# Patient Record
Sex: FEMALE | Race: BLACK OR AFRICAN AMERICAN | NOT HISPANIC OR LATINO | Employment: FULL TIME | URBAN - METROPOLITAN AREA
[De-identification: names, ages, dates, MRNs, and addresses within clinical notes are randomized per-mention and may not be internally consistent; named-entity substitution may affect disease eponyms.]

---

## 2017-02-01 ENCOUNTER — OFFICE VISIT (OUTPATIENT)
Dept: FAMILY MEDICINE | Facility: CLINIC | Age: 47
End: 2017-02-01
Payer: COMMERCIAL

## 2017-02-01 VITALS
OXYGEN SATURATION: 97 % | WEIGHT: 287.06 LBS | HEIGHT: 67 IN | BODY MASS INDEX: 45.06 KG/M2 | RESPIRATION RATE: 18 BRPM | HEART RATE: 96 BPM | TEMPERATURE: 97 F | SYSTOLIC BLOOD PRESSURE: 130 MMHG | DIASTOLIC BLOOD PRESSURE: 80 MMHG

## 2017-02-01 DIAGNOSIS — J30.2 SEASONAL ALLERGIC RHINITIS, UNSPECIFIED ALLERGIC RHINITIS TRIGGER: ICD-10-CM

## 2017-02-01 DIAGNOSIS — E55.9 VITAMIN D DEFICIENCY: ICD-10-CM

## 2017-02-01 DIAGNOSIS — B07.0 PLANTAR WARTS: ICD-10-CM

## 2017-02-01 DIAGNOSIS — E89.40 SURGICAL MENOPAUSE: ICD-10-CM

## 2017-02-01 DIAGNOSIS — I10 ESSENTIAL HYPERTENSION: ICD-10-CM

## 2017-02-01 DIAGNOSIS — Z00.00 ANNUAL PHYSICAL EXAM: Primary | ICD-10-CM

## 2017-02-01 DIAGNOSIS — Z12.31 OTHER SCREENING MAMMOGRAM: ICD-10-CM

## 2017-02-01 DIAGNOSIS — E66.01 MORBID OBESITY WITH BMI OF 45.0-49.9, ADULT: ICD-10-CM

## 2017-02-01 DIAGNOSIS — E04.2 MULTINODULAR GOITER: ICD-10-CM

## 2017-02-01 DIAGNOSIS — J45.909 UNCOMPLICATED ASTHMA, UNSPECIFIED ASTHMA SEVERITY: ICD-10-CM

## 2017-02-01 PROCEDURE — 3079F DIAST BP 80-89 MM HG: CPT | Mod: S$GLB,,, | Performed by: FAMILY MEDICINE

## 2017-02-01 PROCEDURE — 3075F SYST BP GE 130 - 139MM HG: CPT | Mod: S$GLB,,, | Performed by: FAMILY MEDICINE

## 2017-02-01 PROCEDURE — 90471 IMMUNIZATION ADMIN: CPT | Mod: S$GLB,,, | Performed by: FAMILY MEDICINE

## 2017-02-01 PROCEDURE — 99999 PR PBB SHADOW E&M-EST. PATIENT-LVL IV: CPT | Mod: PBBFAC,,, | Performed by: FAMILY MEDICINE

## 2017-02-01 PROCEDURE — 90688 IIV4 VACCINE SPLT 0.5 ML IM: CPT | Mod: S$GLB,,, | Performed by: FAMILY MEDICINE

## 2017-02-01 PROCEDURE — 17110 DESTRUCTION B9 LES UP TO 14: CPT | Mod: S$GLB,,, | Performed by: FAMILY MEDICINE

## 2017-02-01 PROCEDURE — 99396 PREV VISIT EST AGE 40-64: CPT | Mod: 25,S$GLB,, | Performed by: FAMILY MEDICINE

## 2017-02-01 PROCEDURE — 88175 CYTOPATH C/V AUTO FLUID REDO: CPT

## 2017-02-01 RX ORDER — ALBUTEROL SULFATE 90 UG/1
1-2 AEROSOL, METERED RESPIRATORY (INHALATION)
Qty: 18 G | Refills: 5 | Status: SHIPPED | OUTPATIENT
Start: 2017-02-01 | End: 2018-03-23 | Stop reason: SDUPTHER

## 2017-02-01 NOTE — MR AVS SNAPSHOT
[facility]  [address] [address]  Phone: [phone]                  Ayanna Alicia   2017 8:15 AM   Office Visit    Description:  Female : 1970   Provider:  Madeleine Enrique MD   Department:  [facility]           Reason for Visit     Annual Exam           Diagnoses this Visit        Comments    Annual physical exam    -  Primary     Essential hypertension         Multinodular goiter         Uncomplicated asthma, unspecified asthma severity         Vitamin D deficiency         Seasonal allergic rhinitis, unspecified allergic rhinitis trigger         Morbid obesity with BMI of 45.0-49.9, adult         Surgical menopause         Other screening mammogram                To Do List           Future Appointments        Provider Department Dept Phone    2017 9:30 AM Madeleine Enrique MD [facility] [phone]      Goals (5 Years of Data)     None      Follow-Up and Disposition     Return in about 6 months (around 2017).       These Medications        Disp Refills Start End    albuterol 90 mcg/actuation inhaler 18 g 5 2017    Inhale 1-2 puffs into the lungs every 4 to 6 hours as needed for Wheezing. - Inhalation    Pharmacy: [facility] [address] [place] [address] [address] [facility] Ph #: [phone]         [facility] On Call     [facility] On Call Nurse Care Line -  Assistance  Registered nurses in the [facility] On Call Center provide clinical advisement, health education, appointment booking, and other advisory services.  Call for this free service at [phone].             Medications           Message regarding Medications     Verify the changes and/or additions to your medication regime listed below are the same as discussed with your clinician today.  If any of these changes or additions are incorrect, please notify your healthcare  "provider.        START taking these NEW medications        Refills    albuterol 90 mcg/actuation inhaler 5    Sig: Inhale 1-2 puffs into the lungs every 4 to 6 hours as needed for Wheezing.    Class: Normal    Route: Inhalation           Verify that the below list of medications is an accurate representation of the medications you are currently taking.  If none reported, the list may be blank. If incorrect, please contact your healthcare provider. Carry this list with you in case of emergency.           Current Medications     b complex vitamins (B-COMPLEX) tablet Take 1 tablet by mouth once daily.    BIOTIN ORAL Take by mouth once daily.    cetirizine (ZYRTEC) 10 MG tablet Take 1 tablet by mouth Daily.    cholecalciferol, vitamin D3, (VITAMIN D3) 2,000 unit Tab Take by mouth once daily.    hydrocortisone (WESTCORT) 0.2 % cream Apply topically Twice daily as needed.    MAGNESIUM CARBONATE ORAL Take by mouth once daily.    multivitamin (THERAGRAN) per tablet Take 1 tablet by mouth Daily.    naproxen sodium (ALEVE) 220 MG tablet     omega-3 fatty acids-vitamin E (FISH OIL) 1,000 mg Cap Take by mouth 2 (two) times daily.    potassium 99 mg Tab Take by mouth once daily.    albuterol 90 mcg/actuation inhaler Inhale 1-2 puffs into the lungs every 4 to 6 hours as needed for Wheezing.           Clinical Reference Information           Vital Signs - Last Recorded  Most recent update: 2/1/2017  8:33 AM by Marnie Clark MA    BP Pulse Temp Resp    130/80 (BP Location: Right arm, Patient Position: Sitting, BP Method: Manual) 102 96.6 °F (35.9 °C) (Tympanic) 18    Ht Wt SpO2 BMI    5' 6.5" (1.689 m) 130.2 kg (287 lb 0.6 oz) 97% 45.64 kg/m2      Blood Pressure          Most Recent Value    BP  130/80      Allergies as of 2/1/2017     Doxycycline    Fluticasone    Penicillins      Immunizations Administered on Date of Encounter - 2/1/2017     None      Orders Placed During Today's Visit      Normal Orders This Visit    " Liquid-based pap smear, screening     Future Labs/Procedures Expected by Expires    CBC auto differential  2/1/2017 4/2/2018    Comprehensive metabolic panel  2/1/2017 4/2/2018    Hemoglobin A1c  2/1/2017 4/2/2018    HIV-1 and HIV-2 antibodies  2/1/2017 4/2/2018    Insulin, random  2/1/2017 4/2/2018    Lipid panel  2/1/2017 4/2/2018    Mammo Digital Screening Bilat with CAD  2/1/2017 4/3/2018    TSH  2/1/2017 4/2/2018    Urinalysis  2/1/2017 4/2/2018    Vitamin D  2/1/2017 4/2/2018      [facility] Sign-Up     Activating your [facility] account is as easy as 1-2-3!     1) Visit [email or web address], select Sign Up Now, enter this activation code and your date of birth, then select Next.  [email or web address]  Expires: 3/18/2017  9:25 AM      2) Create a username and password to use when you visit [facility] in the future and select a security question in case you lose your password and select Next.    3) Enter your e-mail address and click Sign Up!    Additional Information  If you have questions, please e-mail [email or web address] or call [phone] to talk to our [facility] staff. Remember, [facility] is NOT to be used for urgent needs. For medical emergencies, dial 911.         Instructions    Please call Dr. Enrique for your results.    Thanks.
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
## 2017-02-02 ENCOUNTER — TELEPHONE (OUTPATIENT)
Dept: FAMILY MEDICINE | Facility: CLINIC | Age: 47
End: 2017-02-02

## 2017-02-02 LAB
25(OH)D3+25(OH)D2 SERPL-MCNC: 34.7 NG/ML (ref 30–100)
ALBUMIN SERPL-MCNC: 4.3 G/DL (ref 3.5–5.5)
ALBUMIN/GLOB SERPL: 1.4 {RATIO} (ref 1.1–2.5)
ALP SERPL-CCNC: 90 IU/L (ref 39–117)
ALT SERPL-CCNC: 18 IU/L (ref 0–32)
APPEARANCE UR: CLEAR
AST SERPL-CCNC: 22 IU/L (ref 0–40)
BASOPHILS # BLD AUTO: 0 X10E3/UL (ref 0–0.2)
BASOPHILS NFR BLD AUTO: 0 %
BILIRUB SERPL-MCNC: 0.3 MG/DL (ref 0–1.2)
BILIRUB UR QL STRIP: NEGATIVE
BUN SERPL-MCNC: 18 MG/DL (ref 6–24)
BUN/CREAT SERPL: 20 (ref 9–23)
CALCIUM SERPL-MCNC: 9.6 MG/DL (ref 8.7–10.2)
CHLORIDE SERPL-SCNC: 100 MMOL/L (ref 96–106)
CHOLEST SERPL-MCNC: 190 MG/DL (ref 100–199)
CO2 SERPL-SCNC: 23 MMOL/L (ref 18–29)
COLOR UR: YELLOW
CREAT SERPL-MCNC: 0.92 MG/DL (ref 0.57–1)
EOSINOPHIL # BLD AUTO: 0.2 X10E3/UL (ref 0–0.4)
EOSINOPHIL NFR BLD AUTO: 3 %
ERYTHROCYTE [DISTWIDTH] IN BLOOD BY AUTOMATED COUNT: 12.9 % (ref 12.3–15.4)
GLOBULIN SER CALC-MCNC: 3.1 G/DL (ref 1.5–4.5)
GLUCOSE SERPL-MCNC: 96 MG/DL (ref 65–99)
GLUCOSE UR QL: NEGATIVE
HBA1C MFR BLD: 5.3 % (ref 4.8–5.6)
HCT VFR BLD AUTO: 38.9 % (ref 34–46.6)
HDLC SERPL-MCNC: 44 MG/DL
HGB BLD-MCNC: 12.7 G/DL (ref 11.1–15.9)
HGB UR QL STRIP: NEGATIVE
HIV 1+2 AB+HIV1 P24 AG SERPL QL IA: NON REACTIVE
IMM GRANULOCYTES # BLD: 0 X10E3/UL (ref 0–0.1)
IMM GRANULOCYTES NFR BLD: 0 %
INSULIN SERPL-ACNC: 15 UIU/ML (ref 2.6–24.9)
KETONES UR QL STRIP: ABNORMAL
LDLC SERPL CALC-MCNC: 134 MG/DL (ref 0–99)
LEUKOCYTE ESTERASE UR QL STRIP: NEGATIVE
LYMPHOCYTES # BLD AUTO: 2.4 X10E3/UL (ref 0.7–3.1)
LYMPHOCYTES NFR BLD AUTO: 34 %
MCH RBC QN AUTO: 29.5 PG (ref 26.6–33)
MCHC RBC AUTO-ENTMCNC: 32.6 G/DL (ref 31.5–35.7)
MCV RBC AUTO: 90 FL (ref 79–97)
MICRO URNS: ABNORMAL
MONOCYTES # BLD AUTO: 0.6 X10E3/UL (ref 0.1–0.9)
MONOCYTES NFR BLD AUTO: 9 %
NEUTROPHILS # BLD AUTO: 3.8 X10E3/UL (ref 1.4–7)
NEUTROPHILS NFR BLD AUTO: 54 %
NITRITE UR QL STRIP: NEGATIVE
PH UR STRIP: 6 [PH] (ref 5–7.5)
PLATELET # BLD AUTO: 378 X10E3/UL (ref 150–379)
POTASSIUM SERPL-SCNC: 5.1 MMOL/L (ref 3.5–5.2)
PROT SERPL-MCNC: 7.4 G/DL (ref 6–8.5)
PROT UR QL STRIP: NEGATIVE
RBC # BLD AUTO: 4.31 X10E6/UL (ref 3.77–5.28)
SODIUM SERPL-SCNC: 140 MMOL/L (ref 134–144)
SP GR UR: 1.03 (ref 1–1.03)
TRIGL SERPL-MCNC: 62 MG/DL (ref 0–149)
TSH SERPL DL<=0.005 MIU/L-ACNC: 0.82 UIU/ML (ref 0.45–4.5)
UROBILINOGEN UR STRIP-MCNC: 0.2 MG/DL (ref 0.2–1)
VLDLC SERPL CALC-MCNC: 12 MG/DL (ref 5–40)
WBC # BLD AUTO: 7.1 X10E3/UL (ref 3.4–10.8)

## 2017-03-30 ENCOUNTER — HOSPITAL ENCOUNTER (OUTPATIENT)
Dept: RADIOLOGY | Facility: HOSPITAL | Age: 47
Discharge: HOME | End: 2017-03-30
Attending: FAMILY MEDICINE
Payer: COMMERCIAL

## 2017-03-30 ENCOUNTER — OFFICE VISIT (OUTPATIENT)
Dept: INTERNAL MEDICINE | Facility: CLINIC | Age: 47
End: 2017-03-30
Payer: COMMERCIAL

## 2017-03-30 VITALS
DIASTOLIC BLOOD PRESSURE: 96 MMHG | OXYGEN SATURATION: 97 % | TEMPERATURE: 98 F | BODY MASS INDEX: 43.63 KG/M2 | HEIGHT: 67 IN | WEIGHT: 278 LBS | HEART RATE: 107 BPM | SYSTOLIC BLOOD PRESSURE: 142 MMHG
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
## 2017-03-30 DIAGNOSIS — Z12.31 OTHER SCREENING MAMMOGRAM: ICD-10-CM

## 2017-03-30 DIAGNOSIS — M54.2 NECK PAIN, ACUTE: ICD-10-CM
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
## 2017-03-30 PROCEDURE — 72050 X-RAY EXAM NECK SPINE 4/5VWS: CPT | Mod: TC,PO

## 2017-03-30 PROCEDURE — 77063 BREAST TOMOSYNTHESIS BI: CPT | Mod: 26,,, | Performed by: RADIOLOGY

## 2017-03-30 PROCEDURE — 77067 SCR MAMMO BI INCL CAD: CPT | Mod: TC

## 2017-03-30 PROCEDURE — 72050 X-RAY EXAM NECK SPINE 4/5VWS: CPT | Mod: 26,,, | Performed by: RADIOLOGY

## 2017-03-30 PROCEDURE — 99213 OFFICE O/P EST LOW 20 MIN: CPT | Mod: S$GLB,,, | Performed by: PHYSICIAN ASSISTANT

## 2017-03-30 PROCEDURE — 72100 X-RAY EXAM L-S SPINE 2/3 VWS: CPT | Mod: 26,,, | Performed by: RADIOLOGY

## 2017-03-30 PROCEDURE — 3077F SYST BP >= 140 MM HG: CPT | Mod: S$GLB,,, | Performed by: PHYSICIAN ASSISTANT

## 2017-03-30 PROCEDURE — 3080F DIAST BP >= 90 MM HG: CPT | Mod: S$GLB,,, | Performed by: PHYSICIAN ASSISTANT

## 2017-03-30 PROCEDURE — 77067 SCR MAMMO BI INCL CAD: CPT | Mod: 26,,, | Performed by: RADIOLOGY

## 2017-03-30 PROCEDURE — 72100 X-RAY EXAM L-S SPINE 2/3 VWS: CPT | Mod: TC,PO

## 2017-03-30 PROCEDURE — 1160F RVW MEDS BY RX/DR IN RCRD: CPT | Mod: S$GLB,,, | Performed by: PHYSICIAN ASSISTANT

## 2017-03-30 PROCEDURE — 99999 PR PBB SHADOW E&M-EST. PATIENT-LVL IV: CPT | Mod: PBBFAC,,, | Performed by: PHYSICIAN ASSISTANT

## 2017-03-30 RX ORDER — NABUMETONE 750 MG/1
750 TABLET, FILM COATED ORAL 2 TIMES DAILY
Qty: 14 TABLET | Refills: 0 | Status: SHIPPED | OUTPATIENT
Start: 2017-03-30 | End: 2017-04-06

## 2017-03-30 RX ORDER — METHOCARBAMOL 750 MG/1
750 TABLET, FILM COATED ORAL 4 TIMES DAILY
Qty: 28 TABLET | Refills: 0 | Status: SHIPPED | OUTPATIENT
Start: 2017-03-30 | End: 2017-04-06

## 2017-03-30 NOTE — LETTER
March 30, 2017      [facility]  [address] [place] [address]  Phone: [phone]  Fax: [phone]       Patient: Ayanna Alicia   YOB: 1970  Date of Visit: 03/30/2017    To Whom It May Concern:    Ayanna Nagel was at [facility] on 03/30/2017. She may return to work/school on 4/1/2017with no restrictions. If you have any questions or concerns, or if I can be of further assistance, please do not hesitate to contact me.    Sincerely,          Kiarra Newsome PA-C
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
## 2017-07-12 ENCOUNTER — TELEPHONE (OUTPATIENT)
Dept: FAMILY MEDICINE | Facility: CLINIC | Age: 47
End: 2017-07-12

## 2017-07-12 NOTE — TELEPHONE ENCOUNTER
----- Message from Jesusita Newman sent at 7/12/2017  2:27 PM CDT -----  needs callback, will elaborate..[phone]

## 2017-07-12 NOTE — TELEPHONE ENCOUNTER
Pt calling to find out if thyroid was checked in February. Advised pt it was checked and was normal. Pt voiced understanding and requesting that labs be sent to Dr. Gray office.

## 2017-08-29 ENCOUNTER — TELEPHONE (OUTPATIENT)
Dept: FAMILY MEDICINE | Facility: CLINIC | Age: 47
End: 2017-08-29

## 2017-08-29 NOTE — TELEPHONE ENCOUNTER
Saw that pt was scheduled for tomorrow for chest pains.   Called and spoke with pt, denied any sob or anything. States it is under her breast and goes in to her back. Advised pt that she doesn't need to wait until tomorrow to be seen, advised her to go to urgent care so they could see her right away. Pt states okay. Also wanted to keep appt for tomorrow.

## 2017-08-30 ENCOUNTER — TELEPHONE (OUTPATIENT)
Dept: RADIOLOGY | Facility: HOSPITAL | Age: 47
End: 2017-08-30

## 2017-08-30 ENCOUNTER — OFFICE VISIT (OUTPATIENT)
Dept: FAMILY MEDICINE | Facility: CLINIC | Age: 47
End: 2017-08-30
Payer: COMMERCIAL

## 2017-08-30 VITALS
HEART RATE: 87 BPM | HEIGHT: 66 IN | BODY MASS INDEX: 43.08 KG/M2 | SYSTOLIC BLOOD PRESSURE: 120 MMHG | TEMPERATURE: 95 F | WEIGHT: 268.06 LBS | DIASTOLIC BLOOD PRESSURE: 84 MMHG

## 2017-08-30 DIAGNOSIS — R10.11 RIGHT UPPER QUADRANT PAIN: Primary | ICD-10-CM

## 2017-08-30 DIAGNOSIS — K21.9 GASTROESOPHAGEAL REFLUX DISEASE, ESOPHAGITIS PRESENCE NOT SPECIFIED: ICD-10-CM

## 2017-08-30 DIAGNOSIS — F41.8 ANXIETY WITH DEPRESSION: ICD-10-CM

## 2017-08-30 DIAGNOSIS — G47.00 INSOMNIA, UNSPECIFIED TYPE: ICD-10-CM

## 2017-08-30 DIAGNOSIS — I10 ESSENTIAL HYPERTENSION: ICD-10-CM

## 2017-08-30 PROCEDURE — 99214 OFFICE O/P EST MOD 30 MIN: CPT | Mod: S$GLB,,, | Performed by: FAMILY MEDICINE

## 2017-08-30 PROCEDURE — 3079F DIAST BP 80-89 MM HG: CPT | Mod: S$GLB,,, | Performed by: FAMILY MEDICINE

## 2017-08-30 PROCEDURE — 99999 PR PBB SHADOW E&M-EST. PATIENT-LVL IV: CPT | Mod: PBBFAC,,, | Performed by: FAMILY MEDICINE

## 2017-08-30 PROCEDURE — 3074F SYST BP LT 130 MM HG: CPT | Mod: S$GLB,,, | Performed by: FAMILY MEDICINE

## 2017-08-30 PROCEDURE — 3008F BODY MASS INDEX DOCD: CPT | Mod: S$GLB,,, | Performed by: FAMILY MEDICINE

## 2017-08-30 RX ORDER — ESCITALOPRAM OXALATE 10 MG/1
10 TABLET ORAL DAILY
Qty: 30 TABLET | Refills: 5 | Status: SHIPPED | OUTPATIENT
Start: 2017-08-30 | End: 2018-01-05

## 2017-08-30 RX ORDER — HYDROCODONE BITARTRATE AND ACETAMINOPHEN 7.5; 325 MG/1; MG/1
TABLET ORAL
Refills: 0 | COMMUNITY
Start: 2017-08-17 | End: 2018-01-05

## 2017-08-30 RX ORDER — OMEPRAZOLE 20 MG/1
20 CAPSULE, DELAYED RELEASE ORAL DAILY
Qty: 30 CAPSULE | Refills: 1 | Status: SHIPPED | OUTPATIENT
Start: 2017-08-30 | End: 2018-03-23

## 2017-08-30 NOTE — PROGRESS NOTES
Subjective:       Patient ID: Ayanna Alicia is a 46 y.o. female.    Chief Complaint: Chest Pain    Ms. Alicia comes in today with complaint of having chest pain beneath her right breast at about her right upper quadrant abdomen with radiation of pain around to her back for 4 days.  She states the pain is constant but sometimes intensifies and occasionally feels like a burning, stinging sensation.  She reports occasionally having chest tightness with wheezing (chronic in nature), indigestion, heartburn but denies associated trauma, rash, nausea, vomiting, diarrhea, palpitations, cough, shortness of breath, leg swelling.  She states she uses albuterol MDI with help but requests chamber as she states she feels she will be able to inhale medication better.    She states she went to [facility] in [place] last night with EKG performed and states she was told it was okay.  She states she was given GI cocktail which she states may have helped but also states she had taken Shanti-Padroni prior to going to Urgent Care.  She states she was told she likely has gallbladder problems and was told to follow-up with her PCP.    She states she occasionally consumes spicy foods but drinks caffeinated coffee daily.  She occasionally drinks alcohol and states she has been smoking less of marijuana.  She denies tobacco use.  She states she occasionally takes Relafen for pain but states she has been taking Robaxin for chest pain with help.    She states she feels anxious, irritable and has not been sleeping for few weeks.  She states she is stressed at work.  She denies depression, suicidal or homicidal thoughts.  She states she takes Robaxin with help for sleep.      Current Outpatient Prescriptions:  albuterol 90 mcg/actuation inhaler, Inhale 1-2 puffs into the lungs every 4 to 6 hours as needed for Wheezing.  APPLE CIDER VINEGAR ORAL, Take by mouth.  b complex vitamins (B-COMPLEX) tablet, Take 1 tablet by mouth once  daily.  cetirizine (ZYRTEC) 10 MG tablet, Take 1 tablet by mouth Daily.  cholecalciferol, vitamin D3, (VITAMIN D3) 2,000 unit Tab, Take by mouth once daily.  COCONUT OIL ORAL, Take by mouth.  COLLAGEN MISC, by Misc.(Non-Drug; Combo Route) route.  hydrocortisone (WESTCORT) 0.2 % cream, Apply topically Twice daily as needed.  multivitamin (THERAGRAN) per tablet, Take 1 tablet by mouth Daily.  naproxen sodium (ALEVE) 220 MG tablet,   omega-3 fatty acids-vitamin E (FISH OIL) 1,000 mg Cap, Take by mouth 2 (two) times daily.  hydrocodone-acetaminophen 7.5-325mg (NORCO) 7.5-325 mg per tablet, TK 1 T PO  BID PRN P      Labs:                   WBC                      7.1                 02/01/2017                 HGB                      12.7                02/01/2017                 HCT                      38.9                02/01/2017                 PLT                      378                 02/01/2017                  LDLCALC                  134 (H)             02/01/2017                          CHOL                     190                 02/01/2017                 TRIG                     62                  02/01/2017                 HDL                      44                  02/01/2017                 ALT                      18                  02/01/2017                 AST                      22                  02/01/2017                 NA                       140                 02/01/2017                 K                        5.1                 02/01/2017                 CL                       100                 02/01/2017                 CREATININE               0.92                02/01/2017                 BUN                      18                  02/01/2017                 CO2                      23                  02/01/2017                 TSH                      0.822               02/01/2017                 GLUF                     77                  01/03/2007                  HGBA1C                   5.3                 02/01/2017                 Vit D, 25-Hydroxy                    34.7            02/01/2017       Insulin                            15.0            02/01/2017 02/1/2017    Pap smear - Negative for intraepithelial lesion or malignancy. Sparsely cellular specimen.  03/30/2017  Mammogram - Normal.        Review of Systems   Constitutional: Negative for chills and fever.   Respiratory: Positive for chest tightness and wheezing. Negative for cough and shortness of breath.    Cardiovascular: Positive for chest pain. Negative for leg swelling.   Gastrointestinal: Positive for abdominal pain, constipation and nausea. Negative for diarrhea and vomiting.   Genitourinary: Negative for difficulty urinating.   Musculoskeletal: Positive for back pain.   Skin: Negative for rash.   Psychiatric/Behavioral: Positive for agitation and sleep disturbance. Negative for dysphoric mood and suicidal ideas. The patient is nervous/anxious.         Negative for homicidal ideas.       Objective:      Physical Exam   Constitutional: She is oriented to person, place, and time. She appears well-developed and well-nourished. No distress.   Pleasant.   HENT:   Mouth/Throat: Oropharynx is clear and moist. No oropharyngeal exudate.   Neck: Normal range of motion. Neck supple. Thyromegaly present.   Non tender. Chronic.   Cardiovascular: Normal rate, regular rhythm and intact distal pulses.    No murmur heard.  Pulmonary/Chest: Effort normal and breath sounds normal. No respiratory distress. She has no wheezes.   Abdominal: Soft. Bowel sounds are normal. She exhibits no distension and no mass. There is tenderness. There is no rebound.   Slightly tender at right upper quadrant without acute abdomen noted.   Musculoskeletal: Normal range of motion. She exhibits no edema or tenderness.   She is ambulatory without problems. Non tender heels, feet.   Lymphadenopathy:     She has no cervical adenopathy.    Neurological: She is alert and oriented to person, place, and time.   Skin: No rash noted. She is not diaphoretic.   Psychiatric: Her behavior is normal. Judgment and thought content normal.   Slight depressed demeanor.   Vitals reviewed.      Assessment:       1. Right upper quadrant pain    2. Gastroesophageal reflux disease, esophagitis presence not specified    3. Anxiety with depression    4. Insomnia, unspecified type    5. Essential hypertension        Plan:       1.  Gallbladder ultrasound.  Patient advised to call for results.  2.  GERD lifestyle changes discussed and advised. Avoid illicit drug use.  3.  Omeprazole 20 mg daily x 2 months.  4.  Continue current medications, follow low sodium, low cholesterol, low carb diet, daily walks.  5.  Add Lexapro 10 mg every morning, #30, 5 refills; medication precautions discussed with patient.  6.  Try OTC Melatonin for insomnia.  7.  See me in 1 month for anxiety, depression follow up.  8.  Work excuse for August 30, 2017 - September 1, 2017 with return September 5, 2017.  9.  Will check at pharmacy/prescribe chamber.

## 2017-09-06 ENCOUNTER — TELEPHONE (OUTPATIENT)
Dept: RADIOLOGY | Facility: HOSPITAL | Age: 47
End: 2017-09-06

## 2017-09-07 ENCOUNTER — TELEPHONE (OUTPATIENT)
Dept: FAMILY MEDICINE | Facility: CLINIC | Age: 47
End: 2017-09-07

## 2017-09-07 ENCOUNTER — HOSPITAL ENCOUNTER (OUTPATIENT)
Dept: RADIOLOGY | Facility: HOSPITAL | Age: 47
Discharge: HOME | End: 2017-09-07
Attending: FAMILY MEDICINE
Payer: COMMERCIAL

## 2017-09-07 DIAGNOSIS — R10.11 RIGHT UPPER QUADRANT PAIN: ICD-10-CM

## 2017-09-07 PROCEDURE — 76705 ECHO EXAM OF ABDOMEN: CPT | Mod: 26,,, | Performed by: RADIOLOGY

## 2017-09-07 PROCEDURE — 76705 ECHO EXAM OF ABDOMEN: CPT | Mod: TC,PO

## 2017-09-07 NOTE — TELEPHONE ENCOUNTER
----- Message from Laura Cabezas sent at 9/7/2017  3:30 PM CDT -----  Contact: Patient   Patient request a call back at [phone], Regards to her ultrasound results.    Thanks  td

## 2017-09-08 NOTE — TELEPHONE ENCOUNTER
----- Message from Bessy Cabezas sent at 9/8/2017  8:49 AM CDT -----  Contact: pt   ..Pt was returning nurse call    ..[phone] (home)

## 2017-09-08 NOTE — TELEPHONE ENCOUNTER
Pt notified and verbalized understanding.   Pt wants to know about the pain in her chest under her breast.

## 2017-09-08 NOTE — TELEPHONE ENCOUNTER
Advise pt abdominal u/s shows mildly big and fatty liver and small liver cysts similar to previous result years ago. Thanks for call.

## 2017-09-08 NOTE — TELEPHONE ENCOUNTER
Advise pt although U/S shows gallbladder is okay, she may just be experiencing reflux.  Please continue Omeprazole daily and call back in 2 weeks with status check (to see if Omeprazole helps). Thanks.

## 2018-01-05 ENCOUNTER — OFFICE VISIT (OUTPATIENT)
Dept: FAMILY MEDICINE | Facility: CLINIC | Age: 48
End: 2018-01-05
Payer: COMMERCIAL

## 2018-01-05 VITALS
BODY MASS INDEX: 44.26 KG/M2 | OXYGEN SATURATION: 97 % | HEART RATE: 92 BPM | SYSTOLIC BLOOD PRESSURE: 131 MMHG | WEIGHT: 275.38 LBS | HEIGHT: 66 IN | DIASTOLIC BLOOD PRESSURE: 91 MMHG | RESPIRATION RATE: 16 BRPM | TEMPERATURE: 98 F

## 2018-01-05 DIAGNOSIS — R05.9 COUGH: ICD-10-CM

## 2018-01-05 DIAGNOSIS — J45.21 MILD INTERMITTENT ASTHMA WITH ACUTE EXACERBATION: ICD-10-CM

## 2018-01-05 DIAGNOSIS — J34.89 SINUS DRAINAGE: Primary | ICD-10-CM

## 2018-01-05 DIAGNOSIS — R51.9 ACUTE NONINTRACTABLE HEADACHE, UNSPECIFIED HEADACHE TYPE: ICD-10-CM

## 2018-01-05 PROCEDURE — 99999 PR PBB SHADOW E&M-EST. PATIENT-LVL III: CPT | Mod: PBBFAC,,, | Performed by: FAMILY MEDICINE

## 2018-01-05 PROCEDURE — 99214 OFFICE O/P EST MOD 30 MIN: CPT | Mod: S$GLB,,, | Performed by: FAMILY MEDICINE

## 2018-01-05 RX ORDER — IPRATROPIUM BROMIDE 42 UG/1
2 SPRAY, METERED NASAL 3 TIMES DAILY
Qty: 15 ML | Refills: 2 | Status: SHIPPED | OUTPATIENT
Start: 2018-01-05 | End: 2018-02-04

## 2018-01-05 RX ORDER — PROMETHAZINE HYDROCHLORIDE AND CODEINE PHOSPHATE 6.25; 1 MG/5ML; MG/5ML
5 SOLUTION ORAL EVERY 6 HOURS PRN
Qty: 118 ML | Refills: 0 | Status: SHIPPED | OUTPATIENT
Start: 2018-01-05 | End: 2018-01-15

## 2018-01-05 RX ORDER — METHYLPREDNISOLONE 4 MG/1
TABLET ORAL
Qty: 1 PACKAGE | Refills: 0 | Status: SHIPPED | OUTPATIENT
Start: 2018-01-05 | End: 2018-03-23

## 2018-01-05 NOTE — PROGRESS NOTES
Subjective:      Patient ID: Ayanna Alicia is a 47 y.o. female.    Chief Complaint: Headache and Cough      Past Medical History:   Diagnosis Date    Abnormal Pap smear of cervix     in the past with repeat pap smear okay.    Allergic rhinitis, cause unspecified     Arthritis of both knees     Asthma     Eczema     Fatty liver 10/2014    Fibrocystic breast changes     Headache(784.0)     Hepatomegaly 10/2014    Hypertension     Liver cyst 10/2014    Multinodular goiter     Followed by ENT - Dr. Nicolas Gray    Polymenorrhea     TMJ (dislocation of temporomandibular joint)     Uterine fibroid     in the past    Vitamin D deficiency disease      Past Surgical History:   Procedure Laterality Date     SECTION, CLASSIC      x 1    MULTIPLE TOOTH EXTRACTIONS      PARTIAL HYSTERECTOMY  2013    Due to fibroids     Family History   Problem Relation Age of Onset    Stroke Mother     Diabetes Mother     Heart disease Father 63     MI/CAD    Hypertension Father     Cancer Maternal Grandmother 60     Rectal and stomach cancer    Migraines Cousin     Cataracts Cousin     Cancer Maternal Aunt 50     Stomach cancer    Cancer Maternal Aunt 66     Lung cancer (smoker)    Stroke Maternal Grandfather     Diabetes Maternal Grandfather     Stroke Sister      Social History     Social History    Marital status: Single     Spouse name: N/A    Number of children: 0    Years of education: N/A     Occupational History     [employer]     Social History Main Topics    Smoking status: Never Smoker    Smokeless tobacco: Never Used    Alcohol use 0.0 oz/week      Comment: Occasionally    Drug use: Yes     Types: Marijuana      Comment: Smoking less    Sexual activity: Yes     Partners: Female     Other Topics Concern    Not on file     Social History Narrative    She wears seatbelt. Her son was killed in .       Current Outpatient Prescriptions:      albuterol 90 mcg/actuation inhaler, Inhale 1-2 puffs into the lungs every 4 to 6 hours as needed for Wheezing., Disp: 18 g, Rfl: 5    APPLE CIDER VINEGAR ORAL, Take by mouth., Disp: , Rfl:     b complex vitamins (B-COMPLEX) tablet, Take 1 tablet by mouth once daily., Disp: , Rfl:     cetirizine (ZYRTEC) 10 MG tablet, Take 1 tablet by mouth Daily., Disp: , Rfl:     cholecalciferol, vitamin D3, (VITAMIN D3) 2,000 unit Tab, Take by mouth once daily., Disp: , Rfl:     COCONUT OIL ORAL, Take by mouth., Disp: , Rfl:     COLLAGEN MISC, by Misc.(Non-Drug; Combo Route) route., Disp: , Rfl:     hydrocortisone (WESTCORT) 0.2 % cream, Apply topically Twice daily as needed., Disp: 46 g, Rfl: 0    inhalation spacing device, Use as directed for inhalation., Disp: 1 Device, Rfl: 0    multivitamin (THERAGRAN) per tablet, Take 1 tablet by mouth Daily., Disp: , Rfl:     naproxen sodium (ALEVE) 220 MG tablet, , Disp: , Rfl:     omega-3 fatty acids-vitamin E (FISH OIL) 1,000 mg Cap, Take by mouth 2 (two) times daily., Disp: , Rfl:     omeprazole (PRILOSEC) 20 MG capsule, Take 1 capsule (20 mg total) by mouth once daily., Disp: 30 capsule, Rfl: 1    ipratropium (ATROVENT) 42 mcg (0.06 %) nasal spray, 2 sprays by Nasal route 3 (three) times daily., Disp: 15 mL, Rfl: 2    methylPREDNISolone (MEDROL DOSEPACK) 4 mg tablet, use as directed, Disp: 1 Package, Rfl: 0    promethazine-codeine 6.25-10 mg/5 ml (PHENERGAN WITH CODEINE) 6.25-10 mg/5 mL syrup, Take 5 mLs by mouth every 6 (six) hours as needed for Cough., Disp: 118 mL, Rfl: 0  Review of patient's allergies indicates:   Allergen Reactions    Doxycycline      Other reaction(s): Nausea    Fluticasone      Other reaction(s): Epistaxis  Other reaction(s): Epistaxis    Penicillins      Other reaction(s): unknown       Review of Systems   Constitutional: Negative for chills and fever.   HENT: Positive for congestion and sinus pressure.    Respiratory: Positive for cough  "and wheezing. Negative for shortness of breath.    Cardiovascular: Negative for chest pain and palpitations.   Gastrointestinal: Negative for abdominal pain and blood in stool.   Neurological: Positive for headaches.     HPI  Started with headache on Monday along with drainage and asthma exacerbation mild. Cough, chest tightness and wheezing.  BP slightly elevated while feeling bad.  Symptoms persistent and worsening.   Frontal headache.  Clear drainage and congestion.    Objective:   BP (!) 131/91 (BP Location: Right arm, Patient Position: Sitting, BP Method: Large (Manual))   Pulse 92   Temp 97.5 °F (36.4 °C) (Tympanic)   Resp 16   Ht 5' 6" (1.676 m)   Wt 124.9 kg (275 lb 5.7 oz)   SpO2 97%   BMI 44.44 kg/m²      Physical Exam   Constitutional: She is oriented to person, place, and time. She is cooperative. No distress.   HENT:   Right Ear: Hearing, tympanic membrane, external ear and ear canal normal.   Left Ear: Hearing, tympanic membrane, external ear and ear canal normal.   Mouth/Throat: Uvula is midline, oropharynx is clear and moist and mucous membranes are normal.   Eyes: Conjunctivae and EOM are normal.   Cardiovascular: Normal rate, regular rhythm and normal heart sounds.    Pulmonary/Chest: Effort normal and breath sounds normal. No respiratory distress. She has no wheezes.   Abdominal: Soft. There is no tenderness. There is no rebound and no guarding.   Musculoskeletal: She exhibits no edema.   Neurological: She is alert and oriented to person, place, and time. Coordination and gait normal.   Skin: Skin is warm, dry and intact. No rash noted. She is not diaphoretic. No erythema.   Psychiatric: She has a normal mood and affect. Her speech is normal and behavior is normal.   Nursing note and vitals reviewed.          Assessment:       1. Sinus drainage    2. Acute nonintractable headache, unspecified headache type    3. Cough    4. Mild intermittent asthma with acute exacerbation            Plan: "         Sinus drainage  Comments:  start atrovent.    Acute nonintractable headache, unspecified headache type  Comments:  Headache should subside with medrol.    Cough    Mild intermittent asthma with acute exacerbation  Comments:  Start medrol dose pack.  Ventolin prn.    Other orders  -     promethazine-codeine 6.25-10 mg/5 ml (PHENERGAN WITH CODEINE) 6.25-10 mg/5 mL syrup; Take 5 mLs by mouth every 6 (six) hours as needed for Cough.  Dispense: 118 mL; Refill: 0  -     ipratropium (ATROVENT) 42 mcg (0.06 %) nasal spray; 2 sprays by Nasal route 3 (three) times daily.  Dispense: 15 mL; Refill: 2  -     methylPREDNISolone (MEDROL DOSEPACK) 4 mg tablet; use as directed  Dispense: 1 Package; Refill: 0        Patient Care Team:  Madeleine Enrique MD as PCP - General (Family Medicine)    Return if symptoms worsen or fail to improve.

## 2018-01-05 NOTE — PATIENT INSTRUCTIONS
.   Mediterranean Food Guide   People who live in the area around the [place] have a lower risk of heart disease. Researchers have recently shown that following a Mediterranean diet decreases heart disease by 30% in people whom are at-risk.   This lifestyle is built upon daily exercise along with a lot of fruit, vegetables, plant-based proteins, whole grains, fish and smaller amounts of poultry and red meat. Fatty fish (salmon), olive oil, and nuts make this diet higher in fat than the classic heart healthy diet. These fats are mostly unsaturated, and when consumed in place of saturated fat, are good for the heart. The pyramid above and the chart on the next page describe the types of food and serving sizes in this heart healthy meal plan.   Physical Activity- The Mediterranean Diet pyramid is built upon daily physical activity and exercise. Aim for at least 150 minutes of moderate to vigorous exercise every week. Moderate-to-vigorous exercises include walking at a brisk pace, biking, or swimming, among other activities that elevate your heart rate. Always choose activities that you enjoy and that are safe, in order to be active throughout your life.   Achieve and Maintain a Healthy Weight - The high fat content of the Mediterranean is healthy for your heart, but may lead to increased energy intake and weight gain if you dont pay attention to how much you are eating. If you are trying to lose weight, choose the smaller number of servings from each food group, and try to make your serving sizes match those listed. 2   Food Groups and Servings per day  Serving Sizes    Non-starchy Vegetables   4-8 servings per day  One serving is ½ cup of cooked vegetables or 1 cup raw vegetables   Non-starchy vegetables include artichoke, asparagus, beets, broccoli, [place] sprouts, cauliflower, cabbage, celery, carrots, tomatoes, eggplant, cucumber, onion, green and wax beans, zucchini, turnips, peppers, salad greens  and mushrooms. (Potatoes, peas, and corn are starchy vegetables.)    Fruit   2-4 servings per day  One serving is a small fresh fruit or ½ cup juice or ¼ cup dried fruit   Fresh fruits are preferred because of the fiber and other nutrients they contain. Fruits canned in light syrup or their own juice, and frozen fruit with little or no added sugar are also good choices. Use only small amounts of fruit juice (6 oz per day or less), since even unsweetened juices can contain as much sugar as regular soda.    Legumes and Nuts   1-3 servings per day  Legumes: One serving is ½ cup cooked kidney, black, garbanzo, werner, soy (edamame), navy beans, split peas, or lentils, or ¼ cup fat free refried beans or baked beans   Nuts and Seeds: One serving is 2 Tbsp. sunflower or sesame seeds, 1 Tbsp. peanut butter,   7-8 walnuts or pecans, 20 peanuts, or 12-15 almonds   Aim for 1-2 servings of nuts or seeds and 1-2 servings of legumes per day. Legumes are high in fiber, protein, and minerals. Nuts are high in unsaturated fat, and may increase HDL without increasing LDL cholesterol levels.    Low-fat Dairy Products   1-3 servings per day  One serving is 1 cup of skim milk, non-fat yogurt, or 1oz of low-fat (part-skim) cheese   Calcium-fortified soy milk, soy yogurt, and soy cheese can take the place of dairy products. If servings of dairy or fortified soy are less than 2 per day, we advise a calcium and vitamin D supplement.    Fish or shellfish   2-3 times a week  One serving is 3 ounces (about the size of a deck of cards)   Cook fish by baking, sautéing, broiling, roasting, grilling, or poaching. Choose fatty fishes like salmon, herring, sardines, or mackerel often. The fat in fish is high in omega-3 fats, so it has healthy effects on triglycerides and blood cells.    Poultry, if desired   1-3 times a week  One serving is 3 ounces (about the size of a deck of cards)   Bake, sauté, stir villeda, roast, or grill the poultry you eat, and  eat it without the skin.    Whole Grains and Starchy Vegetables   4-6 servings per day  One serving is about 1 ounce of any of these:   1 slice whole wheat bread ½ cup potatoes, sweet potatoes, corn, or peas   ½ large whole grain bun 1 small whole grain roll   6-inch whole wheat ahsan 6 whole grain crackers   ½ cup cooked whole grain cereal (oatmeal, cracked wheat, quinoa)   ½ cup cooked whole wheat pasta, brown rice, or barley   Whole grains are high in fiber and have less effect on blood sugar and triglyceride levels than refined, processed grains like white bread and pasta. Whole grains also keep the stomach full longer, making it easier to control hunger.

## 2018-03-23 ENCOUNTER — OFFICE VISIT (OUTPATIENT)
Dept: FAMILY MEDICINE | Facility: CLINIC | Age: 48
End: 2018-03-23
Payer: COMMERCIAL

## 2018-03-23 VITALS
RESPIRATION RATE: 16 BRPM | HEART RATE: 105 BPM | BODY MASS INDEX: 45.21 KG/M2 | OXYGEN SATURATION: 97 % | HEIGHT: 66 IN | TEMPERATURE: 97 F | SYSTOLIC BLOOD PRESSURE: 140 MMHG | WEIGHT: 281.31 LBS | DIASTOLIC BLOOD PRESSURE: 92 MMHG

## 2018-03-23 DIAGNOSIS — R05.9 COUGH: ICD-10-CM

## 2018-03-23 DIAGNOSIS — J30.1 ACUTE SEASONAL ALLERGIC RHINITIS DUE TO POLLEN: ICD-10-CM

## 2018-03-23 DIAGNOSIS — R03.0 ELEVATED BLOOD PRESSURE READING: ICD-10-CM

## 2018-03-23 DIAGNOSIS — J45.21 MILD INTERMITTENT ASTHMA WITH EXACERBATION: Primary | ICD-10-CM

## 2018-03-23 PROCEDURE — 3077F SYST BP >= 140 MM HG: CPT | Mod: CPTII,S$GLB,, | Performed by: FAMILY MEDICINE

## 2018-03-23 PROCEDURE — 3080F DIAST BP >= 90 MM HG: CPT | Mod: CPTII,S$GLB,, | Performed by: FAMILY MEDICINE

## 2018-03-23 PROCEDURE — 99999 PR PBB SHADOW E&M-EST. PATIENT-LVL III: CPT | Mod: PBBFAC,,, | Performed by: FAMILY MEDICINE

## 2018-03-23 PROCEDURE — 99214 OFFICE O/P EST MOD 30 MIN: CPT | Mod: S$GLB,,, | Performed by: FAMILY MEDICINE

## 2018-03-23 RX ORDER — METHYLPREDNISOLONE 4 MG/1
TABLET ORAL
Qty: 1 PACKAGE | Refills: 0 | Status: SHIPPED | OUTPATIENT
Start: 2018-03-23 | End: 2018-08-13

## 2018-03-23 RX ORDER — BENZONATATE 100 MG/1
100 CAPSULE ORAL 3 TIMES DAILY PRN
Qty: 30 CAPSULE | Refills: 0 | Status: SHIPPED | OUTPATIENT
Start: 2018-03-23 | End: 2018-04-02

## 2018-03-23 RX ORDER — ALBUTEROL SULFATE 90 UG/1
1-2 AEROSOL, METERED RESPIRATORY (INHALATION)
Qty: 18 G | Refills: 5 | Status: SHIPPED | OUTPATIENT
Start: 2018-03-23 | End: 2019-01-18 | Stop reason: SDUPTHER

## 2018-03-23 NOTE — PROGRESS NOTES
Subjective:      Patient ID: Ayanna Alicia is a 47 y.o. female.    Chief Complaint: Cough and Nasal Congestion      Past Medical History:   Diagnosis Date    Abnormal Pap smear of cervix     in the past with repeat pap smear okay.    Allergic rhinitis, cause unspecified     Arthritis of both knees     Asthma     Eczema     Fatty liver 10/2014    Fibrocystic breast changes     Headache(784.0)     Hepatomegaly 10/2014    Hypertension     Liver cyst 10/2014    Multinodular goiter     Followed by ENT - Dr. Nicolas Gray    Polymenorrhea     TMJ (dislocation of temporomandibular joint)     Uterine fibroid     in the past    Vitamin D deficiency disease      Past Surgical History:   Procedure Laterality Date     SECTION, CLASSIC      x 1    MULTIPLE TOOTH EXTRACTIONS      PARTIAL HYSTERECTOMY  2013    Due to fibroids     Family History   Problem Relation Age of Onset    Stroke Mother     Diabetes Mother     Heart disease Father 63     MI/CAD    Hypertension Father     Cancer Maternal Grandmother 60     Rectal and stomach cancer    Migraines Cousin     Cataracts Cousin     Cancer Maternal Aunt 50     Stomach cancer    Cancer Maternal Aunt 66     Lung cancer (smoker)    Stroke Maternal Grandfather     Diabetes Maternal Grandfather     Stroke Sister      Social History     Social History    Marital status: Single     Spouse name: N/A    Number of children: 0    Years of education: N/A     Occupational History     [employer]     Social History Main Topics    Smoking status: Never Smoker    Smokeless tobacco: Never Used    Alcohol use 0.0 oz/week      Comment: Occasionally    Drug use: Yes     Types: Marijuana      Comment: Smoking less    Sexual activity: Yes     Partners: Female     Other Topics Concern    Not on file     Social History Narrative    She wears seatbelt. Her son was killed in .       Current Outpatient  Prescriptions:     albuterol 90 mcg/actuation inhaler, Inhale 1-2 puffs into the lungs every 4 to 6 hours as needed for Wheezing., Disp: 18 g, Rfl: 5    b complex vitamins (B-COMPLEX) tablet, Take 1 tablet by mouth once daily., Disp: , Rfl:     cetirizine (ZYRTEC) 10 MG tablet, Take 1 tablet by mouth Daily., Disp: , Rfl:     cholecalciferol, vitamin D3, (VITAMIN D3) 2,000 unit Tab, Take by mouth once daily., Disp: , Rfl:     COCONUT OIL ORAL, Take by mouth., Disp: , Rfl:     hydrocortisone (WESTCORT) 0.2 % cream, Apply topically Twice daily as needed., Disp: 46 g, Rfl: 0    inhalation spacing device, Use as directed for inhalation., Disp: 1 Device, Rfl: 0    multivitamin (THERAGRAN) per tablet, Take 1 tablet by mouth Daily., Disp: , Rfl:     naproxen sodium (ALEVE) 220 MG tablet, , Disp: , Rfl:     omega-3 fatty acids-vitamin E (FISH OIL) 1,000 mg Cap, Take by mouth 2 (two) times daily., Disp: , Rfl:     APPLE CIDER VINEGAR ORAL, Take by mouth., Disp: , Rfl:     benzonatate (TESSALON) 100 MG capsule, Take 1 capsule (100 mg total) by mouth 3 (three) times daily as needed for Cough., Disp: 30 capsule, Rfl: 0    methylPREDNISolone (MEDROL DOSEPACK) 4 mg tablet, use as directed, Disp: 1 Package, Rfl: 0  Review of patient's allergies indicates:   Allergen Reactions    Doxycycline      Other reaction(s): Nausea    Fluticasone      Other reaction(s): Epistaxis  Other reaction(s): Epistaxis    Penicillins      Other reaction(s): unknown       Review of Systems   Constitutional: Negative for chills and fever.   HENT: Positive for congestion, postnasal drip, rhinorrhea and sinus pressure.    Respiratory: Positive for cough. Negative for shortness of breath and wheezing.    Cardiovascular: Negative for chest pain and palpitations.   Gastrointestinal: Negative for abdominal pain and blood in stool.     Cough   Associated symptoms include postnasal drip and rhinorrhea. Pertinent negatives include no chest pain,  "chills, fever, shortness of breath or wheezing.   One week of cough, sinus drainage - clear/off white sputum.  Coughing and wheezing persistently.   H/o asthma.  BP elevated today, but not feeling well and typically well controlled.      Objective:   BP (!) 140/92 (BP Location: Right arm, Patient Position: Sitting, BP Method: Large (Manual))   Pulse 105   Temp 97 °F (36.1 °C) (Tympanic)   Resp 16   Ht 5' 6" (1.676 m)   Wt 127.6 kg (281 lb 4.9 oz)   SpO2 97%   BMI 45.40 kg/m²      Physical Exam   Constitutional: She is oriented to person, place, and time. She is cooperative. No distress.   Eyes: Conjunctivae and EOM are normal.   Cardiovascular: Normal rate, regular rhythm and normal heart sounds.    Pulmonary/Chest: Effort normal and breath sounds normal.   Musculoskeletal: She exhibits no edema.   Neurological: She is alert and oriented to person, place, and time. Coordination and gait normal.   Skin: Skin is warm, dry and intact. No rash noted. She is not diaphoretic. No erythema.   Psychiatric: She has a normal mood and affect. Her speech is normal and behavior is normal.   Nursing note and vitals reviewed.          Assessment:       1. Mild intermittent asthma with exacerbation    2. Acute seasonal allergic rhinitis due to pollen    3. Cough    4. Elevated blood pressure reading            Plan:         Mild intermittent asthma with exacerbation  Comments:  Medrol dose pack.  If in a week, any signs of worsening symptoms, green sputum, worsening cough or sinusitis, call clinic.    Acute seasonal allergic rhinitis due to pollen  Comments:  Continue zyrtec.    Cough  Comments:  Tessalon prn.    Elevated blood pressure reading  Comments:  Keep home bp log. Goal < 130/80.    Other orders  -     methylPREDNISolone (MEDROL DOSEPACK) 4 mg tablet; use as directed  Dispense: 1 Package; Refill: 0  -     benzonatate (TESSALON) 100 MG capsule; Take 1 capsule (100 mg total) by mouth 3 (three) times daily as needed for " Cough.  Dispense: 30 capsule; Refill: 0  -     albuterol 90 mcg/actuation inhaler; Inhale 1-2 puffs into the lungs every 4 to 6 hours as needed for Wheezing.  Dispense: 18 g; Refill: 5        Patient Care Team:  Madeleine Enrique MD as PCP - General (Family Medicine)    Follow-up if symptoms worsen or fail to improve.

## 2018-04-04 ENCOUNTER — TELEPHONE (OUTPATIENT)
Dept: FAMILY MEDICINE | Facility: CLINIC | Age: 48
End: 2018-04-04

## 2018-04-04 DIAGNOSIS — Z12.31 VISIT FOR SCREENING MAMMOGRAM: Primary | ICD-10-CM

## 2018-04-04 NOTE — TELEPHONE ENCOUNTER
----- Message from Josy Beard sent at 4/4/2018  2:44 PM CDT -----  Contact: pt  Please call pt @ [phone] regarding an order to get a mammograph.

## 2018-04-05 ENCOUNTER — TELEPHONE (OUTPATIENT)
Dept: FAMILY MEDICINE | Facility: CLINIC | Age: 48
End: 2018-04-05

## 2018-04-05 NOTE — TELEPHONE ENCOUNTER
----- Message from Matilda Cheek sent at 4/5/2018  8:11 AM CDT -----  Contact: Pt  Pt called and stated she was returning a call to the nurse. She can be reached at [phone] (home) [phone] (work).    Thanks,  TF

## 2018-04-23 ENCOUNTER — HOSPITAL ENCOUNTER (OUTPATIENT)
Dept: RADIOLOGY | Facility: HOSPITAL | Age: 48
Discharge: HOME | End: 2018-04-23
Attending: FAMILY MEDICINE
Payer: COMMERCIAL

## 2018-04-23 ENCOUNTER — PATIENT OUTREACH (OUTPATIENT)
Dept: ADMINISTRATIVE | Facility: HOSPITAL | Age: 48
End: 2018-04-23

## 2018-04-23 VITALS — HEIGHT: 66 IN | BODY MASS INDEX: 45.16 KG/M2 | WEIGHT: 281 LBS

## 2018-04-23 DIAGNOSIS — Z12.31 VISIT FOR SCREENING MAMMOGRAM: ICD-10-CM

## 2018-04-23 PROCEDURE — 77063 BREAST TOMOSYNTHESIS BI: CPT | Mod: 26,,, | Performed by: RADIOLOGY

## 2018-04-23 PROCEDURE — 77067 SCR MAMMO BI INCL CAD: CPT | Mod: 26,,, | Performed by: RADIOLOGY

## 2018-04-23 PROCEDURE — 77067 SCR MAMMO BI INCL CAD: CPT | Mod: TC,PO

## 2018-08-13 ENCOUNTER — OFFICE VISIT (OUTPATIENT)
Dept: FAMILY MEDICINE | Facility: CLINIC | Age: 48
End: 2018-08-13
Payer: COMMERCIAL

## 2018-08-13 ENCOUNTER — LAB VISIT (OUTPATIENT)
Dept: LAB | Facility: HOSPITAL | Age: 48
End: 2018-08-13
Attending: FAMILY MEDICINE
Payer: COMMERCIAL

## 2018-08-13 VITALS
HEART RATE: 84 BPM | SYSTOLIC BLOOD PRESSURE: 130 MMHG | DIASTOLIC BLOOD PRESSURE: 72 MMHG | BODY MASS INDEX: 43.29 KG/M2 | TEMPERATURE: 97 F | OXYGEN SATURATION: 98 % | HEIGHT: 66 IN | WEIGHT: 269.38 LBS

## 2018-08-13 DIAGNOSIS — Z00.01 ENCOUNTER FOR GENERAL ADULT MEDICAL EXAMINATION WITH ABNORMAL FINDINGS: Primary | ICD-10-CM

## 2018-08-13 DIAGNOSIS — G47.00 INSOMNIA, UNSPECIFIED TYPE: ICD-10-CM

## 2018-08-13 DIAGNOSIS — E55.9 VITAMIN D DEFICIENCY: ICD-10-CM

## 2018-08-13 DIAGNOSIS — E04.2 MULTINODULAR GOITER: ICD-10-CM

## 2018-08-13 DIAGNOSIS — Z00.01 ENCOUNTER FOR GENERAL ADULT MEDICAL EXAMINATION WITH ABNORMAL FINDINGS: ICD-10-CM

## 2018-08-13 DIAGNOSIS — F41.9 ANXIETY: ICD-10-CM

## 2018-08-13 DIAGNOSIS — J45.30 UNCONTROLLED MILD PERSISTENT ALLERGIC ASTHMA: ICD-10-CM

## 2018-08-13 LAB
25(OH)D3+25(OH)D2 SERPL-MCNC: 32 NG/ML
ALBUMIN SERPL BCP-MCNC: 4 G/DL
ALP SERPL-CCNC: 92 U/L
ALT SERPL W/O P-5'-P-CCNC: 15 U/L
ANION GAP SERPL CALC-SCNC: 8 MMOL/L
AST SERPL-CCNC: 17 U/L
BASOPHILS # BLD AUTO: 0.08 K/UL
BASOPHILS NFR BLD: 1 %
BILIRUB SERPL-MCNC: 0.4 MG/DL
BUN SERPL-MCNC: 21 MG/DL
CALCIUM SERPL-MCNC: 9.6 MG/DL
CHLORIDE SERPL-SCNC: 107 MMOL/L
CHOLEST SERPL-MCNC: 201 MG/DL
CHOLEST/HDLC SERPL: 4.3 {RATIO}
CO2 SERPL-SCNC: 25 MMOL/L
CREAT SERPL-MCNC: 0.9 MG/DL
DIFFERENTIAL METHOD: ABNORMAL
EOSINOPHIL # BLD AUTO: 0.4 K/UL
EOSINOPHIL NFR BLD: 4.9 %
ERYTHROCYTE [DISTWIDTH] IN BLOOD BY AUTOMATED COUNT: 12.7 %
EST. GFR  (AFRICAN AMERICAN): >60 ML/MIN/1.73 M^2
EST. GFR  (NON AFRICAN AMERICAN): >60 ML/MIN/1.73 M^2
ESTIMATED AVG GLUCOSE: 85 MG/DL
GLUCOSE SERPL-MCNC: 90 MG/DL
HBA1C MFR BLD HPLC: 4.6 %
HCT VFR BLD AUTO: 42.1 %
HDLC SERPL-MCNC: 47 MG/DL
HDLC SERPL: 23.4 %
HGB BLD-MCNC: 13.1 G/DL
IMM GRANULOCYTES # BLD AUTO: 0.04 K/UL
IMM GRANULOCYTES NFR BLD AUTO: 0.5 %
LDLC SERPL CALC-MCNC: 142.8 MG/DL
LYMPHOCYTES # BLD AUTO: 2.7 K/UL
LYMPHOCYTES NFR BLD: 34.1 %
MCH RBC QN AUTO: 30.3 PG
MCHC RBC AUTO-ENTMCNC: 31.1 G/DL
MCV RBC AUTO: 98 FL
MONOCYTES # BLD AUTO: 0.6 K/UL
MONOCYTES NFR BLD: 7 %
NEUTROPHILS # BLD AUTO: 4.2 K/UL
NEUTROPHILS NFR BLD: 52.5 %
NONHDLC SERPL-MCNC: 154 MG/DL
NRBC BLD-RTO: 0 /100 WBC
PLATELET # BLD AUTO: 312 K/UL
PMV BLD AUTO: 10.8 FL
POTASSIUM SERPL-SCNC: 4.4 MMOL/L
PROT SERPL-MCNC: 7.5 G/DL
RBC # BLD AUTO: 4.32 M/UL
SODIUM SERPL-SCNC: 140 MMOL/L
TRIGL SERPL-MCNC: 56 MG/DL
TSH SERPL DL<=0.005 MIU/L-ACNC: 0.81 UIU/ML
WBC # BLD AUTO: 8 K/UL

## 2018-08-13 PROCEDURE — 99999 PR PBB SHADOW E&M-EST. PATIENT-LVL V: CPT | Mod: PBBFAC,,, | Performed by: FAMILY MEDICINE

## 2018-08-13 PROCEDURE — 3075F SYST BP GE 130 - 139MM HG: CPT | Mod: CPTII,S$GLB,, | Performed by: FAMILY MEDICINE

## 2018-08-13 PROCEDURE — 36415 COLL VENOUS BLD VENIPUNCTURE: CPT | Mod: PO

## 2018-08-13 PROCEDURE — 99214 OFFICE O/P EST MOD 30 MIN: CPT | Mod: S$GLB,,, | Performed by: FAMILY MEDICINE

## 2018-08-13 PROCEDURE — 3078F DIAST BP <80 MM HG: CPT | Mod: CPTII,S$GLB,, | Performed by: FAMILY MEDICINE

## 2018-08-13 PROCEDURE — 82306 VITAMIN D 25 HYDROXY: CPT

## 2018-08-13 PROCEDURE — 80061 LIPID PANEL: CPT

## 2018-08-13 PROCEDURE — 85025 COMPLETE CBC W/AUTO DIFF WBC: CPT

## 2018-08-13 PROCEDURE — 84443 ASSAY THYROID STIM HORMONE: CPT

## 2018-08-13 PROCEDURE — 3008F BODY MASS INDEX DOCD: CPT | Mod: CPTII,S$GLB,, | Performed by: FAMILY MEDICINE

## 2018-08-13 PROCEDURE — 83036 HEMOGLOBIN GLYCOSYLATED A1C: CPT

## 2018-08-13 PROCEDURE — 80053 COMPREHEN METABOLIC PANEL: CPT

## 2018-08-13 RX ORDER — TRAZODONE HYDROCHLORIDE 100 MG/1
100 TABLET ORAL NIGHTLY
Qty: 30 TABLET | Refills: 2 | Status: SHIPPED | OUTPATIENT
Start: 2018-08-13 | End: 2018-09-04

## 2018-08-13 RX ORDER — FLUTICASONE PROPIONATE 44 UG/1
2 AEROSOL, METERED RESPIRATORY (INHALATION) 2 TIMES DAILY
Qty: 10.6 G | Refills: 1 | Status: SHIPPED | OUTPATIENT
Start: 2018-08-13 | End: 2019-05-21

## 2018-08-13 RX ORDER — MONTELUKAST SODIUM 10 MG/1
10 TABLET ORAL NIGHTLY
Qty: 30 TABLET | Refills: 3 | Status: SHIPPED | OUTPATIENT
Start: 2018-08-13 | End: 2018-09-12

## 2018-08-13 NOTE — PROGRESS NOTES
Subjective:      Patient ID: Ayanna Alicia is a 47 y.o. female.    Chief Complaint: Annual Exam      Past Medical History:   Diagnosis Date    Abnormal Pap smear of cervix     in the past with repeat pap smear okay.    Allergic rhinitis, cause unspecified     Arthritis of both knees     Asthma     Eczema     Fatty liver 10/2014    Fibrocystic breast changes     Headache(784.0)     Hepatomegaly 10/2014    Hypertension     Liver cyst 10/2014    Multinodular goiter     Followed by ENT - Dr. Nicolas Gray    Polymenorrhea     TMJ (dislocation of temporomandibular joint)     Uterine fibroid     in the past    Vitamin D deficiency disease      Past Surgical History:   Procedure Laterality Date     SECTION, CLASSIC      x 1    HYSTERECTOMY      MULTIPLE TOOTH EXTRACTIONS      PARTIAL HYSTERECTOMY  2013    Due to fibroids     Family History   Problem Relation Age of Onset    Stroke Mother     Diabetes Mother     Heart disease Father 63        MI/CAD    Hypertension Father     Cancer Maternal Grandmother 60        Rectal and stomach cancer    Migraines Cousin     Cataracts Cousin     Cancer Maternal Aunt 50        Stomach cancer    Cancer Maternal Aunt 66        Lung cancer (smoker)    Stroke Maternal Grandfather     Diabetes Maternal Grandfather     Stroke Sister      Social History     Socioeconomic History    Marital status: Single     Spouse name: Not on file    Number of children: 0    Years of education: Not on file    Highest education level: Not on file   Social Needs    Financial resource strain: Not on file    Food insecurity - worry: Not on file    Food insecurity - inability: Not on file    Transportation needs - medical: Not on file    Transportation needs - non-medical: Not on file   Occupational History    Occupation:      Employer: [employer]     Comment: [employer]   Tobacco Use    Smoking status: Never Smoker    Smokeless  tobacco: Never Used   Substance and Sexual Activity    Alcohol use: Yes     Alcohol/week: 0.0 oz     Comment: Occasionally    Drug use: Yes     Types: Marijuana     Comment: Smoking less    Sexual activity: Yes     Partners: Female   Other Topics Concern    Not on file   Social History Narrative    She wears seatbelt. Her son was killed in 2010.       Current Outpatient Medications:     albuterol 90 mcg/actuation inhaler, Inhale 1-2 puffs into the lungs every 4 to 6 hours as needed for Wheezing., Disp: 18 g, Rfl: 5    APPLE CIDER VINEGAR ORAL, Take by mouth., Disp: , Rfl:     b complex vitamins (B-COMPLEX) tablet, Take 1 tablet by mouth once daily., Disp: , Rfl:     cetirizine (ZYRTEC) 10 MG tablet, Take 1 tablet by mouth Daily., Disp: , Rfl:     cholecalciferol, vitamin D3, (VITAMIN D3) 2,000 unit Tab, Take by mouth once daily., Disp: , Rfl:     COCONUT OIL ORAL, Take by mouth., Disp: , Rfl:     hydrocortisone (WESTCORT) 0.2 % cream, Apply topically Twice daily as needed., Disp: 46 g, Rfl: 0    inhalation spacing device, Use as directed for inhalation., Disp: 1 Device, Rfl: 0    multivitamin (THERAGRAN) per tablet, Take 1 tablet by mouth Daily., Disp: , Rfl:     naproxen sodium (ALEVE) 220 MG tablet, , Disp: , Rfl:     omega-3 fatty acids-vitamin E (FISH OIL) 1,000 mg Cap, Take by mouth 2 (two) times daily., Disp: , Rfl:     fluticasone (FLOVENT HFA) 44 mcg/actuation inhaler, Inhale 2 puffs into the lungs 2 (two) times daily. Controller, Disp: 10.6 g, Rfl: 1    montelukast (SINGULAIR) 10 mg tablet, Take 1 tablet (10 mg total) by mouth every evening., Disp: 30 tablet, Rfl: 3    traZODone (DESYREL) 100 MG tablet, Take 1 tablet (100 mg total) by mouth every evening., Disp: 30 tablet, Rfl: 2  Review of patient's allergies indicates:   Allergen Reactions    Doxycycline      Other reaction(s): Nausea    Fluticasone      Other reaction(s): Epistaxis  Other reaction(s): Epistaxis    Penicillins       "Other reaction(s): unknown       Review of Systems   Constitutional: Negative for chills and fever.   Respiratory: Negative for shortness of breath and wheezing.    Cardiovascular: Negative for chest pain and palpitations.   Gastrointestinal: Negative for abdominal pain and blood in stool.   Psychiatric/Behavioral: Positive for sleep disturbance. The patient is nervous/anxious.      HPI  Here today for yearly exam.  MMG up to date and  Normal  C/o insomnia and anxiety over the past couple of weeks.  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  She ha 15 months to jail.  Insomnia is intense and persistent around 4 hours per night.  Asthma exacerbations with cough and occasional wheezing over the past couple of weeks coming and going along with congestion.  Thinks allergies are triggers.  H/o multinodular thyroid - been a while since she had u/s. Had negative biopsies in the past.    Objective:   /72 (BP Location: Right arm, Patient Position: Sitting)   Pulse 84   Temp 97.4 °F (36.3 °C)   Ht 5' 6" (1.676 m)   Wt 122.2 kg (269 lb 6.4 oz)   SpO2 98%   BMI 43.48 kg/m²      Physical Exam   Constitutional: She is oriented to person, place, and time. She is cooperative. No distress.   Eyes: Conjunctivae and EOM are normal.   Cardiovascular: Normal rate, regular rhythm and normal heart sounds.   Pulmonary/Chest: Effort normal and breath sounds normal. Right breast exhibits no inverted nipple, no mass, no nipple discharge, no skin change and no tenderness. Left breast exhibits no inverted nipple, no mass, no nipple discharge, no skin change and no tenderness. Breasts are symmetrical. There is no breast swelling.   Bilateral breast - no axillary lad   Genitourinary: No breast tenderness, discharge or bleeding.   Musculoskeletal: She exhibits no edema.   Neurological: She is alert and oriented to person, place, and time. Coordination and gait normal.   Skin: Skin is warm, dry and intact. No rash noted. She " is not diaphoretic. No erythema.   Psychiatric: She has a normal mood and affect. Her speech is normal and behavior is normal.   Nursing note and vitals reviewed.          Assessment:       1. Encounter for general adult medical examination with abnormal findings    2. Insomnia, unspecified type    3. Anxiety    4. Multinodular goiter    5. Vitamin D deficiency    6. Uncontrolled mild persistent allergic asthma            Plan:         Encounter for general adult medical examination with abnormal findings  Comments:  Importance of healthy diet and exercise d/w patient - handout given.  Check labs - she is having slow time losing weight.  MMG normal. Breast exam normal.  Orders:  -     Lipid panel; Future; Expected date: 08/13/2018  -     Hemoglobin A1c; Future; Expected date: 08/13/2018  -     Comprehensive metabolic panel; Future; Expected date: 08/13/2018  -     CBC auto differential; Future; Expected date: 08/13/2018    Insomnia, unspecified type  Comments:  Stress at work causing insomnia around one week.  STart trazodone.  Sside effects d/w patient.    Anxiety  Comments:  Start trazodone.  Recheck in 2-3 weeks.    Multinodular goiter  Comments:  TSH adn u/s ordered.  Orders:  -     TSH; Future; Expected date: 08/13/2018  -     US Soft Tissue Head Neck Thyroid; Future; Expected date: 08/13/2018    Vitamin D deficiency  -     Vitamin D; Future; Expected date: 08/13/2018    Uncontrolled mild persistent allergic asthma  Comments:  Start singulair and flovent.  Albuterol prn.  Recheck in 2 weeks.    Other orders  -     montelukast (SINGULAIR) 10 mg tablet; Take 1 tablet (10 mg total) by mouth every evening.  Dispense: 30 tablet; Refill: 3  -     traZODone (DESYREL) 100 MG tablet; Take 1 tablet (100 mg total) by mouth every evening.  Dispense: 30 tablet; Refill: 2  -     fluticasone (FLOVENT HFA) 44 mcg/actuation inhaler; Inhale 2 puffs into the lungs 2 (two) times daily. Controller  Dispense: 10.6 g; Refill:  1        Patient Care Team:  Madeleine Enrique MD as PCP - General (Family Medicine)  Christopher Jhaveri LPN as Care Coordinator    Follow-up 3-4 weeks, for review labs, review meds.

## 2018-08-14 ENCOUNTER — TELEPHONE (OUTPATIENT)
Dept: FAMILY MEDICINE | Facility: CLINIC | Age: 48
End: 2018-08-14

## 2018-08-14 NOTE — PROGRESS NOTES
Labs normal except LDL cholesterol 142 - goal 130 focus on exercise and healthy diet- thyroid labs normal.

## 2018-08-14 NOTE — TELEPHONE ENCOUNTER
----- Message from Juanita Aly sent at 8/14/2018  3:18 PM CDT -----  Contact: self   Patient returning call. Please call back at       Thanks,  Juanita Aly

## 2018-09-04 ENCOUNTER — OFFICE VISIT (OUTPATIENT)
Dept: FAMILY MEDICINE | Facility: CLINIC | Age: 48
End: 2018-09-04
Payer: COMMERCIAL

## 2018-09-04 VITALS
WEIGHT: 266.31 LBS | TEMPERATURE: 99 F | SYSTOLIC BLOOD PRESSURE: 132 MMHG | BODY MASS INDEX: 42.98 KG/M2 | DIASTOLIC BLOOD PRESSURE: 90 MMHG | RESPIRATION RATE: 18 BRPM | HEART RATE: 108 BPM

## 2018-09-04 DIAGNOSIS — J30.1 SEASONAL ALLERGIC RHINITIS DUE TO POLLEN: ICD-10-CM

## 2018-09-04 DIAGNOSIS — R53.81 MALAISE AND FATIGUE: ICD-10-CM

## 2018-09-04 DIAGNOSIS — J02.9 SORE THROAT: ICD-10-CM

## 2018-09-04 DIAGNOSIS — R53.83 MALAISE AND FATIGUE: ICD-10-CM

## 2018-09-04 DIAGNOSIS — J45.901 EXACERBATION OF ASTHMA, UNSPECIFIED ASTHMA SEVERITY, UNSPECIFIED WHETHER PERSISTENT: Primary | ICD-10-CM

## 2018-09-04 DIAGNOSIS — I10 ESSENTIAL HYPERTENSION: ICD-10-CM

## 2018-09-04 PROCEDURE — 3080F DIAST BP >= 90 MM HG: CPT | Mod: CPTII,S$GLB,, | Performed by: FAMILY MEDICINE

## 2018-09-04 PROCEDURE — 3075F SYST BP GE 130 - 139MM HG: CPT | Mod: CPTII,S$GLB,, | Performed by: FAMILY MEDICINE

## 2018-09-04 PROCEDURE — 99214 OFFICE O/P EST MOD 30 MIN: CPT | Mod: S$GLB,,, | Performed by: FAMILY MEDICINE

## 2018-09-04 PROCEDURE — 99999 PR PBB SHADOW E&M-EST. PATIENT-LVL III: CPT | Mod: PBBFAC,,, | Performed by: FAMILY MEDICINE

## 2018-09-04 PROCEDURE — 3008F BODY MASS INDEX DOCD: CPT | Mod: CPTII,S$GLB,, | Performed by: FAMILY MEDICINE

## 2018-09-04 RX ORDER — AZELASTINE 1 MG/ML
1 SPRAY, METERED NASAL 2 TIMES DAILY
Qty: 15 ML | Refills: 0 | Status: SHIPPED | OUTPATIENT
Start: 2018-09-04 | End: 2019-05-21

## 2018-09-04 RX ORDER — METHYLPREDNISOLONE 4 MG/1
TABLET ORAL
Qty: 1 PACKAGE | Refills: 0 | Status: SHIPPED | OUTPATIENT
Start: 2018-09-04 | End: 2019-01-04

## 2018-09-04 NOTE — PROGRESS NOTES
Subjective:      Patient ID: Ayanna Alicia is a 47 y.o. female.    Chief Complaint: Sinus Problem (came on over the weekend; OTCs not relieving symptoms) and Follow-up (4 wk followup; also noticed swelling on right side of neck)      Past Medical History:   Diagnosis Date    Abnormal Pap smear of cervix     in the past with repeat pap smear okay.    Allergic rhinitis, cause unspecified     Arthritis of both knees     Asthma     Eczema     Fatty liver 10/2014    Fibrocystic breast changes     Headache(784.0)     Hepatomegaly 10/2014    Hypertension     Liver cyst 10/2014    Multinodular goiter     Followed by ENT - Dr. Nicolas Gray    Polymenorrhea     TMJ (dislocation of temporomandibular joint)     Uterine fibroid     in the past    Vitamin D deficiency disease      Past Surgical History:   Procedure Laterality Date     SECTION, CLASSIC      x 1    HYSTERECTOMY      MULTIPLE TOOTH EXTRACTIONS      PARTIAL HYSTERECTOMY  2013    Due to fibroids     Family History   Problem Relation Age of Onset    Stroke Mother     Diabetes Mother     Heart disease Father 63        MI/CAD    Hypertension Father     Cancer Maternal Grandmother 60        Rectal and stomach cancer    Migraines Cousin     Cataracts Cousin     Cancer Maternal Aunt 50        Stomach cancer    Cancer Maternal Aunt 66        Lung cancer (smoker)    Stroke Maternal Grandfather     Diabetes Maternal Grandfather     Stroke Sister      Social History     Socioeconomic History    Marital status: Single     Spouse name: Not on file    Number of children: 0    Years of education: Not on file    Highest education level: Not on file   Social Needs    Financial resource strain: Not on file    Food insecurity - worry: Not on file    Food insecurity - inability: Not on file    Transportation needs - medical: Not on file    Transportation needs - non-medical: Not on file   Occupational History    Occupation:       Employer: [employer]     Comment: [employer]   Tobacco Use    Smoking status: Never Smoker    Smokeless tobacco: Never Used   Substance and Sexual Activity    Alcohol use: Yes     Alcohol/week: 0.0 oz     Comment: Occasionally    Drug use: Yes     Types: Marijuana     Comment: Smoking less    Sexual activity: Yes     Partners: Female   Other Topics Concern    Not on file   Social History Narrative    She wears seatbelt. Her son was killed in 2010.       Current Outpatient Medications:     albuterol 90 mcg/actuation inhaler, Inhale 1-2 puffs into the lungs every 4 to 6 hours as needed for Wheezing., Disp: 18 g, Rfl: 5    b complex vitamins (B-COMPLEX) tablet, Take 1 tablet by mouth once daily., Disp: , Rfl:     cholecalciferol, vitamin D3, (VITAMIN D3) 2,000 unit Tab, Take by mouth once daily., Disp: , Rfl:     COCONUT OIL ORAL, Take by mouth., Disp: , Rfl:     fluticasone (FLOVENT HFA) 44 mcg/actuation inhaler, Inhale 2 puffs into the lungs 2 (two) times daily. Controller, Disp: 10.6 g, Rfl: 1    hydrocortisone (WESTCORT) 0.2 % cream, Apply topically Twice daily as needed., Disp: 46 g, Rfl: 0    inhalation spacing device, Use as directed for inhalation., Disp: 1 Device, Rfl: 0    montelukast (SINGULAIR) 10 mg tablet, Take 1 tablet (10 mg total) by mouth every evening., Disp: 30 tablet, Rfl: 3    multivitamin (THERAGRAN) per tablet, Take 1 tablet by mouth Daily., Disp: , Rfl:     naproxen sodium (ALEVE) 220 MG tablet, , Disp: , Rfl:     omega-3 fatty acids-vitamin E (FISH OIL) 1,000 mg Cap, Take by mouth 2 (two) times daily., Disp: , Rfl:     APPLE CIDER VINEGAR ORAL, Take by mouth., Disp: , Rfl:     azelastine (ASTELIN) 137 mcg (0.1 %) nasal spray, 1 spray (137 mcg total) by Nasal route 2 (two) times daily., Disp: 15 mL, Rfl: 0    methylPREDNISolone (MEDROL DOSEPACK) 4 mg tablet, use as directed, Disp: 1 Package, Rfl: 0  Review of patient's allergies indicates:   Allergen  Reactions    Doxycycline      Other reaction(s): Nausea    Fluticasone      Other reaction(s): Epistaxis  Other reaction(s): Epistaxis    Penicillins      Other reaction(s): unknown       Review of Systems   Constitutional: Negative for chills and fever.   Respiratory: Negative for shortness of breath and wheezing.    Cardiovascular: Negative for chest pain and palpitations.   Gastrointestinal: Negative for abdominal pain and blood in stool.     HPI  H/o asthma.  Last visit - started flovent.  She was feeling better.  Asthma much better controlled.  She did not feel this past Thursday that she got 2 puffs b/c it went into back of throat.  Took 3 puffs instead of 2. Since that time, she has had sore throat, drainage, malaise, fatigue.  She has also had asthma exacerbation - increased wheezing.  Allergies not well controlled - taking singulair which helps.  She still has nasal congestion.l    Objective:   BP (!) 132/90   Pulse 108   Temp 98.9 °F (37.2 °C)   Resp 18   Wt 120.8 kg (266 lb 5.1 oz)   BMI 42.98 kg/m²      Physical Exam   Constitutional: She is oriented to person, place, and time. She is cooperative. No distress.   Eyes: Conjunctivae and EOM are normal.   Cardiovascular: Normal rate, regular rhythm and normal heart sounds.   Pulmonary/Chest: Effort normal and breath sounds normal.   Musculoskeletal: She exhibits no edema.   Neurological: She is alert and oriented to person, place, and time. No cranial nerve deficit. Gait normal.   Skin: Skin is warm, dry and intact. No rash noted. She is not diaphoretic. No erythema.   Psychiatric: She has a normal mood and affect. Her speech is normal and behavior is normal.   Nursing note and vitals reviewed.          Assessment:       1. Exacerbation of asthma, unspecified asthma severity, unspecified whether persistent    2. Essential hypertension    3. Seasonal allergic rhinitis due to pollen    4. Malaise and fatigue    5. Sore throat            Plan:          Exacerbation of asthma, unspecified asthma severity, unspecified whether persistent  Comments:  Continue flovent.  Start medrol pack.  F/u prn.    Essential hypertension  Comments:  Well controlled.    Seasonal allergic rhinitis due to pollen  Comments:  Start astelin - continue singulair.    Malaise and fatigue    Sore throat  Comments:  medrol should help.  Call if no improvement by Friday or if worsening.    Other orders  -     azelastine (ASTELIN) 137 mcg (0.1 %) nasal spray; 1 spray (137 mcg total) by Nasal route 2 (two) times daily.  Dispense: 15 mL; Refill: 0  -     methylPREDNISolone (MEDROL DOSEPACK) 4 mg tablet; use as directed  Dispense: 1 Package; Refill: 0        Patient Care Team:  Madeleine Enrique MD as PCP - General (Family Medicine)  Christopher Jhaveri LPN as Care Coordinator    Follow-up in about 4 months (around 1/4/2019) for review meds.

## 2018-11-19 ENCOUNTER — TELEPHONE (OUTPATIENT)
Dept: RADIOLOGY | Facility: HOSPITAL | Age: 48
End: 2018-11-19

## 2018-11-27 ENCOUNTER — HOSPITAL ENCOUNTER (OUTPATIENT)
Dept: RADIOLOGY | Facility: HOSPITAL | Age: 48
Discharge: HOME | End: 2018-11-27
Attending: FAMILY MEDICINE
Payer: COMMERCIAL

## 2018-11-27 DIAGNOSIS — E04.2 MULTINODULAR GOITER: ICD-10-CM

## 2018-11-27 PROCEDURE — 76536 US EXAM OF HEAD AND NECK: CPT | Mod: TC,PO

## 2018-11-27 PROCEDURE — 76536 US EXAM OF HEAD AND NECK: CPT | Mod: 26,,, | Performed by: RADIOLOGY

## 2018-11-30 ENCOUNTER — TELEPHONE (OUTPATIENT)
Dept: FAMILY MEDICINE | Facility: CLINIC | Age: 48
End: 2018-11-30

## 2018-11-30 DIAGNOSIS — E04.2 MULTIPLE THYROID NODULES: Primary | ICD-10-CM

## 2018-11-30 NOTE — TELEPHONE ENCOUNTER
----- Message from Christen Meadows sent at 11/30/2018  9:49 AM CST -----  Contact: pt   Pt is requesting a call back from the nurse in regards to the getting her test results   [phone] (home)

## 2018-11-30 NOTE — TELEPHONE ENCOUNTER
Spoke with patient regarding ultrasound - multinodular goiter - she has had for a while with prior biopsies, but has been a while.  Will send to Dr. Ponce, ENT, for biopsies.

## 2018-12-06 ENCOUNTER — TELEPHONE (OUTPATIENT)
Dept: FAMILY MEDICINE | Facility: CLINIC | Age: 48
End: 2018-12-06

## 2018-12-06 NOTE — TELEPHONE ENCOUNTER
Spoke with patient during discharge on 11/30 and today, Patient advised she would make the appt at a later time on her own

## 2018-12-06 NOTE — TELEPHONE ENCOUNTER
On 11/30, I sent out a request for patient to be scheduled for ENT - Dr. Ponce this was not done.  Please schedule patient with Dr. Ponce for thyroid nodules.

## 2018-12-18 ENCOUNTER — TELEPHONE (OUTPATIENT)
Dept: FAMILY MEDICINE | Facility: CLINIC | Age: 48
End: 2018-12-18

## 2019-01-04 ENCOUNTER — OFFICE VISIT (OUTPATIENT)
Dept: FAMILY MEDICINE | Facility: CLINIC | Age: 49
End: 2019-01-04
Payer: COMMERCIAL

## 2019-01-04 VITALS
TEMPERATURE: 98 F | BODY MASS INDEX: 43.19 KG/M2 | HEIGHT: 66 IN | DIASTOLIC BLOOD PRESSURE: 86 MMHG | HEART RATE: 110 BPM | SYSTOLIC BLOOD PRESSURE: 124 MMHG | WEIGHT: 268.75 LBS | OXYGEN SATURATION: 95 % | RESPIRATION RATE: 18 BRPM

## 2019-01-04 DIAGNOSIS — J44.1 OBSTRUCTIVE CHRONIC BRONCHITIS WITH EXACERBATION: Primary | ICD-10-CM

## 2019-01-04 PROCEDURE — 99213 OFFICE O/P EST LOW 20 MIN: CPT | Mod: 25,S$GLB,, | Performed by: FAMILY MEDICINE

## 2019-01-04 PROCEDURE — 99999 PR PBB SHADOW E&M-EST. PATIENT-LVL III: ICD-10-PCS | Mod: PBBFAC,,, | Performed by: FAMILY MEDICINE

## 2019-01-04 PROCEDURE — 99213 PR OFFICE/OUTPT VISIT, EST, LEVL III, 20-29 MIN: ICD-10-PCS | Mod: 25,S$GLB,, | Performed by: FAMILY MEDICINE

## 2019-01-04 PROCEDURE — 3074F PR MOST RECENT SYSTOLIC BLOOD PRESSURE < 130 MM HG: ICD-10-PCS | Mod: CPTII,S$GLB,, | Performed by: FAMILY MEDICINE

## 2019-01-04 PROCEDURE — 3079F DIAST BP 80-89 MM HG: CPT | Mod: CPTII,S$GLB,, | Performed by: FAMILY MEDICINE

## 2019-01-04 PROCEDURE — 3079F PR MOST RECENT DIASTOLIC BLOOD PRESSURE 80-89 MM HG: ICD-10-PCS | Mod: CPTII,S$GLB,, | Performed by: FAMILY MEDICINE

## 2019-01-04 PROCEDURE — 3008F BODY MASS INDEX DOCD: CPT | Mod: CPTII,S$GLB,, | Performed by: FAMILY MEDICINE

## 2019-01-04 PROCEDURE — 3008F PR BODY MASS INDEX (BMI) DOCUMENTED: ICD-10-PCS | Mod: CPTII,S$GLB,, | Performed by: FAMILY MEDICINE

## 2019-01-04 PROCEDURE — 3074F SYST BP LT 130 MM HG: CPT | Mod: CPTII,S$GLB,, | Performed by: FAMILY MEDICINE

## 2019-01-04 PROCEDURE — 99999 PR PBB SHADOW E&M-EST. PATIENT-LVL III: CPT | Mod: PBBFAC,,, | Performed by: FAMILY MEDICINE

## 2019-01-04 PROCEDURE — 96372 PR INJECTION,THERAP/PROPH/DIAG2ST, IM OR SUBCUT: ICD-10-PCS | Mod: 59,S$GLB,, | Performed by: FAMILY MEDICINE

## 2019-01-04 PROCEDURE — 96372 THER/PROPH/DIAG INJ SC/IM: CPT | Mod: 59,S$GLB,, | Performed by: FAMILY MEDICINE

## 2019-01-04 RX ORDER — PROMETHAZINE HYDROCHLORIDE AND CODEINE PHOSPHATE 6.25; 1 MG/5ML; MG/5ML
5 SOLUTION ORAL EVERY 4 HOURS PRN
Qty: 180 ML | Refills: 0 | Status: SHIPPED | OUTPATIENT
Start: 2019-01-04 | End: 2020-03-19 | Stop reason: SDUPTHER

## 2019-01-04 RX ORDER — BETAMETHASONE SODIUM PHOSPHATE AND BETAMETHASONE ACETATE 3; 3 MG/ML; MG/ML
12 INJECTION, SUSPENSION INTRA-ARTICULAR; INTRALESIONAL; INTRAMUSCULAR; SOFT TISSUE
Status: DISCONTINUED | OUTPATIENT
Start: 2019-01-04 | End: 2019-01-05

## 2019-01-04 RX ORDER — METHYLPREDNISOLONE ACETATE 80 MG/ML
80 INJECTION, SUSPENSION INTRA-ARTICULAR; INTRALESIONAL; INTRAMUSCULAR; SOFT TISSUE
Status: COMPLETED | OUTPATIENT
Start: 2019-01-04 | End: 2019-01-04

## 2019-01-04 RX ORDER — AZITHROMYCIN 250 MG/1
TABLET, FILM COATED ORAL
Qty: 6 TABLET | Refills: 0 | Status: SHIPPED | OUTPATIENT
Start: 2019-01-04 | End: 2019-01-18

## 2019-01-04 RX ADMIN — METHYLPREDNISOLONE ACETATE 80 MG: 80 INJECTION, SUSPENSION INTRA-ARTICULAR; INTRALESIONAL; INTRAMUSCULAR; SOFT TISSUE at 02:01

## 2019-01-04 NOTE — PROGRESS NOTES
CHIEF COMPLAINT:  This is a 48-year-old female complaining of respiratory illness.      SUBJECTIVE:  The patient complains of a 3 week history of stuffy head, sore throat, severe cough and chest congestion.  Cough is productive of yellow mucus.  Patient complains of chills, shortness of breath and wheezing.  The she denies ear pain or fever.  She complains of intermittent chills.  Positive ill contacts.  Patient did not received influenza vaccine.  She has been taking Robitussin, NyQuil and Mucinex without relief.    ROS:  GENERAL: Patient denies fever, chills, night sweats.  Patient denies weight gain or loss. Patient denies anorexia, fatigue, weakness or swollen glands.  SKIN: Patient denies rash.  HEENT: Patient denies hearing loss, visual disturbance, eye irritation or discharge. Positive for head congestion and sore throat.    LUNGS: Patient denies hemoptysis.  Positive for cough and wheezing.  CARDIOVASCULAR: Patient denies chest pain, palpitations, syncope or lower extremity edema. Positive for shortness of breath.  GI: Patient denies abdominal pain, nausea, vomiting, diarrhea, constipation, blood in stool or melena.    OBJECTIVE:   GENERAL: Well-developed well-nourished obese black female alert and oriented x3 in no acute distress.  Memory, judgment and cognition without deficit.  No audible wheezing.  SKIN: Clear without rash.  Normal color and tone.  HEENT: Eyes: Clear conjunctivae.  No scleral icterus.  Ears: Clear canals.  Clear TMs.  Nose:  Marked congestion.  Pharynx: Without injection or exudates.  NECK: Supple, normal range of motion.  No lymphadenopathy.    LUNGS:  Bilateral diffuse rhonchi with expiratory wheezing.  Normal respiratory effort.  O2 saturation 95%.  CARDIOVASCULAR: Regular rhythm, normal S1, S2 without murmur, gallop or rub.    ASSESSMENT:  1. Obstructive chronic bronchitis with exacerbation      PLAN:   1.  Depo-Medrol 80 mg IM.  2.  Z-Kartik.  3.  Phenergan with codeine.  4.   Symptomatic treatment.  Increase fluid intake.  5.  Follow-up if no improvement or worsening symptoms.

## 2019-01-09 ENCOUNTER — OFFICE VISIT (OUTPATIENT)
Dept: FAMILY MEDICINE | Facility: CLINIC | Age: 49
End: 2019-01-09
Payer: COMMERCIAL

## 2019-01-09 VITALS
WEIGHT: 261.25 LBS | HEIGHT: 66 IN | BODY MASS INDEX: 41.99 KG/M2 | OXYGEN SATURATION: 96 % | HEART RATE: 123 BPM | SYSTOLIC BLOOD PRESSURE: 170 MMHG | TEMPERATURE: 101 F | DIASTOLIC BLOOD PRESSURE: 100 MMHG

## 2019-01-09 DIAGNOSIS — J10.1 INFLUENZA A: Primary | ICD-10-CM

## 2019-01-09 DIAGNOSIS — R50.9 FEVER AND CHILLS: ICD-10-CM

## 2019-01-09 DIAGNOSIS — R05.9 COUGH: ICD-10-CM

## 2019-01-09 DIAGNOSIS — I10 ESSENTIAL HYPERTENSION: ICD-10-CM

## 2019-01-09 PROCEDURE — 99999 PR PBB SHADOW E&M-EST. PATIENT-LVL III: ICD-10-PCS | Mod: PBBFAC,,, | Performed by: REGISTERED NURSE

## 2019-01-09 PROCEDURE — 3080F DIAST BP >= 90 MM HG: CPT | Mod: CPTII,S$GLB,, | Performed by: REGISTERED NURSE

## 2019-01-09 PROCEDURE — 87502 INFLUENZA DNA AMP PROBE: CPT | Mod: QW,S$GLB,, | Performed by: REGISTERED NURSE

## 2019-01-09 PROCEDURE — 99999 PR PBB SHADOW E&M-EST. PATIENT-LVL III: CPT | Mod: PBBFAC,,, | Performed by: REGISTERED NURSE

## 2019-01-09 PROCEDURE — 99214 PR OFFICE/OUTPT VISIT, EST, LEVL IV, 30-39 MIN: ICD-10-PCS | Mod: 25,S$GLB,, | Performed by: REGISTERED NURSE

## 2019-01-09 PROCEDURE — 99214 OFFICE O/P EST MOD 30 MIN: CPT | Mod: 25,S$GLB,, | Performed by: REGISTERED NURSE

## 2019-01-09 PROCEDURE — 3077F PR MOST RECENT SYSTOLIC BLOOD PRESSURE >= 140 MM HG: ICD-10-PCS | Mod: CPTII,S$GLB,, | Performed by: REGISTERED NURSE

## 2019-01-09 PROCEDURE — 3077F SYST BP >= 140 MM HG: CPT | Mod: CPTII,S$GLB,, | Performed by: REGISTERED NURSE

## 2019-01-09 PROCEDURE — 3080F PR MOST RECENT DIASTOLIC BLOOD PRESSURE >= 90 MM HG: ICD-10-PCS | Mod: CPTII,S$GLB,, | Performed by: REGISTERED NURSE

## 2019-01-09 PROCEDURE — 87502 POCT INFLUENZA A/B MOLECULAR: ICD-10-PCS | Mod: QW,S$GLB,, | Performed by: REGISTERED NURSE

## 2019-01-09 PROCEDURE — 3008F BODY MASS INDEX DOCD: CPT | Mod: CPTII,S$GLB,, | Performed by: REGISTERED NURSE

## 2019-01-09 PROCEDURE — 3008F PR BODY MASS INDEX (BMI) DOCUMENTED: ICD-10-PCS | Mod: CPTII,S$GLB,, | Performed by: REGISTERED NURSE

## 2019-01-09 RX ORDER — OSELTAMIVIR PHOSPHATE 75 MG/1
75 CAPSULE ORAL 2 TIMES DAILY
Qty: 10 CAPSULE | Refills: 0 | Status: SHIPPED | OUTPATIENT
Start: 2019-01-09 | End: 2019-01-14

## 2019-01-09 NOTE — LETTER
January 4, 2019                 [facility]  Family Medicine  [address] [address]  Phone: [phone]   January 9, 2019     Patient: Ayanna Alicia   YOB: 1970   Date of Visit: 1/4/2019       To Whom it May Concern:    Ayanna Alicia was seen in my clinic on 01/04/2019. She may return to work on 1/8/2019. If you have any questions or concerns, please don't hesitate to call.    Sincerely,         RACHEL Vegas LPN

## 2019-01-09 NOTE — LETTER
January 9, 2019                 [facility]  Family Medicine  [address] [address]  Phone: [phone]   January 9, 2019     Patient: Ayanna Alicia   YOB: 1970   Date of Visit: 1/9/2019       To Whom it May Concern:    Ayanna Alicia was seen in my clinic on 1/9/2019. She may return to work on 1/15/2019. If you have any questions or concerns, please don't hesitate to call.    Sincerely,           RACHEL Vegas LPN

## 2019-01-14 ENCOUNTER — CLINICAL SUPPORT (OUTPATIENT)
Dept: FAMILY MEDICINE | Facility: CLINIC | Age: 49
End: 2019-01-14
Payer: COMMERCIAL

## 2019-01-14 DIAGNOSIS — Z01.30 BLOOD PRESSURE CHECK: Primary | ICD-10-CM

## 2019-01-14 NOTE — PROGRESS NOTES
Subjective:       Patient ID: Ayanna Alicia is a 48 y.o. female.    Chief Complaint: Chills; Fever; Generalized Body Aches; Sore Throat; and Cough      HPI    Ayanna Alicia is here today to f/u on recent illness.  Seen in office on Friday 1/4/2019 for asthma/bronchitis ---- treated with steroid shot, Z-Pack, and cough medication.  She did complete ABX as ordered but started with 101 temp and chills yesterday.  Reports productive cough of yellowish-green mucus, body aches, fatigue and HA pain.       Review of Systems   Constitutional: Positive for chills, diaphoresis, fatigue and fever.   HENT: Positive for congestion. Negative for ear pain, sinus pain and sore throat.    Eyes: Negative.    Respiratory: Positive for cough. Negative for shortness of breath and wheezing.    Cardiovascular: Negative.    Musculoskeletal: Positive for myalgias.   Neurological: Positive for headaches. Negative for light-headedness.       Review of patient's allergies indicates:   Allergen Reactions    Doxycycline Nausea    Fluticasone Epistaxis    Penicillins      Other reaction(s): unknown       Patient Active Problem List   Diagnosis    HTN (hypertension)    Headache(784.0)    Multinodular goiter    Asthma    Seasonal allergies    Morbid obesity with BMI of 45.0-49.9, adult    Depression with anxiety    GERD (gastroesophageal reflux disease)    Vitamin D deficiency    Surgical menopause    Right knee pain    Patella-femoral syndrome    Localized osteoarthrosis not specified whether primary or secondary, lower leg       Current Outpatient Medications on File Prior to Visit   Medication Sig Dispense Refill    albuterol 90 mcg/actuation inhaler Inhale 1-2 puffs into the lungs every 4 to 6 hours as needed for Wheezing. 18 g 5    APPLE CIDER VINEGAR ORAL Take by mouth.      azelastine (ASTELIN) 137 mcg (0.1 %) nasal spray 1 spray (137 mcg total) by Nasal route 2 (two) times daily. 15 mL 0    azithromycin (Z-BONNIE)  "250 MG tablet Take 2 tablets for first dose today, then 1 tablet daily for 4 days. 6 tablet 0    COCONUT OIL ORAL Take by mouth.      fluticasone (FLOVENT HFA) 44 mcg/actuation inhaler Inhale 2 puffs into the lungs 2 (two) times daily. Controller 10.6 g 1    inhalation spacing device Use as directed for inhalation. 1 Device 0    naproxen sodium (ALEVE) 220 MG tablet       promethazine-codeine 6.25-10 mg/5 ml (PHENERGAN WITH CODEINE) 6.25-10 mg/5 mL syrup Take 5 mLs by mouth every 4 (four) hours as needed for Cough. 180 mL 0     No current facility-administered medications on file prior to visit.        Past medical, surgical, family and social histories have been reviewed today.        Objective:     Vitals:    01/09/19 1310   BP: (!) 170/100   Pulse: (!) 123   Temp: (!) 100.7 °F (38.2 °C)   TempSrc: Oral   SpO2: 96%   Weight: 118.5 kg (261 lb 3.9 oz)   Height: 5' 6" (1.676 m)   PainSc:   5   PainLoc: Generalized         Physical Exam   Constitutional: She is oriented to person, place, and time. She appears well-developed and well-nourished.   Febrile   HENT:   Head: Normocephalic and atraumatic.   Right Ear: External ear normal.   Left Ear: External ear normal.   Nose: Nose normal.   Mouth/Throat: Oropharynx is clear and moist. No oropharyngeal exudate.   Eyes: Pupils are equal, round, and reactive to light.   Cardiovascular: Regular rhythm and normal heart sounds. Tachycardia present.   Pulmonary/Chest: Effort normal and breath sounds normal. No respiratory distress. She has no wheezes. She has no rales. She exhibits no tenderness.   Lymphadenopathy:     She has no cervical adenopathy.   Neurological: She is alert and oriented to person, place, and time.   Vitals reviewed.      Component      Latest Ref Rng & Units 1/9/2019   POC Molecular Influenza A Ag      Negative, Not Reported Positive (A)   POC Molecular Influenza B Ag      Negative, Not Reported Negative    Acceptable       Yes "         Diagnosis       1. Influenza A    2. Fever and chills    3. Cough    4. Essential hypertension          Assessment/ Plan     Influenza A  -     oseltamivir (TAMIFLU) 75 MG capsule; Take 1 capsule (75 mg total) by mouth 2 (two) times daily. for 5 days  Dispense: 10 capsule; Refill: 0    Fever and chills  -     POCT Influenza A/B Molecular  -     oseltamivir (TAMIFLU) 75 MG capsule; Take 1 capsule (75 mg total) by mouth 2 (two) times daily. for 5 days  Dispense: 10 capsule; Refill: 0    Cough  -     POCT Influenza A/B Molecular  -     oseltamivir (TAMIFLU) 75 MG capsule; Take 1 capsule (75 mg total) by mouth 2 (two) times daily. for 5 days  Dispense: 10 capsule; Refill: 0    Essential hypertension  · Uncontrolled at this time, does not take routine medication for HTN.  · Elevation likely due to illness and use of OTC/RX medication.  · Low-salt/caffeine intake, rest.          Medication discussed, take as directed.  Continue RX cough medication as ordered from last appt if needed.  Increase rest and fluids.  Follow-up in clinic in 1 week to recheck blood pressure, or sooner if needed.  RTC prn.        KEVEN Vegas  [facility]

## 2019-01-18 ENCOUNTER — OFFICE VISIT (OUTPATIENT)
Dept: FAMILY MEDICINE | Facility: CLINIC | Age: 49
End: 2019-01-18
Payer: COMMERCIAL

## 2019-01-18 VITALS
BODY MASS INDEX: 41.91 KG/M2 | TEMPERATURE: 99 F | HEIGHT: 66 IN | DIASTOLIC BLOOD PRESSURE: 94 MMHG | SYSTOLIC BLOOD PRESSURE: 156 MMHG | HEART RATE: 104 BPM | OXYGEN SATURATION: 95 % | WEIGHT: 260.81 LBS

## 2019-01-18 DIAGNOSIS — R03.0 ELEVATED BLOOD PRESSURE READING: ICD-10-CM

## 2019-01-18 DIAGNOSIS — J45.909 ASTHMATIC BRONCHITIS WITHOUT COMPLICATION, UNSPECIFIED ASTHMA SEVERITY, UNSPECIFIED WHETHER PERSISTENT: Primary | ICD-10-CM

## 2019-01-18 PROCEDURE — 94640 PR INHAL RX, AIRWAY OBST/DX SPUTUM INDUCT: ICD-10-PCS | Mod: 59,S$GLB,, | Performed by: REGISTERED NURSE

## 2019-01-18 PROCEDURE — 94642 AEROSOL INHALATION TREATMENT: CPT | Mod: S$GLB,,, | Performed by: REGISTERED NURSE

## 2019-01-18 PROCEDURE — 99999 PR PBB SHADOW E&M-EST. PATIENT-LVL IV: CPT | Mod: PBBFAC,,, | Performed by: REGISTERED NURSE

## 2019-01-18 PROCEDURE — 3077F SYST BP >= 140 MM HG: CPT | Mod: CPTII,S$GLB,, | Performed by: REGISTERED NURSE

## 2019-01-18 PROCEDURE — 94642 PR AEROSOL INHALATION TREATMENT: ICD-10-PCS | Mod: S$GLB,,, | Performed by: REGISTERED NURSE

## 2019-01-18 PROCEDURE — 99214 PR OFFICE/OUTPT VISIT, EST, LEVL IV, 30-39 MIN: ICD-10-PCS | Mod: 25,S$GLB,, | Performed by: REGISTERED NURSE

## 2019-01-18 PROCEDURE — 3080F PR MOST RECENT DIASTOLIC BLOOD PRESSURE >= 90 MM HG: ICD-10-PCS | Mod: CPTII,S$GLB,, | Performed by: REGISTERED NURSE

## 2019-01-18 PROCEDURE — 99214 OFFICE O/P EST MOD 30 MIN: CPT | Mod: 25,S$GLB,, | Performed by: REGISTERED NURSE

## 2019-01-18 PROCEDURE — 3077F PR MOST RECENT SYSTOLIC BLOOD PRESSURE >= 140 MM HG: ICD-10-PCS | Mod: CPTII,S$GLB,, | Performed by: REGISTERED NURSE

## 2019-01-18 PROCEDURE — 3008F PR BODY MASS INDEX (BMI) DOCUMENTED: ICD-10-PCS | Mod: CPTII,S$GLB,, | Performed by: REGISTERED NURSE

## 2019-01-18 PROCEDURE — 3080F DIAST BP >= 90 MM HG: CPT | Mod: CPTII,S$GLB,, | Performed by: REGISTERED NURSE

## 2019-01-18 PROCEDURE — 3008F BODY MASS INDEX DOCD: CPT | Mod: CPTII,S$GLB,, | Performed by: REGISTERED NURSE

## 2019-01-18 PROCEDURE — 94640 AIRWAY INHALATION TREATMENT: CPT | Mod: 59,S$GLB,, | Performed by: REGISTERED NURSE

## 2019-01-18 PROCEDURE — 99999 PR PBB SHADOW E&M-EST. PATIENT-LVL IV: ICD-10-PCS | Mod: PBBFAC,,, | Performed by: REGISTERED NURSE

## 2019-01-18 RX ORDER — IBUPROFEN 100 MG/5ML
1000 SUSPENSION, ORAL (FINAL DOSE FORM) ORAL 2 TIMES DAILY
COMMUNITY
End: 2019-05-21

## 2019-01-18 RX ORDER — PREDNISONE 20 MG/1
TABLET ORAL
Qty: 10 TABLET | Refills: 0 | Status: SHIPPED | OUTPATIENT
Start: 2019-01-18 | End: 2019-02-06

## 2019-01-18 RX ORDER — IPRATROPIUM BROMIDE 0.5 MG/2.5ML
0.5 SOLUTION RESPIRATORY (INHALATION)
Status: COMPLETED | OUTPATIENT
Start: 2019-01-18 | End: 2019-01-18

## 2019-01-18 RX ORDER — MONTELUKAST SODIUM 10 MG/1
10 TABLET ORAL NIGHTLY
Qty: 30 TABLET | Refills: 6 | Status: SHIPPED | OUTPATIENT
Start: 2019-01-18 | End: 2019-02-17

## 2019-01-18 RX ORDER — ALBUTEROL SULFATE 0.83 MG/ML
2.5 SOLUTION RESPIRATORY (INHALATION)
Status: COMPLETED | OUTPATIENT
Start: 2019-01-18 | End: 2019-01-18

## 2019-01-18 RX ORDER — CODEINE PHOSPHATE AND GUAIFENESIN 10; 100 MG/5ML; MG/5ML
5 SOLUTION ORAL 3 TIMES DAILY PRN
Qty: 118 ML | Refills: 0 | Status: SHIPPED | OUTPATIENT
Start: 2019-01-18 | End: 2019-02-06 | Stop reason: ALTCHOICE

## 2019-01-18 RX ORDER — ALBUTEROL SULFATE 90 UG/1
1-2 AEROSOL, METERED RESPIRATORY (INHALATION)
Qty: 18 G | Refills: 5 | Status: SHIPPED | OUTPATIENT
Start: 2019-01-18 | End: 2020-01-06

## 2019-01-18 RX ADMIN — ALBUTEROL SULFATE 2.5 MG: 0.83 SOLUTION RESPIRATORY (INHALATION) at 09:01

## 2019-01-18 RX ADMIN — IPRATROPIUM BROMIDE 0.5 MG: 0.5 SOLUTION RESPIRATORY (INHALATION) at 09:01

## 2019-01-18 NOTE — PROGRESS NOTES
"Subjective:       Patient ID: Ayanna Alicia is a 48 y.o. female.    Chief Complaint   Patient presents with    Hypertension       HPI    Ayanna Alicia is here today with c/o cough.  She was last seen in the office on 1/9/2019 for Influenza-A.  Reports doing much better in regards to flu symptoms; however, does have a worsening cough that has been productive of thick tan colored mucus.  Reports wheezing, RT ear pain and clear rhinorrhea.  Taking Phenergan-Codeine but just "makes me sleep".  She has used her MDI about 3 to 4 times this week.    Blood pressure at her last visit was elevated, still up today at 156/94 but not as high as last visit.      Review of Systems   Constitutional: Negative for chills and fever.   HENT: Positive for ear pain and rhinorrhea. Negative for congestion, sinus pain, sneezing and sore throat.    Eyes: Negative.    Respiratory: Positive for cough, chest tightness, shortness of breath and wheezing.    Cardiovascular: Negative.    Neurological: Negative.        Review of patient's allergies indicates:   Allergen Reactions    Doxycycline Nausea    Fluticasone Epistaxis    Penicillins      Other reaction(s): unknown       Patient Active Problem List   Diagnosis    HTN (hypertension)    Multinodular goiter    Asthma    Seasonal allergies    Morbid obesity with BMI of 45.0-49.9, adult    Depression with anxiety    GERD (gastroesophageal reflux disease)    Vitamin D deficiency    Surgical menopause    Patella-femoral syndrome    Localized osteoarthrosis not specified whether primary or secondary, lower leg       Current Outpatient Medications on File Prior to Visit   Medication Sig Dispense Refill    APPLE CIDER VINEGAR ORAL Take by mouth.      ascorbic acid, vitamin C, (VITAMIN C) 1000 MG tablet Take 1,000 mg by mouth 2 (two) times daily.      azelastine (ASTELIN) 137 mcg (0.1 %) nasal spray 1 spray (137 mcg total) by Nasal route 2 (two) times daily. 15 mL 0    " "calcium/magnesium/vit B comp (CALCIUM-MAGNESIUM-B COMPLEX ORAL) Take by mouth.      COCONUT OIL ORAL Take by mouth.      fluticasone (FLOVENT HFA) 44 mcg/actuation inhaler Inhale 2 puffs into the lungs 2 (two) times daily. Controller 10.6 g 1    inhalation spacing device Use as directed for inhalation. 1 Device 0    naproxen sodium (ALEVE) 220 MG tablet       omega-3s/dha/epa/fish oil/D3 (VITAMIN-D + OMEGA-3 ORAL) Take by mouth.      albuterol 90 mcg/actuation inhaler Inhale 1-2 puffs into the lungs every 4 to 6 hours as needed for Wheezing. 18 g 5     No current facility-administered medications on file prior to visit.        Past medical, surgical, family and social histories have been reviewed today.        Objective:     Vitals:    01/18/19 0903 01/18/19 0935   BP: (!) 156/94    Pulse: 104    Temp: 98.8 °F (37.1 °C)    TempSrc: Oral    SpO2: (!) 94% 95% --- post-neb   Weight: 118.3 kg (260 lb 12.9 oz)    Height: 5' 6" (1.676 m)    PainSc: 0-No pain          Physical Exam   Constitutional: She is oriented to person, place, and time. She appears well-developed and well-nourished.   HENT:   Head: Normocephalic and atraumatic.   Right Ear: External ear normal.   Left Ear: External ear normal.   Nose: Nose normal.   Mouth/Throat: Oropharynx is clear and moist. No oropharyngeal exudate.   Eyes: Conjunctivae are normal. Pupils are equal, round, and reactive to light.   Cardiovascular: Regular rhythm and normal heart sounds. Tachycardia present. Exam reveals no gallop and no friction rub.   No murmur heard.  Pulmonary/Chest: Effort normal. No respiratory distress. She has wheezes. She has no rales. She exhibits no tenderness.   Lymphadenopathy:     She has no cervical adenopathy.   Neurological: She is alert and oriented to person, place, and time.   Vitals reviewed.        Diagnosis       1. Asthmatic bronchitis without complication, unspecified asthma severity, unspecified whether persistent    2. Elevated blood " pressure reading          Assessment/ Plan     Asthmatic bronchitis without complication, unspecified asthma severity, unspecified whether persistent  · Symptoms uncontrolled, using MDI ~ 3 to 4 times this week but has not helped.  · Nebulizer treatment x 1 today in office ---- wheezing resolved post-neb, better air flow.  · Medication discussed, take as directed.  -     albuterol nebulizer solution 2.5 mg  -     ipratropium 0.02 % nebulizer solution 0.5 mg  -     predniSONE (DELTASONE) 20 MG tablet; Take 2 tab daily x 3 days, 1 tab daily x 3 days, then 1/2 tab daily x 2 days.  Dispense: 10 tablet; Refill: 0  -     guaifenesin-codeine 100-10 mg/5 ml (TUSSI-ORGANIDIN NR)  mg/5 mL syrup; Take 5 mLs by mouth 3 (three) times daily as needed for Cough.  Dispense: 118 mL; Refill: 0  -     albuterol (PROVENTIL/VENTOLIN HFA) 90 mcg/actuation inhaler; Inhale 1-2 puffs into the lungs every 4 to 6 hours as needed for Wheezing.  Dispense: 18 g; Refill: 5  -     montelukast (SINGULAIR) 10 mg tablet; Take 1 tablet (10 mg total) by mouth every evening.  Dispense: 30 tablet; Refill: 6    Elevated blood pressure reading  · BP elevated likely due to illness, medication used for illness and/or not sleeping well.  · Recheck in 2 weeks.        Follow-up in clinic in 2 to 3 days if worse or no better.          KEVEN Vegas  [facility]

## 2019-01-18 NOTE — LETTER
January 18, 2019                 [facility]  Family Medicine  [address] [address]  Phone: [phone]   January 18, 2019     Patient: Ayanna Alicia   YOB: 1970   Date of Visit: 1/18/2019       To Whom it May Concern:    Ayanna Alicia was seen in my clinic on 1/18/2019. She may return to work 1/21/2019. If you have any questions or concerns, please don't hesitate to call.    Sincerely,         RACHEL Vegas LPN

## 2019-01-21 LAB
CTP QC/QA: YES
POC MOLECULAR INFLUENZA A AGN: POSITIVE
POC MOLECULAR INFLUENZA B AGN: NEGATIVE

## 2019-02-06 ENCOUNTER — OFFICE VISIT (OUTPATIENT)
Dept: FAMILY MEDICINE | Facility: CLINIC | Age: 49
End: 2019-02-06
Payer: COMMERCIAL

## 2019-02-06 VITALS
WEIGHT: 266.56 LBS | OXYGEN SATURATION: 98 % | BODY MASS INDEX: 42.84 KG/M2 | SYSTOLIC BLOOD PRESSURE: 150 MMHG | HEIGHT: 66 IN | DIASTOLIC BLOOD PRESSURE: 90 MMHG | TEMPERATURE: 98 F | HEART RATE: 89 BPM

## 2019-02-06 DIAGNOSIS — J45.901 ASTHMA WITH ACUTE EXACERBATION, UNSPECIFIED ASTHMA SEVERITY, UNSPECIFIED WHETHER PERSISTENT: ICD-10-CM

## 2019-02-06 DIAGNOSIS — I10 ESSENTIAL HYPERTENSION: Primary | ICD-10-CM

## 2019-02-06 DIAGNOSIS — R51.9 FREQUENT HEADACHES: ICD-10-CM

## 2019-02-06 DIAGNOSIS — J30.9 ALLERGIC RHINITIS, UNSPECIFIED SEASONALITY, UNSPECIFIED TRIGGER: ICD-10-CM

## 2019-02-06 PROCEDURE — 3078F PR MOST RECENT DIASTOLIC BLOOD PRESSURE < 80 MM HG: ICD-10-PCS | Mod: CPTII,S$GLB,, | Performed by: REGISTERED NURSE

## 2019-02-06 PROCEDURE — 99214 PR OFFICE/OUTPT VISIT, EST, LEVL IV, 30-39 MIN: ICD-10-PCS | Mod: S$GLB,,, | Performed by: REGISTERED NURSE

## 2019-02-06 PROCEDURE — 3008F PR BODY MASS INDEX (BMI) DOCUMENTED: ICD-10-PCS | Mod: CPTII,S$GLB,, | Performed by: REGISTERED NURSE

## 2019-02-06 PROCEDURE — 99999 PR PBB SHADOW E&M-EST. PATIENT-LVL IV: ICD-10-PCS | Mod: PBBFAC,,, | Performed by: REGISTERED NURSE

## 2019-02-06 PROCEDURE — 99999 PR PBB SHADOW E&M-EST. PATIENT-LVL IV: CPT | Mod: PBBFAC,,, | Performed by: REGISTERED NURSE

## 2019-02-06 PROCEDURE — 99214 OFFICE O/P EST MOD 30 MIN: CPT | Mod: S$GLB,,, | Performed by: REGISTERED NURSE

## 2019-02-06 PROCEDURE — 3008F BODY MASS INDEX DOCD: CPT | Mod: CPTII,S$GLB,, | Performed by: REGISTERED NURSE

## 2019-02-06 PROCEDURE — 3074F PR MOST RECENT SYSTOLIC BLOOD PRESSURE < 130 MM HG: ICD-10-PCS | Mod: CPTII,S$GLB,, | Performed by: REGISTERED NURSE

## 2019-02-06 PROCEDURE — 3078F DIAST BP <80 MM HG: CPT | Mod: CPTII,S$GLB,, | Performed by: REGISTERED NURSE

## 2019-02-06 PROCEDURE — 3074F SYST BP LT 130 MM HG: CPT | Mod: CPTII,S$GLB,, | Performed by: REGISTERED NURSE

## 2019-02-06 RX ORDER — VERAPAMIL HYDROCHLORIDE 120 MG/1
120 TABLET, FILM COATED, EXTENDED RELEASE ORAL NIGHTLY
Qty: 90 TABLET | Refills: 1 | Status: SHIPPED | OUTPATIENT
Start: 2019-02-06 | End: 2019-03-26 | Stop reason: ALTCHOICE

## 2019-02-06 RX ORDER — METOPROLOL SUCCINATE 25 MG/1
25 TABLET, EXTENDED RELEASE ORAL DAILY
Qty: 90 TABLET | Refills: 1 | Status: SHIPPED | OUTPATIENT
Start: 2019-02-06 | End: 2019-02-17

## 2019-02-06 NOTE — LETTER
February 6, 2019                 [facility]  Family Medicine  [address] [address]  Phone: [phone]   February 6, 2019     Patient: Ayanna Alicia   YOB: 1970   Date of Visit: 2/6/2019       To Whom it May Concern:    Ayanna Alicia was seen in my clinic on 2/6/2019. She may return to work on 2/7/2019. If you have any questions or concerns, please don't hesitate to call.    Sincerely,         Amy Petty LPN

## 2019-02-18 NOTE — PROGRESS NOTES
Subjective:       Patient ID: Ayanna Alicia is a 48 y.o. female.    Chief Complaint   Patient presents with    Hypertension       HPI    Ayanna Alicia is here today for a blood pressure recheck.  Diet low in salt, caffeine twice weekly.  Does c/o frequent HA pain with floaters.  Current HA pain 4/10 on pain scale.  Having issue with asthma, using her pump 3 to 4 times a week now, does have daily maintenance inhaler but not using.  Does c/o cough, sneezing and wheezing.  Taking Aleve for HA pain but not helping.  Denies CP or SOB, no edema reported.        Review of Systems   Constitutional: Negative.    HENT: Positive for congestion, postnasal drip, rhinorrhea and sneezing. Negative for ear pain, sinus pain and sore throat.    Eyes: Negative.    Respiratory: Positive for cough, chest tightness, shortness of breath and wheezing.    Cardiovascular: Negative.    Allergic/Immunologic: Positive for environmental allergies.   Neurological: Positive for headaches. Negative for weakness, light-headedness and numbness.       Review of patient's allergies indicates:   Allergen Reactions    Doxycycline      Other reaction(s): Nausea    Fluticasone Other (See Comments)     Other reaction(s): Epistaxis      Penicillins      Other reaction(s): unknown       Patient Active Problem List   Diagnosis    HTN (hypertension)    Multinodular goiter    Asthma    Seasonal allergies    Morbid obesity with BMI of 45.0-49.9, adult    Depression with anxiety    GERD (gastroesophageal reflux disease)    Vitamin D deficiency    Surgical menopause    Patella-femoral syndrome    Localized osteoarthrosis not specified whether primary or secondary, lower leg       Current Outpatient Medications on File Prior to Visit   Medication Sig Dispense Refill    albuterol (PROVENTIL/VENTOLIN HFA) 90 mcg/actuation inhaler Inhale 1-2 puffs into the lungs every 4 to 6 hours as needed for Wheezing. 18 g 5    APPLE CIDER VINEGAR ORAL Take  "by mouth.      ascorbic acid, vitamin C, (VITAMIN C) 1000 MG tablet Take 1,000 mg by mouth 2 (two) times daily.      azelastine (ASTELIN) 137 mcg (0.1 %) nasal spray 1 spray (137 mcg total) by Nasal route 2 (two) times daily. 15 mL 0    calcium/magnesium/vit B comp (CALCIUM-MAGNESIUM-B COMPLEX ORAL) Take by mouth.      fluticasone (FLOVENT HFA) 44 mcg/actuation inhaler Inhale 2 puffs into the lungs 2 (two) times daily. Controller 10.6 g 1    naproxen sodium (ALEVE) 220 MG tablet       omega-3s/dha/epa/fish oil/D3 (VITAMIN-D + OMEGA-3 ORAL) Take by mouth.      COCONUT OIL ORAL Take by mouth.      inhalation spacing device Use as directed for inhalation. 1 Device 0    montelukast (SINGULAIR) 10 mg tablet Take 1 tablet (10 mg total) by mouth every evening. 30 tablet 6     No current facility-administered medications on file prior to visit.        Past medical, surgical, family and social histories have been reviewed today.        Objective:     Vitals:    02/06/19 1415 02/06/19 1436   BP: 138/88 (!) 150/90   Pulse: 89    Temp: 98.1 °F (36.7 °C)    TempSrc: Oral    SpO2: 98%    Weight: 120.9 kg (266 lb 8.6 oz)    Height: 5' 6" (1.676 m)    PainSc:   4    PainLoc: Head          Physical Exam   Constitutional: She is oriented to person, place, and time. She appears well-developed and well-nourished.   HENT:   Head: Normocephalic and atraumatic.   Right Ear: Tympanic membrane normal.   Left Ear: Tympanic membrane normal.   Nose: Mucosal edema and rhinorrhea (boggy//clear RN) present.   Mouth/Throat: No oropharyngeal exudate, posterior oropharyngeal edema or posterior oropharyngeal erythema.   Eyes: Conjunctivae and EOM are normal. Pupils are equal, round, and reactive to light. Right eye exhibits no discharge. Left eye exhibits no discharge.   Cardiovascular: Normal rate and regular rhythm.   Pulmonary/Chest: Effort normal. No respiratory distress. She has wheezes. She has no rales. She exhibits no tenderness. "   Lymphadenopathy:     She has no cervical adenopathy.   Neurological: She is alert and oriented to person, place, and time.   Vitals reviewed.        Diagnosis       1. Essential hypertension    2. Allergic rhinitis, unspecified seasonality, unspecified trigger    3. Asthma with acute exacerbation, unspecified asthma severity, unspecified whether persistent    4. Frequent headaches          Assessment/ Plan     Essential hypertension  · Uncontrolled at this time, med started.  · Low-salt/caffeine, healthy diet, exercise.  -     verapamil (CALAN-SR) 120 MG CR tablet; Take 1 tablet (120 mg total) by mouth every evening.  Dispense: 90 tablet; Refill: 1    Allergic rhinitis, unspecified seasonality, unspecified trigger  · Zyrtec or Claritin once daily with nasal spray if needed.    Asthma with acute exacerbation, unspecified asthma severity, unspecified whether persistent  · Continue inhalers as ordered.  · Restart Flovent.    Frequent headaches  · HA pain due to allergies or HTN ????        Follow-up in 2 weeks for recheck.  RTC prn.        KEVEN Vegas  [facility]

## 2019-02-20 ENCOUNTER — PATIENT OUTREACH (OUTPATIENT)
Dept: ADMINISTRATIVE | Facility: HOSPITAL | Age: 49
End: 2019-02-20

## 2019-03-26 ENCOUNTER — TELEPHONE (OUTPATIENT)
Dept: FAMILY MEDICINE | Facility: CLINIC | Age: 49
End: 2019-03-26

## 2019-03-26 ENCOUNTER — OFFICE VISIT (OUTPATIENT)
Dept: FAMILY MEDICINE | Facility: CLINIC | Age: 49
End: 2019-03-26
Payer: COMMERCIAL

## 2019-03-26 VITALS
BODY MASS INDEX: 43.87 KG/M2 | RESPIRATION RATE: 20 BRPM | DIASTOLIC BLOOD PRESSURE: 84 MMHG | WEIGHT: 271.81 LBS | SYSTOLIC BLOOD PRESSURE: 136 MMHG | OXYGEN SATURATION: 98 % | HEART RATE: 75 BPM | TEMPERATURE: 97 F

## 2019-03-26 DIAGNOSIS — I10 ESSENTIAL HYPERTENSION: Primary | ICD-10-CM

## 2019-03-26 DIAGNOSIS — R06.83 SNORING: ICD-10-CM

## 2019-03-26 DIAGNOSIS — J30.2 SEASONAL ALLERGIES: ICD-10-CM

## 2019-03-26 DIAGNOSIS — Z76.89 SLEEP CONCERN: ICD-10-CM

## 2019-03-26 PROCEDURE — 3079F PR MOST RECENT DIASTOLIC BLOOD PRESSURE 80-89 MM HG: ICD-10-PCS | Mod: CPTII,S$GLB,, | Performed by: FAMILY MEDICINE

## 2019-03-26 PROCEDURE — 3008F PR BODY MASS INDEX (BMI) DOCUMENTED: ICD-10-PCS | Mod: CPTII,S$GLB,, | Performed by: FAMILY MEDICINE

## 2019-03-26 PROCEDURE — 3079F DIAST BP 80-89 MM HG: CPT | Mod: CPTII,S$GLB,, | Performed by: FAMILY MEDICINE

## 2019-03-26 PROCEDURE — 3075F PR MOST RECENT SYSTOLIC BLOOD PRESS GE 130-139MM HG: ICD-10-PCS | Mod: CPTII,S$GLB,, | Performed by: FAMILY MEDICINE

## 2019-03-26 PROCEDURE — 3075F SYST BP GE 130 - 139MM HG: CPT | Mod: CPTII,S$GLB,, | Performed by: FAMILY MEDICINE

## 2019-03-26 PROCEDURE — 99999 PR PBB SHADOW E&M-EST. PATIENT-LVL III: CPT | Mod: PBBFAC,,, | Performed by: FAMILY MEDICINE

## 2019-03-26 PROCEDURE — 99999 PR PBB SHADOW E&M-EST. PATIENT-LVL III: ICD-10-PCS | Mod: PBBFAC,,, | Performed by: FAMILY MEDICINE

## 2019-03-26 PROCEDURE — 99214 OFFICE O/P EST MOD 30 MIN: CPT | Mod: S$GLB,,, | Performed by: FAMILY MEDICINE

## 2019-03-26 PROCEDURE — 99214 PR OFFICE/OUTPT VISIT, EST, LEVL IV, 30-39 MIN: ICD-10-PCS | Mod: S$GLB,,, | Performed by: FAMILY MEDICINE

## 2019-03-26 PROCEDURE — 3008F BODY MASS INDEX DOCD: CPT | Mod: CPTII,S$GLB,, | Performed by: FAMILY MEDICINE

## 2019-03-26 RX ORDER — MONTELUKAST SODIUM 10 MG/1
10 TABLET ORAL NIGHTLY
Qty: 30 TABLET | Refills: 5 | Status: SHIPPED | OUTPATIENT
Start: 2019-03-26 | End: 2019-04-25

## 2019-03-26 RX ORDER — AMLODIPINE BESYLATE 5 MG/1
5 TABLET ORAL DAILY
Qty: 30 TABLET | Refills: 5 | Status: SHIPPED | OUTPATIENT
Start: 2019-03-26 | End: 2019-08-15 | Stop reason: SDUPTHER

## 2019-03-26 NOTE — TELEPHONE ENCOUNTER
----- Message from Laura Cabezas sent at 3/26/2019 12:31 PM CDT -----  Contact: Patient   Patient forgot to get a excuse for today, Please call her at [phone] and if possible she wants to know if it can be email to her at [email or web address]    Thanks  Td

## 2019-03-26 NOTE — TELEPHONE ENCOUNTER
----- Message from Laura Cabezas sent at 3/26/2019 12:31 PM CDT -----  Contact: Patient   Patient forgot to get a excuse for today, Please call her at [phone] and if possible she wants to know if it can be email to her at [email or web address]    Thanks  Td

## 2019-03-26 NOTE — LETTER
March 26, 2019                 [facility]  Family Medicine  [address]  [place] [address]  Phone: [phone]  Fax: [phone]   March 26, 2019     Patient: Ayanna Alicia   YOB: 1970   Date of Visit: 3/26/2019       To Whom it May Concern:    Ayanna Alicia was seen in my clinic on 3/26/2019. She may return to work on 3/27/2019.    If you have any questions or concerns, please don't hesitate to call.    Sincerely,     MD Filipe Cristobal LPN

## 2019-03-26 NOTE — PROGRESS NOTES
Ayanna Alicia    Chief Complaint   Patient presents with    Hypertension    Follow-up       History of Present Illness:   Ms. Alicia comes in today for hypertension follow-up.  She states she takes medication at night.  She states she exercises 4-5 times per week by walking 45 minutes and by lifting 20 lb weights.  She states she monitors what she eats.    She saw AWAIS Steele on February 6, 2019 at which time verapamil  mg daily was added for blood pressure control.  She states she has no problems with taking verapamil CR except noticed she has gained weight.    She took Dyazide in the past but states it caused her to have cramps if she did not force fluids.    She states she awakens throughout the night without good sleep.  She states she snores.  She has history of asthma.    Otherwise, she denies having fever, chills, fatigue, appetite changes; shortness of breath, cough, wheezing; chest pain, palpitations, leg swelling; abdominal pain, nausea, vomiting, diarrhea, constipation; unusual urinary symptoms; back pain; headache; anxiety, depression, homicidal or suicidal thoughts.      Current Outpatient Medications   Medication Sig    albuterol (PROVENTIL/VENTOLIN HFA) 90 mcg/actuation inhaler Inhale 1-2 puffs into the lungs every 4 to 6 hours as needed for Wheezing.    APPLE CIDER VINEGAR ORAL Take by mouth.    ascorbic acid, vitamin C, (VITAMIN C) 1000 MG tablet Take 1,000 mg by mouth 2 (two) times daily.    azelastine (ASTELIN) 137 mcg (0.1 %) nasal spray 1 spray (137 mcg total) by Nasal route 2 (two) times daily.    calcium/magnesium/vit B comp (CALCIUM-MAGNESIUM-B COMPLEX ORAL) Take by mouth.    COCONUT OIL ORAL Take by mouth.    fluticasone (FLOVENT HFA) 44 mcg/actuation inhaler Inhale 2 puffs into the lungs 2 (two) times daily. Controller    inhalation spacing device Use as directed for inhalation.    naproxen sodium (ALEVE) 220 MG tablet     omega-3s/dha/epa/fish oil/D3 (VITAMIN-D +  OMEGA-3 ORAL) Take by mouth.    verapamil (CALAN-SR) 120 MG CR tablet Take 1 tablet (120 mg total) by mouth every evening.       Review of Systems   Constitutional: Negative for activity change, appetite change, chills, fatigue and fever.        Weight 120.9 kg (266 lb 8.6 oz) at February 6, 2019 visit.   Respiratory: Negative for cough, shortness of breath and wheezing.         Positive for snoring.   Cardiovascular: Negative for chest pain, palpitations and leg swelling.   Gastrointestinal: Negative for abdominal pain, constipation, diarrhea, nausea and vomiting.   Genitourinary: Negative for difficulty urinating.   Musculoskeletal: Negative for back pain.   Neurological: Negative for headaches.   Psychiatric/Behavioral: Positive for sleep disturbance. Negative for dysphoric mood and suicidal ideas. The patient is not nervous/anxious.         Negative for homicidal ideas.       Objective:  Physical Exam   Constitutional: She is oriented to person, place, and time. She appears well-developed and well-nourished. No distress.   Pleasant.   Neck: Normal range of motion. Neck supple. Thyromegaly present.   Non tender.   Cardiovascular: Normal rate, regular rhythm and intact distal pulses.   No murmur heard.  Pulmonary/Chest: Effort normal and breath sounds normal. No respiratory distress. She has no wheezes.   Abdominal: Soft. Bowel sounds are normal. She exhibits no distension and no mass. There is no tenderness. There is no rebound.   Musculoskeletal: Normal range of motion. She exhibits no edema or tenderness.   She is ambulatory without problems.    Lymphadenopathy:     She has no cervical adenopathy.   Neurological: She is alert and oriented to person, place, and time.   Skin: She is not diaphoretic.   Psychiatric: She has a normal mood and affect. Her behavior is normal. Judgment and thought content normal.   Vitals reviewed.      ASSESSMENT:  1. Essential hypertension    2. Seasonal allergies    3. Snoring     4. Sleep concern        PLAN:  Ayanna was seen today for hypertension and follow-up.    Diagnoses and all orders for this visit:    Essential hypertension  -     amLODIPine (NORVASC) 5 MG tablet; Take 1 tablet (5 mg total) by mouth once daily.    Seasonal allergies  -     montelukast (SINGULAIR) 10 mg tablet; Take 1 tablet (10 mg total) by mouth every evening.    Snoring  -     Ambulatory referral to Pulmonology    Sleep concern  -     Ambulatory referral to Pulmonology       Discussed other class medication options with patient. Stop Verapamil. Try Amlodipine 5 mg daily, #30, 5 refills; medication precautions discussed with patient.  Continue current medications, follow low sodium, low cholesterol, low carb diet, daily walks.  Prescription refill as noted above.  Follow up in about 5 months (around 8/13/2019) for physical.

## 2019-04-18 ENCOUNTER — TELEPHONE (OUTPATIENT)
Dept: PULMONOLOGY | Facility: CLINIC | Age: 49
End: 2019-04-18

## 2019-04-18 DIAGNOSIS — J45.40 MODERATE PERSISTENT ASTHMA WITHOUT COMPLICATION: Primary | ICD-10-CM

## 2019-05-20 ENCOUNTER — TELEPHONE (OUTPATIENT)
Dept: FAMILY MEDICINE | Facility: CLINIC | Age: 49
End: 2019-05-20

## 2019-05-20 DIAGNOSIS — Z12.31 VISIT FOR SCREENING MAMMOGRAM: Primary | ICD-10-CM

## 2019-05-20 NOTE — TELEPHONE ENCOUNTER
Mammogram order in. However, she just had thyroid US done in November 2018; so, not sure she needs it now and can be scheduled for November 2019 when I see her in August 2019. Thanks.

## 2019-05-20 NOTE — TELEPHONE ENCOUNTER
----- Message from Mili Mcclendon sent at 5/20/2019  1:00 PM CDT -----  Contact: self  Pt is requesting orders for mammogram and ultrasound forthyroid . Please call pt once orders have been placed in system at [phone].        Thanks,   Mili Mcclendon

## 2019-05-21 ENCOUNTER — OFFICE VISIT (OUTPATIENT)
Dept: FAMILY MEDICINE | Facility: CLINIC | Age: 49
End: 2019-05-21
Payer: COMMERCIAL

## 2019-05-21 ENCOUNTER — TELEPHONE (OUTPATIENT)
Dept: PULMONOLOGY | Facility: HOSPITAL | Age: 49
End: 2019-05-21

## 2019-05-21 ENCOUNTER — HOSPITAL ENCOUNTER (OUTPATIENT)
Dept: RADIOLOGY | Facility: HOSPITAL | Age: 49
Discharge: HOME | End: 2019-05-21
Attending: FAMILY MEDICINE
Payer: COMMERCIAL

## 2019-05-21 VITALS
HEIGHT: 66 IN | WEIGHT: 272.06 LBS | DIASTOLIC BLOOD PRESSURE: 88 MMHG | HEART RATE: 100 BPM | BODY MASS INDEX: 43.72 KG/M2 | TEMPERATURE: 99 F | RESPIRATION RATE: 20 BRPM | SYSTOLIC BLOOD PRESSURE: 138 MMHG | OXYGEN SATURATION: 99 %

## 2019-05-21 DIAGNOSIS — J45.901 ASTHMA WITH ACUTE EXACERBATION, UNSPECIFIED ASTHMA SEVERITY, UNSPECIFIED WHETHER PERSISTENT: Primary | ICD-10-CM

## 2019-05-21 DIAGNOSIS — R29.818 SUSPECTED SLEEP APNEA: ICD-10-CM

## 2019-05-21 DIAGNOSIS — Z12.31 VISIT FOR SCREENING MAMMOGRAM: ICD-10-CM

## 2019-05-21 DIAGNOSIS — J06.9 VIRAL URI: ICD-10-CM

## 2019-05-21 PROCEDURE — 77067 SCR MAMMO BI INCL CAD: CPT | Mod: 26,,, | Performed by: RADIOLOGY

## 2019-05-21 PROCEDURE — 77067 SCR MAMMO BI INCL CAD: CPT | Mod: TC

## 2019-05-21 PROCEDURE — 77063 BREAST TOMOSYNTHESIS BI: CPT | Mod: 26,,, | Performed by: RADIOLOGY

## 2019-05-21 PROCEDURE — 3075F PR MOST RECENT SYSTOLIC BLOOD PRESS GE 130-139MM HG: ICD-10-PCS | Mod: CPTII,S$GLB,, | Performed by: REGISTERED NURSE

## 2019-05-21 PROCEDURE — 3075F SYST BP GE 130 - 139MM HG: CPT | Mod: CPTII,S$GLB,, | Performed by: REGISTERED NURSE

## 2019-05-21 PROCEDURE — 3079F DIAST BP 80-89 MM HG: CPT | Mod: CPTII,S$GLB,, | Performed by: REGISTERED NURSE

## 2019-05-21 PROCEDURE — 3079F PR MOST RECENT DIASTOLIC BLOOD PRESSURE 80-89 MM HG: ICD-10-PCS | Mod: CPTII,S$GLB,, | Performed by: REGISTERED NURSE

## 2019-05-21 PROCEDURE — 99999 PR PBB SHADOW E&M-EST. PATIENT-LVL III: ICD-10-PCS | Mod: PBBFAC,,, | Performed by: REGISTERED NURSE

## 2019-05-21 PROCEDURE — 99214 PR OFFICE/OUTPT VISIT, EST, LEVL IV, 30-39 MIN: ICD-10-PCS | Mod: S$GLB,,, | Performed by: REGISTERED NURSE

## 2019-05-21 PROCEDURE — 3008F BODY MASS INDEX DOCD: CPT | Mod: CPTII,S$GLB,, | Performed by: REGISTERED NURSE

## 2019-05-21 PROCEDURE — 77067 MAMMO DIGITAL SCREENING BILAT WITH TOMOSYNTHESIS_CAD: ICD-10-PCS | Mod: 26,,, | Performed by: RADIOLOGY

## 2019-05-21 PROCEDURE — 99999 PR PBB SHADOW E&M-EST. PATIENT-LVL III: CPT | Mod: PBBFAC,,, | Performed by: REGISTERED NURSE

## 2019-05-21 PROCEDURE — 77063 MAMMO DIGITAL SCREENING BILAT WITH TOMOSYNTHESIS_CAD: ICD-10-PCS | Mod: 26,,, | Performed by: RADIOLOGY

## 2019-05-21 PROCEDURE — 3008F PR BODY MASS INDEX (BMI) DOCUMENTED: ICD-10-PCS | Mod: CPTII,S$GLB,, | Performed by: REGISTERED NURSE

## 2019-05-21 PROCEDURE — 99214 OFFICE O/P EST MOD 30 MIN: CPT | Mod: S$GLB,,, | Performed by: REGISTERED NURSE

## 2019-05-21 RX ORDER — PROMETHAZINE HYDROCHLORIDE AND DEXTROMETHORPHAN HYDROBROMIDE 6.25; 15 MG/5ML; MG/5ML
5 SYRUP ORAL
Qty: 118 ML | Refills: 0 | Status: SHIPPED | OUTPATIENT
Start: 2019-05-21 | End: 2019-11-07

## 2019-05-21 RX ORDER — PREDNISONE 20 MG/1
TABLET ORAL
Qty: 10 TABLET | Refills: 0 | Status: SHIPPED | OUTPATIENT
Start: 2019-05-21 | End: 2019-07-08 | Stop reason: SDUPTHER

## 2019-05-21 NOTE — LETTER
May 21, 2019      [facility]  [address] [address]  Phone: [phone]  Fax: [phone]       Patient: Ayanna Alicia   YOB: 1970  Date of Visit: 05/21/2019    To Whom It May Concern:    Krystal Alicia  was at [facility] on 05/21/2019. She may return to work/school on 05/21/2019 with no restrictions. If you have any questions or concerns, or if I can be of further assistance, please do not hesitate to contact me.    Sincerely,    Avani Hamilton LPN

## 2019-05-21 NOTE — PROGRESS NOTES
Subjective:       Patient ID: Ayanna Alicia is a 48 y.o. female.    Chief Complaint   Patient presents with    Nasal Congestion    Cough       HPI    Ayanna Alicia is here today with c/o not feeling well x 3 weeks.  OTC medication not helping.  Reports productive cough, sweats, and NC.  Does have some rare intermittent wheezing.  History of asthma.    She is wondering if she has sleep apnea.  Reports not sleeping well with c/o snoring and waking up gasping for air.  Denies daytime somnolence, although having some fatigue.    EPWORTH SLEEPINESS SCALE:  Total score ---- 3  See flowsheet section for results.        Review of Systems   Constitutional: Positive for diaphoresis. Negative for chills.   HENT: Positive for congestion and postnasal drip. Negative for rhinorrhea and sore throat.    Eyes: Negative.    Respiratory: Positive for cough and wheezing. Negative for chest tightness and shortness of breath.    Cardiovascular: Negative.    Neurological: Negative.    Hematological: Negative for adenopathy.       Review of patient's allergies indicates:   Allergen Reactions    Doxycycline Nausea Only     Other reaction(s): Nausea    Fluticasone Other (See Comments)     Other reaction(s): Epistaxis      Penicillins      Other reaction(s): unknown       Patient Active Problem List   Diagnosis    HTN (hypertension)    Multinodular goiter    Asthma    Seasonal allergies    Morbid obesity with BMI of 45.0-49.9, adult    Depression with anxiety    GERD (gastroesophageal reflux disease)    Vitamin D deficiency    Surgical menopause    Patella-femoral syndrome    Localized osteoarthrosis not specified whether primary or secondary, lower leg       Current Outpatient Medications on File Prior to Visit   Medication Sig Dispense Refill    albuterol (PROVENTIL/VENTOLIN HFA) 90 mcg/actuation inhaler Inhale 1-2 puffs into the lungs every 4 to 6 hours as needed for Wheezing. 18 g 5    amLODIPine (NORVASC) 5 MG  "tablet Take 1 tablet (5 mg total) by mouth once daily. 30 tablet 5    calcium/magnesium/vit B comp (CALCIUM-MAGNESIUM-B COMPLEX ORAL) Take by mouth.      COCONUT OIL ORAL Take by mouth.      naproxen sodium (ALEVE) 220 MG tablet       omega-3s/dha/epa/fish oil/D3 (VITAMIN-D + OMEGA-3 ORAL) Take by mouth.       No current facility-administered medications on file prior to visit.        Past medical, surgical, family and social histories have been reviewed today.        Objective:     Vitals:    05/21/19 0828   BP: 138/88   Pulse: 100   Resp: 20   Temp: 98.6 °F (37 °C)   TempSrc: Oral   SpO2: 99%   Weight: 123.4 kg (272 lb 0.8 oz)   Height: 5' 6" (1.676 m)   PainSc:   4   PainLoc: Generalized         Physical Exam   Constitutional: She is oriented to person, place, and time. She appears well-developed and well-nourished.   HENT:   Head: Normocephalic and atraumatic.   Right Ear: Tympanic membrane normal.   Left Ear: Tympanic membrane normal.   Nose: Mucosal edema and rhinorrhea present. No sinus tenderness. No epistaxis. Right sinus exhibits no maxillary sinus tenderness and no frontal sinus tenderness. Left sinus exhibits no maxillary sinus tenderness and no frontal sinus tenderness.   Mouth/Throat: Mucous membranes are normal. No oropharyngeal exudate, posterior oropharyngeal edema or posterior oropharyngeal erythema (some PND noted).   Eyes: Pupils are equal, round, and reactive to light.   Cardiovascular: Normal rate and regular rhythm.   Pulmonary/Chest: Effort normal and breath sounds normal. No respiratory distress. She has no wheezes. She has no rales. She exhibits no tenderness.   Lymphadenopathy:     She has no cervical adenopathy.   Neurological: She is alert and oriented to person, place, and time.   Vitals reviewed.              Diagnosis       1. Asthma with acute exacerbation, unspecified asthma severity, unspecified whether persistent    2. Viral URI    3. Suspected sleep apnea          Assessment/ " Plan     Asthma with acute exacerbation, unspecified asthma severity, unspecified whether persistent  -     promethazine-dextromethorphan (PROMETHAZINE-DM) 6.25-15 mg/5 mL Syrp; Take 5 mLs by mouth every 4 to 6 hours as needed.  Dispense: 118 mL; Refill: 0  -     predniSONE (DELTASONE) 20 MG tablet; Take 2 tab daily x 3 days, 1 tab daily x 3 days, then 1/2 tab daily x 2 days.  Dispense: 10 tablet; Refill: 0    Viral URI  -     promethazine-dextromethorphan (PROMETHAZINE-DM) 6.25-15 mg/5 mL Syrp; Take 5 mLs by mouth every 4 to 6 hours as needed.  Dispense: 118 mL; Refill: 0  -     predniSONE (DELTASONE) 20 MG tablet; Take 2 tab daily x 3 days, 1 tab daily x 3 days, then 1/2 tab daily x 2 days.  Dispense: 10 tablet; Refill: 0    Suspected sleep apnea  -     Home Sleep Studies; Future          Medication discussed, take as directed.  Inhaler as ordered.  Symptomatic care, rest & fluids.  Follow-up in clinic as needed.          KEVEN Vegas  [facility]

## 2019-06-20 ENCOUNTER — TELEPHONE (OUTPATIENT)
Dept: PULMONOLOGY | Facility: CLINIC | Age: 49
End: 2019-06-20

## 2019-06-20 NOTE — PROGRESS NOTES
Subjective:      Patient ID: Ayanna Alicia is a 48 y.o. female.    Patient Active Problem List   Diagnosis    HTN (hypertension)    Multinodular goiter    Asthma    Seasonal allergies    Morbid obesity with BMI of 45.0-49.9, adult    Depression with anxiety    GERD (gastroesophageal reflux disease)    Vitamin D deficiency    Surgical menopause    Patella-femoral syndrome    Localized osteoarthrosis not specified whether primary or secondary, lower leg       she has been referred by Madeleine Enrique MD for evaluation and management for   Chief Complaint   Patient presents with    Sleep Apnea     Chief Complaint: Sleep Apnea    HPI:  She presents for a sleep evaluation, home sleep testing ordered May 2019 by primary care provider office, not yet done.  Patient has complained of daytime sleepiness, snoring  Patient has observed snoring, periods of not breathing, feels sleepy during the day.  Patient reports non restful sleep.  She denies morning headache.   She reports day time napping rarely; duration 20 - 30 Minutes  She denies recent weight gain.  Cardiovascular risk factors: hypertension and obesity  Bed time is 0900 - 1030  Wake time is 0545 - 0600  Sleep onset is within 15 Minutes.  Sleep maintenance difficulties related to frequent night time awakening and non-restful sleep  Wake after sleep onset occurs five times a night or more.  Nocturia occurs three - four times a night,   Sleep aids :  NO  Dry mouth : YES  Sleep walking:  NO  Sleep talking :  NO  Sleep eating: NO   Vivid Dreams :  NO  Cataplexy :  NO    [place] sleepiness score was 9.  Neck circumference is 44 cm. (17.5 inches).  Mallampati score 3    STOP - BANG Questionnaire:   1. Snoring : Do you snore loudly ?     YES  2. Tired : Do you often feel tired, fatigued, or sleepy during daytime?   YES  3. Observed: Has anyone observed you stop breathing during your sleep?     YES  4. Blood pressure : Do you have or are you being treated  for high blood pressure?    YES  5. BMI :BMI more than 35 kg/m2?    YES  6. Age : Age over 50 yr old?   NO  7. Neck circumference: Neck circumference greater than 40 cm?    YES  8. Gender: Gender male?   NO    SCORE: 6    High risk of GLENN: Yes 5 - 8  Intermediate risk of GLENN: Yes 3 - 4  Low risk of GLENN: Yes 0 - 2    Occupational History:   Employed full time as   with [employer] [place]      Previous Report Reviewed: lab reports, office notes and radiology reports     Past Medical History: The following portions of the patient's history were reviewed and updated as appropriate:   She  has a past surgical history that includes  section, classic; Multiple tooth extractions; Partial hysterectomy (2013); and Hysterectomy.  Her family history includes Cancer (age of onset: 50) in her maternal aunt; Cancer (age of onset: 60) in her maternal grandmother; Cancer (age of onset: 66) in her maternal aunt; Cataracts in her cousin; Diabetes in her maternal grandfather and mother; Heart disease (age of onset: 63) in her father; Hypertension in her father; Migraines in her cousin; Stroke in her maternal grandfather, mother, and sister.  She  reports that she has never smoked. She has never used smokeless tobacco. She reports that she drinks alcohol. She reports that she has current or past drug history. Drug: Marijuana. Frequency: 2.00 times per week.  She has a current medication list which includes the following prescription(s): albuterol, amlodipine, calcium/magnesium/vit b comp, coconut oil, montelukast, naproxen sodium, omega-3s/dha/epa/fish oil/d3, prednisone, and promethazine-dextromethorphan.  She is allergic to doxycycline; fluticasone; and penicillins..    Review of Systems   Constitutional: Negative for fever, chills, weight loss, weight gain, activity change, appetite change, fatigue and night sweats.   HENT: Negative for postnasal drip, rhinorrhea, sinus pressure, voice change and congestion.    Eyes:  "Negative for redness and itching.   Respiratory: Positive for snoring and somnolence. Negative for cough, sputum production, chest tightness, shortness of breath, wheezing, orthopnea, asthma nighttime symptoms, dyspnea on extertion and use of rescue inhaler.    Cardiovascular: Negative.  Negative for chest pain, palpitations and leg swelling.   Genitourinary: Negative for difficulty urinating and hematuria.   Endocrine: Negative for cold intolerance and heat intolerance.    Musculoskeletal: Negative for arthralgias, gait problem, joint swelling and myalgias.   Skin: Negative.    Gastrointestinal: Negative for nausea, vomiting, abdominal pain and acid reflux.   Neurological: Negative for dizziness, weakness, light-headedness and headaches.   Hematological: Negative for adenopathy. No excessive bruising.   All other systems reviewed and are negative.     Objective:   /80   Pulse 96   Resp 18   Ht 5' 6" (1.676 m)   Wt 126.8 kg (279 lb 8.7 oz)   SpO2 96%   BMI 45.12 kg/m²   Physical Exam   Constitutional: She is oriented to person, place, and time. She appears well-developed and well-nourished. She is active and cooperative.  Non-toxic appearance. She does not appear ill. No distress.   HENT:   Head: Normocephalic.   Right Ear: External ear normal.   Left Ear: External ear normal.   Nose: Nose normal.   Mouth/Throat: Oropharynx is clear and moist. No oropharyngeal exudate.   Eyes: Conjunctivae are normal.   Neck: Normal range of motion. Neck supple.   Cardiovascular: Normal rate, regular rhythm, normal heart sounds and intact distal pulses.   Pulmonary/Chest: Effort normal and breath sounds normal. No stridor.   Abdominal: Soft.   Musculoskeletal: Normal range of motion.   Lymphadenopathy:     She has no cervical adenopathy.   Neurological: She is alert and oriented to person, place, and time.   Skin: Skin is warm and dry.   Psychiatric: She has a normal mood and affect. Her behavior is normal. Judgment and " thought content normal.   Vitals reviewed.    Personal Diagnostic Review  Review of labs, xray's, cardiology reports.     Assessment:     1. Sleep-disordered breathing    2. Snoring    3. Daytime sleepiness    4. Morbid obesity with BMI of 45.0-49.9, adult    5. Mild intermittent asthma without complication      No orders of the defined types were placed in this encounter.    Plan:   Discussed diagnosis, its evaluation, treatment and usual course. All questions answered.  Problem List Items Addressed This Visit     Morbid obesity with BMI of 45.0-49.9, adult     Encouraged calorie reduction and 30 minutes of exercise daily. Discussed impact of obesity on general health.  Mediterranean diet highlights reviewed and provided per after visit summary  Has regular exercise program 6 days a week          Asthma     Followed by primary care provider, has albuterol inhaler and on singular           Other Visit Diagnoses     Sleep-disordered breathing    -  Primary    At high risk for sleep apnea proceed with home sleep testing if negative will order 3 night home study if positive will proceed with auto CPAP treatment.    Snoring        Daytime sleepiness            Plan summary:  Proceed with home sleep study ordered by Cullen Steele NP, scheduled to be performed in a few days, if GLENN detected then plan to order auto CPAP and begin therapies in follow-up in about 8 9 weeks for initial compliance download.  CPAP goals reviewed, therapeutic goals for positive airway pressure therapy Auto CPAP. Ideal is usage 100% of nights for 6 - 8 hours per night. Minimum usage is 70% of night for at least 4 hours per night used. Pateint expressed understanding.     Follow up for call report on Hsat/begin APAP then fu CPAP compliance download after initial set up.     Thank you for the opportunity to participate in the care of this patient.

## 2019-06-21 ENCOUNTER — OFFICE VISIT (OUTPATIENT)
Dept: PULMONOLOGY | Facility: CLINIC | Age: 49
End: 2019-06-21
Payer: COMMERCIAL

## 2019-06-21 VITALS
OXYGEN SATURATION: 96 % | WEIGHT: 279.56 LBS | DIASTOLIC BLOOD PRESSURE: 80 MMHG | HEART RATE: 96 BPM | RESPIRATION RATE: 18 BRPM | SYSTOLIC BLOOD PRESSURE: 138 MMHG | HEIGHT: 66 IN | BODY MASS INDEX: 44.93 KG/M2

## 2019-06-21 DIAGNOSIS — R40.0 DAYTIME SLEEPINESS: ICD-10-CM

## 2019-06-21 DIAGNOSIS — J45.20 MILD INTERMITTENT ASTHMA WITHOUT COMPLICATION: ICD-10-CM

## 2019-06-21 DIAGNOSIS — R06.83 SNORING: ICD-10-CM

## 2019-06-21 DIAGNOSIS — E66.01 MORBID OBESITY WITH BMI OF 45.0-49.9, ADULT: ICD-10-CM

## 2019-06-21 DIAGNOSIS — G47.30 SLEEP-DISORDERED BREATHING: Primary | ICD-10-CM

## 2019-06-21 PROCEDURE — 3075F SYST BP GE 130 - 139MM HG: CPT | Mod: CPTII,S$GLB,, | Performed by: NURSE PRACTITIONER

## 2019-06-21 PROCEDURE — 3079F PR MOST RECENT DIASTOLIC BLOOD PRESSURE 80-89 MM HG: ICD-10-PCS | Mod: CPTII,S$GLB,, | Performed by: NURSE PRACTITIONER

## 2019-06-21 PROCEDURE — 99999 PR PBB SHADOW E&M-EST. PATIENT-LVL IV: ICD-10-PCS | Mod: PBBFAC,,, | Performed by: NURSE PRACTITIONER

## 2019-06-21 PROCEDURE — 3079F DIAST BP 80-89 MM HG: CPT | Mod: CPTII,S$GLB,, | Performed by: NURSE PRACTITIONER

## 2019-06-21 PROCEDURE — 3008F BODY MASS INDEX DOCD: CPT | Mod: CPTII,S$GLB,, | Performed by: NURSE PRACTITIONER

## 2019-06-21 PROCEDURE — 99204 OFFICE O/P NEW MOD 45 MIN: CPT | Mod: S$GLB,,, | Performed by: NURSE PRACTITIONER

## 2019-06-21 PROCEDURE — 99204 PR OFFICE/OUTPT VISIT, NEW, LEVL IV, 45-59 MIN: ICD-10-PCS | Mod: S$GLB,,, | Performed by: NURSE PRACTITIONER

## 2019-06-21 PROCEDURE — 99999 PR PBB SHADOW E&M-EST. PATIENT-LVL IV: CPT | Mod: PBBFAC,,, | Performed by: NURSE PRACTITIONER

## 2019-06-21 PROCEDURE — 3075F PR MOST RECENT SYSTOLIC BLOOD PRESS GE 130-139MM HG: ICD-10-PCS | Mod: CPTII,S$GLB,, | Performed by: NURSE PRACTITIONER

## 2019-06-21 PROCEDURE — 3008F PR BODY MASS INDEX (BMI) DOCUMENTED: ICD-10-PCS | Mod: CPTII,S$GLB,, | Performed by: NURSE PRACTITIONER

## 2019-06-21 RX ORDER — MONTELUKAST SODIUM 10 MG/1
TABLET ORAL
Refills: 5 | COMMUNITY
Start: 2019-06-01 | End: 2019-08-15

## 2019-06-21 NOTE — LETTER
June 21, 2019      Madeleine Enrique MD  [address] [address]           [facility]  [address]  [place] [address]  Phone: [phone]  Fax: [phone]          Patient: Ayanna Alicia   MR Number: [id number]   YOB: 1970   Date of Visit: 6/21/2019       Dear Dr. Madeleine Enrique:    Thank you for referring Ayanna Alicia to me for evaluation. Attached you will find relevant portions of my assessment and plan of care.    If you have questions, please do not hesitate to call me. I look forward to following Ayanna Alicia along with you.    Sincerely,    Demetrice Carr, NP    Enclosure  CC:  No Recipients    If you would like to receive this communication electronically, please contact [email or web address] or [phone] to request more information on [facility] [employer] access.    For providers and/or their staff who would like to refer a patient to [facility], please contact us through our one-stop-shop provider referral line, [facility] , at [phone].    If you feel you have received this communication in error or would no longer like to receive these types of communications, please e-mail [email or web address]

## 2019-06-21 NOTE — PATIENT INSTRUCTIONS
What Are Snoring and Obstructive Sleep Apnea?  If youve ever had a stuffed-up nose, you know the feeling of trying to breathe through a very narrow passageway. This is what happens in your throat when you snore. While you sleep, structures in your throat partially block your air passage, making the passage narrow and hard to breathe through. If the entire passage becomes blocked and you cant breathe at all, you have sleep apnea.      Snoring Obstructive sleep apnea   Snoring  If your throat structures are too large or the muscles relax too much during sleep, the air passage may be partially blocked. As air from the nose or mouth passes around this blockage, the throat structures vibrate, causing the familiar sound of snoring. At times, this sound can be so loud that snorers wake up others, or even themselves, during the night. Snoring gets worse as more and more of the air passage is blocked.  Obstructive sleep apnea  If the structures completely block the throat, air cant flow to the lungs at all. This is called apnea (meaning no breathing). Since the lungs arent getting fresh air, the brain tells the body to wake up just enough to tighten the muscles and unblock the air passage. With a loud gasp, breathing begins again. This process may be repeated over and over again throughout the night, making your sleep fragmented with a lighter stage of sleep. Even though you do not remember waking up many times during the night to a lighter sleep, you feel tired the next day. The lack of sleep and fresh air can also strain your lungs, heart, and other organs, leading to problems such as high blood pressure, heart attack, or stroke.  Problems in the nose and jaw  Problems in the structure of the nose may obstruct breathing. A crooked (deviated) septum or swollen turbinates can make snoring worse or lead to apnea. Also, a receding jaw may make the tongue sit too far back, so its more likely to block the airway when  youre asleep.        Date Last Reviewed: 7/18/2015  © 1451-3384 [employer]. [address] [place] [address]. All rights reserved. This information is not intended as a substitute for professional medical care. Always follow your healthcare professional's instructions.        Continuous Positive Air Pressure (CPAP)     A mask over the nose gently directs air into the throat to keep the airway open.   Continuous positive air pressure (CPAP) uses gentle air pressure to hold the airway open. CPAP is often the most effective treatment for sleep apnea and severe snoring. It works very well for many people. But keep in mind that it can take several adjustments before the setup is right for you.  How CPAP works  The CPAP machine  is a small portable pump beside the bed. The pump sends air through a hose, which is held over your nose and mouth by a mask. Mild air pressure is gently pushed through your airway. The air pressure nudges sagging tissues aside. This widens the airway so you can breathe better. CPAP may be combined with other kinds of therapy for sleep apnea.  Types of air pressure treatments  There are different types of CPAP. Your doctor or CPAP technician will help you decide which type is best for you:  · Basic CPAP keeps the pressure constant all night long.  · A bilevel device (BiPAP) provides more pressure when you breathe in and less when you breathe out. A BiPAP machine also may be set to provide automatic breaths to maintain breathing if you stop breathing while sleeping.  · An autoCPAP device automatically adjusts pressure throughout the night and in response to changes such as body position, sleep stage, and snoring.  Date Last Reviewed: 8/10/2015  © 0138-3961 [employer]. [address] [place], [place] [id number]. All rights reserved. This information is not intended as a substitute for professional medical care. Always follow your healthcare professional's  instructions.    Key components of the Mediterranean diet     The Mediterranean diet emphasizes:     Eating primarily plant-based foods, such as fruits and vegetables, whole grains, legumes and nuts  Replacing butter with healthy fats such as olive oil and canola oil  Using herbs and spices instead of salt to flavor foods  Limiting red meat to no more than a few times a month  Eating fish and poultry at least twice a week  Enjoying meals with family and friends  Drinking red wine in moderation (optional)  Getting plenty of exercise        The Mediterranean diet pyramid            Low-Salt Diet  This diet removes foods that are high in salt. It also limits the amount of salt you use when cooking. It is most often used for people with high blood pressure, edema (fluid retention), and kidney, liver, or heart disease.  Table salt contains the mineral sodium. Your body needs sodium to work normally. But too much sodium can make your health problems worse. Your healthcare provider is recommending a low-salt (also called low-sodium) diet for you. Your total daily allowance of salt is 1,500 to 2,300 milligrams (mg). It is less than 1 teaspoon of table salt. This means you can have only about 500 to 700 mg of sodium at each meal. People with certain health problems should limit salt intake to the lower end of the recommended range.    When you cook, dont add much salt. If you can cook without using salt, even better. Dont add salt to your food at the table.  When shopping, read food labels. Salt is often called sodium on the label. Choose foods that are salt-free, low salt, or very low salt. Note that foods with reduced salt may not lower your salt intake enough.    Beans, potatoes, and pasta  Ok: Dry beans, split peas, lentils, potatoes, rice, macaroni, pasta, spaghetti without added salt  Avoid: Potato chips, tortilla chips, and similar products  Breads and cereals  Ok: Low-sodium breads, rolls, cereals, and cakes;  low-salt crackers, matzo crackers  Avoid: Salted crackers, pretzels, popcorn, [place] toast, pancakes, muffins  Dairy  Ok: Milk, chocolate milk, hot chocolate mix, low-salt cheeses, and yogurt  Avoid: Processed cheese and cheese spreads; Roquefort, Camembert, and cottage cheese; buttermilk, instant breakfast drink  Desserts  Ok: Ice cream, frozen yogurt, juice bars, gelatin, cookies and pies, sugar, honey, jelly, hard candy  Avoid: Most pies, cakes and cookies prepared or processed with salt; instant pudding  Drinks  Ok: Tea, coffee, fizzy (carbonated) drinks, juices  Avoid: Flavored coffees, electrolyte replacement drinks, sports drinks  Meats  Ok: All fresh meat, fish, poultry, low-salt tuna, eggs, egg substitute  Avoid: Smoked, pickled, brine-cured, or salted meats and fish. This includes kruse, chipped beef, corned beef, hot dogs, deli meats, ham, kosher meats, salt pork, sausage, canned tuna, salted codfish, smoked salmon, herring, sardines, or anchovies.  Seasonings and spices  Ok: Most seasonings are okay. Good substitutes for salt include: fresh herb blends, hot sauce, lemon, garlic, hernandez, vinegar, dry mustard, parsley, cilantro, horseradish, tomato paste, regular margarine, mayonnaise, unsalted butter, cream cheese, vegetable oil, cream, low-salt salad dressing and gravy.  Avoid: Regular ketchup, relishes, pickles, soy sauce, teriyaki sauce, Worcestershire sauce, BBQ sauce, tartar sauce, meat tenderizer, chili sauce, regular gravy, regular salad dressing, salted butter  Soups  Ok: Low-salt soups and broths made with allowed foods  Avoid: Bouillon cubes, soups with smoked or salted meats, regular soup and broth  Vegetables  Ok: Most vegetables are okay; also low-salt tomato and vegetable juices  Avoid: Sauerkraut and other brine-soaked vegetables; pickles and other pickled vegetables; tomato juice, olives  Date Last Reviewed: 8/1/2016  © 4940-8658 [employer]. [address] [place] [address], [place],  "[place] [address]. All rights reserved. This information is not intended as a substitute for professional medical care. Always follow your healthcare professional's instructions.    Dietary therapy -- Many types of diets produce modest weight loss. Options include balanced low-calorie, low-fat/low-calorie, moderate-fat/low-calorie, and low-carbohydrate diets, as well as the Mediterranean diet, Keto diet, Paleo diet. Staying on the diet chosen is an important predictor of weight loss, regardless of the type of diet. Thus, we suggest tailoring a diet that reduces calorie (energy) intake below calories used by the body (energy expenditure)  to individual patient preferences, rather than focusing on the protein, carbohydrate, or fat composition of the diet. The addition of dietary counseling may improve weight loss, particularly during the first year.     Metabolic studies using state-of-the-art techniques have shown all adults will lose weight when fed less [id number] kcal/day.     Thus, even patients who are concerned that they are "metabolically resistant" to weight loss will lose weight if they comply with a diet of 800 to 1200 kcal/day.     More severe caloric restriction might be expected to induce weight loss more quickly, but a comparison with 400 versus 800 kcal/day diet formulas showed no difference in weight loss, presumably due to slowing of resting metabolic rate. We thus recommend diets with greater than 800 kcal/day.     Exercise -- Although less effect than dietary restriction in promoting weight loss, increasing energy expenditure through physical activity is a strong predictor of weight loss maintenance. Physical activity should be performed for approximately 30 minutes or more, five to seven days a week, to prevent weight gain and to improve cardiovascular health.     There appears to be a dose effect for physical activity and weight loss, and much greater amounts of exercise are necessary to produce significant " weight loss in the absence of a calorically restricted diet. Therefore, when weight loss is the desired goal, a diet should be combined with physical activity and the activity should be gradually increased over time as tolerated by the patient.     A multi component program that includes aerobic and resistance training is preferred.     Existing medical conditions, age, and preferences for types of exercise should all be considered in the decisions.

## 2019-06-21 NOTE — LETTER
June 21, 2019      [facility]  [address]  [place] [address]  Phone: [phone]  Fax: [phone]       Patient: Ayanna Alicia   YOB: 1970  Date of Visit: 06/21/2019    To Whom It May Concern:    Ayanna Alicia  was at [facility] on 06/21/2019. She may return to work on 6/21/19 with no restrictions. If you have any questions or concerns, or if I can be of further assistance, please do not hesitate to contact me.    Sincerely,    Kary Naranjo LPN

## 2019-06-21 NOTE — ASSESSMENT & PLAN NOTE
Encouraged calorie reduction and 30 minutes of exercise daily. Discussed impact of obesity on general health.  Mediterranean diet highlights reviewed and provided per after visit summary  Has regular exercise program 6 days a week

## 2019-07-02 NOTE — TELEPHONE ENCOUNTER
----- Message from Jeanie Pelayo sent at 9/1/2017  8:35 AM CDT -----  Contact: pt  The pt wants to reschedule her US appt, pt can be reached at [phone]///thxMW  
No answer. LM to rtc  
Principal Discharge DX:	Epinephrine adverse reaction, initial encounter

## 2019-07-03 ENCOUNTER — PROCEDURE VISIT (OUTPATIENT)
Dept: SLEEP MEDICINE | Facility: CLINIC | Age: 49
End: 2019-07-03
Payer: COMMERCIAL

## 2019-07-03 DIAGNOSIS — G47.33 OSA (OBSTRUCTIVE SLEEP APNEA): Primary | ICD-10-CM

## 2019-07-03 PROCEDURE — 99499 NO LOS: ICD-10-PCS | Mod: S$GLB,,, | Performed by: INTERNAL MEDICINE

## 2019-07-03 PROCEDURE — 95800 SLP STDY UNATTENDED: CPT

## 2019-07-03 PROCEDURE — 99499 UNLISTED E&M SERVICE: CPT | Mod: S$GLB,,, | Performed by: INTERNAL MEDICINE

## 2019-07-03 NOTE — Clinical Note
PHYSICIAN INTERPRETATION AND COMMENTS: Findings are consistent with MILD obstructive sleep apnea(GLENN). Overall AHI was 11.0/hr with 5.5 hours sleep.CLINICAL HISTORY: 48 year old female presented with: Patient has observed snoring, periods of not breathing, feelssleepy during the day. She reports non restful sleep. 16.5 inch neck, BMI of 45, an [place] sleepiness score of 8, history ofhypertension and symptoms of nocturnal snoring, waking up choking and witnessed apneas. Based on the clinical history, thepatient has a high pre-test probability of having severe GLENN.SLEEP STUDY FINDINGS: Patient underwent a one night Home Sleep Test and by behavioral criteria, slept forapproximately 5.5 hours, with a sleep latency of 8 minutes and a sleep efficiency of 70.9%. Mild sleep disordered breathing(AHI=11) is noted based on a 4% hypopnea desaturation criteria. The patient slept supine 16.7% of the night based on validrecording time of 5.47 hours. When considering more subtle measures of sleep disordere

## 2019-07-05 PROCEDURE — 95800 PR SLEEP STUDY, UNATTENDED, RECORD HEART RATE/O2 SAT/RESP ANAL/SLEEP TIME: ICD-10-PCS | Mod: 26,,, | Performed by: INTERNAL MEDICINE

## 2019-07-05 PROCEDURE — 95800 SLP STDY UNATTENDED: CPT | Mod: 26,,, | Performed by: INTERNAL MEDICINE

## 2019-07-05 NOTE — PROCEDURES
Home Sleep Studies  Date/Time: 7/5/2019 8:11 AM  Performed by: Agustín Aviles MD  Authorized by: Cullen Steele NP       PHYSICIAN INTERPRETATION AND COMMENTS: Findings are consistent with MILD obstructive sleep apnea  (GLENN). Overall AHI was 11.0/hr with 5.5 hours sleep.  CLINICAL HISTORY: 48 year old female presented with: Patient has observed snoring, periods of not breathing, feels  sleepy during the day. She reports non restful sleep. 16.5 inch neck, BMI of 45, an [place] sleepiness score of 8, history of  hypertension and symptoms of nocturnal snoring, waking up choking and witnessed apneas. Based on the clinical history, the  patient has a high pre-test probability of having severe GLENN.  SLEEP STUDY FINDINGS: Patient underwent a one night Home Sleep Test and by behavioral criteria, slept for  approximately 5.5 hours, with a sleep latency of 8 minutes and a sleep efficiency of 70.9%. Mild sleep disordered breathing  (AHI=11) is noted based on a 4% hypopnea desaturation criteria. The patient slept supine 16.7% of the night based on valid  recording time of 5.47 hours. When considering more subtle measures of sleep disordered breathing, the overall respiratory  disturbance index is moderate (RDI=21) based on a 1% hypopnea desaturation criteria with confirmation by surrogate arousal  indicators, predominantly in the supine position (31 events/hour). The apneas/hypopneas are accompanied by minimal oxygen  desaturation (percent time below 90% SpO2: 4.1%, Min SpO2: 81.8%). The average desaturation across all sleep disordered  breathing events is 3.2%. Snoring occurs for 45.7% (30 dB) of the study, 44.9% is very loud. The mean pulse rate is 82 BPM,  with very frequent pulse rate variability (83 events with >= 6 BPM increase/decrease per hour).  TREATMENT CONSIDERATIONS: Consider nasal continuous positive airway pressure (CPAP/AutoPAP) as the initial  treatment choice based on the RDI severity and  co-morbidities. A mandibular advancement splint (MAS) or referral to an  ENT surgeon for modification to the airway should be considered to reduce the potential contribution of GLENN on existing  diseases if the patient prefers an alternative therapy or the CPAP trial is unsuccessful. A Mandibular Advancement Splint  (MAS) will likely provide treatment benefit independent of GLENN severity. The patient should avoid sleeping supine; the  non-supine RDI is 1.6 times less severe than the supine RDI.  DISEASE MANAGEMENT CONSIDERATIONS: Insomnia is a symptom that can be associated with untreated GLENN.

## 2019-07-05 NOTE — PROGRESS NOTES
PHYSICIAN INTERPRETATION AND COMMENTS: Findings are consistent with MILD obstructive sleep apnea  (GLENN). Overall AHI was 11.0/hr with 5.5 hours sleep.  CLINICAL HISTORY: 48 year old female presented with: Patient has observed snoring, periods of not breathing, feels  sleepy during the day. She reports non restful sleep. 16.5 inch neck, BMI of 45, an [place] sleepiness score of 8, history of  hypertension and symptoms of nocturnal snoring, waking up choking and witnessed apneas. Based on the clinical history, the  patient has a high pre-test probability of having severe GLENN.  SLEEP STUDY FINDINGS: Patient underwent a one night Home Sleep Test and by behavioral criteria, slept for  approximately 5.5 hours, with a sleep latency of 8 minutes and a sleep efficiency of 70.9%. Mild sleep disordered breathing  (AHI=11) is noted based on a 4% hypopnea desaturation criteria. The patient slept supine 16.7% of the night based on valid  recording time of 5.47 hours. When considering more subtle measures of sleep disordered breathing, the overall respiratory  disturbance index is moderate (RDI=21) based on a 1% hypopnea desaturation criteria with confirmation by surrogate arousal  indicators, predominantly in the supine position (31 events/hour). The apneas/hypopneas are accompanied by minimal oxygen  desaturation (percent time below 90% SpO2: 4.1%, Min SpO2: 81.8%). The average desaturation across all sleep disordered  breathing events is 3.2%. Snoring occurs for 45.7% (30 dB) of the study, 44.9% is very loud. The mean pulse rate is 82 BPM,  with very frequent pulse rate variability (83 events with >= 6 BPM increase/decrease per hour).  TREATMENT CONSIDERATIONS: Consider nasal continuous positive airway pressure (CPAP/AutoPAP) as the initial  treatment choice based on the RDI severity and co-morbidities. A mandibular advancement splint (MAS) or referral to an  ENT surgeon for modification to the airway should be considered to  reduce the potential contribution of GLENN on existing  diseases if the patient prefers an alternative therapy or the CPAP trial is unsuccessful. A Mandibular Advancement Splint  (MAS) will likely provide treatment benefit independent of GLENN severity. The patient should avoid sleeping supine; the  non-supine RDI is 1.6 times less severe than the supine RDI.  DISEASE MANAGEMENT CONSIDERATIONS: Insomnia is a symptom that can be associated with untreated GLENN.

## 2019-07-08 ENCOUNTER — OFFICE VISIT (OUTPATIENT)
Dept: INTERNAL MEDICINE | Facility: CLINIC | Age: 49
End: 2019-07-08
Payer: COMMERCIAL

## 2019-07-08 ENCOUNTER — TELEPHONE (OUTPATIENT)
Dept: FAMILY MEDICINE | Facility: CLINIC | Age: 49
End: 2019-07-08

## 2019-07-08 VITALS
DIASTOLIC BLOOD PRESSURE: 80 MMHG | WEIGHT: 279.13 LBS | TEMPERATURE: 99 F | HEART RATE: 100 BPM | BODY MASS INDEX: 44.86 KG/M2 | HEIGHT: 66 IN | OXYGEN SATURATION: 100 % | SYSTOLIC BLOOD PRESSURE: 132 MMHG

## 2019-07-08 DIAGNOSIS — J45.901 ASTHMA WITH ACUTE EXACERBATION, UNSPECIFIED ASTHMA SEVERITY, UNSPECIFIED WHETHER PERSISTENT: ICD-10-CM

## 2019-07-08 DIAGNOSIS — G47.30 SLEEP APNEA, UNSPECIFIED TYPE: Primary | ICD-10-CM

## 2019-07-08 PROCEDURE — 99214 OFFICE O/P EST MOD 30 MIN: CPT | Mod: S$GLB,,, | Performed by: NURSE PRACTITIONER

## 2019-07-08 PROCEDURE — 3075F PR MOST RECENT SYSTOLIC BLOOD PRESS GE 130-139MM HG: ICD-10-PCS | Mod: CPTII,S$GLB,, | Performed by: NURSE PRACTITIONER

## 2019-07-08 PROCEDURE — 3079F PR MOST RECENT DIASTOLIC BLOOD PRESSURE 80-89 MM HG: ICD-10-PCS | Mod: CPTII,S$GLB,, | Performed by: NURSE PRACTITIONER

## 2019-07-08 PROCEDURE — 3008F PR BODY MASS INDEX (BMI) DOCUMENTED: ICD-10-PCS | Mod: CPTII,S$GLB,, | Performed by: NURSE PRACTITIONER

## 2019-07-08 PROCEDURE — 3075F SYST BP GE 130 - 139MM HG: CPT | Mod: CPTII,S$GLB,, | Performed by: NURSE PRACTITIONER

## 2019-07-08 PROCEDURE — 99999 PR PBB SHADOW E&M-EST. PATIENT-LVL III: CPT | Mod: PBBFAC,,, | Performed by: NURSE PRACTITIONER

## 2019-07-08 PROCEDURE — 99214 PR OFFICE/OUTPT VISIT, EST, LEVL IV, 30-39 MIN: ICD-10-PCS | Mod: S$GLB,,, | Performed by: NURSE PRACTITIONER

## 2019-07-08 PROCEDURE — 3079F DIAST BP 80-89 MM HG: CPT | Mod: CPTII,S$GLB,, | Performed by: NURSE PRACTITIONER

## 2019-07-08 PROCEDURE — 3008F BODY MASS INDEX DOCD: CPT | Mod: CPTII,S$GLB,, | Performed by: NURSE PRACTITIONER

## 2019-07-08 PROCEDURE — 99999 PR PBB SHADOW E&M-EST. PATIENT-LVL III: ICD-10-PCS | Mod: PBBFAC,,, | Performed by: NURSE PRACTITIONER

## 2019-07-08 RX ORDER — PREDNISONE 20 MG/1
TABLET ORAL
Qty: 10 TABLET | Refills: 0 | Status: SHIPPED | OUTPATIENT
Start: 2019-07-08 | End: 2019-08-15

## 2019-07-08 RX ORDER — BENZONATATE 200 MG/1
200 CAPSULE ORAL 3 TIMES DAILY PRN
Qty: 30 CAPSULE | Refills: 0 | Status: SHIPPED | OUTPATIENT
Start: 2019-07-08 | End: 2019-07-18

## 2019-07-08 NOTE — TELEPHONE ENCOUNTER
Sleep study shows mild sleep apnea.  May benefit from CPAP or other equipment.  Referral to Pulmonary to discuss further.

## 2019-07-08 NOTE — PROGRESS NOTES
Subjective:       Patient ID: Ayanna Alicia is a 48 y.o. female.    Chief Complaint: Wheezing    Asthma   She complains of cough, shortness of breath, sputum production and wheezing. There is no chest tightness, difficulty breathing, frequent throat clearing, hemoptysis or hoarse voice. This is a recurrent problem. The current episode started in the past 7 days. Asthma causes daytime symptoms frequently. Asthma causes nighttime symptoms frequently. The problem has been gradually worsening. The cough is productive of sputum. Associated symptoms include dyspnea on exertion, nasal congestion and postnasal drip. Pertinent negatives include no appetite change, chest pain, ear congestion, ear pain, fever, headaches, heartburn, malaise/fatigue, myalgias, orthopnea, PND, rhinorrhea, sneezing, sore throat, sweats, trouble swallowing or weight loss. Her symptoms are aggravated by lying down. Her symptoms are not alleviated by beta-agonist. There are no known risk factors for lung disease. Her past medical history is significant for asthma and bronchitis.           Past Medical History:   Diagnosis Date    Abnormal Pap smear of cervix     in the past with repeat pap smear okay.    Allergic rhinitis, cause unspecified     Arthritis of both knees     Asthma     Eczema     Fatty liver 10/2014    Fibrocystic breast changes     Headache(784.0)     Hepatomegaly 10/2014    Hypertension     Liver cyst 10/2014    Multinodular goiter     Followed by ENT - Dr. Nicolas Gray    Polymenorrhea     TMJ (dislocation of temporomandibular joint)     Uterine fibroid     in the past    Vitamin D deficiency disease      Past Surgical History:   Procedure Laterality Date     SECTION, CLASSIC      x 1    HYSTERECTOMY      MULTIPLE TOOTH EXTRACTIONS      PARTIAL HYSTERECTOMY  2013    Due to fibroids     Past Surgical History:   Procedure Laterality Date     SECTION, CLASSIC      x 1    HYSTERECTOMY       MULTIPLE TOOTH EXTRACTIONS      PARTIAL HYSTERECTOMY  03/20/2013    Due to fibroids     Social History     Socioeconomic History    Marital status: Single     Spouse name: Not on file    Number of children: 0    Years of education: Not on file    Highest education level: Not on file   Occupational History    Occupation:      Employer: [employer]     Comment: [employer]   Social Needs    Financial resource strain: Not on file    Food insecurity:     Worry: Not on file     Inability: Not on file    Transportation needs:     Medical: Not on file     Non-medical: Not on file   Tobacco Use    Smoking status: Never Smoker    Smokeless tobacco: Never Used   Substance and Sexual Activity    Alcohol use: Yes     Alcohol/week: 0.0 oz     Comment: Occasionally    Drug use: Yes     Frequency: 2.0 times per week     Types: Marijuana     Comment: Smoking less    Sexual activity: Yes     Partners: Female   Lifestyle    Physical activity:     Days per week: Not on file     Minutes per session: Not on file    Stress: Not on file   Relationships    Social connections:     Talks on phone: Not on file     Gets together: Not on file     Attends [employer]ism service: Not on file     Active member of club or organization: Not on file     Attends meetings of clubs or organizations: Not on file     Relationship status: Not on file   Other Topics Concern    Not on file   Social History Narrative    She wears seatbelt. Her son was killed in 2010.     Review of patient's allergies indicates:   Allergen Reactions    Doxycycline Nausea Only     Other reaction(s): Nausea    Fluticasone Other (See Comments)     Other reaction(s): Epistaxis      Penicillins      Other reaction(s): unknown     Current Outpatient Medications   Medication Sig    albuterol (PROVENTIL/VENTOLIN HFA) 90 mcg/actuation inhaler Inhale 1-2 puffs into the lungs every 4 to 6 hours as needed for Wheezing.    amLODIPine (NORVASC) 5 MG  tablet Take 1 tablet (5 mg total) by mouth once daily.    calcium/magnesium/vit B comp (CALCIUM-MAGNESIUM-B COMPLEX ORAL) Take by mouth.    COCONUT OIL ORAL Take by mouth.    montelukast (SINGULAIR) 10 mg tablet     naproxen sodium (ALEVE) 220 MG tablet     omega-3s/dha/epa/fish oil/D3 (VITAMIN-D + OMEGA-3 ORAL) Take by mouth.    predniSONE (DELTASONE) 20 MG tablet Take 2 tab daily x 3 days, 1 tab daily x 3 days, then 1/2 tab daily x 2 days.    benzonatate (TESSALON) 200 MG capsule Take 1 capsule (200 mg total) by mouth 3 (three) times daily as needed for Cough.    promethazine-dextromethorphan (PROMETHAZINE-DM) 6.25-15 mg/5 mL Syrp Take 5 mLs by mouth every 4 to 6 hours as needed.     No current facility-administered medications for this visit.            Review of Systems   Constitutional: Negative for activity change, appetite change, chills, diaphoresis, fatigue, fever, malaise/fatigue, unexpected weight change and weight loss.   HENT: Positive for postnasal drip. Negative for congestion, ear pain, hoarse voice, rhinorrhea, sinus pressure, sinus pain, sneezing, sore throat, tinnitus, trouble swallowing and voice change.    Eyes: Negative for photophobia, pain and visual disturbance.   Respiratory: Positive for cough, sputum production, shortness of breath and wheezing. Negative for hemoptysis and chest tightness.    Cardiovascular: Positive for dyspnea on exertion. Negative for chest pain, palpitations, leg swelling and PND.   Gastrointestinal: Negative for abdominal distention, abdominal pain, constipation, diarrhea, heartburn, nausea and vomiting.   Genitourinary: Negative for decreased urine volume, difficulty urinating, dysuria, flank pain, frequency, hematuria and urgency.   Musculoskeletal: Negative for arthralgias, back pain, joint swelling, myalgias, neck pain and neck stiffness.   Allergic/Immunologic: Positive for environmental allergies. Negative for immunocompromised state.   Neurological:  Negative for dizziness, tremors, seizures, syncope, facial asymmetry, speech difficulty, weakness, light-headedness, numbness and headaches.   Hematological: Negative for adenopathy. Does not bruise/bleed easily.   Psychiatric/Behavioral: Negative for confusion and sleep disturbance.       Objective:      Physical Exam   Constitutional: She is oriented to person, place, and time.   Cardiovascular: Normal rate and regular rhythm.   Pulmonary/Chest: Effort normal. She has wheezes.   Musculoskeletal: Normal range of motion.   Neurological: She is alert and oriented to person, place, and time.   Skin: Skin is warm and dry.       Assessment:     Vitals:    07/08/19 1536   BP: 132/80   Pulse: 100   Temp: 99 °F (37.2 °C)         1. Asthma with acute exacerbation, unspecified asthma severity, unspecified whether persistent        Plan:   Asthma with acute exacerbation, unspecified asthma severity, unspecified whether persistent  -     predniSONE (DELTASONE) 20 MG tablet; Take 2 tab daily x 3 days, 1 tab daily x 3 days, then 1/2 tab daily x 2 days.  Dispense: 10 tablet; Refill: 0  -     benzonatate (TESSALON) 200 MG capsule; Take 1 capsule (200 mg total) by mouth 3 (three) times daily as needed for Cough.  Dispense: 30 capsule; Refill: 0          As above  F/u with PCP if s/s worsen or fail to improve

## 2019-07-08 NOTE — LETTER
July 8, 2019      [facility]  [address]  [place] [address]  Phone: [phone]  Fax: [phone]       Patient: Ayanna Alicia   YOB: 1970  Date of Visit: 07/08/2019     To Whom It May Concern:    Krystal Alicia  was at [facility] on 07/08/2019. Please excuse her from work on 7/8/19 and 7/19/19. She may return on 7/10/19.     Sincerely,    Whit Lucas MA

## 2019-07-09 ENCOUNTER — PATIENT OUTREACH (OUTPATIENT)
Dept: ADMINISTRATIVE | Facility: HOSPITAL | Age: 49
End: 2019-07-09

## 2019-07-09 NOTE — PROGRESS NOTES
PreVisit Chart Audit Perfomed         [place] LPN Care Coordinator  Care Coordination Department  [facility]  [phone]

## 2019-07-15 ENCOUNTER — PATIENT MESSAGE (OUTPATIENT)
Dept: PULMONOLOGY | Facility: CLINIC | Age: 49
End: 2019-07-15

## 2019-07-15 DIAGNOSIS — G47.33 OBSTRUCTIVE SLEEP APNEA: Primary | ICD-10-CM

## 2019-07-15 NOTE — TELEPHONE ENCOUNTER
Orders Placed This Encounter   Procedures    CPAP FOR HOME USE     Order Specific Question:   Type:     Answer:   Auto CPAP     Order Specific Question:   Auto CPAP pressure setting range (cmH20):     Answer:   5-20     Order Specific Question:   Length of need (1-99 months):     Answer:   99     Order Specific Question:   Humidification:     Answer:   Heated     Order Specific Question:   Type of mask:     Answer:   Nasal     Order Specific Question:   Headgear?     Answer:   Yes     Order Specific Question:   Tubing?     Answer:   Yes     Order Specific Question:   Humidifier chamber?     Answer:   Yes     Order Specific Question:   Chin strap?     Answer:   Yes     Order Specific Question:   Filters?     Answer:   Yes     Order Specific Question:   Cushions?     Answer:   Yes     1. Obstructive sleep apnea  CPAP FOR HOME USE     Follow up in 9 weeks for CPAP initial compliance download.

## 2019-07-17 ENCOUNTER — PATIENT MESSAGE (OUTPATIENT)
Dept: PULMONOLOGY | Facility: CLINIC | Age: 49
End: 2019-07-17

## 2019-08-15 ENCOUNTER — OFFICE VISIT (OUTPATIENT)
Dept: FAMILY MEDICINE | Facility: CLINIC | Age: 49
End: 2019-08-15
Payer: COMMERCIAL

## 2019-08-15 VITALS
WEIGHT: 272.69 LBS | TEMPERATURE: 98 F | HEIGHT: 66 IN | OXYGEN SATURATION: 95 % | DIASTOLIC BLOOD PRESSURE: 80 MMHG | BODY MASS INDEX: 43.82 KG/M2 | SYSTOLIC BLOOD PRESSURE: 130 MMHG | HEART RATE: 105 BPM

## 2019-08-15 DIAGNOSIS — Z12.11 COLON CANCER SCREENING: ICD-10-CM

## 2019-08-15 DIAGNOSIS — J30.2 SEASONAL ALLERGIES: ICD-10-CM

## 2019-08-15 DIAGNOSIS — Z00.00 ANNUAL PHYSICAL EXAM: Primary | ICD-10-CM

## 2019-08-15 DIAGNOSIS — I10 ESSENTIAL HYPERTENSION: ICD-10-CM

## 2019-08-15 DIAGNOSIS — J45.909 UNCOMPLICATED ASTHMA, UNSPECIFIED ASTHMA SEVERITY, UNSPECIFIED WHETHER PERSISTENT: ICD-10-CM

## 2019-08-15 DIAGNOSIS — E66.01 MORBID OBESITY WITH BMI OF 40.0-44.9, ADULT: ICD-10-CM

## 2019-08-15 DIAGNOSIS — L68.0 HIRSUTISM: ICD-10-CM

## 2019-08-15 DIAGNOSIS — E55.9 VITAMIN D DEFICIENCY: ICD-10-CM

## 2019-08-15 DIAGNOSIS — K21.9 GASTROESOPHAGEAL REFLUX DISEASE WITHOUT ESOPHAGITIS: ICD-10-CM

## 2019-08-15 PROCEDURE — 3075F SYST BP GE 130 - 139MM HG: CPT | Mod: CPTII,S$GLB,, | Performed by: REGISTERED NURSE

## 2019-08-15 PROCEDURE — 99396 PREV VISIT EST AGE 40-64: CPT | Mod: 25,S$GLB,, | Performed by: REGISTERED NURSE

## 2019-08-15 PROCEDURE — 3075F PR MOST RECENT SYSTOLIC BLOOD PRESS GE 130-139MM HG: ICD-10-PCS | Mod: CPTII,S$GLB,, | Performed by: REGISTERED NURSE

## 2019-08-15 PROCEDURE — 99999 PR PBB SHADOW E&M-EST. PATIENT-LVL IV: ICD-10-PCS | Mod: PBBFAC,,, | Performed by: REGISTERED NURSE

## 2019-08-15 PROCEDURE — 3079F PR MOST RECENT DIASTOLIC BLOOD PRESSURE 80-89 MM HG: ICD-10-PCS | Mod: CPTII,S$GLB,, | Performed by: REGISTERED NURSE

## 2019-08-15 PROCEDURE — 96372 PR INJECTION,THERAP/PROPH/DIAG2ST, IM OR SUBCUT: ICD-10-PCS | Mod: S$GLB,,, | Performed by: REGISTERED NURSE

## 2019-08-15 PROCEDURE — 82043 UR ALBUMIN QUANTITATIVE: CPT

## 2019-08-15 PROCEDURE — 99396 PR PREVENTIVE VISIT,EST,40-64: ICD-10-PCS | Mod: 25,S$GLB,, | Performed by: REGISTERED NURSE

## 2019-08-15 PROCEDURE — 99999 PR PBB SHADOW E&M-EST. PATIENT-LVL IV: CPT | Mod: PBBFAC,,, | Performed by: REGISTERED NURSE

## 2019-08-15 PROCEDURE — 96372 THER/PROPH/DIAG INJ SC/IM: CPT | Mod: S$GLB,,, | Performed by: REGISTERED NURSE

## 2019-08-15 PROCEDURE — 3079F DIAST BP 80-89 MM HG: CPT | Mod: CPTII,S$GLB,, | Performed by: REGISTERED NURSE

## 2019-08-15 RX ORDER — AMLODIPINE BESYLATE 5 MG/1
5 TABLET ORAL DAILY
Qty: 90 TABLET | Refills: 1 | Status: SHIPPED | OUTPATIENT
Start: 2019-08-15 | End: 2020-04-22 | Stop reason: SDUPTHER

## 2019-08-15 RX ORDER — LEVOCETIRIZINE DIHYDROCHLORIDE 5 MG/1
5 TABLET, FILM COATED ORAL EVERY MORNING
Qty: 90 TABLET | Refills: 1 | Status: SHIPPED | OUTPATIENT
Start: 2019-08-15 | End: 2020-06-19 | Stop reason: SDUPTHER

## 2019-08-15 RX ORDER — BETAMETHASONE SODIUM PHOSPHATE AND BETAMETHASONE ACETATE 3; 3 MG/ML; MG/ML
12 INJECTION, SUSPENSION INTRA-ARTICULAR; INTRALESIONAL; INTRAMUSCULAR; SOFT TISSUE
Status: COMPLETED | OUTPATIENT
Start: 2019-08-15 | End: 2019-08-15

## 2019-08-15 RX ORDER — MONTELUKAST SODIUM 10 MG/1
10 TABLET ORAL NIGHTLY
Qty: 90 TABLET | Refills: 1 | Status: SHIPPED | OUTPATIENT
Start: 2019-08-15 | End: 2021-08-19 | Stop reason: SDUPTHER

## 2019-08-15 RX ORDER — FLUTICASONE FUROATE AND VILANTEROL 100; 25 UG/1; UG/1
1 POWDER RESPIRATORY (INHALATION) DAILY
Qty: 60 EACH | Refills: 3 | Status: SHIPPED | OUTPATIENT
Start: 2019-08-15 | End: 2020-03-19 | Stop reason: SDUPTHER

## 2019-08-15 RX ADMIN — BETAMETHASONE SODIUM PHOSPHATE AND BETAMETHASONE ACETATE 12 MG: 3; 3 INJECTION, SUSPENSION INTRA-ARTICULAR; INTRALESIONAL; INTRAMUSCULAR; SOFT TISSUE at 08:08

## 2019-08-15 NOTE — PROGRESS NOTES
"Subjective:       Patient ID: Ayanna Alicia is a 48 y.o. female.    Chief Complaint   Patient presents with    Annual Exam         HPI    Ayanna Alicia is here today for an annual wellness exam.  I have reviewed the patient's medical history in detail and updated the computerized patient record.        Review of Systems   Constitutional: Positive for unexpected weight change (loss -- has changed eating habits, exercising daily, counts calories). Negative for activity change, appetite change, chills, diaphoresis, fatigue and fever.   HENT: Positive for nasal congestion. Negative for mouth sores and tinnitus.         Allergies flaring x 1 week causing her asthma to act up.  Req steroids.   Eyes: Negative for discharge and visual disturbance.   Respiratory: Positive for cough (mild, occ sputum) and wheezing. Negative for shortness of breath.         Asthma flaring x 1 week.  Using inhaler.  Symptoms mild at this time, states "not as bad as it usually is".  Taking Singulair nightly.   Cardiovascular: Negative for chest pain, palpitations and leg swelling.        Reports home BP running ~ 130/80.  Spikes up in times of stress and increased salt intake.   Gastrointestinal: Negative for abdominal pain, bloating, blood in stool, constipation, diarrhea, nausea, vomiting and reflux.   Endocrine: Negative.    Genitourinary: Negative.    Musculoskeletal: Negative.    Integumentary:  Negative for rash. Negative.   Neurological: Negative for vertigo, seizures, syncope, numbness and headaches.   Hematological: Negative.    Psychiatric/Behavioral: Negative for depression and sleep disturbance. The patient is not nervous/anxious.    Breast: negative.           Review of patient's allergies indicates:   Allergen Reactions    Doxycycline Nausea Only    Fluticasone Epistaxis    Penicillins ???         Current Outpatient Medications on File Prior to Visit   Medication Sig Dispense Refill    albuterol (PROVENTIL/VENTOLIN " HFA) 90 mcg/actuation inhaler Inhale 1-2 puffs into the lungs every 4 to 6 hours as needed for Wheezing. 18 g 5    amLODIPine (NORVASC) 5 MG tablet Take 1 tablet (5 mg total) by mouth once daily. 30 tablet 5    calcium/magnesium/vit B comp (CALCIUM-MAGNESIUM-B COMPLEX ORAL) Take by mouth.      COCONUT OIL ORAL Take by mouth.      montelukast (SINGULAIR) 10 mg tablet   5    naproxen sodium (ALEVE) 220 MG tablet       omega-3s/dha/epa/fish oil/D3 (VITAMIN-D + OMEGA-3 ORAL) Take by mouth.      promethazine-dextromethorphan (PROMETHAZINE-DM) 6.25-15 mg/5 mL Syrp Take 5 mLs by mouth every 4 to 6 hours as needed. 118 mL 0     No current facility-administered medications on file prior to visit.        Patient Active Problem List   Diagnosis    HTN (hypertension)    Multinodular goiter    Asthma    Seasonal allergies    Morbid obesity with BMI of 40.0-44.9, adult    Depression with anxiety    GERD (gastroesophageal reflux disease)    Vitamin D deficiency    Surgical menopause    Patella-femoral syndrome    Localized osteoarthrosis not specified whether primary or secondary, lower leg       Past Medical History:   Diagnosis Date    Abnormal Pap smear of cervix     in the past with repeat pap smear okay.    Allergic rhinitis, cause unspecified     Arthritis of both knees     Asthma     Eczema     Fatty liver 10/2014    Fibrocystic breast changes     Headache(784.0)     Hepatomegaly 10/2014    Hypertension     Liver cyst 10/2014    Multinodular goiter     Followed by ENT - Dr. Nicolas Gray    Polymenorrhea     TMJ (dislocation of temporomandibular joint)     Uterine fibroid     in the past    Vitamin D deficiency disease        Past Surgical History:   Procedure Laterality Date     SECTION, CLASSIC      x 1    HYSTERECTOMY      MULTIPLE TOOTH EXTRACTIONS      PARTIAL HYSTERECTOMY  2013    Due to fibroids       Family History   Problem Relation Age of Onset    Stroke  "Mother     Diabetes Mother     Heart disease Father 63        MI/CAD    Hypertension Father     Cancer Maternal Grandmother 60        Rectal and stomach cancer    Migraines Cousin     Cataracts Cousin     Cancer Maternal Aunt 50        Stomach cancer    Cancer Maternal Aunt 66        Lung cancer (smoker)    Stroke Maternal Grandfather     Diabetes Maternal Grandfather     Stroke Sister        Social History     Socioeconomic History    Marital status: Single    Number of children: 0   Occupational History    Occupation:      Employer: [employer]     Comment: [employer]   Tobacco Use    Smoking status: Never Smoker    Smokeless tobacco: Never Used   Substance and Sexual Activity    Alcohol use: Yes     Alcohol/week: 0.0 oz     Comment: Occasionally    Drug use: Yes     Frequency: 2.0 times per week     Types: Marijuana     Comment: Smoking less    Sexual activity: Yes     Partners: Female       Objective:     Vitals:    08/15/19 0819   BP: 130/80   Pulse: 105   Temp: 98.3 °F (36.8 °C)   SpO2: 95%   Weight: 123.7 kg (272 lb 11.3 oz)   Height: 5' 6" (1.676 m)   PainSc: 0-No pain         Physical Exam   Constitutional: She is oriented to person, place, and time. She appears well-developed and well-nourished. No distress.   HENT:   Head: Normocephalic and atraumatic.   Right Ear: Tympanic membrane, external ear and ear canal normal.   Left Ear: Tympanic membrane, external ear and ear canal normal.   Nose: Mucosal edema and rhinorrhea (boggy//clear RN) present. No sinus tenderness or nasal deformity. No epistaxis. Right sinus exhibits no maxillary sinus tenderness and no frontal sinus tenderness. Left sinus exhibits no maxillary sinus tenderness and no frontal sinus tenderness.   Mouth/Throat: Uvula is midline, oropharynx is clear and moist and mucous membranes are normal. Mucous membranes are not dry and not cyanotic. No oral lesions. Normal dentition. No uvula swelling. No " oropharyngeal exudate, posterior oropharyngeal erythema (clear PND noted) or tonsillar abscesses.   Eyes: Pupils are equal, round, and reactive to light. Conjunctivae, EOM and lids are normal. Lids are everted and swept, no foreign bodies found. Right eye exhibits no discharge and no exudate. Left eye exhibits no discharge and no exudate. Right conjunctiva is not injected. Left conjunctiva is not injected. No scleral icterus.   Neck: Normal range of motion. Neck supple. No JVD present. Carotid bruit is not present. No tracheal deviation, no edema, no erythema and normal range of motion present. No thyroid mass and no thyromegaly present.   Cardiovascular: Normal rate, regular rhythm and intact distal pulses. Exam reveals no gallop and no friction rub.   No murmur heard.  Pulmonary/Chest: Effort normal and breath sounds normal. No stridor. No respiratory distress. She has no wheezes. She exhibits no tenderness.   Abdominal: Soft. Bowel sounds are normal. She exhibits no distension and no mass. There is no hepatosplenomegaly. There is no tenderness. There is no rebound, no guarding and no CVA tenderness.   Musculoskeletal: Normal range of motion. She exhibits no edema, tenderness or deformity.   Lymphadenopathy:     She has no cervical adenopathy.   Neurological: She is alert and oriented to person, place, and time. She has normal strength and normal reflexes. She displays no atrophy and no tremor. No cranial nerve deficit or sensory deficit. She exhibits normal muscle tone. Coordination and gait normal.   Skin: Skin is warm, dry and intact. Capillary refill takes less than 2 seconds. No bruising and no rash noted. She is not diaphoretic. No erythema. No pallor.   Psychiatric: She has a normal mood and affect. Her speech is normal and behavior is normal. Judgment and thought content normal. Cognition and memory are normal. Cognition and memory are not impaired.         Diagnosis       1. Annual physical exam    2.  Essential hypertension    3. Seasonal allergies    4. Uncomplicated asthma, unspecified asthma severity, unspecified whether persistent    5. Gastroesophageal reflux disease without esophagitis    6. Vitamin D deficiency    7. Hirsutism    8. Morbid obesity with BMI of 40.0-44.9, adult    9. Colon cancer screening          Assessment/ Plan     Annual physical exam  -     CBC auto differential; Future; Expected date: 08/15/2019  -     Comprehensive metabolic panel; Future; Expected date: 08/15/2019  -     TSH; Future; Expected date: 08/15/2019  -     Lipid panel; Future; Expected date: 08/15/2019  -     Microalbumin/creatinine urine ratio  -     Vitamin D; Future; Expected date: 08/15/2019  -     Case request GI: COLONOSCOPY  -     amLODIPine (NORVASC) 5 MG tablet; Take 1 tablet (5 mg total) by mouth once daily.  Dispense: 90 tablet; Refill: 1  -     Hemoglobin A1c; Future; Expected date: 08/15/2019  -     Insulin, random; Future; Expected date: 08/15/2019  -     Testosterone; Future; Expected date: 08/15/2019    Essential hypertension  -     CBC auto differential; Future; Expected date: 08/15/2019  -     Comprehensive metabolic panel; Future; Expected date: 08/15/2019  -     TSH; Future; Expected date: 08/15/2019  -     Lipid panel; Future; Expected date: 08/15/2019  -     Microalbumin/creatinine urine ratio  -     amLODIPine (NORVASC) 5 MG tablet; Take 1 tablet (5 mg total) by mouth once daily.  Dispense: 90 tablet; Refill: 1    Seasonal allergies  -     levocetirizine (XYZAL) 5 MG tablet; Take 1 tablet (5 mg total) by mouth every morning.  Dispense: 90 tablet; Refill: 1  -     montelukast (SINGULAIR) 10 mg tablet; Take 1 tablet (10 mg total) by mouth every evening.  Dispense: 90 tablet; Refill: 1  -     betamethasone acetate-betamethasone sodium phosphate injection 12 mg    Uncomplicated asthma, unspecified asthma severity, unspecified whether persistent  -     montelukast (SINGULAIR) 10 mg tablet; Take 1 tablet  (10 mg total) by mouth every evening.  Dispense: 90 tablet; Refill: 1  -     betamethasone acetate-betamethasone sodium phosphate injection 12 mg  -     fluticasone furoate-vilanterol (BREO ELLIPTA) 100-25 mcg/dose diskus inhaler; Inhale 1 puff into the lungs once daily. Controller  Dispense: 60 each; Refill: 3    Gastroesophageal reflux disease without esophagitis  · Stable, improved with better eating habits, med prn.    Vitamin D deficiency  -     Vitamin D; Future; Expected date: 08/15/2019    Hirsutism  -     CBC auto differential; Future; Expected date: 08/15/2019  -     Comprehensive metabolic panel; Future; Expected date: 08/15/2019  -     TSH; Future; Expected date: 08/15/2019  -     Lipid panel; Future; Expected date: 08/15/2019  -     Hemoglobin A1c; Future; Expected date: 08/15/2019  -     Insulin, random; Future; Expected date: 08/15/2019  -     Testosterone; Future; Expected date: 08/15/2019    Morbid obesity with BMI of 40.0-44.9, adult  -     CBC auto differential; Future; Expected date: 08/15/2019  -     Comprehensive metabolic panel; Future; Expected date: 08/15/2019  -     TSH; Future; Expected date: 08/15/2019  -     Lipid panel; Future; Expected date: 08/15/2019  -     Hemoglobin A1c; Future; Expected date: 08/15/2019  -     Insulin, random; Future; Expected date: 08/15/2019    Colon cancer screening        -     Case request GI: COLONOSCOPY        Colonoscopy.  Mammogram last done 5/2019.  Lab pending.  Healthy diet, regular exercise.  Weight reduction.  Med refills given.  Follow-up in clinic as needed.        KEVEN Vegas  [facility]

## 2019-08-16 LAB
ALBUMIN/CREAT UR: 16.9 UG/MG (ref 0–30)
CREAT UR-MCNC: 172 MG/DL (ref 15–325)
MICROALBUMIN UR DL<=1MG/L-MCNC: 29 UG/ML

## 2019-10-18 ENCOUNTER — TELEPHONE (OUTPATIENT)
Dept: ENDOSCOPY | Facility: HOSPITAL | Age: 49
End: 2019-10-18

## 2019-10-18 ENCOUNTER — TELEPHONE (OUTPATIENT)
Dept: FAMILY MEDICINE | Facility: CLINIC | Age: 49
End: 2019-10-18

## 2019-10-18 NOTE — TELEPHONE ENCOUNTER
----- Message from Jeanie Pelayo sent at 10/18/2019  9:18 AM CDT -----  Contact: pt  The pt wants to cancel her 10/21/2019 appt, no call back is needed///[email or web address]

## 2019-10-18 NOTE — TELEPHONE ENCOUNTER
----- Message from Alexandra Andrade LPN sent at 10/18/2019 10:18 AM CDT -----  Contact: pt   Wants canceled.  ----- Message -----  From: Jeanie Pelayo  Sent: 10/18/2019   9:18 AM CDT  To: Chino Chappell, Klaus Naylor Staff    The pt wants to cancel her 10/21/2019 appt, no call back is needed///[email or web address]

## 2019-10-18 NOTE — TELEPHONE ENCOUNTER
Spoke with patient and rescheduled procedure from 10- to 1- with dr gongora.  New instructions sent via portal, patient verbalized understanding.

## 2019-10-25 DIAGNOSIS — Z12.11 COLON CANCER SCREENING: ICD-10-CM

## 2019-11-07 ENCOUNTER — OFFICE VISIT (OUTPATIENT)
Dept: INTERNAL MEDICINE | Facility: CLINIC | Age: 49
End: 2019-11-07
Payer: COMMERCIAL

## 2019-11-07 VITALS
SYSTOLIC BLOOD PRESSURE: 128 MMHG | WEIGHT: 279.56 LBS | HEART RATE: 96 BPM | TEMPERATURE: 98 F | BODY MASS INDEX: 44.93 KG/M2 | HEIGHT: 66 IN | OXYGEN SATURATION: 100 % | DIASTOLIC BLOOD PRESSURE: 80 MMHG

## 2019-11-07 DIAGNOSIS — J45.901 ASTHMA WITH ACUTE EXACERBATION, UNSPECIFIED ASTHMA SEVERITY, UNSPECIFIED WHETHER PERSISTENT: ICD-10-CM

## 2019-11-07 DIAGNOSIS — J45.20 MILD INTERMITTENT ASTHMA WITHOUT COMPLICATION: Primary | ICD-10-CM

## 2019-11-07 DIAGNOSIS — J06.9 VIRAL URI WITH COUGH: ICD-10-CM

## 2019-11-07 PROCEDURE — 99214 PR OFFICE/OUTPT VISIT, EST, LEVL IV, 30-39 MIN: ICD-10-PCS | Mod: S$GLB,,, | Performed by: NURSE PRACTITIONER

## 2019-11-07 PROCEDURE — 3008F PR BODY MASS INDEX (BMI) DOCUMENTED: ICD-10-PCS | Mod: CPTII,S$GLB,, | Performed by: NURSE PRACTITIONER

## 2019-11-07 PROCEDURE — 99214 OFFICE O/P EST MOD 30 MIN: CPT | Mod: S$GLB,,, | Performed by: NURSE PRACTITIONER

## 2019-11-07 PROCEDURE — 3079F DIAST BP 80-89 MM HG: CPT | Mod: CPTII,S$GLB,, | Performed by: NURSE PRACTITIONER

## 2019-11-07 PROCEDURE — 3074F SYST BP LT 130 MM HG: CPT | Mod: CPTII,S$GLB,, | Performed by: NURSE PRACTITIONER

## 2019-11-07 PROCEDURE — 3074F PR MOST RECENT SYSTOLIC BLOOD PRESSURE < 130 MM HG: ICD-10-PCS | Mod: CPTII,S$GLB,, | Performed by: NURSE PRACTITIONER

## 2019-11-07 PROCEDURE — 99999 PR PBB SHADOW E&M-EST. PATIENT-LVL III: ICD-10-PCS | Mod: PBBFAC,,, | Performed by: NURSE PRACTITIONER

## 2019-11-07 PROCEDURE — 99999 PR PBB SHADOW E&M-EST. PATIENT-LVL III: CPT | Mod: PBBFAC,,, | Performed by: NURSE PRACTITIONER

## 2019-11-07 PROCEDURE — 3008F BODY MASS INDEX DOCD: CPT | Mod: CPTII,S$GLB,, | Performed by: NURSE PRACTITIONER

## 2019-11-07 PROCEDURE — 3079F PR MOST RECENT DIASTOLIC BLOOD PRESSURE 80-89 MM HG: ICD-10-PCS | Mod: CPTII,S$GLB,, | Performed by: NURSE PRACTITIONER

## 2019-11-07 RX ORDER — PREDNISONE 20 MG/1
TABLET ORAL
Qty: 10 TABLET | Refills: 0 | Status: SHIPPED | OUTPATIENT
Start: 2019-11-07 | End: 2020-01-06

## 2019-11-07 RX ORDER — BENZONATATE 200 MG/1
200 CAPSULE ORAL 2 TIMES DAILY PRN
Qty: 20 CAPSULE | Refills: 0 | Status: SHIPPED | OUTPATIENT
Start: 2019-11-07 | End: 2019-11-17

## 2019-11-07 RX ORDER — PROMETHAZINE HYDROCHLORIDE AND DEXTROMETHORPHAN HYDROBROMIDE 6.25; 15 MG/5ML; MG/5ML
5 SYRUP ORAL NIGHTLY PRN
Qty: 180 ML | Refills: 0 | Status: SHIPPED | OUTPATIENT
Start: 2019-11-07 | End: 2019-11-17

## 2019-11-07 NOTE — LETTER
November 7, 2019      [facility]  [address]  [place] [address]  Phone: [phone]  Fax: [phone]       Patient: Ayanna Alicia   YOB: 1970  Date of Visit: 11/07/2019    To Whom It May Concern:    Krystal Alicia  was at [facility] on 11/07/2019. She may return to work/school on 11/11/19  With/no restrictions. If you have any questions or concerns, or if I can be of further assistance, please do not hesitate to contact me.    Sincerely,        Whit Lucas MA

## 2019-11-07 NOTE — PROGRESS NOTES
Subjective:       Patient ID: Ayanna Alicia is a 49 y.o. female.    Chief Complaint: Nasal Congestion and Cough    URI    This is a new problem. The current episode started in the past 7 days. The problem has been waxing and waning. There has been no fever. Associated symptoms include congestion, coughing, rhinorrhea, sinus pain and a sore throat. Pertinent negatives include no abdominal pain, chest pain, diarrhea, dysuria, ear pain, headaches, joint pain, joint swelling, nausea, neck pain, plugged ear sensation, rash, sneezing, swollen glands, vomiting or wheezing. She has tried antihistamine and increased fluids for the symptoms. The treatment provided mild relief.           Past Medical History:   Diagnosis Date    Abnormal Pap smear of cervix     in the past with repeat pap smear okay.    Allergic rhinitis, cause unspecified     Arthritis of both knees     Asthma     Eczema     Fatty liver 10/2014    Fibrocystic breast changes     Headache(784.0)     Hepatomegaly 10/2014    Hypertension     Liver cyst 10/2014    Multinodular goiter     Followed by ENT - Dr. Nicolas Gray    Polymenorrhea     TMJ (dislocation of temporomandibular joint)     Uterine fibroid     in the past    Vitamin D deficiency disease      Past Surgical History:   Procedure Laterality Date     SECTION, CLASSIC      x 1    HYSTERECTOMY      MULTIPLE TOOTH EXTRACTIONS      PARTIAL HYSTERECTOMY  2013    Due to fibroids     Social History     Socioeconomic History    Marital status: Single     Spouse name: Not on file    Number of children: 0    Years of education: Not on file    Highest education level: Not on file   Occupational History    Occupation:      Employer: [employer]     Comment: [employer]   Social Needs    Financial resource strain: Not on file    Food insecurity:     Worry: Not on file     Inability: Not on file    Transportation needs:     Medical: Not on file      Non-medical: Not on file   Tobacco Use    Smoking status: Never Smoker    Smokeless tobacco: Never Used   Substance and Sexual Activity    Alcohol use: Yes     Alcohol/week: 0.0 standard drinks     Comment: Occasionally    Drug use: Yes     Frequency: 2.0 times per week     Types: Marijuana     Comment: Smoking less    Sexual activity: Yes     Partners: Female   Lifestyle    Physical activity:     Days per week: Not on file     Minutes per session: Not on file    Stress: Not at all   Relationships    Social connections:     Talks on phone: Not on file     Gets together: Not on file     Attends Oriental orthodox service: Not on file     Active member of club or organization: Not on file     Attends meetings of clubs or organizations: Not on file     Relationship status: Not on file   Other Topics Concern    Not on file   Social History Narrative    She wears seatbelt. Her son was killed in 2010.     Review of patient's allergies indicates:   Allergen Reactions    Doxycycline Nausea Only     Other reaction(s): Nausea    Fluticasone Other (See Comments)     Other reaction(s): Epistaxis      Penicillins      Other reaction(s): unknown     Current Outpatient Medications   Medication Sig    albuterol (PROVENTIL/VENTOLIN HFA) 90 mcg/actuation inhaler Inhale 1-2 puffs into the lungs every 4 to 6 hours as needed for Wheezing.    amLODIPine (NORVASC) 5 MG tablet Take 1 tablet (5 mg total) by mouth once daily.    calcium/magnesium/vit B comp (CALCIUM-MAGNESIUM-B COMPLEX ORAL) Take by mouth.    COCONUT OIL ORAL Take by mouth.    fluticasone furoate-vilanterol (BREO ELLIPTA) 100-25 mcg/dose diskus inhaler Inhale 1 puff into the lungs once daily. Controller    levocetirizine (XYZAL) 5 MG tablet Take 1 tablet (5 mg total) by mouth every morning.    montelukast (SINGULAIR) 10 mg tablet Take 1 tablet (10 mg total) by mouth every evening.    naproxen sodium (ALEVE) 220 MG tablet     omega-3s/dha/epa/fish oil/D3  (VITAMIN-D + OMEGA-3 ORAL) Take by mouth.    benzonatate (TESSALON) 200 MG capsule Take 1 capsule (200 mg total) by mouth 2 (two) times daily as needed for Cough.    predniSONE (DELTASONE) 20 MG tablet Take 2 tab daily x 3 days, 1 tab daily x 3 days, then 1/2 tab daily x 2 days.    promethazine-dextromethorphan (PROMETHAZINE-DM) 6.25-15 mg/5 mL Syrp Take 5 mLs by mouth nightly as needed.     No current facility-administered medications for this visit.            Review of Systems   Constitutional: Negative for activity change, appetite change, chills, diaphoresis, fatigue, fever and unexpected weight change.   HENT: Positive for congestion, postnasal drip, rhinorrhea, sinus pain and sore throat. Negative for ear pain, sinus pressure, sneezing, tinnitus, trouble swallowing and voice change.    Eyes: Negative for photophobia, pain and visual disturbance.   Respiratory: Positive for cough. Negative for chest tightness, shortness of breath and wheezing.    Cardiovascular: Negative for chest pain, palpitations and leg swelling.   Gastrointestinal: Negative for abdominal distention, abdominal pain, constipation, diarrhea, nausea and vomiting.   Genitourinary: Negative for decreased urine volume, difficulty urinating, dysuria, flank pain, frequency, hematuria and urgency.   Musculoskeletal: Negative for arthralgias, back pain, joint pain, joint swelling, neck pain and neck stiffness.   Skin: Negative for rash.   Allergic/Immunologic: Negative for immunocompromised state.   Neurological: Negative for dizziness, tremors, seizures, syncope, facial asymmetry, speech difficulty, weakness, light-headedness, numbness and headaches.   Hematological: Negative for adenopathy. Does not bruise/bleed easily.   Psychiatric/Behavioral: Negative for confusion and sleep disturbance.       Objective:      Physical Exam   Constitutional: She is oriented to person, place, and time.   HENT:   Right Ear: Tympanic membrane is erythematous.    Left Ear: Tympanic membrane is erythematous.   Nose: Mucosal edema and rhinorrhea present.   Mouth/Throat: Uvula is midline and mucous membranes are normal. Posterior oropharyngeal erythema present.   Cardiovascular: Normal rate, regular rhythm and normal heart sounds.   Pulmonary/Chest: Effort normal and breath sounds normal. Wheezes: mild    Abdominal: Soft. Bowel sounds are normal.   Neurological: She is alert and oriented to person, place, and time.   Skin: Skin is warm and dry.       Assessment:     Vitals:    11/07/19 1353   BP: 128/80   Pulse: 96   Temp: 98.4 °F (36.9 °C)         1. Mild intermittent asthma without complication    2. Viral URI with cough    3. Asthma with acute exacerbation, unspecified asthma severity, unspecified whether persistent        Plan:   Mild intermittent asthma without complication  -     benzonatate (TESSALON) 200 MG capsule; Take 1 capsule (200 mg total) by mouth 2 (two) times daily as needed for Cough.  Dispense: 20 capsule; Refill: 0  -     promethazine-dextromethorphan (PROMETHAZINE-DM) 6.25-15 mg/5 mL Syrp; Take 5 mLs by mouth nightly as needed.  Dispense: 180 mL; Refill: 0    Viral URI with cough  -     benzonatate (TESSALON) 200 MG capsule; Take 1 capsule (200 mg total) by mouth 2 (two) times daily as needed for Cough.  Dispense: 20 capsule; Refill: 0  -     promethazine-dextromethorphan (PROMETHAZINE-DM) 6.25-15 mg/5 mL Syrp; Take 5 mLs by mouth nightly as needed.  Dispense: 180 mL; Refill: 0    Asthma with acute exacerbation, unspecified asthma severity, unspecified whether persistent  -     predniSONE (DELTASONE) 20 MG tablet; Take 2 tab daily x 3 days, 1 tab daily x 3 days, then 1/2 tab daily x 2 days.  Dispense: 10 tablet; Refill: 0        As above  Continue inhalers/singulair  Return PRN

## 2019-12-18 ENCOUNTER — NURSE TRIAGE (OUTPATIENT)
Dept: ADMINISTRATIVE | Facility: CLINIC | Age: 49
End: 2019-12-18

## 2019-12-18 NOTE — TELEPHONE ENCOUNTER
Last reading 157/109, around 1:15 pm today, has not taken amlodipine yet today.  She has a mild headache, rates it 2/10.  States her bp has been in this range since Friday of last week.      Reason for Disposition   Systolic BP >= 160 OR Diastolic >= 100    Additional Information   Negative: Sounds like a life-threatening emergency to the triager   Negative: Pregnant > 20 weeks and new hand or face swelling   Negative: Pregnant > 20 weeks and BP > 140/90   Negative: Systolic BP >= 160 OR Diastolic >= 100, and any cardiac or neurologic symptoms (e.g., chest pain, difficulty breathing, unsteady gait, blurred vision)   Negative: Patient sounds very sick or weak to the triager   Negative: BP Systolic BP >= 140 OR Diastolic >= 90 and postpartum < 4 weeks   Negative: Systolic BP >= 180 OR Diastolic >= 110, and missed most recent dose of blood pressure medication   Negative: Systolic BP >= 180 OR Diastolic >= 110   Negative: Patient wants to be seen   Negative: Ran out of BP medications   Negative: Taking BP medications and feels is having side effects (e.g., impotence, cough, dizziness)    Protocols used: HIGH BLOOD PRESSURE-A-OH

## 2020-01-02 NOTE — PROGRESS NOTES
Subjective:       Patient ID: Ayanna Alicia is a 49 y.o. female.    Chief Complaint   Patient presents with    Hypertension       Hypertension   This is a chronic problem. The problem is uncontrolled (home bp running ~ 157-159 // 100-110). Associated symptoms include anxiety (normal stress), headaches and malaise/fatigue. Pertinent negatives include no blurred vision, chest pain (more of a chest tightness), neck pain, orthopnea, palpitations, peripheral edema, shortness of breath or sweats. Risk factors for coronary artery disease include obesity, stress and family history. Past treatments include diuretics, calcium channel blockers and angiotensin blockers. There are no compliance problems.        Review of Systems   Constitutional: Positive for fatigue and malaise/fatigue. Negative for activity change and appetite change.   Eyes: Negative.  Negative for blurred vision.   Respiratory: Negative.  Negative for shortness of breath.    Cardiovascular: Negative for chest pain (more of a chest tightness), palpitations, orthopnea and leg swelling.   Musculoskeletal: Negative for neck pain.   Neurological: Positive for headaches. Negative for weakness, light-headedness and numbness.         Review of patient's allergies indicates:   Allergen Reactions    Doxycycline Nausea Only     Other reaction(s): Nausea    Fluticasone Other (See Comments)     Other reaction(s): Epistaxis      Penicillins      Other reaction(s): unknown         Medication List with Changes/Refills   Current Medications    AMLODIPINE (NORVASC) 5 MG TABLET    Take 1 tablet (5 mg total) by mouth once daily.    CALCIUM/MAGNESIUM/VIT B COMP (CALCIUM-MAGNESIUM-B COMPLEX ORAL)    Take by mouth.    COCONUT OIL ORAL    Take by mouth.    FLUTICASONE FUROATE-VILANTEROL (BREO ELLIPTA) 100-25 MCG/DOSE DISKUS INHALER    Inhale 1 puff into the lungs once daily. Controller    LEVOCETIRIZINE (XYZAL) 5 MG TABLET    Take 1 tablet (5 mg total) by mouth every  "morning.    MONTELUKAST (SINGULAIR) 10 MG TABLET    Take 1 tablet (10 mg total) by mouth every evening.    NAPROXEN SODIUM (ALEVE) 220 MG TABLET        OMEGA-3S/DHA/EPA/FISH OIL/D3 (VITAMIN-D + OMEGA-3 ORAL)    Take by mouth.   Discontinued Medications    ALBUTEROL (PROVENTIL/VENTOLIN HFA) 90 MCG/ACTUATION INHALER    Inhale 1-2 puffs into the lungs every 4 to 6 hours as needed for Wheezing.    PREDNISONE (DELTASONE) 20 MG TABLET    Take 2 tab daily x 3 days, 1 tab daily x 3 days, then 1/2 tab daily x 2 days.       Patient Active Problem List   Diagnosis    HTN (hypertension)    Multinodular goiter    Asthma    Seasonal allergies    Morbid obesity with BMI of 40.0-44.9, adult    GERD (gastroesophageal reflux disease)    Vitamin D deficiency    Surgical menopause    Patella-femoral syndrome    Localized osteoarthrosis not specified whether primary or secondary, lower leg         Past medical, surgical, family and social histories have been reviewed today.        Objective:     Vitals:    01/06/20 0847 01/06/20 0900   BP: (!) 150/106 (!) 150/100   Pulse: 74    Temp: 98 °F (36.7 °C)    Weight: 129 kg (284 lb 6.3 oz)    Height: 5' 6" (1.676 m)    PainSc:   4    PainLoc: Head          Physical Exam   Constitutional: She is oriented to person, place, and time. She appears well-developed and well-nourished.   HENT:   Head: Normocephalic and atraumatic.   Eyes: Pupils are equal, round, and reactive to light.   Cardiovascular: Normal rate and regular rhythm.   Pulmonary/Chest: Effort normal and breath sounds normal.   Musculoskeletal: Normal range of motion. She exhibits no edema.   Neurological: She is alert and oriented to person, place, and time.   Skin: Capillary refill takes less than 2 seconds.   Psychiatric: She has a normal mood and affect. Her behavior is normal. Judgment and thought content normal.   Vitals reviewed.        Diagnosis       1. Essential hypertension    2. Morbid obesity with BMI of " 45.0-49.9, adult          Assessment/ Plan     Essential hypertension  · Uncontrolled at this time, on med as ordered.  · Continue amlodipine 5 mg daily, add diuretic.  · Hydration, low-salt/caffeine intake, healthy diet.  -     hydroCHLOROthiazide (HYDRODIURIL) 12.5 MG Tab; Take 1 tablet (12.5 mg total) by mouth once daily.  Dispense: 90 tablet; Refill: 0    Morbid obesity with BMI of 45.0-49.9, adult  · Healthy diet, regular exercise.  · Weight reduction.      To see nurse in 1 week for BP recheck.  She has been given a note to return to work on 1/8/2020.  Follow-up in clinic as needed.        Patient Care Team:  Madeleine Enrique MD as PCP - General (Family Medicine)  Christopher Jhaveri LPN as Care Coordinator      [facility]

## 2020-01-03 ENCOUNTER — PATIENT MESSAGE (OUTPATIENT)
Dept: PULMONOLOGY | Facility: CLINIC | Age: 50
End: 2020-01-03

## 2020-01-03 ENCOUNTER — TELEPHONE (OUTPATIENT)
Dept: PULMONOLOGY | Facility: CLINIC | Age: 50
End: 2020-01-03

## 2020-01-03 NOTE — TELEPHONE ENCOUNTER
----- Message from Guillermina Justin sent at 12/31/2019 11:54 AM CST -----  Regarding: PAP Equipment  Dale Solorzano,    Ms Alicia returned her pap equipment today AMA.     Guillermina Mcdonald, CRT-SDS

## 2020-01-06 ENCOUNTER — OFFICE VISIT (OUTPATIENT)
Dept: FAMILY MEDICINE | Facility: CLINIC | Age: 50
End: 2020-01-06
Payer: COMMERCIAL

## 2020-01-06 VITALS
WEIGHT: 284.38 LBS | SYSTOLIC BLOOD PRESSURE: 150 MMHG | HEART RATE: 74 BPM | TEMPERATURE: 98 F | HEIGHT: 66 IN | BODY MASS INDEX: 45.7 KG/M2 | DIASTOLIC BLOOD PRESSURE: 100 MMHG

## 2020-01-06 DIAGNOSIS — I10 ESSENTIAL HYPERTENSION: Primary | ICD-10-CM

## 2020-01-06 DIAGNOSIS — E66.01 MORBID OBESITY WITH BMI OF 45.0-49.9, ADULT: ICD-10-CM

## 2020-01-06 PROCEDURE — 99214 OFFICE O/P EST MOD 30 MIN: CPT | Mod: S$GLB,,, | Performed by: REGISTERED NURSE

## 2020-01-06 PROCEDURE — 99999 PR PBB SHADOW E&M-EST. PATIENT-LVL III: CPT | Mod: PBBFAC,,, | Performed by: REGISTERED NURSE

## 2020-01-06 PROCEDURE — 3008F PR BODY MASS INDEX (BMI) DOCUMENTED: ICD-10-PCS | Mod: CPTII,S$GLB,, | Performed by: REGISTERED NURSE

## 2020-01-06 PROCEDURE — 3080F PR MOST RECENT DIASTOLIC BLOOD PRESSURE >= 90 MM HG: ICD-10-PCS | Mod: CPTII,S$GLB,, | Performed by: REGISTERED NURSE

## 2020-01-06 PROCEDURE — 3077F PR MOST RECENT SYSTOLIC BLOOD PRESSURE >= 140 MM HG: ICD-10-PCS | Mod: CPTII,S$GLB,, | Performed by: REGISTERED NURSE

## 2020-01-06 PROCEDURE — 3077F SYST BP >= 140 MM HG: CPT | Mod: CPTII,S$GLB,, | Performed by: REGISTERED NURSE

## 2020-01-06 PROCEDURE — 99999 PR PBB SHADOW E&M-EST. PATIENT-LVL III: ICD-10-PCS | Mod: PBBFAC,,, | Performed by: REGISTERED NURSE

## 2020-01-06 PROCEDURE — 3080F DIAST BP >= 90 MM HG: CPT | Mod: CPTII,S$GLB,, | Performed by: REGISTERED NURSE

## 2020-01-06 PROCEDURE — 3008F BODY MASS INDEX DOCD: CPT | Mod: CPTII,S$GLB,, | Performed by: REGISTERED NURSE

## 2020-01-06 PROCEDURE — 99214 PR OFFICE/OUTPT VISIT, EST, LEVL IV, 30-39 MIN: ICD-10-PCS | Mod: S$GLB,,, | Performed by: REGISTERED NURSE

## 2020-01-06 RX ORDER — HYDROCHLOROTHIAZIDE 12.5 MG/1
12.5 TABLET ORAL DAILY
Qty: 90 TABLET | Refills: 0 | Status: SHIPPED | OUTPATIENT
Start: 2020-01-06 | End: 2020-04-02 | Stop reason: SDUPTHER

## 2020-01-06 NOTE — LETTER
January 6, 2020                 [facility]  Family Medicine  [address] [place] [address]  Phone: [phone]  Fax: [phone]   January 6, 2020     Patient: Ayanna Alicia   YOB: 1970   Date of Visit: 1/6/2020       To Whom it May Concern:    Ayanna Alicia was seen in my clinic on 1/6/2020. She may return to work on 01/08/2020.    Please excuse her from any classes or work missed.    If you have any questions or concerns, please don't hesitate to call.    Sincerely,         Cullen Steele, NP      No

## 2020-01-07 ENCOUNTER — PATIENT MESSAGE (OUTPATIENT)
Dept: FAMILY MEDICINE | Facility: CLINIC | Age: 50
End: 2020-01-07

## 2020-01-13 NOTE — PROGRESS NOTES
Subjective:       Patient ID: Ayanna Alicia is a 49 y.o. female.    Chief Complaint   Patient presents with    Hypertension       HPI    Ayanna Alicia is here today for a 1 week HTN follow-up.  She has been on medication as ordered, no problems reported.  She has cut out all alcohol, trying to eat better.  Does not check home BP.        Review of Systems   Constitutional: Negative.    Eyes: Negative.    Respiratory: Negative.    Cardiovascular: Negative.    Neurological: Negative.          Review of patient's allergies indicates:   Allergen Reactions    Doxycycline Nausea Only     Other reaction(s): Nausea    Fluticasone Other (See Comments)     Other reaction(s): Epistaxis      Penicillins      Other reaction(s): unknown         Medication List with Changes/Refills   Current Medications    AMLODIPINE (NORVASC) 5 MG TABLET    Take 1 tablet (5 mg total) by mouth once daily.    CALCIUM/MAGNESIUM/VIT B COMP (CALCIUM-MAGNESIUM-B COMPLEX ORAL)    Take by mouth.    COCONUT OIL ORAL    Take by mouth.    FLUTICASONE FUROATE-VILANTEROL (BREO ELLIPTA) 100-25 MCG/DOSE DISKUS INHALER    Inhale 1 puff into the lungs once daily. Controller    HYDROCHLOROTHIAZIDE (HYDRODIURIL) 12.5 MG TAB    Take 1 tablet (12.5 mg total) by mouth once daily.    LEVOCETIRIZINE (XYZAL) 5 MG TABLET    Take 1 tablet (5 mg total) by mouth every morning.    MONTELUKAST (SINGULAIR) 10 MG TABLET    Take 1 tablet (10 mg total) by mouth every evening.    NAPROXEN SODIUM (ALEVE) 220 MG TABLET        OMEGA-3S/DHA/EPA/FISH OIL/D3 (VITAMIN-D + OMEGA-3 ORAL)    Take by mouth.       Patient Active Problem List   Diagnosis    HTN (hypertension)    Multinodular goiter    Asthma    Seasonal allergies    GERD (gastroesophageal reflux disease)    Vitamin D deficiency    Surgical menopause    Patella-femoral syndrome    Localized osteoarthrosis not specified whether primary or secondary, lower leg    Morbid obesity with BMI of 45.0-49.9, adult  "        Past medical, surgical, family and social histories have been reviewed today.        Objective:     Vitals:    01/15/20 1011 01/15/20 1027   BP: (!) 138/95 (!) 140/72   Pulse: 75    Temp: 97.7 °F (36.5 °C)    Weight: 130.1 kg (286 lb 13.1 oz)    Height: 5' 6" (1.676 m)    PainSc: 0-No pain          Physical Exam   Constitutional: She is oriented to person, place, and time. She appears well-developed and well-nourished. No distress.   HENT:   Head: Normocephalic and atraumatic.   Eyes: Pupils are equal, round, and reactive to light. EOM are normal.   Neck: No JVD present. No tracheal deviation present. No thyromegaly present.   Cardiovascular: Normal rate and regular rhythm.   Pulmonary/Chest: Effort normal and breath sounds normal.   Musculoskeletal: Normal range of motion. She exhibits no edema.   Neurological: She is alert and oriented to person, place, and time.   Skin: Capillary refill takes less than 2 seconds. She is not diaphoretic.   Psychiatric: She has a normal mood and affect. Her behavior is normal. Judgment and thought content normal.         Diagnosis       1. Hypertension, unspecified type    2. Asthma in adult, unspecified asthma severity, unspecified whether complicated, unspecified whether persistent          Assessment/ Plan     Hypertension, unspecified type  · Better controlled on 1 week of medication start, continue.  · Low-salt/caffeine intake, regular exercise.  · Weight reduction.    Asthma in adult, unspecified asthma severity, unspecified whether complicated, unspecified whether persistent  · Med refill:  -     albuterol (PROVENTIL HFA) 90 mcg/actuation inhaler; Inhale 1-2 puffs into the lungs every 4 to 6 hours as needed for Wheezing or Shortness of Breath. Rescue  Dispense: 18 g; Refill: 2        Return to clinic in August 2020 for annual wellness exam.  Follow-up in clinic as needed.        Patient Care Team:  Madeleine Enrique MD as PCP - General (Family Medicine)  Christopher WELLS" MANDO Jhaveri as Care Coordinator      KEVEN [place]  [facility]

## 2020-01-15 ENCOUNTER — OFFICE VISIT (OUTPATIENT)
Dept: FAMILY MEDICINE | Facility: CLINIC | Age: 50
End: 2020-01-15
Payer: COMMERCIAL

## 2020-01-15 VITALS
SYSTOLIC BLOOD PRESSURE: 140 MMHG | TEMPERATURE: 98 F | DIASTOLIC BLOOD PRESSURE: 72 MMHG | BODY MASS INDEX: 46.09 KG/M2 | HEIGHT: 66 IN | WEIGHT: 286.81 LBS | HEART RATE: 75 BPM

## 2020-01-15 DIAGNOSIS — J45.909 ASTHMA IN ADULT, UNSPECIFIED ASTHMA SEVERITY, UNSPECIFIED WHETHER COMPLICATED, UNSPECIFIED WHETHER PERSISTENT: ICD-10-CM

## 2020-01-15 DIAGNOSIS — I10 HYPERTENSION, UNSPECIFIED TYPE: Primary | ICD-10-CM

## 2020-01-15 PROCEDURE — 3074F SYST BP LT 130 MM HG: CPT | Mod: CPTII,S$GLB,, | Performed by: REGISTERED NURSE

## 2020-01-15 PROCEDURE — 3008F PR BODY MASS INDEX (BMI) DOCUMENTED: ICD-10-PCS | Mod: CPTII,S$GLB,, | Performed by: REGISTERED NURSE

## 2020-01-15 PROCEDURE — 3008F BODY MASS INDEX DOCD: CPT | Mod: CPTII,S$GLB,, | Performed by: REGISTERED NURSE

## 2020-01-15 PROCEDURE — 3078F DIAST BP <80 MM HG: CPT | Mod: CPTII,S$GLB,, | Performed by: REGISTERED NURSE

## 2020-01-15 PROCEDURE — 99214 OFFICE O/P EST MOD 30 MIN: CPT | Mod: S$GLB,,, | Performed by: REGISTERED NURSE

## 2020-01-15 PROCEDURE — 99999 PR PBB SHADOW E&M-EST. PATIENT-LVL III: ICD-10-PCS | Mod: PBBFAC,,, | Performed by: REGISTERED NURSE

## 2020-01-15 PROCEDURE — 99999 PR PBB SHADOW E&M-EST. PATIENT-LVL III: CPT | Mod: PBBFAC,,, | Performed by: REGISTERED NURSE

## 2020-01-15 PROCEDURE — 3078F PR MOST RECENT DIASTOLIC BLOOD PRESSURE < 80 MM HG: ICD-10-PCS | Mod: CPTII,S$GLB,, | Performed by: REGISTERED NURSE

## 2020-01-15 PROCEDURE — 99214 PR OFFICE/OUTPT VISIT, EST, LEVL IV, 30-39 MIN: ICD-10-PCS | Mod: S$GLB,,, | Performed by: REGISTERED NURSE

## 2020-01-15 PROCEDURE — 3074F PR MOST RECENT SYSTOLIC BLOOD PRESSURE < 130 MM HG: ICD-10-PCS | Mod: CPTII,S$GLB,, | Performed by: REGISTERED NURSE

## 2020-01-15 RX ORDER — SODIUM, POTASSIUM,MAG SULFATES 17.5-3.13G
SOLUTION, RECONSTITUTED, ORAL ORAL
Qty: 354 ML | Refills: 0 | Status: ON HOLD | OUTPATIENT
Start: 2020-01-15 | End: 2020-01-20 | Stop reason: HOSPADM

## 2020-01-15 RX ORDER — ALBUTEROL SULFATE 90 UG/1
1-2 AEROSOL, METERED RESPIRATORY (INHALATION)
Qty: 18 G | Refills: 2 | Status: SHIPPED | OUTPATIENT
Start: 2020-01-15 | End: 2020-08-11 | Stop reason: SDUPTHER

## 2020-01-15 NOTE — LETTER
January 15, 2020      [facility]  [address] [place] [address]  Phone: [phone]  Fax: [phone]       Patient: Ayanna Alicia   YOB: 1970  Date of Visit: 01/15/2020    To Whom It May Concern:    Krystal Alicia  was at [facility] on 01/15/2020. She may return to work/school on 01/15/2020 with no restrictions. If you have any questions or concerns, or if I can be of further assistance, please do not hesitate to contact me.    Sincerely,        MANDO Swain NP

## 2020-01-19 PROBLEM — Z12.11 ENCOUNTER FOR SCREENING COLONOSCOPY: Status: ACTIVE | Noted: 2020-01-19

## 2020-01-20 ENCOUNTER — HOSPITAL ENCOUNTER (OUTPATIENT)
Facility: HOSPITAL | Age: 50
Discharge: HOME | End: 2020-01-20
Attending: INTERNAL MEDICINE | Admitting: INTERNAL MEDICINE
Payer: COMMERCIAL

## 2020-01-20 ENCOUNTER — ANESTHESIA (OUTPATIENT)
Dept: ENDOSCOPY | Facility: HOSPITAL | Age: 50
End: 2020-01-20
Payer: COMMERCIAL

## 2020-01-20 ENCOUNTER — ANESTHESIA EVENT (OUTPATIENT)
Dept: ENDOSCOPY | Facility: HOSPITAL | Age: 50
End: 2020-01-20
Payer: COMMERCIAL

## 2020-01-20 VITALS
TEMPERATURE: 47 F | HEIGHT: 66 IN | DIASTOLIC BLOOD PRESSURE: 81 MMHG | SYSTOLIC BLOOD PRESSURE: 126 MMHG | HEART RATE: 78 BPM | WEIGHT: 283.94 LBS | RESPIRATION RATE: 18 BRPM | OXYGEN SATURATION: 98 % | BODY MASS INDEX: 45.63 KG/M2

## 2020-01-20 DIAGNOSIS — Z12.11 ENCOUNTER FOR SCREENING COLONOSCOPY: Primary | ICD-10-CM

## 2020-01-20 PROCEDURE — 37000008 HC ANESTHESIA 1ST 15 MINUTES: Performed by: INTERNAL MEDICINE

## 2020-01-20 PROCEDURE — 27201012 HC FORCEPS, HOT/COLD, DISP: Performed by: INTERNAL MEDICINE

## 2020-01-20 PROCEDURE — 63600175 PHARM REV CODE 636 W HCPCS: Performed by: NURSE ANESTHETIST, CERTIFIED REGISTERED

## 2020-01-20 PROCEDURE — 45380 PR COLONOSCOPY,BIOPSY: ICD-10-PCS | Mod: 33,,, | Performed by: INTERNAL MEDICINE

## 2020-01-20 PROCEDURE — 88305 TISSUE EXAM BY PATHOLOGIST: CPT | Mod: 26,,, | Performed by: PATHOLOGY

## 2020-01-20 PROCEDURE — 27200997: Performed by: INTERNAL MEDICINE

## 2020-01-20 PROCEDURE — 45380 COLONOSCOPY AND BIOPSY: CPT | Performed by: INTERNAL MEDICINE

## 2020-01-20 PROCEDURE — 37000009 HC ANESTHESIA EA ADD 15 MINS: Performed by: INTERNAL MEDICINE

## 2020-01-20 PROCEDURE — 88305 TISSUE EXAM BY PATHOLOGIST: ICD-10-PCS | Mod: 26,,, | Performed by: PATHOLOGY

## 2020-01-20 PROCEDURE — 88305 TISSUE EXAM BY PATHOLOGIST: CPT | Performed by: PATHOLOGY

## 2020-01-20 PROCEDURE — 45380 COLONOSCOPY AND BIOPSY: CPT | Mod: 33,,, | Performed by: INTERNAL MEDICINE

## 2020-01-20 RX ORDER — SODIUM CHLORIDE, SODIUM LACTATE, POTASSIUM CHLORIDE, CALCIUM CHLORIDE 600; 310; 30; 20 MG/100ML; MG/100ML; MG/100ML; MG/100ML
INJECTION, SOLUTION INTRAVENOUS CONTINUOUS PRN
Status: DISCONTINUED | OUTPATIENT
Start: 2020-01-20 | End: 2020-01-20

## 2020-01-20 RX ORDER — PROPOFOL 10 MG/ML
INJECTION, EMULSION INTRAVENOUS
Status: DISCONTINUED | OUTPATIENT
Start: 2020-01-20 | End: 2020-01-20

## 2020-01-20 RX ORDER — SODIUM CHLORIDE, SODIUM LACTATE, POTASSIUM CHLORIDE, CALCIUM CHLORIDE 600; 310; 30; 20 MG/100ML; MG/100ML; MG/100ML; MG/100ML
INJECTION, SOLUTION INTRAVENOUS CONTINUOUS
Status: DISCONTINUED | OUTPATIENT
Start: 2020-01-20 | End: 2020-01-20 | Stop reason: HOSPADM

## 2020-01-20 RX ORDER — SODIUM CHLORIDE 0.9 % (FLUSH) 0.9 %
10 SYRINGE (ML) INJECTION
Status: DISCONTINUED | OUTPATIENT
Start: 2020-01-20 | End: 2020-01-20 | Stop reason: HOSPADM

## 2020-01-20 RX ORDER — LIDOCAINE HCL/PF 100 MG/5ML
SYRINGE (ML) INTRAVENOUS
Status: DISCONTINUED | OUTPATIENT
Start: 2020-01-20 | End: 2020-01-20

## 2020-01-20 RX ADMIN — PROPOFOL 30 MG: 10 INJECTION, EMULSION INTRAVENOUS at 09:01

## 2020-01-20 RX ADMIN — SODIUM CHLORIDE, SODIUM LACTATE, POTASSIUM CHLORIDE, AND CALCIUM CHLORIDE: 600; 310; 30; 20 INJECTION, SOLUTION INTRAVENOUS at 09:01

## 2020-01-20 RX ADMIN — LIDOCAINE HYDROCHLORIDE 50 MG: 20 INJECTION, SOLUTION INTRAVENOUS at 09:01

## 2020-01-20 RX ADMIN — PROPOFOL 100 MG: 10 INJECTION, EMULSION INTRAVENOUS at 09:01

## 2020-01-20 NOTE — ANESTHESIA RELEASE NOTE
"Anesthesia Release from PACU Note    Patient: Ayanna Alicia    Procedure(s) Performed: Procedure(s) (LRB):  COLONOSCOPY (N/A)    Anesthesia type: MAC    Post pain: Adequate analgesia    Post assessment: no apparent anesthetic complications, tolerated procedure well and no evidence of recall    Last Vitals:   Visit Vitals  /64   Pulse 78   Temp 36.7 °C (98.1 °F)   Resp 18   Ht 5' 6" (1.676 m)   Wt 128.8 kg (283 lb 15.2 oz)   SpO2 97%   Breastfeeding? No   BMI 45.83 kg/m²       Post vital signs: stable    Level of consciousness: responds to stimulation    Nausea/Vomiting: no nausea/no vomiting    Complications: none    Airway Patency: patent    Respiratory: unassisted    Cardiovascular: stable and blood pressure at baseline    Hydration: euvolemic       "

## 2020-01-20 NOTE — DISCHARGE SUMMARY
Endoscopy Discharge Summary      Admit Date: 1/20/2020    Discharge Date and Time:  1/20/2020 9:29 AM    Attending Physician: Angie Magana MD     Discharge Physician: Angie Magana MD     Principal Admitting Diagnoses: Encounter for screening colonoscopy         Discharge Diagnosis: The encounter diagnosis was Encounter for screening colonoscopy.     Discharged Condition: Good    Indication for Admission: Encounter for screening colonoscopy     Hospital Course: Patient was admitted for an inpatient procedure and tolerated the procedure well with no complications.    Significant Diagnostic Studies: Colonoscopy with polypectomy and clip placement x 1    Pathology (if any):  Specimen (12h ago, onward)    None          Estimated Blood Loss: 1 ml.    Discussed with: patient.    Disposition: Home.    Follow Up/Patient Instructions:   Current Discharge Medication List      CONTINUE these medications which have NOT CHANGED    Details   albuterol (PROVENTIL HFA) 90 mcg/actuation inhaler Inhale 1-2 puffs into the lungs every 4 to 6 hours as needed for Wheezing or Shortness of Breath. Rescue  Qty: 18 g, Refills: 2    Associated Diagnoses: Asthma in adult, unspecified asthma severity, unspecified whether complicated, unspecified whether persistent      amLODIPine (NORVASC) 5 MG tablet Take 1 tablet (5 mg total) by mouth once daily.  Qty: 90 tablet, Refills: 1    Associated Diagnoses: Essential hypertension; Annual physical exam      calcium/magnesium/vit B comp (CALCIUM-MAGNESIUM-B COMPLEX ORAL) Take by mouth.      COCONUT OIL ORAL Take by mouth.      fluticasone furoate-vilanterol (BREO ELLIPTA) 100-25 mcg/dose diskus inhaler Inhale 1 puff into the lungs once daily. Controller  Qty: 60 each, Refills: 3      hydroCHLOROthiazide (HYDRODIURIL) 12.5 MG Tab Take 1 tablet (12.5 mg total) by mouth once daily.  Qty: 90 tablet, Refills: 0    Associated Diagnoses: Essential hypertension      levocetirizine (XYZAL) 5 MG  tablet Take 1 tablet (5 mg total) by mouth every morning.  Qty: 90 tablet, Refills: 1    Associated Diagnoses: Seasonal allergies      montelukast (SINGULAIR) 10 mg tablet Take 1 tablet (10 mg total) by mouth every evening.  Qty: 90 tablet, Refills: 1    Associated Diagnoses: Uncomplicated asthma, unspecified asthma severity, unspecified whether persistent; Seasonal allergies      naproxen sodium (ALEVE) 220 MG tablet       omega-3s/dha/epa/fish oil/D3 (VITAMIN-D + OMEGA-3 ORAL) Take by mouth.         STOP taking these medications       sodium,potassium,mag sulfates (SUPREP BOWEL PREP KIT) 17.5-3.13-1.6 gram SolR Comments:   Reason for Stopping:               No discharge procedures on file.        Angie Magana MD  Gastroenterology and Hepatology

## 2020-01-20 NOTE — TRANSFER OF CARE
"Anesthesia Transfer of Care Note    Patient: Ayanna Alicia    Procedure(s) Performed: Procedure(s) (LRB):  COLONOSCOPY (N/A)    Patient location: PACU    Anesthesia Type: MAC    Transport from OR: Transported from OR on room air with adequate spontaneous ventilation    Post pain: adequate analgesia    Post assessment: no apparent anesthetic complications    Post vital signs: stable    Level of consciousness: responds to stimulation    Nausea/Vomiting: no nausea/vomiting    Complications: none    Transfer of care protocol was followed      Last vitals:   Visit Vitals  /64   Pulse 78   Temp 36.7 °C (98.1 °F)   Resp 18   Ht 5' 6" (1.676 m)   Wt 128.8 kg (283 lb 15.2 oz)   SpO2 97%   Breastfeeding? No   BMI 45.83 kg/m²     " No

## 2020-01-20 NOTE — ANESTHESIA PREPROCEDURE EVALUATION
01/20/2020  Ayanna Alicia is a 49 y.o., female.    Anesthesia Evaluation    I have reviewed the Patient Summary Reports.    I have reviewed the Nursing Notes.   I have reviewed the Medications.     Review of Systems  Anesthesia Hx:  Denies Family Hx of Anesthesia complications.   Denies Personal Hx of Anesthesia complications.   Social:  Social Alcohol Use, Non-Smoker Drug use: Marijuana; Times per week: 4   Hematology/Oncology:  Hematology Normal   Oncology Normal     EENT/Dental:   chronic allergic rhinitis   Cardiovascular:   Hypertension Denies MI.   Denies CABG/stent.         Pulmonary:   Denies COPD. Asthma asymptomatic and mild Sleep Apnea Noncompliant with cpap  Infrequent rescue inhaler use   Renal/:  Renal/ Normal     Hepatic/GI:   Bowel Prep. GERD Liver Disease, Denies Hepatitis. fatty liver  hepatomegaly   Musculoskeletal:   Arthritis  TMJ   Neurological:   Denies CVA. Headaches Denies Seizures.    Endocrine:   Denies Diabetes. Denies Hypothyroidism. Denies Hyperthyroidism. Multinodular goiter   Dermatological:   eczema       Physical Exam  General:  Morbid Obesity    Airway/Jaw/Neck:  Airway Findings: Mouth Opening: Normal Tongue: Normal  General Airway Assessment: Adult  Mallampati: II      Dental:  Dental Findings: In tact   Chest/Lungs:  Chest/Lungs Findings: Clear to auscultation, Normal Respiratory Rate     Heart/Vascular:  Heart Findings: Rate: Normal  Rhythm: Regular Rhythm             Anesthesia Plan  Type of Anesthesia, risks & benefits discussed:  Anesthesia Type:  MAC  Patient's Preference:   Intra-op Monitoring Plan: standard ASA monitors  Intra-op Monitoring Plan Comments:   Post Op Pain Control Plan:   Post Op Pain Control Plan Comments:   Induction:   IV  Beta Blocker:  Patient is not currently on a Beta-Blocker (No further documentation required).       Informed Consent:  Patient understands risks and agrees with Anesthesia plan.  Questions answered. Anesthesia consent signed with patient.  ASA Score: 2     Day of Surgery Review of History & Physical: I have interviewed and examined the patient. I have reviewed the patient's H&P dated:  There are no significant changes.  H&P update referred to the surgeon.         Ready For Surgery From Anesthesia Perspective.

## 2020-01-20 NOTE — ANESTHESIA POSTPROCEDURE EVALUATION
Anesthesia Post Evaluation    Patient: Ayanna Alicia    Procedure(s) Performed: Procedure(s) (LRB):  COLONOSCOPY (N/A)    Final Anesthesia Type: MAC    Patient location during evaluation: PACU  Patient participation: Yes- Able to Participate  Level of consciousness: awake and alert and awake  Post-procedure vital signs: reviewed and stable  Pain management: adequate  Airway patency: patent    PONV status at discharge: No PONV  Anesthetic complications: no      Cardiovascular status: blood pressure returned to baseline  Respiratory status: spontaneous ventilation, room air and unassisted  Hydration status: euvolemic  Follow-up not needed.          Vitals Value Taken Time   /64 1/20/2020  8:21 AM   Temp 36.7 °C (98.1 °F) 1/20/2020  8:21 AM   Pulse 78 1/20/2020  8:21 AM   Resp 18 1/20/2020  8:21 AM   SpO2 97 % 1/20/2020  8:21 AM         No case tracking events are documented in the log.      Pain/Dav Score: No data recorded

## 2020-01-20 NOTE — DISCHARGE INSTRUCTIONS

## 2020-01-20 NOTE — H&P
Short Stay Endoscopy History and Physical    PCP - Madeleine Enrique MD    Procedure - Colonoscopy  ASA - 2  Mallampati - per anesthesia  History of Anesthesia problems - no  Family history Anesthesia problems -  no     HPI:  This is a 49 y.o. female here for evaluation of :   Active Hospital Problems    Diagnosis  POA    *Encounter for screening colonoscopy [Z12.11]  Not Applicable      Resolved Hospital Problems   No resolved problems to display.         Health Maintenance       Date Due Completion Date    Shingles Vaccine (1 of 2) 2018 ---    Colonoscopy 2018 ---    Lipid Panel 2019    Influenza Vaccine (1) 2019    Pap Smear 2020    Override on 2012: Done    Mammogram 2020    TETANUS VACCINE 2022 3/23/2012          Screening - yes  History of polyps - no  Diarrhea - no  Anemia - no  Blood in stools - no  Abdominal pain - no  Family History of Colon Cancer- no  Other - no    ROS:  CONSTITUTIONAL: Denies weight change,  fatigue, fevers, chills, night sweats.  CARDIOVASCULAR: Denies chest pain, shortness of breath, orthopnea and edema.  RESPIRATORY: Denies cough, hemoptysis, dyspnea, and wheezing.  GI: See HPI.    Medical History:   Past Medical History:   Diagnosis Date    Abnormal Pap smear of cervix     in the past with repeat pap smear okay.    Allergic rhinitis, cause unspecified     Arthritis of both knees     Asthma     Eczema     Fatty liver 10/2014    Fibrocystic breast changes     Headache(784.0)     Hepatomegaly 10/2014    Hypertension     Liver cyst 10/2014    Multinodular goiter     Followed by ENT - Dr. Nicolas Gray    Polymenorrhea 2008    TMJ (dislocation of temporomandibular joint)     Uterine fibroid     in the past    Vitamin D deficiency disease        Surgical History:   Past Surgical History:   Procedure Laterality Date     SECTION, CLASSIC      x 1    HYSTERECTOMY      MULTIPLE TOOTH  EXTRACTIONS      PARTIAL HYSTERECTOMY  03/20/2013    Due to fibroids       Family History:   Family History   Problem Relation Age of Onset    Stroke Mother     Diabetes Mother     Heart disease Mother     Heart disease Father 63        MI/CAD    Hypertension Father     Cancer Maternal Grandmother 60        Rectal and stomach cancer    Migraines Cousin     Cataracts Cousin     Cancer Maternal Aunt 50        Stomach cancer    Cancer Maternal Aunt 66        Lung cancer (smoker)    Stroke Maternal Grandfather     Diabetes Maternal Grandfather     Stroke Sister        Social History:   Social History     Tobacco Use    Smoking status: Never Smoker    Smokeless tobacco: Never Used   Substance Use Topics    Alcohol use: Yes     Alcohol/week: 0.0 standard drinks     Comment: Occasionally    Drug use: Yes     Frequency: 4.0 times per week     Types: Marijuana     Comment: 1-19-20       Allergies:   Review of patient's allergies indicates:   Allergen Reactions    Doxycycline Nausea Only     Other reaction(s): Nausea    Fluticasone Other (See Comments)     Other reaction(s): Epistaxis      Penicillins      Other reaction(s): unknown       Medications:   No current facility-administered medications on file prior to encounter.      Current Outpatient Medications on File Prior to Encounter   Medication Sig Dispense Refill    amLODIPine (NORVASC) 5 MG tablet Take 1 tablet (5 mg total) by mouth once daily. 90 tablet 1    calcium/magnesium/vit B comp (CALCIUM-MAGNESIUM-B COMPLEX ORAL) Take by mouth.      COCONUT OIL ORAL Take by mouth.      fluticasone furoate-vilanterol (BREO ELLIPTA) 100-25 mcg/dose diskus inhaler Inhale 1 puff into the lungs once daily. Controller 60 each 3    levocetirizine (XYZAL) 5 MG tablet Take 1 tablet (5 mg total) by mouth every morning. 90 tablet 1    montelukast (SINGULAIR) 10 mg tablet Take 1 tablet (10 mg total) by mouth every evening. 90 tablet 1    naproxen sodium (ALEVE)  220 MG tablet       omega-3s/dha/epa/fish oil/D3 (VITAMIN-D + OMEGA-3 ORAL) Take by mouth.         Physical Exam:  Vital Signs: There were no vitals filed for this visit.  General Appearance: Well appearing in no acute distress  ENT: OP clear  Chest: CTA B  CV: RRR, no m/r/g  Abd: s/nt/nd/nabs  Ext: no edema    Labs:Reviewed    IMP:  Active Hospital Problems    Diagnosis  POA    *Encounter for screening colonoscopy [Z12.11]  Not Applicable      Resolved Hospital Problems   No resolved problems to display.         Plan:   I have explained the risks and benefits of colonoscopy to the patient including but not limited to bleeding, perforation, infection, and death. The patient wishes to proceed.

## 2020-01-20 NOTE — [employer] PATIENT INSTRUCTIONS
Discharge Summary/Instructions after an Endoscopic Procedure  Patient Name: Ayanna Alicia  Patient MRN: [id number]  Patient YOB: 1970 Monday, January 20, 2020 Angie Magana MD  RESTRICTIONS:  During your procedure today, you received medications for sedation.  These   medications may affect your judgment, balance and coordination.  Therefore,   for 24 hours, you have the following restrictions:   - DO NOT drive a car, operate machinery, make legal/financial decisions,   sign important papers or drink alcohol.    ACTIVITY:  Today: no heavy lifting, straining or running due to procedural   sedation/anesthesia.  The following day: return to full activity including work.  DIET:  Eat and drink normally unless instructed otherwise.     TREATMENT FOR COMMON SIDE EFFECTS:  - Mild abdominal pain, nausea, belching, bloating or excessive gas:  rest,   eat lightly and use a heating pad.  - Sore Throat: treat with throat lozenges and/or gargle with warm salt   water.  - Because air was used during the procedure, expelling large amounts of air   from your rectum or belching is normal.  - If a bowel prep was taken, you may not have a bowel movement for 1-3 days.    This is normal.  SYMPTOMS TO WATCH FOR AND REPORT TO YOUR PHYSICIAN:  1. Abdominal pain or bloating, other than gas cramps.  2. Chest pain.  3. Back pain.  4. Signs of infection such as: chills or fever occurring within 24 hours   after the procedure.  5. Rectal bleeding, which would show as bright red, maroon, or black stools.   (A tablespoon of blood from the rectum is not serious, especially if   hemorrhoids are present.)  6. Vomiting.  7. Weakness or dizziness.  GO DIRECTLY TO THE NEAREST EMERGENCY ROOM IF YOU HAVE ANY OF THE FOLLOWING:      Difficulty breathing              Chills and/or fever over 101 F   Persistent vomiting and/or vomiting blood   Severe abdominal pain   Severe chest pain   Black, tarry stools   Bleeding- more than one  tablespoon   Any other symptom or condition that you feel may need urgent attention  Your doctor recommends these additional instructions:  If any biopsies were taken, your doctors clinic will contact you in 1 to 2   weeks with any results.  - Discharge patient to home (via wheelchair).   - Resume previous diet.   - Continue present medications.   - Await pathology results.   - Repeat colonoscopy in 3 years for surveillance.   - Telephone GI clinic for pathology results in 2 weeks.   - Patient has a contact number available for emergencies.  The signs and   symptoms of potential delayed complications were discussed with the   patient.  Return to normal activities tomorrow.  Written discharge   instructions were provided to the patient.  For questions, problems or results please call your physician Angie Magana MD at Work:  [phone]  If you have any questions about the above instructions, call the GI   department at [phone] or call the endoscopy unit at [phone]   from 7am until 3 pm.  [facility] [place], EMERGENCY ROOM PHONE NUMBER:   [phone]  IF A COMPLICATION OR EMERGENCY SITUATION ARISES AND YOU ARE UNABLE TO REACH   YOUR PHYSICIAN - GO DIRECTLY TO THE EMERGENCY ROOM.  I have read or have had read to me these discharge instructions for my   procedure and have received a written copy.  I understand these   instructions and will follow-up with my physician if I have any questions.     __________________________________       _____________________________________  Nurse Signature                                          Patient/Designated   Responsible Party Signature  MD Angie Rodas MD  1/20/2020 9:28:22 AM  [employer]

## 2020-01-20 NOTE — PLAN OF CARE
Dr Magana came to bedside and discussed findings. NO N/V,  no abdominal pain, no GI bleeding, and vitals stable.  Pt discharged from unit.

## 2020-01-23 LAB
FINAL PATHOLOGIC DIAGNOSIS: NORMAL
GROSS: NORMAL

## 2020-01-28 ENCOUNTER — PATIENT MESSAGE (OUTPATIENT)
Dept: GASTROENTEROLOGY | Facility: CLINIC | Age: 50
End: 2020-01-28

## 2020-03-19 ENCOUNTER — OFFICE VISIT (OUTPATIENT)
Dept: FAMILY MEDICINE | Facility: CLINIC | Age: 50
End: 2020-03-19
Payer: COMMERCIAL

## 2020-03-19 VITALS
RESPIRATION RATE: 22 BRPM | HEART RATE: 96 BPM | WEIGHT: 280.19 LBS | SYSTOLIC BLOOD PRESSURE: 140 MMHG | DIASTOLIC BLOOD PRESSURE: 86 MMHG | BODY MASS INDEX: 45.03 KG/M2 | HEIGHT: 66 IN | OXYGEN SATURATION: 97 %

## 2020-03-19 DIAGNOSIS — J40 SINOBRONCHITIS: Primary | ICD-10-CM

## 2020-03-19 DIAGNOSIS — J45.20 MILD INTERMITTENT ASTHMA WITHOUT COMPLICATION: ICD-10-CM

## 2020-03-19 DIAGNOSIS — I10 ESSENTIAL HYPERTENSION: ICD-10-CM

## 2020-03-19 DIAGNOSIS — R05.9 COUGH: ICD-10-CM

## 2020-03-19 DIAGNOSIS — J32.9 SINOBRONCHITIS: Primary | ICD-10-CM

## 2020-03-19 PROCEDURE — 99214 OFFICE O/P EST MOD 30 MIN: CPT | Mod: S$GLB,,, | Performed by: FAMILY MEDICINE

## 2020-03-19 PROCEDURE — 3008F BODY MASS INDEX DOCD: CPT | Mod: CPTII,S$GLB,, | Performed by: FAMILY MEDICINE

## 2020-03-19 PROCEDURE — 3079F PR MOST RECENT DIASTOLIC BLOOD PRESSURE 80-89 MM HG: ICD-10-PCS | Mod: CPTII,S$GLB,, | Performed by: FAMILY MEDICINE

## 2020-03-19 PROCEDURE — 3077F PR MOST RECENT SYSTOLIC BLOOD PRESSURE >= 140 MM HG: ICD-10-PCS | Mod: CPTII,S$GLB,, | Performed by: FAMILY MEDICINE

## 2020-03-19 PROCEDURE — 3077F SYST BP >= 140 MM HG: CPT | Mod: CPTII,S$GLB,, | Performed by: FAMILY MEDICINE

## 2020-03-19 PROCEDURE — 99999 PR PBB SHADOW E&M-EST. PATIENT-LVL III: ICD-10-PCS | Mod: PBBFAC,,, | Performed by: FAMILY MEDICINE

## 2020-03-19 PROCEDURE — 99999 PR PBB SHADOW E&M-EST. PATIENT-LVL III: CPT | Mod: PBBFAC,,, | Performed by: FAMILY MEDICINE

## 2020-03-19 PROCEDURE — 3079F DIAST BP 80-89 MM HG: CPT | Mod: CPTII,S$GLB,, | Performed by: FAMILY MEDICINE

## 2020-03-19 PROCEDURE — 3008F PR BODY MASS INDEX (BMI) DOCUMENTED: ICD-10-PCS | Mod: CPTII,S$GLB,, | Performed by: FAMILY MEDICINE

## 2020-03-19 PROCEDURE — 99214 PR OFFICE/OUTPT VISIT, EST, LEVL IV, 30-39 MIN: ICD-10-PCS | Mod: S$GLB,,, | Performed by: FAMILY MEDICINE

## 2020-03-19 RX ORDER — PROMETHAZINE HYDROCHLORIDE AND CODEINE PHOSPHATE 6.25; 1 MG/5ML; MG/5ML
5 SOLUTION ORAL EVERY 4 HOURS PRN
Qty: 180 ML | Refills: 0 | Status: SHIPPED | OUTPATIENT
Start: 2020-03-19 | End: 2020-03-29

## 2020-03-19 RX ORDER — FLUTICASONE FUROATE AND VILANTEROL 100; 25 UG/1; UG/1
1 POWDER RESPIRATORY (INHALATION) DAILY
Qty: 60 EACH | Refills: 5 | Status: SHIPPED | OUTPATIENT
Start: 2020-03-19 | End: 2020-04-26

## 2020-03-19 RX ORDER — SULFAMETHOXAZOLE AND TRIMETHOPRIM 800; 160 MG/1; MG/1
1 TABLET ORAL 2 TIMES DAILY
Qty: 20 TABLET | Refills: 0 | Status: SHIPPED | OUTPATIENT
Start: 2020-03-19 | End: 2020-03-29

## 2020-03-19 NOTE — PROGRESS NOTES
Ayanna Alicia    Chief Complaint   Patient presents with    Allergies    Asthma       History of Present Illness:   Ms. Alicia comes in today with complaint of having cough, productive of yellowish mucus for several weeks.  She noticed slight tinge of blood in her mucus this morning.  She reports having occasional wheeze, post-tussive shortness of breath.  She also reports having sore throat, nasal congestion.  She states she was evaluated at a local urgent care on March 16, 2020 with negative Strep test and was given Medrol dose pack and Promethazine-DM which she states she continues and helps little.  She also states she has been taking OTC Sudafed, Chloraseptic spray and prescription Xyzal, Singulair and Albuterol MDI with help; she states she's just not at 100%.  She states she ran out of Breo 1 week ago.    Otherwise, she denies having fever, chills, fatigue, appetite changes; chest pain, palpitations, leg swelling; abdominal pain, nausea, vomiting, diarrhea, constipation; unusual urinary symptoms; back pain; headache; anxiety, depression, homicidal or suicidal thoughts.     She does not smoke. She denies known exposure to ill contacts and denies recent foreign travel.           Current Outpatient Medications   Medication Sig    albuterol (PROVENTIL HFA) 90 mcg/actuation inhaler Inhale 1-2 puffs into the lungs every 4 to 6 hours as needed for Wheezing or Shortness of Breath. Rescue    amLODIPine (NORVASC) 5 MG tablet Take 1 tablet (5 mg total) by mouth once daily.    calcium/magnesium/vit B comp (CALCIUM-MAGNESIUM-B COMPLEX ORAL) Take by mouth.    COCONUT OIL ORAL Take by mouth.    hydroCHLOROthiazide (HYDRODIURIL) 12.5 MG Tab Take 1 tablet (12.5 mg total) by mouth once daily.    levocetirizine (XYZAL) 5 MG tablet Take 1 tablet (5 mg total) by mouth every morning.    montelukast (SINGULAIR) 10 mg tablet Take 1 tablet (10 mg total) by mouth every evening.    naproxen sodium (ALEVE) 220 MG tablet      omega-3s/dha/epa/fish oil/D3 (VITAMIN-D + OMEGA-3 ORAL) Take by mouth.    fluticasone furoate-vilanterol (BREO ELLIPTA) 100-25 mcg/dose diskus inhaler Inhale 1 puff into the lungs once daily. Controller       Review of Systems   Constitutional: Positive for chills. Negative for appetite change, fatigue and fever.   HENT: Positive for congestion and sore throat. Negative for postnasal drip, rhinorrhea, sinus pressure, sinus pain and sneezing.    Eyes: Negative for pain, discharge, redness and itching.   Respiratory: Positive for cough, shortness of breath and wheezing.    Cardiovascular: Negative for chest pain, palpitations and leg swelling.   Gastrointestinal: Negative for abdominal pain, constipation, diarrhea, nausea and vomiting.   Genitourinary: Negative for difficulty urinating.   Musculoskeletal: Negative for back pain.   Neurological: Negative for headaches.   Psychiatric/Behavioral: Negative for dysphoric mood and suicidal ideas. The patient is not nervous/anxious.         Negative for homicidal ideas.     Objective:  Physical Exam   Constitutional: She is oriented to person, place, and time. She appears well-developed and well-nourished. No distress.   Pleasant.   HENT:   Head: Normocephalic and atraumatic.   Right Ear: External ear normal.   Left Ear: External ear normal.   Nose: Nose normal.   Mouth/Throat: Oropharynx is clear and moist. No oropharyngeal exudate.   Non tender sinuses.  TM's shiny and clear. Nasal mucosa inflamed, swollen, with slight drainage at right nostril noted.   Eyes: Pupils are equal, round, and reactive to light. Conjunctivae and EOM are normal. Right eye exhibits no discharge. Left eye exhibits no discharge.   Neck: Normal range of motion. Neck supple. Thyromegaly present.   Non tender, chronic.   Cardiovascular: Normal rate, regular rhythm and intact distal pulses.   No murmur heard.  Pulmonary/Chest: Effort normal and breath sounds normal. No respiratory distress. She has  no wheezes.   Abdominal: Soft. Bowel sounds are normal. She exhibits no distension and no mass. There is no tenderness. There is no rebound.   Musculoskeletal: Normal range of motion. She exhibits no edema or tenderness.   She is ambulatory without problems.    Lymphadenopathy:     She has no cervical adenopathy.   Neurological: She is alert and oriented to person, place, and time.   Skin: She is not diaphoretic.   Psychiatric: She has a normal mood and affect. Her behavior is normal. Judgment and thought content normal.   Vitals reviewed.      ASSESSMENT:  1. Sinobronchitis    2. Cough    3. Mild intermittent asthma without complication    4. Essential hypertension        PLAN:  Ayanna was seen today for allergies and asthma.    Diagnoses and all orders for this visit:    Sinobronchitis  -     sulfamethoxazole-trimethoprim 800-160mg (BACTRIM DS) 800-160 mg Tab; Take 1 tablet by mouth 2 (two) times daily. for 10 days    Cough  -     promethazine-codeine 6.25-10 mg/5 ml (PHENERGAN WITH CODEINE) 6.25-10 mg/5 mL syrup; Take 5 mLs by mouth every 4 (four) hours as needed for Cough.    Mild intermittent asthma without complication  -     fluticasone furoate-vilanteroL (BREO ELLIPTA) 100-25 mcg/dose diskus inhaler; Inhale 1 puff into the lungs once daily. Controller    Essential hypertension       Patient advised to call for results.  Continue current medications, follow low sodium, low cholesterol, low carb diet, daily walks.  Stop Sudafed as may cause elevation of blood pressure.  Add Bactrim DS; medication precautions discussed with patient.  Prescription refill for Promethazine-Codeine per patient request as noted above. Stop taking Promethazine-DM.  Work excuse for today with return tomorrow.   Follow up if symptoms worsen or fail to improve.

## 2020-04-02 ENCOUNTER — PATIENT MESSAGE (OUTPATIENT)
Dept: ADMINISTRATIVE | Facility: OTHER | Age: 50
End: 2020-04-02

## 2020-04-02 ENCOUNTER — PATIENT MESSAGE (OUTPATIENT)
Dept: FAMILY MEDICINE | Facility: CLINIC | Age: 50
End: 2020-04-02

## 2020-04-02 DIAGNOSIS — I10 ESSENTIAL HYPERTENSION: ICD-10-CM

## 2020-04-02 RX ORDER — HYDROCHLOROTHIAZIDE 12.5 MG/1
12.5 TABLET ORAL DAILY
Qty: 90 TABLET | Refills: 0 | Status: SHIPPED | OUTPATIENT
Start: 2020-04-02 | End: 2020-07-09

## 2020-04-22 DIAGNOSIS — Z00.00 ANNUAL PHYSICAL EXAM: ICD-10-CM

## 2020-04-22 DIAGNOSIS — I10 ESSENTIAL HYPERTENSION: ICD-10-CM

## 2020-04-22 NOTE — TELEPHONE ENCOUNTER
----- Message from Angela Willi sent at 4/22/2020  3:39 PM CDT -----  Contact: self  Type:  RX Refill Request    Who Called: Ayanna  Refill or New Rx:refill  RX Name and Strength:amLODIPine (NORVASC) 5 MG tablet  How is the patient currently taking it? (ex. 1XDay):  Is this a 30 day or 90 day RX:  Preferred Pharmacy with phone number:    [facility] #[id number] [address] [facility]  [address] [facility] [address]  Phone: [phone] Fax: [phone]      [facility] #[id number] [place], [place] - [address] [address]  Phone: [phone] Fax: [phone]    Local or Mail Order:local  Ordering Provider:Klaus  Would the patient rather a call back or a response via [facility]? call  Best Call Back Number:[phone]  Additional Information: pt states that she is completely out and has sent a previous message via [employer] that has not been responded to.

## 2020-04-23 RX ORDER — AMLODIPINE BESYLATE 5 MG/1
5 TABLET ORAL DAILY
Qty: 90 TABLET | Refills: 1 | Status: SHIPPED | OUTPATIENT
Start: 2020-04-23 | End: 2020-06-19 | Stop reason: SDUPTHER

## 2020-04-26 DIAGNOSIS — J45.20 MILD INTERMITTENT ASTHMA WITHOUT COMPLICATION: ICD-10-CM

## 2020-04-26 RX ORDER — FLUTICASONE FUROATE AND VILANTEROL TRIFENATATE 100; 25 UG/1; UG/1
POWDER RESPIRATORY (INHALATION)
Qty: 1 EACH | Refills: 2 | Status: SHIPPED | OUTPATIENT
Start: 2020-04-26 | End: 2020-08-11 | Stop reason: SDUPTHER

## 2020-06-17 DIAGNOSIS — I10 ESSENTIAL HYPERTENSION: Primary | ICD-10-CM

## 2020-06-17 DIAGNOSIS — Z00.00 ANNUAL PHYSICAL EXAM: ICD-10-CM

## 2020-06-18 DIAGNOSIS — Z12.31 VISIT FOR SCREENING MAMMOGRAM: Primary | ICD-10-CM

## 2020-06-21 RX ORDER — AMLODIPINE BESYLATE 5 MG/1
5 TABLET ORAL DAILY
Qty: 90 TABLET | Refills: 1 | Status: SHIPPED | OUTPATIENT
Start: 2020-06-21 | End: 2020-09-22 | Stop reason: SDUPTHER

## 2020-08-11 DIAGNOSIS — Z00.00 ANNUAL PHYSICAL EXAM: Primary | ICD-10-CM

## 2020-09-17 ENCOUNTER — HOSPITAL ENCOUNTER (OUTPATIENT)
Dept: RADIOLOGY | Facility: HOSPITAL | Age: 50
Discharge: HOME | End: 2020-09-17
Attending: FAMILY MEDICINE
Payer: COMMERCIAL

## 2020-09-17 VITALS — BODY MASS INDEX: 45.03 KG/M2 | HEIGHT: 66 IN | WEIGHT: 280.19 LBS

## 2020-09-17 DIAGNOSIS — Z12.31 VISIT FOR SCREENING MAMMOGRAM: ICD-10-CM

## 2020-09-17 PROCEDURE — 77067 SCR MAMMO BI INCL CAD: CPT | Mod: TC

## 2020-09-17 PROCEDURE — 77063 MAMMO DIGITAL SCREENING BILAT WITH TOMO: ICD-10-PCS | Mod: 26,,, | Performed by: RADIOLOGY

## 2020-09-17 PROCEDURE — 77063 BREAST TOMOSYNTHESIS BI: CPT | Mod: 26,,, | Performed by: RADIOLOGY

## 2020-09-17 PROCEDURE — 77067 SCR MAMMO BI INCL CAD: CPT | Mod: 26,,, | Performed by: RADIOLOGY

## 2020-09-17 PROCEDURE — 77067 MAMMO DIGITAL SCREENING BILAT WITH TOMO: ICD-10-PCS | Mod: 26,,, | Performed by: RADIOLOGY

## 2020-09-22 ENCOUNTER — LAB VISIT (OUTPATIENT)
Dept: LAB | Facility: HOSPITAL | Age: 50
End: 2020-09-22
Payer: COMMERCIAL

## 2020-09-22 ENCOUNTER — OFFICE VISIT (OUTPATIENT)
Dept: FAMILY MEDICINE | Facility: CLINIC | Age: 50
End: 2020-09-22
Payer: COMMERCIAL

## 2020-09-22 VITALS
HEART RATE: 95 BPM | OXYGEN SATURATION: 98 % | BODY MASS INDEX: 44.41 KG/M2 | TEMPERATURE: 98 F | RESPIRATION RATE: 16 BRPM | WEIGHT: 275.13 LBS | DIASTOLIC BLOOD PRESSURE: 84 MMHG | SYSTOLIC BLOOD PRESSURE: 136 MMHG

## 2020-09-22 DIAGNOSIS — E89.40 SURGICAL MENOPAUSE: ICD-10-CM

## 2020-09-22 DIAGNOSIS — E04.2 MULTINODULAR GOITER: ICD-10-CM

## 2020-09-22 DIAGNOSIS — Z23 NEED FOR INFLUENZA VACCINATION: ICD-10-CM

## 2020-09-22 DIAGNOSIS — J45.20 MILD INTERMITTENT ASTHMA WITHOUT COMPLICATION: ICD-10-CM

## 2020-09-22 DIAGNOSIS — Z00.00 ANNUAL PHYSICAL EXAM: ICD-10-CM

## 2020-09-22 DIAGNOSIS — K63.5 BENIGN COLON POLYP: ICD-10-CM

## 2020-09-22 DIAGNOSIS — Z00.00 ANNUAL PHYSICAL EXAM: Primary | ICD-10-CM

## 2020-09-22 DIAGNOSIS — E66.01 MORBID OBESITY WITH BMI OF 40.0-44.9, ADULT: ICD-10-CM

## 2020-09-22 DIAGNOSIS — I10 ESSENTIAL HYPERTENSION: ICD-10-CM

## 2020-09-22 DIAGNOSIS — E55.9 VITAMIN D DEFICIENCY: ICD-10-CM

## 2020-09-22 DIAGNOSIS — J30.2 SEASONAL ALLERGIES: ICD-10-CM

## 2020-09-22 LAB
ALBUMIN SERPL BCP-MCNC: 4.2 G/DL (ref 3.5–5.2)
ALP SERPL-CCNC: 104 U/L (ref 55–135)
ALT SERPL W/O P-5'-P-CCNC: 19 U/L (ref 10–44)
ANION GAP SERPL CALC-SCNC: 9 MMOL/L (ref 8–16)
AST SERPL-CCNC: 15 U/L (ref 10–40)
BACTERIA #/AREA URNS AUTO: ABNORMAL /HPF
BASOPHILS # BLD AUTO: 0.07 K/UL (ref 0–0.2)
BASOPHILS NFR BLD: 0.8 % (ref 0–1.9)
BILIRUB SERPL-MCNC: 0.4 MG/DL (ref 0.1–1)
BILIRUB UR QL STRIP: NEGATIVE
BUN SERPL-MCNC: 18 MG/DL (ref 6–20)
CALCIUM SERPL-MCNC: 9.4 MG/DL (ref 8.7–10.5)
CHLORIDE SERPL-SCNC: 103 MMOL/L (ref 95–110)
CHOLEST SERPL-MCNC: 212 MG/DL (ref 120–199)
CHOLEST/HDLC SERPL: 4.7 {RATIO} (ref 2–5)
CLARITY UR REFRACT.AUTO: ABNORMAL
CO2 SERPL-SCNC: 28 MMOL/L (ref 23–29)
COLOR UR AUTO: YELLOW
CREAT SERPL-MCNC: 0.9 MG/DL (ref 0.5–1.4)
DIFFERENTIAL METHOD: ABNORMAL
EOSINOPHIL # BLD AUTO: 0.2 K/UL (ref 0–0.5)
EOSINOPHIL NFR BLD: 1.9 % (ref 0–8)
ERYTHROCYTE [DISTWIDTH] IN BLOOD BY AUTOMATED COUNT: 13.3 % (ref 11.5–14.5)
EST. GFR  (AFRICAN AMERICAN): >60 ML/MIN/1.73 M^2
EST. GFR  (NON AFRICAN AMERICAN): >60 ML/MIN/1.73 M^2
GLUCOSE SERPL-MCNC: 92 MG/DL (ref 70–110)
GLUCOSE UR QL STRIP: NEGATIVE
HCT VFR BLD AUTO: 40.4 % (ref 37–48.5)
HDLC SERPL-MCNC: 45 MG/DL (ref 40–75)
HDLC SERPL: 21.2 % (ref 20–50)
HGB BLD-MCNC: 12.4 G/DL (ref 12–16)
HGB UR QL STRIP: NEGATIVE
IMM GRANULOCYTES # BLD AUTO: 0.04 K/UL (ref 0–0.04)
IMM GRANULOCYTES NFR BLD AUTO: 0.5 % (ref 0–0.5)
KETONES UR QL STRIP: NEGATIVE
LDLC SERPL CALC-MCNC: 149.4 MG/DL (ref 63–159)
LEUKOCYTE ESTERASE UR QL STRIP: NEGATIVE
LYMPHOCYTES # BLD AUTO: 3 K/UL (ref 1–4.8)
LYMPHOCYTES NFR BLD: 34 % (ref 18–48)
MCH RBC QN AUTO: 30 PG (ref 27–31)
MCHC RBC AUTO-ENTMCNC: 30.7 G/DL (ref 32–36)
MCV RBC AUTO: 98 FL (ref 82–98)
MICROSCOPIC COMMENT: ABNORMAL
MONOCYTES # BLD AUTO: 0.6 K/UL (ref 0.3–1)
MONOCYTES NFR BLD: 6.6 % (ref 4–15)
NEUTROPHILS # BLD AUTO: 5 K/UL (ref 1.8–7.7)
NEUTROPHILS NFR BLD: 56.2 % (ref 38–73)
NITRITE UR QL STRIP: NEGATIVE
NONHDLC SERPL-MCNC: 167 MG/DL
NRBC BLD-RTO: 0 /100 WBC
PH UR STRIP: 6 [PH] (ref 5–8)
PLATELET # BLD AUTO: 364 K/UL (ref 150–350)
PMV BLD AUTO: 9.8 FL (ref 9.2–12.9)
POTASSIUM SERPL-SCNC: 3.7 MMOL/L (ref 3.5–5.1)
PROT SERPL-MCNC: 7.8 G/DL (ref 6–8.4)
PROT UR QL STRIP: NEGATIVE
RBC # BLD AUTO: 4.14 M/UL (ref 4–5.4)
RBC #/AREA URNS AUTO: 0 /HPF (ref 0–4)
SODIUM SERPL-SCNC: 140 MMOL/L (ref 136–145)
SP GR UR STRIP: 1.01 (ref 1–1.03)
SQUAMOUS #/AREA URNS AUTO: 2 /HPF
TRIGL SERPL-MCNC: 88 MG/DL (ref 30–150)
TSH SERPL DL<=0.005 MIU/L-ACNC: 0.95 UIU/ML (ref 0.4–4)
URN SPEC COLLECT METH UR: ABNORMAL
WBC # BLD AUTO: 8.85 K/UL (ref 3.9–12.7)
WBC #/AREA URNS AUTO: 4 /HPF (ref 0–5)

## 2020-09-22 PROCEDURE — 81001 URINALYSIS AUTO W/SCOPE: CPT

## 2020-09-22 PROCEDURE — 99396 PR PREVENTIVE VISIT,EST,40-64: ICD-10-PCS | Mod: 25,S$GLB,, | Performed by: FAMILY MEDICINE

## 2020-09-22 PROCEDURE — 3079F PR MOST RECENT DIASTOLIC BLOOD PRESSURE 80-89 MM HG: ICD-10-PCS | Mod: CPTII,S$GLB,, | Performed by: FAMILY MEDICINE

## 2020-09-22 PROCEDURE — 3079F DIAST BP 80-89 MM HG: CPT | Mod: CPTII,S$GLB,, | Performed by: FAMILY MEDICINE

## 2020-09-22 PROCEDURE — 90471 FLU VACCINE (QUAD) GREATER THAN OR EQUAL TO 3YO PRESERVATIVE FREE IM: ICD-10-PCS | Mod: S$GLB,,, | Performed by: FAMILY MEDICINE

## 2020-09-22 PROCEDURE — 90686 FLU VACCINE (QUAD) GREATER THAN OR EQUAL TO 3YO PRESERVATIVE FREE IM: ICD-10-PCS | Mod: S$GLB,,, | Performed by: FAMILY MEDICINE

## 2020-09-22 PROCEDURE — 3008F BODY MASS INDEX DOCD: CPT | Mod: CPTII,S$GLB,, | Performed by: FAMILY MEDICINE

## 2020-09-22 PROCEDURE — 90686 IIV4 VACC NO PRSV 0.5 ML IM: CPT | Mod: S$GLB,,, | Performed by: FAMILY MEDICINE

## 2020-09-22 PROCEDURE — 85025 COMPLETE CBC W/AUTO DIFF WBC: CPT

## 2020-09-22 PROCEDURE — 99396 PREV VISIT EST AGE 40-64: CPT | Mod: 25,S$GLB,, | Performed by: FAMILY MEDICINE

## 2020-09-22 PROCEDURE — 3075F PR MOST RECENT SYSTOLIC BLOOD PRESS GE 130-139MM HG: ICD-10-PCS | Mod: CPTII,S$GLB,, | Performed by: FAMILY MEDICINE

## 2020-09-22 PROCEDURE — 99999 PR PBB SHADOW E&M-EST. PATIENT-LVL V: ICD-10-PCS | Mod: PBBFAC,,, | Performed by: FAMILY MEDICINE

## 2020-09-22 PROCEDURE — 3075F SYST BP GE 130 - 139MM HG: CPT | Mod: CPTII,S$GLB,, | Performed by: FAMILY MEDICINE

## 2020-09-22 PROCEDURE — 86803 HEPATITIS C AB TEST: CPT

## 2020-09-22 PROCEDURE — 99999 PR PBB SHADOW E&M-EST. PATIENT-LVL V: CPT | Mod: PBBFAC,,, | Performed by: FAMILY MEDICINE

## 2020-09-22 PROCEDURE — 82306 VITAMIN D 25 HYDROXY: CPT

## 2020-09-22 PROCEDURE — 36415 COLL VENOUS BLD VENIPUNCTURE: CPT | Mod: PO

## 2020-09-22 PROCEDURE — 3008F PR BODY MASS INDEX (BMI) DOCUMENTED: ICD-10-PCS | Mod: CPTII,S$GLB,, | Performed by: FAMILY MEDICINE

## 2020-09-22 PROCEDURE — 83525 ASSAY OF INSULIN: CPT

## 2020-09-22 PROCEDURE — 80053 COMPREHEN METABOLIC PANEL: CPT

## 2020-09-22 PROCEDURE — 84443 ASSAY THYROID STIM HORMONE: CPT

## 2020-09-22 PROCEDURE — 83036 HEMOGLOBIN GLYCOSYLATED A1C: CPT

## 2020-09-22 PROCEDURE — 90471 IMMUNIZATION ADMIN: CPT | Mod: S$GLB,,, | Performed by: FAMILY MEDICINE

## 2020-09-22 PROCEDURE — 80061 LIPID PANEL: CPT

## 2020-09-22 NOTE — PROGRESS NOTES
CHIEF COMPLAINT: Annual wellness examination.     HISTORY OF PRESENT ILLNESS: Ms. Alicia with chronic medical problems comes in today fasting and with taking medication for annual wellness examination.     END OF LIFE DECISION: She has no living will and does not desire life support.    Current Outpatient Medications   Medication Sig    albuterol (PROVENTIL HFA) 90 mcg/actuation inhaler Inhale 1-2 puffs into the lungs every 4 to 6 hours as needed for Wheezing or Shortness of Breath. Rescue    amLODIPine (NORVASC) 5 MG tablet Take 1 tablet (5 mg total) by mouth once daily.    calcium/magnesium/vit B comp (CALCIUM-MAGNESIUM-B COMPLEX ORAL) Take by mouth.    COCONUT OIL ORAL Take by mouth.    fluticasone furoate-vilanteroL (BREO ELLIPTA) 100-25 mcg/dose diskus inhaler INHALE 1 PUFF INTO THE LUNGS ONCE DAILY    hydroCHLOROthiazide (HYDRODIURIL) 12.5 MG Tab TAKE 1 TABLET(12.5 MG) BY MOUTH EVERY DAY    levocetirizine (XYZAL) 5 MG tablet Take 1 tablet (5 mg total) by mouth every morning.    montelukast (SINGULAIR) 10 mg tablet Take 1 tablet (10 mg total) by mouth every evening.    naproxen sodium (ALEVE) 220 MG tablet     omega-3s/dha/epa/fish oil/D3 (VITAMIN-D + OMEGA-3 ORAL) Take by mouth.      SCREENINGS:  Cholesterol: November 3, 2014.  FFS/Colonoscopy: January 20, 2020 - benign colon polyps; repeat in 3 years advised.  Mammogram: September 17, 2020 - okay.  GYN Exam: February 1, 2017 pap smear - okay.  Pap smear no longer needed.   Dexa Scan: Never.  Eye Exam: March 7, 2016 wit Dr. Blackwell.  She wears reading glasses.  HCVAb: Never. She desires.  PPD: Never.  Immunizations: Tdap - March 23, 2012.  Gardasil - N./A.  Zostavax - N./A.  Shingrix - Never. Reports no history of varicella or zoster.  Pneumovax - 2009.  Seasonal Flu - January 12, 2016. She desires.     ROS:  GENERAL: Denies fever, chills, fatigue or abnormal weight gain. Appetite normal. Weight 127.1 kg (280 lb 3.3 oz) at March 19, 2020 visit.  Monitors diet. Exercises occasionally during Covid-19 pandemic as currently works from home until September 30, 2020.  SKIN: Denies rashes, itching, changes in mole, color or texture of skin or easy bruising.   HEAD: Denies recent head trauma or headaches.  EYES: Denies change in vision, pain, diplopia, redness or discharge.   EARS: Denies ear pain, discharge, vertigo or decreased hearing.  NOSE: Denies loss of smell, epistaxis except allergy issues but stable today and reports no longer followed by allergist Dr. Harrell.  MOUTH & THROAT: Denies hoarseness or change in voice. Denies excessive gum bleeding or mouth sores. Denies sore throat.  NODES: Denies swollen glands.  CHEST: Denies HICKS, wheezing, cough, or sputum production.   CARDIOVASCULAR: Denies chest pain, PND, orthopnea or reduced exercise tolerance. Denies palpitations. Reports home blood pressure levels range 130-140's/80-90's.  ABDOMEN: Denies diarrhea, constipation, nausea, vomiting, abdominal pain, or blood in stool.   URINARY: Denies flank pain, dysuria or hematuria.  GENITOURINARY: Denies flank pain, dysuria, frequency or hematuria. Does perform monthly breast self exams. Reports occasional hot flashes.  ENDOCRINE: Denies diabetes, cholesterol problems. States saw Dr. Nicolas Gray, ENT, 2015 for surveillance of multinodular thyroid in the past.   HEME/LYMPH: Denies bleeding problems.  PERIPHERAL VASCULAR:Denies claudication or cyanosis.  MUSCULOSKELETAL: Denies joint stiffness, pain or swelling. Denies edema.  NEUROLOGIC: Denies history of seizures, tremors, paralysis, alteration of gait or coordination.  PSYCHIATRIC: Denies mood swings, depression, anxiety, homicidal or suicidal thoughts. Denies sleep problems.    PE:   VS: /84 Comment: Rechecked by Dr. Enrique.  Pulse 95   Temp 97.9 °F (36.6 °C) (Temporal)   Resp 16   Wt 124.8 kg (275 lb 2.2 oz)   SpO2 98%   BMI 44.41 kg/m²   APPEARANCE: Well nourished, well developed female, pleasant and  obese, alert and oriented in no acute distress.  HEAD: Non tender. Full range of motion.  EYES: PERRL, conjunctiva pink, lids no edema.  EARS: External canal patent, no swelling or redness. TM's shiny and clear.  NOSE: Mucosa and turbinates pink, not swollen. No discharge. Non tender sinuses.  THROAT: No pharyngeal erythema or exudate. No stridor.  NECK: Supple, no mass, chronic, mild non tender thyroid enlargement.  NODES: No cervical, axillary or inguinal lymph node enlargement.  CHEST: Normal respiratory effort. Lungs clear to auscultation.  CARDIOVASCULAR: Normal S1, S2. No rubs, murmurs or gallops. PMI not displaced. No carotid bruit. Pedal pulses palpable bilaterally. No edema.  ABDOMEN: Bowel sounds present. Not distended. Soft. No tenderness, masses or organomegaly.  BREAST EXAM: Symmetrical, no external lesions, no discharge, no masses palpated.  PELVIC EXAM: No external lesions noted, no discharge, absent cervix and uterus, bimanual exam showed no adnexal masses or tenderness. Urethra and bladder intact.  RECTAL EXAM: Not examined as had colonoscopy earlier this hear.  MUSCULOSKELETAL: No joint deformities or stiffness. She is ambulatory without problems.  SKIN: No rashes or suspicious lesions, normal color and turgor.  NEUROLOGIC: Cranial Nerves: II-XII grossly intact. DTR's: Knees, Ankles 2+ and equal bilaterally. Gait & Posture: Normal gait and fine motion.  PSYCHIATRIC: Patient alert, oriented x 3. Mood/Affect normal without acute anxiety but mild depression noted. Judgment/insight good as she makes appropriate decisions during today's examination.     ASSESSMENT:    ICD-10-CM ICD-9-CM    1. Annual physical exam  Z00.00 V70.0 CBC auto differential      TSH      Urinalysis      Comprehensive metabolic panel      Lipid Panel      Insulin, random      Hemoglobin A1C      Hepatitis C Antibody      Vitamin D   2. Essential hypertension  I10 401.9    3. Multinodular goiter  E04.2 241.1 US Soft Tissue Head  Neck Thyroid   4. Vitamin D deficiency  E55.9 268.9    5. Mild intermittent asthma without complication  J45.20 493.90    6. Seasonal allergies  J30.2 477.9    7. Benign colon polyp  K63.5 211.3    8. Surgical menopause  E89.40 627.4    9. Morbid obesity with BMI of 40.0-44.9, adult  E66.01 278.01     Z68.41 V85.41    10. Need for influenza vaccination  Z23 V04.81 Influenza - Quadrivalent *Preferred* (6 months+) (PF)     PLAN:  1. Age-appropriate counseling-appropriate low-sodium, low-cholesterol, low carbohydrate diet and exercise daily, monthly breast self exam, annual wellness examination.   2. Patient advised to call for results.  3. Continue current medications.  4. Keep follow up with specialists.  5. Follow up in about 6 months (around 3/22/2021) for hypertension follow up.

## 2020-09-23 LAB
25(OH)D3+25(OH)D2 SERPL-MCNC: 45 NG/ML (ref 30–96)
HCV AB SERPL QL IA: NEGATIVE
INSULIN COLLECTION INTERVAL: NORMAL
INSULIN SERPL-ACNC: 6.1 UU/ML

## 2020-09-24 ENCOUNTER — PATIENT MESSAGE (OUTPATIENT)
Dept: FAMILY MEDICINE | Facility: CLINIC | Age: 50
End: 2020-09-24

## 2020-09-24 LAB
ESTIMATED AVG GLUCOSE: 91 MG/DL (ref 68–131)
HBA1C MFR BLD HPLC: 4.8 % (ref 4–5.6)

## 2020-09-25 ENCOUNTER — TELEPHONE (OUTPATIENT)
Dept: RADIOLOGY | Facility: HOSPITAL | Age: 50
End: 2020-09-25

## 2020-09-30 ENCOUNTER — TELEPHONE (OUTPATIENT)
Dept: RADIOLOGY | Facility: HOSPITAL | Age: 50
End: 2020-09-30

## 2020-10-01 ENCOUNTER — HOSPITAL ENCOUNTER (OUTPATIENT)
Dept: RADIOLOGY | Facility: HOSPITAL | Age: 50
Discharge: HOME | End: 2020-10-01
Attending: FAMILY MEDICINE
Payer: COMMERCIAL

## 2020-10-01 DIAGNOSIS — E04.2 MULTINODULAR GOITER: ICD-10-CM

## 2020-10-01 PROCEDURE — 76536 US SOFT TISSUE HEAD NECK THYROID: ICD-10-PCS | Mod: 26,,, | Performed by: RADIOLOGY

## 2020-10-01 PROCEDURE — 76536 US EXAM OF HEAD AND NECK: CPT | Mod: 26,,, | Performed by: RADIOLOGY

## 2020-10-01 PROCEDURE — 76536 US EXAM OF HEAD AND NECK: CPT | Mod: TC

## 2020-10-07 DIAGNOSIS — I10 ESSENTIAL HYPERTENSION: ICD-10-CM

## 2020-10-07 RX ORDER — HYDROCHLOROTHIAZIDE 12.5 MG/1
TABLET ORAL
Qty: 90 TABLET | Refills: 1 | Status: SHIPPED | OUTPATIENT
Start: 2020-10-07 | End: 2021-03-24 | Stop reason: SDUPTHER

## 2020-12-07 ENCOUNTER — PATIENT MESSAGE (OUTPATIENT)
Dept: FAMILY MEDICINE | Facility: CLINIC | Age: 50
End: 2020-12-07

## 2021-01-15 ENCOUNTER — PATIENT MESSAGE (OUTPATIENT)
Dept: FAMILY MEDICINE | Facility: CLINIC | Age: 51
End: 2021-01-15

## 2021-01-15 DIAGNOSIS — J45.20 MILD INTERMITTENT ASTHMA WITHOUT COMPLICATION: ICD-10-CM

## 2021-01-19 DIAGNOSIS — J45.909 ASTHMA IN ADULT, UNSPECIFIED ASTHMA SEVERITY, UNSPECIFIED WHETHER COMPLICATED, UNSPECIFIED WHETHER PERSISTENT: ICD-10-CM

## 2021-01-19 RX ORDER — ALBUTEROL SULFATE 90 UG/1
1-2 AEROSOL, METERED RESPIRATORY (INHALATION)
Qty: 18 G | Refills: 5 | Status: SHIPPED | OUTPATIENT
Start: 2021-01-19 | End: 2021-10-12

## 2021-01-19 RX ORDER — FLUTICASONE FUROATE AND VILANTEROL TRIFENATATE 100; 25 UG/1; UG/1
POWDER RESPIRATORY (INHALATION)
Qty: 1 EACH | Refills: 5 | Status: SHIPPED | OUTPATIENT
Start: 2021-01-19 | End: 2021-08-04 | Stop reason: SDUPTHER

## 2021-01-21 ENCOUNTER — OFFICE VISIT (OUTPATIENT)
Dept: OTOLARYNGOLOGY | Facility: CLINIC | Age: 51
End: 2021-01-21
Payer: COMMERCIAL

## 2021-01-21 VITALS
TEMPERATURE: 98 F | HEART RATE: 87 BPM | BODY MASS INDEX: 42.95 KG/M2 | DIASTOLIC BLOOD PRESSURE: 94 MMHG | SYSTOLIC BLOOD PRESSURE: 144 MMHG | WEIGHT: 266.13 LBS

## 2021-01-21 DIAGNOSIS — H61.22 IMPACTED CERUMEN OF LEFT EAR: ICD-10-CM

## 2021-01-21 DIAGNOSIS — E04.2 MULTINODULAR THYROID: Primary | ICD-10-CM

## 2021-01-21 PROCEDURE — 69210 PR REMOVAL IMPACTED CERUMEN REQUIRING INSTRUMENTATION, UNILATERAL: ICD-10-PCS | Mod: S$GLB,,, | Performed by: PHYSICIAN ASSISTANT

## 2021-01-21 PROCEDURE — 99999 PR PBB SHADOW E&M-EST. PATIENT-LVL III: ICD-10-PCS | Mod: PBBFAC,,, | Performed by: PHYSICIAN ASSISTANT

## 2021-01-21 PROCEDURE — 99999 PR PBB SHADOW E&M-EST. PATIENT-LVL III: CPT | Mod: PBBFAC,,, | Performed by: PHYSICIAN ASSISTANT

## 2021-01-21 PROCEDURE — 3077F PR MOST RECENT SYSTOLIC BLOOD PRESSURE >= 140 MM HG: ICD-10-PCS | Mod: CPTII,S$GLB,, | Performed by: PHYSICIAN ASSISTANT

## 2021-01-21 PROCEDURE — 3080F DIAST BP >= 90 MM HG: CPT | Mod: CPTII,S$GLB,, | Performed by: PHYSICIAN ASSISTANT

## 2021-01-21 PROCEDURE — 99204 OFFICE O/P NEW MOD 45 MIN: CPT | Mod: 25,S$GLB,, | Performed by: PHYSICIAN ASSISTANT

## 2021-01-21 PROCEDURE — 99204 PR OFFICE/OUTPT VISIT, NEW, LEVL IV, 45-59 MIN: ICD-10-PCS | Mod: 25,S$GLB,, | Performed by: PHYSICIAN ASSISTANT

## 2021-01-21 PROCEDURE — 3008F BODY MASS INDEX DOCD: CPT | Mod: CPTII,S$GLB,, | Performed by: PHYSICIAN ASSISTANT

## 2021-01-21 PROCEDURE — 3077F SYST BP >= 140 MM HG: CPT | Mod: CPTII,S$GLB,, | Performed by: PHYSICIAN ASSISTANT

## 2021-01-21 PROCEDURE — 69210 REMOVE IMPACTED EAR WAX UNI: CPT | Mod: S$GLB,,, | Performed by: PHYSICIAN ASSISTANT

## 2021-01-21 PROCEDURE — 3080F PR MOST RECENT DIASTOLIC BLOOD PRESSURE >= 90 MM HG: ICD-10-PCS | Mod: CPTII,S$GLB,, | Performed by: PHYSICIAN ASSISTANT

## 2021-01-21 PROCEDURE — 3008F PR BODY MASS INDEX (BMI) DOCUMENTED: ICD-10-PCS | Mod: CPTII,S$GLB,, | Performed by: PHYSICIAN ASSISTANT

## 2021-01-26 ENCOUNTER — HOSPITAL ENCOUNTER (OUTPATIENT)
Dept: RADIOLOGY | Facility: HOSPITAL | Age: 51
Discharge: HOME | End: 2021-01-26
Attending: PHYSICIAN ASSISTANT
Payer: COMMERCIAL

## 2021-01-26 DIAGNOSIS — E04.2 MULTINODULAR THYROID: ICD-10-CM

## 2021-01-26 PROCEDURE — 88173 CYTOPATH EVAL FNA REPORT: CPT | Performed by: PATHOLOGY

## 2021-01-26 PROCEDURE — 88173 CYTOPATH EVAL FNA REPORT: CPT | Mod: 26,,, | Performed by: PATHOLOGY

## 2021-01-26 PROCEDURE — 88173 PR  INTERPRETATION OF FNA SMEAR: ICD-10-PCS | Mod: 26,,, | Performed by: PATHOLOGY

## 2021-01-26 PROCEDURE — 10005 FNA BX W/US GDN 1ST LES: CPT

## 2021-01-29 LAB
COMMENT: ABNORMAL
COMMENT: NORMAL
FINAL PATHOLOGIC DIAGNOSIS: ABNORMAL
FINAL PATHOLOGIC DIAGNOSIS: NORMAL

## 2021-02-02 ENCOUNTER — OFFICE VISIT (OUTPATIENT)
Dept: OTOLARYNGOLOGY | Facility: CLINIC | Age: 51
End: 2021-02-02
Payer: COMMERCIAL

## 2021-02-02 ENCOUNTER — TELEPHONE (OUTPATIENT)
Dept: OTOLARYNGOLOGY | Facility: CLINIC | Age: 51
End: 2021-02-02

## 2021-02-02 VITALS
SYSTOLIC BLOOD PRESSURE: 141 MMHG | HEART RATE: 78 BPM | DIASTOLIC BLOOD PRESSURE: 84 MMHG | BODY MASS INDEX: 43.87 KG/M2 | WEIGHT: 271.81 LBS | TEMPERATURE: 98 F

## 2021-02-02 DIAGNOSIS — E04.2 MULTINODULAR THYROID: Primary | ICD-10-CM

## 2021-02-02 PROCEDURE — 3077F PR MOST RECENT SYSTOLIC BLOOD PRESSURE >= 140 MM HG: ICD-10-PCS | Mod: CPTII,S$GLB,, | Performed by: ORTHOPAEDIC SURGERY

## 2021-02-02 PROCEDURE — 99999 PR PBB SHADOW E&M-EST. PATIENT-LVL III: ICD-10-PCS | Mod: PBBFAC,,, | Performed by: ORTHOPAEDIC SURGERY

## 2021-02-02 PROCEDURE — 3008F PR BODY MASS INDEX (BMI) DOCUMENTED: ICD-10-PCS | Mod: CPTII,S$GLB,, | Performed by: ORTHOPAEDIC SURGERY

## 2021-02-02 PROCEDURE — 1126F PR PAIN SEVERITY QUANTIFIED, NO PAIN PRESENT: ICD-10-PCS | Mod: S$GLB,,, | Performed by: ORTHOPAEDIC SURGERY

## 2021-02-02 PROCEDURE — 1126F AMNT PAIN NOTED NONE PRSNT: CPT | Mod: S$GLB,,, | Performed by: ORTHOPAEDIC SURGERY

## 2021-02-02 PROCEDURE — 3079F PR MOST RECENT DIASTOLIC BLOOD PRESSURE 80-89 MM HG: ICD-10-PCS | Mod: CPTII,S$GLB,, | Performed by: ORTHOPAEDIC SURGERY

## 2021-02-02 PROCEDURE — 3079F DIAST BP 80-89 MM HG: CPT | Mod: CPTII,S$GLB,, | Performed by: ORTHOPAEDIC SURGERY

## 2021-02-02 PROCEDURE — 99214 PR OFFICE/OUTPT VISIT, EST, LEVL IV, 30-39 MIN: ICD-10-PCS | Mod: S$GLB,,, | Performed by: ORTHOPAEDIC SURGERY

## 2021-02-02 PROCEDURE — 99999 PR PBB SHADOW E&M-EST. PATIENT-LVL III: CPT | Mod: PBBFAC,,, | Performed by: ORTHOPAEDIC SURGERY

## 2021-02-02 PROCEDURE — 99214 OFFICE O/P EST MOD 30 MIN: CPT | Mod: S$GLB,,, | Performed by: ORTHOPAEDIC SURGERY

## 2021-02-02 PROCEDURE — 3077F SYST BP >= 140 MM HG: CPT | Mod: CPTII,S$GLB,, | Performed by: ORTHOPAEDIC SURGERY

## 2021-02-02 PROCEDURE — 3008F BODY MASS INDEX DOCD: CPT | Mod: CPTII,S$GLB,, | Performed by: ORTHOPAEDIC SURGERY

## 2021-02-05 ENCOUNTER — PATIENT OUTREACH (OUTPATIENT)
Dept: ADMINISTRATIVE | Facility: OTHER | Age: 51
End: 2021-02-05

## 2021-02-05 ENCOUNTER — OFFICE VISIT (OUTPATIENT)
Dept: OTOLARYNGOLOGY | Facility: CLINIC | Age: 51
End: 2021-02-05
Payer: COMMERCIAL

## 2021-02-05 VITALS
TEMPERATURE: 98 F | DIASTOLIC BLOOD PRESSURE: 86 MMHG | HEART RATE: 85 BPM | WEIGHT: 264.56 LBS | HEIGHT: 66 IN | SYSTOLIC BLOOD PRESSURE: 125 MMHG | BODY MASS INDEX: 42.52 KG/M2

## 2021-02-05 DIAGNOSIS — E04.9 GOITER, NODULAR: Primary | ICD-10-CM

## 2021-02-05 DIAGNOSIS — R13.10 PILL DYSPHAGIA: ICD-10-CM

## 2021-02-05 DIAGNOSIS — E04.9 GOITER: Primary | ICD-10-CM

## 2021-02-05 PROCEDURE — 99213 OFFICE O/P EST LOW 20 MIN: CPT | Mod: 25,S$GLB,, | Performed by: OTOLARYNGOLOGY

## 2021-02-05 PROCEDURE — 31575 PR LARYNGOSCOPY, FLEXIBLE; DIAGNOSTIC: ICD-10-PCS | Mod: S$GLB,,, | Performed by: OTOLARYNGOLOGY

## 2021-02-05 PROCEDURE — 3079F PR MOST RECENT DIASTOLIC BLOOD PRESSURE 80-89 MM HG: ICD-10-PCS | Mod: CPTII,S$GLB,, | Performed by: OTOLARYNGOLOGY

## 2021-02-05 PROCEDURE — 3008F PR BODY MASS INDEX (BMI) DOCUMENTED: ICD-10-PCS | Mod: CPTII,S$GLB,, | Performed by: OTOLARYNGOLOGY

## 2021-02-05 PROCEDURE — 31575 DIAGNOSTIC LARYNGOSCOPY: CPT | Mod: S$GLB,,, | Performed by: OTOLARYNGOLOGY

## 2021-02-05 PROCEDURE — 99213 PR OFFICE/OUTPT VISIT, EST, LEVL III, 20-29 MIN: ICD-10-PCS | Mod: 25,S$GLB,, | Performed by: OTOLARYNGOLOGY

## 2021-02-05 PROCEDURE — 1126F PR PAIN SEVERITY QUANTIFIED, NO PAIN PRESENT: ICD-10-PCS | Mod: S$GLB,,, | Performed by: OTOLARYNGOLOGY

## 2021-02-05 PROCEDURE — 3079F DIAST BP 80-89 MM HG: CPT | Mod: CPTII,S$GLB,, | Performed by: OTOLARYNGOLOGY

## 2021-02-05 PROCEDURE — 3074F SYST BP LT 130 MM HG: CPT | Mod: CPTII,S$GLB,, | Performed by: OTOLARYNGOLOGY

## 2021-02-05 PROCEDURE — 3008F BODY MASS INDEX DOCD: CPT | Mod: CPTII,S$GLB,, | Performed by: OTOLARYNGOLOGY

## 2021-02-05 PROCEDURE — 99999 PR PBB SHADOW E&M-EST. PATIENT-LVL IV: CPT | Mod: PBBFAC,,, | Performed by: OTOLARYNGOLOGY

## 2021-02-05 PROCEDURE — 1126F AMNT PAIN NOTED NONE PRSNT: CPT | Mod: S$GLB,,, | Performed by: OTOLARYNGOLOGY

## 2021-02-05 PROCEDURE — 3074F PR MOST RECENT SYSTOLIC BLOOD PRESSURE < 130 MM HG: ICD-10-PCS | Mod: CPTII,S$GLB,, | Performed by: OTOLARYNGOLOGY

## 2021-02-05 PROCEDURE — 99999 PR PBB SHADOW E&M-EST. PATIENT-LVL IV: ICD-10-PCS | Mod: PBBFAC,,, | Performed by: OTOLARYNGOLOGY

## 2021-02-08 ENCOUNTER — TELEPHONE (OUTPATIENT)
Dept: OTOLARYNGOLOGY | Facility: CLINIC | Age: 51
End: 2021-02-08

## 2021-03-09 ENCOUNTER — PATIENT MESSAGE (OUTPATIENT)
Dept: FAMILY MEDICINE | Facility: CLINIC | Age: 51
End: 2021-03-09

## 2021-03-09 ENCOUNTER — PATIENT MESSAGE (OUTPATIENT)
Dept: GASTROENTEROLOGY | Facility: CLINIC | Age: 51
End: 2021-03-09

## 2021-03-22 DIAGNOSIS — Z00.00 ANNUAL PHYSICAL EXAM: ICD-10-CM

## 2021-03-22 DIAGNOSIS — I10 ESSENTIAL HYPERTENSION: ICD-10-CM

## 2021-03-22 RX ORDER — HYDROCHLOROTHIAZIDE 12.5 MG/1
12.5 TABLET ORAL DAILY
Qty: 90 TABLET | Refills: 1 | Status: CANCELLED | OUTPATIENT
Start: 2021-03-22

## 2021-03-22 RX ORDER — AMLODIPINE BESYLATE 5 MG/1
5 TABLET ORAL DAILY
Qty: 90 TABLET | Refills: 1 | Status: CANCELLED | OUTPATIENT
Start: 2021-03-22 | End: 2022-03-22

## 2021-03-24 ENCOUNTER — PATIENT MESSAGE (OUTPATIENT)
Dept: FAMILY MEDICINE | Facility: CLINIC | Age: 51
End: 2021-03-24

## 2021-03-24 ENCOUNTER — OFFICE VISIT (OUTPATIENT)
Dept: FAMILY MEDICINE | Facility: CLINIC | Age: 51
End: 2021-03-24
Payer: COMMERCIAL

## 2021-03-24 ENCOUNTER — LAB VISIT (OUTPATIENT)
Dept: LAB | Facility: HOSPITAL | Age: 51
End: 2021-03-24
Payer: COMMERCIAL

## 2021-03-24 ENCOUNTER — TELEPHONE (OUTPATIENT)
Dept: FAMILY MEDICINE | Facility: CLINIC | Age: 51
End: 2021-03-24

## 2021-03-24 VITALS
WEIGHT: 266.13 LBS | OXYGEN SATURATION: 98 % | BODY MASS INDEX: 42.77 KG/M2 | RESPIRATION RATE: 18 BRPM | HEIGHT: 66 IN | TEMPERATURE: 98 F | SYSTOLIC BLOOD PRESSURE: 148 MMHG | DIASTOLIC BLOOD PRESSURE: 102 MMHG | HEART RATE: 89 BPM

## 2021-03-24 DIAGNOSIS — R58 ECCHYMOSIS: Primary | ICD-10-CM

## 2021-03-24 DIAGNOSIS — R58 ECCHYMOSIS: ICD-10-CM

## 2021-03-24 DIAGNOSIS — I10 ESSENTIAL HYPERTENSION: ICD-10-CM

## 2021-03-24 LAB
ALBUMIN SERPL BCP-MCNC: 4.1 G/DL (ref 3.5–5.2)
ALP SERPL-CCNC: 102 U/L (ref 55–135)
ALT SERPL W/O P-5'-P-CCNC: 14 U/L (ref 10–44)
AST SERPL-CCNC: 17 U/L (ref 10–40)
BILIRUB DIRECT SERPL-MCNC: 0.2 MG/DL (ref 0.1–0.3)
BILIRUB SERPL-MCNC: 0.5 MG/DL (ref 0.1–1)
ERYTHROCYTE [DISTWIDTH] IN BLOOD BY AUTOMATED COUNT: 13.6 % (ref 11.5–14.5)
HCT VFR BLD AUTO: 40.3 % (ref 37–48.5)
HGB BLD-MCNC: 12.4 G/DL (ref 12–16)
INR PPP: 0.9 (ref 0.8–1.2)
MCH RBC QN AUTO: 29.1 PG (ref 27–31)
MCHC RBC AUTO-ENTMCNC: 30.8 G/DL (ref 32–36)
MCV RBC AUTO: 95 FL (ref 82–98)
PLATELET # BLD AUTO: 389 K/UL (ref 150–350)
PMV BLD AUTO: 10 FL (ref 9.2–12.9)
PROT SERPL-MCNC: 7.7 G/DL (ref 6–8.4)
PROTHROMBIN TIME: 9.9 SEC (ref 9–12.5)
RBC # BLD AUTO: 4.26 M/UL (ref 4–5.4)
WBC # BLD AUTO: 9.55 K/UL (ref 3.9–12.7)

## 2021-03-24 PROCEDURE — 99999 PR PBB SHADOW E&M-EST. PATIENT-LVL IV: CPT | Mod: PBBFAC,,, | Performed by: REGISTERED NURSE

## 2021-03-24 PROCEDURE — 3008F PR BODY MASS INDEX (BMI) DOCUMENTED: ICD-10-PCS | Mod: CPTII,S$GLB,, | Performed by: REGISTERED NURSE

## 2021-03-24 PROCEDURE — 99999 PR PBB SHADOW E&M-EST. PATIENT-LVL IV: ICD-10-PCS | Mod: PBBFAC,,, | Performed by: REGISTERED NURSE

## 2021-03-24 PROCEDURE — 85610 PROTHROMBIN TIME: CPT | Performed by: REGISTERED NURSE

## 2021-03-24 PROCEDURE — 3080F DIAST BP >= 90 MM HG: CPT | Mod: CPTII,S$GLB,, | Performed by: REGISTERED NURSE

## 2021-03-24 PROCEDURE — 3080F PR MOST RECENT DIASTOLIC BLOOD PRESSURE >= 90 MM HG: ICD-10-PCS | Mod: CPTII,S$GLB,, | Performed by: REGISTERED NURSE

## 2021-03-24 PROCEDURE — 3008F BODY MASS INDEX DOCD: CPT | Mod: CPTII,S$GLB,, | Performed by: REGISTERED NURSE

## 2021-03-24 PROCEDURE — 3077F PR MOST RECENT SYSTOLIC BLOOD PRESSURE >= 140 MM HG: ICD-10-PCS | Mod: CPTII,S$GLB,, | Performed by: REGISTERED NURSE

## 2021-03-24 PROCEDURE — 99214 PR OFFICE/OUTPT VISIT, EST, LEVL IV, 30-39 MIN: ICD-10-PCS | Mod: S$GLB,,, | Performed by: REGISTERED NURSE

## 2021-03-24 PROCEDURE — 85027 COMPLETE CBC AUTOMATED: CPT | Performed by: REGISTERED NURSE

## 2021-03-24 PROCEDURE — 80076 HEPATIC FUNCTION PANEL: CPT | Performed by: REGISTERED NURSE

## 2021-03-24 PROCEDURE — 3077F SYST BP >= 140 MM HG: CPT | Mod: CPTII,S$GLB,, | Performed by: REGISTERED NURSE

## 2021-03-24 PROCEDURE — 36415 COLL VENOUS BLD VENIPUNCTURE: CPT | Mod: PO | Performed by: REGISTERED NURSE

## 2021-03-24 PROCEDURE — 99214 OFFICE O/P EST MOD 30 MIN: CPT | Mod: S$GLB,,, | Performed by: REGISTERED NURSE

## 2021-03-24 RX ORDER — HYDROCHLOROTHIAZIDE 12.5 MG/1
12.5 TABLET ORAL DAILY
Qty: 90 TABLET | Refills: 1 | Status: SHIPPED | OUTPATIENT
Start: 2021-03-24 | End: 2021-08-19 | Stop reason: SDUPTHER

## 2021-03-24 RX ORDER — AMLODIPINE BESYLATE 5 MG/1
5 TABLET ORAL DAILY
Qty: 90 TABLET | Refills: 1 | Status: SHIPPED | OUTPATIENT
Start: 2021-03-24 | End: 2021-04-28

## 2021-03-25 ENCOUNTER — TELEPHONE (OUTPATIENT)
Dept: RADIOLOGY | Facility: HOSPITAL | Age: 51
End: 2021-03-25

## 2021-03-26 ENCOUNTER — HOSPITAL ENCOUNTER (OUTPATIENT)
Dept: RADIOLOGY | Facility: HOSPITAL | Age: 51
Discharge: HOME | End: 2021-03-26
Attending: REGISTERED NURSE
Payer: COMMERCIAL

## 2021-03-26 DIAGNOSIS — R58 ECCHYMOSIS: ICD-10-CM

## 2021-03-26 PROCEDURE — 76642 US BREAST RIGHT LIMITED: ICD-10-PCS | Mod: 26,RT,, | Performed by: RADIOLOGY

## 2021-03-26 PROCEDURE — 77061 BREAST TOMOSYNTHESIS UNI: CPT | Mod: TC,RT

## 2021-03-26 PROCEDURE — 76642 ULTRASOUND BREAST LIMITED: CPT | Mod: TC,RT

## 2021-03-26 PROCEDURE — 77061 BREAST TOMOSYNTHESIS UNI: CPT | Mod: 26,RT,, | Performed by: RADIOLOGY

## 2021-03-26 PROCEDURE — 77065 MAMMO DIGITAL DIAGNOSTIC RIGHT WITH TOMO: ICD-10-PCS | Mod: 26,RT,, | Performed by: RADIOLOGY

## 2021-03-26 PROCEDURE — 77061 MAMMO DIGITAL DIAGNOSTIC RIGHT WITH TOMO: ICD-10-PCS | Mod: 26,RT,, | Performed by: RADIOLOGY

## 2021-03-26 PROCEDURE — 76642 ULTRASOUND BREAST LIMITED: CPT | Mod: 26,RT,, | Performed by: RADIOLOGY

## 2021-03-26 PROCEDURE — 77065 DX MAMMO INCL CAD UNI: CPT | Mod: 26,RT,, | Performed by: RADIOLOGY

## 2021-03-29 ENCOUNTER — TELEPHONE (OUTPATIENT)
Dept: FAMILY MEDICINE | Facility: CLINIC | Age: 51
End: 2021-03-29

## 2021-03-30 ENCOUNTER — PATIENT MESSAGE (OUTPATIENT)
Dept: FAMILY MEDICINE | Facility: CLINIC | Age: 51
End: 2021-03-30

## 2021-03-30 ENCOUNTER — TELEPHONE (OUTPATIENT)
Dept: FAMILY MEDICINE | Facility: CLINIC | Age: 51
End: 2021-03-30

## 2021-03-31 RX ORDER — PREDNISONE 20 MG/1
TABLET ORAL
Qty: 10 TABLET | Refills: 0 | Status: SHIPPED | OUTPATIENT
Start: 2021-03-31 | End: 2021-04-28

## 2021-03-31 RX ORDER — DICLOFENAC SODIUM 75 MG/1
75 TABLET, DELAYED RELEASE ORAL 2 TIMES DAILY PRN
Qty: 60 TABLET | Refills: 0 | Status: SHIPPED | OUTPATIENT
Start: 2021-03-31 | End: 2021-04-28

## 2021-04-27 ENCOUNTER — PATIENT OUTREACH (OUTPATIENT)
Dept: ADMINISTRATIVE | Facility: HOSPITAL | Age: 51
End: 2021-04-27

## 2021-04-28 ENCOUNTER — OFFICE VISIT (OUTPATIENT)
Dept: FAMILY MEDICINE | Facility: CLINIC | Age: 51
End: 2021-04-28
Payer: COMMERCIAL

## 2021-04-28 ENCOUNTER — LAB VISIT (OUTPATIENT)
Dept: LAB | Facility: HOSPITAL | Age: 51
End: 2021-04-28
Payer: COMMERCIAL

## 2021-04-28 VITALS
HEART RATE: 93 BPM | DIASTOLIC BLOOD PRESSURE: 90 MMHG | TEMPERATURE: 98 F | HEIGHT: 66 IN | SYSTOLIC BLOOD PRESSURE: 140 MMHG | OXYGEN SATURATION: 98 % | WEIGHT: 268.75 LBS | BODY MASS INDEX: 43.19 KG/M2 | RESPIRATION RATE: 18 BRPM

## 2021-04-28 DIAGNOSIS — N95.1 MENOPAUSAL VASOMOTOR SYNDROME: ICD-10-CM

## 2021-04-28 DIAGNOSIS — M54.10 RADICULOPATHY OF LEG: ICD-10-CM

## 2021-04-28 DIAGNOSIS — I10 ESSENTIAL HYPERTENSION: Primary | ICD-10-CM

## 2021-04-28 LAB — FSH SERPL-ACNC: 25.7 MIU/ML

## 2021-04-28 PROCEDURE — 3077F SYST BP >= 140 MM HG: CPT | Mod: CPTII,S$GLB,, | Performed by: REGISTERED NURSE

## 2021-04-28 PROCEDURE — 1125F AMNT PAIN NOTED PAIN PRSNT: CPT | Mod: S$GLB,,, | Performed by: REGISTERED NURSE

## 2021-04-28 PROCEDURE — 36415 COLL VENOUS BLD VENIPUNCTURE: CPT | Mod: PO | Performed by: REGISTERED NURSE

## 2021-04-28 PROCEDURE — 99999 PR PBB SHADOW E&M-EST. PATIENT-LVL IV: ICD-10-PCS | Mod: PBBFAC,,, | Performed by: REGISTERED NURSE

## 2021-04-28 PROCEDURE — 3080F DIAST BP >= 90 MM HG: CPT | Mod: CPTII,S$GLB,, | Performed by: REGISTERED NURSE

## 2021-04-28 PROCEDURE — 3008F PR BODY MASS INDEX (BMI) DOCUMENTED: ICD-10-PCS | Mod: CPTII,S$GLB,, | Performed by: REGISTERED NURSE

## 2021-04-28 PROCEDURE — 3008F BODY MASS INDEX DOCD: CPT | Mod: CPTII,S$GLB,, | Performed by: REGISTERED NURSE

## 2021-04-28 PROCEDURE — 99214 OFFICE O/P EST MOD 30 MIN: CPT | Mod: S$GLB,,, | Performed by: REGISTERED NURSE

## 2021-04-28 PROCEDURE — 1125F PR PAIN SEVERITY QUANTIFIED, PAIN PRESENT: ICD-10-PCS | Mod: S$GLB,,, | Performed by: REGISTERED NURSE

## 2021-04-28 PROCEDURE — 99214 PR OFFICE/OUTPT VISIT, EST, LEVL IV, 30-39 MIN: ICD-10-PCS | Mod: S$GLB,,, | Performed by: REGISTERED NURSE

## 2021-04-28 PROCEDURE — 3077F PR MOST RECENT SYSTOLIC BLOOD PRESSURE >= 140 MM HG: ICD-10-PCS | Mod: CPTII,S$GLB,, | Performed by: REGISTERED NURSE

## 2021-04-28 PROCEDURE — 3080F PR MOST RECENT DIASTOLIC BLOOD PRESSURE >= 90 MM HG: ICD-10-PCS | Mod: CPTII,S$GLB,, | Performed by: REGISTERED NURSE

## 2021-04-28 PROCEDURE — 99999 PR PBB SHADOW E&M-EST. PATIENT-LVL IV: CPT | Mod: PBBFAC,,, | Performed by: REGISTERED NURSE

## 2021-04-28 PROCEDURE — 83001 ASSAY OF GONADOTROPIN (FSH): CPT | Performed by: REGISTERED NURSE

## 2021-04-28 RX ORDER — AMLODIPINE BESYLATE 10 MG/1
10 TABLET ORAL DAILY
Qty: 90 TABLET | Refills: 1 | Status: SHIPPED | OUTPATIENT
Start: 2021-04-28 | End: 2021-08-19 | Stop reason: SDUPTHER

## 2021-04-29 ENCOUNTER — TELEPHONE (OUTPATIENT)
Dept: PHYSICAL MEDICINE AND REHAB | Facility: CLINIC | Age: 51
End: 2021-04-29

## 2021-05-10 ENCOUNTER — TELEPHONE (OUTPATIENT)
Dept: FAMILY MEDICINE | Facility: CLINIC | Age: 51
End: 2021-05-10

## 2021-05-10 ENCOUNTER — PATIENT MESSAGE (OUTPATIENT)
Dept: FAMILY MEDICINE | Facility: CLINIC | Age: 51
End: 2021-05-10

## 2021-05-17 ENCOUNTER — PATIENT OUTREACH (OUTPATIENT)
Dept: ADMINISTRATIVE | Facility: OTHER | Age: 51
End: 2021-05-17

## 2021-08-19 ENCOUNTER — OFFICE VISIT (OUTPATIENT)
Dept: FAMILY MEDICINE | Facility: CLINIC | Age: 51
End: 2021-08-19
Payer: COMMERCIAL

## 2021-08-19 VITALS
SYSTOLIC BLOOD PRESSURE: 136 MMHG | HEART RATE: 96 BPM | HEIGHT: 66 IN | TEMPERATURE: 98 F | DIASTOLIC BLOOD PRESSURE: 88 MMHG | OXYGEN SATURATION: 97 % | WEIGHT: 273.56 LBS | BODY MASS INDEX: 43.96 KG/M2

## 2021-08-19 DIAGNOSIS — J45.909 ASTHMA IN ADULT, UNSPECIFIED ASTHMA SEVERITY, UNSPECIFIED WHETHER COMPLICATED, UNSPECIFIED WHETHER PERSISTENT: ICD-10-CM

## 2021-08-19 DIAGNOSIS — J30.2 SEASONAL ALLERGIES: ICD-10-CM

## 2021-08-19 DIAGNOSIS — I10 ESSENTIAL HYPERTENSION: ICD-10-CM

## 2021-08-19 DIAGNOSIS — L84 CALLUS OF FOOT: ICD-10-CM

## 2021-08-19 PROCEDURE — 99999 PR PBB SHADOW E&M-EST. PATIENT-LVL V: ICD-10-PCS | Mod: PBBFAC,,, | Performed by: FAMILY MEDICINE

## 2021-08-19 PROCEDURE — 3008F BODY MASS INDEX DOCD: CPT | Mod: CPTII,S$GLB,, | Performed by: FAMILY MEDICINE

## 2021-08-19 PROCEDURE — 3079F PR MOST RECENT DIASTOLIC BLOOD PRESSURE 80-89 MM HG: ICD-10-PCS | Mod: CPTII,S$GLB,, | Performed by: FAMILY MEDICINE

## 2021-08-19 PROCEDURE — 3044F PR MOST RECENT HEMOGLOBIN A1C LEVEL <7.0%: ICD-10-PCS | Mod: CPTII,S$GLB,, | Performed by: FAMILY MEDICINE

## 2021-08-19 PROCEDURE — 3044F HG A1C LEVEL LT 7.0%: CPT | Mod: CPTII,S$GLB,, | Performed by: FAMILY MEDICINE

## 2021-08-19 PROCEDURE — 99214 OFFICE O/P EST MOD 30 MIN: CPT | Mod: S$GLB,,, | Performed by: FAMILY MEDICINE

## 2021-08-19 PROCEDURE — 99214 PR OFFICE/OUTPT VISIT, EST, LEVL IV, 30-39 MIN: ICD-10-PCS | Mod: S$GLB,,, | Performed by: FAMILY MEDICINE

## 2021-08-19 PROCEDURE — 3075F SYST BP GE 130 - 139MM HG: CPT | Mod: CPTII,S$GLB,, | Performed by: FAMILY MEDICINE

## 2021-08-19 PROCEDURE — 3075F PR MOST RECENT SYSTOLIC BLOOD PRESS GE 130-139MM HG: ICD-10-PCS | Mod: CPTII,S$GLB,, | Performed by: FAMILY MEDICINE

## 2021-08-19 PROCEDURE — 3079F DIAST BP 80-89 MM HG: CPT | Mod: CPTII,S$GLB,, | Performed by: FAMILY MEDICINE

## 2021-08-19 PROCEDURE — 99999 PR PBB SHADOW E&M-EST. PATIENT-LVL V: CPT | Mod: PBBFAC,,, | Performed by: FAMILY MEDICINE

## 2021-08-19 PROCEDURE — 3008F PR BODY MASS INDEX (BMI) DOCUMENTED: ICD-10-PCS | Mod: CPTII,S$GLB,, | Performed by: FAMILY MEDICINE

## 2021-08-19 RX ORDER — MONTELUKAST SODIUM 10 MG/1
10 TABLET ORAL NIGHTLY
Qty: 90 TABLET | Refills: 1 | Status: SHIPPED | OUTPATIENT
Start: 2021-08-19 | End: 2022-06-09

## 2021-08-19 RX ORDER — HYDROCHLOROTHIAZIDE 12.5 MG/1
12.5 TABLET ORAL DAILY
Qty: 90 TABLET | Refills: 1 | Status: SHIPPED | OUTPATIENT
Start: 2021-08-19 | End: 2021-12-22 | Stop reason: SDUPTHER

## 2021-08-19 RX ORDER — AMLODIPINE BESYLATE 10 MG/1
10 TABLET ORAL DAILY
Qty: 90 TABLET | Refills: 1 | Status: SHIPPED | OUTPATIENT
Start: 2021-08-19 | End: 2021-12-22 | Stop reason: SDUPTHER

## 2021-10-11 DIAGNOSIS — J45.909 ASTHMA IN ADULT, UNSPECIFIED ASTHMA SEVERITY, UNSPECIFIED WHETHER COMPLICATED, UNSPECIFIED WHETHER PERSISTENT: ICD-10-CM

## 2021-10-12 RX ORDER — ALBUTEROL SULFATE 90 UG/1
AEROSOL, METERED RESPIRATORY (INHALATION)
Qty: 18 G | Refills: 5 | Status: SHIPPED | OUTPATIENT
Start: 2021-10-12 | End: 2022-07-15 | Stop reason: SDUPTHER

## 2021-10-14 ENCOUNTER — LAB VISIT (OUTPATIENT)
Dept: LAB | Facility: HOSPITAL | Age: 51
End: 2021-10-14
Attending: FAMILY MEDICINE
Payer: COMMERCIAL

## 2021-10-14 ENCOUNTER — OFFICE VISIT (OUTPATIENT)
Dept: FAMILY MEDICINE | Facility: CLINIC | Age: 51
End: 2021-10-14
Payer: COMMERCIAL

## 2021-10-14 VITALS
HEART RATE: 82 BPM | OXYGEN SATURATION: 97 % | BODY MASS INDEX: 44.11 KG/M2 | HEIGHT: 66 IN | TEMPERATURE: 97 F | SYSTOLIC BLOOD PRESSURE: 132 MMHG | WEIGHT: 274.5 LBS | DIASTOLIC BLOOD PRESSURE: 80 MMHG

## 2021-10-14 DIAGNOSIS — E55.9 VITAMIN D DEFICIENCY: ICD-10-CM

## 2021-10-14 DIAGNOSIS — E66.01 MORBID OBESITY WITH BMI OF 40.0-44.9, ADULT: ICD-10-CM

## 2021-10-14 DIAGNOSIS — E89.40 SURGICAL MENOPAUSE: ICD-10-CM

## 2021-10-14 DIAGNOSIS — K63.5 BENIGN COLON POLYP: ICD-10-CM

## 2021-10-14 DIAGNOSIS — E04.2 MULTINODULAR GOITER: ICD-10-CM

## 2021-10-14 DIAGNOSIS — Z00.00 ANNUAL PHYSICAL EXAM: ICD-10-CM

## 2021-10-14 DIAGNOSIS — J45.909 ASTHMA IN ADULT, UNSPECIFIED ASTHMA SEVERITY, UNSPECIFIED WHETHER COMPLICATED, UNSPECIFIED WHETHER PERSISTENT: ICD-10-CM

## 2021-10-14 DIAGNOSIS — I10 ESSENTIAL HYPERTENSION: ICD-10-CM

## 2021-10-14 DIAGNOSIS — J30.2 SEASONAL ALLERGIES: ICD-10-CM

## 2021-10-14 DIAGNOSIS — Z00.00 ANNUAL PHYSICAL EXAM: Primary | ICD-10-CM

## 2021-10-14 DIAGNOSIS — N95.1 MENOPAUSAL VASOMOTOR SYNDROME: ICD-10-CM

## 2021-10-14 DIAGNOSIS — Z12.31 VISIT FOR SCREENING MAMMOGRAM: ICD-10-CM

## 2021-10-14 LAB
25(OH)D3+25(OH)D2 SERPL-MCNC: 49 NG/ML (ref 30–96)
ALBUMIN SERPL BCP-MCNC: 4 G/DL (ref 3.5–5.2)
ALP SERPL-CCNC: 98 U/L (ref 55–135)
ALT SERPL W/O P-5'-P-CCNC: 19 U/L (ref 10–44)
ANION GAP SERPL CALC-SCNC: 17 MMOL/L (ref 8–16)
AST SERPL-CCNC: 17 U/L (ref 10–40)
BASOPHILS # BLD AUTO: 0.09 K/UL (ref 0–0.2)
BASOPHILS NFR BLD: 1 % (ref 0–1.9)
BILIRUB SERPL-MCNC: 0.4 MG/DL (ref 0.1–1)
BILIRUB UR QL STRIP: NEGATIVE
BUN SERPL-MCNC: 20 MG/DL (ref 6–20)
CALCIUM SERPL-MCNC: 9.9 MG/DL (ref 8.7–10.5)
CHLORIDE SERPL-SCNC: 105 MMOL/L (ref 95–110)
CHOLEST SERPL-MCNC: 201 MG/DL (ref 120–199)
CHOLEST/HDLC SERPL: 3.8 {RATIO} (ref 2–5)
CLARITY UR REFRACT.AUTO: CLEAR
CO2 SERPL-SCNC: 19 MMOL/L (ref 23–29)
COLOR UR AUTO: YELLOW
CREAT SERPL-MCNC: 0.8 MG/DL (ref 0.5–1.4)
DIFFERENTIAL METHOD: ABNORMAL
EOSINOPHIL # BLD AUTO: 0.2 K/UL (ref 0–0.5)
EOSINOPHIL NFR BLD: 2.3 % (ref 0–8)
ERYTHROCYTE [DISTWIDTH] IN BLOOD BY AUTOMATED COUNT: 13.7 % (ref 11.5–14.5)
EST. GFR  (AFRICAN AMERICAN): >60 ML/MIN/1.73 M^2
EST. GFR  (NON AFRICAN AMERICAN): >60 ML/MIN/1.73 M^2
ESTIMATED AVG GLUCOSE: 91 MG/DL (ref 68–131)
GLUCOSE SERPL-MCNC: 100 MG/DL (ref 70–110)
GLUCOSE UR QL STRIP: NEGATIVE
HBA1C MFR BLD: 4.8 % (ref 4–5.6)
HCT VFR BLD AUTO: 38.3 % (ref 37–48.5)
HDLC SERPL-MCNC: 53 MG/DL (ref 40–75)
HDLC SERPL: 26.4 % (ref 20–50)
HGB BLD-MCNC: 12.1 G/DL (ref 12–16)
HGB UR QL STRIP: NEGATIVE
IMM GRANULOCYTES # BLD AUTO: 0.03 K/UL (ref 0–0.04)
IMM GRANULOCYTES NFR BLD AUTO: 0.3 % (ref 0–0.5)
INSULIN COLLECTION INTERVAL: NORMAL
INSULIN SERPL-ACNC: 19.6 UU/ML
KETONES UR QL STRIP: NEGATIVE
LDLC SERPL CALC-MCNC: 134.8 MG/DL (ref 63–159)
LEUKOCYTE ESTERASE UR QL STRIP: NEGATIVE
LYMPHOCYTES # BLD AUTO: 2.7 K/UL (ref 1–4.8)
LYMPHOCYTES NFR BLD: 31.3 % (ref 18–48)
MCH RBC QN AUTO: 29.2 PG (ref 27–31)
MCHC RBC AUTO-ENTMCNC: 31.6 G/DL (ref 32–36)
MCV RBC AUTO: 93 FL (ref 82–98)
MONOCYTES # BLD AUTO: 0.7 K/UL (ref 0.3–1)
MONOCYTES NFR BLD: 8 % (ref 4–15)
NEUTROPHILS # BLD AUTO: 4.9 K/UL (ref 1.8–7.7)
NEUTROPHILS NFR BLD: 57.1 % (ref 38–73)
NITRITE UR QL STRIP: NEGATIVE
NONHDLC SERPL-MCNC: 148 MG/DL
NRBC BLD-RTO: 0 /100 WBC
PH UR STRIP: 6 [PH] (ref 5–8)
PLATELET # BLD AUTO: 410 K/UL (ref 150–450)
PMV BLD AUTO: 10.1 FL (ref 9.2–12.9)
POTASSIUM SERPL-SCNC: 4 MMOL/L (ref 3.5–5.1)
PROT SERPL-MCNC: 7.6 G/DL (ref 6–8.4)
PROT UR QL STRIP: NEGATIVE
RBC # BLD AUTO: 4.14 M/UL (ref 4–5.4)
SODIUM SERPL-SCNC: 141 MMOL/L (ref 136–145)
SP GR UR STRIP: 1.02 (ref 1–1.03)
TRIGL SERPL-MCNC: 66 MG/DL (ref 30–150)
TSH SERPL DL<=0.005 MIU/L-ACNC: 0.67 UIU/ML (ref 0.4–4)
URN SPEC COLLECT METH UR: NORMAL
WBC # BLD AUTO: 8.64 K/UL (ref 3.9–12.7)

## 2021-10-14 PROCEDURE — 80053 COMPREHEN METABOLIC PANEL: CPT | Performed by: FAMILY MEDICINE

## 2021-10-14 PROCEDURE — 82306 VITAMIN D 25 HYDROXY: CPT | Performed by: FAMILY MEDICINE

## 2021-10-14 PROCEDURE — 80061 LIPID PANEL: CPT | Performed by: FAMILY MEDICINE

## 2021-10-14 PROCEDURE — 84443 ASSAY THYROID STIM HORMONE: CPT | Performed by: FAMILY MEDICINE

## 2021-10-14 PROCEDURE — 83525 ASSAY OF INSULIN: CPT | Performed by: FAMILY MEDICINE

## 2021-10-14 PROCEDURE — 83036 HEMOGLOBIN GLYCOSYLATED A1C: CPT | Performed by: FAMILY MEDICINE

## 2021-10-14 PROCEDURE — 99396 PREV VISIT EST AGE 40-64: CPT | Mod: S$GLB,,, | Performed by: FAMILY MEDICINE

## 2021-10-14 PROCEDURE — 36415 COLL VENOUS BLD VENIPUNCTURE: CPT | Mod: PO | Performed by: FAMILY MEDICINE

## 2021-10-14 PROCEDURE — 85025 COMPLETE CBC W/AUTO DIFF WBC: CPT | Performed by: FAMILY MEDICINE

## 2021-10-14 PROCEDURE — 81003 URINALYSIS AUTO W/O SCOPE: CPT | Performed by: FAMILY MEDICINE

## 2021-10-14 PROCEDURE — 99999 PR PBB SHADOW E&M-EST. PATIENT-LVL IV: ICD-10-PCS | Mod: PBBFAC,,, | Performed by: FAMILY MEDICINE

## 2021-10-14 PROCEDURE — 99999 PR PBB SHADOW E&M-EST. PATIENT-LVL IV: CPT | Mod: PBBFAC,,, | Performed by: FAMILY MEDICINE

## 2021-10-14 PROCEDURE — 99396 PR PREVENTIVE VISIT,EST,40-64: ICD-10-PCS | Mod: S$GLB,,, | Performed by: FAMILY MEDICINE

## 2021-10-14 RX ORDER — LEVOCETIRIZINE DIHYDROCHLORIDE 5 MG/1
5 TABLET, FILM COATED ORAL DAILY
Qty: 90 TABLET | Refills: 1 | Status: SHIPPED | OUTPATIENT
Start: 2021-10-14 | End: 2022-02-23 | Stop reason: SDUPTHER

## 2021-10-14 RX ORDER — SERTRALINE HYDROCHLORIDE 50 MG/1
50 TABLET, FILM COATED ORAL DAILY
Qty: 90 TABLET | Refills: 1 | Status: SHIPPED | OUTPATIENT
Start: 2021-10-14 | End: 2022-02-23 | Stop reason: SDUPTHER

## 2021-12-22 DIAGNOSIS — I10 ESSENTIAL HYPERTENSION: ICD-10-CM

## 2021-12-22 DIAGNOSIS — J45.20 MILD INTERMITTENT ASTHMA WITHOUT COMPLICATION: ICD-10-CM

## 2021-12-22 RX ORDER — FLUTICASONE FUROATE AND VILANTEROL TRIFENATATE 100; 25 UG/1; UG/1
POWDER RESPIRATORY (INHALATION)
Qty: 1 EACH | Refills: 5 | Status: SHIPPED | OUTPATIENT
Start: 2021-12-22 | End: 2022-02-23 | Stop reason: SDUPTHER

## 2021-12-22 RX ORDER — HYDROCHLOROTHIAZIDE 12.5 MG/1
12.5 TABLET ORAL DAILY
Qty: 90 TABLET | Refills: 1 | Status: SHIPPED | OUTPATIENT
Start: 2021-12-22 | End: 2022-07-13 | Stop reason: SDUPTHER

## 2021-12-22 RX ORDER — AMLODIPINE BESYLATE 10 MG/1
10 TABLET ORAL DAILY
Qty: 90 TABLET | Refills: 1 | Status: SHIPPED | OUTPATIENT
Start: 2021-12-22 | End: 2022-07-13 | Stop reason: SDUPTHER

## 2022-01-27 ENCOUNTER — HOSPITAL ENCOUNTER (OUTPATIENT)
Dept: RADIOLOGY | Facility: HOSPITAL | Age: 52
Discharge: HOME | End: 2022-01-27
Attending: FAMILY MEDICINE
Payer: COMMERCIAL

## 2022-01-27 VITALS — BODY MASS INDEX: 44.03 KG/M2 | HEIGHT: 66 IN | WEIGHT: 274 LBS

## 2022-01-27 DIAGNOSIS — Z12.31 VISIT FOR SCREENING MAMMOGRAM: ICD-10-CM

## 2022-01-27 PROCEDURE — 77067 SCR MAMMO BI INCL CAD: CPT | Mod: TC

## 2022-01-27 PROCEDURE — 77063 BREAST TOMOSYNTHESIS BI: CPT | Mod: 26,,, | Performed by: RADIOLOGY

## 2022-01-27 PROCEDURE — 77067 MAMMO DIGITAL SCREENING BILAT WITH TOMO: ICD-10-PCS | Mod: 26,,, | Performed by: RADIOLOGY

## 2022-01-27 PROCEDURE — 77067 SCR MAMMO BI INCL CAD: CPT | Mod: 26,,, | Performed by: RADIOLOGY

## 2022-01-27 PROCEDURE — 77063 MAMMO DIGITAL SCREENING BILAT WITH TOMO: ICD-10-PCS | Mod: 26,,, | Performed by: RADIOLOGY

## 2022-01-28 ENCOUNTER — TELEPHONE (OUTPATIENT)
Dept: FAMILY MEDICINE | Facility: CLINIC | Age: 52
End: 2022-01-28
Payer: COMMERCIAL

## 2022-01-28 ENCOUNTER — PATIENT MESSAGE (OUTPATIENT)
Dept: FAMILY MEDICINE | Facility: CLINIC | Age: 52
End: 2022-01-28
Payer: COMMERCIAL

## 2022-01-28 NOTE — TELEPHONE ENCOUNTER
Please check w/radiology dept to see if they will be calling pt to set up additional reviews?  Thanks.

## 2022-01-28 NOTE — TELEPHONE ENCOUNTER
Pt portal message    Pt inquiring mammo results    The new finding---would someone give me a call about my test results? Should I have more testing done?    Please advise

## 2022-02-02 ENCOUNTER — HOSPITAL ENCOUNTER (OUTPATIENT)
Dept: RADIOLOGY | Facility: HOSPITAL | Age: 52
Discharge: HOME | End: 2022-02-02
Attending: FAMILY MEDICINE
Payer: COMMERCIAL

## 2022-02-02 DIAGNOSIS — R92.8 ABNORMAL MAMMOGRAM: ICD-10-CM

## 2022-02-02 PROCEDURE — 77061 MAMMO DIGITAL DIAGNOSTIC RIGHT WITH TOMO: ICD-10-PCS | Mod: 26,RT,, | Performed by: RADIOLOGY

## 2022-02-02 PROCEDURE — 76642 ULTRASOUND BREAST LIMITED: CPT | Mod: 26,RT,, | Performed by: RADIOLOGY

## 2022-02-02 PROCEDURE — 77065 MAMMO DIGITAL DIAGNOSTIC RIGHT WITH TOMO: ICD-10-PCS | Mod: 26,RT,, | Performed by: RADIOLOGY

## 2022-02-02 PROCEDURE — 76642 ULTRASOUND BREAST LIMITED: CPT | Mod: TC,RT

## 2022-02-02 PROCEDURE — 76642 US BREAST RIGHT LIMITED: ICD-10-PCS | Mod: 26,RT,, | Performed by: RADIOLOGY

## 2022-02-02 PROCEDURE — 77065 DX MAMMO INCL CAD UNI: CPT | Mod: TC,RT

## 2022-02-02 PROCEDURE — 77065 DX MAMMO INCL CAD UNI: CPT | Mod: 26,RT,, | Performed by: RADIOLOGY

## 2022-02-02 PROCEDURE — 77061 BREAST TOMOSYNTHESIS UNI: CPT | Mod: 26,RT,, | Performed by: RADIOLOGY

## 2022-02-23 ENCOUNTER — HOSPITAL ENCOUNTER (OUTPATIENT)
Dept: RADIOLOGY | Facility: HOSPITAL | Age: 52
Discharge: HOME | End: 2022-02-23
Attending: REGISTERED NURSE
Payer: COMMERCIAL

## 2022-02-23 ENCOUNTER — OFFICE VISIT (OUTPATIENT)
Dept: FAMILY MEDICINE | Facility: CLINIC | Age: 52
End: 2022-02-23
Payer: COMMERCIAL

## 2022-02-23 VITALS
DIASTOLIC BLOOD PRESSURE: 81 MMHG | HEIGHT: 66 IN | HEART RATE: 104 BPM | BODY MASS INDEX: 43.79 KG/M2 | TEMPERATURE: 98 F | OXYGEN SATURATION: 95 % | WEIGHT: 272.5 LBS | SYSTOLIC BLOOD PRESSURE: 135 MMHG

## 2022-02-23 DIAGNOSIS — Z82.0 FAMILY HISTORY OF TREMOR: ICD-10-CM

## 2022-02-23 DIAGNOSIS — J45.909 ASTHMA IN ADULT, UNSPECIFIED ASTHMA SEVERITY, UNSPECIFIED WHETHER COMPLICATED, UNSPECIFIED WHETHER PERSISTENT: ICD-10-CM

## 2022-02-23 DIAGNOSIS — J45.909 ASTHMA IN ADULT, UNSPECIFIED ASTHMA SEVERITY, UNSPECIFIED WHETHER COMPLICATED, UNSPECIFIED WHETHER PERSISTENT: Primary | ICD-10-CM

## 2022-02-23 DIAGNOSIS — J30.2 SEASONAL ALLERGIES: ICD-10-CM

## 2022-02-23 DIAGNOSIS — R06.00 DYSPNEA, UNSPECIFIED TYPE: ICD-10-CM

## 2022-02-23 DIAGNOSIS — R05.9 COUGH: ICD-10-CM

## 2022-02-23 DIAGNOSIS — N95.1 MENOPAUSAL VASOMOTOR SYNDROME: ICD-10-CM

## 2022-02-23 DIAGNOSIS — R25.1 TREMOR: ICD-10-CM

## 2022-02-23 LAB
CTP QC/QA: YES
SARS-COV-2 RDRP RESP QL NAA+PROBE: NEGATIVE

## 2022-02-23 PROCEDURE — 3008F BODY MASS INDEX DOCD: CPT | Mod: CPTII,S$GLB,, | Performed by: REGISTERED NURSE

## 2022-02-23 PROCEDURE — 99214 OFFICE O/P EST MOD 30 MIN: CPT | Mod: 25,S$GLB,, | Performed by: REGISTERED NURSE

## 2022-02-23 PROCEDURE — 94642 PR AEROSOL INHALATION TREATMENT: ICD-10-PCS | Mod: S$GLB,,, | Performed by: REGISTERED NURSE

## 2022-02-23 PROCEDURE — 71046 X-RAY EXAM CHEST 2 VIEWS: CPT | Mod: TC,FY,PO

## 2022-02-23 PROCEDURE — 99999 PR PBB SHADOW E&M-EST. PATIENT-LVL V: ICD-10-PCS | Mod: PBBFAC,,, | Performed by: REGISTERED NURSE

## 2022-02-23 PROCEDURE — 1159F PR MEDICATION LIST DOCUMENTED IN MEDICAL RECORD: ICD-10-PCS | Mod: CPTII,S$GLB,, | Performed by: REGISTERED NURSE

## 2022-02-23 PROCEDURE — U0002: ICD-10-PCS | Mod: QW,S$GLB,, | Performed by: REGISTERED NURSE

## 2022-02-23 PROCEDURE — 3079F DIAST BP 80-89 MM HG: CPT | Mod: CPTII,S$GLB,, | Performed by: REGISTERED NURSE

## 2022-02-23 PROCEDURE — 3079F PR MOST RECENT DIASTOLIC BLOOD PRESSURE 80-89 MM HG: ICD-10-PCS | Mod: CPTII,S$GLB,, | Performed by: REGISTERED NURSE

## 2022-02-23 PROCEDURE — 71046 X-RAY EXAM CHEST 2 VIEWS: CPT | Mod: 26,,, | Performed by: RADIOLOGY

## 2022-02-23 PROCEDURE — 94640 AIRWAY INHALATION TREATMENT: CPT | Mod: 59,S$GLB,, | Performed by: REGISTERED NURSE

## 2022-02-23 PROCEDURE — 71046 XR CHEST PA AND LATERAL: ICD-10-PCS | Mod: 26,,, | Performed by: RADIOLOGY

## 2022-02-23 PROCEDURE — 1159F MED LIST DOCD IN RCRD: CPT | Mod: CPTII,S$GLB,, | Performed by: REGISTERED NURSE

## 2022-02-23 PROCEDURE — 3075F PR MOST RECENT SYSTOLIC BLOOD PRESS GE 130-139MM HG: ICD-10-PCS | Mod: CPTII,S$GLB,, | Performed by: REGISTERED NURSE

## 2022-02-23 PROCEDURE — 94642 AEROSOL INHALATION TREATMENT: CPT | Mod: S$GLB,,, | Performed by: REGISTERED NURSE

## 2022-02-23 PROCEDURE — 99999 PR PBB SHADOW E&M-EST. PATIENT-LVL V: CPT | Mod: PBBFAC,,, | Performed by: REGISTERED NURSE

## 2022-02-23 PROCEDURE — 96372 PR INJECTION,THERAP/PROPH/DIAG2ST, IM OR SUBCUT: ICD-10-PCS | Mod: 59,S$GLB,, | Performed by: REGISTERED NURSE

## 2022-02-23 PROCEDURE — 3075F SYST BP GE 130 - 139MM HG: CPT | Mod: CPTII,S$GLB,, | Performed by: REGISTERED NURSE

## 2022-02-23 PROCEDURE — 3008F PR BODY MASS INDEX (BMI) DOCUMENTED: ICD-10-PCS | Mod: CPTII,S$GLB,, | Performed by: REGISTERED NURSE

## 2022-02-23 PROCEDURE — 94640 PR INHAL RX, AIRWAY OBST/DX SPUTUM INDUCT: ICD-10-PCS | Mod: S$GLB,,, | Performed by: REGISTERED NURSE

## 2022-02-23 PROCEDURE — 96372 THER/PROPH/DIAG INJ SC/IM: CPT | Mod: 59,S$GLB,, | Performed by: REGISTERED NURSE

## 2022-02-23 PROCEDURE — 99214 PR OFFICE/OUTPT VISIT, EST, LEVL IV, 30-39 MIN: ICD-10-PCS | Mod: 25,S$GLB,, | Performed by: REGISTERED NURSE

## 2022-02-23 PROCEDURE — U0002 COVID-19 LAB TEST NON-CDC: HCPCS | Mod: QW,S$GLB,, | Performed by: REGISTERED NURSE

## 2022-02-23 RX ORDER — FLUTICASONE FUROATE AND VILANTEROL TRIFENATATE 100; 25 UG/1; UG/1
POWDER RESPIRATORY (INHALATION)
Qty: 1 EACH | Refills: 5 | Status: SHIPPED | OUTPATIENT
Start: 2022-02-23 | End: 2022-07-27 | Stop reason: SDUPTHER

## 2022-02-23 RX ORDER — DEXAMETHASONE SODIUM PHOSPHATE 4 MG/ML
8 INJECTION, SOLUTION INTRA-ARTICULAR; INTRALESIONAL; INTRAMUSCULAR; INTRAVENOUS; SOFT TISSUE
Status: COMPLETED | OUTPATIENT
Start: 2022-02-23 | End: 2022-02-23

## 2022-02-23 RX ORDER — LEVOCETIRIZINE DIHYDROCHLORIDE 5 MG/1
5 TABLET, FILM COATED ORAL DAILY
Qty: 90 TABLET | Refills: 1 | Status: SHIPPED | OUTPATIENT
Start: 2022-02-23 | End: 2022-11-08

## 2022-02-23 RX ORDER — ZINC GLUCONATE 50 MG
15 TABLET ORAL DAILY
COMMUNITY
End: 2022-04-27

## 2022-02-23 RX ORDER — IPRATROPIUM BROMIDE 0.5 MG/2.5ML
0.5 SOLUTION RESPIRATORY (INHALATION)
Status: COMPLETED | OUTPATIENT
Start: 2022-02-23 | End: 2022-02-23

## 2022-02-23 RX ORDER — ASCORBIC ACID 500 MG
500 TABLET ORAL DAILY
COMMUNITY
End: 2023-10-11

## 2022-02-23 RX ORDER — SERTRALINE HYDROCHLORIDE 50 MG/1
50 TABLET, FILM COATED ORAL DAILY
Qty: 90 TABLET | Refills: 1 | Status: SHIPPED | OUTPATIENT
Start: 2022-02-23 | End: 2022-08-22

## 2022-02-23 RX ORDER — ALBUTEROL SULFATE 0.63 MG/3ML
0.63 SOLUTION RESPIRATORY (INHALATION)
Qty: 75 ML | Refills: 6 | Status: SHIPPED | OUTPATIENT
Start: 2022-02-23 | End: 2023-02-23

## 2022-02-23 RX ORDER — ALBUTEROL SULFATE 0.83 MG/ML
2.5 SOLUTION RESPIRATORY (INHALATION)
Status: COMPLETED | OUTPATIENT
Start: 2022-02-23 | End: 2022-02-23

## 2022-02-23 RX ADMIN — DEXAMETHASONE SODIUM PHOSPHATE 8 MG: 4 INJECTION, SOLUTION INTRA-ARTICULAR; INTRALESIONAL; INTRAMUSCULAR; INTRAVENOUS; SOFT TISSUE at 04:02

## 2022-02-23 RX ADMIN — IPRATROPIUM BROMIDE 0.5 MG: 0.5 SOLUTION RESPIRATORY (INHALATION) at 03:02

## 2022-02-23 RX ADMIN — ALBUTEROL SULFATE 2.5 MG: 0.83 SOLUTION RESPIRATORY (INHALATION) at 03:02

## 2022-02-23 NOTE — PROGRESS NOTES
Subjective:       Patient ID: Ayanna Alicia is a 51 y.o. female.      Chief Complaint   Patient presents with    Fatigue    Shortness of Breath       HPI    Ayanna is here with c/o not feeling well x few weeks, grad worse.  History of asthma  Reports increased sob, gets more short-winded at times at rest or with exertion.  Reports tachycardia, sweats, some occ wheezing and chest tightness.  Reports bending over to tie shoes causes resp issues.  Reports dry cough with nagging headaches.    Reports hot flashes, not on Zoloft.      Review of Systems   Constitutional: Positive for diaphoresis and fatigue.   HENT: Positive for postnasal drip.    Respiratory: Positive for cough, chest tightness, shortness of breath and wheezing.    Cardiovascular: Positive for palpitations. Negative for chest pain and leg swelling.   Gastrointestinal: Negative.    Genitourinary: Negative.    Neurological: Positive for tremors and headaches. Weakness: mild at this time, concerned due to mother with h/o tremors.         Review of patient's allergies indicates:   Allergen Reactions    Doxycycline Nausea Only    Fluticasone Epistaxis    Penicillins Unknown         Patient Active Problem List   Diagnosis    HTN (hypertension)    Multinodular goiter    Asthma    Seasonal allergies    Morbid obesity with BMI of 40.0-44.9, adult    GERD (gastroesophageal reflux disease)    Vitamin D deficiency    Surgical menopause    Patella-femoral syndrome    Localized osteoarthrosis not specified whether primary or secondary, lower leg    Benign colon polyp           Current Outpatient Medications:     albuterol (PROVENTIL/VENTOLIN HFA) 90 mcg/actuation inhaler, INHALE 1 TO 2 PUFFS BY MOUTH INTO THE LUNGS EVERY 4 TO 6 HOURS AS NEEDED FOR WHEEZING OR SHORTNESS OF BREATH, Disp: 18 g, Rfl: 5    amLODIPine (NORVASC) 10 MG tablet, Take 1 tablet (10 mg total) by mouth once daily., Disp: 90 tablet, Rfl: 1    ascorbic acid, vitamin C, (VITAMIN  "C) 100 MG tablet, Take 60 mg by mouth once daily., Disp: , Rfl:     fluticasone furoate-vilanteroL (BREO ELLIPTA) 100-25 mcg/dose diskus inhaler, INHALE 1 PUFF INTO THE LUNGS ONCE DAILY, Disp: 1 each, Rfl: 5    ginseng (GIN-ZING ORAL), Take by mouth., Disp: , Rfl:     hydroCHLOROthiazide (HYDRODIURIL) 12.5 MG Tab, Take 1 tablet (12.5 mg total) by mouth once daily., Disp: 90 tablet, Rfl: 1    levocetirizine (XYZAL) 5 MG tablet, Take 1 tablet (5 mg total) by mouth once daily., Disp: 90 tablet, Rfl: 1    montelukast (SINGULAIR) 10 mg tablet, Take 1 tablet (10 mg total) by mouth every evening., Disp: 90 tablet, Rfl: 1    omega-3s/dha/epa/fish oil/D3 (VITAMIN-D + OMEGA-3 ORAL), Take by mouth., Disp: , Rfl:     sertraline (ZOLOFT) 50 MG tablet, Take 1 tablet (50 mg total) by mouth once daily., Disp: 90 tablet, Rfl: 1    vitamin E acetate (VITAMIN E ORAL), Take by mouth., Disp: , Rfl:     zinc gluconate 50 mg tablet, Take 15 mg by mouth once daily., Disp: , Rfl:     calcium/magnesium/vit B comp (CALCIUM-MAGNESIUM-B COMPLEX ORAL), Take by mouth., Disp: , Rfl:         Past medical, surgical, family and social histories have been reviewed today.      Objective:     Vitals:    02/23/22 1517   BP: 135/81   Pulse: 104   Temp: 98.1 °F (36.7 °C)   TempSrc: Temporal   SpO2: 95%   Weight: 123.6 kg (272 lb 7.8 oz)   Height: 5' 6" (1.676 m)   PainSc:   3   PainLoc: Chest         Physical Exam  Constitutional:       General: She is not in acute distress.  HENT:      Head: Normocephalic and atraumatic.      Right Ear: Tympanic membrane normal.      Left Ear: Tympanic membrane normal.      Nose: Mucosal edema present. No rhinorrhea.      Right Turbinates: Pale.      Left Turbinates: Pale.      Mouth/Throat:      Mouth: Mucous membranes are moist.      Pharynx: Oropharynx is clear.   Eyes:      General: Lids are normal. Allergic shiner present.         Right eye: No discharge.         Left eye: No discharge.      Extraocular " Movements: Extraocular movements intact.      Pupils: Pupils are equal, round, and reactive to light.   Cardiovascular:      Rate and Rhythm: Tachycardia present.      Comments: Mild tachy at 104  Pulmonary:      Effort: No respiratory distress.      Breath sounds: Wheezing present. No rhonchi.   Chest:      Chest wall: No tenderness.   Lymphadenopathy:      Cervical: No cervical adenopathy.   Skin:     Capillary Refill: Capillary refill takes less than 2 seconds.   Neurological:      Mental Status: She is alert and oriented to person, place, and time.      Sensory: No sensory deficit.      Motor: No weakness, tremor, atrophy, abnormal muscle tone or seizure activity.      Coordination: Coordination is intact. Coordination normal.      Gait: Gait normal.   Psychiatric:         Mood and Affect: Mood normal.         Behavior: Behavior normal.         Thought Content: Thought content normal.         Judgment: Judgment normal.             Results for orders placed or performed in visit on 02/23/22   POCT COVID-19 Rapid Screening   Result Value Ref Range    POC Rapid COVID Negative Negative     Acceptable Yes          Assessment       ICD-10-CM ICD-9-CM    1. Asthma in adult, unspecified asthma severity, unspecified whether complicated, unspecified whether persistent  J45.909 493.90 albuterol nebulizer solution 2.5 mg  ipratropium 0.02 % nebulizer solution 0.5 mg   fluticasone furoate-vilanteroL (BREO ELLIPTA) 100-25 mcg/dose diskus inhaler   X-Ray Chest PA And Lateral   NEBULIZER FOR HOME USE   NEBULIZER KIT (SUPPLIES) FOR HOME USE   albuterol (ACCUNEB) 0.63 mg/3 mL Nebu   dexamethasone injection 8 mg      2. Seasonal allergies  J30.2 477.9 levocetirizine (XYZAL) 5 MG tablet      dexamethasone injection 8 mg   3. Dyspnea, unspecified type  R06.00 786.09 albuterol nebulizer solution 2.5 mg      ipratropium 0.02 % nebulizer solution 0.5 mg      POCT COVID-19 Rapid Screening      X-Ray Chest PA And Lateral       dexamethasone injection 8 mg   4. Cough  R05.9 786.2 albuterol nebulizer solution 2.5 mg      ipratropium 0.02 % nebulizer solution 0.5 mg      POCT COVID-19 Rapid Screening      X-Ray Chest PA And Lateral      dexamethasone injection 8 mg   5. Menopausal vasomotor syndrome  N95.1 627.2 sertraline (ZOLOFT) 50 MG tablet  Refilled Zoloft, monitor.   6. Tremor  R25.1 781.0 Ambulatory referral/consult to Neurology   7. Family history of tremor  Z82.0 V17.2 Ambulatory referral/consult to Neurology        Follow-up     Contact office back in 2 to 3 days if worse or no improvement noted.  Return to clinic as needed.      KEVEN Vegas  [facility]

## 2022-02-28 ENCOUNTER — PATIENT MESSAGE (OUTPATIENT)
Dept: FAMILY MEDICINE | Facility: CLINIC | Age: 52
End: 2022-02-28
Payer: COMMERCIAL

## 2022-03-14 ENCOUNTER — PATIENT MESSAGE (OUTPATIENT)
Dept: FAMILY MEDICINE | Facility: CLINIC | Age: 52
End: 2022-03-14
Payer: COMMERCIAL

## 2022-03-14 DIAGNOSIS — J45.909 ASTHMA IN ADULT, UNSPECIFIED ASTHMA SEVERITY, UNSPECIFIED WHETHER COMPLICATED, UNSPECIFIED WHETHER PERSISTENT: Primary | ICD-10-CM

## 2022-03-21 ENCOUNTER — NURSE TRIAGE (OUTPATIENT)
Dept: ADMINISTRATIVE | Facility: CLINIC | Age: 52
End: 2022-03-21
Payer: COMMERCIAL

## 2022-03-21 ENCOUNTER — TELEPHONE (OUTPATIENT)
Dept: FAMILY MEDICINE | Facility: CLINIC | Age: 52
End: 2022-03-21
Payer: COMMERCIAL

## 2022-03-21 NOTE — TELEPHONE ENCOUNTER
----- Message from Chhaya Conti sent at 3/21/2022  2:36 PM CDT -----  Contact: Missy Louis called regarding her chest is tight and is hard to take a deep breath, and want to know if there is a prescription she can take, please give her a call back at [phone]      Thanks  Kb

## 2022-03-21 NOTE — TELEPHONE ENCOUNTER
"Pt called for c/o Shortness of breath pt said that she saw NP last month for same and she said that her lungs were swollen and developing adult onset Asthma. Pt said that she is having trouble and cant take a deep breath. She also said that she has a little pain on inspiration as well. Pt told will reach out to Md and there are no available appt. I called pt back to tell her to go to the  or Ed since non available.and left on VM    Reason for Disposition   MILD difficulty breathing (e.g., minimal/no SOB at rest, SOB with walking, pulse < 100) of new-onset or worse than normal    Additional Information   Negative: SEVERE difficulty breathing (e.g., struggling for each breath, speaks in single words, pulse > 120)   Negative: Breathing stopped and hasn't returned   Negative: Choking on something   Negative: Bluish (or gray) lips or face   Negative: Difficult to awaken or acting confused (e.g., disoriented, slurred speech)   Negative: Passed out (i.e., fainted, collapsed and was not responding)   Negative: Wheezing started suddenly after medicine, an allergic food, or bee sting   Negative: Stridor   Negative: Slow, shallow and weak breathing   Negative: Sounds like a life-threatening emergency to the triager   Negative: MODERATE difficulty breathing (e.g., speaks in phrases, SOB even at rest, pulse 100-120) of new-onset or worse than normal   Negative: Wheezing can be heard across the room   Negative: Drooling or spitting out saliva (because can't swallow)   Negative: Any history of prior "blood clot" in leg or lungs   Negative: Illness requiring prolonged bedrest in past month (e.g., immobilization, long hospital stay)   Negative: Hip or leg fracture (broken bone) in past month (or had cast on leg or ankle in past month)   Negative: Major surgery in the past month   Negative: Long-distance travel in past month (e.g., car, bus, train, plane; with trip lasting 6 or more hours)   Negative: Cancer " "treatment in past six months (or has cancer now)   Negative: Extra heart beats OR irregular heart beating (i.e., "palpitations")   Negative: Fever > 103 F (39.4 C)   Negative: Fever > 101 F (38.3 C) and over 60 years of age   Negative: Fever > 100.0 F (37.8 C) and bedridden (e.g., nursing home patient, stroke, chronic illness, recovering from surgery)   Negative: Fever > 100.0 F (37.8 C) and diabetes mellitus or weak immune system (e.g., HIV positive, cancer chemo, splenectomy, organ transplant, chronic steroids)   Negative: Periods where breathing stops and then resumes normally and bedridden (e.g., nursing home patient, CVA)   Negative: Pregnant or postpartum (from 0 to 6 weeks after delivery)   Negative: Patient sounds very sick or weak to the triager    Protocols used: BREATHING DIFFICULTY-A-OH    "

## 2022-03-21 NOTE — TELEPHONE ENCOUNTER
Spoke with pt otp who stated she has appt for Thurs 3/24 with Dr Enrique. Soonest NP could see her would be 3/23 so pt elected to keep appt with MD. Pt stated if she feels worse before then she will go to UC or ER, but will keep us updated.

## 2022-03-24 ENCOUNTER — OFFICE VISIT (OUTPATIENT)
Dept: FAMILY MEDICINE | Facility: CLINIC | Age: 52
End: 2022-03-24
Payer: COMMERCIAL

## 2022-03-24 VITALS
WEIGHT: 262.38 LBS | OXYGEN SATURATION: 99 % | SYSTOLIC BLOOD PRESSURE: 122 MMHG | DIASTOLIC BLOOD PRESSURE: 78 MMHG | TEMPERATURE: 98 F | HEIGHT: 66 IN | HEART RATE: 95 BPM | BODY MASS INDEX: 42.17 KG/M2

## 2022-03-24 DIAGNOSIS — R07.89 ANTERIOR CHEST WALL PAIN: ICD-10-CM

## 2022-03-24 DIAGNOSIS — I51.7 MILD CARDIOMEGALY: ICD-10-CM

## 2022-03-24 DIAGNOSIS — R07.89 CHEST TIGHTNESS: Primary | ICD-10-CM

## 2022-03-24 DIAGNOSIS — E66.01 MORBID OBESITY WITH BMI OF 40.0-44.9, ADULT: ICD-10-CM

## 2022-03-24 DIAGNOSIS — J45.20 MILD INTERMITTENT ASTHMA WITHOUT COMPLICATION: ICD-10-CM

## 2022-03-24 PROCEDURE — 3008F PR BODY MASS INDEX (BMI) DOCUMENTED: ICD-10-PCS | Mod: CPTII,S$GLB,, | Performed by: FAMILY MEDICINE

## 2022-03-24 PROCEDURE — 1159F PR MEDICATION LIST DOCUMENTED IN MEDICAL RECORD: ICD-10-PCS | Mod: CPTII,S$GLB,, | Performed by: FAMILY MEDICINE

## 2022-03-24 PROCEDURE — 93010 ELECTROCARDIOGRAM REPORT: CPT | Mod: S$GLB,,, | Performed by: INTERNAL MEDICINE

## 2022-03-24 PROCEDURE — 3078F DIAST BP <80 MM HG: CPT | Mod: CPTII,S$GLB,, | Performed by: FAMILY MEDICINE

## 2022-03-24 PROCEDURE — 1159F MED LIST DOCD IN RCRD: CPT | Mod: CPTII,S$GLB,, | Performed by: FAMILY MEDICINE

## 2022-03-24 PROCEDURE — 93005 PR ELECTROCARDIOGRAM, TRACING: ICD-10-PCS | Mod: S$GLB,,, | Performed by: FAMILY MEDICINE

## 2022-03-24 PROCEDURE — 93005 ELECTROCARDIOGRAM TRACING: CPT | Mod: S$GLB,,, | Performed by: FAMILY MEDICINE

## 2022-03-24 PROCEDURE — 3078F PR MOST RECENT DIASTOLIC BLOOD PRESSURE < 80 MM HG: ICD-10-PCS | Mod: CPTII,S$GLB,, | Performed by: FAMILY MEDICINE

## 2022-03-24 PROCEDURE — 99214 PR OFFICE/OUTPT VISIT, EST, LEVL IV, 30-39 MIN: ICD-10-PCS | Mod: 25,S$GLB,, | Performed by: FAMILY MEDICINE

## 2022-03-24 PROCEDURE — 99214 OFFICE O/P EST MOD 30 MIN: CPT | Mod: 25,S$GLB,, | Performed by: FAMILY MEDICINE

## 2022-03-24 PROCEDURE — 1160F PR REVIEW ALL MEDS BY PRESCRIBER/CLIN PHARMACIST DOCUMENTED: ICD-10-PCS | Mod: CPTII,S$GLB,, | Performed by: FAMILY MEDICINE

## 2022-03-24 PROCEDURE — 1160F RVW MEDS BY RX/DR IN RCRD: CPT | Mod: CPTII,S$GLB,, | Performed by: FAMILY MEDICINE

## 2022-03-24 PROCEDURE — 93010 EKG 12-LEAD: ICD-10-PCS | Mod: S$GLB,,, | Performed by: INTERNAL MEDICINE

## 2022-03-24 PROCEDURE — 3074F SYST BP LT 130 MM HG: CPT | Mod: CPTII,S$GLB,, | Performed by: FAMILY MEDICINE

## 2022-03-24 PROCEDURE — 3074F PR MOST RECENT SYSTOLIC BLOOD PRESSURE < 130 MM HG: ICD-10-PCS | Mod: CPTII,S$GLB,, | Performed by: FAMILY MEDICINE

## 2022-03-24 PROCEDURE — 99999 PR PBB SHADOW E&M-EST. PATIENT-LVL V: CPT | Mod: PBBFAC,,, | Performed by: FAMILY MEDICINE

## 2022-03-24 PROCEDURE — 3008F BODY MASS INDEX DOCD: CPT | Mod: CPTII,S$GLB,, | Performed by: FAMILY MEDICINE

## 2022-03-24 PROCEDURE — 99999 PR PBB SHADOW E&M-EST. PATIENT-LVL V: ICD-10-PCS | Mod: PBBFAC,,, | Performed by: FAMILY MEDICINE

## 2022-03-24 RX ORDER — METHYLPREDNISOLONE 4 MG/1
TABLET ORAL
Qty: 1 EACH | Refills: 0 | Status: SHIPPED | OUTPATIENT
Start: 2022-03-24 | End: 2022-04-27 | Stop reason: ALTCHOICE

## 2022-03-24 NOTE — PROGRESS NOTES
Ayanna Alicia    Chief Complaint   Patient presents with    Chest Pain       History of Present Illness:   Ayanna Alicia is a 51 y.o. female who presents today for chest tightness.    She reports having chest tightness since mid-February 2022. She also reports chest tightness radiates around her body to her back. She reports taking 2 Aleve in the evening for the past week and 8 Ibuprofen over the past 2 weeks. She reports medications help some. She reports pain level today is  2-3.5/10. She reports pain/ tightness is irritating and reports not being able to take a deep breath without pain. She reports having a little cough and reports having shortness of breath with walking long distances. She reports having occasional hot flashes. She reports having 1 episode of indigestion.    Otherwise, she denies having fever, chills, fatigue, appetite changes; wheezing; chest pain, palpitations,  leg swelling; other sinus symptoms; abdominal pain, nausea, vomiting, diarrhea, constipation; unusual urinary symptoms; polydipsia, polyphagia, polyuria, cold intolerance; numbness, headache; anxiety, depression, homicidal or suicidal thoughts.     She states she has not been smoking cigarettes or marijuana since chest tightness started in February 2022.    She saw AWAIS Steele on February 23, 2022 for fatigue, shortness of breath with work up (chest x-ray and negative Covid test).    She reports no history of kidney or liver disease, PUD, or known GERD.    EKG ordered and interpreted: vent rate 76 bpm, normal sinus rhythm, possible Left atrial enlargement, possible anterior infarct, age undetermined, abnormal ECG.  Not much change when compared to 10/2/2014 ECG.      02/23/2022 Chest x-ray:  FINDINGS:  Mild increased interstitial markings are present bilaterally.  No pleural effusion or pneumothorax.   The cardiac silhouette is mildly enlarged.  The hilar and mediastinal contours are unremarkable.   Bones are  intact.   Impression:   Mild cardiomegaly with increased interstitial markings which may represent mild edema.         Current Outpatient Medications   Medication Sig    albuterol (ACCUNEB) 0.63 mg/3 mL Nebu Take 3 mLs (0.63 mg total) by nebulization every 4 to 6 hours as needed (asthma). Rescue    albuterol (PROVENTIL/VENTOLIN HFA) 90 mcg/actuation inhaler INHALE 1 TO 2 PUFFS BY MOUTH INTO THE LUNGS EVERY 4 TO 6 HOURS AS NEEDED FOR WHEEZING OR SHORTNESS OF BREATH    amLODIPine (NORVASC) 10 MG tablet Take 1 tablet (10 mg total) by mouth once daily.    ascorbic acid, vitamin C, (VITAMIN C) 100 MG tablet Take 60 mg by mouth once daily.    calcium/magnesium/vit B comp (CALCIUM-MAGNESIUM-B COMPLEX ORAL) Take by mouth.    fluticasone furoate-vilanteroL (BREO ELLIPTA) 100-25 mcg/dose diskus inhaler INHALE 1 PUFF INTO THE LUNGS ONCE DAILY    ginseng (GIN-ZING ORAL) Take by mouth.    hydroCHLOROthiazide (HYDRODIURIL) 12.5 MG Tab Take 1 tablet (12.5 mg total) by mouth once daily.    levocetirizine (XYZAL) 5 MG tablet Take 1 tablet (5 mg total) by mouth once daily.    montelukast (SINGULAIR) 10 mg tablet Take 1 tablet (10 mg total) by mouth every evening.    omega-3s/dha/epa/fish oil/D3 (VITAMIN-D + OMEGA-3 ORAL) Take by mouth.    sertraline (ZOLOFT) 50 MG tablet Take 1 tablet (50 mg total) by mouth once daily.    vitamin E acetate (VITAMIN E ORAL) Take by mouth.    zinc gluconate 50 mg tablet Take 15 mg by mouth once daily.       Review of Systems   Constitutional: Negative for appetite change, chills, fatigue and fever.        Weight 124.8 kg (275 lb 2.2 oz) September 22, 2020 visit.   Respiratory: Positive for chest tightness and shortness of breath. Negative for cough and wheezing.         See history of present illness.   Cardiovascular: Negative for chest pain, palpitations and leg swelling.   Gastrointestinal: Negative for abdominal pain, constipation, diarrhea, nausea and vomiting.   Endocrine: Positive  for heat intolerance. Negative for cold intolerance, polydipsia, polyphagia and polyuria.   Genitourinary: Negative for difficulty urinating.   Musculoskeletal: Positive for back pain. Negative for myalgias.        See history of present illness.   Neurological: Negative for headaches.   Psychiatric/Behavioral: Negative for dysphoric mood and suicidal ideas. The patient is not nervous/anxious.         Negative for homicidal ideas.       Objective:  Physical Exam  Vitals reviewed.   Constitutional:       General: She is not in acute distress.     Appearance: Normal appearance. She is well-developed. She is obese. She is not ill-appearing, toxic-appearing or diaphoretic.      Comments: Pleasant.   Neck:      Thyroid: Thyromegaly present.      Comments: Non tender, chronic.  Cardiovascular:      Rate and Rhythm: Normal rate and regular rhythm.      Heart sounds: No murmur heard.  Pulmonary:      Effort: Pulmonary effort is normal. No respiratory distress.      Breath sounds: Normal breath sounds. No wheezing.   Chest:      Chest wall: Tenderness present.   Abdominal:      General: Bowel sounds are normal. There is no distension.      Palpations: Abdomen is soft. There is no mass.      Tenderness: There is no abdominal tenderness. There is no guarding or rebound.   Musculoskeletal:         General: No swelling or tenderness. Normal range of motion.      Cervical back: Normal range of motion and neck supple.      Comments: She is ambulatory without problems. Non tender back with full range of motion noted.   Lymphadenopathy:      Cervical: No cervical adenopathy.   Skin:     Findings: No rash.   Neurological:      General: No focal deficit present.      Mental Status: She is alert and oriented to person, place, and time.   Psychiatric:         Mood and Affect: Mood normal.         Behavior: Behavior normal.         Thought Content: Thought content normal.         Judgment: Judgment normal.         ASSESSMENT:  1. Chest  tightness    2. Anterior chest wall pain    3. Mild cardiomegaly    4. Mild intermittent asthma without complication    5. Morbid obesity with BMI of 40.0-44.9, adult        PLAN:  Ayanna was seen today for chest pain.    Diagnoses and all orders for this visit:    Chest tightness  -     IN OFFICE EKG 12-LEAD (to Muse)  -     methylPREDNISolone (MEDROL DOSEPACK) 4 mg tablet; use as directed    Anterior chest wall pain  -     methylPREDNISolone (MEDROL DOSEPACK) 4 mg tablet; use as directed    Mild cardiomegaly  -     Echo; Future    Mild intermittent asthma without complication    Morbid obesity with BMI of 40.0-44.9, adult       Patient advised to call for results.  Continue current medications, follow low sodium, low cholesterol, low carb diet, daily walks.  Try Medrol dose pack as directed; medication precautions discussed with patient.  Work excuse for today with return tomorrow.  Follow up if symptoms worsen or fail to improve.     Scribe Attestation:   I, Alirio Sullivan, am scribing for, and in the presence of, Dr. Enrique. I performed the above scribed service and the documentation accurately describes the services I performed. I attest to the accuracy of the note.    I, Dr. Enrique, reviewed documentation as scribed above. I personally performed the services described in this documentation.  I agree that the record reflects my personal performance and is accurate and complete. . 03/24/2022

## 2022-03-25 ENCOUNTER — TELEPHONE (OUTPATIENT)
Dept: FAMILY MEDICINE | Facility: CLINIC | Age: 52
End: 2022-03-25
Payer: COMMERCIAL

## 2022-03-25 ENCOUNTER — PATIENT MESSAGE (OUTPATIENT)
Dept: FAMILY MEDICINE | Facility: CLINIC | Age: 52
End: 2022-03-25
Payer: COMMERCIAL

## 2022-03-25 DIAGNOSIS — R94.31 ABNORMAL EKG: Primary | ICD-10-CM

## 2022-03-25 NOTE — TELEPHONE ENCOUNTER
"Pt portal message        "Is there a treatment for this?"    Pt needing results and instructions for ekg. Please advise    "

## 2022-03-25 NOTE — TELEPHONE ENCOUNTER
Spoke with cardiology. They could not get her in today but she has been scheduled for Monday. I  Spoke with pt and advised her to go to ER as she may need to have heart enzymes done. Also advised she keep her cardiology appt on Monday.

## 2022-03-25 NOTE — TELEPHONE ENCOUNTER
I would like pt to see Cardiology for further evaluation of abnormal EKG today.  Please call the department to see if Cardiology can see her.  If not, I recommend she go to ER for further evaluation as she may need to have heart enzymes done (these are not done in the clinic).  Thanks.    I have put the following orders and/or medications to this note.  Please advise pt and assist.    Orders Placed This Encounter   Procedures    Ambulatory referral/consult to Cardiology     Standing Status:   Future     Standing Expiration Date:   4/25/2023     Referral Priority:   Routine     Referral Type:   Consultation     Referral Reason:   Specialty Services Required     Requested Specialty:   Cardiology     Number of Visits Requested:   1

## 2022-03-28 ENCOUNTER — OFFICE VISIT (OUTPATIENT)
Dept: CARDIOLOGY | Facility: CLINIC | Age: 52
End: 2022-03-28
Payer: COMMERCIAL

## 2022-03-28 ENCOUNTER — PATIENT OUTREACH (OUTPATIENT)
Dept: ADMINISTRATIVE | Facility: OTHER | Age: 52
End: 2022-03-28
Payer: COMMERCIAL

## 2022-03-28 VITALS
HEIGHT: 66 IN | OXYGEN SATURATION: 97 % | SYSTOLIC BLOOD PRESSURE: 128 MMHG | WEIGHT: 261.69 LBS | HEART RATE: 71 BPM | BODY MASS INDEX: 42.06 KG/M2 | DIASTOLIC BLOOD PRESSURE: 68 MMHG

## 2022-03-28 DIAGNOSIS — J45.20 MILD INTERMITTENT ASTHMA WITHOUT COMPLICATION: ICD-10-CM

## 2022-03-28 DIAGNOSIS — R94.31 ABNORMAL EKG: Primary | ICD-10-CM

## 2022-03-28 DIAGNOSIS — I10 PRIMARY HYPERTENSION: ICD-10-CM

## 2022-03-28 DIAGNOSIS — E66.01 MORBID OBESITY WITH BMI OF 40.0-44.9, ADULT: ICD-10-CM

## 2022-03-28 DIAGNOSIS — R06.09 DOE (DYSPNEA ON EXERTION): ICD-10-CM

## 2022-03-28 DIAGNOSIS — R00.2 PALPITATIONS: ICD-10-CM

## 2022-03-28 DIAGNOSIS — K21.9 GASTROESOPHAGEAL REFLUX DISEASE WITHOUT ESOPHAGITIS: ICD-10-CM

## 2022-03-28 DIAGNOSIS — R07.89 OTHER CHEST PAIN: ICD-10-CM

## 2022-03-28 DIAGNOSIS — G47.30 SLEEP APNEA, UNSPECIFIED TYPE: ICD-10-CM

## 2022-03-28 PROCEDURE — 3074F PR MOST RECENT SYSTOLIC BLOOD PRESSURE < 130 MM HG: ICD-10-PCS | Mod: CPTII,S$GLB,, | Performed by: INTERNAL MEDICINE

## 2022-03-28 PROCEDURE — 3078F DIAST BP <80 MM HG: CPT | Mod: CPTII,S$GLB,, | Performed by: INTERNAL MEDICINE

## 2022-03-28 PROCEDURE — 3008F BODY MASS INDEX DOCD: CPT | Mod: CPTII,S$GLB,, | Performed by: INTERNAL MEDICINE

## 2022-03-28 PROCEDURE — 3078F PR MOST RECENT DIASTOLIC BLOOD PRESSURE < 80 MM HG: ICD-10-PCS | Mod: CPTII,S$GLB,, | Performed by: INTERNAL MEDICINE

## 2022-03-28 PROCEDURE — 99999 PR PBB SHADOW E&M-EST. PATIENT-LVL V: ICD-10-PCS | Mod: PBBFAC,,, | Performed by: INTERNAL MEDICINE

## 2022-03-28 PROCEDURE — 3074F SYST BP LT 130 MM HG: CPT | Mod: CPTII,S$GLB,, | Performed by: INTERNAL MEDICINE

## 2022-03-28 PROCEDURE — 99999 PR PBB SHADOW E&M-EST. PATIENT-LVL V: CPT | Mod: PBBFAC,,, | Performed by: INTERNAL MEDICINE

## 2022-03-28 PROCEDURE — 1159F MED LIST DOCD IN RCRD: CPT | Mod: CPTII,S$GLB,, | Performed by: INTERNAL MEDICINE

## 2022-03-28 PROCEDURE — 99205 PR OFFICE/OUTPT VISIT, NEW, LEVL V, 60-74 MIN: ICD-10-PCS | Mod: S$GLB,,, | Performed by: INTERNAL MEDICINE

## 2022-03-28 PROCEDURE — 99205 OFFICE O/P NEW HI 60 MIN: CPT | Mod: S$GLB,,, | Performed by: INTERNAL MEDICINE

## 2022-03-28 PROCEDURE — 1159F PR MEDICATION LIST DOCUMENTED IN MEDICAL RECORD: ICD-10-PCS | Mod: CPTII,S$GLB,, | Performed by: INTERNAL MEDICINE

## 2022-03-28 PROCEDURE — 3008F PR BODY MASS INDEX (BMI) DOCUMENTED: ICD-10-PCS | Mod: CPTII,S$GLB,, | Performed by: INTERNAL MEDICINE

## 2022-03-28 NOTE — PATIENT INSTRUCTIONS
Key components of the Mediterranean diet    The Mediterranean diet emphasizes:    Eating primarily plant-based foods, such as fruits and vegetables, whole grains, legumes and nuts  Replacing butter with healthy fats such as olive oil and canola oil  Using herbs and spices instead of salt to flavor foods  Limiting red meat to no more than a few times a month  Eating fish and poultry at least twice a week  Enjoying meals with family and friends  Drinking red wine in moderation (optional)  Getting plenty of exercise      The Mediterranean diet pyramid

## 2022-03-28 NOTE — PROGRESS NOTES
"Subjective:   Patient ID:  Ayanna Alicia is a 51 y.o. female who presents for cardiac consult of No chief complaint on file.    Referring Physician: Madeleine Enrique MD   Reason for consult: abnormal ECG    HPI  The patient came in today for cardiac consult of No chief complaint on file.    3/28/22  Ayanna Alicia is a 51 y.o. female  With HTN, HLD, obesity, asthma, GERD presents for CV eval of abnormal ECG.     Pt had recent ER eval 3 days ago :My full Hx/PE/MDM: Patient is a 51-year-old female presenting for evaluation after an "abnormal EKG" in her PCPs office. She was seen for some intermittent chest tightness and shortness of breath, felt to be related to her asthma. An EKG was read as age undetermined possible anterior infarct. She denies any chest pain at this time, no current shortness of breath. She has an appointment scheduled with cardiology on Monday, 3 days from now. No fevers, chills. She has had a slight cough and some congestion as well. She denies abdominal pain,, vomiting. On exam, lungs clear bilaterally, heart regular rate and rhythm. Lower extremities without edema. Labs, EKG, chest x-ray obtained. Blood work unremarkable, troponin negative. Chest x-ray with likely atelectasis, low suspicion for pneumonia given normal white count, no fever, clear lungs. EKG with atrial enlargement, no evidence of ACS. She stable for discharge with instructions to follow-up with cardiology    Results reviewed, ECG with poor RWP. She has been having more HICKS, thought due to asthma concerned due to angina. She gets more SOB when she walks and feels palpitations and hears in ears.     Patient feels  no leg swelling, no PND, no dizziness, no syncope, no CNS symptoms.    Patient has fairly good exercise tolerance. Is an , does rental properties.     Patient is compliant with medications.    FH - mother - had strokes, father - had MI; older sister -  of strokes -  in 60s     ECG  Normal sinus " rhythm   Possible Left atrial enlargement   Possible Anterior infarct ,age undetermined   Abnormal ECG   When compared with ECG of 02-OCT-2014 11:29,   Borderline criteria for Anterior infarct are now Present   QT has lengthened   Confirmed by CLAY BLACKBURN MD ([id number]) on 3/25/2022 11:45:17 AM     Past Medical History:   Diagnosis Date    Abnormal Pap smear of cervix     in the past with repeat pap smear okay.    Allergic rhinitis, cause unspecified     Arthritis of both knees     Asthma     Eczema     Fatty liver 10/2014    Fibrocystic breast changes     Headache(784.0)     Hepatomegaly 10/2014    Hypertension     Liver cyst 10/2014    Multinodular goiter     Followed by ENT - Dr. Nicolas Gray    Polymenorrhea 2008    TMJ (dislocation of temporomandibular joint)     Uterine fibroid     in the past    Vitamin D deficiency disease        Past Surgical History:   Procedure Laterality Date     SECTION, CLASSIC      x 1    COLONOSCOPY N/A 2020    Procedure: COLONOSCOPY;  Surgeon: Angie Magana MD;  Location: [facility];  Service: Endoscopy;  Laterality: N/A;    HYSTERECTOMY      MULTIPLE TOOTH EXTRACTIONS      PARTIAL HYSTERECTOMY  2013    Due to fibroids       Social History     Tobacco Use    Smoking status: Never Smoker    Smokeless tobacco: Never Used   Substance Use Topics    Alcohol use: Yes     Alcohol/week: 0.0 standard drinks     Comment: Occasionally    Drug use: Yes     Frequency: 4.0 times per week     Types: Marijuana     Comment: 20       Family History   Problem Relation Age of Onset    Stroke Mother     Diabetes Mother     Heart disease Mother     Heart disease Father 63        MI/CAD    Hypertension Father     Cancer Maternal Grandmother 60        Rectal and stomach cancer    Migraines Cousin     Cataracts Cousin     Cancer Maternal Aunt 50        Stomach cancer    Cancer Maternal Aunt 66        Lung cancer (smoker)    Stroke Maternal  Grandfather     Diabetes Maternal Grandfather     Stroke Sister        Patient's Medications   New Prescriptions    No medications on file   Previous Medications    ALBUTEROL (ACCUNEB) 0.63 MG/3 ML NEBU    Take 3 mLs (0.63 mg total) by nebulization every 4 to 6 hours as needed (asthma). Rescue    ALBUTEROL (PROVENTIL/VENTOLIN HFA) 90 MCG/ACTUATION INHALER    INHALE 1 TO 2 PUFFS BY MOUTH INTO THE LUNGS EVERY 4 TO 6 HOURS AS NEEDED FOR WHEEZING OR SHORTNESS OF BREATH    AMLODIPINE (NORVASC) 10 MG TABLET    Take 1 tablet (10 mg total) by mouth once daily.    ASCORBIC ACID, VITAMIN C, (VITAMIN C) 100 MG TABLET    Take 60 mg by mouth once daily.    CALCIUM/MAGNESIUM/VIT B COMP (CALCIUM-MAGNESIUM-B COMPLEX ORAL)    Take by mouth.    FLUTICASONE FUROATE-VILANTEROL (BREO ELLIPTA) 100-25 MCG/DOSE DISKUS INHALER    INHALE 1 PUFF INTO THE LUNGS ONCE DAILY    GINSENG (GIN-ZING ORAL)    Take by mouth.    HYDROCHLOROTHIAZIDE (HYDRODIURIL) 12.5 MG TAB    Take 1 tablet (12.5 mg total) by mouth once daily.    LEVOCETIRIZINE (XYZAL) 5 MG TABLET    Take 1 tablet (5 mg total) by mouth once daily.    METHYLPREDNISOLONE (MEDROL DOSEPACK) 4 MG TABLET    use as directed    MONTELUKAST (SINGULAIR) 10 MG TABLET    Take 1 tablet (10 mg total) by mouth every evening.    OMEGA-3S/DHA/EPA/FISH OIL/D3 (VITAMIN-D + OMEGA-3 ORAL)    Take by mouth.    SERTRALINE (ZOLOFT) 50 MG TABLET    Take 1 tablet (50 mg total) by mouth once daily.    VITAMIN E ACETATE (VITAMIN E ORAL)    Take by mouth.    ZINC GLUCONATE 50 MG TABLET    Take 15 mg by mouth once daily.   Modified Medications    No medications on file   Discontinued Medications    No medications on file       Review of Systems   Constitutional: Negative.    HENT: Negative.    Eyes: Negative.    Respiratory: Positive for shortness of breath.    Cardiovascular: Positive for chest pain and palpitations.   Gastrointestinal: Negative.    Genitourinary: Negative.    Musculoskeletal: Negative.    Skin:  "Negative.    Neurological: Negative.    Endo/Heme/Allergies: Negative.    Psychiatric/Behavioral: Negative.    All 12 systems otherwise negative.      Wt Readings from Last 3 Encounters:   03/28/22 118.7 kg (261 lb 11 oz)   03/24/22 119 kg (262 lb 5.6 oz)   02/23/22 123.6 kg (272 lb 7.8 oz)     Temp Readings from Last 3 Encounters:   03/24/22 97.8 °F (36.6 °C)   02/23/22 98.1 °F (36.7 °C) (Temporal)   10/14/21 97.4 °F (36.3 °C)     BP Readings from Last 3 Encounters:   03/28/22 128/68   03/24/22 122/78   02/23/22 135/81     Pulse Readings from Last 3 Encounters:   03/28/22 71   03/24/22 95   02/23/22 104       /68 (BP Location: Left arm, Patient Position: Sitting, BP Method: Large (Manual))   Pulse 71   Ht 5' 6" (1.676 m)   Wt 118.7 kg (261 lb 11 oz)   SpO2 97%   BMI 42.24 kg/m²     Objective:   Physical Exam  Vitals and nursing note reviewed.   Constitutional:       General: She is not in acute distress.     Appearance: She is well-developed. She is obese. She is not diaphoretic.   HENT:      Head: Normocephalic and atraumatic.      Nose: Nose normal.   Eyes:      General: No scleral icterus.     Conjunctiva/sclera: Conjunctivae normal.   Neck:      Thyroid: No thyromegaly.      Vascular: No JVD.   Cardiovascular:      Rate and Rhythm: Normal rate and regular rhythm.      Heart sounds: S1 normal and S2 normal. No murmur heard.    No friction rub. No gallop. No S3 or S4 sounds.   Pulmonary:      Effort: Pulmonary effort is normal. No respiratory distress.      Breath sounds: Normal breath sounds. No stridor. No wheezing or rales.   Chest:      Chest wall: No tenderness.   Abdominal:      General: Bowel sounds are normal. There is no distension.      Palpations: Abdomen is soft. There is no mass.      Tenderness: There is no abdominal tenderness. There is no rebound.   Genitourinary:     Comments: Deferred  Musculoskeletal:         General: No tenderness or deformity. Normal range of motion.      Cervical " back: Normal range of motion and neck supple.   Lymphadenopathy:      Cervical: No cervical adenopathy.   Skin:     General: Skin is warm and dry.      Coloration: Skin is not pale.      Findings: No erythema or rash.   Neurological:      Mental Status: She is alert and oriented to person, place, and time.      Motor: No abnormal muscle tone.      Coordination: Coordination normal.   Psychiatric:         Behavior: Behavior normal.         Thought Content: Thought content normal.         Judgment: Judgment normal.         Lab Results   Component Value Date     10/14/2021    K 4.0 10/14/2021     10/14/2021    CO2 19 (L) 10/14/2021    BUN 20 10/14/2021    CREATININE 0.8 10/14/2021     10/14/2021    HGBA1C 4.8 10/14/2021    MG 2.2 02/25/2014    AST 17 10/14/2021    ALT 19 10/14/2021    ALBUMIN 4.0 10/14/2021    PROT 7.6 10/14/2021    BILITOT 0.4 10/14/2021    WBC 8.64 10/14/2021    HGB 12.1 10/14/2021    HCT 38.3 10/14/2021    MCV 93 10/14/2021     10/14/2021    INR 0.9 03/24/2021    TSH 0.668 10/14/2021    CHOL 201 (H) 10/14/2021    HDL 53 10/14/2021    LDLCALC 134.8 10/14/2021    LDLCALC 134 (H) 02/01/2017    TRIG 66 10/14/2021     Assessment:      1. Abnormal EKG    2. Primary hypertension    3. Mild intermittent asthma without complication    4. Morbid obesity with BMI of 40.0-44.9, adult    5. Gastroesophageal reflux disease without esophagitis    6. HICKS (dyspnea on exertion)    7. Other chest pain    8. Palpitations    9. Sleep apnea, unspecified type        Plan:     1. Abnormal ECG - poor RWP concern for anterior ischemia, with CP/SOB, palpitations   - order stress echo, to r/o ischemia/valve disease  - order Holter  - refer to pulm as well - has GLENN/worsening asthma     2. HTN   titrate meds    3. Obesity  - cont weight loss with diet/exercise    4. GERD  - cont PPI     5. Asthma  - cont tx per PCP    6. GLENN  - may need CPAP vs Inspire       Thank you for allowing me to participate in  this patient's care. Please do not hesitate to contact me with any questions or concerns. Consult note has been forwarded to the referral physician.

## 2022-04-07 ENCOUNTER — HOSPITAL ENCOUNTER (OUTPATIENT)
Dept: CARDIOLOGY | Facility: HOSPITAL | Age: 52
Discharge: HOME | End: 2022-04-07
Attending: INTERNAL MEDICINE
Payer: COMMERCIAL

## 2022-04-07 VITALS
HEART RATE: 101 BPM | DIASTOLIC BLOOD PRESSURE: 59 MMHG | WEIGHT: 261 LBS | BODY MASS INDEX: 41.95 KG/M2 | SYSTOLIC BLOOD PRESSURE: 118 MMHG | HEIGHT: 66 IN

## 2022-04-07 DIAGNOSIS — R07.89 OTHER CHEST PAIN: ICD-10-CM

## 2022-04-07 DIAGNOSIS — R00.2 PALPITATIONS: ICD-10-CM

## 2022-04-07 DIAGNOSIS — E66.01 MORBID OBESITY WITH BMI OF 40.0-44.9, ADULT: ICD-10-CM

## 2022-04-07 DIAGNOSIS — I10 PRIMARY HYPERTENSION: ICD-10-CM

## 2022-04-07 DIAGNOSIS — K21.9 GASTROESOPHAGEAL REFLUX DISEASE WITHOUT ESOPHAGITIS: ICD-10-CM

## 2022-04-07 DIAGNOSIS — R94.31 ABNORMAL EKG: ICD-10-CM

## 2022-04-07 DIAGNOSIS — J45.20 MILD INTERMITTENT ASTHMA WITHOUT COMPLICATION: ICD-10-CM

## 2022-04-07 DIAGNOSIS — R06.09 DOE (DYSPNEA ON EXERTION): ICD-10-CM

## 2022-04-07 LAB
AORTIC ROOT ANNULUS: 2.94 CM
AV INDEX (PROSTH): 0.84
AV MEAN GRADIENT: 5 MMHG
AV PEAK GRADIENT: 9 MMHG
AV VALVE AREA: 2.74 CM2
AV VELOCITY RATIO: 0.83
BSA FOR ECHO PROCEDURE: 2.35 M2
CV ECHO LV RWT: 0.56 CM
CV STRESS BASE HR: 101 BPM
DIASTOLIC BLOOD PRESSURE: 59 MMHG
DOP CALC AO PEAK VEL: 1.53 M/S
DOP CALC AO VTI: 24.6 CM
DOP CALC LVOT AREA: 3.3 CM2
DOP CALC LVOT DIAMETER: 2.04 CM
DOP CALC LVOT PEAK VEL: 1.27 M/S
DOP CALC LVOT STROKE VOLUME: 67.3 CM3
DOP CALC RVOT PEAK VEL: 0.92 M/S
DOP CALC RVOT VTI: 16.7 CM
DOP CALCLVOT PEAK VEL VTI: 20.6 CM
E WAVE DECELERATION TIME: 168.5 MSEC
E/A RATIO: 1
E/E' RATIO: 5.85 M/S
ECHO EF ESTIMATED: 58 %
ECHO LV POSTERIOR WALL: 1.18 CM (ref 0.6–1.1)
EJECTION FRACTION: 60 %
FRACTIONAL SHORTENING: 30 % (ref 28–44)
INTERVENTRICULAR SEPTUM: 0.92 CM (ref 0.6–1.1)
IVRT: 94.2 MSEC
LA MAJOR: 3.81 CM
LA MINOR: 3.73 CM
LA WIDTH: 3.73 CM
LEFT ATRIUM SIZE: 3.18 CM
LEFT ATRIUM VOLUME INDEX MOD: 17 ML/M2
LEFT ATRIUM VOLUME INDEX: 17 ML/M2
LEFT ATRIUM VOLUME MOD: 38.15 CM3
LEFT ATRIUM VOLUME: 38.01 CM3
LEFT INTERNAL DIMENSION IN SYSTOLE: 2.92 CM (ref 2.1–4)
LEFT VENTRICLE DIASTOLIC VOLUME INDEX: 34.71 ML/M2
LEFT VENTRICLE DIASTOLIC VOLUME: 77.76 ML
LEFT VENTRICLE MASS INDEX: 65 G/M2
LEFT VENTRICLE SYSTOLIC VOLUME INDEX: 14.6 ML/M2
LEFT VENTRICLE SYSTOLIC VOLUME: 32.74 ML
LEFT VENTRICULAR INTERNAL DIMENSION IN DIASTOLE: 4.18 CM (ref 3.5–6)
LEFT VENTRICULAR MASS: 145.9 G
LV LATERAL E/E' RATIO: 5.43 M/S
LV SEPTAL E/E' RATIO: 6.33 M/S
LVOT MG: 3.99 MMHG
LVOT MV: 0.95 CM/S
MV PEAK A VEL: 0.76 M/S
MV PEAK E VEL: 0.76 M/S
MV STENOSIS PRESSURE HALF TIME: 48.86 MS
MV VALVE AREA P 1/2 METHOD: 4.5 CM2
OHS CV CPX 1 MINUTE RECOVERY HEART RATE: 139 BPM
OHS CV CPX 85 PERCENT MAX PREDICTED HEART RATE MALE: 135
OHS CV CPX ESTIMATED METS: 5
OHS CV CPX MAX PREDICTED HEART RATE: 159
OHS CV CPX PATIENT IS FEMALE: 1
OHS CV CPX PATIENT IS MALE: 0
OHS CV CPX PEAK DIASTOLIC BLOOD PRESSURE: 60 MMHG
OHS CV CPX PEAK HEAR RATE: 148 BPM
OHS CV CPX PEAK RATE PRESSURE PRODUCT: ABNORMAL
OHS CV CPX PEAK SYSTOLIC BLOOD PRESSURE: 173 MMHG
OHS CV CPX PERCENT MAX PREDICTED HEART RATE ACHIEVED: 93
OHS CV CPX RATE PRESSURE PRODUCT PRESENTING: ABNORMAL
PISA TR MAX VEL: 2.54 M/S
PV MEAN GRADIENT: 2.46 MMHG
PV PEAK VELOCITY: 1.09 CM/S
RA MAJOR: 4.41 CM
RA PRESSURE: 3 MMHG
RA WIDTH: 3.62 CM
RIGHT VENTRICULAR END-DIASTOLIC DIMENSION: 3.13 CM
SINUS: 2.97 CM
STJ: 3.03 CM
STRESS ANGINA INDEX: 0
STRESS ECHO POST EXERCISE DUR MIN: 1 MINUTES
STRESS ECHO POST EXERCISE DUR SEC: 50 SECONDS
SYSTOLIC BLOOD PRESSURE: 118 MMHG
TDI LATERAL: 0.14 M/S
TDI SEPTAL: 0.12 M/S
TDI: 0.13 M/S
TR MAX PG: 26 MMHG
TRICUSPID ANNULAR PLANE SYSTOLIC EXCURSION: 2.16 CM
TV REST PULMONARY ARTERY PRESSURE: 29 MMHG

## 2022-04-07 PROCEDURE — 93226 XTRNL ECG REC<48 HR SCAN A/R: CPT

## 2022-04-07 PROCEDURE — 93351 STRESS TTE COMPLETE: CPT

## 2022-04-07 PROCEDURE — 93351 STRESS ECHO (CUPID ONLY): ICD-10-PCS | Mod: 26,,, | Performed by: INTERNAL MEDICINE

## 2022-04-07 PROCEDURE — 93351 STRESS TTE COMPLETE: CPT | Mod: 26,,, | Performed by: INTERNAL MEDICINE

## 2022-04-11 DIAGNOSIS — R00.2 PALPITATIONS: Primary | ICD-10-CM

## 2022-04-12 NOTE — PROGRESS NOTES
Subjective:       Patient ID: Ayanna Alicia is a 51 y.o. female.    Chief Complaint: Shortness of Breath      52yo female referred by Jarad Contreras MD for GLENN/asthma  Daily BREO, albuterol hfa/neb prn  Has been SOB, wheezing, productive cough, yellow sputum, fatigue for 2 months, inhalers do not help  Has taken medrol steroid pack  Went to [facility] ER 3 weeks ago, Chest X Ray: Mild patchy opacification in the lung bases is likely atelectasis versus less likely pneumonia  Has not taken any antibiotics  No fever or chest pain    Asthma Control Test  In the past 4  weeks, how much of the time did your asthma keep you from getting as much done at work, school or at home?: All of the time  During the past 4 weeks, how often have you had shortness of breath?: More than once a day  During the past 4 weeks, how often did your asthma symptoms (wheezing, couging, shortness of breath, chest tightness or pain) wake you up at night or earlier than usual in the morning?: 4 or more nights a week  During the past 4 weeks, how often have you used your rescue inhaler or nebulizer medication (such as albuterol)?: 1 or 2 times per day  How would you rate your asthma control during the past 4 weeks?: Not controlled at all  If your score is 19 or less, your asthma may not be under control: 6     Immunization History   Administered Date(s) Administered    COVID-19 Vaccine 08/20/2021, 10/14/2021    COVID-19, MRNA, LN-S, PF (MODERNA FULL 0.5 ML DOSE) 08/20/2021    Influenza 10/13/2010    Influenza - Quadrivalent 10/02/2014, 01/12/2016, 02/01/2017    Influenza - Quadrivalent - PF *Preferred* (6 months and older) 09/22/2020    Influenza - Trivalent (ADULT) 10/13/2010    Tdap 03/23/2012      Tobacco Use: Low Risk     Smoking Tobacco Use: Never Smoker    Smokeless Tobacco Use: Never Used      Past Medical History:   Diagnosis Date    Abnormal Pap smear of cervix     in the past with repeat pap smear okay.    Allergic rhinitis, cause  unspecified     Arthritis of both knees     Asthma     Eczema     Fatty liver 10/2014    Fibrocystic breast changes     Headache(784.0)     Hepatomegaly 10/2014    Hypertension     Liver cyst 10/2014    Multinodular goiter     Followed by ENT - Dr. Nicolas Gray    Polymenorrhea 2008    TMJ (dislocation of temporomandibular joint)     Uterine fibroid     in the past    Vitamin D deficiency disease       Current Outpatient Medications on File Prior to Visit   Medication Sig Dispense Refill    albuterol (ACCUNEB) 0.63 mg/3 mL Nebu Take 3 mLs (0.63 mg total) by nebulization every 4 to 6 hours as needed (asthma). Rescue 75 mL 6    albuterol (PROVENTIL/VENTOLIN HFA) 90 mcg/actuation inhaler INHALE 1 TO 2 PUFFS BY MOUTH INTO THE LUNGS EVERY 4 TO 6 HOURS AS NEEDED FOR WHEEZING OR SHORTNESS OF BREATH 18 g 5    amLODIPine (NORVASC) 10 MG tablet Take 1 tablet (10 mg total) by mouth once daily. 90 tablet 1    ascorbic acid, vitamin C, (VITAMIN C) 100 MG tablet Take 60 mg by mouth once daily.      calcium/magnesium/vit B comp (CALCIUM-MAGNESIUM-B COMPLEX ORAL) Take by mouth.      fluticasone furoate-vilanteroL (BREO ELLIPTA) 100-25 mcg/dose diskus inhaler INHALE 1 PUFF INTO THE LUNGS ONCE DAILY 1 each 5    ginseng (GIN-ZING ORAL) Take by mouth.      hydroCHLOROthiazide (HYDRODIURIL) 12.5 MG Tab Take 1 tablet (12.5 mg total) by mouth once daily. 90 tablet 1    levocetirizine (XYZAL) 5 MG tablet Take 1 tablet (5 mg total) by mouth once daily. 90 tablet 1    montelukast (SINGULAIR) 10 mg tablet Take 1 tablet (10 mg total) by mouth every evening. 90 tablet 1    omega-3s/dha/epa/fish oil/D3 (VITAMIN-D + OMEGA-3 ORAL) Take by mouth.      sertraline (ZOLOFT) 50 MG tablet Take 1 tablet (50 mg total) by mouth once daily. 90 tablet 1    vitamin E acetate (VITAMIN E ORAL) Take by mouth.      zinc gluconate 50 mg tablet Take 15 mg by mouth once daily.      methylPREDNISolone (MEDROL DOSEPACK) 4 mg tablet use as  "directed 1 each 0     No current facility-administered medications on file prior to visit.        Review of Systems   Constitutional: Positive for fatigue. Negative for fever, weight loss, appetite change and weakness.   HENT: Negative for postnasal drip, rhinorrhea, sinus pressure, trouble swallowing and congestion.    Respiratory: Positive for cough, sputum production, shortness of breath, wheezing and dyspnea on extertion. Negative for choking and chest tightness.    Cardiovascular: Negative for chest pain and leg swelling.   Musculoskeletal: Negative for arthralgias, gait problem and joint swelling.   Gastrointestinal: Negative for nausea, vomiting and abdominal pain.   Neurological: Negative for dizziness, weakness and headaches.   All other systems reviewed and are negative.      Objective:       Vitals:    04/13/22 0849   BP: 120/86   Pulse: 107   Resp: 16   SpO2: 99%   Weight: 117.4 kg (258 lb 11.4 oz)   Height: 5' 6" (1.676 m)       Physical Exam   Constitutional: She is oriented to person, place, and time. She appears well-developed and well-nourished. No distress. She is obese.   HENT:   Head: Normocephalic.   Nose: Nose normal.   Mouth/Throat: Oropharynx is clear and moist.   Cardiovascular: Normal rate and regular rhythm.   Pulmonary/Chest: Effort normal. No respiratory distress. She has no wheezes. She has no rhonchi. She has no rales.   Coarse breath sounds in bilateral lower fields   Musculoskeletal:         General: No edema.      Cervical back: Normal range of motion and neck supple.   Lymphadenopathy: No supraclavicular adenopathy is present.     She has no cervical adenopathy.   Neurological: She is alert and oriented to person, place, and time. Gait normal.   Skin: Skin is warm and dry.   Psychiatric: She has a normal mood and affect.   Vitals reviewed.    Personal Diagnostic Review    Nicolas Valdez II, MD - 03/25/2022   Formatting of this note might be different from the original.   XR CHEST " 1 VIEW     HISTORY:  chest pain,  other     COMPARISON: None available     TECHNIQUE: AP portable chest radiograph     FINDINGS:     The heart is mildly enlarged.     There is no overt pulmonary edema. Mild patchy opacification in the lung bases is likely atelectasis versus less likely pneumonia.     There is no pneumothorax or pleural effusion.  Exam End: 03/25/22  5:03 PM Last Resulted: 03/25/22  5:07 PM       Stress Echo Which stress agent will be used? Treadmill Exercise; Color Flow Doppler? No  · The left ventricle is normal in size with concentric remodeling and   normal systolic function.  · The test was stopped because the patient experienced shortness of   breath. The patient requested the test to be stopped.  · The patient's exercise capacity was severely impaired.  · There were no arrhythmias during stress.  · The estimated ejection fraction is 60%.  · Normal left ventricular diastolic function.  · Normal right ventricular size with normal right ventricular systolic   function.  · Mild tricuspid regurgitation.  · Normal central venous pressure (3 mmHg).  · The estimated PA systolic pressure is 29 mmHg.  · The stress echo portion of this study is negative for myocardial   ischemia.  · The ECG portion of this study is negative for myocardial ischemia.       2019:  Home Sleep Studies  Date/Time: 7/5/2019 8:11 AM  Performed by: Agustín Aviles MD  Authorized by: Cullen Steele NP   PHYSICIAN INTERPRETATION AND COMMENTS: Findings are consistent with MILD obstructive sleep apnea  (GLENN). Overall AHI was 11.0/hr with 5.5 hours sleep.  CLINICAL HISTORY: 48 year old female presented with: Patient has observed snoring, periods of not breathing, feels  sleepy during the day. She reports non restful sleep. 16.5 inch neck, BMI of 45, an [place] sleepiness score of 8, history of  hypertension and symptoms of nocturnal snoring, waking up choking and witnessed apneas. Based on the clinical history, the  patient  has a high pre-test probability of having severe GLENN.  SLEEP STUDY FINDINGS: Patient underwent a one night Home Sleep Test and by behavioral criteria, slept for  approximately 5.5 hours, with a sleep latency of 8 minutes and a sleep efficiency of 70.9%. Mild sleep disordered breathing  (AHI=11) is noted based on a 4% hypopnea desaturation criteria. The patient slept supine 16.7% of the night based on valid  recording time of 5.47 hours. When considering more subtle measures of sleep disordered breathing, the overall respiratory  disturbance index is moderate (RDI=21) based on a 1% hypopnea desaturation criteria with confirmation by surrogate arousal  indicators, predominantly in the supine position (31 events/hour). The apneas/hypopneas are accompanied by minimal oxygen  desaturation (percent time below 90% SpO2: 4.1%, Min SpO2: 81.8%). The average desaturation across all sleep disordered  breathing events is 3.2%. Snoring occurs for 45.7% (30 dB) of the study, 44.9% is very loud. The mean pulse rate is 82 BPM,  with very frequent pulse rate variability (83 events with >= 6 BPM increase/decrease per hour).  TREATMENT CONSIDERATIONS: Consider nasal continuous positive airway pressure (CPAP/AutoPAP) as the initial  treatment choice based on the RDI severity and co-morbidities. A mandibular advancement splint (MAS) or referral to an  ENT surgeon for modification to the airway should be considered to reduce the potential contribution of GLENN on existing  diseases if the patient prefers an alternative therapy or the CPAP trial is unsuccessful. A Mandibular Advancement Splint  (MAS) will likely provide treatment benefit independent of GLENN severity. The patient should avoid sleeping supine; the  non-supine RDI is 1.6 times less severe than the supine RDI.  DISEASE MANAGEMENT CONSIDERATIONS: Insomnia is a symptom that can be associated with untreated GLENN.        Assessment/Plan:       Problem List Items Addressed This Visit         Pulmonary    Asthma     No wheezing on exam  BREO, albuterol  Prednisone, doxycycline today           Acute bronchitis - Primary     No acute distress  Continue asthma inhalers  Prednisone and doxycyline, follow up in 2 weeks with Chest X Ray  Allergy to doxycycline in Epic: princess reported in 2012, patient reports she does not remember this, will give zofran for prn           Relevant Medications    doxycycline (MONODOX) 100 MG capsule    ondansetron (ZOFRAN-ODT) 4 MG TbDL    predniSONE (DELTASONE) 20 MG tablet    Other Relevant Orders    X-Ray Chest PA And Lateral    Pneumonia due to infectious organism     Suggestive on Chest X Ray last month  Antibiotics and repeat Chest X Ray in 2 weeks           Relevant Medications    doxycycline (MONODOX) 100 MG capsule    ondansetron (ZOFRAN-ODT) 4 MG TbDL    predniSONE (DELTASONE) 20 MG tablet    Other Relevant Orders    X-Ray Chest PA And Lateral       Cardiac/Vascular    HTN (hypertension)     Stable on amlodipine              Endocrine    Morbid obesity with BMI of 40.0-44.9, adult     Weight loss and exercise to improve overall health.                Other    Sleep apnea     Assess next visit               Follow up in 2 weeks with repeat Chest X Ray. Report to ER for any worsening symptoms.    Discussed diagnosis, its evaluation, treatment and usual course. All questions answered.    Patient verbalized understanding of plan and left in no acute distress    Thank you for the courtesy of participating in the care of this patient    Marilu Izquierdo PA-C

## 2022-04-13 ENCOUNTER — OFFICE VISIT (OUTPATIENT)
Dept: PULMONOLOGY | Facility: CLINIC | Age: 52
End: 2022-04-13
Payer: COMMERCIAL

## 2022-04-13 VITALS
HEART RATE: 107 BPM | HEIGHT: 66 IN | SYSTOLIC BLOOD PRESSURE: 120 MMHG | DIASTOLIC BLOOD PRESSURE: 86 MMHG | BODY MASS INDEX: 41.57 KG/M2 | RESPIRATION RATE: 16 BRPM | WEIGHT: 258.69 LBS | OXYGEN SATURATION: 99 %

## 2022-04-13 DIAGNOSIS — J18.9 PNEUMONIA DUE TO INFECTIOUS ORGANISM, UNSPECIFIED LATERALITY, UNSPECIFIED PART OF LUNG: ICD-10-CM

## 2022-04-13 DIAGNOSIS — J45.31 MILD PERSISTENT ASTHMA WITH ACUTE EXACERBATION: ICD-10-CM

## 2022-04-13 DIAGNOSIS — G47.30 SLEEP APNEA, UNSPECIFIED TYPE: ICD-10-CM

## 2022-04-13 DIAGNOSIS — J20.9 ACUTE BRONCHITIS, UNSPECIFIED ORGANISM: Primary | ICD-10-CM

## 2022-04-13 DIAGNOSIS — I10 PRIMARY HYPERTENSION: ICD-10-CM

## 2022-04-13 DIAGNOSIS — E66.01 MORBID OBESITY WITH BMI OF 40.0-44.9, ADULT: ICD-10-CM

## 2022-04-13 PROCEDURE — 99999 PR PBB SHADOW E&M-EST. PATIENT-LVL V: ICD-10-PCS | Mod: PBBFAC,,, | Performed by: PHYSICIAN ASSISTANT

## 2022-04-13 PROCEDURE — 99214 OFFICE O/P EST MOD 30 MIN: CPT | Mod: S$GLB,,, | Performed by: PHYSICIAN ASSISTANT

## 2022-04-13 PROCEDURE — 1160F RVW MEDS BY RX/DR IN RCRD: CPT | Mod: CPTII,S$GLB,, | Performed by: PHYSICIAN ASSISTANT

## 2022-04-13 PROCEDURE — 1159F MED LIST DOCD IN RCRD: CPT | Mod: CPTII,S$GLB,, | Performed by: PHYSICIAN ASSISTANT

## 2022-04-13 PROCEDURE — 3079F PR MOST RECENT DIASTOLIC BLOOD PRESSURE 80-89 MM HG: ICD-10-PCS | Mod: CPTII,S$GLB,, | Performed by: PHYSICIAN ASSISTANT

## 2022-04-13 PROCEDURE — 3074F SYST BP LT 130 MM HG: CPT | Mod: CPTII,S$GLB,, | Performed by: PHYSICIAN ASSISTANT

## 2022-04-13 PROCEDURE — 99214 PR OFFICE/OUTPT VISIT, EST, LEVL IV, 30-39 MIN: ICD-10-PCS | Mod: S$GLB,,, | Performed by: PHYSICIAN ASSISTANT

## 2022-04-13 PROCEDURE — 1159F PR MEDICATION LIST DOCUMENTED IN MEDICAL RECORD: ICD-10-PCS | Mod: CPTII,S$GLB,, | Performed by: PHYSICIAN ASSISTANT

## 2022-04-13 PROCEDURE — 99999 PR PBB SHADOW E&M-EST. PATIENT-LVL V: CPT | Mod: PBBFAC,,, | Performed by: PHYSICIAN ASSISTANT

## 2022-04-13 PROCEDURE — 1160F PR REVIEW ALL MEDS BY PRESCRIBER/CLIN PHARMACIST DOCUMENTED: ICD-10-PCS | Mod: CPTII,S$GLB,, | Performed by: PHYSICIAN ASSISTANT

## 2022-04-13 PROCEDURE — 3008F PR BODY MASS INDEX (BMI) DOCUMENTED: ICD-10-PCS | Mod: CPTII,S$GLB,, | Performed by: PHYSICIAN ASSISTANT

## 2022-04-13 PROCEDURE — 3008F BODY MASS INDEX DOCD: CPT | Mod: CPTII,S$GLB,, | Performed by: PHYSICIAN ASSISTANT

## 2022-04-13 PROCEDURE — 3079F DIAST BP 80-89 MM HG: CPT | Mod: CPTII,S$GLB,, | Performed by: PHYSICIAN ASSISTANT

## 2022-04-13 PROCEDURE — 3074F PR MOST RECENT SYSTOLIC BLOOD PRESSURE < 130 MM HG: ICD-10-PCS | Mod: CPTII,S$GLB,, | Performed by: PHYSICIAN ASSISTANT

## 2022-04-13 RX ORDER — ONDANSETRON 4 MG/1
4 TABLET, ORALLY DISINTEGRATING ORAL EVERY 6 HOURS PRN
Qty: 90 TABLET | Refills: 0 | Status: SHIPPED | OUTPATIENT
Start: 2022-04-13 | End: 2023-03-17

## 2022-04-13 RX ORDER — DOXYCYCLINE 100 MG/1
100 CAPSULE ORAL EVERY 12 HOURS
Qty: 20 CAPSULE | Refills: 1 | Status: SHIPPED | OUTPATIENT
Start: 2022-04-13 | End: 2022-09-13

## 2022-04-13 RX ORDER — PREDNISONE 20 MG/1
TABLET ORAL
Qty: 20 TABLET | Refills: 0 | Status: SHIPPED | OUTPATIENT
Start: 2022-04-13 | End: 2022-04-25

## 2022-04-13 NOTE — ASSESSMENT & PLAN NOTE
No acute distress  Continue asthma inhalers  Prednisone and doxycyline, follow up in 2 weeks with Chest X Ray  Allergy to doxycycline in Epic: princess reported in 2012, patient reports she does not remember this, will give zofran for prn

## 2022-04-26 ENCOUNTER — PATIENT MESSAGE (OUTPATIENT)
Dept: PULMONOLOGY | Facility: CLINIC | Age: 52
End: 2022-04-26
Payer: COMMERCIAL

## 2022-04-27 ENCOUNTER — OFFICE VISIT (OUTPATIENT)
Dept: PULMONOLOGY | Facility: CLINIC | Age: 52
End: 2022-04-27
Payer: COMMERCIAL

## 2022-04-27 ENCOUNTER — PATIENT MESSAGE (OUTPATIENT)
Dept: PULMONOLOGY | Facility: CLINIC | Age: 52
End: 2022-04-27

## 2022-04-27 ENCOUNTER — HOSPITAL ENCOUNTER (OUTPATIENT)
Dept: RADIOLOGY | Facility: HOSPITAL | Age: 52
Discharge: HOME | DRG: 193 | End: 2022-04-27
Attending: PHYSICIAN ASSISTANT
Payer: COMMERCIAL

## 2022-04-27 ENCOUNTER — HOSPITAL ENCOUNTER (INPATIENT)
Facility: HOSPITAL | Age: 52
LOS: 4 days | Discharge: HOME | DRG: 193 | End: 2022-05-02
Attending: EMERGENCY MEDICINE | Admitting: FAMILY MEDICINE
Payer: COMMERCIAL

## 2022-04-27 VITALS
BODY MASS INDEX: 40.89 KG/M2 | OXYGEN SATURATION: 95 % | DIASTOLIC BLOOD PRESSURE: 70 MMHG | RESPIRATION RATE: 16 BRPM | SYSTOLIC BLOOD PRESSURE: 124 MMHG | HEIGHT: 66 IN | WEIGHT: 254.44 LBS | HEART RATE: 116 BPM

## 2022-04-27 DIAGNOSIS — J18.9 PNEUMONIA OF BOTH LOWER LOBES DUE TO INFECTIOUS ORGANISM: ICD-10-CM

## 2022-04-27 DIAGNOSIS — R06.02 SOB (SHORTNESS OF BREATH): Primary | ICD-10-CM

## 2022-04-27 DIAGNOSIS — J18.9 PNEUMONIA OF BOTH LUNGS DUE TO INFECTIOUS ORGANISM, UNSPECIFIED PART OF LUNG: ICD-10-CM

## 2022-04-27 DIAGNOSIS — J20.9 ACUTE BRONCHITIS, UNSPECIFIED ORGANISM: ICD-10-CM

## 2022-04-27 DIAGNOSIS — E66.01 MORBID OBESITY: ICD-10-CM

## 2022-04-27 DIAGNOSIS — R91.8 PULMONARY INFILTRATES ON CXR: ICD-10-CM

## 2022-04-27 DIAGNOSIS — R06.02 SOB (SHORTNESS OF BREATH): ICD-10-CM

## 2022-04-27 DIAGNOSIS — J18.9 PNEUMONIA DUE TO INFECTIOUS ORGANISM, UNSPECIFIED LATERALITY, UNSPECIFIED PART OF LUNG: ICD-10-CM

## 2022-04-27 DIAGNOSIS — Z87.09 HISTORY OF ASTHMA: ICD-10-CM

## 2022-04-27 DIAGNOSIS — J96.01 ACUTE HYPOXEMIC RESPIRATORY FAILURE: Primary | ICD-10-CM

## 2022-04-27 DIAGNOSIS — J18.9 UNRESOLVED PNEUMONIA: ICD-10-CM

## 2022-04-27 LAB
ALBUMIN SERPL BCP-MCNC: 3.7 G/DL (ref 3.5–5.2)
ALP SERPL-CCNC: 113 U/L (ref 55–135)
ALT SERPL W/O P-5'-P-CCNC: 27 U/L (ref 10–44)
ANION GAP SERPL CALC-SCNC: 11 MMOL/L (ref 8–16)
APTT BLDCRRT: 28.4 SEC (ref 21–32)
AST SERPL-CCNC: 26 U/L (ref 10–40)
BASOPHILS # BLD AUTO: 0.12 K/UL (ref 0–0.2)
BASOPHILS NFR BLD: 0.9 % (ref 0–1.9)
BILIRUB SERPL-MCNC: 0.4 MG/DL (ref 0.1–1)
BNP SERPL-MCNC: <10 PG/ML (ref 0–99)
BUN SERPL-MCNC: 13 MG/DL (ref 6–20)
CALCIUM SERPL-MCNC: 9.2 MG/DL (ref 8.7–10.5)
CHLORIDE SERPL-SCNC: 105 MMOL/L (ref 95–110)
CO2 SERPL-SCNC: 23 MMOL/L (ref 23–29)
CREAT SERPL-MCNC: 0.9 MG/DL (ref 0.5–1.4)
D DIMER PPP IA.FEU-MCNC: 0.63 MG/L FEU
DIFFERENTIAL METHOD: ABNORMAL
EOSINOPHIL # BLD AUTO: 0.6 K/UL (ref 0–0.5)
EOSINOPHIL NFR BLD: 4.4 % (ref 0–8)
ERYTHROCYTE [DISTWIDTH] IN BLOOD BY AUTOMATED COUNT: 14.5 % (ref 11.5–14.5)
EST. GFR  (AFRICAN AMERICAN): >60 ML/MIN/1.73 M^2
EST. GFR  (NON AFRICAN AMERICAN): >60 ML/MIN/1.73 M^2
FIBRINOGEN PPP-MCNC: 443 MG/DL (ref 182–400)
GLUCOSE SERPL-MCNC: 110 MG/DL (ref 70–110)
HCT VFR BLD AUTO: 43.9 % (ref 37–48.5)
HGB BLD-MCNC: 13.2 G/DL (ref 12–16)
IMM GRANULOCYTES # BLD AUTO: 0.16 K/UL (ref 0–0.04)
IMM GRANULOCYTES NFR BLD AUTO: 1.2 % (ref 0–0.5)
INR PPP: 1 (ref 0.8–1.2)
LACTATE SERPL-SCNC: 1.7 MMOL/L (ref 0.5–2.2)
LDH SERPL L TO P-CCNC: 460 U/L (ref 110–260)
LYMPHOCYTES # BLD AUTO: 1.6 K/UL (ref 1–4.8)
LYMPHOCYTES NFR BLD: 12.4 % (ref 18–48)
MCH RBC QN AUTO: 28.1 PG (ref 27–31)
MCHC RBC AUTO-ENTMCNC: 30.1 G/DL (ref 32–36)
MCV RBC AUTO: 94 FL (ref 82–98)
MONOCYTES # BLD AUTO: 0.7 K/UL (ref 0.3–1)
MONOCYTES NFR BLD: 5.2 % (ref 4–15)
NEUTROPHILS # BLD AUTO: 10 K/UL (ref 1.8–7.7)
NEUTROPHILS NFR BLD: 75.9 % (ref 38–73)
NRBC BLD-RTO: 0 /100 WBC
PLATELET # BLD AUTO: 308 K/UL (ref 150–450)
PMV BLD AUTO: 10.5 FL (ref 9.2–12.9)
POTASSIUM SERPL-SCNC: 4.6 MMOL/L (ref 3.5–5.1)
PROCALCITONIN SERPL IA-MCNC: 0.04 NG/ML
PROT SERPL-MCNC: 7.1 G/DL (ref 6–8.4)
PROTHROMBIN TIME: 10.4 SEC (ref 9–12.5)
RBC # BLD AUTO: 4.69 M/UL (ref 4–5.4)
SARS-COV-2 RDRP RESP QL NAA+PROBE: NEGATIVE
SODIUM SERPL-SCNC: 139 MMOL/L (ref 136–145)
TROPONIN I SERPL DL<=0.01 NG/ML-MCNC: 0.02 NG/ML (ref 0–0.03)
WBC # BLD AUTO: 13.13 K/UL (ref 3.9–12.7)

## 2022-04-27 PROCEDURE — G0378 HOSPITAL OBSERVATION PER HR: HCPCS

## 2022-04-27 PROCEDURE — 1160F RVW MEDS BY RX/DR IN RCRD: CPT | Mod: CPTII,S$GLB,, | Performed by: PHYSICIAN ASSISTANT

## 2022-04-27 PROCEDURE — 83605 ASSAY OF LACTIC ACID: CPT | Performed by: EMERGENCY MEDICINE

## 2022-04-27 PROCEDURE — 3078F PR MOST RECENT DIASTOLIC BLOOD PRESSURE < 80 MM HG: ICD-10-PCS | Mod: CPTII,S$GLB,, | Performed by: PHYSICIAN ASSISTANT

## 2022-04-27 PROCEDURE — 93005 ELECTROCARDIOGRAM TRACING: CPT

## 2022-04-27 PROCEDURE — 84484 ASSAY OF TROPONIN QUANT: CPT | Performed by: EMERGENCY MEDICINE

## 2022-04-27 PROCEDURE — 63600175 PHARM REV CODE 636 W HCPCS: Performed by: EMERGENCY MEDICINE

## 2022-04-27 PROCEDURE — 3008F PR BODY MASS INDEX (BMI) DOCUMENTED: ICD-10-PCS | Mod: CPTII,S$GLB,, | Performed by: PHYSICIAN ASSISTANT

## 2022-04-27 PROCEDURE — 80053 COMPREHEN METABOLIC PANEL: CPT | Performed by: EMERGENCY MEDICINE

## 2022-04-27 PROCEDURE — 71046 X-RAY EXAM CHEST 2 VIEWS: CPT | Mod: 26,,, | Performed by: RADIOLOGY

## 2022-04-27 PROCEDURE — 99900035 HC TECH TIME PER 15 MIN (STAT)

## 2022-04-27 PROCEDURE — 25000003 PHARM REV CODE 250: Performed by: FAMILY MEDICINE

## 2022-04-27 PROCEDURE — 63600175 PHARM REV CODE 636 W HCPCS: Performed by: FAMILY MEDICINE

## 2022-04-27 PROCEDURE — 3074F PR MOST RECENT SYSTOLIC BLOOD PRESSURE < 130 MM HG: ICD-10-PCS | Mod: CPTII,S$GLB,, | Performed by: PHYSICIAN ASSISTANT

## 2022-04-27 PROCEDURE — 85379 FIBRIN DEGRADATION QUANT: CPT | Performed by: EMERGENCY MEDICINE

## 2022-04-27 PROCEDURE — 83615 LACTATE (LD) (LDH) ENZYME: CPT | Performed by: EMERGENCY MEDICINE

## 2022-04-27 PROCEDURE — 71046 XR CHEST PA AND LATERAL: ICD-10-PCS | Mod: 26,,, | Performed by: RADIOLOGY

## 2022-04-27 PROCEDURE — 3074F SYST BP LT 130 MM HG: CPT | Mod: CPTII,S$GLB,, | Performed by: PHYSICIAN ASSISTANT

## 2022-04-27 PROCEDURE — 94760 N-INVAS EAR/PLS OXIMETRY 1: CPT

## 2022-04-27 PROCEDURE — 85730 THROMBOPLASTIN TIME PARTIAL: CPT | Performed by: EMERGENCY MEDICINE

## 2022-04-27 PROCEDURE — 1160F PR REVIEW ALL MEDS BY PRESCRIBER/CLIN PHARMACIST DOCUMENTED: ICD-10-PCS | Mod: CPTII,S$GLB,, | Performed by: PHYSICIAN ASSISTANT

## 2022-04-27 PROCEDURE — 71046 X-RAY EXAM CHEST 2 VIEWS: CPT | Mod: TC

## 2022-04-27 PROCEDURE — 1159F MED LIST DOCD IN RCRD: CPT | Mod: CPTII,S$GLB,, | Performed by: PHYSICIAN ASSISTANT

## 2022-04-27 PROCEDURE — 83880 ASSAY OF NATRIURETIC PEPTIDE: CPT | Performed by: EMERGENCY MEDICINE

## 2022-04-27 PROCEDURE — 25500020 PHARM REV CODE 255: Performed by: EMERGENCY MEDICINE

## 2022-04-27 PROCEDURE — 25000242 PHARM REV CODE 250 ALT 637 W/ HCPCS: Performed by: FAMILY MEDICINE

## 2022-04-27 PROCEDURE — 1159F PR MEDICATION LIST DOCUMENTED IN MEDICAL RECORD: ICD-10-PCS | Mod: CPTII,S$GLB,, | Performed by: PHYSICIAN ASSISTANT

## 2022-04-27 PROCEDURE — 25000242 PHARM REV CODE 250 ALT 637 W/ HCPCS: Performed by: EMERGENCY MEDICINE

## 2022-04-27 PROCEDURE — 94640 AIRWAY INHALATION TREATMENT: CPT | Mod: XB

## 2022-04-27 PROCEDURE — 3008F BODY MASS INDEX DOCD: CPT | Mod: CPTII,S$GLB,, | Performed by: PHYSICIAN ASSISTANT

## 2022-04-27 PROCEDURE — 99214 OFFICE O/P EST MOD 30 MIN: CPT | Mod: S$GLB,,, | Performed by: PHYSICIAN ASSISTANT

## 2022-04-27 PROCEDURE — 93010 EKG 12-LEAD: ICD-10-PCS | Mod: ,,, | Performed by: INTERNAL MEDICINE

## 2022-04-27 PROCEDURE — 87040 BLOOD CULTURE FOR BACTERIA: CPT | Performed by: EMERGENCY MEDICINE

## 2022-04-27 PROCEDURE — 99999 PR PBB SHADOW E&M-EST. PATIENT-LVL IV: CPT | Mod: PBBFAC,,, | Performed by: PHYSICIAN ASSISTANT

## 2022-04-27 PROCEDURE — 96375 TX/PRO/DX INJ NEW DRUG ADDON: CPT

## 2022-04-27 PROCEDURE — 94640 AIRWAY INHALATION TREATMENT: CPT

## 2022-04-27 PROCEDURE — 3078F DIAST BP <80 MM HG: CPT | Mod: CPTII,S$GLB,, | Performed by: PHYSICIAN ASSISTANT

## 2022-04-27 PROCEDURE — 99291 CRITICAL CARE FIRST HOUR: CPT | Mod: 25

## 2022-04-27 PROCEDURE — 85610 PROTHROMBIN TIME: CPT | Performed by: EMERGENCY MEDICINE

## 2022-04-27 PROCEDURE — 96365 THER/PROPH/DIAG IV INF INIT: CPT

## 2022-04-27 PROCEDURE — 85384 FIBRINOGEN ACTIVITY: CPT | Performed by: EMERGENCY MEDICINE

## 2022-04-27 PROCEDURE — 93010 ELECTROCARDIOGRAM REPORT: CPT | Mod: ,,, | Performed by: INTERNAL MEDICINE

## 2022-04-27 PROCEDURE — U0002 COVID-19 LAB TEST NON-CDC: HCPCS | Performed by: EMERGENCY MEDICINE

## 2022-04-27 PROCEDURE — 99999 PR PBB SHADOW E&M-EST. PATIENT-LVL IV: ICD-10-PCS | Mod: PBBFAC,,, | Performed by: PHYSICIAN ASSISTANT

## 2022-04-27 PROCEDURE — 27000221 HC OXYGEN, UP TO 24 HOURS

## 2022-04-27 PROCEDURE — 85025 COMPLETE CBC W/AUTO DIFF WBC: CPT | Performed by: EMERGENCY MEDICINE

## 2022-04-27 PROCEDURE — 84145 PROCALCITONIN (PCT): CPT | Performed by: EMERGENCY MEDICINE

## 2022-04-27 PROCEDURE — 99214 PR OFFICE/OUTPT VISIT, EST, LEVL IV, 30-39 MIN: ICD-10-PCS | Mod: S$GLB,,, | Performed by: PHYSICIAN ASSISTANT

## 2022-04-27 PROCEDURE — 96372 THER/PROPH/DIAG INJ SC/IM: CPT | Performed by: EMERGENCY MEDICINE

## 2022-04-27 RX ORDER — HYDRALAZINE HYDROCHLORIDE 20 MG/ML
10 INJECTION INTRAMUSCULAR; INTRAVENOUS EVERY 6 HOURS PRN
Status: DISCONTINUED | OUTPATIENT
Start: 2022-04-27 | End: 2022-05-02 | Stop reason: HOSPADM

## 2022-04-27 RX ORDER — ARFORMOTEROL TARTRATE 15 UG/2ML
15 SOLUTION RESPIRATORY (INHALATION) 2 TIMES DAILY
Status: DISCONTINUED | OUTPATIENT
Start: 2022-04-27 | End: 2022-05-02 | Stop reason: HOSPADM

## 2022-04-27 RX ORDER — ONDANSETRON 4 MG/1
4 TABLET, ORALLY DISINTEGRATING ORAL EVERY 6 HOURS PRN
Status: DISCONTINUED | OUTPATIENT
Start: 2022-04-27 | End: 2022-05-02 | Stop reason: HOSPADM

## 2022-04-27 RX ORDER — BUDESONIDE 0.5 MG/2ML
0.5 INHALANT ORAL EVERY 12 HOURS
Status: DISCONTINUED | OUTPATIENT
Start: 2022-04-27 | End: 2022-05-02 | Stop reason: HOSPADM

## 2022-04-27 RX ORDER — ENOXAPARIN SODIUM 100 MG/ML
40 INJECTION SUBCUTANEOUS EVERY 12 HOURS
Status: DISCONTINUED | OUTPATIENT
Start: 2022-04-27 | End: 2022-05-02 | Stop reason: HOSPADM

## 2022-04-27 RX ORDER — AMLODIPINE BESYLATE 10 MG/1
10 TABLET ORAL DAILY
Status: DISCONTINUED | OUTPATIENT
Start: 2022-04-28 | End: 2022-05-02 | Stop reason: HOSPADM

## 2022-04-27 RX ORDER — HYDROCHLOROTHIAZIDE 12.5 MG/1
12.5 TABLET ORAL DAILY
Status: DISCONTINUED | OUTPATIENT
Start: 2022-04-28 | End: 2022-05-02 | Stop reason: HOSPADM

## 2022-04-27 RX ORDER — ENOXAPARIN SODIUM 100 MG/ML
40 INJECTION SUBCUTANEOUS EVERY 24 HOURS
Status: DISCONTINUED | OUTPATIENT
Start: 2022-04-27 | End: 2022-04-27

## 2022-04-27 RX ORDER — SERTRALINE HYDROCHLORIDE 50 MG/1
50 TABLET, FILM COATED ORAL DAILY
Status: DISCONTINUED | OUTPATIENT
Start: 2022-04-28 | End: 2022-05-02 | Stop reason: HOSPADM

## 2022-04-27 RX ORDER — IPRATROPIUM BROMIDE AND ALBUTEROL SULFATE 2.5; .5 MG/3ML; MG/3ML
3 SOLUTION RESPIRATORY (INHALATION)
Status: COMPLETED | OUTPATIENT
Start: 2022-04-27 | End: 2022-04-27

## 2022-04-27 RX ORDER — ACETAMINOPHEN 325 MG/1
650 TABLET ORAL EVERY 6 HOURS PRN
Status: DISCONTINUED | OUTPATIENT
Start: 2022-04-27 | End: 2022-05-02 | Stop reason: HOSPADM

## 2022-04-27 RX ADMIN — IPRATROPIUM BROMIDE AND ALBUTEROL SULFATE 3 ML: 2.5; .5 SOLUTION RESPIRATORY (INHALATION) at 01:04

## 2022-04-27 RX ADMIN — BUDESONIDE 0.5 MG: 0.5 INHALANT ORAL at 07:04

## 2022-04-27 RX ADMIN — ENOXAPARIN SODIUM 40 MG: 40 INJECTION SUBCUTANEOUS at 08:04

## 2022-04-27 RX ADMIN — IOHEXOL 100 ML: 350 INJECTION, SOLUTION INTRAVENOUS at 03:04

## 2022-04-27 RX ADMIN — CEFTRIAXONE 1 G: 1 INJECTION, SOLUTION INTRAVENOUS at 02:04

## 2022-04-27 RX ADMIN — AZITHROMYCIN MONOHYDRATE 500 MG: 500 INJECTION, POWDER, LYOPHILIZED, FOR SOLUTION INTRAVENOUS at 03:04

## 2022-04-27 RX ADMIN — ARFORMOTEROL TARTRATE 15 MCG: 15 SOLUTION RESPIRATORY (INHALATION) at 07:04

## 2022-04-27 NOTE — PROGRESS NOTES
Subjective:       Patient ID: Ayanna Alicia is a 51 y.o. female.    Chief Complaint: Asthma      4/27/22  50yo female here for follow up of pneumonia/asthma  Took doxycycline and prednisone  Has worsening SOB, fatigue, significant SOB just from walking to office from car  No relief with inhalers; BREO and ALBUTEROL  No leg swelling  No wheezing, no cough  No NVD  Feels feverish    4/13/2022  50yo female referred by Jarad Contreras MD for GLENN/asthma  Daily BREO, albuterol hfa/neb prn  Has been SOB, wheezing, productive cough, yellow sputum, fatigue for 2 months, inhalers do not help  Has taken medrol steroid pack  Went to [facility] ER 3 weeks ago, Chest X Ray: Mild patchy opacification in the lung bases is likely atelectasis versus less likely pneumonia  Has not taken any antibiotics  No fever or chest pain    Asthma Control Test  In the past 4  weeks, how much of the time did your asthma keep you from getting as much done at work, school or at home?: Most of the time  During the past 4 weeks, how often have you had shortness of breath?: More than once a day  During the past 4 weeks, how often did your asthma symptoms (wheezing, couging, shortness of breath, chest tightness or pain) wake you up at night or earlier than usual in the morning?: 4 or more nights a week  During the past 4 weeks, how often have you used your rescue inhaler or nebulizer medication (such as albuterol)?: 3 or more times per day  How would you rate your asthma control during the past 4 weeks?: Not controlled at all  If your score is 19 or less, your asthma may not be under control: 6     Immunization History   Administered Date(s) Administered    COVID-19 Vaccine 08/20/2021, 10/14/2021    COVID-19, MRNA, LN-S, PF (MODERNA FULL 0.5 ML DOSE) 08/20/2021    Influenza 10/13/2010    Influenza - Quadrivalent 10/02/2014, 01/12/2016, 02/01/2017    Influenza - Quadrivalent - PF *Preferred* (6 months and older) 09/22/2020    Influenza - Trivalent  (ADULT) 10/13/2010    Tdap 03/23/2012      Tobacco Use: Low Risk     Smoking Tobacco Use: Never Smoker    Smokeless Tobacco Use: Never Used      Past Medical History:   Diagnosis Date    Abnormal Pap smear of cervix     in the past with repeat pap smear okay.    Allergic rhinitis, cause unspecified     Arthritis of both knees     Asthma     Eczema     Fatty liver 10/2014    Fibrocystic breast changes     Headache(784.0)     Hepatomegaly 10/2014    Hypertension     Liver cyst 10/2014    Multinodular goiter     Followed by ENT - Dr. Nicolas Gray    Polymenorrhea 2008    TMJ (dislocation of temporomandibular joint)     Uterine fibroid     in the past    Vitamin D deficiency disease       Current Outpatient Medications on File Prior to Visit   Medication Sig Dispense Refill    albuterol (ACCUNEB) 0.63 mg/3 mL Nebu Take 3 mLs (0.63 mg total) by nebulization every 4 to 6 hours as needed (asthma). Rescue 75 mL 6    albuterol (PROVENTIL/VENTOLIN HFA) 90 mcg/actuation inhaler INHALE 1 TO 2 PUFFS BY MOUTH INTO THE LUNGS EVERY 4 TO 6 HOURS AS NEEDED FOR WHEEZING OR SHORTNESS OF BREATH 18 g 5    amLODIPine (NORVASC) 10 MG tablet Take 1 tablet (10 mg total) by mouth once daily. 90 tablet 1    ascorbic acid, vitamin C, (VITAMIN C) 100 MG tablet Take 60 mg by mouth once daily.      calcium/magnesium/vit B comp (CALCIUM-MAGNESIUM-B COMPLEX ORAL) Take by mouth.      doxycycline (MONODOX) 100 MG capsule Take 1 capsule (100 mg total) by mouth every 12 (twelve) hours. 20 capsule 1    fluticasone furoate-vilanteroL (BREO ELLIPTA) 100-25 mcg/dose diskus inhaler INHALE 1 PUFF INTO THE LUNGS ONCE DAILY 1 each 5    ginseng (GIN-ZING ORAL) Take by mouth.      hydroCHLOROthiazide (HYDRODIURIL) 12.5 MG Tab Take 1 tablet (12.5 mg total) by mouth once daily. 90 tablet 1    levocetirizine (XYZAL) 5 MG tablet Take 1 tablet (5 mg total) by mouth once daily. 90 tablet 1    montelukast (SINGULAIR) 10 mg tablet Take 1  "tablet (10 mg total) by mouth every evening. 90 tablet 1    omega-3s/dha/epa/fish oil/D3 (VITAMIN-D + OMEGA-3 ORAL) Take by mouth.      ondansetron (ZOFRAN-ODT) 4 MG TbDL Dissolve 1 tablet (4 mg total) by mouth every 6 (six) hours as needed (nausea). 90 tablet 0    sertraline (ZOLOFT) 50 MG tablet Take 1 tablet (50 mg total) by mouth once daily. 90 tablet 1    vitamin E acetate (VITAMIN E ORAL) Take by mouth.      zinc gluconate 50 mg tablet Take 15 mg by mouth once daily.      methylPREDNISolone (MEDROL DOSEPACK) 4 mg tablet use as directed 1 each 0     No current facility-administered medications on file prior to visit.        Review of Systems   Constitutional: Positive for fatigue. Negative for fever, weight loss, appetite change and weakness.   HENT: Negative for postnasal drip, rhinorrhea, sinus pressure, trouble swallowing and congestion.    Respiratory: Positive for cough, chest tightness, shortness of breath and dyspnea on extertion. Negative for sputum production, choking and wheezing.    Cardiovascular: Negative for chest pain and leg swelling.   Musculoskeletal: Positive for back pain. Negative for arthralgias, gait problem and joint swelling.   Gastrointestinal: Negative for nausea, vomiting and abdominal pain.   Neurological: Negative for dizziness, weakness and headaches.   All other systems reviewed and are negative.      Objective:       Vitals:    04/27/22 1112   BP: 124/70   Pulse: (!) 116   Resp: 16   SpO2: 95%   Weight: 115.4 kg (254 lb 6.6 oz)   Height: 5' 6" (1.676 m)       Physical Exam   Constitutional: She is oriented to person, place, and time. She appears well-developed and well-nourished. No distress. She is obese.   HENT:   Head: Normocephalic.   Nose: Nose normal.   Mouth/Throat: Oropharynx is clear and moist.   Cardiovascular: Normal rate and regular rhythm.   Pulmonary/Chest: No respiratory distress. She has no wheezes. She has no rhonchi. She has rales.   Increased effort "   Musculoskeletal:         General: No edema.      Cervical back: Normal range of motion and neck supple.   Lymphadenopathy: No supraclavicular adenopathy is present.     She has no cervical adenopathy.   Neurological: She is alert and oriented to person, place, and time. Gait normal.   Skin: Skin is warm and dry.   Psychiatric: She has a normal mood and affect.   Vitals reviewed.    Personal Diagnostic Review    Nicolas Valdez II, MD - 03/25/2022   Formatting of this note might be different from the original.   XR CHEST 1 VIEW     HISTORY:  chest pain,  other     COMPARISON: None available     TECHNIQUE: AP portable chest radiograph     FINDINGS:     The heart is mildly enlarged.     There is no overt pulmonary edema. Mild patchy opacification in the lung bases is likely atelectasis versus less likely pneumonia.     There is no pneumothorax or pleural effusion.  Exam End: 03/25/22  5:03 PM Last Resulted: 03/25/22  5:07 PM       X-Ray Chest PA And Lateral  Narrative: EXAMINATION:  XR CHEST PA AND LATERAL    CLINICAL HISTORY:  SOB; Pneumonia, unspecified organism    TECHNIQUE:  PA and lateral views of the chest were performed.    COMPARISON:  Chest radiograph February 23, 2022    FINDINGS:  New interstitial opacities are seen within the lung bases bilaterally which may represent multifocal pneumonia, edema, or changes of aspiration depending on the clinical context.  No pneumothorax or pleural effusion.  Unchanged mild cardiomegaly.  No acute osseous abnormality.  Impression: As above.    Electronically signed by: Vahe Astudillo  Date:    04/27/2022  Time:    11:46    2019:  Home Sleep Studies  Date/Time: 7/5/2019 8:11 AM  Performed by: Agustín Aviles MD  Authorized by: Cullen Steele NP   PHYSICIAN INTERPRETATION AND COMMENTS: Findings are consistent with MILD obstructive sleep apnea  (GLENN). Overall AHI was 11.0/hr with 5.5 hours sleep.  CLINICAL HISTORY: 48 year old female presented with: Patient has  observed snoring, periods of not breathing, feels  sleepy during the day. She reports non restful sleep. 16.5 inch neck, BMI of 45, an [place] sleepiness score of 8, history of  hypertension and symptoms of nocturnal snoring, waking up choking and witnessed apneas. Based on the clinical history, the  patient has a high pre-test probability of having severe GLENN.  SLEEP STUDY FINDINGS: Patient underwent a one night Home Sleep Test and by behavioral criteria, slept for  approximately 5.5 hours, with a sleep latency of 8 minutes and a sleep efficiency of 70.9%. Mild sleep disordered breathing  (AHI=11) is noted based on a 4% hypopnea desaturation criteria. The patient slept supine 16.7% of the night based on valid  recording time of 5.47 hours. When considering more subtle measures of sleep disordered breathing, the overall respiratory  disturbance index is moderate (RDI=21) based on a 1% hypopnea desaturation criteria with confirmation by surrogate arousal  indicators, predominantly in the supine position (31 events/hour). The apneas/hypopneas are accompanied by minimal oxygen  desaturation (percent time below 90% SpO2: 4.1%, Min SpO2: 81.8%). The average desaturation across all sleep disordered  breathing events is 3.2%. Snoring occurs for 45.7% (30 dB) of the study, 44.9% is very loud. The mean pulse rate is 82 BPM,  with very frequent pulse rate variability (83 events with >= 6 BPM increase/decrease per hour).  TREATMENT CONSIDERATIONS: Consider nasal continuous positive airway pressure (CPAP/AutoPAP) as the initial  treatment choice based on the RDI severity and co-morbidities. A mandibular advancement splint (MAS) or referral to an  ENT surgeon for modification to the airway should be considered to reduce the potential contribution of GLENN on existing  diseases if the patient prefers an alternative therapy or the CPAP trial is unsuccessful. A Mandibular Advancement Splint  (MAS) will likely provide treatment  benefit independent of GLENN severity. The patient should avoid sleeping supine; the  non-supine RDI is 1.6 times less severe than the supine RDI.  DISEASE MANAGEMENT CONSIDERATIONS: Insomnia is a symptom that can be associated with untreated GLENN.        Assessment/Plan:       Problem List Items Addressed This Visit        Pulmonary    Pneumonia due to infectious organism     Failed doxycycline, tolerated but worsening Chest X Ray  See below for plan           SOB (shortness of breath) - Primary     Worsening SOB and fatigue despite steroid, doxy, albuterol  Worsening increased interstitial markings on Chest X Ray today  See below for plan           Relevant Orders    Complete PFT with bronchodilator    Arterial Blood Gas    Stress test, pulmonary        Patient given option of trying different oral antibiotic, recommend D-dimer for PE evaluation. Or report to ER due to worsening symptoms and failing outpatient treatment (doxycycline). She agrees to go to ER for further evaluation today. EMS offered. She will drive herself to ER.     PFT, walk, ABG ordered for outpatient evaluation. Follow up next available with pulmonologist.    Discussed diagnosis, its evaluation, treatment and usual course. All questions answered.    Patient verbalized understanding of plan and left in no acute distress    Thank you for the courtesy of participating in the care of this patient    Marilu Izquierdo PA-C

## 2022-04-27 NOTE — PROGRESS NOTES
Pharmacist Renal Dose Adjustment Note    Ayanna Alicia is a 51 y.o. female being treated with the medication enoxaparin.    Patient Data:    Vital Signs (Most Recent):  Temp: 98.7 °F (37.1 °C) (04/27/22 1229)  Pulse: 83 (04/27/22 1332)  Resp: 16 (04/27/22 1332)  BP: (!) 112/58 (04/27/22 1331)  SpO2: 100 % (04/27/22 1332)   Vital Signs (72h Range):  Temp:  [98.7 °F (37.1 °C)]   Pulse:  []   Resp:  [16-26]   BP: (112-156)/(58-79)   SpO2:  [88 %-100 %]      Recent Labs   Lab 04/27/22  1322   CREATININE 0.9     Serum creatinine: 0.9 mg/dL 04/27/22 1322  Estimated creatinine clearance: 93.2 mL/min    Medication: Enoxaparin dose: 40 mg frequency daily will be changed to medication: enoxaparin  dose: 40 mg frequency: every 12 hours, due to BMI>40.    Pharmacist's Name: Carolyne Barros, PharmD

## 2022-04-27 NOTE — ASSESSMENT & PLAN NOTE
Bilateral lower lobe infiltrates  Leukocytosis noted  CTA pending  Will continue Rocephin azithromycin  Sputum culture pending

## 2022-04-27 NOTE — ED PROVIDER NOTES
SCRIBE #1 NOTE: I, Audrey Barbosa, am scribing for, and in the presence of, Mary Whittington MD. I have scribed the entire note.      History      Chief Complaint   Patient presents with    Shortness of Breath     Worsening SOB after dx with pneumonia; sent by Dr. Izquierdo for further eval       Review of patient's allergies indicates:   Allergen Reactions    Doxycycline Nausea Only     Other reaction(s): Nausea    Fluticasone Other (See Comments)     Other reaction(s): Epistaxis      Penicillins      Other reaction(s): unknown        HPI   HPI    4/27/2022, 12:55 PM   History obtained from the patient      History of Present Illness: Ayanna Alicia is a 51 y.o. female patient with a PMHx of asthma, fatty liver, and hepatomegaly who presents to the Emergency Department for SOB which onset gradually at the beginning of this month. For her SOB, the pt was seen by Pulmonary and prescribed doxycycline and steroids with minimal improvement. Today, the pt had a CXR done outpatient and, after, seen by Pulmonology who told the pt she had pneumonia and referred her to the ED. Symptoms are constant and moderate in severity. She reports that her SOB is worse upon minimal exertion. No mitigating factors reported. Associated sxs include a non-productive cough and chest tightness. Patient denies any orthopnea, PND, leg swelling, CP, fever, chills, N/V/D, weakness, and all other sxs at this time. The pt denies being on home O2. Per the pt, she has sleep apnea, but she does not use a CPAP. The pt admits to smoking marijuana. No further complaints or concerns at this time.         Arrival mode: Personal vehicle     PCP: Madeleine Enrique MD       Past Medical History:  Past Medical History:   Diagnosis Date    Abnormal Pap smear of cervix     in the past with repeat pap smear okay.    Allergic rhinitis, cause unspecified     Arthritis of both knees     Asthma     Eczema     Fatty liver 10/2014    Fibrocystic breast changes      Headache(784.0)     Hepatomegaly 10/2014    Hypertension     Liver cyst 10/2014    Multinodular goiter     Followed by ENT - Dr. Nicolas Gray    Polymenorrhea 2008    TMJ (dislocation of temporomandibular joint)     Uterine fibroid     in the past    Vitamin D deficiency disease        Past Surgical History:  Past Surgical History:   Procedure Laterality Date     SECTION, CLASSIC      x 1    COLONOSCOPY N/A 2020    Procedure: COLONOSCOPY;  Surgeon: Angie Magana MD;  Location: [facility];  Service: Endoscopy;  Laterality: N/A;    HYSTERECTOMY      MULTIPLE TOOTH EXTRACTIONS      PARTIAL HYSTERECTOMY  2013    Due to fibroids         Family History:  Family History   Problem Relation Age of Onset    Stroke Mother     Diabetes Mother     Heart disease Mother     Heart disease Father 63        MI/CAD    Hypertension Father     Cancer Maternal Grandmother 60        Rectal and stomach cancer    Migraines Cousin     Cataracts Cousin     Cancer Maternal Aunt 50        Stomach cancer    Cancer Maternal Aunt 66        Lung cancer (smoker)    Stroke Maternal Grandfather     Diabetes Maternal Grandfather     Stroke Sister        Social History:  Social History     Tobacco Use    Smoking status: Never Smoker    Smokeless tobacco: Never Used   Substance and Sexual Activity    Alcohol use: Yes     Alcohol/week: 0.0 standard drinks     Comment: Occasionally    Drug use: Yes     Frequency: 4.0 times per week     Types: Marijuana     Comment: 20    Sexual activity: Yes     Partners: Female       ROS   Review of Systems   Constitutional: Negative for chills and fever.   HENT: Negative for sore throat.    Respiratory: Positive for cough (non-productive), chest tightness and shortness of breath.         (-) orthopnea  (-) PND   Cardiovascular: Negative for chest pain and leg swelling.   Gastrointestinal: Negative for diarrhea, nausea and vomiting.   Genitourinary: Negative  for dysuria.   Musculoskeletal: Negative for back pain.   Skin: Negative for rash.   Neurological: Negative for weakness.   Hematological: Does not bruise/bleed easily.   All other systems reviewed and are negative.    Physical Exam      Initial Vitals [04/27/22 1229]   BP Pulse Resp Temp SpO2   (!) 156/79 104 (!) 24 98.7 °F (37.1 °C) (!) 88 %      MAP       --          Physical Exam  Nursing Notes and Vital Signs Reviewed.  Constitutional: Patient is in no acute distress. Morbidly obese.  Head: Atraumatic. Normocephalic.  Eyes: PERRL. EOM intact. Conjunctivae are not pale. No scleral icterus.  ENT: Mucous membranes are moist. Oropharynx is clear and symmetric.    Neck: Supple. Full ROM. No lymphadenopathy.  Cardiovascular: Regular rate. Regular rhythm. No murmurs, rubs, or gallops. Distal pulses are 2+ and symmetric.  Pulmonary/Chest: No respiratory distress. The pt is a little tachypneic. Rhonchi. Faint expiratory wheezing. No crackles.  Abdominal: Soft and non-distended.  There is no tenderness.  No rebound, guarding, or rigidity.   Musculoskeletal: Moves all extremities. No obvious deformities. No edema.  Skin: Warm and dry.  Neurological:  Alert, awake, and appropriate.  Normal speech.  No acute focal neurological deficits are appreciated.  Psychiatric: Normal affect. Good eye contact. Appropriate in content.    ED Course    Critical Care    Date/Time: 4/27/2022 2:27 PM  Performed by: Mary Whittington MD  Authorized by: Mary Whittington MD   Direct patient critical care time: 15 minutes  Additional history critical care time: 5 minutes  Ordering / reviewing critical care time: 5 minutes  Documentation critical care time: 5 minutes  Consulting other physicians critical care time: 10 minutes  Total critical care time (exclusive of procedural time) : 40 minutes  Critical care time was exclusive of separately billable procedures and treating other patients and teaching time.  Critical care was necessary to treat or  "prevent imminent or life-threatening deterioration of the following conditions: respiratory failure (hypoxemic).  Critical care was time spent personally by me on the following activities: blood draw for specimens, development of treatment plan with patient or surrogate, discussions with consultants, interpretation of cardiac output measurements, evaluation of patient's response to treatment, examination of patient, obtaining history from patient or surrogate, ordering and performing treatments and interventions, ordering and review of laboratory studies, ordering and review of radiographic studies, pulse oximetry, re-evaluation of patient's condition and review of old charts.        ED Vital Signs:  Vitals:    04/27/22 1229 04/27/22 1239 04/27/22 1249 04/27/22 1250   BP: (!) 156/79 (!) 144/72     Pulse: 104 101 93 96   Resp: (!) 24 (!) 26  (!) 24   Temp: 98.7 °F (37.1 °C)      TempSrc: Oral      SpO2: (!) 88% 97%  95%   Weight: 115.4 kg (254 lb 6.6 oz)      Height: 5' 6" (1.676 m)       04/27/22 1300 04/27/22 1310 04/27/22 1331 04/27/22 1332   BP:   (!) 112/58    Pulse:  95 91 83   Resp: (!) 22 (!) 22  16   Temp:       TempSrc:       SpO2:  100% 100% 100%   Weight:       Height:           Abnormal Lab Results:  Labs Reviewed   CBC W/ AUTO DIFFERENTIAL - Abnormal; Notable for the following components:       Result Value    WBC 13.13 (*)     MCHC 30.1 (*)     Immature Granulocytes 1.2 (*)     Gran # (ANC) 10.0 (*)     Immature Grans (Abs) 0.16 (*)     Eos # 0.6 (*)     Gran % 75.9 (*)     Lymph % 12.4 (*)     All other components within normal limits   LACTATE DEHYDROGENASE - Abnormal; Notable for the following components:     (*)     All other components within normal limits   D DIMER, QUANTITATIVE - Abnormal; Notable for the following components:    D-Dimer 0.63 (*)     All other components within normal limits   FIBRINOGEN - Abnormal; Notable for the following components:    Fibrinogen 443 (*)     All other " components within normal limits   CULTURE, RESPIRATORY   CULTURE, RESPIRATORY   CULTURE, BLOOD   CULTURE, BLOOD   COMPREHENSIVE METABOLIC PANEL   TROPONIN I   B-TYPE NATRIURETIC PEPTIDE   PROTIME-INR   APTT   SARS-COV-2 RNA AMPLIFICATION, QUAL   PROCALCITONIN   LACTIC ACID, PLASMA        All Lab Results:  Results for orders placed or performed during the hospital encounter of 04/27/22   CBC auto differential   Result Value Ref Range    WBC 13.13 (H) 3.90 - 12.70 K/uL    RBC 4.69 4.00 - 5.40 M/uL    Hemoglobin 13.2 12.0 - 16.0 g/dL    Hematocrit 43.9 37.0 - 48.5 %    MCV 94 82 - 98 fL    MCH 28.1 27.0 - 31.0 pg    MCHC 30.1 (L) 32.0 - 36.0 g/dL    RDW 14.5 11.5 - 14.5 %    Platelets 308 150 - 450 K/uL    MPV 10.5 9.2 - 12.9 fL    Immature Granulocytes 1.2 (H) 0.0 - 0.5 %    Gran # (ANC) 10.0 (H) 1.8 - 7.7 K/uL    Immature Grans (Abs) 0.16 (H) 0.00 - 0.04 K/uL    Lymph # 1.6 1.0 - 4.8 K/uL    Mono # 0.7 0.3 - 1.0 K/uL    Eos # 0.6 (H) 0.0 - 0.5 K/uL    Baso # 0.12 0.00 - 0.20 K/uL    nRBC 0 0 /100 WBC    Gran % 75.9 (H) 38.0 - 73.0 %    Lymph % 12.4 (L) 18.0 - 48.0 %    Mono % 5.2 4.0 - 15.0 %    Eosinophil % 4.4 0.0 - 8.0 %    Basophil % 0.9 0.0 - 1.9 %    Differential Method Automated    Comprehensive metabolic panel   Result Value Ref Range    Sodium 139 136 - 145 mmol/L    Potassium 4.6 3.5 - 5.1 mmol/L    Chloride 105 95 - 110 mmol/L    CO2 23 23 - 29 mmol/L    Glucose 110 70 - 110 mg/dL    BUN 13 6 - 20 mg/dL    Creatinine 0.9 0.5 - 1.4 mg/dL    Calcium 9.2 8.7 - 10.5 mg/dL    Total Protein 7.1 6.0 - 8.4 g/dL    Albumin 3.7 3.5 - 5.2 g/dL    Total Bilirubin 0.4 0.1 - 1.0 mg/dL    Alkaline Phosphatase 113 55 - 135 U/L    AST 26 10 - 40 U/L    ALT 27 10 - 44 U/L    Anion Gap 11 8 - 16 mmol/L    eGFR if African American >60 >60 mL/min/1.73 m^2    eGFR if non African American >60 >60 mL/min/1.73 m^2   Troponin I #1   Result Value Ref Range    Troponin I 0.017 0.000 - 0.026 ng/mL   BNP   Result Value Ref Range    BNP  <10 0 - 99 pg/mL   Protime-INR   Result Value Ref Range    Prothrombin Time 10.4 9.0 - 12.5 sec    INR 1.0 0.8 - 1.2   APTT   Result Value Ref Range    aPTT 28.4 21.0 - 32.0 sec   COVID-19 Rapid Screening   Result Value Ref Range    SARS-CoV-2 RNA, Amplification, Qual Negative Negative   Lactate dehydrogenase   Result Value Ref Range     (H) 110 - 260 U/L   D dimer, quantitative   Result Value Ref Range    D-Dimer 0.63 (H) <0.50 mg/L FEU   Fibrinogen   Result Value Ref Range    Fibrinogen 443 (H) 182 - 400 mg/dL   Procalcitonin   Result Value Ref Range    Procalcitonin 0.04 <0.25 ng/mL   Lactic acid, plasma   Result Value Ref Range    Lactate (Lactic Acid) 1.7 0.5 - 2.2 mmol/L         Imaging Results:  Imaging Results    None        The EKG was ordered, reviewed, and independently interpreted by the ED provider.  Interpretation time: 12:49  Rate: 91 BPM  Rhythm: normal sinus rhythm  Interpretation: Biatrial enlargement. Left axis deviation. Low voltage QRS. Cannot rule out Anterior infarct, age undetermined. No STEMI.           The Emergency Provider reviewed the vital signs and test results, which are outlined above.    ED Discussion     1:23 PM: Pt's previous CXR results from 4/27/2022 were reviewed and are as follows:   EXAMINATION:  XR CHEST PA AND LATERAL     CLINICAL HISTORY:  SOB; Pneumonia, unspecified organism     TECHNIQUE:  PA and lateral views of the chest were performed.     COMPARISON:  Chest radiograph February 23, 2022     FINDINGS:  New interstitial opacities are seen within the lung bases bilaterally which may represent multifocal pneumonia, edema, or changes of aspiration depending on the clinical context.  No pneumothorax or pleural effusion.  Unchanged mild cardiomegaly.  No acute osseous abnormality.     Impression:     As above.      2:24 PM: Spoke with the pt about her penicillin allergy, and the pt is unable to state what her allergy is, and reports that she was told at a young age by  her mother that she had an allergy to penicillin.    2:41 PM: Discussed case with Connor Camp NP ([facility]). Dr. Nassar agrees with current care and management of pt and accepts admission.   Admitting Service: Hospital Medicine  Admitting Physician: Dr. Nassar  Admit to: Obs Unit    2:42 PM: Re-evaluated pt. I have discussed test results, shared treatment plan, and the need for admission with patient and family at bedside. Pt and family express understanding at this time and agree with all information. All questions answered. Pt and family have no further questions or concerns at this time. Pt is ready for admit.           ED Medication(s):  Medications   azithromycin 500 mg in dextrose 5 % 250 mL IVPB (ready to mix system) (has no administration in time range)   cefTRIAXone (ROCEPHIN) 1 g/50 mL D5W IVPB (has no administration in time range)   azithromycin 500 mg in dextrose 5 % 250 mL IVPB (ready to mix system) (has no administration in time range)   amLODIPine tablet 10 mg (has no administration in time range)   hydroCHLOROthiazide tablet 12.5 mg (has no administration in time range)   sertraline tablet 50 mg (has no administration in time range)   hydrALAZINE injection 10 mg (has no administration in time range)   ondansetron disintegrating tablet 4 mg (has no administration in time range)   acetaminophen tablet 650 mg (has no administration in time range)   budesonide nebulizer solution 0.5 mg (has no administration in time range)   arformoteroL nebulizer solution 15 mcg (has no administration in time range)   albuterol-ipratropium 2.5 mg-0.5 mg/3 mL nebulizer solution 3 mL (3 mLs Nebulization Given 4/27/22 1310)   cefTRIAXone (ROCEPHIN) 1 g/50 mL D5W IVPB (1 g Intravenous New Bag 4/27/22 1446)           New Prescriptions    No medications on file         Medical Decision Making    Medical Decision Making:   Clinical Tests:   Lab Tests: Ordered and Reviewed  Radiological Study: Reviewed  Medical Tests:  Ordered and Reviewed           Scribe Attestation:   Scribe #1: I performed the above scribed service and the documentation accurately describes the services I performed. I attest to the accuracy of the note.    Attending:   Physician Attestation Statement for Scribe #1: I, Mary Whittington MD, personally performed the services described in this documentation, as scribed by Audrey Barbosa, in my presence, and it is both accurate and complete.          Clinical Impression       ICD-10-CM ICD-9-CM   1. Acute hypoxemic respiratory failure  J96.01 518.81   2. SOB (shortness of breath)  R06.02 786.05   3. Pneumonia of both lower lobes due to infectious organism  J18.9 486   4. Morbid obesity  E66.01 278.01   5. History of asthma  Z87.09 V12.69       Disposition:   Disposition: Placed in Observation  Condition: Fair         Mary Whittington MD  04/27/22 6176

## 2022-04-27 NOTE — H&P
[facility] - Emergency Dept.  [facility] Medicine  History & Physical    Patient Name: Ayanna Alicia  MRN: [id number]  Patient Class: OP- Observation  Admission Date: 4/27/2022  Attending Physician: Thaddeus Nassar MD  Primary Care Provider: Madeleine Enrique MD         Patient information was obtained from patient and ER records.     Subjective:     Principal Problem:CAP (community acquired pneumonia)    Chief Complaint:   Chief Complaint   Patient presents with    Shortness of Breath     Worsening SOB after dx with pneumonia; sent by Dr. Izquierdo for further eval        HPI: Patient is a 51 y.o. aa female with a PMHx of asthma, fatty liver, HTN, and KALYANI who presents to the Emergency Department for SOB which onset gradually at the beginning of this month. Patient currently being seen by pulmonology outpatient for asthma.  She was treated with doxycycline and steroids without improvement.  Patient was seen by pulmonology today and was referred to the ED.  She reports shortness of breath is worse on exertion and relieves with rest.  States that she may have had fever when the symptoms 1st started however not now.  Reports nonproductive cough.    In the ED, chest xray concerning bilateral interstitial infiltrates. D dimer elevated and CTA chest was pending at time of admission. She was started on rocephin and azithromycin. HM consulted and patient placed in observation for pna.       Past Medical History:   Diagnosis Date    Abnormal Pap smear of cervix     in the past with repeat pap smear okay.    Allergic rhinitis, cause unspecified     Arthritis of both knees     Asthma     Eczema     Fatty liver 10/2014    Fibrocystic breast changes     Headache(784.0)     Hepatomegaly 10/2014    Hypertension     Liver cyst 10/2014    Multinodular goiter     Followed by ENT - Dr. Nicolas Gray    Polymenorrhea 2008    TMJ (dislocation of temporomandibular joint)     Uterine fibroid     in the past    Vitamin D deficiency disease         Past Surgical History:   Procedure Laterality Date     SECTION, CLASSIC      x 1    COLONOSCOPY N/A 2020    Procedure: COLONOSCOPY;  Surgeon: Angie Magana MD;  Location: [facility];  Service: Endoscopy;  Laterality: N/A;    HYSTERECTOMY      MULTIPLE TOOTH EXTRACTIONS      PARTIAL HYSTERECTOMY  2013    Due to fibroids       Review of patient's allergies indicates:   Allergen Reactions    Doxycycline Nausea Only     Other reaction(s): Nausea    Fluticasone Other (See Comments)     Other reaction(s): Epistaxis      Penicillins      Other reaction(s): unknown       No current facility-administered medications on file prior to encounter.     Current Outpatient Medications on File Prior to Encounter   Medication Sig    albuterol (ACCUNEB) 0.63 mg/3 mL Nebu Take 3 mLs (0.63 mg total) by nebulization every 4 to 6 hours as needed (asthma). Rescue    albuterol (PROVENTIL/VENTOLIN HFA) 90 mcg/actuation inhaler INHALE 1 TO 2 PUFFS BY MOUTH INTO THE LUNGS EVERY 4 TO 6 HOURS AS NEEDED FOR WHEEZING OR SHORTNESS OF BREATH    amLODIPine (NORVASC) 10 MG tablet Take 1 tablet (10 mg total) by mouth once daily.    ascorbic acid, vitamin C, (VITAMIN C) 500 MG tablet Take 500 mg by mouth once daily.    calcium/magnesium/vit B comp (CALCIUM-MAGNESIUM-B COMPLEX ORAL) Take 1 tablet by mouth once daily at 6am.    doxycycline (MONODOX) 100 MG capsule Take 1 capsule (100 mg total) by mouth every 12 (twelve) hours.    fluticasone furoate-vilanteroL (BREO ELLIPTA) 100-25 mcg/dose diskus inhaler INHALE 1 PUFF INTO THE LUNGS ONCE DAILY    hydroCHLOROthiazide (HYDRODIURIL) 12.5 MG Tab Take 1 tablet (12.5 mg total) by mouth once daily.    levocetirizine (XYZAL) 5 MG tablet Take 1 tablet (5 mg total) by mouth once daily.    montelukast (SINGULAIR) 10 mg tablet Take 1 tablet (10 mg total) by mouth every evening.    omega-3s/dha/epa/fish oil/D3 (VITAMIN-D + OMEGA-3 ORAL) Take 1 tablet by mouth once  daily at 6am.    ondansetron (ZOFRAN-ODT) 4 MG TbDL Dissolve 1 tablet (4 mg total) by mouth every 6 (six) hours as needed (nausea).    sertraline (ZOLOFT) 50 MG tablet Take 1 tablet (50 mg total) by mouth once daily.    [DISCONTINUED] ginseng (GIN-ZING ORAL) Take by mouth.    [DISCONTINUED] methylPREDNISolone (MEDROL DOSEPACK) 4 mg tablet use as directed    [DISCONTINUED] vitamin E acetate (VITAMIN E ORAL) Take by mouth.    [DISCONTINUED] zinc gluconate 50 mg tablet Take 15 mg by mouth once daily.     Family History       Problem Relation (Age of Onset)    Cancer Maternal Grandmother (60), Maternal Aunt (50), Maternal Aunt (66)    Cataracts Cousin    Diabetes Mother, Maternal Grandfather    Heart disease Mother, Father (63)    Hypertension Father    Migraines Cousin    Stroke Mother, Maternal Grandfather, Sister          Tobacco Use    Smoking status: Never Smoker    Smokeless tobacco: Never Used   Substance and Sexual Activity    Alcohol use: Yes     Alcohol/week: 0.0 standard drinks     Comment: Occasionally    Drug use: Yes     Frequency: 4.0 times per week     Types: Marijuana     Comment: 1-19-20    Sexual activity: Yes     Partners: Female     Review of Systems   Constitutional:  Negative for fatigue and fever.   HENT:  Negative for sinus pressure.    Eyes:  Negative for visual disturbance.   Respiratory:  Positive for cough and shortness of breath.    Cardiovascular:  Negative for chest pain.   Gastrointestinal:  Negative for nausea and vomiting.   Genitourinary:  Negative for difficulty urinating.   Musculoskeletal:  Negative for back pain.   Skin:  Negative for rash.   Neurological:  Negative for headaches.   Psychiatric/Behavioral:  Negative for confusion.    Objective:     Vital Signs (Most Recent):  Temp: 98.7 °F (37.1 °C) (04/27/22 1229)  Pulse: 83 (04/27/22 1332)  Resp: 16 (04/27/22 1332)  BP: (!) 112/58 (04/27/22 1331)  SpO2: 100 % (04/27/22 1332)   Vital Signs (24h Range):  Temp:  [98.7 °F  (37.1 °C)] 98.7 °F (37.1 °C)  Pulse:  [] 83  Resp:  [16-26] 16  SpO2:  [88 %-100 %] 100 %  BP: (112-156)/(58-79) 112/58     Weight: 115.4 kg (254 lb 6.6 oz)  Body mass index is 41.06 kg/m².    Physical Exam  Constitutional:       General: She is not in acute distress.     Appearance: She is well-developed. She is not diaphoretic.   HENT:      Head: Normocephalic and atraumatic.   Eyes:      Pupils: Pupils are equal, round, and reactive to light.   Cardiovascular:      Rate and Rhythm: Normal rate and regular rhythm.      Heart sounds: Normal heart sounds. No murmur heard.    No friction rub. No gallop.   Pulmonary:      Effort: Pulmonary effort is normal. No respiratory distress.      Breath sounds: No stridor. Rhonchi present. No wheezing or rales.   Abdominal:      General: Bowel sounds are normal. There is no distension.      Palpations: Abdomen is soft. There is no mass.      Tenderness: There is no abdominal tenderness. There is no guarding.   Musculoskeletal:      Right lower leg: No edema.      Left lower leg: No edema.   Skin:     General: Skin is warm.      Findings: No erythema.   Neurological:      Mental Status: She is alert and oriented to person, place, and time.   Psychiatric:         Mood and Affect: Mood normal.         Behavior: Behavior normal.         Thought Content: Thought content normal.         Judgment: Judgment normal.         CRANIAL NERVES     CN III, IV, VI   Pupils are equal, round, and reactive to light.     Significant Labs:   Results for orders placed or performed during the hospital encounter of 04/27/22   CBC auto differential   Result Value Ref Range    WBC 13.13 (H) 3.90 - 12.70 K/uL    RBC 4.69 4.00 - 5.40 M/uL    Hemoglobin 13.2 12.0 - 16.0 g/dL    Hematocrit 43.9 37.0 - 48.5 %    MCV 94 82 - 98 fL    MCH 28.1 27.0 - 31.0 pg    MCHC 30.1 (L) 32.0 - 36.0 g/dL    RDW 14.5 11.5 - 14.5 %    Platelets 308 150 - 450 K/uL    MPV 10.5 9.2 - 12.9 fL    Immature Granulocytes 1.2  (H) 0.0 - 0.5 %    Gran # (ANC) 10.0 (H) 1.8 - 7.7 K/uL    Immature Grans (Abs) 0.16 (H) 0.00 - 0.04 K/uL    Lymph # 1.6 1.0 - 4.8 K/uL    Mono # 0.7 0.3 - 1.0 K/uL    Eos # 0.6 (H) 0.0 - 0.5 K/uL    Baso # 0.12 0.00 - 0.20 K/uL    nRBC 0 0 /100 WBC    Gran % 75.9 (H) 38.0 - 73.0 %    Lymph % 12.4 (L) 18.0 - 48.0 %    Mono % 5.2 4.0 - 15.0 %    Eosinophil % 4.4 0.0 - 8.0 %    Basophil % 0.9 0.0 - 1.9 %    Differential Method Automated    Comprehensive metabolic panel   Result Value Ref Range    Sodium 139 136 - 145 mmol/L    Potassium 4.6 3.5 - 5.1 mmol/L    Chloride 105 95 - 110 mmol/L    CO2 23 23 - 29 mmol/L    Glucose 110 70 - 110 mg/dL    BUN 13 6 - 20 mg/dL    Creatinine 0.9 0.5 - 1.4 mg/dL    Calcium 9.2 8.7 - 10.5 mg/dL    Total Protein 7.1 6.0 - 8.4 g/dL    Albumin 3.7 3.5 - 5.2 g/dL    Total Bilirubin 0.4 0.1 - 1.0 mg/dL    Alkaline Phosphatase 113 55 - 135 U/L    AST 26 10 - 40 U/L    ALT 27 10 - 44 U/L    Anion Gap 11 8 - 16 mmol/L    eGFR if African American >60 >60 mL/min/1.73 m^2    eGFR if non African American >60 >60 mL/min/1.73 m^2   Troponin I #1   Result Value Ref Range    Troponin I 0.017 0.000 - 0.026 ng/mL   BNP   Result Value Ref Range    BNP <10 0 - 99 pg/mL   Protime-INR   Result Value Ref Range    Prothrombin Time 10.4 9.0 - 12.5 sec    INR 1.0 0.8 - 1.2   APTT   Result Value Ref Range    aPTT 28.4 21.0 - 32.0 sec   COVID-19 Rapid Screening   Result Value Ref Range    SARS-CoV-2 RNA, Amplification, Qual Negative Negative   Lactate dehydrogenase   Result Value Ref Range     (H) 110 - 260 U/L   D dimer, quantitative   Result Value Ref Range    D-Dimer 0.63 (H) <0.50 mg/L FEU   Fibrinogen   Result Value Ref Range    Fibrinogen 443 (H) 182 - 400 mg/dL   Procalcitonin   Result Value Ref Range    Procalcitonin 0.04 <0.25 ng/mL   Lactic acid, plasma   Result Value Ref Range    Lactate (Lactic Acid) 1.7 0.5 - 2.2 mmol/L        Significant Imaging: X-Ray Chest PA And Lateral  Narrative:  EXAMINATION:  XR CHEST PA AND LATERAL    CLINICAL HISTORY:  SOB; Pneumonia, unspecified organism    TECHNIQUE:  PA and lateral views of the chest were performed.    COMPARISON:  Chest radiograph February 23, 2022    FINDINGS:  New interstitial opacities are seen within the lung bases bilaterally which may represent multifocal pneumonia, edema, or changes of aspiration depending on the clinical context.  No pneumothorax or pleural effusion.  Unchanged mild cardiomegaly.  No acute osseous abnormality.  Impression: As above.    Electronically signed by: Vahe Astudillo  Date:    04/27/2022  Time:    11:46      Assessment/Plan:     * CAP (community acquired pneumonia)  Bilateral lower lobe infiltrates  Leukocytosis noted  CTA pending  Will continue Rocephin azithromycin  Sputum culture pending        Acute hypoxemic respiratory failure  Patient with Hypoxic Respiratory failure which is Acute.  she is not on home oxygen. Supplemental oxygen was provided and noted- Oxygen Concentration (%):  [24] 24.   Signs/symptoms of respiratory failure include- tachypnea and increased work of breathing. Contributing diagnoses includes - Pneumonia Labs and images were reviewed. Patient Has not had a recent ABG. Will treat underlying causes and adjust management of respiratory failure as follows-     Treat pneumonia   Home o2 eval prior to dc     Sleep apnea  Does not use CPAP at home  Currently being followed by pulm outpatient      Asthma  Will continue steroid and Laba nebs      HTN (hypertension)  Resume home meds  Hydralazine p.r.n.      VTE Risk Mitigation (From admission, onward)         Ordered     enoxaparin injection 40 mg  Daily         04/27/22 6475                   Thaddeus Nassar MD  Department of Hospital Medicine   [facility] - Emergency Dept.

## 2022-04-27 NOTE — SUBJECTIVE & OBJECTIVE
Past Medical History:   Diagnosis Date    Abnormal Pap smear of cervix     in the past with repeat pap smear okay.    Allergic rhinitis, cause unspecified     Arthritis of both knees     Asthma     Eczema     Fatty liver 10/2014    Fibrocystic breast changes     Headache(784.0)     Hepatomegaly 10/2014    Hypertension     Liver cyst 10/2014    Multinodular goiter     Followed by ENT - Dr. Nicolas Gray    Polymenorrhea     TMJ (dislocation of temporomandibular joint)     Uterine fibroid     in the past    Vitamin D deficiency disease        Past Surgical History:   Procedure Laterality Date     SECTION, CLASSIC      x 1    COLONOSCOPY N/A 2020    Procedure: COLONOSCOPY;  Surgeon: Angie Magana MD;  Location: [facility];  Service: Endoscopy;  Laterality: N/A;    HYSTERECTOMY      MULTIPLE TOOTH EXTRACTIONS      PARTIAL HYSTERECTOMY  2013    Due to fibroids       Review of patient's allergies indicates:   Allergen Reactions    Doxycycline Nausea Only     Other reaction(s): Nausea    Fluticasone Other (See Comments)     Other reaction(s): Epistaxis      Penicillins      Other reaction(s): unknown       No current facility-administered medications on file prior to encounter.     Current Outpatient Medications on File Prior to Encounter   Medication Sig    albuterol (ACCUNEB) 0.63 mg/3 mL Nebu Take 3 mLs (0.63 mg total) by nebulization every 4 to 6 hours as needed (asthma). Rescue    albuterol (PROVENTIL/VENTOLIN HFA) 90 mcg/actuation inhaler INHALE 1 TO 2 PUFFS BY MOUTH INTO THE LUNGS EVERY 4 TO 6 HOURS AS NEEDED FOR WHEEZING OR SHORTNESS OF BREATH    amLODIPine (NORVASC) 10 MG tablet Take 1 tablet (10 mg total) by mouth once daily.    ascorbic acid, vitamin C, (VITAMIN C) 500 MG tablet Take 500 mg by mouth once daily.    calcium/magnesium/vit B comp (CALCIUM-MAGNESIUM-B COMPLEX ORAL) Take 1 tablet by mouth once daily at 6am.    doxycycline (MONODOX) 100 MG capsule Take 1 capsule (100 mg  total) by mouth every 12 (twelve) hours.    fluticasone furoate-vilanteroL (BREO ELLIPTA) 100-25 mcg/dose diskus inhaler INHALE 1 PUFF INTO THE LUNGS ONCE DAILY    hydroCHLOROthiazide (HYDRODIURIL) 12.5 MG Tab Take 1 tablet (12.5 mg total) by mouth once daily.    levocetirizine (XYZAL) 5 MG tablet Take 1 tablet (5 mg total) by mouth once daily.    montelukast (SINGULAIR) 10 mg tablet Take 1 tablet (10 mg total) by mouth every evening.    omega-3s/dha/epa/fish oil/D3 (VITAMIN-D + OMEGA-3 ORAL) Take 1 tablet by mouth once daily at 6am.    ondansetron (ZOFRAN-ODT) 4 MG TbDL Dissolve 1 tablet (4 mg total) by mouth every 6 (six) hours as needed (nausea).    sertraline (ZOLOFT) 50 MG tablet Take 1 tablet (50 mg total) by mouth once daily.    [DISCONTINUED] ginseng (GIN-ZING ORAL) Take by mouth.    [DISCONTINUED] methylPREDNISolone (MEDROL DOSEPACK) 4 mg tablet use as directed    [DISCONTINUED] vitamin E acetate (VITAMIN E ORAL) Take by mouth.    [DISCONTINUED] zinc gluconate 50 mg tablet Take 15 mg by mouth once daily.     Family History       Problem Relation (Age of Onset)    Cancer Maternal Grandmother (60), Maternal Aunt (50), Maternal Aunt (66)    Cataracts Cousin    Diabetes Mother, Maternal Grandfather    Heart disease Mother, Father (63)    Hypertension Father    Migraines Cousin    Stroke Mother, Maternal Grandfather, Sister          Tobacco Use    Smoking status: Never Smoker    Smokeless tobacco: Never Used   Substance and Sexual Activity    Alcohol use: Yes     Alcohol/week: 0.0 standard drinks     Comment: Occasionally    Drug use: Yes     Frequency: 4.0 times per week     Types: Marijuana     Comment: 1-19-20    Sexual activity: Yes     Partners: Female     Review of Systems   Constitutional:  Negative for fatigue and fever.   HENT:  Negative for sinus pressure.    Eyes:  Negative for visual disturbance.   Respiratory:  Positive for cough and shortness of breath.    Cardiovascular:  Negative for chest pain.    Gastrointestinal:  Negative for nausea and vomiting.   Genitourinary:  Negative for difficulty urinating.   Musculoskeletal:  Negative for back pain.   Skin:  Negative for rash.   Neurological:  Negative for headaches.   Psychiatric/Behavioral:  Negative for confusion.    Objective:     Vital Signs (Most Recent):  Temp: 98.7 °F (37.1 °C) (04/27/22 1229)  Pulse: 83 (04/27/22 1332)  Resp: 16 (04/27/22 1332)  BP: (!) 112/58 (04/27/22 1331)  SpO2: 100 % (04/27/22 1332)   Vital Signs (24h Range):  Temp:  [98.7 °F (37.1 °C)] 98.7 °F (37.1 °C)  Pulse:  [] 83  Resp:  [16-26] 16  SpO2:  [88 %-100 %] 100 %  BP: (112-156)/(58-79) 112/58     Weight: 115.4 kg (254 lb 6.6 oz)  Body mass index is 41.06 kg/m².    Physical Exam  Constitutional:       General: She is not in acute distress.     Appearance: She is well-developed. She is not diaphoretic.   HENT:      Head: Normocephalic and atraumatic.   Eyes:      Pupils: Pupils are equal, round, and reactive to light.   Cardiovascular:      Rate and Rhythm: Normal rate and regular rhythm.      Heart sounds: Normal heart sounds. No murmur heard.    No friction rub. No gallop.   Pulmonary:      Effort: Pulmonary effort is normal. No respiratory distress.      Breath sounds: No stridor. Rhonchi present. No wheezing or rales.   Abdominal:      General: Bowel sounds are normal. There is no distension.      Palpations: Abdomen is soft. There is no mass.      Tenderness: There is no abdominal tenderness. There is no guarding.   Musculoskeletal:      Right lower leg: No edema.      Left lower leg: No edema.   Skin:     General: Skin is warm.      Findings: No erythema.   Neurological:      Mental Status: She is alert and oriented to person, place, and time.   Psychiatric:         Mood and Affect: Mood normal.         Behavior: Behavior normal.         Thought Content: Thought content normal.         Judgment: Judgment normal.         CRANIAL NERVES     CN III, IV, VI   Pupils are  equal, round, and reactive to light.     Significant Labs:   Results for orders placed or performed during the hospital encounter of 04/27/22   CBC auto differential   Result Value Ref Range    WBC 13.13 (H) 3.90 - 12.70 K/uL    RBC 4.69 4.00 - 5.40 M/uL    Hemoglobin 13.2 12.0 - 16.0 g/dL    Hematocrit 43.9 37.0 - 48.5 %    MCV 94 82 - 98 fL    MCH 28.1 27.0 - 31.0 pg    MCHC 30.1 (L) 32.0 - 36.0 g/dL    RDW 14.5 11.5 - 14.5 %    Platelets 308 150 - 450 K/uL    MPV 10.5 9.2 - 12.9 fL    Immature Granulocytes 1.2 (H) 0.0 - 0.5 %    Gran # (ANC) 10.0 (H) 1.8 - 7.7 K/uL    Immature Grans (Abs) 0.16 (H) 0.00 - 0.04 K/uL    Lymph # 1.6 1.0 - 4.8 K/uL    Mono # 0.7 0.3 - 1.0 K/uL    Eos # 0.6 (H) 0.0 - 0.5 K/uL    Baso # 0.12 0.00 - 0.20 K/uL    nRBC 0 0 /100 WBC    Gran % 75.9 (H) 38.0 - 73.0 %    Lymph % 12.4 (L) 18.0 - 48.0 %    Mono % 5.2 4.0 - 15.0 %    Eosinophil % 4.4 0.0 - 8.0 %    Basophil % 0.9 0.0 - 1.9 %    Differential Method Automated    Comprehensive metabolic panel   Result Value Ref Range    Sodium 139 136 - 145 mmol/L    Potassium 4.6 3.5 - 5.1 mmol/L    Chloride 105 95 - 110 mmol/L    CO2 23 23 - 29 mmol/L    Glucose 110 70 - 110 mg/dL    BUN 13 6 - 20 mg/dL    Creatinine 0.9 0.5 - 1.4 mg/dL    Calcium 9.2 8.7 - 10.5 mg/dL    Total Protein 7.1 6.0 - 8.4 g/dL    Albumin 3.7 3.5 - 5.2 g/dL    Total Bilirubin 0.4 0.1 - 1.0 mg/dL    Alkaline Phosphatase 113 55 - 135 U/L    AST 26 10 - 40 U/L    ALT 27 10 - 44 U/L    Anion Gap 11 8 - 16 mmol/L    eGFR if African American >60 >60 mL/min/1.73 m^2    eGFR if non African American >60 >60 mL/min/1.73 m^2   Troponin I #1   Result Value Ref Range    Troponin I 0.017 0.000 - 0.026 ng/mL   BNP   Result Value Ref Range    BNP <10 0 - 99 pg/mL   Protime-INR   Result Value Ref Range    Prothrombin Time 10.4 9.0 - 12.5 sec    INR 1.0 0.8 - 1.2   APTT   Result Value Ref Range    aPTT 28.4 21.0 - 32.0 sec   COVID-19 Rapid Screening   Result Value Ref Range    SARS-CoV-2  RNA, Amplification, Qual Negative Negative   Lactate dehydrogenase   Result Value Ref Range     (H) 110 - 260 U/L   D dimer, quantitative   Result Value Ref Range    D-Dimer 0.63 (H) <0.50 mg/L FEU   Fibrinogen   Result Value Ref Range    Fibrinogen 443 (H) 182 - 400 mg/dL   Procalcitonin   Result Value Ref Range    Procalcitonin 0.04 <0.25 ng/mL   Lactic acid, plasma   Result Value Ref Range    Lactate (Lactic Acid) 1.7 0.5 - 2.2 mmol/L        Significant Imaging: X-Ray Chest PA And Lateral  Narrative: EXAMINATION:  XR CHEST PA AND LATERAL    CLINICAL HISTORY:  SOB; Pneumonia, unspecified organism    TECHNIQUE:  PA and lateral views of the chest were performed.    COMPARISON:  Chest radiograph February 23, 2022    FINDINGS:  New interstitial opacities are seen within the lung bases bilaterally which may represent multifocal pneumonia, edema, or changes of aspiration depending on the clinical context.  No pneumothorax or pleural effusion.  Unchanged mild cardiomegaly.  No acute osseous abnormality.  Impression: As above.    Electronically signed by: Vahe Astudillo  Date:    04/27/2022  Time:    11:46

## 2022-04-27 NOTE — ASSESSMENT & PLAN NOTE
Patient with Hypoxic Respiratory failure which is Acute.  she is not on home oxygen. Supplemental oxygen was provided and noted- Oxygen Concentration (%):  [24] 24.   Signs/symptoms of respiratory failure include- tachypnea and increased work of breathing. Contributing diagnoses includes - Pneumonia Labs and images were reviewed. Patient Has not had a recent ABG. Will treat underlying causes and adjust management of respiratory failure as follows-     Treat pneumonia   Home o2 eval prior to dc

## 2022-04-27 NOTE — ASSESSMENT & PLAN NOTE
Worsening SOB and fatigue despite steroid, doxy, albuterol  Worsening increased interstitial markings on Chest X Ray today  See below for plan

## 2022-04-28 PROBLEM — R91.8 PULMONARY INFILTRATES ON CXR: Status: ACTIVE | Noted: 2022-04-28

## 2022-04-28 LAB
ANION GAP SERPL CALC-SCNC: 12 MMOL/L (ref 8–16)
BASOPHILS # BLD AUTO: 0.09 K/UL (ref 0–0.2)
BASOPHILS NFR BLD: 0.9 % (ref 0–1.9)
BUN SERPL-MCNC: 12 MG/DL (ref 6–20)
CALCIUM SERPL-MCNC: 9 MG/DL (ref 8.7–10.5)
CHLORIDE SERPL-SCNC: 104 MMOL/L (ref 95–110)
CO2 SERPL-SCNC: 25 MMOL/L (ref 23–29)
CREAT SERPL-MCNC: 0.9 MG/DL (ref 0.5–1.4)
CRP SERPL-MCNC: 28.3 MG/L (ref 0–8.2)
DIFFERENTIAL METHOD: ABNORMAL
EOSINOPHIL # BLD AUTO: 0.6 K/UL (ref 0–0.5)
EOSINOPHIL NFR BLD: 6.1 % (ref 0–8)
ERYTHROCYTE [DISTWIDTH] IN BLOOD BY AUTOMATED COUNT: 14.5 % (ref 11.5–14.5)
ERYTHROCYTE [SEDIMENTATION RATE] IN BLOOD BY WESTERGREN METHOD: 30 MM/HR (ref 0–20)
EST. GFR  (AFRICAN AMERICAN): >60 ML/MIN/1.73 M^2
EST. GFR  (NON AFRICAN AMERICAN): >60 ML/MIN/1.73 M^2
GLUCOSE SERPL-MCNC: 99 MG/DL (ref 70–110)
HCT VFR BLD AUTO: 40 % (ref 37–48.5)
HGB BLD-MCNC: 12 G/DL (ref 12–16)
HIV1+2 IGG SERPL QL IA.RAPID: NORMAL
IMM GRANULOCYTES # BLD AUTO: 0.11 K/UL (ref 0–0.04)
IMM GRANULOCYTES NFR BLD AUTO: 1.1 % (ref 0–0.5)
LYMPHOCYTES # BLD AUTO: 1.6 K/UL (ref 1–4.8)
LYMPHOCYTES NFR BLD: 15.4 % (ref 18–48)
MCH RBC QN AUTO: 28.2 PG (ref 27–31)
MCHC RBC AUTO-ENTMCNC: 30 G/DL (ref 32–36)
MCV RBC AUTO: 94 FL (ref 82–98)
MONOCYTES # BLD AUTO: 0.7 K/UL (ref 0.3–1)
MONOCYTES NFR BLD: 6.7 % (ref 4–15)
NEUTROPHILS # BLD AUTO: 7.2 K/UL (ref 1.8–7.7)
NEUTROPHILS NFR BLD: 69.8 % (ref 38–73)
NRBC BLD-RTO: 0 /100 WBC
PLATELET # BLD AUTO: 390 K/UL (ref 150–450)
PMV BLD AUTO: 9.5 FL (ref 9.2–12.9)
POTASSIUM SERPL-SCNC: 4.4 MMOL/L (ref 3.5–5.1)
RBC # BLD AUTO: 4.26 M/UL (ref 4–5.4)
RHEUMATOID FACT SERPL-ACNC: 19 IU/ML (ref 0–15)
SODIUM SERPL-SCNC: 141 MMOL/L (ref 136–145)
TSH SERPL DL<=0.005 MIU/L-ACNC: 0.85 UIU/ML (ref 0.4–4)
WBC # BLD AUTO: 10.31 K/UL (ref 3.9–12.7)

## 2022-04-28 PROCEDURE — 27000221 HC OXYGEN, UP TO 24 HOURS

## 2022-04-28 PROCEDURE — 36415 COLL VENOUS BLD VENIPUNCTURE: CPT | Performed by: INTERNAL MEDICINE

## 2022-04-28 PROCEDURE — 94761 N-INVAS EAR/PLS OXIMETRY MLT: CPT

## 2022-04-28 PROCEDURE — 25000003 PHARM REV CODE 250: Performed by: FAMILY MEDICINE

## 2022-04-28 PROCEDURE — 85651 RBC SED RATE NONAUTOMATED: CPT | Performed by: INTERNAL MEDICINE

## 2022-04-28 PROCEDURE — 86331 IMMUNODIFFUSION OUCHTERLONY: CPT | Mod: 91 | Performed by: INTERNAL MEDICINE

## 2022-04-28 PROCEDURE — 99900035 HC TECH TIME PER 15 MIN (STAT)

## 2022-04-28 PROCEDURE — 80048 BASIC METABOLIC PNL TOTAL CA: CPT | Performed by: FAMILY MEDICINE

## 2022-04-28 PROCEDURE — 86038 ANTINUCLEAR ANTIBODIES: CPT | Performed by: INTERNAL MEDICINE

## 2022-04-28 PROCEDURE — 86431 RHEUMATOID FACTOR QUANT: CPT | Performed by: INTERNAL MEDICINE

## 2022-04-28 PROCEDURE — 87206 SMEAR FLUORESCENT/ACID STAI: CPT | Performed by: INTERNAL MEDICINE

## 2022-04-28 PROCEDURE — 87070 CULTURE OTHR SPECIMN AEROBIC: CPT | Performed by: INTERNAL MEDICINE

## 2022-04-28 PROCEDURE — 99223 1ST HOSP IP/OBS HIGH 75: CPT | Mod: ,,, | Performed by: INTERNAL MEDICINE

## 2022-04-28 PROCEDURE — 25000242 PHARM REV CODE 250 ALT 637 W/ HCPCS: Performed by: INTERNAL MEDICINE

## 2022-04-28 PROCEDURE — 87015 SPECIMEN INFECT AGNT CONCNTJ: CPT | Performed by: INTERNAL MEDICINE

## 2022-04-28 PROCEDURE — 85025 COMPLETE CBC W/AUTO DIFF WBC: CPT | Performed by: FAMILY MEDICINE

## 2022-04-28 PROCEDURE — 86703 HIV-1/HIV-2 1 RESULT ANTBDY: CPT | Performed by: INTERNAL MEDICINE

## 2022-04-28 PROCEDURE — 84443 ASSAY THYROID STIM HORMONE: CPT | Performed by: FAMILY MEDICINE

## 2022-04-28 PROCEDURE — 86140 C-REACTIVE PROTEIN: CPT | Performed by: INTERNAL MEDICINE

## 2022-04-28 PROCEDURE — 94640 AIRWAY INHALATION TREATMENT: CPT

## 2022-04-28 PROCEDURE — 82164 ANGIOTENSIN I ENZYME TEST: CPT | Performed by: INTERNAL MEDICINE

## 2022-04-28 PROCEDURE — 25000242 PHARM REV CODE 250 ALT 637 W/ HCPCS: Performed by: FAMILY MEDICINE

## 2022-04-28 PROCEDURE — 87116 MYCOBACTERIA CULTURE: CPT | Performed by: INTERNAL MEDICINE

## 2022-04-28 PROCEDURE — 87102 FUNGUS ISOLATION CULTURE: CPT | Performed by: INTERNAL MEDICINE

## 2022-04-28 PROCEDURE — 63600175 PHARM REV CODE 636 W HCPCS: Performed by: EMERGENCY MEDICINE

## 2022-04-28 PROCEDURE — 63600175 PHARM REV CODE 636 W HCPCS: Performed by: FAMILY MEDICINE

## 2022-04-28 PROCEDURE — 25000003 PHARM REV CODE 250: Performed by: NURSE PRACTITIONER

## 2022-04-28 PROCEDURE — 99223 PR INITIAL HOSPITAL CARE,LEVL III: ICD-10-PCS | Mod: ,,, | Performed by: INTERNAL MEDICINE

## 2022-04-28 PROCEDURE — 87205 SMEAR GRAM STAIN: CPT | Performed by: INTERNAL MEDICINE

## 2022-04-28 PROCEDURE — 11000001 HC ACUTE MED/SURG PRIVATE ROOM

## 2022-04-28 RX ORDER — ALBUTEROL SULFATE 0.83 MG/ML
2.5 SOLUTION RESPIRATORY (INHALATION) EVERY 6 HOURS
Status: DISCONTINUED | OUTPATIENT
Start: 2022-04-28 | End: 2022-05-02 | Stop reason: HOSPADM

## 2022-04-28 RX ORDER — ALBUTEROL SULFATE 0.83 MG/ML
2.5 SOLUTION RESPIRATORY (INHALATION) EVERY 4 HOURS PRN
Status: DISCONTINUED | OUTPATIENT
Start: 2022-04-28 | End: 2022-04-28

## 2022-04-28 RX ORDER — DOCUSATE SODIUM 100 MG/1
100 CAPSULE, LIQUID FILLED ORAL 2 TIMES DAILY PRN
Status: DISCONTINUED | OUTPATIENT
Start: 2022-04-28 | End: 2022-05-02 | Stop reason: HOSPADM

## 2022-04-28 RX ORDER — MAG HYDROX/ALUMINUM HYD/SIMETH 200-200-20
30 SUSPENSION, ORAL (FINAL DOSE FORM) ORAL EVERY 4 HOURS PRN
Status: DISCONTINUED | OUTPATIENT
Start: 2022-04-28 | End: 2022-05-02 | Stop reason: HOSPADM

## 2022-04-28 RX ORDER — MAG HYDROX/ALUMINUM HYD/SIMETH 200-200-20
30 SUSPENSION, ORAL (FINAL DOSE FORM) ORAL EVERY 6 HOURS PRN
Status: DISCONTINUED | OUTPATIENT
Start: 2022-04-28 | End: 2022-04-28

## 2022-04-28 RX ADMIN — MAGNESIUM HYDROXIDE/ALUMINUM HYDROXICE/SIMETHICONE 30 ML: 120; 1200; 1200 SUSPENSION ORAL at 04:04

## 2022-04-28 RX ADMIN — ALBUTEROL SULFATE 2.5 MG: 2.5 SOLUTION RESPIRATORY (INHALATION) at 01:04

## 2022-04-28 RX ADMIN — SERTRALINE HYDROCHLORIDE 50 MG: 50 TABLET ORAL at 08:04

## 2022-04-28 RX ADMIN — ALBUTEROL SULFATE 2.5 MG: 2.5 SOLUTION RESPIRATORY (INHALATION) at 11:04

## 2022-04-28 RX ADMIN — CEFTRIAXONE 1 G: 1 INJECTION, SOLUTION INTRAVENOUS at 02:04

## 2022-04-28 RX ADMIN — AZITHROMYCIN MONOHYDRATE 500 MG: 500 INJECTION, POWDER, LYOPHILIZED, FOR SOLUTION INTRAVENOUS at 01:04

## 2022-04-28 RX ADMIN — BUDESONIDE 0.5 MG: 0.5 INHALANT ORAL at 07:04

## 2022-04-28 RX ADMIN — AMLODIPINE BESYLATE 10 MG: 10 TABLET ORAL at 08:04

## 2022-04-28 RX ADMIN — ARFORMOTEROL TARTRATE 15 MCG: 15 SOLUTION RESPIRATORY (INHALATION) at 07:04

## 2022-04-28 RX ADMIN — HYDROCHLOROTHIAZIDE 12.5 MG: 12.5 TABLET ORAL at 08:04

## 2022-04-28 RX ADMIN — MAGNESIUM HYDROXIDE/ALUMINUM HYDROXICE/SIMETHICONE 30 ML: 120; 1200; 1200 SUSPENSION ORAL at 08:04

## 2022-04-28 RX ADMIN — ENOXAPARIN SODIUM 40 MG: 40 INJECTION SUBCUTANEOUS at 08:04

## 2022-04-28 RX ADMIN — ALBUTEROL SULFATE 2.5 MG: 2.5 SOLUTION RESPIRATORY (INHALATION) at 07:04

## 2022-04-28 NOTE — ASSESSMENT & PLAN NOTE
Patient with Hypoxic Respiratory failure which is Acute.  she is not on home oxygen. Supplemental oxygen was provided and noted- Oxygen Concentration (%):  [24-28] 28.   Signs/symptoms of respiratory failure include- tachypnea and increased work of breathing. Contributing diagnoses includes - Pneumonia Labs and images were reviewed. Patient Has not had a recent ABG. Will treat underlying causes and adjust management of respiratory failure as follows- O2 target sat 92-94% , IV rocephine AZT , nebs , cx sent

## 2022-04-28 NOTE — CONSULTS
[facility]  Pulmonology  Consult Note    Patient Name: Ayanna Alicia  MRN: [id number]  Admission Date: 2022  Hospital Length of Stay: 0 days  Code Status: Full Code  Attending Physician: Thaddeus Nassar MD  Primary Care Provider: Madeleine Enrique MD   Principal Problem: CAP (community acquired pneumonia)    [unfilled]  Subjective:     HPI:  51-year-old female patient with past medical history of asthma and sleep apnea sent by pulmonology nurse practitioner to the emergency room for nonresolving pneumonia over few weeks.SOB, wheezing, productive cough, yellow sputum, fatigue for 2 months.    She was treated about 3 weeks ago with doxycycline but did not notice a persistent improvement.  O2 sat for the last few weeks were between 86-90%.  She does report 15 years of smoking marijuana daily.    She did vape at some point 2021.      Past Medical History:   Diagnosis Date    Abnormal Pap smear of cervix     in the past with repeat pap smear okay.    Allergic rhinitis, cause unspecified     Arthritis of both knees     Asthma     Eczema     Fatty liver 10/2014    Fibrocystic breast changes     Headache(784.0)     Hepatomegaly 10/2014    Hypertension     Liver cyst 10/2014    Multinodular goiter     Followed by ENT - Dr. Nicolas Gray    Polymenorrhea 2008    TMJ (dislocation of temporomandibular joint)     Uterine fibroid     in the past    Vitamin D deficiency disease        Past Surgical History:   Procedure Laterality Date     SECTION, CLASSIC      x 1    COLONOSCOPY N/A 2020    Procedure: COLONOSCOPY;  Surgeon: Angie Magana MD;  Location: [facility];  Service: Endoscopy;  Laterality: N/A;    HYSTERECTOMY      MULTIPLE TOOTH EXTRACTIONS      PARTIAL HYSTERECTOMY  2013    Due to fibroids       Review of patient's allergies indicates:   Allergen Reactions    Doxycycline Nausea Only     Other reaction(s): Nausea    Fluticasone Other (See Comments)     Other reaction(s): Epistaxis       Penicillins      Other reaction(s): unknown       Family History       Problem Relation (Age of Onset)    Cancer Maternal Grandmother (60), Maternal Aunt (50), Maternal Aunt (66)    Cataracts Cousin    Diabetes Mother, Maternal Grandfather    Heart disease Mother, Father (63)    Hypertension Father    Migraines Cousin    Stroke Mother, Maternal Grandfather, Sister          Tobacco Use    Smoking status: Never Smoker    Smokeless tobacco: Never Used   Substance and Sexual Activity    Alcohol use: Yes     Alcohol/week: 0.0 standard drinks     Comment: Occasionally    Drug use: Yes     Frequency: 4.0 times per week     Types: Marijuana     Comment: 1-19-20    Sexual activity: Yes     Partners: Female         Review of Systems   Constitutional:  Positive for activity change and fatigue. Negative for chills and fever.   HENT:  Positive for congestion. Negative for drooling, ear discharge and nosebleeds.    Eyes:  Negative for pain, discharge and itching.   Respiratory:  Positive for apnea, cough and shortness of breath. Negative for choking.    Cardiovascular:  Negative for chest pain.   Gastrointestinal:  Negative for anal bleeding.   Endocrine: Negative for cold intolerance.   Genitourinary:  Negative for hematuria.   Musculoskeletal:  Positive for arthralgias. Negative for neck stiffness.   Skin:  Negative for rash.   Allergic/Immunologic: Negative for immunocompromised state.   Neurological:  Positive for weakness. Negative for seizures and facial asymmetry.   Hematological:  Negative for adenopathy.   Psychiatric/Behavioral:  Negative for behavioral problems, self-injury and suicidal ideas.    Objective:     Vital Signs (Most Recent):  Temp: 98.4 °F (36.9 °C) (04/28/22 0746)  Pulse: 88 (04/28/22 0746)  Resp: 18 (04/28/22 0746)  BP: 115/68 (04/28/22 0746)  SpO2: 99 % (04/28/22 0746) Vital Signs (24h Range):  Temp:  [97.8 °F (36.6 °C)-98.7 °F (37.1 °C)] 98.4 °F (36.9 °C)  Pulse:  [] 88  Resp:  [16-26] 18  SpO2:   [88 %-100 %] 99 %  BP: (112-156)/(58-79) 115/68     Weight: 115.6 kg (254 lb 13.6 oz)  Body mass index is 41.13 kg/m².      Intake/Output Summary (Last 24 hours) at 4/28/2022 0851  Last data filed at 4/27/2022 1551  Gross per 24 hour   Intake 50 ml   Output --   Net 50 ml       Physical Exam  Vitals and nursing note reviewed.   Constitutional:       General: She is not in acute distress.     Appearance: She is well-developed. She is ill-appearing.   HENT:      Head: Normocephalic and atraumatic.      Nose: Nose normal.   Eyes:      Extraocular Movements: Extraocular movements intact.   Cardiovascular:      Rate and Rhythm: Normal rate and regular rhythm.   Pulmonary:      Effort: Pulmonary effort is normal.      Breath sounds: No stridor. Rales present.      Comments: Bilateral mid to lower lung field rales  Abdominal:      General: There is no distension.   Musculoskeletal:         General: No deformity or signs of injury.      Cervical back: Normal range of motion and neck supple.   Skin:     General: Skin is warm and dry.   Neurological:      General: No focal deficit present.      Mental Status: She is alert and oriented to person, place, and time. Mental status is at baseline.   Psychiatric:         Mood and Affect: Mood normal.         Behavior: Behavior normal.         Thought Content: Thought content normal.         Judgment: Judgment normal.       Vents:  Oxygen Concentration (%): 28 (04/28/22 0723)    Lines/Drains/Airways       Peripheral Intravenous Line  Duration                  Peripheral IV - Single Lumen 04/27/22 1320 20 G Left Forearm <1 day                    Significant Labs:    CBC/Anemia Profile:  Recent Labs   Lab 04/27/22  1322   WBC 13.13*   HGB 13.2   HCT 43.9      MCV 94   RDW 14.5        Chemistries:  Recent Labs   Lab 04/27/22  1322      K 4.6      CO2 23   BUN 13   CREATININE 0.9   CALCIUM 9.2   ALBUMIN 3.7   PROT 7.1   BILITOT 0.4   ALKPHOS 113   ALT 27   AST 26       Latest Reference Range & Units 04/27/22 13:22   BNP 0 - 99 pg/mL <10 [1]    - 260 U/L 460 (H) [2]   Troponin I 0.000 - 0.026 ng/mL 0.017 [3]   Stress echo April 7, 2022  Summary    The left ventricle is normal in size with concentric remodeling and normal systolic function.  The test was stopped because the patient experienced shortness of breath. The patient requested the test to be stopped.  The patient's exercise capacity was severely impaired.  There were no arrhythmias during stress.  The estimated ejection fraction is 60%.  Normal left ventricular diastolic function.  Normal right ventricular size with normal right ventricular systolic function.  Mild tricuspid regurgitation.  Normal central venous pressure (3 mmHg).  The estimated PA systolic pressure is 29 mmHg.  The stress echo portion of this study is negative for myocardial ischemia.  The ECG portion of this study is negative for myocardial ischemia.    EKG 04/27/2022    Vent. Rate : 091 BPM     Atrial Rate : 091 BPM      P-R Int : 160 ms          QRS Dur : 092 ms       QT Int : 382 ms       P-R-T Axes : 051 -42 019 degrees      QTc Int : 469 ms     Normal sinus rhythm   Biatrial enlargement   Left axis deviation   Low voltage QRS   Cannot rule out Anterior infarct (cited on or before 27-APR-2022)   Abnormal ECG   When compared with ECG of 07-APR-2022 12:39,   Previous ECG has undetermined rhythm, needs review   The axis Shifted left   T wave inversion now evident in Inferior leads       Significant Imaging:         CTA Chest Non-Coronary (PE Study)  Order: [id number]  Status: Final result    Visible to patient: Yes (not seen)    Next appt: 05/05/2022 at 08:00 AM in Pulmonology (PULMONARY LAB, [facility])    0 Result Notes    Details    Reading Physician Reading Date Result Priority   Jerry Skelton MD  [phone]  [phone] 4/27/2022 STAT     Narrative & Impression  EXAMINATION:  CTA CHEST NON CORONARY     CLINICAL HISTORY:  Chest pain and  shortness of breath     TECHNIQUE:  After the intravenous administration of 100 cc of Omni 350 nonionic contrast using CT pulmonary angio technique, 1.25  Mm axial images were acquired using helical CT technique from the lung apices through costophrenic sulci.  Sagittal coronal and oblique MIPS were also submitted for interpretation.     COMPARISON:  Chest x-ray dated 04/27/2022     FINDINGS:  -Pulmonary arteries: Pulmonary arteries are well opacified.  No evidence of pulmonary embolism.  No evidence of pulmonary hypertension.  No right heart strain is identified with RV/LV ratio < 0.9.     -Lungs: Diffuse interstitial thickening with ground-glass opacities predominately throughout the lower lobes with associated bronchiectasis concerning for interstitial pneumonia versus interstitial edema.  Similar findings within the right middle lobe.  Upper lobes are largely clear.  Concerning for     -Pleura: No thickening or fluid.     -Mediastinum/Paula:Mildly enlarged lymph nodes are seen within the mediastinum and bilateral hilar regions.     -Axilla: Shotty adenopathy.     -Thyroid: Heterogeneous enlargement of the thyroid gland bilaterally concerning for multinodular goiter.  Recommend follow-up thyroid ultrasound.     -Heart/Aorta: Heart size is enlarged.  Mild coronary artery disease.  No pericardial effusion. Aorta normal caliber.   Aorta demonstrates mild atherosclerotic disease.     -Bones/Chest Wall: Intact  with multilevel degenerative spondylosis throughout the thoracic spine     -Upper Abdomen: Small hiatal hernia.  Hepatomegaly.  Spleen is normal in size.  Multiple indeterminate hypodensities within the liver not fully characterized on phase of imaging largest within the left hepatic lobe measuring 13 mm.     Impression:     No evidence of pulmonary embolism.     Diffuse interstitial thickening with ground-glass opacities predominately throughout the lower lobes with associated bronchiectasis concerning for  interstitial pneumonia versus interstitial edema.     Enlarged lymph nodes are seen within the mediastinum and bilateral hilar regions likely reactive.  Follow-up after acute exacerbation is recommended.     Heterogeneous enlargement of the thyroid gland bilaterally concerning for multinodular goiter. Recommend follow-up thyroid ultrasound.        Chest x-ray 04/27/2022    CLINICAL HISTORY:  SOB; Pneumonia, unspecified organism     TECHNIQUE:  PA and lateral views of the chest were performed.     COMPARISON:  Chest radiograph February 23, 2022     FINDINGS:  New interstitial opacities are seen within the lung bases bilaterally which may represent multifocal pneumonia, edema, or changes of aspiration depending on the clinical context.  No pneumothorax or pleural effusion.  Unchanged mild cardiomegaly.  No acute osseous abnormality.            ABG  No results for input(s): PH, PO2, PCO2, HCO3, BE in the last 168 hours.  Assessment/Plan:     * CAP (community acquired pneumonia)   O2 target sat 92-94% , IV rocephine AZT , nebs , cx sent .   Autoimmune etiologieslow suspicion, xray changes occurred within few weeks  , will send basic rheum w/up. HIV   WBC decreased from 13 K to thank a on antibiotic.  Send sputum microbiology.  Send autoimmune serologies.  Fungal serologies.      Pulmonary infiltrates on CXR  With history of marijuana smoking.  Unusual presentation of pneumonia Suspicion of inhalation related lung injury.  If afebrile and negative microbiology will try empiric steroids in the next 24 hours.        Acute hypoxemic respiratory failure  Patient with Hypoxic Respiratory failure which is Acute.  she is not on home oxygen. Supplemental oxygen was provided and noted- Oxygen Concentration (%):  [24-28] 28.   Signs/symptoms of respiratory failure include- tachypnea and increased work of breathing. Contributing diagnoses includes - Pneumonia Labs and images were reviewed. Patient Has not had a recent ABG. Will  treat underlying causes and adjust management of respiratory failure as follows- O2 target sat 92-94% , IV rocephine AZT , nebs , cx sent     Sleep apnea  2019 AHI 11 events per hour . Nocturnal CPAP     Asthma  Albuterol , ICS , LABA    HTN (hypertension)  Amlodipine , HCTZ       Discussed with hospital medicine attending.      Thank you for your consult. I will follow-up with patient. Please contact us if you have any additional questions.     Mode Thompson MD  Pulmonology  [facility]

## 2022-04-28 NOTE — PLAN OF CARE
[facility]  Initial Discharge Assessment       Primary Care Provider: Madeleine Enrique MD    Admission Diagnosis: Morbid obesity [E66.01]  SOB (shortness of breath) [R06.02]  History of asthma [Z87.09]  Pneumonia of both lower lobes due to infectious organism [J18.9]  Acute hypoxemic respiratory failure [J96.01]    Admission Date: 4/27/2022  Expected Discharge Date:     Discharge Barriers Identified: None    Payor: BLUE CROSS BLUE SHIELD / Plan: BCBS OF [facility] LOCAL PLUS / Product Type: Commercial /     Extended Emergency Contact Information  Primary Emergency Contact: Lianna Alicia   [facility]  Home Phone: [phone]  Mobile Phone: [phone]  Relation: Other  Secondary Emergency Contact: Soy Clements  Mobile Phone: [phone]  Relation: Relative    Discharge Plan A: Home  Discharge Plan B: Home      [id number] [address] [facility] [address] [facility]  [address] [facility] [address]  Phone: [phone] Fax: [phone]    [facility] [address] [address]  Phone: [phone] Fax: [phone]    [id number] [place], [place] - [address] [facility]  [address] [facility] [address]  Phone: [phone] Fax: [phone]    [facility] [address] [place] [address] [place] [address]  Phone: [phone] Fax: [phone]      Initial Assessment (most recent)     Adult Discharge Assessment - 04/28/22 0905        Discharge Assessment    Assessment Type Discharge Planning Assessment     Confirmed/corrected address, phone number and insurance Yes     Confirmed Demographics Correct on Facesheet     Source of Information patient     When was your last doctors appointment? 04/13/22     Communicated JANETTE with patient/caregiver Date not available/Unable to determine     Reason For Admission Shortness of breath     Lives With alone      Do you expect to return to your current living situation? Yes     Do you have help at home or someone to help you manage your care at home? No     Prior to hospitilization cognitive status: Alert/Oriented     Current cognitive status: Alert/Oriented     Walking or Climbing Stairs Difficulty none     Dressing/Bathing Difficulty none     Equipment Currently Used at Home nebulizer     Readmission within 30 days? No     Patient currently being followed by outpatient case management? No     Do you currently have service(s) that help you manage your care at home? No     Do you take prescription medications? Yes     Do you have prescription coverage? Yes     Coverage [place]     Do you have any problems affording any of your prescribed medications? No     Is the patient taking medications as prescribed? yes     Who is going to help you get home at discharge? patient is independent     How do you get to doctors appointments? car, drives self     Are you on dialysis? No     Do you take coumadin? No     Discharge Plan A Home     Discharge Plan B Home     DME Needed Upon Discharge  none     Discharge Plan discussed with: Patient     Discharge Barriers Identified None                  CM met with patient at the bedside to assess for discharge needs.  Patient lives at home alone and is independent with all needs.  Patient drove herself to the hospital.  Patient has no anticipated discharge needs at this time. CM provided a transitional care folder, information on advanced directives, information on pharmacy bedside delivery, and discharge planning begins on admission with contact information for any needs/questions.

## 2022-04-28 NOTE — SUBJECTIVE & OBJECTIVE
Interval History: WBC trending down.  Currently on Rocephin and azithromycin.  Procalcitonin negative.  Possibly viral pneumonia however given patient's improvement on antibiotic therapy will continue current treatment.  Pulmonology consulted on case.    No acute events overnight.  Currently stable on nasal cannula.    Review of Systems   Constitutional:  Negative for fatigue and fever.   HENT:  Negative for sinus pressure.    Eyes:  Negative for visual disturbance.   Respiratory:  Positive for cough and shortness of breath.    Cardiovascular:  Negative for chest pain.   Gastrointestinal:  Negative for nausea and vomiting.   Genitourinary:  Negative for difficulty urinating.   Musculoskeletal:  Negative for back pain.   Skin:  Negative for rash.   Neurological:  Negative for headaches.   Psychiatric/Behavioral:  Negative for confusion.    Objective:     Vital Signs (Most Recent):  Temp: 98.4 °F (36.9 °C) (04/28/22 0746)  Pulse: 88 (04/28/22 0746)  Resp: 18 (04/28/22 0746)  BP: 115/68 (04/28/22 0746)  SpO2: 99 % (04/28/22 0746) Vital Signs (24h Range):  Temp:  [97.8 °F (36.6 °C)-98.7 °F (37.1 °C)] 98.4 °F (36.9 °C)  Pulse:  [] 88  Resp:  [16-26] 18  SpO2:  [88 %-100 %] 99 %  BP: (112-156)/(58-79) 115/68     Weight: 115.6 kg (254 lb 13.6 oz)  Body mass index is 41.13 kg/m².    Intake/Output Summary (Last 24 hours) at 4/28/2022 1116  Last data filed at 4/27/2022 1551  Gross per 24 hour   Intake 50 ml   Output --   Net 50 ml      Physical Exam  Constitutional:       General: She is not in acute distress.     Appearance: She is well-developed. She is not diaphoretic.   HENT:      Head: Normocephalic and atraumatic.   Eyes:      Pupils: Pupils are equal, round, and reactive to light.   Cardiovascular:      Rate and Rhythm: Normal rate and regular rhythm.      Heart sounds: Normal heart sounds. No murmur heard.    No friction rub. No gallop.   Pulmonary:      Effort: Pulmonary effort is normal. No respiratory  distress.      Breath sounds: No stridor. Rhonchi present. No wheezing or rales.   Abdominal:      General: Bowel sounds are normal. There is no distension.      Palpations: Abdomen is soft. There is no mass.      Tenderness: There is no abdominal tenderness. There is no guarding.   Musculoskeletal:      Right lower leg: No edema.      Left lower leg: No edema.   Skin:     General: Skin is warm.      Findings: No erythema.   Neurological:      Mental Status: She is alert and oriented to person, place, and time.   Psychiatric:         Mood and Affect: Mood normal.         Behavior: Behavior normal.         Thought Content: Thought content normal.         Judgment: Judgment normal.       Significant Labs: All pertinent labs within the past 24 hours have been reviewed.    Significant Imaging: I have reviewed all pertinent imaging results/findings within the past 24 hours.

## 2022-04-28 NOTE — ASSESSMENT & PLAN NOTE
O2 target sat 92-94% , IV rocephine AZT , nebs , cx sent .   Autoimmune etiologieslow suspicion, xray changes occurred within few weeks  , will send basic rheum w/up. HIV   WBC decreased from 13 K to thank a on antibiotic.  Send sputum microbiology.  Send autoimmune serologies.  Fungal serologies.

## 2022-04-28 NOTE — HOSPITAL COURSE
Patient was admitted for hypoxic respiratory failure. CTA showed siffuse interstitial thickening with ground-glass opacities predominately throughout the lower lobes with associated bronchiectasis concerning for interstitial pneumonia versus interstitial edema. She was treated empirically for CAP with rocephin and azithromycin. Patient did not clinically improve. Sputum culture negative. She was then started on IV solumedrol with good clinical response. Inflammatory markers trended down post steroids. Chest xray showed improved. Of note multi-nodular goiter was found during stay. Patient has been seeing ENT and PCP. TSH WNL and anti-TPO negative. Rheumatoid factor elevated however anti-ccp within normal limits. Home O2 was obtained. Decision was made to discharge with prednisone taper x 5 weeks. Outpatient f/u with pulmonology.

## 2022-04-28 NOTE — ASSESSMENT & PLAN NOTE
With history of marijuana smoking.  Unusual presentation of pneumonia Suspicion of inhalation related lung injury.  If afebrile and negative microbiology will try empiric steroids in the next 24 hours.

## 2022-04-28 NOTE — ASSESSMENT & PLAN NOTE
Bilateral lower lobe infiltrates  Leukocytosis noted  CTA pending  Will continue Rocephin azithromycin  Sputum culture pending    4/28:  Leukocytosis improving  CTA showing bilateral interstitial infiltrate  Procalcitonin is negative therefore  Viral pneumonia is possibility  However given patient's current improvement on antibiotic therapy  Will plan to continue Rocephin azithromycin  Sputum culture pending

## 2022-04-28 NOTE — HOSPITAL COURSE
O2 sat 99% on 2 liters/minute.  Chest x-ray CT angio of the chest reviewed.  Home sleep study reviewed.  4/29 O2 SAT 93% ON 2 LITERS/MINUTE.  AFEBRILE.  SOB fatigue decreased activity.  Preliminary respiratory culture gram-positive cocci gram-negative rods rales.  Workup showed carotid factor 30, mild elevation of ESR and CRP.  On IV Rocephin azithromycin  4/30 seen and examined.  O2 sat 94% on 1 liter/minute.  Afebrile.  Sputum culture normal iris.  Continues to feel SOB coughing.  No improvement in chest x-ray ESR CRP on IV antibiotic.  Afebrile.  5/1 seen and examined.  Feeling slightly better.  O2 sat 96% on 1 L. Ambulating.  Microbiology studies negative.  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  SOB fatigue.    05/02: seen and examined: on 2 LPM, inflammation studies pending, added IGE, T  max: 98.9F

## 2022-04-28 NOTE — PROGRESS NOTES
[facility]  [facility] Medicine  Progress Note    Patient Name: Ayanna Alicia  MRN: [id number]  Patient Class: IP- Inpatient   Admission Date: 4/27/2022  Length of Stay: 0 days  Attending Physician: Thaddeus Nassar MD  Primary Care Provider: Madeleine Enrique MD        Subjective:     Principal Problem:CAP (community acquired pneumonia)        HPI:  Patient is a 51 y.o. aa female with a PMHx of asthma, fatty liver, HTN, and KALYANI who presents to the Emergency Department for SOB which onset gradually at the beginning of this month. Patient currently being seen by pulmonology outpatient for asthma.  She was treated with doxycycline and steroids without improvement.  Patient was seen by pulmonology today and was referred to the ED.  She reports shortness of breath is worse on exertion and relieves with rest.  States that she may have had fever when the symptoms 1st started however not now.  Reports nonproductive cough.    In the ED, chest xray concerning bilateral interstitial infiltrates. D dimer elevated and CTA chest was pending at time of admission. She was started on rocephin and azithromycin. HM consulted and patient placed in observation for pna.       Overview/Hospital Course:  4/28:  WBC trending down.  Currently on Rocephin and azithromycin.  Procalcitonin negative.  Possibly viral pneumonia however given patient's improvement on antibiotic therapy will continue current treatment.  Pulmonology consulted on case.      Interval History: WBC trending down.  Currently on Rocephin and azithromycin.  Procalcitonin negative.  Possibly viral pneumonia however given patient's improvement on antibiotic therapy will continue current treatment.  Pulmonology consulted on case.    No acute events overnight.  Currently stable on nasal cannula.    Review of Systems   Constitutional:  Negative for fatigue and fever.   HENT:  Negative for sinus pressure.    Eyes:  Negative for visual disturbance.   Respiratory:  Positive for cough  and shortness of breath.    Cardiovascular:  Negative for chest pain.   Gastrointestinal:  Negative for nausea and vomiting.   Genitourinary:  Negative for difficulty urinating.   Musculoskeletal:  Negative for back pain.   Skin:  Negative for rash.   Neurological:  Negative for headaches.   Psychiatric/Behavioral:  Negative for confusion.    Objective:     Vital Signs (Most Recent):  Temp: 98.4 °F (36.9 °C) (04/28/22 0746)  Pulse: 88 (04/28/22 0746)  Resp: 18 (04/28/22 0746)  BP: 115/68 (04/28/22 0746)  SpO2: 99 % (04/28/22 0746) Vital Signs (24h Range):  Temp:  [97.8 °F (36.6 °C)-98.7 °F (37.1 °C)] 98.4 °F (36.9 °C)  Pulse:  [] 88  Resp:  [16-26] 18  SpO2:  [88 %-100 %] 99 %  BP: (112-156)/(58-79) 115/68     Weight: 115.6 kg (254 lb 13.6 oz)  Body mass index is 41.13 kg/m².    Intake/Output Summary (Last 24 hours) at 4/28/2022 1116  Last data filed at 4/27/2022 1551  Gross per 24 hour   Intake 50 ml   Output --   Net 50 ml      Physical Exam  Constitutional:       General: She is not in acute distress.     Appearance: She is well-developed. She is not diaphoretic.   HENT:      Head: Normocephalic and atraumatic.   Eyes:      Pupils: Pupils are equal, round, and reactive to light.   Cardiovascular:      Rate and Rhythm: Normal rate and regular rhythm.      Heart sounds: Normal heart sounds. No murmur heard.    No friction rub. No gallop.   Pulmonary:      Effort: Pulmonary effort is normal. No respiratory distress.      Breath sounds: No stridor. Rhonchi present. No wheezing or rales.   Abdominal:      General: Bowel sounds are normal. There is no distension.      Palpations: Abdomen is soft. There is no mass.      Tenderness: There is no abdominal tenderness. There is no guarding.   Musculoskeletal:      Right lower leg: No edema.      Left lower leg: No edema.   Skin:     General: Skin is warm.      Findings: No erythema.   Neurological:      Mental Status: She is alert and oriented to person, place, and  time.   Psychiatric:         Mood and Affect: Mood normal.         Behavior: Behavior normal.         Thought Content: Thought content normal.         Judgment: Judgment normal.       Significant Labs: All pertinent labs within the past 24 hours have been reviewed.    Significant Imaging: I have reviewed all pertinent imaging results/findings within the past 24 hours.        Assessment/Plan:      * CAP (community acquired pneumonia)  Bilateral lower lobe infiltrates  Leukocytosis noted  CTA pending  Will continue Rocephin azithromycin  Sputum culture pending    4/28:  Leukocytosis improving  CTA showing bilateral interstitial infiltrate  Procalcitonin is negative therefore  Viral pneumonia is possibility  However given patient's current improvement on antibiotic therapy  Will plan to continue Rocephin azithromycin  Sputum culture pending      Acute hypoxemic respiratory failure  Patient with Hypoxic Respiratory failure which is Acute.  she is not on home oxygen. Supplemental oxygen was provided and noted- Oxygen Concentration (%):  [24-28] 28.   Signs/symptoms of respiratory failure include- tachypnea and increased work of breathing. Contributing diagnoses includes - Pneumonia Labs and images were reviewed. Patient Has not had a recent ABG. Will treat underlying causes and adjust management of respiratory failure as follows-     Treat pneumonia   Home o2 eval prior to dc     4/28:  Patient being seen by pulmonology outpatient  Will consult pulmonology on case  Wean O2 as tolerated  Home O2 eval prior to discharge    Sleep apnea  Does not use CPAP at home  Currently being followed by pulm outpatient      Asthma  Will continue steroid and Laba nebs      HTN (hypertension)  Resume home meds  Hydralazine p.r.n.        VTE Risk Mitigation (From admission, onward)         Ordered     enoxaparin injection 40 mg  Every 12 hours         04/27/22 1533                Discharge Planning   JANETTE:      Code Status: Full Code   Is  the patient medically ready for discharge?:     Reason for patient still in hospital (select all that apply): Patient trending condition  Discharge Plan A: Home                  Thaddeus Nassar MD  Department of Hospital Medicine   [facility]

## 2022-04-28 NOTE — ASSESSMENT & PLAN NOTE
Patient with Hypoxic Respiratory failure which is Acute.  she is not on home oxygen. Supplemental oxygen was provided and noted- Oxygen Concentration (%):  [24-28] 28.   Signs/symptoms of respiratory failure include- tachypnea and increased work of breathing. Contributing diagnoses includes - Pneumonia Labs and images were reviewed. Patient Has not had a recent ABG. Will treat underlying causes and adjust management of respiratory failure as follows-     Treat pneumonia   Home o2 eval prior to dc     4/28:  Patient being seen by pulmonology outpatient  Will consult pulmonology on case  Wean O2 as tolerated  Home O2 eval prior to discharge

## 2022-04-28 NOTE — SUBJECTIVE & OBJECTIVE
Past Medical History:   Diagnosis Date    Abnormal Pap smear of cervix     in the past with repeat pap smear okay.    Allergic rhinitis, cause unspecified     Arthritis of both knees     Asthma     Eczema     Fatty liver 10/2014    Fibrocystic breast changes     Headache(784.0)     Hepatomegaly 10/2014    Hypertension     Liver cyst 10/2014    Multinodular goiter     Followed by ENT - Dr. Nicolas Gray    Polymenorrhea     TMJ (dislocation of temporomandibular joint)     Uterine fibroid     in the past    Vitamin D deficiency disease        Past Surgical History:   Procedure Laterality Date     SECTION, CLASSIC      x 1    COLONOSCOPY N/A 2020    Procedure: COLONOSCOPY;  Surgeon: Angie Magana MD;  Location: [facility];  Service: Endoscopy;  Laterality: N/A;    HYSTERECTOMY      MULTIPLE TOOTH EXTRACTIONS      PARTIAL HYSTERECTOMY  2013    Due to fibroids       Review of patient's allergies indicates:   Allergen Reactions    Doxycycline Nausea Only     Other reaction(s): Nausea    Fluticasone Other (See Comments)     Other reaction(s): Epistaxis      Penicillins      Other reaction(s): unknown       Family History       Problem Relation (Age of Onset)    Cancer Maternal Grandmother (60), Maternal Aunt (50), Maternal Aunt (66)    Cataracts Cousin    Diabetes Mother, Maternal Grandfather    Heart disease Mother, Father (63)    Hypertension Father    Migraines Cousin    Stroke Mother, Maternal Grandfather, Sister          Tobacco Use    Smoking status: Never Smoker    Smokeless tobacco: Never Used   Substance and Sexual Activity    Alcohol use: Yes     Alcohol/week: 0.0 standard drinks     Comment: Occasionally    Drug use: Yes     Frequency: 4.0 times per week     Types: Marijuana     Comment: 20    Sexual activity: Yes     Partners: Female         Review of Systems   Constitutional:  Positive for activity change and fatigue. Negative for chills and fever.   HENT:  Positive for  congestion. Negative for drooling, ear discharge and nosebleeds.    Eyes:  Negative for pain, discharge and itching.   Respiratory:  Positive for apnea, cough and shortness of breath. Negative for choking.    Cardiovascular:  Negative for chest pain.   Gastrointestinal:  Negative for anal bleeding.   Endocrine: Negative for cold intolerance.   Genitourinary:  Negative for hematuria.   Musculoskeletal:  Positive for arthralgias. Negative for neck stiffness.   Skin:  Negative for rash.   Allergic/Immunologic: Negative for immunocompromised state.   Neurological:  Positive for weakness. Negative for seizures and facial asymmetry.   Hematological:  Negative for adenopathy.   Psychiatric/Behavioral:  Negative for behavioral problems, self-injury and suicidal ideas.    Objective:     Vital Signs (Most Recent):  Temp: 98.4 °F (36.9 °C) (04/28/22 0746)  Pulse: 88 (04/28/22 0746)  Resp: 18 (04/28/22 0746)  BP: 115/68 (04/28/22 0746)  SpO2: 99 % (04/28/22 0746) Vital Signs (24h Range):  Temp:  [97.8 °F (36.6 °C)-98.7 °F (37.1 °C)] 98.4 °F (36.9 °C)  Pulse:  [] 88  Resp:  [16-26] 18  SpO2:  [88 %-100 %] 99 %  BP: (112-156)/(58-79) 115/68     Weight: 115.6 kg (254 lb 13.6 oz)  Body mass index is 41.13 kg/m².      Intake/Output Summary (Last 24 hours) at 4/28/2022 0851  Last data filed at 4/27/2022 1551  Gross per 24 hour   Intake 50 ml   Output --   Net 50 ml       Physical Exam  Vitals and nursing note reviewed.   Constitutional:       General: She is not in acute distress.     Appearance: She is well-developed. She is ill-appearing.   HENT:      Head: Normocephalic and atraumatic.      Nose: Nose normal.   Eyes:      Extraocular Movements: Extraocular movements intact.   Cardiovascular:      Rate and Rhythm: Normal rate and regular rhythm.   Pulmonary:      Effort: Pulmonary effort is normal.      Breath sounds: No stridor. Rales present.      Comments: Bilateral mid to lower lung field rales  Abdominal:      General:  There is no distension.   Musculoskeletal:         General: No deformity or signs of injury.      Cervical back: Normal range of motion and neck supple.   Skin:     General: Skin is warm and dry.   Neurological:      General: No focal deficit present.      Mental Status: She is alert and oriented to person, place, and time. Mental status is at baseline.   Psychiatric:         Mood and Affect: Mood normal.         Behavior: Behavior normal.         Thought Content: Thought content normal.         Judgment: Judgment normal.       Vents:  Oxygen Concentration (%): 28 (04/28/22 0723)    Lines/Drains/Airways       Peripheral Intravenous Line  Duration                  Peripheral IV - Single Lumen 04/27/22 1320 20 G Left Forearm <1 day                    Significant Labs:    CBC/Anemia Profile:  Recent Labs   Lab 04/27/22  1322   WBC 13.13*   HGB 13.2   HCT 43.9      MCV 94   RDW 14.5        Chemistries:  Recent Labs   Lab 04/27/22  1322      K 4.6      CO2 23   BUN 13   CREATININE 0.9   CALCIUM 9.2   ALBUMIN 3.7   PROT 7.1   BILITOT 0.4   ALKPHOS 113   ALT 27   AST 26      Latest Reference Range & Units 04/27/22 13:22   BNP 0 - 99 pg/mL <10 [1]    - 260 U/L 460 (H) [2]   Troponin I 0.000 - 0.026 ng/mL 0.017 [3]   Stress echo April 7, 2022  Summary    The left ventricle is normal in size with concentric remodeling and normal systolic function.  The test was stopped because the patient experienced shortness of breath. The patient requested the test to be stopped.  The patient's exercise capacity was severely impaired.  There were no arrhythmias during stress.  The estimated ejection fraction is 60%.  Normal left ventricular diastolic function.  Normal right ventricular size with normal right ventricular systolic function.  Mild tricuspid regurgitation.  Normal central venous pressure (3 mmHg).  The estimated PA systolic pressure is 29 mmHg.  The stress echo portion of this study is negative for  myocardial ischemia.  The ECG portion of this study is negative for myocardial ischemia.    EKG 04/27/2022    Vent. Rate : 091 BPM     Atrial Rate : 091 BPM      P-R Int : 160 ms          QRS Dur : 092 ms       QT Int : 382 ms       P-R-T Axes : 051 -42 019 degrees      QTc Int : 469 ms     Normal sinus rhythm   Biatrial enlargement   Left axis deviation   Low voltage QRS   Cannot rule out Anterior infarct (cited on or before 27-APR-2022)   Abnormal ECG   When compared with ECG of 07-APR-2022 12:39,   Previous ECG has undetermined rhythm, needs review   The axis Shifted left   T wave inversion now evident in Inferior leads       Significant Imaging:         CTA Chest Non-Coronary (PE Study)  Order: [id number]  Status: Final result    Visible to patient: Yes (not seen)    Next appt: 05/05/2022 at 08:00 AM in Pulmonology (PULMONARY LAB, [facility])    0 Result Notes    Details    Reading Physician Reading Date Result Priority   Jerry Skelton MD  [phone]  [phone] 4/27/2022 STAT     Narrative & Impression  EXAMINATION:  CTA CHEST NON CORONARY     CLINICAL HISTORY:  Chest pain and shortness of breath     TECHNIQUE:  After the intravenous administration of 100 cc of Omni 350 nonionic contrast using CT pulmonary angio technique, 1.25  Mm axial images were acquired using helical CT technique from the lung apices through costophrenic sulci.  Sagittal coronal and oblique MIPS were also submitted for interpretation.     COMPARISON:  Chest x-ray dated 04/27/2022     FINDINGS:  -Pulmonary arteries: Pulmonary arteries are well opacified.  No evidence of pulmonary embolism.  No evidence of pulmonary hypertension.  No right heart strain is identified with RV/LV ratio < 0.9.     -Lungs: Diffuse interstitial thickening with ground-glass opacities predominately throughout the lower lobes with associated bronchiectasis concerning for interstitial pneumonia versus interstitial edema.  Similar findings within the right middle lobe.   Upper lobes are largely clear.  Concerning for     -Pleura: No thickening or fluid.     -Mediastinum/Paula:Mildly enlarged lymph nodes are seen within the mediastinum and bilateral hilar regions.     -Axilla: Shotty adenopathy.     -Thyroid: Heterogeneous enlargement of the thyroid gland bilaterally concerning for multinodular goiter.  Recommend follow-up thyroid ultrasound.     -Heart/Aorta: Heart size is enlarged.  Mild coronary artery disease.  No pericardial effusion. Aorta normal caliber.   Aorta demonstrates mild atherosclerotic disease.     -Bones/Chest Wall: Intact  with multilevel degenerative spondylosis throughout the thoracic spine     -Upper Abdomen: Small hiatal hernia.  Hepatomegaly.  Spleen is normal in size.  Multiple indeterminate hypodensities within the liver not fully characterized on phase of imaging largest within the left hepatic lobe measuring 13 mm.     Impression:     No evidence of pulmonary embolism.     Diffuse interstitial thickening with ground-glass opacities predominately throughout the lower lobes with associated bronchiectasis concerning for interstitial pneumonia versus interstitial edema.     Enlarged lymph nodes are seen within the mediastinum and bilateral hilar regions likely reactive.  Follow-up after acute exacerbation is recommended.     Heterogeneous enlargement of the thyroid gland bilaterally concerning for multinodular goiter. Recommend follow-up thyroid ultrasound.        Chest x-ray 04/27/2022    CLINICAL HISTORY:  SOB; Pneumonia, unspecified organism     TECHNIQUE:  PA and lateral views of the chest were performed.     COMPARISON:  Chest radiograph February 23, 2022     FINDINGS:  New interstitial opacities are seen within the lung bases bilaterally which may represent multifocal pneumonia, edema, or changes of aspiration depending on the clinical context.  No pneumothorax or pleural effusion.  Unchanged mild cardiomegaly.  No acute osseous abnormality.

## 2022-04-28 NOTE — HPI
51-year-old female patient with past medical history of asthma and sleep apnea sent by pulmonology nurse practitioner to the emergency room for nonresolving pneumonia over few weeks.SOB, wheezing, productive cough, yellow sputum, fatigue for 2 months.    She was treated about 3 weeks ago with doxycycline but did not notice a persistent improvement.  O2 sat for the last few weeks were between 86-90%.  She does report 15 years of smoking marijuana daily.    She did vape at some point November 2021.

## 2022-04-29 LAB
ANA SER QL IF: NORMAL
ANION GAP SERPL CALC-SCNC: 8 MMOL/L (ref 8–16)
BASOPHILS # BLD AUTO: 0.08 K/UL (ref 0–0.2)
BASOPHILS NFR BLD: 0.6 % (ref 0–1.9)
BUN SERPL-MCNC: 13 MG/DL (ref 6–20)
CALCIUM SERPL-MCNC: 8.8 MG/DL (ref 8.7–10.5)
CCP AB SER IA-ACNC: <0.5 U/ML
CHLORIDE SERPL-SCNC: 103 MMOL/L (ref 95–110)
CO2 SERPL-SCNC: 29 MMOL/L (ref 23–29)
CREAT SERPL-MCNC: 0.8 MG/DL (ref 0.5–1.4)
DIFFERENTIAL METHOD: ABNORMAL
EOSINOPHIL # BLD AUTO: 0.6 K/UL (ref 0–0.5)
EOSINOPHIL NFR BLD: 4.4 % (ref 0–8)
ERYTHROCYTE [DISTWIDTH] IN BLOOD BY AUTOMATED COUNT: 14.5 % (ref 11.5–14.5)
EST. GFR  (AFRICAN AMERICAN): >60 ML/MIN/1.73 M^2
EST. GFR  (NON AFRICAN AMERICAN): >60 ML/MIN/1.73 M^2
GLUCOSE SERPL-MCNC: 110 MG/DL (ref 70–110)
HCT VFR BLD AUTO: 38.7 % (ref 37–48.5)
HGB BLD-MCNC: 11.7 G/DL (ref 12–16)
IMM GRANULOCYTES # BLD AUTO: 0.09 K/UL (ref 0–0.04)
IMM GRANULOCYTES NFR BLD AUTO: 0.7 % (ref 0–0.5)
LYMPHOCYTES # BLD AUTO: 2.1 K/UL (ref 1–4.8)
LYMPHOCYTES NFR BLD: 16.7 % (ref 18–48)
MCH RBC QN AUTO: 28.1 PG (ref 27–31)
MCHC RBC AUTO-ENTMCNC: 30.2 G/DL (ref 32–36)
MCV RBC AUTO: 93 FL (ref 82–98)
MONOCYTES # BLD AUTO: 0.9 K/UL (ref 0.3–1)
MONOCYTES NFR BLD: 7.5 % (ref 4–15)
NEUTROPHILS # BLD AUTO: 8.8 K/UL (ref 1.8–7.7)
NEUTROPHILS NFR BLD: 70.1 % (ref 38–73)
NRBC BLD-RTO: 0 /100 WBC
PLATELET # BLD AUTO: 370 K/UL (ref 150–450)
PMV BLD AUTO: 9.6 FL (ref 9.2–12.9)
POTASSIUM SERPL-SCNC: 4.1 MMOL/L (ref 3.5–5.1)
RBC # BLD AUTO: 4.16 M/UL (ref 4–5.4)
SODIUM SERPL-SCNC: 140 MMOL/L (ref 136–145)
WBC # BLD AUTO: 12.61 K/UL (ref 3.9–12.7)

## 2022-04-29 PROCEDURE — 94640 AIRWAY INHALATION TREATMENT: CPT

## 2022-04-29 PROCEDURE — 63600175 PHARM REV CODE 636 W HCPCS: Performed by: EMERGENCY MEDICINE

## 2022-04-29 PROCEDURE — 87449 NOS EACH ORGANISM AG IA: CPT | Performed by: INTERNAL MEDICINE

## 2022-04-29 PROCEDURE — 63700000 PHARM REV CODE 250 ALT 637 W/O HCPCS: Performed by: FAMILY MEDICINE

## 2022-04-29 PROCEDURE — 99900035 HC TECH TIME PER 15 MIN (STAT)

## 2022-04-29 PROCEDURE — 94761 N-INVAS EAR/PLS OXIMETRY MLT: CPT

## 2022-04-29 PROCEDURE — 27000221 HC OXYGEN, UP TO 24 HOURS

## 2022-04-29 PROCEDURE — 99233 SBSQ HOSP IP/OBS HIGH 50: CPT | Mod: ,,, | Performed by: INTERNAL MEDICINE

## 2022-04-29 PROCEDURE — 25000242 PHARM REV CODE 250 ALT 637 W/ HCPCS: Performed by: INTERNAL MEDICINE

## 2022-04-29 PROCEDURE — 86200 CCP ANTIBODY: CPT | Performed by: INTERNAL MEDICINE

## 2022-04-29 PROCEDURE — 86635 COCCIDIOIDES ANTIBODY: CPT | Performed by: INTERNAL MEDICINE

## 2022-04-29 PROCEDURE — 11000001 HC ACUTE MED/SURG PRIVATE ROOM

## 2022-04-29 PROCEDURE — 25000242 PHARM REV CODE 250 ALT 637 W/ HCPCS: Performed by: FAMILY MEDICINE

## 2022-04-29 PROCEDURE — 36415 COLL VENOUS BLD VENIPUNCTURE: CPT | Performed by: INTERNAL MEDICINE

## 2022-04-29 PROCEDURE — 99233 PR SUBSEQUENT HOSPITAL CARE,LEVL III: ICD-10-PCS | Mod: ,,, | Performed by: INTERNAL MEDICINE

## 2022-04-29 PROCEDURE — 85025 COMPLETE CBC W/AUTO DIFF WBC: CPT | Performed by: FAMILY MEDICINE

## 2022-04-29 PROCEDURE — 25000003 PHARM REV CODE 250: Performed by: FAMILY MEDICINE

## 2022-04-29 PROCEDURE — 80048 BASIC METABOLIC PNL TOTAL CA: CPT | Performed by: FAMILY MEDICINE

## 2022-04-29 RX ORDER — AZITHROMYCIN 250 MG/1
500 TABLET, FILM COATED ORAL ONCE
Status: COMPLETED | OUTPATIENT
Start: 2022-04-29 | End: 2022-04-29

## 2022-04-29 RX ORDER — HYDROCODONE BITARTRATE AND ACETAMINOPHEN 5; 325 MG/1; MG/1
1 TABLET ORAL EVERY 6 HOURS PRN
Status: DISCONTINUED | OUTPATIENT
Start: 2022-04-29 | End: 2022-05-02 | Stop reason: HOSPADM

## 2022-04-29 RX ADMIN — ALBUTEROL SULFATE 2.5 MG: 2.5 SOLUTION RESPIRATORY (INHALATION) at 07:04

## 2022-04-29 RX ADMIN — Medication 4 TABLET: at 04:04

## 2022-04-29 RX ADMIN — BUDESONIDE 0.5 MG: 0.5 INHALANT ORAL at 07:04

## 2022-04-29 RX ADMIN — AMLODIPINE BESYLATE 10 MG: 10 TABLET ORAL at 09:04

## 2022-04-29 RX ADMIN — ARFORMOTEROL TARTRATE 15 MCG: 15 SOLUTION RESPIRATORY (INHALATION) at 07:04

## 2022-04-29 RX ADMIN — ALBUTEROL SULFATE 2.5 MG: 2.5 SOLUTION RESPIRATORY (INHALATION) at 01:04

## 2022-04-29 RX ADMIN — SERTRALINE HYDROCHLORIDE 50 MG: 50 TABLET ORAL at 09:04

## 2022-04-29 RX ADMIN — AZITHROMYCIN MONOHYDRATE 500 MG: 250 TABLET ORAL at 12:04

## 2022-04-29 RX ADMIN — ENOXAPARIN SODIUM 40 MG: 40 INJECTION SUBCUTANEOUS at 09:04

## 2022-04-29 RX ADMIN — ENOXAPARIN SODIUM 40 MG: 40 INJECTION SUBCUTANEOUS at 08:04

## 2022-04-29 RX ADMIN — HYDROCHLOROTHIAZIDE 12.5 MG: 12.5 TABLET ORAL at 09:04

## 2022-04-29 NOTE — PROGRESS NOTES
[facility]  [facility] Medicine  Progress Note    Patient Name: Ayanna Alicia  MRN: [id number]  Patient Class: IP- Inpatient   Admission Date: 4/27/2022  Length of Stay: 1 days  Attending Physician: Thaddeus Nassar MD  Primary Care Provider: Madeleine Enrique MD        Subjective:     Principal Problem:CAP (community acquired pneumonia)        HPI:  Patient is a 51 y.o. aa female with a PMHx of asthma, fatty liver, HTN, and KALYANI who presents to the Emergency Department for SOB which onset gradually at the beginning of this month. Patient currently being seen by pulmonology outpatient for asthma.  She was treated with doxycycline and steroids without improvement.  Patient was seen by pulmonology today and was referred to the ED.  She reports shortness of breath is worse on exertion and relieves with rest.  States that she may have had fever when the symptoms 1st started however not now.  Reports nonproductive cough.    In the ED, chest xray concerning bilateral interstitial infiltrates. D dimer elevated and CTA chest was pending at time of admission. She was started on rocephin and azithromycin. HM consulted and patient placed in observation for pna.       Overview/Hospital Course:  4/28:  WBC trending down.  Currently on Rocephin and azithromycin.  Procalcitonin negative.  Possibly viral pneumonia however given patient's improvement on antibiotic therapy will continue current treatment.  Pulmonology consulted on case.  4/29:  WBC trending down.  Continue Rocephin.  Rheumatoid factor elevated.  Checking anti CCP.  Will plan repeat inflammatory markers in a.m..  Repeat chest x-ray.  Possible bronch on Monday pending clinical response.      Interval History: WBC trending down.  Continue Rocephin.  Rheumatoid factor elevated.  Checking anti CCP.  Will plan repeat inflammatory markers in a.m..  Repeat chest x-ray.  Possible bronch on Monday pending clinical response.    Patient reports diarrhea overnight.  Heartburn with  meals.  Denies any other issues.    Review of Systems   Constitutional:  Negative for fatigue and fever.   HENT:  Negative for sinus pressure.    Eyes:  Negative for visual disturbance.   Respiratory:  Positive for cough and shortness of breath.    Cardiovascular:  Negative for chest pain.   Gastrointestinal:  Negative for nausea and vomiting.   Genitourinary:  Negative for difficulty urinating.   Musculoskeletal:  Negative for back pain.   Skin:  Negative for rash.   Neurological:  Negative for headaches.   Psychiatric/Behavioral:  Negative for confusion.    Objective:     Vital Signs (Most Recent):  Temp: 97.8 °F (36.6 °C) (04/29/22 1144)  Pulse: 87 (04/29/22 1306)  Resp: 18 (04/29/22 1306)  BP: 119/63 (04/29/22 1144)  SpO2: (!) 93 % (04/29/22 1306)   Vital Signs (24h Range):  Temp:  [97.8 °F (36.6 °C)-98.7 °F (37.1 °C)] 97.8 °F (36.6 °C)  Pulse:  [] 87  Resp:  [18-20] 18  SpO2:  [92 %-98 %] 93 %  BP: (114-146)/(63-78) 119/63     Weight: 115.7 kg (255 lb 1.2 oz)  Body mass index is 41.17 kg/m².  No intake or output data in the 24 hours ending 04/29/22 1438   Physical Exam  Constitutional:       General: She is not in acute distress.     Appearance: She is well-developed. She is not diaphoretic.   HENT:      Head: Normocephalic and atraumatic.   Eyes:      Pupils: Pupils are equal, round, and reactive to light.   Cardiovascular:      Rate and Rhythm: Normal rate and regular rhythm.      Heart sounds: Normal heart sounds. No murmur heard.    No friction rub. No gallop.   Pulmonary:      Effort: Pulmonary effort is normal. No respiratory distress.      Breath sounds: No stridor. No wheezing, rhonchi or rales.   Abdominal:      General: Bowel sounds are normal. There is no distension.      Palpations: Abdomen is soft. There is no mass.      Tenderness: There is no abdominal tenderness. There is no guarding.   Musculoskeletal:      Right lower leg: No edema.      Left lower leg: No edema.   Skin:     General:  Skin is warm.      Findings: No erythema.   Neurological:      Mental Status: She is alert and oriented to person, place, and time.   Psychiatric:         Mood and Affect: Mood normal.         Behavior: Behavior normal.         Thought Content: Thought content normal.         Judgment: Judgment normal.       Significant Labs: All pertinent labs within the past 24 hours have been reviewed.    Significant Imaging: I have reviewed all pertinent imaging results/findings within the past 24 hours.      Assessment/Plan:      * CAP (community acquired pneumonia)  Bilateral lower lobe infiltrates  Leukocytosis noted  CTA pending  Will continue Rocephin azithromycin  Sputum culture pending    4/28:  Leukocytosis improving  CTA showing bilateral interstitial infiltrate  Procalcitonin is negative therefore  Viral pneumonia is possibility  However given patient's current improvement on antibiotic therapy  Will plan to continue Rocephin azithromycin  Sputum culture pending    4/29:  Continue Rocephin  Completed azithromycin    Acute hypoxemic respiratory failure  Patient with Hypoxic Respiratory failure which is Acute.  she is not on home oxygen. Supplemental oxygen was provided and noted- Oxygen Concentration (%):  [28] 28.   Signs/symptoms of respiratory failure include- tachypnea and increased work of breathing. Contributing diagnoses includes - Pneumonia Labs and images were reviewed. Patient Has not had a recent ABG. Will treat underlying causes and adjust management of respiratory failure as follows-     Treat pneumonia   Home o2 eval prior to dc     4/28:  Patient being seen by pulmonology outpatient  Will consult pulmonology on case  Wean O2 as tolerated  Home O2 eval prior to discharge    4/29:  Wean O2 as tolerated  Rheumatoid factor elevated  Anti CCP pending  Will recheck inflammatory markers tomorrow  Repeat chest x-ray  Possible bronch on Monday depending on clinical response      Sleep apnea  Does not use CPAP at  home  Currently being followed by pulm outpatient      Asthma  Will continue steroid and Laba nebs      HTN (hypertension)  Resume home meds  Hydralazine p.r.n.        VTE Risk Mitigation (From admission, onward)         Ordered     enoxaparin injection 40 mg  Every 12 hours         04/27/22 1533                Discharge Planning   JANETTE:      Code Status: Full Code   Is the patient medically ready for discharge?:     Reason for patient still in hospital (select all that apply): Patient trending condition  Discharge Plan A: Home                  Thaddeus Nassar MD  Department of Hospital Medicine   [facility]

## 2022-04-29 NOTE — PROGRESS NOTES
[facility]  Pulmonology  Consult Note    Patient Name: Ayanna Alicia  MRN: [id number]  Admission Date: 4/27/2022  Hospital Length of Stay: 1 days  Code Status: Full Code  Attending Physician: Thaddeus Nassar MD  Primary Care Provider: Madeleine Enrique MD   Principal Problem: CAP (community acquired pneumonia)    [unfilled]  Subjective:     HPI:  51-year-old female patient with past medical history of asthma and sleep apnea sent by pulmonology nurse practitioner to the emergency room for nonresolving pneumonia over few weeks.SOB, wheezing, productive cough, yellow sputum, fatigue for 2 months.    She was treated about 3 weeks ago with doxycycline but did not notice a persistent improvement.  O2 sat for the last few weeks were between 86-90%.  She does report 15 years of smoking marijuana daily.    She did vape at some point November 2021.          4/29 O2 SAT 93% ON 2 LITERS/MINUTE.  AFEBRILE.  SOB fatigue decreased activity.  Preliminary respiratory culture gram-positive cocci gram-negative rods rales.  Workup showed carotid factor 30, mild elevation of ESR and CRP.  On IV Rocephin azithromycin    Review of Systems   Constitutional:  Positive for activity change and fatigue. Negative for chills and fever.   HENT:  Positive for congestion. Negative for drooling, ear discharge and nosebleeds.    Eyes:  Negative for pain, discharge and itching.   Respiratory:  Positive for apnea, cough and shortness of breath. Negative for choking.    Cardiovascular:  Negative for chest pain.   Gastrointestinal:  Negative for anal bleeding.   Endocrine: Negative for cold intolerance.   Genitourinary:  Negative for hematuria.   Musculoskeletal:  Positive for arthralgias. Negative for neck stiffness.   Skin:  Negative for rash.   Allergic/Immunologic: Negative for immunocompromised state.   Neurological:  Positive for weakness. Negative for seizures and facial asymmetry.   Hematological:  Negative for adenopathy.    Psychiatric/Behavioral:  Negative for behavioral problems, self-injury and suicidal ideas.    Objective:     Vital Signs (Most Recent):  Temp: 97.8 °F (36.6 °C) (04/29/22 1144)  Pulse: 87 (04/29/22 1306)  Resp: 18 (04/29/22 1306)  BP: 119/63 (04/29/22 1144)  SpO2: (!) 93 % (04/29/22 1306) Vital Signs (24h Range):  Temp:  [97.8 °F (36.6 °C)-98.7 °F (37.1 °C)] 97.8 °F (36.6 °C)  Pulse:  [] 87  Resp:  [18-20] 18  SpO2:  [92 %-98 %] 93 %  BP: (114-146)/(63-78) 119/63     Weight: 115.7 kg (255 lb 1.2 oz)  Body mass index is 41.17 kg/m².    No intake or output data in the 24 hours ending 04/29/22 1537      Physical Exam  Vitals and nursing note reviewed.   Constitutional:       General: She is not in acute distress.     Appearance: She is well-developed. She is ill-appearing.   HENT:      Head: Normocephalic and atraumatic.      Nose: Nose normal.   Eyes:      Extraocular Movements: Extraocular movements intact.   Cardiovascular:      Rate and Rhythm: Normal rate and regular rhythm.   Pulmonary:      Effort: Pulmonary effort is normal.      Breath sounds: No stridor. Rales present.      Comments: Bilateral mid to lower lung field rales  Abdominal:      General: There is no distension.   Musculoskeletal:         General: No deformity or signs of injury.      Cervical back: Normal range of motion and neck supple.   Skin:     General: Skin is warm and dry.   Neurological:      General: No focal deficit present.      Mental Status: She is alert and oriented to person, place, and time. Mental status is at baseline.   Psychiatric:         Mood and Affect: Mood normal.         Behavior: Behavior normal.         Thought Content: Thought content normal.         Judgment: Judgment normal.       Vents:  Oxygen Concentration (%): 28 (04/29/22 0733)    Lines/Drains/Airways       Peripheral Intravenous Line  Duration                  Peripheral IV - Single Lumen 04/27/22 1320 20 G Left Forearm 2 days                     Significant Labs:    CBC/Anemia Profile:  Recent Labs   Lab 04/28/22  1015 04/29/22  0601   WBC 10.31 12.61   HGB 12.0 11.7*   HCT 40.0 38.7    370   MCV 94 93   RDW 14.5 14.5          Chemistries:  Recent Labs   Lab 04/28/22  1014 04/29/22  0601    140   K 4.4 4.1    103   CO2 25 29   BUN 12 13   CREATININE 0.9 0.8   CALCIUM 9.0 8.8        Latest Reference Range & Units 04/27/22 13:22   BNP 0 - 99 pg/mL <10 [1]    - 260 U/L 460 (H) [2]   Troponin I 0.000 - 0.026 ng/mL 0.017 [3]   Stress echo April 7, 2022  Summary    The left ventricle is normal in size with concentric remodeling and normal systolic function.  The test was stopped because the patient experienced shortness of breath. The patient requested the test to be stopped.  The patient's exercise capacity was severely impaired.  There were no arrhythmias during stress.  The estimated ejection fraction is 60%.  Normal left ventricular diastolic function.  Normal right ventricular size with normal right ventricular systolic function.  Mild tricuspid regurgitation.  Normal central venous pressure (3 mmHg).  The estimated PA systolic pressure is 29 mmHg.  The stress echo portion of this study is negative for myocardial ischemia.  The ECG portion of this study is negative for myocardial ischemia.    EKG 04/27/2022    Vent. Rate : 091 BPM     Atrial Rate : 091 BPM      P-R Int : 160 ms          QRS Dur : 092 ms       QT Int : 382 ms       P-R-T Axes : 051 -42 019 degrees      QTc Int : 469 ms     Normal sinus rhythm   Biatrial enlargement   Left axis deviation   Low voltage QRS   Cannot rule out Anterior infarct (cited on or before 27-APR-2022)   Abnormal ECG   When compared with ECG of 07-APR-2022 12:39,   Previous ECG has undetermined rhythm, needs review   The axis Shifted left   T wave inversion now evident in Inferior leads       Significant Imaging:         CTA Chest Non-Coronary (PE Study)  Order: [id number]  Status: Final result     Visible to patient: Yes (not seen)    Next appt: 05/05/2022 at 08:00 AM in Pulmonology (PULMONARY LAB, [facility])    0 Result Notes    Details    Reading Physician Reading Date Result Priority   Jerry Skelton MD  [phone] 4/27/2022 STAT     Narrative & Impression  EXAMINATION:  CTA CHEST NON CORONARY     CLINICAL HISTORY:  Chest pain and shortness of breath     TECHNIQUE:  After the intravenous administration of 100 cc of Omni 350 nonionic contrast using CT pulmonary angio technique, 1.25  Mm axial images were acquired using helical CT technique from the lung apices through costophrenic sulci.  Sagittal coronal and oblique MIPS were also submitted for interpretation.     COMPARISON:  Chest x-ray dated 04/27/2022     FINDINGS:  -Pulmonary arteries: Pulmonary arteries are well opacified.  No evidence of pulmonary embolism.  No evidence of pulmonary hypertension.  No right heart strain is identified with RV/LV ratio < 0.9.     -Lungs: Diffuse interstitial thickening with ground-glass opacities predominately throughout the lower lobes with associated bronchiectasis concerning for interstitial pneumonia versus interstitial edema.  Similar findings within the right middle lobe.  Upper lobes are largely clear.  Concerning for     -Pleura: No thickening or fluid.     -Mediastinum/Paula:Mildly enlarged lymph nodes are seen within the mediastinum and bilateral hilar regions.     -Axilla: Shotty adenopathy.     -Thyroid: Heterogeneous enlargement of the thyroid gland bilaterally concerning for multinodular goiter.  Recommend follow-up thyroid ultrasound.     -Heart/Aorta: Heart size is enlarged.  Mild coronary artery disease.  No pericardial effusion. Aorta normal caliber.   Aorta demonstrates mild atherosclerotic disease.     -Bones/Chest Wall: Intact  with multilevel degenerative spondylosis throughout the thoracic spine     -Upper Abdomen: Small hiatal hernia.  Hepatomegaly.  Spleen is normal in size.   Multiple indeterminate hypodensities within the liver not fully characterized on phase of imaging largest within the left hepatic lobe measuring 13 mm.     Impression:     No evidence of pulmonary embolism.     Diffuse interstitial thickening with ground-glass opacities predominately throughout the lower lobes with associated bronchiectasis concerning for interstitial pneumonia versus interstitial edema.     Enlarged lymph nodes are seen within the mediastinum and bilateral hilar regions likely reactive.  Follow-up after acute exacerbation is recommended.     Heterogeneous enlargement of the thyroid gland bilaterally concerning for multinodular goiter. Recommend follow-up thyroid ultrasound.        Chest x-ray 04/27/2022    CLINICAL HISTORY:  SOB; Pneumonia, unspecified organism     TECHNIQUE:  PA and lateral views of the chest were performed.     COMPARISON:  Chest radiograph February 23, 2022     FINDINGS:  New interstitial opacities are seen within the lung bases bilaterally which may represent multifocal pneumonia, edema, or changes of aspiration depending on the clinical context.  No pneumothorax or pleural effusion.  Unchanged mild cardiomegaly.  No acute osseous abnormality.            ABG  No results for input(s): PH, PO2, PCO2, HCO3, BE in the last 168 hours.  Assessment/Plan:     * CAP (community acquired pneumonia)   O2 target sat 92-94% , IV rocephine AZT , nebs , cx sent .   Autoimmune etiologieslow suspicion, xray changes occurred within few weeks  , will send basic rheum w/up. HIV   WBC decreased from 13 K to thank a on antibiotic.  Send sputum microbiology.  Send autoimmune serologies.  Fungal serologies.  4/29 gram-positive cocci and gram-negative rods in sputum rare.  IV Rocephin azithromycin.  Await identification    Pulmonary infiltrates on CXR  With history of marijuana smoking.  Unusual presentation of pneumonia Suspicion of inhalation related lung injury.  If afebrile and negative microbiology  will try empiric steroids in the next 24 hours.    4/29 treating now as community-acquired pneumonia IV Rocephin and azithromycin.  Repeat chest x-ray CRP and ESR in a.m..  Now mildly elevated.  Rare Gram-positive cocci and Gram-negative rods in sputum If no improvement suspicion of marijuana smoking related hypersensitivity pneumonitis/lung injury.  Might proceed with a trial of empiric steroid     Acute hypoxemic respiratory failure  Patient with Hypoxic Respiratory failure which is Acute.  she is not on home oxygen. Supplemental oxygen was provided and noted- Oxygen Concentration (%):  [28] 28.   Signs/symptoms of respiratory failure include- tachypnea and increased work of breathing. Contributing diagnoses includes - Pneumonia Labs and images were reviewed. Patient Has not had a recent ABG. Will treat underlying causes and adjust management of respiratory failure as follows- O2 target sat 92-94% , IV rocephine AZT , nebs , cx sent   4/29 treating now as community-acquired pneumonia IV Rocephin and azithromycin.  Repeat chest x-ray CRP and ESR in a.m..  If no improvement suspicion of marijuana smoking related hypersensitivity pneumonitis/lung injury.  Might proceed with a trial of empiric steroid     Asthma  Albuterol , ICS , LABA  4/29 continue albuterol Brovana budesonide      Discussed with hospital medicine attending.    Mode Thompson MD  Pulmonology  [facility]

## 2022-04-29 NOTE — SUBJECTIVE & OBJECTIVE
4/29 O2 SAT 93% ON 2 LITERS/MINUTE.  AFEBRILE.  SOB fatigue decreased activity.  Preliminary respiratory culture gram-positive cocci gram-negative rods rales.  Workup showed carotid factor 30, mild elevation of ESR and CRP.  On IV Rocephin azithromycin    Review of Systems   Constitutional:  Positive for activity change and fatigue. Negative for chills and fever.   HENT:  Positive for congestion. Negative for drooling, ear discharge and nosebleeds.    Eyes:  Negative for pain, discharge and itching.   Respiratory:  Positive for apnea, cough and shortness of breath. Negative for choking.    Cardiovascular:  Negative for chest pain.   Gastrointestinal:  Negative for anal bleeding.   Endocrine: Negative for cold intolerance.   Genitourinary:  Negative for hematuria.   Musculoskeletal:  Positive for arthralgias. Negative for neck stiffness.   Skin:  Negative for rash.   Allergic/Immunologic: Negative for immunocompromised state.   Neurological:  Positive for weakness. Negative for seizures and facial asymmetry.   Hematological:  Negative for adenopathy.   Psychiatric/Behavioral:  Negative for behavioral problems, self-injury and suicidal ideas.    Objective:     Vital Signs (Most Recent):  Temp: 97.8 °F (36.6 °C) (04/29/22 1144)  Pulse: 87 (04/29/22 1306)  Resp: 18 (04/29/22 1306)  BP: 119/63 (04/29/22 1144)  SpO2: (!) 93 % (04/29/22 1306) Vital Signs (24h Range):  Temp:  [97.8 °F (36.6 °C)-98.7 °F (37.1 °C)] 97.8 °F (36.6 °C)  Pulse:  [] 87  Resp:  [18-20] 18  SpO2:  [92 %-98 %] 93 %  BP: (114-146)/(63-78) 119/63     Weight: 115.7 kg (255 lb 1.2 oz)  Body mass index is 41.17 kg/m².    No intake or output data in the 24 hours ending 04/29/22 1537      Physical Exam  Vitals and nursing note reviewed.   Constitutional:       General: She is not in acute distress.     Appearance: She is well-developed. She is ill-appearing.   HENT:      Head: Normocephalic and atraumatic.      Nose: Nose normal.   Eyes:       Extraocular Movements: Extraocular movements intact.   Cardiovascular:      Rate and Rhythm: Normal rate and regular rhythm.   Pulmonary:      Effort: Pulmonary effort is normal.      Breath sounds: No stridor. Rales present.      Comments: Bilateral mid to lower lung field rales  Abdominal:      General: There is no distension.   Musculoskeletal:         General: No deformity or signs of injury.      Cervical back: Normal range of motion and neck supple.   Skin:     General: Skin is warm and dry.   Neurological:      General: No focal deficit present.      Mental Status: She is alert and oriented to person, place, and time. Mental status is at baseline.   Psychiatric:         Mood and Affect: Mood normal.         Behavior: Behavior normal.         Thought Content: Thought content normal.         Judgment: Judgment normal.       Vents:  Oxygen Concentration (%): 28 (04/29/22 0733)    Lines/Drains/Airways       Peripheral Intravenous Line  Duration                  Peripheral IV - Single Lumen 04/27/22 1320 20 G Left Forearm 2 days                    Significant Labs:    CBC/Anemia Profile:  Recent Labs   Lab 04/28/22  1015 04/29/22  0601   WBC 10.31 12.61   HGB 12.0 11.7*   HCT 40.0 38.7    370   MCV 94 93   RDW 14.5 14.5          Chemistries:  Recent Labs   Lab 04/28/22  1014 04/29/22  0601    140   K 4.4 4.1    103   CO2 25 29   BUN 12 13   CREATININE 0.9 0.8   CALCIUM 9.0 8.8        Latest Reference Range & Units 04/27/22 13:22   BNP 0 - 99 pg/mL <10 [1]    - 260 U/L 460 (H) [2]   Troponin I 0.000 - 0.026 ng/mL 0.017 [3]   Stress echo April 7, 2022  Summary    The left ventricle is normal in size with concentric remodeling and normal systolic function.  The test was stopped because the patient experienced shortness of breath. The patient requested the test to be stopped.  The patient's exercise capacity was severely impaired.  There were no arrhythmias during stress.  The estimated  ejection fraction is 60%.  Normal left ventricular diastolic function.  Normal right ventricular size with normal right ventricular systolic function.  Mild tricuspid regurgitation.  Normal central venous pressure (3 mmHg).  The estimated PA systolic pressure is 29 mmHg.  The stress echo portion of this study is negative for myocardial ischemia.  The ECG portion of this study is negative for myocardial ischemia.    EKG 04/27/2022    Vent. Rate : 091 BPM     Atrial Rate : 091 BPM      P-R Int : 160 ms          QRS Dur : 092 ms       QT Int : 382 ms       P-R-T Axes : 051 -42 019 degrees      QTc Int : 469 ms     Normal sinus rhythm   Biatrial enlargement   Left axis deviation   Low voltage QRS   Cannot rule out Anterior infarct (cited on or before 27-APR-2022)   Abnormal ECG   When compared with ECG of 07-APR-2022 12:39,   Previous ECG has undetermined rhythm, needs review   The axis Shifted left   T wave inversion now evident in Inferior leads       Significant Imaging:         CTA Chest Non-Coronary (PE Study)  Order: [id number]  Status: Final result    Visible to patient: Yes (not seen)    Next appt: 05/05/2022 at 08:00 AM in Pulmonology (PULMONARY LAB, [facility])    0 Result Notes    Details    Reading Physician Reading Date Result Priority   Jerry Skelton MD  [phone] 4/27/2022 STAT     Narrative & Impression  EXAMINATION:  CTA CHEST NON CORONARY     CLINICAL HISTORY:  Chest pain and shortness of breath     TECHNIQUE:  After the intravenous administration of 100 cc of Omni 350 nonionic contrast using CT pulmonary angio technique, 1.25  Mm axial images were acquired using helical CT technique from the lung apices through costophrenic sulci.  Sagittal coronal and oblique MIPS were also submitted for interpretation.     COMPARISON:  Chest x-ray dated 04/27/2022     FINDINGS:  -Pulmonary arteries: Pulmonary arteries are well opacified.  No evidence of pulmonary embolism.  No evidence of pulmonary  hypertension.  No right heart strain is identified with RV/LV ratio < 0.9.     -Lungs: Diffuse interstitial thickening with ground-glass opacities predominately throughout the lower lobes with associated bronchiectasis concerning for interstitial pneumonia versus interstitial edema.  Similar findings within the right middle lobe.  Upper lobes are largely clear.  Concerning for     -Pleura: No thickening or fluid.     -Mediastinum/Paula:Mildly enlarged lymph nodes are seen within the mediastinum and bilateral hilar regions.     -Axilla: Shotty adenopathy.     -Thyroid: Heterogeneous enlargement of the thyroid gland bilaterally concerning for multinodular goiter.  Recommend follow-up thyroid ultrasound.     -Heart/Aorta: Heart size is enlarged.  Mild coronary artery disease.  No pericardial effusion. Aorta normal caliber.   Aorta demonstrates mild atherosclerotic disease.     -Bones/Chest Wall: Intact  with multilevel degenerative spondylosis throughout the thoracic spine     -Upper Abdomen: Small hiatal hernia.  Hepatomegaly.  Spleen is normal in size.  Multiple indeterminate hypodensities within the liver not fully characterized on phase of imaging largest within the left hepatic lobe measuring 13 mm.     Impression:     No evidence of pulmonary embolism.     Diffuse interstitial thickening with ground-glass opacities predominately throughout the lower lobes with associated bronchiectasis concerning for interstitial pneumonia versus interstitial edema.     Enlarged lymph nodes are seen within the mediastinum and bilateral hilar regions likely reactive.  Follow-up after acute exacerbation is recommended.     Heterogeneous enlargement of the thyroid gland bilaterally concerning for multinodular goiter. Recommend follow-up thyroid ultrasound.        Chest x-ray 04/27/2022    CLINICAL HISTORY:  SOB; Pneumonia, unspecified organism     TECHNIQUE:  PA and lateral views of the chest were performed.     COMPARISON:  Chest  radiograph February 23, 2022     FINDINGS:  New interstitial opacities are seen within the lung bases bilaterally which may represent multifocal pneumonia, edema, or changes of aspiration depending on the clinical context.  No pneumothorax or pleural effusion.  Unchanged mild cardiomegaly.  No acute osseous abnormality.

## 2022-04-29 NOTE — ASSESSMENT & PLAN NOTE
Patient with Hypoxic Respiratory failure which is Acute.  she is not on home oxygen. Supplemental oxygen was provided and noted- Oxygen Concentration (%):  [28] 28.   Signs/symptoms of respiratory failure include- tachypnea and increased work of breathing. Contributing diagnoses includes - Pneumonia Labs and images were reviewed. Patient Has not had a recent ABG. Will treat underlying causes and adjust management of respiratory failure as follows- O2 target sat 92-94% , IV rocephine AZT , nebs , cx sent   4/29 treating now as community-acquired pneumonia IV Rocephin and azithromycin.  Repeat chest x-ray CRP and ESR in a.m..  If no improvement suspicion of marijuana smoking related hypersensitivity pneumonitis/lung injury.  Might proceed with a trial of empiric steroid

## 2022-04-29 NOTE — ASSESSMENT & PLAN NOTE
With history of marijuana smoking.  Unusual presentation of pneumonia Suspicion of inhalation related lung injury.  If afebrile and negative microbiology will try empiric steroids in the next 24 hours.    4/29 treating now as community-acquired pneumonia IV Rocephin and azithromycin.  Repeat chest x-ray CRP and ESR in a.m..  Now mildly elevated.  Rare Gram-positive cocci and Gram-negative rods in sputum If no improvement suspicion of marijuana smoking related hypersensitivity pneumonitis/lung injury.  Might proceed with a trial of empiric steroid

## 2022-04-29 NOTE — ASSESSMENT & PLAN NOTE
Bilateral lower lobe infiltrates  Leukocytosis noted  CTA pending  Will continue Rocephin azithromycin  Sputum culture pending    4/28:  Leukocytosis improving  CTA showing bilateral interstitial infiltrate  Procalcitonin is negative therefore  Viral pneumonia is possibility  However given patient's current improvement on antibiotic therapy  Will plan to continue Rocephin azithromycin  Sputum culture pending    4/29:  Continue Rocephin  Completed azithromycin

## 2022-04-29 NOTE — SUBJECTIVE & OBJECTIVE
Interval History: WBC trending down.  Continue Rocephin.  Rheumatoid factor elevated.  Checking anti CCP.  Will plan repeat inflammatory markers in a.m..  Repeat chest x-ray.  Possible bronch on Monday pending clinical response.    Patient reports diarrhea overnight.  Heartburn with meals.  Denies any other issues.    Review of Systems   Constitutional:  Negative for fatigue and fever.   HENT:  Negative for sinus pressure.    Eyes:  Negative for visual disturbance.   Respiratory:  Positive for cough and shortness of breath.    Cardiovascular:  Negative for chest pain.   Gastrointestinal:  Negative for nausea and vomiting.   Genitourinary:  Negative for difficulty urinating.   Musculoskeletal:  Negative for back pain.   Skin:  Negative for rash.   Neurological:  Negative for headaches.   Psychiatric/Behavioral:  Negative for confusion.    Objective:     Vital Signs (Most Recent):  Temp: 97.8 °F (36.6 °C) (04/29/22 1144)  Pulse: 87 (04/29/22 1306)  Resp: 18 (04/29/22 1306)  BP: 119/63 (04/29/22 1144)  SpO2: (!) 93 % (04/29/22 1306)   Vital Signs (24h Range):  Temp:  [97.8 °F (36.6 °C)-98.7 °F (37.1 °C)] 97.8 °F (36.6 °C)  Pulse:  [] 87  Resp:  [18-20] 18  SpO2:  [92 %-98 %] 93 %  BP: (114-146)/(63-78) 119/63     Weight: 115.7 kg (255 lb 1.2 oz)  Body mass index is 41.17 kg/m².  No intake or output data in the 24 hours ending 04/29/22 1438   Physical Exam  Constitutional:       General: She is not in acute distress.     Appearance: She is well-developed. She is not diaphoretic.   HENT:      Head: Normocephalic and atraumatic.   Eyes:      Pupils: Pupils are equal, round, and reactive to light.   Cardiovascular:      Rate and Rhythm: Normal rate and regular rhythm.      Heart sounds: Normal heart sounds. No murmur heard.    No friction rub. No gallop.   Pulmonary:      Effort: Pulmonary effort is normal. No respiratory distress.      Breath sounds: No stridor. No wheezing, rhonchi or rales.   Abdominal:       General: Bowel sounds are normal. There is no distension.      Palpations: Abdomen is soft. There is no mass.      Tenderness: There is no abdominal tenderness. There is no guarding.   Musculoskeletal:      Right lower leg: No edema.      Left lower leg: No edema.   Skin:     General: Skin is warm.      Findings: No erythema.   Neurological:      Mental Status: She is alert and oriented to person, place, and time.   Psychiatric:         Mood and Affect: Mood normal.         Behavior: Behavior normal.         Thought Content: Thought content normal.         Judgment: Judgment normal.       Significant Labs: All pertinent labs within the past 24 hours have been reviewed.    Significant Imaging: I have reviewed all pertinent imaging results/findings within the past 24 hours.

## 2022-04-29 NOTE — ASSESSMENT & PLAN NOTE
O2 target sat 92-94% , IV rocephine AZT , nebs , cx sent .   Autoimmune etiologieslow suspicion, xray changes occurred within few weeks  , will send basic rheum w/up. HIV   WBC decreased from 13 K to thank a on antibiotic.  Send sputum microbiology.  Send autoimmune serologies.  Fungal serologies.  4/29 gram-positive cocci and gram-negative rods in sputum rare.  IV Rocephin azithromycin.  Await identification

## 2022-04-29 NOTE — ASSESSMENT & PLAN NOTE
Patient with Hypoxic Respiratory failure which is Acute.  she is not on home oxygen. Supplemental oxygen was provided and noted- Oxygen Concentration (%):  [28] 28.   Signs/symptoms of respiratory failure include- tachypnea and increased work of breathing. Contributing diagnoses includes - Pneumonia Labs and images were reviewed. Patient Has not had a recent ABG. Will treat underlying causes and adjust management of respiratory failure as follows-     Treat pneumonia   Home o2 eval prior to dc     4/28:  Patient being seen by pulmonology outpatient  Will consult pulmonology on case  Wean O2 as tolerated  Home O2 eval prior to discharge    4/29:  Wean O2 as tolerated  Rheumatoid factor elevated  Anti CCP pending  Will recheck inflammatory markers tomorrow  Repeat chest x-ray  Possible bronch on Monday depending on clinical response

## 2022-04-30 LAB
ACE SERPL-CCNC: 22 U/L (ref 16–85)
ANION GAP SERPL CALC-SCNC: 10 MMOL/L (ref 8–16)
BASOPHILS # BLD AUTO: 0.1 K/UL (ref 0–0.2)
BASOPHILS NFR BLD: 0.8 % (ref 0–1.9)
BUN SERPL-MCNC: 14 MG/DL (ref 6–20)
CALCIUM SERPL-MCNC: 9.5 MG/DL (ref 8.7–10.5)
CHLORIDE SERPL-SCNC: 104 MMOL/L (ref 95–110)
CO2 SERPL-SCNC: 28 MMOL/L (ref 23–29)
CREAT SERPL-MCNC: 0.8 MG/DL (ref 0.5–1.4)
CRP SERPL-MCNC: 27.8 MG/L (ref 0–8.2)
DIFFERENTIAL METHOD: ABNORMAL
EOSINOPHIL # BLD AUTO: 0.7 K/UL (ref 0–0.5)
EOSINOPHIL NFR BLD: 5.4 % (ref 0–8)
ERYTHROCYTE [DISTWIDTH] IN BLOOD BY AUTOMATED COUNT: 14.5 % (ref 11.5–14.5)
ERYTHROCYTE [SEDIMENTATION RATE] IN BLOOD BY WESTERGREN METHOD: 40 MM/HR (ref 0–20)
EST. GFR  (AFRICAN AMERICAN): >60 ML/MIN/1.73 M^2
EST. GFR  (NON AFRICAN AMERICAN): >60 ML/MIN/1.73 M^2
GLUCOSE SERPL-MCNC: 103 MG/DL (ref 70–110)
HCT VFR BLD AUTO: 39.2 % (ref 37–48.5)
HGB BLD-MCNC: 11.9 G/DL (ref 12–16)
IMM GRANULOCYTES # BLD AUTO: 0.09 K/UL (ref 0–0.04)
IMM GRANULOCYTES NFR BLD AUTO: 0.7 % (ref 0–0.5)
LYMPHOCYTES # BLD AUTO: 2.4 K/UL (ref 1–4.8)
LYMPHOCYTES NFR BLD: 19.5 % (ref 18–48)
MCH RBC QN AUTO: 28.5 PG (ref 27–31)
MCHC RBC AUTO-ENTMCNC: 30.4 G/DL (ref 32–36)
MCV RBC AUTO: 94 FL (ref 82–98)
MONOCYTES # BLD AUTO: 0.9 K/UL (ref 0.3–1)
MONOCYTES NFR BLD: 7.1 % (ref 4–15)
NEUTROPHILS # BLD AUTO: 8.2 K/UL (ref 1.8–7.7)
NEUTROPHILS NFR BLD: 66.5 % (ref 38–73)
NRBC BLD-RTO: 0 /100 WBC
PLATELET # BLD AUTO: 381 K/UL (ref 150–450)
PMV BLD AUTO: 10.1 FL (ref 9.2–12.9)
POTASSIUM SERPL-SCNC: 4.2 MMOL/L (ref 3.5–5.1)
RBC # BLD AUTO: 4.18 M/UL (ref 4–5.4)
SODIUM SERPL-SCNC: 142 MMOL/L (ref 136–145)
THYROPEROXIDASE IGG SERPL-ACNC: <6 IU/ML
WBC # BLD AUTO: 12.33 K/UL (ref 3.9–12.7)

## 2022-04-30 PROCEDURE — 80048 BASIC METABOLIC PNL TOTAL CA: CPT | Performed by: FAMILY MEDICINE

## 2022-04-30 PROCEDURE — 94761 N-INVAS EAR/PLS OXIMETRY MLT: CPT

## 2022-04-30 PROCEDURE — 27000221 HC OXYGEN, UP TO 24 HOURS

## 2022-04-30 PROCEDURE — 99233 SBSQ HOSP IP/OBS HIGH 50: CPT | Mod: ,,, | Performed by: INTERNAL MEDICINE

## 2022-04-30 PROCEDURE — 94640 AIRWAY INHALATION TREATMENT: CPT

## 2022-04-30 PROCEDURE — 99233 PR SUBSEQUENT HOSPITAL CARE,LEVL III: ICD-10-PCS | Mod: ,,, | Performed by: INTERNAL MEDICINE

## 2022-04-30 PROCEDURE — 11000001 HC ACUTE MED/SURG PRIVATE ROOM

## 2022-04-30 PROCEDURE — 36415 COLL VENOUS BLD VENIPUNCTURE: CPT | Performed by: FAMILY MEDICINE

## 2022-04-30 PROCEDURE — 63600175 PHARM REV CODE 636 W HCPCS: Performed by: INTERNAL MEDICINE

## 2022-04-30 PROCEDURE — 85025 COMPLETE CBC W/AUTO DIFF WBC: CPT | Performed by: FAMILY MEDICINE

## 2022-04-30 PROCEDURE — 99900035 HC TECH TIME PER 15 MIN (STAT)

## 2022-04-30 PROCEDURE — 85651 RBC SED RATE NONAUTOMATED: CPT | Performed by: INTERNAL MEDICINE

## 2022-04-30 PROCEDURE — 25000242 PHARM REV CODE 250 ALT 637 W/ HCPCS: Performed by: FAMILY MEDICINE

## 2022-04-30 PROCEDURE — 86376 MICROSOMAL ANTIBODY EACH: CPT | Performed by: FAMILY MEDICINE

## 2022-04-30 PROCEDURE — 86140 C-REACTIVE PROTEIN: CPT | Performed by: INTERNAL MEDICINE

## 2022-04-30 PROCEDURE — 25000242 PHARM REV CODE 250 ALT 637 W/ HCPCS: Performed by: INTERNAL MEDICINE

## 2022-04-30 PROCEDURE — 63600175 PHARM REV CODE 636 W HCPCS: Performed by: EMERGENCY MEDICINE

## 2022-04-30 PROCEDURE — 25000003 PHARM REV CODE 250: Performed by: FAMILY MEDICINE

## 2022-04-30 RX ORDER — NAPROXEN 500 MG/1
500 TABLET ORAL 2 TIMES DAILY PRN
Status: DISCONTINUED | OUTPATIENT
Start: 2022-04-30 | End: 2022-05-02 | Stop reason: HOSPADM

## 2022-04-30 RX ADMIN — ENOXAPARIN SODIUM 40 MG: 40 INJECTION SUBCUTANEOUS at 09:04

## 2022-04-30 RX ADMIN — HYDROCHLOROTHIAZIDE 12.5 MG: 12.5 TABLET ORAL at 09:04

## 2022-04-30 RX ADMIN — ALBUTEROL SULFATE 2.5 MG: 2.5 SOLUTION RESPIRATORY (INHALATION) at 07:04

## 2022-04-30 RX ADMIN — ARFORMOTEROL TARTRATE 15 MCG: 15 SOLUTION RESPIRATORY (INHALATION) at 07:04

## 2022-04-30 RX ADMIN — SERTRALINE HYDROCHLORIDE 50 MG: 50 TABLET ORAL at 09:04

## 2022-04-30 RX ADMIN — ENOXAPARIN SODIUM 40 MG: 40 INJECTION SUBCUTANEOUS at 08:04

## 2022-04-30 RX ADMIN — BUDESONIDE 0.5 MG: 0.5 INHALANT ORAL at 07:04

## 2022-04-30 RX ADMIN — METHYLPREDNISOLONE SODIUM SUCCINATE 80 MG: 40 INJECTION, POWDER, FOR SOLUTION INTRAMUSCULAR; INTRAVENOUS at 08:04

## 2022-04-30 RX ADMIN — AMLODIPINE BESYLATE 10 MG: 10 TABLET ORAL at 09:04

## 2022-04-30 RX ADMIN — ALBUTEROL SULFATE 2.5 MG: 2.5 SOLUTION RESPIRATORY (INHALATION) at 12:04

## 2022-04-30 RX ADMIN — NAPROXEN 500 MG: 500 TABLET ORAL at 08:04

## 2022-04-30 RX ADMIN — ALBUTEROL SULFATE 2.5 MG: 2.5 SOLUTION RESPIRATORY (INHALATION) at 01:04

## 2022-04-30 RX ADMIN — Medication 4 TABLET: at 11:04

## 2022-04-30 RX ADMIN — ARFORMOTEROL TARTRATE 15 MCG: 15 SOLUTION RESPIRATORY (INHALATION) at 08:04

## 2022-04-30 RX ADMIN — Medication 4 TABLET: at 05:04

## 2022-04-30 RX ADMIN — Medication 4 TABLET: at 08:04

## 2022-04-30 RX ADMIN — METHYLPREDNISOLONE SODIUM SUCCINATE 80 MG: 40 INJECTION, POWDER, FOR SOLUTION INTRAMUSCULAR; INTRAVENOUS at 11:04

## 2022-04-30 NOTE — ASSESSMENT & PLAN NOTE
4/30:  Patient with hx of MDG  Has been evaluated by PCP and ENT outpatient  tsh normal, will check TPO  Thyroid U/S shows enlargement compared  To prior U/S  Will recommend outpatient follow up  Possible need for repeat FNA

## 2022-04-30 NOTE — PROGRESS NOTES
[facility]  [facility]  Progress Note    Patient Name: Ayanna Alicia  MRN: [id number]  Patient Class: IP- Inpatient   Admission Date: 4/27/2022  Length of Stay: 2 days  Attending Physician: Thaddeus Nassar MD  Primary Care Provider: Madeleine Enrique MD        Subjective:     Principal Problem:CAP (community acquired pneumonia)        HPI:  Patient is a 51 y.o. aa female with a PMHx of asthma, fatty liver, HTN, and KALYANI who presents to the Emergency Department for SOB which onset gradually at the beginning of this month. Patient currently being seen by pulmonology outpatient for asthma.  She was treated with doxycycline and steroids without improvement.  Patient was seen by pulmonology today and was referred to the ED.  She reports shortness of breath is worse on exertion and relieves with rest.  States that she may have had fever when the symptoms 1st started however not now.  Reports nonproductive cough.    In the ED, chest xray concerning bilateral interstitial infiltrates. D dimer elevated and CTA chest was pending at time of admission. She was started on rocephin and azithromycin. HM consulted and patient placed in observation for pna.       Overview/Hospital Course:  4/28:  WBC trending down.  Currently on Rocephin and azithromycin.  Procalcitonin negative.  Possibly viral pneumonia however given patient's improvement on antibiotic therapy will continue current treatment.  Pulmonology consulted on case.  4/29:  WBC trending down.  Continue Rocephin.  Rheumatoid factor elevated.  Checking anti CCP.  Will plan repeat inflammatory markers in a.m..  Repeat chest x-ray.  Possible bronch on Monday pending clinical response.   4/30: No improvement noted on chest xray. Discussed with pulmonology. Will start solumedrol.Thyroid U/S showing multinodular goiter with growth since last U/S. Patient has been evaluated by pcp and ENT. Will recommend outpatient f/u. Check TPO antibodies, tsh normal.       Interval History:  No improvement noted on chest xray. Discussed with pulmonology. Will start solumedrol.Thyroid U/S showing multinodular goiter with growth since last U/S. Patient has been evaluated by pcp and ENT. Will recommend outpatient f/u. Check TPO antibodies, tsh normal.     Patient complains of tooth ache overnight. Also reports burning sensation underneath left breast. Lost IV access. Requesting IV to be placed with ultrasound guidance.     Review of Systems   Constitutional:  Negative for fatigue and fever.   HENT:  Positive for dental problem (pain). Negative for sinus pressure.    Eyes:  Negative for visual disturbance.   Respiratory:  Positive for cough and shortness of breath.    Cardiovascular:  Negative for chest pain.   Gastrointestinal:  Negative for nausea and vomiting.   Genitourinary:  Negative for difficulty urinating.   Musculoskeletal:  Negative for back pain.   Skin:  Negative for rash.   Neurological:  Negative for headaches.   Psychiatric/Behavioral:  Negative for confusion.    Objective:     Vital Signs (Most Recent):  Temp: 97.7 °F (36.5 °C) (04/30/22 0739)  Pulse: 103 (04/30/22 0739)  Resp: 18 (04/30/22 0739)  BP: 119/69 (04/30/22 0739)  SpO2: (!) 91 % (04/30/22 0739)   Vital Signs (24h Range):  Temp:  [97.7 °F (36.5 °C)-98.6 °F (37 °C)] 97.7 °F (36.5 °C)  Pulse:  [] 103  Resp:  [18-21] 18  SpO2:  [88 %-99 %] 91 %  BP: (119-146)/(59-88) 119/69     Weight: 116.5 kg (256 lb 13.4 oz)  Body mass index is 41.45 kg/m².  No intake or output data in the 24 hours ending 04/30/22 1039   Physical Exam  Constitutional:       General: She is not in acute distress.     Appearance: She is well-developed. She is not diaphoretic.   HENT:      Head: Normocephalic and atraumatic.   Eyes:      Pupils: Pupils are equal, round, and reactive to light.   Cardiovascular:      Rate and Rhythm: Normal rate and regular rhythm.      Heart sounds: Normal heart sounds. No murmur heard.    No friction rub. No gallop.    Pulmonary:      Effort: Pulmonary effort is normal. No respiratory distress.      Breath sounds: No stridor. No wheezing, rhonchi or rales.   Abdominal:      General: Bowel sounds are normal. There is no distension.      Palpations: Abdomen is soft. There is no mass.      Tenderness: There is no abdominal tenderness. There is no guarding.   Musculoskeletal:      Right lower leg: No edema.      Left lower leg: No edema.   Skin:     General: Skin is warm.      Findings: No erythema.   Neurological:      Mental Status: She is alert and oriented to person, place, and time.   Psychiatric:         Mood and Affect: Mood normal.         Behavior: Behavior normal.         Thought Content: Thought content normal.         Judgment: Judgment normal.       Significant Labs: All pertinent labs within the past 24 hours have been reviewed.    Significant Imaging: I have reviewed all pertinent imaging results/findings within the past 24 hours.      Assessment/Plan:      * CAP (community acquired pneumonia)  Bilateral lower lobe infiltrates  Leukocytosis noted  CTA pending  Will continue Rocephin azithromycin  Sputum culture pending    4/28:  Leukocytosis improving  CTA showing bilateral interstitial infiltrate  Procalcitonin is negative therefore  Viral pneumonia is possibility  However given patient's current improvement on antibiotic therapy  Will plan to continue Rocephin azithromycin  Sputum culture pending    4/30:  Sputum culture normal iris  procal negative  No improvement on chest xray  Will dc abx therapy  Unlikely to be CAP    Acute hypoxemic respiratory failure  Patient with Hypoxic Respiratory failure which is Acute.  she is not on home oxygen. Supplemental oxygen was provided and noted- Oxygen Concentration (%):  [28] 28.   Signs/symptoms of respiratory failure include- tachypnea and increased work of breathing. Contributing diagnoses includes - Pneumonia Labs and images were reviewed. Patient Has not had a recent  ABG. Will treat underlying causes and adjust management of respiratory failure as follows-     Treat pneumonia   Home o2 eval prior to dc     4/28:  Patient being seen by pulmonology outpatient  Will consult pulmonology on case  Wean O2 as tolerated  Home O2 eval prior to discharge    4/30:  Wean O2 as tolerated  Rheumatoid factor elevated  Anti CCP negative   No improvement on chest xray  abx dced  Will start solumedrol  Possible bronch on Monday if no improvement     Sleep apnea  Does not use CPAP at home  Currently being followed by pulm outpatient      Asthma  Will continue steroid and Laba nebs      Multinodular goiter  4/30:  Patient with hx of MDG  Has been evaluated by PCP and ENT outpatient  tsh normal, will check TPO  Thyroid U/S shows enlargement compared  To prior U/S  Will recommend outpatient follow up  Possible need for repeat FNA      HTN (hypertension)  Resume home meds  Hydralazine p.r.n.      VTE Risk Mitigation (From admission, onward)         Ordered     enoxaparin injection 40 mg  Every 12 hours         04/27/22 1533                Discharge Planning   JANETTE:      Code Status: Full Code   Is the patient medically ready for discharge?:     Reason for patient still in hospital (select all that apply): Patient trending condition  Discharge Plan A: Home                  Thaddeus Nassar MD  Department of Hospital Medicine   [facility]

## 2022-04-30 NOTE — SUBJECTIVE & OBJECTIVE
Interval History: No improvement noted on chest xray. Discussed with pulmonology. Will start solumedrol.Thyroid U/S showing multinodular goiter with growth since last U/S. Patient has been evaluated by pcp and ENT. Will recommend outpatient f/u. Check TPO antibodies, tsh normal.     Patient complains of tooth ache overnight. Also reports burning sensation underneath left breast. Lost IV access. Requesting IV to be placed with ultrasound guidance.     Review of Systems   Constitutional:  Negative for fatigue and fever.   HENT:  Positive for dental problem (pain). Negative for sinus pressure.    Eyes:  Negative for visual disturbance.   Respiratory:  Positive for cough and shortness of breath.    Cardiovascular:  Negative for chest pain.   Gastrointestinal:  Negative for nausea and vomiting.   Genitourinary:  Negative for difficulty urinating.   Musculoskeletal:  Negative for back pain.   Skin:  Negative for rash.   Neurological:  Negative for headaches.   Psychiatric/Behavioral:  Negative for confusion.    Objective:     Vital Signs (Most Recent):  Temp: 97.7 °F (36.5 °C) (04/30/22 0739)  Pulse: 103 (04/30/22 0739)  Resp: 18 (04/30/22 0739)  BP: 119/69 (04/30/22 0739)  SpO2: (!) 91 % (04/30/22 0739)   Vital Signs (24h Range):  Temp:  [97.7 °F (36.5 °C)-98.6 °F (37 °C)] 97.7 °F (36.5 °C)  Pulse:  [] 103  Resp:  [18-21] 18  SpO2:  [88 %-99 %] 91 %  BP: (119-146)/(59-88) 119/69     Weight: 116.5 kg (256 lb 13.4 oz)  Body mass index is 41.45 kg/m².  No intake or output data in the 24 hours ending 04/30/22 1039   Physical Exam  Constitutional:       General: She is not in acute distress.     Appearance: She is well-developed. She is not diaphoretic.   HENT:      Head: Normocephalic and atraumatic.   Eyes:      Pupils: Pupils are equal, round, and reactive to light.   Cardiovascular:      Rate and Rhythm: Normal rate and regular rhythm.      Heart sounds: Normal heart sounds. No murmur heard.    No friction rub. No  gallop.   Pulmonary:      Effort: Pulmonary effort is normal. No respiratory distress.      Breath sounds: No stridor. No wheezing, rhonchi or rales.   Abdominal:      General: Bowel sounds are normal. There is no distension.      Palpations: Abdomen is soft. There is no mass.      Tenderness: There is no abdominal tenderness. There is no guarding.   Musculoskeletal:      Right lower leg: No edema.      Left lower leg: No edema.   Skin:     General: Skin is warm.      Findings: No erythema.   Neurological:      Mental Status: She is alert and oriented to person, place, and time.   Psychiatric:         Mood and Affect: Mood normal.         Behavior: Behavior normal.         Thought Content: Thought content normal.         Judgment: Judgment normal.       Significant Labs: All pertinent labs within the past 24 hours have been reviewed.    Significant Imaging: I have reviewed all pertinent imaging results/findings within the past 24 hours.

## 2022-04-30 NOTE — NURSING
Patient resting in Bed. ER placed IV per Patient request. Patient declined PICC at this time. MD notified. Will continue current treatment plan.

## 2022-04-30 NOTE — ASSESSMENT & PLAN NOTE
Patient with Hypoxic Respiratory failure which is Acute.  she is not on home oxygen. Supplemental oxygen was provided and noted- Oxygen Concentration (%):  [28] 28.   Signs/symptoms of respiratory failure include- tachypnea and increased work of breathing. Contributing diagnoses includes - Pneumonia Labs and images were reviewed. Patient Has not had a recent ABG. Will treat underlying causes and adjust management of respiratory failure as follows-     Treat pneumonia   Home o2 eval prior to dc     4/28:  Patient being seen by pulmonology outpatient  Will consult pulmonology on case  Wean O2 as tolerated  Home O2 eval prior to discharge    4/30:  Wean O2 as tolerated  Rheumatoid factor elevated  Anti CCP negative   No improvement on chest xray  abx dced  Will start solumedrol  Possible bronch on Monday if no improvement

## 2022-04-30 NOTE — PROGRESS NOTES
[facility]  Pulmonology  Consult Note    Patient Name: Ayanna Alicia  MRN: [id number]  Admission Date: 4/27/2022  Hospital Length of Stay: 2 days  Code Status: Full Code  Attending Physician: Thaddeus Nassar MD  Primary Care Provider: Madeleine Enrique MD   Principal Problem: Unresolved pneumonia    [unfilled]  Subjective:     HPI:  51-year-old female patient with past medical history of asthma and sleep apnea sent by pulmonology nurse practitioner to the emergency room for nonresolving pneumonia over few weeks.SOB, wheezing, productive cough, yellow sputum, fatigue for 2 months.    She was treated about 3 weeks ago with doxycycline but did not notice a persistent improvement.  O2 sat for the last few weeks were between 86-90%.  She does report 15 years of smoking marijuana daily.    She did vape at some point November 2021.          4/30 seen and examined.  O2 sat 94% on 1 liter/minute.  Afebrile.  Sputum culture normal iris.  Continues to feel SOB coughing.  No improvement in chest x-ray ESR CRP on IV antibiotic.  Afebrile.  Review of Systems   Constitutional:  Positive for activity change and fatigue. Negative for chills and fever.   HENT:  Positive for congestion. Negative for drooling, ear discharge and nosebleeds.    Eyes:  Negative for pain, discharge and itching.   Respiratory:  Positive for apnea, cough and shortness of breath. Negative for choking.    Cardiovascular:  Negative for chest pain.   Gastrointestinal:  Negative for anal bleeding.   Endocrine: Negative for cold intolerance.   Genitourinary:  Negative for hematuria.   Musculoskeletal:  Positive for arthralgias. Negative for neck stiffness.   Skin:  Negative for rash.   Allergic/Immunologic: Negative for immunocompromised state.   Neurological:  Positive for weakness. Negative for seizures and facial asymmetry.   Hematological:  Negative for adenopathy.   Psychiatric/Behavioral:  Negative for behavioral problems, self-injury and suicidal ideas.     Objective:     Vital Signs (Most Recent):  Temp: 97.5 °F (36.4 °C) (04/30/22 1159)  Pulse: 102 (04/30/22 1332)  Resp: 18 (04/30/22 1332)  BP: 137/84 (04/30/22 1159)  SpO2: (!) 94 % (04/30/22 1332) Vital Signs (24h Range):  Temp:  [97.5 °F (36.4 °C)-98.6 °F (37 °C)] 97.5 °F (36.4 °C)  Pulse:  [] 102  Resp:  [18-21] 18  SpO2:  [88 %-99 %] 94 %  BP: (119-146)/(59-88) 137/84     Weight: 116.5 kg (256 lb 13.4 oz)  Body mass index is 41.45 kg/m².    No intake or output data in the 24 hours ending 04/30/22 1401      Physical Exam  Vitals and nursing note reviewed.   Constitutional:       General: She is not in acute distress.     Appearance: She is well-developed. She is ill-appearing.   HENT:      Head: Normocephalic and atraumatic.      Nose: Nose normal.   Eyes:      Extraocular Movements: Extraocular movements intact.   Cardiovascular:      Rate and Rhythm: Normal rate and regular rhythm.   Pulmonary:      Effort: Pulmonary effort is normal.      Breath sounds: No stridor. Rales present.      Comments: Bilateral mid to lower lung field rales  Abdominal:      General: There is no distension.   Musculoskeletal:         General: No deformity or signs of injury.      Cervical back: Normal range of motion and neck supple.   Skin:     General: Skin is warm and dry.   Neurological:      General: No focal deficit present.      Mental Status: She is alert and oriented to person, place, and time. Mental status is at baseline.   Psychiatric:         Mood and Affect: Mood normal.         Behavior: Behavior normal.         Thought Content: Thought content normal.         Judgment: Judgment normal.       Vents:  Oxygen Concentration (%): 24 (04/30/22 1332)    Lines/Drains/Airways       Peripheral Intravenous Line  Duration                  Peripheral IV - Single Lumen 04/30/22 1139 20 G Anterior;Right Forearm <1 day                    Significant Labs:    CBC/Anemia Profile:  Recent Labs   Lab 04/29/22  0601 04/30/22  0604    WBC 12.61 12.33   HGB 11.7* 11.9*   HCT 38.7 39.2    381   MCV 93 94   RDW 14.5 14.5          Chemistries:  Recent Labs   Lab 04/29/22  0601 04/30/22  0604    142   K 4.1 4.2    104   CO2 29 28   BUN 13 14   CREATININE 0.8 0.8   CALCIUM 8.8 9.5        Latest Reference Range & Units 04/27/22 13:22   BNP 0 - 99 pg/mL <10 [1]    - 260 U/L 460 (H) [2]   Troponin I 0.000 - 0.026 ng/mL 0.017 [3]   Stress echo April 7, 2022  Summary    · The left ventricle is normal in size with concentric remodeling and normal systolic function.  · The test was stopped because the patient experienced shortness of breath. The patient requested the test to be stopped.  · The patient's exercise capacity was severely impaired.  · There were no arrhythmias during stress.  · The estimated ejection fraction is 60%.  · Normal left ventricular diastolic function.  · Normal right ventricular size with normal right ventricular systolic function.  · Mild tricuspid regurgitation.  · Normal central venous pressure (3 mmHg).  · The estimated PA systolic pressure is 29 mmHg.  · The stress echo portion of this study is negative for myocardial ischemia.  · The ECG portion of this study is negative for myocardial ischemia.    EKG 04/27/2022    Vent. Rate : 091 BPM     Atrial Rate : 091 BPM      P-R Int : 160 ms          QRS Dur : 092 ms       QT Int : 382 ms       P-R-T Axes : 051 -42 019 degrees      QTc Int : 469 ms     Normal sinus rhythm   Biatrial enlargement   Left axis deviation   Low voltage QRS   Cannot rule out Anterior infarct (cited on or before 27-APR-2022)   Abnormal ECG   When compared with ECG of 07-APR-2022 12:39,   Previous ECG has undetermined rhythm, needs review   The axis Shifted left   T wave inversion now evident in Inferior leads       Significant Imaging:     Chest x-ray 04/30/2022  EXAMINATION:  XR CHEST AP PORTABLE     CLINICAL HISTORY:  PNA;  pneumonia.     COMPARISON:  04/27/2022     FINDINGS:  Hazy  bibasilar infiltrates, worse on the left.  No significant interval change.  Heart size within normal limits.Degenerative changes thoracic spine.     .     Impression:     Hazy bibasilar infiltrates, greater on the left.  No significant interval change.      CTA Chest Non-Coronary (PE Study)  Order: [id number]   Status: Final result     Visible to patient: Yes (not seen)     Next appt: 05/05/2022 at 08:00 AM in Pulmonology (PULMONARY LAB, [facility])     0 Result Notes    Details    Reading Physician Reading Date Result Priority   Jerry Skelton MD  [phone]  [phone] 4/27/2022 STAT     Narrative & Impression  EXAMINATION:  CTA CHEST NON CORONARY     CLINICAL HISTORY:  Chest pain and shortness of breath     TECHNIQUE:  After the intravenous administration of 100 cc of Omni 350 nonionic contrast using CT pulmonary angio technique, 1.25  Mm axial images were acquired using helical CT technique from the lung apices through costophrenic sulci.  Sagittal coronal and oblique MIPS were also submitted for interpretation.     COMPARISON:  Chest x-ray dated 04/27/2022     FINDINGS:  -Pulmonary arteries: Pulmonary arteries are well opacified.  No evidence of pulmonary embolism.  No evidence of pulmonary hypertension.  No right heart strain is identified with RV/LV ratio < 0.9.     -Lungs: Diffuse interstitial thickening with ground-glass opacities predominately throughout the lower lobes with associated bronchiectasis concerning for interstitial pneumonia versus interstitial edema.  Similar findings within the right middle lobe.  Upper lobes are largely clear.  Concerning for     -Pleura: No thickening or fluid.     -Mediastinum/Paula:Mildly enlarged lymph nodes are seen within the mediastinum and bilateral hilar regions.     -Axilla: Shotty adenopathy.     -Thyroid: Heterogeneous enlargement of the thyroid gland bilaterally concerning for multinodular goiter.  Recommend follow-up thyroid ultrasound.     -Heart/Aorta: Heart  size is enlarged.  Mild coronary artery disease.  No pericardial effusion. Aorta normal caliber.   Aorta demonstrates mild atherosclerotic disease.     -Bones/Chest Wall: Intact  with multilevel degenerative spondylosis throughout the thoracic spine     -Upper Abdomen: Small hiatal hernia.  Hepatomegaly.  Spleen is normal in size.  Multiple indeterminate hypodensities within the liver not fully characterized on phase of imaging largest within the left hepatic lobe measuring 13 mm.     Impression:     No evidence of pulmonary embolism.     Diffuse interstitial thickening with ground-glass opacities predominately throughout the lower lobes with associated bronchiectasis concerning for interstitial pneumonia versus interstitial edema.     Enlarged lymph nodes are seen within the mediastinum and bilateral hilar regions likely reactive.  Follow-up after acute exacerbation is recommended.     Heterogeneous enlargement of the thyroid gland bilaterally concerning for multinodular goiter. Recommend follow-up thyroid ultrasound.        Chest x-ray 04/27/2022    CLINICAL HISTORY:  SOB; Pneumonia, unspecified organism     TECHNIQUE:  PA and lateral views of the chest were performed.     COMPARISON:  Chest radiograph February 23, 2022     FINDINGS:  New interstitial opacities are seen within the lung bases bilaterally which may represent multifocal pneumonia, edema, or changes of aspiration depending on the clinical context.  No pneumothorax or pleural effusion.  Unchanged mild cardiomegaly.  No acute osseous abnormality.            ABG  No results for input(s): PH, PO2, PCO2, HCO3, BE in the last 168 hours.  Assessment/Plan:     * Unresolved pneumonia   O2 target sat 92-94% , IV rocephine AZT , nebs , cx sent .   Autoimmune etiologieslow suspicion, xray changes occurred within few weeks  , will send basic rheum w/up. HIV   WBC decreased from 13 K to thank a on antibiotic.  Send sputum microbiology.  Send autoimmune serologies.   Fungal serologies.  4/29 gram-positive cocci and gram-negative rods in sputum rare.  IV Rocephin azithromycin.  Await identification  4/30 lack of improvement with outpatient antibiotic therapy and inpatient antibiotic therapy.  Microbiology studies negative.  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  If no improvement will need bronchoscopy and bronchioalveolar sampling.    Pulmonary infiltrates on CXR  With history of marijuana smoking.  Unusual presentation of pneumonia Suspicion of inhalation related lung injury.  If afebrile and negative microbiology will try empiric steroids in the next 24 hours.    4/29 treating now as community-acquired pneumonia IV Rocephin and azithromycin.  Repeat chest x-ray CRP and ESR in a.m..  Now mildly elevated.  Rare Gram-positive cocci and Gram-negative rods in sputum If no improvement suspicion of marijuana smoking related hypersensitivity pneumonitis/lung injury.  Might proceed with a trial of empiric steroid   4/30 lack of improvement with outpatient antibiotic therapy and inpatient antibiotic therapy.  Microbiology studies negative.  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  If no improvement will need bronchoscopy and bronchioalveolar sampling.    Acute hypoxemic respiratory failure  Patient with Hypoxic Respiratory failure which is  Acute.  she is not on home oxygen. Supplemental oxygen was provided and noted- Oxygen Concentration (%):  [24-28] 24.   Signs/symptoms of respiratory failure include- tachypnea and increased work of breathing. Contributing diagnoses includes - Pneumonia Labs and images were reviewed. Patient Has not had a recent ABG. Will treat underlying causes and adjust management of respiratory failure as follows- O2 target sat 92-94% , IV rocephine AZT , nebs , cx sent   4/29 treating now as community-acquired pneumonia IV Rocephin and azithromycin.  Repeat chest x-ray CRP and ESR in a.m..  If no improvement suspicion of marijuana smoking related hypersensitivity pneumonitis/lung injury.  Might proceed with a trial of empiric steroid   4/30 lack of improvement with outpatient antibiotic therapy and inpatient antibiotic therapy.  Microbiology studies negative.  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  If no improvement will need bronchoscopy and bronchioalveolar sampling.    Asthma  Albuterol , ICS , LABA  4/29 continue albuterol Brovana budesonide  4/30 continue same    Multinodular goiter  4/30 ultrasound of the thyroid noted.  Outpatient ENT evaluation.  Discussed with hospital medicine attending        Mode Thompson MD  Pulmonology  [facility]

## 2022-04-30 NOTE — SUBJECTIVE & OBJECTIVE
4/30 seen and examined.  O2 sat 94% on 1 liter/minute.  Afebrile.  Sputum culture normal iirs.  Continues to feel SOB coughing.  No improvement in chest x-ray ESR CRP on IV antibiotic.  Afebrile.  Review of Systems   Constitutional:  Positive for activity change and fatigue. Negative for chills and fever.   HENT:  Positive for congestion. Negative for drooling, ear discharge and nosebleeds.    Eyes:  Negative for pain, discharge and itching.   Respiratory:  Positive for apnea, cough and shortness of breath. Negative for choking.    Cardiovascular:  Negative for chest pain.   Gastrointestinal:  Negative for anal bleeding.   Endocrine: Negative for cold intolerance.   Genitourinary:  Negative for hematuria.   Musculoskeletal:  Positive for arthralgias. Negative for neck stiffness.   Skin:  Negative for rash.   Allergic/Immunologic: Negative for immunocompromised state.   Neurological:  Positive for weakness. Negative for seizures and facial asymmetry.   Hematological:  Negative for adenopathy.   Psychiatric/Behavioral:  Negative for behavioral problems, self-injury and suicidal ideas.    Objective:     Vital Signs (Most Recent):  Temp: 97.5 °F (36.4 °C) (04/30/22 1159)  Pulse: 102 (04/30/22 1332)  Resp: 18 (04/30/22 1332)  BP: 137/84 (04/30/22 1159)  SpO2: (!) 94 % (04/30/22 1332) Vital Signs (24h Range):  Temp:  [97.5 °F (36.4 °C)-98.6 °F (37 °C)] 97.5 °F (36.4 °C)  Pulse:  [] 102  Resp:  [18-21] 18  SpO2:  [88 %-99 %] 94 %  BP: (119-146)/(59-88) 137/84     Weight: 116.5 kg (256 lb 13.4 oz)  Body mass index is 41.45 kg/m².    No intake or output data in the 24 hours ending 04/30/22 1401      Physical Exam  Vitals and nursing note reviewed.   Constitutional:       General: She is not in acute distress.     Appearance: She is well-developed. She is ill-appearing.   HENT:      Head: Normocephalic and atraumatic.      Nose: Nose normal.   Eyes:      Extraocular Movements: Extraocular movements intact.    Cardiovascular:      Rate and Rhythm: Normal rate and regular rhythm.   Pulmonary:      Effort: Pulmonary effort is normal.      Breath sounds: No stridor. Rales present.      Comments: Bilateral mid to lower lung field rales  Abdominal:      General: There is no distension.   Musculoskeletal:         General: No deformity or signs of injury.      Cervical back: Normal range of motion and neck supple.   Skin:     General: Skin is warm and dry.   Neurological:      General: No focal deficit present.      Mental Status: She is alert and oriented to person, place, and time. Mental status is at baseline.   Psychiatric:         Mood and Affect: Mood normal.         Behavior: Behavior normal.         Thought Content: Thought content normal.         Judgment: Judgment normal.       Vents:  Oxygen Concentration (%): 24 (04/30/22 1332)    Lines/Drains/Airways       Peripheral Intravenous Line  Duration                  Peripheral IV - Single Lumen 04/30/22 1139 20 G Anterior;Right Forearm <1 day                    Significant Labs:    CBC/Anemia Profile:  Recent Labs   Lab 04/29/22  0601 04/30/22  0604   WBC 12.61 12.33   HGB 11.7* 11.9*   HCT 38.7 39.2    381   MCV 93 94   RDW 14.5 14.5          Chemistries:  Recent Labs   Lab 04/29/22  0601 04/30/22  0604    142   K 4.1 4.2    104   CO2 29 28   BUN 13 14   CREATININE 0.8 0.8   CALCIUM 8.8 9.5        Latest Reference Range & Units 04/27/22 13:22   BNP 0 - 99 pg/mL <10 [1]    - 260 U/L 460 (H) [2]   Troponin I 0.000 - 0.026 ng/mL 0.017 [3]   Stress echo April 7, 2022  Summary    The left ventricle is normal in size with concentric remodeling and normal systolic function.  The test was stopped because the patient experienced shortness of breath. The patient requested the test to be stopped.  The patient's exercise capacity was severely impaired.  There were no arrhythmias during stress.  The estimated ejection fraction is 60%.  Normal left  ventricular diastolic function.  Normal right ventricular size with normal right ventricular systolic function.  Mild tricuspid regurgitation.  Normal central venous pressure (3 mmHg).  The estimated PA systolic pressure is 29 mmHg.  The stress echo portion of this study is negative for myocardial ischemia.  The ECG portion of this study is negative for myocardial ischemia.    EKG 04/27/2022    Vent. Rate : 091 BPM     Atrial Rate : 091 BPM      P-R Int : 160 ms          QRS Dur : 092 ms       QT Int : 382 ms       P-R-T Axes : 051 -42 019 degrees      QTc Int : 469 ms     Normal sinus rhythm   Biatrial enlargement   Left axis deviation   Low voltage QRS   Cannot rule out Anterior infarct (cited on or before 27-APR-2022)   Abnormal ECG   When compared with ECG of 07-APR-2022 12:39,   Previous ECG has undetermined rhythm, needs review   The axis Shifted left   T wave inversion now evident in Inferior leads       Significant Imaging:     Chest x-ray 04/30/2022  EXAMINATION:  XR CHEST AP PORTABLE     CLINICAL HISTORY:  PNA;  pneumonia.     COMPARISON:  04/27/2022     FINDINGS:  Hazy bibasilar infiltrates, worse on the left.  No significant interval change.  Heart size within normal limits.Degenerative changes thoracic spine.     .     Impression:     Hazy bibasilar infiltrates, greater on the left.  No significant interval change.      CTA Chest Non-Coronary (PE Study)  Order: [id number]  Status: Final result    Visible to patient: Yes (not seen)    Next appt: 05/05/2022 at 08:00 AM in Pulmonology (PULMONARY LAB, [facility])    0 Result Notes    Details    Reading Physician Reading Date Result Priority   Jerry Skelton MD  [phone]  [phone] 4/27/2022 STAT     Narrative & Impression  EXAMINATION:  CTA CHEST NON CORONARY     CLINICAL HISTORY:  Chest pain and shortness of breath     TECHNIQUE:  After the intravenous administration of 100 cc of Omni 350 nonionic contrast using CT pulmonary angio technique, 1.25  Mm  axial images were acquired using helical CT technique from the lung apices through costophrenic sulci.  Sagittal coronal and oblique MIPS were also submitted for interpretation.     COMPARISON:  Chest x-ray dated 04/27/2022     FINDINGS:  -Pulmonary arteries: Pulmonary arteries are well opacified.  No evidence of pulmonary embolism.  No evidence of pulmonary hypertension.  No right heart strain is identified with RV/LV ratio < 0.9.     -Lungs: Diffuse interstitial thickening with ground-glass opacities predominately throughout the lower lobes with associated bronchiectasis concerning for interstitial pneumonia versus interstitial edema.  Similar findings within the right middle lobe.  Upper lobes are largely clear.  Concerning for     -Pleura: No thickening or fluid.     -Mediastinum/Paula:Mildly enlarged lymph nodes are seen within the mediastinum and bilateral hilar regions.     -Axilla: Shotty adenopathy.     -Thyroid: Heterogeneous enlargement of the thyroid gland bilaterally concerning for multinodular goiter.  Recommend follow-up thyroid ultrasound.     -Heart/Aorta: Heart size is enlarged.  Mild coronary artery disease.  No pericardial effusion. Aorta normal caliber.   Aorta demonstrates mild atherosclerotic disease.     -Bones/Chest Wall: Intact  with multilevel degenerative spondylosis throughout the thoracic spine     -Upper Abdomen: Small hiatal hernia.  Hepatomegaly.  Spleen is normal in size.  Multiple indeterminate hypodensities within the liver not fully characterized on phase of imaging largest within the left hepatic lobe measuring 13 mm.     Impression:     No evidence of pulmonary embolism.     Diffuse interstitial thickening with ground-glass opacities predominately throughout the lower lobes with associated bronchiectasis concerning for interstitial pneumonia versus interstitial edema.     Enlarged lymph nodes are seen within the mediastinum and bilateral hilar regions likely reactive.  Follow-up  after acute exacerbation is recommended.     Heterogeneous enlargement of the thyroid gland bilaterally concerning for multinodular goiter. Recommend follow-up thyroid ultrasound.        Chest x-ray 04/27/2022    CLINICAL HISTORY:  SOB; Pneumonia, unspecified organism     TECHNIQUE:  PA and lateral views of the chest were performed.     COMPARISON:  Chest radiograph February 23, 2022     FINDINGS:  New interstitial opacities are seen within the lung bases bilaterally which may represent multifocal pneumonia, edema, or changes of aspiration depending on the clinical context.  No pneumothorax or pleural effusion.  Unchanged mild cardiomegaly.  No acute osseous abnormality.

## 2022-04-30 NOTE — PLAN OF CARE
Patient sitting up in bed with family at bedside. No acute distress noted. Plan of care reviewed with patient. Verbalized understanding. Remains free of falls. Will continue current treatment plan.

## 2022-04-30 NOTE — ASSESSMENT & PLAN NOTE
Patient with Hypoxic Respiratory failure which is Acute.  she is not on home oxygen. Supplemental oxygen was provided and noted- Oxygen Concentration (%):  [24-28] 24.   Signs/symptoms of respiratory failure include- tachypnea and increased work of breathing. Contributing diagnoses includes - Pneumonia Labs and images were reviewed. Patient Has not had a recent ABG. Will treat underlying causes and adjust management of respiratory failure as follows- O2 target sat 92-94% , IV rocephine AZT , nebs , cx sent   4/29 treating now as community-acquired pneumonia IV Rocephin and azithromycin.  Repeat chest x-ray CRP and ESR in a.m..  If no improvement suspicion of marijuana smoking related hypersensitivity pneumonitis/lung injury.  Might proceed with a trial of empiric steroid   4/30 lack of improvement with outpatient antibiotic therapy and inpatient antibiotic therapy.  Microbiology studies negative.  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  If no improvement will need bronchoscopy and bronchioalveolar sampling.

## 2022-04-30 NOTE — ASSESSMENT & PLAN NOTE
O2 target sat 92-94% , IV rocephine AZT , nebs , cx sent .   Autoimmune etiologieslow suspicion, xray changes occurred within few weeks  , will send basic rheum w/up. HIV   WBC decreased from 13 K to thank a on antibiotic.  Send sputum microbiology.  Send autoimmune serologies.  Fungal serologies.  4/29 gram-positive cocci and gram-negative rods in sputum rare.  IV Rocephin azithromycin.  Await identification  4/30 lack of improvement with outpatient antibiotic therapy and inpatient antibiotic therapy.  Microbiology studies negative.  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  If no improvement will need bronchoscopy and bronchioalveolar sampling.

## 2022-04-30 NOTE — ASSESSMENT & PLAN NOTE
Bilateral lower lobe infiltrates  Leukocytosis noted  CTA pending  Will continue Rocephin azithromycin  Sputum culture pending    4/28:  Leukocytosis improving  CTA showing bilateral interstitial infiltrate  Procalcitonin is negative therefore  Viral pneumonia is possibility  However given patient's current improvement on antibiotic therapy  Will plan to continue Rocephin azithromycin  Sputum culture pending    4/30:  Sputum culture normal iris  procal negative  No improvement on chest xray  Will dc abx therapy  Unlikely to be CAP

## 2022-04-30 NOTE — ASSESSMENT & PLAN NOTE
With history of marijuana smoking.  Unusual presentation of pneumonia Suspicion of inhalation related lung injury.  If afebrile and negative microbiology will try empiric steroids in the next 24 hours.    4/29 treating now as community-acquired pneumonia IV Rocephin and azithromycin.  Repeat chest x-ray CRP and ESR in a.m..  Now mildly elevated.  Rare Gram-positive cocci and Gram-negative rods in sputum If no improvement suspicion of marijuana smoking related hypersensitivity pneumonitis/lung injury.  Might proceed with a trial of empiric steroid   4/30 lack of improvement with outpatient antibiotic therapy and inpatient antibiotic therapy.  Microbiology studies negative.  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  If no improvement will need bronchoscopy and bronchioalveolar sampling.

## 2022-04-30 NOTE — ASSESSMENT & PLAN NOTE
4/30 ultrasound of the thyroid noted.  Outpatient ENT evaluation.  Discussed with hospital medicine attending

## 2022-04-30 NOTE — NURSING
Patient does not have IV access, patient wants IV to be done by ER (ultrasound guided) because she is a hard stick. ER called by faustnio. Called ER again around 0130 am to follow up, spoke to carmella Lawson on the list.

## 2022-05-01 PROCEDURE — 27000221 HC OXYGEN, UP TO 24 HOURS

## 2022-05-01 PROCEDURE — 25000242 PHARM REV CODE 250 ALT 637 W/ HCPCS: Performed by: FAMILY MEDICINE

## 2022-05-01 PROCEDURE — 63600175 PHARM REV CODE 636 W HCPCS: Performed by: EMERGENCY MEDICINE

## 2022-05-01 PROCEDURE — 99233 PR SUBSEQUENT HOSPITAL CARE,LEVL III: ICD-10-PCS | Mod: ,,, | Performed by: INTERNAL MEDICINE

## 2022-05-01 PROCEDURE — 63600175 PHARM REV CODE 636 W HCPCS: Performed by: INTERNAL MEDICINE

## 2022-05-01 PROCEDURE — 25000242 PHARM REV CODE 250 ALT 637 W/ HCPCS: Performed by: INTERNAL MEDICINE

## 2022-05-01 PROCEDURE — 99900035 HC TECH TIME PER 15 MIN (STAT)

## 2022-05-01 PROCEDURE — 99233 SBSQ HOSP IP/OBS HIGH 50: CPT | Mod: ,,, | Performed by: INTERNAL MEDICINE

## 2022-05-01 PROCEDURE — 21400001 HC TELEMETRY ROOM

## 2022-05-01 PROCEDURE — 94640 AIRWAY INHALATION TREATMENT: CPT

## 2022-05-01 PROCEDURE — 25000003 PHARM REV CODE 250: Performed by: FAMILY MEDICINE

## 2022-05-01 PROCEDURE — 94761 N-INVAS EAR/PLS OXIMETRY MLT: CPT

## 2022-05-01 RX ADMIN — Medication 4 TABLET: at 08:05

## 2022-05-01 RX ADMIN — SERTRALINE HYDROCHLORIDE 50 MG: 50 TABLET ORAL at 08:05

## 2022-05-01 RX ADMIN — BUDESONIDE 0.5 MG: 0.5 INHALANT ORAL at 07:05

## 2022-05-01 RX ADMIN — METHYLPREDNISOLONE SODIUM SUCCINATE 80 MG: 40 INJECTION, POWDER, FOR SOLUTION INTRAMUSCULAR; INTRAVENOUS at 04:05

## 2022-05-01 RX ADMIN — AMLODIPINE BESYLATE 10 MG: 10 TABLET ORAL at 08:05

## 2022-05-01 RX ADMIN — ALBUTEROL SULFATE 2.5 MG: 2.5 SOLUTION RESPIRATORY (INHALATION) at 07:05

## 2022-05-01 RX ADMIN — Medication 4 TABLET: at 04:05

## 2022-05-01 RX ADMIN — Medication 4 TABLET: at 11:05

## 2022-05-01 RX ADMIN — HYDROCHLOROTHIAZIDE 12.5 MG: 12.5 TABLET ORAL at 08:05

## 2022-05-01 RX ADMIN — ARFORMOTEROL TARTRATE 15 MCG: 15 SOLUTION RESPIRATORY (INHALATION) at 07:05

## 2022-05-01 RX ADMIN — ALBUTEROL SULFATE 2.5 MG: 2.5 SOLUTION RESPIRATORY (INHALATION) at 01:05

## 2022-05-01 RX ADMIN — ENOXAPARIN SODIUM 40 MG: 40 INJECTION SUBCUTANEOUS at 08:05

## 2022-05-01 RX ADMIN — METHYLPREDNISOLONE SODIUM SUCCINATE 80 MG: 40 INJECTION, POWDER, FOR SOLUTION INTRAMUSCULAR; INTRAVENOUS at 11:05

## 2022-05-01 RX ADMIN — METHYLPREDNISOLONE SODIUM SUCCINATE 80 MG: 40 INJECTION, POWDER, FOR SOLUTION INTRAMUSCULAR; INTRAVENOUS at 08:05

## 2022-05-01 NOTE — PROGRESS NOTES
[facility]  [facility]  Progress Note    Patient Name: Ayanna Alicia  MRN: [id number]  Patient Class: IP- Inpatient   Admission Date: 4/27/2022  Length of Stay: 3 days  Attending Physician: Thaddeus Nassar MD  Primary Care Provider: Madeleine Enrique MD        Subjective:     Principal Problem:Unresolved pneumonia        HPI:  Patient is a 51 y.o. aa female with a PMHx of asthma, fatty liver, HTN, and KALYANI who presents to the Emergency Department for SOB which onset gradually at the beginning of this month. Patient currently being seen by pulmonology outpatient for asthma.  She was treated with doxycycline and steroids without improvement.  Patient was seen by pulmonology today and was referred to the ED.  She reports shortness of breath is worse on exertion and relieves with rest.  States that she may have had fever when the symptoms 1st started however not now.  Reports nonproductive cough.    In the ED, chest xray concerning bilateral interstitial infiltrates. D dimer elevated and CTA chest was pending at time of admission. She was started on rocephin and azithromycin. HM consulted and patient placed in observation for pna.       Overview/Hospital Course:  4/28:  WBC trending down.  Currently on Rocephin and azithromycin.  Procalcitonin negative.  Possibly viral pneumonia however given patient's improvement on antibiotic therapy will continue current treatment.  Pulmonology consulted on case.  4/29:  WBC trending down.  Continue Rocephin.  Rheumatoid factor elevated.  Checking anti CCP.  Will plan repeat inflammatory markers in a.m..  Repeat chest x-ray.  Possible bronch on Monday pending clinical response.   4/30: No improvement noted on chest xray. Discussed with pulmonology. Will start solumedrol.Thyroid U/S showing multinodular goiter with growth since last U/S. Patient has been evaluated by pcp and ENT. Will recommend outpatient f/u. Check TPO antibodies, tsh normal.   5/1:  Patient reports feeling  better since starting Solu-Medrol.  Will continue current management.  Repeat imaging and lab work in a.m..  Possible bronchoscopy this admission if no improvement.      Interval History: Patient reports feeling better since starting Solu-Medrol.  Will continue current management.  Repeat imaging and lab work in a.m..  Possible bronchoscopy this admission if no improvement.    Patient denies any issues overnight.  Denies any current issues.    Review of Systems   Constitutional:  Negative for fatigue and fever.   HENT:  Positive for dental problem (pain). Negative for sinus pressure.    Eyes:  Negative for visual disturbance.   Respiratory:  Positive for cough and shortness of breath.    Cardiovascular:  Negative for chest pain.   Gastrointestinal:  Negative for nausea and vomiting.   Genitourinary:  Negative for difficulty urinating.   Musculoskeletal:  Negative for back pain.   Skin:  Negative for rash.   Neurological:  Negative for headaches.   Psychiatric/Behavioral:  Negative for confusion.    Objective:     Vital Signs (Most Recent):  Temp: 97.9 °F (36.6 °C) (05/01/22 0716)  Pulse: 91 (05/01/22 0733)  Resp: 18 (05/01/22 0733)  BP: 124/79 (05/01/22 0716)  SpO2: 96 % (05/01/22 0733) Vital Signs (24h Range):  Temp:  [97.5 °F (36.4 °C)-98.7 °F (37.1 °C)] 97.9 °F (36.6 °C)  Pulse:  [] 91  Resp:  [16-19] 18  SpO2:  [92 %-96 %] 96 %  BP: (124-138)/(64-84) 124/79     Weight: 116.5 kg (256 lb 13.4 oz)  Body mass index is 41.45 kg/m².  No intake or output data in the 24 hours ending 05/01/22 1007   Physical Exam  Constitutional:       General: She is not in acute distress.     Appearance: She is well-developed. She is not diaphoretic.   HENT:      Head: Normocephalic and atraumatic.   Eyes:      Pupils: Pupils are equal, round, and reactive to light.   Cardiovascular:      Rate and Rhythm: Normal rate and regular rhythm.      Heart sounds: Normal heart sounds. No murmur heard.    No friction rub. No gallop.    Pulmonary:      Effort: Pulmonary effort is normal. No respiratory distress.      Breath sounds: No stridor. No wheezing, rhonchi or rales.   Abdominal:      General: Bowel sounds are normal. There is no distension.      Palpations: Abdomen is soft. There is no mass.      Tenderness: There is no abdominal tenderness. There is no guarding.   Musculoskeletal:      Right lower leg: No edema.      Left lower leg: No edema.   Skin:     General: Skin is warm.      Findings: No erythema.   Neurological:      Mental Status: She is alert and oriented to person, place, and time.   Psychiatric:         Mood and Affect: Mood normal.         Behavior: Behavior normal.         Thought Content: Thought content normal.         Judgment: Judgment normal.       Significant Labs: All pertinent labs within the past 24 hours have been reviewed.    Significant Imaging: I have reviewed all pertinent imaging results/findings within the past 24 hours.        Assessment/Plan:      * Unresolved pneumonia  Bilateral lower lobe infiltrates  Leukocytosis noted  CTA pending  Will continue Rocephin azithromycin  Sputum culture pending    4/28:  Leukocytosis improving  CTA showing bilateral interstitial infiltrate  Procalcitonin is negative therefore  Viral pneumonia is possibility  However given patient's current improvement on antibiotic therapy  Will plan to continue Rocephin azithromycin  Sputum culture pending    5/1:  Sputum culture normal iris  procal negative  No improvement on chest xray  Will dc abx therapy  Unlikely to be CAP    Acute hypoxemic respiratory failure  Patient with Hypoxic Respiratory failure which is Acute.  she is not on home oxygen. Supplemental oxygen was provided and noted- Oxygen Concentration (%):  [24] 24.   Signs/symptoms of respiratory failure include- tachypnea and increased work of breathing. Contributing diagnoses includes - Pneumonia Labs and images were reviewed. Patient Has not had a recent ABG. Will treat  underlying causes and adjust management of respiratory failure as follows-     Treat pneumonia   Home o2 eval prior to dc     4/28:  Patient being seen by pulmonology outpatient  Will consult pulmonology on case  Wean O2 as tolerated  Home O2 eval prior to discharge    5/1:  Wean O2 as tolerated  Rheumatoid factor elevated  Anti CCP negative   No improvement on chest xray  abx dced  Continue solumedrol  Possible bronch this admission if no improvement     Sleep apnea  Does not use CPAP at home  Currently being followed by pulm outpatient      Asthma  Will continue steroid and Laba nebs      Multinodular goiter  4/30:  Patient with hx of MDG  Has been evaluated by PCP and ENT outpatient  tsh normal, will check TPO  Thyroid U/S shows enlargement compared  To prior U/S  Will recommend outpatient follow up  Possible need for repeat FNA    5/1:  Anti TPO negative  Recommend outpatient follow-up with PCP and ENT    HTN (hypertension)  Resume home meds  Hydralazine p.r.n.        VTE Risk Mitigation (From admission, onward)         Ordered     enoxaparin injection 40 mg  Every 12 hours         04/27/22 1533                Discharge Planning   JANETTE:      Code Status: Full Code   Is the patient medically ready for discharge?:     Reason for patient still in hospital (select all that apply): Patient trending condition  Discharge Plan A: Home                  Thaddeus Nassar MD  Department of Hospital Medicine   [facility]

## 2022-05-01 NOTE — ASSESSMENT & PLAN NOTE
Patient with Hypoxic Respiratory failure which is Acute.  she is not on home oxygen. Supplemental oxygen was provided and noted- Oxygen Concentration (%):  [24] 24.   Signs/symptoms of respiratory failure include- tachypnea and increased work of breathing. Contributing diagnoses includes - Pneumonia Labs and images were reviewed. Patient Has not had a recent ABG. Will treat underlying causes and adjust management of respiratory failure as follows- O2 target sat 92-94% , IV rocephine AZT , nebs , cx sent   4/29 treating now as community-acquired pneumonia IV Rocephin and azithromycin.  Repeat chest x-ray CRP and ESR in a.m..  If no improvement suspicion of marijuana smoking related hypersensitivity pneumonitis/lung injury.  Might proceed with a trial of empiric steroid   4/30 lack of improvement with outpatient antibiotic therapy and inpatient antibiotic therapy.  Microbiology studies negative.  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  If no improvement will need bronchoscopy and bronchioalveolar sampling.  5/1 repeat chest x-ray and CRP ESR in a.m..  On IV Solu-Medrol 80 mg q.8 hours.  Home O2 evaluation

## 2022-05-01 NOTE — PLAN OF CARE
Pt AAOx4. VSS on 2L NC. Pt remained free of falls this shift. No complaints of pain or discomfort. Medications administered as ordered. PIV intact, flushed and SL. Hourly rounding completed. Pt instructed to call for assistance. POC reviewed. Pt verbalized understanding.

## 2022-05-01 NOTE — NURSING
Pt transferred to room 216. Pt oriented to room, call light system, and hourly rounding. No further needs expressed at this time.

## 2022-05-01 NOTE — ASSESSMENT & PLAN NOTE
Patient with Hypoxic Respiratory failure which is Acute.  she is not on home oxygen. Supplemental oxygen was provided and noted- Oxygen Concentration (%):  [24] 24.   Signs/symptoms of respiratory failure include- tachypnea and increased work of breathing. Contributing diagnoses includes - Pneumonia Labs and images were reviewed. Patient Has not had a recent ABG. Will treat underlying causes and adjust management of respiratory failure as follows-     Treat pneumonia   Home o2 eval prior to dc     4/28:  Patient being seen by pulmonology outpatient  Will consult pulmonology on case  Wean O2 as tolerated  Home O2 eval prior to discharge    5/1:  Wean O2 as tolerated  Rheumatoid factor elevated  Anti CCP negative   No improvement on chest xray  abx dced  Continue solumedrol  Possible bronch this admission if no improvement

## 2022-05-01 NOTE — ASSESSMENT & PLAN NOTE
4/30:  Patient with hx of MDG  Has been evaluated by PCP and ENT outpatient  tsh normal, will check TPO  Thyroid U/S shows enlargement compared  To prior U/S  Will recommend outpatient follow up  Possible need for repeat FNA    5/1:  Anti TPO negative  Recommend outpatient follow-up with PCP and ENT

## 2022-05-01 NOTE — ASSESSMENT & PLAN NOTE
With history of marijuana smoking.  Unusual presentation of pneumonia Suspicion of inhalation related lung injury.  If afebrile and negative microbiology will try empiric steroids in the next 24 hours.    4/29 treating now as community-acquired pneumonia IV Rocephin and azithromycin.  Repeat chest x-ray CRP and ESR in a.m..  Now mildly elevated.  Rare Gram-positive cocci and Gram-negative rods in sputum If no improvement suspicion of marijuana smoking related hypersensitivity pneumonitis/lung injury.  Might proceed with a trial of empiric steroid   4/30 lack of improvement with outpatient antibiotic therapy and inpatient antibiotic therapy.  Microbiology studies negative.  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  If no improvement will need bronchoscopy and bronchioalveolar sampling.  5/15/1 repeat chest x-ray and CRP ESR in a.m..  On IV Solu-Medrol 80 mg q.8 hours.

## 2022-05-01 NOTE — ASSESSMENT & PLAN NOTE
Albuterol , ICS , LABA  4/29 continue albuterol Brovana budesonide  4/30 continue same  5/1 discharged on albuterol Breo and Singulair

## 2022-05-01 NOTE — ASSESSMENT & PLAN NOTE
Bilateral lower lobe infiltrates  Leukocytosis noted  CTA pending  Will continue Rocephin azithromycin  Sputum culture pending    4/28:  Leukocytosis improving  CTA showing bilateral interstitial infiltrate  Procalcitonin is negative therefore  Viral pneumonia is possibility  However given patient's current improvement on antibiotic therapy  Will plan to continue Rocephin azithromycin  Sputum culture pending    5/1:  Sputum culture normal iris  procal negative  No improvement on chest xray  Will dc abx therapy  Unlikely to be CAP

## 2022-05-01 NOTE — ASSESSMENT & PLAN NOTE
4/30 ultrasound of the thyroid noted.  Outpatient ENT evaluation.  Discussed with hospital medicine attending  5/1 ENT evaluation

## 2022-05-01 NOTE — ASSESSMENT & PLAN NOTE
O2 target sat 92-94% , IV rocephine AZT , nebs , cx sent .   Autoimmune etiologieslow suspicion, xray changes occurred within few weeks  , will send basic rheum w/up. HIV   WBC decreased from 13 K to thank a on antibiotic.  Send sputum microbiology.  Send autoimmune serologies.  Fungal serologies.  4/29 gram-positive cocci and gram-negative rods in sputum rare.  IV Rocephin azithromycin.  Await identification  4/30 lack of improvement with outpatient antibiotic therapy and inpatient antibiotic therapy.  Microbiology studies negative.  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  If no improvement will need bronchoscopy and bronchioalveolar sampling.  5/1 repeat chest x-ray and CRP ESR in a.m..  On IV Solu-Medrol 80 mg q.8 hours.

## 2022-05-01 NOTE — SUBJECTIVE & OBJECTIVE
Interval History: Patient reports feeling better since starting Solu-Medrol.  Will continue current management.  Repeat imaging and lab work in a.m..  Possible bronchoscopy this admission if no improvement.    Patient denies any issues overnight.  Denies any current issues.    Review of Systems   Constitutional:  Negative for fatigue and fever.   HENT:  Positive for dental problem (pain). Negative for sinus pressure.    Eyes:  Negative for visual disturbance.   Respiratory:  Positive for cough and shortness of breath.    Cardiovascular:  Negative for chest pain.   Gastrointestinal:  Negative for nausea and vomiting.   Genitourinary:  Negative for difficulty urinating.   Musculoskeletal:  Negative for back pain.   Skin:  Negative for rash.   Neurological:  Negative for headaches.   Psychiatric/Behavioral:  Negative for confusion.    Objective:     Vital Signs (Most Recent):  Temp: 97.9 °F (36.6 °C) (05/01/22 0716)  Pulse: 91 (05/01/22 0733)  Resp: 18 (05/01/22 0733)  BP: 124/79 (05/01/22 0716)  SpO2: 96 % (05/01/22 0733) Vital Signs (24h Range):  Temp:  [97.5 °F (36.4 °C)-98.7 °F (37.1 °C)] 97.9 °F (36.6 °C)  Pulse:  [] 91  Resp:  [16-19] 18  SpO2:  [92 %-96 %] 96 %  BP: (124-138)/(64-84) 124/79     Weight: 116.5 kg (256 lb 13.4 oz)  Body mass index is 41.45 kg/m².  No intake or output data in the 24 hours ending 05/01/22 [facility] [place]   Physical Exam  Constitutional:       General: She is not in acute distress.     Appearance: She is well-developed. She is not diaphoretic.   HENT:      Head: Normocephalic and atraumatic.   Eyes:      Pupils: Pupils are equal, round, and reactive to light.   Cardiovascular:      Rate and Rhythm: Normal rate and regular rhythm.      Heart sounds: Normal heart sounds. No murmur heard.    No friction rub. No gallop.   Pulmonary:      Effort: Pulmonary effort is normal. No respiratory distress.      Breath sounds: No stridor. No wheezing, rhonchi or rales.   Abdominal:      General: Bowel  sounds are normal. There is no distension.      Palpations: Abdomen is soft. There is no mass.      Tenderness: There is no abdominal tenderness. There is no guarding.   Musculoskeletal:      Right lower leg: No edema.      Left lower leg: No edema.   Skin:     General: Skin is warm.      Findings: No erythema.   Neurological:      Mental Status: She is alert and oriented to person, place, and time.   Psychiatric:         Mood and Affect: Mood normal.         Behavior: Behavior normal.         Thought Content: Thought content normal.         Judgment: Judgment normal.       Significant Labs: All pertinent labs within the past 24 hours have been reviewed.    Significant Imaging: I have reviewed all pertinent imaging results/findings within the past 24 hours.

## 2022-05-01 NOTE — SUBJECTIVE & OBJECTIVE
5/1 seen and examined.  Feeling slightly better.  O2 sat 96% on 1 L. Ambulating.  Microbiology studies negative.  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  SOB fatigue.   Review of Systems   Constitutional:  Positive for activity change and fatigue. Negative for chills and fever.   HENT:  Positive for congestion. Negative for drooling, ear discharge and nosebleeds.    Eyes:  Negative for pain, discharge and itching.   Respiratory:  Positive for apnea, cough and shortness of breath. Negative for choking.    Cardiovascular:  Negative for chest pain.   Gastrointestinal:  Negative for anal bleeding.   Endocrine: Negative for cold intolerance.   Genitourinary:  Negative for hematuria.   Musculoskeletal:  Positive for arthralgias. Negative for neck stiffness.   Skin:  Negative for rash.   Allergic/Immunologic: Negative for immunocompromised state.   Neurological:  Positive for weakness. Negative for seizures and facial asymmetry.   Hematological:  Negative for adenopathy.   Psychiatric/Behavioral:  Negative for behavioral problems, self-injury and suicidal ideas.    Objective:     Vital Signs (Most Recent):  Temp: 98.9 °F (37.2 °C) (05/01/22 1200)  Pulse: 106 (05/01/22 1200)  Resp: 18 (05/01/22 1200)  BP: 135/82 (05/01/22 1200)  SpO2: 96 % (05/01/22 1200) Vital Signs (24h Range):  Temp:  [97.9 °F (36.6 °C)-98.9 °F (37.2 °C)] 98.9 °F (37.2 °C)  Pulse:  [] 106  Resp:  [16-19] 18  SpO2:  [92 %-96 %] 96 %  BP: (124-138)/(64-82) 135/82     Weight: 116.5 kg (256 lb 13.4 oz)  Body mass index is 41.45 kg/m².      Intake/Output Summary (Last 24 hours) at 5/1/2022 1400  Last data filed at 5/1/2022 1300  Gross per 24 hour   Intake 300 ml   Output --   Net 300 ml         Physical Exam  Vitals and nursing note reviewed.   Constitutional:       General: She  is not in acute distress.     Appearance: She is well-developed. She is ill-appearing.   HENT:      Head: Normocephalic and atraumatic.      Nose: Nose normal.   Eyes:      Extraocular Movements: Extraocular movements intact.   Cardiovascular:      Rate and Rhythm: Normal rate and regular rhythm.   Pulmonary:      Effort: Pulmonary effort is normal.      Breath sounds: No stridor. Rales present.      Comments: Bilateral mid to lower lung field rales  Abdominal:      General: There is no distension.   Musculoskeletal:         General: No deformity or signs of injury.      Cervical back: Normal range of motion and neck supple.   Skin:     General: Skin is warm and dry.   Neurological:      General: No focal deficit present.      Mental Status: She is alert and oriented to person, place, and time. Mental status is at baseline.   Psychiatric:         Mood and Affect: Mood normal.         Behavior: Behavior normal.         Thought Content: Thought content normal.         Judgment: Judgment normal.       Vents:  Oxygen Concentration (%): (S) 24 (05/01/22 0729)    Lines/Drains/Airways       Peripheral Intravenous Line  Duration                  Peripheral IV - Single Lumen 04/30/22 1139 20 G Anterior;Right Forearm 1 day                    Significant Labs:    CBC/Anemia Profile:  Recent Labs   Lab 04/30/22  0604   WBC 12.33   HGB 11.9*   HCT 39.2      MCV 94   RDW 14.5          Chemistries:  Recent Labs   Lab 04/30/22  0604      K 4.2      CO2 28   BUN 14   CREATININE 0.8   CALCIUM 9.5        Latest Reference Range & Units 04/27/22 13:22   BNP 0 - 99 pg/mL <10 [1]    - 260 U/L 460 (H) [2]   Troponin I 0.000 - 0.026 ng/mL 0.017 [3]   Stress echo April 7, 2022  Summary    The left ventricle is normal in size with concentric remodeling and normal systolic function.  The test was stopped because the patient experienced shortness of breath. The patient requested the test to be stopped.  The patient's  exercise capacity was severely impaired.  There were no arrhythmias during stress.  The estimated ejection fraction is 60%.  Normal left ventricular diastolic function.  Normal right ventricular size with normal right ventricular systolic function.  Mild tricuspid regurgitation.  Normal central venous pressure (3 mmHg).  The estimated PA systolic pressure is 29 mmHg.  The stress echo portion of this study is negative for myocardial ischemia.  The ECG portion of this study is negative for myocardial ischemia.    EKG 04/27/2022    Vent. Rate : 091 BPM     Atrial Rate : 091 BPM      P-R Int : 160 ms          QRS Dur : 092 ms       QT Int : 382 ms       P-R-T Axes : 051 -42 019 degrees      QTc Int : 469 ms     Normal sinus rhythm   Biatrial enlargement   Left axis deviation   Low voltage QRS   Cannot rule out Anterior infarct (cited on or before 27-APR-2022)   Abnormal ECG   When compared with ECG of 07-APR-2022 12:39,   Previous ECG has undetermined rhythm, needs review   The axis Shifted left   T wave inversion now evident in Inferior leads       Significant Imaging:     Chest x-ray 04/30/2022  EXAMINATION:  XR CHEST AP PORTABLE     CLINICAL HISTORY:  PNA;  pneumonia.     COMPARISON:  04/27/2022     FINDINGS:  Hazy bibasilar infiltrates, worse on the left.  No significant interval change.  Heart size within normal limits.Degenerative changes thoracic spine.     .     Impression:     Hazy bibasilar infiltrates, greater on the left.  No significant interval change.      CTA Chest Non-Coronary (PE Study)  Order: [id number]  Status: Final result    Visible to patient: Yes (not seen)    Next appt: 05/05/2022 at 08:00 AM in Pulmonology (PULMONARY LAB, [facility])    0 Result Notes    Details    Reading Physician Reading Date Result Priority   Jerry Skelton MD  [phone]  [phone] 4/27/2022 STAT     Narrative & Impression  EXAMINATION:  CTA CHEST NON CORONARY     CLINICAL HISTORY:  Chest pain and shortness of breath      TECHNIQUE:  After the intravenous administration of 100 cc of Omni 350 nonionic contrast using CT pulmonary angio technique, 1.25  Mm axial images were acquired using helical CT technique from the lung apices through costophrenic sulci.  Sagittal coronal and oblique MIPS were also submitted for interpretation.     COMPARISON:  Chest x-ray dated 04/27/2022     FINDINGS:  -Pulmonary arteries: Pulmonary arteries are well opacified.  No evidence of pulmonary embolism.  No evidence of pulmonary hypertension.  No right heart strain is identified with RV/LV ratio < 0.9.     -Lungs: Diffuse interstitial thickening with ground-glass opacities predominately throughout the lower lobes with associated bronchiectasis concerning for interstitial pneumonia versus interstitial edema.  Similar findings within the right middle lobe.  Upper lobes are largely clear.  Concerning for     -Pleura: No thickening or fluid.     -Mediastinum/Paula:Mildly enlarged lymph nodes are seen within the mediastinum and bilateral hilar regions.     -Axilla: Shotty adenopathy.     -Thyroid: Heterogeneous enlargement of the thyroid gland bilaterally concerning for multinodular goiter.  Recommend follow-up thyroid ultrasound.     -Heart/Aorta: Heart size is enlarged.  Mild coronary artery disease.  No pericardial effusion. Aorta normal caliber.   Aorta demonstrates mild atherosclerotic disease.     -Bones/Chest Wall: Intact  with multilevel degenerative spondylosis throughout the thoracic spine     -Upper Abdomen: Small hiatal hernia.  Hepatomegaly.  Spleen is normal in size.  Multiple indeterminate hypodensities within the liver not fully characterized on phase of imaging largest within the left hepatic lobe measuring 13 mm.     Impression:     No evidence of pulmonary embolism.     Diffuse interstitial thickening with ground-glass opacities predominately throughout the lower lobes with associated bronchiectasis concerning for interstitial pneumonia  versus interstitial edema.     Enlarged lymph nodes are seen within the mediastinum and bilateral hilar regions likely reactive.  Follow-up after acute exacerbation is recommended.     Heterogeneous enlargement of the thyroid gland bilaterally concerning for multinodular goiter. Recommend follow-up thyroid ultrasound.        Chest x-ray 04/27/2022    CLINICAL HISTORY:  SOB; Pneumonia, unspecified organism     TECHNIQUE:  PA and lateral views of the chest were performed.     COMPARISON:  Chest radiograph February 23, 2022     FINDINGS:  New interstitial opacities are seen within the lung bases bilaterally which may represent multifocal pneumonia, edema, or changes of aspiration depending on the clinical context.  No pneumothorax or pleural effusion.  Unchanged mild cardiomegaly.  No acute osseous abnormality.

## 2022-05-01 NOTE — PROGRESS NOTES
[facility] - Telemetry ([facility])  Pulmonology  Progress Note    Patient Name: Ayanna Alicia  MRN: [id number]  Admission Date: 4/27/2022  Hospital Length of Stay: 3 days  Code Status: Full Code  Attending Provider: Thaddeus Nassar MD  Primary Care Provider: Madeleine Enrique MD   Principal Problem: Unresolved pneumonia    Subjective:     5/1 seen and examined.  Feeling slightly better.  O2 sat 96% on 1 L. Ambulating.  Microbiology studies negative.  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  SOB fatigue.   Review of Systems   Constitutional:  Positive for activity change and fatigue. Negative for chills and fever.   HENT:  Positive for congestion. Negative for drooling, ear discharge and nosebleeds.    Eyes:  Negative for pain, discharge and itching.   Respiratory:  Positive for apnea, cough and shortness of breath. Negative for choking.    Cardiovascular:  Negative for chest pain.   Gastrointestinal:  Negative for anal bleeding.   Endocrine: Negative for cold intolerance.   Genitourinary:  Negative for hematuria.   Musculoskeletal:  Positive for arthralgias. Negative for neck stiffness.   Skin:  Negative for rash.   Allergic/Immunologic: Negative for immunocompromised state.   Neurological:  Positive for weakness. Negative for seizures and facial asymmetry.   Hematological:  Negative for adenopathy.   Psychiatric/Behavioral:  Negative for behavioral problems, self-injury and suicidal ideas.    Objective:     Vital Signs (Most Recent):  Temp: 98.9 °F (37.2 °C) (05/01/22 1200)  Pulse: 106 (05/01/22 1200)  Resp: 18 (05/01/22 1200)  BP: 135/82 (05/01/22 1200)  SpO2: 96 % (05/01/22 1200) Vital Signs (24h Range):  Temp:  [97.9 °F (36.6 °C)-98.9 °F (37.2 °C)] 98.9 °F (37.2 °C)  Pulse:  [] 106  Resp:  [16-19] 18  SpO2:  [92 %-96 %] 96 %  BP: (124-138)/(64-82)  135/82     Weight: 116.5 kg (256 lb 13.4 oz)  Body mass index is 41.45 kg/m².      Intake/Output Summary (Last 24 hours) at 5/1/2022 1400  Last data filed at 5/1/2022 1300  Gross per 24 hour   Intake 300 ml   Output --   Net 300 ml         Physical Exam  Vitals and nursing note reviewed.   Constitutional:       General: She is not in acute distress.     Appearance: She is well-developed. She is ill-appearing.   HENT:      Head: Normocephalic and atraumatic.      Nose: Nose normal.   Eyes:      Extraocular Movements: Extraocular movements intact.   Cardiovascular:      Rate and Rhythm: Normal rate and regular rhythm.   Pulmonary:      Effort: Pulmonary effort is normal.      Breath sounds: No stridor. Rales present.      Comments: Bilateral mid to lower lung field rales  Abdominal:      General: There is no distension.   Musculoskeletal:         General: No deformity or signs of injury.      Cervical back: Normal range of motion and neck supple.   Skin:     General: Skin is warm and dry.   Neurological:      General: No focal deficit present.      Mental Status: She is alert and oriented to person, place, and time. Mental status is at baseline.   Psychiatric:         Mood and Affect: Mood normal.         Behavior: Behavior normal.         Thought Content: Thought content normal.         Judgment: Judgment normal.       Vents:  Oxygen Concentration (%): (S) 24 (05/01/22 0729)    Lines/Drains/Airways       Peripheral Intravenous Line  Duration                  Peripheral IV - Single Lumen 04/30/22 1139 20 G Anterior;Right Forearm 1 day                    Significant Labs:    CBC/Anemia Profile:  Recent Labs   Lab 04/30/22  0604   WBC 12.33   HGB 11.9*   HCT 39.2      MCV 94   RDW 14.5          Chemistries:  Recent Labs   Lab 04/30/22  0604      K 4.2      CO2 28   BUN 14   CREATININE 0.8   CALCIUM 9.5        Latest Reference Range & Units 04/27/22 13:22   BNP 0 - 99 pg/mL <10 [1]    - 260 U/L  460 (H) [2]   Troponin I 0.000 - 0.026 ng/mL 0.017 [3]   Stress echo April 7, 2022  Summary    · The left ventricle is normal in size with concentric remodeling and normal systolic function.  · The test was stopped because the patient experienced shortness of breath. The patient requested the test to be stopped.  · The patient's exercise capacity was severely impaired.  · There were no arrhythmias during stress.  · The estimated ejection fraction is 60%.  · Normal left ventricular diastolic function.  · Normal right ventricular size with normal right ventricular systolic function.  · Mild tricuspid regurgitation.  · Normal central venous pressure (3 mmHg).  · The estimated PA systolic pressure is 29 mmHg.  · The stress echo portion of this study is negative for myocardial ischemia.  · The ECG portion of this study is negative for myocardial ischemia.    EKG 04/27/2022    Vent. Rate : 091 BPM     Atrial Rate : 091 BPM      P-R Int : 160 ms          QRS Dur : 092 ms       QT Int : 382 ms       P-R-T Axes : 051 -42 019 degrees      QTc Int : 469 ms     Normal sinus rhythm   Biatrial enlargement   Left axis deviation   Low voltage QRS   Cannot rule out Anterior infarct (cited on or before 27-APR-2022)   Abnormal ECG   When compared with ECG of 07-APR-2022 12:39,   Previous ECG has undetermined rhythm, needs review   The axis Shifted left   T wave inversion now evident in Inferior leads       Significant Imaging:     Chest x-ray 04/30/2022  EXAMINATION:  XR CHEST AP PORTABLE     CLINICAL HISTORY:  PNA;  pneumonia.     COMPARISON:  04/27/2022     FINDINGS:  Hazy bibasilar infiltrates, worse on the left.  No significant interval change.  Heart size within normal limits.Degenerative changes thoracic spine.     .     Impression:     Hazy bibasilar infiltrates, greater on the left.  No significant interval change.      CTA Chest Non-Coronary (PE Study)  Order: [id number]   Status: Final result     Visible to patient: Yes (not  seen)     Next appt: 05/05/2022 at 08:00 AM in Pulmonology (PULMONARY LAB, [facility])     0 Result Notes    Details    Reading Physician Reading Date Result Priority   Jerry Skelton MD  [phone] 4/27/2022 STAT     Narrative & Impression  EXAMINATION:  CTA CHEST NON CORONARY     CLINICAL HISTORY:  Chest pain and shortness of breath     TECHNIQUE:  After the intravenous administration of 100 cc of Omni 350 nonionic contrast using CT pulmonary angio technique, 1.25  Mm axial images were acquired using helical CT technique from the lung apices through costophrenic sulci.  Sagittal coronal and oblique MIPS were also submitted for interpretation.     COMPARISON:  Chest x-ray dated 04/27/2022     FINDINGS:  -Pulmonary arteries: Pulmonary arteries are well opacified.  No evidence of pulmonary embolism.  No evidence of pulmonary hypertension.  No right heart strain is identified with RV/LV ratio < 0.9.     -Lungs: Diffuse interstitial thickening with ground-glass opacities predominately throughout the lower lobes with associated bronchiectasis concerning for interstitial pneumonia versus interstitial edema.  Similar findings within the right middle lobe.  Upper lobes are largely clear.  Concerning for     -Pleura: No thickening or fluid.     -Mediastinum/Paula:Mildly enlarged lymph nodes are seen within the mediastinum and bilateral hilar regions.     -Axilla: Shotty adenopathy.     -Thyroid: Heterogeneous enlargement of the thyroid gland bilaterally concerning for multinodular goiter.  Recommend follow-up thyroid ultrasound.     -Heart/Aorta: Heart size is enlarged.  Mild coronary artery disease.  No pericardial effusion. Aorta normal caliber.   Aorta demonstrates mild atherosclerotic disease.     -Bones/Chest Wall: Intact  with multilevel degenerative spondylosis throughout the thoracic spine     -Upper Abdomen: Small hiatal hernia.  Hepatomegaly.  Spleen is normal in size.  Multiple indeterminate  hypodensities within the liver not fully characterized on phase of imaging largest within the left hepatic lobe measuring 13 mm.     Impression:     No evidence of pulmonary embolism.     Diffuse interstitial thickening with ground-glass opacities predominately throughout the lower lobes with associated bronchiectasis concerning for interstitial pneumonia versus interstitial edema.     Enlarged lymph nodes are seen within the mediastinum and bilateral hilar regions likely reactive.  Follow-up after acute exacerbation is recommended.     Heterogeneous enlargement of the thyroid gland bilaterally concerning for multinodular goiter. Recommend follow-up thyroid ultrasound.        Chest x-ray 04/27/2022    CLINICAL HISTORY:  SOB; Pneumonia, unspecified organism     TECHNIQUE:  PA and lateral views of the chest were performed.     COMPARISON:  Chest radiograph February 23, 2022     FINDINGS:  New interstitial opacities are seen within the lung bases bilaterally which may represent multifocal pneumonia, edema, or changes of aspiration depending on the clinical context.  No pneumothorax or pleural effusion.  Unchanged mild cardiomegaly.  No acute osseous abnormality.            ABG  No results for input(s): PH, PO2, PCO2, HCO3, BE in the last 168 hours.  Assessment/Plan:     * Unresolved pneumonia   O2 target sat 92-94% , IV rocephine AZT , nebs , cx sent .   Autoimmune etiologieslow suspicion, xray changes occurred within few weeks  , will send basic rheum w/up. HIV   WBC decreased from 13 K to thank a on antibiotic.  Send sputum microbiology.  Send autoimmune serologies.  Fungal serologies.  4/29 gram-positive cocci and gram-negative rods in sputum rare.  IV Rocephin azithromycin.  Await identification  4/30 lack of improvement with outpatient antibiotic therapy and inpatient antibiotic therapy.  Microbiology studies negative.  Findings on CT scan with patient clinical history more in favor of interstitial lung disease  likely related to inhalation hypersensitivity pneumonitis in patient with history of marijuana smoking times 15 years, noted improvements on steroids as outpatient previously will start empiric Solu-Medrol , monitor patient clinically repeat chest x-ray in 48 hours ESR CRP.  If no improvement will need bronchoscopy and bronchioalveolar sampling.  5/1 repeat chest x-ray and CRP ESR in a.m..  On IV Solu-Medrol 80 mg q.8 hours.    Pulmonary infiltrates on CXR  With history of marijuana smoking.  Unusual presentation of pneumonia Suspicion of inhalation related lung injury.  If afebrile and negative microbiology will try empiric steroids in the next 24 hours.    4/29 treating now as community-acquired pneumonia IV Rocephin and azithromycin.  Repeat chest x-ray CRP and ESR in a.m..  Now mildly elevated.  Rare Gram-positive cocci and Gram-negative rods in sputum If no improvement suspicion of marijuana smoking related hypersensitivity pneumonitis/lung injury.  Might proceed with a trial of empiric steroid   4/30 lack of improvement with outpatient antibiotic therapy and inpatient antibiotic therapy.  Microbiology studies negative.  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  If no improvement will need bronchoscopy and bronchioalveolar sampling.  5/15/1 repeat chest x-ray and CRP ESR in a.m..  On IV Solu-Medrol 80 mg q.8 hours.    Acute hypoxemic respiratory failure  Patient with Hypoxic Respiratory failure which is Acute.  she is not on home oxygen. Supplemental oxygen was provided and noted- Oxygen Concentration (%):  [24] 24.   Signs/symptoms of respiratory failure include- tachypnea and increased  work of breathing. Contributing diagnoses includes - Pneumonia Labs and images were reviewed. Patient Has not had a recent ABG. Will treat underlying causes and adjust management of respiratory failure as follows- O2 target sat 92-94% , IV rocephine AZT , nebs , cx sent   4/29 treating now as community-acquired pneumonia IV Rocephin and azithromycin.  Repeat chest x-ray CRP and ESR in a.m..  If no improvement suspicion of marijuana smoking related hypersensitivity pneumonitis/lung injury.  Might proceed with a trial of empiric steroid   4/30 lack of improvement with outpatient antibiotic therapy and inpatient antibiotic therapy.  Microbiology studies negative.  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  If no improvement will need bronchoscopy and bronchioalveolar sampling.  5/1 repeat chest x-ray and CRP ESR in a.m..  On IV Solu-Medrol 80 mg q.8 hours.  Home O2 evaluation    Asthma  Albuterol , ICS , LABA  4/29 continue albuterol Brovana budesonide  4/30 continue same  5/1 discharged on albuterol Breo and Singulair    Multinodular goiter  4/30 ultrasound of the thyroid noted.  Outpatient ENT evaluation.  Discussed with hospital medicine attending  5/1 ENT evaluation           Mode Thompson MD  Pulmonology  [facility] - Telemetry ([facility])

## 2022-05-02 VITALS
BODY MASS INDEX: 39.82 KG/M2 | HEIGHT: 66 IN | TEMPERATURE: 98 F | WEIGHT: 247.81 LBS | SYSTOLIC BLOOD PRESSURE: 114 MMHG | DIASTOLIC BLOOD PRESSURE: 58 MMHG | HEART RATE: 101 BPM | RESPIRATION RATE: 18 BRPM | OXYGEN SATURATION: 97 %

## 2022-05-02 DIAGNOSIS — J45.909 ASTHMA: Primary | ICD-10-CM

## 2022-05-02 LAB
ANION GAP SERPL CALC-SCNC: 12 MMOL/L (ref 8–16)
ANISOCYTOSIS BLD QL SMEAR: SLIGHT
BACTERIA SPEC AEROBE CULT: NORMAL
BACTERIA SPEC AEROBE CULT: NORMAL
BASOPHILS # BLD AUTO: 0.05 K/UL (ref 0–0.2)
BASOPHILS NFR BLD: 0.2 % (ref 0–1.9)
BUN SERPL-MCNC: 20 MG/DL (ref 6–20)
CALCIUM SERPL-MCNC: 9.5 MG/DL (ref 8.7–10.5)
CHLORIDE SERPL-SCNC: 106 MMOL/L (ref 95–110)
CO2 SERPL-SCNC: 22 MMOL/L (ref 23–29)
CREAT SERPL-MCNC: 0.8 MG/DL (ref 0.5–1.4)
CRP SERPL-MCNC: 8.3 MG/L (ref 0–8.2)
DIFFERENTIAL METHOD: ABNORMAL
EOSINOPHIL # BLD AUTO: 0 K/UL (ref 0–0.5)
EOSINOPHIL NFR BLD: 0 % (ref 0–8)
ERYTHROCYTE [DISTWIDTH] IN BLOOD BY AUTOMATED COUNT: 13.9 % (ref 11.5–14.5)
ERYTHROCYTE [SEDIMENTATION RATE] IN BLOOD BY WESTERGREN METHOD: 30 MM/HR (ref 0–20)
EST. GFR  (AFRICAN AMERICAN): >60 ML/MIN/1.73 M^2
EST. GFR  (NON AFRICAN AMERICAN): >60 ML/MIN/1.73 M^2
GLUCOSE SERPL-MCNC: 163 MG/DL (ref 70–110)
GRAM STN SPEC: NORMAL
HCT VFR BLD AUTO: 36.7 % (ref 37–48.5)
HGB BLD-MCNC: 11.2 G/DL (ref 12–16)
HYPOCHROMIA BLD QL SMEAR: ABNORMAL
IGE SERPL-ACNC: 420 IU/ML (ref 0–100)
IMM GRANULOCYTES # BLD AUTO: 0.36 K/UL (ref 0–0.04)
IMM GRANULOCYTES NFR BLD AUTO: 1.4 % (ref 0–0.5)
LYMPHOCYTES # BLD AUTO: 2.1 K/UL (ref 1–4.8)
LYMPHOCYTES NFR BLD: 7.7 % (ref 18–48)
MCH RBC QN AUTO: 28.4 PG (ref 27–31)
MCHC RBC AUTO-ENTMCNC: 30.5 G/DL (ref 32–36)
MCV RBC AUTO: 93 FL (ref 82–98)
MONOCYTES # BLD AUTO: 1 K/UL (ref 0.3–1)
MONOCYTES NFR BLD: 3.6 % (ref 4–15)
NEUTROPHILS # BLD AUTO: 23.2 K/UL (ref 1.8–7.7)
NEUTROPHILS NFR BLD: 87.1 % (ref 38–73)
NRBC BLD-RTO: 0 /100 WBC
PLATELET # BLD AUTO: 389 K/UL (ref 150–450)
PLATELET BLD QL SMEAR: ABNORMAL
PMV BLD AUTO: 10.1 FL (ref 9.2–12.9)
POTASSIUM SERPL-SCNC: 4.3 MMOL/L (ref 3.5–5.1)
RBC # BLD AUTO: 3.94 M/UL (ref 4–5.4)
SODIUM SERPL-SCNC: 140 MMOL/L (ref 136–145)
WBC # BLD AUTO: 26.66 K/UL (ref 3.9–12.7)

## 2022-05-02 PROCEDURE — 99232 SBSQ HOSP IP/OBS MODERATE 35: CPT | Mod: ,,, | Performed by: INTERNAL MEDICINE

## 2022-05-02 PROCEDURE — 25000003 PHARM REV CODE 250: Performed by: NURSE PRACTITIONER

## 2022-05-02 PROCEDURE — 85651 RBC SED RATE NONAUTOMATED: CPT | Performed by: INTERNAL MEDICINE

## 2022-05-02 PROCEDURE — 99900035 HC TECH TIME PER 15 MIN (STAT)

## 2022-05-02 PROCEDURE — 63600175 PHARM REV CODE 636 W HCPCS: Performed by: EMERGENCY MEDICINE

## 2022-05-02 PROCEDURE — 63600175 PHARM REV CODE 636 W HCPCS: Performed by: INTERNAL MEDICINE

## 2022-05-02 PROCEDURE — 80048 BASIC METABOLIC PNL TOTAL CA: CPT | Performed by: FAMILY MEDICINE

## 2022-05-02 PROCEDURE — 27000221 HC OXYGEN, UP TO 24 HOURS

## 2022-05-02 PROCEDURE — 82785 ASSAY OF IGE: CPT | Performed by: FAMILY MEDICINE

## 2022-05-02 PROCEDURE — 25000003 PHARM REV CODE 250: Performed by: FAMILY MEDICINE

## 2022-05-02 PROCEDURE — 94640 AIRWAY INHALATION TREATMENT: CPT

## 2022-05-02 PROCEDURE — 94761 N-INVAS EAR/PLS OXIMETRY MLT: CPT

## 2022-05-02 PROCEDURE — 86140 C-REACTIVE PROTEIN: CPT | Performed by: INTERNAL MEDICINE

## 2022-05-02 PROCEDURE — 36415 COLL VENOUS BLD VENIPUNCTURE: CPT | Performed by: FAMILY MEDICINE

## 2022-05-02 PROCEDURE — 85025 COMPLETE CBC W/AUTO DIFF WBC: CPT | Performed by: FAMILY MEDICINE

## 2022-05-02 PROCEDURE — 25000242 PHARM REV CODE 250 ALT 637 W/ HCPCS: Performed by: INTERNAL MEDICINE

## 2022-05-02 PROCEDURE — 94799 UNLISTED PULMONARY SVC/PX: CPT

## 2022-05-02 PROCEDURE — 99232 PR SUBSEQUENT HOSPITAL CARE,LEVL II: ICD-10-PCS | Mod: ,,, | Performed by: INTERNAL MEDICINE

## 2022-05-02 PROCEDURE — 25000242 PHARM REV CODE 250 ALT 637 W/ HCPCS: Performed by: FAMILY MEDICINE

## 2022-05-02 PROCEDURE — 94760 N-INVAS EAR/PLS OXIMETRY 1: CPT

## 2022-05-02 RX ORDER — GUAIFENESIN/DEXTROMETHORPHAN 100-10MG/5
10 SYRUP ORAL EVERY 4 HOURS PRN
Status: DISCONTINUED | OUTPATIENT
Start: 2022-05-02 | End: 2022-05-02 | Stop reason: HOSPADM

## 2022-05-02 RX ORDER — IPRATROPIUM BROMIDE AND ALBUTEROL SULFATE 2.5; .5 MG/3ML; MG/3ML
3 SOLUTION RESPIRATORY (INHALATION) EVERY 6 HOURS PRN
Qty: 90 ML | Refills: 0 | Status: SHIPPED | OUTPATIENT
Start: 2022-05-02 | End: 2022-10-17 | Stop reason: SDUPTHER

## 2022-05-02 RX ORDER — PREDNISONE 10 MG/1
TABLET ORAL
Qty: 11 TABLET | Refills: 0 | Status: SHIPPED | OUTPATIENT
Start: 2022-05-23 | End: 2022-06-06

## 2022-05-02 RX ORDER — PREDNISONE 20 MG/1
TABLET ORAL
Qty: 32 TABLET | Refills: 0 | Status: SHIPPED | OUTPATIENT
Start: 2022-05-02 | End: 2022-05-23

## 2022-05-02 RX ADMIN — HYDROCHLOROTHIAZIDE 12.5 MG: 12.5 TABLET ORAL at 09:05

## 2022-05-02 RX ADMIN — METHYLPREDNISOLONE SODIUM SUCCINATE 80 MG: 40 INJECTION, POWDER, FOR SOLUTION INTRAMUSCULAR; INTRAVENOUS at 12:05

## 2022-05-02 RX ADMIN — AMLODIPINE BESYLATE 10 MG: 10 TABLET ORAL at 09:05

## 2022-05-02 RX ADMIN — METHYLPREDNISOLONE SODIUM SUCCINATE 80 MG: 40 INJECTION, POWDER, FOR SOLUTION INTRAMUSCULAR; INTRAVENOUS at 02:05

## 2022-05-02 RX ADMIN — ALBUTEROL SULFATE 2.5 MG: 2.5 SOLUTION RESPIRATORY (INHALATION) at 01:05

## 2022-05-02 RX ADMIN — GUAIFENESIN AND DEXTROMETHORPHAN 10 ML: 100; 10 SYRUP ORAL at 01:05

## 2022-05-02 RX ADMIN — SERTRALINE HYDROCHLORIDE 50 MG: 50 TABLET ORAL at 09:05

## 2022-05-02 RX ADMIN — ALBUTEROL SULFATE 2.5 MG: 2.5 SOLUTION RESPIRATORY (INHALATION) at 07:05

## 2022-05-02 RX ADMIN — ENOXAPARIN SODIUM 40 MG: 40 INJECTION SUBCUTANEOUS at 09:05

## 2022-05-02 RX ADMIN — ARFORMOTEROL TARTRATE 15 MCG: 15 SOLUTION RESPIRATORY (INHALATION) at 07:05

## 2022-05-02 RX ADMIN — BUDESONIDE 0.5 MG: 0.5 INHALANT ORAL at 07:05

## 2022-05-02 NOTE — PROGRESS NOTES
[facility] - Telemetry ([facility])  Pulmonology  Progress Note    Patient Name: Ayanna Alicia  MRN: [id number]  Admission Date: 4/27/2022  Hospital Length of Stay: 4 days  Code Status: Full Code  Attending Provider: Thaddeus Nassar MD  Primary Care Provider: Madeleine Enrique MD   Principal Problem: Unresolved pneumonia    Subjective:     Interval History:  05/02: seen and examined: on 2 LPM, inflammation studies pending, added IGE, T  max: 98.9F    Review of Systems   Constitutional:  Positive for malaise/fatigue.   Respiratory:  Positive for shortness of breath.        Objective:     Vital Signs (Most Recent):  Temp: 96.9 °F (36.1 °C) (05/02/22 0422)  Pulse: 99 (05/02/22 0422)  Resp: 18 (05/02/22 0422)  BP: 136/76 (05/02/22 0422)  SpO2: 97 % (05/02/22 0422) Vital Signs (24h Range):  Temp:  [96.9 °F (36.1 °C)-98.9 °F (37.2 °C)] 96.9 °F (36.1 °C)  Pulse:  [] 99  Resp:  [16-18] 18  SpO2:  [94 %-99 %] 97 %  BP: (124-137)/(66-82) 136/76     Weight: 112.4 kg (247 lb 12.8 oz)  Body mass index is 40 kg/m².      Intake/Output Summary (Last 24 hours) at 5/2/2022 0659  Last data filed at 5/1/2022 1800  Gross per 24 hour   Intake 555 ml   Output --   Net 555 ml       Physical Exam  Vitals and nursing note reviewed.   Constitutional:       Appearance: Normal appearance.   HENT:      Head: Normocephalic and atraumatic.      Nose: Nose normal.   Eyes:      Pupils: Pupils are equal, round, and reactive to light.   Cardiovascular:      Rate and Rhythm: Normal rate and regular rhythm.      Pulses: Normal pulses.      Heart sounds: Normal heart sounds.   Pulmonary:      Effort: Pulmonary effort is normal.      Breath sounds: Normal breath sounds.   Abdominal:      General: Bowel sounds are normal.      Palpations: Abdomen is soft.   Musculoskeletal:      Cervical back: Normal range of motion.   Neurological:      General: No focal deficit present.      Mental Status: She is alert and oriented to person, place, and time.       Vents:  Oxygen  Concentration (%): 28 (05/02/22 0110)    Lines/Drains/Airways       Peripheral Intravenous Line  Duration                  Peripheral IV - Single Lumen 04/30/22 1139 20 G Anterior;Right Forearm 1 day                    Significant Labs:    CBC/Anemia Profile:  Recent Labs   Lab 05/02/22  0432   WBC 26.66*   HGB 11.2*   HCT 36.7*      MCV 93   RDW 13.9        Chemistries:  Recent Labs   Lab 05/02/22  0432      K 4.3      CO2 22*   BUN 20   CREATININE 0.8   CALCIUM 9.5       ABGs: No results for input(s): PH, PCO2, HCO3, POCSATURATED, BE in the last 48 hours.  Blood Culture: No results for input(s): LABBLOO in the last 48 hours.  POCT Glucose: No results for input(s): POCTGLUCOSE in the last 48 hours.  Respiratory Culture: No results for input(s): GSRESP, RESPIRATORYC in the last 48 hours.  All pertinent labs within the past 24 hours have been reviewed.    Significant Imaging:  I have reviewed all pertinent imaging results/findings within the past 24 hours.    X-Ray Chest AP Portable  Narrative: EXAMINATION:  XR CHEST AP PORTABLE    CLINICAL HISTORY:  Unresolving pneumonia;    COMPARISON:  April 30, 2022    FINDINGS:  Mild interval improvement without resolution of early right lung base infiltrate.  Left retrocardiac infiltrate and adjacent effusion is unchanged.  Negative for new pulmonary opacities.  The hilar and mediastinal contours and osseous structures are unchanged.  Impression: 1.  Interval improvement without resolution of right basilar infiltrate.  Stable left basilar infiltrate.    2.  Follow-up radiographs recommended.    Electronically signed by: Yariel Hall MD  Date:    05/02/2022  Time:    08:13       Component      Latest Ref Rng & Units 5/2/2022 4/30/2022   CRP      0.0 - 8.2 mg/L 8.3 (H) 27.8 (H)   Sed Rate      0 - 20 mm/Hr 30 (H) 40 (H)           ABG  No results for input(s): PH, PO2, PCO2, HCO3, BE in the last 168 hours.  Assessment/Plan:     * Unresolved pneumonia   O2  target sat 92-94% , IV rocephine AZT , nebs , cx sent .   Autoimmune etiologieslow suspicion, xray changes occurred within few weeks  , will send basic rheum w/up. HIV   WBC decreased from 13 K to thank a on antibiotic.  Send sputum microbiology.  Send autoimmune serologies.  Fungal serologies.  4/29 gram-positive cocci and gram-negative rods in sputum rare.  IV Rocephin azithromycin.  Await identification  4/30 lack of improvement with outpatient antibiotic therapy and inpatient antibiotic therapy.  Microbiology studies negative.  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  If no improvement will need bronchoscopy and bronchioalveolar sampling.  5/1 repeat chest x-ray and CRP ESR in a.m..  On IV Solu-Medrol 80 mg q.8 hours.  05/02: CXR improved, Completed Abx course    Pulmonary infiltrates on CXR  With history of marijuana smoking.  Unusual presentation of pneumonia Suspicion of inhalation related lung injury.  If afebrile and negative microbiology will try empiric steroids in the next 24 hours.    4/29 treating now as community-acquired pneumonia IV Rocephin and azithromycin.  Repeat chest x-ray CRP and ESR in a.m..  Now mildly elevated.  Rare Gram-positive cocci and Gram-negative rods in sputum If no improvement suspicion of marijuana smoking related hypersensitivity pneumonitis/lung injury.  Might proceed with a trial of empiric steroid   4/30 lack of improvement with outpatient antibiotic therapy and inpatient antibiotic therapy.  Microbiology studies negative.  Findings on CT scan with patient clinical history more in favor of interstitial lung disease likely related to inhalation  hypersensitivity pneumonitis in patient with history of marijuana smoking times 15 years, noted improvements on steroids as outpatient previously will start empiric Solu-Medrol , monitor patient clinically repeat chest x-ray in 48 hours ESR CRP.  If no improvement will need bronchoscopy and bronchioalveolar sampling.  5/15/1 repeat chest x-ray and CRP ESR in a.m..  On IV Solu-Medrol 80 mg q.8 hours.  05/02: Wax and wane infiltrated likely HSP: dc on pred 40 mg x 7 days, 30 mg x 7 days, 20 mg x 7 days, 10 mg x 7 days, 5 mg X 7 days    Acute hypoxemic respiratory failure  Patient with Hypoxic Respiratory failure which is Acute.  she is not on home oxygen. Supplemental oxygen was provided and noted- Oxygen Concentration (%):  [24-28] 28.   Signs/symptoms of respiratory failure include- tachypnea and increased work of breathing. Contributing diagnoses includes - Pneumonia Labs and images were reviewed. Patient Has not had a recent ABG. Will treat underlying causes and adjust management of respiratory failure as follows- O2 target sat 92-94% , IV rocephine AZT , nebs , cx sent   4/29 treating now as community-acquired pneumonia IV Rocephin and azithromycin.  Repeat chest x-ray CRP and ESR in a.m..  If no improvement suspicion of marijuana smoking related hypersensitivity pneumonitis/lung injury.  Might proceed with a trial of empiric steroid   4/30 lack of improvement with outpatient antibiotic therapy and inpatient antibiotic therapy.  Microbiology studies negative.  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  If no improvement will need bronchoscopy and bronchioalveolar sampling.  5/1 repeat chest  x-ray and CRP ESR in a.m..  On IV Solu-Medrol 80 mg q.8 hours.  Home O2 evaluation  05/02: CXR improved, Home O2 eval, follow in clinic:DR Thompson  pred 40 mg x 7 days, 30 mg x 7 days, 20 mg x 7 days, 10 mg x 7 days, 5 mg X 7 days    Asthma  Albuterol , ICS , LABA  4/29 continue albuterol Brovana budesonide  4/30 continue same  5/1 discharged on albuterol Breo and Singulair  05/02: Check IGE, BREO and MONTELAST, outpatient PFT, FeNO    Radha GARCIA Home     Agustín Aviles MD  Pulmonology  [facility] - Telemetry ([facility])

## 2022-05-02 NOTE — ASSESSMENT & PLAN NOTE
Albuterol , ICS , LABA  4/29 continue albuterol Brovana budesonide  4/30 continue same  5/1 discharged on albuterol Breo and Singulair  05/02: Check IGE, BREO and MONTELAST, outpatient PFT, FeNO

## 2022-05-02 NOTE — ASSESSMENT & PLAN NOTE
Patient with Hypoxic Respiratory failure which is Acute.  she is not on home oxygen. Supplemental oxygen was provided and noted- Oxygen Concentration (%):  [24-28] 28.   Signs/symptoms of respiratory failure include- tachypnea and increased work of breathing. Contributing diagnoses includes - Pneumonia Labs and images were reviewed. Patient Has not had a recent ABG. Will treat underlying causes and adjust management of respiratory failure as follows- O2 target sat 92-94% , IV rocephine AZT , nebs , cx sent   4/29 treating now as community-acquired pneumonia IV Rocephin and azithromycin.  Repeat chest x-ray CRP and ESR in a.m..  If no improvement suspicion of marijuana smoking related hypersensitivity pneumonitis/lung injury.  Might proceed with a trial of empiric steroid   4/30 lack of improvement with outpatient antibiotic therapy and inpatient antibiotic therapy.  Microbiology studies negative.  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  If no improvement will need bronchoscopy and bronchioalveolar sampling.  5/1 repeat chest x-ray and CRP ESR in a.m..  On IV Solu-Medrol 80 mg q.8 hours.  Home O2 evaluation  05/02: CXR improved, Home O2 eval, follow in clinic:DR Thompson  pred 40 mg x 7 days, 30 mg x 7 days, 20 mg x 7 days, 10 mg x 7 days, 5 mg X 7 days

## 2022-05-02 NOTE — PROGRESS NOTES
Patient refused the Lactobacillus, stated she thinks it is causing constipation - Sent Dr Garcia secure chat

## 2022-05-02 NOTE — PLAN OF CARE
[facility] - Telemetry (Hospital)  Discharge Final Note    Primary Care Provider: Madeleine Enrique MD    Expected Discharge Date: 5/2/2022    Final Discharge Note (most recent)     Final Note - 05/02/22 1523        Final Note    Assessment Type Final Discharge Note     Anticipated Discharge Disposition Home or Self Care     Hospital Resources/Appts/Education Provided Appointments scheduled by Navigator/Coordinator;Appointments scheduled and added to AVS        Post-Acute Status    Post-Acute Authorization HME     HME Status Set-up Complete/Auth obtained   Home 02 with [facility]    Discharge Delays None known at this time                 Important Message from Medicare             Contact Info     Madeleine Enrique MD   Specialty: Family Medicine   Relationship: PCP - General    [address] [id number]   Phone: [phone]       Next Steps: Go on 5/6/2022    Instructions: @ 8:20am for hospital follow up

## 2022-05-02 NOTE — ASSESSMENT & PLAN NOTE
O2 target sat 92-94% , IV rocephine AZT , nebs , cx sent .   Autoimmune etiologieslow suspicion, xray changes occurred within few weeks  , will send basic rheum w/up. HIV   WBC decreased from 13 K to thank a on antibiotic.  Send sputum microbiology.  Send autoimmune serologies.  Fungal serologies.  4/29 gram-positive cocci and gram-negative rods in sputum rare.  IV Rocephin azithromycin.  Await identification  4/30 lack of improvement with outpatient antibiotic therapy and inpatient antibiotic therapy.  Microbiology studies negative.  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  If no improvement will need bronchoscopy and bronchioalveolar sampling.  5/1 repeat chest x-ray and CRP ESR in a.m..  On IV Solu-Medrol 80 mg q.8 hours.  05/02: CXR improved, Completed Abx course

## 2022-05-02 NOTE — PLAN OF CARE
Pt free from fall/injury/trauma  Nebs and steroids administered  Skin intact with no evidence of breakdown  Pt up with assistance to restroom  Pt monitored on telemetry   Will continue to monitor

## 2022-05-02 NOTE — ASSESSMENT & PLAN NOTE
With history of marijuana smoking.  Unusual presentation of pneumonia Suspicion of inhalation related lung injury.  If afebrile and negative microbiology will try empiric steroids in the next 24 hours.    4/29 treating now as community-acquired pneumonia IV Rocephin and azithromycin.  Repeat chest x-ray CRP and ESR in a.m..  Now mildly elevated.  Rare Gram-positive cocci and Gram-negative rods in sputum If no improvement suspicion of marijuana smoking related hypersensitivity pneumonitis/lung injury.  Might proceed with a trial of empiric steroid   4/30 lack of improvement with outpatient antibiotic therapy and inpatient antibiotic therapy.  Microbiology studies negative.  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  If no improvement will need bronchoscopy and bronchioalveolar sampling.  5/15/1 repeat chest x-ray and CRP ESR in a.m..  On IV Solu-Medrol 80 mg q.8 hours.  05/02: Wax and wane infiltrated likely HSP: dc on pred 40 mg x 7 days, 30 mg x 7 days, 20 mg x 7 days, 10 mg x 7 days, 5 mg X 7 days

## 2022-05-02 NOTE — PROGRESS NOTES
Discharge instructions given to patient, verbalized understanding - IV removed without difficulty, pressure dressing applied to site - cardiac monitor removed and returned to monitor tech - patient to car via wheelchair without distress noted. Patient drove herself to hospital and is going to drive herself home. Instructed patient on how to use portable O2 tank.

## 2022-05-02 NOTE — SUBJECTIVE & OBJECTIVE
Interval History:  05/02: seen and examined: on 2 LPM, inflammation studies pending, added IGE, T  max: 98.9F    Review of Systems   Constitutional:  Positive for malaise/fatigue.   Respiratory:  Positive for shortness of breath.        Objective:     Vital Signs (Most Recent):  Temp: 96.9 °F (36.1 °C) (05/02/22 0422)  Pulse: 99 (05/02/22 0422)  Resp: 18 (05/02/22 0422)  BP: 136/76 (05/02/22 0422)  SpO2: 97 % (05/02/22 0422) Vital Signs (24h Range):  Temp:  [96.9 °F (36.1 °C)-98.9 °F (37.2 °C)] 96.9 °F (36.1 °C)  Pulse:  [] 99  Resp:  [16-18] 18  SpO2:  [94 %-99 %] 97 %  BP: (124-137)/(66-82) 136/76     Weight: 112.4 kg (247 lb 12.8 oz)  Body mass index is 40 kg/m².      Intake/Output Summary (Last 24 hours) at 5/2/2022 0659  Last data filed at 5/1/2022 1800  Gross per 24 hour   Intake 555 ml   Output --   Net 555 ml       Physical Exam  Vitals and nursing note reviewed.   Constitutional:       Appearance: Normal appearance.   HENT:      Head: Normocephalic and atraumatic.      Nose: Nose normal.   Eyes:      Pupils: Pupils are equal, round, and reactive to light.   Cardiovascular:      Rate and Rhythm: Normal rate and regular rhythm.      Pulses: Normal pulses.      Heart sounds: Normal heart sounds.   Pulmonary:      Effort: Pulmonary effort is normal.      Breath sounds: Normal breath sounds.   Abdominal:      General: Bowel sounds are normal.      Palpations: Abdomen is soft.   Musculoskeletal:      Cervical back: Normal range of motion.   Neurological:      General: No focal deficit present.      Mental Status: She is alert and oriented to person, place, and time.       Vents:  Oxygen Concentration (%): 28 (05/02/22 0110)    Lines/Drains/Airways       Peripheral Intravenous Line  Duration                  Peripheral IV - Single Lumen 04/30/22 1139 20 G Anterior;Right Forearm 1 day                    Significant Labs:    CBC/Anemia Profile:  Recent Labs   Lab 05/02/22 0432   WBC 26.66*   HGB 11.2*   HCT  36.7*      MCV 93   RDW 13.9        Chemistries:  Recent Labs   Lab 05/02/22  0432      K 4.3      CO2 22*   BUN 20   CREATININE 0.8   CALCIUM 9.5       ABGs: No results for input(s): PH, PCO2, HCO3, POCSATURATED, BE in the last 48 hours.  Blood Culture: No results for input(s): LABBLOO in the last 48 hours.  POCT Glucose: No results for input(s): POCTGLUCOSE in the last 48 hours.  Respiratory Culture: No results for input(s): GSRESP, RESPIRATORYC in the last 48 hours.  All pertinent labs within the past 24 hours have been reviewed.    Significant Imaging:  I have reviewed all pertinent imaging results/findings within the past 24 hours.    X-Ray Chest AP Portable  Narrative: EXAMINATION:  XR CHEST AP PORTABLE    CLINICAL HISTORY:  Unresolving pneumonia;    COMPARISON:  April 30, 2022    FINDINGS:  Mild interval improvement without resolution of early right lung base infiltrate.  Left retrocardiac infiltrate and adjacent effusion is unchanged.  Negative for new pulmonary opacities.  The hilar and mediastinal contours and osseous structures are unchanged.  Impression: 1.  Interval improvement without resolution of right basilar infiltrate.  Stable left basilar infiltrate.    2.  Follow-up radiographs recommended.    Electronically signed by: Yariel Hall MD  Date:    05/02/2022  Time:    08:13       Component      Latest Ref Rng & Units 5/2/2022 4/30/2022   CRP      0.0 - 8.2 mg/L 8.3 (H) 27.8 (H)   Sed Rate      0 - 20 mm/Hr 30 (H) 40 (H)

## 2022-05-02 NOTE — RESPIRATORY THERAPY
Home Oxygen Evaluation     1) Patient's O2 Sat on room air while at rest: 92%  If at rest Sat is 89% or above, continue.     2) Patient's O2 Sat on room air while exercisin%  If exercise Sat is 88% or below, continue.     3) Patient's O2 Sat while exercising on O2: 94% at 2 LPM  (Must show improvement from #2 for patients to qualify)     If exercise Sat on O2 shows improvement from #2, this patient qualifies for portable Oxygen. If not, the patient does not qualify.

## 2022-05-03 ENCOUNTER — PATIENT MESSAGE (OUTPATIENT)
Dept: CARDIOLOGY | Facility: CLINIC | Age: 52
End: 2022-05-03
Payer: COMMERCIAL

## 2022-05-03 PROBLEM — J18.9 UNRESOLVED PNEUMONIA: Status: RESOLVED | Noted: 2022-04-27 | Resolved: 2022-05-03

## 2022-05-03 PROBLEM — R06.02 SOB (SHORTNESS OF BREATH): Status: RESOLVED | Noted: 2022-04-27 | Resolved: 2022-05-03

## 2022-05-03 LAB
1,3 BETA GLUCAN SER-MCNC: <31 PG/ML
BACTERIA BLD CULT: NORMAL
BACTERIA BLD CULT: NORMAL
FUNGITELL COMMENTS: NEGATIVE

## 2022-05-04 ENCOUNTER — PATIENT MESSAGE (OUTPATIENT)
Dept: PULMONOLOGY | Facility: CLINIC | Age: 52
End: 2022-05-04
Payer: COMMERCIAL

## 2022-05-04 ENCOUNTER — TELEPHONE (OUTPATIENT)
Dept: PULMONOLOGY | Facility: CLINIC | Age: 52
End: 2022-05-04
Payer: COMMERCIAL

## 2022-05-04 PROBLEM — G47.33 OSA ON CPAP: Status: ACTIVE | Noted: 2022-04-13

## 2022-05-04 LAB
ASPERGILLUS AB SER QL ID: NORMAL
B DERMAT AB SER QL ID: NORMAL
C IMMITIS AB SER QL ID: NORMAL
H CAPSUL AB SER QL ID: NORMAL

## 2022-05-04 NOTE — PROGRESS NOTES
Pulmonary Outpatient  Visit     Subjective:       Patient ID: Ayanna Alicia is a 51 y.o. female.    Chief Complaint: Asthma and Shortness of Breath      Ayanna Alicia is 51 y.o.  Post hospital f/u  Acute respiratory failure ILD, +ve RF  Was discharged on oxygen  Here to review results  Explained  PFT: severe restriction and reduced DLCO  ABG  6MWD: oxygen desat needs supplementary oxygen 3 LPM  Has some nose bleed will add AYR gel and humifier bottle  FMLA paper work given  Out of work letter   Added PEPCID and Bactrim  Adherent with inhaler        Asthma Control Test  In the past 4  weeks, how much of the time did your asthma keep you from getting as much done at work, school or at home?: All of the time  During the past 4 weeks, how often have you had shortness of breath?: More than once a day  During the past 4 weeks, how often did your asthma symptoms (wheezing, couging, shortness of breath, chest tightness or pain) wake you up at night or earlier than usual in the morning?: 4 or more nights a week  During the past 4 weeks, how often have you used your rescue inhaler or nebulizer medication (such as albuterol)?: 1 or 2 times per day  How would you rate your asthma control during the past 4 weeks?: Somewhat controlled  If your score is 19 or less, your asthma may not be under control: 8          MMRC Dyspnea Scale (4 is worst)     [] MMRC 0: Dyspneic on strenuous excercise (0 points)    [x] MMRC 1: Dyspneic on walking a slight hill (0 points)    [] MMRC 2: Dyspneic on walking level ground; must stop occasionally due to breathlessness (1 point)    [] MMRC 3: Must stop for breathlessness after walking 100 yards or after a few minutes (2 points)    [] MMRC 4: Cannot leave house; breathless on dressing/undressing (3 points)           COPD score 8        The following portions of the patient's history were reviewed and updated as appropriate:   She  has a past medical  history of Abnormal Pap smear of cervix, Allergic rhinitis, cause unspecified, Arthritis of both knees, Asthma, Eczema, Fatty liver (10/2014), Fibrocystic breast changes, Headache(784.0), Hepatomegaly (10/2014), Hypertension, Liver cyst (10/2014), Multinodular goiter, Polymenorrhea (), TMJ (dislocation of temporomandibular joint), Uterine fibroid, and Vitamin D deficiency disease.  She does not have any pertinent problems on file.  She  has a past surgical history that includes  section, classic; Multiple tooth extractions; Partial hysterectomy (2013); Hysterectomy; and Colonoscopy (N/A, 2020).  Her family history includes Cancer (age of onset: 50) in her maternal aunt; Cancer (age of onset: 60) in her maternal grandmother; Cancer (age of onset: 66) in her maternal aunt; Cataracts in her cousin; Diabetes in her maternal grandfather and mother; Heart disease in her mother; Heart disease (age of onset: 63) in her father; Hypertension in her father; Migraines in her cousin; Stroke in her maternal grandfather, mother, and sister.  She  reports that she has never smoked. She has never used smokeless tobacco. She reports current alcohol use. She reports current drug use. Frequency: 4.00 times per week. Drug: Marijuana.  She has a current medication list which includes the following prescription(s): albuterol, albuterol, albuterol-ipratropium, amlodipine, ascorbic acid (vitamin c), calcium/magnesium/vit b comp, doxycycline, breo ellipta, hydrochlorothiazide, levocetirizine, montelukast, omega-3s/dha/epa/fish oil/d3, ondansetron, [START ON 2022] prednisone, prednisone, sertraline, famotidine, and [START ON 2022] sulfamethoxazole-trimethoprim 800-160mg.  Current Outpatient Medications on File Prior to Visit   Medication Sig Dispense Refill    albuterol (ACCUNEB) 0.63 mg/3 mL Nebu Take 3 mLs (0.63 mg total) by nebulization every 4 to 6 hours as needed (asthma). Rescue 75 mL 6    albuterol  (PROVENTIL/VENTOLIN HFA) 90 mcg/actuation inhaler INHALE 1 TO 2 PUFFS BY MOUTH INTO THE LUNGS EVERY 4 TO 6 HOURS AS NEEDED FOR WHEEZING OR SHORTNESS OF BREATH 18 g 5    albuterol-ipratropium (DUO-NEB) 2.5 mg-0.5 mg/3 mL nebulizer solution Take 3 mLs by nebulization every 6 (six) hours as needed for Wheezing. Rescue 90 mL 0    amLODIPine (NORVASC) 10 MG tablet Take 1 tablet (10 mg total) by mouth once daily. 90 tablet 1    ascorbic acid, vitamin C, (VITAMIN C) 500 MG tablet Take 500 mg by mouth once daily.      calcium/magnesium/vit B comp (CALCIUM-MAGNESIUM-B COMPLEX ORAL) Take 1 tablet by mouth once daily at 6am.      doxycycline (MONODOX) 100 MG capsule Take 1 capsule (100 mg total) by mouth every 12 (twelve) hours. 20 capsule 1    fluticasone furoate-vilanteroL (BREO ELLIPTA) 100-25 mcg/dose diskus inhaler INHALE 1 PUFF INTO THE LUNGS ONCE DAILY 1 each 5    hydroCHLOROthiazide (HYDRODIURIL) 12.5 MG Tab Take 1 tablet (12.5 mg total) by mouth once daily. 90 tablet 1    levocetirizine (XYZAL) 5 MG tablet Take 1 tablet (5 mg total) by mouth once daily. 90 tablet 1    montelukast (SINGULAIR) 10 mg tablet Take 1 tablet (10 mg total) by mouth every evening. 90 tablet 1    omega-3s/dha/epa/fish oil/D3 (VITAMIN-D + OMEGA-3 ORAL) Take 1 tablet by mouth once daily at 6am.      ondansetron (ZOFRAN-ODT) 4 MG TbDL Dissolve 1 tablet (4 mg total) by mouth every 6 (six) hours as needed (nausea). 90 tablet 0    [START ON 5/23/2022] predniSONE (DELTASONE) 10 MG tablet Take 1 tablet (10 mg total) by mouth once daily for 7 days, THEN 0.5 tablets (5 mg total) once daily for 7 days. 11 tablet 0    predniSONE (DELTASONE) 20 MG tablet Take 2 tablets (40 mg total) by mouth once daily for 7 days, THEN 1.5 tablets (30 mg total) once daily for 7 days, THEN 1 tablet (20 mg total) once daily for 7 days. 32 tablet 0    sertraline (ZOLOFT) 50 MG tablet Take 1 tablet (50 mg total) by mouth once daily. 90 tablet 1     No current  facility-administered medications on file prior to visit.     She is allergic to doxycycline, fluticasone, and penicillins..      Review of Systems   Constitutional: Positive for activity change and fatigue.   Respiratory: Positive for dyspnea on extertion.    Gastrointestinal: Positive for acid reflux.       Outpatient Encounter Medications as of 5/5/2022   Medication Sig Dispense Refill    albuterol (ACCUNEB) 0.63 mg/3 mL Nebu Take 3 mLs (0.63 mg total) by nebulization every 4 to 6 hours as needed (asthma). Rescue 75 mL 6    albuterol (PROVENTIL/VENTOLIN HFA) 90 mcg/actuation inhaler INHALE 1 TO 2 PUFFS BY MOUTH INTO THE LUNGS EVERY 4 TO 6 HOURS AS NEEDED FOR WHEEZING OR SHORTNESS OF BREATH 18 g 5    albuterol-ipratropium (DUO-NEB) 2.5 mg-0.5 mg/3 mL nebulizer solution Take 3 mLs by nebulization every 6 (six) hours as needed for Wheezing. Rescue 90 mL 0    amLODIPine (NORVASC) 10 MG tablet Take 1 tablet (10 mg total) by mouth once daily. 90 tablet 1    ascorbic acid, vitamin C, (VITAMIN C) 500 MG tablet Take 500 mg by mouth once daily.      calcium/magnesium/vit B comp (CALCIUM-MAGNESIUM-B COMPLEX ORAL) Take 1 tablet by mouth once daily at 6am.      doxycycline (MONODOX) 100 MG capsule Take 1 capsule (100 mg total) by mouth every 12 (twelve) hours. 20 capsule 1    fluticasone furoate-vilanteroL (BREO ELLIPTA) 100-25 mcg/dose diskus inhaler INHALE 1 PUFF INTO THE LUNGS ONCE DAILY 1 each 5    hydroCHLOROthiazide (HYDRODIURIL) 12.5 MG Tab Take 1 tablet (12.5 mg total) by mouth once daily. 90 tablet 1    levocetirizine (XYZAL) 5 MG tablet Take 1 tablet (5 mg total) by mouth once daily. 90 tablet 1    montelukast (SINGULAIR) 10 mg tablet Take 1 tablet (10 mg total) by mouth every evening. 90 tablet 1    omega-3s/dha/epa/fish oil/D3 (VITAMIN-D + OMEGA-3 ORAL) Take 1 tablet by mouth once daily at 6am.      ondansetron (ZOFRAN-ODT) 4 MG TbDL Dissolve 1 tablet (4 mg total) by mouth every 6 (six) hours as  "needed (nausea). 90 tablet 0    [START ON 5/23/2022] predniSONE (DELTASONE) 10 MG tablet Take 1 tablet (10 mg total) by mouth once daily for 7 days, THEN 0.5 tablets (5 mg total) once daily for 7 days. 11 tablet 0    predniSONE (DELTASONE) 20 MG tablet Take 2 tablets (40 mg total) by mouth once daily for 7 days, THEN 1.5 tablets (30 mg total) once daily for 7 days, THEN 1 tablet (20 mg total) once daily for 7 days. 32 tablet 0    sertraline (ZOLOFT) 50 MG tablet Take 1 tablet (50 mg total) by mouth once daily. 90 tablet 1    famotidine (PEPCID AC) 10 MG tablet Take 1 tablet (10 mg total) by mouth 2 (two) times daily. 60 tablet 3    [START ON 5/6/2022] sulfamethoxazole-trimethoprim 800-160mg (BACTRIM DS) 800-160 mg Tab Take 2 tablets by mouth every Mon, Wed, Fri. 24 tablet 2     No facility-administered encounter medications on file as of 5/5/2022.       Objective:     Vital Signs (Most Recent)  Vital Signs  Pulse: 104  Resp: 20  SpO2: (!) 88 % (97 on 3 liters)  BP: 137/88  Height and Weight  Height: 5' 6" (167.6 cm)  Weight: 116.8 kg (257 lb 8 oz)  BSA (Calculated - sq m): 2.33 sq meters  BMI (Calculated): 41.6  Weight in (lb) to have BMI = 25: 154.6]  Wt Readings from Last 2 Encounters:   05/05/22 116.8 kg (257 lb 8 oz)   05/05/22 116.8 kg (257 lb 8 oz)       Physical Exam   Constitutional: She is oriented to person, place, and time. Vital signs are normal. She appears well-developed and well-nourished. Nasal cannula in place.   HENT:   Head: Normocephalic.   Mouth/Throat: Mallampati Score: II.   Neck: No JVD present.   Cardiovascular: Normal rate and intact distal pulses.   No murmur heard.  Pulmonary/Chest: Normal expansion, effort normal and breath sounds normal.   Abdominal: Soft. Bowel sounds are normal.   Musculoskeletal:         General: No edema. Normal range of motion.      Cervical back: Normal range of motion and neck supple.   Lymphadenopathy:     She has no axillary adenopathy.   Neurological: She " is alert and oriented to person, place, and time.   Skin: Skin is warm and dry. Bruising and ecchymosis noted. No cyanosis. Nails show no clubbing.   Psychiatric: She has a normal mood and affect.   Nursing note and vitals reviewed.      Laboratory  Lab Results   Component Value Date    WBC 26.66 (H) 05/02/2022    RBC 3.94 (L) 05/02/2022    HGB 11.2 (L) 05/02/2022    HCT 36.7 (L) 05/02/2022    MCV 93 05/02/2022    MCH 28.4 05/02/2022    MCHC 30.5 (L) 05/02/2022    RDW 13.9 05/02/2022     05/02/2022    MPV 10.1 05/02/2022    GRAN 23.2 (H) 05/02/2022    GRAN 87.1 (H) 05/02/2022    LYMPH 2.1 05/02/2022    LYMPH 7.7 (L) 05/02/2022    MONO 1.0 05/02/2022    MONO 3.6 (L) 05/02/2022    EOS 0.0 05/02/2022    BASO 0.05 05/02/2022    EOSINOPHIL 0.0 05/02/2022    BASOPHIL 0.2 05/02/2022       BMP  Lab Results   Component Value Date     05/02/2022    K 4.3 05/02/2022     05/02/2022    CO2 22 (L) 05/02/2022    BUN 20 05/02/2022    CREATININE 0.8 05/02/2022    CALCIUM 9.5 05/02/2022    ANIONGAP 12 05/02/2022    ESTGFRAFRICA >60 05/02/2022    EGFRNONAA >60 05/02/2022    AST 26 04/27/2022    ALT 27 04/27/2022    PROT 7.1 04/27/2022       Lab Results   Component Value Date    BNP <10 04/27/2022       Lab Results   Component Value Date    TSH 0.854 04/28/2022       Lab Results   Component Value Date    SEDRATE 30 (H) 05/02/2022       Lab Results   Component Value Date    CRP 8.3 (H) 05/02/2022     Lab Results   Component Value Date     (H) 05/02/2022        Lab Results   Component Value Date    ASPERGILLUS None Detected 04/29/2022     No results found for: AFUMIGATUSCL     Lab Results   Component Value Date    ACE 22 04/28/2022        Diagnostic Results:  I have personally reviewed today the following studies:    Component      Latest Ref Rng & Units 5/2/2022 4/29/2022 4/28/2022   Coccidioides      None Detected  None Detected    Aspergillus Antibody      None Detected  None Detected    Blastomyces  "Antibody      None Detected  None Detected    Histoplasma Ab, Immunodiffusion      None Detected  None Detected    Fungitell Assay      <60 pg/mL pg/mL  <31    Fungitell Comments      Negative  Negative    Sed Rate      0 - 20 mm/Hr 30 (H)  30 (H)   CRP      0.0 - 8.2 mg/L 8.3 (H)  28.3 (H)   KEISHA Screen      Negative <1:80   Negative <1:80   Rheumatoid Factor      0.0 - 15.0 IU/mL   19.0 (H)   Angio Convert Enzyme      16 - 85 U/L   22   CCP Antibodies      <5.0 U/mL  <0.5    IgE      0 - 100 IU/mL 420 (H)         Recent Labs     05/05/22  0819   PH 7.406   PCO2 40.4   PO2 64*   HCO3 25.4   POCSATURATED 92*   BE 1      6MW Test  Height: 5' 6" (167.6 cm)  Weight: 116.8 kg (257 lb 8 oz)  BMI (Calculated): 41.6  Predicted Distance: 309.43  Interpretation  Predicted Distance Meters (Calculated): 446.82 meters  Ordering Provider: Felecia YU              Interpreting Provider: Dr. Aviles  Performing nurse/tech/RT: Kary CRT  Diagnosis: Shortness of Breath  Height: 5' 6" (167.6 cm)  Weight: 116.8 kg (257 lb 8 oz)  BMI (Calculated): 41.6              Patient Race:                                                                 Phase Oxygen Assessment Supplemental O2 Heart   Rate Blood Pressure Jessica Dyspnea Scale Rating   Resting 92 % Room Air 91 bpm 133/81 3   Exercise             Minute             1 88 % (placed patient on 2lnc(no change)3l(no change), increased to 4l spo2 increased to 93%) Room Air 110 bpm       2 93 % 4 L/M 105 bpm       3 94 % 4 L/M 110 bpm       4 86 % (increased patient to 6lnc, spo2 increased to 91%) 4 L/M 111 bpm       5 91 % 6 L/M 116 bpm       6  94 % 6 L/M 123 bpm 137/88 5-6   Recovery             Minute             1 92 % 6 L/M 107 bpm       2 97 % 6 L/M 90 bpm       3 99 % 6 L/M 93 bpm       4 99 % 6 L/M 92 bpm 132/79 3      Six Minute Walk Summary  6MWT Status: completed without stopping  Patient Reported: Dyspnea, Lightheadedness (chest tightness)      "      Interpretation:  Did the patient stop or pause?: No  Total Time Walked (Calculated): 360 seconds  Final Partial Lap Distance (feet): 150 feet  Total Distance Meters (Calculated): 289.56 meters  Predicted Distance Meters (Calculated): 446.82 meters  Percentage of Predicted (Calculated): 64.8  Peak VO2 (Calculated): 12.67  Mets: 3.62  Has The Patient Had a Previous Six Minute Walk Test?: No  Comments: patient has home o2, placed patient on oxygen and will see Dr. Aviles at [facility] at 10:40am 5/5/22     Previous 6MWT Results  Has The Patient Had a Previous Six Minute Walk Test?: No          PFT  Spirometry shows a reduced vital capacity suggesting restriction. Lung volumes show severe restriction is present. Airway mechanics show airway resistance is increased. Specific conductance remains normal. DLCO is severely decreased. MVV is mildly   decreased.   Â    Notes:   Â    DLCO interpretation based on the adjusted DLCO value due to a low hemoglobin     Office Spirometry Results:     FEV1: 1.47L( 59.6%)  FVC  1.67L( 54.4%)  FV1/FVc 88  TLC  2.09L( 39.6%)  DLCO  6.48( 25.9%)      Assessment/Plan:     Problem List Items Addressed This Visit     Pulmonary infiltrates on CXR - Primary    Relevant Orders    X-Ray Chest PA And Lateral (Completed)    C-Reactive Protein    Sedimentation rate    Ambulatory referral/consult to Rheumatology    Acute hypoxemic respiratory failure    HTN (hypertension)     Stable Norvasc and HCTZ  Will monitor BP at home           Asthma     On BREO + JESSI  PDM education  FeNO           Morbid obesity with BMI of 40.0-44.9, adult     Weight loss and exercise to improve overall health.    Advised will/may likely gain weight with steriods           GERD (gastroesophageal reflux disease)     Added PEPCID since will be on steriods for > 4 weeks           Acute interstitial pneumonitis     ILD with +ve RF  Based on Chest CT  Elevated ESR and CRP improved with steriods  PFT today: RESTRICTION with  reduced DLCO  Steriod regime: Take 2 tablets (40 mg total) by mouth once daily for 7 days, THEN 1.5 tablets (30 mg total) once daily for 7 days, THEN 1 tablet (20 mg total) once daily for 7 days.  Take 1 tablet (10 mg total) by mouth once daily for 7 days, THEN 0.5 tablets (5 mg total) once daily for 7 days.,   PCP prophylaxis  Consult rheumatology  Bronchoscopy still consideration             Relevant Medications    sulfamethoxazole-trimethoprim 800-160mg (BACTRIM DS) 800-160 mg Tab (Start on 2022)    famotidine (PEPCID AC) 10 MG tablet    Other Relevant Orders    Ambulatory referral/consult to Rheumatology    Fraction of  Nitric Oxide    X-Ray Chest PA And Lateral    Spirometry with/without bronchodilator    GLENN on CPAP     Cont CPAP  Download next visit    AutoPAp 5-20           Relevant Orders    MyChart Patient Entered CPAP Usage    Elevated IgE level     Will moniotor  With bronchiectasis likely consistent with ABPA  Already on steriods  Trend: if refractory will consult allergy           Elevated rheumatoid factor    Relevant Orders    Ambulatory referral/consult to Rheumatology           AYR gel  Humidifier bottle      Follow up in about 4 weeks (around 2022), or cxr today, next visit cxr, trevor, labs today, FMLA paper work, bactrim, PEPCID, water bottle,FENo,, for Referal to Rheumatology.    This note was prepared using voice recognition system and is likely to have sound alike errors that may have been overlooked even after proof reading.  Please call me with any questions    Discussed diagnosis, its evaluation, treatment and usual course. All questions answered.      Agustín Aviles MD

## 2022-05-05 ENCOUNTER — HOSPITAL ENCOUNTER (OUTPATIENT)
Dept: RADIOLOGY | Facility: HOSPITAL | Age: 52
Discharge: HOME | End: 2022-05-05
Attending: INTERNAL MEDICINE
Payer: COMMERCIAL

## 2022-05-05 ENCOUNTER — CLINICAL SUPPORT (OUTPATIENT)
Dept: PULMONOLOGY | Facility: CLINIC | Age: 52
End: 2022-05-05
Payer: COMMERCIAL

## 2022-05-05 ENCOUNTER — TELEPHONE (OUTPATIENT)
Dept: PULMONOLOGY | Facility: CLINIC | Age: 52
End: 2022-05-05

## 2022-05-05 ENCOUNTER — OFFICE VISIT (OUTPATIENT)
Dept: PULMONOLOGY | Facility: CLINIC | Age: 52
End: 2022-05-05
Payer: COMMERCIAL

## 2022-05-05 VITALS
OXYGEN SATURATION: 88 % | WEIGHT: 257.5 LBS | SYSTOLIC BLOOD PRESSURE: 137 MMHG | BODY MASS INDEX: 41.38 KG/M2 | HEIGHT: 66 IN | RESPIRATION RATE: 20 BRPM | HEART RATE: 104 BPM | DIASTOLIC BLOOD PRESSURE: 88 MMHG

## 2022-05-05 VITALS — WEIGHT: 257.5 LBS | HEIGHT: 66 IN | BODY MASS INDEX: 41.38 KG/M2

## 2022-05-05 DIAGNOSIS — K21.9 GASTROESOPHAGEAL REFLUX DISEASE WITHOUT ESOPHAGITIS: ICD-10-CM

## 2022-05-05 DIAGNOSIS — I10 PRIMARY HYPERTENSION: ICD-10-CM

## 2022-05-05 DIAGNOSIS — R91.8 PULMONARY INFILTRATES ON CXR: ICD-10-CM

## 2022-05-05 DIAGNOSIS — J96.01 ACUTE HYPOXEMIC RESPIRATORY FAILURE: ICD-10-CM

## 2022-05-05 DIAGNOSIS — R76.8 ELEVATED IGE LEVEL: ICD-10-CM

## 2022-05-05 DIAGNOSIS — J45.901 PERSISTENT ASTHMA WITH ACUTE EXACERBATION, UNSPECIFIED ASTHMA SEVERITY: ICD-10-CM

## 2022-05-05 DIAGNOSIS — R91.8 PULMONARY INFILTRATES ON CXR: Primary | ICD-10-CM

## 2022-05-05 DIAGNOSIS — R06.02 SOB (SHORTNESS OF BREATH): ICD-10-CM

## 2022-05-05 DIAGNOSIS — R76.8 ELEVATED RHEUMATOID FACTOR: ICD-10-CM

## 2022-05-05 DIAGNOSIS — J84.114 ACUTE INTERSTITIAL PNEUMONITIS: ICD-10-CM

## 2022-05-05 DIAGNOSIS — J84.114 ACUTE INTERSTITIAL PNEUMONITIS: Primary | ICD-10-CM

## 2022-05-05 DIAGNOSIS — G47.33 OSA ON CPAP: ICD-10-CM

## 2022-05-05 DIAGNOSIS — E66.01 MORBID OBESITY WITH BMI OF 40.0-44.9, ADULT: ICD-10-CM

## 2022-05-05 DIAGNOSIS — J45.909 ASTHMA: ICD-10-CM

## 2022-05-05 LAB
ALLENS TEST: ABNORMAL
BRPFT: ABNORMAL
BRPFT: ABNORMAL
DELSYS: ABNORMAL
DLCO ADJ PRE: 7 ML/(MIN*MMHG) (ref 19.28–30.74)
DLCO SINGLE BREATH LLN: 19.28
DLCO SINGLE BREATH PRE REF: 25.9 %
DLCO SINGLE BREATH REF: 25.01
DLCOC SBVA LLN: 3.34
DLCOC SBVA PRE REF: 71.4 %
DLCOC SBVA REF: 4.74
DLCOC SINGLE BREATH LLN: 19.28
DLCOC SINGLE BREATH PRE REF: 28 %
DLCOC SINGLE BREATH REF: 25.01
DLCOVA LLN: 3.34
DLCOVA PRE REF: 66.1 %
DLCOVA PRE: 3.13 ML/(MIN*MMHG*L) (ref 3.34–6.15)
DLCOVA REF: 4.74
DLVAADJ PRE: 3.39 ML/(MIN*MMHG*L) (ref 3.34–6.15)
ERV LLN: -16449.07
ERV PRE REF: 67 %
ERV REF: 0.93
FEF 25 75 LLN: 1.1
FEF 25 75 PRE REF: 97.2 %
FEF 25 75 REF: 2.36
FEV1 FVC LLN: 69
FEV1 FVC PRE REF: 109.1 %
FEV1 FVC REF: 80
FEV1 LLN: 1.84
FEV1 PRE REF: 59.6 %
FEV1 REF: 2.46
FIO2: 0.21
FRCPLETH LLN: 1.99
FRCPLETH PREREF: 36.8 %
FRCPLETH REF: 2.81
FVC LLN: 2.33
FVC PRE REF: 54.4 %
FVC REF: 3.08
HCO3 UR-SCNC: 25.4 MMOL/L (ref 24–28)
IVC PRE: 1.5 L (ref 2.33–3.87)
IVC SINGLE BREATH LLN: 2.33
IVC SINGLE BREATH PRE REF: 48.8 %
IVC SINGLE BREATH REF: 3.08
MODE: ABNORMAL
MVV LLN: 92
MVV PRE REF: 77.4 %
MVV REF: 108
PCO2 BLDA: 40.4 MMHG (ref 35–45)
PEF LLN: 4.23
PEF PRE REF: 89.9 %
PEF REF: 6.36
PH SMN: 7.41 [PH] (ref 7.35–7.45)
PO2 BLDA: 64 MMHG (ref 80–100)
POC BE: 1 MMOL/L
POC SATURATED O2: 92 % (ref 95–100)
PRE DLCO: 6.48 ML/(MIN*MMHG) (ref 19.28–30.74)
PRE ERV: 0.62 L (ref -16449.07–16450.93)
PRE FEF 25 75: 2.29 L/S (ref 1.1–4.08)
PRE FET 100: 6.03 SEC
PRE FEV1 FVC: 87.6 % (ref 69.22–89.65)
PRE FEV1: 1.47 L (ref 1.84–3.06)
PRE FRC PL: 1.03 L (ref 1.99–3.63)
PRE FVC: 1.67 L (ref 2.33–3.87)
PRE MVV: 83.53 L/MIN (ref 91.69–124.05)
PRE PEF: 5.72 L/S (ref 4.23–8.49)
PRE RV: 0.41 L (ref 1.31–2.46)
PRE TLC: 2.09 L (ref 4.29–6.26)
RAW LLN: 3.06
RAW PRE REF: 150.9 %
RAW PRE: 4.62 CMH2O*S/L (ref 3.06–3.06)
RAW REF: 3.06
RV LLN: 1.31
RV PRE REF: 22 %
RV REF: 1.88
RVTLC LLN: 27
RVTLC PRE REF: 53.6 %
RVTLC PRE: 19.84 % (ref 27.39–46.57)
RVTLC REF: 37
SAMPLE: ABNORMAL
SITE: ABNORMAL
TLC LLN: 4.29
TLC PRE REF: 39.6 %
TLC REF: 5.27
VA PRE: 2.07 L (ref 5.12–5.12)
VA SINGLE BREATH LLN: 5.12
VA SINGLE BREATH PRE REF: 40.3 %
VA SINGLE BREATH REF: 5.12
VC LLN: 2.33
VC PRE REF: 54.4 %
VC PRE: 1.67 L (ref 2.33–3.87)
VC REF: 3.08

## 2022-05-05 PROCEDURE — 3008F PR BODY MASS INDEX (BMI) DOCUMENTED: ICD-10-PCS | Mod: CPTII,S$GLB,, | Performed by: INTERNAL MEDICINE

## 2022-05-05 PROCEDURE — 94729 PR C02/MEMBANE DIFFUSE CAPACITY: ICD-10-PCS | Mod: S$GLB,,, | Performed by: INTERNAL MEDICINE

## 2022-05-05 PROCEDURE — 94618 PULMONARY STRESS TESTING: CPT | Mod: S$GLB,,, | Performed by: INTERNAL MEDICINE

## 2022-05-05 PROCEDURE — 1111F DSCHRG MED/CURRENT MED MERGE: CPT | Mod: CPTII,S$GLB,, | Performed by: INTERNAL MEDICINE

## 2022-05-05 PROCEDURE — 94618 PULMONARY STRESS TESTING: ICD-10-PCS | Mod: S$GLB,,, | Performed by: INTERNAL MEDICINE

## 2022-05-05 PROCEDURE — 3075F SYST BP GE 130 - 139MM HG: CPT | Mod: CPTII,S$GLB,, | Performed by: INTERNAL MEDICINE

## 2022-05-05 PROCEDURE — 99999 PR PBB SHADOW E&M-EST. PATIENT-LVL I: CPT | Mod: PBBFAC,,,

## 2022-05-05 PROCEDURE — 99214 OFFICE O/P EST MOD 30 MIN: CPT | Mod: 25,S$GLB,, | Performed by: INTERNAL MEDICINE

## 2022-05-05 PROCEDURE — 1160F RVW MEDS BY RX/DR IN RCRD: CPT | Mod: CPTII,S$GLB,, | Performed by: INTERNAL MEDICINE

## 2022-05-05 PROCEDURE — 94729 DIFFUSING CAPACITY: CPT | Mod: S$GLB,,, | Performed by: INTERNAL MEDICINE

## 2022-05-05 PROCEDURE — 36600 WITHDRAWAL OF ARTERIAL BLOOD: CPT | Mod: S$GLB,,, | Performed by: INTERNAL MEDICINE

## 2022-05-05 PROCEDURE — 1160F PR REVIEW ALL MEDS BY PRESCRIBER/CLIN PHARMACIST DOCUMENTED: ICD-10-PCS | Mod: CPTII,S$GLB,, | Performed by: INTERNAL MEDICINE

## 2022-05-05 PROCEDURE — 99214 PR OFFICE/OUTPT VISIT, EST, LEVL IV, 30-39 MIN: ICD-10-PCS | Mod: 25,S$GLB,, | Performed by: INTERNAL MEDICINE

## 2022-05-05 PROCEDURE — 71046 X-RAY EXAM CHEST 2 VIEWS: CPT | Mod: TC

## 2022-05-05 PROCEDURE — 94726 PLETHYSMOGRAPHY LUNG VOLUMES: CPT | Mod: S$GLB,,, | Performed by: INTERNAL MEDICINE

## 2022-05-05 PROCEDURE — 71046 X-RAY EXAM CHEST 2 VIEWS: CPT | Mod: 26,,, | Performed by: RADIOLOGY

## 2022-05-05 PROCEDURE — 3079F DIAST BP 80-89 MM HG: CPT | Mod: CPTII,S$GLB,, | Performed by: INTERNAL MEDICINE

## 2022-05-05 PROCEDURE — 99999 PR PBB SHADOW E&M-EST. PATIENT-LVL II: CPT | Mod: PBBFAC,,,

## 2022-05-05 PROCEDURE — 82803 BLOOD GASES ANY COMBINATION: CPT | Mod: S$GLB,,, | Performed by: INTERNAL MEDICINE

## 2022-05-05 PROCEDURE — 94726 PULM FUNCT TST PLETHYSMOGRAP: ICD-10-PCS | Mod: S$GLB,,, | Performed by: INTERNAL MEDICINE

## 2022-05-05 PROCEDURE — 3075F PR MOST RECENT SYSTOLIC BLOOD PRESS GE 130-139MM HG: ICD-10-PCS | Mod: CPTII,S$GLB,, | Performed by: INTERNAL MEDICINE

## 2022-05-05 PROCEDURE — 3008F BODY MASS INDEX DOCD: CPT | Mod: CPTII,S$GLB,, | Performed by: INTERNAL MEDICINE

## 2022-05-05 PROCEDURE — 99999 PR PBB SHADOW E&M-EST. PATIENT-LVL V: CPT | Mod: PBBFAC,,, | Performed by: INTERNAL MEDICINE

## 2022-05-05 PROCEDURE — 94010 BREATHING CAPACITY TEST: CPT | Mod: S$GLB,,, | Performed by: INTERNAL MEDICINE

## 2022-05-05 PROCEDURE — 3079F PR MOST RECENT DIASTOLIC BLOOD PRESSURE 80-89 MM HG: ICD-10-PCS | Mod: CPTII,S$GLB,, | Performed by: INTERNAL MEDICINE

## 2022-05-05 PROCEDURE — 99999 PR PBB SHADOW E&M-EST. PATIENT-LVL V: ICD-10-PCS | Mod: PBBFAC,,, | Performed by: INTERNAL MEDICINE

## 2022-05-05 PROCEDURE — 82803 PR  BLOOD GASES: PH, PO2 & PCO2: ICD-10-PCS | Mod: S$GLB,,, | Performed by: INTERNAL MEDICINE

## 2022-05-05 PROCEDURE — 1159F MED LIST DOCD IN RCRD: CPT | Mod: CPTII,S$GLB,, | Performed by: INTERNAL MEDICINE

## 2022-05-05 PROCEDURE — 1111F PR DISCHARGE MEDS RECONCILED W/ CURRENT OUTPATIENT MED LIST: ICD-10-PCS | Mod: CPTII,S$GLB,, | Performed by: INTERNAL MEDICINE

## 2022-05-05 PROCEDURE — 99999 PR PBB SHADOW E&M-EST. PATIENT-LVL I: ICD-10-PCS | Mod: PBBFAC,,,

## 2022-05-05 PROCEDURE — 71046 XR CHEST PA AND LATERAL: ICD-10-PCS | Mod: 26,,, | Performed by: RADIOLOGY

## 2022-05-05 PROCEDURE — 1159F PR MEDICATION LIST DOCUMENTED IN MEDICAL RECORD: ICD-10-PCS | Mod: CPTII,S$GLB,, | Performed by: INTERNAL MEDICINE

## 2022-05-05 PROCEDURE — 36600 PR WITHDRAWAL OF ARTERIAL BLOOD: ICD-10-PCS | Mod: S$GLB,,, | Performed by: INTERNAL MEDICINE

## 2022-05-05 PROCEDURE — 94010 BREATHING CAPACITY TEST: ICD-10-PCS | Mod: S$GLB,,, | Performed by: INTERNAL MEDICINE

## 2022-05-05 PROCEDURE — 99999 PR PBB SHADOW E&M-EST. PATIENT-LVL II: ICD-10-PCS | Mod: PBBFAC,,,

## 2022-05-05 RX ORDER — SULFAMETHOXAZOLE AND TRIMETHOPRIM 800; 160 MG/1; MG/1
2 TABLET ORAL
Qty: 24 TABLET | Refills: 2 | Status: SHIPPED | OUTPATIENT
Start: 2022-05-06 | End: 2022-06-09 | Stop reason: SDUPTHER

## 2022-05-05 RX ORDER — FAMOTIDINE 10 MG/1
10 TABLET ORAL 2 TIMES DAILY
Qty: 60 TABLET | Refills: 3 | Status: SHIPPED | OUTPATIENT
Start: 2022-05-05 | End: 2022-09-06

## 2022-05-05 NOTE — TELEPHONE ENCOUNTER
Orders Placed This Encounter   Procedures    C-Reactive Protein     Standing Status:   Future     Standing Expiration Date:   7/4/2023    Sedimentation rate     Standing Status:   Future     Standing Expiration Date:   7/4/2023

## 2022-05-05 NOTE — ASSESSMENT & PLAN NOTE
ILD with +ve RF  Based on Chest CT  Elevated ESR and CRP improved with steriods  PFT today: RESTRICTION with reduced DLCO  Steriod regime: Take 2 tablets (40 mg total) by mouth once daily for 7 days, THEN 1.5 tablets (30 mg total) once daily for 7 days, THEN 1 tablet (20 mg total) once daily for 7 days.  Take 1 tablet (10 mg total) by mouth once daily for 7 days, THEN 0.5 tablets (5 mg total) once daily for 7 days.,   PCP prophylaxis  Consult rheumatology  Bronchoscopy still consideration

## 2022-05-05 NOTE — ASSESSMENT & PLAN NOTE
Weight loss and exercise to improve overall health.    Advised will/may likely gain weight with steriods

## 2022-05-05 NOTE — PROCEDURES
"[facility] - Pulmonary Function  Six Minute Walk     SUMMARY     Ordering Provider: Felecia YU   Interpreting Provider: Dr. Aviles  Performing nurse/tech/RT: Kary CRT  Diagnosis: Shortness of Breath  Height: 5' 6" (167.6 cm)  Weight: 116.8 kg (257 lb 8 oz)  BMI (Calculated): 41.6   Patient Race:             Phase Oxygen Assessment Supplemental O2 Heart   Rate Blood Pressure Jessica Dyspnea Scale Rating   Resting 92 % Room Air 91 bpm 133/81 3   Exercise        Minute        1 88 % (placed patient on 2lnc(no change)3l(no change), increased to 4l spo2 increased to 93%) Room Air 110 bpm     2 93 % 4 L/M 105 bpm     3 94 % 4 L/M 110 bpm     4 86 % (increased patient to 6lnc, spo2 increased to 91%) 4 L/M 111 bpm     5 91 % 6 L/M 116 bpm     6  94 % 6 L/M 123 bpm 137/88 5-6   Recovery        Minute        1 92 % 6 L/M 107 bpm     2 97 % 6 L/M 90 bpm     3 99 % 6 L/M 93 bpm     4 99 % 6 L/M 92 bpm 132/79 3     Six Minute Walk Summary  6MWT Status: completed without stopping  Patient Reported: Dyspnea, Lightheadedness (chest tightness)     Interpretation:  Did the patient stop or pause?: No         Total Time Walked (Calculated): 360 seconds  Final Partial Lap Distance (feet): 150 feet  Total Distance Meters (Calculated): 289.56 meters  Predicted Distance Meters (Calculated): 446.82 meters  Percentage of Predicted (Calculated): 64.8  Peak VO2 (Calculated): 12.67  Mets: 3.62  Has The Patient Had a Previous Six Minute Walk Test?: No  Comments: patient has home o2, placed patient on oxygen and will see Dr. Aviles at [facility] at 10:40am 5/5/22    Previous 6MWT Results  Has The Patient Had a Previous Six Minute Walk Test?: No         CLINICAL INTERPRETATION:  Six minute walk distance is 289.56m (64.8 % predicted) with somewhat heavy dyspnea.  During exercise, there was significant desaturation while breathing room air.  Blood pressure remained stable and Heart rate remained stable with walking.  The patient " reported non-pulmonary symptoms during exercise.  Significant exercise impairment is likely due to desaturation.  The patient did complete the study, walking 360 seconds of the 360 second test.  The patient may benefit from using supplemental oxygen during exertion.  Based upon age and body mass index, exercise capacity is less than predicted.      [] Mild exercise-induced hypoxemia described as an arterial oxygen saturation of 93-95% (or 3-4% less than at rest)  []  Moderate exercise-induced hypoxemia as 89-93%  [x]  Severe exercise induced hypoxemia as < 89% O2 saturation.  Medicare Criteria for oxygen prescription comments:   [x]  When arterial oxygen saturation is at or below 88% during exercise (severe exercise induced hypoxemia) then the patient falls under Medicare Group 1 criteria for supplemental oxygen

## 2022-05-05 NOTE — PROCEDURES
See ABG results.       A-a Gradient  35.2 mm Hg    Expected A-a Gradient for Age  17.3 mm Hg    Primary Metabolic Alkalosis, with: Appropriately Compensated by Respiratory Acidosis

## 2022-05-05 NOTE — PATIENT INSTRUCTIONS
ACTION PLAN    GREEN ZONE  My sputum is clear/white/usual color and easily cleared.  My breathing is no harder than usual.  I can do my usual activities.  I can think clearly.   Take your usual medicines, including oxygen, as you are told to do so by your health care provider.   YELLOW ZONE  My sputum has change (color, thickness, amount).  I am more short of breath than usual.  I cough or wheeze more.  I weigh more and my legs/feet swell.  I cannot do my usual activities without resting.   Call your health care provider. You will probably be told to begin taking an antibiotic and prednisone. Have your pharmacy phone number available.   RED ZONE  I cannot cough out my sputum.  I am much more short of breath than normal.  I need to sit up to breathe  I cannot do my usual activities.  I am unable to speak more than one or two words at a time.  I am confused.   Call your health care provider. You may be asked to come in to be seen, told to go to the emergency room, or told to call 9-1-1.

## 2022-05-05 NOTE — ASSESSMENT & PLAN NOTE
Will moniotor  With bronchiectasis likely consistent with ABPA  Already on steriods  Trend: if refractory will consult allergy

## 2022-05-05 NOTE — PROGRESS NOTES
Pulmonary Disease Management  [facility]   CHRONIC CARE MANAGEMENT  Initial Visit       Diagnosis: ASTHMA  Last Hospital Admission: 4/27/22   Last provider visit: 5/5/22      Current Outpatient Medications:     albuterol (ACCUNEB) 0.63 mg/3 mL Nebu, Take 3 mLs (0.63 mg total) by nebulization every 4 to 6 hours as needed (asthma). Rescue, Disp: 75 mL, Rfl: 6    albuterol (PROVENTIL/VENTOLIN HFA) 90 mcg/actuation inhaler, INHALE 1 TO 2 PUFFS BY MOUTH INTO THE LUNGS EVERY 4 TO 6 HOURS AS NEEDED FOR WHEEZING OR SHORTNESS OF BREATH, Disp: 18 g, Rfl: 5    albuterol-ipratropium (DUO-NEB) 2.5 mg-0.5 mg/3 mL nebulizer solution, Take 3 mLs by nebulization every 6 (six) hours as needed for Wheezing. Rescue, Disp: 90 mL, Rfl: 0    amLODIPine (NORVASC) 10 MG tablet, Take 1 tablet (10 mg total) by mouth once daily., Disp: 90 tablet, Rfl: 1    ascorbic acid, vitamin C, (VITAMIN C) 500 MG tablet, Take 500 mg by mouth once daily., Disp: , Rfl:     calcium/magnesium/vit B comp (CALCIUM-MAGNESIUM-B COMPLEX ORAL), Take 1 tablet by mouth once daily at 6am., Disp: , Rfl:     doxycycline (MONODOX) 100 MG capsule, Take 1 capsule (100 mg total) by mouth every 12 (twelve) hours., Disp: 20 capsule, Rfl: 1    famotidine (PEPCID AC) 10 MG tablet, Take 1 tablet (10 mg total) by mouth 2 (two) times daily., Disp: 60 tablet, Rfl: 3    fluticasone furoate-vilanteroL (BREO ELLIPTA) 100-25 mcg/dose diskus inhaler, INHALE 1 PUFF INTO THE LUNGS ONCE DAILY, Disp: 1 each, Rfl: 5    hydroCHLOROthiazide (HYDRODIURIL) 12.5 MG Tab, Take 1 tablet (12.5 mg total) by mouth once daily., Disp: 90 tablet, Rfl: 1    levocetirizine (XYZAL) 5 MG tablet, Take 1 tablet (5 mg total) by mouth once daily., Disp: 90 tablet, Rfl: 1    montelukast (SINGULAIR) 10 mg tablet, Take 1 tablet (10 mg total) by mouth every evening., Disp: 90 tablet, Rfl: 1    omega-3s/dha/epa/fish oil/D3 (VITAMIN-D + OMEGA-3 ORAL), Take 1 tablet by mouth once daily at 6am.,  Disp: , Rfl:     ondansetron (ZOFRAN-ODT) 4 MG TbDL, Dissolve 1 tablet (4 mg total) by mouth every 6 (six) hours as needed (nausea)., Disp: 90 tablet, Rfl: 0    [START ON 5/23/2022] predniSONE (DELTASONE) 10 MG tablet, Take 1 tablet (10 mg total) by mouth once daily for 7 days, THEN 0.5 tablets (5 mg total) once daily for 7 days., Disp: 11 tablet, Rfl: 0    predniSONE (DELTASONE) 20 MG tablet, Take 2 tablets (40 mg total) by mouth once daily for 7 days, THEN 1.5 tablets (30 mg total) once daily for 7 days, THEN 1 tablet (20 mg total) once daily for 7 days., Disp: 32 tablet, Rfl: 0    sertraline (ZOLOFT) 50 MG tablet, Take 1 tablet (50 mg total) by mouth once daily., Disp: 90 tablet, Rfl: 1    [START ON 5/6/2022] sulfamethoxazole-trimethoprim 800-160mg (BACTRIM DS) 800-160 mg Tab, Take 2 tablets by mouth every Mon, Wed, Fri., Disp: 24 tablet, Rfl: 2    Review of patient's allergies indicates:   Allergen Reactions    Doxycycline Nausea Only     Other reaction(s): Nausea    Fluticasone Other (See Comments)     Other reaction(s): Epistaxis      Penicillins      Other reaction(s): unknown  Pt tolerated ceftriaxone       Smoking history:   Social History     Tobacco Use   Smoking Status Never Smoker   Smokeless Tobacco Never Used            PFT Results:  Pre FVC   Date/Time Value Ref Range Status   05/05/2022 11:15 AM 1.67 (L) 2.33 - 3.87 L Final     Pre FEV1   Date/Time Value Ref Range Status   05/05/2022 11:15 AM 1.47 (L) 1.84 - 3.06 L Final     Pre FEV1 FVC   Date/Time Value Ref Range Status   05/05/2022 11:15 AM 87.60 69.22 - 89.65 % Final     Pre TLC   Date/Time Value Ref Range Status   05/05/2022 11:15 AM 2.09 (L) 4.29 - 6.26 L Final     Pre DLCO   Date/Time Value Ref Range Status   05/05/2022 11:15 AM 6.48 (L) 19.28 - 30.74 ml/(min*mmHg) Final         Patient Concerns or Expectations:   NA  Therapist Comments:   Ayanna Alicia  was seen 5/5/2022  12:05 PM in the Pulmonary Disease management clinic for  evaluation, education, reinforcement of self management techniques and exacerbation action plan.    Saskia Barron    Past Medical History:   Diagnosis Date    Abnormal Pap smear of cervix     in the past with repeat pap smear okay.    Allergic rhinitis, cause unspecified     Arthritis of both knees     Asthma     Eczema     Fatty liver 10/2014    Fibrocystic breast changes     Headache(784.0)     Hepatomegaly 10/2014    Hypertension     Liver cyst 10/2014    Multinodular goiter     Followed by ENT - Dr. Nicolas Gray    Polymenorrhea 2008    TMJ (dislocation of temporomandibular joint)     Uterine fibroid     in the past    Vitamin D deficiency disease                Educational assessment:   [x]            Good  []            Fair  []            Poor    Readiness to learn:   [x]            Good  []            Fair  []            Poor    Vision Status:   [x]            Good  []            Fair  []            Poor    Reading Ability:  [x]            Good  []            Fair  []            Poor    Knowledge of condition:   []            Good  []            Fair  []            Poor    Language Barriers:   [x]            Good  []            Fair  []            Poor  []            None    Cognitive/ Physical Barriers:   []            Good  []            Fair  []            Poor  [x]            None    Learning best by:                       [x]            Seeing  [x]            Hearing  [x]            Reading                         [x]            Doing    Describe any barrier /Limitation or financial implications of care choices identified     []            Financial  []            Emotional  []            Education  []            Vision/Hearing  []            Physical  [x]            None  []                TOPIC /CONTENT FOR TODAY:    [x]            MDI with or without spacer  [x]            Dry powder inhaler  [x]            Maximize energy   []           Peak Flow meter  []            COPD action  plan  [x]            Nebulizer use  [x]            Oxygen use safety  [x]            Breathing and cough techniques  [x]            Energy conservation  [x]            Infection prevention  [x]            ASTHMA TRIGGERS          Learner:    [x]            Patient   []            Caregiver    Method:    [x]            Verbal explanation  [x]            Audio visual    [x]            Literature  [x]            Teach back      Evaluation:    [x]            Teach back  [x]            Demonstrate  [x]            Follow up phone call    []            2 weeks     []            4 weeks   []            PRN        Plan:  Monthly Pulmonary Disease Management Questionnaire  Follow-up PDM appointment scheduled for 11/2/22 at 0900 at [facility]   Reinforce education  Meds: ALBUTEROL, DUONEB, BREO  DME Needs: [facility]  Action Plan: ASTHMA  Immunization: Pneumococcal- DUE, Flu-CURRENT  Next Provider Visit:  6/3/22  Next Spirometry/CPFT: 6/3/22  Approximate time spent with patient: 35 MINUTES

## 2022-05-05 NOTE — PATIENT INSTRUCTIONS
Prednisone Oral tablet  Trade Names   Deltasone   Predone   Sterapred   Sterapred DS   The list of names may not include all products that are available in the market  What is this medicine?  PREDNISONE (PRED ni sone) is a corticosteroid. It is commonly used to treat inflammation of the skin, joints, lungs, and other organs. Common conditions treated include asthma, allergies, and arthritis. It is also used for other conditions, such as blood disorders and diseases of the adrenal glands.  This medicine may be used for other purposes; ask your health care provider or pharmacist if you have questions.  What should I tell my health care provider before I take this medicine?  They need to know if you have any of these conditions:   Cushing's syndrome   diabetes   glaucoma   heart disease   high blood pressure   infection (especially a virus infection such as chickenpox, cold sores, or herpes)   kidney disease   liver disease   mental illness   myasthenia gravis   osteoporosis   seizures   stomach or intestine problems   thyroid disease   an unusual or allergic reaction to lactose, prednisone, other medicines, foods, dyes, or preservatives   pregnant or trying to get pregnant   breast-feeding   How should I use this medicine?  Take this medicine by mouth with a glass of water. Follow the directions on the prescription label. Take this medicine with food. If you are taking this medicine once a day, take it in the morning. Do not take more medicine than you are told to take. Do not suddenly stop taking your medicine because you may develop a severe reaction. Your doctor will tell you how much medicine to take. If your doctor wants you to stop the medicine, the dose may be slowly lowered over time to avoid any side effects.    Talk to your pediatrician regarding the use of this medicine in children. Special care may be needed.  Overdosage: If you think you have taken too much of this medicine contact a poison  control center or emergency room at once.  NOTE: This medicine is only for you. Do not share this medicine with others.  What if I miss a dose?  If you miss a dose, take it as soon as you can. If it is almost time for your next dose, talk to your doctor or health care professional. You may need to miss a dose or take an extra dose. Do not take double or extra doses without advice.  What may interact with this medicine?  Do not take this medicine with any of the following medications:   metyrapone   mifepristone   This medicine may also interact with the following medications:   aminoglutethimide   amphotericin B   aspirin and aspirin-like medicines   barbiturates   certain medicines for diabetes, like glipizide or glyburide   cholestyramine   cholinesterase inhibitors   cyclosporine   digoxin   diuretics   ephedrine   female hormones, like estrogens and birth control pills   isoniazid   ketoconazole   NSAIDS, medicines for pain and inflammation, like ibuprofen or naproxen   phenytoin   rifampin   toxoids   vaccines   warfarin   This list may not describe all possible interactions. Give your health care provider a list of all the medicines, herbs, non-prescription drugs, or dietary supplements you use. Also tell them if you smoke, drink alcohol, or use illegal drugs. Some items may interact with your medicine.  What should I watch for while using this medicine?  Visit your doctor or health care professional for regular checks on your progress. If you are taking this medicine over a prolonged period, carry an identification card with your name and address, the type and dose of your medicine, and your doctor's name and address.    This medicine may increase your risk of getting an infection. Tell your doctor or health care professional if you are around anyone with measles or chickenpox, or if you develop sores or blisters that do not heal properly.    If you are going to have surgery, tell your doctor or health care  professional that you have taken this medicine within the last twelve months.    Ask your doctor or health care professional about your diet. You may need to lower the amount of salt you eat.    This medicine may affect blood sugar levels. If you have diabetes, check with your doctor or health care professional before you change your diet or the dose of your diabetic medicine.  What side effects may I notice from receiving this medicine?  Side effects that you should report to your doctor or health care professional as soon as possible:   allergic reactions like skin rash, itching or hives, swelling of the face, lips, or tongue   changes in emotions or moods   changes in vision   depressed mood   eye pain   fever or chills, cough, sore throat, pain or difficulty passing urine   increased thirst   swelling of ankles, feet   Side effects that usually do not require medical attention (report to your doctor or health care professional if they continue or are bothersome):   confusion, excitement, restlessness   headache   nausea, vomiting   skin problems, acne, thin and shiny skin   trouble sleeping   weight gain   This list may not describe all possible side effects. Call your doctor for medical advice about side effects. You may report side effects to FDA at 2-758-FDA-1799.  Where should I keep my medicine?  Keep out of the reach of children.    Store at room temperature between 15 and 30 degrees C (59 and 86 degrees F). Protect from light. Keep container tightly closed. Throw away any unused medicine after the expiration date.  NOTE: This sheet is a summary. It may not cover all possible information. If you have questions about this medicine, talk to your doctor, pharmacist, or health care provider      Please call office [phone] for any questions      Steriod regime: Take 2 tablets (40 mg total) by mouth once daily for 7 days, THEN 1.5 tablets (30 mg total) once daily for 7 days, THEN 1 tablet (20 mg total) once  daily for 7 days.  Take 1 tablet (10 mg total) by mouth once daily for 7 days, THEN 0.5 tablets (5 mg total) once daily for 7 days.,

## 2022-05-06 ENCOUNTER — PATIENT MESSAGE (OUTPATIENT)
Dept: PULMONOLOGY | Facility: CLINIC | Age: 52
End: 2022-05-06
Payer: COMMERCIAL

## 2022-05-06 ENCOUNTER — PATIENT MESSAGE (OUTPATIENT)
Dept: FAMILY MEDICINE | Facility: CLINIC | Age: 52
End: 2022-05-06
Payer: COMMERCIAL

## 2022-05-06 NOTE — DISCHARGE SUMMARY
[facility] Medicine  Discharge Summary      Patient Name: Ayanna Alicia  MRN: [id number]  Patient Class: IP- Inpatient  Admission Date: 4/27/2022  Hospital Length of Stay: 4 days  Discharge Date and Time: 5/2/2022  3:54 PM  Attending Physician: No att. providers found   Discharging Provider: Thaddeus Nasasr MD  Primary Care Provider: Madeleine Enrique MD      HPI:   Patient is a 51 y.o. aa female with a PMHx of asthma, fatty liver, HTN, and KALYANI who presents to the Emergency Department for SOB which onset gradually at the beginning of this month. Patient currently being seen by pulmonology outpatient for asthma.  She was treated with doxycycline and steroids without improvement.  Patient was seen by pulmonology today and was referred to the ED.  She reports shortness of breath is worse on exertion and relieves with rest.  States that she may have had fever when the symptoms 1st started however not now.  Reports nonproductive cough.    In the ED, chest xray concerning bilateral interstitial infiltrates. D dimer elevated and CTA chest was pending at time of admission. She was started on rocephin and azithromycin. HM consulted and patient placed in observation for pna.       * No surgery found *      Hospital Course:   Patient was admitted for hypoxic respiratory failure. CTA showed siffuse interstitial thickening with ground-glass opacities predominately throughout the lower lobes with associated bronchiectasis concerning for interstitial pneumonia versus interstitial edema. She was treated empirically for CAP with rocephin and azithromycin. Patient did not clinically improve. Sputum culture negative. She was then started on IV solumedrol with good clinical response. Inflammatory markers trended down post steroids. Chest xray showed improved. Of note multi-nodular goiter was found during stay. Patient has been seeing ENT and PCP. TSH WNL and anti-TPO negative. Rheumatoid factor elevated however  anti-ccp within normal limits. Home O2 was obtained. Decision was made to discharge with prednisone taper x 5 weeks. Outpatient f/u with pulmonology.            Goals of Care Treatment Preferences:  Code Status: Full Code      Consults:   Consults (From admission, onward)        Status Ordering Provider     Inpatient consult to Pulmonology  Once        Provider:  Mode Thompson MD    Completed LE, TRAVIS     Inpatient consult to Social Work/Case Management  Once        Provider:  (Not yet assigned)    Completed LE, TRAVIS          No new Assessment & Plan notes have been filed under this hospital service since the last note was generated.  Service: Hospital Medicine    Final Active Diagnoses:    Diagnosis Date Noted POA    Pulmonary infiltrates on CXR [R91.8] 04/28/2022 Unknown    Acute hypoxemic respiratory failure [J96.01] 04/27/2022 Yes    GLENN on CPAP [G47.33, Z99.89] 04/13/2022 Yes    Asthma [J45.909] 02/25/2014 Yes    Multinodular goiter [E04.2] 02/25/2014 Yes    HTN (hypertension) [I10] 09/04/2013 Yes      Problems Resolved During this Admission:    Diagnosis Date Noted Date Resolved POA    PRINCIPAL PROBLEM:  Unresolved pneumonia [J18.9] 04/27/2022 05/03/2022 Yes       Discharged Condition: good    Disposition: Home or Self Care    Follow Up:   Follow-up Information     Madeleine Enrique MD. Go on 5/6/2022.    Specialty: Family Medicine  Why: @ 8:20am for hospital follow up  Contact information:  [address] [facility] [address] [phone]                       Patient Instructions:      OXYGEN FOR HOME USE     Order Specific Question Answer Comments   Liter Flow 2    Duration With activity    Qualifying Test Performed at: Activity    Oxygen saturation at rest 92    Oxygen saturation with activity 88    Oxygen saturation with activity on oxygen 94    Portable mode: continuous    Route nasal cannula    Device: home concentrator with portable tanks    Length of need (in months): 3 mos    Patient  "condition with qualifying saturation Other - List qualifying diagnosis and code    Select a diagnosis & list the code in the comments Acute respiratory failure with hypoxia [933265]    Height: 5' 6" (1.676 m)    Weight: 112.4 kg (247 lb 12.8 oz)    Alternative treatment measures have been tried or considered and deemed clinically ineffective. Yes        Significant Diagnostic Studies: Labs: BMP: No results for input(s): GLU, NA, K, CL, CO2, BUN, CREATININE, CALCIUM, MG in the last 48 hours. and CBC No results for input(s): WBC, HGB, HCT, PLT in the last 48 hours.    Pending Diagnostic Studies:     Procedure Component Value Units Date/Time    Anastacio Panel (5 Bird Antigens), Serum [[id number]] Collected: 04/28/22 1014    Order Status: Sent Lab Status: In process Updated: 04/28/22 9093    Specimen: Blood          Medications:  Reconciled Home Medications:      Medication List      START taking these medications    albuterol-ipratropium 2.5 mg-0.5 mg/3 mL nebulizer solution  Commonly known as: DUO-NEB  Take 3 mLs by nebulization every 6 (six) hours as needed for Wheezing. Rescue     * predniSONE 20 MG tablet  Commonly known as: DELTASONE  Take 2 tablets (40 mg total) by mouth once daily for 7 days, THEN 1.5 tablets (30 mg total) once daily for 7 days, THEN 1 tablet (20 mg total) once daily for 7 days.  Start taking on: May 2, 2022     * predniSONE 10 MG tablet  Commonly known as: DELTASONE  Take 1 tablet (10 mg total) by mouth once daily for 7 days, THEN 0.5 tablets (5 mg total) once daily for 7 days.  Start taking on: May 23, 2022         * This list has 2 medication(s) that are the same as other medications prescribed for you. Read the directions carefully, and ask your doctor or other care provider to review them with you.            CONTINUE taking these medications    * albuterol 90 mcg/actuation inhaler  Commonly known as: PROVENTIL/VENTOLIN HFA  INHALE 1 TO 2 PUFFS BY MOUTH INTO THE LUNGS EVERY 4 TO 6 HOURS AS " NEEDED FOR WHEEZING OR SHORTNESS OF BREATH     * albuterol 0.63 mg/3 mL Nebu  Commonly known as: ACCUNEB  Take 3 mLs (0.63 mg total) by nebulization every 4 to 6 hours as needed (asthma). Rescue     amLODIPine 10 MG tablet  Commonly known as: NORVASC  Take 1 tablet (10 mg total) by mouth once daily.     ascorbic acid (vitamin C) 500 MG tablet  Commonly known as: VITAMIN C  Take 500 mg by mouth once daily.     BREO ELLIPTA 100-25 mcg/dose diskus inhaler  Generic drug: fluticasone furoate-vilanteroL  INHALE 1 PUFF INTO THE LUNGS ONCE DAILY     CALCIUM-MAGNESIUM-B COMPLEX ORAL  Take 1 tablet by mouth once daily at 6am.     doxycycline 100 MG capsule  Commonly known as: MONODOX  Take 1 capsule (100 mg total) by mouth every 12 (twelve) hours.     hydroCHLOROthiazide 12.5 MG Tab  Commonly known as: HYDRODIURIL  Take 1 tablet (12.5 mg total) by mouth once daily.     levocetirizine 5 MG tablet  Commonly known as: XYZAL  Take 1 tablet (5 mg total) by mouth once daily.     montelukast 10 mg tablet  Commonly known as: SINGULAIR  Take 1 tablet (10 mg total) by mouth every evening.     ondansetron 4 MG Tbdl  Commonly known as: ZOFRAN-ODT  Dissolve 1 tablet (4 mg total) by mouth every 6 (six) hours as needed (nausea).     sertraline 50 MG tablet  Commonly known as: ZOLOFT  Take 1 tablet (50 mg total) by mouth once daily.     VITAMIN-D + OMEGA-3 ORAL  Take 1 tablet by mouth once daily at 6am.         * This list has 2 medication(s) that are the same as other medications prescribed for you. Read the directions carefully, and ask your doctor or other care provider to review them with you.                Indwelling Lines/Drains at time of discharge:   Lines/Drains/Airways     None                 Time spent on the discharge of patient: 36 minutes         Thaddeus Nassar MD  Department of Hospital Medicine  '[place] - Telemetry ([facility])

## 2022-05-09 ENCOUNTER — TELEPHONE (OUTPATIENT)
Dept: FAMILY MEDICINE | Facility: CLINIC | Age: 52
End: 2022-05-09
Payer: COMMERCIAL

## 2022-05-09 ENCOUNTER — TELEPHONE (OUTPATIENT)
Dept: PULMONOLOGY | Facility: CLINIC | Age: 52
End: 2022-05-09
Payer: COMMERCIAL

## 2022-05-09 NOTE — TELEPHONE ENCOUNTER
"Pt portal message    Inquiring about wegovy.    "Appetite suppressant medication is this something we can do with my medical conditions? Will my insurance cover it ?"    Please advise    "

## 2022-05-09 NOTE — TELEPHONE ENCOUNTER
Advise pt she should first check with her insurance to see if it covers Wegovy for weight loss assistance.  If so, then we can discuss if appropriate for her to use.  Thanks.

## 2022-05-12 ENCOUNTER — PATIENT MESSAGE (OUTPATIENT)
Dept: PULMONOLOGY | Facility: CLINIC | Age: 52
End: 2022-05-12
Payer: COMMERCIAL

## 2022-05-13 ENCOUNTER — TELEPHONE (OUTPATIENT)
Dept: PULMONOLOGY | Facility: CLINIC | Age: 52
End: 2022-05-13
Payer: COMMERCIAL

## 2022-05-13 DIAGNOSIS — J45.909 ASTHMA, UNSPECIFIED ASTHMA SEVERITY, UNSPECIFIED WHETHER COMPLICATED, UNSPECIFIED WHETHER PERSISTENT: Primary | ICD-10-CM

## 2022-05-17 LAB
Lab: NEGATIVE

## 2022-05-18 ENCOUNTER — PATIENT MESSAGE (OUTPATIENT)
Dept: RHEUMATOLOGY | Facility: CLINIC | Age: 52
End: 2022-05-18

## 2022-05-18 ENCOUNTER — OFFICE VISIT (OUTPATIENT)
Dept: PULMONOLOGY | Facility: CLINIC | Age: 52
End: 2022-05-18
Payer: COMMERCIAL

## 2022-05-18 ENCOUNTER — CLINICAL SUPPORT (OUTPATIENT)
Dept: PULMONOLOGY | Facility: CLINIC | Age: 52
End: 2022-05-18
Payer: COMMERCIAL

## 2022-05-18 ENCOUNTER — TELEPHONE (OUTPATIENT)
Dept: PULMONOLOGY | Facility: CLINIC | Age: 52
End: 2022-05-18
Payer: COMMERCIAL

## 2022-05-18 ENCOUNTER — HOSPITAL ENCOUNTER (OUTPATIENT)
Dept: RADIOLOGY | Facility: HOSPITAL | Age: 52
Discharge: HOME | End: 2022-05-18
Attending: INTERNAL MEDICINE
Payer: COMMERCIAL

## 2022-05-18 ENCOUNTER — OFFICE VISIT (OUTPATIENT)
Dept: RHEUMATOLOGY | Facility: CLINIC | Age: 52
End: 2022-05-18
Payer: COMMERCIAL

## 2022-05-18 VITALS
BODY MASS INDEX: 41.34 KG/M2 | SYSTOLIC BLOOD PRESSURE: 133 MMHG | HEIGHT: 66 IN | DIASTOLIC BLOOD PRESSURE: 77 MMHG | HEART RATE: 83 BPM | WEIGHT: 257.25 LBS

## 2022-05-18 VITALS
WEIGHT: 257.25 LBS | OXYGEN SATURATION: 100 % | RESPIRATION RATE: 12 BRPM | HEIGHT: 66 IN | BODY MASS INDEX: 41.34 KG/M2 | HEART RATE: 77 BPM | DIASTOLIC BLOOD PRESSURE: 72 MMHG | SYSTOLIC BLOOD PRESSURE: 126 MMHG

## 2022-05-18 DIAGNOSIS — Z71.89 COUNSELING ON HEALTH PROMOTION AND DISEASE PREVENTION: ICD-10-CM

## 2022-05-18 DIAGNOSIS — R76.8 ELEVATED RHEUMATOID FACTOR: ICD-10-CM

## 2022-05-18 DIAGNOSIS — I10 PRIMARY HYPERTENSION: ICD-10-CM

## 2022-05-18 DIAGNOSIS — E66.01 MORBID OBESITY WITH BMI OF 40.0-44.9, ADULT: ICD-10-CM

## 2022-05-18 DIAGNOSIS — J84.9 INTERSTITIAL LUNG DISEASE: ICD-10-CM

## 2022-05-18 DIAGNOSIS — J96.11 CHRONIC RESPIRATORY FAILURE WITH HYPOXIA: ICD-10-CM

## 2022-05-18 DIAGNOSIS — G47.33 OSA (OBSTRUCTIVE SLEEP APNEA): Primary | ICD-10-CM

## 2022-05-18 DIAGNOSIS — Z79.60 LONG-TERM USE OF IMMUNOSUPPRESSANT MEDICATION: ICD-10-CM

## 2022-05-18 DIAGNOSIS — J98.4 RESTRICTIVE LUNG DISEASE: ICD-10-CM

## 2022-05-18 DIAGNOSIS — J84.9 INTERSTITIAL PULMONARY DISEASE, UNSPECIFIED: ICD-10-CM

## 2022-05-18 DIAGNOSIS — R76.8 RHEUMATOID FACTOR POSITIVE: Primary | ICD-10-CM

## 2022-05-18 DIAGNOSIS — J45.909 ASTHMA, UNSPECIFIED ASTHMA SEVERITY, UNSPECIFIED WHETHER COMPLICATED, UNSPECIFIED WHETHER PERSISTENT: ICD-10-CM

## 2022-05-18 DIAGNOSIS — R91.8 PULMONARY INFILTRATES ON CXR: ICD-10-CM

## 2022-05-18 DIAGNOSIS — J45.909 ASTHMA, UNSPECIFIED ASTHMA SEVERITY, UNSPECIFIED WHETHER COMPLICATED, UNSPECIFIED WHETHER PERSISTENT: Primary | ICD-10-CM

## 2022-05-18 DIAGNOSIS — J45.909 ASTHMA IN ADULT, UNSPECIFIED ASTHMA SEVERITY, UNSPECIFIED WHETHER COMPLICATED, UNSPECIFIED WHETHER PERSISTENT: ICD-10-CM

## 2022-05-18 DIAGNOSIS — J84.114 ACUTE INTERSTITIAL PNEUMONITIS: ICD-10-CM

## 2022-05-18 DIAGNOSIS — J45.909 PERSISTENT ASTHMA WITHOUT COMPLICATION, UNSPECIFIED ASTHMA SEVERITY: ICD-10-CM

## 2022-05-18 DIAGNOSIS — R76.8 ELEVATED IGE LEVEL: ICD-10-CM

## 2022-05-18 PROBLEM — J20.9 ACUTE BRONCHITIS: Status: RESOLVED | Noted: 2022-04-13 | Resolved: 2022-05-18

## 2022-05-18 LAB
BRPFT: NORMAL
FEF 25 75 LLN: 1.11
FEF 25 75 PRE REF: 66.1 %
FEF 25 75 REF: 2.37
FEV1 FVC LLN: 69
FEV1 FVC PRE REF: 105.4 %
FEV1 FVC REF: 80
FEV1 LLN: 1.84
FEV1 PRE REF: 53.2 %
FEV1 REF: 2.47
FVC LLN: 2.34
FVC PRE REF: 50.2 %
FVC REF: 3.09
PEF LLN: 4.25
PEF PRE REF: 70.6 %
PEF REF: 6.39
PRE FEF 25 75: 1.56 L/S
PRE FET 100: 7.1 SEC
PRE FEV1 FVC: 84.55 %
PRE FEV1: 1.31 L
PRE FVC: 1.55 L
PRE PEF: 4.51 L/S

## 2022-05-18 PROCEDURE — 99214 OFFICE O/P EST MOD 30 MIN: CPT | Mod: 25,S$GLB,, | Performed by: INTERNAL MEDICINE

## 2022-05-18 PROCEDURE — 99214 PR OFFICE/OUTPT VISIT, EST, LEVL IV, 30-39 MIN: ICD-10-PCS | Mod: 25,S$GLB,, | Performed by: INTERNAL MEDICINE

## 2022-05-18 PROCEDURE — 99999 PR PBB SHADOW E&M-EST. PATIENT-LVL V: CPT | Mod: PBBFAC,,, | Performed by: INTERNAL MEDICINE

## 2022-05-18 PROCEDURE — 3078F DIAST BP <80 MM HG: CPT | Mod: CPTII,S$GLB,, | Performed by: INTERNAL MEDICINE

## 2022-05-18 PROCEDURE — 1111F DSCHRG MED/CURRENT MED MERGE: CPT | Mod: CPTII,S$GLB,, | Performed by: INTERNAL MEDICINE

## 2022-05-18 PROCEDURE — 99205 PR OFFICE/OUTPT VISIT, NEW, LEVL V, 60-74 MIN: ICD-10-PCS | Mod: S$GLB,,, | Performed by: INTERNAL MEDICINE

## 2022-05-18 PROCEDURE — 1159F PR MEDICATION LIST DOCUMENTED IN MEDICAL RECORD: ICD-10-PCS | Mod: CPTII,S$GLB,, | Performed by: INTERNAL MEDICINE

## 2022-05-18 PROCEDURE — 99999 PR PBB SHADOW E&M-EST. PATIENT-LVL V: ICD-10-PCS | Mod: PBBFAC,,, | Performed by: INTERNAL MEDICINE

## 2022-05-18 PROCEDURE — 3008F BODY MASS INDEX DOCD: CPT | Mod: CPTII,S$GLB,, | Performed by: INTERNAL MEDICINE

## 2022-05-18 PROCEDURE — 3078F PR MOST RECENT DIASTOLIC BLOOD PRESSURE < 80 MM HG: ICD-10-PCS | Mod: CPTII,S$GLB,, | Performed by: INTERNAL MEDICINE

## 2022-05-18 PROCEDURE — 3008F PR BODY MASS INDEX (BMI) DOCUMENTED: ICD-10-PCS | Mod: CPTII,S$GLB,, | Performed by: INTERNAL MEDICINE

## 2022-05-18 PROCEDURE — 99999 PR PBB SHADOW E&M-EST. PATIENT-LVL I: ICD-10-PCS | Mod: PBBFAC,,,

## 2022-05-18 PROCEDURE — 71046 X-RAY EXAM CHEST 2 VIEWS: CPT | Mod: 26,,, | Performed by: RADIOLOGY

## 2022-05-18 PROCEDURE — 3074F SYST BP LT 130 MM HG: CPT | Mod: CPTII,S$GLB,, | Performed by: INTERNAL MEDICINE

## 2022-05-18 PROCEDURE — 1111F PR DISCHARGE MEDS RECONCILED W/ CURRENT OUTPATIENT MED LIST: ICD-10-PCS | Mod: CPTII,S$GLB,, | Performed by: INTERNAL MEDICINE

## 2022-05-18 PROCEDURE — 71046 XR CHEST PA AND LATERAL: ICD-10-PCS | Mod: 26,,, | Performed by: RADIOLOGY

## 2022-05-18 PROCEDURE — 1159F MED LIST DOCD IN RCRD: CPT | Mod: CPTII,S$GLB,, | Performed by: INTERNAL MEDICINE

## 2022-05-18 PROCEDURE — 94010 BREATHING CAPACITY TEST: CPT | Mod: S$GLB,,, | Performed by: INTERNAL MEDICINE

## 2022-05-18 PROCEDURE — 3074F PR MOST RECENT SYSTOLIC BLOOD PRESSURE < 130 MM HG: ICD-10-PCS | Mod: CPTII,S$GLB,, | Performed by: INTERNAL MEDICINE

## 2022-05-18 PROCEDURE — 1160F PR REVIEW ALL MEDS BY PRESCRIBER/CLIN PHARMACIST DOCUMENTED: ICD-10-PCS | Mod: CPTII,S$GLB,, | Performed by: INTERNAL MEDICINE

## 2022-05-18 PROCEDURE — 99205 OFFICE O/P NEW HI 60 MIN: CPT | Mod: S$GLB,,, | Performed by: INTERNAL MEDICINE

## 2022-05-18 PROCEDURE — 99999 PR PBB SHADOW E&M-EST. PATIENT-LVL I: CPT | Mod: PBBFAC,,,

## 2022-05-18 PROCEDURE — 1160F RVW MEDS BY RX/DR IN RCRD: CPT | Mod: CPTII,S$GLB,, | Performed by: INTERNAL MEDICINE

## 2022-05-18 PROCEDURE — 3075F PR MOST RECENT SYSTOLIC BLOOD PRESS GE 130-139MM HG: ICD-10-PCS | Mod: CPTII,S$GLB,, | Performed by: INTERNAL MEDICINE

## 2022-05-18 PROCEDURE — 94010 BREATHING CAPACITY TEST: ICD-10-PCS | Mod: S$GLB,,, | Performed by: INTERNAL MEDICINE

## 2022-05-18 PROCEDURE — 71046 X-RAY EXAM CHEST 2 VIEWS: CPT | Mod: TC

## 2022-05-18 PROCEDURE — 3075F SYST BP GE 130 - 139MM HG: CPT | Mod: CPTII,S$GLB,, | Performed by: INTERNAL MEDICINE

## 2022-05-18 RX ORDER — MYCOPHENOLATE MOFETIL 500 MG/1
1000 TABLET ORAL 2 TIMES DAILY
Qty: 360 TABLET | Refills: 1 | Status: SHIPPED | OUTPATIENT
Start: 2022-05-18 | End: 2022-10-06 | Stop reason: SDUPTHER

## 2022-05-18 NOTE — PROGRESS NOTES
Pulmonary Outpatient  Visit     Subjective:       Patient ID: Ayanna Alicia is a 51 y.o. female.    Chief Complaint: review tests      Ayanna Alicia is 51 y.o.  Post hospital f/u  Acute respiratory failure ILD, +ve RF  Was discharged on oxygen  Here to review results  Explained  PFT: severe restriction and reduced DLCO  ABG  6MWD: oxygen desat needs supplementary oxygen 3 LPM  Has some nose bleed will add AYR gel and humifier bottle  FMLA paper work given  Out of work letter   Added PEPCID and Bactrim  Adherent with inhaler    2022  Here to see today  Concern, Dyspnea with activity palpitations  Seen Rheumatology: Added CELLCEPT  On steriod taper  No cough wheezing  On oxygen 3 LPM  Had GLENN in 2019: was prescribed CPAP returned back  Needs PAP at night  Motivated to restart                 MMRC Dyspnea Scale (4 is worst)     [] MMRC 0: Dyspneic on strenuous excercise (0 points)    [] MMRC 1: Dyspneic on walking a slight hill (0 points)    [x] MMRC 2: Dyspneic on walking level ground; must stop occasionally due to breathlessness (1 point)    [] MMRC 3: Must stop for breathlessness after walking 100 yards or after a few minutes (2 points)    [] MMRC 4: Cannot leave house; breathless on dressing/undressing (3 points)                 The following portions of the patient's history were reviewed and updated as appropriate:   She  has a past medical history of Abnormal Pap smear of cervix, Allergic rhinitis, cause unspecified, Arthritis of both knees, Asthma, Eczema, Fatty liver (10/2014), Fibrocystic breast changes, Headache(784.0), Hepatomegaly (10/2014), Hypertension, Liver cyst (10/2014), Multinodular goiter, Polymenorrhea (), TMJ (dislocation of temporomandibular joint), Uterine fibroid, and Vitamin D deficiency disease.  She does not have any pertinent problems on file.  She  has a past surgical history that includes  section, classic; Multiple  tooth extractions; Partial hysterectomy (03/20/2013); Hysterectomy; and Colonoscopy (N/A, 1/20/2020).  Her family history includes Cancer (age of onset: 50) in her maternal aunt; Cancer (age of onset: 60) in her maternal grandmother; Cancer (age of onset: 66) in her maternal aunt; Cataracts in her cousin; Diabetes in her maternal grandfather and mother; Heart disease in her mother; Heart disease (age of onset: 63) in her father; Hypertension in her father; Migraines in her cousin; Stroke in her maternal grandfather, mother, and sister.  She  reports that she has never smoked. She has never used smokeless tobacco. She reports current alcohol use. She reports current drug use. Frequency: 4.00 times per week. Drug: Marijuana.  She has a current medication list which includes the following prescription(s): albuterol, albuterol, albuterol-ipratropium, amlodipine, ascorbic acid (vitamin c), calcium/magnesium/vit b comp, doxycycline, famotidine, breo ellipta, hydrochlorothiazide, levocetirizine, montelukast, mycophenolate, omega-3s/dha/epa/fish oil/d3, ondansetron, [START ON 5/23/2022] prednisone, prednisone, sertraline, and sulfamethoxazole-trimethoprim 800-160mg.  Current Outpatient Medications on File Prior to Visit   Medication Sig Dispense Refill    albuterol (ACCUNEB) 0.63 mg/3 mL Nebu Take 3 mLs (0.63 mg total) by nebulization every 4 to 6 hours as needed (asthma). Rescue 75 mL 6    albuterol (PROVENTIL/VENTOLIN HFA) 90 mcg/actuation inhaler INHALE 1 TO 2 PUFFS BY MOUTH INTO THE LUNGS EVERY 4 TO 6 HOURS AS NEEDED FOR WHEEZING OR SHORTNESS OF BREATH 18 g 5    albuterol-ipratropium (DUO-NEB) 2.5 mg-0.5 mg/3 mL nebulizer solution Take 3 mLs by nebulization every 6 (six) hours as needed for Wheezing. Rescue 90 mL 0    amLODIPine (NORVASC) 10 MG tablet Take 1 tablet (10 mg total) by mouth once daily. 90 tablet 1    ascorbic acid, vitamin C, (VITAMIN C) 500 MG tablet Take 500 mg by mouth once daily.       calcium/magnesium/vit B comp (CALCIUM-MAGNESIUM-B COMPLEX ORAL) Take 1 tablet by mouth once daily at 6am.      doxycycline (MONODOX) 100 MG capsule Take 1 capsule (100 mg total) by mouth every 12 (twelve) hours. 20 capsule 1    famotidine (PEPCID AC) 10 MG tablet Take 1 tablet (10 mg total) by mouth 2 (two) times daily. 60 tablet 3    fluticasone furoate-vilanteroL (BREO ELLIPTA) 100-25 mcg/dose diskus inhaler INHALE 1 PUFF INTO THE LUNGS ONCE DAILY 1 each 5    hydroCHLOROthiazide (HYDRODIURIL) 12.5 MG Tab Take 1 tablet (12.5 mg total) by mouth once daily. 90 tablet 1    levocetirizine (XYZAL) 5 MG tablet Take 1 tablet (5 mg total) by mouth once daily. 90 tablet 1    montelukast (SINGULAIR) 10 mg tablet Take 1 tablet (10 mg total) by mouth every evening. 90 tablet 1    mycophenolate (CELLCEPT) 500 mg Tab Take 2 tablets (1,000 mg total) by mouth 2 (two) times daily. 360 tablet 1    omega-3s/dha/epa/fish oil/D3 (VITAMIN-D + OMEGA-3 ORAL) Take 1 tablet by mouth once daily at 6am.      ondansetron (ZOFRAN-ODT) 4 MG TbDL Dissolve 1 tablet (4 mg total) by mouth every 6 (six) hours as needed (nausea). 90 tablet 0    [START ON 5/23/2022] predniSONE (DELTASONE) 10 MG tablet Take 1 tablet (10 mg total) by mouth once daily for 7 days, THEN 0.5 tablets (5 mg total) once daily for 7 days. 11 tablet 0    predniSONE (DELTASONE) 20 MG tablet Take 2 tablets (40 mg total) by mouth once daily for 7 days, THEN 1.5 tablets (30 mg total) once daily for 7 days, THEN 1 tablet (20 mg total) once daily for 7 days. 32 tablet 0    sertraline (ZOLOFT) 50 MG tablet Take 1 tablet (50 mg total) by mouth once daily. 90 tablet 1    sulfamethoxazole-trimethoprim 800-160mg (BACTRIM DS) 800-160 mg Tab Take 2 tablets by mouth every Mon, Wed, Fri. 24 tablet 2     No current facility-administered medications on file prior to visit.     She is allergic to doxycycline, fluticasone, and penicillins..      Review of Systems   Constitutional:  Positive for activity change and fatigue.   Respiratory: Positive for dyspnea on extertion.    Gastrointestinal: Positive for acid reflux.       Outpatient Encounter Medications as of 5/18/2022   Medication Sig Dispense Refill    albuterol (ACCUNEB) 0.63 mg/3 mL Nebu Take 3 mLs (0.63 mg total) by nebulization every 4 to 6 hours as needed (asthma). Rescue 75 mL 6    albuterol (PROVENTIL/VENTOLIN HFA) 90 mcg/actuation inhaler INHALE 1 TO 2 PUFFS BY MOUTH INTO THE LUNGS EVERY 4 TO 6 HOURS AS NEEDED FOR WHEEZING OR SHORTNESS OF BREATH 18 g 5    albuterol-ipratropium (DUO-NEB) 2.5 mg-0.5 mg/3 mL nebulizer solution Take 3 mLs by nebulization every 6 (six) hours as needed for Wheezing. Rescue 90 mL 0    amLODIPine (NORVASC) 10 MG tablet Take 1 tablet (10 mg total) by mouth once daily. 90 tablet 1    ascorbic acid, vitamin C, (VITAMIN C) 500 MG tablet Take 500 mg by mouth once daily.      calcium/magnesium/vit B comp (CALCIUM-MAGNESIUM-B COMPLEX ORAL) Take 1 tablet by mouth once daily at 6am.      doxycycline (MONODOX) 100 MG capsule Take 1 capsule (100 mg total) by mouth every 12 (twelve) hours. 20 capsule 1    famotidine (PEPCID AC) 10 MG tablet Take 1 tablet (10 mg total) by mouth 2 (two) times daily. 60 tablet 3    fluticasone furoate-vilanteroL (BREO ELLIPTA) 100-25 mcg/dose diskus inhaler INHALE 1 PUFF INTO THE LUNGS ONCE DAILY 1 each 5    hydroCHLOROthiazide (HYDRODIURIL) 12.5 MG Tab Take 1 tablet (12.5 mg total) by mouth once daily. 90 tablet 1    levocetirizine (XYZAL) 5 MG tablet Take 1 tablet (5 mg total) by mouth once daily. 90 tablet 1    montelukast (SINGULAIR) 10 mg tablet Take 1 tablet (10 mg total) by mouth every evening. 90 tablet 1    mycophenolate (CELLCEPT) 500 mg Tab Take 2 tablets (1,000 mg total) by mouth 2 (two) times daily. 360 tablet 1    omega-3s/dha/epa/fish oil/D3 (VITAMIN-D + OMEGA-3 ORAL) Take 1 tablet by mouth once daily at 6am.      ondansetron (ZOFRAN-ODT) 4 MG TbDL Dissolve  "1 tablet (4 mg total) by mouth every 6 (six) hours as needed (nausea). 90 tablet 0    [START ON 5/23/2022] predniSONE (DELTASONE) 10 MG tablet Take 1 tablet (10 mg total) by mouth once daily for 7 days, THEN 0.5 tablets (5 mg total) once daily for 7 days. 11 tablet 0    predniSONE (DELTASONE) 20 MG tablet Take 2 tablets (40 mg total) by mouth once daily for 7 days, THEN 1.5 tablets (30 mg total) once daily for 7 days, THEN 1 tablet (20 mg total) once daily for 7 days. 32 tablet 0    sertraline (ZOLOFT) 50 MG tablet Take 1 tablet (50 mg total) by mouth once daily. 90 tablet 1    sulfamethoxazole-trimethoprim 800-160mg (BACTRIM DS) 800-160 mg Tab Take 2 tablets by mouth every Mon, Wed, Fri. 24 tablet 2     No facility-administered encounter medications on file as of 5/18/2022.       Objective:     Vital Signs (Most Recent)  Vital Signs  Pulse: 77  Resp: 12  SpO2: 100 %  BP: 126/72  Height and Weight  Height: 5' 6" (167.6 cm)  Weight: 116.7 kg (257 lb 4.4 oz)  BSA (Calculated - sq m): 2.33 sq meters  BMI (Calculated): 41.5  Weight in (lb) to have BMI = 25: 154.6]  Wt Readings from Last 2 Encounters:   05/18/22 116.7 kg (257 lb 4.4 oz)   05/18/22 116.7 kg (257 lb 4.4 oz)       Physical Exam   Constitutional: She is oriented to person, place, and time. Vital signs are normal. She appears well-developed and well-nourished. Nasal cannula in place.   HENT:   Head: Normocephalic.   Mouth/Throat: Mallampati Score: II.   Neck: No JVD present.   Cardiovascular: Normal rate and intact distal pulses.   No murmur heard.  Pulmonary/Chest: Normal expansion, effort normal and breath sounds normal.   Abdominal: Soft. Bowel sounds are normal.   Musculoskeletal:         General: No edema. Normal range of motion.      Cervical back: Normal range of motion and neck supple.   Lymphadenopathy:     She has no axillary adenopathy.   Neurological: She is alert and oriented to person, place, and time.   Skin: Skin is warm and dry. Bruising " and ecchymosis noted. No cyanosis. Nails show no clubbing.   Psychiatric: She has a normal mood and affect.   Nursing note and vitals reviewed.      Laboratory  Lab Results   Component Value Date    WBC 26.66 (H) 05/02/2022    RBC 3.94 (L) 05/02/2022    HGB 11.2 (L) 05/02/2022    HCT 36.7 (L) 05/02/2022    MCV 93 05/02/2022    MCH 28.4 05/02/2022    MCHC 30.5 (L) 05/02/2022    RDW 13.9 05/02/2022     05/02/2022    MPV 10.1 05/02/2022    GRAN 23.2 (H) 05/02/2022    GRAN 87.1 (H) 05/02/2022    LYMPH 2.1 05/02/2022    LYMPH 7.7 (L) 05/02/2022    MONO 1.0 05/02/2022    MONO 3.6 (L) 05/02/2022    EOS 0.0 05/02/2022    BASO 0.05 05/02/2022    EOSINOPHIL 0.0 05/02/2022    BASOPHIL 0.2 05/02/2022       BMP  Lab Results   Component Value Date     05/02/2022    K 4.3 05/02/2022     05/02/2022    CO2 22 (L) 05/02/2022    BUN 20 05/02/2022    CREATININE 0.8 05/02/2022    CALCIUM 9.5 05/02/2022    ANIONGAP 12 05/02/2022    ESTGFRAFRICA >60 05/02/2022    EGFRNONAA >60 05/02/2022    AST 26 04/27/2022    ALT 27 04/27/2022    PROT 7.1 04/27/2022       Lab Results   Component Value Date    BNP <10 04/27/2022       Lab Results   Component Value Date    TSH 0.854 04/28/2022       Lab Results   Component Value Date    SEDRATE 15 05/05/2022       Lab Results   Component Value Date    CRP 7.3 05/05/2022     Lab Results   Component Value Date     (H) 05/02/2022        Lab Results   Component Value Date    ASPERGILLUS None Detected 04/29/2022     No results found for: AFUMIGATUSCL     Lab Results   Component Value Date    ACE 22 04/28/2022        Diagnostic Results:  I have personally reviewed today the following studies:  Office Spirometry Results:     FEV1: 1.31L( 53.2%)  FVC  1.56L( 50.2%)  FV1/FVc 85     PHYSICIAN INTERPRETATION AND COMMENTS: Findings are consistent with MILD obstructive sleep apnea  (GLENN). Overall AHI was 11.0/hr with 5.5 hours sleep.  CLINICAL HISTORY: 48 year old female presented with:  Patient has observed snoring, periods of not breathing, feels  sleepy during the day. She reports non restful sleep. 16.5 inch neck, BMI of 45, an [place] sleepiness score of 8, history of  hypertension and symptoms of nocturnal snoring, waking up choking and witnessed apneas. Based on the clinical history, the  patient has a high pre-test probability of having severe GLENN.  SLEEP STUDY FINDINGS: Patient underwent a one night Home Sleep Test and by behavioral criteria, slept for  approximately 5.5 hours, with a sleep latency of 8 minutes and a sleep efficiency of 70.9%. Mild sleep disordered breathing  (AHI=11) is noted based on a 4% hypopnea desaturation criteria. The patient slept supine 16.7% of the night based on valid  recording time of 5.47 hours. When considering more subtle measures of sleep disordered breathing, the overall respiratory  disturbance index is moderate (RDI=21) based on a 1% hypopnea desaturation criteria with confirmation by surrogate arousal  indicators, predominantly in the supine position (31 events/hour). The apneas/hypopneas are accompanied by minimal oxygen  desaturation (percent time below 90% SpO2: 4.1%, Min SpO2: 81.8%). The average desaturation across all sleep disordered  breathing events is 3.2%. Snoring occurs for 45.7% (30 dB) of the study, 44.9% is very loud. The mean pulse rate is 82 BPM,  with very frequent pulse rate variability (83 events with >= 6 BPM increase/decrease per hour).    Assessment/Plan:     Problem List Items Addressed This Visit     HTN (hypertension)     Stable Norvasc and HCTZ  Will monitor BP at home           Asthma     On BREO + JESSI  PDM education  Refill Nebs meds           Morbid obesity with BMI of 40.0-44.9, adult     Weight loss and exercise to improve overall health.    Advised will/may likely gain weight with steriods           Acute interstitial pneumonitis     ILD with +ve RF  Based on Chest CT  Elevated ESR and CRP improved with steriods  PFT  today: RESTRICTION with reduced DLCO  Steriod regime: Take 2 tablets (40 mg total) by mouth once daily for 7 days, THEN 1.5 tablets (30 mg total) once daily for 7 days, THEN 1 tablet (20 mg total) once daily for 7 days.  Take 1 tablet (10 mg total) by mouth once daily for 7 days, THEN 0.5 tablets (5 mg total) once daily for 7 days.,   PCP prophylaxis  Consult rheumatology: completed, started on CELLCEPT  Bronchoscopy still consideration on review of chest CT               Relevant Orders    CPAP Titration (Must have dx of GLENN from previous sleep study.)    GLENN (obstructive sleep apnea) - Primary     Doesn't have CPAP  Did not like it  Hard to keep   CPAP titration ordered           Relevant Orders    CPAP Titration (Must have dx of GLENN from previous sleep study.)    Chronic respiratory failure with hypoxia      Home O2  3 LPM  Need to use CPAP with O2  Will get CPAP titration           Relevant Orders    CPAP Titration (Must have dx of GLENN from previous sleep study.)    Pulmonary infiltrates on CXR     On steriod taper  Current CXR shows improvement           Relevant Orders    CPAP Titration (Must have dx of GLENN from previous sleep study.)    Elevated IgE level     Will moniotor  With bronchiectasis likely consistent with ABPA  Already on steriods  Trend: if refractory will consult allergy           Elevated rheumatoid factor     Seen Rheumatology  Cellceptwas added           Restrictive lung disease     Secondary to API/ILD/+ve RF/Body habitus  FEV1: 53.2%, FVC 50.2%, FEV1/FVC 85    Continue Inhalers           Relevant Orders    CPAP Titration (Must have dx of GLENN from previous sleep study.)      Other Visit Diagnoses     Asthma in adult, unspecified asthma severity, unspecified whether complicated, unspecified whether persistent                Split study      Follow up in about 2 months (around 7/18/2022), or reassurance, cont cellcept, refill nebs, move june appt to July, sleep study.    This note was prepared  using voice recognition system and is likely to have sound alike errors that may have been overlooked even after proof reading.  Please call me with any questions    Discussed diagnosis, its evaluation, treatment and usual course. All questions answered.      Agustín Aviles MD

## 2022-05-18 NOTE — PROGRESS NOTES
RHEUMATOLOGY OUTPATIENT CLINIC NOTE    5/18/2022    Attending Rheumatologist: Chicho Lewis  Primary Care Provider/Physician Requesting Consultation: Madeleine Enrique MD   Chief Complaint/Reason For Consultation:  elevated rheumatoid factor      Subjective:     Ayanna Alicia is a 51 y.o. Black or  female with interstitial lung disease with rheumatoid factor elevation referred for rheumatic evaluation.    Patient main concern of worsening dyspnea since approximately February.  Denies any particular precipitating event or past events.  No response to oral antibiotic therapy while inpatient.  Currently on home oxygen and steroids per Pulmonary.  Denies significant arthralgias that interfere with activities of daily living.    Review of Systems   Constitutional: Positive for malaise/fatigue. Negative for fever.   HENT:        Mild sicca symptoms since on home oxygen per nasal cannula   Eyes: Negative for photophobia and pain.   Respiratory: Positive for cough, sputum production and shortness of breath. Negative for hemoptysis.    Gastrointestinal: Negative for abdominal pain, blood in stool and melena.   Genitourinary: Negative for dysuria, hematuria and urgency.   Musculoskeletal: Negative for joint pain (Denies significant arthralgias previous steroid therapy).   Skin: Negative for rash.        Denies Raynaud phenomena   Neurological: Negative for focal weakness.   Endo/Heme/Allergies:        No history of vascular thrombosis       Chronic comorbid conditions affecting medical decision making today:  Past Medical History:   Diagnosis Date    Abnormal Pap smear of cervix     in the past with repeat pap smear okay.    Allergic rhinitis, cause unspecified     Arthritis of both knees     Asthma     Eczema     Fatty liver 10/2014    Fibrocystic breast changes     Headache(784.0)     Hepatomegaly 10/2014    Hypertension     Liver cyst 10/2014    Multinodular goiter     Followed by ENT -   Nicolas Gray    Polymenorrhea     TMJ (dislocation of temporomandibular joint)     Uterine fibroid     in the past    Vitamin D deficiency disease      Past Surgical History:   Procedure Laterality Date     SECTION, CLASSIC      x 1    COLONOSCOPY N/A 2020    Procedure: COLONOSCOPY;  Surgeon: Angie Magana MD;  Location: [facility];  Service: Endoscopy;  Laterality: N/A;    HYSTERECTOMY      MULTIPLE TOOTH EXTRACTIONS      PARTIAL HYSTERECTOMY  2013    Due to fibroids     Family History   Problem Relation Age of Onset    Stroke Mother     Diabetes Mother     Heart disease Mother     Heart disease Father 63        MI/CAD    Hypertension Father     Cancer Maternal Grandmother 60        Rectal and stomach cancer    Migraines Cousin     Cataracts Cousin     Cancer Maternal Aunt 50        Stomach cancer    Cancer Maternal Aunt 66        Lung cancer (smoker)    Stroke Maternal Grandfather     Diabetes Maternal Grandfather     Stroke Sister      Social History     Substance and Sexual Activity   Alcohol Use Yes    Alcohol/week: 0.0 standard drinks    Comment: Occasionally     Social History     Tobacco Use   Smoking Status Never Smoker   Smokeless Tobacco Never Used     Social History     Substance and Sexual Activity   Drug Use Yes    Frequency: 4.0 times per week    Types: Marijuana    Comment: 20       Current Outpatient Medications:     albuterol (ACCUNEB) 0.63 mg/3 mL Nebu, Take 3 mLs (0.63 mg total) by nebulization every 4 to 6 hours as needed (asthma). Rescue, Disp: 75 mL, Rfl: 6    albuterol (PROVENTIL/VENTOLIN HFA) 90 mcg/actuation inhaler, INHALE 1 TO 2 PUFFS BY MOUTH INTO THE LUNGS EVERY 4 TO 6 HOURS AS NEEDED FOR WHEEZING OR SHORTNESS OF BREATH, Disp: 18 g, Rfl: 5    albuterol-ipratropium (DUO-NEB) 2.5 mg-0.5 mg/3 mL nebulizer solution, Take 3 mLs by nebulization every 6 (six) hours as needed for Wheezing. Rescue, Disp: 90 mL, Rfl: 0    amLODIPine  (NORVASC) 10 MG tablet, Take 1 tablet (10 mg total) by mouth once daily., Disp: 90 tablet, Rfl: 1    ascorbic acid, vitamin C, (VITAMIN C) 500 MG tablet, Take 500 mg by mouth once daily., Disp: , Rfl:     calcium/magnesium/vit B comp (CALCIUM-MAGNESIUM-B COMPLEX ORAL), Take 1 tablet by mouth once daily at 6am., Disp: , Rfl:     doxycycline (MONODOX) 100 MG capsule, Take 1 capsule (100 mg total) by mouth every 12 (twelve) hours., Disp: 20 capsule, Rfl: 1    famotidine (PEPCID AC) 10 MG tablet, Take 1 tablet (10 mg total) by mouth 2 (two) times daily., Disp: 60 tablet, Rfl: 3    fluticasone furoate-vilanteroL (BREO ELLIPTA) 100-25 mcg/dose diskus inhaler, INHALE 1 PUFF INTO THE LUNGS ONCE DAILY, Disp: 1 each, Rfl: 5    hydroCHLOROthiazide (HYDRODIURIL) 12.5 MG Tab, Take 1 tablet (12.5 mg total) by mouth once daily., Disp: 90 tablet, Rfl: 1    levocetirizine (XYZAL) 5 MG tablet, Take 1 tablet (5 mg total) by mouth once daily., Disp: 90 tablet, Rfl: 1    montelukast (SINGULAIR) 10 mg tablet, Take 1 tablet (10 mg total) by mouth every evening., Disp: 90 tablet, Rfl: 1    omega-3s/dha/epa/fish oil/D3 (VITAMIN-D + OMEGA-3 ORAL), Take 1 tablet by mouth once daily at 6am., Disp: , Rfl:     predniSONE (DELTASONE) 20 MG tablet, Take 2 tablets (40 mg total) by mouth once daily for 7 days, THEN 1.5 tablets (30 mg total) once daily for 7 days, THEN 1 tablet (20 mg total) once daily for 7 days., Disp: 32 tablet, Rfl: 0    sertraline (ZOLOFT) 50 MG tablet, Take 1 tablet (50 mg total) by mouth once daily., Disp: 90 tablet, Rfl: 1    sulfamethoxazole-trimethoprim 800-160mg (BACTRIM DS) 800-160 mg Tab, Take 2 tablets by mouth every Mon, Wed, Fri., Disp: 24 tablet, Rfl: 2    ondansetron (ZOFRAN-ODT) 4 MG TbDL, Dissolve 1 tablet (4 mg total) by mouth every 6 (six) hours as needed (nausea)., Disp: 90 tablet, Rfl: 0    [START ON 5/23/2022] predniSONE (DELTASONE) 10 MG tablet, Take 1 tablet (10 mg total) by mouth once daily  for 7 days, THEN 0.5 tablets (5 mg total) once daily for 7 days. (Patient not taking: Reported on 5/18/2022), Disp: 11 tablet, Rfl: 0     Objective:     Vitals:    05/18/22 0710   BP: 133/77   Pulse: 83     Physical Exam   Constitutional: She appears obese.   Eyes: Conjunctivae are normal.   Pulmonary/Chest: Effort normal.   Musculoskeletal:         General: No swelling. Normal range of motion.   Neurological: She displays no weakness.   Skin: No rash noted. No erythema.       Reviewed available old and all outside pertinent medical records available.    All lab results personally reviewed and interpreted by me.  Lab Results   Component Value Date    WBC 26.66 (H) 05/02/2022    HGB 11.2 (L) 05/02/2022    HCT 36.7 (L) 05/02/2022    MCV 93 05/02/2022    RDW 13.9 05/02/2022     05/02/2022    NEUTROABS 3.8 02/01/2017    PLTEST Appears normal 05/02/2022       Lab Results   Component Value Date     05/02/2022    K 4.3 05/02/2022     05/02/2022    CO2 22 (L) 05/02/2022     (H) 05/02/2022    BUN 20 05/02/2022    CALCIUM 9.5 05/02/2022    PROT 7.1 04/27/2022    ALBUMIN 3.7 04/27/2022    BILITOT 0.4 04/27/2022    AST 26 04/27/2022    ALKPHOS 113 04/27/2022    ALT 27 04/27/2022       Lab Results   Component Value Date    COLORU Yellow 10/14/2021    APPEARANCEUA Clear 10/14/2021    SPECGRAV 1.020 10/14/2021    PHUR 6.0 10/14/2021    PROTEINUA Negative 10/14/2021    GLUCOSEU Negative 02/01/2017    KETONESU Negative 10/14/2021    LEUKOCYTESUR Negative 10/14/2021    NITRITE Negative 10/14/2021    UROBILINOGEN 0.2 02/01/2017       No results found for: PTH    No results found for: URICACID    Lab Results   Component Value Date    CRP 7.3 05/05/2022       No results found for: ANATITER    No components found for: TSPOTTB,  QUANTIFERON     ASSESSMENT:     Slight elevation of rheumatoid factor detected during evaluation for interstitial lung disease.  Poor response to oral antibiotic therapy, suspected to be  autoimmune in origin by Pulmonary Medicine.  Patient without synovitis on exam, currently on systemic steroids for IL D.  Denies significant arthralgias that interfere with activities of daily living prior systemic steroids.  Imaging on file without marginal erosive changes or ankylosis.  Will perform x-rays survey evaluation to determine presence of chronic inflammatory arthritis changes.  If no infectious etiology suspected, might benefit from anti-inflammatory/antifibrotic therapy in efforts to halt interstitial lung disease progression.  Consider addition of CellCept as steroid sparing agent up to 3 g per day and monitor response after at lest 4 months of therapy.  Side effects of therapy discussed, written literature provided.  Blood work today and prior next encounter.    PLAN:     1. Elevation rheumatoid factor    2. Interstitial lung disease    3.  High risk medication use    4. Other specified counseling    Disclaimer: This note is prepared using voice recognition software and as such is likely to have errors and has not been proof read. Please contact me for questions.   Chicho Lewis M.D.

## 2022-05-18 NOTE — PATIENT INSTRUCTIONS
Your provider has scheduled you for a sleep study.   You should be receiving a phone call from the sleep lab shortly after your study has been approved by your insurance. Please make sure you have your current phone numbers in the [facility]. If you do not hear from anyone in the next 10 business days, please call the sleep lab at [phone] to schedule your sleep study. The sleep studies are performed at [facility] seven nights a week.  When you are scheduling your sleep study, they will also make you a follow up appointment with your provider. This follow up appointment will be 10-14 days after your sleep study to review the results. If it is noted that you do have sleep apnea on your initial sleep study, you may receive a call back for a second night study with the CPAP before you come back to the office.

## 2022-05-18 NOTE — ASSESSMENT & PLAN NOTE
Secondary to API/ILD/+ve RF/Body habitus  FEV1: 53.2%, FVC 50.2%, FEV1/FVC 85    Continue Inhalers

## 2022-05-18 NOTE — ASSESSMENT & PLAN NOTE
ILD with +ve RF  Based on Chest CT  Elevated ESR and CRP improved with steriods  PFT today: RESTRICTION with reduced DLCO  Steriod regime: Take 2 tablets (40 mg total) by mouth once daily for 7 days, THEN 1.5 tablets (30 mg total) once daily for 7 days, THEN 1 tablet (20 mg total) once daily for 7 days.  Take 1 tablet (10 mg total) by mouth once daily for 7 days, THEN 0.5 tablets (5 mg total) once daily for 7 days.,   PCP prophylaxis  Consult rheumatology: completed, started on CELLCEPT  Bronchoscopy still consideration on review of chest CT

## 2022-05-24 ENCOUNTER — PATIENT MESSAGE (OUTPATIENT)
Dept: PULMONOLOGY | Facility: CLINIC | Age: 52
End: 2022-05-24
Payer: COMMERCIAL

## 2022-05-25 LAB — FUNGUS SPEC CULT: NORMAL

## 2022-06-09 ENCOUNTER — PATIENT MESSAGE (OUTPATIENT)
Dept: PULMONOLOGY | Facility: CLINIC | Age: 52
End: 2022-06-09
Payer: COMMERCIAL

## 2022-06-09 DIAGNOSIS — J45.909 ASTHMA IN ADULT, UNSPECIFIED ASTHMA SEVERITY, UNSPECIFIED WHETHER COMPLICATED, UNSPECIFIED WHETHER PERSISTENT: ICD-10-CM

## 2022-06-09 DIAGNOSIS — J84.114 ACUTE INTERSTITIAL PNEUMONITIS: ICD-10-CM

## 2022-06-09 DIAGNOSIS — J30.2 SEASONAL ALLERGIES: ICD-10-CM

## 2022-06-09 RX ORDER — SULFAMETHOXAZOLE AND TRIMETHOPRIM 800; 160 MG/1; MG/1
2 TABLET ORAL
Qty: 24 TABLET | Refills: 2 | Status: SHIPPED | OUTPATIENT
Start: 2022-06-10 | End: 2022-07-10

## 2022-06-09 RX ORDER — MONTELUKAST SODIUM 10 MG/1
TABLET ORAL
Qty: 90 TABLET | Refills: 1 | Status: SHIPPED | OUTPATIENT
Start: 2022-06-09 | End: 2022-09-20 | Stop reason: SDUPTHER

## 2022-06-09 NOTE — TELEPHONE ENCOUNTER
No new care gaps identified.  [facility] Embedded Care Gaps. Reference number: [id number]. 6/09/2022   1:34:18 PM CDT

## 2022-06-09 NOTE — TELEPHONE ENCOUNTER
Refill Routing Note   Medication(s) are not appropriate for processing by [facility] for the following reason(s):      - Patient has been seen in the ED/Hospital since the last PCP visit    [facility] action(s):  Defer          Medication reconciliation completed: No     Appointments  past 12m or future 3m with PCP    Date Provider   Last Visit   3/24/2022 Madeleine Enrique MD   Next Visit   6/9/2022 Madeleine Enrique MD   ED visits in past 90 days: 0        Note composed:1:41 PM 06/09/2022

## 2022-06-16 ENCOUNTER — PATIENT OUTREACH (OUTPATIENT)
Dept: PULMONOLOGY | Facility: CLINIC | Age: 52
End: 2022-06-16
Payer: COMMERCIAL

## 2022-06-16 LAB
ACID FAST MOD KINY STN SPEC: NORMAL
MYCOBACTERIUM SPEC QL CULT: NORMAL

## 2022-06-17 ENCOUNTER — HOSPITAL ENCOUNTER (OUTPATIENT)
Dept: SLEEP MEDICINE | Facility: HOSPITAL | Age: 52
Discharge: HOME | End: 2022-06-17
Attending: INTERNAL MEDICINE
Payer: COMMERCIAL

## 2022-06-17 DIAGNOSIS — G47.61 PLMD (PERIODIC LIMB MOVEMENT DISORDER): ICD-10-CM

## 2022-06-17 DIAGNOSIS — J84.114 ACUTE INTERSTITIAL PNEUMONITIS: ICD-10-CM

## 2022-06-17 DIAGNOSIS — R91.8 PULMONARY INFILTRATES ON CXR: ICD-10-CM

## 2022-06-17 DIAGNOSIS — J98.4 RESTRICTIVE LUNG DISEASE: ICD-10-CM

## 2022-06-17 DIAGNOSIS — G47.33 OSA (OBSTRUCTIVE SLEEP APNEA): ICD-10-CM

## 2022-06-17 DIAGNOSIS — J96.11 CHRONIC RESPIRATORY FAILURE WITH HYPOXIA: ICD-10-CM

## 2022-06-17 PROCEDURE — 95811 POLYSOM 6/>YRS CPAP 4/> PARM: CPT | Mod: 26,,, | Performed by: INTERNAL MEDICINE

## 2022-06-17 PROCEDURE — 95811 PR POLYSOMNOGRAPHY W/CPAP: ICD-10-PCS | Mod: 26,,, | Performed by: INTERNAL MEDICINE

## 2022-06-17 PROCEDURE — 95811 POLYSOM 6/>YRS CPAP 4/> PARM: CPT

## 2022-06-17 NOTE — Clinical Note
Obstructive Sleep Apnea (G47.33) Periodic limb movement disorder: mild Trial of CPAP therapy on 9 cm H2O with a Medium size Resmed Full Face Mask AirFit F20 mask and heated humidification. Avoid alcohol, sedatives and other CNS depressants that may worsen sleep apnea and disrupt normal sleep architecture. Sleep hygiene should be reviewed to assess factors that may improve sleep quality. Weight management and regular exercise should be initiated or continued under supervison of clinician. Oxygen may be required with sleep, follow up Overnight saturation

## 2022-06-24 ENCOUNTER — PATIENT MESSAGE (OUTPATIENT)
Dept: PULMONOLOGY | Facility: CLINIC | Age: 52
End: 2022-06-24
Payer: COMMERCIAL

## 2022-06-24 ENCOUNTER — PATIENT MESSAGE (OUTPATIENT)
Dept: RHEUMATOLOGY | Facility: CLINIC | Age: 52
End: 2022-06-24
Payer: COMMERCIAL

## 2022-06-24 DIAGNOSIS — G47.33 OSA (OBSTRUCTIVE SLEEP APNEA): Primary | ICD-10-CM

## 2022-06-24 NOTE — PROCEDURES
"Obstructive Sleep Apnea (G47.33)   Periodic limb movement disorder: mild   Trial of CPAP therapy on 9 cm H2O with a Medium size Resmed Full Face Mask AirFit F20 mask and   heated humidification.   Avoid alcohol, sedatives and other CNS depressants that may worsen sleep apnea and disrupt normal sleep   architecture.   Sleep hygiene should be reviewed to assess factors that may improve sleep quality.   Weight management and regular exercise should be initiated or continued under supervison of clinician.   Oxygen may be required with sleep, follow up Overnight saturation     See imported Sleep Study result in "Chart Review" under the   "Media tab".      (This Sleep Study was interpreted by a Board Certified Sleep  Specialist who conducted an epoch-by-epoch review of the entire  raw data recording.)     (The indication for this sleep study was reviewed and deemed  appropriate by [facility] Practice Parameters or other reasons by a  Board Certified Sleep Specialist.)      The sleep study that you ordered is complete.  You have ordered sleep LAB services to perform the sleep study for@       You can look  for the report in the  Media as a procedure document type.    As the ordering provider, you are responsible for reviewing the results and implementing a treatment plan with your patient.     If you need a Sleep Medicine provider to explain the sleep study findings and arrange treatment for the patient, please refer patient for consultation to our Sleep Clinic via [facility] with Ambulatory Consult Sleep.     To do that please place an order for an  "Ambulatory Consult Sleep" - it will go to our clinic work queue for our Medical Assistant to contact the patient for an appointment.     For any questions, please contact our clinic staff at  to talk to clinical staff or pulmonary nurse navigator    Agustín Aviles MD      "

## 2022-06-24 NOTE — PROGRESS NOTES
Orders Placed This Encounter   Procedures    CPAP FOR HOME USE     O2 bleed 2 LPM     Order Specific Question:   Type ():     Answer:   CPAP     Order Specific Question:   CPAP setting (cmH20):     Answer:   9     Order Specific Question:   Length of need (1-99 months):     Answer:   99     Order Specific Question:   Fulfillment Priority:     Answer:   Level 2:   AHI 30  to < 59 associated with systolic congestive heart failure, moderate-severe COPD, moderate-severe neuromuscular disease, HTN consistently > 140/90 despite at least 2 anti-hypertensives that patient is compliant with, atrial fibrillation     Order Specific Question:   Humidification ():     Answer:   Heated     Order Specific Question:   Type of mask (//), 1 per 90 days:     Answer:   FFM     Order Specific Question:   Headgear (), 1 per 180 days:     Answer:   Yes     Order Specific Question:   Tubing (/), 1 per 90 days:     Answer:   Yes     Order Specific Question:   Humidifier chamber (), 1 per 180 days:     Answer:   Yes     Order Specific Question:   Chin strap (), 1 per 180 days:     Answer:   Yes     Order Specific Question:   Are disposable filters needed?     Answer:   Yes     Order Specific Question:    Disposable Filter, 6 per 90 days     Answer:   Yes        Obstructive Sleep Apnea (G47.33)   Periodic limb movement disorder: mild   Trial of CPAP therapy on 9 cm H2O with a Medium size Resmed Full Face Mask AirFit F20 mask and   heated humidification.   Avoid alcohol, sedatives and other CNS depressants that may worsen sleep apnea and disrupt normal sleep   architecture.   Sleep hygiene should be reviewed to assess factors that may improve sleep quality.   Weight management and regular exercise should be initiated or continued under supervison of clinician.   Oxygen may be required with sleep, follow up Overnight saturation

## 2022-07-13 ENCOUNTER — OFFICE VISIT (OUTPATIENT)
Dept: CARDIOLOGY | Facility: CLINIC | Age: 52
End: 2022-07-13
Payer: COMMERCIAL

## 2022-07-13 VITALS
SYSTOLIC BLOOD PRESSURE: 125 MMHG | HEART RATE: 76 BPM | OXYGEN SATURATION: 76 % | BODY MASS INDEX: 41.17 KG/M2 | HEIGHT: 66 IN | WEIGHT: 256.19 LBS | DIASTOLIC BLOOD PRESSURE: 75 MMHG

## 2022-07-13 DIAGNOSIS — R06.02 SOB (SHORTNESS OF BREATH): Primary | ICD-10-CM

## 2022-07-13 DIAGNOSIS — E66.01 MORBID OBESITY: ICD-10-CM

## 2022-07-13 DIAGNOSIS — R91.8 PULMONARY INFILTRATES ON CXR: ICD-10-CM

## 2022-07-13 DIAGNOSIS — Z87.09 HISTORY OF ASTHMA: ICD-10-CM

## 2022-07-13 DIAGNOSIS — J18.9 UNRESOLVED PNEUMONIA: ICD-10-CM

## 2022-07-13 DIAGNOSIS — J18.9 PNEUMONIA OF BOTH LOWER LOBES DUE TO INFECTIOUS ORGANISM: ICD-10-CM

## 2022-07-13 DIAGNOSIS — J45.20 MILD INTERMITTENT ASTHMA WITHOUT COMPLICATION: ICD-10-CM

## 2022-07-13 DIAGNOSIS — R00.2 PALPITATIONS: ICD-10-CM

## 2022-07-13 DIAGNOSIS — I10 ESSENTIAL HYPERTENSION: ICD-10-CM

## 2022-07-13 PROCEDURE — 3074F SYST BP LT 130 MM HG: CPT | Mod: CPTII,S$GLB,, | Performed by: INTERNAL MEDICINE

## 2022-07-13 PROCEDURE — 3074F PR MOST RECENT SYSTOLIC BLOOD PRESSURE < 130 MM HG: ICD-10-PCS | Mod: CPTII,S$GLB,, | Performed by: INTERNAL MEDICINE

## 2022-07-13 PROCEDURE — 3078F DIAST BP <80 MM HG: CPT | Mod: CPTII,S$GLB,, | Performed by: INTERNAL MEDICINE

## 2022-07-13 PROCEDURE — 1159F PR MEDICATION LIST DOCUMENTED IN MEDICAL RECORD: ICD-10-PCS | Mod: CPTII,S$GLB,, | Performed by: INTERNAL MEDICINE

## 2022-07-13 PROCEDURE — 3008F BODY MASS INDEX DOCD: CPT | Mod: CPTII,S$GLB,, | Performed by: INTERNAL MEDICINE

## 2022-07-13 PROCEDURE — 99999 PR PBB SHADOW E&M-EST. PATIENT-LVL IV: ICD-10-PCS | Mod: PBBFAC,,, | Performed by: INTERNAL MEDICINE

## 2022-07-13 PROCEDURE — 3008F PR BODY MASS INDEX (BMI) DOCUMENTED: ICD-10-PCS | Mod: CPTII,S$GLB,, | Performed by: INTERNAL MEDICINE

## 2022-07-13 PROCEDURE — 99214 PR OFFICE/OUTPT VISIT, EST, LEVL IV, 30-39 MIN: ICD-10-PCS | Mod: S$GLB,,, | Performed by: INTERNAL MEDICINE

## 2022-07-13 PROCEDURE — 99999 PR PBB SHADOW E&M-EST. PATIENT-LVL IV: CPT | Mod: PBBFAC,,, | Performed by: INTERNAL MEDICINE

## 2022-07-13 PROCEDURE — 1159F MED LIST DOCD IN RCRD: CPT | Mod: CPTII,S$GLB,, | Performed by: INTERNAL MEDICINE

## 2022-07-13 PROCEDURE — 1160F PR REVIEW ALL MEDS BY PRESCRIBER/CLIN PHARMACIST DOCUMENTED: ICD-10-PCS | Mod: CPTII,S$GLB,, | Performed by: INTERNAL MEDICINE

## 2022-07-13 PROCEDURE — 1160F RVW MEDS BY RX/DR IN RCRD: CPT | Mod: CPTII,S$GLB,, | Performed by: INTERNAL MEDICINE

## 2022-07-13 PROCEDURE — 99214 OFFICE O/P EST MOD 30 MIN: CPT | Mod: S$GLB,,, | Performed by: INTERNAL MEDICINE

## 2022-07-13 PROCEDURE — 3078F PR MOST RECENT DIASTOLIC BLOOD PRESSURE < 80 MM HG: ICD-10-PCS | Mod: CPTII,S$GLB,, | Performed by: INTERNAL MEDICINE

## 2022-07-13 RX ORDER — AMLODIPINE BESYLATE 10 MG/1
10 TABLET ORAL DAILY
Qty: 90 TABLET | Refills: 1 | Status: SHIPPED | OUTPATIENT
Start: 2022-07-13 | End: 2023-02-20 | Stop reason: SDUPTHER

## 2022-07-13 RX ORDER — AMLODIPINE BESYLATE 10 MG/1
10 TABLET ORAL DAILY
Qty: 90 TABLET | Refills: 1 | Status: SHIPPED | OUTPATIENT
Start: 2022-07-13 | End: 2022-07-13

## 2022-07-13 RX ORDER — HYDROCHLOROTHIAZIDE 12.5 MG/1
12.5 TABLET ORAL DAILY
Qty: 90 TABLET | Refills: 1 | Status: SHIPPED | OUTPATIENT
Start: 2022-07-13 | End: 2023-01-10 | Stop reason: SDUPTHER

## 2022-07-13 RX ORDER — HYDROCHLOROTHIAZIDE 12.5 MG/1
12.5 TABLET ORAL DAILY
Qty: 90 TABLET | Refills: 1 | Status: SHIPPED | OUTPATIENT
Start: 2022-07-13 | End: 2022-07-13

## 2022-07-13 NOTE — PROGRESS NOTES
"Subjective:   Patient ID:  Ayanna Alicia is a 51 y.o. female who presents for cardiac consult of No chief complaint on file.    Referring Physician: Madeleine Enrique MD   Reason for consult: abnormal ECG    HPI  The patient came in today for cardiac consult of No chief complaint on file.      Ayanna Alicia is a 51 y.o. female  With HTN, ILD, HLD, obesity, asthma, GERD presents for follow up CV eval.     3/28/22  Pt had recent ER eval 3 days ago :My full Hx/PE/MDM: Patient is a 51-year-old female presenting for evaluation after an "abnormal EKG" in her PCPs office. She was seen for some intermittent chest tightness and shortness of breath, felt to be related to her asthma. An EKG was read as age undetermined possible anterior infarct. She denies any chest pain at this time, no current shortness of breath. She has an appointment scheduled with cardiology on Monday, 3 days from now. No fevers, chills. She has had a slight cough and some congestion as well. She denies abdominal pain,, vomiting. On exam, lungs clear bilaterally, heart regular rate and rhythm. Lower extremities without edema. Labs, EKG, chest x-ray obtained. Blood work unremarkable, troponin negative. Chest x-ray with likely atelectasis, low suspicion for pneumonia given normal white count, no fever, clear lungs. EKG with atrial enlargement, no evidence of ACS. She stable for discharge with instructions to follow-up with cardiology    Results reviewed, ECG with poor RWP. She has been having more HICKS, thought due to asthma concerned due to angina. She gets more SOB when she walks and feels palpitations and hears in ears.     Hospital Course:   Patient was admitted for hypoxic respiratory failure. CTA showed siffuse interstitial thickening with ground-glass opacities predominately throughout the lower lobes with associated bronchiectasis concerning for interstitial pneumonia versus interstitial edema. She was treated empirically for CAP with " rocephin and azithromycin. Patient did not clinically improve. Sputum culture negative. She was then started on IV solumedrol with good clinical response. Inflammatory markers trended down post steroids. Chest xray showed improved. Of note multi-nodular goiter was found during stay. Patient has been seeing ENT and PCP. TSH WNL and anti-TPO negative. Rheumatoid factor elevated however anti-ccp within normal limits. Home O2 was obtained. Decision was made to discharge with prednisone taper x 5 weeks. Outpatient f/u with pulmonology.      22  She was admitted for resp failure in April treated with steroids and abx. Is seeing pulm, is on oxygen on 2L. She has occ CP/cough as well.     Patient feels  no leg swelling, no PND, no dizziness, no syncope, no CNS symptoms.    Patient has fairly good exercise tolerance. Is an , does rental properties.     Patient is compliant with medications.    FH - mother - had strokes, father - had MI; older sister -  of strokes -  in 60s     Summary    · The left ventricle is normal in size with concentric remodeling and normal systolic function.  · The test was stopped because the patient experienced shortness of breath. The patient requested the test to be stopped.  · The patient's exercise capacity was severely impaired.  · There were no arrhythmias during stress.  · The estimated ejection fraction is 60%.  · Normal left ventricular diastolic function.  · Normal right ventricular size with normal right ventricular systolic function.  · Mild tricuspid regurgitation.  · Normal central venous pressure (3 mmHg).  · The estimated PA systolic pressure is 29 mmHg.  · The stress echo portion of this study is negative for myocardial ischemia.  · The ECG portion of this study is negative for myocardial ischemia.         ECG  Normal sinus rhythm   Possible Left atrial enlargement   Possible Anterior infarct ,age undetermined   Abnormal ECG   When compared with ECG of  02-OCT-2014 11:29,   Borderline criteria for Anterior infarct are now Present   QT has lengthened   Confirmed by CLAY BLACKBURN MD ([id number]) on 3/25/2022 11:45:17 AM     Past Medical History:   Diagnosis Date    Abnormal Pap smear of cervix     in the past with repeat pap smear okay.    Allergic rhinitis, cause unspecified     Arthritis of both knees     Asthma     Eczema     Fatty liver 10/2014    Fibrocystic breast changes     Headache(784.0)     Hepatomegaly 10/2014    Hypertension     Liver cyst 10/2014    Multinodular goiter     Followed by ENT - Dr. Nicolas Gray    Polymenorrhea 2008    TMJ (dislocation of temporomandibular joint)     Uterine fibroid     in the past    Vitamin D deficiency disease        Past Surgical History:   Procedure Laterality Date     SECTION, CLASSIC      x 1    COLONOSCOPY N/A 2020    Procedure: COLONOSCOPY;  Surgeon: Angie Magana MD;  Location[facility];  Service: Endoscopy;  Laterality: N/A;    HYSTERECTOMY      MULTIPLE TOOTH EXTRACTIONS      PARTIAL HYSTERECTOMY  2013    Due to fibroids       Social History     Tobacco Use    Smoking status: Never Smoker    Smokeless tobacco: Never Used   Substance Use Topics    Alcohol use: Yes     Alcohol/week: 0.0 standard drinks     Comment: Occasionally    Drug use: Yes     Frequency: 4.0 times per week     Types: Marijuana     Comment: 20       Family History   Problem Relation Age of Onset    Stroke Mother     Diabetes Mother     Heart disease Mother     Heart disease Father 63        MI/CAD    Hypertension Father     Cancer Maternal Grandmother 60        Rectal and stomach cancer    Migraines Cousin     Cataracts Cousin     Cancer Maternal Aunt 50        Stomach cancer    Cancer Maternal Aunt 66        Lung cancer (smoker)    Stroke Maternal Grandfather     Diabetes Maternal Grandfather     Stroke Sister        Patient's Medications   New Prescriptions    No medications on  file   Previous Medications    ALBUTEROL (ACCUNEB) 0.63 MG/3 ML NEBU    Take 3 mLs (0.63 mg total) by nebulization every 4 to 6 hours as needed (asthma). Rescue    ALBUTEROL (PROVENTIL/VENTOLIN HFA) 90 MCG/ACTUATION INHALER    INHALE 1 TO 2 PUFFS BY MOUTH INTO THE LUNGS EVERY 4 TO 6 HOURS AS NEEDED FOR WHEEZING OR SHORTNESS OF BREATH    ALBUTEROL-IPRATROPIUM (DUO-NEB) 2.5 MG-0.5 MG/3 ML NEBULIZER SOLUTION    Take 3 mLs by nebulization every 6 (six) hours as needed for Wheezing. Rescue    ASCORBIC ACID, VITAMIN C, (VITAMIN C) 500 MG TABLET    Take 500 mg by mouth once daily.    CALCIUM/MAGNESIUM/VIT B COMP (CALCIUM-MAGNESIUM-B COMPLEX ORAL)    Take 1 tablet by mouth once daily at 6am.    DOXYCYCLINE (MONODOX) 100 MG CAPSULE    Take 1 capsule (100 mg total) by mouth every 12 (twelve) hours.    FAMOTIDINE (PEPCID AC) 10 MG TABLET    Take 1 tablet (10 mg total) by mouth 2 (two) times daily.    FLUTICASONE FUROATE-VILANTEROL (BREO ELLIPTA) 100-25 MCG/DOSE DISKUS INHALER    INHALE 1 PUFF INTO THE LUNGS ONCE DAILY    LEVOCETIRIZINE (XYZAL) 5 MG TABLET    Take 1 tablet (5 mg total) by mouth once daily.    MONTELUKAST (SINGULAIR) 10 MG TABLET    TAKE 1 TABLET(10 MG) BY MOUTH EVERY EVENING    MYCOPHENOLATE (CELLCEPT) 500 MG TAB    Take 2 tablets (1,000 mg total) by mouth 2 (two) times daily.    OMEGA-3S/DHA/EPA/FISH OIL/D3 (VITAMIN-D + OMEGA-3 ORAL)    Take 1 tablet by mouth once daily at 6am.    ONDANSETRON (ZOFRAN-ODT) 4 MG TBDL    Dissolve 1 tablet (4 mg total) by mouth every 6 (six) hours as needed (nausea).    SERTRALINE (ZOLOFT) 50 MG TABLET    Take 1 tablet (50 mg total) by mouth once daily.   Modified Medications    Modified Medication Previous Medication    AMLODIPINE (NORVASC) 10 MG TABLET amLODIPine (NORVASC) 10 MG tablet       Take 1 tablet (10 mg total) by mouth once daily.    Take 1 tablet (10 mg total) by mouth once daily.    HYDROCHLOROTHIAZIDE (HYDRODIURIL) 12.5 MG TAB hydroCHLOROthiazide (HYDRODIURIL) 12.5  "MG Tab       Take 1 tablet (12.5 mg total) by mouth once daily.    Take 1 tablet (12.5 mg total) by mouth once daily.   Discontinued Medications    No medications on file       Review of Systems   Constitutional: Negative.    HENT: Negative.    Eyes: Negative.    Respiratory: Positive for shortness of breath.    Cardiovascular: Positive for chest pain and palpitations.   Gastrointestinal: Negative.    Genitourinary: Negative.    Musculoskeletal: Negative.    Skin: Negative.    Neurological: Negative.    Endo/Heme/Allergies: Negative.    Psychiatric/Behavioral: Negative.    All 12 systems otherwise negative.      Wt Readings from Last 3 Encounters:   07/13/22 116.2 kg (256 lb 2.8 oz)   05/18/22 116.7 kg (257 lb 4.4 oz)   05/18/22 116.7 kg (257 lb 4.4 oz)     Temp Readings from Last 3 Encounters:   05/02/22 97.8 °F (36.6 °C) (Oral)   03/24/22 97.8 °F (36.6 °C)   02/23/22 98.1 °F (36.7 °C) (Temporal)     BP Readings from Last 3 Encounters:   07/13/22 125/75   05/18/22 126/72   05/18/22 133/77     Pulse Readings from Last 3 Encounters:   07/13/22 76   05/18/22 77   05/18/22 83       /75   Pulse 76   Ht 5' 6" (1.676 m)   Wt 116.2 kg (256 lb 2.8 oz)   SpO2 (!) 76% Comment: pt is on oxygen  BMI 41.35 kg/m²     Objective:   Physical Exam  Vitals and nursing note reviewed.   Constitutional:       General: She is not in acute distress.     Appearance: She is well-developed. She is obese. She is not diaphoretic.   HENT:      Head: Normocephalic and atraumatic.      Nose: Nose normal.   Eyes:      General: No scleral icterus.     Conjunctiva/sclera: Conjunctivae normal.   Neck:      Thyroid: No thyromegaly.      Vascular: No JVD.   Cardiovascular:      Rate and Rhythm: Normal rate and regular rhythm.      Heart sounds: S1 normal and S2 normal. No murmur heard.    No friction rub. No gallop. No S3 or S4 sounds.   Pulmonary:      Effort: Pulmonary effort is normal. No respiratory distress.      Breath sounds: Normal " breath sounds. No stridor. No wheezing or rales.   Chest:      Chest wall: No tenderness.   Abdominal:      General: Bowel sounds are normal. There is no distension.      Palpations: Abdomen is soft. There is no mass.      Tenderness: There is no abdominal tenderness. There is no rebound.   Genitourinary:     Comments: Deferred  Musculoskeletal:         General: No tenderness or deformity. Normal range of motion.      Cervical back: Normal range of motion and neck supple.   Lymphadenopathy:      Cervical: No cervical adenopathy.   Skin:     General: Skin is warm and dry.      Coloration: Skin is not pale.      Findings: No erythema or rash.   Neurological:      Mental Status: She is alert and oriented to person, place, and time.      Motor: No abnormal muscle tone.      Coordination: Coordination normal.   Psychiatric:         Behavior: Behavior normal.         Thought Content: Thought content normal.         Judgment: Judgment normal.         Lab Results   Component Value Date     05/02/2022    K 4.3 05/02/2022     05/02/2022    CO2 22 (L) 05/02/2022    BUN 20 05/02/2022    CREATININE 0.8 05/02/2022     (H) 05/02/2022    HGBA1C 4.8 10/14/2021    MG 2.2 02/25/2014    AST 26 04/27/2022    ALT 27 04/27/2022    ALBUMIN 3.7 04/27/2022    PROT 7.1 04/27/2022    BILITOT 0.4 04/27/2022    WBC 26.66 (H) 05/02/2022    HGB 11.2 (L) 05/02/2022    HCT 36.7 (L) 05/02/2022    MCV 93 05/02/2022     05/02/2022    INR 1.0 04/27/2022    TSH 0.854 04/28/2022    CHOL 201 (H) 10/14/2021    HDL 53 10/14/2021    LDLCALC 134.8 10/14/2021    LDLCALC 134 (H) 02/01/2017    TRIG 66 10/14/2021    BNP <10 04/27/2022     Assessment:      1. SOB (shortness of breath)    2. Pneumonia of both lower lobes due to infectious organism    3. Morbid obesity    4. History of asthma    5. Unresolved pneumonia    6. Pulmonary infiltrates on CXR    7. Palpitations    8. Mild intermittent asthma without complication    9. Essential  hypertension        Plan:     1. Abnormal ECG - poor RWP concern for anterior ischemia, with CP/SOB, palpitations   - stress echo - neg 4/2022  - order Holter  - refer to pulm as well - has GLENN/worsening asthma     2. HTN   titrate meds    3. Obesity BMI 41 - 256 lbs  - cont weight loss with diet/exercise    4. GERD  - rec PPI     5. Asthma with ILD  - started Cellcept  - cont tx per PCP/pulm    6. GLENN  - may need CPAP vs Inspire     7. Chest pain, atypical  - sec to inflammation - f/u rheum and pulm  - discussed reflux as well  - if worse rec GI eval      Thank you for allowing me to participate in this patient's care. Please do not hesitate to contact me with any questions or concerns. Consult note has been forwarded to the referral physician.

## 2022-07-15 ENCOUNTER — CLINICAL SUPPORT (OUTPATIENT)
Dept: PULMONOLOGY | Facility: CLINIC | Age: 52
End: 2022-07-15
Payer: COMMERCIAL

## 2022-07-15 ENCOUNTER — HOSPITAL ENCOUNTER (OUTPATIENT)
Dept: RADIOLOGY | Facility: HOSPITAL | Age: 52
Discharge: HOME | End: 2022-07-15
Attending: INTERNAL MEDICINE
Payer: COMMERCIAL

## 2022-07-15 ENCOUNTER — OFFICE VISIT (OUTPATIENT)
Dept: PULMONOLOGY | Facility: CLINIC | Age: 52
End: 2022-07-15
Payer: COMMERCIAL

## 2022-07-15 VITALS
HEART RATE: 116 BPM | RESPIRATION RATE: 17 BRPM | WEIGHT: 255.31 LBS | DIASTOLIC BLOOD PRESSURE: 80 MMHG | BODY MASS INDEX: 41.03 KG/M2 | HEIGHT: 66 IN | OXYGEN SATURATION: 94 % | SYSTOLIC BLOOD PRESSURE: 134 MMHG

## 2022-07-15 VITALS — WEIGHT: 255.31 LBS | BODY MASS INDEX: 41.03 KG/M2 | HEIGHT: 66 IN

## 2022-07-15 DIAGNOSIS — I10 PRIMARY HYPERTENSION: ICD-10-CM

## 2022-07-15 DIAGNOSIS — R76.8 ELEVATED RHEUMATOID FACTOR: ICD-10-CM

## 2022-07-15 DIAGNOSIS — R76.8 ELEVATED IGE LEVEL: ICD-10-CM

## 2022-07-15 DIAGNOSIS — J84.114 ACUTE INTERSTITIAL PNEUMONITIS: Primary | ICD-10-CM

## 2022-07-15 DIAGNOSIS — R91.8 PULMONARY INFILTRATES ON CXR: ICD-10-CM

## 2022-07-15 DIAGNOSIS — J96.11 CHRONIC RESPIRATORY FAILURE WITH HYPOXIA: ICD-10-CM

## 2022-07-15 DIAGNOSIS — J84.114 ACUTE INTERSTITIAL PNEUMONITIS: ICD-10-CM

## 2022-07-15 DIAGNOSIS — G47.33 OSA (OBSTRUCTIVE SLEEP APNEA): ICD-10-CM

## 2022-07-15 DIAGNOSIS — E66.01 MORBID OBESITY WITH BMI OF 40.0-44.9, ADULT: ICD-10-CM

## 2022-07-15 DIAGNOSIS — J98.4 RESTRICTIVE LUNG DISEASE: ICD-10-CM

## 2022-07-15 DIAGNOSIS — J45.909 ASTHMA IN ADULT, UNSPECIFIED ASTHMA SEVERITY, UNSPECIFIED WHETHER COMPLICATED, UNSPECIFIED WHETHER PERSISTENT: ICD-10-CM

## 2022-07-15 LAB
BRPFT: NORMAL
FEF 25 75 LLN: 1.45
FEF 25 75 PRE REF: 101.2 %
FEF 25 75 REF: 2.67
FEV1 FVC LLN: 68
FEV1 FVC PRE REF: 109.1 %
FEV1 FVC REF: 80
FEV1 LLN: 2.21
FEV1 PRE REF: 46.9 %
FEV1 REF: 2.86
FVC LLN: 2.79
FVC PRE REF: 42.7 %
FVC REF: 3.61
PEF LLN: 5.13
PEF PRE REF: 71.2 %
PEF REF: 6.96
PRE FEF 25 75: 2.71 L/S
PRE FET 100: 6.95 SEC
PRE FEV1 FVC: 87.08 %
PRE FEV1: 1.34 L
PRE FVC: 1.54 L
PRE PEF: 4.96 L/S

## 2022-07-15 PROCEDURE — 99214 OFFICE O/P EST MOD 30 MIN: CPT | Mod: 25,S$GLB,, | Performed by: INTERNAL MEDICINE

## 2022-07-15 PROCEDURE — 99999 PR PBB SHADOW E&M-EST. PATIENT-LVL V: ICD-10-PCS | Mod: PBBFAC,,, | Performed by: INTERNAL MEDICINE

## 2022-07-15 PROCEDURE — 99999 PR PBB SHADOW E&M-EST. PATIENT-LVL V: CPT | Mod: PBBFAC,,, | Performed by: INTERNAL MEDICINE

## 2022-07-15 PROCEDURE — 1160F RVW MEDS BY RX/DR IN RCRD: CPT | Mod: CPTII,S$GLB,, | Performed by: INTERNAL MEDICINE

## 2022-07-15 PROCEDURE — 1159F PR MEDICATION LIST DOCUMENTED IN MEDICAL RECORD: ICD-10-PCS | Mod: CPTII,S$GLB,, | Performed by: INTERNAL MEDICINE

## 2022-07-15 PROCEDURE — 94010 BREATHING CAPACITY TEST: CPT | Mod: S$GLB,,, | Performed by: INTERNAL MEDICINE

## 2022-07-15 PROCEDURE — 71046 X-RAY EXAM CHEST 2 VIEWS: CPT | Mod: TC

## 2022-07-15 PROCEDURE — 94010 BREATHING CAPACITY TEST: ICD-10-PCS | Mod: S$GLB,,, | Performed by: INTERNAL MEDICINE

## 2022-07-15 PROCEDURE — 94618 PULMONARY STRESS TESTING: CPT | Mod: S$GLB,,, | Performed by: INTERNAL MEDICINE

## 2022-07-15 PROCEDURE — 3008F BODY MASS INDEX DOCD: CPT | Mod: CPTII,S$GLB,, | Performed by: INTERNAL MEDICINE

## 2022-07-15 PROCEDURE — 3079F DIAST BP 80-89 MM HG: CPT | Mod: CPTII,S$GLB,, | Performed by: INTERNAL MEDICINE

## 2022-07-15 PROCEDURE — 94618 PULMONARY STRESS TESTING: ICD-10-PCS | Mod: S$GLB,,, | Performed by: INTERNAL MEDICINE

## 2022-07-15 PROCEDURE — 1160F PR REVIEW ALL MEDS BY PRESCRIBER/CLIN PHARMACIST DOCUMENTED: ICD-10-PCS | Mod: CPTII,S$GLB,, | Performed by: INTERNAL MEDICINE

## 2022-07-15 PROCEDURE — 3008F PR BODY MASS INDEX (BMI) DOCUMENTED: ICD-10-PCS | Mod: CPTII,S$GLB,, | Performed by: INTERNAL MEDICINE

## 2022-07-15 PROCEDURE — 99999 PR PBB SHADOW E&M-EST. PATIENT-LVL I: ICD-10-PCS | Mod: PBBFAC,,,

## 2022-07-15 PROCEDURE — 1159F MED LIST DOCD IN RCRD: CPT | Mod: CPTII,S$GLB,, | Performed by: INTERNAL MEDICINE

## 2022-07-15 PROCEDURE — 95012 NITRIC OXIDE EXP GAS DETER: CPT | Mod: S$GLB,,, | Performed by: INTERNAL MEDICINE

## 2022-07-15 PROCEDURE — 71046 XR CHEST PA AND LATERAL: ICD-10-PCS | Mod: 26,,, | Performed by: RADIOLOGY

## 2022-07-15 PROCEDURE — 71046 X-RAY EXAM CHEST 2 VIEWS: CPT | Mod: 26,,, | Performed by: RADIOLOGY

## 2022-07-15 PROCEDURE — 99214 PR OFFICE/OUTPT VISIT, EST, LEVL IV, 30-39 MIN: ICD-10-PCS | Mod: 25,S$GLB,, | Performed by: INTERNAL MEDICINE

## 2022-07-15 PROCEDURE — 95012 PR NITRIC OXIDE EXPIRED GAS DETERMINATION: ICD-10-PCS | Mod: S$GLB,,, | Performed by: INTERNAL MEDICINE

## 2022-07-15 PROCEDURE — 3075F PR MOST RECENT SYSTOLIC BLOOD PRESS GE 130-139MM HG: ICD-10-PCS | Mod: CPTII,S$GLB,, | Performed by: INTERNAL MEDICINE

## 2022-07-15 PROCEDURE — 3079F PR MOST RECENT DIASTOLIC BLOOD PRESSURE 80-89 MM HG: ICD-10-PCS | Mod: CPTII,S$GLB,, | Performed by: INTERNAL MEDICINE

## 2022-07-15 PROCEDURE — 3075F SYST BP GE 130 - 139MM HG: CPT | Mod: CPTII,S$GLB,, | Performed by: INTERNAL MEDICINE

## 2022-07-15 PROCEDURE — 99999 PR PBB SHADOW E&M-EST. PATIENT-LVL I: CPT | Mod: PBBFAC,,,

## 2022-07-15 RX ORDER — ALBUTEROL SULFATE 90 UG/1
AEROSOL, METERED RESPIRATORY (INHALATION)
Qty: 8.5 G | Refills: 5 | Status: SHIPPED | OUTPATIENT
Start: 2022-07-15 | End: 2022-09-20 | Stop reason: SDUPTHER

## 2022-07-15 NOTE — PROCEDURES
"[facility] - Pulmonary Function  Six Minute Walk     SUMMARY     Ordering Provider: Dr. Aviles   Interpreting Provider: Dr. Aviles  Performing nurse/tech/RT: KAILEY Gonzalez, RRT  Diagnosis:  (acute interstitial pneumonitis.)  Height: 5' 6" (167.6 cm)  Weight: 115.8 kg (255 lb 4.7 oz)  BMI (Calculated): 41.2   Patient Race:             Phase Oxygen Assessment Supplemental O2 Heart   Rate Blood Pressure Jessica Dyspnea Scale Rating   Resting 85 % (90% when placed on 2L) Room Air (increased to 2) 112 bpm 117/65 3   Exercise        Minute        1 82 % (90% when increased to 6L) 2 L/M (increased to 4L, then 6L) 134 bpm     2 94 % 6 L/M 125 bpm     3 89 % 6 L/M 142 bpm     4 89 % 6 L/M 138 bpm     5 84 % (92% when placed on venti mask) 6 L/M (40% venti mask applied) 145 bpm     6  89 % 40% Venti Mask 12 L/M 141 bpm 135/66 5-6   Recovery        Minute        1 96 % 40% Venti Mask 12 L/M 127 bpm     2 99 % 40% Venti Mask 12 L/M 113 bpm     3 100 % 40% Venti Mask 12 L/M 110 bpm     4 100 % 40% Venti Mask 12 L/M 107 bpm 118/71 2     Six Minute Walk Summary  6MWT Status: completed without stopping  Patient Reported: Dyspnea, Lightheadedness (back/side pain)     Interpretation:  Did the patient stop or pause?: No         Total Time Walked (Calculated): 360 seconds  Final Partial Lap Distance (feet): 100 feet  Total Distance Meters (Calculated): 335.28 meters  Predicted Distance Meters (Calculated): 449.11 meters  Percentage of Predicted (Calculated): 74.65  Peak VO2 (Calculated): 14.04  Mets: 4.01  Has The Patient Had a Previous Six Minute Walk Test?: Yes       Previous 6MWT Results  Has The Patient Had a Previous Six Minute Walk Test?: Yes  Date of Previous Test: 05/05/22  Total Time Walked: 360 seconds  Total Distance (meters): 289.56  Predicted Distance (meters): 446.82 meters  Percentage of Predicted: 64.8  Percent Change (Calculated): -0.16           CLINICAL INTERPRETATION:  Six minute walk distance is 335.28m " (74.65 % predicted) with moderate dyspnea.  During exercise, there was significant desaturation while breathing room air.  Blood pressure remained stable and Heart rate increased significantly with walking.  The patient reported non-pulmonary symptoms during exercise.  The patient did complete the study, walking 360 seconds of the 360 second test.  The patient may benefit from using supplemental oxygen and using supplemental oxygen during exertion.  Since the previous study in 05/05/2022, exercise capacity may be somewhat improved.  Based upon age and body mass index, exercise capacity is normal.      [] Mild exercise-induced hypoxemia described as an arterial oxygen saturation of 93-95% (or 3-4% less than at rest)  []  Moderate exercise-induced hypoxemia as 89-93%  [x]  Severe exercise induced hypoxemia as < 89% O2 saturation.  Medicare Criteria for oxygen prescription comments:   []  When arterial oxygen saturation is at or below 88% during exercise (severe exercise induced hypoxemia) then the patient falls under Medicare Group 1 criteria for supplemental oxygen

## 2022-07-15 NOTE — PROGRESS NOTES
Pulmonary Outpatient  Visit     Subjective:       Patient ID: Ayanna Alicia is a 51 y.o. female.    Chief Complaint: Asthma, Sleep Apnea, and Interstitial Lung Disease      Ayanna Alicia is 51 y.o.  Post hospital f/u  Acute respiratory failure ILD, +ve RF  Was discharged on oxygen  Here to review results  Explained  PFT: severe restriction and reduced DLCO  ABG  6MWD: oxygen desat needs supplementary oxygen 3 LPM  Has some nose bleed will add AYR gel and humifier bottle  FMLA paper work given  Out of work letter   Added PEPCID and Bactrim  Adherent with inhaler    05/18/2022  Here to see today  Concern, Dyspnea with activity palpitations  Seen Rheumatology: Added CELLCEPT  On steriod taper  No cough wheezing  On oxygen 3 LPM  Had GLENN in 2019: was prescribed CPAP returned back  Needs PAP at night  Motivated to restart      07/15/2022  Last seen 05/18/2022  ACT score 10, Orange score 6  CPAP study: CPAP ordered  Await   Says cannot go to work, tied, focus off  6MWD: RA sat was 95%  O2 titrated to 4-6 LPM  Tachycardia noted : 145 BPM      Asthma Control Test  In the past 4  weeks, how much of the time did your asthma keep you from getting as much done at work, school or at home?: Most of the time  During the past 4 weeks, how often have you had shortness of breath?: More than once a day  During the past 4 weeks, how often did your asthma symptoms (wheezing, couging, shortness of breath, chest tightness or pain) wake you up at night or earlier than usual in the morning?: 4 or more nights a week  During the past 4 weeks, how often have you used your rescue inhaler or nebulizer medication (such as albuterol)?: 2 or 3 times a week  How would you rate your asthma control during the past 4 weeks?: Somewhat controlled  If your score is 19 or less, your asthma may not be under control: 10          MMRC Dyspnea Scale (4 is worst)     [] MMRC 0: Dyspneic on strenuous  excercise (0 points)    [] MMRC 1: Dyspneic on walking a slight hill (0 points)    [x] MMRC 2: Dyspneic on walking level ground; must stop occasionally due to breathlessness (1 point)    [] MMRC 3: Must stop for breathlessness after walking 100 yards or after a few minutes (2 points)    [] MMRC 4: Cannot leave house; breathless on dressing/undressing (3 points)         EPWORTH SLEEPINESS SCALE 7/15/2022   Sitting and reading 1   Watching TV 1   Sitting, inactive in a public place (e.g. a theatre or a meeting) 1   As a passenger in a car for an hour without a break 0   Lying down to rest in the afternoon when circumstances permit 3   Sitting and talking to someone 0   Sitting quietly after a lunch without alcohol 0   In a car, while stopped for a few minutes in traffic 0   Total score 6                 The following portions of the patient's history were reviewed and updated as appropriate:   She  has a past medical history of Abnormal Pap smear of cervix, Allergic rhinitis, cause unspecified, Arthritis of both knees, Asthma, Eczema, Fatty liver (10/2014), Fibrocystic breast changes, Headache(784.0), Hepatomegaly (10/2014), Hypertension, Liver cyst (10/2014), Multinodular goiter, Polymenorrhea (), TMJ (dislocation of temporomandibular joint), Uterine fibroid, and Vitamin D deficiency disease.  She does not have any pertinent problems on file.  She  has a past surgical history that includes  section, classic; Multiple tooth extractions; Partial hysterectomy (2013); Hysterectomy; and Colonoscopy (N/A, 2020).  Her family history includes Cancer (age of onset: 50) in her maternal aunt; Cancer (age of onset: 60) in her maternal grandmother; Cancer (age of onset: 66) in her maternal aunt; Cataracts in her cousin; Diabetes in her maternal grandfather and mother; Heart disease in her mother; Heart disease (age of onset: 63) in her father; Hypertension in her father; Migraines in her cousin; Stroke in  her maternal grandfather, mother, and sister.  She  reports that she has never smoked. She has never used smokeless tobacco. She reports current alcohol use. She reports current drug use. Frequency: 4.00 times per week. Drug: Marijuana.  She has a current medication list which includes the following prescription(s): albuterol, albuterol-ipratropium, amlodipine, ascorbic acid (vitamin c), calcium/magnesium/vit b comp, doxycycline, famotidine, breo ellipta, hydrochlorothiazide, levocetirizine, montelukast, mycophenolate, omega-3s/dha/epa/fish oil/d3, ondansetron, sertraline, and albuterol.  Current Outpatient Medications on File Prior to Visit   Medication Sig Dispense Refill    albuterol (ACCUNEB) 0.63 mg/3 mL Nebu Take 3 mLs (0.63 mg total) by nebulization every 4 to 6 hours as needed (asthma). Rescue 75 mL 6    albuterol-ipratropium (DUO-NEB) 2.5 mg-0.5 mg/3 mL nebulizer solution Take 3 mLs by nebulization every 6 (six) hours as needed for Wheezing. Rescue 90 mL 0    amLODIPine (NORVASC) 10 MG tablet Take 1 tablet (10 mg total) by mouth once daily. 90 tablet 1    ascorbic acid, vitamin C, (VITAMIN C) 500 MG tablet Take 500 mg by mouth once daily.      calcium/magnesium/vit B comp (CALCIUM-MAGNESIUM-B COMPLEX ORAL) Take 1 tablet by mouth once daily at 6am.      doxycycline (MONODOX) 100 MG capsule Take 1 capsule (100 mg total) by mouth every 12 (twelve) hours. 20 capsule 1    famotidine (PEPCID AC) 10 MG tablet Take 1 tablet (10 mg total) by mouth 2 (two) times daily. 60 tablet 3    fluticasone furoate-vilanteroL (BREO ELLIPTA) 100-25 mcg/dose diskus inhaler INHALE 1 PUFF INTO THE LUNGS ONCE DAILY 1 each 5    hydroCHLOROthiazide (HYDRODIURIL) 12.5 MG Tab Take 1 tablet (12.5 mg total) by mouth once daily. 90 tablet 1    levocetirizine (XYZAL) 5 MG tablet Take 1 tablet (5 mg total) by mouth once daily. 90 tablet 1    montelukast (SINGULAIR) 10 mg tablet TAKE 1 TABLET(10 MG) BY MOUTH EVERY EVENING 90 tablet  1    mycophenolate (CELLCEPT) 500 mg Tab Take 2 tablets (1,000 mg total) by mouth 2 (two) times daily. 360 tablet 1    omega-3s/dha/epa/fish oil/D3 (VITAMIN-D + OMEGA-3 ORAL) Take 1 tablet by mouth once daily at 6am.      ondansetron (ZOFRAN-ODT) 4 MG TbDL Dissolve 1 tablet (4 mg total) by mouth every 6 (six) hours as needed (nausea). 90 tablet 0    sertraline (ZOLOFT) 50 MG tablet Take 1 tablet (50 mg total) by mouth once daily. 90 tablet 1    [DISCONTINUED] albuterol (PROVENTIL/VENTOLIN HFA) 90 mcg/actuation inhaler INHALE 1 TO 2 PUFFS BY MOUTH INTO THE LUNGS EVERY 4 TO 6 HOURS AS NEEDED FOR WHEEZING OR SHORTNESS OF BREATH 18 g 5     No current facility-administered medications on file prior to visit.     She is allergic to doxycycline, fluticasone, and penicillins..      Review of Systems   Constitutional: Positive for activity change and fatigue.   Respiratory: Positive for dyspnea on extertion.    Gastrointestinal: Positive for acid reflux.       Outpatient Encounter Medications as of 7/15/2022   Medication Sig Dispense Refill    albuterol (ACCUNEB) 0.63 mg/3 mL Nebu Take 3 mLs (0.63 mg total) by nebulization every 4 to 6 hours as needed (asthma). Rescue 75 mL 6    albuterol-ipratropium (DUO-NEB) 2.5 mg-0.5 mg/3 mL nebulizer solution Take 3 mLs by nebulization every 6 (six) hours as needed for Wheezing. Rescue 90 mL 0    amLODIPine (NORVASC) 10 MG tablet Take 1 tablet (10 mg total) by mouth once daily. 90 tablet 1    ascorbic acid, vitamin C, (VITAMIN C) 500 MG tablet Take 500 mg by mouth once daily.      calcium/magnesium/vit B comp (CALCIUM-MAGNESIUM-B COMPLEX ORAL) Take 1 tablet by mouth once daily at 6am.      doxycycline (MONODOX) 100 MG capsule Take 1 capsule (100 mg total) by mouth every 12 (twelve) hours. 20 capsule 1    famotidine (PEPCID AC) 10 MG tablet Take 1 tablet (10 mg total) by mouth 2 (two) times daily. 60 tablet 3    fluticasone furoate-vilanteroL (BREO ELLIPTA) 100-25 mcg/dose  "diskus inhaler INHALE 1 PUFF INTO THE LUNGS ONCE DAILY 1 each 5    hydroCHLOROthiazide (HYDRODIURIL) 12.5 MG Tab Take 1 tablet (12.5 mg total) by mouth once daily. 90 tablet 1    levocetirizine (XYZAL) 5 MG tablet Take 1 tablet (5 mg total) by mouth once daily. 90 tablet 1    montelukast (SINGULAIR) 10 mg tablet TAKE 1 TABLET(10 MG) BY MOUTH EVERY EVENING 90 tablet 1    mycophenolate (CELLCEPT) 500 mg Tab Take 2 tablets (1,000 mg total) by mouth 2 (two) times daily. 360 tablet 1    omega-3s/dha/epa/fish oil/D3 (VITAMIN-D + OMEGA-3 ORAL) Take 1 tablet by mouth once daily at 6am.      ondansetron (ZOFRAN-ODT) 4 MG TbDL Dissolve 1 tablet (4 mg total) by mouth every 6 (six) hours as needed (nausea). 90 tablet 0    sertraline (ZOLOFT) 50 MG tablet Take 1 tablet (50 mg total) by mouth once daily. 90 tablet 1    [DISCONTINUED] albuterol (PROVENTIL/VENTOLIN HFA) 90 mcg/actuation inhaler INHALE 1 TO 2 PUFFS BY MOUTH INTO THE LUNGS EVERY 4 TO 6 HOURS AS NEEDED FOR WHEEZING OR SHORTNESS OF BREATH 18 g 5    albuterol (PROVENTIL/VENTOLIN HFA) 90 mcg/actuation inhaler INHALE 1 TO 2 PUFFS BY MOUTH INTO THE LUNGS EVERY 4 TO 6 HOURS AS NEEDED FOR WHEEZING OR SHORTNESS OF BREATH 8.5 g 5     No facility-administered encounter medications on file as of 7/15/2022.       Objective:     Vital Signs (Most Recent)  Vital Signs  Pulse: (!) 116  Resp: 17  SpO2: (!) 94 % (2 LPM)  BP: 134/80  Height and Weight  Height: 5' 6" (167.6 cm)  Weight: 115.8 kg (255 lb 4.7 oz)  BSA (Calculated - sq m): 2.32 sq meters  BMI (Calculated): 41.2  Weight in (lb) to have BMI = 25: 154.6]  Wt Readings from Last 2 Encounters:   07/15/22 115.8 kg (255 lb 4.7 oz)   07/15/22 115.8 kg (255 lb 4.7 oz)       Physical Exam   Constitutional: She is oriented to person, place, and time. Vital signs are normal. She appears well-developed and well-nourished. Nasal cannula in place.   HENT:   Head: Normocephalic.   Mouth/Throat: Mallampati Score: II.   Neck: No JVD " present.   Cardiovascular: Normal rate and intact distal pulses.   No murmur heard.  Pulmonary/Chest: Normal expansion, effort normal and breath sounds normal.   Abdominal: Soft. Bowel sounds are normal.   Musculoskeletal:         General: No edema. Normal range of motion.      Cervical back: Normal range of motion and neck supple.   Lymphadenopathy:     She has no axillary adenopathy.   Neurological: She is alert and oriented to person, place, and time.   Skin: Skin is warm and dry. Bruising and ecchymosis noted. No cyanosis. Nails show no clubbing.   Psychiatric: She has a normal mood and affect.   Nursing note and vitals reviewed.      Laboratory  Lab Results   Component Value Date    WBC 26.66 (H) 05/02/2022    RBC 3.94 (L) 05/02/2022    HGB 11.2 (L) 05/02/2022    HCT 36.7 (L) 05/02/2022    MCV 93 05/02/2022    MCH 28.4 05/02/2022    MCHC 30.5 (L) 05/02/2022    RDW 13.9 05/02/2022     05/02/2022    MPV 10.1 05/02/2022    GRAN 23.2 (H) 05/02/2022    GRAN 87.1 (H) 05/02/2022    LYMPH 2.1 05/02/2022    LYMPH 7.7 (L) 05/02/2022    MONO 1.0 05/02/2022    MONO 3.6 (L) 05/02/2022    EOS 0.0 05/02/2022    BASO 0.05 05/02/2022    EOSINOPHIL 0.0 05/02/2022    BASOPHIL 0.2 05/02/2022       BMP  Lab Results   Component Value Date     05/02/2022    K 4.3 05/02/2022     05/02/2022    CO2 22 (L) 05/02/2022    BUN 20 05/02/2022    CREATININE 0.8 05/02/2022    CALCIUM 9.5 05/02/2022    ANIONGAP 12 05/02/2022    ESTGFRAFRICA >60 05/02/2022    EGFRNONAA >60 05/02/2022    AST 26 04/27/2022    ALT 27 04/27/2022    PROT 7.1 04/27/2022       Lab Results   Component Value Date    BNP <10 04/27/2022       Lab Results   Component Value Date    TSH 0.854 04/28/2022       Lab Results   Component Value Date    SEDRATE 15 05/05/2022       Lab Results   Component Value Date    CRP 7.3 05/05/2022     Lab Results   Component Value Date     (H) 05/02/2022        Lab Results   Component Value Date    ASPERGILLUS None  Detected 04/29/2022     No results found for: AFUMIGATUSCL     Lab Results   Component Value Date    ACE 22 04/28/2022        Diagnostic Results:  I have personally reviewed today the following studies:  Office Spirometry Results:     FEV1: 1.34L( 46.9%)  FVC  1.54L( 42.7%)  FV1/FVC 87     PHYSICIAN INTERPRETATION AND COMMENTS: Findings are consistent with MILD obstructive sleep apnea  (GLENN). Overall AHI was 11.0/hr with 5.5 hours sleep.  CLINICAL HISTORY: 48 year old female presented with: Patient has observed snoring, periods of not breathing, feels  sleepy during the day. She reports non restful sleep. 16.5 inch neck, BMI of 45, an [place] sleepiness score of 8, history of  hypertension and symptoms of nocturnal snoring, waking up choking and witnessed apneas. Based on the clinical history, the  patient has a high pre-test probability of having severe GLENN.  SLEEP STUDY FINDINGS: Patient underwent a one night Home Sleep Test and by behavioral criteria, slept for  approximately 5.5 hours, with a sleep latency of 8 minutes and a sleep efficiency of 70.9%. Mild sleep disordered breathing  (AHI=11) is noted based on a 4% hypopnea desaturation criteria. The patient slept supine 16.7% of the night based on valid  recording time of 5.47 hours. When considering more subtle measures of sleep disordered breathing, the overall respiratory  disturbance index is moderate (RDI=21) based on a 1% hypopnea desaturation criteria with confirmation by surrogate arousal  indicators, predominantly in the supine position (31 events/hour). The apneas/hypopneas are accompanied by minimal oxygen  desaturation (percent time below 90% SpO2: 4.1%, Min SpO2: 81.8%). The average desaturation across all sleep disordered  breathing events is 3.2%. Snoring occurs for 45.7% (30 dB) of the study, 44.9% is very loud. The mean pulse rate is 82 BPM,  with very frequent pulse rate variability (83 events with >= 6 BPM increase/decrease per hour).    Six  Minute Walk Summary  6MWT Status: completed without stopping  Patient Reported: Dyspnea, Lightheadedness (back/side pain)            Interpretation:  Did the patient stop or pause?: No  Total Time Walked (Calculated): 360 seconds  Final Partial Lap Distance (feet): 100 feet  Total Distance Meters (Calculated): 335.28 meters  Predicted Distance Meters (Calculated): 449.11 meters  Percentage of Predicted (Calculated): 74.65  Peak VO2 (Calculated): 14.04  Mets: 4.01  Has The Patient Had a Previous Six Minute Walk Test?: Yes     Previous 6MWT Results  Has The Patient Had a Previous Six Minute Walk Test?: Yes  Date of Previous Test: 05/05/22  Total Time Walked: 360 seconds  Total Distance (meters): 289.56  Predicted Distance (meters): 446.82 meters  Percentage of Predicted: 64.8  Percent Change (Calculated): -0.16       Assessment/Plan:     Problem List Items Addressed This Visit     Pulmonary infiltrates on CXR    Relevant Orders    CT Chest Without Contrast    Elevated IgE level    HTN (hypertension)     Stable Norvasc and HCTZ  Will monitor BP at home           Morbid obesity with BMI of 40.0-44.9, adult     Weight loss and exercise to improve overall health.    Advised will/may likely gain weight with steriods           Acute interstitial pneumonitis - Primary     ILD with +ve RF  Based on Chest CT  Elevated ESR and CRP improved with steriods  PFT today: RESTRICTION   Consulted rheumatology: completed, started on CELLCEPT  Stable FEV1 and FVC    PFT and HRCT next visit               Relevant Orders    Stress test, pulmonary (Completed)    Echo Saline Bubble? Yes    Ambulatory referral/consult to Pulmonary Rehab    Complete PFT without bronchodilator - Clinic    GLENN (obstructive sleep apnea)     CPAP has been ordered with O2 bleed           Chronic respiratory failure with hypoxia     Patient with Hypoxic Respiratory failure which is Chronic.  she is on home oxygen at 4 LPM. Supplemental oxygen was provided and noted-   .   Signs/symptoms of respiratory failure include- increased work of breathing. Contributing diagnoses includes - Interstitial lung disease Labs and images were reviewed. Patient Has not had a recent ABG. Will treat underlying causes and adjust management of respiratory failure as follows-                Relevant Orders    Stress test, pulmonary (Completed)    Echo Saline Bubble? Yes    Ambulatory referral/consult to Pulmonary Rehab    Elevated rheumatoid factor     Followed with rheumatology           Restrictive lung disease     Secondary to API/ILD/+ve RF/Body habitus  FEV1: 46.9 %, FVC 42.7%, FEV1/FVC 87    Continue Inhalers    Repeat HRCT           Relevant Orders    Stress test, pulmonary (Completed)    Echo Saline Bubble? Yes    CT Chest Without Contrast    Ambulatory referral/consult to Pulmonary Rehab    Complete PFT without bronchodilator - Clinic      Other Visit Diagnoses     Asthma in adult, unspecified asthma severity, unspecified whether complicated, unspecified whether persistent        Relevant Medications    albuterol (PROVENTIL/VENTOLIN HFA) 90 mcg/actuation inhaler            6MWD distance improved from 289.56m to 335.28m  ( 64% to 74%)  Enrol to pul rehab      Follow up in about 2 months (around 9/15/2022), or echo, 6MWD, weight loss, pul rehab, PFT.    This note was prepared using voice recognition system and is likely to have sound alike errors that may have been overlooked even after proof reading.  Please call me with any questions    Discussed diagnosis, its evaluation, treatment and usual course. All questions answered.      Agustín Aviles MD

## 2022-07-15 NOTE — PROCEDURES
"Clinical Guide to Interpretation or FeNO Levels :    FeNO  (ppb) LOW INTERMEDIATE HIGH   ADULT VALUES < 25 25-50          > 50   Th2-driven Inflammation Unlikely Likely Significant     Patients FeNO level at this visit : _13___ (ppb)      Interpretation of FeNO measurement in adults:   [X ] FENO is less than 25 ppb implies non eosinophilic airway inflammation or the absence of airway inflammation.   Comment: Low FENO (<25 ppb) in adult asthmatics with persistent symptoms suggests other etiologies for these symptoms and a lower likelihood of benefit from adding or increasing inhaled glucocorticoids.     [ ] FENO between 25 and 50 ppb in adults should be interpreted cautiously with reference to the clinical situation (eg, symptomatic, on or off therapy, current smoking).     [ ] FENO greater than 50 ppb in adults  suggests eosinophilic airway inflammation   Comment: High FENO (>50 ppb) in adult asthmatics even with atypical symptoms suggests glucocorticoid responsiveness. High FENO (>50 ppb) can help identify poor adherence or uncontrolled inflammation in asthma patients with otherwise seemingly "controlled" asthma.     Discussion:   ·A FENO less than 25 ppb in adults and less than 20 ppb in children younger than 12 years of age implies noneosinophilic airway inflammation or the absence of airway inflammation.   ·A FENO greater than 50 ppb in adults or greater than 35 ppb in children suggests eosinophilic airway inflammation.   ·Values of FENO between 25 and 50 ppb in adults (20 to 35 ppb in children) should be interpreted cautiously with reference to the clinical situation (eg, symptomatic, on or off therapy, current smoking).   ·A rising FENO with a greater than 20 percent change and more than 25 ppb (20 ppb in children) from a previously stable level suggests increasing eosinophilic airway inflammation, but there are wide inter-individual differences.   ·A decrease in FENO greater than 20 percent for values over 50 " ppb or more than 10 ppb for values less than 50 ppb may be clinically important.   FENO in other respiratory diseases - Several other diseases are associated with altered levels of exhaled NO: low levels of FENO have been noted in cystic fibrosis, current smoking, pulmonary hypertension, hypothermia, primary ciliary dyskinesia, and bronchopulmonary dysplasia. Elevated FENO has been noted in atopy, nonasthmatic eosinophilic bronchitis, COPD exacerbations, noncystic fibrosis bronchiectasis, and viral upper respiratory infections.     REFERENCE:   ATS Board of Directors, December 2004, and by the ERS Executive Committee, June 2004. ATS/ERS Recommendations for Standardized Procedures for the Online and Offline Measurement of Exhaled Lower Respiratory Nitric Oxide and Nasal Nitric Oxide. Guideline 2005

## 2022-07-15 NOTE — ASSESSMENT & PLAN NOTE
Patient with Hypoxic Respiratory failure which is Chronic.  she is on home oxygen at 4 LPM. Supplemental oxygen was provided and noted-  .   Signs/symptoms of respiratory failure include- increased work of breathing. Contributing diagnoses includes - Interstitial lung disease Labs and images were reviewed. Patient Has not had a recent ABG. Will treat underlying causes and adjust management of respiratory failure as follows-

## 2022-07-15 NOTE — ASSESSMENT & PLAN NOTE
Secondary to API/ILD/+ve RF/Body habitus  FEV1: 46.9 %, FVC 42.7%, FEV1/FVC 87    Continue Inhalers    Repeat HRCT

## 2022-07-15 NOTE — ASSESSMENT & PLAN NOTE
ILD with +ve RF  Based on Chest CT  Elevated ESR and CRP improved with steriods  PFT today: RESTRICTION   Consulted rheumatology: completed, started on CELLCEPT  Stable FEV1 and FVC    PFT and HRCT next visit

## 2022-07-18 ENCOUNTER — PATIENT MESSAGE (OUTPATIENT)
Dept: PULMONOLOGY | Facility: CLINIC | Age: 52
End: 2022-07-18
Payer: COMMERCIAL

## 2022-07-19 ENCOUNTER — PATIENT MESSAGE (OUTPATIENT)
Dept: RHEUMATOLOGY | Facility: CLINIC | Age: 52
End: 2022-07-19
Payer: COMMERCIAL

## 2022-07-19 ENCOUNTER — TELEPHONE (OUTPATIENT)
Dept: RHEUMATOLOGY | Facility: CLINIC | Age: 52
End: 2022-07-19
Payer: COMMERCIAL

## 2022-07-19 NOTE — TELEPHONE ENCOUNTER
----- Message from Dima Pretty sent at 7/19/2022  8:37 AM CDT -----  Contact: [phone]  Patient would like to consult with a nurse in regards to her scheduled xray. Please call back at [phone]. Thanks r/s

## 2022-07-20 ENCOUNTER — HOSPITAL ENCOUNTER (OUTPATIENT)
Dept: RADIOLOGY | Facility: HOSPITAL | Age: 52
Discharge: HOME | End: 2022-07-20
Attending: INTERNAL MEDICINE
Payer: COMMERCIAL

## 2022-07-20 ENCOUNTER — PATIENT MESSAGE (OUTPATIENT)
Dept: RHEUMATOLOGY | Facility: CLINIC | Age: 52
End: 2022-07-20
Payer: COMMERCIAL

## 2022-07-20 DIAGNOSIS — J84.9 INTERSTITIAL LUNG DISEASE: ICD-10-CM

## 2022-07-20 DIAGNOSIS — R76.8 RHEUMATOID FACTOR POSITIVE: ICD-10-CM

## 2022-07-20 PROCEDURE — 77077 XR ARTHRITIS SURVEY: ICD-10-PCS | Mod: 26,,, | Performed by: RADIOLOGY

## 2022-07-20 PROCEDURE — 77077 JOINT SURVEY SINGLE VIEW: CPT | Mod: 26,,, | Performed by: RADIOLOGY

## 2022-07-20 PROCEDURE — 77077 JOINT SURVEY SINGLE VIEW: CPT | Mod: TC

## 2022-07-21 ENCOUNTER — PATIENT MESSAGE (OUTPATIENT)
Dept: RHEUMATOLOGY | Facility: CLINIC | Age: 52
End: 2022-07-21
Payer: COMMERCIAL

## 2022-07-21 ENCOUNTER — TELEPHONE (OUTPATIENT)
Dept: RHEUMATOLOGY | Facility: CLINIC | Age: 52
End: 2022-07-21
Payer: COMMERCIAL

## 2022-07-25 DIAGNOSIS — J84.111 CHRONIC INTERSTITIAL PNEUMONIA: ICD-10-CM

## 2022-07-25 DIAGNOSIS — J98.4 RESTRICTIVE LUNG DISEASE: Primary | ICD-10-CM

## 2022-07-25 DIAGNOSIS — R76.8 ELEVATED RHEUMATOID FACTOR: ICD-10-CM

## 2022-07-25 RX ORDER — PREDNISONE 10 MG/1
10 TABLET ORAL DAILY
Qty: 30 TABLET | Refills: 3 | Status: SHIPPED | OUTPATIENT
Start: 2022-07-25 | End: 2022-08-22 | Stop reason: SDUPTHER

## 2022-07-25 NOTE — PROGRESS NOTES
Requested Prescriptions     Signed Prescriptions Disp Refills    predniSONE (DELTASONE) 10 MG tablet 30 tablet 3     Sig: Take 1 tablet (10 mg total) by mouth once daily.

## 2022-07-26 ENCOUNTER — TELEPHONE (OUTPATIENT)
Dept: PULMONOLOGY | Facility: CLINIC | Age: 52
End: 2022-07-26
Payer: COMMERCIAL

## 2022-07-27 DIAGNOSIS — J45.909 ASTHMA IN ADULT, UNSPECIFIED ASTHMA SEVERITY, UNSPECIFIED WHETHER COMPLICATED, UNSPECIFIED WHETHER PERSISTENT: ICD-10-CM

## 2022-07-27 RX ORDER — FLUTICASONE FUROATE AND VILANTEROL 100; 25 UG/1; UG/1
POWDER RESPIRATORY (INHALATION)
Qty: 60 EACH | Refills: 5 | Status: SHIPPED | OUTPATIENT
Start: 2022-07-27 | End: 2022-09-20 | Stop reason: SDUPTHER

## 2022-08-10 ENCOUNTER — PATIENT MESSAGE (OUTPATIENT)
Dept: PULMONOLOGY | Facility: CLINIC | Age: 52
End: 2022-08-10
Payer: COMMERCIAL

## 2022-08-11 ENCOUNTER — TELEPHONE (OUTPATIENT)
Dept: PULMONOLOGY | Facility: HOSPITAL | Age: 52
End: 2022-08-11
Payer: COMMERCIAL

## 2022-08-17 ENCOUNTER — HOSPITAL ENCOUNTER (OUTPATIENT)
Dept: PULMONOLOGY | Facility: HOSPITAL | Age: 52
Discharge: HOME | End: 2022-08-17
Attending: INTERNAL MEDICINE
Payer: COMMERCIAL

## 2022-08-17 VITALS — HEIGHT: 66 IN | WEIGHT: 255 LBS | BODY MASS INDEX: 40.98 KG/M2

## 2022-08-17 DIAGNOSIS — J96.11 CHRONIC RESPIRATORY FAILURE WITH HYPOXIA: ICD-10-CM

## 2022-08-17 DIAGNOSIS — J98.4 RESTRICTIVE LUNG DISEASE: ICD-10-CM

## 2022-08-17 DIAGNOSIS — J84.114 ACUTE INTERSTITIAL PNEUMONITIS: ICD-10-CM

## 2022-08-17 PROCEDURE — G0239 OTH RESP PROC, GROUP: HCPCS

## 2022-08-17 NOTE — PROGRESS NOTES
[facility]  Pulmonary Rehab  Initial Consult    SUMMARY     Referring Physician: Agustín Aviles MD   Pt presented today for intake to pulmonary rehab. She has Acute Interstitial Pneumonitis, Chronic Respiratory failure with hypoxia, Restrictive Lung disease, Asthma, Sleep apnea, among other medical issues. Pt wears O2 continuously at 3 lpm. She takes Albuterol nebs and Breo at home. She is trying to be more compliant with her CPAP at night. Pt is excited to start rehab, stating that she asked to participate in pulmonary rehab. She hopes to gain strength, decrease fatigue, lose weight and would like to get back in the physical shape that she once was. She would also like to possibly wean off oxygen. Pt is set to start 8/22/22.    Diagnosis:   Problem List as of 8/17/2022 Reviewed: 7/15/2022  5:03 PM by Agustín Aviles MD       ENT    Seasonal allergies       Pulmonary    Asthma    Last Assessment & Plan 5/18/2022 Office Visit Written 5/18/2022 10:11 AM by Agustín Aviles MD     On BREO + JESSI  PDM education  Refill Nebs meds           Chronic interstitial pneumonia    Last Assessment & Plan 7/15/2022 Office Visit Edited 7/15/2022  5:04 PM by Agustín Aviles MD     ILD with +ve RF  Based on Chest CT  Elevated ESR and CRP improved with steriods  PFT today: RESTRICTION   Consulted rheumatology: completed, started on CELLCEPT  Stable FEV1 and FVC    PFT and HRCT next visit               Chronic respiratory failure with hypoxia    Last Assessment & Plan 7/15/2022 Office Visit Written 7/15/2022  5:06 PM by Agustín Aviles MD     Patient with Hypoxic Respiratory failure which is Chronic.  she is on home oxygen at 4 LPM. Supplemental oxygen was provided and noted-  .   Signs/symptoms of respiratory failure include- increased work of breathing. Contributing diagnoses includes - Interstitial lung disease Labs and images were reviewed. Patient Has not had a recent ABG. Will treat underlying  causes and adjust management of respiratory failure as follows-                Pulmonary infiltrates on CXR    Last Assessment & Plan 5/18/2022 Office Visit Written 5/18/2022 10:09 AM by Agustín Aviles MD     On steriod taper  Current CXR shows improvement           Restrictive lung disease    Last Assessment & Plan 7/15/2022 Office Visit Edited 7/15/2022  1:50 PM by Agustín Aviles MD     Secondary to API/ILD/+ve RF/Body habitus  FEV1: 46.9 %, FVC 42.7%, FEV1/FVC 87    Continue Inhalers    Repeat HRCT           Acute interstitial pneumonitis       Cardiac/Vascular    HTN (hypertension)    Last Assessment & Plan 7/15/2022 Office Visit Written 7/15/2022  6:48 AM by Agustín Aviles MD     Stable Norvasc and HCTZ  Will monitor BP at home              Renal/    Surgical menopause       Immunology/Multi System    Elevated IgE level    Last Assessment & Plan 5/18/2022 Office Visit Written 5/18/2022 10:07 AM by Agustín Aviles MD     Will moniotor  With bronchiectasis likely consistent with ABPA  Already on steriods  Trend: if refractory will consult allergy           Elevated rheumatoid factor    Last Assessment & Plan 7/15/2022 Office Visit Written 7/15/2022  6:47 AM by Agustín Aviles MD     Followed with rheumatology              Endocrine    Multinodular goiter    Last Assessment & Plan 4/27/2022 Hospital Encounter Written 5/1/2022  2:03 PM by Mode Thompson MD     4/30 ultrasound of the thyroid noted.  Outpatient ENT evaluation.  Discussed with hospital medicine attending  5/1 ENT evaluation           Morbid obesity with BMI of 40.0-44.9, adult    Last Assessment & Plan 7/15/2022 Office Visit Written 7/15/2022  6:47 AM by Agustín Aviles MD     Weight loss and exercise to improve overall health.    Advised will/may likely gain weight with steriods           Vitamin D deficiency       GI    GERD (gastroesophageal reflux disease)    Last Assessment & Plan 5/5/2022 Office Visit Written  5/5/2022  1:05 PM by Agustín Aviles MD     Added PEPCID since will be on steriods for > 4 weeks           Benign colon polyp       Orthopedic    Patella-femoral syndrome    Localized osteoarthrosis not specified whether primary or secondary, lower leg       Other    GLENN (obstructive sleep apnea)    Last Assessment & Plan 7/15/2022 Office Visit Written 7/15/2022  1:50 PM by Agustín Aviles MD     CPAP has been ordered with O2 bleed           PLMD (periodic limb movement disorder)          History of Present Illness:       Smoking History:     Social History     Tobacco Use   Smoking Status Never Smoker   Smokeless Tobacco Never Used       Medications:   Current Outpatient Medications Ordered in Epic   Medication Sig Dispense Refill    albuterol (ACCUNEB) 0.63 mg/3 mL Nebu Take 3 mLs (0.63 mg total) by nebulization every 4 to 6 hours as needed (asthma). Rescue 75 mL 6    albuterol (PROVENTIL/VENTOLIN HFA) 90 mcg/actuation inhaler INHALE 1 TO 2 PUFFS BY MOUTH INTO THE LUNGS EVERY 4 TO 6 HOURS AS NEEDED FOR WHEEZING OR SHORTNESS OF BREATH 8.5 g 5    albuterol-ipratropium (DUO-NEB) 2.5 mg-0.5 mg/3 mL nebulizer solution Take 3 mLs by nebulization every 6 (six) hours as needed for Wheezing. Rescue 90 mL 0    amLODIPine (NORVASC) 10 MG tablet Take 1 tablet (10 mg total) by mouth once daily. 90 tablet 1    ascorbic acid, vitamin C, (VITAMIN C) 500 MG tablet Take 500 mg by mouth once daily.      calcium/magnesium/vit B comp (CALCIUM-MAGNESIUM-B COMPLEX ORAL) Take 1 tablet by mouth once daily at 6am.      doxycycline (MONODOX) 100 MG capsule Take 1 capsule (100 mg total) by mouth every 12 (twelve) hours. 20 capsule 1    famotidine (PEPCID AC) 10 MG tablet Take 1 tablet (10 mg total) by mouth 2 (two) times daily. 60 tablet 3    fluticasone furoate-vilanteroL (BREO ELLIPTA) 100-25 mcg/dose diskus inhaler INHALE 1 PUFF INTO THE LUNGS ONCE DAILY 60 each 5    hydroCHLOROthiazide (HYDRODIURIL) 12.5 MG Tab Take 1  tablet (12.5 mg total) by mouth once daily. 90 tablet 1    levocetirizine (XYZAL) 5 MG tablet Take 1 tablet (5 mg total) by mouth once daily. 90 tablet 1    montelukast (SINGULAIR) 10 mg tablet TAKE 1 TABLET(10 MG) BY MOUTH EVERY EVENING 90 tablet 1    mycophenolate (CELLCEPT) 500 mg Tab Take 2 tablets (1,000 mg total) by mouth 2 (two) times daily. 360 tablet 1    omega-3s/dha/epa/fish oil/D3 (VITAMIN-D + OMEGA-3 ORAL) Take 1 tablet by mouth once daily at 6am.      ondansetron (ZOFRAN-ODT) 4 MG TbDL Dissolve 1 tablet (4 mg total) by mouth every 6 (six) hours as needed (nausea). 90 tablet 0    predniSONE (DELTASONE) 10 MG tablet Take 1 tablet (10 mg total) by mouth once daily. 30 tablet 3    sertraline (ZOLOFT) 50 MG tablet Take 1 tablet (50 mg total) by mouth once daily. 90 tablet 1     No current Epic-ordered facility-administered medications on file.        Pulmonary History Questionnaire:    Pulmonary History  How many times have you been hospitalized in the last year as a result of lung problems?: 1  How many days were you in the hospital in the last year as a result of breathing difficulty?: 5  How many ER visits have you had in the past year as a result of breathing difficulty?: 1  Have you ever attended a pulmonary rehabilitation program?: No  Have you ever had any chest injuries or surgeries?: No  Do you have any physical limitations that may affect your ability to exercise?: other (comment) (weight)    Major Symptomatology  What are your symptoms today?: Can't walk far without getting out of breath, using Alb more, O2 sat low, using O2 more  What were your symptoms last year?: increased SOB, heart beating fast, feeling tired, able to be off O2 more  What were your symptoms 5 years ago?: had asthma, used inhaler,had bronchitis often, was still able to exercise without SOB  When did you realize that you had lung problems?: 10 yrs ago    Disease Impact  Do you sleep with your head elevated?:  Elevated  If elevated, how high?: 30 degrees  Do you awaken during the night?: No  Do your ankles ever swell up?: No  Do you cough?: Yes  What part of the day?: anytime  Do you cough up sputum?: Yes  When?: sometimes  Describe: white, sometimes yellow, thick, moderate amount  Have you ever coughed up blood?: No  Do you use oxygen?: Yes  How often?: continuously  Liters per minute: 2 l/min  Type of oxygen delivery system: O2 concentrator and tanks  Supplier: [facility]  Are you on any home respiratory theraphy?: Yes  Type: nebs and CPAP  How do you clean the equipment?: soap and water  Do you exercise?: No  Do you have exercise equipment?: Yes  Type: stationary bike  Has your physician limited your activities?: No    Depression PHQ:    Depression Patient Health Questionnaire (PHQ-9)  Over the last two weeks how often have you been bothered by little interest or pleasure in doing things: (P) Not at all  Over the last two weeks how often have you been bothered by feeling down, depressed or hopeless: (P) Not at all  Over the last two weeks how often have you been bothered by trouble falling or staying asleep, or sleeping too much: (P) Not at all  Over the last two weeks how often have you been bothered by feeling tired or having little energy: (P) Several days  Over the last two weeks how often have you been bothered by a poor appetite or overeating: (P) Several days  Over the last two weeks how often have you been bothered by feeling bad about yourself - or that you are a failure or have let yourself or your family down: (P) Not at all  Over the last two weeks how often have you been bothered by trouble concentrating on things, such as reading the newspaper or watching television: (P) Several days  Over the last two weeks how often have you been bothered by moving or speaking so slowly that other people could have noticed. Or the opposite - being so fidgety or restless that you have been moving around a lot more than  "usual.: (P) Not at all  Over the last two weeks how often have you been bothered by thoughts that you would be better off dead, or of hurting yourself: (P) Not at all  If you checked off any problems, how difficult have these problems made it for you to do your work, take care of things at home or get along with other people?: (P) Somewhat difficult  PHQ-9 Score: (P) 3  PHQ-9 Interpretation: (P) Minimal or None    Questionnaire Score:   Pulmonary Knowledge Test: 38  Rate Your Plate: 48  SF36 Physical Functionin  SF36 Mental Health: 52     Physical Health Summary  Your Score: 26  Your current score indicates that you are well below the general population of similar age and gender.  Compared to this population your health is:    Well Below the average in:  Overall physical functioning  Performance at work, home, or school due to physical problems  General health  Same or Better than the average in:  Ability to participate in activities due to bodily pain    Mental Health Summary  Your Score: 52  Your current score indicates that you are the same or better than the general population of similar age and gender.  Compared to this population your health is:    Well Below the average in:  Ability to participate in social activities  Same or Better than the average in:  Level of energy  Performance at work, home, or school due to emotional problems  ?  Progress    Self Evaluated Transition  Compared to one year ago, you rated your health in general as: Much worse    Pulmonary Function Test Data:     Date: 7/15/2022 FEV1: 46.9 FVC: 42.7     Six Minute Walk    Height: 5' 6" (167.6 cm) Weight: 115.7 kg (255 lb)  Performing RT: RB, RRT    Phase Oxygen Assessment Supplemental O2 Heart   Rate Blood Pressure Jessica Dyspnea Scale Rating   Resting 99 % 2 L/M 84 bpm  136/77 1   Exercise        Minute        1 82 % 2 L/M - 40% VM added 100 bpm     2 96 % 40% Venti Mask 12 L/M 110 bpm     3 92 % 40% Venti Mask 12 L/M 119 bpm     4 " 90 % 40% Venti Mask 12 L/M 128 bpm     5 85 % 40% Venti Mask 12 L/M (increased to 50%) 133 bpm     6  93 % 50% Venti Mask 15 L/M 127 bpm 131/70 7-8   Recovery        Minute        1 98 % 50% Venti Mask 15 L/M 117 bpm     2 99 % 50% Venti Mask 15 L/M 98 bpm     3 100 % 50% Venti Mask 15 L/M 102 bpm     4 100 % 50% Venti Mask 15 L/M 96 bpm 123/72 3       Six Minute Walk Summary       Total Time Walked (Calculated): 360 seconds  Total Distance Meters (Calculated): 403.86 meters  Predicted Distance Meters (Calculated): 449.42 meters  Percentage of Predicted (Calculated): 89.86  Peak VO2 (Calculated): 16.1  Mets: 4.6  Symptoms: lightheaded/tired          Fall Risk:  Fall Risk Assessment (every shift)  History Of Fall (W/I 3 Mos): N  Age Greater Than 65 Years: N  Dizziness/Vertigo: N    Individual Goal Review  Individual Goal Review  Are you exercising on a regular basis at home?: No  Dietary goal:: lose weight, eat more vegetables and fruit, eat more healthy, less sodas  Are you a diabetic?: No  Are you better able to manage your daily activities, i.e. grooming, bathing, cooking, etc.?: Yes  Are you smoke free?: Yes  Has your knowledge of stress management increased?: N/A  Do you have a positive support system in place?: Yes  Your short term goal was:: decrease tiredness, be able to walk without sob  Your long term goal was:: possibly wean from oxygen, get back in shape, be able to work, physically like used to. Be able to go trips    Education: Benefits of pulmonary rehab, what program consists of, pt expectations, compliance policy, questions answered. Pt expressed understanding.     Short Term Goals: Decrease tiredness, be able to walk without feeling breathless  Long Term Goals: Possibly wean off oxygen, get back in shape, be able to work and be physically active like she used to be  Dietary Goals: Lose weight, eat more fruits and vegetables, eat more healthy, drink less sodas      Checklist                                                                            Response  Chronic Stable Pulmonary Impairment                             Yes  Medical Regimen Optimized                                             Yes  PFT within 12 months                                                       Yes  Impaired Function due to Pulmonary Disease                 Yes  Motivated and Physically able to Participate                     Yes  Rehab likely to result in improvement                               Yes    Orders: Begin pulmonary rehabilitation 2 times a week, see exercise prescription.    [facility]  Pulmonary Rehab  Exercise Prescription    SUMMARY     General Guidelines     · Record Pulse, SPO2, and Blood Pressure before during and after exercise   · Hold exercise for: Oxygen saturation <90% despite supplemental Oxygen, S/Sx Exacerbation, Blood Pressure >180/95 or <80/60, Resting pulse 100>134 Max target heart rate  · Maintain SPO2>90% with exercise    Outpatient Exercises  Days per week: 2 Days  Minutes: 45    Home Exercise  Days per week: 2 Days  Minutes: 45    Exercise Prescription  Initial Exercise Prescription: Yes      Modality  Modality: Arm Ergometer, Hand Free Weights, Nustep      Arm Ergometer  Time: 10 minutes  Level: 1      Nustep  Time: 20 minutes  Steps: 1000  Load: 3      Biceps  lbs: 3 lbs (dumbbells)  Sets: 2  Reps: 10    Chest  lbs: 3 lbs (dumbbells)  Sets: 2  Reps: 10      I certify the patient is physically and psychologically able to participate in pulmonary rehabilitation and is medically stable.

## 2022-08-22 ENCOUNTER — PATIENT MESSAGE (OUTPATIENT)
Dept: PULMONOLOGY | Facility: CLINIC | Age: 52
End: 2022-08-22
Payer: COMMERCIAL

## 2022-08-22 ENCOUNTER — HOSPITAL ENCOUNTER (OUTPATIENT)
Dept: PULMONOLOGY | Facility: HOSPITAL | Age: 52
Discharge: HOME | End: 2022-08-22
Attending: INTERNAL MEDICINE
Payer: COMMERCIAL

## 2022-08-22 VITALS — WEIGHT: 253.31 LBS | BODY MASS INDEX: 40.88 KG/M2

## 2022-08-22 DIAGNOSIS — J98.4 RESTRICTIVE LUNG DISEASE: ICD-10-CM

## 2022-08-22 DIAGNOSIS — R76.8 ELEVATED RHEUMATOID FACTOR: ICD-10-CM

## 2022-08-22 DIAGNOSIS — J84.111 CHRONIC INTERSTITIAL PNEUMONIA: ICD-10-CM

## 2022-08-22 PROCEDURE — G0239 OTH RESP PROC, GROUP: HCPCS

## 2022-08-22 RX ORDER — PREDNISONE 10 MG/1
10 TABLET ORAL DAILY
Qty: 30 TABLET | Refills: 3 | Status: SHIPPED | OUTPATIENT
Start: 2022-08-22 | End: 2022-09-20 | Stop reason: SDUPTHER

## 2022-08-22 NOTE — PROGRESS NOTES
[facility]  Pulmonary Rehab  Session Summary    SUMMARY     Session Data  Session Number: 2  Time In: 1100  Time Out: 1200  Weight: 114.9 kg (253 lb 4.8 oz)  Target Heart Rate Zone: Minimum: 100 bpm  Target Heart Rate Zone: Maximum: 134 bpm  Patient Motivation: Excellent  Patient Effort: Excellent      Pre Exercise Vitals  SpO2: 93 %  Supplemental O2?: Yes  O2 Device: nasal cannula  O2 Flow (L/min): 4  Pulse: 110  BP: 122/63  Jessica Dyspnea Rating : 5-6      During Exercise Vitals  SpO2: 97 %  Supplemental O2?: Yes  O2 Device: nasal cannula  O2 Flow (L/min): 4  Pulse: 97  BP: 117/61  Jessica Dyspnea Rating : 4      Post Exercise Vitals  SpO2: 92 %  Supplemental O2?: Yes  O2 Device: nasal cannula  O2 Flow (L/min): 4  Pulse: 111  BP: 139/78  Jessica Dyspnea Rating : 4       Modality  Modality: Arm Ergometer, Hand Free Weights, Nustep, Treadmill      Arm Ergometer  Time: 10 minutes (.88 miles)  Level: 1 (1.6 mets)      Nustep  Time: 20 minutes  Steps: 1535  Load: 3  Mets: 2.2    Treadmill  Time: 5 minutes  MPH: 1.2 MPH  stGstrstastdstest:st st1st Biceps  lbs: 3 lbs (dumbbells)  Sets: 2  Reps: 10      Chest  lbs: 3 lbs (dumbbells)  Sets: 2  Reps: 10     Pulmonary Rehab COVID19 Screening Checklist    1. Are you having any of the following physical symptoms?   Yes [] No [x]      Fever or chills   Unexplained muscle or body aches   Cough   Shortness of breath or difficulty breathing   Fatigue   Headache   New loss of taste or smell   Sore throat   Congestion or runny nose   Nausea or vomiting   Diarrhea      2.  Have you come in close contact with anyone with the above symptoms or with known COVID19 in the last 14 days? Yes [] No [x]        3.  Have you tested positive for COVID19 in the last 30 days?   Yes [] No [x]         Education  Equipment use and safety  Pursed lip breathing  maximizing energy      Therapist Notes   Excellent effort. Today was pt's first exercise day. She completed all initial goals. Pt appears  to be motivated. Vitals were stable. NC 4lpm in use for session. Will continue to motivate and educate pt in rehab.    Dr. Aviles immediately available as needed.

## 2022-08-24 ENCOUNTER — HOSPITAL ENCOUNTER (OUTPATIENT)
Dept: PULMONOLOGY | Facility: HOSPITAL | Age: 52
Discharge: HOME | End: 2022-08-24
Attending: INTERNAL MEDICINE
Payer: COMMERCIAL

## 2022-08-24 PROCEDURE — G0239 OTH RESP PROC, GROUP: HCPCS

## 2022-08-25 VITALS — BODY MASS INDEX: 40.53 KG/M2 | WEIGHT: 251.13 LBS

## 2022-08-25 NOTE — PROGRESS NOTES
[facility]  Pulmonary Rehab  Session Summary    SUMMARY     Session Data  Session Number: 3  Time In: 1100  Time Out: 1200  Weight: 113.9 kg (251 lb 1.6 oz)  Target Heart Rate Zone: Minimum: 100 bpm  Target Heart Rate Zone: Maximum: 134 bpm  Patient Motivation: Excellent  Patient Effort: Excellent      Pre Exercise Vitals  SpO2: 96 %  Supplemental O2?: Yes  O2 Device: nasal cannula  O2 Flow (L/min): 4  Pulse: 101  BP: 132/75  Jessica Dyspnea Rating : 3      During Exercise Vitals  SpO2: 98 %  Supplemental O2?: Yes  O2 Device: nasal cannula  O2 Flow (L/min): 4  Pulse: 102  BP: 113/63  Jessica Dyspnea Rating : 3      Post Exercise Vitals  SpO2: 99 %  Supplemental O2?: Yes  O2 Device: nasal cannula  O2 Flow (L/min): 4  Pulse: 101  BP: 129/69  Jessica Dyspnea Rating : 3       Modality  Modality: Arm Ergometer, Hand Free Weights, Nustep, Recumbent Bike, Treadmill      Arm Ergometer  Time: 10 minutes (1.8 mets)  Level: 1 (.96 miles)      Nustep  Time: 20 minutes  Steps: 1670  Load: 3  Mets: 2.4      Recumbent Bike  Time: 5 minutes (.67 miles)  Level: 1  Mets: 2.1      Treadmill  Time: 5 minutes  MPH: 1.3 MPH  stGstrstastdstest:st st1st Biceps  lbs: 3 lbs (dumbbells)  Sets: 2  Reps: 10      Chest  lbs: 3 lbs (dumbbells)  Sets: 2  Reps: 10     Pulmonary Rehab COVID19 Screening Checklist    1. Are you having any of the following physical symptoms?   Yes [] No [x]      Fever or chills   Unexplained muscle or body aches   Cough   Shortness of breath or difficulty breathing   Fatigue   Headache   New loss of taste or smell   Sore throat   Congestion or runny nose   Nausea or vomiting   Diarrhea      2.  Have you come in close contact with anyone with the above symptoms or with known COVID19 in the last 14 days? Yes [] No [x]        3.  Have you tested positive for COVID19 in the last 30 days?   Yes [] No [x]       Education  Avoiding allergens and irritants  Tips for safe exercise      Therapist Notes   Excellent effort.  Introduced recumbent bike. Pt tolerated this and all exercises well. Vitals were stable. Pt is motivated and works hard in her session. Will continue to encourage and educate pt in rehab.    Dr. Aviles immediately available as needed.

## 2022-08-29 ENCOUNTER — HOSPITAL ENCOUNTER (OUTPATIENT)
Dept: PULMONOLOGY | Facility: HOSPITAL | Age: 52
Discharge: HOME | End: 2022-08-29
Attending: INTERNAL MEDICINE
Payer: COMMERCIAL

## 2022-08-29 VITALS — WEIGHT: 254.5 LBS | BODY MASS INDEX: 41.08 KG/M2

## 2022-08-29 PROCEDURE — G0239 OTH RESP PROC, GROUP: HCPCS

## 2022-08-29 NOTE — RESPIRATORY THERAPY
[facility]  Pulmonary Rehab  Session Summary    SUMMARY     Session Data  Session Number: 4  Time In: 1100  Time Out: 1200  Weight: 115.4 kg (254 lb 8 oz)  Target Heart Rate Zone: Minimum: 100 bpm  Target Heart Rate Zone: Maximum: 134 bpm  Patient Motivation: Excellent  Patient Effort: Excellent      Pre Exercise Vitals  SpO2: 94 %  Supplemental O2?: Yes  O2 Device: nasal cannula  O2 Flow (L/min): 4  Pulse: 120  BP: 134/65  Jessica Dyspnea Rating : 4      During Exercise Vitals  SpO2: 94 %  Supplemental O2?: Yes  O2 Device: nasal cannula  O2 Flow (L/min): 4  Pulse: 105  BP: 125/71  Jessica Dyspnea Rating : 3      Post Exercise Vitals  SpO2: 93 %  Supplemental O2?: Yes  O2 Device: nasal cannula  O2 Flow (L/min): 4  Pulse: 118  BP: 144/61  Jessica Dyspnea Rating : 2       Modality  Modality: Arm Ergometer, Hand Free Weights, Nustep, Recumbent Bike, Treadmill      Arm Ergometer  Time: 10 minutes (1.12 miles)  Level: 1.5 (1.9 mets)      Nustep  Time: 20 minutes  Steps: 1647  Load: 3  Mets: 2.3      Recumbent Bike  Time: 5 minutes (.63 miles)  Level: 2  Mets: 1.9      Treadmill  Time: 5 minutes  MPH: 1.3 MPH  stGstrstastdstest:st st1st Biceps  lbs: 3 lbs (dumbbells)  Sets: 2  Reps: 10      Chest  lbs: 3 lbs (dumbbells)  Sets: 2  Reps: 10    Pulmonary Rehab COVID19 Screening Checklist    1. Are you having any of the following physical symptoms?   Yes [] No [x]     Fever or chills  Unexplained muscle or body aches  Cough  Shortness of breath or difficulty breathing  Fatigue  Headache  New loss of taste or smell  Sore throat  Congestion or runny nose  Nausea or vomiting  Diarrhea      2.  Have you come in close contact with anyone with the above symptoms or with known COVID19 in the last 14 days? Yes [] No [x]        3.  Have you tested positive for COVID19 in the last 30 days?   Yes [] No [x]        Education  Preventing lung infections  Lessons learned with COPD      Therapist Notes   Excellent effort. Pt was more SOB today. She  did well though and completed all exercises as charted above. Increased to level 2 on recumbent bike for 5 minutes and increased to level 1.5 on arm ergometer. She tolerated all changes and exercises well. Pt desaturates to low 80's with exercise but recovers into the low 90's with rest. Will continue to motivate and educate pt in rehab.    Dr. Thompson immediately available as needed.

## 2022-08-31 ENCOUNTER — HOSPITAL ENCOUNTER (OUTPATIENT)
Dept: PULMONOLOGY | Facility: HOSPITAL | Age: 52
Discharge: HOME | End: 2022-08-31
Attending: INTERNAL MEDICINE
Payer: COMMERCIAL

## 2022-08-31 VITALS — WEIGHT: 253.5 LBS | BODY MASS INDEX: 40.92 KG/M2

## 2022-08-31 PROCEDURE — G0239 OTH RESP PROC, GROUP: HCPCS

## 2022-08-31 NOTE — PROGRESS NOTES
[facility]  Pulmonary Rehab  Session Summary    SUMMARY     Session Data  Session Number: 5  Time In: 1100  Time Out: 1200  Weight: 115 kg (253 lb 8 oz)  Target Heart Rate Zone: Minimum: 100 bpm  Target Heart Rate Zone: Maximum: 134 bpm  Patient Motivation: Excellent  Patient Effort: Excellent      Pre Exercise Vitals  SpO2: 95 %  Supplemental O2?: Yes  O2 Device: nasal cannula  O2 Flow (L/min): 3  Pulse: 128  BP: 133/70  Jessica Dyspnea Rating : 4      During Exercise Vitals  SpO2: 92 %  Supplemental O2?: Yes  O2 Device: nasal cannula  O2 Flow (L/min): 3  Pulse: 110  BP: 119/66  Jessica Dyspnea Rating : 3      Post Exercise Vitals  SpO2: 91 %  Supplemental O2?: Yes  O2 Device: nasal cannula  O2 Flow (L/min): 3  Pulse: 109  BP: 128/70  Jessica Dyspnea Rating : 3       Modality  Modality: Arm Ergometer, Hand Free Weights, Nustep, Treadmill    Arm Ergometer  Time: 11 minutes (2.3 mets)  Level: 1.5 (1.16 miles)      Nustep  Time: 21 minutes  Steps: 1651  Load: 4  Mets: 2.6      Treadmill  Time: 5 minutes  MPH: 1.4 MPH  ndGndrndanddndend:nd nd2nd Biceps  lbs: 3 lbs (dumbbells)  Sets: 2  Reps: 10      Chest  lbs: 3 lbs (dumbbells)  Sets: 2  Reps: 10    Pulmonary Rehab COVID19 Screening Checklist    1. Are you having any of the following physical symptoms?   Yes [] No [x]     Fever or chills  Unexplained muscle or body aches  Cough  Shortness of breath or difficulty breathing  Fatigue  Headache  New loss of taste or smell  Sore throat  Congestion or runny nose  Nausea or vomiting  Diarrhea      2.  Have you come in close contact with anyone with the above symptoms or with known COVID19 in the last 14 days? Yes [] No [x]        3.  Have you tested positive for COVID19 in the last 30 days?   Yes [] No [x]      Education  Lessons learned with COPD  Preventing lung infections      Therapist Notes   Excellent effort. Increased to 1.4 mph and incline of 1 on treadmill. Increased to load 4 on Nustep for 21 minutes, achieved 1651 steps.  Also increased time on arm ergometer to 11 minutes on level 1.5. Pt tolerated all changes and exercises well. Will continue to motivate and educate pt in rehab.    Dr. Garcia immediately available as needed.

## 2022-09-07 ENCOUNTER — PATIENT OUTREACH (OUTPATIENT)
Dept: PULMONOLOGY | Facility: CLINIC | Age: 52
End: 2022-09-07
Payer: COMMERCIAL

## 2022-09-07 NOTE — PROGRESS NOTES
[facility]  Pulmonary Rehab  30 Day Evaluation and Recertification    SUMMARY       Summary of Progress: Ayanna Alicia has been doing excellent in rehab. She is increasing her exercise tolerance and will continue to benefit from pulmonary rehab. Pt works hard in her sessions and is dedicated and committed to rehab. She participates in educational topics. Pt is exercising at home, outside of rehab in hopes of achieving the most benefit from the program. Will continue to motivate and educate pt while striving to increase her strength and endurance in rehab.    Attends:     Patient attends 2 days a week and has completed 5 visits.  The patient's current compliance is 100%.    Referring provider:  Dr. Aviles    Start date:  8/17/22    Problems this Certification Period:   none    Exercise Capacity Summary          Nustep Date:    8/22/22 Time Load Steps Date:  8/31/22 Time Load Steps     20 3 1535  21 4 1651   Recumbent Bike Date:  8/24/22  (0.67 miles     Time Level Date:  8/29/22  (0.63 miles) Time Level     5 1  5 2   Treadmill Date:    8/22/22 Time Speed Grade Date:  8/31/22 Time Speed Grade     5 1.2 0  5 1.4 1   Arm Ergometer Date:    8/22/22  (0.88 miles) Time Level Date:  8/31/22  (1.16 miles) Time Level     10 1  11 1.5   Free Weights Date:    8/22/22  (Dumb) Bicep Curls  Chest Press  Triceps  Legs lbs Sets Reps Date:  8/31/22  (Dumb) Bicep Curls  Chest Press  Triceps  Legs lbs Sets Reps      3 2 10   3 2 10                                                                       Education:  Equipment use and safety  Proper heart rate zone  Pursed lip breathing  Maximizing energy  Benefits of rehab and exercise    Goals:  met    Updated Exercise Prescription:  Endurance Training: Treadmill 7 minutes, 1.4 mph, incline 1  Strength Training: Arm ergometer, level 2, 10 minutes         I certify that the patient is making progress in pulmonary rehabilitation, is physically able to participate,  medically stable and remains motivated.

## 2022-09-07 NOTE — TELEPHONE ENCOUNTER
Chronic Disease Management  Called patient to complete Pulmonary Disease Management Questionnaire.    No answer. Left message.

## 2022-09-08 ENCOUNTER — HOSPITAL ENCOUNTER (OUTPATIENT)
Dept: PULMONOLOGY | Facility: HOSPITAL | Age: 52
Discharge: HOME | End: 2022-09-08
Attending: INTERNAL MEDICINE
Payer: COMMERCIAL

## 2022-09-08 VITALS — BODY MASS INDEX: 40.95 KG/M2 | WEIGHT: 253.69 LBS

## 2022-09-08 PROCEDURE — G0239 OTH RESP PROC, GROUP: HCPCS

## 2022-09-08 NOTE — PROGRESS NOTES
[facility]  Pulmonary Rehab  Session Summary    SUMMARY     Session Data  Session Number: 6  Time In: 1430  Time Out: 1530  Weight: 115.1 kg (253 lb 11.2 oz)  Target Heart Rate Zone: Minimum: 100 bpm  Target Heart Rate Zone: Maximum: 134 bpm  Patient Motivation: Excellent  Patient Effort: Excellent      Pre Exercise Vitals  SpO2: 94 %  Supplemental O2?: Yes  O2 Device: nasal cannula  O2 Flow (L/min): 4  Pulse: 115  BP: 131/64  Jessica Dyspnea Rating : 5-6      During Exercise Vitals  SpO2: 97 %  Supplemental O2?: Yes  O2 Device: nasal cannula  O2 Flow (L/min): 4  Pulse: 111  BP: 121/60  Jessica Dyspnea Rating : 3      Post Exercise Vitals  SpO2: 98 %  Supplemental O2?: Yes  O2 Device: nasal cannula  O2 Flow (L/min): 4  Pulse: 120  BP: 115/66  Jessica Dyspnea Rating : 4       Modality  Modality: Arm Ergometer, Hand Free Weights, Nustep, Recumbent Bike, Treadmill      Arm Ergometer  Time: 10 minutes (1.7 mets)  Level: 2 (.98 miles)      Nustep  Time: 21 minutes  Steps: 1700  Load: 4  Mets: 2.6      Recumbent Bike  Time: 6 minutes (1 mile)  Level: 2  Mets: 2.5      Treadmill  Time: 7 minutes  MPH: 1.4 MPH  ndGndrndanddndend:nd nd2nd Biceps  lbs: 3 lbs (dumbbells)  Sets: 2  Reps: 10      Chest  lbs: 3 lbs (dumbbells)  Sets: 2  Reps: 10    Pulmonary Rehab COVID19 Screening Checklist    1. Are you having any of the following physical symptoms?   Yes [] No [x]     Fever or chills  Unexplained muscle or body aches  Cough  Shortness of breath or difficulty breathing  Fatigue  Headache  New loss of taste or smell  Sore throat  Congestion or runny nose  Nausea or vomiting  Diarrhea      2.  Have you come in close contact with anyone with the above symptoms or with known COVID19 in the last 14 days? Yes [] No [x]        3.  Have you tested positive for COVID19 in the last 30 days?   Yes [] No [x]        Education  Compliance in rehab, with O2 and CPAP      Therapist Notes   Excellent effort. Increased to 7 minutes on treadmill with  incline of 1 and 1.4 mph. Also increased to level 2 on arm ergometer and increased time on recumbent bike to 6 mins on level 2. She tolerated changes and exercises well. Will continue to motivate and educate pt in rehab.    Dr. Love immediately available as needed.

## 2022-09-12 NOTE — PROGRESS NOTES
"Subjective:      Ayanna Alicia is a 51 y.o. female, here today for follow-up of:  BIOMETRIC SCREENING        PCP: Madeleine Enrique MD --- last visit 3/24/2022  The patient's last visit with me was on 2/23/2022.      HPI:    Ayanna is here today for work/insurance biometric screening.  Does bring in form today for review and completion when labs done.      Review of Systems   Constitutional:  Positive for fatigue. Negative for activity change, appetite change, chills, diaphoresis and fever.        Reports trying to eat healthier, "I'm eating more greens".  No smoking or alcohol intake reported.   HENT: Negative.     Eyes:  Negative for photophobia, pain and visual disturbance.   Respiratory:  Positive for chest tightness and shortness of breath. Negative for cough and wheezing.         Currently in Pulm Rehab.  Doing better, resp s/s fluctuate.  Is on oxygen per orders.   Cardiovascular:  Negative for chest pain, palpitations and leg swelling.   Gastrointestinal:  Negative for abdominal distention, abdominal pain, anal bleeding, blood in stool, constipation, diarrhea, nausea, rectal pain and vomiting.   Endocrine: Negative for polydipsia, polyphagia and polyuria.   Genitourinary: Negative.    Musculoskeletal:  Negative for gait problem and joint swelling.   Skin:  Negative for rash and wound.   Neurological:  Negative for dizziness, tremors, seizures, syncope, speech difficulty, light-headedness and headaches.   Hematological:  Negative for adenopathy. Does not bruise/bleed easily.     Review of patient's allergies indicates:   Allergen Reactions    Doxycycline Nausea Only     Other reaction(s): Nausea    Fluticasone Other (See Comments)     Other reaction(s): Epistaxis      Penicillins      Other reaction(s): unknown  Pt tolerated ceftriaxone       Patient Active Problem List   Diagnosis    HTN (hypertension)    Multinodular goiter    Asthma    Seasonal allergies    Morbid obesity with BMI of 40.0-44.9, adult    " GERD (gastroesophageal reflux disease)    Vitamin D deficiency    Surgical menopause    Patella-femoral syndrome    Localized osteoarthrosis not specified whether primary or secondary, lower leg    Benign colon polyp    Chronic interstitial pneumonia    GLENN (obstructive sleep apnea)    Chronic respiratory failure with hypoxia    Elevated IgE level    Elevated rheumatoid factor    Restrictive lung disease    PLMD (periodic limb movement disorder)         Current Outpatient Medications:     albuterol (ACCUNEB) 0.63 mg/3 mL Nebu, Take 3 mLs (0.63 mg total) by nebulization every 4 to 6 hours as needed (asthma). Rescue, Disp: 75 mL, Rfl: 6    albuterol (PROVENTIL/VENTOLIN HFA) 90 mcg/actuation inhaler, INHALE 1 TO 2 PUFFS BY MOUTH INTO THE LUNGS EVERY 4 TO 6 HOURS AS NEEDED FOR WHEEZING OR SHORTNESS OF BREATH, Disp: 8.5 g, Rfl: 5    albuterol-ipratropium (DUO-NEB) 2.5 mg-0.5 mg/3 mL nebulizer solution, Take 3 mLs by nebulization every 6 (six) hours as needed for Wheezing. Rescue, Disp: 90 mL, Rfl: 0    amLODIPine (NORVASC) 10 MG tablet, Take 1 tablet (10 mg total) by mouth once daily., Disp: 90 tablet, Rfl: 1    ascorbic acid, vitamin C, (VITAMIN C) 500 MG tablet, Take 500 mg by mouth once daily., Disp: , Rfl:     calcium/magnesium/vit B comp (CALCIUM-MAGNESIUM-B COMPLEX ORAL), Take 1 tablet by mouth once daily at 6am., Disp: , Rfl:     doxycycline (MONODOX) 100 MG capsule, Take 1 capsule (100 mg total) by mouth every 12 (twelve) hours., Disp: 20 capsule, Rfl: 1    fluticasone furoate-vilanteroL (BREO ELLIPTA) 100-25 mcg/dose diskus inhaler, INHALE 1 PUFF INTO THE LUNGS ONCE DAILY, Disp: 60 each, Rfl: 5    hydroCHLOROthiazide (HYDRODIURIL) 12.5 MG Tab, Take 1 tablet (12.5 mg total) by mouth once daily., Disp: 90 tablet, Rfl: 1    levocetirizine (XYZAL) 5 MG tablet, Take 1 tablet (5 mg total) by mouth once daily., Disp: 90 tablet, Rfl: 1    montelukast (SINGULAIR) 10 mg tablet, TAKE 1 TABLET(10 MG) BY MOUTH EVERY EVENING,  "Disp: 90 tablet, Rfl: 1    mycophenolate (CELLCEPT) 500 mg Tab, Take 2 tablets (1,000 mg total) by mouth 2 (two) times daily., Disp: 360 tablet, Rfl: 1    omega-3s/dha/epa/fish oil/D3 (VITAMIN-D + OMEGA-3 ORAL), Take 1 tablet by mouth once daily at 6am., Disp: , Rfl:     ondansetron (ZOFRAN-ODT) 4 MG TbDL, Dissolve 1 tablet (4 mg total) by mouth every 6 (six) hours as needed (nausea)., Disp: 90 tablet, Rfl: 0    predniSONE (DELTASONE) 10 MG tablet, Take 1 tablet (10 mg total) by mouth once daily., Disp: 30 tablet, Rfl: 3    sertraline (ZOLOFT) 50 MG tablet, TAKE 1 TABLET(50 MG) BY MOUTH EVERY DAY, Disp: 90 tablet, Rfl: 1      Past medical, surgical, family and social histories have been reviewed today.      Objective:     Vitals:    09/13/22 1440   BP: 108/70   Pulse: 110   Temp: 98.1 °F (36.7 °C)   SpO2: (!) 93%   Weight: 114.4 kg (252 lb 1.5 oz)   Height: 5' 6" (1.676 m)       Physical Exam  Vitals reviewed.   Constitutional:       General: She is not in acute distress.     Appearance: She is not ill-appearing or diaphoretic.   Eyes:      Pupils: Pupils are equal, round, and reactive to light.   Cardiovascular:      Rate and Rhythm: Regular rhythm. Tachycardia present.      Pulses: Normal pulses.      Heart sounds: Normal heart sounds. No murmur heard.    No gallop.   Pulmonary:      Effort: Pulmonary effort is normal. No respiratory distress.      Breath sounds: No rhonchi or rales.   Chest:      Chest wall: No tenderness.   Musculoskeletal:         General: Normal range of motion.      Cervical back: Normal range of motion and neck supple. No rigidity.      Right lower leg: No edema.      Left lower leg: No edema.   Skin:     Capillary Refill: Capillary refill takes less than 2 seconds.   Neurological:      Mental Status: She is alert and oriented to person, place, and time. Mental status is at baseline.   Psychiatric:         Mood and Affect: Mood normal.         Behavior: Behavior normal.         Thought " Content: Thought content normal.         Judgment: Judgment normal.         Lab Results   Component Value Date    CHOL 201 (H) 10/14/2021    CHOL 212 (H) 09/22/2020    CHOL 201 (H) 08/13/2018     Lab Results   Component Value Date    HDL 53 10/14/2021    HDL 45 09/22/2020    HDL 47 08/13/2018     Lab Results   Component Value Date    LDLCALC 134.8 10/14/2021    LDLCALC 149.4 09/22/2020    LDLCALC 142.8 08/13/2018     Lab Results   Component Value Date    TRIG 66 10/14/2021    TRIG 88 09/22/2020    TRIG 56 08/13/2018     Lab Results   Component Value Date    CHOLHDL 26.4 10/14/2021    CHOLHDL 21.2 09/22/2020    CHOLHDL 23.4 08/13/2018     CMP  Sodium   Date Value Ref Range Status   07/20/2022 139 136 - 145 mmol/L Final     Potassium   Date Value Ref Range Status   07/20/2022 4.3 3.5 - 5.1 mmol/L Final     Chloride   Date Value Ref Range Status   07/20/2022 102 95 - 110 mmol/L Final     CO2   Date Value Ref Range Status   07/20/2022 27 23 - 29 mmol/L Final     Glucose   Date Value Ref Range Status   07/20/2022 93 70 - 110 mg/dL Final     BUN   Date Value Ref Range Status   07/20/2022 12 6 - 20 mg/dL Final     Creatinine   Date Value Ref Range Status   07/20/2022 0.9 0.5 - 1.4 mg/dL Final     Calcium   Date Value Ref Range Status   07/20/2022 9.6 8.7 - 10.5 mg/dL Final     Total Protein   Date Value Ref Range Status   07/20/2022 7.3 6.0 - 8.4 g/dL Final     Albumin   Date Value Ref Range Status   07/20/2022 4.1 3.5 - 5.2 g/dL Final     Total Bilirubin   Date Value Ref Range Status   07/20/2022 0.3 0.1 - 1.0 mg/dL Final     Comment:     For infants and newborns, interpretation of results should be based  on gestational age, weight and in agreement with clinical  observations.    Premature Infant recommended reference ranges:  Up to 24 hours.............<8.0 mg/dL  Up to 48 hours............<12.0 mg/dL  3-5 days..................<15.0 mg/dL  6-29 days.................<15.0 mg/dL       Alkaline Phosphatase   Date Value  Ref Range Status   07/20/2022 105 55 - 135 U/L Final     AST   Date Value Ref Range Status   07/20/2022 23 10 - 40 U/L Final     ALT   Date Value Ref Range Status   07/20/2022 15 10 - 44 U/L Final     Anion Gap   Date Value Ref Range Status   07/20/2022 10 8 - 16 mmol/L Final     eGFR if    Date Value Ref Range Status   07/20/2022 >60 >60 mL/min/1.73 m^2 Final     eGFR if non    Date Value Ref Range Status   07/20/2022 >60 >60 mL/min/1.73 m^2 Final     Comment:     Calculation used to obtain the estimated glomerular filtration  rate (eGFR) is the CKD-EPI equation.            Diagnosis/Assessment:     1. Encounter for biometric screening     Plan:     Able to use lipids and glucose from labs as above.  Form completed and faxed.    Follow-Up:     RTC as directed and/or prn.      KEVEN Vegas  [facility]       Patient Care Team:  Madeleine Enrique MD as PCP - General (Family Medicine)      Future Appointments   Date Time Provider Department Center   9/14/2022 11:00 AM PULMONARY REHAB, ON[facility] ON[facility] PUL[facility]   9/19/2022 11:00 AM PULMONARY REHAB, ON[facility] ON[facility] PUL[facility]   9/20/2022 12:30 PM CARDIOVASCULAR 2, [facility] SPECCPR [facility]   9/20/2022  1:30 PM [facility] CT1 LIMIT 500 LBS [facility] CT SCAN [place]   9/20/2022  2:00 PM Agustín Aviles MD [facility] PULMSVC [facility]   9/21/2022 11:00 AM PULMONARY REHAB, ONLH ONLH PULRE[facility]   9/26/2022 11:00 AM PULMONARY REHAB, ON[facility] ON[facility] PUL[facility]   9/28/2022 11:00 AM PULMONARY REHAB, ON[facility] ON[facility] PUL[facility]   10/3/2022 11:00 AM PULMONARY REHAB, ON[facility] ON[facility] PUL[facility]   10/10/2022 11:00 AM PULMONARY REHAB, ONLH ONLH PULREHB [facility]   10/12/2022 11:00 AM PULMONARY REHAB, ON[facility] ON[facility] PUL[facility]   10/17/2022 11:00 AM PULMONARY REHAB, ONL ONL PULREHB [facility]   10/18/2022  9:00 AM [facility] CT1 LIMIT 500 LBS [facility] CT SCAN [place]   10/18/2022  9:30 AM LABORATORY, [facility] LAB [facility]  [place]   10/19/2022 11:00 AM PULMONARY REHAB, ONLH ONLH PULREHB [facility]   10/24/2022 11:00 AM PULMONARY REHAB, ONLH ONLH PULREHB [facility]   10/26/2022 11:00 AM PULMONARY REHAB, ONLH ONLH PULREHB [facility]   10/27/2022 10:00 AM Chicho Lewis MD ON[facility] RHEU  [facility]   10/31/2022 11:00 AM PULMONARY REHAB, ONLH ONLH PULREHB [facility]   11/2/2022  9:00 AM PULMONARY DISEASE MANAGEMENT ONLC ONLC PULMFS  [facility]   11/2/2022 11:00 AM PULMONARY REHAB, ONLH ONLH PULREHB [facility]   11/7/2022 11:00 AM PULMONARY REHAB, ONLH ONLH PULREHB [facility]   11/9/2022 11:00 AM PULMONARY REHAB, ONLH ONLH PULREHB [facility]   11/14/2022 11:00 AM PULMONARY REHAB, ONLH ONLH PULREHB [facility]   11/16/2022 11:00 AM PULMONARY REHAB, ONLH ONLH PULREHB [facility]   11/21/2022 11:00 AM PULMONARY REHAB, ONLH ONLH PULREHB [facility]   11/23/2022 11:00 AM PULMONARY REHAB, ONLH ONLH PULREHB [facility]   11/28/2022 11:00 AM PULMONARY REHAB, ONLH ONLH PULREHB [facility]   11/30/2022 11:00 AM PULMONARY REHAB, ONLH ONLH PULREHB [facility]   12/5/2022 11:00 AM PULMONARY REHAB, ONLH ONLH PULREHB [facility]   12/7/2022 11:00 AM PULMONARY REHAB, ONLH ONLH PULREHB [facility]   12/12/2022 11:00 AM PULMONARY REHAB, ONLH ONLH PULREHB [facility]   12/14/2022 11:00 AM PULMONARY REHAB, ONLH ONLH PULREHB [facility]   12/19/2022 11:00 AM PULMONARY REHAB, ON ONL PULREHB [facility]   12/21/2022 11:00 AM PULMONARY REHAB, ON ON PULREHB [facility]   1/17/2023  2:20 PM Madeleine Enrique MD [facility]   1/18/2023 10:00 AM Rajeev Castillo MD [facility] CARDIO [facility]         20 minutes of total time spent on the encounter, which includes face to face time and non-face to face time preparing to see the patient (eg, review of tests), obtaining and/or reviewing separately obtained history, and documenting clinical information in the electronic or other health record.  Also includes independent interpretation of results (not separately reported) and communicating results to the patient/family/caregiver, with care  coordination (not separately reported).

## 2022-09-13 ENCOUNTER — OFFICE VISIT (OUTPATIENT)
Dept: FAMILY MEDICINE | Facility: CLINIC | Age: 52
End: 2022-09-13
Payer: COMMERCIAL

## 2022-09-13 VITALS
HEIGHT: 66 IN | OXYGEN SATURATION: 93 % | HEART RATE: 110 BPM | WEIGHT: 252.13 LBS | SYSTOLIC BLOOD PRESSURE: 108 MMHG | DIASTOLIC BLOOD PRESSURE: 70 MMHG | BODY MASS INDEX: 40.52 KG/M2 | TEMPERATURE: 98 F

## 2022-09-13 DIAGNOSIS — Z00.8 ENCOUNTER FOR BIOMETRIC SCREENING: Primary | ICD-10-CM

## 2022-09-13 PROCEDURE — 3078F DIAST BP <80 MM HG: CPT | Mod: CPTII,S$GLB,, | Performed by: REGISTERED NURSE

## 2022-09-13 PROCEDURE — 3074F SYST BP LT 130 MM HG: CPT | Mod: CPTII,S$GLB,, | Performed by: REGISTERED NURSE

## 2022-09-13 PROCEDURE — 99999 PR PBB SHADOW E&M-EST. PATIENT-LVL IV: CPT | Mod: PBBFAC,,, | Performed by: REGISTERED NURSE

## 2022-09-13 PROCEDURE — 99999 PR PBB SHADOW E&M-EST. PATIENT-LVL IV: ICD-10-PCS | Mod: PBBFAC,,, | Performed by: REGISTERED NURSE

## 2022-09-13 PROCEDURE — 3074F PR MOST RECENT SYSTOLIC BLOOD PRESSURE < 130 MM HG: ICD-10-PCS | Mod: CPTII,S$GLB,, | Performed by: REGISTERED NURSE

## 2022-09-13 PROCEDURE — 99429 UNLISTED PREVENTIVE SERVICE: CPT | Mod: S$GLB,,, | Performed by: REGISTERED NURSE

## 2022-09-13 PROCEDURE — 99429: ICD-10-PCS | Mod: S$GLB,,, | Performed by: REGISTERED NURSE

## 2022-09-13 PROCEDURE — 3008F BODY MASS INDEX DOCD: CPT | Mod: CPTII,S$GLB,, | Performed by: REGISTERED NURSE

## 2022-09-13 PROCEDURE — 3078F PR MOST RECENT DIASTOLIC BLOOD PRESSURE < 80 MM HG: ICD-10-PCS | Mod: CPTII,S$GLB,, | Performed by: REGISTERED NURSE

## 2022-09-13 PROCEDURE — 3008F PR BODY MASS INDEX (BMI) DOCUMENTED: ICD-10-PCS | Mod: CPTII,S$GLB,, | Performed by: REGISTERED NURSE

## 2022-09-14 ENCOUNTER — HOSPITAL ENCOUNTER (OUTPATIENT)
Dept: PULMONOLOGY | Facility: HOSPITAL | Age: 52
Discharge: HOME | End: 2022-09-14
Attending: INTERNAL MEDICINE
Payer: COMMERCIAL

## 2022-09-14 VITALS — WEIGHT: 251.38 LBS | BODY MASS INDEX: 40.58 KG/M2

## 2022-09-14 PROCEDURE — G0239 OTH RESP PROC, GROUP: HCPCS

## 2022-09-14 NOTE — PROGRESS NOTES
[facility]  Pulmonary Rehab  Session Summary    SUMMARY     Session Data  Session Number: 7  Time In: 1100  Time Out: 1200  Weight: 114 kg (251 lb 6.4 oz)  Target Heart Rate Zone: Minimum: 100 bpm  Target Heart Rate Zone: Maximum: 134 bpm  Patient Motivation: Excellent  Patient Effort: Excellent      Pre Exercise Vitals  SpO2: 95 %  Supplemental O2?: Yes  O2 Device: nasal cannula  O2 Flow (L/min): 4  Pulse: 110  BP: 115/60  Jessica Dyspnea Rating : 4      During Exercise Vitals  SpO2: 95 %  Supplemental O2?: Yes  O2 Device: nasal cannula  O2 Flow (L/min): 4  Pulse: 105  BP: 109/57  Jessica Dyspnea Rating : 3      Post Exercise Vitals  SpO2: 97 %  Supplemental O2?: Yes  O2 Device: nasal cannula  O2 Flow (L/min): 4  Pulse: 106  BP: 118/67  Jessica Dyspnea Rating : 4       Modality  Modality: Arm Ergometer, Hand Free Weights, Nustep, Treadmill      Arm Ergometer  Time: 10 minutes (2.4 mets)  Level: 2 (1.14 miles)      Nustep  Time: 20 minutes  Steps: 1600  Load: 4  Mets: 2.5      Treadmill  Time: 7 minutes  MPH: 1.2 MPH  ndGndrndanddndend:nd nd2nd Biceps  lbs: 3 lbs (dumbbells)  Sets: 2  Reps: 10      Chest  lbs: 3 lbs (dumbbells)  Sets: 2  Reps: 10    Pulmonary Rehab COVID19 Screening Checklist    1. Are you having any of the following physical symptoms?   Yes [] No [x]     Fever or chills  Unexplained muscle or body aches  Cough  Shortness of breath or difficulty breathing  Fatigue  Headache  New loss of taste or smell  Sore throat  Congestion or runny nose  Nausea or vomiting  Diarrhea      2.  Have you come in close contact with anyone with the above symptoms or with known COVID19 in the last 14 days? Yes [] No [x]        3.  Have you tested positive for COVID19 in the last 30 days?   Yes [] No [x]        Education  Maximizing energy      Therapist Notes   Excellent effort. Pt tolerated all exercises charted above well. BP lower today than usual. Pt advised to monitor it at home and contact PCP if stay low. She did not  feel well today but worked through it. Will continue to motivate and educate pt in rehab.    Dr. Aviles immediately available as needed.

## 2022-09-19 ENCOUNTER — HOSPITAL ENCOUNTER (OUTPATIENT)
Dept: PULMONOLOGY | Facility: HOSPITAL | Age: 52
Discharge: HOME | End: 2022-09-19
Attending: INTERNAL MEDICINE
Payer: COMMERCIAL

## 2022-09-19 VITALS — WEIGHT: 252.69 LBS | BODY MASS INDEX: 40.79 KG/M2

## 2022-09-19 PROCEDURE — G0239 OTH RESP PROC, GROUP: HCPCS

## 2022-09-19 NOTE — PROGRESS NOTES
[facility]  Pulmonary Rehab  Session Summary    SUMMARY     Session Data  Session Number: 8  Time In: 1100  Time Out: 1200  Weight: 114.6 kg (252 lb 11.2 oz)  Target Heart Rate Zone: Minimum: 100 bpm  Target Heart Rate Zone: Maximum: 134 bpm  Patient Motivation: Excellent  Patient Effort: Excellent      Pre Exercise Vitals  SpO2: 94 %  Supplemental O2?: Yes  O2 Device: nasal cannula  O2 Flow (L/min): 3  Pulse: 105  BP: 132/60  Jessica Dyspnea Rating : 4      During Exercise Vitals  SpO2: 93 %  Supplemental O2?: Yes  O2 Device: nasal cannula  O2 Flow (L/min): 3  Pulse: 102  BP: 127/72  Jessica Dyspnea Rating : 3      Post Exercise Vitals  SpO2: 92 %  Supplemental O2?: Yes  O2 Device: nasal cannula  O2 Flow (L/min): 3  Pulse: 109  BP: 130/66  Jessica Dyspnea Rating : 3       Modality  Modality: Arm Ergometer, Hand Free Weights, Nustep, Treadmill      Arm Ergometer  Time: 12 minutes (2 mets)  Level: 2 (1.21 miles)    Nustep  Time: 20 minutes  Steps: 1606  Load: 4  Mets: 2.3      Treadmill  Time: 7 minutes  MPH: 1.2 MPH  stGstrstastdstest:st st1st Biceps  lbs: 3 lbs (dumbbells)  Sets: 2  Reps: 10      Chest  lbs: 3 lbs (dumbbells)  Sets: 2  Reps: 10    Pulmonary Rehab COVID19 Screening Checklist    1. Are you having any of the following physical symptoms?   Yes [] No [x]     Fever or chills  Unexplained muscle or body aches  Cough  Shortness of breath or difficulty breathing  Fatigue  Headache  New loss of taste or smell  Sore throat  Congestion or runny nose  Nausea or vomiting  Diarrhea      2.  Have you come in close contact with anyone with the above symptoms or with known COVID19 in the last 14 days? Yes [] No [x]        3.  Have you tested positive for COVID19 in the last 30 days?   Yes [] No [x]      Education  Proper nutrition and breathing      Therapist Notes   Excellent effort. Increased time on arm ergometer, level 2 to 12 mins. Pt tolerated all exercises well. Pt exercised at home this past weekend by doing weights  and riding stationary bike. Will continue to motivate and educate pt in rehab.    Dr. Aviles immediately available as needed.

## 2022-09-20 ENCOUNTER — HOSPITAL ENCOUNTER (OUTPATIENT)
Dept: CARDIOLOGY | Facility: HOSPITAL | Age: 52
Discharge: HOME | End: 2022-09-20
Attending: INTERNAL MEDICINE
Payer: COMMERCIAL

## 2022-09-20 ENCOUNTER — OFFICE VISIT (OUTPATIENT)
Dept: PULMONOLOGY | Facility: CLINIC | Age: 52
End: 2022-09-20
Payer: COMMERCIAL

## 2022-09-20 ENCOUNTER — OFFICE VISIT (OUTPATIENT)
Dept: OTOLARYNGOLOGY | Facility: CLINIC | Age: 52
End: 2022-09-20
Payer: COMMERCIAL

## 2022-09-20 ENCOUNTER — HOSPITAL ENCOUNTER (OUTPATIENT)
Dept: RADIOLOGY | Facility: HOSPITAL | Age: 52
Discharge: HOME | End: 2022-09-20
Attending: INTERNAL MEDICINE
Payer: COMMERCIAL

## 2022-09-20 ENCOUNTER — HOSPITAL ENCOUNTER (OUTPATIENT)
Dept: PULMONOLOGY | Facility: HOSPITAL | Age: 52
Discharge: HOME | End: 2022-09-20
Attending: INTERNAL MEDICINE
Payer: COMMERCIAL

## 2022-09-20 VITALS
BODY MASS INDEX: 40.34 KG/M2 | WEIGHT: 251 LBS | HEIGHT: 66 IN | OXYGEN SATURATION: 95 % | DIASTOLIC BLOOD PRESSURE: 70 MMHG | WEIGHT: 251.13 LBS | HEIGHT: 66 IN | RESPIRATION RATE: 12 BRPM | HEART RATE: 110 BPM | BODY MASS INDEX: 40.36 KG/M2 | DIASTOLIC BLOOD PRESSURE: 70 MMHG | HEART RATE: 93 BPM | SYSTOLIC BLOOD PRESSURE: 108 MMHG | SYSTOLIC BLOOD PRESSURE: 108 MMHG

## 2022-09-20 VITALS — BODY MASS INDEX: 40.82 KG/M2 | WEIGHT: 252.88 LBS

## 2022-09-20 DIAGNOSIS — J84.114 ACUTE INTERSTITIAL PNEUMONITIS: ICD-10-CM

## 2022-09-20 DIAGNOSIS — J45.909 ASTHMA IN ADULT, UNSPECIFIED ASTHMA SEVERITY, UNSPECIFIED WHETHER COMPLICATED, UNSPECIFIED WHETHER PERSISTENT: ICD-10-CM

## 2022-09-20 DIAGNOSIS — J98.4 RESTRICTIVE LUNG DISEASE: ICD-10-CM

## 2022-09-20 DIAGNOSIS — J30.2 SEASONAL ALLERGIES: ICD-10-CM

## 2022-09-20 DIAGNOSIS — R91.8 PULMONARY INFILTRATES ON CXR: ICD-10-CM

## 2022-09-20 DIAGNOSIS — J96.11 CHRONIC RESPIRATORY FAILURE WITH HYPOXIA: ICD-10-CM

## 2022-09-20 DIAGNOSIS — J84.111 CHRONIC INTERSTITIAL PNEUMONIA: Primary | ICD-10-CM

## 2022-09-20 DIAGNOSIS — I10 PRIMARY HYPERTENSION: ICD-10-CM

## 2022-09-20 DIAGNOSIS — G47.33 OSA ON CPAP: ICD-10-CM

## 2022-09-20 DIAGNOSIS — K13.0 LIP LESION: Primary | ICD-10-CM

## 2022-09-20 DIAGNOSIS — R76.8 ELEVATED RHEUMATOID FACTOR: ICD-10-CM

## 2022-09-20 LAB
AORTIC ROOT ANNULUS: 2.41 CM
ASCENDING AORTA: 2.43 CM
AV INDEX (PROSTH): 0.51
AV MEAN GRADIENT: 10 MMHG
AV PEAK GRADIENT: 18 MMHG
AV VALVE AREA: 1.81 CM2
AV VELOCITY RATIO: 0.5
BSA FOR ECHO PROCEDURE: 2.3 M2
CV ECHO LV RWT: 0.6 CM
DOP CALC AO PEAK VEL: 2.12 M/S
DOP CALC AO VTI: 37.8 CM
DOP CALC LVOT AREA: 3.6 CM2
DOP CALC LVOT DIAMETER: 2.13 CM
DOP CALC LVOT PEAK VEL: 1.06 M/S
DOP CALC LVOT STROKE VOLUME: 68.38 CM3
DOP CALC RVOT PEAK VEL: 0.9 M/S
DOP CALC RVOT VTI: 17.9 CM
DOP CALCLVOT PEAK VEL VTI: 19.2 CM
ECHO LV POSTERIOR WALL: 1.33 CM (ref 0.6–1.1)
EJECTION FRACTION: 60 %
FRACTIONAL SHORTENING: 33 % (ref 28–44)
INTERVENTRICULAR SEPTUM: 1.02 CM (ref 0.6–1.1)
IVC DIAMETER: 1.09 CM
IVRT: 91.34 MSEC
LA MAJOR: 4.87 CM
LA MINOR: 4.96 CM
LA WIDTH: 3.5 CM
LEFT ATRIUM SIZE: 3.07 CM
LEFT ATRIUM VOLUME INDEX MOD: 18 ML/M2
LEFT ATRIUM VOLUME INDEX: 20.4 ML/M2
LEFT ATRIUM VOLUME MOD: 39.6 CM3
LEFT ATRIUM VOLUME: 44.89 CM3
LEFT INTERNAL DIMENSION IN SYSTOLE: 2.94 CM (ref 2.1–4)
LEFT VENTRICLE DIASTOLIC VOLUME INDEX: 40.16 ML/M2
LEFT VENTRICLE DIASTOLIC VOLUME: 88.35 ML
LEFT VENTRICLE MASS INDEX: 85 G/M2
LEFT VENTRICLE SYSTOLIC VOLUME INDEX: 15.1 ML/M2
LEFT VENTRICLE SYSTOLIC VOLUME: 33.27 ML
LEFT VENTRICULAR INTERNAL DIMENSION IN DIASTOLE: 4.41 CM (ref 3.5–6)
LEFT VENTRICULAR MASS: 186.26 G
LVOT MG: 2.82 MMHG
LVOT MV: 0.79 CM/S
PISA TR MAX VEL: 2.8 M/S
PULM VEIN S/D RATIO: 1.23
PV MEAN GRADIENT: 1.96 MMHG
PV PEAK D VEL: 0.56 M/S
PV PEAK S VEL: 0.69 M/S
PV PEAK VELOCITY: 1.39 CM/S
RA MAJOR: 4.63 CM
RA PRESSURE: 3 MMHG
RA WIDTH: 3.44 CM
RIGHT VENTRICULAR END-DIASTOLIC DIMENSION: 3.13 CM
SINUS: 2.49 CM
STJ: 2.32 CM
TDI LATERAL: 0.1 M/S
TDI SEPTAL: 0.13 M/S
TDI: 0.12 M/S
TR MAX PG: 31 MMHG
TV REST PULMONARY ARTERY PRESSURE: 34 MMHG

## 2022-09-20 PROCEDURE — 99999 PR PBB SHADOW E&M-EST. PATIENT-LVL V: ICD-10-PCS | Mod: PBBFAC,,, | Performed by: INTERNAL MEDICINE

## 2022-09-20 PROCEDURE — 99214 PR OFFICE/OUTPT VISIT, EST, LEVL IV, 30-39 MIN: ICD-10-PCS | Mod: S$GLB,,, | Performed by: INTERNAL MEDICINE

## 2022-09-20 PROCEDURE — 3078F DIAST BP <80 MM HG: CPT | Mod: CPTII,S$GLB,, | Performed by: INTERNAL MEDICINE

## 2022-09-20 PROCEDURE — 3008F PR BODY MASS INDEX (BMI) DOCUMENTED: ICD-10-PCS | Mod: CPTII,S$GLB,, | Performed by: INTERNAL MEDICINE

## 2022-09-20 PROCEDURE — 1160F PR REVIEW ALL MEDS BY PRESCRIBER/CLIN PHARMACIST DOCUMENTED: ICD-10-PCS | Mod: CPTII,S$GLB,, | Performed by: INTERNAL MEDICINE

## 2022-09-20 PROCEDURE — 93306 ECHO (CUPID ONLY): ICD-10-PCS | Mod: 26,,, | Performed by: INTERNAL MEDICINE

## 2022-09-20 PROCEDURE — 3074F SYST BP LT 130 MM HG: CPT | Mod: CPTII,S$GLB,, | Performed by: INTERNAL MEDICINE

## 2022-09-20 PROCEDURE — 11442 PR EXC SKIN BENIG 1.1-2 CM FACE,FACIAL: ICD-10-PCS | Mod: S$GLB,,, | Performed by: STUDENT IN AN ORGANIZED HEALTH CARE EDUCATION/TRAINING PROGRAM

## 2022-09-20 PROCEDURE — 3078F PR MOST RECENT DIASTOLIC BLOOD PRESSURE < 80 MM HG: ICD-10-PCS | Mod: CPTII,S$GLB,, | Performed by: INTERNAL MEDICINE

## 2022-09-20 PROCEDURE — 88305 TISSUE EXAM BY PATHOLOGIST: ICD-10-PCS | Mod: 26,,, | Performed by: DERMATOLOGY

## 2022-09-20 PROCEDURE — 3074F PR MOST RECENT SYSTOLIC BLOOD PRESSURE < 130 MM HG: ICD-10-PCS | Mod: CPTII,S$GLB,, | Performed by: INTERNAL MEDICINE

## 2022-09-20 PROCEDURE — 99999 PR PBB SHADOW E&M-EST. PATIENT-LVL I: ICD-10-PCS | Mod: PBBFAC,,, | Performed by: STUDENT IN AN ORGANIZED HEALTH CARE EDUCATION/TRAINING PROGRAM

## 2022-09-20 PROCEDURE — 3008F BODY MASS INDEX DOCD: CPT | Mod: CPTII,S$GLB,, | Performed by: INTERNAL MEDICINE

## 2022-09-20 PROCEDURE — 88305 TISSUE EXAM BY PATHOLOGIST: CPT | Performed by: DERMATOLOGY

## 2022-09-20 PROCEDURE — 99213 PR OFFICE/OUTPT VISIT, EST, LEVL III, 20-29 MIN: ICD-10-PCS | Mod: 25,S$GLB,, | Performed by: STUDENT IN AN ORGANIZED HEALTH CARE EDUCATION/TRAINING PROGRAM

## 2022-09-20 PROCEDURE — 1159F PR MEDICATION LIST DOCUMENTED IN MEDICAL RECORD: ICD-10-PCS | Mod: CPTII,S$GLB,, | Performed by: INTERNAL MEDICINE

## 2022-09-20 PROCEDURE — 11442 EXC FACE-MM B9+MARG 1.1-2 CM: CPT | Mod: S$GLB,,, | Performed by: STUDENT IN AN ORGANIZED HEALTH CARE EDUCATION/TRAINING PROGRAM

## 2022-09-20 PROCEDURE — 99214 OFFICE O/P EST MOD 30 MIN: CPT | Mod: S$GLB,,, | Performed by: INTERNAL MEDICINE

## 2022-09-20 PROCEDURE — G0239 OTH RESP PROC, GROUP: HCPCS

## 2022-09-20 PROCEDURE — 71250 CT THORAX DX C-: CPT | Mod: TC

## 2022-09-20 PROCEDURE — 93306 TTE W/DOPPLER COMPLETE: CPT | Mod: 26,,, | Performed by: INTERNAL MEDICINE

## 2022-09-20 PROCEDURE — 1160F RVW MEDS BY RX/DR IN RCRD: CPT | Mod: CPTII,S$GLB,, | Performed by: INTERNAL MEDICINE

## 2022-09-20 PROCEDURE — 88305 TISSUE EXAM BY PATHOLOGIST: CPT | Mod: 26,,, | Performed by: DERMATOLOGY

## 2022-09-20 PROCEDURE — 99213 OFFICE O/P EST LOW 20 MIN: CPT | Mod: 25,S$GLB,, | Performed by: STUDENT IN AN ORGANIZED HEALTH CARE EDUCATION/TRAINING PROGRAM

## 2022-09-20 PROCEDURE — 99999 PR PBB SHADOW E&M-EST. PATIENT-LVL V: CPT | Mod: PBBFAC,,, | Performed by: INTERNAL MEDICINE

## 2022-09-20 PROCEDURE — 1159F MED LIST DOCD IN RCRD: CPT | Mod: CPTII,S$GLB,, | Performed by: INTERNAL MEDICINE

## 2022-09-20 PROCEDURE — 99999 PR PBB SHADOW E&M-EST. PATIENT-LVL I: CPT | Mod: PBBFAC,,, | Performed by: STUDENT IN AN ORGANIZED HEALTH CARE EDUCATION/TRAINING PROGRAM

## 2022-09-20 PROCEDURE — 93306 TTE W/DOPPLER COMPLETE: CPT

## 2022-09-20 RX ORDER — PREDNISONE 10 MG/1
10 TABLET ORAL DAILY
Qty: 30 TABLET | Refills: 3 | Status: SHIPPED | OUTPATIENT
Start: 2022-09-20 | End: 2023-01-24 | Stop reason: SDUPTHER

## 2022-09-20 RX ORDER — MONTELUKAST SODIUM 10 MG/1
10 TABLET ORAL NIGHTLY
Qty: 90 TABLET | Refills: 1 | Status: SHIPPED | OUTPATIENT
Start: 2022-09-20 | End: 2023-03-17

## 2022-09-20 RX ORDER — FLUTICASONE FUROATE AND VILANTEROL 100; 25 UG/1; UG/1
POWDER RESPIRATORY (INHALATION)
Qty: 60 EACH | Refills: 5 | Status: SHIPPED | OUTPATIENT
Start: 2022-09-20 | End: 2023-04-26 | Stop reason: SDUPTHER

## 2022-09-20 RX ORDER — ALBUTEROL SULFATE 90 UG/1
AEROSOL, METERED RESPIRATORY (INHALATION)
Qty: 8.5 G | Refills: 5 | Status: SHIPPED | OUTPATIENT
Start: 2022-09-20 | End: 2023-05-10 | Stop reason: SDUPTHER

## 2022-09-20 RX ORDER — SODIUM CHLORIDE 0.9 % (FLUSH) 0.9 %
10 SYRINGE (ML) INJECTION
Status: DISCONTINUED | OUTPATIENT
Start: 2022-09-20 | End: 2023-07-31 | Stop reason: ALTCHOICE

## 2022-09-20 NOTE — NURSING NOTE
Pt presented for an echocardiogram with bubble study per Dr. Hall.  Procedure was explained to the patient, she verbalized understanding.      Patient tolerated the procedure well.  IV discontinued, pressure dressing applied..

## 2022-09-20 NOTE — PROGRESS NOTES
Pulmonary Outpatient  Visit     Subjective:       Patient ID: Ayanna Alicia is a 51 y.o. female.    Chief Complaint: Sleep Apnea, Asthma, and Interstitial Lung Disease      Ayanna Alicia is 51 y.o.  Post hospital f/u  Acute respiratory failure ILD, +ve RF  Was discharged on oxygen  Here to review results  Explained  PFT: severe restriction and reduced DLCO  ABG  6MWD: oxygen desat needs supplementary oxygen 3 LPM  Has some nose bleed will add AYR gel and humifier bottle  FMLA paper work given  Out of work letter   Added PEPCID and Bactrim  Adherent with inhaler    05/18/2022  Here to see today  Concern, Dyspnea with activity palpitations  Seen Rheumatology: Added CELLCEPT  On steriod taper  No cough wheezing  On oxygen 3 LPM  Had GLENN in 2019: was prescribed CPAP returned back  Needs PAP at night  Motivated to restart      07/15/2022  Last seen 05/18/2022  ACT score 10, Shickley score 6  CPAP study: CPAP ordered  Await   Says cannot go to work, tied, focus off  6MWD: RA sat was 95%  O2 titrated to 4-6 LPM  Tachycardia noted : 145 BPM    09/20/2022  Last seen 07/15/2022  Here to review progress  Enrolled in pul rehab, session 8  Sat Stable @ 2-3 LPM  ACt score 5, Shickley 6  No cough, better exercise tolerance  Stable on cellcept 1000mg BID + prednisone 10 mg  Will DW Rheum whether can titrate up cellcept to wean steriods  CPAP download shows adherence   CPAP 9 cm with resudual AHI 0.2  Usage > 4 hrs was 67%  Cehst CT reviewed  Repeat PFT pending      Asthma Control Test  In the past 4  weeks, how much of the time did your asthma keep you from getting as much done at work, school or at home?: Some of the time  During the past 4 weeks, how often have you had shortness of breath?: More than once a day  During the past 4 weeks, how often did your asthma symptoms (wheezing, couging, shortness of breath, chest tightness or pain) wake you up at night or earlier  than usual in the morning?: Not at all  During the past 4 weeks, how often have you used your rescue inhaler or nebulizer medication (such as albuterol)?: 2 or 3 times a week  How would you rate your asthma control during the past 4 weeks?: Somewhat controlled  If your score is 19 or less, your asthma may not be under control: 15          MMRC Dyspnea Scale (4 is worst)     [] MMRC 0: Dyspneic on strenuous excercise (0 points)    [] MMRC 1: Dyspneic on walking a slight hill (0 points)    [x] MMRC 2: Dyspneic on walking level ground; must stop occasionally due to breathlessness (1 point)    [] MMRC 3: Must stop for breathlessness after walking 100 yards or after a few minutes (2 points)    [] MMRC 4: Cannot leave house; breathless on dressing/undressing (3 points)         EPWORTH SLEEPINESS SCALE 2022   Sitting and reading 1   Watching TV 1   Sitting, inactive in a public place (e.g. a theatre or a meeting) 0   As a passenger in a car for an hour without a break 1   Lying down to rest in the afternoon when circumstances permit 3   Sitting and talking to someone 0   Sitting quietly after a lunch without alcohol 0   In a car, while stopped for a few minutes in traffic 0   Total score 6             The following portions of the patient's history were reviewed and updated as appropriate:   She  has a past medical history of Abnormal Pap smear of cervix, Acute interstitial pneumonitis, Allergic rhinitis, cause unspecified, Arthritis of both knees, Asthma, Eczema, Fatty liver (10/2014), Fibrocystic breast changes, Headache(784.0), Hepatomegaly (10/2014), Hypertension, Liver cyst (10/2014), Multinodular goiter, Polymenorrhea (), TMJ (dislocation of temporomandibular joint), Uterine fibroid, and Vitamin D deficiency disease.  She does not have any pertinent problems on file.  She  has a past surgical history that includes  section, classic; Multiple tooth extractions; Partial hysterectomy (2013);  Hysterectomy; and Colonoscopy (N/A, 1/20/2020).  Her family history includes Cancer (age of onset: 50) in her maternal aunt; Cancer (age of onset: 60) in her maternal grandmother; Cancer (age of onset: 66) in her maternal aunt; Cataracts in her cousin; Diabetes in her maternal grandfather and mother; Heart disease in her mother; Heart disease (age of onset: 63) in her father; Hypertension in her father; Migraines in her cousin; Stroke in her maternal grandfather, mother, and sister.  She  reports that she has never smoked. She has never used smokeless tobacco. She reports current alcohol use. She reports current drug use. Frequency: 4.00 times per week. Drug: Marijuana.  She has a current medication list which includes the following prescription(s): albuterol, albuterol-ipratropium, amlodipine, ascorbic acid (vitamin c), calcium/magnesium/vit b comp, hydrochlorothiazide, levocetirizine, mycophenolate, omega-3s/dha/epa/fish oil/d3, ondansetron, sertraline, albuterol, fluticasone furoate-vilanterol, montelukast, and prednisone, and the following Facility-Administered Medications: sodium chloride 0.9%.  Current Outpatient Medications on File Prior to Visit   Medication Sig Dispense Refill    albuterol (ACCUNEB) 0.63 mg/3 mL Nebu Take 3 mLs (0.63 mg total) by nebulization every 4 to 6 hours as needed (asthma). Rescue 75 mL 6    albuterol-ipratropium (DUO-NEB) 2.5 mg-0.5 mg/3 mL nebulizer solution Take 3 mLs by nebulization every 6 (six) hours as needed for Wheezing. Rescue 90 mL 0    amLODIPine (NORVASC) 10 MG tablet Take 1 tablet (10 mg total) by mouth once daily. 90 tablet 1    ascorbic acid, vitamin C, (VITAMIN C) 500 MG tablet Take 500 mg by mouth once daily.      calcium/magnesium/vit B comp (CALCIUM-MAGNESIUM-B COMPLEX ORAL) Take 1 tablet by mouth once daily at 6am.      hydroCHLOROthiazide (HYDRODIURIL) 12.5 MG Tab Take 1 tablet (12.5 mg total) by mouth once daily. 90 tablet 1    levocetirizine (XYZAL) 5 MG  tablet Take 1 tablet (5 mg total) by mouth once daily. 90 tablet 1    mycophenolate (CELLCEPT) 500 mg Tab Take 2 tablets (1,000 mg total) by mouth 2 (two) times daily. 360 tablet 1    omega-3s/dha/epa/fish oil/D3 (VITAMIN-D + OMEGA-3 ORAL) Take 1 tablet by mouth once daily at 6am.      ondansetron (ZOFRAN-ODT) 4 MG TbDL Dissolve 1 tablet (4 mg total) by mouth every 6 (six) hours as needed (nausea). 90 tablet 0    sertraline (ZOLOFT) 50 MG tablet TAKE 1 TABLET(50 MG) BY MOUTH EVERY DAY 90 tablet 1    [DISCONTINUED] albuterol (PROVENTIL/VENTOLIN HFA) 90 mcg/actuation inhaler INHALE 1 TO 2 PUFFS BY MOUTH INTO THE LUNGS EVERY 4 TO 6 HOURS AS NEEDED FOR WHEEZING OR SHORTNESS OF BREATH 8.5 g 5    [DISCONTINUED] fluticasone furoate-vilanteroL (BREO ELLIPTA) 100-25 mcg/dose diskus inhaler INHALE 1 PUFF INTO THE LUNGS ONCE DAILY 60 each 5    [DISCONTINUED] montelukast (SINGULAIR) 10 mg tablet TAKE 1 TABLET(10 MG) BY MOUTH EVERY EVENING 90 tablet 1    [DISCONTINUED] predniSONE (DELTASONE) 10 MG tablet Take 1 tablet (10 mg total) by mouth once daily. 30 tablet 3     No current facility-administered medications on file prior to visit.     She is allergic to doxycycline, fluticasone, and penicillins..      Review of Systems   Constitutional:  Positive for activity change and fatigue.   Respiratory:  Positive for dyspnea on extertion.    Gastrointestinal:  Positive for acid reflux.     Outpatient Encounter Medications as of 9/20/2022   Medication Sig Dispense Refill    albuterol (ACCUNEB) 0.63 mg/3 mL Nebu Take 3 mLs (0.63 mg total) by nebulization every 4 to 6 hours as needed (asthma). Rescue 75 mL 6    albuterol-ipratropium (DUO-NEB) 2.5 mg-0.5 mg/3 mL nebulizer solution Take 3 mLs by nebulization every 6 (six) hours as needed for Wheezing. Rescue 90 mL 0    amLODIPine (NORVASC) 10 MG tablet Take 1 tablet (10 mg total) by mouth once daily. 90 tablet 1    ascorbic acid, vitamin C, (VITAMIN C) 500 MG tablet Take 500 mg by mouth  once daily.      calcium/magnesium/vit B comp (CALCIUM-MAGNESIUM-B COMPLEX ORAL) Take 1 tablet by mouth once daily at 6am.      hydroCHLOROthiazide (HYDRODIURIL) 12.5 MG Tab Take 1 tablet (12.5 mg total) by mouth once daily. 90 tablet 1    levocetirizine (XYZAL) 5 MG tablet Take 1 tablet (5 mg total) by mouth once daily. 90 tablet 1    mycophenolate (CELLCEPT) 500 mg Tab Take 2 tablets (1,000 mg total) by mouth 2 (two) times daily. 360 tablet 1    omega-3s/dha/epa/fish oil/D3 (VITAMIN-D + OMEGA-3 ORAL) Take 1 tablet by mouth once daily at 6am.      ondansetron (ZOFRAN-ODT) 4 MG TbDL Dissolve 1 tablet (4 mg total) by mouth every 6 (six) hours as needed (nausea). 90 tablet 0    sertraline (ZOLOFT) 50 MG tablet TAKE 1 TABLET(50 MG) BY MOUTH EVERY DAY 90 tablet 1    [DISCONTINUED] albuterol (PROVENTIL/VENTOLIN HFA) 90 mcg/actuation inhaler INHALE 1 TO 2 PUFFS BY MOUTH INTO THE LUNGS EVERY 4 TO 6 HOURS AS NEEDED FOR WHEEZING OR SHORTNESS OF BREATH 8.5 g 5    [DISCONTINUED] fluticasone furoate-vilanteroL (BREO ELLIPTA) 100-25 mcg/dose diskus inhaler INHALE 1 PUFF INTO THE LUNGS ONCE DAILY 60 each 5    [DISCONTINUED] montelukast (SINGULAIR) 10 mg tablet TAKE 1 TABLET(10 MG) BY MOUTH EVERY EVENING 90 tablet 1    [DISCONTINUED] predniSONE (DELTASONE) 10 MG tablet Take 1 tablet (10 mg total) by mouth once daily. 30 tablet 3    albuterol (PROVENTIL/VENTOLIN HFA) 90 mcg/actuation inhaler INHALE 1 TO 2 PUFFS BY MOUTH INTO THE LUNGS EVERY 4 TO 6 HOURS AS NEEDED FOR WHEEZING OR SHORTNESS OF BREATH 8.5 g 5    fluticasone furoate-vilanteroL (BREO ELLIPTA) 100-25 mcg/dose diskus inhaler INHALE 1 PUFF INTO THE LUNGS ONCE DAILY 60 each 5    montelukast (SINGULAIR) 10 mg tablet Take 1 tablet (10 mg total) by mouth every evening. 90 tablet 1    predniSONE (DELTASONE) 10 MG tablet Take 1 tablet (10 mg total) by mouth once daily. 30 tablet 3     Facility-Administered Encounter Medications as of 9/20/2022   Medication Dose Route Frequency  "Provider Last Rate Last Admin    sodium chloride 0.9% flush 10 mL  10 mL Intravenous PRN Agustín Aviles MD           Objective:     Vital Signs (Most Recent)  Vital Signs  Pulse: 110  Resp: 12  SpO2: 95 %  BP: 108/70  Pain Score: 0-No pain  Height and Weight  Height: 5' 6" (167.6 cm)  Weight: 113.9 kg (251 lb 1.7 oz)  BSA (Calculated - sq m): 2.3 sq meters  BMI (Calculated): 40.5  Weight in (lb) to have BMI = 25: 154.6]  Wt Readings from Last 2 Encounters:   09/20/22 113.9 kg (251 lb 1.7 oz)   09/20/22 113.9 kg (251 lb)       Physical Exam   Constitutional: She is oriented to person, place, and time. She appears well-developed and well-nourished.   HENT:   Head: Normocephalic.   Mouth/Throat: Mallampati Score: II.   Neck: No JVD present.   Cardiovascular: Normal rate and intact distal pulses.   No murmur heard.  Pulmonary/Chest: Normal expansion, effort normal and breath sounds normal.   Abdominal: Soft. Bowel sounds are normal.   Musculoskeletal:         General: No edema. Normal range of motion.      Cervical back: Normal range of motion and neck supple.   Lymphadenopathy:     She has no axillary adenopathy.   Neurological: She is alert and oriented to person, place, and time.   Skin: Skin is warm and dry. No cyanosis. Nails show no clubbing.   Psychiatric: She has a normal mood and affect.   Nursing note and vitals reviewed.    Laboratory  Lab Results   Component Value Date    WBC 8.43 07/20/2022    RBC 4.05 07/20/2022    HGB 11.2 (L) 07/20/2022    HCT 35.3 (L) 07/20/2022    MCV 87 07/20/2022    MCH 27.7 07/20/2022    MCHC 31.7 (L) 07/20/2022    RDW 14.5 07/20/2022     07/20/2022    MPV 9.8 07/20/2022    GRAN 5.6 07/20/2022    GRAN 66.8 07/20/2022    LYMPH 1.4 07/20/2022    LYMPH 16.1 (L) 07/20/2022    MONO 0.8 07/20/2022    MONO 9.0 07/20/2022    EOS 0.6 (H) 07/20/2022    BASO 0.08 07/20/2022    EOSINOPHIL 6.5 07/20/2022    BASOPHIL 0.9 07/20/2022       BMP  Lab Results   Component Value Date    NA " 139 07/20/2022    K 4.3 07/20/2022     07/20/2022    CO2 27 07/20/2022    BUN 12 07/20/2022    CREATININE 0.9 07/20/2022    CALCIUM 9.6 07/20/2022    ANIONGAP 10 07/20/2022    ESTGFRAFRICA >60 07/20/2022    EGFRNONAA >60 07/20/2022    AST 23 07/20/2022    ALT 15 07/20/2022    PROT 7.3 07/20/2022       Lab Results   Component Value Date    BNP <10 04/27/2022       Lab Results   Component Value Date    TSH 0.854 04/28/2022       Lab Results   Component Value Date    SEDRATE 42 (H) 07/20/2022       Lab Results   Component Value Date    CRP 14.6 (H) 07/20/2022     Lab Results   Component Value Date     (H) 05/02/2022        Lab Results   Component Value Date    ASPERGILLUS None Detected 04/29/2022     No results found for: AFUMIGATUSCL     Lab Results   Component Value Date    ACE 22 04/28/2022        Diagnostic Results:  I have personally reviewed today the following studies:  Office Spirometry Results:     FEV1: 1.34L( 46.9%)  FVC  1.54L( 42.7%)  FV1/FVC 87          CT Chest Without Contrast  Narrative: EXAMINATION:  CT CHEST WITHOUT CONTRAST, multiplanar reconstructions    CLINICAL HISTORY:  Other nonspecific abnormal finding of lung field.ILD;    TECHNIQUE:  Axial images through the chest were obtained without the use of IV contrast. Sagittal and coronal <reconstructions are provided for review>.    COMPARISON:  April 27, 2022    FINDINGS:  Progressive peripheral ground-glass opacities are seen throughout all lobes.  Extensive bronchiectatic changes in the bilateral lower lobes again seen.  Negative for effusion or pneumothorax.    There are no hilar or mediastinal masses or abnormal lymph nodes by size criteria.    The airways are patent.  The thyroid gland is unchanged in appearance with redemonstration of large low-density lesions in both thyroid lobes measuring up to 3 cm on the right..  The esophagus is normal.  Minor coronary artery calcifications again seen.    Stable hepatic cysts measuring up  to 1.6 cm in size in the left hepatic lobe.  The upper abdominal organs are otherwise normal.    Negative for osseous lesions. Multilevel marginal spondylosis again seen.  Impression: 1.  Detrimental change.  Worsening peripheral ground-glass opacities throughout all lobes.  Differential considerations would include superimposed pneumonia versus active alveolitis of an underlying interstitial lung disease versus inflammatory process involving the lungs.  Clinical correlation is advised.    2.  Redemonstration of bilateral thyroid nodules measuring up to 3 cm, for which a thyroid ultrasound is highly recommended.    3.  Other stable findings as noted above.    All CT scans at this facility are performed  using dose modulation techniques as appropriate to performed exam including the following:  automated exposure control; adjustment of mA and/or kV according to the patients size (this includes techniques or standardized protocols for targeted exams where dose is matched to indication/reason for exam: i.e. extremities or head);  iterative reconstruction technique.    Electronically signed by: Yariel Hall MD  Date:    09/20/2022  Time:    14:23     There is no evidence of intracardiac shunting.  The left ventricle is normal in size with concentric remodeling and normal systolic function.  The estimated ejection fraction is 60%.  Normal left ventricular diastolic function.  Normal right ventricular size with normal right ventricular systolic function.  Normal central venous pressure (3 mmHg).  The estimated PA systolic pressure is 34 mmHg.          Performed Procedure    Transthoracic echo (TTE) complete  Study Details    A complete echo was performed using complete 2D, color flow Doppler and spectral Doppler. During the study, the apical, parasternal and subcostal views were captured. There was 5 mL of intravenous agitated saline (bubble) used. Study is negative for shunt.     Assessment/Plan:     Problem List Items  Addressed This Visit       Seasonal allergies    Relevant Medications    montelukast (SINGULAIR) 10 mg tablet    HTN (hypertension)    Chronic respiratory failure with hypoxia    Elevated rheumatoid factor    Relevant Medications    predniSONE (DELTASONE) 10 MG tablet    Restrictive lung disease    Relevant Medications    predniSONE (DELTASONE) 10 MG tablet    Chronic interstitial pneumonia - Primary    Relevant Medications    predniSONE (DELTASONE) 10 MG tablet    Other Relevant Orders    Complete PFT without bronchodilator - Clinic    Stress test, pulmonary    X-Ray Chest PA And Lateral    GLENN on CPAP     Wilmot score 6  CPAP  Using and benefit            Other Visit Diagnoses       Asthma in adult, unspecified asthma severity, unspecified whether complicated, unspecified whether persistent        Relevant Medications    montelukast (SINGULAIR) 10 mg tablet    fluticasone furoate-vilanteroL (BREO ELLIPTA) 100-25 mcg/dose diskus inhaler    albuterol (PROVENTIL/VENTOLIN HFA) 90 mcg/actuation inhaler                Enrolled in pul rehab: benefits: Letter written  Benefits from CPAP  O2 requirement improved, will  ge POC after 6mwd  Cont Cellcept and Pred 10, collaborate with rheumatology  echO Normal EF, PA was normal 22.4 , No shunt      Follow up in about 3 months (around 12/20/2022), or PFT, 6MWD, CXR, PFT.    This note was prepared using voice recognition system and is likely to have sound alike errors that may have been overlooked even after proof reading.  Please call me with any questions    Discussed diagnosis, its evaluation, treatment and usual course. All questions answered.      Agustín Aviles MD     Requested Prescriptions     Signed Prescriptions Disp Refills    montelukast (SINGULAIR) 10 mg tablet 90 tablet 1     Sig: Take 1 tablet (10 mg total) by mouth every evening.    fluticasone furoate-vilanteroL (BREO ELLIPTA) 100-25 mcg/dose diskus inhaler 60 each 5     Sig: INHALE 1 PUFF INTO THE LUNGS  ONCE DAILY    albuterol (PROVENTIL/VENTOLIN HFA) 90 mcg/actuation inhaler 8.5 g 5     Sig: INHALE 1 TO 2 PUFFS BY MOUTH INTO THE LUNGS EVERY 4 TO 6 HOURS AS NEEDED FOR WHEEZING OR SHORTNESS OF BREATH    predniSONE (DELTASONE) 10 MG tablet 30 tablet 3     Sig: Take 1 tablet (10 mg total) by mouth once daily.

## 2022-09-20 NOTE — PROGRESS NOTES
Chief complaint:  No chief complaint on file.          Referring Provider:  Aaareferral Self  No address on file      History of present illness:     Ms. Alicia is a 51 y.o. presenting for evaluation of lip lesion.   Present for 10 days - irhgt lower lip. Has bit it. Bled a couple times when she bites it. Otherwise does not hurt. Stable size.     The patient denies neck mass, odynophagia, dysphagia, otalgia, unusual bleeding, or unintentional weight loss.        History      Past Medical History:   Past Medical History:   Diagnosis Date    Abnormal Pap smear of cervix     in the past with repeat pap smear okay.    Acute interstitial pneumonitis     Allergic rhinitis, cause unspecified     Arthritis of both knees     Asthma     Eczema     Fatty liver 10/2014    Fibrocystic breast changes     Headache(784.0)     Hepatomegaly 10/2014    Hypertension     Liver cyst 10/2014    Multinodular goiter     Followed by ENT - Dr. Nicolas Gray    Polymenorrhea     TMJ (dislocation of temporomandibular joint)     Uterine fibroid     in the past    Vitamin D deficiency disease          Past Surgical History:  Past Surgical History:   Procedure Laterality Date     SECTION, CLASSIC      x 1    COLONOSCOPY N/A 2020    Procedure: COLONOSCOPY;  Surgeon: Angie Magana MD;  Location: [facility];  Service: Endoscopy;  Laterality: N/A;    HYSTERECTOMY      MULTIPLE TOOTH EXTRACTIONS      PARTIAL HYSTERECTOMY  2013    Due to fibroids         Medications: Medication list reviewed. She  has a current medication list which includes the following prescription(s): albuterol, albuterol, albuterol-ipratropium, amlodipine, ascorbic acid (vitamin c), calcium/magnesium/vit b comp, fluticasone furoate-vilanterol, hydrochlorothiazide, levocetirizine, montelukast, mycophenolate, omega-3s/dha/epa/fish oil/d3, ondansetron, prednisone, and sertraline, and the following Facility-Administered Medications: sodium chloride 0.9%.      Allergies:   Review of patient's allergies indicates:   Allergen Reactions    Doxycycline Nausea Only     Other reaction(s): Nausea    Fluticasone Other (See Comments)     Other reaction(s): Epistaxis      Penicillins      Other reaction(s): unknown  Pt tolerated ceftriaxone         Family history: family history includes Cancer (age of onset: 50) in her maternal aunt; Cancer (age of onset: 60) in her maternal grandmother; Cancer (age of onset: 66) in her maternal aunt; Cataracts in her cousin; Diabetes in her maternal grandfather and mother; Heart disease in her mother; Heart disease (age of onset: 63) in her father; Hypertension in her father; Migraines in her cousin; Stroke in her maternal grandfather, mother, and sister.         Social History          Alcohol use:  reports current alcohol use.            Tobacco:  reports that she has never smoked. She has never used smokeless tobacco.         Physical Examination      Vitals: There were no vitals taken for this visit.      General: Well developed, well nourished, well hydrated.     Voice: no dysphonia, no dysarthria      Head/Face: Normocephalic, atraumatic. No scars or lesions. Facial musculature equal.     Eyes: No scleral icterus or conjunctival hemorrhage. EOMI. PERRLA.     Ears:     Right ear: No gross deformity. EAC is clear of debris and erythema. TM are intact with a pneumatized middle ear. No signs of retraction, fluid or infection.      Left ear: No gross deformity. EAC is clear of debris and erythema. TM are intact with a pneumatized middle ear. No signs of retraction, fluid or infection.      Nose: No gross deformity or lesions. No purulent discharge. No significant NSD.      Mouth/Oropharynx: 1 cm right lower lesion - firm, pedunculated, pink. No mucosal lesions within the oropharynx. No tonsillar exudate or lesions. Pharyngeal walls symmetrical. Uvula midline. Tongue midline without lesions.     Neck: Trachea midline. No masses. No thyromegaly or  nodules palpated.     Lymphatic: No lymphadenopathy in the neck.     Extremities: No cyanosis. Warm and well-perfused.     Skin: No scars or lesions on face or neck.      Neurologic: Moving all extremities without gross abnormality.CN II-XII grossly intact. House-Brackmann 1/6. No signs of nystagmus.          Data reviewed      Review of records:      I reviewed records from the referring provider's office visits, including the history, workup, and/or treatment of this problem thus far.        Procedures:    Excision of lower lip lesion with simple closure    Risks and benefits of the procedure were discussed. Informed consent was obtained.    Local anesthesia using 1% lidocaine with epinephrine was injected in the surrounding skin to achieve adequate anesthesia. The area was then cleaned and draped in the standard fashion.    Incision was marked with a small margin of normal mucosa around the lesion. We began with excision of the 1 cm lower lip lesion.  The incisions were made with a 15 blade in an elliptical fashion. Dissection was carried down to the submucosa and overlying muscle with a scissor.. The lesion was removed en bloc. This was sent for pathology.  The wound was irrigated with sterile saline. Hemostasis obtained with pressure and silver nitrate. Closure was achieved with simple running 5-0 FAST.      Assessment/Plan:    1. Lip lesion        Excised today  Baci x 5 days  Tylenol/motrin for pain  Will call with results  She would prefer to return as needed for follow up; will call if there are any wound concerns       Jay Sanabria MD  [facility]   [facility]  [id number] [place] [address].  [place] [address]  P: [phone]  F: [phone]

## 2022-09-20 NOTE — PROGRESS NOTES
[facility] - [facility]  Pulmonary Rehab  Session Summary    SUMMARY     Session Data  Session Number: 9  Time In: 1100  Time Out: 1200  Weight: 114.7 kg (252 lb 14.4 oz)  Target Heart Rate Zone: Minimum: 100 bpm  Target Heart Rate Zone: Maximum: 134 bpm  Patient Motivation: Excellent  Patient Effort: Excellent      Pre Exercise Vitals  SpO2: 92 %  Supplemental O2?: Yes  O2 Device: nasal cannula  O2 Flow (L/min): 3  Pulse: 97  BP: 125/68  Jessica Dyspnea Rating : 4      During Exercise Vitals  SpO2: 98 %  Supplemental O2?: Yes  O2 Device: nasal cannula  O2 Flow (L/min): 3  Pulse: 100  BP: 110/57  Jessica Dyspnea Rating : 3      Post Exercise Vitals  SpO2: 95 %  Supplemental O2?: Yes  O2 Device: nasal cannula  O2 Flow (L/min): 3  Pulse: 113  BP: 104/55  Jessica Dyspnea Rating : 4       Modality  Modality: Arm Ergometer, Hand Free Weights, Nustep, Treadmill    Arm Ergometer  Time: 10 minutes (1.17 miles)  Level: 2 (2.2 mets)      Nustep  Time: 20 minutes  Steps: 1585  Load: 5 (12:28 on load 4, then changed to load 5.)  Mets: 2.7    Treadmill  Time: 8 minutes  MPH: 1.2 MPH  ndGndrndanddndend:nd nd2nd Biceps  lbs: 3 lbs (dumbbells)  Sets: 2  Reps: 10      Chest  lbs: 3 lbs (dumbbells)  Sets: 2  Reps: 10    Pulmonary Rehab COVID19 Screening Checklist    1. Are you having any of the following physical symptoms?   Yes [] No [x]     Fever or chills  Unexplained muscle or body aches  Cough  Shortness of breath or difficulty breathing  Fatigue  Headache  New loss of taste or smell  Sore throat  Congestion or runny nose  Nausea or vomiting  Diarrhea      2.  Have you come in close contact with anyone with the above symptoms or with known COVID19 in the last 14 days? Yes [] No [x]        3.  Have you tested positive for COVID19 in the last 30 days?   Yes [] No [x]        Education  Proper nutrition and breathing      Therapist Notes   Excellent effort. Increased to load 5 on Nustep for 7:30 mins. Also increased time on treadmill to 8 mins at 1.2  mph, incline of 1. Pt tolerated all changes and exercises well. Vitals were stable. Will continue to motivate and educate pt in rehab.    Dr. Aviles immediately available as needed.

## 2022-09-26 ENCOUNTER — HOSPITAL ENCOUNTER (OUTPATIENT)
Dept: PULMONOLOGY | Facility: HOSPITAL | Age: 52
Discharge: HOME | End: 2022-09-26
Attending: INTERNAL MEDICINE
Payer: COMMERCIAL

## 2022-09-26 ENCOUNTER — PATIENT MESSAGE (OUTPATIENT)
Dept: PULMONOLOGY | Facility: CLINIC | Age: 52
End: 2022-09-26
Payer: COMMERCIAL

## 2022-09-26 VITALS — BODY MASS INDEX: 40.53 KG/M2 | WEIGHT: 251.13 LBS

## 2022-09-26 DIAGNOSIS — J06.9 UPPER RESPIRATORY TRACT INFECTION, UNSPECIFIED TYPE: Primary | ICD-10-CM

## 2022-09-26 PROCEDURE — G0239 OTH RESP PROC, GROUP: HCPCS

## 2022-09-26 RX ORDER — AZITHROMYCIN 250 MG/1
TABLET, FILM COATED ORAL
Qty: 6 TABLET | Refills: 0 | Status: SHIPPED | OUTPATIENT
Start: 2022-09-26 | End: 2022-10-31

## 2022-09-26 NOTE — PROGRESS NOTES
[facility]  Pulmonary Rehab  Session Summary    SUMMARY     Session Data  Session Number: 10  Time In: 1100  Time Out: 1200  Weight: 113.9 kg (251 lb 1.6 oz)  Target Heart Rate Zone: Minimum: 100 bpm  Target Heart Rate Zone: Maximum: 133 bpm  Patient Motivation: Excellent  Patient Effort: Excellent      Pre Exercise Vitals  SpO2: 95 %  Supplemental O2?: Yes  O2 Device: nasal cannula  O2 Flow (L/min): 3  Pulse: 105  BP: 138/84  Jessica Dyspnea Rating : 3      During Exercise Vitals  SpO2: 98 %  Supplemental O2?: Yes  O2 Device: nasal cannula  O2 Flow (L/min): 3  Pulse: 105  BP: 137/76  Jessica Dyspnea Rating : 3      Post Exercise Vitals  SpO2: 98 %  Supplemental O2?: Yes  O2 Device: nasal cannula  O2 Flow (L/min): 2  Pulse: 106  BP: 125/78  Jessica Dyspnea Rating : 3       Modality  Modality: Arm Ergometer, Hand Free Weights, Nustep, Treadmill      Arm Ergometer  Time: 10 minutes (1.45 miles)  Level: 2 (2.8 mets)      Nustep  Time: 20 minutes  Steps: 1549  Load: 5  Mets: 2.9      Treadmill  Time: 8 minutes  MPH: 1.3 MPH  ndGndrndanddndend:nd nd2nd Biceps  lbs: 5 lbs (dumbbells)  Sets: 2  Reps: 10      Chest  lbs: 5 lbs (dumbbells)  Sets: 2  Reps: 10    Pulmonary Rehab COVID19 Screening Checklist    1. Are you having any of the following physical symptoms?   Yes [] No [x]     Fever or chills  Unexplained muscle or body aches  Cough  Shortness of breath or difficulty breathing  Fatigue  Headache  New loss of taste or smell  Sore throat  Congestion or runny nose  Nausea or vomiting  Diarrhea      2.  Have you come in close contact with anyone with the above symptoms or with known COVID19 in the last 14 days? Yes [] No [x]        3.  Have you tested positive for COVID19 in the last 30 days?   Yes [] No [x]        Education  Stress and SOB  Cycle of inactivity      Therapist Notes   Excellent effort. Increased to 1.3 mph on treadmill. Also increased to load 5 for entire 20 mins on Nustep and to 5 lb dumbbells for chest and bicep  exercises. She tolerated all changes and exercises well. Pt did not want to do 6 MWT that Dr. Aviles ordered today, wants to wait until Wed. Due to her not feeling well this past weekend. Will continue to motivate and educate pt in rehab.    Dr. Thompson immediately available as needed.

## 2022-09-26 NOTE — TELEPHONE ENCOUNTER
Requested Prescriptions     Signed Prescriptions Disp Refills    azithromycin (Z-BONNIE) 250 MG tablet 6 tablet 0     Sig: Take 2 tablets by mouth on day 1; Take 1 tablet by mouth on days 2-5     Authorizing Provider: DANE BARRY

## 2022-09-28 ENCOUNTER — HOSPITAL ENCOUNTER (OUTPATIENT)
Dept: PULMONOLOGY | Facility: HOSPITAL | Age: 52
Discharge: HOME | End: 2022-09-28
Attending: INTERNAL MEDICINE
Payer: COMMERCIAL

## 2022-09-28 VITALS — WEIGHT: 249.69 LBS | BODY MASS INDEX: 40.3 KG/M2

## 2022-09-28 PROCEDURE — G0239 OTH RESP PROC, GROUP: HCPCS

## 2022-09-28 NOTE — PROGRESS NOTES
[facility]  Pulmonary Rehab  Session Summary    SUMMARY     Session Data  Session Number: 11  Time In: 1430  Time Out: 1530  Weight: 113.3 kg (249 lb 11.2 oz)  Target Heart Rate Zone: Minimum: 100 bpm  Target Heart Rate Zone: Maximum: 133 bpm  Patient Motivation: Excellent  Patient Effort: Excellent      Pre Exercise Vitals  SpO2: 95 %  Supplemental O2?: Yes  O2 Device: nasal cannula  O2 Flow (L/min): 3  Pulse: 115  BP: 135/76  Jessica Dyspnea Rating : 3      During Exercise Vitals  SpO2: 95 %  Supplemental O2?: Yes  O2 Device: nasal cannula  O2 Flow (L/min): 4  Pulse: 124  BP: 108/68  Jessica Dyspnea Rating : 3      Post Exercise Vitals  SpO2: 97 %  Supplemental O2?: Yes  O2 Device: nasal cannula  O2 Flow (L/min): 4  Pulse: 121  BP: 125/67  Jessica Dyspnea Rating : 5-6       Modality  Modality: Arm Ergometer, Hand Free Weights, Nustep, Treadmill      Arm Ergometer  Time: 12 minutes (1.37 miles)  Level: 2 (2.2 mets)    Nustep  Time: 21 minutes  Steps: 1613  Load: 5  Mets: 2.7    Treadmill  Time: 5 minutes  MPH: 1.4 MPH  ndGndrndanddndend:nd nd2nd Biceps  lbs: 5 lbs (dumbbells)  Sets: 2  Reps: 10      Chest  lbs: 5 lbs (dumbbells)  Sets: 2  Reps: 10    Pulmonary Rehab COVID19 Screening Checklist    1. Are you having any of the following physical symptoms?   Yes [] No [x]     Fever or chills  Unexplained muscle or body aches  Cough  Shortness of breath or difficulty breathing  Fatigue  Headache  New loss of taste or smell  Sore throat  Congestion or runny nose  Nausea or vomiting  Diarrhea      2.  Have you come in close contact with anyone with the above symptoms or with known COVID19 in the last 14 days? Yes [] No [x]        3.  Have you tested positive for COVID19 in the last 30 days?   Yes [] No [x]        Education  Cycle of inactivity  Pursed lip breathing      Therapist Notes   Excellent effort. Increased time on Nustep to 21 minutes on load 5. She also completed 12 mins on level 2 on arm ergometer. She tolerated  exercise well but had to increase O2 to 4lpm. Pt states she doesn't feel well to do 6 MWT today. Will try next week. Will continue to motivate and educate pt in rehab.    Dr. Castillo immediately available as needed.

## 2022-09-30 ENCOUNTER — PATIENT MESSAGE (OUTPATIENT)
Dept: PULMONOLOGY | Facility: CLINIC | Age: 52
End: 2022-09-30
Payer: COMMERCIAL

## 2022-09-30 LAB
FINAL PATHOLOGIC DIAGNOSIS: NORMAL
GROSS: NORMAL
Lab: NORMAL
MICROSCOPIC EXAM: NORMAL

## 2022-10-05 ENCOUNTER — PATIENT MESSAGE (OUTPATIENT)
Dept: RHEUMATOLOGY | Facility: CLINIC | Age: 52
End: 2022-10-05
Payer: COMMERCIAL

## 2022-10-06 DIAGNOSIS — J84.9 INTERSTITIAL LUNG DISEASE: ICD-10-CM

## 2022-10-06 RX ORDER — MYCOPHENOLATE MOFETIL 500 MG/1
1000 TABLET ORAL 2 TIMES DAILY
Qty: 360 TABLET | Refills: 1 | Status: SHIPPED | OUTPATIENT
Start: 2022-10-06 | End: 2022-10-11 | Stop reason: SDUPTHER

## 2022-10-10 ENCOUNTER — CLINICAL SUPPORT (OUTPATIENT)
Dept: PULMONOLOGY | Facility: CLINIC | Age: 52
End: 2022-10-10
Payer: COMMERCIAL

## 2022-10-10 ENCOUNTER — HOSPITAL ENCOUNTER (OUTPATIENT)
Dept: PULMONOLOGY | Facility: HOSPITAL | Age: 52
Discharge: HOME | End: 2022-10-10
Attending: INTERNAL MEDICINE
Payer: COMMERCIAL

## 2022-10-10 VITALS — HEIGHT: 66 IN | BODY MASS INDEX: 40.08 KG/M2 | WEIGHT: 249.38 LBS

## 2022-10-10 VITALS — BODY MASS INDEX: 40.25 KG/M2 | WEIGHT: 249.38 LBS

## 2022-10-10 DIAGNOSIS — J84.111 CHRONIC INTERSTITIAL PNEUMONIA: ICD-10-CM

## 2022-10-10 PROCEDURE — 99999 PR PBB SHADOW E&M-EST. PATIENT-LVL I: ICD-10-PCS | Mod: PBBFAC,,,

## 2022-10-10 PROCEDURE — 94618 PULMONARY STRESS TESTING: ICD-10-PCS | Mod: S$GLB,,, | Performed by: INTERNAL MEDICINE

## 2022-10-10 PROCEDURE — G0239 OTH RESP PROC, GROUP: HCPCS

## 2022-10-10 PROCEDURE — 99999 PR PBB SHADOW E&M-EST. PATIENT-LVL I: CPT | Mod: PBBFAC,,,

## 2022-10-10 PROCEDURE — 94618 PULMONARY STRESS TESTING: CPT | Mod: S$GLB,,, | Performed by: INTERNAL MEDICINE

## 2022-10-10 NOTE — TELEPHONE ENCOUNTER
"Patient is scheduled for CT chest without contrast next Tuesday 10/18/2022. She just had one on 09/20/2022 ordered by Dr. Aviles. Does she have to have another one next week?    Sadaf Lafleur (Allye), [facility]  Rheumatology Department   "

## 2022-10-10 NOTE — PROCEDURES
"[facility] - Pulmonary Function  Six Minute Walk     SUMMARY     Ordering Provider:    Interpreting Provider:   Performing nurse/tech/RT: Aníbal, RT (performed) (MARIA DOLORES BryanRT (charted))  Diagnosis:  (Acute Interstitial Pneumonitis)  Height: 5' 6" (167.6 cm)  Weight: 113.1 kg (249 lb 6.4 oz)  BMI (Calculated): 40.3   Patient Race:             Phase Oxygen Assessment Supplemental O2 Heart   Rate Blood Pressure Jessica Dyspnea Scale Rating   Resting 93 % 3 L/M 110 bpm 114/72 2   Exercise        Minute        1 75 % (Increased to 5L) 3 L/M 120 bpm     2 86 % 5 L/M 129 bpm     3 76 % (Increased to 6L) 5 L/M 130 bpm     4 74 % 6 L/M 135 bpm     5 90 % 6 L/M 132 bpm     6  86 % 6 L/M 129 bpm 115/74 4   Recovery        Minute        1 90 % 6 L/M 108 bpm     2 97 % 4 L/M 111 bpm     3 96 % 4 L/M 111 bpm     4 96 % 3 L/M 112 bpm 107/57 3     Six Minute Walk Summary  6MWT Status: completed with stops  Patient Reported: Dyspnea     Interpretation:  Did the patient stop or pause?: Yes  How many times did the patient stop or pause?: 1  Stop Time 1: 222  Restart Time 1: 300  Pause Time 1: 78 seconds         Total Time Walked (Calculated): 282 seconds  Final Partial Lap Distance (feet): 100 feet  Total Distance Meters (Calculated): 213.36 meters  Predicted Distance Meters (Calculated): 449.46 meters  Percentage of Predicted (Calculated): 47.47  Peak VO2 (Calculated): 10.38  Mets: 2.97  Has The Patient Had a Previous Six Minute Walk Test?: Yes       Previous 6MWT Results  Has The Patient Had a Previous Six Minute Walk Test?: Yes  Date of Previous Test: 05/05/22  Total Time Walked: 360 seconds  Total Distance (meters): 289.56  Predicted Distance (meters): 446.82 meters  Percentage of Predicted: 64.8  Percent Change (Calculated): 0.26           CLINICAL INTERPRETATION:  Six minute walk distance is 213.36m (47.47 % predicted) with light dyspnea.  During exercise, there was significant desaturation while " breathing supplemental oxygen.  Blood pressure remained stable and Heart rate increased significantly with walking.  Significant exercise impairment is likely due to desaturation.  The patient did not complete the study, walking 282 seconds of the 360 second test.  The patient may benefit from using supplemental oxygen and using supplemental oxygen during exertion.  Since the previous study in 05/05/2022, exercise capacity may be somewhat worse.  Based upon age and body mass index, exercise capacity is less than predicted.      [] Mild exercise-induced hypoxemia described as an arterial oxygen saturation of 93-95% (or 3-4% less than at rest)  []  Moderate exercise-induced hypoxemia as 89-93%  [x]  Severe exercise induced hypoxemia as < 89% O2 saturation.  Medicare Criteria for oxygen prescription comments:   []  When arterial oxygen saturation is at or below 88% during exercise (severe exercise induced hypoxemia) then the patient falls under Medicare Group 1 criteria for supplemental oxygen

## 2022-10-10 NOTE — PROGRESS NOTES
[facility] - [facility] Re[facility]  Pulmonary Rehab  Session Summary    SUMMARY     Session Data  Session Number: 12  Time In: 1100  Time Out: 1200  Weight: 113.1 kg (249 lb 6.4 oz)  Target Heart Rate Zone: Minimum: 100 bpm  Target Heart Rate Zone: Maximum: 133 bpm  Patient Motivation: Excellent  Patient Effort: Excellent      Pre Exercise Vitals  SpO2: 93 %  Supplemental O2?: Yes  O2 Device: nasal cannula  O2 Flow (L/min): 3  Pulse: 110  BP: 114/72  Jessica Dyspnea Rating : 2      During Exercise Vitals  SpO2: 94 %  Supplemental O2?: Yes  O2 Device: nasal cannula  O2 Flow (L/min): 3  Pulse: 120  BP: 115/74  Jessica Dyspnea Rating : 4      Post Exercise Vitals  SpO2: 94 %  Supplemental O2?: Yes  O2 Device: nasal cannula  O2 Flow (L/min): 3  Pulse: 110  BP: 122/68  Jessica Dyspnea Rating : 3       Modality  Modality: Arm Ergometer, Hand Free Weights, Nustep (6 min walk done prior to exercise)      Arm Ergometer  Time: 10 minutes (2 mets)  Level: 2 (1.15 miles)      Nustep  Time: 22 minutes  Steps: 1539  Load: 5  Mets: 2.5    Biceps  lbs: 5 lbs (dumbbells)  Sets: 2  Reps: 10      Chest  lbs: 5 lbs (dumbbells)  Sets: 2  Reps: 10    Pulmonary Rehab COVID19 Screening Checklist    1. Are you having any of the following physical symptoms?   Yes [] No [x]     Fever or chills  Unexplained muscle or body aches  Cough  Shortness of breath or difficulty breathing  Fatigue  Headache  New loss of taste or smell  Sore throat  Congestion or runny nose  Nausea or vomiting  Diarrhea      2.  Have you come in close contact with anyone with the above symptoms or with known COVID19 in the last 14 days? Yes [] No [x]        3.  Have you tested positive for COVID19 in the last 30 days?   Yes [] No [x]         Education  Preventing infections  Tips for safe exercise      Therapist Notes   Excellent effort. Pt performed 6 MWT prior to exercise in rehab. (See notes) Will discuss with Dr. Aviles possible doing 6 MWT on pulse dose O2 concentrator. She  tolerated all exercises well. Vitals stable. Will continue to motivate and educate pt in rehab.    Dr. Castillo immediately available as needed.

## 2022-10-11 ENCOUNTER — PATIENT MESSAGE (OUTPATIENT)
Dept: PULMONOLOGY | Facility: CLINIC | Age: 52
End: 2022-10-11
Payer: COMMERCIAL

## 2022-10-11 ENCOUNTER — PATIENT MESSAGE (OUTPATIENT)
Dept: RHEUMATOLOGY | Facility: CLINIC | Age: 52
End: 2022-10-11
Payer: COMMERCIAL

## 2022-10-11 DIAGNOSIS — J84.9 INTERSTITIAL LUNG DISEASE: ICD-10-CM

## 2022-10-11 RX ORDER — MYCOPHENOLATE MOFETIL 500 MG/1
1500 TABLET ORAL 2 TIMES DAILY
Qty: 180 TABLET | Refills: 0 | Status: SHIPPED | OUTPATIENT
Start: 2022-10-11 | End: 2023-03-17

## 2022-10-11 NOTE — TELEPHONE ENCOUNTER
"Contacted patient to discuss this message with her. Patient stated that when she went to the pharmacy yesterday to  her cellcept prescription they told her that they didn't have a prescription for her. She has not started the increase due to her not having enough pills.     Contacted [facility] at [facility] to see what is going on with patients prescription. They stated that it is too soon to fill the prescription since she last filled on 08/29/2022. She would not be due for a refill until 11/01/2022.    Called patient back to discuss this with her. Patient stated that she has been taking her prescription as prescribed and wanted to know how much it would cost to pay out of pocket for the remainder of the pills. After speaking with the pharmacy again advised patient that it would be $32.00 for a 21 day supply until insurance will cover the prescription again. Patient then asked if there is any way that Dr. Sutton send in a prescription for the increased dosage to see if insurance will go ahead and cover it since it is an increase. Advised patient that I will talk to Dr. Sutton and let her know once I hear something. Patient verbalized understanding. All questions answered.     Sadaf Garrison" [place], [facility]  Rheumatology Department   "

## 2022-10-12 ENCOUNTER — HOSPITAL ENCOUNTER (OUTPATIENT)
Dept: PULMONOLOGY | Facility: HOSPITAL | Age: 52
Discharge: HOME | End: 2022-10-12
Attending: INTERNAL MEDICINE
Payer: COMMERCIAL

## 2022-10-12 VITALS — WEIGHT: 248.81 LBS | BODY MASS INDEX: 40.16 KG/M2

## 2022-10-12 DIAGNOSIS — R09.02 EXERCISE HYPOXEMIA: ICD-10-CM

## 2022-10-12 DIAGNOSIS — J96.11 CHRONIC RESPIRATORY FAILURE WITH HYPOXIA: ICD-10-CM

## 2022-10-12 DIAGNOSIS — J84.111 CHRONIC INTERSTITIAL PNEUMONIA: Primary | ICD-10-CM

## 2022-10-12 DIAGNOSIS — J98.4 RESTRICTIVE LUNG DISEASE: ICD-10-CM

## 2022-10-12 PROCEDURE — 94625 PHY/QHP OP PULM RHB W/O MNTR: CPT

## 2022-10-12 NOTE — PROGRESS NOTES
[facility] - [facility] Re[facility]  Pulmonary Rehab  30 Day Evaluation and Recertification    SUMMARY       Summary of Progress: Ayanna Alicia has been doing excellent in rehab. She is increasing her exercise tolerance and will continue to benefit from pulmonary rehab. Pt works hard in her sessions and strives to meet her goals. Pt has been focusing on eating more healthy. She has lost 6 lbs since starting rehab. Pt would like a portable O2 concentrator but required 6lpm of O2 continuous on 6 MWT done on 10/11/22. Discussed with Dr. Aviles. He is investigating trying to help pt with portable concentrator. Pt exercises at home outside of rehab with free weights and stationary bike. Will continue to motivate and educate pt while striving to increase her strength and endurance in rehab.    Attends:     Patient attends 2 days a week and has completed 12 visits.  The patient's current compliance is 83%.    Referring provider:  Dr. Aviles    Start date:  8/17/22    Problems this Certification Period:   compliance    Exercise Capacity Summary          Nustep Date:  8/31/22   Time Load Steps Date:  10/10/22 Time Load Steps     21 4 1651  22 5 1539   Recumbent Bike Date:  8/29/22  (0.63 miles)   Time Level Date:  9/8/22  (1 mile) Time Level     5 2  6 2   Treadmill Date:  8/31/22   Time Speed Grade Date:  9/26/22 Time Speed Grade     5 1.4 1  8 1.3 1   Arm Ergometer Date:    8/31/22  (1.16 miles) Time Level Date:  9/28/22  (1.37 miles) Time Level     11 1.5  12 2   Free Weights Date:  8/31/22  (Dumb)   Bicep Curls  Chest Press  Triceps  Legs lbs Sets Reps Date:  10/10/22  (Dumb) Bicep Curls  Chest Press  Triceps  Legs lbs Sets Reps      3 2 10   5 2 10                                                                                                 Education:  Proper nutrition and exercise  Pursed lip breathing  Maximizing energy  Tips for safe exercise  Preventing lung infections  Pursed lip breathing    Goals:  met    Updated  Exercise Prescription:  Endurance Training: Treadmill 10 mins, 1.4 mph, incline 1  Strength Training: Nustep, load 6, 500 steps, 10 minutes         I certify that the patient is making progress in pulmonary rehabilitation, is physically able to participate, medically stable and remains motivated.

## 2022-10-12 NOTE — PROGRESS NOTES
"Spoke with patient  Patient wants POC with accomondation of pulse flow  Will closely monitor SpO2    Orders Placed This Encounter   Procedures    OXYGEN FOR HOME USE     Night time O2 bleed with CPAP     Order Specific Question:   Liter Flow     Answer:   6     Order Specific Question:   Duration     Answer:   Continuous     Order Specific Question:   Qualifying Test Performed at:     Answer:   Activity     Order Specific Question:   Oxygen saturation at rest     Answer:   93     Comments:   3.0LPM     Order Specific Question:   Oxygen saturation with activity     Answer:   86     Order Specific Question:   Oxygen saturation with activity on oxygen     Answer:   90     Comments:   6.0LPM     Order Specific Question:   Portable mode:     Answer:   pulse dose acceptable     Order Specific Question:   Mode:     Answer:   Portable concentrator     Order Specific Question:   Route     Answer:   nasal cannula     Order Specific Question:   Device:     Answer:   home concentrator with portable concentrator     Order Specific Question:   Length of need (in months):     Answer:   99 mos     Order Specific Question:   Patient condition with qualifying saturation     Answer:   Other - List qualifying diagnosis and code     Order Specific Question:   Select a diagnosis & list the code in the comments     Answer:   ILD (interstitial lung disease) [795544]     Order Specific Question:   Height:     Answer:   5'6"     Order Specific Question:   Weight:     Answer:   248LB     Order Specific Question:   Alternative treatment measures have been tried or considered and deemed clinically ineffective.     Answer:   Yes        "

## 2022-10-12 NOTE — PROGRESS NOTES
[facility] - Pulmonary Rehab  Pulmonary Rehab  Session Summary    SUMMARY     Session Data  Session Number: 12  Time In: 1100  Time Out: 1200  Weight: 112.9 kg (248 lb 12.8 oz)  Target Heart Rate Zone: Minimum: 100 bpm  Target Heart Rate Zone: Maximum: 133 bpm  Patient Motivation: Excellent  Patient Effort: Excellent      Pre Exercise Vitals  SpO2: 93 %  Supplemental O2?: Yes  O2 Device: nasal cannula  O2 Flow (L/min): 3  Pulse: 102  BP: 131/65  Jessica Dyspnea Rating :  (2.5)      During Exercise Vitals  SpO2: 93 %  Supplemental O2?: Yes  O2 Device: nasal cannula  O2 Flow (L/min): 3  Pulse: 107  BP: 122/68  Jessica Dyspnea Rating : 4      Post Exercise Vitals  SpO2: 94 %  Supplemental O2?: Yes  O2 Device: nasal cannula  O2 Flow (L/min): 3  Pulse: 109  BP: 118/65  Jessica Dyspnea Rating : 3       Modality  Modality: Hand Free Weights, Nustep, Arm Ergometer, Recumbent Bike       Arm Ergometer  Time: 12 minutes  Level: 2 (Miles 1.19  Mets 2.1)     Nustep  Time: 20 minutes  Steps: 1384  Load:  (Load 5 for 10 min then load 6 for 10 min)  Mets: 2.6      Recumbent Bike  Time: 6 minutes  Level: 2 (Miles 1.04)  Mets: 1.4        Biceps  lbs: 4 lbs (dumbbell)  Sets: 2  Reps: 10    Chest  lbs: 4 lbs (dumbbell)  Sets: 2  Reps: 10              Education  Preventing Lung infections  Tips for safe exercise      Therapist Notes   Good patient effort.Patient tolerated exercise well and Vital signs were stable.Started Nustep load at 5 for 10 min then increased load to 6 for the next 10 minutes.Patient did well but was tired after rehab session.Will continue to educated and motivate patient in Rehab.  Dr. Aviles immediately available as needed.       Pulmonary Rehab COVID19 Screening Checklist    1. Are you having any of the following physical symptoms?   Yes [] No [x]     Fever or chills  Unexplained muscle or body aches  Cough  Shortness of breath or difficulty breathing  Fatigue  Headache  New loss of taste or smell  Sore throat  Congestion  or runny nose  Nausea or vomiting  Diarrhea      2.  Have you come in close contact with anyone with the above symptoms or with known COVID19 in the last 14 days? Yes [] No [x]        3.  Have you tested positive for COVID19 in the last 30 days?   Yes [] No [x]

## 2022-10-17 ENCOUNTER — PATIENT MESSAGE (OUTPATIENT)
Dept: PULMONOLOGY | Facility: CLINIC | Age: 52
End: 2022-10-17
Payer: COMMERCIAL

## 2022-10-17 DIAGNOSIS — J96.11 CHRONIC RESPIRATORY FAILURE WITH HYPOXIA: Primary | ICD-10-CM

## 2022-10-17 RX ORDER — IPRATROPIUM BROMIDE AND ALBUTEROL SULFATE 2.5; .5 MG/3ML; MG/3ML
3 SOLUTION RESPIRATORY (INHALATION) EVERY 6 HOURS PRN
Qty: 90 ML | Refills: 11 | Status: SHIPPED | OUTPATIENT
Start: 2022-10-17 | End: 2022-11-07 | Stop reason: SDUPTHER

## 2022-10-18 NOTE — TELEPHONE ENCOUNTER
"Contacted patient to discuss this with her. Advised patient that she does not need another CT scan. Appointment cancelled. Patient verbalized understanding. All questions answered.     Sadaf Lafleur (Allye), [facility]  Rheumatology Department   "

## 2022-10-27 ENCOUNTER — LAB VISIT (OUTPATIENT)
Dept: LAB | Facility: HOSPITAL | Age: 52
End: 2022-10-27
Attending: FAMILY MEDICINE
Payer: COMMERCIAL

## 2022-10-27 ENCOUNTER — PATIENT MESSAGE (OUTPATIENT)
Dept: PULMONOLOGY | Facility: CLINIC | Age: 52
End: 2022-10-27
Payer: COMMERCIAL

## 2022-10-27 ENCOUNTER — TELEPHONE (OUTPATIENT)
Dept: RHEUMATOLOGY | Facility: CLINIC | Age: 52
End: 2022-10-27
Payer: COMMERCIAL

## 2022-10-27 ENCOUNTER — OFFICE VISIT (OUTPATIENT)
Dept: RHEUMATOLOGY | Facility: CLINIC | Age: 52
End: 2022-10-27
Payer: COMMERCIAL

## 2022-10-27 ENCOUNTER — PATIENT MESSAGE (OUTPATIENT)
Dept: RHEUMATOLOGY | Facility: CLINIC | Age: 52
End: 2022-10-27
Payer: COMMERCIAL

## 2022-10-27 VITALS
DIASTOLIC BLOOD PRESSURE: 81 MMHG | SYSTOLIC BLOOD PRESSURE: 137 MMHG | TEMPERATURE: 99 F | HEART RATE: 112 BPM | WEIGHT: 248.88 LBS | HEIGHT: 66 IN | BODY MASS INDEX: 40 KG/M2

## 2022-10-27 DIAGNOSIS — Z79.60 LONG-TERM USE OF IMMUNOSUPPRESSANT MEDICATION: ICD-10-CM

## 2022-10-27 DIAGNOSIS — Z79.899 HIGH RISK MEDICATION USE: ICD-10-CM

## 2022-10-27 DIAGNOSIS — R76.8 RHEUMATOID FACTOR POSITIVE: Primary | ICD-10-CM

## 2022-10-27 DIAGNOSIS — M05.9 RHEUMATOID ARTHRITIS WITH POSITIVE RHEUMATOID FACTOR, INVOLVING UNSPECIFIED SITE: ICD-10-CM

## 2022-10-27 DIAGNOSIS — R76.8 RHEUMATOID FACTOR POSITIVE: ICD-10-CM

## 2022-10-27 DIAGNOSIS — J84.9 INTERSTITIAL LUNG DISEASE: ICD-10-CM

## 2022-10-27 DIAGNOSIS — J84.9 INTERSTITIAL PULMONARY DISEASE, UNSPECIFIED: ICD-10-CM

## 2022-10-27 PROCEDURE — 83516 IMMUNOASSAY NONANTIBODY: CPT | Mod: 59 | Performed by: INTERNAL MEDICINE

## 2022-10-27 PROCEDURE — 3075F PR MOST RECENT SYSTOLIC BLOOD PRESS GE 130-139MM HG: ICD-10-PCS | Mod: CPTII,S$GLB,, | Performed by: INTERNAL MEDICINE

## 2022-10-27 PROCEDURE — 99999 PR PBB SHADOW E&M-EST. PATIENT-LVL IV: CPT | Mod: PBBFAC,,, | Performed by: INTERNAL MEDICINE

## 2022-10-27 PROCEDURE — 1159F PR MEDICATION LIST DOCUMENTED IN MEDICAL RECORD: ICD-10-PCS | Mod: CPTII,S$GLB,, | Performed by: INTERNAL MEDICINE

## 2022-10-27 PROCEDURE — 36415 COLL VENOUS BLD VENIPUNCTURE: CPT | Performed by: INTERNAL MEDICINE

## 2022-10-27 PROCEDURE — 3079F DIAST BP 80-89 MM HG: CPT | Mod: CPTII,S$GLB,, | Performed by: INTERNAL MEDICINE

## 2022-10-27 PROCEDURE — 1159F MED LIST DOCD IN RCRD: CPT | Mod: CPTII,S$GLB,, | Performed by: INTERNAL MEDICINE

## 2022-10-27 PROCEDURE — 3079F PR MOST RECENT DIASTOLIC BLOOD PRESSURE 80-89 MM HG: ICD-10-PCS | Mod: CPTII,S$GLB,, | Performed by: INTERNAL MEDICINE

## 2022-10-27 PROCEDURE — 86235 NUCLEAR ANTIGEN ANTIBODY: CPT | Mod: 59 | Performed by: INTERNAL MEDICINE

## 2022-10-27 PROCEDURE — 83516 IMMUNOASSAY NONANTIBODY: CPT | Performed by: INTERNAL MEDICINE

## 2022-10-27 PROCEDURE — 99999 PR PBB SHADOW E&M-EST. PATIENT-LVL IV: ICD-10-PCS | Mod: PBBFAC,,, | Performed by: INTERNAL MEDICINE

## 2022-10-27 PROCEDURE — 86036 ANCA SCREEN EACH ANTIBODY: CPT | Performed by: INTERNAL MEDICINE

## 2022-10-27 PROCEDURE — 99215 OFFICE O/P EST HI 40 MIN: CPT | Mod: S$GLB,,, | Performed by: INTERNAL MEDICINE

## 2022-10-27 PROCEDURE — 99215 PR OFFICE/OUTPT VISIT, EST, LEVL V, 40-54 MIN: ICD-10-PCS | Mod: S$GLB,,, | Performed by: INTERNAL MEDICINE

## 2022-10-27 PROCEDURE — 3075F SYST BP GE 130 - 139MM HG: CPT | Mod: CPTII,S$GLB,, | Performed by: INTERNAL MEDICINE

## 2022-10-27 RX ORDER — FAMOTIDINE 10 MG/ML
20 INJECTION INTRAVENOUS
Status: CANCELLED | OUTPATIENT
Start: 2022-10-27

## 2022-10-27 RX ORDER — HEPARIN 100 UNIT/ML
500 SYRINGE INTRAVENOUS
Status: CANCELLED | OUTPATIENT
Start: 2022-10-27

## 2022-10-27 RX ORDER — ACETAMINOPHEN 325 MG/1
650 TABLET ORAL
Status: CANCELLED | OUTPATIENT
Start: 2022-10-27

## 2022-10-27 RX ORDER — SODIUM CHLORIDE 0.9 % (FLUSH) 0.9 %
10 SYRINGE (ML) INJECTION
Status: CANCELLED | OUTPATIENT
Start: 2022-10-27

## 2022-10-27 RX ORDER — MEPERIDINE HYDROCHLORIDE 50 MG/ML
25 INJECTION INTRAMUSCULAR; INTRAVENOUS; SUBCUTANEOUS
Status: CANCELLED | OUTPATIENT
Start: 2022-10-27

## 2022-10-27 NOTE — TELEPHONE ENCOUNTER
----- Message from Chicho Lewis MD sent at 10/27/2022 10:48 AM CDT -----  New rituximab.  Planning on repeating regimen in 6 months (375mg q/ weekly x4).

## 2022-10-27 NOTE — TELEPHONE ENCOUNTER
Spoke with pt to discuss rituxan infusion protocol per Dr Lewis's therapy plan. Pt verbalized understanding. She is tentatively scheduled for her first treatment on 11/11/22 pending ins. Approval. She will come to the [facility] on 10/31 for FLUAD or go to her PCP. Reviewed Dr Lewis' instructions regarding cellcept. Pt is presently taking 2gm daily.

## 2022-10-27 NOTE — Clinical Note
Good afternoon!  Planning on rituximab 375 q/ weekly x4, with repeat cycle in 4-6 months.  Will recommend actemra or abatacept if RTX infusion denied.

## 2022-10-27 NOTE — PROGRESS NOTES
RHEUMATOLOGY OUTPATIENT CLINIC NOTE    10/27/2022    Attending Rheumatologist: Chicho Lewis  Primary Care Provider/Physician Requesting Consultation: Madeleine Enrique MD   Chief Complaint/Reason For Consultation:  Positive RF and Interstitial Lung Disease      Subjective:     Ayanna Alicia is a 52 y.o. Black or  female with interstitial pneumonitis and positive rheumatoid factor for follow-up encounter.    Main concern of progressive respiratory symptoms.  Adherence with CellCept currently at 3 g per day for the past 2 weeks without significant side effects.    Addendum 11/11: High titier 52kD SSA, RF elevation and sicca symptoms consistent with Sjogren's syndrome.  Leukocytosis, low-grade temperature, increased yellow sputum production in context of fibrotic interstitial lung disease. Cannot exclude concomitant infectious process at present.  Prescribed oral antibiotic therapy by Pulmonary earlier this week.  CXR with Basilar pulmonary opacities, similar in extent. Sputum culture not available.  Persistent malaise and unchanged sputum production reported by patient.  Consider too risky to receive infusion today.  Recommend f/u with pulmonary medicine for clearance to receive rituximab infusions for fibrotic interstitial lung disease with autoimmune features.    Review of Systems   Constitutional:  Positive for malaise/fatigue. Negative for fever.   HENT:  Positive for congestion and nosebleeds.    Eyes:  Negative for photophobia and pain.   Respiratory:  Positive for cough and shortness of breath. Negative for hemoptysis.    Gastrointestinal:  Positive for diarrhea (Loose stool since dose increase of CellCept). Negative for blood in stool and melena.        No history of diverticulosis or diverticulitis   Genitourinary:  Negative for dysuria, hematuria and urgency.   Musculoskeletal:  Negative for joint pain.   Skin:  Negative for rash.   Neurological:  Positive for weakness. Negative for focal  weakness.     Chronic comorbid conditions affecting medical decision making today:  Past Medical History:   Diagnosis Date    Abnormal Pap smear of cervix     in the past with repeat pap smear okay.    Acute interstitial pneumonitis     Allergic rhinitis, cause unspecified     Arthritis of both knees     Asthma     Eczema     Fatty liver 10/2014    Fibrocystic breast changes     Headache(784.0)     Hepatomegaly 10/2014    Hypertension     Liver cyst 10/2014    Multinodular goiter     Followed by ENT - Dr. Nicolas Gray    Polymenorrhea     TMJ (dislocation of temporomandibular joint)     Uterine fibroid     in the past    Vitamin D deficiency disease      Past Surgical History:   Procedure Laterality Date     SECTION, CLASSIC      x 1    COLONOSCOPY N/A 2020    Procedure: COLONOSCOPY;  Surgeon: Angie Magana MD;  Location: [facility];  Service: Endoscopy;  Laterality: N/A;    HYSTERECTOMY      MULTIPLE TOOTH EXTRACTIONS      PARTIAL HYSTERECTOMY  2013    Due to fibroids     Family History   Problem Relation Age of Onset    Stroke Mother     Diabetes Mother     Heart disease Mother     Heart disease Father 63        MI/CAD    Hypertension Father     Cancer Maternal Grandmother 60        Rectal and stomach cancer    Migraines Cousin     Cataracts Cousin     Cancer Maternal Aunt 50        Stomach cancer    Cancer Maternal Aunt 66        Lung cancer (smoker)    Stroke Maternal Grandfather     Diabetes Maternal Grandfather     Stroke Sister      Social History     Tobacco Use   Smoking Status Never   Smokeless Tobacco Never       Current Outpatient Medications:     albuterol (ACCUNEB) 0.63 mg/3 mL Nebu, Take 3 mLs (0.63 mg total) by nebulization every 4 to 6 hours as needed (asthma). Rescue, Disp: 75 mL, Rfl: 6    albuterol (PROVENTIL/VENTOLIN HFA) 90 mcg/actuation inhaler, INHALE 1 TO 2 PUFFS BY MOUTH INTO THE LUNGS EVERY 4 TO 6 HOURS AS NEEDED FOR WHEEZING OR SHORTNESS OF BREATH, Disp: 8.5 g,  Rfl: 5    albuterol-ipratropium (DUO-NEB) 2.5 mg-0.5 mg/3 mL nebulizer solution, Take 3 mLs by nebulization every 6 (six) hours as needed for Wheezing. Rescue, Disp: 90 mL, Rfl: 11    amLODIPine (NORVASC) 10 MG tablet, Take 1 tablet (10 mg total) by mouth once daily., Disp: 90 tablet, Rfl: 1    ascorbic acid, vitamin C, (VITAMIN C) 500 MG tablet, Take 500 mg by mouth once daily., Disp: , Rfl:     azithromycin (Z-BONNIE) 250 MG tablet, Take 2 tablets by mouth on day 1; Take 1 tablet by mouth on days 2-5, Disp: 6 tablet, Rfl: 0    calcium/magnesium/vit B comp (CALCIUM-MAGNESIUM-B COMPLEX ORAL), Take 1 tablet by mouth once daily at 6am., Disp: , Rfl:     fluticasone furoate-vilanteroL (BREO ELLIPTA) 100-25 mcg/dose diskus inhaler, INHALE 1 PUFF INTO THE LUNGS ONCE DAILY, Disp: 60 each, Rfl: 5    hydroCHLOROthiazide (HYDRODIURIL) 12.5 MG Tab, Take 1 tablet (12.5 mg total) by mouth once daily., Disp: 90 tablet, Rfl: 1    levocetirizine (XYZAL) 5 MG tablet, Take 1 tablet (5 mg total) by mouth once daily., Disp: 90 tablet, Rfl: 1    montelukast (SINGULAIR) 10 mg tablet, Take 1 tablet (10 mg total) by mouth every evening., Disp: 90 tablet, Rfl: 1    mycophenolate (CELLCEPT) 500 mg Tab, Take 3 tablets (1,500 mg total) by mouth 2 (two) times daily., Disp: 180 tablet, Rfl: 0    omega-3s/dha/epa/fish oil/D3 (VITAMIN-D + OMEGA-3 ORAL), Take 1 tablet by mouth once daily at 6am., Disp: , Rfl:     ondansetron (ZOFRAN-ODT) 4 MG TbDL, Dissolve 1 tablet (4 mg total) by mouth every 6 (six) hours as needed (nausea)., Disp: 90 tablet, Rfl: 0    predniSONE (DELTASONE) 10 MG tablet, Take 1 tablet (10 mg total) by mouth once daily., Disp: 30 tablet, Rfl: 3    sertraline (ZOLOFT) 50 MG tablet, TAKE 1 TABLET(50 MG) BY MOUTH EVERY DAY, Disp: 90 tablet, Rfl: 1    Current Facility-Administered Medications:     sodium chloride 0.9% flush 10 mL, 10 mL, Intravenous, PRN, Agustín Aviles MD     Objective:     Vitals:    10/27/22 0954   BP: 137/81    Pulse: (!) 112   Temp: 99.2 °F (37.3 °C)     Physical Exam   HENT:   Mouth/Throat: Oropharynx is clear.   Eyes: Conjunctivae are normal.   Pulmonary/Chest: No respiratory distress.   Musculoskeletal:         General: No swelling or tenderness.   Neurological: She displays no weakness.   Skin: Rash (Perioral erythema crossing nasolabial folds) noted.     Reviewed available old and all outside pertinent medical records available.    All lab results personally reviewed and interpreted by me.       ASSESSMENT:     Interstitial pneumonia with autoimmune features    Seropositive rheumatoid arthritis    High risk medication use    Medication monitoring encounter    PLAN:     Follow-up encounter for interstitial pneumonitis with features of autoimmunity (elevated rheumatoid factor).  Inadequate response to course of CellCept 2 g per day, interval escalation medication to 3 g per day approximately 2 weeks ago.  Review of systems negative for fever.  Refers chronic intermittent bleeding from right nostril in context of history of allergic rhinitis and CPAP machine use.  Recommend follow-up with ENT, for nosebleeds consider CT sinus to determine presence of ulcerative lesions suggestive of vasculitis.  Erythema on face on area of CPAP mask, no connective tissue disease appearing rashes otherwise.  No obvious telangiectasias or sclerodactyly present.  Exam without reproducible muscle weakness or squeeze tenderness suggestive of active subclinical synovitis.  Repeat chest CT with progressive interstitial lung disease.  Impression of interstitial lung disease refractory to current management of CellCept and systemic steroids.  Recommend escalating therapy to adding rituximab, may discontinue CellCept once scheduled for RTX infusions.  Side effects therapy discussed, written literature provided.  Planning on repeating chest CT and pulmonary function test to determine response to therapy in 4-6 months.  Will add Myomarker panel  and vasculitis serologies for prognosis.        Disclaimer: This note is prepared using voice recognition software and as such is likely to have errors and has not been proof read. Please contact me for questions.         Chicho Lewis M.D.

## 2022-10-29 LAB — PROTEINASE3 IGG SER-ACNC: <0.2 U

## 2022-10-31 ENCOUNTER — TELEPHONE (OUTPATIENT)
Dept: PULMONOLOGY | Facility: HOSPITAL | Age: 52
End: 2022-10-31
Payer: COMMERCIAL

## 2022-10-31 ENCOUNTER — PATIENT MESSAGE (OUTPATIENT)
Dept: RHEUMATOLOGY | Facility: CLINIC | Age: 52
End: 2022-10-31
Payer: COMMERCIAL

## 2022-10-31 DIAGNOSIS — D84.9 IMMUNOSUPPRESSED STATUS: Primary | ICD-10-CM

## 2022-10-31 DIAGNOSIS — J84.111 CHRONIC INTERSTITIAL PNEUMONIA: Primary | ICD-10-CM

## 2022-10-31 LAB
ANCA AB TITR SER IF: NORMAL TITER
MYELOPEROXIDASE AB SER-ACNC: 3 UNITS
P-ANCA TITR SER IF: NORMAL TITER

## 2022-10-31 NOTE — TELEPHONE ENCOUNTER
Spoke to pt this morning concerning absences in pulmonary rehab. Pt states she has no energy and is about to start infusions for inflammation. She states she will need to drop out of rehab at this time. She hopes infusions will give her more energy and she will start back to rehab in the next few months.

## 2022-11-01 ENCOUNTER — OFFICE VISIT (OUTPATIENT)
Dept: FAMILY MEDICINE | Facility: CLINIC | Age: 52
End: 2022-11-01
Payer: COMMERCIAL

## 2022-11-01 ENCOUNTER — HOSPITAL ENCOUNTER (OUTPATIENT)
Dept: RADIOLOGY | Facility: HOSPITAL | Age: 52
Discharge: HOME | End: 2022-11-01
Attending: REGISTERED NURSE
Payer: COMMERCIAL

## 2022-11-01 ENCOUNTER — PATIENT MESSAGE (OUTPATIENT)
Dept: FAMILY MEDICINE | Facility: CLINIC | Age: 52
End: 2022-11-01

## 2022-11-01 VITALS
TEMPERATURE: 98 F | BODY MASS INDEX: 39.48 KG/M2 | HEART RATE: 101 BPM | DIASTOLIC BLOOD PRESSURE: 70 MMHG | OXYGEN SATURATION: 98 % | SYSTOLIC BLOOD PRESSURE: 124 MMHG | WEIGHT: 245.69 LBS | HEIGHT: 66 IN

## 2022-11-01 DIAGNOSIS — D84.9 IMMUNOCOMPROMISED STATE: ICD-10-CM

## 2022-11-01 DIAGNOSIS — J84.9 INTERSTITIAL LUNG DISEASE: ICD-10-CM

## 2022-11-01 DIAGNOSIS — Z23 NEED FOR IMMUNIZATION AGAINST INFLUENZA: ICD-10-CM

## 2022-11-01 DIAGNOSIS — E66.01 SEVERE OBESITY WITH BODY MASS INDEX (BMI) OF 35.0 TO 39.9 WITH SERIOUS COMORBIDITY: ICD-10-CM

## 2022-11-01 DIAGNOSIS — Z00.00 PREVENTATIVE HEALTH CARE: Primary | ICD-10-CM

## 2022-11-01 DIAGNOSIS — I10 HYPERTENSION, UNSPECIFIED TYPE: ICD-10-CM

## 2022-11-01 DIAGNOSIS — G47.33 OSA ON CPAP: ICD-10-CM

## 2022-11-01 DIAGNOSIS — J96.11 CHRONIC RESPIRATORY FAILURE WITH HYPOXIA: ICD-10-CM

## 2022-11-01 DIAGNOSIS — Z23 NEED FOR PNEUMOCOCCAL VACCINATION: ICD-10-CM

## 2022-11-01 DIAGNOSIS — E55.9 VITAMIN D DEFICIENCY: ICD-10-CM

## 2022-11-01 DIAGNOSIS — M05.9 RHEUMATOID ARTHRITIS WITH POSITIVE RHEUMATOID FACTOR, INVOLVING UNSPECIFIED SITE: ICD-10-CM

## 2022-11-01 DIAGNOSIS — Z83.3 FAMILY HISTORY OF DIABETES MELLITUS (DM): ICD-10-CM

## 2022-11-01 DIAGNOSIS — R61 NIGHT SWEATS: ICD-10-CM

## 2022-11-01 DIAGNOSIS — E78.00 HYPERCHOLESTEROLEMIA: ICD-10-CM

## 2022-11-01 PROCEDURE — 99396 PR PREVENTIVE VISIT,EST,40-64: ICD-10-PCS | Mod: 25,S$GLB,, | Performed by: REGISTERED NURSE

## 2022-11-01 PROCEDURE — 90694 VACC AIIV4 NO PRSRV 0.5ML IM: CPT | Mod: S$GLB,,, | Performed by: REGISTERED NURSE

## 2022-11-01 PROCEDURE — 3008F BODY MASS INDEX DOCD: CPT | Mod: CPTII,S$GLB,, | Performed by: REGISTERED NURSE

## 2022-11-01 PROCEDURE — 71046 X-RAY EXAM CHEST 2 VIEWS: CPT | Mod: TC,FY,PO

## 2022-11-01 PROCEDURE — 3044F PR MOST RECENT HEMOGLOBIN A1C LEVEL <7.0%: ICD-10-PCS | Mod: CPTII,S$GLB,, | Performed by: REGISTERED NURSE

## 2022-11-01 PROCEDURE — 99999 PR PBB SHADOW E&M-EST. PATIENT-LVL V: CPT | Mod: PBBFAC,,, | Performed by: REGISTERED NURSE

## 2022-11-01 PROCEDURE — 90472 IMMUNIZATION ADMIN EACH ADD: CPT | Mod: S$GLB,,, | Performed by: REGISTERED NURSE

## 2022-11-01 PROCEDURE — 71046 XR CHEST PA AND LATERAL: ICD-10-PCS | Mod: 26,,, | Performed by: RADIOLOGY

## 2022-11-01 PROCEDURE — 99999 PR PBB SHADOW E&M-EST. PATIENT-LVL V: ICD-10-PCS | Mod: PBBFAC,,, | Performed by: REGISTERED NURSE

## 2022-11-01 PROCEDURE — 3074F SYST BP LT 130 MM HG: CPT | Mod: CPTII,S$GLB,, | Performed by: REGISTERED NURSE

## 2022-11-01 PROCEDURE — 71046 X-RAY EXAM CHEST 2 VIEWS: CPT | Mod: 26,,, | Performed by: RADIOLOGY

## 2022-11-01 PROCEDURE — 90677 PNEUMOCOCCAL CONJUGATE VACCINE 20-VALENT: ICD-10-PCS | Mod: S$GLB,,, | Performed by: REGISTERED NURSE

## 2022-11-01 PROCEDURE — 3078F DIAST BP <80 MM HG: CPT | Mod: CPTII,S$GLB,, | Performed by: REGISTERED NURSE

## 2022-11-01 PROCEDURE — 3078F PR MOST RECENT DIASTOLIC BLOOD PRESSURE < 80 MM HG: ICD-10-PCS | Mod: CPTII,S$GLB,, | Performed by: REGISTERED NURSE

## 2022-11-01 PROCEDURE — 99396 PREV VISIT EST AGE 40-64: CPT | Mod: 25,S$GLB,, | Performed by: REGISTERED NURSE

## 2022-11-01 PROCEDURE — 90677 PCV20 VACCINE IM: CPT | Mod: S$GLB,,, | Performed by: REGISTERED NURSE

## 2022-11-01 PROCEDURE — 3044F HG A1C LEVEL LT 7.0%: CPT | Mod: CPTII,S$GLB,, | Performed by: REGISTERED NURSE

## 2022-11-01 PROCEDURE — 3008F PR BODY MASS INDEX (BMI) DOCUMENTED: ICD-10-PCS | Mod: CPTII,S$GLB,, | Performed by: REGISTERED NURSE

## 2022-11-01 PROCEDURE — 90472 PNEUMOCOCCAL CONJUGATE VACCINE 20-VALENT: ICD-10-PCS | Mod: S$GLB,,, | Performed by: REGISTERED NURSE

## 2022-11-01 PROCEDURE — 3074F PR MOST RECENT SYSTOLIC BLOOD PRESSURE < 130 MM HG: ICD-10-PCS | Mod: CPTII,S$GLB,, | Performed by: REGISTERED NURSE

## 2022-11-01 PROCEDURE — 90471 FLU VACCINE - QUADRIVALENT - ADJUVANTED: ICD-10-PCS | Mod: S$GLB,,, | Performed by: REGISTERED NURSE

## 2022-11-01 PROCEDURE — 90694 FLU VACCINE - QUADRIVALENT - ADJUVANTED: ICD-10-PCS | Mod: S$GLB,,, | Performed by: REGISTERED NURSE

## 2022-11-01 PROCEDURE — 90471 IMMUNIZATION ADMIN: CPT | Mod: S$GLB,,, | Performed by: REGISTERED NURSE

## 2022-11-01 NOTE — PROGRESS NOTES
Subjective:      Ayanna Alicia is a 52 y.o. female, to the office today for:  ANNUAL WELLNESS    HPI:    Ayanna Alicia has fasted to have bloodwork done today.  I have reviewed the patient's medical history in detail and updated the computerized patient record.    Health Maintenance Topics with due status: Not Due       Topic Last Completion Date    Mammogram 02/02/2022     Health Maintenance Due   Topic Date Due    Pneumococcal Vaccines (Age 0-64) (1 - PCV) Never done    Shingles Vaccine (1 of 2) Never done    TETANUS VACCINE  03/23/2022    Influenza Vaccine (1) 09/01/2022    Lipid Panel  10/14/2022       Review of Systems   Constitutional:  Positive for diaphoresis (reports onset of hot flashes last month) and fatigue. Negative for activity change, appetite change, chills, fever and unexpected weight change.        Overall healthy diet, no exercise due to chronic pulmonary issues.  Does take daily vitamins/supplements --- MVI, D3 and fish oil.   HENT: Negative.     Eyes:  Negative for discharge and visual disturbance.   Respiratory:  Positive for shortness of breath. Negative for cough and wheezing.         Dependent on oxygen, reports home sats at rest running ~ 90s.  With activity and/or exertion, sats will drop btw 58 and 73.  Do come back up with rest.  Oxygen set at 3 to 4 lpm per Pulmonary.   Cardiovascular:  Negative for chest pain, palpitations and leg swelling.        Reports home bp ~ 120/70.   Gastrointestinal: Negative.    Genitourinary: Negative.    Musculoskeletal: Negative.    Integumentary:  Negative for rash and mole/lesion.   Neurological:  Negative for vertigo, seizures, syncope, numbness and headaches.   Hematological: Negative.    Psychiatric/Behavioral:  Negative for depression and sleep disturbance. The patient is nervous/anxious.         Reports problem with nerves, more emotional, crying more.   Breast: negative.        Review of patient's allergies indicates:   Allergen Reactions     Doxycycline Nausea Only    Fluticasone Epistaxis    Penicillins Unknown reaction --- was able to tolerate ceftriaxone       Patient Active Problem List   Diagnosis    HTN (hypertension)    Multinodular goiter    Asthma    Seasonal allergies    Morbid obesity with BMI of 40.0-44.9, adult    GERD (gastroesophageal reflux disease)    Vitamin D deficiency    Surgical menopause    Patella-femoral syndrome    Localized osteoarthrosis not specified whether primary or secondary, lower leg    Benign colon polyp    Chronic interstitial pneumonia    GLENN on CPAP    Chronic respiratory failure with hypoxia    Elevated IgE level    Elevated rheumatoid factor    Restrictive lung disease    PLMD (periodic limb movement disorder)    Exercise hypoxemia    Interstitial lung disease    Rheumatoid arthritis with positive rheumatoid factor         Current Outpatient Medications:     albuterol (ACCUNEB) 0.63 mg/3 mL Nebu, Take 3 mLs (0.63 mg total) by nebulization every 4 to 6 hours as needed (asthma). Rescue, Disp: 75 mL, Rfl: 6    albuterol (PROVENTIL/VENTOLIN HFA) 90 mcg/actuation inhaler, INHALE 1 TO 2 PUFFS BY MOUTH INTO THE LUNGS EVERY 4 TO 6 HOURS AS NEEDED FOR WHEEZING OR SHORTNESS OF BREATH, Disp: 8.5 g, Rfl: 5    albuterol-ipratropium (DUO-NEB) 2.5 mg-0.5 mg/3 mL nebulizer solution, Take 3 mLs by nebulization every 6 (six) hours as needed for Wheezing. Rescue, Disp: 90 mL, Rfl: 11    amLODIPine (NORVASC) 10 MG tablet, Take 1 tablet (10 mg total) by mouth once daily., Disp: 90 tablet, Rfl: 1    ascorbic acid, vitamin C, (VITAMIN C) 500 MG tablet, Take 500 mg by mouth once daily., Disp: , Rfl:     calcium/magnesium/vit B comp (CALCIUM-MAGNESIUM-B COMPLEX ORAL), Take 1 tablet by mouth once daily at 6am., Disp: , Rfl:     fluticasone furoate-vilanteroL (BREO ELLIPTA) 100-25 mcg/dose diskus inhaler, INHALE 1 PUFF INTO THE LUNGS ONCE DAILY, Disp: 60 each, Rfl: 5    hydroCHLOROthiazide (HYDRODIURIL) 12.5 MG Tab, Take 1 tablet (12.5  mg total) by mouth once daily., Disp: 90 tablet, Rfl: 1    levocetirizine (XYZAL) 5 MG tablet, Take 1 tablet (5 mg total) by mouth once daily., Disp: 90 tablet, Rfl: 1    montelukast (SINGULAIR) 10 mg tablet, Take 1 tablet (10 mg total) by mouth every evening., Disp: 90 tablet, Rfl: 1    mycophenolate (CELLCEPT) 500 mg Tab, Take 3 tablets (1,500 mg total) by mouth 2 (two) times daily., Disp: 180 tablet, Rfl: 0    omega-3s/dha/epa/fish oil/D3 (VITAMIN-D + OMEGA-3 ORAL), Take 1 tablet by mouth once daily at 6am., Disp: , Rfl:     ondansetron (ZOFRAN-ODT) 4 MG TbDL, Dissolve 1 tablet (4 mg total) by mouth every 6 (six) hours as needed (nausea)., Disp: 90 tablet, Rfl: 0    predniSONE (DELTASONE) 10 MG tablet, Take 1 tablet (10 mg total) by mouth once daily., Disp: 30 tablet, Rfl: 3    sertraline (ZOLOFT) 50 MG tablet, TAKE 1 TABLET(50 MG) BY MOUTH EVERY DAY, Disp: 90 tablet, Rfl: 1    Current Facility-Administered Medications:     sodium chloride 0.9% flush 10 mL, 10 mL, Intravenous, PRN, Agustín Aviles MD      Past Medical History:   Diagnosis Date    Abnormal Pap smear of cervix     in the past with repeat pap smear okay.    Acute interstitial pneumonitis     Allergic rhinitis, cause unspecified     Arthritis of both knees     Asthma     Eczema     Fatty liver 10/2014    Fibrocystic breast changes     Headache(784.0)     Hepatomegaly 10/2014    Hypertension     Liver cyst 10/2014    Multinodular goiter     Followed by ENT - Dr. Nicolas Gray    Polymenorrhea     TMJ (dislocation of temporomandibular joint)     Uterine fibroid     in the past    Vitamin D deficiency disease        Past Surgical History:   Procedure Laterality Date     SECTION, CLASSIC      x 1    COLONOSCOPY N/A 2020    Procedure: COLONOSCOPY;  Surgeon: Angie Magana MD;  Location: [facility];  Service: Endoscopy;  Laterality: N/A;    HYSTERECTOMY      MULTIPLE TOOTH EXTRACTIONS      PARTIAL HYSTERECTOMY  2013    Due to  "fibroids       Family History   Problem Relation Age of Onset    Stroke Mother     Diabetes Mother     Heart disease Mother     Heart disease Father 63        MI/CAD    Hypertension Father     Cancer Maternal Grandmother 60        Rectal and stomach cancer    Migraines Cousin     Cataracts Cousin     Cancer Maternal Aunt 50        Stomach cancer    Cancer Maternal Aunt 66        Lung cancer (smoker)    Stroke Maternal Grandfather     Diabetes Maternal Grandfather     Stroke Sister        Social History     Tobacco Use    Smoking status: Never    Smokeless tobacco: Never   Substance Use Topics    Alcohol use: Yes     Alcohol/week: 0.0 standard drinks     Comment: Occasionally    Drug use: Yes     Frequency: 4.0 times per week     Types: Marijuana     Comment: 1-19-20         Objective:     Vitals:    11/01/22 0837   BP: 124/70   Pulse: 101   Temp: 98.4 °F (36.9 °C)   SpO2: 98%   Weight: 111.4 kg (245 lb 11.2 oz)   Height: 5' 6" (1.676 m)       Body mass index is 39.66 kg/m².      Physical Exam  Constitutional:       General: She is not in acute distress.     Appearance: She is well-developed. She is not diaphoretic.   HENT:      Head: Normocephalic and atraumatic.      Right Ear: Tympanic membrane, ear canal and external ear normal. There is no impacted cerumen.      Left Ear: Tympanic membrane, ear canal and external ear normal. There is no impacted cerumen.      Nose: Nose normal. No nasal deformity, mucosal edema, congestion or rhinorrhea.      Mouth/Throat:      Mouth: Mucous membranes are moist. Mucous membranes are not dry and not cyanotic. No oral lesions.      Dentition: Normal dentition.      Pharynx: Oropharynx is clear. Uvula midline. No oropharyngeal exudate, posterior oropharyngeal erythema or uvula swelling.      Tonsils: No tonsillar abscesses.   Eyes:      General: Lids are normal. Lids are everted, no foreign bodies appreciated. No scleral icterus.        Right eye: No discharge.         Left eye: No " discharge.      Conjunctiva/sclera: Conjunctivae normal.      Right eye: Right conjunctiva is not injected. No exudate.     Left eye: Left conjunctiva is not injected. No exudate.     Pupils: Pupils are equal, round, and reactive to light.   Neck:      Thyroid: No thyroid mass or thyromegaly.      Vascular: No carotid bruit or JVD.      Trachea: No tracheal deviation.   Cardiovascular:      Rate and Rhythm: Normal rate and regular rhythm.      Pulses: Normal pulses.      Heart sounds: Normal heart sounds. No murmur heard.    No friction rub. No gallop.   Pulmonary:      Effort: Pulmonary effort is normal.      Breath sounds: No stridor.   Chest:      Chest wall: No tenderness.   Abdominal:      General: Bowel sounds are normal. There is no distension.      Palpations: Abdomen is soft.   Genitourinary:     Comments: Deferred, h/o hysterectomy  Musculoskeletal:         General: No swelling, tenderness or deformity. Normal range of motion.      Cervical back: Normal range of motion and neck supple. No edema or erythema. Normal range of motion.   Lymphadenopathy:      Cervical: No cervical adenopathy.   Skin:     General: Skin is warm and dry.      Capillary Refill: Capillary refill takes less than 2 seconds.      Coloration: Skin is not pale.      Findings: No bruising, erythema or rash.   Neurological:      Mental Status: She is alert and oriented to person, place, and time.      Motor: No tremor, atrophy or abnormal muscle tone.      Deep Tendon Reflexes: Reflexes are normal and symmetric.   Psychiatric:         Mood and Affect: Mood normal.         Speech: Speech normal.         Behavior: Behavior normal.         Thought Content: Thought content normal.         Cognition and Memory: Memory is not impaired.         Judgment: Judgment normal.       LABS REVIEWED:    Lab Results   Component Value Date    WBC 8.43 07/20/2022    RBC 4.05 07/20/2022    HGB 11.2 (L) 07/20/2022    HCT 35.3 (L) 07/20/2022    MCV 87  07/20/2022    MCH 27.7 07/20/2022    MCHC 31.7 (L) 07/20/2022    RDW 14.5 07/20/2022     07/20/2022    MPV 9.8 07/20/2022    GRAN 5.6 07/20/2022    GRAN 66.8 07/20/2022    LYMPH 1.4 07/20/2022    LYMPH 16.1 (L) 07/20/2022    MONO 0.8 07/20/2022    MONO 9.0 07/20/2022    EOS 0.6 (H) 07/20/2022    BASO 0.08 07/20/2022    EOSINOPHIL 6.5 07/20/2022    BASOPHIL 0.9 07/20/2022       Sodium   Date Value Ref Range Status   07/20/2022 139 136 - 145 mmol/L Final     Potassium   Date Value Ref Range Status   07/20/2022 4.3 3.5 - 5.1 mmol/L Final     Chloride   Date Value Ref Range Status   07/20/2022 102 95 - 110 mmol/L Final     CO2   Date Value Ref Range Status   07/20/2022 27 23 - 29 mmol/L Final     Glucose   Date Value Ref Range Status   07/20/2022 93 70 - 110 mg/dL Final     BUN   Date Value Ref Range Status   07/20/2022 12 6 - 20 mg/dL Final     Creatinine   Date Value Ref Range Status   07/20/2022 0.9 0.5 - 1.4 mg/dL Final     Calcium   Date Value Ref Range Status   07/20/2022 9.6 8.7 - 10.5 mg/dL Final     Total Protein   Date Value Ref Range Status   07/20/2022 7.3 6.0 - 8.4 g/dL Final     Albumin   Date Value Ref Range Status   07/20/2022 4.1 3.5 - 5.2 g/dL Final     Total Bilirubin   Date Value Ref Range Status   07/20/2022 0.3 0.1 - 1.0 mg/dL Final     Comment:     For infants and newborns, interpretation of results should be based  on gestational age, weight and in agreement with clinical  observations.    Premature Infant recommended reference ranges:  Up to 24 hours.............<8.0 mg/dL  Up to 48 hours............<12.0 mg/dL  3-5 days..................<15.0 mg/dL  6-29 days.................<15.0 mg/dL       Alkaline Phosphatase   Date Value Ref Range Status   07/20/2022 105 55 - 135 U/L Final     AST   Date Value Ref Range Status   07/20/2022 23 10 - 40 U/L Final     ALT   Date Value Ref Range Status   07/20/2022 15 10 - 44 U/L Final     Anion Gap   Date Value Ref Range Status   07/20/2022 10 8 - 16  mmol/L Final       eGFR if    Date Value Ref Range Status   07/20/2022 >60 >60 mL/min/1.73 m^2 Final        Lab Results   Component Value Date    CHOL 201 (H) 10/14/2021     Lab Results   Component Value Date    TRIG 66 10/14/2021     Lab Results   Component Value Date    HDL 53 10/14/2021     Lab Results   Component Value Date    LDLCALC 134.8 10/14/2021       Lab Results   Component Value Date    TSH 0.854 04/28/2022       Lab Results   Component Value Date    HGBA1C 4.8 10/14/2021       Insulin   Date Value Ref Range Status   10/14/2021 19.6 <25.0 uU/mL Final       Vit D, 25-Hydroxy   Date Value Ref Range Status   10/14/2021 49 30 - 96 ng/mL Final     Comment:     Vitamin D deficiency.........<10 ng/mL                              Vitamin D insufficiency......10-29 ng/mL       Vitamin D sufficiency........> or equal to 30 ng/mL  Vitamin D toxicity............>100 ng/mL         Lab Results   Component Value Date    HIV1X2 Negative 09/16/2011       Lab Results   Component Value Date    HEPBCAB Negative 07/20/2022    HEPCAB Negative 09/22/2020       Microalb/Creat Ratio   Date Value Ref Range Status   08/15/2019 16.9 0.0 - 30.0 ug/mg Final         Diagnosis/Assessment:     1. Preventative health care  - (In Office Administered) Pneumococcal Conjugate Vaccine (20 Valent) (IM)  - CBC Auto Differential; Future  - Comprehensive Metabolic Panel; Future  - Lipid Panel; Future  - Hemoglobin A1C; Future  - Insulin, Random; Future  - Vitamin D; Future    2. Hypertension, unspecified type -- stable on medication.  DASH/heart healthy diet.  - CBC Auto Differential; Future  - Comprehensive Metabolic Panel; Future  - Lipid Panel; Future  - Hemoglobin A1C; Future  - Insulin, Random; Future    3. Hypercholesterolemia -- daily fish oil supplement  - Lipid Panel; Future    4. Chronic respiratory failure with hypoxia --- lab today as ordered by Pulmonary, stable currently  - C-REACTIVE PROTEIN; Future  - Sedimentation  rate; Future  - QUANTIFERON GOLD TB; Future  - HEPATITIS B CORE ANTIBODY, TOTAL; Future  - HEPATITIS B SURFACE ANTIBODY; Future  - HEPATITIS B SURFACE ANTIGEN; Future  - X-Ray Chest PA And Lateral; Future    5. Interstitial lung disease --- per Pulmonary, on med as ordered  - C-REACTIVE PROTEIN; Future  - Sedimentation rate; Future  - QUANTIFERON GOLD TB; Future  - HEPATITIS B CORE ANTIBODY, TOTAL; Future  - HEPATITIS B SURFACE ANTIBODY; Future  - HEPATITIS B SURFACE ANTIGEN; Future  - X-Ray Chest PA And Lateral; Future    6. GLENN on CPAP -- per Pulm    7. Rheumatoid arthritis with positive rheumatoid factor, involving unspecified site -- per Rheumatology    8. Immunocompromised state    9. Vitamin D deficiency  - Vitamin D; Future    10. Night sweats --- hormonal vs other ?  - Hemoglobin A1C; Future  - Insulin, Random; Future    11. Severe obesity with body mass index (BMI) of 35.0 to 39.9 with serious comorbidity --- healthy dietary and lifestyle changes within physical/respiratory limitations    12. Family history of diabetes mellitus (DM)  - Hemoglobin A1C; Future    13. Need for immunization against influenza --- high-dose given today per Pulmonary recommendations    14. Need for pneumococcal vaccination  - (In Office Administered) Pneumococcal Conjugate Vaccine (20 Valent) (IM)       Follow-Up:     Pending lab.  RTC as directed or prn.        KEVEN Vegas  [facility]       Patient Care Team:  Madeleine Enrique MD as PCP - General (Family Medicine)  Jarad Contreras MD as Consulting Physician (Cardiology)  Chicho Lewis MD as Consulting Physician (Rheumatology)  Cullen Steele NP as Nurse Practitioner (Family Medicine)  Agustín Aviles MD as Consulting Physician (Pulmonary Disease)

## 2022-11-02 ENCOUNTER — PATIENT MESSAGE (OUTPATIENT)
Dept: FAMILY MEDICINE | Facility: CLINIC | Age: 52
End: 2022-11-02
Payer: COMMERCIAL

## 2022-11-02 ENCOUNTER — TELEPHONE (OUTPATIENT)
Dept: PULMONOLOGY | Facility: HOSPITAL | Age: 52
End: 2022-11-02
Payer: COMMERCIAL

## 2022-11-02 NOTE — TELEPHONE ENCOUNTER
----- Message from Kary Randolph MA sent at 10/31/2022  1:03 PM CDT -----  Regarding: FW: Pulm rehab  FYI  ----- Message -----  From: Genna Solorzano RRT  Sent: 10/31/2022  12:59 PM CDT  To: Stefan Randolph Staff  Subject: Pulm rehab                                       FYI... Spoke to pt this morning concerning absences in pulmonary rehab. Pt states she has no energy and is about to start infusions for inflammation. She states she will need to drop out of rehab at this time. She hopes infusions will give her more energy and she will start back to rehab in the next few months.

## 2022-11-03 ENCOUNTER — TELEPHONE (OUTPATIENT)
Dept: RHEUMATOLOGY | Facility: CLINIC | Age: 52
End: 2022-11-03
Payer: COMMERCIAL

## 2022-11-03 ENCOUNTER — TELEPHONE (OUTPATIENT)
Dept: PULMONOLOGY | Facility: CLINIC | Age: 52
End: 2022-11-03
Payer: COMMERCIAL

## 2022-11-03 NOTE — TELEPHONE ENCOUNTER
"Patient returned my phone call regarding TB results. Advised patient that Dr. Lewis would like to schedule a T-SPOT lab test for her since her TB test came back indeterminate. Patient verbalized understanding. All questions answered.     Sadaf Lafleur (Allye), [facility]  Rheumatology Department   "

## 2022-11-03 NOTE — TELEPHONE ENCOUNTER
----- Message from Angela Kirby sent at 11/3/2022  4:27 PM CDT -----  Contact: Ayanna  .Type:  Patient Returning Call    Who Called:   Ayanna   Who Left Message for Patient:nurse   Does the patient know what this is regarding?:call   Would the patient rather a call back or a response via [facility]? .[phone]     Additional Information:

## 2022-11-04 ENCOUNTER — PATIENT MESSAGE (OUTPATIENT)
Dept: RHEUMATOLOGY | Facility: CLINIC | Age: 52
End: 2022-11-04
Payer: COMMERCIAL

## 2022-11-07 ENCOUNTER — HOSPITAL ENCOUNTER (OUTPATIENT)
Dept: RADIOLOGY | Facility: HOSPITAL | Age: 52
Discharge: HOME | End: 2022-11-07
Attending: INTERNAL MEDICINE
Payer: COMMERCIAL

## 2022-11-07 ENCOUNTER — OFFICE VISIT (OUTPATIENT)
Dept: PULMONOLOGY | Facility: CLINIC | Age: 52
End: 2022-11-07
Payer: COMMERCIAL

## 2022-11-07 VITALS
BODY MASS INDEX: 39.46 KG/M2 | DIASTOLIC BLOOD PRESSURE: 80 MMHG | HEIGHT: 66 IN | SYSTOLIC BLOOD PRESSURE: 120 MMHG | OXYGEN SATURATION: 98 % | HEART RATE: 112 BPM | WEIGHT: 245.56 LBS | RESPIRATION RATE: 19 BRPM

## 2022-11-07 DIAGNOSIS — J84.9 INTERSTITIAL LUNG DISEASE: ICD-10-CM

## 2022-11-07 DIAGNOSIS — E66.01 MORBID OBESITY WITH BMI OF 40.0-44.9, ADULT: ICD-10-CM

## 2022-11-07 DIAGNOSIS — J96.11 CHRONIC RESPIRATORY FAILURE WITH HYPOXIA: ICD-10-CM

## 2022-11-07 DIAGNOSIS — R76.8 RHEUMATOID FACTOR POSITIVE: ICD-10-CM

## 2022-11-07 DIAGNOSIS — J84.9 INTERSTITIAL PULMONARY DISEASE, UNSPECIFIED: ICD-10-CM

## 2022-11-07 DIAGNOSIS — R05.9 COUGH, UNSPECIFIED TYPE: Primary | ICD-10-CM

## 2022-11-07 PROCEDURE — 3079F DIAST BP 80-89 MM HG: CPT | Mod: CPTII,S$GLB,, | Performed by: PHYSICIAN ASSISTANT

## 2022-11-07 PROCEDURE — 3079F PR MOST RECENT DIASTOLIC BLOOD PRESSURE 80-89 MM HG: ICD-10-PCS | Mod: CPTII,S$GLB,, | Performed by: PHYSICIAN ASSISTANT

## 2022-11-07 PROCEDURE — 99214 PR OFFICE/OUTPT VISIT, EST, LEVL IV, 30-39 MIN: ICD-10-PCS | Mod: S$GLB,,, | Performed by: PHYSICIAN ASSISTANT

## 2022-11-07 PROCEDURE — 1160F PR REVIEW ALL MEDS BY PRESCRIBER/CLIN PHARMACIST DOCUMENTED: ICD-10-PCS | Mod: CPTII,S$GLB,, | Performed by: PHYSICIAN ASSISTANT

## 2022-11-07 PROCEDURE — 3044F HG A1C LEVEL LT 7.0%: CPT | Mod: CPTII,S$GLB,, | Performed by: PHYSICIAN ASSISTANT

## 2022-11-07 PROCEDURE — 3074F SYST BP LT 130 MM HG: CPT | Mod: CPTII,S$GLB,, | Performed by: PHYSICIAN ASSISTANT

## 2022-11-07 PROCEDURE — 3044F PR MOST RECENT HEMOGLOBIN A1C LEVEL <7.0%: ICD-10-PCS | Mod: CPTII,S$GLB,, | Performed by: PHYSICIAN ASSISTANT

## 2022-11-07 PROCEDURE — 99214 OFFICE O/P EST MOD 30 MIN: CPT | Mod: S$GLB,,, | Performed by: PHYSICIAN ASSISTANT

## 2022-11-07 PROCEDURE — 3008F BODY MASS INDEX DOCD: CPT | Mod: CPTII,S$GLB,, | Performed by: PHYSICIAN ASSISTANT

## 2022-11-07 PROCEDURE — 99999 PR PBB SHADOW E&M-EST. PATIENT-LVL IV: ICD-10-PCS | Mod: PBBFAC,,, | Performed by: PHYSICIAN ASSISTANT

## 2022-11-07 PROCEDURE — 3008F PR BODY MASS INDEX (BMI) DOCUMENTED: ICD-10-PCS | Mod: CPTII,S$GLB,, | Performed by: PHYSICIAN ASSISTANT

## 2022-11-07 PROCEDURE — 3074F PR MOST RECENT SYSTOLIC BLOOD PRESSURE < 130 MM HG: ICD-10-PCS | Mod: CPTII,S$GLB,, | Performed by: PHYSICIAN ASSISTANT

## 2022-11-07 PROCEDURE — 1159F PR MEDICATION LIST DOCUMENTED IN MEDICAL RECORD: ICD-10-PCS | Mod: CPTII,S$GLB,, | Performed by: PHYSICIAN ASSISTANT

## 2022-11-07 PROCEDURE — 1159F MED LIST DOCD IN RCRD: CPT | Mod: CPTII,S$GLB,, | Performed by: PHYSICIAN ASSISTANT

## 2022-11-07 PROCEDURE — 99999 PR PBB SHADOW E&M-EST. PATIENT-LVL IV: CPT | Mod: PBBFAC,,, | Performed by: PHYSICIAN ASSISTANT

## 2022-11-07 PROCEDURE — 1160F RVW MEDS BY RX/DR IN RCRD: CPT | Mod: CPTII,S$GLB,, | Performed by: PHYSICIAN ASSISTANT

## 2022-11-07 PROCEDURE — 71250 CT THORAX DX C-: CPT | Mod: TC

## 2022-11-07 RX ORDER — IPRATROPIUM BROMIDE AND ALBUTEROL SULFATE 2.5; .5 MG/3ML; MG/3ML
3 SOLUTION RESPIRATORY (INHALATION) EVERY 6 HOURS PRN
Qty: 90 ML | Refills: 11 | Status: SHIPPED | OUTPATIENT
Start: 2022-11-07 | End: 2023-05-10

## 2022-11-07 RX ORDER — DOXYCYCLINE 100 MG/1
100 CAPSULE ORAL 2 TIMES DAILY
Qty: 10 CAPSULE | Refills: 1 | Status: SHIPPED | OUTPATIENT
Start: 2022-11-07 | End: 2023-03-17

## 2022-11-07 NOTE — PROGRESS NOTES
Pulmonary Outpatient  Visit     Subjective:       Patient ID: Ayanna Alicia is a 52 y.o. female.    Chief Complaint: Shortness of Breath      11/7/22  51yo female history of ILD  Followed by Dr. Aviles and Dr. Lewis  Last seen 9/20/22  Scheduled for PFT and ABG 12/28/22 and visit with Dr. Aviles  Feels like she is declining since last visit, more SOB and fatigue  Coughing up thick yellow mucus, hot flashes, low grade temp (99F)  On 2g cellcept  Starting rituximab infusions Friday    Ayanna Alicia is 51 y.o.  Post hospital f/u  Acute respiratory failure ILD, +ve RF  Was discharged on oxygen  Here to review results  Explained  PFT: severe restriction and reduced DLCO  ABG  6MWD: oxygen desat needs supplementary oxygen 3 LPM  Has some nose bleed will add AYR gel and humifier bottle  FMLA paper work given  Out of work letter   Added PEPCID and Bactrim  Adherent with inhaler    05/18/2022  Here to see today  Concern, Dyspnea with activity palpitations  Seen Rheumatology: Added CELLCEPT  On steriod taper  No cough wheezing  On oxygen 3 LPM  Had GLENN in 2019: was prescribed CPAP returned back  Needs PAP at night  Motivated to restart      07/15/2022  Last seen 05/18/2022  ACT score 10, [place] score 6  CPAP study: CPAP ordered  Await   Says cannot go to work, tied, focus off  6MWD: RA sat was 95%  O2 titrated to 4-6 LPM  Tachycardia noted : 145 BPM    09/20/2022  Last seen 07/15/2022  Here to review progress  Enrolled in pul rehab, session 8  Sat Stable @ 2-3 LPM  ACt score 5, [place] 6  No cough, better exercise tolerance  Stable on cellcept 1000mg BID + prednisone 10 mg  Will [facility] Rheum whether can titrate up cellcept to wean steriods  CPAP download shows adherence   CPAP 9 cm with resudual AHI 0.2  Usage > 4 hrs was 67%  Cehst CT reviewed  Repeat PFT pending      Asthma Control Test  In the past 4  weeks, how much of the time did your asthma keep you from  getting as much done at work, school or at home?: All of the time  During the past 4 weeks, how often have you had shortness of breath?: More than once a day  During the past 4 weeks, how often did your asthma symptoms (wheezing, couging, shortness of breath, chest tightness or pain) wake you up at night or earlier than usual in the morning?: 4 or more nights a week  During the past 4 weeks, how often have you used your rescue inhaler or nebulizer medication (such as albuterol)?: 1 or 2 times per day  How would you rate your asthma control during the past 4 weeks?: Not controlled at all  If your score is 19 or less, your asthma may not be under control: 6          MMRC Dyspnea Scale (4 is worst)     [] MMRC 0: Dyspneic on strenuous excercise (0 points)    [] MMRC 1: Dyspneic on walking a slight hill (0 points)    [x] MMRC 2: Dyspneic on walking level ground; must stop occasionally due to breathlessness (1 point)    [] MMRC 3: Must stop for breathlessness after walking 100 yards or after a few minutes (2 points)    [] MMRC 4: Cannot leave house; breathless on dressing/undressing (3 points)         EPWORTH SLEEPINESS SCALE 9/20/2022   Sitting and reading 1   Watching TV 1   Sitting, inactive in a public place (e.g. a theatre or a meeting) 0   As a passenger in a car for an hour without a break 1   Lying down to rest in the afternoon when circumstances permit 3   Sitting and talking to someone 0   Sitting quietly after a lunch without alcohol 0   In a car, while stopped for a few minutes in traffic 0   Total score 6             The following portions of the patient's history were reviewed and updated as appropriate:   She  has a past medical history of Abnormal Pap smear of cervix, Acute interstitial pneumonitis, Allergic rhinitis, cause unspecified, Arthritis of both knees, Asthma, Eczema, Fatty liver (10/2014), Fibrocystic breast changes, Headache(784.0), Hepatomegaly (10/2014), Hypertension, Liver cyst (10/2014),  Multinodular goiter, Polymenorrhea (), TMJ (dislocation of temporomandibular joint), Uterine fibroid, and Vitamin D deficiency disease.  She does not have any pertinent problems on file.  She  has a past surgical history that includes  section, classic; Multiple tooth extractions; Partial hysterectomy (2013); Hysterectomy; and Colonoscopy (N/A, 2020).  Her family history includes Cancer (age of onset: 50) in her maternal aunt; Cancer (age of onset: 60) in her maternal grandmother; Cancer (age of onset: 66) in her maternal aunt; Cataracts in her cousin; Diabetes in her maternal grandfather; Diverticulitis in her mother; Heart disease in her mother; Heart disease (age of onset: 63) in her father; Hypertension in her father; Migraines in her cousin; Peripheral vascular disease in her maternal grandfather; Stroke in her maternal grandfather, mother, and sister.  She  reports that she has never smoked. She has never used smokeless tobacco. She reports current alcohol use. She reports current drug use. Frequency: 4.00 times per week. Drug: Marijuana.  She has a current medication list which includes the following prescription(s): albuterol, albuterol, amlodipine, ascorbic acid (vitamin c), calcium/magnesium/vit b comp, fluticasone furoate-vilanterol, hydrochlorothiazide, levocetirizine, montelukast, mycophenolate, omega-3s/dha/epa/fish oil/d3, ondansetron, prednisone, sertraline, albuterol-ipratropium, and doxycycline, and the following Facility-Administered Medications: sodium chloride 0.9%.  Current Outpatient Medications on File Prior to Visit   Medication Sig Dispense Refill    albuterol (ACCUNEB) 0.63 mg/3 mL Nebu Take 3 mLs (0.63 mg total) by nebulization every 4 to 6 hours as needed (asthma). Rescue 75 mL 6    albuterol (PROVENTIL/VENTOLIN HFA) 90 mcg/actuation inhaler INHALE 1 TO 2 PUFFS BY MOUTH INTO THE LUNGS EVERY 4 TO 6 HOURS AS NEEDED FOR WHEEZING OR SHORTNESS OF BREATH 8.5 g 5     amLODIPine (NORVASC) 10 MG tablet Take 1 tablet (10 mg total) by mouth once daily. 90 tablet 1    ascorbic acid, vitamin C, (VITAMIN C) 500 MG tablet Take 500 mg by mouth once daily.      calcium/magnesium/vit B comp (CALCIUM-MAGNESIUM-B COMPLEX ORAL) Take 1 tablet by mouth once daily at 6am.      fluticasone furoate-vilanteroL (BREO ELLIPTA) 100-25 mcg/dose diskus inhaler INHALE 1 PUFF INTO THE LUNGS ONCE DAILY 60 each 5    hydroCHLOROthiazide (HYDRODIURIL) 12.5 MG Tab Take 1 tablet (12.5 mg total) by mouth once daily. 90 tablet 1    levocetirizine (XYZAL) 5 MG tablet Take 1 tablet (5 mg total) by mouth once daily. 90 tablet 1    montelukast (SINGULAIR) 10 mg tablet Take 1 tablet (10 mg total) by mouth every evening. 90 tablet 1    mycophenolate (CELLCEPT) 500 mg Tab Take 3 tablets (1,500 mg total) by mouth 2 (two) times daily. 180 tablet 0    omega-3s/dha/epa/fish oil/D3 (VITAMIN-D + OMEGA-3 ORAL) Take 1 tablet by mouth once daily at 6am.      ondansetron (ZOFRAN-ODT) 4 MG TbDL Dissolve 1 tablet (4 mg total) by mouth every 6 (six) hours as needed (nausea). 90 tablet 0    predniSONE (DELTASONE) 10 MG tablet Take 1 tablet (10 mg total) by mouth once daily. 30 tablet 3    sertraline (ZOLOFT) 50 MG tablet TAKE 1 TABLET(50 MG) BY MOUTH EVERY DAY 90 tablet 1    [DISCONTINUED] albuterol-ipratropium (DUO-NEB) 2.5 mg-0.5 mg/3 mL nebulizer solution Take 3 mLs by nebulization every 6 (six) hours as needed for Wheezing. Rescue 90 mL 11     Current Facility-Administered Medications on File Prior to Visit   Medication Dose Route Frequency Provider Last Rate Last Admin    sodium chloride 0.9% flush 10 mL  10 mL Intravenous PRN Agustín Aviles MD         She is allergic to doxycycline, fluticasone, and penicillins..      Review of Systems   Constitutional:  Positive for activity change and fatigue. Negative for weight loss, appetite change and weakness.   HENT:  Negative for postnasal drip, rhinorrhea, sinus pressure, trouble  swallowing and congestion.    Respiratory:  Positive for cough, sputum production, shortness of breath and dyspnea on extertion. Negative for choking, chest tightness and wheezing.    Cardiovascular:  Negative for chest pain and leg swelling.   Musculoskeletal:  Negative for arthralgias, gait problem and joint swelling.   Gastrointestinal:  Positive for acid reflux. Negative for nausea, vomiting and abdominal pain.   Neurological:  Negative for dizziness, weakness and headaches.     Outpatient Encounter Medications as of 11/7/2022   Medication Sig Dispense Refill    albuterol (ACCUNEB) 0.63 mg/3 mL Nebu Take 3 mLs (0.63 mg total) by nebulization every 4 to 6 hours as needed (asthma). Rescue 75 mL 6    albuterol (PROVENTIL/VENTOLIN HFA) 90 mcg/actuation inhaler INHALE 1 TO 2 PUFFS BY MOUTH INTO THE LUNGS EVERY 4 TO 6 HOURS AS NEEDED FOR WHEEZING OR SHORTNESS OF BREATH 8.5 g 5    amLODIPine (NORVASC) 10 MG tablet Take 1 tablet (10 mg total) by mouth once daily. 90 tablet 1    ascorbic acid, vitamin C, (VITAMIN C) 500 MG tablet Take 500 mg by mouth once daily.      calcium/magnesium/vit B comp (CALCIUM-MAGNESIUM-B COMPLEX ORAL) Take 1 tablet by mouth once daily at 6am.      fluticasone furoate-vilanteroL (BREO ELLIPTA) 100-25 mcg/dose diskus inhaler INHALE 1 PUFF INTO THE LUNGS ONCE DAILY 60 each 5    hydroCHLOROthiazide (HYDRODIURIL) 12.5 MG Tab Take 1 tablet (12.5 mg total) by mouth once daily. 90 tablet 1    levocetirizine (XYZAL) 5 MG tablet Take 1 tablet (5 mg total) by mouth once daily. 90 tablet 1    montelukast (SINGULAIR) 10 mg tablet Take 1 tablet (10 mg total) by mouth every evening. 90 tablet 1    mycophenolate (CELLCEPT) 500 mg Tab Take 3 tablets (1,500 mg total) by mouth 2 (two) times daily. 180 tablet 0    omega-3s/dha/epa/fish oil/D3 (VITAMIN-D + OMEGA-3 ORAL) Take 1 tablet by mouth once daily at 6am.      ondansetron (ZOFRAN-ODT) 4 MG TbDL Dissolve 1 tablet (4 mg total) by mouth every 6 (six) hours as  "needed (nausea). 90 tablet 0    predniSONE (DELTASONE) 10 MG tablet Take 1 tablet (10 mg total) by mouth once daily. 30 tablet 3    sertraline (ZOLOFT) 50 MG tablet TAKE 1 TABLET(50 MG) BY MOUTH EVERY DAY 90 tablet 1    [DISCONTINUED] albuterol-ipratropium (DUO-NEB) 2.5 mg-0.5 mg/3 mL nebulizer solution Take 3 mLs by nebulization every 6 (six) hours as needed for Wheezing. Rescue 90 mL 11    albuterol-ipratropium (DUO-NEB) 2.5 mg-0.5 mg/3 mL nebulizer solution Take 3 mLs by nebulization every 6 (six) hours as needed for Wheezing. Rescue 90 mL 11    doxycycline (VIBRAMYCIN) 100 MG Cap Take 1 capsule (100 mg total) by mouth 2 (two) times daily. 10 capsule 1     Facility-Administered Encounter Medications as of 11/7/2022   Medication Dose Route Frequency Provider Last Rate Last Admin    sodium chloride 0.9% flush 10 mL  10 mL Intravenous PRN Agustín Aviles MD           Objective:     Vital Signs (Most Recent)  Vital Signs  Pulse: (!) 112  Resp: 19  SpO2: 98 % (3 LPM)  BP: 120/80  Height and Weight  Height: 5' 6" (167.6 cm)  Weight: 111.4 kg (245 lb 9.5 oz)  BSA (Calculated - sq m): 2.28 sq meters  BMI (Calculated): 39.7  Weight in (lb) to have BMI = 25: 154.6]  Wt Readings from Last 2 Encounters:   11/07/22 111.4 kg (245 lb 9.5 oz)   11/01/22 111.4 kg (245 lb 11.2 oz)       Physical Exam   Constitutional: She is oriented to person, place, and time. She appears well-developed and well-nourished.   HENT:   Head: Normocephalic.   Mouth/Throat: Mallampati Score: II.   Neck: No JVD present.   Cardiovascular: Normal rate and intact distal pulses.   No murmur heard.  Pulmonary/Chest: Normal expansion and effort normal. No respiratory distress. She has no wheezes. She has rhonchi. She has no rales.   Abdominal: Soft. Bowel sounds are normal.   Musculoskeletal:         General: No edema. Normal range of motion.      Cervical back: Normal range of motion and neck supple.   Lymphadenopathy:     She has no axillary " adenopathy.   Neurological: She is alert and oriented to person, place, and time.   Skin: Skin is warm and dry. No cyanosis. Nails show no clubbing.   Psychiatric: She has a normal mood and affect.   Nursing note and vitals reviewed.    Laboratory  Lab Results   Component Value Date    WBC 16.92 (H) 11/01/2022    RBC 4.10 11/01/2022    HGB 10.1 (L) 11/01/2022    HCT 34.7 (L) 11/01/2022    MCV 85 11/01/2022    MCH 24.6 (L) 11/01/2022    MCHC 29.1 (L) 11/01/2022    RDW 15.9 (H) 11/01/2022     (H) 11/01/2022    MPV 10.5 11/01/2022    GRAN 14.8 (H) 11/01/2022    GRAN 87.3 (H) 11/01/2022    LYMPH 0.7 (L) 11/01/2022    LYMPH 4.4 (L) 11/01/2022    MONO 0.8 11/01/2022    MONO 4.5 11/01/2022    EOS 0.4 11/01/2022    BASO 0.08 11/01/2022    EOSINOPHIL 2.1 11/01/2022    BASOPHIL 0.5 11/01/2022       BMP  Lab Results   Component Value Date     11/01/2022    K 3.9 11/01/2022     11/01/2022    CO2 24 11/01/2022    BUN 11 11/01/2022    CREATININE 0.8 11/01/2022    CALCIUM 10.2 11/01/2022    ANIONGAP 13 11/01/2022    ESTGFRAFRICA >60 07/20/2022    EGFRNONAA >60 07/20/2022    AST 31 11/01/2022    ALT 31 11/01/2022    PROT 7.5 11/01/2022       Lab Results   Component Value Date    BNP <10 04/27/2022       Lab Results   Component Value Date    TSH 0.854 04/28/2022       Lab Results   Component Value Date    SEDRATE 110 (H) 11/01/2022       Lab Results   Component Value Date    CRP 54.2 (H) 11/01/2022     Lab Results   Component Value Date     (H) 05/02/2022        Lab Results   Component Value Date    ASPERGILLUS None Detected 04/29/2022     No results found for: AFUMIGATUSCL     Lab Results   Component Value Date    ACE 22 04/28/2022        Diagnostic Results:  I have personally reviewed today the following studies:  Office Spirometry Results:     FEV1: 1.34L( 46.9%)  FVC  1.54L( 42.7%)  FV1/FVC 87          CT Chest Without Contrast  Narrative: EXAMINATION:  CT CHEST WITHOUT CONTRAST, multiplanar  reconstructions    CLINICAL HISTORY:  Interstitial pulmonary disease, unspecifiedInterstitial lung disease;Monitor disease progression/response to therapy;    TECHNIQUE:  Axial images through the chest were obtained without the use of IV contrast. Sagittal and coronal <reconstructions are provided for review>.    COMPARISON:  September 20, 2022    FINDINGS:  There is been interval progression of the ground-glass opacities throughout the inferior half of the lungs, confluent and portions of the bilateral lower lobes and inferior middle lobe.  The distribution of the peripheral ground-glass opacities within the bilateral upper lobes has not significantly changed.  Negative for effusion or pneumothorax.    Minor coronary artery calcifications again seen.  Small pericardial effusion.  There are no hilar or mediastinal masses or abnormal lymph nodes by size criteria.    The airways are patent.  The thyroid gland is unchanged with redemonstration of a 3 cm right thyroid nodule..  The esophagus is normal.    The upper abdominal organs are unchanged in appearance..    The osseous structures are unchanged in appearance.  Impression: 1.  Detrimental change.  There continues to be progression of the ground-glass opacities throughout the lungs, especially the lower half of the lungs.  The previous diagnostic considerations remain valid.  As clinically warranted, the patient may benefit from a bronchoscopy with BAL or open lung biopsy if the etiology of this disease process is unknown/uncertain at this time.    2.  Stable findings as noted above.    All CT scans at this facility are performed  using dose modulation techniques as appropriate to performed exam including the following:  automated exposure control; adjustment of mA and/or kV according to the patients size (this includes techniques or standardized protocols for targeted exams where dose is matched to indication/reason for exam: i.e. extremities or head);  iterative  reconstruction technique.    Electronically signed by: Yariel Hall MD  Date:    11/07/2022  Time:    13:25     EXAMINATION:  XR CHEST PA AND LATERAL     CLINICAL HISTORY:  Chronic respiratory failure with hypoxia     TECHNIQUE:  PA and lateral views of the chest were performed.     COMPARISON:  Radiograph 07/15/2022; CTs 09/20/2022 and 04/27/2022     FINDINGS:  The left lower lung zone is partially obscured.  There is patchy airspace and interstitial disease in both mid to lower lung zones with bronchiectasis.  Borderline enlarged cardiac silhouette, unchanged.  No pleural effusion or pneumothorax.  Multilevel spinal degenerative change.     Impression:     Basilar pulmonary opacities, similar in extent.  In a chronic setting these are suggestive of interstitial fibrosis.  Consider pulmonary medicine consultation.        Electronically signed by: Vladislav Watts  Date:                                            11/01/2022  Time:                                           11:07    Assessment/Plan:     Problem List Items Addressed This Visit          Pulmonary    Chronic respiratory failure with hypoxia     Stable on 3L NC         Interstitial lung disease     Continue plan Per Dr. Aviles, Dr. Mckeon         Cough - Primary     Worsening cough, sputum production  Doxycycline and sputum cultures today         Relevant Medications    doxycycline (VIBRAMYCIN) 100 MG Cap    albuterol-ipratropium (DUO-NEB) 2.5 mg-0.5 mg/3 mL nebulizer solution    Other Relevant Orders    Culture, Respiratory with Gram Stain    Fungus culture    AFB Culture & Smear    AFB Culture & Smear    AFB Culture & Smear       Endocrine    Morbid obesity with BMI of 40.0-44.9, adult     Weight loss and exercise to improve overall health.                Follow up as scheduled Dr. Aviles 12/28/22 with PFT and ABG. Report to ER for any worsening symptoms    Discussed diagnosis, its evaluation, treatment and usual course. All questions  answered.    Patient verbalized understanding of plan and left in no acute distress    Thank you for the courtesy of participating in the care of this patient    Marilu Izquierdo PA-C

## 2022-11-10 PROBLEM — Z79.899 HIGH RISK MEDICATION USE: Status: ACTIVE | Noted: 2022-11-10

## 2022-11-10 PROBLEM — J84.9 INTERSTITIAL PULMONARY DISEASE, UNSPECIFIED: Status: ACTIVE | Noted: 2022-11-10

## 2022-11-10 LAB
ANTI-PM/SCL AB: <20 UNITS
ANTI-SS-A 52 KD AB, IGG: 107 UNITS
EJ AB SER QL: NEGATIVE
ENA JO1 AB SER IA-ACNC: <20 UNITS
ENA SM+RNP AB SER IA-ACNC: <20 UNITS
FIBRILLARIN (U3 RNP): NEGATIVE
KU AB SER QL: NEGATIVE
MDA-5 (P140): <20 UNITS
MI2 AB SER QL: NEGATIVE
NXP-2 (P140): <20 UNITS
OJ AB SER QL: NEGATIVE
PL12 AB SER QL: NEGATIVE
PL7 AB SER QL: NEGATIVE
SRP AB SERPL QL: NEGATIVE
TIF1 GAMMA (P155/140): <20 UNITS
U2 SNRNP: NEGATIVE

## 2022-11-10 RX ORDER — METHYLPREDNISOLONE SOD SUCC 125 MG
100 VIAL (EA) INJECTION ONCE
Status: CANCELLED | OUTPATIENT
Start: 2022-11-10 | End: 2022-11-10

## 2022-11-10 RX ORDER — DIPHENHYDRAMINE HYDROCHLORIDE 50 MG/ML
25 INJECTION INTRAMUSCULAR; INTRAVENOUS
Status: CANCELLED
Start: 2022-11-10

## 2022-11-10 RX ORDER — ACETAMINOPHEN 325 MG/1
650 TABLET ORAL ONCE
Status: CANCELLED | OUTPATIENT
Start: 2022-11-10 | End: 2022-11-10

## 2022-11-11 ENCOUNTER — PATIENT MESSAGE (OUTPATIENT)
Dept: FAMILY MEDICINE | Facility: CLINIC | Age: 52
End: 2022-11-11
Payer: COMMERCIAL

## 2022-11-11 ENCOUNTER — PATIENT MESSAGE (OUTPATIENT)
Dept: PULMONOLOGY | Facility: CLINIC | Age: 52
End: 2022-11-11
Payer: COMMERCIAL

## 2022-11-11 ENCOUNTER — TELEPHONE (OUTPATIENT)
Dept: PULMONOLOGY | Facility: CLINIC | Age: 52
End: 2022-11-11
Payer: COMMERCIAL

## 2022-11-11 NOTE — TELEPHONE ENCOUNTER
Spoke with patient   Infusion was canceled due to antibiotics  Rescheduled for next week   No fever  Will encourage to continue with pulmonary rehab

## 2022-11-15 ENCOUNTER — PATIENT MESSAGE (OUTPATIENT)
Dept: PULMONOLOGY | Facility: CLINIC | Age: 52
End: 2022-11-15
Payer: COMMERCIAL

## 2022-11-18 ENCOUNTER — LAB VISIT (OUTPATIENT)
Dept: LAB | Facility: HOSPITAL | Age: 52
End: 2022-11-18
Payer: COMMERCIAL

## 2022-11-18 ENCOUNTER — LAB VISIT (OUTPATIENT)
Dept: LAB | Facility: HOSPITAL | Age: 52
End: 2022-11-18
Attending: INTERNAL MEDICINE
Payer: COMMERCIAL

## 2022-11-18 ENCOUNTER — INFUSION (OUTPATIENT)
Dept: INFUSION THERAPY | Facility: HOSPITAL | Age: 52
End: 2022-11-18
Attending: INTERNAL MEDICINE
Payer: COMMERCIAL

## 2022-11-18 ENCOUNTER — TELEPHONE (OUTPATIENT)
Dept: INFUSION THERAPY | Facility: HOSPITAL | Age: 52
End: 2022-11-18
Payer: COMMERCIAL

## 2022-11-18 VITALS
WEIGHT: 243.38 LBS | HEART RATE: 78 BPM | BODY MASS INDEX: 39.12 KG/M2 | OXYGEN SATURATION: 98 % | SYSTOLIC BLOOD PRESSURE: 98 MMHG | DIASTOLIC BLOOD PRESSURE: 60 MMHG | TEMPERATURE: 98 F | HEIGHT: 66 IN | RESPIRATION RATE: 16 BRPM

## 2022-11-18 DIAGNOSIS — J84.9 INTERSTITIAL LUNG DISEASE: Primary | ICD-10-CM

## 2022-11-18 DIAGNOSIS — J84.9 INTERSTITIAL PULMONARY DISEASE, UNSPECIFIED: ICD-10-CM

## 2022-11-18 DIAGNOSIS — M05.9 RHEUMATOID ARTHRITIS WITH POSITIVE RHEUMATOID FACTOR, INVOLVING UNSPECIFIED SITE: ICD-10-CM

## 2022-11-18 DIAGNOSIS — R05.9 COUGH, UNSPECIFIED TYPE: ICD-10-CM

## 2022-11-18 DIAGNOSIS — Z79.899 HIGH RISK MEDICATION USE: ICD-10-CM

## 2022-11-18 DIAGNOSIS — Z79.60 LONG-TERM USE OF IMMUNOSUPPRESSANT MEDICATION: ICD-10-CM

## 2022-11-18 LAB
ALBUMIN SERPL BCP-MCNC: 3.2 G/DL (ref 3.5–5.2)
ALP SERPL-CCNC: 78 U/L (ref 55–135)
ALT SERPL W/O P-5'-P-CCNC: 30 U/L (ref 10–44)
ANION GAP SERPL CALC-SCNC: 11 MMOL/L (ref 8–16)
AST SERPL-CCNC: 29 U/L (ref 10–40)
BASOPHILS # BLD AUTO: 0.09 K/UL (ref 0–0.2)
BASOPHILS NFR BLD: 0.6 % (ref 0–1.9)
BILIRUB SERPL-MCNC: 0.4 MG/DL (ref 0.1–1)
BUN SERPL-MCNC: 10 MG/DL (ref 6–20)
CALCIUM SERPL-MCNC: 9.3 MG/DL (ref 8.7–10.5)
CHLORIDE SERPL-SCNC: 100 MMOL/L (ref 95–110)
CO2 SERPL-SCNC: 27 MMOL/L (ref 23–29)
CREAT SERPL-MCNC: 0.9 MG/DL (ref 0.5–1.4)
DIFFERENTIAL METHOD: ABNORMAL
EOSINOPHIL # BLD AUTO: 0.4 K/UL (ref 0–0.5)
EOSINOPHIL NFR BLD: 2.8 % (ref 0–8)
ERYTHROCYTE [DISTWIDTH] IN BLOOD BY AUTOMATED COUNT: 16.4 % (ref 11.5–14.5)
EST. GFR  (NO RACE VARIABLE): >60 ML/MIN/1.73 M^2
GLUCOSE SERPL-MCNC: 153 MG/DL (ref 70–110)
HCT VFR BLD AUTO: 30.9 % (ref 37–48.5)
HGB BLD-MCNC: 9 G/DL (ref 12–16)
IMM GRANULOCYTES # BLD AUTO: 0.18 K/UL (ref 0–0.04)
IMM GRANULOCYTES NFR BLD AUTO: 1.2 % (ref 0–0.5)
LYMPHOCYTES # BLD AUTO: 1.3 K/UL (ref 1–4.8)
LYMPHOCYTES NFR BLD: 8 % (ref 18–48)
MCH RBC QN AUTO: 24.3 PG (ref 27–31)
MCHC RBC AUTO-ENTMCNC: 29.1 G/DL (ref 32–36)
MCV RBC AUTO: 83 FL (ref 82–98)
MONOCYTES # BLD AUTO: 1 K/UL (ref 0.3–1)
MONOCYTES NFR BLD: 6.5 % (ref 4–15)
NEUTROPHILS # BLD AUTO: 12.6 K/UL (ref 1.8–7.7)
NEUTROPHILS NFR BLD: 80.9 % (ref 38–73)
NRBC BLD-RTO: 0 /100 WBC
PLATELET # BLD AUTO: 524 K/UL (ref 150–450)
PMV BLD AUTO: 9.7 FL (ref 9.2–12.9)
POTASSIUM SERPL-SCNC: 3.7 MMOL/L (ref 3.5–5.1)
PROT SERPL-MCNC: 7.1 G/DL (ref 6–8.4)
RBC # BLD AUTO: 3.71 M/UL (ref 4–5.4)
SODIUM SERPL-SCNC: 138 MMOL/L (ref 136–145)
WBC # BLD AUTO: 15.6 K/UL (ref 3.9–12.7)

## 2022-11-18 PROCEDURE — 80053 COMPREHEN METABOLIC PANEL: CPT | Performed by: INTERNAL MEDICINE

## 2022-11-18 PROCEDURE — 87070 CULTURE OTHR SPECIMN AEROBIC: CPT | Performed by: PHYSICIAN ASSISTANT

## 2022-11-18 PROCEDURE — 96375 TX/PRO/DX INJ NEW DRUG ADDON: CPT

## 2022-11-18 PROCEDURE — 63600175 PHARM REV CODE 636 W HCPCS: Performed by: INTERNAL MEDICINE

## 2022-11-18 PROCEDURE — 87015 SPECIMEN INFECT AGNT CONCNTJ: CPT | Performed by: PHYSICIAN ASSISTANT

## 2022-11-18 PROCEDURE — 36415 COLL VENOUS BLD VENIPUNCTURE: CPT | Performed by: INTERNAL MEDICINE

## 2022-11-18 PROCEDURE — 63600175 PHARM REV CODE 636 W HCPCS: Performed by: REGISTERED NURSE

## 2022-11-18 PROCEDURE — 87206 SMEAR FLUORESCENT/ACID STAI: CPT | Performed by: PHYSICIAN ASSISTANT

## 2022-11-18 PROCEDURE — 96413 CHEMO IV INFUSION 1 HR: CPT

## 2022-11-18 PROCEDURE — 96415 CHEMO IV INFUSION ADDL HR: CPT

## 2022-11-18 PROCEDURE — 96366 THER/PROPH/DIAG IV INF ADDON: CPT

## 2022-11-18 PROCEDURE — 87116 MYCOBACTERIA CULTURE: CPT | Performed by: PHYSICIAN ASSISTANT

## 2022-11-18 PROCEDURE — 85025 COMPLETE CBC W/AUTO DIFF WBC: CPT | Performed by: INTERNAL MEDICINE

## 2022-11-18 PROCEDURE — A4216 STERILE WATER/SALINE, 10 ML: HCPCS | Performed by: INTERNAL MEDICINE

## 2022-11-18 PROCEDURE — 87205 SMEAR GRAM STAIN: CPT | Performed by: PHYSICIAN ASSISTANT

## 2022-11-18 PROCEDURE — 25000003 PHARM REV CODE 250: Performed by: INTERNAL MEDICINE

## 2022-11-18 PROCEDURE — 87102 FUNGUS ISOLATION CULTURE: CPT | Performed by: PHYSICIAN ASSISTANT

## 2022-11-18 PROCEDURE — 96365 THER/PROPH/DIAG IV INF INIT: CPT

## 2022-11-18 RX ORDER — METHYLPREDNISOLONE SOD SUCC 125 MG
100 VIAL (EA) INJECTION ONCE
Status: CANCELLED | OUTPATIENT
Start: 2022-11-18 | End: 2022-11-18

## 2022-11-18 RX ORDER — HEPARIN 100 UNIT/ML
500 SYRINGE INTRAVENOUS
Status: CANCELLED | OUTPATIENT
Start: 2022-11-25

## 2022-11-18 RX ORDER — SODIUM CHLORIDE 0.9 % (FLUSH) 0.9 %
10 SYRINGE (ML) INJECTION
Status: CANCELLED | OUTPATIENT
Start: 2022-11-25

## 2022-11-18 RX ORDER — FAMOTIDINE 10 MG/ML
20 INJECTION INTRAVENOUS
Status: CANCELLED | OUTPATIENT
Start: 2022-11-25

## 2022-11-18 RX ORDER — SODIUM CHLORIDE 0.9 % (FLUSH) 0.9 %
10 SYRINGE (ML) INJECTION
Status: DISCONTINUED | OUTPATIENT
Start: 2022-11-18 | End: 2022-11-18 | Stop reason: HOSPADM

## 2022-11-18 RX ORDER — MEPERIDINE HYDROCHLORIDE 50 MG/ML
25 INJECTION INTRAMUSCULAR; INTRAVENOUS; SUBCUTANEOUS
Status: CANCELLED | OUTPATIENT
Start: 2022-11-25

## 2022-11-18 RX ORDER — FAMOTIDINE 10 MG/ML
20 INJECTION INTRAVENOUS
Status: COMPLETED | OUTPATIENT
Start: 2022-11-18 | End: 2022-11-18

## 2022-11-18 RX ORDER — DIPHENHYDRAMINE HYDROCHLORIDE 50 MG/ML
25 INJECTION INTRAMUSCULAR; INTRAVENOUS
Status: CANCELLED
Start: 2022-11-25

## 2022-11-18 RX ORDER — ACETAMINOPHEN 325 MG/1
650 TABLET ORAL ONCE
Status: CANCELLED | OUTPATIENT
Start: 2022-11-18 | End: 2022-11-18

## 2022-11-18 RX ORDER — ACETAMINOPHEN 325 MG/1
650 TABLET ORAL
Status: CANCELLED | OUTPATIENT
Start: 2022-11-25

## 2022-11-18 RX ORDER — ACETAMINOPHEN 325 MG/1
650 TABLET ORAL
Status: COMPLETED | OUTPATIENT
Start: 2022-11-18 | End: 2022-11-18

## 2022-11-18 RX ORDER — DIPHENHYDRAMINE HYDROCHLORIDE 50 MG/ML
25 INJECTION INTRAMUSCULAR; INTRAVENOUS
Status: COMPLETED | OUTPATIENT
Start: 2022-11-18 | End: 2022-11-18

## 2022-11-18 RX ORDER — METHYLPREDNISOLONE SOD SUCC 125 MG
100 VIAL (EA) INJECTION ONCE
Status: COMPLETED | OUTPATIENT
Start: 2022-11-18 | End: 2022-11-18

## 2022-11-18 RX ADMIN — METHYLPREDNISOLONE SODIUM SUCCINATE 100 MG: 125 INJECTION, POWDER, FOR SOLUTION INTRAMUSCULAR; INTRAVENOUS at 09:11

## 2022-11-18 RX ADMIN — Medication 10 ML: at 09:11

## 2022-11-18 RX ADMIN — FAMOTIDINE 20 MG: 10 INJECTION INTRAVENOUS at 09:11

## 2022-11-18 RX ADMIN — RITUXIMAB 800 MG: 10 INJECTION, SOLUTION INTRAVENOUS at 10:11

## 2022-11-18 RX ADMIN — DIPHENHYDRAMINE HYDROCHLORIDE 25 MG: 50 INJECTION INTRAMUSCULAR; INTRAVENOUS at 09:11

## 2022-11-18 RX ADMIN — ACETAMINOPHEN 650 MG: 325 TABLET ORAL at 09:11

## 2022-11-18 NOTE — PLAN OF CARE
Plan of care reviewed with patient. Discussed if there are any new or ongoing concerns. Denies.    Problem: Adult Inpatient Plan of Care  Goal: Plan of Care Review  Outcome: Ongoing, Progressing  Goal: Patient-Specific Goal (Individualized)  Outcome: Ongoing, Progressing  Goal: Absence of Hospital-Acquired Illness or Injury  Outcome: Ongoing, Progressing  Intervention: Identify and Manage Fall Risk  Flowsheets (Taken 11/18/2022 1109)  Safety Promotion/Fall Prevention: in recliner, wheels locked  Goal: Optimal Comfort and Wellbeing  Outcome: Ongoing, Progressing  Intervention: Provide Person-Centered Care  Flowsheets (Taken 11/18/2022 1109)  Trust Relationship/Rapport:   reassurance provided   care explained   choices provided   thoughts/feelings acknowledged   emotional support provided   empathic listening provided   questions answered   questions encouraged

## 2022-11-18 NOTE — TELEPHONE ENCOUNTER
----- Message from Emerald Connor sent at 11/18/2022  7:49 AM CST -----  Regarding: RE: rituxan  Good morning Macy, once the patient's treatment has been completed and payment due posted to her account she will fall into our DCAP workque.  Once her account has been added to the workque it will be reviewed for possible enrollment into the program. Not sure how long it may take to be posted to her account, but Karen Vidal will take care of it.  Thank you,  Emerald  ----- Message -----  From: Macy Plasencia RN  Sent: 11/18/2022   7:35 AM CST  [facility]  Subject: rit[facility]an                                          Good Morning,   Pt is starting her rituxan treatments today. Please enroll in copay assist. She was scheduled to begin last Friday but treatment was delayed due to an infection. Thanks, Macy

## 2022-11-21 LAB
BACTERIA SPEC AEROBE CULT: NORMAL
BACTERIA SPEC AEROBE CULT: NORMAL
GRAM STN SPEC: NORMAL

## 2022-11-22 ENCOUNTER — PATIENT MESSAGE (OUTPATIENT)
Dept: RHEUMATOLOGY | Facility: CLINIC | Age: 52
End: 2022-11-22
Payer: COMMERCIAL

## 2022-11-25 ENCOUNTER — PATIENT MESSAGE (OUTPATIENT)
Dept: PULMONOLOGY | Facility: CLINIC | Age: 52
End: 2022-11-25
Payer: COMMERCIAL

## 2022-11-25 ENCOUNTER — INFUSION (OUTPATIENT)
Dept: INFUSION THERAPY | Facility: HOSPITAL | Age: 52
End: 2022-11-25
Attending: INTERNAL MEDICINE
Payer: COMMERCIAL

## 2022-11-25 VITALS
RESPIRATION RATE: 16 BRPM | OXYGEN SATURATION: 98 % | WEIGHT: 241.38 LBS | SYSTOLIC BLOOD PRESSURE: 111 MMHG | HEART RATE: 94 BPM | DIASTOLIC BLOOD PRESSURE: 64 MMHG | TEMPERATURE: 98 F | BODY MASS INDEX: 38.96 KG/M2

## 2022-11-25 DIAGNOSIS — J84.9 INTERSTITIAL PULMONARY DISEASE, UNSPECIFIED: ICD-10-CM

## 2022-11-25 DIAGNOSIS — J84.9 INTERSTITIAL LUNG DISEASE: Primary | ICD-10-CM

## 2022-11-25 DIAGNOSIS — Z79.899 HIGH RISK MEDICATION USE: ICD-10-CM

## 2022-11-25 DIAGNOSIS — M05.9 RHEUMATOID ARTHRITIS WITH POSITIVE RHEUMATOID FACTOR, INVOLVING UNSPECIFIED SITE: ICD-10-CM

## 2022-11-25 PROCEDURE — 96375 TX/PRO/DX INJ NEW DRUG ADDON: CPT

## 2022-11-25 PROCEDURE — 63600175 PHARM REV CODE 636 W HCPCS: Mod: JG | Performed by: INTERNAL MEDICINE

## 2022-11-25 PROCEDURE — 96365 THER/PROPH/DIAG IV INF INIT: CPT

## 2022-11-25 PROCEDURE — A4216 STERILE WATER/SALINE, 10 ML: HCPCS | Performed by: INTERNAL MEDICINE

## 2022-11-25 PROCEDURE — 96413 CHEMO IV INFUSION 1 HR: CPT

## 2022-11-25 PROCEDURE — 63600175 PHARM REV CODE 636 W HCPCS: Performed by: REGISTERED NURSE

## 2022-11-25 PROCEDURE — 96415 CHEMO IV INFUSION ADDL HR: CPT

## 2022-11-25 PROCEDURE — 25000003 PHARM REV CODE 250: Performed by: INTERNAL MEDICINE

## 2022-11-25 PROCEDURE — 96366 THER/PROPH/DIAG IV INF ADDON: CPT

## 2022-11-25 RX ORDER — ACETAMINOPHEN 325 MG/1
650 TABLET ORAL ONCE
Status: CANCELLED | OUTPATIENT
Start: 2022-11-25 | End: 2022-11-25

## 2022-11-25 RX ORDER — METHYLPREDNISOLONE SOD SUCC 125 MG
100 VIAL (EA) INJECTION ONCE
Status: COMPLETED | OUTPATIENT
Start: 2022-11-25 | End: 2022-11-25

## 2022-11-25 RX ORDER — ACETAMINOPHEN 325 MG/1
650 TABLET ORAL
Status: CANCELLED | OUTPATIENT
Start: 2022-12-02

## 2022-11-25 RX ORDER — HEPARIN 100 UNIT/ML
500 SYRINGE INTRAVENOUS
Status: CANCELLED | OUTPATIENT
Start: 2022-12-02

## 2022-11-25 RX ORDER — SODIUM CHLORIDE 0.9 % (FLUSH) 0.9 %
10 SYRINGE (ML) INJECTION
Status: CANCELLED | OUTPATIENT
Start: 2022-12-02

## 2022-11-25 RX ORDER — DIPHENHYDRAMINE HYDROCHLORIDE 50 MG/ML
25 INJECTION INTRAMUSCULAR; INTRAVENOUS
Status: CANCELLED
Start: 2022-12-02

## 2022-11-25 RX ORDER — MEPERIDINE HYDROCHLORIDE 50 MG/ML
25 INJECTION INTRAMUSCULAR; INTRAVENOUS; SUBCUTANEOUS
Status: CANCELLED | OUTPATIENT
Start: 2022-12-02

## 2022-11-25 RX ORDER — FAMOTIDINE 10 MG/ML
20 INJECTION INTRAVENOUS
Status: CANCELLED | OUTPATIENT
Start: 2022-12-02

## 2022-11-25 RX ORDER — FAMOTIDINE 10 MG/ML
20 INJECTION INTRAVENOUS
Status: COMPLETED | OUTPATIENT
Start: 2022-11-25 | End: 2022-11-25

## 2022-11-25 RX ORDER — DIPHENHYDRAMINE HYDROCHLORIDE 50 MG/ML
25 INJECTION INTRAMUSCULAR; INTRAVENOUS
Status: COMPLETED | OUTPATIENT
Start: 2022-11-25 | End: 2022-11-25

## 2022-11-25 RX ORDER — METHYLPREDNISOLONE SOD SUCC 125 MG
100 VIAL (EA) INJECTION ONCE
Status: CANCELLED | OUTPATIENT
Start: 2022-11-25 | End: 2022-11-25

## 2022-11-25 RX ORDER — SODIUM CHLORIDE 0.9 % (FLUSH) 0.9 %
10 SYRINGE (ML) INJECTION
Status: DISCONTINUED | OUTPATIENT
Start: 2022-11-25 | End: 2022-11-25 | Stop reason: HOSPADM

## 2022-11-25 RX ORDER — ACETAMINOPHEN 325 MG/1
650 TABLET ORAL
Status: COMPLETED | OUTPATIENT
Start: 2022-11-25 | End: 2022-11-25

## 2022-11-25 RX ADMIN — FAMOTIDINE 20 MG: 10 INJECTION INTRAVENOUS at 09:11

## 2022-11-25 RX ADMIN — DIPHENHYDRAMINE HYDROCHLORIDE 25 MG: 50 INJECTION INTRAMUSCULAR; INTRAVENOUS at 09:11

## 2022-11-25 RX ADMIN — Medication 10 ML: at 09:11

## 2022-11-25 RX ADMIN — RITUXIMAB 800 MG: 10 INJECTION, SOLUTION INTRAVENOUS at 09:11

## 2022-11-25 RX ADMIN — METHYLPREDNISOLONE SODIUM SUCCINATE 100 MG: 125 INJECTION, POWDER, FOR SOLUTION INTRAMUSCULAR; INTRAVENOUS at 09:11

## 2022-11-25 RX ADMIN — ACETAMINOPHEN 650 MG: 325 TABLET ORAL at 09:11

## 2022-11-25 NOTE — PLAN OF CARE
Plan of care reviewed with patient. Discussed if there are any new or ongoing concerns. Denies.    Problem: Adult Inpatient Plan of Care  Goal: Plan of Care Review  11/25/2022 0925 by Alessandra Hastings RN  Outcome: Ongoing, Progressing  11/25/2022 0925 by Alessandra Hastings RN  Outcome: Ongoing, Progressing  Goal: Patient-Specific Goal (Individualized)  11/25/2022 0925 by Alessandra Hastings RN  Outcome: Ongoing, Progressing  11/25/2022 0925 by Alessandra Hastings RN  Outcome: Ongoing, Progressing  Goal: Absence of Hospital-Acquired Illness or Injury  11/25/2022 0925 by Alessandra Hastings RN  Outcome: Ongoing, Progressing  11/25/2022 0925 by Alessandra Hastings RN  Outcome: Ongoing, Progressing  Intervention: Identify and Manage Fall Risk  Flowsheets (Taken 11/25/2022 0925)  Safety Promotion/Fall Prevention: in recliner, wheels locked  Goal: Optimal Comfort and Wellbeing  11/25/2022 0925 by Alessandra Hastings RN  Outcome: Ongoing, Progressing  11/25/2022 0925 by Alessandra Hastings RN  Outcome: Ongoing, Progressing  Intervention: Provide Person-Centered Care  Flowsheets (Taken 11/25/2022 0925)  Trust Relationship/Rapport:   care explained   reassurance provided   choices provided   empathic listening provided   emotional support provided   thoughts/feelings acknowledged   questions answered   questions encouraged

## 2022-12-02 ENCOUNTER — PATIENT MESSAGE (OUTPATIENT)
Dept: RHEUMATOLOGY | Facility: CLINIC | Age: 52
End: 2022-12-02
Payer: COMMERCIAL

## 2022-12-02 ENCOUNTER — INFUSION (OUTPATIENT)
Dept: INFUSION THERAPY | Facility: HOSPITAL | Age: 52
End: 2022-12-02
Attending: INTERNAL MEDICINE
Payer: COMMERCIAL

## 2022-12-02 VITALS
SYSTOLIC BLOOD PRESSURE: 117 MMHG | HEART RATE: 93 BPM | WEIGHT: 239 LBS | BODY MASS INDEX: 38.57 KG/M2 | TEMPERATURE: 98 F | OXYGEN SATURATION: 98 % | RESPIRATION RATE: 16 BRPM | DIASTOLIC BLOOD PRESSURE: 71 MMHG

## 2022-12-02 DIAGNOSIS — J84.9 INTERSTITIAL PULMONARY DISEASE, UNSPECIFIED: ICD-10-CM

## 2022-12-02 DIAGNOSIS — J84.9 INTERSTITIAL LUNG DISEASE: Primary | ICD-10-CM

## 2022-12-02 DIAGNOSIS — M05.9 RHEUMATOID ARTHRITIS WITH POSITIVE RHEUMATOID FACTOR, INVOLVING UNSPECIFIED SITE: ICD-10-CM

## 2022-12-02 DIAGNOSIS — Z79.899 HIGH RISK MEDICATION USE: ICD-10-CM

## 2022-12-02 PROCEDURE — 96413 CHEMO IV INFUSION 1 HR: CPT

## 2022-12-02 PROCEDURE — 96415 CHEMO IV INFUSION ADDL HR: CPT

## 2022-12-02 PROCEDURE — 96375 TX/PRO/DX INJ NEW DRUG ADDON: CPT

## 2022-12-02 PROCEDURE — 25000003 PHARM REV CODE 250: Performed by: INTERNAL MEDICINE

## 2022-12-02 PROCEDURE — 63600175 PHARM REV CODE 636 W HCPCS: Performed by: INTERNAL MEDICINE

## 2022-12-02 PROCEDURE — 63600175 PHARM REV CODE 636 W HCPCS: Performed by: REGISTERED NURSE

## 2022-12-02 RX ORDER — DIPHENHYDRAMINE HYDROCHLORIDE 50 MG/ML
25 INJECTION INTRAMUSCULAR; INTRAVENOUS
Status: COMPLETED | OUTPATIENT
Start: 2022-12-02 | End: 2022-12-02

## 2022-12-02 RX ORDER — FAMOTIDINE 10 MG/ML
20 INJECTION INTRAVENOUS
Status: COMPLETED | OUTPATIENT
Start: 2022-12-02 | End: 2022-12-02

## 2022-12-02 RX ORDER — FAMOTIDINE 10 MG/ML
20 INJECTION INTRAVENOUS
Status: CANCELLED | OUTPATIENT
Start: 2022-12-09

## 2022-12-02 RX ORDER — DIPHENHYDRAMINE HYDROCHLORIDE 50 MG/ML
25 INJECTION INTRAMUSCULAR; INTRAVENOUS
Status: CANCELLED
Start: 2022-12-09

## 2022-12-02 RX ORDER — ACETAMINOPHEN 325 MG/1
650 TABLET ORAL ONCE
Status: CANCELLED | OUTPATIENT
Start: 2022-12-02 | End: 2022-12-02

## 2022-12-02 RX ORDER — ACETAMINOPHEN 325 MG/1
650 TABLET ORAL
Status: CANCELLED | OUTPATIENT
Start: 2022-12-09

## 2022-12-02 RX ORDER — ACETAMINOPHEN 325 MG/1
650 TABLET ORAL
Status: COMPLETED | OUTPATIENT
Start: 2022-12-02 | End: 2022-12-02

## 2022-12-02 RX ORDER — METHYLPREDNISOLONE SOD SUCC 125 MG
100 VIAL (EA) INJECTION ONCE
Status: CANCELLED | OUTPATIENT
Start: 2022-12-02 | End: 2022-12-02

## 2022-12-02 RX ORDER — MEPERIDINE HYDROCHLORIDE 50 MG/ML
25 INJECTION INTRAMUSCULAR; INTRAVENOUS; SUBCUTANEOUS
Status: DISCONTINUED | OUTPATIENT
Start: 2022-12-02 | End: 2022-12-02 | Stop reason: HOSPADM

## 2022-12-02 RX ORDER — HEPARIN 100 UNIT/ML
500 SYRINGE INTRAVENOUS
Status: CANCELLED | OUTPATIENT
Start: 2022-12-09

## 2022-12-02 RX ORDER — SODIUM CHLORIDE 0.9 % (FLUSH) 0.9 %
10 SYRINGE (ML) INJECTION
Status: CANCELLED | OUTPATIENT
Start: 2022-12-09

## 2022-12-02 RX ORDER — METHYLPREDNISOLONE SOD SUCC 125 MG
100 VIAL (EA) INJECTION ONCE
Status: COMPLETED | OUTPATIENT
Start: 2022-12-02 | End: 2022-12-02

## 2022-12-02 RX ORDER — MEPERIDINE HYDROCHLORIDE 50 MG/ML
25 INJECTION INTRAMUSCULAR; INTRAVENOUS; SUBCUTANEOUS
Status: CANCELLED | OUTPATIENT
Start: 2022-12-09

## 2022-12-02 RX ADMIN — ACETAMINOPHEN 650 MG: 325 TABLET ORAL at 09:12

## 2022-12-02 RX ADMIN — DIPHENHYDRAMINE HYDROCHLORIDE 25 MG: 50 INJECTION INTRAMUSCULAR; INTRAVENOUS at 09:12

## 2022-12-02 RX ADMIN — FAMOTIDINE 20 MG: 10 INJECTION INTRAVENOUS at 09:12

## 2022-12-02 RX ADMIN — RITUXIMAB 800 MG: 10 INJECTION, SOLUTION INTRAVENOUS at 09:12

## 2022-12-02 RX ADMIN — SODIUM CHLORIDE: 0.9 INJECTION, SOLUTION INTRAVENOUS at 09:12

## 2022-12-02 RX ADMIN — METHYLPREDNISOLONE SODIUM SUCCINATE 100 MG: 125 INJECTION, POWDER, FOR SOLUTION INTRAMUSCULAR; INTRAVENOUS at 09:12

## 2022-12-02 NOTE — PLAN OF CARE
Discussed plan of care with pt. Addressed any and ongoing concerns. Pt denies    Problem: Adult Inpatient Plan of Care  Goal: Plan of Care Review  Outcome: Ongoing, Progressing  Flowsheets (Taken 12/2/2022 1407)  Plan of Care Reviewed With: patient  Goal: Patient-Specific Goal (Individualized)  Outcome: Ongoing, Progressing  Goal: Absence of Hospital-Acquired Illness or Injury  Outcome: Ongoing, Progressing  Intervention: Identify and Manage Fall Risk  Flowsheets (Taken 12/2/2022 1407)  Safety Promotion/Fall Prevention:   assistive device/personal item within reach   in recliner, wheels locked   instructed to call staff for mobility  Intervention: Prevent Infection  Flowsheets (Taken 12/2/2022 1407)  Infection Prevention:   hand hygiene promoted   equipment surfaces disinfected  Goal: Optimal Comfort and Wellbeing  Outcome: Ongoing, Progressing  Intervention: Provide Person-Centered Care  Flowsheets (Taken 12/2/2022 1407)  Trust Relationship/Rapport:   care explained   reassurance provided   choices provided   thoughts/feelings acknowledged   emotional support provided   empathic listening provided   questions encouraged     Problem: Fall Injury Risk  Goal: Absence of Fall and Fall-Related Injury  Outcome: Ongoing, Progressing  Intervention: Promote Injury-Free Environment  Flowsheets (Taken 12/2/2022 1407)  Safety Promotion/Fall Prevention:   assistive device/personal item within reach   in recliner, wheels locked   instructed to call staff for mobility

## 2022-12-05 ENCOUNTER — PATIENT MESSAGE (OUTPATIENT)
Dept: PULMONOLOGY | Facility: CLINIC | Age: 52
End: 2022-12-05
Payer: COMMERCIAL

## 2022-12-05 NOTE — TELEPHONE ENCOUNTER
I think she is questioning about the dose of rituximab infusion. Infusion dose is based on body surface area - 3.75 mg / m2 BSA. It cannot be increased to 1000  mg without completion of the first course. Advice to continue infusion as advised by . He will repeat labs and CT after completion of the infusion course to monitor efficacy.   Thanks

## 2022-12-06 ENCOUNTER — TELEPHONE (OUTPATIENT)
Dept: PULMONOLOGY | Facility: CLINIC | Age: 52
End: 2022-12-06
Payer: COMMERCIAL

## 2022-12-07 ENCOUNTER — PATIENT MESSAGE (OUTPATIENT)
Dept: FAMILY MEDICINE | Facility: CLINIC | Age: 52
End: 2022-12-07
Payer: COMMERCIAL

## 2022-12-07 DIAGNOSIS — N95.1 MENOPAUSAL VASOMOTOR SYNDROME: ICD-10-CM

## 2022-12-07 NOTE — TELEPHONE ENCOUNTER
No new care gaps identified.  [facility] Embedded Care Gaps. Reference number: [id number]. 12/07/2022   3:07:36 PM CST

## 2022-12-08 RX ORDER — SERTRALINE HYDROCHLORIDE 50 MG/1
50 TABLET, FILM COATED ORAL DAILY
Qty: 90 TABLET | Refills: 3 | Status: ON HOLD | OUTPATIENT
Start: 2022-12-08 | End: 2023-08-05 | Stop reason: HOSPADM

## 2022-12-09 ENCOUNTER — PATIENT MESSAGE (OUTPATIENT)
Dept: PULMONOLOGY | Facility: CLINIC | Age: 52
End: 2022-12-09
Payer: COMMERCIAL

## 2022-12-09 ENCOUNTER — INFUSION (OUTPATIENT)
Dept: INFUSION THERAPY | Facility: HOSPITAL | Age: 52
End: 2022-12-09
Attending: INTERNAL MEDICINE
Payer: COMMERCIAL

## 2022-12-09 VITALS
WEIGHT: 239.63 LBS | BODY MASS INDEX: 38.51 KG/M2 | RESPIRATION RATE: 16 BRPM | HEART RATE: 96 BPM | OXYGEN SATURATION: 95 % | HEIGHT: 66 IN | SYSTOLIC BLOOD PRESSURE: 103 MMHG | DIASTOLIC BLOOD PRESSURE: 67 MMHG | TEMPERATURE: 98 F

## 2022-12-09 DIAGNOSIS — M05.9 RHEUMATOID ARTHRITIS WITH POSITIVE RHEUMATOID FACTOR, INVOLVING UNSPECIFIED SITE: ICD-10-CM

## 2022-12-09 DIAGNOSIS — Z79.899 HIGH RISK MEDICATION USE: ICD-10-CM

## 2022-12-09 DIAGNOSIS — J84.9 INTERSTITIAL PULMONARY DISEASE, UNSPECIFIED: ICD-10-CM

## 2022-12-09 DIAGNOSIS — J84.9 INTERSTITIAL LUNG DISEASE: Primary | ICD-10-CM

## 2022-12-09 PROCEDURE — 63600175 PHARM REV CODE 636 W HCPCS: Performed by: REGISTERED NURSE

## 2022-12-09 PROCEDURE — 96413 CHEMO IV INFUSION 1 HR: CPT

## 2022-12-09 PROCEDURE — 63600175 PHARM REV CODE 636 W HCPCS: Performed by: INTERNAL MEDICINE

## 2022-12-09 PROCEDURE — 96375 TX/PRO/DX INJ NEW DRUG ADDON: CPT

## 2022-12-09 PROCEDURE — 96415 CHEMO IV INFUSION ADDL HR: CPT

## 2022-12-09 PROCEDURE — 25000003 PHARM REV CODE 250: Performed by: INTERNAL MEDICINE

## 2022-12-09 RX ORDER — ONDANSETRON 2 MG/ML
4 INJECTION INTRAMUSCULAR; INTRAVENOUS
Status: DISCONTINUED | OUTPATIENT
Start: 2022-12-09 | End: 2022-12-09 | Stop reason: HOSPADM

## 2022-12-09 RX ORDER — HEPARIN 100 UNIT/ML
500 SYRINGE INTRAVENOUS
Status: CANCELLED | OUTPATIENT
Start: 2022-12-16

## 2022-12-09 RX ORDER — ONDANSETRON 2 MG/ML
4 INJECTION INTRAMUSCULAR; INTRAVENOUS
Status: CANCELLED
Start: 2022-12-09

## 2022-12-09 RX ORDER — DIPHENHYDRAMINE HYDROCHLORIDE 50 MG/ML
25 INJECTION INTRAMUSCULAR; INTRAVENOUS
Status: CANCELLED
Start: 2022-12-16

## 2022-12-09 RX ORDER — ACETAMINOPHEN 325 MG/1
650 TABLET ORAL
Status: COMPLETED | OUTPATIENT
Start: 2022-12-09 | End: 2022-12-09

## 2022-12-09 RX ORDER — METHYLPREDNISOLONE SOD SUCC 125 MG
100 VIAL (EA) INJECTION ONCE
Status: CANCELLED | OUTPATIENT
Start: 2022-12-09 | End: 2022-12-09

## 2022-12-09 RX ORDER — SODIUM CHLORIDE 0.9 % (FLUSH) 0.9 %
10 SYRINGE (ML) INJECTION
Status: CANCELLED | OUTPATIENT
Start: 2022-12-16

## 2022-12-09 RX ORDER — ACETAMINOPHEN 325 MG/1
650 TABLET ORAL ONCE
Status: CANCELLED | OUTPATIENT
Start: 2022-12-09 | End: 2022-12-09

## 2022-12-09 RX ORDER — MEPERIDINE HYDROCHLORIDE 50 MG/ML
25 INJECTION INTRAMUSCULAR; INTRAVENOUS; SUBCUTANEOUS
Status: CANCELLED | OUTPATIENT
Start: 2022-12-16

## 2022-12-09 RX ORDER — FAMOTIDINE 10 MG/ML
20 INJECTION INTRAVENOUS
Status: CANCELLED | OUTPATIENT
Start: 2022-12-16

## 2022-12-09 RX ORDER — ACETAMINOPHEN 325 MG/1
650 TABLET ORAL
Status: CANCELLED | OUTPATIENT
Start: 2022-12-16

## 2022-12-09 RX ORDER — METHYLPREDNISOLONE SOD SUCC 125 MG
100 VIAL (EA) INJECTION ONCE
Status: COMPLETED | OUTPATIENT
Start: 2022-12-09 | End: 2022-12-09

## 2022-12-09 RX ORDER — FAMOTIDINE 10 MG/ML
20 INJECTION INTRAVENOUS
Status: COMPLETED | OUTPATIENT
Start: 2022-12-09 | End: 2022-12-09

## 2022-12-09 RX ORDER — DIPHENHYDRAMINE HYDROCHLORIDE 50 MG/ML
25 INJECTION INTRAMUSCULAR; INTRAVENOUS
Status: COMPLETED | OUTPATIENT
Start: 2022-12-09 | End: 2022-12-09

## 2022-12-09 RX ADMIN — RITUXIMAB 800 MG: 10 INJECTION, SOLUTION INTRAVENOUS at 09:12

## 2022-12-09 RX ADMIN — METHYLPREDNISOLONE SODIUM SUCCINATE 100 MG: 125 INJECTION, POWDER, FOR SOLUTION INTRAMUSCULAR; INTRAVENOUS at 09:12

## 2022-12-09 RX ADMIN — ACETAMINOPHEN 650 MG: 325 TABLET ORAL at 09:12

## 2022-12-09 RX ADMIN — FAMOTIDINE 20 MG: 10 INJECTION INTRAVENOUS at 09:12

## 2022-12-09 RX ADMIN — DIPHENHYDRAMINE HYDROCHLORIDE 25 MG: 50 INJECTION INTRAMUSCULAR; INTRAVENOUS at 09:12

## 2022-12-09 NOTE — DISCHARGE INSTRUCTIONS
WAYS TO HELP PREVENT INFECTION        WASH YOUR HANDS OFTEN DURING THE DAY, ESPECIALLY BEFORE YOU EAT, AFTER USING THE BATHROOM, AND AFTER TOUCHING ANIMALS    STAY AWAY FROM PEOPLE WHO HAVE ILLNESSES YOU CAN CATCH; SUCH AS COLDS, FLU, CHICKEN POX    TRY TO AVOID CROWDS    STAY AWAY FROM CHILDREN WHO RECENTLY HAVE RECEIVED LIVE VIRUS VACCINES    MAINTAIN GOOD MOUTH CARE    DO NOT SQUEEZE OR SCRATCH PIMPLES    CLEAN CUTS & SCRAPES RIGHT AWAY AND DAILY UNTIL HEALED WITH WARM WATER, SOAP & AN ANTISEPTIC    AVOID CONTACT WITH LITTER BOXES, BIRD CAGES, & FISH TANKS    AVOID STANDING WATER, IE., BIRD BATHS, FLOWER POTS/VASES, OR HUMIDIFIERS    WEAR GLOVES WHEN GARDENING OR CLEANING UP AFTER OTHERS, ESPECIALLY BABIES & SMALL CHILDREN    DO NOT EAT RAW FISH, SEAFOOD, MEAT, OR EGGS      FALL PREVENTION   Falls often occur due to slipping, tripping or losing your balance. Here are ways to reduce your risk of falling again.   Was there anything that caused your fall that can be fixed, removed or replaced?   Make your home safe by keeping walkways clear of objects you may trip over.   Use non-slip pads under rugs.   Do not walk in poorly lit areas.   Do not stand on chairs or wobbly ladders.   Use caution when reaching overhead or looking upward. This position can cause a loss of balance.   Be sure your shoes fit properly, have non-slip bottoms and are in good condition.   Be cautious when going up and down stairs, curbs, and when walking on uneven sidewalks.   If your balance is poor, consider using a cane or walker.   If your fall was related to alcohol use, stop or limit alcohol intake.   If your fall was related to use of sleeping medicines, talk to your doctor about this. You may need to reduce your dosage at bedtime if you awaken during the night to go to the bathroom.   To reduce the need for nighttime bathroom trips:   Avoid drinking fluids for several hours before going to bed   Empty your bladder before going to bed    Men can keep a urinal at the bedside   © 0547-6077 [employer], [address] [place] [address], [place] [address]. All rights reserved. This information is not intended as a substitute for professional medical care. Always follow your healthcare professional's instructions.

## 2022-12-09 NOTE — NURSING
Infusion # 4 - Rituxan 375mg/m2  Last dose- 12/2/2022    Any:  -recent illness, infection, or antibiotic use in past week- denied  -open wounds or mouth sores- denied  -invasive procedures or surgeries in past 4 weeks or in upcoming 4 weeks- denied  -vaccinations in past week- denied  -any new symptoms/change in symptoms-been coughing more with productive yellow sputum; afebrile today.  Dr Aviles aware and will see Monday.  -chance you may be pregnant- denied      Recent labs? 11/18/2022  Last pulmonary provider visit- Will see on Monday     Premeds-Tylenol, Solu-medrol, Pepcid, and Benadryl     Rituxan 800 mg administered IV at a titrated rate per orders; see MAR and vitals for more detail.  Pt is aware that if she starts feeling worse or running a fever to go to nearest urgent care/ER.

## 2022-12-09 NOTE — PLAN OF CARE
Patient tolerated Rituxan well today; no adverse reaction noted.  IV discontinued with catheter intact and pressure dressing applied to the site.  Has f/u appt(s) scheduled per MD request.  No questions or concerns voiced.  NAD noted upon discharge.

## 2022-12-16 ENCOUNTER — PATIENT MESSAGE (OUTPATIENT)
Dept: PULMONOLOGY | Facility: CLINIC | Age: 52
End: 2022-12-16
Payer: COMMERCIAL

## 2022-12-16 ENCOUNTER — PATIENT MESSAGE (OUTPATIENT)
Dept: RHEUMATOLOGY | Facility: CLINIC | Age: 52
End: 2022-12-16
Payer: COMMERCIAL

## 2022-12-16 LAB — FUNGUS SPEC CULT: NORMAL

## 2022-12-20 ENCOUNTER — TELEPHONE (OUTPATIENT)
Dept: PULMONOLOGY | Facility: CLINIC | Age: 52
End: 2022-12-20
Payer: COMMERCIAL

## 2022-12-27 DIAGNOSIS — R06.02 SOB (SHORTNESS OF BREATH): Primary | ICD-10-CM

## 2022-12-28 ENCOUNTER — CLINICAL SUPPORT (OUTPATIENT)
Dept: PULMONOLOGY | Facility: CLINIC | Age: 52
End: 2022-12-28
Payer: COMMERCIAL

## 2022-12-28 ENCOUNTER — OFFICE VISIT (OUTPATIENT)
Dept: PULMONOLOGY | Facility: CLINIC | Age: 52
End: 2022-12-28
Payer: COMMERCIAL

## 2022-12-28 VITALS
OXYGEN SATURATION: 93 % | BODY MASS INDEX: 38.73 KG/M2 | HEIGHT: 66 IN | HEART RATE: 113 BPM | WEIGHT: 241 LBS | SYSTOLIC BLOOD PRESSURE: 124 MMHG | RESPIRATION RATE: 18 BRPM | DIASTOLIC BLOOD PRESSURE: 80 MMHG

## 2022-12-28 DIAGNOSIS — J84.9 INTERSTITIAL LUNG DISEASE: ICD-10-CM

## 2022-12-28 DIAGNOSIS — E66.01 MORBID OBESITY WITH BMI OF 40.0-44.9, ADULT: ICD-10-CM

## 2022-12-28 DIAGNOSIS — J98.4 RESTRICTIVE LUNG DISEASE: ICD-10-CM

## 2022-12-28 DIAGNOSIS — R09.02 EXERCISE HYPOXEMIA: ICD-10-CM

## 2022-12-28 DIAGNOSIS — I10 PRIMARY HYPERTENSION: ICD-10-CM

## 2022-12-28 DIAGNOSIS — M05.9 RHEUMATOID ARTHRITIS WITH POSITIVE RHEUMATOID FACTOR, INVOLVING UNSPECIFIED SITE: ICD-10-CM

## 2022-12-28 DIAGNOSIS — J96.11 CHRONIC RESPIRATORY FAILURE WITH HYPOXIA: ICD-10-CM

## 2022-12-28 DIAGNOSIS — J84.111 CHRONIC INTERSTITIAL PNEUMONIA: Primary | ICD-10-CM

## 2022-12-28 DIAGNOSIS — G47.33 OSA ON CPAP: ICD-10-CM

## 2022-12-28 DIAGNOSIS — R06.02 SOB (SHORTNESS OF BREATH): ICD-10-CM

## 2022-12-28 DIAGNOSIS — J84.114 ACUTE INTERSTITIAL PNEUMONITIS: ICD-10-CM

## 2022-12-28 LAB
ALLENS TEST: ABNORMAL
DELSYS: ABNORMAL
FIO2: 21
HCO3 UR-SCNC: 26.6 MMOL/L (ref 24–28)
MODE: ABNORMAL
PCO2 BLDA: 40.3 MMHG (ref 35–45)
PH SMN: 7.43 [PH] (ref 7.35–7.45)
PO2 BLDA: 39 MMHG (ref 80–100)
POC BE: 2 MMOL/L
POC SATURATED O2: 75 % (ref 95–100)
SAMPLE: ABNORMAL
SITE: ABNORMAL

## 2022-12-28 PROCEDURE — 1160F PR REVIEW ALL MEDS BY PRESCRIBER/CLIN PHARMACIST DOCUMENTED: ICD-10-PCS | Mod: CPTII,S$GLB,, | Performed by: INTERNAL MEDICINE

## 2022-12-28 PROCEDURE — 82803 BLOOD GASES ANY COMBINATION: CPT | Mod: S$GLB,,, | Performed by: INTERNAL MEDICINE

## 2022-12-28 PROCEDURE — 3079F PR MOST RECENT DIASTOLIC BLOOD PRESSURE 80-89 MM HG: ICD-10-PCS | Mod: CPTII,S$GLB,, | Performed by: INTERNAL MEDICINE

## 2022-12-28 PROCEDURE — 1160F RVW MEDS BY RX/DR IN RCRD: CPT | Mod: CPTII,S$GLB,, | Performed by: INTERNAL MEDICINE

## 2022-12-28 PROCEDURE — 3044F HG A1C LEVEL LT 7.0%: CPT | Mod: CPTII,S$GLB,, | Performed by: INTERNAL MEDICINE

## 2022-12-28 PROCEDURE — 3008F BODY MASS INDEX DOCD: CPT | Mod: CPTII,S$GLB,, | Performed by: INTERNAL MEDICINE

## 2022-12-28 PROCEDURE — 99999 PR PBB SHADOW E&M-EST. PATIENT-LVL V: ICD-10-PCS | Mod: PBBFAC,,, | Performed by: INTERNAL MEDICINE

## 2022-12-28 PROCEDURE — 36600 WITHDRAWAL OF ARTERIAL BLOOD: CPT | Mod: S$GLB,,, | Performed by: INTERNAL MEDICINE

## 2022-12-28 PROCEDURE — 36600 PR WITHDRAWAL OF ARTERIAL BLOOD: ICD-10-PCS | Mod: S$GLB,,, | Performed by: INTERNAL MEDICINE

## 2022-12-28 PROCEDURE — 99214 PR OFFICE/OUTPT VISIT, EST, LEVL IV, 30-39 MIN: ICD-10-PCS | Mod: 25,S$GLB,, | Performed by: INTERNAL MEDICINE

## 2022-12-28 PROCEDURE — 82803 PR  BLOOD GASES: PH, PO2 & PCO2: ICD-10-PCS | Mod: S$GLB,,, | Performed by: INTERNAL MEDICINE

## 2022-12-28 PROCEDURE — 3008F PR BODY MASS INDEX (BMI) DOCUMENTED: ICD-10-PCS | Mod: CPTII,S$GLB,, | Performed by: INTERNAL MEDICINE

## 2022-12-28 PROCEDURE — 99999 PR PBB SHADOW E&M-EST. PATIENT-LVL V: CPT | Mod: PBBFAC,,, | Performed by: INTERNAL MEDICINE

## 2022-12-28 PROCEDURE — 99214 OFFICE O/P EST MOD 30 MIN: CPT | Mod: 25,S$GLB,, | Performed by: INTERNAL MEDICINE

## 2022-12-28 PROCEDURE — 1159F PR MEDICATION LIST DOCUMENTED IN MEDICAL RECORD: ICD-10-PCS | Mod: CPTII,S$GLB,, | Performed by: INTERNAL MEDICINE

## 2022-12-28 PROCEDURE — 3079F DIAST BP 80-89 MM HG: CPT | Mod: CPTII,S$GLB,, | Performed by: INTERNAL MEDICINE

## 2022-12-28 PROCEDURE — 3074F PR MOST RECENT SYSTOLIC BLOOD PRESSURE < 130 MM HG: ICD-10-PCS | Mod: CPTII,S$GLB,, | Performed by: INTERNAL MEDICINE

## 2022-12-28 PROCEDURE — 3044F PR MOST RECENT HEMOGLOBIN A1C LEVEL <7.0%: ICD-10-PCS | Mod: CPTII,S$GLB,, | Performed by: INTERNAL MEDICINE

## 2022-12-28 PROCEDURE — 1159F MED LIST DOCD IN RCRD: CPT | Mod: CPTII,S$GLB,, | Performed by: INTERNAL MEDICINE

## 2022-12-28 PROCEDURE — 3074F SYST BP LT 130 MM HG: CPT | Mod: CPTII,S$GLB,, | Performed by: INTERNAL MEDICINE

## 2022-12-28 RX ORDER — METOLAZONE 2.5 MG/1
2.5 TABLET ORAL
Qty: 36 TABLET | Refills: 3 | Status: ON HOLD | OUTPATIENT
Start: 2022-12-28 | End: 2023-08-05 | Stop reason: HOSPADM

## 2022-12-28 RX ORDER — NINTEDANIB 100 MG/1
50 CAPSULE ORAL 2 TIMES DAILY
Qty: 60 CAPSULE | Refills: 6 | Status: SHIPPED | OUTPATIENT
Start: 2022-12-28 | End: 2023-03-07 | Stop reason: SDUPTHER

## 2022-12-28 NOTE — ASSESSMENT & PLAN NOTE
ILD with +ve RF  Based on Chest CT  Elevated ESR and CRP improved with steriods: declined recently  SP Rituxan       HRCT reviewed  Worsening GGO    Fluid restriction  Metalozone Mon/WEd/FRi  Added OFEV  Cont Prednisone

## 2022-12-28 NOTE — PROGRESS NOTES
Pulmonary Outpatient  Visit     Subjective:       Patient ID: Ayanna Alicia is a 52 y.o. female.    Chief Complaint: Asthma, Apnea, Pulmonary Fibrosis, and Fatigue        Ayanna Alicia is 51 y.o.  Post hospital f/u  Acute respiratory failure ILD, +ve RF  Was discharged on oxygen  Here to review results  Explained  PFT: severe restriction and reduced DLCO  ABG  6MWD: oxygen desat needs supplementary oxygen 3 LPM  Has some nose bleed will add AYR gel and humifier bottle  FMLA paper work given  Out of work letter   Added PEPCID and Bactrim  Adherent with inhaler    05/18/2022  Here to see today  Concern, Dyspnea with activity palpitations  Seen Rheumatology: Added CELLCEPT  On steriod taper  No cough wheezing  On oxygen 3 LPM  Had GLENN in 2019: was prescribed CPAP returned back  Needs PAP at night  Motivated to restart      07/15/2022  Last seen 05/18/2022  ACT score 10, Lyerly score 6  CPAP study: CPAP ordered  Await   Says cannot go to work, tied, focus off  6MWD: RA sat was 95%  O2 titrated to 4-6 LPM  Tachycardia noted : 145 BPM    09/20/2022  Last seen 07/15/2022  Here to review progress  Enrolled in pul rehab, session 8  Sat Stable @ 2-3 LPM  ACt score 5, Lyerly 6  No cough, better exercise tolerance  Stable on cellcept 1000mg BID + prednisone 10 mg  Will DW Rheum whether can titrate up cellcept to wean steriods  CPAP download shows adherence   CPAP 9 cm with resudual AHI 0.2  Usage > 4 hrs was 67%  Cehst CT reviewed  Repeat PFT pending      12/28/2022  Last seen 11/07/2022  Here to review chest CT done recently  Fatigue, SOB, on 6 LPM  SP rituxan 4 doses  ABG reveiwed  ESR and CRP were increased  On Prednisone 10 mg  Hoarse voice  Discussed utility of bronchoscopy if infection is considered  TBBX would have risk of flaring and acute resp failure:  Serologies sent  Added OFEV  Will also reduce fluid intake to approx 32 oz  Will reenrol on Pul  rehab      Asthma Control Test  In the past 4  weeks, how much of the time did your asthma keep you from getting as much done at work, school or at home?: Some of the time  During the past 4 weeks, how often have you had shortness of breath?: More than once a day  During the past 4 weeks, how often did your asthma symptoms (wheezing, couging, shortness of breath, chest tightness or pain) wake you up at night or earlier than usual in the morning?: Once or twice  During the past 4 weeks, how often have you used your rescue inhaler or nebulizer medication (such as albuterol)?: 3 or more times per day  How would you rate your asthma control during the past 4 weeks?: Not controlled at all  If your score is 19 or less, your asthma may not be under control: 10          MMRC Dyspnea Scale (4 is worst)     [] MMRC 0: Dyspneic on strenuous excercise (0 points)    [] MMRC 1: Dyspneic on walking a slight hill (0 points)    [x] MMRC 2: Dyspneic on walking level ground; must stop occasionally due to breathlessness (1 point)    [] MMRC 3: Must stop for breathlessness after walking 100 yards or after a few minutes (2 points)    [] MMRC 4: Cannot leave house; breathless on dressing/undressing (3 points)          EPWORTH SLEEPINESS SCALE 12/28/2022   Sitting and reading 1   Watching TV 1   Sitting, inactive in a public place (e.g. a theatre or a meeting) 0   As a passenger in a car for an hour without a break 1   Lying down to rest in the afternoon when circumstances permit 3   Sitting and talking to someone 0   Sitting quietly after a lunch without alcohol 0   In a car, while stopped for a few minutes in traffic 0   Total score 6        Asthma Control Test  In the past 4  weeks, how much of the time did your asthma keep you from getting as much done at work, school or at home?: Some of the time  During the past 4 weeks, how often have you had shortness of breath?: More than once a day  During the past 4 weeks, how often did your  asthma symptoms (wheezing, couging, shortness of breath, chest tightness or pain) wake you up at night or earlier than usual in the morning?: Once or twice  During the past 4 weeks, how often have you used your rescue inhaler or nebulizer medication (such as albuterol)?: 3 or more times per day  How would you rate your asthma control during the past 4 weeks?: Not controlled at all  If your score is 19 or less, your asthma may not be under control: 10        The following portions of the patient's history were reviewed and updated as appropriate:   She  has a past medical history of Abnormal Pap smear of cervix, Acute interstitial pneumonitis, Allergic rhinitis, cause unspecified, Arthritis of both knees, Asthma, Eczema, Fatty liver (10/2014), Fibrocystic breast changes, Headache(784.0), Hepatomegaly (10/2014), Hypertension, Liver cyst (10/2014), Multinodular goiter, Polymenorrhea (), TMJ (dislocation of temporomandibular joint), Uterine fibroid, and Vitamin D deficiency disease.  She does not have any pertinent problems on file.  She  has a past surgical history that includes  section, classic; Multiple tooth extractions; Partial hysterectomy (2013); Hysterectomy; and Colonoscopy (N/A, 2020).  Her family history includes Cancer (age of onset: 50) in her maternal aunt; Cancer (age of onset: 60) in her maternal grandmother; Cancer (age of onset: 66) in her maternal aunt; Cataracts in her cousin; Diabetes in her maternal grandfather; Diverticulitis in her mother; Heart disease in her mother; Heart disease (age of onset: 63) in her father; Hypertension in her father; Migraines in her cousin; Peripheral vascular disease in her maternal grandfather; Stroke in her maternal grandfather, mother, and sister.  She  reports that she has never smoked. She has never used smokeless tobacco. She reports current alcohol use. She reports current drug use. Frequency: 4.00 times per week. Drug: Marijuana.  She  has a current medication list which includes the following prescription(s): albuterol, albuterol, albuterol-ipratropium, amlodipine, ascorbic acid (vitamin c), calcium/magnesium/vit b comp, doxycycline, fluticasone furoate-vilanterol, hydrochlorothiazide, levocetirizine, montelukast, mycophenolate, omega-3s/dha/epa/fish oil/d3, ondansetron, prednisone, sertraline, metolazone, and ofev, and the following Facility-Administered Medications: sodium chloride 0.9%.  Current Outpatient Medications on File Prior to Visit   Medication Sig Dispense Refill    albuterol (ACCUNEB) 0.63 mg/3 mL Nebu Take 3 mLs (0.63 mg total) by nebulization every 4 to 6 hours as needed (asthma). Rescue 75 mL 6    albuterol (PROVENTIL/VENTOLIN HFA) 90 mcg/actuation inhaler INHALE 1 TO 2 PUFFS BY MOUTH INTO THE LUNGS EVERY 4 TO 6 HOURS AS NEEDED FOR WHEEZING OR SHORTNESS OF BREATH 8.5 g 5    albuterol-ipratropium (DUO-NEB) 2.5 mg-0.5 mg/3 mL nebulizer solution Take 3 mLs by nebulization every 6 (six) hours as needed for Wheezing. Rescue (Patient taking differently: Take 3 mLs by nebulization 3 (three) times daily. Rescue) 90 mL 11    amLODIPine (NORVASC) 10 MG tablet Take 1 tablet (10 mg total) by mouth once daily. 90 tablet 1    ascorbic acid, vitamin C, (VITAMIN C) 500 MG tablet Take 500 mg by mouth once daily.      calcium/magnesium/vit B comp (CALCIUM-MAGNESIUM-B COMPLEX ORAL) Take 1 tablet by mouth once daily at 6am.      doxycycline (VIBRAMYCIN) 100 MG Cap Take 1 capsule (100 mg total) by mouth 2 (two) times daily. 10 capsule 1    fluticasone furoate-vilanteroL (BREO ELLIPTA) 100-25 mcg/dose diskus inhaler INHALE 1 PUFF INTO THE LUNGS ONCE DAILY 60 each 5    hydroCHLOROthiazide (HYDRODIURIL) 12.5 MG Tab Take 1 tablet (12.5 mg total) by mouth once daily. 90 tablet 1    levocetirizine (XYZAL) 5 MG tablet TAKE 1 TABLET(5 MG) BY MOUTH EVERY DAY 90 tablet 1    montelukast (SINGULAIR) 10 mg tablet Take 1 tablet (10 mg total) by mouth every  evening. 90 tablet 1    mycophenolate (CELLCEPT) 500 mg Tab Take 3 tablets (1,500 mg total) by mouth 2 (two) times daily. 180 tablet 0    omega-3s/dha/epa/fish oil/D3 (VITAMIN-D + OMEGA-3 ORAL) Take 1 tablet by mouth once daily at 6am.      ondansetron (ZOFRAN-ODT) 4 MG TbDL Dissolve 1 tablet (4 mg total) by mouth every 6 (six) hours as needed (nausea). 90 tablet 0    predniSONE (DELTASONE) 10 MG tablet Take 1 tablet (10 mg total) by mouth once daily. 30 tablet 3    sertraline (ZOLOFT) 50 MG tablet Take 1 tablet (50 mg total) by mouth once daily. 90 tablet 3     Current Facility-Administered Medications on File Prior to Visit   Medication Dose Route Frequency Provider Last Rate Last Admin    sodium chloride 0.9% flush 10 mL  10 mL Intravenous PRN Agustín Aviles MD         She is allergic to doxycycline, fluticasone, and penicillins..      Review of Systems   Constitutional:  Positive for activity change and fatigue.   Respiratory:  Positive for dyspnea on extertion.    Gastrointestinal:  Positive for acid reflux.     Outpatient Encounter Medications as of 12/28/2022   Medication Sig Dispense Refill    albuterol (ACCUNEB) 0.63 mg/3 mL Nebu Take 3 mLs (0.63 mg total) by nebulization every 4 to 6 hours as needed (asthma). Rescue 75 mL 6    albuterol (PROVENTIL/VENTOLIN HFA) 90 mcg/actuation inhaler INHALE 1 TO 2 PUFFS BY MOUTH INTO THE LUNGS EVERY 4 TO 6 HOURS AS NEEDED FOR WHEEZING OR SHORTNESS OF BREATH 8.5 g 5    albuterol-ipratropium (DUO-NEB) 2.5 mg-0.5 mg/3 mL nebulizer solution Take 3 mLs by nebulization every 6 (six) hours as needed for Wheezing. Rescue (Patient taking differently: Take 3 mLs by nebulization 3 (three) times daily. Rescue) 90 mL 11    amLODIPine (NORVASC) 10 MG tablet Take 1 tablet (10 mg total) by mouth once daily. 90 tablet 1    ascorbic acid, vitamin C, (VITAMIN C) 500 MG tablet Take 500 mg by mouth once daily.      calcium/magnesium/vit B comp (CALCIUM-MAGNESIUM-B COMPLEX ORAL) Take 1  "tablet by mouth once daily at 6am.      doxycycline (VIBRAMYCIN) 100 MG Cap Take 1 capsule (100 mg total) by mouth 2 (two) times daily. 10 capsule 1    fluticasone furoate-vilanteroL (BREO ELLIPTA) 100-25 mcg/dose diskus inhaler INHALE 1 PUFF INTO THE LUNGS ONCE DAILY 60 each 5    hydroCHLOROthiazide (HYDRODIURIL) 12.5 MG Tab Take 1 tablet (12.5 mg total) by mouth once daily. 90 tablet 1    levocetirizine (XYZAL) 5 MG tablet TAKE 1 TABLET(5 MG) BY MOUTH EVERY DAY 90 tablet 1    montelukast (SINGULAIR) 10 mg tablet Take 1 tablet (10 mg total) by mouth every evening. 90 tablet 1    mycophenolate (CELLCEPT) 500 mg Tab Take 3 tablets (1,500 mg total) by mouth 2 (two) times daily. 180 tablet 0    omega-3s/dha/epa/fish oil/D3 (VITAMIN-D + OMEGA-3 ORAL) Take 1 tablet by mouth once daily at 6am.      ondansetron (ZOFRAN-ODT) 4 MG TbDL Dissolve 1 tablet (4 mg total) by mouth every 6 (six) hours as needed (nausea). 90 tablet 0    predniSONE (DELTASONE) 10 MG tablet Take 1 tablet (10 mg total) by mouth once daily. 30 tablet 3    sertraline (ZOLOFT) 50 MG tablet Take 1 tablet (50 mg total) by mouth once daily. 90 tablet 3    metOLazone (ZAROXOLYN) 2.5 MG tablet Take 1 tablet (2.5 mg total) by mouth every Mon, Wed, Fri. 36 tablet 3    nintedanib (OFEV) 100 mg Cap Take 50 mg by mouth 2 (two) times daily. 60 capsule 6     Facility-Administered Encounter Medications as of 12/28/2022   Medication Dose Route Frequency Provider Last Rate Last Admin    sodium chloride 0.9% flush 10 mL  10 mL Intravenous PRN Agustín Aviles MD           Objective:     Vital Signs (Most Recent)  Vital Signs  Pulse: (!) 113  Resp: 18  SpO2: (!) 93 % (6 liters)  BP: 124/80  Height and Weight  Height: 5' 6" (167.6 cm)  Weight: 109.3 kg (241 lb)  BSA (Calculated - sq m): 2.26 sq meters  BMI (Calculated): 38.9  Weight in (lb) to have BMI = 25: 154.6]  Wt Readings from Last 2 Encounters:   12/28/22 109.3 kg (241 lb)   12/09/22 108.7 kg (239 lb 10.2 oz) "       Physical Exam   Constitutional: She is oriented to person, place, and time. She appears well-developed and well-nourished.   HENT:   Head: Normocephalic.   Mouth/Throat: Mallampati Score: II.   Neck: No JVD present.   Cardiovascular: Normal rate and intact distal pulses.   No murmur heard.  Pulmonary/Chest: Normal expansion, effort normal and breath sounds normal.   Abdominal: Soft. Bowel sounds are normal.   Musculoskeletal:         General: No edema. Normal range of motion.      Cervical back: Normal range of motion and neck supple.   Lymphadenopathy:     She has no axillary adenopathy.   Neurological: She is alert and oriented to person, place, and time.   Skin: Skin is warm and dry. No cyanosis. Nails show no clubbing.   Psychiatric: She has a normal mood and affect.   Nursing note and vitals reviewed.    Laboratory  Lab Results   Component Value Date    WBC 15.60 (H) 11/18/2022    RBC 3.71 (L) 11/18/2022    HGB 9.0 (L) 11/18/2022    HCT 30.9 (L) 11/18/2022    MCV 83 11/18/2022    MCH 24.3 (L) 11/18/2022    MCHC 29.1 (L) 11/18/2022    RDW 16.4 (H) 11/18/2022     (H) 11/18/2022    MPV 9.7 11/18/2022    GRAN 12.6 (H) 11/18/2022    GRAN 80.9 (H) 11/18/2022    LYMPH 1.3 11/18/2022    LYMPH 8.0 (L) 11/18/2022    MONO 1.0 11/18/2022    MONO 6.5 11/18/2022    EOS 0.4 11/18/2022    BASO 0.09 11/18/2022    EOSINOPHIL 2.8 11/18/2022    BASOPHIL 0.6 11/18/2022       BMP  Lab Results   Component Value Date     11/18/2022    K 3.7 11/18/2022     11/18/2022    CO2 27 11/18/2022    BUN 10 11/18/2022    CREATININE 0.9 11/18/2022    CALCIUM 9.3 11/18/2022    ANIONGAP 11 11/18/2022    ESTGFRAFRICA >60 07/20/2022    EGFRNONAA >60 07/20/2022    AST 29 11/18/2022    ALT 30 11/18/2022    PROT 7.1 11/18/2022       Lab Results   Component Value Date    BNP <10 04/27/2022       Lab Results   Component Value Date    TSH 0.854 04/28/2022       Lab Results   Component Value Date    SEDRATE 110 (H) 11/01/2022        Lab Results   Component Value Date    CRP 54.2 (H) 11/01/2022     Lab Results   Component Value Date     (H) 05/02/2022        Lab Results   Component Value Date    ASPERGILLUS None Detected 04/29/2022     No results found for: AFUMIGATUSCL     Lab Results   Component Value Date    ACE 22 04/28/2022        Diagnostic Results:  I have personally reviewed today the following studies:  Office Spirometry Results:     FEV1: 1.34L( 46.9%)  FVC  1.54L( 42.7%)  FV1/FVC 87          CT Chest Without Contrast  Narrative: EXAMINATION:  CT CHEST WITHOUT CONTRAST, multiplanar reconstructions    CLINICAL HISTORY:  Interstitial pulmonary disease, unspecifiedInterstitial lung disease;Monitor disease progression/response to therapy;    TECHNIQUE:  Axial images through the chest were obtained without the use of IV contrast. Sagittal and coronal <reconstructions are provided for review>.    COMPARISON:  September 20, 2022    FINDINGS:  There is been interval progression of the ground-glass opacities throughout the inferior half of the lungs, confluent and portions of the bilateral lower lobes and inferior middle lobe.  The distribution of the peripheral ground-glass opacities within the bilateral upper lobes has not significantly changed.  Negative for effusion or pneumothorax.    Minor coronary artery calcifications again seen.  Small pericardial effusion.  There are no hilar or mediastinal masses or abnormal lymph nodes by size criteria.    The airways are patent.  The thyroid gland is unchanged with redemonstration of a 3 cm right thyroid nodule..  The esophagus is normal.    The upper abdominal organs are unchanged in appearance..    The osseous structures are unchanged in appearance.  Impression: 1.  Detrimental change.  There continues to be progression of the ground-glass opacities throughout the lungs, especially the lower half of the lungs.  The previous diagnostic considerations remain valid.  As clinically  warranted, the patient may benefit from a bronchoscopy with BAL or open lung biopsy if the etiology of this disease process is unknown/uncertain at this time.    2.  Stable findings as noted above.    All CT scans at this facility are performed  using dose modulation techniques as appropriate to performed exam including the following:  automated exposure control; adjustment of mA and/or kV according to the patients size (this includes techniques or standardized protocols for targeted exams where dose is matched to indication/reason for exam: i.e. extremities or head);  iterative reconstruction technique.    Electronically signed by: Yariel Hall MD  Date:    11/07/2022  Time:    13:25       There is no evidence of intracardiac shunting.  The left ventricle is normal in size with concentric remodeling and normal systolic function.  The estimated ejection fraction is 60%.  Normal left ventricular diastolic function.  Normal right ventricular size with normal right ventricular systolic function.  Normal central venous pressure (3 mmHg).  The estimated PA systolic pressure is 34 mmHg.          Performed Procedure    Transthoracic echo (TTE) complete  Study Details    A complete echo was performed using complete 2D, color flow Doppler and spectral Doppler. During the study, the apical, parasternal and subcostal views were captured. There was 5 mL of intravenous agitated saline (bubble) used. Study is negative for shunt.     Recent Labs     12/28/22  1139   PH 7.427   PCO2 40.3   PO2 39*   HCO3 26.6   POCSATURATED 75*   BE 2        Assessment/Plan:     Problem List Items Addressed This Visit       HTN (hypertension)    Chronic interstitial pneumonia - Primary    Relevant Medications    metOLazone (ZAROXOLYN) 2.5 MG tablet    nintedanib (OFEV) 100 mg Cap    Other Relevant Orders    Comprehensive Metabolic Panel    FUNGAL IMMUNODIFFUSION - BLOOD    Fungitell Assay For (1.3)-B-D-Glucans    Sedimentation rate    C-Reactive  Protein    Ambulatory referral/consult to Pulmonary Rehab    Rheumatoid arthritis with positive rheumatoid factor    Relevant Medications    metOLazone (ZAROXOLYN) 2.5 MG tablet    nintedanib (OFEV) 100 mg Cap    Other Relevant Orders    Comprehensive Metabolic Panel    FUNGAL IMMUNODIFFUSION - BLOOD    Fungitell Assay For (1.3)-B-D-Glucans    Sedimentation rate    C-Reactive Protein    Chronic respiratory failure with hypoxia    Relevant Medications    metOLazone (ZAROXOLYN) 2.5 MG tablet    Other Relevant Orders    Comprehensive Metabolic Panel    FUNGAL IMMUNODIFFUSION - BLOOD    Fungitell Assay For (1.3)-B-D-Glucans    Sedimentation rate    C-Reactive Protein    Ambulatory referral/consult to Pulmonary Rehab    Morbid obesity with BMI of 40.0-44.9, adult     Weight loss and exercise         GLENN on CPAP     [place] score 6  Inconsistent PAP use  Says suffocates her  Encourage adherence         Restrictive lung disease     Encouraged to rejoin pul rehab  Discussed LUNG transplantation criteria         Relevant Medications    metOLazone (ZAROXOLYN) 2.5 MG tablet    Other Relevant Orders    Comprehensive Metabolic Panel    FUNGAL IMMUNODIFFUSION - BLOOD    Fungitell Assay For (1.3)-B-D-Glucans    Sedimentation rate    C-Reactive Protein    Ambulatory referral/consult to Pulmonary Rehab    Exercise hypoxemia     POC 4-6 LPM         Interstitial lung disease     ILD with +ve RF  Based on Chest CT  Elevated ESR and CRP improved with steriods: declined recently  SP Rituxan       HRCT reviewed  Worsening GGO    Fluid restriction  Metalozone Mon/WEd/FRi  Added OFEV  Cont Prednisone             Relevant Medications    metOLazone (ZAROXOLYN) 2.5 MG tablet    nintedanib (OFEV) 100 mg Cap    Other Relevant Orders    Comprehensive Metabolic Panel    FUNGAL IMMUNODIFFUSION - BLOOD    Fungitell Assay For (1.3)-B-D-Glucans    Sedimentation rate    C-Reactive Protein           Re enrolled in pul rehab: benefits: Letter  written  Benefits from CPAP: adherence stressed   Standing labs  Cont   Pred 10, collaborate with rheumatology  Added OFEV  TBBX at this point may be hazardous and given +ve inflammatory serologies may not change therapy plan  Monitor immunosuppresion to avoid fungal infections or PCP         Follow up in about 3 months (around 3/28/2023), or standing labs, CXR, trevor, Metalazone, cont Prednsione, Pul rehab,.    This note was prepared using voice recognition system and is likely to have sound alike errors that may have been overlooked even after proof reading.  Please call me with any questions    Discussed diagnosis, its evaluation, treatment and usual course. All questions answered.      Agustín Aviles MD     Requested Prescriptions     Signed Prescriptions Disp Refills    metOLazone (ZAROXOLYN) 2.5 MG tablet 36 tablet 3     Sig: Take 1 tablet (2.5 mg total) by mouth every Mon, Wed, Fri.    nintedanib (OFEV) 100 mg Cap 60 capsule 6     Sig: Take 50 mg by mouth 2 (two) times daily.

## 2022-12-29 ENCOUNTER — SPECIALTY PHARMACY (OUTPATIENT)
Dept: PHARMACY | Facility: CLINIC | Age: 52
End: 2022-12-29
Payer: COMMERCIAL

## 2022-12-30 NOTE — TELEPHONE ENCOUNTER
Benefit Investigation    OFEV  RX [employer]   Deductible: $0  Max OOP: $3500   Estimated copay: $80  [facility] is in network    Forward to FA

## 2022-12-30 NOTE — TELEPHONE ENCOUNTER
Ofev PA submitted via [facility].  Key: [id number] - PA Case ID: [id number]    ______________________________      Samra, this is Rosalba Nassar with [facility].  We are working on your prescription that your doctor has sent us. We will be working with your insurance to get this approved for you. We will be calling you along the way with updates on your medication.  If you have any questions, you can reach us at [phone].    Welcome call outcome: Patient/caregiver reached

## 2022-12-30 NOTE — TELEPHONE ENCOUNTER
Ofev is LDD     Outgoing call to [employer], spoke to Beatris, luis [facility] is in pt's network    Outgoing call to pt regarding Ofev approval, network, copay, and copay card - LVM    *If pt calls back - please inform pt rx was routed to [facility] as Ofev is a limited drug distribution, [facility] do not have access to medication. Also please inform pt her copay is $80, there is a copay card available , pt would have to call to request copay assistance: [phone]    [phone]  Copay card: [phone]    _____________________________    [facility]  Closing out referral at [facility]

## 2022-12-30 NOTE — TELEPHONE ENCOUNTER
Shanel YU approved. CaseId: [id number]; Status: Approved; Review Type: Prior Auth; Coverage Start Date:11/30/2022 - 12/30/2023    Test claim: $80    Forward to BI   Ofev is LDD ([facility])

## 2023-01-01 ENCOUNTER — PATIENT MESSAGE (OUTPATIENT)
Dept: PULMONOLOGY | Facility: CLINIC | Age: 53
End: 2023-01-01
Payer: COMMERCIAL

## 2023-01-03 ENCOUNTER — PATIENT MESSAGE (OUTPATIENT)
Dept: PULMONOLOGY | Facility: CLINIC | Age: 53
End: 2023-01-03
Payer: COMMERCIAL

## 2023-01-03 ENCOUNTER — TELEPHONE (OUTPATIENT)
Dept: OTOLARYNGOLOGY | Facility: CLINIC | Age: 53
End: 2023-01-03
Payer: COMMERCIAL

## 2023-01-07 LAB
ACID FAST MOD KINY STN SPEC: NORMAL
MYCOBACTERIUM SPEC QL CULT: NORMAL

## 2023-01-09 ENCOUNTER — TELEPHONE (OUTPATIENT)
Dept: PULMONOLOGY | Facility: CLINIC | Age: 53
End: 2023-01-09
Payer: COMMERCIAL

## 2023-01-09 NOTE — TELEPHONE ENCOUNTER
----- Message from Suki Pelayo sent at 1/9/2023  7:50 AM CST -----  Contact: nintedanib (OFEV) 100 mg Cap  Pharmacy called in regards to nintedanib (OFEV) 100 mg Cap. Has the prior authorization  has been sent. Please call back at.      Jesus Pillai @ [phone] [facility] [id number]

## 2023-01-09 NOTE — TELEPHONE ENCOUNTER
Left vm informing Freya that PA was submitted on 12/29/22 by [facility]. Return call with any further questions.

## 2023-01-10 ENCOUNTER — PATIENT MESSAGE (OUTPATIENT)
Dept: CARDIOLOGY | Facility: CLINIC | Age: 53
End: 2023-01-10
Payer: COMMERCIAL

## 2023-01-10 DIAGNOSIS — I10 ESSENTIAL HYPERTENSION: ICD-10-CM

## 2023-01-10 RX ORDER — HYDROCHLOROTHIAZIDE 12.5 MG/1
12.5 TABLET ORAL DAILY
Qty: 90 TABLET | Refills: 1 | Status: SHIPPED | OUTPATIENT
Start: 2023-01-10 | End: 2023-06-29 | Stop reason: SDUPTHER

## 2023-01-13 ENCOUNTER — PATIENT MESSAGE (OUTPATIENT)
Dept: PULMONOLOGY | Facility: CLINIC | Age: 53
End: 2023-01-13
Payer: COMMERCIAL

## 2023-01-17 ENCOUNTER — PATIENT MESSAGE (OUTPATIENT)
Dept: PULMONOLOGY | Facility: CLINIC | Age: 53
End: 2023-01-17
Payer: COMMERCIAL

## 2023-01-19 ENCOUNTER — PATIENT MESSAGE (OUTPATIENT)
Dept: PULMONOLOGY | Facility: CLINIC | Age: 53
End: 2023-01-19
Payer: COMMERCIAL

## 2023-01-24 ENCOUNTER — TELEPHONE (OUTPATIENT)
Dept: PULMONOLOGY | Facility: HOSPITAL | Age: 53
End: 2023-01-24
Payer: COMMERCIAL

## 2023-01-24 ENCOUNTER — PATIENT MESSAGE (OUTPATIENT)
Dept: PULMONOLOGY | Facility: CLINIC | Age: 53
End: 2023-01-24
Payer: COMMERCIAL

## 2023-01-24 DIAGNOSIS — I10 PRIMARY HYPERTENSION: Primary | ICD-10-CM

## 2023-01-24 DIAGNOSIS — J98.4 RESTRICTIVE LUNG DISEASE: ICD-10-CM

## 2023-01-24 DIAGNOSIS — R76.8 ELEVATED RHEUMATOID FACTOR: ICD-10-CM

## 2023-01-24 DIAGNOSIS — J84.111 CHRONIC INTERSTITIAL PNEUMONIA: ICD-10-CM

## 2023-01-24 RX ORDER — PREDNISONE 10 MG/1
20 TABLET ORAL DAILY
Qty: 60 TABLET | Refills: 3 | Status: SHIPPED | OUTPATIENT
Start: 2023-01-24 | End: 2023-03-17 | Stop reason: SDUPTHER

## 2023-01-24 NOTE — TELEPHONE ENCOUNTER
Spoke with patient   Voice issues  Has appt with ENT Monday  Tolerating intial dose ofev well  If stable next 7 days will increase  Also increased prednisone to 20 mg  She felt that she did not have oral thrush  Will speak next week

## 2023-01-25 ENCOUNTER — HOSPITAL ENCOUNTER (OUTPATIENT)
Dept: CARDIOLOGY | Facility: HOSPITAL | Age: 53
Discharge: HOME | End: 2023-01-25
Attending: STUDENT IN AN ORGANIZED HEALTH CARE EDUCATION/TRAINING PROGRAM
Payer: COMMERCIAL

## 2023-01-25 ENCOUNTER — OFFICE VISIT (OUTPATIENT)
Dept: CARDIOLOGY | Facility: CLINIC | Age: 53
End: 2023-01-25
Payer: COMMERCIAL

## 2023-01-25 VITALS
BODY MASS INDEX: 37.56 KG/M2 | DIASTOLIC BLOOD PRESSURE: 72 MMHG | OXYGEN SATURATION: 99 % | HEIGHT: 66 IN | SYSTOLIC BLOOD PRESSURE: 130 MMHG | HEART RATE: 105 BPM | WEIGHT: 233.69 LBS

## 2023-01-25 DIAGNOSIS — E55.9 VITAMIN D DEFICIENCY: ICD-10-CM

## 2023-01-25 DIAGNOSIS — J96.11 CHRONIC RESPIRATORY FAILURE WITH HYPOXIA: ICD-10-CM

## 2023-01-25 DIAGNOSIS — I10 PRIMARY HYPERTENSION: ICD-10-CM

## 2023-01-25 DIAGNOSIS — G47.33 OSA ON CPAP: ICD-10-CM

## 2023-01-25 DIAGNOSIS — K21.9 GASTROESOPHAGEAL REFLUX DISEASE WITHOUT ESOPHAGITIS: Primary | ICD-10-CM

## 2023-01-25 DIAGNOSIS — R07.9 CHEST PAIN, UNSPECIFIED TYPE: ICD-10-CM

## 2023-01-25 DIAGNOSIS — J98.4 RESTRICTIVE LUNG DISEASE: ICD-10-CM

## 2023-01-25 DIAGNOSIS — E66.01 SEVERE OBESITY: ICD-10-CM

## 2023-01-25 DIAGNOSIS — R94.31 ABNORMAL EKG: ICD-10-CM

## 2023-01-25 PROCEDURE — 99214 PR OFFICE/OUTPT VISIT, EST, LEVL IV, 30-39 MIN: ICD-10-PCS | Mod: S$GLB,,, | Performed by: STUDENT IN AN ORGANIZED HEALTH CARE EDUCATION/TRAINING PROGRAM

## 2023-01-25 PROCEDURE — 3078F PR MOST RECENT DIASTOLIC BLOOD PRESSURE < 80 MM HG: ICD-10-PCS | Mod: CPTII,S$GLB,, | Performed by: STUDENT IN AN ORGANIZED HEALTH CARE EDUCATION/TRAINING PROGRAM

## 2023-01-25 PROCEDURE — 3008F BODY MASS INDEX DOCD: CPT | Mod: CPTII,S$GLB,, | Performed by: STUDENT IN AN ORGANIZED HEALTH CARE EDUCATION/TRAINING PROGRAM

## 2023-01-25 PROCEDURE — 93010 EKG 12-LEAD: ICD-10-PCS | Mod: ,,, | Performed by: INTERNAL MEDICINE

## 2023-01-25 PROCEDURE — 93010 ELECTROCARDIOGRAM REPORT: CPT | Mod: ,,, | Performed by: INTERNAL MEDICINE

## 2023-01-25 PROCEDURE — 99999 PR PBB SHADOW E&M-EST. PATIENT-LVL V: ICD-10-PCS | Mod: PBBFAC,,, | Performed by: STUDENT IN AN ORGANIZED HEALTH CARE EDUCATION/TRAINING PROGRAM

## 2023-01-25 PROCEDURE — 1159F PR MEDICATION LIST DOCUMENTED IN MEDICAL RECORD: ICD-10-PCS | Mod: CPTII,S$GLB,, | Performed by: STUDENT IN AN ORGANIZED HEALTH CARE EDUCATION/TRAINING PROGRAM

## 2023-01-25 PROCEDURE — 3075F SYST BP GE 130 - 139MM HG: CPT | Mod: CPTII,S$GLB,, | Performed by: STUDENT IN AN ORGANIZED HEALTH CARE EDUCATION/TRAINING PROGRAM

## 2023-01-25 PROCEDURE — 3075F PR MOST RECENT SYSTOLIC BLOOD PRESS GE 130-139MM HG: ICD-10-PCS | Mod: CPTII,S$GLB,, | Performed by: STUDENT IN AN ORGANIZED HEALTH CARE EDUCATION/TRAINING PROGRAM

## 2023-01-25 PROCEDURE — 99999 PR PBB SHADOW E&M-EST. PATIENT-LVL V: CPT | Mod: PBBFAC,,, | Performed by: STUDENT IN AN ORGANIZED HEALTH CARE EDUCATION/TRAINING PROGRAM

## 2023-01-25 PROCEDURE — 1159F MED LIST DOCD IN RCRD: CPT | Mod: CPTII,S$GLB,, | Performed by: STUDENT IN AN ORGANIZED HEALTH CARE EDUCATION/TRAINING PROGRAM

## 2023-01-25 PROCEDURE — 3008F PR BODY MASS INDEX (BMI) DOCUMENTED: ICD-10-PCS | Mod: CPTII,S$GLB,, | Performed by: STUDENT IN AN ORGANIZED HEALTH CARE EDUCATION/TRAINING PROGRAM

## 2023-01-25 PROCEDURE — 3078F DIAST BP <80 MM HG: CPT | Mod: CPTII,S$GLB,, | Performed by: STUDENT IN AN ORGANIZED HEALTH CARE EDUCATION/TRAINING PROGRAM

## 2023-01-25 PROCEDURE — 99214 OFFICE O/P EST MOD 30 MIN: CPT | Mod: S$GLB,,, | Performed by: STUDENT IN AN ORGANIZED HEALTH CARE EDUCATION/TRAINING PROGRAM

## 2023-01-25 PROCEDURE — 93005 ELECTROCARDIOGRAM TRACING: CPT

## 2023-01-25 NOTE — PROGRESS NOTES
"Subjective:   Patient ID:  Ayanna Alicia is a 52 y.o. female who presents for cardiac consult of Shortness of Breath    Referring Physician: Madeleine Enrique MD   Reason for consult: abnormal ECG    HPI  The patient came in today for cardiac consult of Shortness of Breath      Ayanna Alicia is a 52 y.o. female  With HTN, ILD, HLD, obesity, asthma, GERD presents for follow up CV eval.     3/28/22  Pt had recent ER eval 3 days ago :My full Hx/PE/MDM: Patient is a 51-year-old female presenting for evaluation after an "abnormal EKG" in her PCPs office. She was seen for some intermittent chest tightness and shortness of breath, felt to be related to her asthma. An EKG was read as age undetermined possible anterior infarct. She denies any chest pain at this time, no current shortness of breath. She has an appointment scheduled with cardiology on Monday, 3 days from now. No fevers, chills. She has had a slight cough and some congestion as well. She denies abdominal pain,, vomiting. On exam, lungs clear bilaterally, heart regular rate and rhythm. Lower extremities without edema. Labs, EKG, chest x-ray obtained. Blood work unremarkable, troponin negative. Chest x-ray with likely atelectasis, low suspicion for pneumonia given normal white count, no fever, clear lungs. EKG with atrial enlargement, no evidence of ACS. She stable for discharge with instructions to follow-up with cardiology    Results reviewed, ECG with poor RWP. She has been having more HICKS, thought due to asthma concerned due to angina. She gets more SOB when she walks and feels palpitations and hears in ears.     Hospital Course:   Patient was admitted for hypoxic respiratory failure. CTA showed siffuse interstitial thickening with ground-glass opacities predominately throughout the lower lobes with associated bronchiectasis concerning for interstitial pneumonia versus interstitial edema. She was treated empirically for CAP with rocephin and " azithromycin. Patient did not clinically improve. Sputum culture negative. She was then started on IV solumedrol with good clinical response. Inflammatory markers trended down post steroids. Chest xray showed improved. Of note multi-nodular goiter was found during stay. Patient has been seeing ENT and PCP. TSH WNL and anti-TPO negative. Rheumatoid factor elevated however anti-ccp within normal limits. Home O2 was obtained. Decision was made to discharge with prednisone taper x 5 weeks. Outpatient f/u with pulmonology.      22  She was admitted for resp failure in April treated with steroids and abx. Is seeing pulm, is on oxygen on 2L. She has occ CP/cough as well.     Patient feels  no leg swelling, no PND, no dizziness, no syncope, no CNS symptoms.    Patient has fairly good exercise tolerance. Is an , does rental properties.     Patient is compliant with medications.  FH - mother - had strokes, father - had MI; older sister -  of strokes -  in 60s         23  Comes in today, wants to switch providers   On chronic home O2 4-6L continuous  Intermittent chest pain, usually at rest, doesn't last long, thinks it may be acid reflux  Takes pepcid prn only  Follows up with Pulmonary    blood pressure stable    Denies syncope, palpitations        EKG 23 ST, biatrial enlargement, cannot r/o anterior infarct  ECG  Normal sinus rhythm   Possible Left atrial enlargement   Possible Anterior infarct ,age undetermined   Abnormal ECG   When compared with ECG of 02-OCT-2014 11:29,   Borderline criteria for Anterior infarct are now Present   QT has lengthened   Confirmed by BERE BASURTO, CLAY ([id number]) on 3/25/2022 11:45:17 AM       Summary    The left ventricle is normal in size with concentric remodeling and normal systolic function.  The test was stopped because the patient experienced shortness of breath. The patient requested the test to be stopped.  The patient's exercise capacity was severely  impaired.  There were no arrhythmias during stress.  The estimated ejection fraction is 60%.  Normal left ventricular diastolic function.  Normal right ventricular size with normal right ventricular systolic function.  Mild tricuspid regurgitation.  Normal central venous pressure (3 mmHg).  The estimated PA systolic pressure is 29 mmHg.  The stress echo portion of this study is negative for myocardial ischemia.  The ECG portion of this study is negative for myocardial ischemia.         Past Medical History:   Diagnosis Date    Abnormal Pap smear of cervix     in the past with repeat pap smear okay.    Acute interstitial pneumonitis     Allergic rhinitis, cause unspecified     Arthritis of both knees     Asthma     Eczema     Fatty liver 10/2014    Fibrocystic breast changes     Headache(784.0)     Hepatomegaly 10/2014    Hypertension     Liver cyst 10/2014    Multinodular goiter     Followed by ENT - Dr. Nicolas rGay    Polymenorrhea     TMJ (dislocation of temporomandibular joint)     Uterine fibroid     in the past    Vitamin D deficiency disease        Past Surgical History:   Procedure Laterality Date     SECTION, CLASSIC      x 1    COLONOSCOPY N/A 2020    Procedure: COLONOSCOPY;  Surgeon: Angie Magana MD;  Location: [facility];  Service: Endoscopy;  Laterality: N/A;    HYSTERECTOMY      MULTIPLE TOOTH EXTRACTIONS      PARTIAL HYSTERECTOMY  2013    Due to fibroids       Social History     Tobacco Use    Smoking status: Never    Smokeless tobacco: Never   Substance Use Topics    Alcohol use: Yes     Alcohol/week: 0.0 standard drinks     Comment: Occasionally    Drug use: Yes     Frequency: 4.0 times per week     Types: Marijuana       Family History   Problem Relation Age of Onset    Stroke Mother     Heart disease Mother     Diverticulitis Mother     Heart disease Father 63        MI/CAD    Hypertension Father     Stroke Sister     Cancer Maternal Grandmother 60        Rectal and  stomach cancer    Stroke Maternal Grandfather     Diabetes Maternal Grandfather     Peripheral vascular disease Maternal Grandfather     Cancer Maternal Aunt 50        Stomach cancer    Cancer Maternal Aunt 66        Lung cancer (smoker)    Migraines Cousin     Cataracts Cousin        Patient's Medications   New Prescriptions    No medications on file   Previous Medications    ALBUTEROL (ACCUNEB) 0.63 MG/3 ML NEBU    Take 3 mLs (0.63 mg total) by nebulization every 4 to 6 hours as needed (asthma). Rescue    ALBUTEROL (PROVENTIL/VENTOLIN HFA) 90 MCG/ACTUATION INHALER    INHALE 1 TO 2 PUFFS BY MOUTH INTO THE LUNGS EVERY 4 TO 6 HOURS AS NEEDED FOR WHEEZING OR SHORTNESS OF BREATH    ALBUTEROL-IPRATROPIUM (DUO-NEB) 2.5 MG-0.5 MG/3 ML NEBULIZER SOLUTION    Take 3 mLs by nebulization every 6 (six) hours as needed for Wheezing. Rescue    AMLODIPINE (NORVASC) 10 MG TABLET    Take 1 tablet (10 mg total) by mouth once daily.    ASCORBIC ACID, VITAMIN C, (VITAMIN C) 500 MG TABLET    Take 500 mg by mouth once daily.    CALCIUM/MAGNESIUM/VIT B COMP (CALCIUM-MAGNESIUM-B COMPLEX ORAL)    Take 1 tablet by mouth once daily at 6am.    DOXYCYCLINE (VIBRAMYCIN) 100 MG CAP    Take 1 capsule (100 mg total) by mouth 2 (two) times daily.    FLUTICASONE FUROATE-VILANTEROL (BREO ELLIPTA) 100-25 MCG/DOSE DISKUS INHALER    INHALE 1 PUFF INTO THE LUNGS ONCE DAILY    HYDROCHLOROTHIAZIDE (HYDRODIURIL) 12.5 MG TAB    Take 1 tablet (12.5 mg total) by mouth once daily.    LEVOCETIRIZINE (XYZAL) 5 MG TABLET    TAKE 1 TABLET(5 MG) BY MOUTH EVERY DAY    METOLAZONE (ZAROXOLYN) 2.5 MG TABLET    Take 1 tablet (2.5 mg total) by mouth every Mon, Wed, Fri.    MONTELUKAST (SINGULAIR) 10 MG TABLET    Take 1 tablet (10 mg total) by mouth every evening.    MYCOPHENOLATE (CELLCEPT) 500 MG TAB    Take 3 tablets (1,500 mg total) by mouth 2 (two) times daily.    NINTEDANIB (OFEV) 100 MG CAP    Take 50 mg by mouth 2 (two) times daily.    OMEGA-3S/DHA/EPA/FISH OIL/D3  "(VITAMIN-D + OMEGA-3 ORAL)    Take 1 tablet by mouth once daily at 6am.    ONDANSETRON (ZOFRAN-ODT) 4 MG TBDL    Dissolve 1 tablet (4 mg total) by mouth every 6 (six) hours as needed (nausea).    PREDNISONE (DELTASONE) 10 MG TABLET    Take 2 tablets (20 mg total) by mouth once daily.    SERTRALINE (ZOLOFT) 50 MG TABLET    Take 1 tablet (50 mg total) by mouth once daily.   Modified Medications    No medications on file   Discontinued Medications    No medications on file       Review of Systems   Constitutional: Negative.    HENT: Negative.     Eyes: Negative.    Respiratory:  Positive for shortness of breath.    Cardiovascular:  Positive for chest pain.   Gastrointestinal: Negative.    Genitourinary: Negative.    Musculoskeletal: Negative.    Skin: Negative.    Neurological: Negative.    Endo/Heme/Allergies: Negative.    Psychiatric/Behavioral: Negative.     All 12 systems otherwise negative.    Wt Readings from Last 3 Encounters:   01/25/23 106 kg (233 lb 11 oz)   12/28/22 109.3 kg (241 lb)   12/09/22 108.7 kg (239 lb 10.2 oz)     Temp Readings from Last 3 Encounters:   12/09/22 97.9 °F (36.6 °C)   12/02/22 97.8 °F (36.6 °C)   11/25/22 97.9 °F (36.6 °C)     BP Readings from Last 3 Encounters:   01/25/23 130/72   12/28/22 124/80   12/09/22 103/67     Pulse Readings from Last 3 Encounters:   01/25/23 105   12/28/22 (!) 113   12/09/22 96       /72 (BP Location: Right arm, Patient Position: Sitting, BP Method: Large (Manual))   Pulse 105   Ht 5' 6" (1.676 m)   Wt 106 kg (233 lb 11 oz)   SpO2 99%   BMI 37.72 kg/m²     Objective:   Physical Exam  Vitals and nursing note reviewed.   Constitutional:       General: She is not in acute distress.     Appearance: She is well-developed. She is obese. She is not diaphoretic.   HENT:      Head: Normocephalic and atraumatic.      Nose: Nose normal.   Eyes:      General: No scleral icterus.     Conjunctiva/sclera: Conjunctivae normal.   Neck:      Thyroid: No " thyromegaly.      Vascular: No JVD.   Cardiovascular:      Rate and Rhythm: Normal rate and regular rhythm.      Heart sounds: S1 normal and S2 normal. No murmur heard.    No friction rub. No gallop. No S3 or S4 sounds.   Pulmonary:      Effort: Pulmonary effort is normal. No respiratory distress.      Breath sounds: Normal breath sounds. No stridor. No wheezing or rales.   Chest:      Chest wall: No tenderness.   Abdominal:      General: Bowel sounds are normal. There is no distension.      Palpations: Abdomen is soft. There is no mass.      Tenderness: There is no abdominal tenderness. There is no rebound.   Genitourinary:     Comments: Deferred  Musculoskeletal:         General: No tenderness or deformity. Normal range of motion.      Cervical back: Normal range of motion and neck supple.   Lymphadenopathy:      Cervical: No cervical adenopathy.   Skin:     General: Skin is warm and dry.      Coloration: Skin is not pale.      Findings: No erythema or rash.   Neurological:      Mental Status: She is alert and oriented to person, place, and time.      Motor: No abnormal muscle tone.      Coordination: Coordination normal.   Psychiatric:         Behavior: Behavior normal.         Thought Content: Thought content normal.         Judgment: Judgment normal.       Lab Results   Component Value Date     11/18/2022    K 3.7 11/18/2022     11/18/2022    CO2 27 11/18/2022    BUN 10 11/18/2022    CREATININE 0.9 11/18/2022     (H) 11/18/2022    HGBA1C 5.1 11/01/2022    MG 2.2 02/25/2014    AST 29 11/18/2022    ALT 30 11/18/2022    ALBUMIN 3.2 (L) 11/18/2022    PROT 7.1 11/18/2022    BILITOT 0.4 11/18/2022    WBC 15.60 (H) 11/18/2022    HGB 9.0 (L) 11/18/2022    HCT 30.9 (L) 11/18/2022    MCV 83 11/18/2022     (H) 11/18/2022    INR 1.0 04/27/2022    TSH 0.854 04/28/2022    CHOL 228 (H) 11/01/2022    HDL 37 (L) 11/01/2022    LDLCALC 171.8 (H) 11/01/2022    LDLCALC 134 (H) 02/01/2017    TRIG 96  11/01/2022    BNP <10 04/27/2022     Assessment:      1. Gastroesophageal reflux disease without esophagitis    2. GLENN on CPAP    3. Restrictive lung disease    4. Chronic respiratory failure with hypoxia    5. Primary hypertension    6. Vitamin D deficiency    7. Severe obesity    8. Chest pain, unspecified type    9. Abnormal EKG          Plan:     Abnormla EKG   - stress echo - neg 4/2022    HTN  Stable  Continue HCTZ, amlodipine     Obesity BMI 35-39.9  Low-salt, low-fat diet   Exercise as tolerated, at least 30 minutes daily  Seated exercises    GERD  - recommend Prilosec daily    Asthma with ILD  - continue therapy  - f/jackie pulm     GLENN  - intermittent use of CPAP  - f/u with pulm    Chest pain, atypical  - sec to inflammation vs GERD - f/u rheum and pulm  - stable     Reviewed all labs     Rtc 3 months

## 2023-01-27 ENCOUNTER — PATIENT MESSAGE (OUTPATIENT)
Dept: PULMONOLOGY | Facility: CLINIC | Age: 53
End: 2023-01-27
Payer: COMMERCIAL

## 2023-01-27 DIAGNOSIS — J96.11 CHRONIC RESPIRATORY FAILURE WITH HYPOXIA: ICD-10-CM

## 2023-01-27 DIAGNOSIS — J84.9 INTERSTITIAL PULMONARY DISEASE, UNSPECIFIED: ICD-10-CM

## 2023-01-27 DIAGNOSIS — J84.9 INTERSTITIAL LUNG DISEASE: Primary | ICD-10-CM

## 2023-01-27 RX ORDER — SULFAMETHOXAZOLE AND TRIMETHOPRIM 800; 160 MG/1; MG/1
1 TABLET ORAL
Qty: 36 TABLET | Refills: 1 | Status: SHIPPED | OUTPATIENT
Start: 2023-01-27 | End: 2023-03-17

## 2023-01-27 NOTE — TELEPHONE ENCOUNTER
Orders Placed This Encounter   Procedures    MOTORIZED SCOOTER FOR HOME USE     Order Specific Question:   Height:     Answer:   5F 6In     Order Specific Question:   Weight:     Answer:   233lb     Order Specific Question:   Length of need (1-99 months):     Answer:   99        Requested Prescriptions     Signed Prescriptions Disp Refills    sulfamethoxazole-trimethoprim 800-160mg (BACTRIM DS) 800-160 mg Tab 36 tablet 1     Sig: Take 1 tablet by mouth every Mon, Wed, Fri. PCP prophylaxis while on prednisone 20 mg     Authorizing Provider: DANE BARRY

## 2023-01-30 ENCOUNTER — OFFICE VISIT (OUTPATIENT)
Dept: OTOLARYNGOLOGY | Facility: CLINIC | Age: 53
End: 2023-01-30
Payer: COMMERCIAL

## 2023-01-30 ENCOUNTER — PATIENT MESSAGE (OUTPATIENT)
Dept: PULMONOLOGY | Facility: CLINIC | Age: 53
End: 2023-01-30
Payer: COMMERCIAL

## 2023-01-30 VITALS — WEIGHT: 233.19 LBS | BODY MASS INDEX: 37.64 KG/M2 | TEMPERATURE: 98 F

## 2023-01-30 DIAGNOSIS — R49.0 DYSPHONIA: Primary | ICD-10-CM

## 2023-01-30 DIAGNOSIS — B37.89 LARYNGEAL CANDIDIASIS: ICD-10-CM

## 2023-01-30 PROCEDURE — 99214 PR OFFICE/OUTPT VISIT, EST, LEVL IV, 30-39 MIN: ICD-10-PCS | Mod: 25,S$GLB,, | Performed by: STUDENT IN AN ORGANIZED HEALTH CARE EDUCATION/TRAINING PROGRAM

## 2023-01-30 PROCEDURE — 99999 PR PBB SHADOW E&M-EST. PATIENT-LVL III: CPT | Mod: PBBFAC,,, | Performed by: STUDENT IN AN ORGANIZED HEALTH CARE EDUCATION/TRAINING PROGRAM

## 2023-01-30 PROCEDURE — 3008F PR BODY MASS INDEX (BMI) DOCUMENTED: ICD-10-PCS | Mod: CPTII,S$GLB,, | Performed by: STUDENT IN AN ORGANIZED HEALTH CARE EDUCATION/TRAINING PROGRAM

## 2023-01-30 PROCEDURE — 31575 PR LARYNGOSCOPY, FLEXIBLE; DIAGNOSTIC: ICD-10-PCS | Mod: S$GLB,,, | Performed by: STUDENT IN AN ORGANIZED HEALTH CARE EDUCATION/TRAINING PROGRAM

## 2023-01-30 PROCEDURE — 99999 PR PBB SHADOW E&M-EST. PATIENT-LVL III: ICD-10-PCS | Mod: PBBFAC,,, | Performed by: STUDENT IN AN ORGANIZED HEALTH CARE EDUCATION/TRAINING PROGRAM

## 2023-01-30 PROCEDURE — 1159F MED LIST DOCD IN RCRD: CPT | Mod: CPTII,S$GLB,, | Performed by: STUDENT IN AN ORGANIZED HEALTH CARE EDUCATION/TRAINING PROGRAM

## 2023-01-30 PROCEDURE — 99214 OFFICE O/P EST MOD 30 MIN: CPT | Mod: 25,S$GLB,, | Performed by: STUDENT IN AN ORGANIZED HEALTH CARE EDUCATION/TRAINING PROGRAM

## 2023-01-30 PROCEDURE — 1159F PR MEDICATION LIST DOCUMENTED IN MEDICAL RECORD: ICD-10-PCS | Mod: CPTII,S$GLB,, | Performed by: STUDENT IN AN ORGANIZED HEALTH CARE EDUCATION/TRAINING PROGRAM

## 2023-01-30 PROCEDURE — 3008F BODY MASS INDEX DOCD: CPT | Mod: CPTII,S$GLB,, | Performed by: STUDENT IN AN ORGANIZED HEALTH CARE EDUCATION/TRAINING PROGRAM

## 2023-01-30 PROCEDURE — 31575 DIAGNOSTIC LARYNGOSCOPY: CPT | Mod: S$GLB,,, | Performed by: STUDENT IN AN ORGANIZED HEALTH CARE EDUCATION/TRAINING PROGRAM

## 2023-01-30 RX ORDER — FLUCONAZOLE 150 MG/1
150 TABLET ORAL DAILY
Qty: 21 TABLET | Refills: 0 | Status: SHIPPED | OUTPATIENT
Start: 2023-01-30 | End: 2023-02-20

## 2023-01-30 NOTE — PROGRESS NOTES
Chief complaint:    Chief Complaint   Patient presents with    Other     Hoarseness x7wks           Referring Provider:  Magalys Self  No address on file    History of present illness:     Ms. Alicia is a 52 y.o. w/AIP presenting for evaluation of hoarseness.     Onset: 7 weeks, had a night with a bad coughing attack and has been hoarse since  Severity: severe  Quality: hoarse, raspy, breathy and is progressively worsening  The voice has been persistently hoarse.   Associated signs and symptoms:  productive cough  Symptoms are worse all day  Prior medical therapy:   OTC medications, lozenges    Recently increased from 10 to 20 mg prednisone daily    Also on alubterol and breo    History      Past Medical History:   Past Medical History:   Diagnosis Date    Abnormal Pap smear of cervix     in the past with repeat pap smear okay.    Acute interstitial pneumonitis     Allergic rhinitis, cause unspecified     Arthritis of both knees     Asthma     Eczema     Fatty liver 10/2014    Fibrocystic breast changes     Headache(784.0)     Hepatomegaly 10/2014    Hypertension     Liver cyst 10/2014    Multinodular goiter     Followed by ENT - Dr. Nicolas Gray    Polymenorrhea     TMJ (dislocation of temporomandibular joint)     Uterine fibroid     in the past    Vitamin D deficiency disease          Past Surgical History:  Past Surgical History:   Procedure Laterality Date     SECTION, CLASSIC      x 1    COLONOSCOPY N/A 2020    Procedure: COLONOSCOPY;  Surgeon: Angie Magana MD;  Location: [facility];  Service: Endoscopy;  Laterality: N/A;    HYSTERECTOMY      MULTIPLE TOOTH EXTRACTIONS      PARTIAL HYSTERECTOMY  2013    Due to fibroids         Medications: Medication list reviewed. She  has a current medication list which includes the following prescription(s): albuterol, albuterol, albuterol-ipratropium, amlodipine, ascorbic acid (vitamin c), calcium/magnesium/vit b comp, doxycycline,  fluticasone furoate-vilanterol, hydrochlorothiazide, levocetirizine, metolazone, montelukast, mycophenolate, ofev, omega-3s/dha/epa/fish oil/d3, ondansetron, prednisone, sertraline, and sulfamethoxazole-trimethoprim 800-160mg, and the following Facility-Administered Medications: sodium chloride 0.9%.     Allergies:   Review of patient's allergies indicates:   Allergen Reactions    Doxycycline Nausea Only     Other reaction(s): Nausea    Fluticasone Other (See Comments)     Other reaction(s): Epistaxis      Penicillins      Other reaction(s): unknown  Pt tolerated ceftriaxone         Family history: family history includes Cancer (age of onset: 50) in her maternal aunt; Cancer (age of onset: 60) in her maternal grandmother; Cancer (age of onset: 66) in her maternal aunt; Cataracts in her cousin; Diabetes in her maternal grandfather; Diverticulitis in her mother; Heart disease in her mother; Heart disease (age of onset: 63) in her father; Hypertension in her father; Migraines in her cousin; Peripheral vascular disease in her maternal grandfather; Stroke in her maternal grandfather, mother, and sister.         Social History          Alcohol use:  reports current alcohol use.            Tobacco:  reports that she has never smoked. She has never used smokeless tobacco.         Physical Examination      Vitals: Temperature 98.1 °F (36.7 °C), temperature source Temporal, weight 105.8 kg (233 lb 3.2 oz).      General: Well developed, well nourished, well hydrated.    Voice: moderate to severe dysphonia, no dysarthria      Head/Face: Normocephalic, atraumatic. No scars or lesions. Facial musculature equal.     Eyes: No scleral icterus or conjunctival hemorrhage. EOMI. PERRLA.     Ears:     Right ear: No gross deformity. EAC is clear of debris and erythema. TM are intact with a pneumatized middle ear. No signs of retraction, fluid or infection.      Left ear: No gross deformity. EAC is clear of debris and erythema. TM are  intact with a pneumatized middle ear. No signs of retraction, fluid or infection.      Nose: No gross deformity or lesions. No purulent discharge. No significant NSD.      Mouth/Oropharynx: Lips without any lesions. No mucosal lesions within the oropharynx. No tonsillar exudate or lesions. Pharyngeal walls symmetrical. Uvula midline. Tongue midline without lesions.     Neck: Trachea midline. No masses. No thyromegaly or nodules palpated.     Lymphatic: No lymphadenopathy in the neck.     Extremities: No cyanosis. Warm and well-perfused.     Skin: No scars or lesions on face or neck.      Neurologic: Moving all extremities without gross abnormality.CN II-XII grossly intact. House-Brackmann 1/6. No signs of nystagmus.          Data reviewed      Review of records:      I reviewed records from the referring provider's office visits, including the history, workup, and/or treatment of this problem thus far.        Procedures:    Procedure -Transnasal fiberoptic laryngoscopy     Surgeon: Jay Sanabria M.D. .      Anesthesia: topical 0.05% oxymetazoline with 4% lidocaine      Complications: None.     Description of Procedure: With the patient in the sitting position, topical lidocaine and oxymetazoline was applied to the nose. The scope was passed through the nose. Examination was carried out of the nose, nasopharynx, oropharynx, hypopharynx, and larynx with findings as noted above. Scope was removed. The patient tolerated the procedure well.      Findings: No masses or lesions in the nose, nasopharynx, oropharynx, hypopharynx, or larynx. Vocal fold abduction and adduction is normal but there are bilateral white plaques of striking surface of VC (larger on the right). No pooling of secretions in the piriform sinuses, penetration, or aspiration.          Assessment/Plan:    1. Dysphonia    2. Laryngeal candidiasis        No obstructing airway pathology that would cause her worsening hypoxia  About to start bactrim three  times weekly  Oral diflucan x 21d  Return in 3-4 weeks for recheck        Jay Sanabria MD  [facility]   [facility]  [address] [facility]  [place] [id number]  P: [phone]  F: [phone]

## 2023-01-31 ENCOUNTER — TELEPHONE (OUTPATIENT)
Dept: SLEEP MEDICINE | Facility: CLINIC | Age: 53
End: 2023-01-31
Payer: COMMERCIAL

## 2023-01-31 NOTE — TELEPHONE ENCOUNTER
Was seen by ENT  Added Diflucan  SpO2 90% in Am  Episodic drops  Oxygen sometime 5-6 LPM  No fever   Tolerates OFEV, occasional loose stool.  Will increase OFEV once done with Diflucan

## 2023-02-01 ENCOUNTER — TELEPHONE (OUTPATIENT)
Dept: PULMONOLOGY | Facility: CLINIC | Age: 53
End: 2023-02-01
Payer: COMMERCIAL

## 2023-02-01 NOTE — TELEPHONE ENCOUNTER
Returned call to patient. Informed her if she does not want to go through [facility] we can fax the order to company she wants to go through for [facility] [employer].   She stated understanding   ----- Message from Kizzy Babin sent at 2/1/2023 11:40 AM CST -----  Contact: Ayanna Urena is needing a call back regarding some concerns she has. Please call her back at [phone].

## 2023-02-15 ENCOUNTER — PATIENT MESSAGE (OUTPATIENT)
Dept: CARDIOLOGY | Facility: CLINIC | Age: 53
End: 2023-02-15
Payer: COMMERCIAL

## 2023-02-15 DIAGNOSIS — I10 ESSENTIAL HYPERTENSION: ICD-10-CM

## 2023-02-15 DIAGNOSIS — J20.9 ACUTE BRONCHITIS, UNSPECIFIED ORGANISM: ICD-10-CM

## 2023-02-15 DIAGNOSIS — J18.9 PNEUMONIA DUE TO INFECTIOUS ORGANISM, UNSPECIFIED LATERALITY, UNSPECIFIED PART OF LUNG: ICD-10-CM

## 2023-02-15 RX ORDER — ONDANSETRON 4 MG/1
4 TABLET, ORALLY DISINTEGRATING ORAL EVERY 6 HOURS PRN
Qty: 90 TABLET | Refills: 0 | Status: CANCELLED | OUTPATIENT
Start: 2023-02-15

## 2023-02-15 RX ORDER — AMLODIPINE BESYLATE 10 MG/1
10 TABLET ORAL DAILY
Qty: 90 TABLET | Refills: 1 | Status: CANCELLED | OUTPATIENT
Start: 2023-02-15 | End: 2024-02-15

## 2023-02-20 ENCOUNTER — PATIENT MESSAGE (OUTPATIENT)
Dept: PULMONOLOGY | Facility: CLINIC | Age: 53
End: 2023-02-20
Payer: COMMERCIAL

## 2023-02-20 DIAGNOSIS — I10 ESSENTIAL HYPERTENSION: ICD-10-CM

## 2023-02-21 RX ORDER — AMLODIPINE BESYLATE 10 MG/1
10 TABLET ORAL DAILY
Qty: 90 TABLET | Refills: 1 | Status: SHIPPED | OUTPATIENT
Start: 2023-02-21 | End: 2023-06-12 | Stop reason: SDUPTHER

## 2023-02-22 ENCOUNTER — LAB VISIT (OUTPATIENT)
Dept: LAB | Facility: HOSPITAL | Age: 53
End: 2023-02-22
Attending: FAMILY MEDICINE
Payer: COMMERCIAL

## 2023-02-22 DIAGNOSIS — R76.8 RHEUMATOID FACTOR POSITIVE: ICD-10-CM

## 2023-02-22 DIAGNOSIS — Z79.899 HIGH RISK MEDICATION USE: ICD-10-CM

## 2023-02-22 DIAGNOSIS — J84.9 INTERSTITIAL LUNG DISEASE: ICD-10-CM

## 2023-02-22 LAB
ALBUMIN SERPL BCP-MCNC: 3.3 G/DL (ref 3.5–5.2)
ALP SERPL-CCNC: 90 U/L (ref 55–135)
ALT SERPL W/O P-5'-P-CCNC: 44 U/L (ref 10–44)
ANION GAP SERPL CALC-SCNC: 15 MMOL/L (ref 8–16)
AST SERPL-CCNC: 34 U/L (ref 10–40)
BASOPHILS # BLD AUTO: 0.11 K/UL (ref 0–0.2)
BASOPHILS NFR BLD: 0.6 % (ref 0–1.9)
BILIRUB SERPL-MCNC: 0.3 MG/DL (ref 0.1–1)
BILIRUB UR QL STRIP: NEGATIVE
BUN SERPL-MCNC: 18 MG/DL (ref 6–20)
CALCIUM SERPL-MCNC: 9.7 MG/DL (ref 8.7–10.5)
CHLORIDE SERPL-SCNC: 100 MMOL/L (ref 95–110)
CLARITY UR: CLEAR
CO2 SERPL-SCNC: 27 MMOL/L (ref 23–29)
COLOR UR: YELLOW
CREAT SERPL-MCNC: 0.9 MG/DL (ref 0.5–1.4)
CREAT UR-MCNC: 187 MG/DL (ref 15–325)
DIFFERENTIAL METHOD: ABNORMAL
EOSINOPHIL # BLD AUTO: 0.6 K/UL (ref 0–0.5)
EOSINOPHIL NFR BLD: 3.4 % (ref 0–8)
ERYTHROCYTE [DISTWIDTH] IN BLOOD BY AUTOMATED COUNT: 18.5 % (ref 11.5–14.5)
EST. GFR  (NO RACE VARIABLE): >60 ML/MIN/1.73 M^2
GLUCOSE SERPL-MCNC: 146 MG/DL (ref 70–110)
GLUCOSE UR QL STRIP: NEGATIVE
HCT VFR BLD AUTO: 33.3 % (ref 37–48.5)
HGB BLD-MCNC: 10 G/DL (ref 12–16)
HGB UR QL STRIP: NEGATIVE
IMM GRANULOCYTES # BLD AUTO: 0.18 K/UL (ref 0–0.04)
IMM GRANULOCYTES NFR BLD AUTO: 1 % (ref 0–0.5)
KETONES UR QL STRIP: NEGATIVE
LEUKOCYTE ESTERASE UR QL STRIP: NEGATIVE
LYMPHOCYTES # BLD AUTO: 1.8 K/UL (ref 1–4.8)
LYMPHOCYTES NFR BLD: 9.9 % (ref 18–48)
MCH RBC QN AUTO: 24.4 PG (ref 27–31)
MCHC RBC AUTO-ENTMCNC: 30 G/DL (ref 32–36)
MCV RBC AUTO: 81 FL (ref 82–98)
MONOCYTES # BLD AUTO: 1.1 K/UL (ref 0.3–1)
MONOCYTES NFR BLD: 6.1 % (ref 4–15)
NEUTROPHILS # BLD AUTO: 14.2 K/UL (ref 1.8–7.7)
NEUTROPHILS NFR BLD: 79 % (ref 38–73)
NITRITE UR QL STRIP: NEGATIVE
NRBC BLD-RTO: 0 /100 WBC
PH UR STRIP: 7 [PH] (ref 5–8)
PLATELET # BLD AUTO: 510 K/UL (ref 150–450)
PMV BLD AUTO: 8.8 FL (ref 9.2–12.9)
POTASSIUM SERPL-SCNC: 3.5 MMOL/L (ref 3.5–5.1)
PROT SERPL-MCNC: 6.8 G/DL (ref 6–8.4)
PROT UR QL STRIP: NEGATIVE
PROT UR-MCNC: 10 MG/DL (ref 0–15)
PROT/CREAT UR: 0.05 MG/G{CREAT} (ref 0–0.2)
RBC # BLD AUTO: 4.09 M/UL (ref 4–5.4)
SODIUM SERPL-SCNC: 142 MMOL/L (ref 136–145)
SP GR UR STRIP: 1.01 (ref 1–1.03)
URN SPEC COLLECT METH UR: NORMAL
UROBILINOGEN UR STRIP-ACNC: NEGATIVE EU/DL
WBC # BLD AUTO: 17.91 K/UL (ref 3.9–12.7)

## 2023-02-22 PROCEDURE — 80053 COMPREHEN METABOLIC PANEL: CPT | Performed by: INTERNAL MEDICINE

## 2023-02-22 PROCEDURE — 84156 ASSAY OF PROTEIN URINE: CPT | Performed by: INTERNAL MEDICINE

## 2023-02-22 PROCEDURE — 36415 COLL VENOUS BLD VENIPUNCTURE: CPT | Performed by: INTERNAL MEDICINE

## 2023-02-22 PROCEDURE — 81002 URINALYSIS NONAUTO W/O SCOPE: CPT | Performed by: INTERNAL MEDICINE

## 2023-02-22 PROCEDURE — 85025 COMPLETE CBC W/AUTO DIFF WBC: CPT | Performed by: INTERNAL MEDICINE

## 2023-02-28 ENCOUNTER — PATIENT MESSAGE (OUTPATIENT)
Dept: PULMONOLOGY | Facility: CLINIC | Age: 53
End: 2023-02-28
Payer: COMMERCIAL

## 2023-02-28 ENCOUNTER — OFFICE VISIT (OUTPATIENT)
Dept: RHEUMATOLOGY | Facility: CLINIC | Age: 53
End: 2023-02-28
Payer: COMMERCIAL

## 2023-02-28 VITALS
BODY MASS INDEX: 36.92 KG/M2 | SYSTOLIC BLOOD PRESSURE: 126 MMHG | WEIGHT: 229.75 LBS | HEIGHT: 66 IN | HEART RATE: 106 BPM | DIASTOLIC BLOOD PRESSURE: 83 MMHG

## 2023-02-28 DIAGNOSIS — J84.9 INTERSTITIAL PULMONARY DISEASE, UNSPECIFIED: ICD-10-CM

## 2023-02-28 DIAGNOSIS — G47.33 OSA ON CPAP: ICD-10-CM

## 2023-02-28 DIAGNOSIS — J96.11 CHRONIC RESPIRATORY FAILURE WITH HYPOXIA: ICD-10-CM

## 2023-02-28 DIAGNOSIS — E04.2 MULTINODULAR GOITER: ICD-10-CM

## 2023-02-28 DIAGNOSIS — M35.02 SJOGREN SYNDROME WITH LUNG INVOLVEMENT: ICD-10-CM

## 2023-02-28 DIAGNOSIS — R74.8 ELEVATED LIVER ENZYMES: ICD-10-CM

## 2023-02-28 DIAGNOSIS — M05.9 SEROPOSITIVE RHEUMATOID ARTHRITIS: ICD-10-CM

## 2023-02-28 DIAGNOSIS — J84.111 CHRONIC INTERSTITIAL PNEUMONIA: ICD-10-CM

## 2023-02-28 DIAGNOSIS — E66.01 MORBID OBESITY WITH BMI OF 40.0-44.9, ADULT: ICD-10-CM

## 2023-02-28 DIAGNOSIS — Z79.899 HIGH RISK MEDICATION USE: ICD-10-CM

## 2023-02-28 DIAGNOSIS — M05.9 RHEUMATOID ARTHRITIS WITH POSITIVE RHEUMATOID FACTOR, INVOLVING UNSPECIFIED SITE: Primary | ICD-10-CM

## 2023-02-28 DIAGNOSIS — Z79.60 LONG-TERM USE OF IMMUNOSUPPRESSANT MEDICATION: ICD-10-CM

## 2023-02-28 DIAGNOSIS — K21.9 GASTROESOPHAGEAL REFLUX DISEASE WITHOUT ESOPHAGITIS: ICD-10-CM

## 2023-02-28 PROCEDURE — 3074F SYST BP LT 130 MM HG: CPT | Mod: CPTII,S$GLB,, | Performed by: INTERNAL MEDICINE

## 2023-02-28 PROCEDURE — 99215 OFFICE O/P EST HI 40 MIN: CPT | Mod: S$GLB,,, | Performed by: INTERNAL MEDICINE

## 2023-02-28 PROCEDURE — 1159F PR MEDICATION LIST DOCUMENTED IN MEDICAL RECORD: ICD-10-PCS | Mod: CPTII,S$GLB,, | Performed by: INTERNAL MEDICINE

## 2023-02-28 PROCEDURE — 3079F PR MOST RECENT DIASTOLIC BLOOD PRESSURE 80-89 MM HG: ICD-10-PCS | Mod: CPTII,S$GLB,, | Performed by: INTERNAL MEDICINE

## 2023-02-28 PROCEDURE — 1159F MED LIST DOCD IN RCRD: CPT | Mod: CPTII,S$GLB,, | Performed by: INTERNAL MEDICINE

## 2023-02-28 PROCEDURE — 3079F DIAST BP 80-89 MM HG: CPT | Mod: CPTII,S$GLB,, | Performed by: INTERNAL MEDICINE

## 2023-02-28 PROCEDURE — 3008F PR BODY MASS INDEX (BMI) DOCUMENTED: ICD-10-PCS | Mod: CPTII,S$GLB,, | Performed by: INTERNAL MEDICINE

## 2023-02-28 PROCEDURE — 99999 PR PBB SHADOW E&M-EST. PATIENT-LVL IV: CPT | Mod: PBBFAC,,, | Performed by: INTERNAL MEDICINE

## 2023-02-28 PROCEDURE — 3074F PR MOST RECENT SYSTOLIC BLOOD PRESSURE < 130 MM HG: ICD-10-PCS | Mod: CPTII,S$GLB,, | Performed by: INTERNAL MEDICINE

## 2023-02-28 PROCEDURE — 3008F BODY MASS INDEX DOCD: CPT | Mod: CPTII,S$GLB,, | Performed by: INTERNAL MEDICINE

## 2023-02-28 PROCEDURE — 99215 PR OFFICE/OUTPT VISIT, EST, LEVL V, 40-54 MIN: ICD-10-PCS | Mod: S$GLB,,, | Performed by: INTERNAL MEDICINE

## 2023-02-28 PROCEDURE — 99999 PR PBB SHADOW E&M-EST. PATIENT-LVL IV: ICD-10-PCS | Mod: PBBFAC,,, | Performed by: INTERNAL MEDICINE

## 2023-02-28 NOTE — PROGRESS NOTES
RHEUMATOLOGY OUTPATIENT CLINIC NOTE    2/28/2023    Attending Rheumatologist: Chicho Lewis  Primary Care Provider/Physician Requesting Consultation: Madeleine Enrique MD   Chief Complaint/Reason For Consultation:  Positive Rheumatoid Factor and Interstitial Lung Disease      Subjective:     Ayanna Alicia is a 52 y.o. Black or  female with ILD with SPRA and SJS for f/u.    Recovering from dysphonia after course of fluconazole.  Dyspnea persistent, clinically slightly better compared to previous visit.    Addendum 5/11: Repeat CT following RTX rx w/ progressive ILD findings.  Suggest trial of Actemra instead.  Recommend follow up in clinic to discuss implications of therapy if patient would prefer more information than provided via patient portal.     Addendum 5/18: patient opted for Actemra IV for SPRA w/ ILD refractory to RTX.       Addendum 5/23: peer to peer performed for Actemra: medication approved.     Addendum 6/9: elevation of liver chemistries on last CMP, not repeated.  Suggest bloodwork (cmp, ggt, ck) prior Actemra infusion, if persistent liver chemistry elevation will hold Actemra and refer to hepatology for evaluation.      Review of Systems   Constitutional:  Negative for fever.   Respiratory:  Positive for cough and shortness of breath. Negative for hemoptysis.    Gastrointestinal:  Negative for blood in stool.        Denies odynophagia   Genitourinary:  Negative for hematuria.   Musculoskeletal:  Negative for joint pain.   Skin:  Negative for rash.   Neurological:  Negative for focal weakness.     Chronic comorbid conditions affecting medical decision making today:  Past Medical History:   Diagnosis Date    Abnormal Pap smear of cervix     in the past with repeat pap smear okay.    Acute interstitial pneumonitis     Allergic rhinitis, cause unspecified     Arthritis of both knees     Asthma     Eczema     Fatty liver 10/2014    Fibrocystic breast changes     Headache(784.0)      Hepatomegaly 10/2014    Hypertension     Liver cyst 10/2014    Multinodular goiter     Followed by ENT - Dr. Nicolas Gray    Polymenorrhea     TMJ (dislocation of temporomandibular joint)     Uterine fibroid     in the past    Vitamin D deficiency disease      Past Surgical History:   Procedure Laterality Date     SECTION, CLASSIC      x 1    COLONOSCOPY N/A 2020    Procedure: COLONOSCOPY;  Surgeon: Angie Magana MD;  Location: [facility];  Service: Endoscopy;  Laterality: N/A;    HYSTERECTOMY      MULTIPLE TOOTH EXTRACTIONS      PARTIAL HYSTERECTOMY  2013    Due to fibroids     Family History   Problem Relation Age of Onset    Stroke Mother     Heart disease Mother     Diverticulitis Mother     Heart disease Father 63        MI/CAD    Hypertension Father     Stroke Sister     Cancer Maternal Grandmother 60        Rectal and stomach cancer    Stroke Maternal Grandfather     Diabetes Maternal Grandfather     Peripheral vascular disease Maternal Grandfather     Cancer Maternal Aunt 50        Stomach cancer    Cancer Maternal Aunt 66        Lung cancer (smoker)    Migraines Cousin     Cataracts Cousin      Social History     Tobacco Use   Smoking Status Never   Smokeless Tobacco Never       Current Outpatient Medications:     albuterol (PROVENTIL/VENTOLIN HFA) 90 mcg/actuation inhaler, INHALE 1 TO 2 PUFFS BY MOUTH INTO THE LUNGS EVERY 4 TO 6 HOURS AS NEEDED FOR WHEEZING OR SHORTNESS OF BREATH, Disp: 8.5 g, Rfl: 5    albuterol-ipratropium (DUO-NEB) 2.5 mg-0.5 mg/3 mL nebulizer solution, Take 3 mLs by nebulization every 6 (six) hours as needed for Wheezing. Rescue (Patient taking differently: Take 3 mLs by nebulization 3 (three) times daily. Rescue), Disp: 90 mL, Rfl: 11    amLODIPine (NORVASC) 10 MG tablet, Take 1 tablet (10 mg total) by mouth once daily., Disp: 90 tablet, Rfl: 1    ascorbic acid, vitamin C, (VITAMIN C) 500 MG tablet, Take 500 mg by mouth once daily., Disp: , Rfl:      calcium/magnesium/vit B comp (CALCIUM-MAGNESIUM-B COMPLEX ORAL), Take 1 tablet by mouth once daily at 6am., Disp: , Rfl:     doxycycline (VIBRAMYCIN) 100 MG Cap, Take 1 capsule (100 mg total) by mouth 2 (two) times daily., Disp: 10 capsule, Rfl: 1    fluticasone furoate-vilanteroL (BREO ELLIPTA) 100-25 mcg/dose diskus inhaler, INHALE 1 PUFF INTO THE LUNGS ONCE DAILY, Disp: 60 each, Rfl: 5    hydroCHLOROthiazide (HYDRODIURIL) 12.5 MG Tab, Take 1 tablet (12.5 mg total) by mouth once daily., Disp: 90 tablet, Rfl: 1    levocetirizine (XYZAL) 5 MG tablet, TAKE 1 TABLET(5 MG) BY MOUTH EVERY DAY, Disp: 90 tablet, Rfl: 1    metOLazone (ZAROXOLYN) 2.5 MG tablet, Take 1 tablet (2.5 mg total) by mouth every Mon, Wed, Fri., Disp: 36 tablet, Rfl: 3    montelukast (SINGULAIR) 10 mg tablet, Take 1 tablet (10 mg total) by mouth every evening., Disp: 90 tablet, Rfl: 1    nintedanib (OFEV) 100 mg Cap, Take 50 mg by mouth 2 (two) times daily., Disp: 60 capsule, Rfl: 6    omega-3s/dha/epa/fish oil/D3 (VITAMIN-D + OMEGA-3 ORAL), Take 1 tablet by mouth once daily at 6am., Disp: , Rfl:     ondansetron (ZOFRAN-ODT) 4 MG TbDL, Dissolve 1 tablet (4 mg total) by mouth every 6 (six) hours as needed (nausea)., Disp: 90 tablet, Rfl: 0    predniSONE (DELTASONE) 10 MG tablet, Take 2 tablets (20 mg total) by mouth once daily., Disp: 60 tablet, Rfl: 3    sertraline (ZOLOFT) 50 MG tablet, Take 1 tablet (50 mg total) by mouth once daily., Disp: 90 tablet, Rfl: 3    sulfamethoxazole-trimethoprim 800-160mg (BACTRIM DS) 800-160 mg Tab, Take 1 tablet by mouth every Mon, Wed, Fri. PCP prophylaxis while on prednisone 20 mg, Disp: 36 tablet, Rfl: 1    mycophenolate (CELLCEPT) 500 mg Tab, Take 3 tablets (1,500 mg total) by mouth 2 (two) times daily., Disp: 180 tablet, Rfl: 0    Current Facility-Administered Medications:     sodium chloride 0.9% flush 10 mL, 10 mL, Intravenous, PRN, Agustín Aviles MD     Objective:     Vitals:    02/28/23 0759   BP:  126/83   Pulse: 106     Physical Exam   Constitutional: She appears obese.   Eyes: Conjunctivae are normal.   Pulmonary/Chest: Effort normal.   Musculoskeletal:         General: No swelling or tenderness.   Neurological: She displays no weakness.   Skin: No rash noted.     Reviewed available old and all outside pertinent medical records available.    All lab results personally reviewed and interpreted by me.       ASSESSMENT      Encounter Diagnoses   Name Primary?    Interstitial pulmonary disease, unspecified     High risk medication use     Long-term use of immunosuppressant medication     Rheumatoid arthritis with positive rheumatoid factor, involving unspecified site     Sjogren syndrome with lung involvement Yes    Chronic interstitial pneumonia     Chronic respiratory failure with hypoxia     Morbid obesity with BMI of 40.0-44.9, adult     Multinodular goiter     Gastroesophageal reflux disease without esophagitis     GLENN on CPAP       PLAN     Clinically stable.  Denies arthralgias, active joint swelling, rashes.  HICKS and cough with little change.  No synovitis or reproducible weakness on exam.  Labs w/o toxicity from biologic.  MMF replaced by RTX 1000mg course, received twice since last visit.  On PDN per pulmonary.  Plan to repeat CT at least 4 months since RTX infusion to monitor response to therapy.  CT chest and labs close to f/u visit.  Will continue RTX 1000mg once q 6months for time being, contingent of repeat CT results.      Chicho Lewis M.D.

## 2023-03-02 ENCOUNTER — TELEPHONE (OUTPATIENT)
Dept: FAMILY MEDICINE | Facility: CLINIC | Age: 53
End: 2023-03-02
Payer: COMMERCIAL

## 2023-03-02 ENCOUNTER — OFFICE VISIT (OUTPATIENT)
Dept: OTOLARYNGOLOGY | Facility: CLINIC | Age: 53
End: 2023-03-02
Payer: COMMERCIAL

## 2023-03-02 VITALS
HEART RATE: 123 BPM | TEMPERATURE: 97 F | DIASTOLIC BLOOD PRESSURE: 72 MMHG | WEIGHT: 230.38 LBS | SYSTOLIC BLOOD PRESSURE: 129 MMHG | BODY MASS INDEX: 37.18 KG/M2

## 2023-03-02 DIAGNOSIS — Z12.31 OTHER SCREENING MAMMOGRAM: Primary | ICD-10-CM

## 2023-03-02 DIAGNOSIS — J84.111 CHRONIC INTERSTITIAL PNEUMONIA: Primary | ICD-10-CM

## 2023-03-02 PROCEDURE — 99214 PR OFFICE/OUTPT VISIT, EST, LEVL IV, 30-39 MIN: ICD-10-PCS | Mod: 25,S$GLB,, | Performed by: STUDENT IN AN ORGANIZED HEALTH CARE EDUCATION/TRAINING PROGRAM

## 2023-03-02 PROCEDURE — 3008F PR BODY MASS INDEX (BMI) DOCUMENTED: ICD-10-PCS | Mod: CPTII,S$GLB,, | Performed by: STUDENT IN AN ORGANIZED HEALTH CARE EDUCATION/TRAINING PROGRAM

## 2023-03-02 PROCEDURE — 1159F PR MEDICATION LIST DOCUMENTED IN MEDICAL RECORD: ICD-10-PCS | Mod: CPTII,S$GLB,, | Performed by: STUDENT IN AN ORGANIZED HEALTH CARE EDUCATION/TRAINING PROGRAM

## 2023-03-02 PROCEDURE — 1159F MED LIST DOCD IN RCRD: CPT | Mod: CPTII,S$GLB,, | Performed by: STUDENT IN AN ORGANIZED HEALTH CARE EDUCATION/TRAINING PROGRAM

## 2023-03-02 PROCEDURE — 3074F PR MOST RECENT SYSTOLIC BLOOD PRESSURE < 130 MM HG: ICD-10-PCS | Mod: CPTII,S$GLB,, | Performed by: STUDENT IN AN ORGANIZED HEALTH CARE EDUCATION/TRAINING PROGRAM

## 2023-03-02 PROCEDURE — 3078F PR MOST RECENT DIASTOLIC BLOOD PRESSURE < 80 MM HG: ICD-10-PCS | Mod: CPTII,S$GLB,, | Performed by: STUDENT IN AN ORGANIZED HEALTH CARE EDUCATION/TRAINING PROGRAM

## 2023-03-02 PROCEDURE — 99999 PR PBB SHADOW E&M-EST. PATIENT-LVL IV: CPT | Mod: PBBFAC,,, | Performed by: STUDENT IN AN ORGANIZED HEALTH CARE EDUCATION/TRAINING PROGRAM

## 2023-03-02 PROCEDURE — 3074F SYST BP LT 130 MM HG: CPT | Mod: CPTII,S$GLB,, | Performed by: STUDENT IN AN ORGANIZED HEALTH CARE EDUCATION/TRAINING PROGRAM

## 2023-03-02 PROCEDURE — 3078F DIAST BP <80 MM HG: CPT | Mod: CPTII,S$GLB,, | Performed by: STUDENT IN AN ORGANIZED HEALTH CARE EDUCATION/TRAINING PROGRAM

## 2023-03-02 PROCEDURE — 99999 PR PBB SHADOW E&M-EST. PATIENT-LVL IV: ICD-10-PCS | Mod: PBBFAC,,, | Performed by: STUDENT IN AN ORGANIZED HEALTH CARE EDUCATION/TRAINING PROGRAM

## 2023-03-02 PROCEDURE — 31575 DIAGNOSTIC LARYNGOSCOPY: CPT | Mod: S$GLB,,, | Performed by: STUDENT IN AN ORGANIZED HEALTH CARE EDUCATION/TRAINING PROGRAM

## 2023-03-02 PROCEDURE — 99214 OFFICE O/P EST MOD 30 MIN: CPT | Mod: 25,S$GLB,, | Performed by: STUDENT IN AN ORGANIZED HEALTH CARE EDUCATION/TRAINING PROGRAM

## 2023-03-02 PROCEDURE — 3008F BODY MASS INDEX DOCD: CPT | Mod: CPTII,S$GLB,, | Performed by: STUDENT IN AN ORGANIZED HEALTH CARE EDUCATION/TRAINING PROGRAM

## 2023-03-02 PROCEDURE — 31575 PR LARYNGOSCOPY, FLEXIBLE; DIAGNOSTIC: ICD-10-PCS | Mod: S$GLB,,, | Performed by: STUDENT IN AN ORGANIZED HEALTH CARE EDUCATION/TRAINING PROGRAM

## 2023-03-02 RX ORDER — FLUCONAZOLE 100 MG/1
100 TABLET ORAL 2 TIMES DAILY
Qty: 56 TABLET | Refills: 0 | Status: SHIPPED | OUTPATIENT
Start: 2023-03-02 | End: 2023-04-03

## 2023-03-02 RX ORDER — SULFAMETHOXAZOLE AND TRIMETHOPRIM 800; 160 MG/1; MG/1
1 TABLET ORAL 2 TIMES DAILY
Qty: 20 TABLET | Refills: 0 | Status: SHIPPED | OUTPATIENT
Start: 2023-03-02 | End: 2023-03-03

## 2023-03-02 NOTE — TELEPHONE ENCOUNTER
----- Message from Alyssa Weaver sent at 3/2/2023  2:12 PM CST -----  Patient is requesting order for mammo and to be scheduled,Please call back at [phone].thanks

## 2023-03-02 NOTE — PROGRESS NOTES
Chief complaint:    No chief complaint on file.          Referring Provider:  No referring provider defined for this encounter.    History of present illness:     Ms. Alicia is a 52 y.o. w/AIP presenting for evaluation of hoarseness.     Onset: 7 weeks, had a night with a bad coughing attack and has been hoarse since  Severity: severe  Quality: hoarse, raspy, breathy and is progressively worsening  The voice has been persistently hoarse.   Associated signs and symptoms:  productive cough  Symptoms are worse all day  Prior medical therapy:   OTC medications, lozenges    Recently increased from 10 to 20 mg prednisone daily    Also on alubterol and breo    Update 3/2/23  Overall voice doing a lot better, but not quite back to normal. Took diflucan as prescribed. However, over last day she did have worse SOB and mucus production.   Also some nasal crusting and scabs blown out this morning.      History      Past Medical History:   Past Medical History:   Diagnosis Date    Abnormal Pap smear of cervix     in the past with repeat pap smear okay.    Acute interstitial pneumonitis     Allergic rhinitis, cause unspecified     Arthritis of both knees     Asthma     Eczema     Fatty liver 10/2014    Fibrocystic breast changes     Headache(784.0)     Hepatomegaly 10/2014    Hypertension     Liver cyst 10/2014    Multinodular goiter     Followed by ENT - Dr. Nicolas Gray    Polymenorrhea 2008    TMJ (dislocation of temporomandibular joint)     Uterine fibroid     in the past    Vitamin D deficiency disease          Past Surgical History:  Past Surgical History:   Procedure Laterality Date     SECTION, CLASSIC      x 1    COLONOSCOPY N/A 2020    Procedure: COLONOSCOPY;  Surgeon: Angie Magana MD;  Location: [facility];  Service: Endoscopy;  Laterality: N/A;    HYSTERECTOMY      MULTIPLE TOOTH EXTRACTIONS      PARTIAL HYSTERECTOMY  2013    Due to fibroids         Medications: Medication list reviewed. She   has a current medication list which includes the following prescription(s): albuterol, albuterol-ipratropium, amlodipine, ascorbic acid (vitamin c), calcium/magnesium/vit b comp, doxycycline, fluconazole, fluticasone furoate-vilanterol, hydrochlorothiazide, levocetirizine, metolazone, montelukast, mycophenolate, ofev, omega-3s/dha/epa/fish oil/d3, ondansetron, prednisone, sertraline, sulfamethoxazole-trimethoprim 800-160mg, and sulfamethoxazole-trimethoprim 800-160mg, and the following Facility-Administered Medications: sodium chloride 0.9%.     Allergies:   Review of patient's allergies indicates:   Allergen Reactions    Doxycycline Nausea Only     Other reaction(s): Nausea    Fluticasone Other (See Comments)     Other reaction(s): Epistaxis      Penicillins      Other reaction(s): unknown  Pt tolerated ceftriaxone         Family history: family history includes Cancer (age of onset: 50) in her maternal aunt; Cancer (age of onset: 60) in her maternal grandmother; Cancer (age of onset: 66) in her maternal aunt; Cataracts in her cousin; Diabetes in her maternal grandfather; Diverticulitis in her mother; Heart disease in her mother; Heart disease (age of onset: 63) in her father; Hypertension in her father; Migraines in her cousin; Peripheral vascular disease in her maternal grandfather; Stroke in her maternal grandfather, mother, and sister.         Social History          Alcohol use:  reports current alcohol use.            Tobacco:  reports that she has never smoked. She has never used smokeless tobacco.         Physical Examination      Vitals: Blood pressure 129/72, pulse (!) 123, temperature 97 °F (36.1 °C), weight 104.5 kg (230 lb 6.1 oz).      General: Well developed, well nourished, well hydrated.    Voice: moderate to severe dysphonia, no dysarthria      Head/Face: Normocephalic, atraumatic. No scars or lesions. Facial musculature equal.     Eyes: No scleral icterus or conjunctival hemorrhage. EOMI. PERRLA.      Ears:     Right ear: No gross deformity. EAC is clear of debris and erythema. TM are intact with a pneumatized middle ear. No signs of retraction, fluid or infection.      Left ear: No gross deformity. EAC is clear of debris and erythema. TM are intact with a pneumatized middle ear. No signs of retraction, fluid or infection.      Nose: No gross deformity or lesions. No purulent discharge. No significant NSD.      Mouth/Oropharynx: Lips without any lesions. No mucosal lesions within the oropharynx. No tonsillar exudate or lesions. Pharyngeal walls symmetrical. Uvula midline. Tongue midline without lesions.     Neck: Trachea midline. No masses. No thyromegaly or nodules palpated.     Lymphatic: No lymphadenopathy in the neck.     Extremities: No cyanosis. Warm and well-perfused.     Skin: No scars or lesions on face or neck.      Neurologic: Moving all extremities without gross abnormality.CN II-XII grossly intact. House-Brackmann 1/6. No signs of nystagmus.          Data reviewed      Review of records:      I reviewed records from the referring provider's office visits, including the history, workup, and/or treatment of this problem thus far.        Procedures:    Procedure -Transnasal fiberoptic laryngoscopy     Surgeon: Jay Sanabria M.D. .      Anesthesia: topical 0.05% oxymetazoline with 4% lidocaine      Complications: None.     Description of Procedure: With the patient in the sitting position, topical lidocaine and oxymetazoline was applied to the nose. The scope was passed through the nose. Examination was carried out of the nose, nasopharynx, oropharynx, hypopharynx, and larynx with findings as noted above. Scope was removed. The patient tolerated the procedure well.      Findings: No masses or lesions in the nose, nasopharynx, oropharynx, hypopharynx, or larynx. Vocal fold abduction and adduction is normal but there are bilateral white plaques of striking surface of VC (larger on the right). No  pooling of secretions in the piriform sinuses, penetration, or aspiration.          Assessment/Plan:    1. Chronic interstitial pneumonia          No obstructing airway pathology that would cause her worsening hypoxia  About to start bactrim three times weekly  Oral diflucan x 21d  Return in 3-4 weeks for recheck    Update 3/2/23            Jay Sanabria MD  [facility]   [facility]  [id number] [place] [address].  POLLY Balderrama [id number]  P: [phone]  F: [phone]

## 2023-03-02 NOTE — TELEPHONE ENCOUNTER
I have put the following orders and/or medications to this note.  Please advise pt and assist.    Orders Placed This Encounter   Procedures    Mammo Digital Screening Bilat w/ Reid     Standing Status:   Future     Standing Expiration Date:   3/2/2025     Order Specific Question:   May the Radiologist modify the order per protocol to meet the clinical needs of the patient?     Answer:   Yes     Order Specific Question:   Release to patient     Answer:   Immediate

## 2023-03-03 ENCOUNTER — PATIENT MESSAGE (OUTPATIENT)
Dept: OTOLARYNGOLOGY | Facility: CLINIC | Age: 53
End: 2023-03-03
Payer: COMMERCIAL

## 2023-03-03 RX ORDER — SULFAMETHOXAZOLE AND TRIMETHOPRIM 800; 160 MG/1; MG/1
1 TABLET ORAL DAILY
Qty: 14 TABLET | Refills: 0 | Status: SHIPPED | OUTPATIENT
Start: 2023-03-03 | End: 2023-03-29

## 2023-03-06 ENCOUNTER — PATIENT MESSAGE (OUTPATIENT)
Dept: PULMONOLOGY | Facility: CLINIC | Age: 53
End: 2023-03-06
Payer: COMMERCIAL

## 2023-03-07 ENCOUNTER — OFFICE VISIT (OUTPATIENT)
Dept: PULMONOLOGY | Facility: CLINIC | Age: 53
End: 2023-03-07
Payer: COMMERCIAL

## 2023-03-07 ENCOUNTER — TELEPHONE (OUTPATIENT)
Dept: PULMONOLOGY | Facility: CLINIC | Age: 53
End: 2023-03-07
Payer: COMMERCIAL

## 2023-03-07 ENCOUNTER — HOSPITAL ENCOUNTER (OUTPATIENT)
Dept: RADIOLOGY | Facility: HOSPITAL | Age: 53
Discharge: HOME | End: 2023-03-07
Attending: INTERNAL MEDICINE
Payer: COMMERCIAL

## 2023-03-07 VITALS
BODY MASS INDEX: 37.09 KG/M2 | SYSTOLIC BLOOD PRESSURE: 132 MMHG | DIASTOLIC BLOOD PRESSURE: 78 MMHG | HEART RATE: 107 BPM | OXYGEN SATURATION: 90 % | WEIGHT: 230.81 LBS | HEIGHT: 66 IN

## 2023-03-07 DIAGNOSIS — G47.33 OSA ON CPAP: ICD-10-CM

## 2023-03-07 DIAGNOSIS — M05.7A RHEUMATOID ARTHRITIS OF OTHER SITE WITH POSITIVE RHEUMATOID FACTOR: ICD-10-CM

## 2023-03-07 DIAGNOSIS — J84.9 INTERSTITIAL LUNG DISEASE: ICD-10-CM

## 2023-03-07 DIAGNOSIS — J84.111 CHRONIC INTERSTITIAL PNEUMONIA: Primary | ICD-10-CM

## 2023-03-07 DIAGNOSIS — J96.11 CHRONIC RESPIRATORY FAILURE WITH HYPOXIA: ICD-10-CM

## 2023-03-07 DIAGNOSIS — J98.4 RESTRICTIVE LUNG DISEASE: ICD-10-CM

## 2023-03-07 DIAGNOSIS — R06.02 SOB (SHORTNESS OF BREATH): ICD-10-CM

## 2023-03-07 DIAGNOSIS — M05.9 RHEUMATOID ARTHRITIS WITH POSITIVE RHEUMATOID FACTOR, INVOLVING UNSPECIFIED SITE: ICD-10-CM

## 2023-03-07 DIAGNOSIS — B37.0 THRUSH: ICD-10-CM

## 2023-03-07 DIAGNOSIS — R09.02 EXERCISE HYPOXEMIA: ICD-10-CM

## 2023-03-07 DIAGNOSIS — E66.01 MORBID OBESITY WITH BMI OF 40.0-44.9, ADULT: ICD-10-CM

## 2023-03-07 DIAGNOSIS — I10 PRIMARY HYPERTENSION: ICD-10-CM

## 2023-03-07 DIAGNOSIS — R06.02 SOB (SHORTNESS OF BREATH): Primary | ICD-10-CM

## 2023-03-07 PROCEDURE — 71046 X-RAY EXAM CHEST 2 VIEWS: CPT | Mod: 26,,, | Performed by: RADIOLOGY

## 2023-03-07 PROCEDURE — 1159F PR MEDICATION LIST DOCUMENTED IN MEDICAL RECORD: ICD-10-PCS | Mod: CPTII,S$GLB,, | Performed by: INTERNAL MEDICINE

## 2023-03-07 PROCEDURE — 3078F PR MOST RECENT DIASTOLIC BLOOD PRESSURE < 80 MM HG: ICD-10-PCS | Mod: CPTII,S$GLB,, | Performed by: INTERNAL MEDICINE

## 2023-03-07 PROCEDURE — 3075F PR MOST RECENT SYSTOLIC BLOOD PRESS GE 130-139MM HG: ICD-10-PCS | Mod: CPTII,S$GLB,, | Performed by: INTERNAL MEDICINE

## 2023-03-07 PROCEDURE — 1160F RVW MEDS BY RX/DR IN RCRD: CPT | Mod: CPTII,S$GLB,, | Performed by: INTERNAL MEDICINE

## 2023-03-07 PROCEDURE — 99214 OFFICE O/P EST MOD 30 MIN: CPT | Mod: S$GLB,,, | Performed by: INTERNAL MEDICINE

## 2023-03-07 PROCEDURE — 3008F PR BODY MASS INDEX (BMI) DOCUMENTED: ICD-10-PCS | Mod: CPTII,S$GLB,, | Performed by: INTERNAL MEDICINE

## 2023-03-07 PROCEDURE — 3078F DIAST BP <80 MM HG: CPT | Mod: CPTII,S$GLB,, | Performed by: INTERNAL MEDICINE

## 2023-03-07 PROCEDURE — 99999 PR PBB SHADOW E&M-EST. PATIENT-LVL V: CPT | Mod: PBBFAC,,, | Performed by: INTERNAL MEDICINE

## 2023-03-07 PROCEDURE — 3075F SYST BP GE 130 - 139MM HG: CPT | Mod: CPTII,S$GLB,, | Performed by: INTERNAL MEDICINE

## 2023-03-07 PROCEDURE — 71046 X-RAY EXAM CHEST 2 VIEWS: CPT | Mod: TC

## 2023-03-07 PROCEDURE — 1159F MED LIST DOCD IN RCRD: CPT | Mod: CPTII,S$GLB,, | Performed by: INTERNAL MEDICINE

## 2023-03-07 PROCEDURE — 3008F BODY MASS INDEX DOCD: CPT | Mod: CPTII,S$GLB,, | Performed by: INTERNAL MEDICINE

## 2023-03-07 PROCEDURE — 99214 PR OFFICE/OUTPT VISIT, EST, LEVL IV, 30-39 MIN: ICD-10-PCS | Mod: S$GLB,,, | Performed by: INTERNAL MEDICINE

## 2023-03-07 PROCEDURE — 99999 PR PBB SHADOW E&M-EST. PATIENT-LVL V: ICD-10-PCS | Mod: PBBFAC,,, | Performed by: INTERNAL MEDICINE

## 2023-03-07 PROCEDURE — 1160F PR REVIEW ALL MEDS BY PRESCRIBER/CLIN PHARMACIST DOCUMENTED: ICD-10-PCS | Mod: CPTII,S$GLB,, | Performed by: INTERNAL MEDICINE

## 2023-03-07 PROCEDURE — 71046 XR CHEST PA AND LATERAL: ICD-10-PCS | Mod: 26,,, | Performed by: RADIOLOGY

## 2023-03-07 RX ORDER — NYSTATIN 100000 [USP'U]/ML
4 SUSPENSION ORAL
Qty: 240 ML | Refills: 1 | Status: SHIPPED | OUTPATIENT
Start: 2023-03-07 | End: 2023-03-22

## 2023-03-07 RX ORDER — NINTEDANIB 100 MG/1
100 CAPSULE ORAL 2 TIMES DAILY
Qty: 120 CAPSULE | Refills: 11 | Status: SHIPPED | OUTPATIENT
Start: 2023-03-07 | End: 2023-05-01

## 2023-03-07 NOTE — PROGRESS NOTES
Pulmonary Outpatient  Visit     Subjective:       Patient ID: Ayanna Alicia is a 52 y.o. female.    Chief Complaint: Asthma, Apnea, and Breathing Problem        Ayanna Alicia is 51 y.o.  Post hospital f/u  Acute respiratory failure ILD, +ve RF  Was discharged on oxygen  Here to review results  Explained  PFT: severe restriction and reduced DLCO  ABG  6MWD: oxygen desat needs supplementary oxygen 3 LPM  Has some nose bleed will add AYR gel and humifier bottle  FMLA paper work given  Out of work letter   Added PEPCID and Bactrim  Adherent with inhaler    05/18/2022  Here to see today  Concern, Dyspnea with activity palpitations  Seen Rheumatology: Added CELLCEPT  On steriod taper  No cough wheezing  On oxygen 3 LPM  Had GLENN in 2019: was prescribed CPAP returned back  Needs PAP at night  Motivated to restart      07/15/2022  Last seen 05/18/2022  ACT score 10, [place] score 6  CPAP study: CPAP ordered  Await   Says cannot go to work, tied, focus off  6MWD: RA sat was 95%  O2 titrated to 4-6 LPM  Tachycardia noted : 145 BPM    09/20/2022  Last seen 07/15/2022  Here to review progress  Enrolled in pul rehab, session 8  Sat Stable @ 2-3 LPM  ACt score 5, [place] 6  No cough, better exercise tolerance  Stable on cellcept 1000mg BID + prednisone 10 mg  Will DW Rheum whether can titrate up cellcept to wean steriods  CPAP download shows adherence   CPAP 9 cm with resudual AHI 0.2  Usage > 4 hrs was 67%  Cehst CT reviewed  Repeat PFT pending      12/28/2022  Last seen 11/07/2022  Here to review chest CT done recently  Fatigue, SOB, on 6 LPM  SP rituxan 4 doses  ABG reveiwed  ESR and CRP were increased  On Prednisone 10 mg  Hoarse voice  Discussed utility of bronchoscopy if infection is considered  TBBX would have risk of flaring and acute resp failure:  Serologies sent  Added OFEV  Will also reduce fluid intake to approx 32 oz  Will reenrol on Pul  rehab      03/07/2023  Urgent visit: weak, easily desat  Subjectively better  Seen Dr Stallings: Diflucan and bactrim  Oral thrush: not present today  No fever  On 6 LPM  Dropped weight from 241Lb to 228 lb  Ofev increased: felt better onofev  Will decrease prednisone to 10 mg since concern for PJP  Discussed referal to advance lung disease  Sputum culture  In wheel chair  Adherent with CPAP  Schedule bronch for BAL next week      Asthma Control Test  In the past 4  weeks, how much of the time did your asthma keep you from getting as much done at work, school or at home?: All of the time  During the past 4 weeks, how often have you had shortness of breath?: More than once a day  During the past 4 weeks, how often did your asthma symptoms (wheezing, couging, shortness of breath, chest tightness or pain) wake you up at night or earlier than usual in the morning?: Once or twice  During the past 4 weeks, how often have you used your rescue inhaler or nebulizer medication (such as albuterol)?: 3 or more times per day  How would you rate your asthma control during the past 4 weeks?: Not controlled at all  If your score is 19 or less, your asthma may not be under control: 8          MMRC Dyspnea Scale (4 is worst)     [] MMRC 0: Dyspneic on strenuous excercise (0 points)    [] MMRC 1: Dyspneic on walking a slight hill (0 points)    [x] MMRC 2: Dyspneic on walking level ground; must stop occasionally due to breathlessness (1 point)    [] MMRC 3: Must stop for breathlessness after walking 100 yards or after a few minutes (2 points)    [] MMRC 4: Cannot leave house; breathless on dressing/undressing (3 points)          EPWORTH SLEEPINESS SCALE 12/28/2022   Sitting and reading 1   Watching TV 1   Sitting, inactive in a public place (e.g. a theatre or a meeting) 0   As a passenger in a car for an hour without a break 1   Lying down to rest in the afternoon when circumstances permit 3   Sitting and talking to someone 0   Sitting  quietly after a lunch without alcohol 0   In a car, while stopped for a few minutes in traffic 0   Total score 6        Asthma Control Test  In the past 4  weeks, how much of the time did your asthma keep you from getting as much done at work, school or at home?: All of the time  During the past 4 weeks, how often have you had shortness of breath?: More than once a day  During the past 4 weeks, how often did your asthma symptoms (wheezing, couging, shortness of breath, chest tightness or pain) wake you up at night or earlier than usual in the morning?: Once or twice  During the past 4 weeks, how often have you used your rescue inhaler or nebulizer medication (such as albuterol)?: 3 or more times per day  How would you rate your asthma control during the past 4 weeks?: Not controlled at all  If your score is 19 or less, your asthma may not be under control: 8        The following portions of the patient's history were reviewed and updated as appropriate:   She  has a past medical history of Abnormal Pap smear of cervix, Acute interstitial pneumonitis, Allergic rhinitis, cause unspecified, Arthritis of both knees, Asthma, Eczema, Fatty liver (10/2014), Fibrocystic breast changes, Headache(784.0), Hepatomegaly (10/2014), Hypertension, Liver cyst (10/2014), Multinodular goiter, Polymenorrhea (), TMJ (dislocation of temporomandibular joint), Uterine fibroid, and Vitamin D deficiency disease.  She does not have any pertinent problems on file.  She  has a past surgical history that includes  section, classic; Multiple tooth extractions; Partial hysterectomy (2013); Hysterectomy; and Colonoscopy (N/A, 2020).  Her family history includes Cancer (age of onset: 50) in her maternal aunt; Cancer (age of onset: 60) in her maternal grandmother; Cancer (age of onset: 66) in her maternal aunt; Cataracts in her cousin; Diabetes in her maternal grandfather; Diverticulitis in her mother; Heart disease in her  mother; Heart disease (age of onset: 63) in her father; Hypertension in her father; Migraines in her cousin; Peripheral vascular disease in her maternal grandfather; Stroke in her maternal grandfather, mother, and sister.  She  reports that she has never smoked. She has never used smokeless tobacco. She reports current alcohol use. She reports current drug use. Frequency: 4.00 times per week. Drug: Marijuana.  She has a current medication list which includes the following prescription(s): albuterol, albuterol-ipratropium, amlodipine, ascorbic acid (vitamin c), calcium/magnesium/vit b comp, doxycycline, fluconazole, fluticasone furoate-vilanterol, hydrochlorothiazide, levocetirizine, metolazone, montelukast, omega-3s/dha/epa/fish oil/d3, ondansetron, prednisone, sertraline, sulfamethoxazole-trimethoprim 800-160mg, sulfamethoxazole-trimethoprim 800-160mg, mycophenolate, ofev, and nystatin, and the following Facility-Administered Medications: sodium chloride 0.9%.  Current Outpatient Medications on File Prior to Visit   Medication Sig Dispense Refill    albuterol (PROVENTIL/VENTOLIN HFA) 90 mcg/actuation inhaler INHALE 1 TO 2 PUFFS BY MOUTH INTO THE LUNGS EVERY 4 TO 6 HOURS AS NEEDED FOR WHEEZING OR SHORTNESS OF BREATH 8.5 g 5    albuterol-ipratropium (DUO-NEB) 2.5 mg-0.5 mg/3 mL nebulizer solution Take 3 mLs by nebulization every 6 (six) hours as needed for Wheezing. Rescue (Patient taking differently: Take 3 mLs by nebulization 3 (three) times daily. Rescue) 90 mL 11    amLODIPine (NORVASC) 10 MG tablet Take 1 tablet (10 mg total) by mouth once daily. 90 tablet 1    ascorbic acid, vitamin C, (VITAMIN C) 500 MG tablet Take 500 mg by mouth once daily.      calcium/magnesium/vit B comp (CALCIUM-MAGNESIUM-B COMPLEX ORAL) Take 1 tablet by mouth once daily at 6am.      doxycycline (VIBRAMYCIN) 100 MG Cap Take 1 capsule (100 mg total) by mouth 2 (two) times daily. 10 capsule 1    fluconazole (DIFLUCAN) 100 MG tablet Take 1  tablet (100 mg total) by mouth 2 (two) times a day. 56 tablet 0    fluticasone furoate-vilanteroL (BREO ELLIPTA) 100-25 mcg/dose diskus inhaler INHALE 1 PUFF INTO THE LUNGS ONCE DAILY 60 each 5    hydroCHLOROthiazide (HYDRODIURIL) 12.5 MG Tab Take 1 tablet (12.5 mg total) by mouth once daily. 90 tablet 1    levocetirizine (XYZAL) 5 MG tablet TAKE 1 TABLET(5 MG) BY MOUTH EVERY DAY 90 tablet 1    metOLazone (ZAROXOLYN) 2.5 MG tablet Take 1 tablet (2.5 mg total) by mouth every Mon, Wed, Fri. 36 tablet 3    montelukast (SINGULAIR) 10 mg tablet Take 1 tablet (10 mg total) by mouth every evening. 90 tablet 1    omega-3s/dha/epa/fish oil/D3 (VITAMIN-D + OMEGA-3 ORAL) Take 1 tablet by mouth once daily at 6am.      ondansetron (ZOFRAN-ODT) 4 MG TbDL Dissolve 1 tablet (4 mg total) by mouth every 6 (six) hours as needed (nausea). 90 tablet 0    predniSONE (DELTASONE) 10 MG tablet Take 2 tablets (20 mg total) by mouth once daily. (Patient taking differently: Take 10 mg by mouth once daily.) 60 tablet 3    sertraline (ZOLOFT) 50 MG tablet Take 1 tablet (50 mg total) by mouth once daily. 90 tablet 3    sulfamethoxazole-trimethoprim 800-160mg (BACTRIM DS) 800-160 mg Tab Take 1 tablet by mouth every Mon, Wed, Fri. PCP prophylaxis while on prednisone 20 mg 36 tablet 1    sulfamethoxazole-trimethoprim 800-160mg (BACTRIM DS) 800-160 mg Tab Take 1 tablet by mouth once daily. Take this instead of your usual Bactrim 3 times weekly dose for the next 10 days, then resume your usual Bactrim schedule. for 14 days 14 tablet 0    mycophenolate (CELLCEPT) 500 mg Tab Take 3 tablets (1,500 mg total) by mouth 2 (two) times daily. 180 tablet 0     Current Facility-Administered Medications on File Prior to Visit   Medication Dose Route Frequency Provider Last Rate Last Admin    sodium chloride 0.9% flush 10 mL  10 mL Intravenous PRN Agustín Aviles MD         She is allergic to doxycycline, fluticasone, and penicillins..      Review of Systems    Constitutional:  Positive for activity change and fatigue.   Respiratory:  Positive for dyspnea on extertion.    Gastrointestinal:  Positive for acid reflux.   All other systems reviewed and are negative.    Outpatient Encounter Medications as of 3/7/2023   Medication Sig Dispense Refill    albuterol (PROVENTIL/VENTOLIN HFA) 90 mcg/actuation inhaler INHALE 1 TO 2 PUFFS BY MOUTH INTO THE LUNGS EVERY 4 TO 6 HOURS AS NEEDED FOR WHEEZING OR SHORTNESS OF BREATH 8.5 g 5    albuterol-ipratropium (DUO-NEB) 2.5 mg-0.5 mg/3 mL nebulizer solution Take 3 mLs by nebulization every 6 (six) hours as needed for Wheezing. Rescue (Patient taking differently: Take 3 mLs by nebulization 3 (three) times daily. Rescue) 90 mL 11    amLODIPine (NORVASC) 10 MG tablet Take 1 tablet (10 mg total) by mouth once daily. 90 tablet 1    ascorbic acid, vitamin C, (VITAMIN C) 500 MG tablet Take 500 mg by mouth once daily.      calcium/magnesium/vit B comp (CALCIUM-MAGNESIUM-B COMPLEX ORAL) Take 1 tablet by mouth once daily at 6am.      doxycycline (VIBRAMYCIN) 100 MG Cap Take 1 capsule (100 mg total) by mouth 2 (two) times daily. 10 capsule 1    fluconazole (DIFLUCAN) 100 MG tablet Take 1 tablet (100 mg total) by mouth 2 (two) times a day. 56 tablet 0    fluticasone furoate-vilanteroL (BREO ELLIPTA) 100-25 mcg/dose diskus inhaler INHALE 1 PUFF INTO THE LUNGS ONCE DAILY 60 each 5    hydroCHLOROthiazide (HYDRODIURIL) 12.5 MG Tab Take 1 tablet (12.5 mg total) by mouth once daily. 90 tablet 1    levocetirizine (XYZAL) 5 MG tablet TAKE 1 TABLET(5 MG) BY MOUTH EVERY DAY 90 tablet 1    metOLazone (ZAROXOLYN) 2.5 MG tablet Take 1 tablet (2.5 mg total) by mouth every Mon, Wed, Fri. 36 tablet 3    montelukast (SINGULAIR) 10 mg tablet Take 1 tablet (10 mg total) by mouth every evening. 90 tablet 1    omega-3s/dha/epa/fish oil/D3 (VITAMIN-D + OMEGA-3 ORAL) Take 1 tablet by mouth once daily at 6am.      ondansetron (ZOFRAN-ODT) 4 MG TbDL Dissolve 1 tablet (4  "mg total) by mouth every 6 (six) hours as needed (nausea). 90 tablet 0    predniSONE (DELTASONE) 10 MG tablet Take 2 tablets (20 mg total) by mouth once daily. (Patient taking differently: Take 10 mg by mouth once daily.) 60 tablet 3    sertraline (ZOLOFT) 50 MG tablet Take 1 tablet (50 mg total) by mouth once daily. 90 tablet 3    sulfamethoxazole-trimethoprim 800-160mg (BACTRIM DS) 800-160 mg Tab Take 1 tablet by mouth every Mon, Wed, Fri. PCP prophylaxis while on prednisone 20 mg 36 tablet 1    sulfamethoxazole-trimethoprim 800-160mg (BACTRIM DS) 800-160 mg Tab Take 1 tablet by mouth once daily. Take this instead of your usual Bactrim 3 times weekly dose for the next 10 days, then resume your usual Bactrim schedule. for 14 days 14 tablet 0    [DISCONTINUED] nintedanib (OFEV) 100 mg Cap Take 50 mg by mouth 2 (two) times daily. (Patient taking differently: Take 100 mg by mouth 2 (two) times daily.) 60 capsule 6    mycophenolate (CELLCEPT) 500 mg Tab Take 3 tablets (1,500 mg total) by mouth 2 (two) times daily. 180 tablet 0    nintedanib (OFEV) 100 mg Cap Take 100 mg by mouth 2 (two) times daily. 120 capsule 11    nystatin (MYCOSTATIN) 100,000 unit/mL suspension Swish and spit 4 mLs (400,000 Units total) by mouth 2 hours after meals and at bedtime for 10 days 240 mL 1     Facility-Administered Encounter Medications as of 3/7/2023   Medication Dose Route Frequency Provider Last Rate Last Admin    sodium chloride 0.9% flush 10 mL  10 mL Intravenous PRN Agustín Aviles MD           Objective:     Vital Signs (Most Recent)  Vital Signs  Pulse: 107  SpO2: (!) 90 % (6 liters 90-95)  BP: 132/78  Height and Weight  Height: 5' 6" (167.6 cm)  Weight: 104.7 kg (230 lb 13.2 oz)  BSA (Calculated - sq m): 2.21 sq meters  BMI (Calculated): 37.3  Weight in (lb) to have BMI = 25: 154.6]  Wt Readings from Last 2 Encounters:   03/07/23 104.7 kg (230 lb 13.2 oz)   03/02/23 104.5 kg (230 lb 6.1 oz)       Physical Exam "   Constitutional: She is oriented to person, place, and time. She appears well-developed and well-nourished.   HENT:   Head: Normocephalic.   Mouth/Throat: Mallampati Score: II.   Neck: No JVD present.   Cardiovascular: Normal rate and intact distal pulses.   No murmur heard.  Pulmonary/Chest: Normal expansion and effort normal. She has rales.   Abdominal: Soft. Bowel sounds are normal.   Musculoskeletal:         General: No edema. Normal range of motion.      Cervical back: Normal range of motion and neck supple.   Lymphadenopathy:     She has no axillary adenopathy.   Neurological: She is alert and oriented to person, place, and time.   Skin: Skin is warm and dry. No cyanosis. Nails show no clubbing.   Psychiatric: She has a normal mood and affect.   Nursing note and vitals reviewed.    Laboratory  Lab Results   Component Value Date    WBC 17.91 (H) 02/22/2023    RBC 4.09 02/22/2023    HGB 10.0 (L) 02/22/2023    HCT 33.3 (L) 02/22/2023    MCV 81 (L) 02/22/2023    MCH 24.4 (L) 02/22/2023    MCHC 30.0 (L) 02/22/2023    RDW 18.5 (H) 02/22/2023     (H) 02/22/2023    MPV 8.8 (L) 02/22/2023    GRAN 14.2 (H) 02/22/2023    GRAN 79.0 (H) 02/22/2023    LYMPH 1.8 02/22/2023    LYMPH 9.9 (L) 02/22/2023    MONO 1.1 (H) 02/22/2023    MONO 6.1 02/22/2023    EOS 0.6 (H) 02/22/2023    BASO 0.11 02/22/2023    EOSINOPHIL 3.4 02/22/2023    BASOPHIL 0.6 02/22/2023       BMP  Lab Results   Component Value Date     02/22/2023    K 3.5 02/22/2023     02/22/2023    CO2 27 02/22/2023    BUN 18 02/22/2023    CREATININE 0.9 02/22/2023    CALCIUM 9.7 02/22/2023    ANIONGAP 15 02/22/2023    ESTGFRAFRICA >60 07/20/2022    EGFRNONAA >60 07/20/2022    AST 34 02/22/2023    ALT 44 02/22/2023    PROT 6.8 02/22/2023       Lab Results   Component Value Date    BNP <10 04/27/2022       Lab Results   Component Value Date    TSH 0.854 04/28/2022       Lab Results   Component Value Date    SEDRATE 110 (H) 11/01/2022       Lab Results    Component Value Date    CRP 54.2 (H) 11/01/2022     Lab Results   Component Value Date     (H) 05/02/2022        Lab Results   Component Value Date    ASPERGILLUS None Detected 04/29/2022     No results found for: AFUMIGATUSCL     Lab Results   Component Value Date    ACE 22 04/28/2022        Diagnostic Results:  I have personally reviewed today the following studies:  Office Spirometry Results:             X-Ray Chest PA And Lateral  Narrative: EXAM:  XR CHEST PA AND LATERAL    CLINICAL HISTORY: Shortness of breath    TECHNIQUE:  Two-view chest x-ray    COMPARISON STUDIES: 11/01/2022    FINDINGS:  Stable mild cardiomegaly.    There is a background of diffuse interstitial thickening throughout the mid to lower lungs more confluent areas of hazy airspace opacification at the lung bases, right greater than left.  Slightly increasing opacification at the right lung base.  No significant effusion evident.    No acute osseous findings. .  There are no advanced arthritic changes.  Impression: Slight interval worsening.  Increasing hazy airspace opacification right lung base superimposed upon chronic change.    Finalized on: 3/7/2023 8:53 AM By:  Nicolas Cowart MD  [facility]# [id number]      2023-03-07 08:55:21.150    [facility]        No results for input(s): PH, PCO2, PO2, HCO3, POCSATURATED, BE in the last 72 hours.         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    Culture, Respiratory with Gram Stain    Ambulatory referral/consult to Advanced Lung Disease    Case Request Endoscopy: Bronchoscopy - insert lighted tube into airway to take a biopsy of lung (Completed)    Rheumatoid arthritis with positive rheumatoid factor    Relevant Medications    nintedanib (OFEV) 100 mg Cap    Other Relevant Orders    Ambulatory  referral/consult to Advanced Lung Disease    Case Request Endoscopy: Bronchoscopy - insert lighted tube into airway to take a biopsy of lung (Completed)    Chronic respiratory failure with hypoxia    Relevant Orders    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    Culture, Respiratory with Gram Stain    Case Request Endoscopy: Bronchoscopy - insert lighted tube into airway to take a biopsy of lung (Completed)    Morbid obesity with BMI of 40.0-44.9, adult    GLENN on CPAP    Restrictive lung disease    Relevant Orders    Ambulatory referral/consult to Advanced Lung Disease    Case Request Endoscopy: Bronchoscopy - insert lighted tube into airway to take a biopsy of lung (Completed)    Exercise hypoxemia    Interstitial lung disease    Relevant Medications    nintedanib (OFEV) 100 mg Cap     Other Visit Diagnoses       Thrush        Relevant Medications    nystatin (MYCOSTATIN) 100,000 unit/mL suspension                Increased OFEV  Reduced prednisone  Complete BACTARIM as ordered  Added nystatin  Bronch for atypical infection  Referal to advanced lung clinic     My diagnostic impression and work-up plan were discussed at length with patient. Risks were discussed. BRONCH procedure. Complications of the procedure discussed in detail with patient. Complications including but not limited to infection that may require hospital admission, bleeding that may require blood transfusion and or hospital admission, perforation of the lung which may require surgery,chance of injury to the throat, windpipe or bronchial tubes, laryngospam, coughing, aspiration, hypoxemia or cardiac arrythmias. Patient expressed and verbalized understanding. The material risks of anesthesia in connection with the procedure including brain damage, paralysis from the neck downwards, paralysis from the waist downwards, loss of function of an arm or a leg, disfigurement (incluing scars)and death were discussed with patient who expressedand verbalized understanding.  Alternate treatments and material risks associated with such alternatives were discussed with pateint. These include radiologic surveillance with minimal risk and sugery with an indeterminate risk. The material risks of refusing the procedure was discussed in detail. This includes no diagnosis or confirmation of diagnisis and rendering of appropriate treatment the risk of which depends on the nature of the diagnosed illness. Patient expressed and verbalized understanding. Procedure scheduled for date TBD. Consent signed. Orders entered. Coagulation studies per orders.   All questions were answered and the patient expressed understanding      Follow up in about 4 weeks (around 4/4/2023), or Sputum, ref advance lung disease, cont current, Added Nystatin, Bronch consent.    This note was prepared using voice recognition system and is likely to have sound alike errors that may have been overlooked even after proof reading.  Please call me with any questions    Discussed diagnosis, its evaluation, treatment and usual course. All questions answered.      Agustín Aviles MD     Requested Prescriptions     Signed Prescriptions Disp Refills    nystatin (MYCOSTATIN) 100,000 unit/mL suspension 240 mL 1     Sig: Swish and spit 4 mLs (400,000 Units total) by mouth 2 hours after meals and at bedtime for 10 days    nintedanib (OFEV) 100 mg Cap 120 capsule 11     Sig: Take 100 mg by mouth 2 (two) times daily.

## 2023-03-07 NOTE — H&P (VIEW-ONLY)
Pulmonary Outpatient  Visit     Subjective:       Patient ID: Ayanna Alicia is a 52 y.o. female.    Chief Complaint: Asthma, Apnea, and Breathing Problem        Ayanna Alicia is 51 y.o.  Post hospital f/u  Acute respiratory failure ILD, +ve RF  Was discharged on oxygen  Here to review results  Explained  PFT: severe restriction and reduced DLCO  ABG  6MWD: oxygen desat needs supplementary oxygen 3 LPM  Has some nose bleed will add AYR gel and humifier bottle  FMLA paper work given  Out of work letter   Added PEPCID and Bactrim  Adherent with inhaler    05/18/2022  Here to see today  Concern, Dyspnea with activity palpitations  Seen Rheumatology: Added CELLCEPT  On steriod taper  No cough wheezing  On oxygen 3 LPM  Had GLENN in 2019: was prescribed CPAP returned back  Needs PAP at night  Motivated to restart      07/15/2022  Last seen 05/18/2022  ACT score 10, [place] score 6  CPAP study: CPAP ordered  Await   Says cannot go to work, tied, focus off  6MWD: RA sat was 95%  O2 titrated to 4-6 LPM  Tachycardia noted : 145 BPM    09/20/2022  Last seen 07/15/2022  Here to review progress  Enrolled in pul rehab, session 8  Sat Stable @ 2-3 LPM  ACt score 5, [place] 6  No cough, better exercise tolerance  Stable on cellcept 1000mg BID + prednisone 10 mg  Will DW Rheum whether can titrate up cellcept to wean steriods  CPAP download shows adherence   CPAP 9 cm with resudual AHI 0.2  Usage > 4 hrs was 67%  Cehst CT reviewed  Repeat PFT pending      12/28/2022  Last seen 11/07/2022  Here to review chest CT done recently  Fatigue, SOB, on 6 LPM  SP rituxan 4 doses  ABG reveiwed  ESR and CRP were increased  On Prednisone 10 mg  Hoarse voice  Discussed utility of bronchoscopy if infection is considered  TBBX would have risk of flaring and acute resp failure:  Serologies sent  Added OFEV  Will also reduce fluid intake to approx 32 oz  Will reenrol on Pul  rehab      03/07/2023  Urgent visit: weak, easily desat  Subjectively better  Seen Dr Stallings: Diflucan and bactrim  Oral thrush: not present today  No fever  On 6 LPM  Dropped weight from 241Lb to 228 lb  Ofev increased: felt better onofev  Will decrease prednisone to 10 mg since concern for PJP  Discussed referal to advance lung disease  Sputum culture  In wheel chair  Adherent with CPAP  Schedule bronch for BAL next week      Asthma Control Test  In the past 4  weeks, how much of the time did your asthma keep you from getting as much done at work, school or at home?: All of the time  During the past 4 weeks, how often have you had shortness of breath?: More than once a day  During the past 4 weeks, how often did your asthma symptoms (wheezing, couging, shortness of breath, chest tightness or pain) wake you up at night or earlier than usual in the morning?: Once or twice  During the past 4 weeks, how often have you used your rescue inhaler or nebulizer medication (such as albuterol)?: 3 or more times per day  How would you rate your asthma control during the past 4 weeks?: Not controlled at all  If your score is 19 or less, your asthma may not be under control: 8          MMRC Dyspnea Scale (4 is worst)     [] MMRC 0: Dyspneic on strenuous excercise (0 points)    [] MMRC 1: Dyspneic on walking a slight hill (0 points)    [x] MMRC 2: Dyspneic on walking level ground; must stop occasionally due to breathlessness (1 point)    [] MMRC 3: Must stop for breathlessness after walking 100 yards or after a few minutes (2 points)    [] MMRC 4: Cannot leave house; breathless on dressing/undressing (3 points)          EPWORTH SLEEPINESS SCALE 12/28/2022   Sitting and reading 1   Watching TV 1   Sitting, inactive in a public place (e.g. a theatre or a meeting) 0   As a passenger in a car for an hour without a break 1   Lying down to rest in the afternoon when circumstances permit 3   Sitting and talking to someone 0   Sitting  quietly after a lunch without alcohol 0   In a car, while stopped for a few minutes in traffic 0   Total score 6        Asthma Control Test  In the past 4  weeks, how much of the time did your asthma keep you from getting as much done at work, school or at home?: All of the time  During the past 4 weeks, how often have you had shortness of breath?: More than once a day  During the past 4 weeks, how often did your asthma symptoms (wheezing, couging, shortness of breath, chest tightness or pain) wake you up at night or earlier than usual in the morning?: Once or twice  During the past 4 weeks, how often have you used your rescue inhaler or nebulizer medication (such as albuterol)?: 3 or more times per day  How would you rate your asthma control during the past 4 weeks?: Not controlled at all  If your score is 19 or less, your asthma may not be under control: 8        The following portions of the patient's history were reviewed and updated as appropriate:   She  has a past medical history of Abnormal Pap smear of cervix, Acute interstitial pneumonitis, Allergic rhinitis, cause unspecified, Arthritis of both knees, Asthma, Eczema, Fatty liver (10/2014), Fibrocystic breast changes, Headache(784.0), Hepatomegaly (10/2014), Hypertension, Liver cyst (10/2014), Multinodular goiter, Polymenorrhea (), TMJ (dislocation of temporomandibular joint), Uterine fibroid, and Vitamin D deficiency disease.  She does not have any pertinent problems on file.  She  has a past surgical history that includes  section, classic; Multiple tooth extractions; Partial hysterectomy (2013); Hysterectomy; and Colonoscopy (N/A, 2020).  Her family history includes Cancer (age of onset: 50) in her maternal aunt; Cancer (age of onset: 60) in her maternal grandmother; Cancer (age of onset: 66) in her maternal aunt; Cataracts in her cousin; Diabetes in her maternal grandfather; Diverticulitis in her mother; Heart disease in her  mother; Heart disease (age of onset: 63) in her father; Hypertension in her father; Migraines in her cousin; Peripheral vascular disease in her maternal grandfather; Stroke in her maternal grandfather, mother, and sister.  She  reports that she has never smoked. She has never used smokeless tobacco. She reports current alcohol use. She reports current drug use. Frequency: 4.00 times per week. Drug: Marijuana.  She has a current medication list which includes the following prescription(s): albuterol, albuterol-ipratropium, amlodipine, ascorbic acid (vitamin c), calcium/magnesium/vit b comp, doxycycline, fluconazole, fluticasone furoate-vilanterol, hydrochlorothiazide, levocetirizine, metolazone, montelukast, omega-3s/dha/epa/fish oil/d3, ondansetron, prednisone, sertraline, sulfamethoxazole-trimethoprim 800-160mg, sulfamethoxazole-trimethoprim 800-160mg, mycophenolate, ofev, and nystatin, and the following Facility-Administered Medications: sodium chloride 0.9%.  Current Outpatient Medications on File Prior to Visit   Medication Sig Dispense Refill    albuterol (PROVENTIL/VENTOLIN HFA) 90 mcg/actuation inhaler INHALE 1 TO 2 PUFFS BY MOUTH INTO THE LUNGS EVERY 4 TO 6 HOURS AS NEEDED FOR WHEEZING OR SHORTNESS OF BREATH 8.5 g 5    albuterol-ipratropium (DUO-NEB) 2.5 mg-0.5 mg/3 mL nebulizer solution Take 3 mLs by nebulization every 6 (six) hours as needed for Wheezing. Rescue (Patient taking differently: Take 3 mLs by nebulization 3 (three) times daily. Rescue) 90 mL 11    amLODIPine (NORVASC) 10 MG tablet Take 1 tablet (10 mg total) by mouth once daily. 90 tablet 1    ascorbic acid, vitamin C, (VITAMIN C) 500 MG tablet Take 500 mg by mouth once daily.      calcium/magnesium/vit B comp (CALCIUM-MAGNESIUM-B COMPLEX ORAL) Take 1 tablet by mouth once daily at 6am.      doxycycline (VIBRAMYCIN) 100 MG Cap Take 1 capsule (100 mg total) by mouth 2 (two) times daily. 10 capsule 1    fluconazole (DIFLUCAN) 100 MG tablet Take 1  tablet (100 mg total) by mouth 2 (two) times a day. 56 tablet 0    fluticasone furoate-vilanteroL (BREO ELLIPTA) 100-25 mcg/dose diskus inhaler INHALE 1 PUFF INTO THE LUNGS ONCE DAILY 60 each 5    hydroCHLOROthiazide (HYDRODIURIL) 12.5 MG Tab Take 1 tablet (12.5 mg total) by mouth once daily. 90 tablet 1    levocetirizine (XYZAL) 5 MG tablet TAKE 1 TABLET(5 MG) BY MOUTH EVERY DAY 90 tablet 1    metOLazone (ZAROXOLYN) 2.5 MG tablet Take 1 tablet (2.5 mg total) by mouth every Mon, Wed, Fri. 36 tablet 3    montelukast (SINGULAIR) 10 mg tablet Take 1 tablet (10 mg total) by mouth every evening. 90 tablet 1    omega-3s/dha/epa/fish oil/D3 (VITAMIN-D + OMEGA-3 ORAL) Take 1 tablet by mouth once daily at 6am.      ondansetron (ZOFRAN-ODT) 4 MG TbDL Dissolve 1 tablet (4 mg total) by mouth every 6 (six) hours as needed (nausea). 90 tablet 0    predniSONE (DELTASONE) 10 MG tablet Take 2 tablets (20 mg total) by mouth once daily. (Patient taking differently: Take 10 mg by mouth once daily.) 60 tablet 3    sertraline (ZOLOFT) 50 MG tablet Take 1 tablet (50 mg total) by mouth once daily. 90 tablet 3    sulfamethoxazole-trimethoprim 800-160mg (BACTRIM DS) 800-160 mg Tab Take 1 tablet by mouth every Mon, Wed, Fri. PCP prophylaxis while on prednisone 20 mg 36 tablet 1    sulfamethoxazole-trimethoprim 800-160mg (BACTRIM DS) 800-160 mg Tab Take 1 tablet by mouth once daily. Take this instead of your usual Bactrim 3 times weekly dose for the next 10 days, then resume your usual Bactrim schedule. for 14 days 14 tablet 0    mycophenolate (CELLCEPT) 500 mg Tab Take 3 tablets (1,500 mg total) by mouth 2 (two) times daily. 180 tablet 0     Current Facility-Administered Medications on File Prior to Visit   Medication Dose Route Frequency Provider Last Rate Last Admin    sodium chloride 0.9% flush 10 mL  10 mL Intravenous PRN Agustín Aviles MD         She is allergic to doxycycline, fluticasone, and penicillins..      Review of Systems    Constitutional:  Positive for activity change and fatigue.   Respiratory:  Positive for dyspnea on extertion.    Gastrointestinal:  Positive for acid reflux.   All other systems reviewed and are negative.    Outpatient Encounter Medications as of 3/7/2023   Medication Sig Dispense Refill    albuterol (PROVENTIL/VENTOLIN HFA) 90 mcg/actuation inhaler INHALE 1 TO 2 PUFFS BY MOUTH INTO THE LUNGS EVERY 4 TO 6 HOURS AS NEEDED FOR WHEEZING OR SHORTNESS OF BREATH 8.5 g 5    albuterol-ipratropium (DUO-NEB) 2.5 mg-0.5 mg/3 mL nebulizer solution Take 3 mLs by nebulization every 6 (six) hours as needed for Wheezing. Rescue (Patient taking differently: Take 3 mLs by nebulization 3 (three) times daily. Rescue) 90 mL 11    amLODIPine (NORVASC) 10 MG tablet Take 1 tablet (10 mg total) by mouth once daily. 90 tablet 1    ascorbic acid, vitamin C, (VITAMIN C) 500 MG tablet Take 500 mg by mouth once daily.      calcium/magnesium/vit B comp (CALCIUM-MAGNESIUM-B COMPLEX ORAL) Take 1 tablet by mouth once daily at 6am.      doxycycline (VIBRAMYCIN) 100 MG Cap Take 1 capsule (100 mg total) by mouth 2 (two) times daily. 10 capsule 1    fluconazole (DIFLUCAN) 100 MG tablet Take 1 tablet (100 mg total) by mouth 2 (two) times a day. 56 tablet 0    fluticasone furoate-vilanteroL (BREO ELLIPTA) 100-25 mcg/dose diskus inhaler INHALE 1 PUFF INTO THE LUNGS ONCE DAILY 60 each 5    hydroCHLOROthiazide (HYDRODIURIL) 12.5 MG Tab Take 1 tablet (12.5 mg total) by mouth once daily. 90 tablet 1    levocetirizine (XYZAL) 5 MG tablet TAKE 1 TABLET(5 MG) BY MOUTH EVERY DAY 90 tablet 1    metOLazone (ZAROXOLYN) 2.5 MG tablet Take 1 tablet (2.5 mg total) by mouth every Mon, Wed, Fri. 36 tablet 3    montelukast (SINGULAIR) 10 mg tablet Take 1 tablet (10 mg total) by mouth every evening. 90 tablet 1    omega-3s/dha/epa/fish oil/D3 (VITAMIN-D + OMEGA-3 ORAL) Take 1 tablet by mouth once daily at 6am.      ondansetron (ZOFRAN-ODT) 4 MG TbDL Dissolve 1 tablet (4  "mg total) by mouth every 6 (six) hours as needed (nausea). 90 tablet 0    predniSONE (DELTASONE) 10 MG tablet Take 2 tablets (20 mg total) by mouth once daily. (Patient taking differently: Take 10 mg by mouth once daily.) 60 tablet 3    sertraline (ZOLOFT) 50 MG tablet Take 1 tablet (50 mg total) by mouth once daily. 90 tablet 3    sulfamethoxazole-trimethoprim 800-160mg (BACTRIM DS) 800-160 mg Tab Take 1 tablet by mouth every Mon, Wed, Fri. PCP prophylaxis while on prednisone 20 mg 36 tablet 1    sulfamethoxazole-trimethoprim 800-160mg (BACTRIM DS) 800-160 mg Tab Take 1 tablet by mouth once daily. Take this instead of your usual Bactrim 3 times weekly dose for the next 10 days, then resume your usual Bactrim schedule. for 14 days 14 tablet 0    [DISCONTINUED] nintedanib (OFEV) 100 mg Cap Take 50 mg by mouth 2 (two) times daily. (Patient taking differently: Take 100 mg by mouth 2 (two) times daily.) 60 capsule 6    mycophenolate (CELLCEPT) 500 mg Tab Take 3 tablets (1,500 mg total) by mouth 2 (two) times daily. 180 tablet 0    nintedanib (OFEV) 100 mg Cap Take 100 mg by mouth 2 (two) times daily. 120 capsule 11    nystatin (MYCOSTATIN) 100,000 unit/mL suspension Swish and spit 4 mLs (400,000 Units total) by mouth 2 hours after meals and at bedtime for 10 days 240 mL 1     Facility-Administered Encounter Medications as of 3/7/2023   Medication Dose Route Frequency Provider Last Rate Last Admin    sodium chloride 0.9% flush 10 mL  10 mL Intravenous PRN Agustín Aviles MD           Objective:     Vital Signs (Most Recent)  Vital Signs  Pulse: 107  SpO2: (!) 90 % (6 liters 90-95)  BP: 132/78  Height and Weight  Height: 5' 6" (167.6 cm)  Weight: 104.7 kg (230 lb 13.2 oz)  BSA (Calculated - sq m): 2.21 sq meters  BMI (Calculated): 37.3  Weight in (lb) to have BMI = 25: 154.6]  Wt Readings from Last 2 Encounters:   03/07/23 104.7 kg (230 lb 13.2 oz)   03/02/23 104.5 kg (230 lb 6.1 oz)       Physical Exam "   Constitutional: She is oriented to person, place, and time. She appears well-developed and well-nourished.   HENT:   Head: Normocephalic.   Mouth/Throat: Mallampati Score: II.   Neck: No JVD present.   Cardiovascular: Normal rate and intact distal pulses.   No murmur heard.  Pulmonary/Chest: Normal expansion and effort normal. She has rales.   Abdominal: Soft. Bowel sounds are normal.   Musculoskeletal:         General: No edema. Normal range of motion.      Cervical back: Normal range of motion and neck supple.   Lymphadenopathy:     She has no axillary adenopathy.   Neurological: She is alert and oriented to person, place, and time.   Skin: Skin is warm and dry. No cyanosis. Nails show no clubbing.   Psychiatric: She has a normal mood and affect.   Nursing note and vitals reviewed.    Laboratory  Lab Results   Component Value Date    WBC 17.91 (H) 02/22/2023    RBC 4.09 02/22/2023    HGB 10.0 (L) 02/22/2023    HCT 33.3 (L) 02/22/2023    MCV 81 (L) 02/22/2023    MCH 24.4 (L) 02/22/2023    MCHC 30.0 (L) 02/22/2023    RDW 18.5 (H) 02/22/2023     (H) 02/22/2023    MPV 8.8 (L) 02/22/2023    GRAN 14.2 (H) 02/22/2023    GRAN 79.0 (H) 02/22/2023    LYMPH 1.8 02/22/2023    LYMPH 9.9 (L) 02/22/2023    MONO 1.1 (H) 02/22/2023    MONO 6.1 02/22/2023    EOS 0.6 (H) 02/22/2023    BASO 0.11 02/22/2023    EOSINOPHIL 3.4 02/22/2023    BASOPHIL 0.6 02/22/2023       BMP  Lab Results   Component Value Date     02/22/2023    K 3.5 02/22/2023     02/22/2023    CO2 27 02/22/2023    BUN 18 02/22/2023    CREATININE 0.9 02/22/2023    CALCIUM 9.7 02/22/2023    ANIONGAP 15 02/22/2023    ESTGFRAFRICA >60 07/20/2022    EGFRNONAA >60 07/20/2022    AST 34 02/22/2023    ALT 44 02/22/2023    PROT 6.8 02/22/2023       Lab Results   Component Value Date    BNP <10 04/27/2022       Lab Results   Component Value Date    TSH 0.854 04/28/2022       Lab Results   Component Value Date    SEDRATE 110 (H) 11/01/2022       Lab Results    Component Value Date    CRP 54.2 (H) 11/01/2022     Lab Results   Component Value Date     (H) 05/02/2022        Lab Results   Component Value Date    ASPERGILLUS None Detected 04/29/2022     No results found for: AFUMIGATUSCL     Lab Results   Component Value Date    ACE 22 04/28/2022        Diagnostic Results:  I have personally reviewed today the following studies:  Office Spirometry Results:             X-Ray Chest PA And Lateral  Narrative: EXAM:  XR CHEST PA AND LATERAL    CLINICAL HISTORY: Shortness of breath    TECHNIQUE:  Two-view chest x-ray    COMPARISON STUDIES: 11/01/2022    FINDINGS:  Stable mild cardiomegaly.    There is a background of diffuse interstitial thickening throughout the mid to lower lungs more confluent areas of hazy airspace opacification at the lung bases, right greater than left.  Slightly increasing opacification at the right lung base.  No significant effusion evident.    No acute osseous findings. .  There are no advanced arthritic changes.  Impression: Slight interval worsening.  Increasing hazy airspace opacification right lung base superimposed upon chronic change.    Finalized on: 3/7/2023 8:53 AM By:  Nicolas Cowart MD  [facility]# [id number]      2023-03-07 08:55:21.150    [facility]        No results for input(s): PH, PCO2, PO2, HCO3, POCSATURATED, BE in the last 72 hours.         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    Culture, Respiratory with Gram Stain    Ambulatory referral/consult to Advanced Lung Disease    Case Request Endoscopy: Bronchoscopy - insert lighted tube into airway to take a biopsy of lung (Completed)    Rheumatoid arthritis with positive rheumatoid factor    Relevant Medications    nintedanib (OFEV) 100 mg Cap    Other Relevant Orders    Ambulatory  referral/consult to Advanced Lung Disease    Case Request Endoscopy: Bronchoscopy - insert lighted tube into airway to take a biopsy of lung (Completed)    Chronic respiratory failure with hypoxia    Relevant Orders    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    Culture, Respiratory with Gram Stain    Case Request Endoscopy: Bronchoscopy - insert lighted tube into airway to take a biopsy of lung (Completed)    Morbid obesity with BMI of 40.0-44.9, adult    GLENN on CPAP    Restrictive lung disease    Relevant Orders    Ambulatory referral/consult to Advanced Lung Disease    Case Request Endoscopy: Bronchoscopy - insert lighted tube into airway to take a biopsy of lung (Completed)    Exercise hypoxemia    Interstitial lung disease    Relevant Medications    nintedanib (OFEV) 100 mg Cap     Other Visit Diagnoses       Thrush        Relevant Medications    nystatin (MYCOSTATIN) 100,000 unit/mL suspension                Increased OFEV  Reduced prednisone  Complete BACTARIM as ordered  Added nystatin  Bronch for atypical infection  Referal to advanced lung clinic     My diagnostic impression and work-up plan were discussed at length with patient. Risks were discussed. BRONCH procedure. Complications of the procedure discussed in detail with patient. Complications including but not limited to infection that may require hospital admission, bleeding that may require blood transfusion and or hospital admission, perforation of the lung which may require surgery,chance of injury to the throat, windpipe or bronchial tubes, laryngospam, coughing, aspiration, hypoxemia or cardiac arrythmias. Patient expressed and verbalized understanding. The material risks of anesthesia in connection with the procedure including brain damage, paralysis from the neck downwards, paralysis from the waist downwards, loss of function of an arm or a leg, disfigurement (incluing scars)and death were discussed with patient who expressedand verbalized understanding.  Alternate treatments and material risks associated with such alternatives were discussed with pateint. These include radiologic surveillance with minimal risk and sugery with an indeterminate risk. The material risks of refusing the procedure was discussed in detail. This includes no diagnosis or confirmation of diagnisis and rendering of appropriate treatment the risk of which depends on the nature of the diagnosed illness. Patient expressed and verbalized understanding. Procedure scheduled for date TBD. Consent signed. Orders entered. Coagulation studies per orders.   All questions were answered and the patient expressed understanding      Follow up in about 4 weeks (around 4/4/2023), or Sputum, ref advance lung disease, cont current, Added Nystatin, Bronch consent.    This note was prepared using voice recognition system and is likely to have sound alike errors that may have been overlooked even after proof reading.  Please call me with any questions    Discussed diagnosis, its evaluation, treatment and usual course. All questions answered.      Agustín Aviles MD     Requested Prescriptions     Signed Prescriptions Disp Refills    nystatin (MYCOSTATIN) 100,000 unit/mL suspension 240 mL 1     Sig: Swish and spit 4 mLs (400,000 Units total) by mouth 2 hours after meals and at bedtime for 10 days    nintedanib (OFEV) 100 mg Cap 120 capsule 11     Sig: Take 100 mg by mouth 2 (two) times daily.

## 2023-03-08 ENCOUNTER — PATIENT MESSAGE (OUTPATIENT)
Dept: ENDOSCOPY | Facility: HOSPITAL | Age: 53
End: 2023-03-08
Payer: COMMERCIAL

## 2023-03-08 ENCOUNTER — PATIENT MESSAGE (OUTPATIENT)
Dept: TRANSPLANT | Facility: CLINIC | Age: 53
End: 2023-03-08
Payer: COMMERCIAL

## 2023-03-08 ENCOUNTER — TELEPHONE (OUTPATIENT)
Dept: TRANSPLANT | Facility: CLINIC | Age: 53
End: 2023-03-08
Payer: COMMERCIAL

## 2023-03-08 DIAGNOSIS — J84.9 INTERSTITIAL LUNG DISEASE: Primary | ICD-10-CM

## 2023-03-08 NOTE — TELEPHONE ENCOUNTER
Spoke with patient about upcoming appointments on 3/13/23      ----- Message from Keira CAR Do RN sent at 3/8/2023  3:41 PM CST -----  Regarding: new referral  Popeye Urrutia,  Mitch patient needs an appt with Poojary with PFTs and 6 MWT (with continuous/interval SPO2).  Patient is aware and looking forward to your call.    Thanks,  Ghada HUNTER

## 2023-03-09 ENCOUNTER — TELEPHONE (OUTPATIENT)
Dept: TRANSPLANT | Facility: CLINIC | Age: 53
End: 2023-03-09
Payer: COMMERCIAL

## 2023-03-09 ENCOUNTER — DOCUMENTATION ONLY (OUTPATIENT)
Dept: TRANSPLANT | Facility: CLINIC | Age: 53
End: 2023-03-09
Payer: COMMERCIAL

## 2023-03-09 RX ORDER — LIDOCAINE HYDROCHLORIDE 40 MG/ML
4 SOLUTION TOPICAL ONCE
Status: CANCELLED | OUTPATIENT
Start: 2023-03-09 | End: 2023-03-09

## 2023-03-09 RX ORDER — SODIUM CHLORIDE 9 MG/ML
INJECTION, SOLUTION INTRAVENOUS CONTINUOUS
Status: CANCELLED | OUTPATIENT
Start: 2023-03-09

## 2023-03-09 RX ORDER — LIDOCAINE HYDROCHLORIDE 20 MG/ML
20 INJECTION, SOLUTION INFILTRATION; PERINEURAL ONCE
Status: CANCELLED | OUTPATIENT
Start: 2023-03-09 | End: 2023-03-09

## 2023-03-09 NOTE — TELEPHONE ENCOUNTER
"Received written orders from Dr. Vincent.  Patient contacted and scheduled with Dr. Vincent in the ALD clinic on 3/13 with PFTs and a 6MWT.        ----- Message from Le Vincent MD sent at 3/7/2023  2:23 PM CST -----  Regarding: RE: ALD Referral  That ABG is good  ----- Message -----  From: Keira CAR Do, RN  Sent: 3/7/2023  12:15 PM CST  To: Le Vincent MD  Subject: RE: ALD Referral                                 ABG's done on 12/22.  Do you want an updated one?  ----- Message -----  From: Le Vincent MD  Sent: 3/7/2023  12:06 PM CST  To: Keira CAR Do, RN  Subject: RE: ALD Referral                                 ALD routine   Testing:  PFTs, 6mwt with continuous/interval SPO2 (current exertional O2 needs/?6LPM), ABG    ----- Message -----  From: Keira CAR Do, RN  Sent: 3/7/2023  12:03 PM CST  To: Le Vincent MD  Subject: ALD Referral                                     Advanced Lung Disease (ALD) Clinic Referral Note    Referral from: Pulmonologist DR. Agustín Aviles    Lung diagnosis: IIP: Nonspecific Interstitial Pneumonia, Rheumatoid Disease    Age: 52 y.o.    Height/Weight/BMI:  Ht: 5'6" / Wt: 104.7 kg /Body mass index is 37.26 kg/m².    Smoking history: Social History    Tobacco Use      Smoking status: Never      Smokeless tobacco: Never    PFT date: 05/05/2022  FVC  1.54 / 42.7  FEV1  1.34 / 46.9  FEV1/FVC   87 / 109.1    6MWT: 10/10/2022-Total distance 213.36 meter.  93% on 3L of O2 desat to 74%, increased O2 to 6L     CXR date: 03/07/2023  Impression: Slight interval worsening.  Increasing hazy airspace opacification right lung base superimposed upon chronic change.    Chest CT date: 11/07/2022  Impression:  1.  Detrimental change.  There continues to be progression of the ground-glass opacities throughout the lungs, especially the lower half of the lungs.  The previous diagnostic considerations remain valid.  As clinically warranted, the patient may benefit from a bronchoscopy with " BAL or open lung biopsy if the etiology of this disease process is unknown/uncertain at this time.     2.  Stable findings as noted above.    Echo date: 09/20/2022    Impression:    · There is no evidence of intracardiac shunting.  · The left ventricle is normal in size with concentric remodeling and normal systolic function.  · The estimated ejection fraction is 60%.  · Normal left ventricular diastolic function.  · Normal right ventricular size with normal right ventricular systolic function.  · Normal central venous pressure (3 mmHg).  · The estimated PA systolic pressure is 34 mmHg    Other pertinent medical history:  OFEV prescribed today by general pulmonologist. On MMF prescribed by Rheumotologist.    GLENN on CPAP     Restrictive lung disease    Relevant Orders    Ambulatory referral/consult to Advanced Lung Disease    Case Request Endoscopy: Bronchoscopy - insert lighted tube into airway to take a biopsy of lung (Completed)    Exercise hypoxemia    Interstitial lung disease    Relevant Medications    nintedanib (OFEV) 100 mg Cap    Please send recs.

## 2023-03-10 ENCOUNTER — TELEPHONE (OUTPATIENT)
Dept: SLEEP MEDICINE | Facility: CLINIC | Age: 53
End: 2023-03-10
Payer: COMMERCIAL

## 2023-03-10 ENCOUNTER — PATIENT MESSAGE (OUTPATIENT)
Dept: ENDOSCOPY | Facility: HOSPITAL | Age: 53
End: 2023-03-10
Payer: COMMERCIAL

## 2023-03-10 ENCOUNTER — TELEPHONE (OUTPATIENT)
Dept: TRANSPLANT | Facility: CLINIC | Age: 53
End: 2023-03-10
Payer: COMMERCIAL

## 2023-03-10 NOTE — TELEPHONE ENCOUNTER
Spoke with patient about r/s upcoming appointments on 3/13 to 3/16              FW: Reschedule appt  Received: Today  Keira CAR Do, RN  Ghada Johnson MA  Caller: [phone] (Today, 11:29 AM)         Previous Messages       ----- Message -----   From: Ino Arvizu MA   Sent: 3/10/2023  11:34 AM CST   To: [facility]   Subject: Reschedule appt                                   Patient does think she has enough tanks to make to the appt. Requesting her appt for next day schedule?

## 2023-03-10 NOTE — TELEPHONE ENCOUNTER
----- Message from Zaira Connor, RRT sent at 3/9/2023  3:38 PM CST -----  Dr. Aviles    I spoke to intake team in [place] who handles the oxygen.. I was told they will have Mrs. Alicia oxygen concentrator swap out for the 10LPM one.  I called her and left her a message stating that she should be hearing from [facility] soon about the swap out and if not, to call me.    Zaira

## 2023-03-14 ENCOUNTER — TELEPHONE (OUTPATIENT)
Dept: SLEEP MEDICINE | Facility: CLINIC | Age: 53
End: 2023-03-14
Payer: COMMERCIAL

## 2023-03-14 NOTE — TELEPHONE ENCOUNTER
Wants to reschedule due to appt Advanced lung disease clinic day after bronch  Will move appt to April 5th  Tolerating OFEV increas and taper of steriods  Voice better

## 2023-03-15 ENCOUNTER — TELEPHONE (OUTPATIENT)
Dept: TRANSPLANT | Facility: CLINIC | Age: 53
End: 2023-03-15
Payer: COMMERCIAL

## 2023-03-15 ENCOUNTER — PATIENT MESSAGE (OUTPATIENT)
Dept: PULMONOLOGY | Facility: CLINIC | Age: 53
End: 2023-03-15
Payer: COMMERCIAL

## 2023-03-15 DIAGNOSIS — J45.20 MILD INTERMITTENT ASTHMA WITHOUT COMPLICATION: Primary | ICD-10-CM

## 2023-03-15 RX ORDER — IPRATROPIUM BROMIDE 0.5 MG/2.5ML
500 SOLUTION RESPIRATORY (INHALATION) 4 TIMES DAILY
Qty: 300 ML | Refills: 11 | Status: SHIPPED | OUTPATIENT
Start: 2023-03-15 | End: 2023-10-24

## 2023-03-15 NOTE — TELEPHONE ENCOUNTER
Requested Prescriptions     Signed Prescriptions Disp Refills    ipratropium (ATROVENT) 0.02 % nebulizer solution 300 mL 11     Sig: Take 2.5 mLs (500 mcg total) by nebulization 4 (four) times daily. Rescue     Authorizing Provider: DANE BARRY

## 2023-03-16 ENCOUNTER — OFFICE VISIT (OUTPATIENT)
Dept: TRANSPLANT | Facility: CLINIC | Age: 53
End: 2023-03-16
Payer: COMMERCIAL

## 2023-03-16 ENCOUNTER — HOSPITAL ENCOUNTER (OUTPATIENT)
Dept: PULMONOLOGY | Facility: CLINIC | Age: 53
Discharge: HOME | End: 2023-03-16
Payer: COMMERCIAL

## 2023-03-16 VITALS
BODY MASS INDEX: 36.95 KG/M2 | WEIGHT: 229.94 LBS | WEIGHT: 229.94 LBS | RESPIRATION RATE: 18 BRPM | HEART RATE: 110 BPM | HEIGHT: 66 IN | TEMPERATURE: 101 F | HEIGHT: 66 IN | DIASTOLIC BLOOD PRESSURE: 66 MMHG | SYSTOLIC BLOOD PRESSURE: 119 MMHG | OXYGEN SATURATION: 100 % | BODY MASS INDEX: 36.95 KG/M2

## 2023-03-16 DIAGNOSIS — J84.111 CHRONIC INTERSTITIAL PNEUMONIA: ICD-10-CM

## 2023-03-16 DIAGNOSIS — J84.9 INTERSTITIAL LUNG DISEASE: ICD-10-CM

## 2023-03-16 DIAGNOSIS — M05.7A RHEUMATOID ARTHRITIS OF OTHER SITE WITH POSITIVE RHEUMATOID FACTOR: ICD-10-CM

## 2023-03-16 DIAGNOSIS — J84.9 INTERSTITIAL PULMONARY DISEASE, UNSPECIFIED: Primary | ICD-10-CM

## 2023-03-16 DIAGNOSIS — J98.4 RESTRICTIVE LUNG DISEASE: ICD-10-CM

## 2023-03-16 LAB
ALBUMIN SERPL BCP-MCNC: 3.2 G/DL (ref 3.5–5.2)
ALP SERPL-CCNC: 97 U/L (ref 55–135)
ALT SERPL W/O P-5'-P-CCNC: 27 U/L (ref 10–44)
ANION GAP SERPL CALC-SCNC: 11 MMOL/L (ref 8–16)
AST SERPL-CCNC: 28 U/L (ref 10–40)
BASOPHILS # BLD AUTO: 0.1 K/UL (ref 0–0.2)
BASOPHILS NFR BLD: 0.6 % (ref 0–1.9)
BILIRUB SERPL-MCNC: 0.2 MG/DL (ref 0.1–1)
BUN SERPL-MCNC: 12 MG/DL (ref 6–20)
CALCIUM SERPL-MCNC: 9.8 MG/DL (ref 8.7–10.5)
CHLORIDE SERPL-SCNC: 101 MMOL/L (ref 95–110)
CO2 SERPL-SCNC: 28 MMOL/L (ref 23–29)
CREAT SERPL-MCNC: 0.9 MG/DL (ref 0.5–1.4)
DIFFERENTIAL METHOD: ABNORMAL
EOSINOPHIL # BLD AUTO: 0.2 K/UL (ref 0–0.5)
EOSINOPHIL NFR BLD: 1.2 % (ref 0–8)
ERYTHROCYTE [DISTWIDTH] IN BLOOD BY AUTOMATED COUNT: 18.2 % (ref 11.5–14.5)
EST. GFR  (NO RACE VARIABLE): >60 ML/MIN/1.73 M^2
GLUCOSE SERPL-MCNC: 67 MG/DL (ref 70–110)
HCT VFR BLD AUTO: 33.6 % (ref 37–48.5)
HGB BLD-MCNC: 10.1 G/DL (ref 12–16)
IMM GRANULOCYTES # BLD AUTO: 0.18 K/UL (ref 0–0.04)
IMM GRANULOCYTES NFR BLD AUTO: 1.1 % (ref 0–0.5)
LACTATE SERPL-SCNC: 1.2 MMOL/L (ref 0.5–2.2)
LYMPHOCYTES # BLD AUTO: 1.6 K/UL (ref 1–4.8)
LYMPHOCYTES NFR BLD: 9.6 % (ref 18–48)
MCH RBC QN AUTO: 24.9 PG (ref 27–31)
MCHC RBC AUTO-ENTMCNC: 30.1 G/DL (ref 32–36)
MCV RBC AUTO: 83 FL (ref 82–98)
MONOCYTES # BLD AUTO: 1 K/UL (ref 0.3–1)
MONOCYTES NFR BLD: 6 % (ref 4–15)
NEUTROPHILS # BLD AUTO: 13.4 K/UL (ref 1.8–7.7)
NEUTROPHILS NFR BLD: 81.5 % (ref 38–73)
NRBC BLD-RTO: 0 /100 WBC
PLATELET # BLD AUTO: 510 K/UL (ref 150–450)
PMV BLD AUTO: 9.1 FL (ref 9.2–12.9)
POTASSIUM SERPL-SCNC: 4.2 MMOL/L (ref 3.5–5.1)
PROT SERPL-MCNC: 7.6 G/DL (ref 6–8.4)
RBC # BLD AUTO: 4.05 M/UL (ref 4–5.4)
SODIUM SERPL-SCNC: 140 MMOL/L (ref 136–145)
WBC # BLD AUTO: 16.45 K/UL (ref 3.9–12.7)

## 2023-03-16 PROCEDURE — 3078F DIAST BP <80 MM HG: CPT | Mod: CPTII,NTX,S$GLB, | Performed by: INTERNAL MEDICINE

## 2023-03-16 PROCEDURE — 1160F PR REVIEW ALL MEDS BY PRESCRIBER/CLIN PHARMACIST DOCUMENTED: ICD-10-PCS | Mod: CPTII,NTX,S$GLB, | Performed by: INTERNAL MEDICINE

## 2023-03-16 PROCEDURE — 94726 PULM FUNCT TST PLETHYSMOGRAP: ICD-10-PCS | Mod: 53,NTX,S$GLB, | Performed by: INTERNAL MEDICINE

## 2023-03-16 PROCEDURE — 94729 PR C02/MEMBANE DIFFUSE CAPACITY: ICD-10-PCS | Mod: NTX,S$GLB,, | Performed by: INTERNAL MEDICINE

## 2023-03-16 PROCEDURE — 3078F PR MOST RECENT DIASTOLIC BLOOD PRESSURE < 80 MM HG: ICD-10-PCS | Mod: CPTII,NTX,S$GLB, | Performed by: INTERNAL MEDICINE

## 2023-03-16 PROCEDURE — 80053 COMPREHEN METABOLIC PANEL: CPT | Mod: NTX | Performed by: NURSE PRACTITIONER

## 2023-03-16 PROCEDURE — 1159F MED LIST DOCD IN RCRD: CPT | Mod: CPTII,NTX,S$GLB, | Performed by: INTERNAL MEDICINE

## 2023-03-16 PROCEDURE — 3074F SYST BP LT 130 MM HG: CPT | Mod: CPTII,NTX,S$GLB, | Performed by: INTERNAL MEDICINE

## 2023-03-16 PROCEDURE — 94010 BREATHING CAPACITY TEST: ICD-10-PCS | Mod: NTX,S$GLB,, | Performed by: INTERNAL MEDICINE

## 2023-03-16 PROCEDURE — 99215 PR OFFICE/OUTPT VISIT, EST, LEVL V, 40-54 MIN: ICD-10-PCS | Mod: NTX,S$GLB,, | Performed by: INTERNAL MEDICINE

## 2023-03-16 PROCEDURE — 99291 CRITICAL CARE FIRST HOUR: CPT | Mod: NTX

## 2023-03-16 PROCEDURE — 83880 ASSAY OF NATRIURETIC PEPTIDE: CPT | Mod: NTX | Performed by: NURSE PRACTITIONER

## 2023-03-16 PROCEDURE — 3008F PR BODY MASS INDEX (BMI) DOCUMENTED: ICD-10-PCS | Mod: CPTII,NTX,S$GLB, | Performed by: INTERNAL MEDICINE

## 2023-03-16 PROCEDURE — 99215 OFFICE O/P EST HI 40 MIN: CPT | Mod: NTX,S$GLB,, | Performed by: INTERNAL MEDICINE

## 2023-03-16 PROCEDURE — 94618 PULMONARY STRESS TESTING: ICD-10-PCS | Mod: NTX,S$GLB,, | Performed by: INTERNAL MEDICINE

## 2023-03-16 PROCEDURE — 99999 PR PBB SHADOW E&M-EST. PATIENT-LVL IV: ICD-10-PCS | Mod: PBBFAC,TXP,, | Performed by: INTERNAL MEDICINE

## 2023-03-16 PROCEDURE — 94729 DIFFUSING CAPACITY: CPT | Mod: NTX,S$GLB,, | Performed by: INTERNAL MEDICINE

## 2023-03-16 PROCEDURE — 3008F BODY MASS INDEX DOCD: CPT | Mod: CPTII,NTX,S$GLB, | Performed by: INTERNAL MEDICINE

## 2023-03-16 PROCEDURE — 94010 BREATHING CAPACITY TEST: CPT | Mod: NTX,S$GLB,, | Performed by: INTERNAL MEDICINE

## 2023-03-16 PROCEDURE — 94618 PULMONARY STRESS TESTING: CPT | Mod: NTX,S$GLB,, | Performed by: INTERNAL MEDICINE

## 2023-03-16 PROCEDURE — 1160F RVW MEDS BY RX/DR IN RCRD: CPT | Mod: CPTII,NTX,S$GLB, | Performed by: INTERNAL MEDICINE

## 2023-03-16 PROCEDURE — 1159F PR MEDICATION LIST DOCUMENTED IN MEDICAL RECORD: ICD-10-PCS | Mod: CPTII,NTX,S$GLB, | Performed by: INTERNAL MEDICINE

## 2023-03-16 PROCEDURE — 83605 ASSAY OF LACTIC ACID: CPT | Mod: NTX | Performed by: NURSE PRACTITIONER

## 2023-03-16 PROCEDURE — 99999 PR PBB SHADOW E&M-EST. PATIENT-LVL IV: CPT | Mod: PBBFAC,TXP,, | Performed by: INTERNAL MEDICINE

## 2023-03-16 PROCEDURE — 85025 COMPLETE CBC W/AUTO DIFF WBC: CPT | Mod: NTX | Performed by: NURSE PRACTITIONER

## 2023-03-16 PROCEDURE — 3074F PR MOST RECENT SYSTOLIC BLOOD PRESSURE < 130 MM HG: ICD-10-PCS | Mod: CPTII,NTX,S$GLB, | Performed by: INTERNAL MEDICINE

## 2023-03-16 PROCEDURE — 94726 PLETHYSMOGRAPHY LUNG VOLUMES: CPT | Mod: 53,NTX,S$GLB, | Performed by: INTERNAL MEDICINE

## 2023-03-16 RX ORDER — CHOLECALCIFEROL (VITAMIN D3) 25 MCG
1000 TABLET ORAL DAILY
COMMUNITY

## 2023-03-16 NOTE — PROGRESS NOTES
LUNG TRANSPLANT INITIAL EVALUATION                                                                                                                                           Reason for Visit:  Evaluation for lung transplant    Referring Physician: Dr Agustín Aviles    History of Present Illness: Ayanna Alicia is a 52 y.o. female who is on 5L of oxygen.  She is on CPAP.  Her New York Heart Association Class is III and a Karnofsky score of 50% - Requires considerable assistance and frequent medical care. She is not diabetic.    Ayanna Alicia is a 51 yo F pt with PMH of Chronic hypoxemic respiratory failure, Sjogren's Syndrome with  associated ILD, Asthma, GLENN, Morbid Obesity who is presenting to our clinic for a new consult for advanced lung disease    She had a hospitalization 5/2022 with AHRF and persistent pneumonia, was treated for PNA, and dishcarged on oxygen. Initial PFTs with severe restriction and reduced DLCO. SHe was prescribed steroids, referred to Rheumatology who started her on Cellcept, and steroids were continued, She had refractory symptoms and not much response. Thus she was started on Rituximab infusions. Ofev was also added and she was enrolled in pulmonary rehab.  She was referred to use for lung transplant evaluation  She takes Breo, Spiriva, Montelukast for her Asthma    Has significant exertional SOB, can walk at home, but only limited distance  Has both dry and clear mucus, sometimes yellow  No chest pain but chest tightness  Has lower abdominal pain after a coughing episode  No Nausea or vomiting  No fever, but chills sometimes, was  febrile here at 101  Heart burn present, takes pepcid for it   Flatulence present  Has been losing weight, lost 50 lbs since last year  Not taking cellcept since 12/2022, received 4 treatment of Rituximab , last in Dec 2022, takes prednisone 10 mg and Bactrim  Has a fungus on her vocal cords, takes nystatin moushwash, took fluconazole as well  Tolerative  Ofev fine, no  side effects    Occupation history: , also has some properties  Never smoker, used to smoke marJixee, did that for 15 years, socially, quit back in 3/2022  No alcohol use    Past Medical History:   Diagnosis Date    Abnormal Pap smear of cervix     in the past with repeat pap smear okay.    Acute interstitial pneumonitis     Allergic rhinitis, cause unspecified     Arthritis of both knees     Asthma     Eczema     Fatty liver 10/2014    Fibrocystic breast changes     Headache(784.0)     Hepatomegaly 10/2014    Hypertension     Liver cyst 10/2014    Multinodular goiter     Followed by ENT - Dr. Nicolas Gray    Polymenorrhea     TMJ (dislocation of temporomandibular joint)     Uterine fibroid     in the past    Vitamin D deficiency disease        Past Surgical History:   Procedure Laterality Date     SECTION, CLASSIC      x 1    COLONOSCOPY N/A 2020    Procedure: COLONOSCOPY;  Surgeon: Angie Magana MD;  Location: [facility];  Service: Endoscopy;  Laterality: N/A;    HYSTERECTOMY      MULTIPLE TOOTH EXTRACTIONS      PARTIAL HYSTERECTOMY  2013    Due to fibroids       Allergies: Doxycycline, Fluticasone, and Penicillins    Current Outpatient Medications   Medication Sig    albuterol (PROVENTIL/VENTOLIN HFA) 90 mcg/actuation inhaler INHALE 1 TO 2 PUFFS BY MOUTH INTO THE LUNGS EVERY 4 TO 6 HOURS AS NEEDED FOR WHEEZING OR SHORTNESS OF BREATH    albuterol-ipratropium (DUO-NEB) 2.5 mg-0.5 mg/3 mL nebulizer solution Take 3 mLs by nebulization every 6 (six) hours as needed for Wheezing. Rescue (Patient taking differently: Take 3 mLs by nebulization 3 (three) times daily. Rescue)    amLODIPine (NORVASC) 10 MG tablet Take 1 tablet (10 mg total) by mouth once daily.    ascorbic acid, vitamin C, (VITAMIN C) 500 MG tablet Take 500 mg by mouth once daily.    calcium/magnesium/vit B comp (CALCIUM-MAGNESIUM-B COMPLEX ORAL) Take 1 tablet by mouth once daily at 6am.    doxycycline  (VIBRAMYCIN) 100 MG Cap Take 1 capsule (100 mg total) by mouth 2 (two) times daily.    fluconazole (DIFLUCAN) 100 MG tablet Take 1 tablet (100 mg total) by mouth 2 (two) times a day.    fluticasone furoate-vilanteroL (BREO ELLIPTA) 100-25 mcg/dose diskus inhaler INHALE 1 PUFF INTO THE LUNGS ONCE DAILY    hydroCHLOROthiazide (HYDRODIURIL) 12.5 MG Tab Take 1 tablet (12.5 mg total) by mouth once daily.    ipratropium (ATROVENT) 0.02 % nebulizer solution Take 2.5 mLs (500 mcg total) by nebulization 4 (four) times daily. Rescue    levocetirizine (XYZAL) 5 MG tablet TAKE 1 TABLET(5 MG) BY MOUTH EVERY DAY    metOLazone (ZAROXOLYN) 2.5 MG tablet Take 1 tablet (2.5 mg total) by mouth every Mon, Wed, Fri.    montelukast (SINGULAIR) 10 mg tablet Take 1 tablet (10 mg total) by mouth every evening.    mycophenolate (CELLCEPT) 500 mg Tab Take 3 tablets (1,500 mg total) by mouth 2 (two) times daily.    nintedanib (OFEV) 100 mg Cap Take 100 mg by mouth 2 (two) times daily.    nystatin (MYCOSTATIN) 100,000 unit/mL suspension Swish and spit 4 mLs (400,000 Units total) by mouth 2 hours after meals and at bedtime for 10 days    omega-3s/dha/epa/fish oil/D3 (VITAMIN-D + OMEGA-3 ORAL) Take 1 tablet by mouth once daily at 6am.    ondansetron (ZOFRAN-ODT) 4 MG TbDL Dissolve 1 tablet (4 mg total) by mouth every 6 (six) hours as needed (nausea).    predniSONE (DELTASONE) 10 MG tablet Take 2 tablets (20 mg total) by mouth once daily. (Patient taking differently: Take 10 mg by mouth once daily.)    sertraline (ZOLOFT) 50 MG tablet Take 1 tablet (50 mg total) by mouth once daily.    sulfamethoxazole-trimethoprim 800-160mg (BACTRIM DS) 800-160 mg Tab Take 1 tablet by mouth every Mon, Wed, Fri. PCP prophylaxis while on prednisone 20 mg    sulfamethoxazole-trimethoprim 800-160mg (BACTRIM DS) 800-160 mg Tab Take 1 tablet by mouth once daily. Take this instead of your usual Bactrim 3 times weekly dose for the next 10 days, then resume your usual  Bactrim schedule. for 14 days     Current Facility-Administered Medications   Medication    sodium chloride 0.9% flush 10 mL       Immunization History   Administered Date(s) Administered    COVID-19 Vaccine 08/20/2021, 10/14/2021    COVID-19, MRNA, LN-S, PF (MODERNA FULL 0.5 ML DOSE) 08/20/2021, 10/14/2021    Influenza 10/13/2010    Influenza (FLUAD) - Quadrivalent - Adjuvanted - PF *Preferred* (65+) 11/01/2022    Influenza - Quadrivalent 10/02/2014, 01/12/2016, 02/01/2017    Influenza - Quadrivalent - PF *Preferred* (6 months and older) 09/22/2020, 10/14/2021    Influenza - Trivalent (ADULT) 10/13/2010    Pneumococcal Conjugate - 20 Valent 11/01/2022    Tdap 03/23/2012     Family History:    Family History   Problem Relation Age of Onset    Stroke Mother     Heart disease Mother     Diverticulitis Mother     Heart disease Father 63        MI/CAD    Hypertension Father     Stroke Sister     Cancer Maternal Grandmother 60        Rectal and stomach cancer    Stroke Maternal Grandfather     Diabetes Maternal Grandfather     Peripheral vascular disease Maternal Grandfather     Cancer Maternal Aunt 50        Stomach cancer    Cancer Maternal Aunt 66        Lung cancer (smoker)    Migraines Cousin     Cataracts Cousin      Social History     Substance and Sexual Activity   Alcohol Use Yes    Alcohol/week: 0.0 standard drinks    Comment: Occasionally      Social History     Substance and Sexual Activity   Drug Use Yes    Frequency: 4.0 times per week    Types: Marijuana      Social History     Socioeconomic History    Marital status: Single    Number of children: 0   Occupational History    Occupation:      Employer: [employer]     Comment: [employer]   Tobacco Use    Smoking status: Never    Smokeless tobacco: Never   Substance and Sexual Activity    Alcohol use: Yes     Alcohol/week: 0.0 standard drinks     Comment: Occasionally    Drug use: Yes     Frequency: 4.0 times per week     Types: Marijuana  "   Sexual activity: Yes     Partners: Female   Social History Narrative    She wears seatbelt. Her son was killed in 2010.     Social Determinants of Health     Physical Activity: Inactive    Days of Exercise per Week: 0 days    Minutes of Exercise per Session: 0 min     Review of Systems   Constitutional:  Positive for chills, fever, malaise/fatigue and weight loss. Negative for diaphoresis.   Respiratory:  Positive for cough, sputum production and shortness of breath. Negative for hemoptysis and wheezing.    Cardiovascular:  Negative for chest pain, palpitations and leg swelling.   Gastrointestinal:  Positive for abdominal pain, constipation and heartburn. Negative for blood in stool, diarrhea, nausea and vomiting.   Musculoskeletal:  Positive for myalgias.   Skin:  Negative for itching and rash.   Neurological:  Negative for dizziness, seizures, weakness and headaches.   Vitals  /66 (BP Location: Right arm, Patient Position: Sitting, BP Method: Large (Automatic))   Pulse 110   Temp (!) 101.1 °F (38.4 °C) (Oral)   Resp 18   Ht 5' 6" (1.676 m)   Wt 104.3 kg (229 lb 15 oz)   SpO2 100%   PF (!) 6 L/min   BMI 37.11 kg/m²   Physical Exam  Constitutional:       Appearance: Normal appearance. She is ill-appearing. She is not diaphoretic.      Comments: In mild respiratory distress   HENT:      Head: Normocephalic and atraumatic.      Nose: No congestion.      Mouth/Throat:      Comments: Hoarse voice  Eyes:      General:         Right eye: No discharge.         Left eye: No discharge.      Extraocular Movements: Extraocular movements intact.      Conjunctiva/sclera: Conjunctivae normal.   Cardiovascular:      Rate and Rhythm: Regular rhythm. Tachycardia present.      Heart sounds: No murmur heard.  Pulmonary:      Effort: Respiratory distress present.      Breath sounds: No stridor. Rhonchi and rales present. No wheezing.   Chest:      Chest wall: No tenderness.   Abdominal:      General: There is no " distension.      Palpations: Abdomen is soft.      Tenderness: There is no abdominal tenderness. There is no guarding.   Musculoskeletal:         General: No swelling or tenderness.   Skin:     Coloration: Skin is not jaundiced or pale.   Neurological:      Mental Status: She is alert and oriented to person, place, and time. Mental status is at baseline.      Motor: No weakness.      Coordination: Coordination normal.     Labs:  Lab Visit on 03/15/2023   Component Date Value    Pneumocystis Source 03/15/2023 Tracheal Aspirate     Gram Stain (Respiratory) 03/15/2023 <10 epithelial cells per low power field.     Gram Stain (Respiratory) 03/15/2023 Moderate WBC's     Gram Stain (Respiratory) 03/15/2023 Moderate Gram positive cocci     Gram Stain (Respiratory) 03/15/2023 Rare Gram negative rods        Pulmonary Function Tests 3/16/2023   FVC 1.31   FEV1 1.2   DLCO (ml/mmHg sec) 3.1   FVC% 42.9   FEV1% 49.1   FEF 25-75 2.21   FEF 25-75% 91   DLCO% 12.5     6MW 10/12/2022 10/10/2022 10/10/2022 9/28/2022 9/26/2022 9/20/2022 9/19/2022   6MWT Status - - completed with stops - - - -   Patient Reported - - Dyspnea - - - -   Was O2 used? - - Yes - - - -   Delivery Method - - Cannula - - - -   6MW Distance walked (feet) - - 700 - - - -   Distance walked (meters) - - 213.36 - - - -   Did patient stop? - - Yes - - - -   Oxygen Saturation - - 93 - - - -   Supplemental Oxygen - - 3 L/M - - - -   Heart Rate - - 110 - - - -   Blood Pressure - - 114/72 - - - -   Jessica Dyspnea Rating  (No Data) light light moderate moderate somewhat heavy somewhat heavy   Oxygen Saturation - - 86 - - - -   Supplemental Oxygen - - 6 L/M - - - -   Heart Rate - - 129 - - - -   Blood Pressure - - 115/74 - - - -   Jessica Dyspnea Rating  moderate moderate somewhat heavy heavy moderate somewhat heavy moderate   Recovery Time (seconds) - - 240 - - - -   Oxygen Saturation - - 96 - - - -   Supplemental Oxygen - - 3 L/M - - - -   Heart Rate - - 112 - - - -        Imaging:  Results for orders placed during the hospital encounter of 03/07/23    X-Ray Chest PA And Lateral    Narrative  EXAM:  XR CHEST PA AND LATERAL    CLINICAL HISTORY: Shortness of breath      TECHNIQUE:  Two-view chest x-ray      COMPARISON STUDIES: 11/01/2022    FINDINGS:  Stable mild cardiomegaly.    There is a background of diffuse interstitial thickening throughout the mid to lower lungs more confluent areas of hazy airspace opacification at the lung bases, right greater than left.  Slightly increasing opacification at the right lung base.  No significant effusion evident.    No acute osseous findings. .  There are no advanced arthritic changes.    Impression  Slight interval worsening.  Increasing hazy airspace opacification right lung base superimposed upon chronic change.    Finalized on: 3/7/2023 8:53 AM By:  Nicolas Cowart MD  [facility]# [id number]      2023-03-07 08:55:21.150    [facility]    No results found for this or any previous visit.    CT Chest 11/7/2022:    FINDINGS:  There is been interval progression of the ground-glass opacities throughout the inferior half of the lungs, confluent and portions of the bilateral lower lobes and inferior middle lobe.  The distribution of the peripheral ground-glass opacities within the bilateral upper lobes has not significantly changed.  Negative for effusion or pneumothorax.     Minor coronary artery calcifications again seen.  Small pericardial effusion.  There are no hilar or mediastinal masses or abnormal lymph nodes by size criteria.     The airways are patent.  The thyroid gland is unchanged with redemonstration of a 3 cm right thyroid nodule..  The esophagus is normal.     The upper abdominal organs are unchanged in appearance..     The osseous structures are unchanged in appearance.     Impression:     1.  Detrimental change.  There continues to be progression of the ground-glass opacities throughout the lungs, especially the lower half of the lungs.  The  previous diagnostic considerations remain valid.  As clinically warranted, the patient may benefit from a bronchoscopy with BAL or open lung biopsy if the etiology of this disease process is unknown/uncertain at this time.     2.  Stable findings as noted above.     All CT scans at this facility are performed  using dose modulation techniques as appropriate to performed exam including the following:  automated exposure control; adjustment of mA and/or kV according to the patients size (this includes techniques or standardized protocols for targeted exams where dose is matched to indication/reason for exam: i.e. extremities or head);  iterative reconstruction technique.      CT Chest 9/2022:  FINDINGS:  Progressive peripheral ground-glass opacities are seen throughout all lobes.  Extensive bronchiectatic changes in the bilateral lower lobes again seen.  Negative for effusion or pneumothorax.     There are no hilar or mediastinal masses or abnormal lymph nodes by size criteria.     The airways are patent.  The thyroid gland is unchanged in appearance with redemonstration of large low-density lesions in both thyroid lobes measuring up to 3 cm on the right..  The esophagus is normal.  Minor coronary artery calcifications again seen.     Stable hepatic cysts measuring up to 1.6 cm in size in the left hepatic lobe.  The upper abdominal organs are otherwise normal.     Negative for osseous lesions. Multilevel marginal spondylosis again seen.     Impression:     1.  Detrimental change.  Worsening peripheral ground-glass opacities throughout all lobes.  Differential considerations would include superimposed pneumonia versus active alveolitis of an underlying interstitial lung disease versus inflammatory process involving the lungs.  Clinical correlation is advised.     2.  Redemonstration of bilateral thyroid nodules measuring up to 3 cm, for which a thyroid ultrasound is highly recommended.     3.  Other stable findings as  noted above.     All CT scans at this facility are performed  using dose modulation techniques as appropriate to performed exam including the following:  automated exposure control; adjustment of mA and/or kV according to the patients size (this includes techniques or standardized protocols for targeted exams where dose is matched to indication/reason for exam: i.e. extremities or head);  iterative reconstruction technique.         Cardiodiagnostics:  ECHO 9/2022:    There is no evidence of intracardiac shunting.  The left ventricle is normal in size with concentric remodeling and normal systolic function.  The estimated ejection fraction is 60%.  Normal left ventricular diastolic function.  Normal right ventricular size with normal right ventricular systolic function.  Normal central venous pressure (3 mmHg).  The estimated PA systolic pressure is 34 mmHg.      Assessment:  1. Interstitial pulmonary disease, unspecified    2. Chronic interstitial pneumonia    3. Restrictive lung disease    4. Rheumatoid arthritis of other site with positive rheumatoid factor      Plan:     Patient with Progressive fibrotic ILD, Likely Sjogren's Syndrome related ILD given serology. Pt is on appropriate medical therapy for her condition, that unfortunately appears progressive despite all that. She received Cellcept, currently on prednisone, Rituximab infusion, and on Ofev. Her PFTs are with worsening FEV1, FVC and DLCO 12% on most recent one done today.   Pt also with poor performance on 6MWD, she walked 152 feet only, desatted to 68% on 5LPM, with HR going up to 137. She is febrile at 101. Given all this, she might be in an acute exacerbation, and we need to make sure no specific viral etiologies are driving her symptoms worsening. Given all this and her worsening symptoms we recommended that she goes to the ED. She had her friend driving her, and she will go to the ER in [facility] closer to where she lives.  Given uncontrolled  GERD, with associated with ILD, recommend PPI, and obtaining esophagram.  Encouraged weight loss, and CPAP compliance    Hao Jack MD  Pulmonary/Critical Care Fellow PGY5    Attending Note:     I have seen and evaluated the patient with Dr. Jack. Their note reflects the content of our discussion and my plan of care.  Pt sent for initial evaluation of ILD.  Has a hx of Sjogren's Syndrome with progressive fibrotic ILD phenotype.  Currently on MMF, pred, rituxan, and OFEV.  PFTs continue to decline.  Desats to 68% on 6L during 6MWT.  GERD uncontrolled.  Obtain esophagram and recommend PPI.  Encourage weight loss and CPAP compliance.  Otherwise, is on optimal therapy but unfortunately is not a transplant candidate at this time due to BMI.  Recommend ED evaluation given new fever of 101F and severe desaturations.        Shayy Leung D.O.  Pulmonary/Critical Care and Lung Transplantation  [facility] [place]

## 2023-03-16 NOTE — LETTER
March 20, 2023        Agustín Aviles  [address] [facility] [place] [address]  Phone: [phone]  Fax: [phone]             [facility] Trans[facility] 1st Fl  [address]  [facility] [address]  Phone: [phone]   Patient: Ayanna Alicia   MR Number: [id number]   YOB: 1970   Date of Visit: 3/16/2023       Dear Dr. Agustín Aviles    Thank you for referring Ayanna Alicia to me for evaluation. Attached you will find relevant portions of my assessment and plan of care.    If you have questions, please do not hesitate to call me. I look forward to following Ayanna Alicia along with you.    Sincerely,    Shayy Leung, DO    Enclosure    If you would like to receive this communication electronically, please contact [email or web address] or [phone] to request [employer] access.    [employer] is a tool which provides read-only access to select patient information with whom you have a relationship. Its easy to use and provides real time access to review your patients record including encounter summaries, notes, results, and demographic information.    If you feel you have received this communication in error or would no longer like to receive these types of communications, please e-mail [email or web address]

## 2023-03-17 ENCOUNTER — PATIENT MESSAGE (OUTPATIENT)
Dept: PULMONOLOGY | Facility: CLINIC | Age: 53
End: 2023-03-17
Payer: COMMERCIAL

## 2023-03-17 ENCOUNTER — HOSPITAL ENCOUNTER (INPATIENT)
Facility: HOSPITAL | Age: 53
LOS: 1 days | Discharge: HOME | DRG: 197 | End: 2023-03-17
Attending: EMERGENCY MEDICINE | Admitting: HOSPITALIST
Payer: COMMERCIAL

## 2023-03-17 VITALS
OXYGEN SATURATION: 98 % | HEIGHT: 66 IN | DIASTOLIC BLOOD PRESSURE: 62 MMHG | WEIGHT: 230 LBS | RESPIRATION RATE: 20 BRPM | SYSTOLIC BLOOD PRESSURE: 115 MMHG | BODY MASS INDEX: 36.96 KG/M2 | TEMPERATURE: 99 F | HEART RATE: 84 BPM

## 2023-03-17 DIAGNOSIS — R76.8 ELEVATED RHEUMATOID FACTOR: ICD-10-CM

## 2023-03-17 DIAGNOSIS — R06.00 DYSPNEA: ICD-10-CM

## 2023-03-17 DIAGNOSIS — J98.4 RESTRICTIVE LUNG DISEASE: ICD-10-CM

## 2023-03-17 DIAGNOSIS — J84.111 CHRONIC INTERSTITIAL PNEUMONIA: ICD-10-CM

## 2023-03-17 DIAGNOSIS — J96.21 ACUTE ON CHRONIC RESPIRATORY FAILURE WITH HYPOXIA: Primary | ICD-10-CM

## 2023-03-17 DIAGNOSIS — J44.1 COPD EXACERBATION: ICD-10-CM

## 2023-03-17 DIAGNOSIS — R05.9 COUGH, UNSPECIFIED TYPE: ICD-10-CM

## 2023-03-17 DIAGNOSIS — R07.9 CHEST PAIN: ICD-10-CM

## 2023-03-17 DIAGNOSIS — J18.9 PNEUMONIA OF BOTH LOWER LOBES DUE TO INFECTIOUS ORGANISM: ICD-10-CM

## 2023-03-17 PROBLEM — E66.9 OBESITY WITH SERIOUS COMORBIDITY: Status: ACTIVE | Noted: 2023-01-25

## 2023-03-17 PROBLEM — J96.10 CHRONIC RESPIRATORY FAILURE: Status: ACTIVE | Noted: 2022-04-27

## 2023-03-17 PROBLEM — J84.112 UIP (USUAL INTERSTITIAL PNEUMONITIS): Status: ACTIVE | Noted: 2023-03-17

## 2023-03-17 PROBLEM — J45.20 MILD INTERMITTENT ASTHMA: Status: ACTIVE | Noted: 2023-03-17

## 2023-03-17 LAB
BNP SERPL-MCNC: 12 PG/ML (ref 0–99)
DLCO ADJ PRE: 3.53 ML/(MIN*MMHG) (ref 19.13–30.6)
DLCO SINGLE BREATH LLN: 19.13
DLCO SINGLE BREATH PRE REF: 12.5 %
DLCO SINGLE BREATH REF: 24.86
DLCOC SBVA LLN: 3.31
DLCOC SBVA PRE REF: 48.1 %
DLCOC SBVA REF: 4.71
DLCOC SINGLE BREATH LLN: 19.13
DLCOC SINGLE BREATH PRE REF: 14.2 %
DLCOC SINGLE BREATH REF: 24.86
DLCOCSBVAULN: 6.12
DLCOCSINGLEBREATHULN: 30.6
DLCOSINGLEBREATHULN: 30.6
DLCOVA LLN: 3.31
DLCOVA PRE REF: 42.2 %
DLCOVA PRE: 1.99 ML/(MIN*MMHG*L) (ref 3.31–6.12)
DLCOVA REF: 4.71
DLCOVAULN: 6.12
DLVAADJ PRE: 2.27 ML/(MIN*MMHG*L) (ref 3.31–6.12)
FEF 25 75 LLN: 1.08
FEF 25 75 PRE REF: 95.2 %
FEF 25 75 REF: 2.33
FEV05 LLN: 1.17
FEV05 REF: 2.02
FEV1 FVC LLN: 69
FEV1 FVC PRE REF: 113.9 %
FEV1 FVC REF: 80
FEV1 LLN: 1.82
FEV1 PRE REF: 49.1 %
FEV1 REF: 2.44
FVC LLN: 2.3
FVC PRE REF: 42.9 %
FVC REF: 3.06
INFLUENZA A, MOLECULAR: NEGATIVE
INFLUENZA B, MOLECULAR: NEGATIVE
IVC PRE: 1.23 L (ref 2.3–3.85)
IVC SINGLE BREATH LLN: 2.3
IVC SINGLE BREATH PRE REF: 40.2 %
IVC SINGLE BREATH REF: 3.06
IVCSINGLEBREATHULN: 3.85
PEF LLN: 4.17
PEF PRE REF: 56.2 %
PEF REF: 6.31
PHYSICIAN COMMENT: ABNORMAL
PRE DLCO: 3.1 ML/(MIN*MMHG) (ref 19.13–30.6)
PRE FEF 25 75: 2.21 L/S (ref 1.08–4.04)
PRE FET 100: 4.7 SEC
PRE FEV05 REF: 53.5 %
PRE FEV1 FVC: 91.27 % (ref 69.05–89.61)
PRE FEV1: 1.2 L (ref 1.82–3.04)
PRE FEV5: 1.08 L (ref 1.17–2.88)
PRE FVC: 1.31 L (ref 2.3–3.85)
PRE PEF: 3.54 L/S (ref 4.17–8.44)
SARS-COV-2 RDRP RESP QL NAA+PROBE: NEGATIVE
SPECIMEN SOURCE: NORMAL
VA PRE: 1.56 L (ref 5.12–5.12)
VA SINGLE BREATH LLN: 5.12
VA SINGLE BREATH PRE REF: 30.4 %
VA SINGLE BREATH REF: 5.12
VASINGLEBREATHULN: 5.12

## 2023-03-17 PROCEDURE — 25000242 PHARM REV CODE 250 ALT 637 W/ HCPCS: Mod: NTX | Performed by: NURSE PRACTITIONER

## 2023-03-17 PROCEDURE — 87502 INFLUENZA DNA AMP PROBE: CPT | Mod: NTX | Performed by: HOSPITALIST

## 2023-03-17 PROCEDURE — 94761 N-INVAS EAR/PLS OXIMETRY MLT: CPT | Mod: NTX

## 2023-03-17 PROCEDURE — 99900035 HC TECH TIME PER 15 MIN (STAT): Mod: NTX

## 2023-03-17 PROCEDURE — 11000001 HC ACUTE MED/SURG PRIVATE ROOM: Mod: NTX

## 2023-03-17 PROCEDURE — 87040 BLOOD CULTURE FOR BACTERIA: CPT | Mod: NTX | Performed by: EMERGENCY MEDICINE

## 2023-03-17 PROCEDURE — 63600175 PHARM REV CODE 636 W HCPCS: Mod: NTX | Performed by: NURSE PRACTITIONER

## 2023-03-17 PROCEDURE — 93005 ELECTROCARDIOGRAM TRACING: CPT | Mod: NTX

## 2023-03-17 PROCEDURE — 94640 AIRWAY INHALATION TREATMENT: CPT | Mod: NTX

## 2023-03-17 PROCEDURE — 86698 HISTOPLASMA ANTIBODY: CPT | Mod: NTX | Performed by: NURSE PRACTITIONER

## 2023-03-17 PROCEDURE — 87449 NOS EACH ORGANISM AG IA: CPT | Mod: NTX | Performed by: NURSE PRACTITIONER

## 2023-03-17 PROCEDURE — 99291 CRITICAL CARE FIRST HOUR: CPT | Mod: NTX

## 2023-03-17 PROCEDURE — 25000242 PHARM REV CODE 250 ALT 637 W/ HCPCS: Mod: NTX | Performed by: EMERGENCY MEDICINE

## 2023-03-17 PROCEDURE — 63600175 PHARM REV CODE 636 W HCPCS: Mod: NTX | Performed by: EMERGENCY MEDICINE

## 2023-03-17 PROCEDURE — 25000003 PHARM REV CODE 250: Mod: NTX | Performed by: NURSE PRACTITIONER

## 2023-03-17 PROCEDURE — 86606 ASPERGILLUS ANTIBODY: CPT | Mod: NTX | Performed by: NURSE PRACTITIONER

## 2023-03-17 PROCEDURE — 36415 COLL VENOUS BLD VENIPUNCTURE: CPT | Mod: NTX | Performed by: NURSE PRACTITIONER

## 2023-03-17 PROCEDURE — 93010 ELECTROCARDIOGRAM REPORT: CPT | Mod: NTX,,, | Performed by: INTERNAL MEDICINE

## 2023-03-17 PROCEDURE — U0002 COVID-19 LAB TEST NON-CDC: HCPCS | Mod: NTX | Performed by: HOSPITALIST

## 2023-03-17 PROCEDURE — 93010 EKG 12-LEAD: ICD-10-PCS | Mod: NTX,,, | Performed by: INTERNAL MEDICINE

## 2023-03-17 PROCEDURE — 27000221 HC OXYGEN, UP TO 24 HOURS: Mod: NTX

## 2023-03-17 RX ORDER — HYDROCODONE BITARTRATE AND ACETAMINOPHEN 5; 325 MG/1; MG/1
1 TABLET ORAL EVERY 6 HOURS PRN
Status: DISCONTINUED | OUTPATIENT
Start: 2023-03-17 | End: 2023-03-17 | Stop reason: HOSPADM

## 2023-03-17 RX ORDER — IBUPROFEN 200 MG
24 TABLET ORAL
Status: DISCONTINUED | OUTPATIENT
Start: 2023-03-17 | End: 2023-03-17 | Stop reason: HOSPADM

## 2023-03-17 RX ORDER — MORPHINE SULFATE 4 MG/ML
2 INJECTION, SOLUTION INTRAMUSCULAR; INTRAVENOUS EVERY 4 HOURS PRN
Status: DISCONTINUED | OUTPATIENT
Start: 2023-03-17 | End: 2023-03-17 | Stop reason: HOSPADM

## 2023-03-17 RX ORDER — PANTOPRAZOLE SODIUM 40 MG/1
40 TABLET, DELAYED RELEASE ORAL DAILY
Qty: 30 TABLET | Refills: 11 | Status: ON HOLD | OUTPATIENT
Start: 2023-03-17 | End: 2023-11-14 | Stop reason: HOSPADM

## 2023-03-17 RX ORDER — IPRATROPIUM BROMIDE AND ALBUTEROL SULFATE 2.5; .5 MG/3ML; MG/3ML
3 SOLUTION RESPIRATORY (INHALATION) EVERY 4 HOURS PRN
Status: DISCONTINUED | OUTPATIENT
Start: 2023-03-17 | End: 2023-03-17 | Stop reason: HOSPADM

## 2023-03-17 RX ORDER — ENOXAPARIN SODIUM 100 MG/ML
40 INJECTION SUBCUTANEOUS EVERY 24 HOURS
Status: DISCONTINUED | OUTPATIENT
Start: 2023-03-17 | End: 2023-03-17 | Stop reason: HOSPADM

## 2023-03-17 RX ORDER — LEVOFLOXACIN 5 MG/ML
750 INJECTION, SOLUTION INTRAVENOUS
Status: COMPLETED | OUTPATIENT
Start: 2023-03-17 | End: 2023-03-17

## 2023-03-17 RX ORDER — NALOXONE HCL 0.4 MG/ML
0.02 VIAL (ML) INJECTION
Status: DISCONTINUED | OUTPATIENT
Start: 2023-03-17 | End: 2023-03-17 | Stop reason: HOSPADM

## 2023-03-17 RX ORDER — SUCRALFATE 1 G/1
1 TABLET ORAL 4 TIMES DAILY
Qty: 120 TABLET | Refills: 6 | Status: SHIPPED | OUTPATIENT
Start: 2023-03-17 | End: 2023-04-16

## 2023-03-17 RX ORDER — HYDROCHLOROTHIAZIDE 12.5 MG/1
12.5 TABLET ORAL DAILY
Status: DISCONTINUED | OUTPATIENT
Start: 2023-03-17 | End: 2023-03-17 | Stop reason: HOSPADM

## 2023-03-17 RX ORDER — PREDNISONE 10 MG/1
10 TABLET ORAL DAILY
Status: DISCONTINUED | OUTPATIENT
Start: 2023-03-17 | End: 2023-03-17 | Stop reason: HOSPADM

## 2023-03-17 RX ORDER — PREDNISONE 10 MG/1
10 TABLET ORAL DAILY
Start: 2023-03-17 | End: 2023-04-18 | Stop reason: SDUPTHER

## 2023-03-17 RX ORDER — TALC
6 POWDER (GRAM) TOPICAL NIGHTLY PRN
Status: DISCONTINUED | OUTPATIENT
Start: 2023-03-17 | End: 2023-03-17 | Stop reason: HOSPADM

## 2023-03-17 RX ORDER — CETIRIZINE HYDROCHLORIDE 5 MG/1
5 TABLET ORAL DAILY
Status: DISCONTINUED | OUTPATIENT
Start: 2023-03-17 | End: 2023-03-17 | Stop reason: HOSPADM

## 2023-03-17 RX ORDER — ONDANSETRON 2 MG/ML
4 INJECTION INTRAMUSCULAR; INTRAVENOUS EVERY 8 HOURS PRN
Status: DISCONTINUED | OUTPATIENT
Start: 2023-03-17 | End: 2023-03-17 | Stop reason: HOSPADM

## 2023-03-17 RX ORDER — IBUPROFEN 200 MG
16 TABLET ORAL
Status: DISCONTINUED | OUTPATIENT
Start: 2023-03-17 | End: 2023-03-17 | Stop reason: HOSPADM

## 2023-03-17 RX ORDER — IPRATROPIUM BROMIDE AND ALBUTEROL SULFATE 2.5; .5 MG/3ML; MG/3ML
3 SOLUTION RESPIRATORY (INHALATION)
Status: COMPLETED | OUTPATIENT
Start: 2023-03-17 | End: 2023-03-17

## 2023-03-17 RX ORDER — MAG HYDROX/ALUMINUM HYD/SIMETH 200-200-20
30 SUSPENSION, ORAL (FINAL DOSE FORM) ORAL 4 TIMES DAILY PRN
Status: DISCONTINUED | OUTPATIENT
Start: 2023-03-17 | End: 2023-03-17 | Stop reason: HOSPADM

## 2023-03-17 RX ORDER — SIMETHICONE 80 MG
1 TABLET,CHEWABLE ORAL 4 TIMES DAILY PRN
Status: DISCONTINUED | OUTPATIENT
Start: 2023-03-17 | End: 2023-03-17 | Stop reason: HOSPADM

## 2023-03-17 RX ORDER — AMLODIPINE BESYLATE 5 MG/1
10 TABLET ORAL DAILY
Status: DISCONTINUED | OUTPATIENT
Start: 2023-03-17 | End: 2023-03-17 | Stop reason: HOSPADM

## 2023-03-17 RX ORDER — ARFORMOTEROL TARTRATE 15 UG/2ML
15 SOLUTION RESPIRATORY (INHALATION) 2 TIMES DAILY
Status: DISCONTINUED | OUTPATIENT
Start: 2023-03-17 | End: 2023-03-17 | Stop reason: HOSPADM

## 2023-03-17 RX ORDER — ACETAMINOPHEN 650 MG/1
650 SUPPOSITORY RECTAL EVERY 4 HOURS PRN
Status: DISCONTINUED | OUTPATIENT
Start: 2023-03-17 | End: 2023-03-17 | Stop reason: HOSPADM

## 2023-03-17 RX ORDER — FLUTICASONE FUROATE AND VILANTEROL 100; 25 UG/1; UG/1
1 POWDER RESPIRATORY (INHALATION) DAILY
Status: DISCONTINUED | OUTPATIENT
Start: 2023-03-17 | End: 2023-03-17 | Stop reason: CLARIF

## 2023-03-17 RX ORDER — METOLAZONE 2.5 MG/1
2.5 TABLET ORAL
Status: DISCONTINUED | OUTPATIENT
Start: 2023-03-17 | End: 2023-03-17 | Stop reason: HOSPADM

## 2023-03-17 RX ORDER — POLYETHYLENE GLYCOL 3350 17 G/17G
17 POWDER, FOR SOLUTION ORAL DAILY PRN
Status: DISCONTINUED | OUTPATIENT
Start: 2023-03-17 | End: 2023-03-17 | Stop reason: HOSPADM

## 2023-03-17 RX ORDER — PROMETHAZINE HYDROCHLORIDE 25 MG/1
25 TABLET ORAL EVERY 6 HOURS PRN
Status: DISCONTINUED | OUTPATIENT
Start: 2023-03-17 | End: 2023-03-17 | Stop reason: HOSPADM

## 2023-03-17 RX ORDER — LEVOFLOXACIN 750 MG/1
750 TABLET ORAL DAILY
Qty: 14 TABLET | Refills: 0 | Status: SHIPPED | OUTPATIENT
Start: 2023-03-17 | End: 2023-03-31

## 2023-03-17 RX ORDER — SERTRALINE HYDROCHLORIDE 50 MG/1
50 TABLET, FILM COATED ORAL DAILY
Status: DISCONTINUED | OUTPATIENT
Start: 2023-03-17 | End: 2023-03-17 | Stop reason: HOSPADM

## 2023-03-17 RX ORDER — CHOLECALCIFEROL (VITAMIN D3) 25 MCG
1000 TABLET ORAL DAILY
Status: DISCONTINUED | OUTPATIENT
Start: 2023-03-17 | End: 2023-03-17 | Stop reason: HOSPADM

## 2023-03-17 RX ORDER — LEVOFLOXACIN 5 MG/ML
750 INJECTION, SOLUTION INTRAVENOUS
Status: DISCONTINUED | OUTPATIENT
Start: 2023-03-18 | End: 2023-03-17 | Stop reason: HOSPADM

## 2023-03-17 RX ORDER — SODIUM CHLORIDE 0.9 % (FLUSH) 0.9 %
3 SYRINGE (ML) INJECTION EVERY 12 HOURS PRN
Status: DISCONTINUED | OUTPATIENT
Start: 2023-03-17 | End: 2023-03-17 | Stop reason: HOSPADM

## 2023-03-17 RX ORDER — GLUCAGON 1 MG
1 KIT INJECTION
Status: DISCONTINUED | OUTPATIENT
Start: 2023-03-17 | End: 2023-03-17 | Stop reason: HOSPADM

## 2023-03-17 RX ORDER — BUDESONIDE 0.5 MG/2ML
0.5 INHALANT ORAL EVERY 12 HOURS
Status: DISCONTINUED | OUTPATIENT
Start: 2023-03-17 | End: 2023-03-17 | Stop reason: HOSPADM

## 2023-03-17 RX ORDER — ACETAMINOPHEN 325 MG/1
650 TABLET ORAL EVERY 8 HOURS PRN
Status: DISCONTINUED | OUTPATIENT
Start: 2023-03-17 | End: 2023-03-17 | Stop reason: HOSPADM

## 2023-03-17 RX ADMIN — IPRATROPIUM BROMIDE AND ALBUTEROL SULFATE 3 ML: 2.5; .5 SOLUTION RESPIRATORY (INHALATION) at 12:03

## 2023-03-17 RX ADMIN — ARFORMOTEROL TARTRATE 15 MCG: 15 SOLUTION RESPIRATORY (INHALATION) at 07:03

## 2023-03-17 RX ADMIN — LEVOFLOXACIN 750 MG: 750 INJECTION, SOLUTION INTRAVENOUS at 12:03

## 2023-03-17 RX ADMIN — HYDROCHLOROTHIAZIDE 12.5 MG: 12.5 TABLET ORAL at 09:03

## 2023-03-17 RX ADMIN — SIMETHICONE 80 MG: 80 TABLET, CHEWABLE ORAL at 02:03

## 2023-03-17 RX ADMIN — AMLODIPINE BESYLATE 10 MG: 5 TABLET ORAL at 09:03

## 2023-03-17 RX ADMIN — SERTRALINE HYDROCHLORIDE 50 MG: 50 TABLET ORAL at 09:03

## 2023-03-17 RX ADMIN — BUDESONIDE 0.5 MG: 0.5 INHALANT ORAL at 07:03

## 2023-03-17 RX ADMIN — Medication 1000 UNITS: at 09:03

## 2023-03-17 RX ADMIN — PREDNISONE 10 MG: 10 TABLET ORAL at 09:03

## 2023-03-17 NOTE — HPI
Ayanna Alicia is a 52-year-old female with a past medical history of chronic hypoxic respiratory failure at 5L/NC, asthma, Sjogren's syndrome with associated interstitial lung disease, multinodular goiter, GLENN, and morbid obesity who  presented upon the request of the transplant team after she reportedly had a fever of 101 with associated productive cough and shortness of breath.  Patient reports she felt as if she had worsening shortness of breadth yesterday due to ambulating across campus to go to her transplant appointment, in addition to, undergoing pulmonary function test which she was unable to complete. She is followed by Dr. Aviles here in [place].     Patient reports she was hospitalized in May of 2022 with acute respiratory failure and persistent pneumonia. At that time, she was dishcarged on oxygen. She has since been diagnosed with Sjogren's disease and thought to have interstitial lung disease secondary to her underlying rheumatological disease. Initial PFTs with severe restriction and reduced DLCO. She was initially prescribed steroids and subsequently added Cellcept and Rituximab infusions (last infusion in Dec 2022). Most recently, she was initiated on Ofev; however, the patient reports she has not had any significant improvement in her clinical condition since her original illness in May 2022 and reports she has clinically worsened over the course of this time. She chronically takes Breo, Spiriva, and Montelukast for her asthma. She endorses significant exertional SOB, can walk at home, but only limited distance. Has both dry and clear mucus, sometimes yellow sputum production. Denies any chest pain but endorses occasional chest tightness. Has the occasional lower abdominal pain exacerbated by coughing episodes; denies nausea or vomiting. No fever but endorses occasional chills. Endorses chronic heart burn for which she takes OTC Pepcid without significant symptomatic improvement. Endorses  chronic belching and flatulence. Endorses losing weight, lost 50 lbs since last year. Not taking cellcept since 12/2022, received 4 treatments of Rituximab with her last dose in Dec 2022. She takes prednisone 10 mg and recently completed a course of Bactrim. Reports a recent fungal infection on her vocal cords diagnosed per endoscopy for which she takes nystatin mouthwash and a course of fluconazole as well. Reports tolerating Ofev with no acute side effects at this time.    Patient with recent sputum culture performed on March 15, 2022 with moderate WBC's, moderate gram positive cocci, and rare gram negative rods; however, has not been speciated as of this time.  Labs at time of admission are unremarkable.  She has remained afebrile.  No acute findings on her chest imaging at the time of presentation.  Patient is noted to have patchy areas as airspace disease in her lower lung zones which are similar to her prior chest images.  Pulmonary was consulted for evaluation.

## 2023-03-17 NOTE — HOSPITAL COURSE
Ms. Alicia was admitted this morning for worsening shortness of breathe and advised by her transplant lung surgeon in [facility] to come to the ER.  Patient is on home o2 at 5-6L and currently on 5L sating >90%. She states she drops when she ambulates but that is chronic.  Her WBC is mildly elevated but is on chronic steroids and has a known chronic fungal infection of the lung.  Patient was seen by pulmonary and CT chest appears unchanged.  Repeat fungetell assay was ordered and patient is scheduled for a bronchoscopy outpatient on 4/6.  She has a follow up appt with Dr. Aviles on 3/28.  Case dw Taylor, Pulm NP.  We are in agreement that no additional inpatient therapy is needed.  She will be discharge on Levaquin for 14 days as she has positive sputum cultures on Bactrim (completed on 3/16).  Plan of care dw patient and scripts for Levaquin and protonix was sent to [facility].  She also has a pending script for dounebs that will be delivered prior to discharge.

## 2023-03-17 NOTE — SUBJECTIVE & OBJECTIVE
Past Medical History:   Diagnosis Date    Abnormal Pap smear of cervix     in the past with repeat pap smear okay.    Acute interstitial pneumonitis     Allergic rhinitis, cause unspecified     Arthritis of both knees     Asthma     Eczema     Fatty liver 10/2014    Fibrocystic breast changes     Headache(784.0)     Hepatomegaly 10/2014    Hypertension     Liver cyst 10/2014    Multinodular goiter     Followed by ENT - Dr. Nicolas Gray    Polymenorrhea     TMJ (dislocation of temporomandibular joint)     Uterine fibroid     in the past    Vitamin D deficiency disease        Past Surgical History:   Procedure Laterality Date     SECTION, CLASSIC      x 1    COLONOSCOPY N/A 2020    Procedure: COLONOSCOPY;  Surgeon: Angie Magana MD;  Location: [facility];  Service: Endoscopy;  Laterality: N/A;    HYSTERECTOMY      MULTIPLE TOOTH EXTRACTIONS      PARTIAL HYSTERECTOMY  2013    Due to fibroids       Review of patient's allergies indicates:   Allergen Reactions    Doxycycline Nausea Only     Other reaction(s): Nausea    Fluticasone Other (See Comments)     Other reaction(s): Epistaxis      Penicillins      Other reaction(s): unknown  Pt tolerated ceftriaxone       Current Facility-Administered Medications on File Prior to Encounter   Medication    sodium chloride 0.9% flush 10 mL     Current Outpatient Medications on File Prior to Encounter   Medication Sig    albuterol (PROVENTIL/VENTOLIN HFA) 90 mcg/actuation inhaler INHALE 1 TO 2 PUFFS BY MOUTH INTO THE LUNGS EVERY 4 TO 6 HOURS AS NEEDED FOR WHEEZING OR SHORTNESS OF BREATH    albuterol-ipratropium (DUO-NEB) 2.5 mg-0.5 mg/3 mL nebulizer solution Take 3 mLs by nebulization every 6 (six) hours as needed for Wheezing. Rescue (Patient taking differently: Take 3 mLs by nebulization 3 (three) times daily. Rescue)    amLODIPine (NORVASC) 10 MG tablet Take 1 tablet (10 mg total) by mouth once daily.    fluconazole (DIFLUCAN) 100 MG tablet Take 1  tablet (100 mg total) by mouth 2 (two) times a day.    fluticasone furoate-vilanteroL (BREO ELLIPTA) 100-25 mcg/dose diskus inhaler INHALE 1 PUFF INTO THE LUNGS ONCE DAILY    hydroCHLOROthiazide (HYDRODIURIL) 12.5 MG Tab Take 1 tablet (12.5 mg total) by mouth once daily.    ipratropium (ATROVENT) 0.02 % nebulizer solution Take 2.5 mLs (500 mcg total) by nebulization 4 (four) times daily. Rescue    levocetirizine (XYZAL) 5 MG tablet TAKE 1 TABLET(5 MG) BY MOUTH EVERY DAY    metOLazone (ZAROXOLYN) 2.5 MG tablet Take 1 tablet (2.5 mg total) by mouth every Mon, Wed, Fri.    mv-minerals/FA/omega 3,6,9 #3 (WOMEN'S 50+ ADVANCED ORAL) Take 1 tablet by mouth Daily.    nintedanib (OFEV) 100 mg Cap Take 100 mg by mouth 2 (two) times daily.    nystatin (MYCOSTATIN) 100,000 unit/mL suspension Swish and spit 4 mLs (400,000 Units total) by mouth 2 hours after meals and at bedtime for 10 days    omega-3s/dha/epa/fish oil/D3 (VITAMIN-D + OMEGA-3 ORAL) Take 2 tablets by mouth once daily at 6am.    predniSONE (DELTASONE) 10 MG tablet Take 2 tablets (20 mg total) by mouth once daily. (Patient taking differently: Take 10 mg by mouth once daily.)    sertraline (ZOLOFT) 50 MG tablet Take 1 tablet (50 mg total) by mouth once daily.    sulfamethoxazole-trimethoprim 800-160mg (BACTRIM DS) 800-160 mg Tab Take 1 tablet by mouth once daily. Take this instead of your usual Bactrim 3 times weekly dose for the next 10 days, then resume your usual Bactrim schedule. for 14 days    vitamin D (VITAMIN D3) 1000 units Tab Take 1,000 Units by mouth once daily.    ascorbic acid, vitamin C, (VITAMIN C) 500 MG tablet Take 500 mg by mouth once daily.    calcium/magnesium/vit B comp (CALCIUM-MAGNESIUM-B COMPLEX ORAL) Take 1 tablet by mouth once daily at 6am.    mycophenolate (CELLCEPT) 500 mg Tab Take 3 tablets (1,500 mg total) by mouth 2 (two) times daily.    [DISCONTINUED] doxycycline (VIBRAMYCIN) 100 MG Cap Take 1 capsule (100 mg total) by mouth 2 (two)  times daily. (Patient not taking: Reported on 3/16/2023)    [DISCONTINUED] montelukast (SINGULAIR) 10 mg tablet Take 1 tablet (10 mg total) by mouth every evening. (Patient not taking: Reported on 3/16/2023)    [DISCONTINUED] ondansetron (ZOFRAN-ODT) 4 MG TbDL Dissolve 1 tablet (4 mg total) by mouth every 6 (six) hours as needed (nausea). (Patient not taking: Reported on 3/16/2023)    [DISCONTINUED] sulfamethoxazole-trimethoprim 800-160mg (BACTRIM DS) 800-160 mg Tab Take 1 tablet by mouth every Mon, Wed, Fri. PCP prophylaxis while on prednisone 20 mg (Patient not taking: Reported on 3/16/2023)     Family History       Problem Relation (Age of Onset)    Cancer Maternal Grandmother (60), Maternal Aunt (50), Maternal Aunt (66)    Cataracts Cousin    Diabetes Maternal Grandfather    Diverticulitis Mother    Heart disease Mother, Father (63)    Hypertension Father    Migraines Cousin    No Known Problems Brother    Peripheral vascular disease Maternal Grandfather    Stroke Mother, Sister, Maternal Grandfather          Tobacco Use    Smoking status: Never     Passive exposure: Past    Smokeless tobacco: Never   Substance and Sexual Activity    Alcohol use: Not Currently     Comment: last use 03/2022    Drug use: Not Currently     Frequency: 4.0 times per week     Types: Marijuana     Comment: last year was last use 03/2022    Sexual activity: Yes     Partners: Female     Review of Systems   Constitutional:  Positive for fever. Negative for appetite change, chills, diaphoresis and fatigue.   Respiratory:  Positive for cough, chest tightness and shortness of breath. Negative for choking, wheezing and stridor.    All other systems reviewed and are negative.  Objective:     Vital Signs (Most Recent):  Temp: 98 °F (36.7 °C) (03/16/23 2025)  Pulse: 97 (03/17/23 0402)  Resp: (!) 35 (03/17/23 0242)  BP: 113/70 (03/17/23 0402)  SpO2: 99 % (03/17/23 0402)   Vital Signs (24h Range):  Temp:  [98 °F (36.7 °C)-101.1 °F (38.4 °C)] 98 °F  (36.7 °C)  Pulse:  [] 97  Resp:  [18-35] 35  SpO2:  [90 %-100 %] 99 %  BP: (111-135)/(60-79) 113/70     Weight: 104.3 kg (230 lb)  Body mass index is 37.12 kg/m².    Physical Exam  Vitals and nursing note reviewed.   Constitutional:       General: She is awake. She is not in acute distress.     Appearance: Normal appearance. She is well-developed, well-groomed and overweight. She is not ill-appearing, toxic-appearing or diaphoretic.   HENT:      Head: Normocephalic and atraumatic.      Mouth/Throat:      Lips: Pink.      Mouth: Mucous membranes are moist.      Pharynx: Oropharynx is clear. Uvula midline.   Eyes:      Extraocular Movements: Extraocular movements intact.      Conjunctiva/sclera: Conjunctivae normal.      Pupils: Pupils are equal, round, and reactive to light.   Cardiovascular:      Rate and Rhythm: Normal rate and regular rhythm.      Heart sounds: Normal heart sounds. No murmur heard.  Pulmonary:      Effort: Pulmonary effort is normal.      Breath sounds: Rhonchi and rales present. No wheezing.   Abdominal:      General: Bowel sounds are normal.      Palpations: Abdomen is soft.      Tenderness: There is no abdominal tenderness.   Musculoskeletal:      Cervical back: Normal range of motion and neck supple.      Comments: 5/5 strength throughout   Skin:     General: Skin is warm and dry.      Capillary Refill: Capillary refill takes less than 2 seconds.   Neurological:      General: No focal deficit present.      Mental Status: She is alert and oriented to person, place, and time. Mental status is at baseline.      GCS: GCS eye subscore is 4. GCS verbal subscore is 5. GCS motor subscore is 6.      Cranial Nerves: Cranial nerves 2-12 are intact.      Sensory: Sensation is intact.      Motor: Motor function is intact.   Psychiatric:         Mood and Affect: Mood normal.         Speech: Speech normal.         Behavior: Behavior normal. Behavior is cooperative.         CRANIAL NERVES     CN III,  IV, VI   Pupils are equal, round, and reactive to light.   LABS:  Recent Results (from the past 24 hour(s))   Spirometry w/tracing    Collection Time: 03/16/23  2:33 PM   Result Value Ref Range    Pre FVC 1.31 (L) 2.30 - 3.85 L    PRE FEV5 1.08 (L) 1.17 - 2.88 L    Pre FEV1 1.20 (L) 1.82 - 3.04 L    Pre FEV1 FVC 91.27 (H) 69.05 - 89.61 %    Pre FEF 25 75 2.21 1.08 - 4.04 L/s    Pre PEF 3.54 (L) 4.17 - 8.44 L/s    Pre  4.70 sec    Pre DLCO 3.10 (L) 19.13 - 30.60 ml/(min*mmHg)    DLCO ADJ PRE 3.53 (L) 19.13 - 30.60 ml/(min*mmHg)    DLCOVA PRE 1.99 (L) 3.31 - 6.12 ml/(min*mmHg*L)    DLVAAdj PRE 2.27 (L) 3.31 - 6.12 ml/(min*mmHg*L)    VA PRE 1.56 (L) 5.12 - 5.12 L    IVC PRE 1.23 (L) 2.30 - 3.85 L    FVC Ref 3.06     FVC LLN 2.30     FVC Pre Ref 42.9 %    FEV05 REF 2.02     FEV05 LLN 1.17     PRE FEV05 REF 53.5 %    FEV1 Ref 2.44     FEV1 LLN 1.82     FEV1 Pre Ref 49.1 %    FEV1 FVC Ref 80     FEV1 FVC LLN 69     FEV1 FVC Pre Ref 113.9 %    FEF 25 75 Ref 2.33     FEF 25 75 LLN 1.08     FEF 25 75 Pre Ref 95.2 %    PEF Ref 6.31     PEF LLN 4.17     PEF Pre Ref 56.2 %    DLCO Single Breath Ref 24.86     DLCO Single Breath LLN 19.13     DLCO SINGLEBREATH ULN 30.60     DLCO Single Breath Pre Ref 12.5 %    DLCOc Single Breath Ref 24.86     DLCOc Single Breath LLN 19.13     DLCOC SINGLEBREATH ULN 30.60     DLCOc Single Breath Pre Ref 14.2 %    DLCOVA Ref 4.71     DLCOVA LLN 3.31     DLCOVA ULN 6.12     DLCOVA Pre Ref 42.2 %    DLCOc SBVA Ref 4.71     DLCOc SBVA LLN 3.31     DLCOCSBVA ULN 6.12     DLCOc SBVA Pre Ref 48.1 %    VA Single Breath Ref 5.12     VA Single Breath LLN 5.12     VA SINGLEBREATH ULN 5.12     VA Single Breath Pre Ref 30.4 %    IVC Single Breath Ref 3.06     IVC Single Breath LLN 2.30     IVC SINGLEBREATH ULN 3.85     IVC Single Breath Pre Ref 40.2 %   CBC auto differential    Collection Time: 03/16/23  9:05 PM   Result Value Ref Range    WBC 16.45 (H) 3.90 - 12.70 K/uL    RBC 4.05 4.00 - 5.40 M/uL     Hemoglobin 10.1 (L) 12.0 - 16.0 g/dL    Hematocrit 33.6 (L) 37.0 - 48.5 %    MCV 83 82 - 98 fL    MCH 24.9 (L) 27.0 - 31.0 pg    MCHC 30.1 (L) 32.0 - 36.0 g/dL    RDW 18.2 (H) 11.5 - 14.5 %    Platelets 510 (H) 150 - 450 K/uL    MPV 9.1 (L) 9.2 - 12.9 fL    Immature Granulocytes 1.1 (H) 0.0 - 0.5 %    Gran # (ANC) 13.4 (H) 1.8 - 7.7 K/uL    Immature Grans (Abs) 0.18 (H) 0.00 - 0.04 K/uL    Lymph # 1.6 1.0 - 4.8 K/uL    Mono # 1.0 0.3 - 1.0 K/uL    Eos # 0.2 0.0 - 0.5 K/uL    Baso # 0.10 0.00 - 0.20 K/uL    nRBC 0 0 /100 WBC    Gran % 81.5 (H) 38.0 - 73.0 %    Lymph % 9.6 (L) 18.0 - 48.0 %    Mono % 6.0 4.0 - 15.0 %    Eosinophil % 1.2 0.0 - 8.0 %    Basophil % 0.6 0.0 - 1.9 %    Differential Method Automated    Comprehensive metabolic panel    Collection Time: 03/16/23  9:05 PM   Result Value Ref Range    Sodium 140 136 - 145 mmol/L    Potassium 4.2 3.5 - 5.1 mmol/L    Chloride 101 95 - 110 mmol/L    CO2 28 23 - 29 mmol/L    Glucose 67 (L) 70 - 110 mg/dL    BUN 12 6 - 20 mg/dL    Creatinine 0.9 0.5 - 1.4 mg/dL    Calcium 9.8 8.7 - 10.5 mg/dL    Total Protein 7.6 6.0 - 8.4 g/dL    Albumin 3.2 (L) 3.5 - 5.2 g/dL    Total Bilirubin 0.2 0.1 - 1.0 mg/dL    Alkaline Phosphatase 97 55 - 135 U/L    AST 28 10 - 40 U/L    ALT 27 10 - 44 U/L    Anion Gap 11 8 - 16 mmol/L    eGFR >60 >60 mL/min/1.73 m^2   Lactic acid, plasma    Collection Time: 03/16/23  9:05 PM   Result Value Ref Range    Lactate (Lactic Acid) 1.2 0.5 - 2.2 mmol/L   BNP    Collection Time: 03/16/23  9:05 PM   Result Value Ref Range    BNP 12 0 - 99 pg/mL   Influenza A & B by Molecular    Collection Time: 03/17/23  3:34 AM    Specimen: Nasopharyngeal Swab   Result Value Ref Range    Influenza A, Molecular Negative Negative    Influenza B, Molecular Negative Negative    Flu A & B Source Nasal swab    COVID-19 Rapid Screening    Collection Time: 03/17/23  3:34 AM   Result Value Ref Range    SARS-CoV-2 RNA, Amplification, Qual Negative Negative        RADIOLOGY  X-Ray Chest PA And Lateral    Result Date: 3/16/2023  EXAMINATION: XR CHEST PA AND LATERAL CLINICAL HISTORY: Dyspnea, unspecified TECHNIQUE: PA and lateral views of the chest were performed. COMPARISON: None FINDINGS: Cardiomegaly.  Patchy areas of airspace disease in the lower lung zones suggestive of pneumonia.  Pulmonary edema and CHF not excluded.     As above Electronically signed by: Jaswinder Dubois Date:    03/16/2023 Time:    20:44    X-Ray Chest PA And Lateral    Result Date: 3/7/2023  EXAM:  XR CHEST PA AND LATERAL CLINICAL HISTORY: Shortness of breath TECHNIQUE:  Two-view chest x-ray COMPARISON STUDIES: 11/01/2022 FINDINGS: Stable mild cardiomegaly. There is a background of diffuse interstitial thickening throughout the mid to lower lungs more confluent areas of hazy airspace opacification at the lung bases, right greater than left.  Slightly increasing opacification at the right lung base.  No significant effusion evident. No acute osseous findings. .  There are no advanced arthritic changes.     Slight interval worsening.  Increasing hazy airspace opacification right lung base superimposed upon chronic change. Finalized on: 3/7/2023 8:53 AM By:  Nicolas Cowart MD [facility]# [id number]      2023-03-07 08:55:21.150    [facility]      EKG    MICROBIOLOGY    MDM     Amount and/or Complexity of Data Reviewed  Clinical lab tests: reviewed  Tests in the radiology section of CPT®: reviewed  Tests in the medicine section of CPT®: reviewed  Discussion of test results with the performing providers: yes  Decide to obtain previous medical records or to obtain history from someone other than the patient: yes  Obtain history from someone other than the patient: yes  Review and summarize past medical records: yes  Discuss the patient with other providers: yes  Independent visualization of images, tracings, or specimens: yes

## 2023-03-17 NOTE — ASSESSMENT & PLAN NOTE
General chronic worsening pneumonia seen on imaging.  Temp of 101° at home.  Initiated on Levaquin in ED.  Followed by Stefan.  Compliant with home medications.  On chronic home O2 5-6 liters/minute via nasal cannula.  Plan:  -continue supplemental oxygen  -continue antibiotics  -continue home meds  -nebs tx prn  -pulmonary consult if needed

## 2023-03-17 NOTE — PHARMACY MED REC
"Admission Medication History     The home medication history was taken by Donovan Pelayo.    You may go to "Admission" then "Reconcile Home Medications" tabs to review and/or act upon these items.     The home medication list has been updated by the Pharmacy department.   Please read ALL comments highlighted in yellow.   Please address this information as you see fit.    Feel free to contact us if you have any questions or require assistance.      The medications listed below were removed from the home medication list. Please reorder if appropriate:  Patient reports no longer taking the following medication(s):  MYCOPHENOLATE 500MG    Medications listed below were obtained from: Analytic software- [employer] and Medical records  (Not in a hospital admission)      Donovan Pelayo  [id number]    Current Outpatient Medications on File Prior to Encounter   Medication Sig Dispense Refill Last Dose    albuterol (PROVENTIL/VENTOLIN HFA) 90 mcg/actuation inhaler INHALE 1 TO 2 PUFFS BY MOUTH INTO THE LUNGS EVERY 4 TO 6 HOURS AS NEEDED FOR WHEEZING OR SHORTNESS OF BREATH 8.5 g 5 3/16/2023    albuterol-ipratropium (DUO-NEB) 2.5 mg-0.5 mg/3 mL nebulizer solution Take 3 mLs by nebulization every 6 (six) hours as needed for Wheezing. Rescue (Patient taking differently: Take 3 mLs by nebulization 3 (three) times daily as needed for Wheezing. Rescue) 90 mL 11 3/16/2023    amLODIPine (NORVASC) 10 MG tablet Take 1 tablet (10 mg total) by mouth once daily. 90 tablet 1 3/16/2023    fluconazole (DIFLUCAN) 100 MG tablet Take 1 tablet (100 mg total) by mouth 2 (two) times a day. 56 tablet 0 3/16/2023    fluticasone furoate-vilanteroL (BREO ELLIPTA) 100-25 mcg/dose diskus inhaler INHALE 1 PUFF INTO THE LUNGS ONCE DAILY 60 each 5 3/16/2023    hydroCHLOROthiazide (HYDRODIURIL) 12.5 MG Tab Take 1 tablet (12.5 mg total) by mouth once daily. 90 tablet 1 3/16/2023    ipratropium (ATROVENT) 0.02 % nebulizer solution Take 2.5 mLs (500 mcg " total) by nebulization 4 (four) times daily. Rescue 300 mL 11 3/16/2023    levocetirizine (XYZAL) 5 MG tablet TAKE 1 TABLET(5 MG) BY MOUTH EVERY DAY 90 tablet 1 3/16/2023    metOLazone (ZAROXOLYN) 2.5 MG tablet Take 1 tablet (2.5 mg total) by mouth every Mon, Wed, Fri. 36 tablet 3 3/16/2023    mv-minerals/FA/omega 3,6,9 #3 (WOMEN'S 50+ ADVANCED ORAL) Take 1 tablet by mouth Daily.   3/16/2023    nintedanib (OFEV) 100 mg Cap Take 100 mg by mouth 2 (two) times daily. 120 capsule 11 3/16/2023    nystatin (MYCOSTATIN) 100,000 unit/mL suspension Swish and spit 4 mLs (400,000 Units total) by mouth 2 hours after meals and at bedtime for 10 days 240 mL 1 3/16/2023    omega-3s/dha/epa/fish oil/D3 (VITAMIN-D + OMEGA-3 ORAL) Take 2 tablets by mouth once daily at 6am.   3/16/2023    predniSONE (DELTASONE) 10 MG tablet Take 2 tablets (20 mg total) by mouth once daily. (Patient taking differently: Take 10 mg by mouth once daily.) 60 tablet 3 3/16/2023    sertraline (ZOLOFT) 50 MG tablet Take 1 tablet (50 mg total) by mouth once daily. 90 tablet 3 3/16/2023    sulfamethoxazole-trimethoprim 800-160mg (BACTRIM DS) 800-160 mg Tab Take 1 tablet by mouth once daily. Take this instead of your usual Bactrim 3 times weekly dose for the next 10 days, then resume your usual Bactrim schedule. for 14 days 14 tablet 0 3/16/2023    vitamin D (VITAMIN D3) 1000 units Tab Take 1,000 Units by mouth once daily.   3/16/2023    ascorbic acid, vitamin C, (VITAMIN C) 500 MG tablet Take 500 mg by mouth once daily.   More than a month    calcium/magnesium/vit B comp (CALCIUM-MAGNESIUM-B COMPLEX ORAL) Take 1 tablet by mouth once daily at 6am.   More than a month                           .

## 2023-03-17 NOTE — ASSESSMENT & PLAN NOTE
Body mass index is 37.12 kg/m². Morbid obesity complicates all aspects of disease management from diagnostic modalities to treatment. Weight loss encouraged and health benefits explained to patient.

## 2023-03-17 NOTE — H&P
"[facility]  [facility] Medicine  History & Physical    Patient Name: Ayanna Alicia  MRN: [id number]  Patient Class: IP- Inpatient  Admission Date: 3/17/2023  Attending Physician: Darshan Freeman MD   Primary Care Provider: Madeleine Enrique MD         Patient information was obtained from patient, past medical records and ER records.     Subjective:     Principal Problem:Asthma exacerbation    Chief Complaint:   Chief Complaint   Patient presents with    Shortness of Breath     Shortness of breath, cough x "more than today," pt c/o lungs "tightening up" and having "fungal infection of voice box" coughing green mucous. Abx x1 month without relief. Pt c/o 101 fever today. Pt on 4-6L/min O2 at home         HPI: Ayanna Alicia is a 52 y.o. female with a PMH  has a past medical history of Abnormal Pap smear of cervix, Acute interstitial pneumonitis, Allergic rhinitis, cause unspecified, Arthritis of both knees, Asthma, Eczema, Fatty liver (10/2014), Fibrocystic breast changes, Headache(784.0), Hepatomegaly (10/2014), Hypertension, Liver cyst (10/2014), Multinodular goiter, Polymenorrhea (2008), TMJ (dislocation of temporomandibular joint), Uterine fibroid, and Vitamin D deficiency disease.  Presents to the unit for evaluation of worsening shortness of a productive cough over last 2 days.  Patient states she has been long history of basilar pneumonia and fungal infection in her over the last 11 months.  Patient recently seen by pulmonologist at [facility] main campus and was informed to return to the ER for admission and further evaluation.  Patient is followed by Dr. Aviles here in [place].  Associated symptoms include fever of 101 which was resolved on its own without any use of antipyretic therapy.  Patient reports she is currently on 4 liters/minute via nasal cannula, but states she has had to pop out of 5-6 liters/minute via nasal cannula of lower worsening shortness of breath.  Currently reports " no improvement with treatment of home nebulized treatments, steroids, our antibiotics  (Bactrim).  Denies any other symptoms at this time    ER workup revealed leukocytosis of 16.45, CBG of 67 mg/dL, and chest x-ray revealing pneumonia and lower lung zones.  Remainder of lab work unremarkable.  Patient initiated on 750 of Levaquin IV and received 3 DuoNebs treatments in ED. remainder of vital signs have been stable.  Hospital Medicine consult to admit patient for COPD exacerbation and pneumonia.  Patient is in agreement with treatment plan.  Patient will be admitted to the hospital under observation status.    PCP: Madeleine Enrique         Past Medical History:   Diagnosis Date    Abnormal Pap smear of cervix     in the past with repeat pap smear okay.    Acute interstitial pneumonitis     Allergic rhinitis, cause unspecified     Arthritis of both knees     Asthma     Eczema     Fatty liver 10/2014    Fibrocystic breast changes     Headache(784.0)     Hepatomegaly 10/2014    Hypertension     Liver cyst 10/2014    Multinodular goiter     Followed by ENT - Dr. Nicolas rGay    Polymenorrhea     TMJ (dislocation of temporomandibular joint)     Uterine fibroid     in the past    Vitamin D deficiency disease        Past Surgical History:   Procedure Laterality Date     SECTION, CLASSIC      x 1    COLONOSCOPY N/A 2020    Procedure: COLONOSCOPY;  Surgeon: Angie Magana MD;  Location: [facility];  Service: Endoscopy;  Laterality: N/A;    HYSTERECTOMY      MULTIPLE TOOTH EXTRACTIONS      PARTIAL HYSTERECTOMY  2013    Due to fibroids       Review of patient's allergies indicates:   Allergen Reactions    Doxycycline Nausea Only     Other reaction(s): Nausea    Fluticasone Other (See Comments)     Other reaction(s): Epistaxis      Penicillins      Other reaction(s): unknown  Pt tolerated ceftriaxone       Current Facility-Administered Medications on File Prior to Encounter    Medication    sodium chloride 0.9% flush 10 mL     Current Outpatient Medications on File Prior to Encounter   Medication Sig    albuterol (PROVENTIL/VENTOLIN HFA) 90 mcg/actuation inhaler INHALE 1 TO 2 PUFFS BY MOUTH INTO THE LUNGS EVERY 4 TO 6 HOURS AS NEEDED FOR WHEEZING OR SHORTNESS OF BREATH    albuterol-ipratropium (DUO-NEB) 2.5 mg-0.5 mg/3 mL nebulizer solution Take 3 mLs by nebulization every 6 (six) hours as needed for Wheezing. Rescue (Patient taking differently: Take 3 mLs by nebulization 3 (three) times daily. Rescue)    amLODIPine (NORVASC) 10 MG tablet Take 1 tablet (10 mg total) by mouth once daily.    fluconazole (DIFLUCAN) 100 MG tablet Take 1 tablet (100 mg total) by mouth 2 (two) times a day.    fluticasone furoate-vilanteroL (BREO ELLIPTA) 100-25 mcg/dose diskus inhaler INHALE 1 PUFF INTO THE LUNGS ONCE DAILY    hydroCHLOROthiazide (HYDRODIURIL) 12.5 MG Tab Take 1 tablet (12.5 mg total) by mouth once daily.    ipratropium (ATROVENT) 0.02 % nebulizer solution Take 2.5 mLs (500 mcg total) by nebulization 4 (four) times daily. Rescue    levocetirizine (XYZAL) 5 MG tablet TAKE 1 TABLET(5 MG) BY MOUTH EVERY DAY    metOLazone (ZAROXOLYN) 2.5 MG tablet Take 1 tablet (2.5 mg total) by mouth every Mon, Wed, Fri.    mv-minerals/FA/omega 3,6,9 #3 (WOMEN'S 50+ ADVANCED ORAL) Take 1 tablet by mouth Daily.    nintedanib (OFEV) 100 mg Cap Take 100 mg by mouth 2 (two) times daily.    nystatin (MYCOSTATIN) 100,000 unit/mL suspension Swish and spit 4 mLs (400,000 Units total) by mouth 2 hours after meals and at bedtime for 10 days    omega-3s/dha/epa/fish oil/D3 (VITAMIN-D + OMEGA-3 ORAL) Take 2 tablets by mouth once daily at 6am.    predniSONE (DELTASONE) 10 MG tablet Take 2 tablets (20 mg total) by mouth once daily. (Patient taking differently: Take 10 mg by mouth once daily.)    sertraline (ZOLOFT) 50 MG tablet Take 1 tablet (50 mg total) by mouth once daily.    sulfamethoxazole-trimethoprim  800-160mg (BACTRIM DS) 800-160 mg Tab Take 1 tablet by mouth once daily. Take this instead of your usual Bactrim 3 times weekly dose for the next 10 days, then resume your usual Bactrim schedule. for 14 days    vitamin D (VITAMIN D3) 1000 units Tab Take 1,000 Units by mouth once daily.    ascorbic acid, vitamin C, (VITAMIN C) 500 MG tablet Take 500 mg by mouth once daily.    calcium/magnesium/vit B comp (CALCIUM-MAGNESIUM-B COMPLEX ORAL) Take 1 tablet by mouth once daily at 6am.    mycophenolate (CELLCEPT) 500 mg Tab Take 3 tablets (1,500 mg total) by mouth 2 (two) times daily.    [DISCONTINUED] doxycycline (VIBRAMYCIN) 100 MG Cap Take 1 capsule (100 mg total) by mouth 2 (two) times daily. (Patient not taking: Reported on 3/16/2023)    [DISCONTINUED] montelukast (SINGULAIR) 10 mg tablet Take 1 tablet (10 mg total) by mouth every evening. (Patient not taking: Reported on 3/16/2023)    [DISCONTINUED] ondansetron (ZOFRAN-ODT) 4 MG TbDL Dissolve 1 tablet (4 mg total) by mouth every 6 (six) hours as needed (nausea). (Patient not taking: Reported on 3/16/2023)    [DISCONTINUED] sulfamethoxazole-trimethoprim 800-160mg (BACTRIM DS) 800-160 mg Tab Take 1 tablet by mouth every Mon, Wed, Fri. PCP prophylaxis while on prednisone 20 mg (Patient not taking: Reported on 3/16/2023)     Family History       Problem Relation (Age of Onset)    Cancer Maternal Grandmother (60), Maternal Aunt (50), Maternal Aunt (66)    Cataracts Cousin    Diabetes Maternal Grandfather    Diverticulitis Mother    Heart disease Mother, Father (63)    Hypertension Father    Migraines Cousin    No Known Problems Brother    Peripheral vascular disease Maternal Grandfather    Stroke Mother, Sister, Maternal Grandfather          Tobacco Use    Smoking status: Never     Passive exposure: Past    Smokeless tobacco: Never   Substance and Sexual Activity    Alcohol use: Not Currently     Comment: last use 03/2022    Drug use: Not Currently      Frequency: 4.0 times per week     Types: Marijuana     Comment: last year was last use 03/2022    Sexual activity: Yes     Partners: Female     Review of Systems   Constitutional:  Positive for fever. Negative for appetite change, chills, diaphoresis and fatigue.   Respiratory:  Positive for cough, chest tightness and shortness of breath. Negative for choking, wheezing and stridor.    All other systems reviewed and are negative.  Objective:     Vital Signs (Most Recent):  Temp: 98 °F (36.7 °C) (03/16/23 2025)  Pulse: 97 (03/17/23 0402)  Resp: (!) 35 (03/17/23 0242)  BP: 113/70 (03/17/23 0402)  SpO2: 99 % (03/17/23 0402)   Vital Signs (24h Range):  Temp:  [98 °F (36.7 °C)-101.1 °F (38.4 °C)] 98 °F (36.7 °C)  Pulse:  [] 97  Resp:  [18-35] 35  SpO2:  [90 %-100 %] 99 %  BP: (111-135)/(60-79) 113/70     Weight: 104.3 kg (230 lb)  Body mass index is 37.12 kg/m².    Physical Exam  Vitals and nursing note reviewed.   Constitutional:       General: She is awake. She is not in acute distress.     Appearance: Normal appearance. She is well-developed, well-groomed and overweight. She is not ill-appearing, toxic-appearing or diaphoretic.   HENT:      Head: Normocephalic and atraumatic.      Mouth/Throat:      Lips: Pink.      Mouth: Mucous membranes are moist.      Pharynx: Oropharynx is clear. Uvula midline.   Eyes:      Extraocular Movements: Extraocular movements intact.      Conjunctiva/sclera: Conjunctivae normal.      Pupils: Pupils are equal, round, and reactive to light.   Cardiovascular:      Rate and Rhythm: Normal rate and regular rhythm.      Heart sounds: Normal heart sounds. No murmur heard.  Pulmonary:      Effort: Pulmonary effort is normal.      Breath sounds: Rhonchi and rales present. No wheezing.   Abdominal:      General: Bowel sounds are normal.      Palpations: Abdomen is soft.      Tenderness: There is no abdominal tenderness.   Musculoskeletal:      Cervical back: Normal range of motion and neck  supple.      Comments: 5/5 strength throughout   Skin:     General: Skin is warm and dry.      Capillary Refill: Capillary refill takes less than 2 seconds.   Neurological:      General: No focal deficit present.      Mental Status: She is alert and oriented to person, place, and time. Mental status is at baseline.      GCS: GCS eye subscore is 4. GCS verbal subscore is 5. GCS motor subscore is 6.      Cranial Nerves: Cranial nerves 2-12 are intact.      Sensory: Sensation is intact.      Motor: Motor function is intact.   Psychiatric:         Mood and Affect: Mood normal.         Speech: Speech normal.         Behavior: Behavior normal. Behavior is cooperative.         CRANIAL NERVES     CN III, IV, VI   Pupils are equal, round, and reactive to light.   LABS:  Recent Results (from the past 24 hour(s))   Spirometry w/tracing    Collection Time: 03/16/23  2:33 PM   Result Value Ref Range    Pre FVC 1.31 (L) 2.30 - 3.85 L    PRE FEV5 1.08 (L) 1.17 - 2.88 L    Pre FEV1 1.20 (L) 1.82 - 3.04 L    Pre FEV1 FVC 91.27 (H) 69.05 - 89.61 %    Pre FEF 25 75 2.21 1.08 - 4.04 L/s    Pre PEF 3.54 (L) 4.17 - 8.44 L/s    Pre  4.70 sec    Pre DLCO 3.10 (L) 19.13 - 30.60 ml/(min*mmHg)    DLCO ADJ PRE 3.53 (L) 19.13 - 30.60 ml/(min*mmHg)    DLCOVA PRE 1.99 (L) 3.31 - 6.12 ml/(min*mmHg*L)    DLVAAdj PRE 2.27 (L) 3.31 - 6.12 ml/(min*mmHg*L)    VA PRE 1.56 (L) 5.12 - 5.12 L    IVC PRE 1.23 (L) 2.30 - 3.85 L    FVC Ref 3.06     FVC LLN 2.30     FVC Pre Ref 42.9 %    FEV05 REF 2.02     FEV05 LLN 1.17     PRE FEV05 REF 53.5 %    FEV1 Ref 2.44     FEV1 LLN 1.82     FEV1 Pre Ref 49.1 %    FEV1 FVC Ref 80     FEV1 FVC LLN 69     FEV1 FVC Pre Ref 113.9 %    FEF 25 75 Ref 2.33     FEF 25 75 LLN 1.08     FEF 25 75 Pre Ref 95.2 %    PEF Ref 6.31     PEF LLN 4.17     PEF Pre Ref 56.2 %    DLCO Single Breath Ref 24.86     DLCO Single Breath LLN 19.13     DLCO SINGLEBREATH ULN 30.60     DLCO Single Breath Pre Ref 12.5 %    DLCOc Single Breath  Ref 24.86     DLCOc Single Breath LLN 19.13     DLCOC SINGLEBREATH ULN 30.60     DLCOc Single Breath Pre Ref 14.2 %    DLCOVA Ref 4.71     DLCOVA LLN 3.31     DLCOVA ULN 6.12     DLCOVA Pre Ref 42.2 %    DLCOc SBVA Ref 4.71     DLCOc SBVA LLN 3.31     DLCOCSBVA ULN 6.12     DLCOc SBVA Pre Ref 48.1 %    VA Single Breath Ref 5.12     VA Single Breath LLN 5.12     VA SINGLEBREATH ULN 5.12     VA Single Breath Pre Ref 30.4 %    IVC Single Breath Ref 3.06     IVC Single Breath LLN 2.30     IVC SINGLEBREATH ULN 3.85     IVC Single Breath Pre Ref 40.2 %   CBC auto differential    Collection Time: 03/16/23  9:05 PM   Result Value Ref Range    WBC 16.45 (H) 3.90 - 12.70 K/uL    RBC 4.05 4.00 - 5.40 M/uL    Hemoglobin 10.1 (L) 12.0 - 16.0 g/dL    Hematocrit 33.6 (L) 37.0 - 48.5 %    MCV 83 82 - 98 fL    MCH 24.9 (L) 27.0 - 31.0 pg    MCHC 30.1 (L) 32.0 - 36.0 g/dL    RDW 18.2 (H) 11.5 - 14.5 %    Platelets 510 (H) 150 - 450 K/uL    MPV 9.1 (L) 9.2 - 12.9 fL    Immature Granulocytes 1.1 (H) 0.0 - 0.5 %    Gran # (ANC) 13.4 (H) 1.8 - 7.7 K/uL    Immature Grans (Abs) 0.18 (H) 0.00 - 0.04 K/uL    Lymph # 1.6 1.0 - 4.8 K/uL    Mono # 1.0 0.3 - 1.0 K/uL    Eos # 0.2 0.0 - 0.5 K/uL    Baso # 0.10 0.00 - 0.20 K/uL    nRBC 0 0 /100 WBC    Gran % 81.5 (H) 38.0 - 73.0 %    Lymph % 9.6 (L) 18.0 - 48.0 %    Mono % 6.0 4.0 - 15.0 %    Eosinophil % 1.2 0.0 - 8.0 %    Basophil % 0.6 0.0 - 1.9 %    Differential Method Automated    Comprehensive metabolic panel    Collection Time: 03/16/23  9:05 PM   Result Value Ref Range    Sodium 140 136 - 145 mmol/L    Potassium 4.2 3.5 - 5.1 mmol/L    Chloride 101 95 - 110 mmol/L    CO2 28 23 - 29 mmol/L    Glucose 67 (L) 70 - 110 mg/dL    BUN 12 6 - 20 mg/dL    Creatinine 0.9 0.5 - 1.4 mg/dL    Calcium 9.8 8.7 - 10.5 mg/dL    Total Protein 7.6 6.0 - 8.4 g/dL    Albumin 3.2 (L) 3.5 - 5.2 g/dL    Total Bilirubin 0.2 0.1 - 1.0 mg/dL    Alkaline Phosphatase 97 55 - 135 U/L    AST 28 10 - 40 U/L    ALT 27 10  - 44 U/L    Anion Gap 11 8 - 16 mmol/L    eGFR >60 >60 mL/min/1.73 m^2   Lactic acid, plasma    Collection Time: 03/16/23  9:05 PM   Result Value Ref Range    Lactate (Lactic Acid) 1.2 0.5 - 2.2 mmol/L   BNP    Collection Time: 03/16/23  9:05 PM   Result Value Ref Range    BNP 12 0 - 99 pg/mL   Influenza A & B by Molecular    Collection Time: 03/17/23  3:34 AM    Specimen: Nasopharyngeal Swab   Result Value Ref Range    Influenza A, Molecular Negative Negative    Influenza B, Molecular Negative Negative    Flu A & B Source Nasal swab    COVID-19 Rapid Screening    Collection Time: 03/17/23  3:34 AM   Result Value Ref Range    SARS-CoV-2 RNA, Amplification, Qual Negative Negative       RADIOLOGY  X-Ray Chest PA And Lateral    Result Date: 3/16/2023  EXAMINATION: XR CHEST PA AND LATERAL CLINICAL HISTORY: Dyspnea, unspecified TECHNIQUE: PA and lateral views of the chest were performed. COMPARISON: None FINDINGS: Cardiomegaly.  Patchy areas of airspace disease in the lower lung zones suggestive of pneumonia.  Pulmonary edema and CHF not excluded.     As above Electronically signed by: Jaswinder Dubois Date:    03/16/2023 Time:    20:44    X-Ray Chest PA And Lateral    Result Date: 3/7/2023  EXAM:  XR CHEST PA AND LATERAL CLINICAL HISTORY: Shortness of breath TECHNIQUE:  Two-view chest x-ray COMPARISON STUDIES: 11/01/2022 FINDINGS: Stable mild cardiomegaly. There is a background of diffuse interstitial thickening throughout the mid to lower lungs more confluent areas of hazy airspace opacification at the lung bases, right greater than left.  Slightly increasing opacification at the right lung base.  No significant effusion evident. No acute osseous findings. .  There are no advanced arthritic changes.     Slight interval worsening.  Increasing hazy airspace opacification right lung base superimposed upon chronic change. Finalized on: 3/7/2023 8:53 AM By:  Nicolas Cowart MD [facility]# [id number]      2023-03-07 08:55:21.150     [facility]      EKG    MICROBIOLOGY    MDM     Amount and/or Complexity of Data Reviewed  Clinical lab tests: reviewed  Tests in the radiology section of CPT®: reviewed  Tests in the medicine section of CPT®: reviewed  Discussion of test results with the performing providers: yes  Decide to obtain previous medical records or to obtain history from someone other than the patient: yes  Obtain history from someone other than the patient: yes  Review and summarize past medical records: yes  Discuss the patient with other providers: yes  Independent visualization of images, tracings, or specimens: yes          Assessment/Plan:     * Asthma exacerbation  Patient with history of Asthma currently on inhalers and home oxygen presently in acute exacerbation and is without signs/symptoms of distress.   Patient currently saturating  100% on 5 L/min via NC.   Plan:  -Continue home medications, titrate as needed  -Titrate oxygen requirements as needed  -Incentive spirometry  -continue home medications  -Monitor pulse oximetry  -Duonebs prn          Chronic interstitial pneumonia  General chronic worsening pneumonia seen on imaging.  Temp of 101° at home.  Initiated on Levaquin in ED.  Followed by Stefan.  Compliant with home medications.  On chronic home O2 5-6 liters/minute via nasal cannula.  Plan:  -continue supplemental oxygen  -continue antibiotics  -continue home meds  -nebs tx prn  -pulmonary consult if needed      HTN (hypertension)  Currently normotensive. BP usually well controlled per patient with home medications.  Plan:  -Optimize pain control   -Continue home medications (HCTZ, norvasc, metolazone), titrate as needed   -Monitor BP  -Low salt/cardiac diet when not NPO  -IV hydralazine prn for SBP>160 or DBP>90           GERD (gastroesophageal reflux disease)  Chronic. Stable. Currently asymptomatic. Home medications include PPI/Antacids as needed.  Plan:  -Continue PPI/Antacids as needed         Obesity with serious  comorbidity  Body mass index is 37.12 kg/m². Morbid obesity complicates all aspects of disease management from diagnostic modalities to treatment. Weight loss encouraged and health benefits explained to patient.         VTE Risk Mitigation (From admission, onward)         Ordered     enoxaparin injection 40 mg  Daily         03/17/23 0128     IP VTE HIGH RISK PATIENT  Once         03/17/23 0128     Place sequential compression device  Until discontinued         03/17/23 0128              //Core Measures   -DVT proph: SCDs, lovenox   -Code status full    -Surrogate: family    Components of this note were documented using a voice recognition system and are subject to errors not corrected at the time the document was proof read. Please contact the author for any clarifications.       Dejon Freeman NP  Department of Hospital Medicine  [facility] Dept.

## 2023-03-17 NOTE — CONSULTS
[facility]  Pulmonology Consultation Note     Patient Name: Ayanna Alicia    MRN: [id number]  Admission Date: 3/17/2023    Hospital Length of Stay: 0 days  Code Status: Full Code     Attending Physician: Darshan Freeman MD  Primary Care Provider: Maedleine Enrique MD   Principal Problem: Chronic respiratory failure  Subjective:   HPI:  Ayanna Alicia is a 52-year-old female with a past medical history of chronic hypoxic respiratory failure at 5L/NC, asthma, Sjogren's syndrome with associated interstitial lung disease, multinodular goiter, GLENN, and morbid obesity who  presented upon the request of the transplant team after she reportedly had a fever of 101 with associated productive cough and shortness of breath.  Patient reports she felt as if she had worsening shortness of breadth yesterday due to ambulating across campus to go to her transplant appointment, in addition to, undergoing pulmonary function test which she was unable to complete. She is followed by Dr. Aviles here in [place].     Patient reports she was hospitalized in May of 2022 with acute respiratory failure and persistent pneumonia. At that time, she was dishcarged on oxygen. She has since been diagnosed with Sjogren's disease and thought to have interstitial lung disease secondary to her underlying rheumatological disease. Initial PFTs with severe restriction and reduced DLCO. She was initially prescribed steroids and subsequently added Cellcept and Rituximab infusions (last infusion in Dec 2022). Most recently, she was initiated on Ofev; however, the patient reports she has not had any significant improvement in her clinical condition since her original illness in May 2022 and reports she has clinically worsened over the course of this time. She chronically takes Breo, Spiriva, and Montelukast for her asthma. She endorses significant exertional SOB, can walk at home, but only limited distance. Has  both dry and clear mucus, sometimes yellow sputum production. Denies any chest pain but endorses occasional chest tightness. Has the occasional lower abdominal pain exacerbated by coughing episodes; denies nausea or vomiting. No fever but endorses occasional chills. Endorses chronic heart burn for which she takes OTC Pepcid without significant symptomatic improvement. Endorses chronic belching and flatulence. Endorses losing weight, lost 50 lbs since last year. Not taking cellcept since 12/2022, received 4 treatments of Rituximab with her last dose in Dec 2022. She takes prednisone 10 mg and recently completed a course of Bactrim. Reports a recent fungal infection on her vocal cords diagnosed per endoscopy for which she takes nystatin mouthwash and a course of fluconazole as well. Reports tolerating Ofev with no acute side effects at this time.    Patient with recent sputum culture performed on March 15, 2022 with moderate WBC's, moderate gram positive cocci, and rare gram negative rods; however, has not been speciated as of this time.  Labs at time of admission are unremarkable.  She has remained afebrile.  No acute findings on her chest imaging at the time of presentation.  Patient is noted to have patchy areas as airspace disease in her lower lung zones which are similar to her prior chest images.  Pulmonary was consulted for evaluation.     Past Medical History:   Diagnosis Date    Abnormal Pap smear of cervix     in the past with repeat pap smear okay.    Acute interstitial pneumonitis     Allergic rhinitis, cause unspecified     Arthritis of both knees     Asthma     Eczema     Fatty liver 10/2014    Fibrocystic breast changes     Headache(784.0)     Hepatomegaly 10/2014    Hypertension     Liver cyst 10/2014    Multinodular goiter     Followed by ENT - Dr. Nicolas Gray    Polymenorrhea 2008    TMJ (dislocation of temporomandibular joint)     Uterine fibroid     in the past    Vitamin D deficiency disease       Past Surgical History:   Procedure Laterality Date     SECTION, CLASSIC      x 1    COLONOSCOPY N/A 2020    Procedure: COLONOSCOPY;  Surgeon: Angie Magana MD;  Location: [facility];  Service: Endoscopy;  Laterality: N/A;    HYSTERECTOMY      MULTIPLE TOOTH EXTRACTIONS      PARTIAL HYSTERECTOMY  2013    Due to fibroids     Review of patient's allergies indicates:   Allergen Reactions    Doxycycline Nausea Only     Other reaction(s): Nausea    Fluticasone Other (See Comments)     Other reaction(s): Epistaxis      Penicillins      Other reaction(s): unknown  Pt tolerated ceftriaxone     Family History       Problem Relation (Age of Onset)    Cancer Maternal Grandmother (60), Maternal Aunt (50), Maternal Aunt (66)    Cataracts Cousin    Diabetes Maternal Grandfather    Diverticulitis Mother    Heart disease Mother, Father (63)    Hypertension Father    Migraines Cousin    No Known Problems Brother    Peripheral vascular disease Maternal Grandfather    Stroke Mother, Sister, Maternal Grandfather          Tobacco Use    Smoking status: Never     Passive exposure: Past    Smokeless tobacco: Never   Substance and Sexual Activity    Alcohol use: Not Currently     Comment: last use 2022    Drug use: Not Currently     Frequency: 4.0 times per week     Types: Marijuana     Comment: last year was last use 2022    Sexual activity: Yes     Partners: Female       Review of Systems negative except as indicated in HPI  Objective:     Vital Signs (Most Recent):  Temp: 98 °F (36.7 °C) (23)  Pulse: 88 (23 0800)  Resp: (!) 23 (23 0800)  BP: 112/63 (23 0916)  SpO2: 100 % (23 0800)   Vital Signs (24h Range):  Temp:  [98 °F (36.7 °C)-101.1 °F (38.4 °C)] 98 °F (36.7 °C)  Pulse:  [] 88  Resp:  [18-35] 23  SpO2:  [90 %-100 %] 100 %  BP: (109-135)/(60-79) 112/63     Weight: 104.3 kg (230 lb)  Body mass index is 37.12 kg/m².    Intake/Output Summary (Last 24 hours) at  3/17/2023 1054  Last data filed at 3/17/2023 0226  Gross per 24 hour   Intake 149 ml   Output --   Net 149 ml     Physical Exam  Vitals and nursing note reviewed.   Constitutional:       Appearance: Normal appearance. She is obese.   HENT:      Head: Normocephalic.   Eyes:      Conjunctiva/sclera: Conjunctivae normal.   Cardiovascular:      Rate and Rhythm: Normal rate.   Pulmonary:      Effort: Pulmonary effort is normal.      Comments: Posterior bibasilar crackles  Abdominal:      Palpations: Abdomen is soft.   Musculoskeletal:         General: Normal range of motion.      Cervical back: Normal range of motion and neck supple.   Skin:     General: Skin is warm and dry.   Neurological:      Mental Status: She is alert and oriented to person, place, and time.   Psychiatric:         Mood and Affect: Mood normal.     Significant Labs:  CBC/Anemia Profile:  Recent Labs   Lab 03/16/23  2105   WBC 16.45*   HGB 10.1*   HCT 33.6*   *   MCV 83   RDW 18.2*   Chemistries:  Recent Labs   Lab 03/16/23  2105      K 4.2      CO2 28   BUN 12   CREATININE 0.9   CALCIUM 9.8   ALBUMIN 3.2*   PROT 7.6   BILITOT 0.2   ALKPHOS 97   ALT 27   AST 28     Significant Imaging:   CT: I have reviewed all pertinent results/findings within the past 24 hours and my personal findings are:  Ground-glass opacities to bilateral mid and lower lung zones with some subpleural reticulations and subpleural bands.  Bronchiectasis appears slightly worse from prior images.  No significant lymphadenopathy identified.  Mild cardiomegaly.  No significant pleural effusions.  CXR: I have reviewed all pertinent results/findings within the past 24 hours and my personal findings are:  Patchy bibasilar airspace disease similar to prior images.    Assessment/Plan:     Pulmonary  * Chronic respiratory failure  Patient with chronic hypoxic respiratory failure who is on home oxygen at 5L/NC which is her current inpatient requirements. Signs/symptoms of  respiratory failure included increased work of breathing, decreased oxygen saturations, and wheezing. Contributing diagnoses includes asthma, interstitial lung disease, and decompensated heart failure. Labs and images were reviewed. Patient does not have a recent ABG for review.  Initial PFTs with severe restriction and reduced DLCO; attempted repeat PFTs on 3/16/2022 which she was unable to complete due to worsening shortness of breath.    Will treat underlying causes and adjust management of respiratory failure as follows:  No acute worsening of her underlying chronic conditions; ok for discharge at this time  Optimize GERD control; Protonix and Carafate ordered   Sputum culture on 3/15 with gram positive cocci and gram negative clemencia  Discharge on Levaquin 750 mg po x14 days  Follow-up with Dr. Aviles as scheduled  Obtain Fungitell and Fungal immunodiffusion study   Continue supplemental oxygen as per home regimen of 5L/NC    Mild intermittent asthma  She chronically takes Breo, Spiriva, and Montelukast     Resume home regimen at time of discharge    UIP (usual interstitial pneumonitis)  Hospitalized May 2022 with acute respiratory failure and persistent pneumonia and dishcarged on oxygen. She has since been diagnosed with Sjogren's disease with associated ILD. Initial PFTs with severe restriction and reduced DLCO. Initially prescribed steroids and subsequently Cellcept and Rituximab infusions (last infusion in Dec 2022). Now on Ofev. Patient reports she has not had any significant improvement in her clinical condition since her original illness in May 2022 and reports she has clinically worsened over the course of this time.     Follow-up with rheumatology and pulmonology  Continue Prednisone 10mg daily as per home regimen  Continue OFev and monitor for side effects  Repeat CT chest today with only mild worsening but no significant progress of disease    GI  GERD (gastroesophageal reflux disease)  Endorses  chronic reflux with belching and chronic heart burn    Discontinue home OTC Pepcid  Add Protonix and Carafate for reflux control       Thank you for your consult. I will sign off. Please contact us if you have any additional questions.     Taylor Mccann NP  Pulmonary and Critical Care  [facility]

## 2023-03-17 NOTE — ASSESSMENT & PLAN NOTE
Endorses chronic reflux with belching and chronic heart burn    · Discontinue home OTC Pepcid  · Add Protonix and Carafate for reflux control

## 2023-03-17 NOTE — ASSESSMENT & PLAN NOTE
She chronically takes Breo, Spiriva, and Montelukast     · Resume home regimen at time of discharge

## 2023-03-17 NOTE — ED PROVIDER NOTES
"SCRIBE #1 NOTE: I, Pratima Gucrief, am scribing for, and in the presence of, Florentino Meek Jr., MD. I have scribed the entire note.       History     Chief Complaint   Patient presents with    Shortness of Breath     Shortness of breath, cough x "more than today," pt c/o lungs "tightening up" and having "fungal infection of voice box" coughing green mucous. Abx x1 month without relief. Pt c/o 101 fever today. Pt on 4-6L/min O2 at home      Review of patient's allergies indicates:   Allergen Reactions    Doxycycline Nausea Only     Other reaction(s): Nausea    Fluticasone Other (See Comments)     Other reaction(s): Epistaxis      Penicillins      Other reaction(s): unknown  Pt tolerated ceftriaxone         History of Present Illness     HPI    3/17/2023, 12:27 AM  History obtained from the patient      History of Present Illness: Ayanna Alicia is a 52 y.o. female patient with a PMHx of HTN who presents to the Emergency Department for evaluation of SOB which worsened in the past 2 days PTA. Pt states she has been battling pneumonia for 11 months. Pt was recently seen by a specialist in [place] who told her to come into the ED today due to worsening SOB. Symptoms are constant and moderate in severity. No mitigating or exacerbating factors reported. Associated sxs include productive cough and 101 degree fever. Patient denies any N/V/D, headache, dizziness, chills, and all other sxs at this time. Prior Tx includes antibiotics and 6 liters oxygen at home with no relief. No further complaints or concerns at this time.       Arrival mode: Personal vehicle   PCP: Madeleine Enrique MD        Past Medical History:  Past Medical History:   Diagnosis Date    Abnormal Pap smear of cervix     in the past with repeat pap smear okay.    Acute interstitial pneumonitis     Allergic rhinitis, cause unspecified     Arthritis of both knees     Asthma     Eczema     Fatty liver 10/2014    Fibrocystic breast changes     " Headache(784.0)     Hepatomegaly 10/2014    Hypertension     Liver cyst 10/2014    Multinodular goiter     Followed by ENT - Dr. Nicolas Gray    Polymenorrhea 2008    TMJ (dislocation of temporomandibular joint)     Uterine fibroid     in the past    Vitamin D deficiency disease        Past Surgical History:  Past Surgical History:   Procedure Laterality Date     SECTION, CLASSIC      x 1    COLONOSCOPY N/A 2020    Procedure: COLONOSCOPY;  Surgeon: Angie Magana MD;  Location: [facility];  Service: Endoscopy;  Laterality: N/A;    HYSTERECTOMY      MULTIPLE TOOTH EXTRACTIONS      PARTIAL HYSTERECTOMY  2013    Due to fibroids         Family History:  Family History   Problem Relation Age of Onset    Stroke Mother     Heart disease Mother     Diverticulitis Mother     Heart disease Father 63        MI/CAD    Hypertension Father     Stroke Sister     No Known Problems Brother     Cancer Maternal Grandmother 60        Rectal and stomach cancer    Stroke Maternal Grandfather     Diabetes Maternal Grandfather     Peripheral vascular disease Maternal Grandfather     Cancer Maternal Aunt 50        Stomach cancer    Cancer Maternal Aunt 66        Lung cancer (smoker)    Migraines Cousin     Cataracts Cousin        Social History:  Social History     Tobacco Use    Smoking status: Never     Passive exposure: Past    Smokeless tobacco: Never   Substance and Sexual Activity    Alcohol use: Not Currently     Comment: last use 2022    Drug use: Not Currently     Frequency: 4.0 times per week     Types: Marijuana     Comment: last year was last use 2022    Sexual activity: Yes     Partners: Female        Review of Systems     Review of Systems   Constitutional:  Positive for fever. Negative for chills.   HENT:  Negative for sore throat.    Respiratory:  Positive for cough (productive) and shortness of breath.    Cardiovascular:  Negative for chest pain.   Gastrointestinal:  Negative for diarrhea,  nausea and vomiting.   Genitourinary:  Negative for dysuria.   Musculoskeletal:  Negative for back pain.   Skin:  Negative for rash.   Neurological:  Negative for dizziness, weakness and headaches.   Hematological:  Does not bruise/bleed easily.   All other systems reviewed and are negative.     Physical Exam     Initial Vitals [03/16/23 2025]   BP Pulse Resp Temp SpO2   111/79 106 (!) 24 98 °F (36.7 °C) 95 %      MAP       --          Physical Exam  Nursing Notes and Vital Signs Reviewed.  Constitutional: Patient is in no acute distress. Well-developed and well-nourished.  Head: Atraumatic. Normocephalic.  Eyes:  EOM intact.  No scleral icterus.  ENT: Mucous membranes are moist.  Nares clear   Neck:  Full ROM. No JVD.  Cardiovascular: Regular rate. Regular rhythm No murmurs, rubs, or gallops. Distal pulses are 2+ and symmetric  Pulmonary/Chest:  Patient is tachypneic and wheezing in all lung fields.  Patient is hypoxic at her baseline oxygen.  This improved with oxygen.    Abdominal: Soft and non-distended.  There is no tenderness.  No rebound, guarding, or rigidity. Good bowel sounds.  Genitourinary: No CVA tenderness.  No suprapubic tenderness  Musculoskeletal: Moves all extremities. No obvious deformities.  5 x 5 strength in all extremities   Skin: Warm and dry.  Neurological:  Alert, awake, and appropriate.  Normal speech.  No acute focal neurological deficits are appreciated.  Two through 12 intact bilaterally.  Psychiatric: Normal affect. Good eye contact. Appropriate in content.      ED Course   Critical Care    Date/Time: 3/17/2023 12:34 AM  Performed by: Florentino Meek Jr., MD  Authorized by: Florentino Meek Jr., MD   Direct patient critical care time: 15 minutes  Additional history critical care time: 5 minutes  Ordering / reviewing critical care time: 5 minutes  Documentation critical care time: 5 minutes  Consulting other physicians critical care time: 3 minutes  Consult with family critical care  "time: 5 minutes  Total critical care time (exclusive of procedural time) : 38 minutes  Critical care time was exclusive of separately billable procedures and treating other patients and teaching time.  Critical care was necessary to treat or prevent imminent or life-threatening deterioration of the following conditions: Hypoxia and acute on chronic respiratory failure.  Critical care was time spent personally by me on the following activities: blood draw for specimens, development of treatment plan with patient or surrogate, discussions with consultants, interpretation of cardiac output measurements, evaluation of patient's response to treatment, examination of patient, obtaining history from patient or surrogate, ordering and performing treatments and interventions, ordering and review of laboratory studies, ordering and review of radiographic studies, pulse oximetry, re-evaluation of patient's condition and review of old charts.      ED Vital Signs:  Vitals:    03/16/23 2025 03/16/23 2028 03/17/23 0017 03/17/23 0026   BP: 111/79  132/70    Pulse: 106  98 86   Resp: (!) 24   (!) 21   Temp: 98 °F (36.7 °C)      TempSrc: Oral      SpO2: 95%  95% (!) 90%   Weight:  104.3 kg (230 lb)     Height:  5' 6" (1.676 m)      03/17/23 0031 03/17/23 0036   BP:     Pulse: 84 89   Resp: (!) 23 (!) 21   Temp:     TempSrc:     SpO2: 100% 100%   Weight:     Height:         Abnormal Lab Results:  Labs Reviewed   CBC W/ AUTO DIFFERENTIAL - Abnormal; Notable for the following components:       Result Value    WBC 16.45 (*)     Hemoglobin 10.1 (*)     Hematocrit 33.6 (*)     MCH 24.9 (*)     MCHC 30.1 (*)     RDW 18.2 (*)     Platelets 510 (*)     MPV 9.1 (*)     Immature Granulocytes 1.1 (*)     Gran # (ANC) 13.4 (*)     Immature Grans (Abs) 0.18 (*)     Gran % 81.5 (*)     Lymph % 9.6 (*)     All other components within normal limits   COMPREHENSIVE METABOLIC PANEL - Abnormal; Notable for the following components:    Glucose 67 (*)     " Albumin 3.2 (*)     All other components within normal limits   CULTURE, BLOOD   CULTURE, BLOOD   LACTIC ACID, PLASMA        All Lab Results:  Results for orders placed or performed during the hospital encounter of 03/17/23   CBC auto differential   Result Value Ref Range    WBC 16.45 (H) 3.90 - 12.70 K/uL    RBC 4.05 4.00 - 5.40 M/uL    Hemoglobin 10.1 (L) 12.0 - 16.0 g/dL    Hematocrit 33.6 (L) 37.0 - 48.5 %    MCV 83 82 - 98 fL    MCH 24.9 (L) 27.0 - 31.0 pg    MCHC 30.1 (L) 32.0 - 36.0 g/dL    RDW 18.2 (H) 11.5 - 14.5 %    Platelets 510 (H) 150 - 450 K/uL    MPV 9.1 (L) 9.2 - 12.9 fL    Immature Granulocytes 1.1 (H) 0.0 - 0.5 %    Gran # (ANC) 13.4 (H) 1.8 - 7.7 K/uL    Immature Grans (Abs) 0.18 (H) 0.00 - 0.04 K/uL    Lymph # 1.6 1.0 - 4.8 K/uL    Mono # 1.0 0.3 - 1.0 K/uL    Eos # 0.2 0.0 - 0.5 K/uL    Baso # 0.10 0.00 - 0.20 K/uL    nRBC 0 0 /100 WBC    Gran % 81.5 (H) 38.0 - 73.0 %    Lymph % 9.6 (L) 18.0 - 48.0 %    Mono % 6.0 4.0 - 15.0 %    Eosinophil % 1.2 0.0 - 8.0 %    Basophil % 0.6 0.0 - 1.9 %    Differential Method Automated    Comprehensive metabolic panel   Result Value Ref Range    Sodium 140 136 - 145 mmol/L    Potassium 4.2 3.5 - 5.1 mmol/L    Chloride 101 95 - 110 mmol/L    CO2 28 23 - 29 mmol/L    Glucose 67 (L) 70 - 110 mg/dL    BUN 12 6 - 20 mg/dL    Creatinine 0.9 0.5 - 1.4 mg/dL    Calcium 9.8 8.7 - 10.5 mg/dL    Total Protein 7.6 6.0 - 8.4 g/dL    Albumin 3.2 (L) 3.5 - 5.2 g/dL    Total Bilirubin 0.2 0.1 - 1.0 mg/dL    Alkaline Phosphatase 97 55 - 135 U/L    AST 28 10 - 40 U/L    ALT 27 10 - 44 U/L    Anion Gap 11 8 - 16 mmol/L    eGFR >60 >60 mL/min/1.73 m^2   Lactic acid, plasma   Result Value Ref Range    Lactate (Lactic Acid) 1.2 0.5 - 2.2 mmol/L        Imaging Results:  Imaging Results              X-Ray Chest PA And Lateral (Final result)  Result time 03/16/23 20:44:42      Final result by Jaswinder Dubois MD (03/16/23 20:44:42)                   Impression:      As  above      Electronically signed by: Jaswinder Dubois  Date:    03/16/2023  Time:    20:44               Narrative:    EXAMINATION:  XR CHEST PA AND LATERAL    CLINICAL HISTORY:  Dyspnea, unspecified    TECHNIQUE:  PA and lateral views of the chest were performed.    COMPARISON:  None    FINDINGS:  Cardiomegaly.  Patchy areas of airspace disease in the lower lung zones suggestive of pneumonia.  Pulmonary edema and CHF not excluded.                                              The Emergency Provider reviewed the vital signs and test results, which are outlined above.     ED Discussion       12:37 AM: Discussed case with Darshan Freeman MD ([facility]). Dr. Freeman agrees with current care and management of pt and accepts admission.   Admitting Service: [facility]  Admitting Physician: Dr. Freeman  Admit to: inpatient med/tele    12:38 AM: Re-evaluated pt. I have discussed test results, shared treatment plan, and the need for admission with patient and family at bedside. Pt and family express understanding at this time and agree with all information. All questions answered. Pt and family have no further questions or concerns at this time. Pt is ready for admit.         Medical Decision Making:   History:   Old Medical Records: I decided to obtain old medical records.  Old Records Summarized: records from previous admission(s) and records from clinic visits.       <> Summary of Records: Patient with extensive history interstitial lung disease with multiple medications in the past.  She sees Dr. Aviles.  Patient with history of recurrent pneumonia.  Initial Assessment:   Patient tachypneic and short of breath and wheezing.  There was a delay in blood cultures and antibiotics as the patient was not room for 5 hours due to extensive holds in the ED.  Upon presentation and evaluation the patient's chart blood cultures antibiotics along with breathing treatments were ordered.  Differential Diagnosis:    Ammonia, interstitial lung disease, respiratory failure  Clinical Tests:   Lab Tests: Ordered and Reviewed  Radiological Study: Ordered and Reviewed  ED Management:  Patient was evaluated in the ED.  Blood cultures were obtained along with breathing treatments and oxygen.  We subsequently discussed the case with Hospital Medicine who accepted the patient for admission.  Patient is aware of the and agrees with the plan of care.  Other:   I have discussed this case with another health care provider.       <> Summary of the Discussion: Consultation was obtained with hospital medicine for admission.         ED Medication(s):  Medications   levoFLOXacin 750 mg/150 mL IVPB 750 mg (750 mg Intravenous New Bag 3/17/23 0059)   albuterol-ipratropium 2.5 mg-0.5 mg/3 mL nebulizer solution 3 mL (3 mLs Nebulization Given 3/17/23 0036)       New Prescriptions    No medications on file               Scribe Attestation:   Scribe #1: I performed the above scribed service and the documentation accurately describes the services I performed. I attest to the accuracy of the note.     Attending:   Physician Attestation Statement for Scribe #1: I, Florentino Meek Jr., MD, personally performed the services described in this documentation, as scribed by Pratima Davila, in my presence, and it is both accurate and complete.           Clinical Impression       ICD-10-CM ICD-9-CM   1. Acute on chronic respiratory failure with hypoxia  J96.21 518.84     799.02   2. Dyspnea  R06.00 786.09   3. Pneumonia of both lower lobes due to infectious organism  J18.9 486       Disposition:   Disposition: Admitted  Condition: Fair      Florentino Meek Jr., MD  03/17/23 0059

## 2023-03-17 NOTE — ASSESSMENT & PLAN NOTE
Hospitalized May 2022 with acute respiratory failure and persistent pneumonia and dishcarged on oxygen. She has since been diagnosed with Sjogren's disease with associated ILD. Initial PFTs with severe restriction and reduced DLCO. Initially prescribed steroids and subsequently Cellcept and Rituximab infusions (last infusion in Dec 2022). Now on Ofev. Patient reports she has not had any significant improvement in her clinical condition since her original illness in May 2022 and reports she has clinically worsened over the course of this time.     · Follow-up with rheumatology and pulmonology  · Continue Prednisone 10mg daily as per home regimen  · Continue OFev and monitor for side effects  · Repeat CT chest today with only mild worsening but no significant progress of disease

## 2023-03-17 NOTE — ASSESSMENT & PLAN NOTE
Patient with chronic hypoxic respiratory failure who is on home oxygen at 5L/NC which is her current inpatient requirements. Signs/symptoms of respiratory failure included increased work of breathing, decreased oxygen saturations, and wheezing. Contributing diagnoses includes asthma, interstitial lung disease, and decompensated heart failure. Labs and images were reviewed. Patient does not have a recent ABG for review.  Initial PFTs with severe restriction and reduced DLCO; attempted repeat PFTs on 3/16/2022 which she was unable to complete due to worsening shortness of breath.    Will treat underlying causes and adjust management of respiratory failure as follows:  · No acute worsening of her underlying chronic conditions; ok for discharge at this time  · Optimize GERD control; Protonix and Carafate ordered   · Sputum culture on 3/15 with gram positive cocci and gram negative clemencia  · Discharge on Levaquin 750 mg po x14 days  · Follow-up with Dr. Aviles as scheduled  · Obtain Fungitell and Fungal immunodiffusion study   · Continue supplemental oxygen as per home regimen of 5L/NC

## 2023-03-17 NOTE — DISCHARGE SUMMARY
[facility] - Emergency Dept.  Hospital Medicine  Discharge Summary      Patient Name: Ayanna Alicia  MRN: [id number]  [facility]: [id number]  Patient Class: IP- Inpatient  Admission Date: 3/17/2023  Hospital Length of Stay: 0 days  Discharge Date and Time:  03/17/2023 11:36 AM  Attending Physician: Darshan Freeman MD   Discharging Provider: Hammad Espinosa MD  Primary Care Provider: Madeleine Enrique MD    Primary Care Team: Networked reference to record PCT     HPI:   Ayanna Alicia is a 52-year-old female with a past medical history of chronic hypoxic respiratory failure at 5L/NC, asthma, Sjogren's syndrome with associated interstitial lung disease, multinodular goiter, GLENN, and morbid obesity who  presented upon the request of the transplant team after she reportedly had a fever of 101 with associated productive cough and shortness of breath.  Patient reports she felt as if she had worsening shortness of breadth yesterday due to ambulating across campus to go to her transplant appointment, in addition to, undergoing pulmonary function test which she was unable to complete. She is followed by Dr. Aviles here in [place].     Patient reports she was hospitalized in May of 2022 with acute respiratory failure and persistent pneumonia. At that time, she was dishcarged on oxygen. She has since been diagnosed with Sjogren's disease and thought to have interstitial lung disease secondary to her underlying rheumatological disease. Initial PFTs with severe restriction and reduced DLCO. She was initially prescribed steroids and subsequently added Cellcept and Rituximab infusions (last infusion in Dec 2022). Most recently, she was initiated on Ofev; however, the patient reports she has not had any significant improvement in her clinical condition since her original illness in May 2022 and reports she has clinically worsened over the course of this time. She chronically takes Breo, Spiriva, and Montelukast for her asthma. She  endorses significant exertional SOB, can walk at home, but only limited distance. Has both dry and clear mucus, sometimes yellow sputum production. Denies any chest pain but endorses occasional chest tightness. Has the occasional lower abdominal pain exacerbated by coughing episodes; denies nausea or vomiting. No fever but endorses occasional chills. Endorses chronic heart burn for which she takes OTC Pepcid without significant symptomatic improvement. Endorses chronic belching and flatulence. Endorses losing weight, lost 50 lbs since last year. Not taking cellcept since 12/2022, received 4 treatments of Rituximab with her last dose in Dec 2022. She takes prednisone 10 mg and recently completed a course of Bactrim. Reports a recent fungal infection on her vocal cords diagnosed per endoscopy for which she takes nystatin mouthwash and a course of fluconazole as well. Reports tolerating Ofev with no acute side effects at this time.    Patient with recent sputum culture performed on March 15, 2022 with moderate WBC's, moderate gram positive cocci, and rare gram negative rods; however, has not been speciated as of this time.  Labs at time of admission are unremarkable.  She has remained afebrile.  No acute findings on her chest imaging at the time of presentation.  Patient is noted to have patchy areas as airspace disease in her lower lung zones which are similar to her prior chest images.  Pulmonary was consulted for evaluation.         * No surgery found *      Hospital Course:   Ms. Alicia was admitted this morning for worsening shortness of breathe and advised by her transplant lung surgeon in [facility] to come to the ER.  Patient is on home o2 at 5-6L and currently on 5L sating >90%. She states she drops when she ambulates but that is chronic.  Her WBC is mildly elevated but is on chronic steroids and has a known chronic fungal infection of the lung.  Patient was seen by pulmonary and CT chest appears unchanged.  Repeat  fungetell assay was ordered and patient is scheduled for a bronchoscopy outpatient on 4/6.  She has a follow up appt with Dr. Aviles on 3/28.  Case dw [facility] Pulm NP.  We are in agreement that no additional inpatient therapy is needed.  She will be discharge on Levaquin for 14 days as she has positive sputum cultures on Bactrim (completed on 3/16).  Plan of care dw patient and scripts for Levaquin and protonix was sent to [facility].  She also has a pending script for dounebs that will be delivered prior to discharge.       Goals of Care Treatment Preferences:  Code Status: Full Code      Consults:   Consults (From admission, onward)        Status Ordering Provider     Inpatient consult to Pulmonology  Once        Provider:  Darshan العلي MD    Completed ALEIDA العلي          No new Assessment & Plan notes have been filed under this hospital service since the last note was generated.  Service: Hospital Medicine    Final Active Diagnoses:    Diagnosis Date Noted POA    PRINCIPAL PROBLEM:  Chronic respiratory failure [J96.10] 04/27/2022 Unknown    UIP (usual interstitial pneumonitis) [J84.112] 03/17/2023 Yes    Mild intermittent asthma [J45.20] 03/17/2023 Yes    Obesity with serious comorbidity [E66.9] 01/25/2023 Yes    Chronic interstitial pneumonia [J84.111] 04/13/2022 Yes    GERD (gastroesophageal reflux disease) [K21.9] 11/03/2014 Yes    HTN (hypertension) [I10] 09/04/2013 Yes      Problems Resolved During this Admission:    Diagnosis Date Noted Date Resolved POA    Asthma exacerbation [J45.901] 02/25/2014 03/17/2023 Yes       Discharged Condition: stable    Disposition: Home or Self Care    Follow Up:   Follow-up Information     Agustín Aviles MD Follow up on 3/28/2023.    Specialties: Critical Care Medicine, Pulmonary Disease  Contact information:  [address]  [place] [address] [phone]                       Patient Instructions:      Diet diabetic     Notify  your health care provider if you experience any of the following:  temperature >100.4     Notify your health care provider if you experience any of the following:  persistent nausea and vomiting or diarrhea     Notify your health care provider if you experience any of the following:  severe uncontrolled pain     Activity as tolerated       Significant Diagnostic Studies: Labs:   BMP:   Recent Labs   Lab 03/16/23 2105   GLU 67*      K 4.2      CO2 28   BUN 12   CREATININE 0.9   CALCIUM 9.8   , CMP   Recent Labs   Lab 03/16/23 2105      K 4.2      CO2 28   GLU 67*   BUN 12   CREATININE 0.9   CALCIUM 9.8   PROT 7.6   ALBUMIN 3.2*   BILITOT 0.2   ALKPHOS 97   AST 28   ALT 27   ANIONGAP 11    and CBC   Recent Labs   Lab 03/16/23 2105   WBC 16.45*   HGB 10.1*   HCT 33.6*   *     Microbiology:   Sputum Culture   Lab Results   Component Value Date    GSRESP <10 epithelial cells per low power field. 03/15/2023    GSRESP Moderate WBC's 03/15/2023    GSRESP Moderate Gram positive cocci 03/15/2023    GSRESP Rare Gram negative rods 03/15/2023    RESPIRATORYC Normal respiratory iris 03/15/2023     Radiology: X-Ray: CXR: X-Ray Chest 1 View (CXR): No results found for this visit on 03/17/23.    Pending Diagnostic Studies:     Procedure Component Value Units Date/Time    Fungal Immunodiffusion - Blood [[id number]] Collected: 03/17/23 0954    Order Status: Sent Lab Status: In process Updated: 03/17/23 0958    Specimen: Blood     Fungitell Assay For (1.3)-B-D-Glucans [[id number]] Collected: 03/17/23 0954    Order Status: Sent Lab Status: In process Updated: 03/17/23 0958    Specimen: Blood          Medications:  Reconciled Home Medications:      Medication List      START taking these medications    levoFLOXacin 750 MG tablet  Commonly known as: LEVAQUIN  Take 1 tablet (750 mg total) by mouth once daily. for 14 days     pantoprazole 40 MG tablet  Commonly known as: PROTONIX  Take 1 tablet (40 mg total)  by mouth once daily.     sucralfate 1 gram tablet  Commonly known as: CARAFATE  Take 1 tablet (1 g total) by mouth 4 (four) times daily.        CHANGE how you take these medications    albuterol-ipratropium 2.5 mg-0.5 mg/3 mL nebulizer solution  Commonly known as: DUO-NEB  Take 3 mLs by nebulization every 6 (six) hours as needed for Wheezing. Rescue  What changed: when to take this        CONTINUE taking these medications    albuterol 90 mcg/actuation inhaler  Commonly known as: PROVENTIL/VENTOLIN HFA  INHALE 1 TO 2 PUFFS BY MOUTH INTO THE LUNGS EVERY 4 TO 6 HOURS AS NEEDED FOR WHEEZING OR SHORTNESS OF BREATH     amLODIPine 10 MG tablet  Commonly known as: NORVASC  Take 1 tablet (10 mg total) by mouth once daily.     ascorbic acid (vitamin C) 500 MG tablet  Commonly known as: VITAMIN C  Take 500 mg by mouth once daily.     BREO ELLIPTA 100-25 mcg/dose diskus inhaler  Generic drug: fluticasone furoate-vilanteroL  INHALE 1 PUFF INTO THE LUNGS ONCE DAILY     CALCIUM-MAGNESIUM-B COMPLEX ORAL  Take 1 tablet by mouth once daily at 6am.     fluconazole 100 MG tablet  Commonly known as: DIFLUCAN  Take 1 tablet (100 mg total) by mouth 2 (two) times a day.     hydroCHLOROthiazide 12.5 MG Tab  Commonly known as: HYDRODIURIL  Take 1 tablet (12.5 mg total) by mouth once daily.     ipratropium 0.02 % nebulizer solution  Commonly known as: ATROVENT  Take 2.5 mLs (500 mcg total) by nebulization 4 (four) times daily. Rescue     levocetirizine 5 MG tablet  Commonly known as: XYZAL  TAKE 1 TABLET(5 MG) BY MOUTH EVERY DAY     metOLazone 2.5 MG tablet  Commonly known as: ZAROXOLYN  Take 1 tablet (2.5 mg total) by mouth every Mon, Wed, Fri.     nystatin 100,000 unit/mL suspension  Commonly known as: MYCOSTATIN  Swish and spit 4 mLs (400,000 Units total) by mouth 2 hours after meals and at bedtime for 10 days     OFEV 100 mg Cap  Generic drug: nintedanib  Take 100 mg by mouth 2 (two) times daily.     predniSONE 10 MG tablet  Commonly  known as: DELTASONE  Take 1 tablet (10 mg total) by mouth once daily.     sertraline 50 MG tablet  Commonly known as: ZOLOFT  Take 1 tablet (50 mg total) by mouth once daily.     sulfamethoxazole-trimethoprim 800-160mg 800-160 mg Tab  Commonly known as: BACTRIM DS  Take 1 tablet by mouth once daily. Take this instead of your usual Bactrim 3 times weekly dose for the next 10 days, then resume your usual Bactrim schedule. for 14 days     vitamin D 1000 units Tab  Commonly known as: VITAMIN D3  Take 1,000 Units by mouth once daily.     VITAMIN-D + OMEGA-3 ORAL  Take 2 tablets by mouth once daily at 6am.     WOMEN'S 50+ ADVANCED ORAL  Take 1 tablet by mouth Daily.            Indwelling Lines/Drains at time of discharge:   Lines/Drains/Airways     None                 Time spent on the discharge of patient: 40 minutes         Hammad Espinosa MD  Department of Hospital Medicine  [facility] - Emergency Dept.

## 2023-03-17 NOTE — SUBJECTIVE & OBJECTIVE
Past Medical History:   Diagnosis Date    Abnormal Pap smear of cervix     in the past with repeat pap smear okay.    Acute interstitial pneumonitis     Allergic rhinitis, cause unspecified     Arthritis of both knees     Asthma     Eczema     Fatty liver 10/2014    Fibrocystic breast changes     Headache(784.0)     Hepatomegaly 10/2014    Hypertension     Liver cyst 10/2014    Multinodular goiter     Followed by ENT - Dr. Nicolas Gray    Polymenorrhea     TMJ (dislocation of temporomandibular joint)     Uterine fibroid     in the past    Vitamin D deficiency disease      Past Surgical History:   Procedure Laterality Date     SECTION, CLASSIC      x 1    COLONOSCOPY N/A 2020    Procedure: COLONOSCOPY;  Surgeon: Angie Magana MD;  Location: [facility];  Service: Endoscopy;  Laterality: N/A;    HYSTERECTOMY      MULTIPLE TOOTH EXTRACTIONS      PARTIAL HYSTERECTOMY  2013    Due to fibroids     Review of patient's allergies indicates:   Allergen Reactions    Doxycycline Nausea Only     Other reaction(s): Nausea    Fluticasone Other (See Comments)     Other reaction(s): Epistaxis      Penicillins      Other reaction(s): unknown  Pt tolerated ceftriaxone     Family History       Problem Relation (Age of Onset)    Cancer Maternal Grandmother (60), Maternal Aunt (50), Maternal Aunt (66)    Cataracts Cousin    Diabetes Maternal Grandfather    Diverticulitis Mother    Heart disease Mother, Father (63)    Hypertension Father    Migraines Cousin    No Known Problems Brother    Peripheral vascular disease Maternal Grandfather    Stroke Mother, Sister, Maternal Grandfather          Tobacco Use    Smoking status: Never     Passive exposure: Past    Smokeless tobacco: Never   Substance and Sexual Activity    Alcohol use: Not Currently     Comment: last use 2022    Drug use: Not Currently     Frequency: 4.0 times per week     Types: Marijuana     Comment: last year was last use 2022    Sexual  activity: Yes     Partners: Female       Review of Systems negative except as indicated in HPI  Objective:     Vital Signs (Most Recent):  Temp: 98 °F (36.7 °C) (03/16/23 2025)  Pulse: 88 (03/17/23 0800)  Resp: (!) 23 (03/17/23 0800)  BP: 112/63 (03/17/23 0916)  SpO2: 100 % (03/17/23 0800)   Vital Signs (24h Range):  Temp:  [98 °F (36.7 °C)-101.1 °F (38.4 °C)] 98 °F (36.7 °C)  Pulse:  [] 88  Resp:  [18-35] 23  SpO2:  [90 %-100 %] 100 %  BP: (109-135)/(60-79) 112/63     Weight: 104.3 kg (230 lb)  Body mass index is 37.12 kg/m².    Intake/Output Summary (Last 24 hours) at 3/17/2023 1054  Last data filed at 3/17/2023 0226  Gross per 24 hour   Intake 149 ml   Output --   Net 149 ml     Physical Exam  Vitals and nursing note reviewed.   Constitutional:       Appearance: Normal appearance. She is obese.   HENT:      Head: Normocephalic.   Eyes:      Conjunctiva/sclera: Conjunctivae normal.   Cardiovascular:      Rate and Rhythm: Normal rate.   Pulmonary:      Effort: Pulmonary effort is normal.      Comments: Posterior bibasilar crackles  Abdominal:      Palpations: Abdomen is soft.   Musculoskeletal:         General: Normal range of motion.      Cervical back: Normal range of motion and neck supple.   Skin:     General: Skin is warm and dry.   Neurological:      Mental Status: She is alert and oriented to person, place, and time.   Psychiatric:         Mood and Affect: Mood normal.     Significant Labs:  CBC/Anemia Profile:  Recent Labs   Lab 03/16/23 2105   WBC 16.45*   HGB 10.1*   HCT 33.6*   *   MCV 83   RDW 18.2*   Chemistries:  Recent Labs   Lab 03/16/23 2105      K 4.2      CO2 28   BUN 12   CREATININE 0.9   CALCIUM 9.8   ALBUMIN 3.2*   PROT 7.6   BILITOT 0.2   ALKPHOS 97   ALT 27   AST 28     Significant Imaging:   CT: I have reviewed all pertinent results/findings within the past 24 hours and my personal findings are:  Ground-glass opacities to bilateral mid and lower lung zones with  some subpleural reticulations and subpleural bands.  Bronchiectasis appears slightly worse from prior images.  No significant lymphadenopathy identified.  Mild cardiomegaly.  No significant pleural effusions.  CXR: I have reviewed all pertinent results/findings within the past 24 hours and my personal findings are:  Patchy bibasilar airspace disease similar to prior images.

## 2023-03-17 NOTE — FIRST PROVIDER EVALUATION
Medical screening examination initiated.  I have conducted a focused provider triage encounter, findings are as follows:    Brief history of present illness:  Pt. C/o chronic lung disese with worsening yesterday    There were no vitals filed for this visit.    Pertinent physical exam:  SOB    Brief workup plan:  MD to see, imaging, labs    Preliminary workup initiated; this workup will be continued and followed by the physician or advanced practice provider that is assigned to the patient when roomed.

## 2023-03-17 NOTE — ASSESSMENT & PLAN NOTE
Patient with history of Asthma currently on inhalers and home oxygen presently in acute exacerbation and is without signs/symptoms of distress.   Patient currently saturating  100% on 5 L/min via NC.   Plan:  -Continue home medications, titrate as needed  -Titrate oxygen requirements as needed  -Incentive spirometry  -continue home medications  -Monitor pulse oximetry  -Liss prn

## 2023-03-20 ENCOUNTER — PATIENT OUTREACH (OUTPATIENT)
Dept: ADMINISTRATIVE | Facility: CLINIC | Age: 53
End: 2023-03-20
Payer: COMMERCIAL

## 2023-03-20 DIAGNOSIS — J96.21 ACUTE ON CHRONIC RESPIRATORY FAILURE WITH HYPOXEMIA: Primary | ICD-10-CM

## 2023-03-20 DIAGNOSIS — J84.112 UIP (USUAL INTERSTITIAL PNEUMONITIS): Primary | ICD-10-CM

## 2023-03-20 DIAGNOSIS — J44.1 COPD WITH ACUTE EXACERBATION: ICD-10-CM

## 2023-03-20 DIAGNOSIS — K21.00 GASTROESOPHAGEAL REFLUX DISEASE WITH ESOPHAGITIS WITHOUT HEMORRHAGE: ICD-10-CM

## 2023-03-20 NOTE — PROGRESS NOTES
C3 nurse attempted to contact Ayanan Alicia  for a TCC post hospital discharge follow up call. No answer. Left voicemail with callback information. The patient does not have a scheduled HOSFU appointment. Message sent to PCP staff for assistance with scheduling visit with patient.

## 2023-03-20 NOTE — PROGRESS NOTES
Spoke with patient  DC Diflucan due to QT  Revert bactrim to Mon, Wed, Fri  No rash  Recent LFT normal    Recs from [facility] reveiwed    Orders Placed This Encounter   Procedures    FL Esophagram Complete     Standing Status:   Future     Standing Expiration Date:   3/20/2024     Order Specific Question:   Reason for Exam:     Answer:   ILD, GERD     Order Specific Question:   May the Radiologist modify the order per protocol to meet the clinical needs of the patient?     Answer:   Yes     Order Specific Question:   Is the patient allergic to iodine contrast?     Answer:   No     Order Specific Question:   Release to patient     Answer:   Immediate

## 2023-03-21 LAB
1,3 BETA GLUCAN SER-MCNC: <31 PG/ML
FUNGITELL COMMENTS: NEGATIVE

## 2023-03-22 LAB
ASPERGILLUS AB SER QL ID: NOT DETECTED
B DERMAT AB SER QL ID: NOT DETECTED
BACTERIA BLD CULT: NORMAL
BACTERIA BLD CULT: NORMAL
C IMMITIS AB SER QL ID: NOT DETECTED
H CAPSUL AB SER QL ID: NOT DETECTED

## 2023-03-22 NOTE — PROGRESS NOTES
C3 nurse spoke with Ayanna Alicia for a TCC post hospital discharge follow up call. The patient does not have a scheduled HOSFU appointment with Madeleine Enrique MD within 5-7 days post hospital discharge date 03/17/23. C3 nurse was unable to schedule HOSFU appointment in [facility].    Message sent to PCP staff requesting they contact patient and schedule follow up appointment.    Per patient's request, referral placed for [email or web address]      No route to pcp (see notes)

## 2023-03-24 ENCOUNTER — PES CALL (OUTPATIENT)
Dept: ADMINISTRATIVE | Facility: CLINIC | Age: 53
End: 2023-03-24
Payer: COMMERCIAL

## 2023-03-24 ENCOUNTER — PATIENT MESSAGE (OUTPATIENT)
Dept: ENDOSCOPY | Facility: HOSPITAL | Age: 53
End: 2023-03-24
Payer: COMMERCIAL

## 2023-03-25 DIAGNOSIS — J96.11 CHRONIC RESPIRATORY FAILURE WITH HYPOXIA AND HYPERCAPNIA: ICD-10-CM

## 2023-03-25 DIAGNOSIS — J84.9 INTERSTITIAL LUNG DISEASE: ICD-10-CM

## 2023-03-25 DIAGNOSIS — J84.112 UIP (USUAL INTERSTITIAL PNEUMONITIS): ICD-10-CM

## 2023-03-25 DIAGNOSIS — R09.02 EXERCISE HYPOXEMIA: ICD-10-CM

## 2023-03-25 DIAGNOSIS — J98.4 RESTRICTIVE LUNG DISEASE: ICD-10-CM

## 2023-03-25 DIAGNOSIS — J84.111 CHRONIC INTERSTITIAL PNEUMONIA: Primary | ICD-10-CM

## 2023-03-25 DIAGNOSIS — J96.12 CHRONIC RESPIRATORY FAILURE WITH HYPOXIA AND HYPERCAPNIA: ICD-10-CM

## 2023-03-25 NOTE — PROGRESS NOTES
"Orders Placed This Encounter   Procedures    WHEELCHAIR FOR HOME USE     Order Specific Question:   Hours in W/C per day:     Answer:   12     Order Specific Question:   Type of Wheelchair:     Answer:   Lightweight     Order Specific Question:   Patient unable to propel in Standard wheelchair?     Answer:   Yes     Order Specific Question:   Size(Width):     Answer:   18"(STD adult)     Order Specific Question:   Leg Support:     Answer:   STD footrests     Order Specific Question:   Arm Height:     Answer:   Detachable     Order Specific Question:   Lap Belt:     Answer:   Buckle     Order Specific Question:   Accessories:     Answer:   Anti-tippers     Order Specific Question:   Accessories:     Answer:   Front brakes     Order Specific Question:   Accessories:     Answer:   Brake extensions     Order Specific Question:   Accessories:     Answer:   Safety belt     Order Specific Question:   Cushion:     Answer:   Basic     Order Specific Question:   Justification for cushion:     Answer:   Prevent pressure ulcers     Order Specific Question:   Reclining Back     Answer:   No     Order Specific Question:   Height:     Answer:   5 6     Order Specific Question:   Weight:     Answer:   230     Order Specific Question:   Does patient have medical equipment at home?     Answer:   CPAP     Order Specific Question:   Length of need (1-99 months):     Answer:   99     Order Specific Question:   Please check all that apply:     Answer:   Caregiver is capable and willing to operate wheelchair safely.     Order Specific Question:   Please check all that apply:     Answer:   Patient's upper body strength is sufficient for propulsion.     Order Specific Question:   Please check all that apply:     Answer:   Patient mobility limitations cannot be sufficiently resolved by the use of other ambulatory therapies.        "

## 2023-03-27 ENCOUNTER — PATIENT MESSAGE (OUTPATIENT)
Dept: ENDOSCOPY | Facility: HOSPITAL | Age: 53
End: 2023-03-27
Payer: COMMERCIAL

## 2023-03-27 ENCOUNTER — PES CALL (OUTPATIENT)
Dept: ADMINISTRATIVE | Facility: CLINIC | Age: 53
End: 2023-03-27
Payer: COMMERCIAL

## 2023-03-28 ENCOUNTER — OFFICE VISIT (OUTPATIENT)
Dept: HOME HEALTH SERVICES | Facility: CLINIC | Age: 53
End: 2023-03-28
Payer: COMMERCIAL

## 2023-03-28 VITALS
SYSTOLIC BLOOD PRESSURE: 122 MMHG | DIASTOLIC BLOOD PRESSURE: 70 MMHG | RESPIRATION RATE: 19 BRPM | HEART RATE: 89 BPM | OXYGEN SATURATION: 97 %

## 2023-03-28 DIAGNOSIS — R05.9 COUGH, UNSPECIFIED TYPE: ICD-10-CM

## 2023-03-28 DIAGNOSIS — J96.21 ACUTE ON CHRONIC RESPIRATORY FAILURE WITH HYPOXEMIA: Primary | ICD-10-CM

## 2023-03-28 DIAGNOSIS — J84.111 CHRONIC INTERSTITIAL PNEUMONIA: ICD-10-CM

## 2023-03-28 DIAGNOSIS — J84.112 UIP (USUAL INTERSTITIAL PNEUMONITIS): ICD-10-CM

## 2023-03-28 DIAGNOSIS — K21.00 GASTROESOPHAGEAL REFLUX DISEASE WITH ESOPHAGITIS WITHOUT HEMORRHAGE: ICD-10-CM

## 2023-03-28 PROCEDURE — 1111F PR DISCHARGE MEDS RECONCILED W/ CURRENT OUTPATIENT MED LIST: ICD-10-PCS | Mod: CPTII,S$GLB,,

## 2023-03-28 PROCEDURE — 1111F DSCHRG MED/CURRENT MED MERGE: CPT | Mod: CPTII,S$GLB,,

## 2023-03-28 PROCEDURE — 3074F PR MOST RECENT SYSTOLIC BLOOD PRESSURE < 130 MM HG: ICD-10-PCS | Mod: CPTII,S$GLB,,

## 2023-03-28 PROCEDURE — 99350 HOME/RES VST EST HIGH MDM 60: CPT | Mod: S$GLB,,,

## 2023-03-28 PROCEDURE — 3078F DIAST BP <80 MM HG: CPT | Mod: CPTII,S$GLB,,

## 2023-03-28 PROCEDURE — 3078F PR MOST RECENT DIASTOLIC BLOOD PRESSURE < 80 MM HG: ICD-10-PCS | Mod: CPTII,S$GLB,,

## 2023-03-28 PROCEDURE — 3074F SYST BP LT 130 MM HG: CPT | Mod: CPTII,S$GLB,,

## 2023-03-28 PROCEDURE — 99350 PR HOME VISIT,ESTAB PATIENT,LEVEL IV: ICD-10-PCS | Mod: S$GLB,,,

## 2023-03-28 NOTE — ASSESSMENT & PLAN NOTE
Reports reflux with heartburn and belching  Continue pantoprazole and Carafate  F/U with primary  Monitor

## 2023-03-28 NOTE — PHYSICIAN QUERY
PT Name: Ayanna Alicia  MR #: [id number]     DOCUMENTATION CLARIFICATION     CDS: Branden Dias RN CCDS               Contact information: [email or web address]    This form is a permanent document in the medical record.     Query Date: March 28, 2023    By submitting this query, we are merely seeking further clarification of documentation.  Please utilize your independent clinical judgment when addressing the question(s) below.    The Medical Record contains the following   Indicators Supporting Clinical Findings Location in Medical Record     x Heart Failure documented chronic hypoxic respiratory failure    Contributing diagnoses includes asthma, interstitial lung disease, and decompensated heart failure. 3/17/2023 Pulmonology consult Taylor Mccann, NP     x BNP  03/16/23 21:05   BNP 12    Lab value     x EF/Echo There is no evidence of intracardiac shunting.  The left ventricle is normal in size with concentric remodeling and normal systolic function.  The estimated ejection fraction is 60%.  Normal left ventricular diastolic function.  Normal right ventricular size with normal right ventricular systolic function.  Normal central venous pressure (3 mmHg).  The estimated PA systolic pressure is 34 mmHg. 9/20/2022 Echo report     x Radiology findings Mildly progressive severe interstitial pneumonitis with worsening traction bronchiectasis.  Findings are inconsistent with UIP. 3/17/2023 CT chest     x Subjective/Objective Respiratory Conditions  presented upon the request of the transplant team after she reportedly had a fever of 101 with associated productive cough and shortness of breath.  Patient reports she felt as if she had worsening shortness of breadth yesterday due to ambulating across campus to go to her transplant appointment, in addition to, undergoing pulmonary function test which she was unable to complete. Posterior bibasilar crackles    Rhonchi and rales present. 3/17/2023 Pulmonology consult  Taylor Mccann NP            3/17/2023 H&P    Recent/Current MI      Heart Transplant, LVAD      Edema, JVD      Ascites       x Diuretics/Meds amLODIPine 10 mg PO daily  hydroCHLOROthiazide 12.5 mg PO daily 3/17/2023 Medications     Other Treatment       x Other CT is slightly worse than prior, but I feel this most likely represents some progression of disease as opposed to major active inflammation or infection.  That being said, her gram stain from a sputum on 3/15 has some GPC and GNR in it (though nothing growing) so it is reasonable to cover her empirically for good measure.  She can follow up with Dr Darnell as previously scheduled.     Patient presented to the ED for worsening shortness of breath and productive cough x2 days duration and was found to have signs/symptoms of acute COPD exacerbation in setting of pneumonia noted on chest x-ray.     Current Outpatient Medications on File Prior to Encounter  amLODIPine (NORVASC) 10 mg PO daily  metOLazone (ZAROXOLYN) 2.5 mg PO every Mon, Wed, Fri. 3/17/2023 Pulmonology consult Jerry Greenfield MD          3/17/2023 H&P     Heart failure is a clinical diagnosis which includes symptomatic fluid retention, elevated intracardiac pressures, and/or the inability of the heart to deliver adequate blood flow.    Heart Failure with reduced Ejection Fraction (HFrEF) or Systolic Heart Failure (loses ability to contract normally, EF is <40%)    Heart Failure with preserved Ejection Fraction (HFpEF) or Diastolic Heart Failure (stiff ventricles, does not relax properly, EF is >50%)     Heart Failure with Combined Systolic and Diastolic Failure (stiff ventricles, does not relax properly and EF is <50%)    Mid-range or mildly reduced ejection fraction (HFmrEF) is classified as systolic heart failure.  Congestive heart failure with a recovered EF is classified as Diastolic Heart Failure.  Common clues to acute exacerbation:  Rapidly progressive symptoms (w/in 2 weeks of  presentation), using IV diuretics, using supplemental O2, pulmonary edema on Xray, new or worsening pleural effusion, +JVD or other signs of volume overload, MI w/in 4 weeks, and/or BNP >500  The clinical guidelines noted are only system guidelines, and do not replace the providers clinical judgment.    Provider, please specify the diagnosis associated with the above clinical findings.    [   ]  Heart Failure Ruled Out   [   ]  Chronic Diastolic Heart Failure (HFpEF) - preexisting and stable   [   ]  Acute on Chronic Diastolic Heart Failure (HFpEF) - worsening of CHF signs/symptoms in preexisting CHF   [   ]  Other (please specify): ___________________________________   [ x ]  Clinically Undetermined       Please document in your progress notes daily for the duration of treatment until resolved and include in your discharge summary.    References:  American Heart Association editorial staff. (2017, May). Ejection Fraction Heart Failure Measurement. American Heart Association. https://www.heart.org/en/health-topics/heart-failure/diagnosing-heart-failure/ejection-fraction-heart-failure-measurement#:~:text=Ejection%20fraction%20(EF)%20is%20a,pushed%20out%20with%20each%20heartbeat  MAURISIO Demarco (2020, December 15). Heart failure with preserved ejection fraction: Clinical manifestations and diagnosis. [employer]. https://www."[employer].com/contents/heart-failure-with-preserved-ejection-fraction-clinical-manifestations-and-diagnosis.  ICD-10-CM/PCS Coding Clinic Third Quarter ICD-10, Effective with discharges: September 8, 2020 [facility] § Heart failure with mid-range or mildly reduced ejection fraction (2020).  ICD-10-CM/PCS Coding Clinic Third Quarter ICD-10, Effective with discharges: September 8, 2020 [facility] § Heart failure with recovered ejection fraction (2020).  Form No. 30651

## 2023-03-28 NOTE — ASSESSMENT & PLAN NOTE
On home O2 6L  Followed by pulmonology  Currently on Bactrim and Levaquin  Neb treatments as needed  Monitor

## 2023-03-28 NOTE — ASSESSMENT & PLAN NOTE
Reports occasional cough  Recent throat irritation due to excessive coughing  Antitussives to pharmacy  Monitor

## 2023-03-28 NOTE — PROGRESS NOTES
[facility]  Transition of Care Home Visit    Visit Date: 3/28/2023  Encounter Provider: Gilberto Ren   PCP:  Madeleine Enrique MD    PRESENTING HISTORY      Patient ID: Ayanna Alicia is a 52 y.o. female.    Consult Requested By:  Fern Jeffrey  Reason for Consult:  Hospital Follow Up.    Ayanna is being seen at home due to being seen at home due to physical debility that presents a taxing effort to leave the home, to mitigate high risk of hospital readmission and/or due to the limited availability of reliable or safe options for transportation to the point of access to the provider. Prior to treatment on this visit the chart was reviewed and patient verbal consent was obtained.      Chief Complaint: Follow-up        History of Present Illness: Ms. Ayanna Alicia is a 52 y.o. female who was recently admitted to the hospital.    3/17 ED visit with a diagnosis of HTN who presented to the Emergency Department for evaluation of SOB which worsened in the past 2 days PTA. Pt states she has been battling pneumonia for 11 months. Pt was recently seen by a specialist in [place] who told her to come into the ED today due to worsening SOB. Symptoms are constant and moderate in severity. No mitigating or exacerbating factors reported. Associated sxs include productive cough and 101 degree fever. Patient denies any N/V/D, headache, dizziness, chills, and all other sxs at this time. Prior Tx includes antibiotics and 6 liters oxygen at home with no relief. No further complaints or concerns at this time.   ___________________________________________________________________    Today:    HPI:  Patient is a 52 y.o. being seen today for a post hospital discharge assessment following a recent admit for evaluation of SOB. Patient presented with a diagnosis of HTN. She was hospitalized 3/17. See hospital course.   Patient is found in their home today, in no acute distress and agreeable to this visit.  Patient reports  improvement of symptoms in which prompted the hospital stay.  Was feeling better last few days but slightly worse today. Reports SOB with exertion.  Home oxygen at 6 L per nasal cannula.  Reports cough and throat irritation.  Will send cough medication to pharmacy. Patient reports compliance with all medications.  Patient denies any N/V/D, headache, dizziness, chills, and all other sxs at this time. Patient has no further complaints or concerns at this time. She has esophagram for 3/29. Questions elicited. Time allowed for questions, all issues addressed. Contact info given for any concerns or changes.         Review of Systems   Constitutional: Negative.    HENT: Negative.     Eyes: Negative.    Respiratory:  Positive for cough and shortness of breath. Negative for chest tightness.    Cardiovascular: Negative.  Negative for leg swelling.   Gastrointestinal: Negative.    Endocrine: Negative.    Genitourinary: Negative.    Musculoskeletal: Negative.    Skin: Negative.    Allergic/Immunologic: Negative.    Neurological: Negative.    Hematological: Negative.    Psychiatric/Behavioral: Negative.  Negative for agitation.    All other systems reviewed and are negative.    Assessments:  Environmental: Patient lives in a single story home.  There is adequate lighting and the temperature is comfortable.    Functional Status: Independent with ADL's.  Safety: Fall precautions.   Nutritional: Adequate food in the home.   Home Health/DME/Supplies: No HH. DME: oxygen    PAST HISTORY:     Past Medical History:   Diagnosis Date    Abnormal Pap smear of cervix     in the past with repeat pap smear okay.    Acute interstitial pneumonitis     Allergic rhinitis, cause unspecified     Arthritis of both knees     Asthma     Eczema     Fatty liver 10/2014    Fibrocystic breast changes     Headache(784.0)     Hepatomegaly 10/2014    Hypertension     Liver cyst 10/2014    Multinodular goiter     Followed by ENT - Dr. Nicolas Gray     Polymenorrhea     TMJ (dislocation of temporomandibular joint)     Uterine fibroid     in the past    Vitamin D deficiency disease        Past Surgical History:   Procedure Laterality Date     SECTION, CLASSIC      x 1    COLONOSCOPY N/A 2020    Procedure: COLONOSCOPY;  Surgeon: Angie Magana MD;  [facility];  Service: Endoscopy;  Laterality: N/A;    HYSTERECTOMY      MULTIPLE TOOTH EXTRACTIONS      PARTIAL HYSTERECTOMY  2013    Due to fibroids       Family History   Problem Relation Age of Onset    Stroke Mother     Heart disease Mother     Diverticulitis Mother     Heart disease Father 63        MI/CAD    Hypertension Father     Stroke Sister     No Known Problems Brother     Cancer Maternal Grandmother 60        Rectal and stomach cancer    Stroke Maternal Grandfather     Diabetes Maternal Grandfather     Peripheral vascular disease Maternal Grandfather     Cancer Maternal Aunt 50        Stomach cancer    Cancer Maternal Aunt 66        Lung cancer (smoker)    Migraines Cousin     Cataracts Cousin        Social History     Socioeconomic History    Marital status: Single    Number of children: 0   Occupational History    Occupation:      Employer: [employer]     Comment: [employer]   Tobacco Use    Smoking status: Never     Passive exposure: Past    Smokeless tobacco: Never   Substance and Sexual Activity    Alcohol use: Not Currently     Comment: last use 2022    Drug use: Not Currently     Frequency: 4.0 times per week     Types: Marijuana     Comment: last year was last use 2022    Sexual activity: Yes     Partners: Female   Social History Narrative    She wears seatbelt. Her son was killed in .       MEDICATIONS & ALLERGIES:     Current Outpatient Medications on File Prior to Visit   Medication Sig Dispense Refill    albuterol (PROVENTIL/VENTOLIN HFA) 90 mcg/actuation inhaler INHALE 1 TO 2 PUFFS BY MOUTH INTO THE LUNGS EVERY 4 TO 6 HOURS AS  NEEDED FOR WHEEZING OR SHORTNESS OF BREATH 8.5 g 5    albuterol-ipratropium (DUO-NEB) 2.5 mg-0.5 mg/3 mL nebulizer solution Take 3 mLs by nebulization every 6 (six) hours as needed for Wheezing. Rescue 90 mL 11    amLODIPine (NORVASC) 10 MG tablet Take 1 tablet (10 mg total) by mouth once daily. 90 tablet 1    ascorbic acid, vitamin C, (VITAMIN C) 500 MG tablet Take 500 mg by mouth once daily.      calcium/magnesium/vit B comp (CALCIUM-MAGNESIUM-B COMPLEX ORAL) Take 1 tablet by mouth once daily at 6am.      fluconazole (DIFLUCAN) 100 MG tablet Take 1 tablet (100 mg total) by mouth 2 (two) times a day. (Patient not taking: Reported on 3/22/2023) 56 tablet 0    fluticasone furoate-vilanteroL (BREO ELLIPTA) 100-25 mcg/dose diskus inhaler INHALE 1 PUFF INTO THE LUNGS ONCE DAILY 60 each 5    hydroCHLOROthiazide (HYDRODIURIL) 12.5 MG Tab Take 1 tablet (12.5 mg total) by mouth once daily. 90 tablet 1    ipratropium (ATROVENT) 0.02 % nebulizer solution Take 2.5 mLs (500 mcg total) by nebulization 4 (four) times daily. Rescue (Patient not taking: Reported on 3/22/2023) 300 mL 11    levocetirizine (XYZAL) 5 MG tablet TAKE 1 TABLET(5 MG) BY MOUTH EVERY DAY 90 tablet 1    levoFLOXacin (LEVAQUIN) 750 MG tablet Take 1 tablet (750 mg total) by mouth once daily. for 14 days 14 tablet 0    metOLazone (ZAROXOLYN) 2.5 MG tablet Take 1 tablet (2.5 mg total) by mouth every Mon, Wed, Fri. 36 tablet 3    mv-minerals/FA/omega 3,6,9 #3 (WOMEN'S 50+ ADVANCED ORAL) Take 1 tablet by mouth Daily.      nintedanib (OFEV) 100 mg Cap Take 100 mg by mouth 2 (two) times daily. 120 capsule 11    omega-3s/dha/epa/fish oil/D3 (VITAMIN-D + OMEGA-3 ORAL) Take 2 tablets by mouth once daily at 6am.      pantoprazole (PROTONIX) 40 MG tablet Take 1 tablet (40 mg total) by mouth once daily. 30 tablet 11    predniSONE (DELTASONE) 10 MG tablet Take 1 tablet (10 mg total) by mouth once daily.      sertraline (ZOLOFT) 50 MG tablet Take 1 tablet (50 mg total) by  mouth once daily. 90 tablet 3    sucralfate (CARAFATE) 1 gram tablet Take 1 tablet (1 g total) by mouth 4 (four) times daily. 120 tablet 6    sulfamethoxazole-trimethoprim 800-160mg (BACTRIM DS) 800-160 mg Tab Take 1 tablet by mouth once daily. Take this instead of your usual Bactrim 3 times weekly dose for the next 10 days, then resume your usual Bactrim schedule. for 14 days 14 tablet 0    vitamin D (VITAMIN D3) 1000 units Tab Take 1,000 Units by mouth once daily.       Current Facility-Administered Medications on File Prior to Visit   Medication Dose Route Frequency Provider Last Rate Last Admin    sodium chloride 0.9% flush 10 mL  10 mL Intravenous PRN Agustín Aviles MD            Review of patient's allergies indicates:   Allergen Reactions    Doxycycline Nausea Only     Other reaction(s): Nausea    Fluticasone Other (See Comments)     Other reaction(s): Epistaxis      Penicillins      Other reaction(s): unknown  Pt tolerated ceftriaxone       OBJECTIVE:     Vital Signs:  Vitals:    03/28/23 1730   BP: 122/70   Pulse: 89   Resp: 19     There is no height or weight on file to calculate BMI.     Physical Exam:  Physical Exam  Vitals reviewed.   Constitutional:       General: She is not in acute distress.     Appearance: Normal appearance. She is well-developed. She is obese.   HENT:      Head: Normocephalic and atraumatic.      Nose: Nose normal.      Mouth/Throat:      Mouth: Mucous membranes are dry.      Pharynx: Oropharynx is clear.   Eyes:      Pupils: Pupils are equal, round, and reactive to light.   Cardiovascular:      Rate and Rhythm: Normal rate and regular rhythm.      Pulses: Normal pulses.      Heart sounds: Normal heart sounds.   Pulmonary:      Effort: Pulmonary effort is normal.      Breath sounds: Normal breath sounds.   Abdominal:      General: Bowel sounds are normal.      Palpations: Abdomen is soft.   Musculoskeletal:         General: Normal range of motion.      Cervical back: Normal  range of motion and neck supple.   Skin:     General: Skin is warm and dry.   Neurological:      General: No focal deficit present.      Mental Status: She is alert and oriented to person, place, and time. Mental status is at baseline.   Psychiatric:         Mood and Affect: Mood normal.         Behavior: Behavior normal.         Thought Content: Thought content normal.         Judgment: Judgment normal.       Laboratory  Lab Results   Component Value Date    WBC 16.45 (H) 03/16/2023    HGB 10.1 (L) 03/16/2023    HCT 33.6 (L) 03/16/2023    MCV 83 03/16/2023     (H) 03/16/2023     Lab Results   Component Value Date    INR 1.0 04/27/2022    INR 0.9 03/24/2021     Lab Results   Component Value Date    HGBA1C 5.1 11/01/2022     No results for input(s): POCTGLUCOSE in the last 72 hours.    Diagnostic Results:      TRANSITION OF CARE:     [facility] Contact Note: 3/27    Family and/or Caretaker present at visit?  No.  Diagnostic tests reviewed/disposition: No diagnosic tests pending after this hospitalization.  Disease/illness education: Take all medication as prescribed. Activity as tolerated. Keep all upcoming appts.   Home health/community services discussion/referrals: Patient does not have home health established from hospital visit.  They do not need home health.  If needed, we will set up home health for the patient.   Establishment or re-establishment of referral orders for community resources: No other necessary community resources.   Discussion with other health care providers: No discussion with other health care providers necessary.     Transition of Care Visit:  I have reviewed and updated the history and problem list.  I have reconciled the medication list.  I have discussed the hospitalization and current medical issues, prognosis and plans with the patient/family.  I  spent more than 50% of time discussing the care with the patient/family.  Total Face-to-Face Encounter: 60  minutes.    Medications Reconciliation:   I have reconciled the patient's home medications and discharge medications with the patient/family. I have updated all changes.  Refer to After-Visit Medication List.    ASSESSMENT & PLAN:     HIGH RISK CONDITION(S):      Problem List Items Addressed This Visit          Pulmonary    Chronic interstitial pneumonia    Current Assessment & Plan     On home O2 6L  Followed by pulmonology  Currently on Bactrim and Levaquin  Neb treatments as needed  Monitor         Cough    Current Assessment & Plan     Reports occasional cough  Recent throat irritation due to excessive coughing  Antitussives to pharmacy  Monitor         UIP (usual interstitial pneumonitis)    Current Assessment & Plan     CT with slight worsening   Continue current medications  F/U with pulm/rheumatology  Continue home oxygen  Monitor            GI    GERD (gastroesophageal reflux disease)    Current Assessment & Plan     Reports reflux with heartburn and belching  Continue pantoprazole and Carafate  F/U with primary  Monitor          Other Visit Diagnoses       Acute on chronic respiratory failure with hypoxemia    -  Primary    Recent ED visit with SOB  Currently requiring 6L O2 a home  3/15 sputum cx gram positive cocci/gram negative clemencia  Levaquin x14 days  F/U with pulm  Monitor             Were controlled substances prescribed?  No    Instructions for the patient:  - Continue all medications, treatments and therapies as ordered.   - Follow all instructions, recommendations as discussed.  - Maintain Safety Precautions at all times.  - Attend all medical appointments as scheduled.  - For worsening symptoms: call Primary Care Physician or Nurse Practitioner.  - For emergencies, call 911 or immediately report to the nearest emergency room.  - Limit Risks of environmental exposure to coronavirus/COVID-19 as discussed including: social distancing, hand hygiene, and limiting departures from the home for  necessities only.     Scheduled Follow-up :  Future Appointments   Date Time Provider Department Center   3/29/2023  9:30 AM [facility] XRFL1 [facility] XRAY [place]   4/6/2023 10:40 AM [facility] MAMMO1-SCR [facility] MAMMO [place]   4/26/2023  2:20 PM Rajeev Castillo MD ON CARDIO BR [facility]   5/10/2023 10:00 AM [facility] CT1 LIMIT 500 LBS [facility] CT SCAN [place]   5/10/2023 10:30 AM LABORATORY, [facility] ON LAB [facility]   6/9/2023  8:30 AM CHAIR 04 HGV HGVH INFSN [place]   7/6/2023  1:00 PM Chicho Lewis MD ON RHEU  Medical C       After Visit Medication List :     Medication List            Accurate as of March 28, 2023  6:00 PM. If you have any questions, ask your nurse or doctor.                CONTINUE taking these medications      albuterol 90 mcg/actuation inhaler  Commonly known as: PROVENTIL/VENTOLIN HFA  INHALE 1 TO 2 PUFFS BY MOUTH INTO THE LUNGS EVERY 4 TO 6 HOURS AS NEEDED FOR WHEEZING OR SHORTNESS OF BREATH     albuterol-ipratropium 2.5 mg-0.5 mg/3 mL nebulizer solution  Commonly known as: DUO-NEB  Take 3 mLs by nebulization every 6 (six) hours as needed for Wheezing. Rescue     amLODIPine 10 MG tablet  Commonly known as: NORVASC  Take 1 tablet (10 mg total) by mouth once daily.     ascorbic acid (vitamin C) 500 MG tablet  Commonly known as: VITAMIN C     BREO ELLIPTA 100-25 mcg/dose diskus inhaler  Generic drug: fluticasone furoate-vilanteroL  INHALE 1 PUFF INTO THE LUNGS ONCE DAILY     CALCIUM-MAGNESIUM-B COMPLEX ORAL     fluconazole 100 MG tablet  Commonly known as: DIFLUCAN  Take 1 tablet (100 mg total) by mouth 2 (two) times a day.     hydroCHLOROthiazide 12.5 MG Tab  Commonly known as: HYDRODIURIL  Take 1 tablet (12.5 mg total) by mouth once daily.     ipratropium 0.02 % nebulizer solution  Commonly known as: ATROVENT  Take 2.5 mLs (500 mcg total) by nebulization 4 (four) times daily. Rescue     levocetirizine 5 MG tablet  Commonly known as: XYZAL  TAKE 1 TABLET(5 MG) BY MOUTH EVERY DAY      levoFLOXacin 750 MG tablet  Commonly known as: LEVAQUIN  Take 1 tablet (750 mg total) by mouth once daily. for 14 days     metOLazone 2.5 MG tablet  Commonly known as: ZAROXOLYN  Take 1 tablet (2.5 mg total) by mouth every Mon, Wed, Fri.     OFEV 100 mg Cap  Generic drug: nintedanib  Take 100 mg by mouth 2 (two) times daily.     pantoprazole 40 MG tablet  Commonly known as: PROTONIX  Take 1 tablet (40 mg total) by mouth once daily.     predniSONE 10 MG tablet  Commonly known as: DELTASONE  Take 1 tablet (10 mg total) by mouth once daily.     sertraline 50 MG tablet  Commonly known as: ZOLOFT  Take 1 tablet (50 mg total) by mouth once daily.     sucralfate 1 gram tablet  Commonly known as: CARAFATE  Take 1 tablet (1 g total) by mouth 4 (four) times daily.     sulfamethoxazole-trimethoprim 800-160mg 800-160 mg Tab  Commonly known as: BACTRIM DS  Take 1 tablet by mouth once daily. Take this instead of your usual Bactrim 3 times weekly dose for the next 10 days, then resume your usual Bactrim schedule. for 14 days     vitamin D 1000 units Tab  Commonly known as: VITAMIN D3     VITAMIN-D + OMEGA-3 ORAL     WOMEN'S 50+ ADVANCED ORAL              Signature: Gilberto Ren NP

## 2023-03-28 NOTE — ASSESSMENT & PLAN NOTE
CT with slight worsening   Continue current medications  F/U with pulm/rheumatology  Continue home oxygen  Monitor

## 2023-03-29 ENCOUNTER — HOSPITAL ENCOUNTER (OUTPATIENT)
Dept: RADIOLOGY | Facility: HOSPITAL | Age: 53
Discharge: HOME | End: 2023-03-29
Attending: INTERNAL MEDICINE
Payer: COMMERCIAL

## 2023-03-29 DIAGNOSIS — J84.112 UIP (USUAL INTERSTITIAL PNEUMONITIS): ICD-10-CM

## 2023-03-29 DIAGNOSIS — K21.00 GASTROESOPHAGEAL REFLUX DISEASE WITH ESOPHAGITIS WITHOUT HEMORRHAGE: ICD-10-CM

## 2023-03-29 PROCEDURE — A9698 NON-RAD CONTRAST MATERIALNOC: HCPCS | Performed by: INTERNAL MEDICINE

## 2023-03-29 PROCEDURE — 74220 X-RAY XM ESOPHAGUS 1CNTRST: CPT | Mod: TC

## 2023-03-29 PROCEDURE — 25500020 PHARM REV CODE 255: Performed by: INTERNAL MEDICINE

## 2023-03-29 PROCEDURE — 74220 X-RAY XM ESOPHAGUS 1CNTRST: CPT | Mod: 26,,, | Performed by: RADIOLOGY

## 2023-03-29 PROCEDURE — 74220 FL ESOPHAGRAM COMPLETE: ICD-10-PCS | Mod: 26,,, | Performed by: RADIOLOGY

## 2023-03-29 RX ORDER — DEXTROMETHORPHAN POLISTIREX 30 MG/5ML
60 SUSPENSION ORAL 2 TIMES DAILY PRN
Qty: 200 ML | Refills: 0 | Status: SHIPPED | OUTPATIENT
Start: 2023-03-29 | End: 2023-04-08

## 2023-03-29 RX ORDER — BENZONATATE 100 MG/1
200 CAPSULE ORAL 3 TIMES DAILY PRN
Qty: 60 CAPSULE | Refills: 0 | Status: SHIPPED | OUTPATIENT
Start: 2023-03-29 | End: 2023-04-18 | Stop reason: SDUPTHER

## 2023-03-29 RX ADMIN — BARIUM SULFATE 50 G: 960 POWDER, FOR SUSPENSION ORAL at 10:03

## 2023-03-29 RX ADMIN — BARIUM SULFATE 121 ML: 980 POWDER, FOR SUSPENSION ORAL at 10:03

## 2023-03-30 ENCOUNTER — TELEPHONE (OUTPATIENT)
Dept: SLEEP MEDICINE | Facility: CLINIC | Age: 53
End: 2023-03-30
Payer: COMMERCIAL

## 2023-03-30 DIAGNOSIS — K22.4 ESOPHAGEAL DYSMOTILITY: Primary | ICD-10-CM

## 2023-04-05 ENCOUNTER — HOSPITAL ENCOUNTER (OUTPATIENT)
Facility: HOSPITAL | Age: 53
Discharge: HOME | End: 2023-04-05
Attending: INTERNAL MEDICINE | Admitting: INTERNAL MEDICINE
Payer: COMMERCIAL

## 2023-04-05 ENCOUNTER — ANESTHESIA EVENT (OUTPATIENT)
Dept: ENDOSCOPY | Facility: HOSPITAL | Age: 53
End: 2023-04-05
Payer: COMMERCIAL

## 2023-04-05 ENCOUNTER — ANESTHESIA (OUTPATIENT)
Dept: ENDOSCOPY | Facility: HOSPITAL | Age: 53
End: 2023-04-05
Payer: COMMERCIAL

## 2023-04-05 VITALS
BODY MASS INDEX: 36.96 KG/M2 | RESPIRATION RATE: 20 BRPM | HEIGHT: 66 IN | HEART RATE: 108 BPM | TEMPERATURE: 99 F | SYSTOLIC BLOOD PRESSURE: 114 MMHG | WEIGHT: 230 LBS | OXYGEN SATURATION: 95 % | DIASTOLIC BLOOD PRESSURE: 79 MMHG

## 2023-04-05 DIAGNOSIS — J84.111 CHRONIC INTERSTITIAL PNEUMONIA: ICD-10-CM

## 2023-04-05 DIAGNOSIS — J84.9 INTERSTITIAL LUNG DISEASE: Primary | ICD-10-CM

## 2023-04-05 LAB
APPEARANCE FLD: ABNORMAL
BODY FLD TYPE: ABNORMAL
COLOR FLD: COLORLESS
KOH PREP SPEC: NORMAL
KOH PREP SPEC: NORMAL
MESOTHL CELL NFR FLD MANUAL: 10 %
MONOS+MACROS NFR FLD MANUAL: 64 %
NEUTROPHILS NFR FLD MANUAL: 22 %
OTHER CELLS FLD MANUAL: 4 %
WBC # FLD: 666 /CU MM

## 2023-04-05 PROCEDURE — 31624 DX BRONCHOSCOPE/LAVAGE: CPT | Mod: 59,RT,, | Performed by: INTERNAL MEDICINE

## 2023-04-05 PROCEDURE — 88112 CYTOPATH CELL ENHANCE TECH: CPT | Performed by: PATHOLOGY

## 2023-04-05 PROCEDURE — 88312 SPECIAL STAINS GROUP 1: CPT | Mod: 59 | Performed by: PATHOLOGY

## 2023-04-05 PROCEDURE — 63600175 PHARM REV CODE 636 W HCPCS: Performed by: STUDENT IN AN ORGANIZED HEALTH CARE EDUCATION/TRAINING PROGRAM

## 2023-04-05 PROCEDURE — 87206 SMEAR FLUORESCENT/ACID STAI: CPT | Mod: 91 | Performed by: INTERNAL MEDICINE

## 2023-04-05 PROCEDURE — 88305 TISSUE EXAM BY PATHOLOGIST: ICD-10-PCS | Mod: 26,,, | Performed by: PATHOLOGY

## 2023-04-05 PROCEDURE — 88312 PR  SPECIAL STAINS,GROUP I: ICD-10-PCS | Mod: 26,,, | Performed by: PATHOLOGY

## 2023-04-05 PROCEDURE — 89051 BODY FLUID CELL COUNT: CPT | Performed by: INTERNAL MEDICINE

## 2023-04-05 PROCEDURE — 31645 BRNCHSC W/THER ASPIR 1ST: CPT

## 2023-04-05 PROCEDURE — 87102 FUNGUS ISOLATION CULTURE: CPT | Performed by: INTERNAL MEDICINE

## 2023-04-05 PROCEDURE — 87205 SMEAR GRAM STAIN: CPT | Mod: 59 | Performed by: INTERNAL MEDICINE

## 2023-04-05 PROCEDURE — 31645 BRNCHSC W/THER ASPIR 1ST: CPT | Mod: ,,, | Performed by: INTERNAL MEDICINE

## 2023-04-05 PROCEDURE — 88112 PR  CYTOPATH, CELL ENHANCE TECH: ICD-10-PCS | Mod: 26,,, | Performed by: PATHOLOGY

## 2023-04-05 PROCEDURE — 25000003 PHARM REV CODE 250: Performed by: STUDENT IN AN ORGANIZED HEALTH CARE EDUCATION/TRAINING PROGRAM

## 2023-04-05 PROCEDURE — 25000242 PHARM REV CODE 250 ALT 637 W/ HCPCS: Performed by: INTERNAL MEDICINE

## 2023-04-05 PROCEDURE — 37000008 HC ANESTHESIA 1ST 15 MINUTES: Performed by: INTERNAL MEDICINE

## 2023-04-05 PROCEDURE — 88305 TISSUE EXAM BY PATHOLOGIST: CPT | Mod: 26,,, | Performed by: PATHOLOGY

## 2023-04-05 PROCEDURE — 25000003 PHARM REV CODE 250: Performed by: INTERNAL MEDICINE

## 2023-04-05 PROCEDURE — 31624 DX BRONCHOSCOPE/LAVAGE: CPT | Performed by: INTERNAL MEDICINE

## 2023-04-05 PROCEDURE — 87210 SMEAR WET MOUNT SALINE/INK: CPT | Mod: 91 | Performed by: INTERNAL MEDICINE

## 2023-04-05 PROCEDURE — 37000009 HC ANESTHESIA EA ADD 15 MINS: Performed by: INTERNAL MEDICINE

## 2023-04-05 PROCEDURE — 87070 CULTURE OTHR SPECIMN AEROBIC: CPT | Performed by: INTERNAL MEDICINE

## 2023-04-05 PROCEDURE — 31645 PR BRONCHOSCOPY,RX ASPIR PULM TREE: ICD-10-PCS | Mod: ,,, | Performed by: INTERNAL MEDICINE

## 2023-04-05 PROCEDURE — 88312 SPECIAL STAINS GROUP 1: CPT | Mod: 26,,, | Performed by: PATHOLOGY

## 2023-04-05 PROCEDURE — 31624 PR BRONCHOSCOPY,DIAG2STIC W LAVAGE: ICD-10-PCS | Mod: 59,RT,, | Performed by: INTERNAL MEDICINE

## 2023-04-05 PROCEDURE — 88112 CYTOPATH CELL ENHANCE TECH: CPT | Mod: 26,,, | Performed by: PATHOLOGY

## 2023-04-05 PROCEDURE — 88305 TISSUE EXAM BY PATHOLOGIST: CPT | Mod: 59 | Performed by: PATHOLOGY

## 2023-04-05 PROCEDURE — 87116 MYCOBACTERIA CULTURE: CPT | Mod: 59 | Performed by: INTERNAL MEDICINE

## 2023-04-05 PROCEDURE — 87015 SPECIMEN INFECT AGNT CONCNTJ: CPT | Performed by: INTERNAL MEDICINE

## 2023-04-05 RX ORDER — SUCCINYLCHOLINE CHLORIDE 20 MG/ML
INJECTION INTRAMUSCULAR; INTRAVENOUS
Status: DISCONTINUED | OUTPATIENT
Start: 2023-04-05 | End: 2023-04-05

## 2023-04-05 RX ORDER — DEXAMETHASONE SODIUM PHOSPHATE 4 MG/ML
INJECTION, SOLUTION INTRA-ARTICULAR; INTRALESIONAL; INTRAMUSCULAR; INTRAVENOUS; SOFT TISSUE
Status: DISCONTINUED | OUTPATIENT
Start: 2023-04-05 | End: 2023-04-05

## 2023-04-05 RX ORDER — LIDOCAINE HYDROCHLORIDE 40 MG/ML
4 SOLUTION TOPICAL ONCE
Status: COMPLETED | OUTPATIENT
Start: 2023-04-05 | End: 2023-04-05

## 2023-04-05 RX ORDER — IPRATROPIUM BROMIDE AND ALBUTEROL SULFATE 2.5; .5 MG/3ML; MG/3ML
3 SOLUTION RESPIRATORY (INHALATION) ONCE
Status: COMPLETED | OUTPATIENT
Start: 2023-04-05 | End: 2023-04-05

## 2023-04-05 RX ORDER — SODIUM CHLORIDE 9 MG/ML
INJECTION, SOLUTION INTRAVENOUS CONTINUOUS
Status: DISCONTINUED | OUTPATIENT
Start: 2023-04-05 | End: 2023-04-05 | Stop reason: HOSPADM

## 2023-04-05 RX ORDER — ONDANSETRON 2 MG/ML
INJECTION INTRAMUSCULAR; INTRAVENOUS
Status: DISCONTINUED | OUTPATIENT
Start: 2023-04-05 | End: 2023-04-05

## 2023-04-05 RX ORDER — PROPOFOL 10 MG/ML
VIAL (ML) INTRAVENOUS
Status: DISCONTINUED | OUTPATIENT
Start: 2023-04-05 | End: 2023-04-05

## 2023-04-05 RX ORDER — FENTANYL CITRATE 50 UG/ML
INJECTION, SOLUTION INTRAMUSCULAR; INTRAVENOUS
Status: DISCONTINUED | OUTPATIENT
Start: 2023-04-05 | End: 2023-04-05

## 2023-04-05 RX ORDER — MIDAZOLAM HYDROCHLORIDE 1 MG/ML
INJECTION INTRAMUSCULAR; INTRAVENOUS
Status: DISCONTINUED | OUTPATIENT
Start: 2023-04-05 | End: 2023-04-05

## 2023-04-05 RX ADMIN — ONDANSETRON 100 MG: 2 INJECTION INTRAMUSCULAR; INTRAVENOUS at 10:04

## 2023-04-05 RX ADMIN — SUCCINYLCHOLINE CHLORIDE 100 MG: 20 INJECTION, SOLUTION INTRAMUSCULAR; INTRAVENOUS; PARENTERAL at 10:04

## 2023-04-05 RX ADMIN — ONDANSETRON 4 MG: 2 INJECTION INTRAMUSCULAR; INTRAVENOUS at 10:04

## 2023-04-05 RX ADMIN — FENTANYL CITRATE 50 MCG: 50 INJECTION, SOLUTION INTRAMUSCULAR; INTRAVENOUS at 10:04

## 2023-04-05 RX ADMIN — PROPOFOL 50 MG: 10 INJECTION, EMULSION INTRAVENOUS at 10:04

## 2023-04-05 RX ADMIN — GLYCOPYRROLATE 0.2 MG: 0.2 INJECTION, SOLUTION INTRAMUSCULAR; INTRAVITREAL at 10:04

## 2023-04-05 RX ADMIN — MIDAZOLAM HYDROCHLORIDE 1 MG: 1 INJECTION INTRAMUSCULAR; INTRAVENOUS at 10:04

## 2023-04-05 RX ADMIN — SODIUM CHLORIDE, SODIUM LACTATE, POTASSIUM CHLORIDE, AND CALCIUM CHLORIDE: .6; .31; .03; .02 INJECTION, SOLUTION INTRAVENOUS at 10:04

## 2023-04-05 RX ADMIN — PROPOFOL 150 MG: 10 INJECTION, EMULSION INTRAVENOUS at 10:04

## 2023-04-05 RX ADMIN — DEXAMETHASONE SODIUM PHOSPHATE 8 MG: 4 INJECTION, SOLUTION INTRA-ARTICULAR; INTRALESIONAL; INTRAMUSCULAR; INTRAVENOUS; SOFT TISSUE at 10:04

## 2023-04-05 RX ADMIN — IPRATROPIUM BROMIDE AND ALBUTEROL SULFATE 3 ML: 2.5; .5 SOLUTION RESPIRATORY (INHALATION) at 11:04

## 2023-04-05 NOTE — PLAN OF CARE
Pt arrived with severe coughing, Spo2 in the 80's and c/o dry mouth.  Pt usually sat in low 90's with home O2 n/c.  Pt placed on humidified face mask at 98%.  Pt was later allowed to rinse out mouth with water and spit it out to help relieve mouth dryness.

## 2023-04-05 NOTE — ANESTHESIA POSTPROCEDURE EVALUATION
Anesthesia Post Evaluation    Patient: Ayanna Alicia    Procedure(s) Performed: Procedure(s) (LRB):  Bronchoscopy - insert lighted tube into airway to take a biopsy of lung (Bilateral)    Final Anesthesia Type: general      Patient location during evaluation: PACU  Patient participation: Yes- Able to Participate  Level of consciousness: awake and alert  Post-procedure vital signs: reviewed and stable  Pain management: adequate  Airway patency: patent  GLENN mitigation strategies: Preoperative initiation of continuous positive airway pressure (CPAP) or non-invasive positive pressure ventilation (NIPPV) and Extubation and recovery carried out in lateral, semiupright, or other nonsupine position  PONV status at discharge: No PONV  Anesthetic complications: no      Cardiovascular status: blood pressure returned to baseline  Respiratory status: unassisted, spontaneous ventilation and face mask  Hydration status: euvolemic  Follow-up not needed.          Vitals Value Taken Time   /64 04/05/23 1100   Temp 37.2 °C (99 °F) 04/05/23 1100   Pulse 110 04/05/23 1100   Resp 20 04/05/23 1100   SpO2 86 % 04/05/23 1100         No case tracking events are documented in the log.      Pain/Dav Score: No data recorded

## 2023-04-05 NOTE — OP NOTE
Bronchoscopy performed  LMA converted to 7.0 ETT due to poor ventilation  Normal airways  BAL RML  Bronch washing  Sent for microbiology, fungal cultures and PJR stain  Cytology on wash  BAP cell differential  Extubated post procedure

## 2023-04-05 NOTE — ANESTHESIA PREPROCEDURE EVALUATION
04/05/2023  Ayanna Alicia is a 52 y.o., female.    Pre-op Assessment    I have reviewed the Patient Summary Reports.    I have reviewed the Nursing Notes. I have reviewed the NPO Status.   I have reviewed the Medications.     Review of Systems  Anesthesia Hx:  No problems with previous Anesthesia  Denies Family Hx of Anesthesia complications.   Denies Personal Hx of Anesthesia complications.   Social:  Social Alcohol Use Drug use: Marijuana; Times per week: 4   Hematology/Oncology:  Hematology Normal   Oncology Normal     EENT/Dental:   chronic allergic rhinitis   Cardiovascular:   Hypertension Denies MI.   Denies CABG/stent.   Denies CHF.    Pulmonary:   Pneumonia Denies COPD. Asthma severe and moderate Sleep Apnea Noncompliant with cpap  Recent rescue inhaler use 4/5/23   Renal/:  Renal/ Normal     Hepatic/GI:   Bowel Prep. GERD Liver Disease, Denies Hepatitis. fatty liver  hepatomegaly   Musculoskeletal:   Arthritis  TMJ   Neurological:   Denies CVA. Headaches Denies Seizures.    Endocrine:   Denies Diabetes. Denies Hypothyroidism. Denies Hyperthyroidism. Multinodular goiter   Dermatological:   eczema       Physical Exam  General:  Well nourished, Cooperative, Alert, Oriented, Morbid Obesity and Anxious      Airway/Jaw/Neck:  Airway Findings: Mouth Opening: Normal   Tongue: Normal   General Airway Assessment: Adult Mallampati: II  Neck ROM: Normal ROM       Dental:  Dental Findings: In tact     Chest/Lungs:  Chest/Lungs Findings: Tachypnea      Heart/Vascular:  Heart Findings: Rate: Normal  Rhythm: Regular Rhythm             Anesthesia Plan  Type of Anesthesia, risks & benefits discussed:  Anesthesia Type:  Gen Supraglottic Airway, Gen ETT    Patient's Preference:   Plan Factors:          Intra-op Monitoring Plan: standard ASA monitors  Intra-op Monitoring Plan Comments:   Post Op Pain Control  Plan: multimodal analgesia  Post Op Pain Control Plan Comments:     Induction:   IV  Beta Blocker:  Patient is not currently on a Beta-Blocker (No further documentation required).       Informed Consent: Informed consent signed with the Patient and all parties understand the risks and agree with anesthesia plan.  All questions answered.  Anesthesia consent signed with patient.  ASA Score: 4     Day of Surgery Review of History & Physical: I have interviewed and examined the patient. I have reviewed the patient's H&P dated:  There are no significant changes.            Ready For Surgery From Anesthesia Perspective.           Physical Exam  General: Well nourished, Cooperative, Alert, Oriented, Morbid Obesity and Anxious    Airway:  Mallampati: II   Mouth Opening: Normal  Tongue: Normal  Neck ROM: Normal ROM    Dental:  In tact    Chest/Lungs:  Tachypnea  inspiratory wheezes    Heart:  Rate: Normal  Rhythm: Regular Rhythm          Anesthesia Plan  Type of Anesthesia, risks & benefits discussed:    Anesthesia Type: Gen Supraglottic Airway, Gen ETT  Intra-op Monitoring Plan: standard ASA monitors  Post Op Pain Control Plan: multimodal analgesia  Induction:  IV  Informed Consent: Informed consent signed with the Patient and all parties understand the risks and agree with anesthesia plan.  All questions answered.   ASA Score: 4  Day of Surgery Review of History & Physical: I have interviewed and examined the patient. I have reviewed the patient's H&P dated:     Ready For Surgery From Anesthesia Perspective.       .

## 2023-04-05 NOTE — PLAN OF CARE
Pt given breathing treatment to help increse SpO2 levels to norm like at home.  Pt wears O2 6 lpm and sats 91 - 92 %.

## 2023-04-05 NOTE — TRANSFER OF CARE
"Anesthesia Transfer of Care Note    Patient: Ayanna Alicia    Procedure(s) Performed: Procedure(s) (LRB):  Bronchoscopy - insert lighted tube into airway to take a biopsy of lung (Bilateral)    Patient location: PACU    Anesthesia Type: general    Transport from OR: Transported from OR on 6-10 L/min O2 by face mask with adequate spontaneous ventilation    Post pain: adequate analgesia    Post assessment: no apparent anesthetic complications    Post vital signs: stable    Level of consciousness: awake, responds to stimulation and alert    Nausea/Vomiting: no nausea/vomiting    Complications: none    Transfer of care protocol was followedComments: Report given to PACU RN at bedside. RN given opportunity to ask questions or clarify concerns. No Concerns verbalized. RN was asked if ready to assume care of patient. RN verbally confirmed. Pt. left in guarded condition. SV. Vital Signs Return to Near Baseline. No s/s of distress noted.       Last vitals:   Visit Vitals  /64   Pulse 110   Temp 37.2 °C (99 °F)   Resp 20   Ht 5' 6" (1.676 m)   Wt 104.3 kg (230 lb)   SpO2 (!) 86%   Breastfeeding No   BMI 37.12 kg/m²     "

## 2023-04-05 NOTE — DISCHARGE SUMMARY
[facility])  Discharge Note  Short Stay    Procedure(s) (LRB):  Bronchoscopy - insert lighted tube into airway to take a biopsy of lung (Bilateral)      OUTCOME: Patient tolerated treatment/procedure well without complication and is now ready for discharge.    DISPOSITION: Home or Self Care    FINAL DIAGNOSIS:  Interstitial lung disease    FOLLOWUP: In clinic    DISCHARGE INSTRUCTIONS:  No discharge procedures on file.     TIME SPENT ON DISCHARGE: 15 minutes

## 2023-04-06 ENCOUNTER — PATIENT MESSAGE (OUTPATIENT)
Dept: PULMONOLOGY | Facility: CLINIC | Age: 53
End: 2023-04-06
Payer: COMMERCIAL

## 2023-04-06 LAB — PATH INTERP FLD-IMP: NORMAL

## 2023-04-08 LAB
BACTERIA SPEC AEROBE CULT: NORMAL
GRAM STN SPEC: NORMAL

## 2023-04-10 ENCOUNTER — PATIENT MESSAGE (OUTPATIENT)
Dept: PULMONOLOGY | Facility: CLINIC | Age: 53
End: 2023-04-10
Payer: COMMERCIAL

## 2023-04-12 DIAGNOSIS — J84.112 UIP (USUAL INTERSTITIAL PNEUMONITIS): ICD-10-CM

## 2023-04-12 DIAGNOSIS — J84.9 INTERSTITIAL PULMONARY DISEASE, UNSPECIFIED: ICD-10-CM

## 2023-04-12 DIAGNOSIS — J84.9 INTERSTITIAL LUNG DISEASE: Primary | ICD-10-CM

## 2023-04-13 LAB
FINAL PATHOLOGIC DIAGNOSIS: NORMAL
Lab: NORMAL
SUPPLEMENTAL DIAGNOSIS: NORMAL

## 2023-04-17 ENCOUNTER — PATIENT MESSAGE (OUTPATIENT)
Dept: SLEEP MEDICINE | Facility: CLINIC | Age: 53
End: 2023-04-17
Payer: COMMERCIAL

## 2023-04-18 DIAGNOSIS — J84.111 CHRONIC INTERSTITIAL PNEUMONIA: ICD-10-CM

## 2023-04-18 DIAGNOSIS — R05.9 COUGH, UNSPECIFIED TYPE: ICD-10-CM

## 2023-04-18 DIAGNOSIS — R76.8 ELEVATED RHEUMATOID FACTOR: ICD-10-CM

## 2023-04-18 DIAGNOSIS — J98.4 RESTRICTIVE LUNG DISEASE: ICD-10-CM

## 2023-04-18 RX ORDER — PREDNISONE 10 MG/1
10 TABLET ORAL DAILY
Qty: 30 TABLET | Refills: 3
Start: 2023-04-18 | End: 2023-06-13 | Stop reason: SDUPTHER

## 2023-04-19 RX ORDER — BENZONATATE 100 MG/1
200 CAPSULE ORAL 3 TIMES DAILY PRN
Qty: 60 CAPSULE | Refills: 0 | Status: SHIPPED | OUTPATIENT
Start: 2023-04-19 | End: 2023-04-30

## 2023-04-20 ENCOUNTER — OFFICE VISIT (OUTPATIENT)
Dept: GASTROENTEROLOGY | Facility: CLINIC | Age: 53
End: 2023-04-20
Payer: COMMERCIAL

## 2023-04-20 ENCOUNTER — PATIENT MESSAGE (OUTPATIENT)
Dept: SLEEP MEDICINE | Facility: CLINIC | Age: 53
End: 2023-04-20

## 2023-04-20 ENCOUNTER — OFFICE VISIT (OUTPATIENT)
Dept: OTOLARYNGOLOGY | Facility: CLINIC | Age: 53
End: 2023-04-20
Payer: COMMERCIAL

## 2023-04-20 VITALS
BODY MASS INDEX: 36.96 KG/M2 | TEMPERATURE: 97 F | DIASTOLIC BLOOD PRESSURE: 78 MMHG | SYSTOLIC BLOOD PRESSURE: 115 MMHG | WEIGHT: 230 LBS | HEIGHT: 66 IN | HEART RATE: 82 BPM

## 2023-04-20 VITALS
HEIGHT: 66 IN | HEART RATE: 92 BPM | SYSTOLIC BLOOD PRESSURE: 112 MMHG | DIASTOLIC BLOOD PRESSURE: 86 MMHG | WEIGHT: 233.69 LBS | OXYGEN SATURATION: 90 % | BODY MASS INDEX: 37.56 KG/M2

## 2023-04-20 DIAGNOSIS — K21.9 GASTROESOPHAGEAL REFLUX DISEASE, UNSPECIFIED WHETHER ESOPHAGITIS PRESENT: Primary | ICD-10-CM

## 2023-04-20 DIAGNOSIS — K22.4 ESOPHAGEAL DYSMOTILITY: ICD-10-CM

## 2023-04-20 DIAGNOSIS — R76.8 ELEVATED RHEUMATOID FACTOR: ICD-10-CM

## 2023-04-20 DIAGNOSIS — K44.9 HIATAL HERNIA: ICD-10-CM

## 2023-04-20 DIAGNOSIS — J38.7 LARYNGEAL ULCERATION: Primary | ICD-10-CM

## 2023-04-20 DIAGNOSIS — J84.111 CHRONIC INTERSTITIAL PNEUMONIA: ICD-10-CM

## 2023-04-20 DIAGNOSIS — J98.4 RESTRICTIVE LUNG DISEASE: ICD-10-CM

## 2023-04-20 PROCEDURE — 99213 PR OFFICE/OUTPT VISIT, EST, LEVL III, 20-29 MIN: ICD-10-PCS | Mod: 25,S$GLB,, | Performed by: STUDENT IN AN ORGANIZED HEALTH CARE EDUCATION/TRAINING PROGRAM

## 2023-04-20 PROCEDURE — 3078F PR MOST RECENT DIASTOLIC BLOOD PRESSURE < 80 MM HG: ICD-10-PCS | Mod: CPTII,S$GLB,, | Performed by: STUDENT IN AN ORGANIZED HEALTH CARE EDUCATION/TRAINING PROGRAM

## 2023-04-20 PROCEDURE — 3008F PR BODY MASS INDEX (BMI) DOCUMENTED: ICD-10-PCS | Mod: CPTII,S$GLB,, | Performed by: INTERNAL MEDICINE

## 2023-04-20 PROCEDURE — 99204 PR OFFICE/OUTPT VISIT, NEW, LEVL IV, 45-59 MIN: ICD-10-PCS | Mod: S$GLB,,, | Performed by: INTERNAL MEDICINE

## 2023-04-20 PROCEDURE — 3079F DIAST BP 80-89 MM HG: CPT | Mod: CPTII,S$GLB,, | Performed by: INTERNAL MEDICINE

## 2023-04-20 PROCEDURE — 31575 PR LARYNGOSCOPY, FLEXIBLE; DIAGNOSTIC: ICD-10-PCS | Mod: S$GLB,,, | Performed by: STUDENT IN AN ORGANIZED HEALTH CARE EDUCATION/TRAINING PROGRAM

## 2023-04-20 PROCEDURE — 3074F SYST BP LT 130 MM HG: CPT | Mod: CPTII,S$GLB,, | Performed by: INTERNAL MEDICINE

## 2023-04-20 PROCEDURE — 99204 OFFICE O/P NEW MOD 45 MIN: CPT | Mod: S$GLB,,, | Performed by: INTERNAL MEDICINE

## 2023-04-20 PROCEDURE — 3079F PR MOST RECENT DIASTOLIC BLOOD PRESSURE 80-89 MM HG: ICD-10-PCS | Mod: CPTII,S$GLB,, | Performed by: INTERNAL MEDICINE

## 2023-04-20 PROCEDURE — 99999 PR PBB SHADOW E&M-EST. PATIENT-LVL V: ICD-10-PCS | Mod: PBBFAC,,, | Performed by: STUDENT IN AN ORGANIZED HEALTH CARE EDUCATION/TRAINING PROGRAM

## 2023-04-20 PROCEDURE — 3078F DIAST BP <80 MM HG: CPT | Mod: CPTII,S$GLB,, | Performed by: STUDENT IN AN ORGANIZED HEALTH CARE EDUCATION/TRAINING PROGRAM

## 2023-04-20 PROCEDURE — 3008F PR BODY MASS INDEX (BMI) DOCUMENTED: ICD-10-PCS | Mod: CPTII,S$GLB,, | Performed by: STUDENT IN AN ORGANIZED HEALTH CARE EDUCATION/TRAINING PROGRAM

## 2023-04-20 PROCEDURE — 3008F BODY MASS INDEX DOCD: CPT | Mod: CPTII,S$GLB,, | Performed by: INTERNAL MEDICINE

## 2023-04-20 PROCEDURE — 1159F PR MEDICATION LIST DOCUMENTED IN MEDICAL RECORD: ICD-10-PCS | Mod: CPTII,S$GLB,, | Performed by: INTERNAL MEDICINE

## 2023-04-20 PROCEDURE — 1159F PR MEDICATION LIST DOCUMENTED IN MEDICAL RECORD: ICD-10-PCS | Mod: CPTII,S$GLB,, | Performed by: STUDENT IN AN ORGANIZED HEALTH CARE EDUCATION/TRAINING PROGRAM

## 2023-04-20 PROCEDURE — 99213 OFFICE O/P EST LOW 20 MIN: CPT | Mod: 25,S$GLB,, | Performed by: STUDENT IN AN ORGANIZED HEALTH CARE EDUCATION/TRAINING PROGRAM

## 2023-04-20 PROCEDURE — 3008F BODY MASS INDEX DOCD: CPT | Mod: CPTII,S$GLB,, | Performed by: STUDENT IN AN ORGANIZED HEALTH CARE EDUCATION/TRAINING PROGRAM

## 2023-04-20 PROCEDURE — 99999 PR PBB SHADOW E&M-EST. PATIENT-LVL V: CPT | Mod: PBBFAC,,, | Performed by: INTERNAL MEDICINE

## 2023-04-20 PROCEDURE — 99999 PR PBB SHADOW E&M-EST. PATIENT-LVL V: CPT | Mod: PBBFAC,,, | Performed by: STUDENT IN AN ORGANIZED HEALTH CARE EDUCATION/TRAINING PROGRAM

## 2023-04-20 PROCEDURE — 3074F PR MOST RECENT SYSTOLIC BLOOD PRESSURE < 130 MM HG: ICD-10-PCS | Mod: CPTII,S$GLB,, | Performed by: STUDENT IN AN ORGANIZED HEALTH CARE EDUCATION/TRAINING PROGRAM

## 2023-04-20 PROCEDURE — 31575 DIAGNOSTIC LARYNGOSCOPY: CPT | Mod: S$GLB,,, | Performed by: STUDENT IN AN ORGANIZED HEALTH CARE EDUCATION/TRAINING PROGRAM

## 2023-04-20 PROCEDURE — 1159F MED LIST DOCD IN RCRD: CPT | Mod: CPTII,S$GLB,, | Performed by: INTERNAL MEDICINE

## 2023-04-20 PROCEDURE — 3074F PR MOST RECENT SYSTOLIC BLOOD PRESSURE < 130 MM HG: ICD-10-PCS | Mod: CPTII,S$GLB,, | Performed by: INTERNAL MEDICINE

## 2023-04-20 PROCEDURE — 1159F MED LIST DOCD IN RCRD: CPT | Mod: CPTII,S$GLB,, | Performed by: STUDENT IN AN ORGANIZED HEALTH CARE EDUCATION/TRAINING PROGRAM

## 2023-04-20 PROCEDURE — 99999 PR PBB SHADOW E&M-EST. PATIENT-LVL V: ICD-10-PCS | Mod: PBBFAC,,, | Performed by: INTERNAL MEDICINE

## 2023-04-20 PROCEDURE — 3074F SYST BP LT 130 MM HG: CPT | Mod: CPTII,S$GLB,, | Performed by: STUDENT IN AN ORGANIZED HEALTH CARE EDUCATION/TRAINING PROGRAM

## 2023-04-20 RX ORDER — PREDNISONE 10 MG/1
20 TABLET ORAL DAILY
Qty: 60 TABLET | Refills: 3 | Status: SHIPPED | OUTPATIENT
Start: 2023-04-20 | End: 2023-05-20

## 2023-04-20 NOTE — PROGRESS NOTES
[facility]  Gastroenterology    Patient evaluated at the request of Agustín Aviles MD  [address] [place] [address]    PCP: Madeeline Enrique MD    4/20/23    HPI       esphageal dysmotility      Additional comments: Pt present today for procedure f/u          Last edited by Toni Milligan, MA on 4/20/2023  9:25 AM.        Reason for Visit: GERD, Hiatal Hernia, Abnormal Esophagram    Subjective:   Ayanna Alicia is a 52 y.o. female  with history if interstitial pneumonitis, chronic respiratory failure on home O2 here for evaluation of GERD, hiatal hernia and esophageal dysmotility. Patient has had worsening reflux symptoms with heartburn and reflux of food into esophagus. She had an esophagram done recently which showed small hiatal hernia, GERD and esophageal dysmotility. She was referred to GI for further evaluation. Also complains of a RUQ/rib pain with coughing. She was recently placed onto Protonix and carafate. Taking Protonix in the evenings and has noted some improvement. Patient denies any dysphagia.       Past Medical History:   Diagnosis Date    Abnormal Pap smear of cervix     in the past with repeat pap smear okay.    Acute interstitial pneumonitis     Allergic rhinitis, cause unspecified     Arthritis of both knees     Asthma     Eczema     Fatty liver 10/2014    Fibrocystic breast changes     Headache(784.0)     Hepatomegaly 10/2014    Hypertension     Liver cyst 10/2014    Multinodular goiter     Followed by ENT - Dr. Nicolas Gray    Polymenorrhea 2008    TMJ (dislocation of temporomandibular joint)     Uterine fibroid     in the past    Vitamin D deficiency disease        Past Surgical History:   Procedure Laterality Date    BRONCHOSCOPY Bilateral 4/5/2023    Procedure: Bronchoscopy - insert lighted tube into airway to take a biopsy of lung;  Surgeon: Agustín Aviles MD;  [facility];  Service: Pulmonary;  Laterality: Bilateral;      SECTION, CLASSIC      x 1    COLONOSCOPY N/A 2020    Procedure: COLONOSCOPY;  Surgeon: Angie Magana MD;  Location: [facility];  Service: Endoscopy;  Laterality: N/A;    HYSTERECTOMY      MULTIPLE TOOTH EXTRACTIONS      PARTIAL HYSTERECTOMY  2013    Due to fibroids       Current Outpatient Medications on File Prior to Visit   Medication Sig Dispense Refill    albuterol (PROVENTIL/VENTOLIN HFA) 90 mcg/actuation inhaler INHALE 1 TO 2 PUFFS BY MOUTH INTO THE LUNGS EVERY 4 TO 6 HOURS AS NEEDED FOR WHEEZING OR SHORTNESS OF BREATH 8.5 g 5    albuterol-ipratropium (DUO-NEB) 2.5 mg-0.5 mg/3 mL nebulizer solution Take 3 mLs by nebulization every 6 (six) hours as needed for Wheezing. Rescue 90 mL 11    amLODIPine (NORVASC) 10 MG tablet Take 1 tablet (10 mg total) by mouth once daily. 90 tablet 1    ascorbic acid, vitamin C, (VITAMIN C) 500 MG tablet Take 500 mg by mouth once daily.      calcium/magnesium/vit B comp (CALCIUM-MAGNESIUM-B COMPLEX ORAL) Take 1 tablet by mouth once daily at 6am.      hydroCHLOROthiazide (HYDRODIURIL) 12.5 MG Tab Take 1 tablet (12.5 mg total) by mouth once daily. 90 tablet 1    ipratropium (ATROVENT) 0.02 % nebulizer solution Take 2.5 mLs (500 mcg total) by nebulization 4 (four) times daily. Rescue 300 mL 11    levocetirizine (XYZAL) 5 MG tablet TAKE 1 TABLET(5 MG) BY MOUTH EVERY DAY 90 tablet 1    metOLazone (ZAROXOLYN) 2.5 MG tablet Take 1 tablet (2.5 mg total) by mouth every Mon, Wed, Fri. 36 tablet 3    mv-minerals/FA/omega 3,6,9 #3 (WOMEN'S 50+ ADVANCED ORAL) Take 1 tablet by mouth Daily.      nintedanib (OFEV) 100 mg Cap Take 100 mg by mouth 2 (two) times daily. 120 capsule 11    omega-3s/dha/epa/fish oil/D3 (VITAMIN-D + OMEGA-3 ORAL) Take 2 tablets by mouth once daily at 6am.      pantoprazole (PROTONIX) 40 MG tablet Take 1 tablet (40 mg total) by mouth once daily. 30 tablet 11    predniSONE (DELTASONE) 10 MG tablet Take 1 tablet (10 mg total) by  mouth once daily. 30 tablet 3    sertraline (ZOLOFT) 50 MG tablet Take 1 tablet (50 mg total) by mouth once daily. 90 tablet 3    vitamin D (VITAMIN D3) 1000 units Tab Take 1,000 Units by mouth once daily.      benzonatate (TESSALON) 100 MG capsule Take 2 capsules (200 mg total) by mouth 3 (three) times daily as needed for Cough. (Patient not taking: Reported on 4/20/2023) 60 capsule 0    fluticasone furoate-vilanteroL (BREO ELLIPTA) 100-25 mcg/dose diskus inhaler INHALE 1 PUFF INTO THE LUNGS ONCE DAILY (Patient not taking: Reported on 4/20/2023) 60 each 5     Current Facility-Administered Medications on File Prior to Visit   Medication Dose Route Frequency Provider Last Rate Last Admin    sodium chloride 0.9% flush 10 mL  10 mL Intravenous PRN Agustín Aviles MD           Review of patient's allergies indicates:   Allergen Reactions    Doxycycline Nausea Only     Other reaction(s): Nausea    Fluticasone Other (See Comments)     Other reaction(s): Epistaxis      Penicillins      Other reaction(s): unknown  Pt tolerated ceftriaxone       Social History     Socioeconomic History    Marital status: Single    Number of children: 0   Occupational History    Occupation:      Employer: [employer]     Comment: [employer]   Tobacco Use    Smoking status: Never     Passive exposure: Past    Smokeless tobacco: Never   Substance and Sexual Activity    Alcohol use: Not Currently     Comment: last use 03/2022    Drug use: Not Currently     Frequency: 4.0 times per week     Types: Marijuana     Comment: last year was last use 03/2022    Sexual activity: Yes     Partners: Female   Social History Narrative    She wears seatbelt. Her son was killed in 2010.       Family History   Problem Relation Age of Onset    Stroke Mother     Heart disease Mother     Diverticulitis Mother     Heart disease Father 63        MI/CAD    Hypertension Father     Stroke Sister     No Known Problems Brother      Cancer Maternal Grandmother 60        Rectal and stomach cancer    Stroke Maternal Grandfather     Diabetes Maternal Grandfather     Peripheral vascular disease Maternal Grandfather     Cancer Maternal Aunt 50        Stomach cancer    Cancer Maternal Aunt 66        Lung cancer (smoker)    Migraines Cousin     Cataracts Cousin        Review of Systems   Constitutional:  Negative for appetite change, fever and unexpected weight change.   HENT:  Negative for postnasal drip, rhinorrhea, sneezing, sore throat and trouble swallowing.    Eyes:  Negative for visual disturbance.   Respiratory:  Positive for cough. Negative for shortness of breath and wheezing.    Cardiovascular:  Negative for chest pain, palpitations and leg swelling.   Gastrointestinal:  Positive for abdominal pain (RUQ/rib). Negative for blood in stool, constipation, diarrhea, nausea and vomiting.   Genitourinary:  Negative for dysuria.   Musculoskeletal:  Negative for arthralgias, joint swelling and myalgias.   Skin:  Negative for color change, pallor and rash.   Neurological:  Negative for weakness, light-headedness, numbness and headaches.   Hematological:  Negative for adenopathy. Does not bruise/bleed easily.   Psychiatric/Behavioral:  Negative for agitation.          Objective:   Vitals:   Vitals:    04/20/23 0926   BP: 112/86   Pulse: 92       Physical Exam  Vitals reviewed.   Constitutional:       General: She is not in acute distress.     Appearance: She is not diaphoretic.   HENT:      Head: Normocephalic and atraumatic.      Mouth/Throat:      Pharynx: No oropharyngeal exudate.   Eyes:      General: No scleral icterus.        Right eye: No discharge.         Left eye: No discharge.      Conjunctiva/sclera: Conjunctivae normal.      Pupils: Pupils are equal, round, and reactive to light.   Cardiovascular:      Rate and Rhythm: Normal rate and regular rhythm.      Heart sounds: Normal heart sounds. No murmur heard.    No friction rub.  No gallop.   Pulmonary:      Effort: Pulmonary effort is normal. No respiratory distress.      Breath sounds: Normal breath sounds. No stridor. No wheezing or rales.      Comments: On home O2  Abdominal:      General: Bowel sounds are normal. There is no distension.      Palpations: Abdomen is soft. There is no mass.      Tenderness: There is abdominal tenderness (over right rib cage). There is no guarding.   Musculoskeletal:         General: Normal range of motion.      Cervical back: Normal range of motion.   Skin:     General: Skin is warm and dry.      Coloration: Skin is not pale.      Findings: No erythema or rash.   Neurological:      Mental Status: She is alert and oriented to person, place, and time.       FL Esophagram Complete    Result Date: 3/29/2023  EXAMINATION: FL ESOPHAGRAM COMPLETE CLINICAL HISTORY: ILD, GERD;Idiopathic pulmonary fibrosis TECHNIQUE: Contrast material: Barium sulfate suspension Air kerma: 73 mGy COMPARISON: None FINDINGS: Swallowing: Trace penetration, no aspiration. Esophagus: Esophageal dysmotility involving the mid and distal esophagus.  Esophageal reflux.  Esophageal residue never fully clears to gravity or water wash.  There is a small hiatal hernia. Gastroesophageal reflux: No gastroesophageal reflux.     Esophageal dysmotility with small hiatal hernia.     IMPRESSION     Problem List Items Addressed This Visit          GI    GERD (gastroesophageal reflux disease) - Primary     Other Visit Diagnoses       Esophageal dysmotility        Hiatal hernia                PLANS:    - Patient with typical acid reflux symptoms. Esophagram reviewed- small hiatal hernia noted which discussed can be a risk factor for acid reflux  - Patient denies any dysphagia symptoms. Dysmotility on esophagram may be IEM from the acid reflux itself  - Due to patient's pulmonary status, she is high risk for endoscopy. Therefore will hold off at this time and feel it would likely not  plan  anyway   - Change Protonix to in the mornings. Will keep on same dose for now and continue for another month  - Strict reflux precautions discussed- avoidance of caffeine/chocolate/mints, HOB elevation at night and avoidance of eating at least 3 hours before bedtime  - RTC in 4 weeks for f/u    Gastroesophageal reflux disease, unspecified whether esophagitis present    Esophageal dysmotility  -     Ambulatory referral/consult to Gastroenterology    Hiatal hernia      Angie Magana MD  Gastroenterology

## 2023-04-20 NOTE — PROGRESS NOTES
Chief complaint:    Chief Complaint   Patient presents with    Follow-up     Dysphonia follow up.           Referring Provider:  Magalys Self  No address on file    History of present illness:     Ms. Alicia is a 52 y.o. w/AIP presenting for evaluation of hoarseness.     Onset: 7 weeks, had a night with a bad coughing attack and has been hoarse since  Severity: severe  Quality: hoarse, raspy, breathy and is progressively worsening  The voice has been persistently hoarse.   Associated signs and symptoms:  productive cough  Symptoms are worse all day  Prior medical therapy:   OTC medications, lozenges    Recently increased from 10 to 20 mg prednisone daily    Also on alubterol and breo    Update 3/2/23  Overall voice doing a lot better, but not quite back to normal. Took diflucan as prescribed. However, over last day she did have worse SOB and mucus production.   Also some nasal crusting and scabs blown out this morning.      Update 4/20/23    Continued dysphonia. No improvement. Some globus at times.     Completed antibiotics (bactrim), had to stop diflucan while in the hospital.  Still taking MWF dose per pulm.    Had bronch a week ago - reports things looked good    Still on albuterol and breo    Has been on prednisone 10mg for months    History      Past Medical History:   Past Medical History:   Diagnosis Date    Abnormal Pap smear of cervix     in the past with repeat pap smear okay.    Acute interstitial pneumonitis     Allergic rhinitis, cause unspecified     Arthritis of both knees     Asthma     Eczema     Fatty liver 10/2014    Fibrocystic breast changes     Headache(784.0)     Hepatomegaly 10/2014    Hypertension     Liver cyst 10/2014    Multinodular goiter     Followed by ENT - Dr. Nicolas Gray    Polymenorrhea 2008    TMJ (dislocation of temporomandibular joint)     Uterine fibroid     in the past    Vitamin D deficiency disease          Past Surgical History:  Past Surgical History:   Procedure  Laterality Date    BRONCHOSCOPY Bilateral 2023    Procedure: Bronchoscopy - insert lighted tube into airway to take a biopsy of lung;  Surgeon: Agustín Aviles MD;  Location: [facility] ENDO;  Service: Pulmonary;  Laterality: Bilateral;     SECTION, CLASSIC      x 1    COLONOSCOPY N/A 2020    Procedure: COLONOSCOPY;  Surgeon: Angie Magana MD;  Location: [facility] ENDO;  Service: Endoscopy;  Laterality: N/A;    HYSTERECTOMY      MULTIPLE TOOTH EXTRACTIONS      PARTIAL HYSTERECTOMY  2013    Due to fibroids         Medications: Medication list reviewed. She  has a current medication list which includes the following prescription(s): albuterol, albuterol-ipratropium, amlodipine, hydrochlorothiazide, ipratropium, levocetirizine, metolazone, mv-minerals/fa/omega 3,6,9 #3, ofev, omega-3s/dha/epa/fish oil/d3, pantoprazole, prednisone, sertraline, vitamin d, ascorbic acid (vitamin c), benzonatate, calcium/magnesium/vit b comp, and fluticasone furoate-vilanterol, and the following Facility-Administered Medications: sodium chloride 0.9%.     Allergies:   Review of patient's allergies indicates:   Allergen Reactions    Doxycycline Nausea Only     Other reaction(s): Nausea    Fluticasone Other (See Comments)     Other reaction(s): Epistaxis      Penicillins      Other reaction(s): unknown  Pt tolerated ceftriaxone         Family history: family history includes Cancer (age of onset: 50) in her maternal aunt; Cancer (age of onset: 60) in her maternal grandmother; Cancer (age of onset: 66) in her maternal aunt; Cataracts in her cousin; Diabetes in her maternal grandfather; Diverticulitis in her mother; Heart disease in her mother; Heart disease (age of onset: 63) in her father; Hypertension in her father; Migraines in her cousin; No Known Problems in her brother; Peripheral vascular disease in her maternal grandfather; Stroke in her maternal grandfather, mother, and sister.         Social History           "Alcohol use:  reports that she does not currently use alcohol.            Tobacco:  reports that she has never smoked. She has been exposed to tobacco smoke. She has never used smokeless tobacco.         Physical Examination      Vitals: Blood pressure 115/78, pulse 82, temperature 97.1 °F (36.2 °C), temperature source Tympanic, height 5' 6" (1.676 m), weight 104.3 kg (230 lb).      General: Well developed, well nourished, well hydrated.oxygen dependent    Voice: moderate to severe dysphonia, no dysarthria      Head/Face: Normocephalic, atraumatic. No scars or lesions. Facial musculature equal.     Eyes: No scleral icterus or conjunctival hemorrhage. EOMI. PERRLA.     Ears:     Right ear: No gross deformity. EAC is clear of debris and erythema. TM are intact with a pneumatized middle ear. No signs of retraction, fluid or infection.      Left ear: No gross deformity. EAC is clear of debris and erythema. TM are intact with a pneumatized middle ear. No signs of retraction, fluid or infection.      Nose: No gross deformity or lesions. No purulent discharge. No significant NSD.      Mouth/Oropharynx: Lips without any lesions. No mucosal lesions within the oropharynx. No tonsillar exudate or lesions. Pharyngeal walls symmetrical. Uvula midline. Tongue midline without lesions.     Neck: Trachea midline. No masses. No thyromegaly or nodules palpated.     Lymphatic: No lymphadenopathy in the neck.     Extremities: No cyanosis. Warm and well-perfused.     Skin: No scars or lesions on face or neck.      Neurologic: Moving all extremities without gross abnormality.CN II-XII grossly intact. House-Brackmann 1/6. No signs of nystagmus.          Data reviewed      Review of records:      I reviewed records from the referring provider's office visits, including the history, workup, and/or treatment of this problem thus far.        Procedures:    Procedure -Transnasal fiberoptic laryngoscopy     Surgeon: Jay Sanabria M.D. .  "     Anesthesia: topical 0.05% oxymetazoline with 4% lidocaine      Complications: None.     Description of Procedure: With the patient in the sitting position, topical lidocaine and oxymetazoline was applied to the nose. The scope was passed through the nose. Examination was carried out of the nose, nasopharynx, oropharynx, hypopharynx, and larynx with findings as noted above. Scope was removed. The patient tolerated the procedure well.      Findings: No masses or lesions in the nose, nasopharynx, oropharynx, hypopharynx, or larynx. Vocal fold abduction and adduction is normal but there are bilateral white plaques/ulcerations of striking surface of VC (larger on the right). No pooling of secretions in the piriform sinuses, penetration, or aspiration.            Assessment/Plan:    1. Laryngeal ulceration          No obstructing airway pathology that would cause her worsening hypoxia  Has failed course of bactrim, prolonged course of diflucan, reflux management  Uses a spacer with her inhalers  No improvement in laryngeal lesions or dysphonia  I recommend laryngology evaluation - may need biopsy at this point, due to co morbidities possibly with in-office TNE scope?                Jay Sanabria MD  [facility]   [facility]  [address]  [place] [address]  P: [phone]  F: [phone]

## 2023-04-26 ENCOUNTER — OFFICE VISIT (OUTPATIENT)
Dept: CARDIOLOGY | Facility: CLINIC | Age: 53
End: 2023-04-26
Payer: COMMERCIAL

## 2023-04-26 VITALS
WEIGHT: 235.44 LBS | HEIGHT: 66 IN | SYSTOLIC BLOOD PRESSURE: 114 MMHG | OXYGEN SATURATION: 91 % | BODY MASS INDEX: 37.84 KG/M2 | DIASTOLIC BLOOD PRESSURE: 76 MMHG | HEART RATE: 111 BPM

## 2023-04-26 DIAGNOSIS — J96.11 CHRONIC RESPIRATORY FAILURE WITH HYPOXIA: ICD-10-CM

## 2023-04-26 DIAGNOSIS — R07.9 CHEST PAIN, UNSPECIFIED TYPE: ICD-10-CM

## 2023-04-26 DIAGNOSIS — K21.9 GASTROESOPHAGEAL REFLUX DISEASE WITHOUT ESOPHAGITIS: ICD-10-CM

## 2023-04-26 DIAGNOSIS — J45.909 ASTHMA IN ADULT, UNSPECIFIED ASTHMA SEVERITY, UNSPECIFIED WHETHER COMPLICATED, UNSPECIFIED WHETHER PERSISTENT: ICD-10-CM

## 2023-04-26 DIAGNOSIS — J45.20 MILD INTERMITTENT ASTHMA WITHOUT COMPLICATION: ICD-10-CM

## 2023-04-26 DIAGNOSIS — I10 PRIMARY HYPERTENSION: Primary | ICD-10-CM

## 2023-04-26 DIAGNOSIS — J98.4 RESTRICTIVE LUNG DISEASE: ICD-10-CM

## 2023-04-26 DIAGNOSIS — G47.33 OSA ON CPAP: ICD-10-CM

## 2023-04-26 DIAGNOSIS — E66.01 SEVERE OBESITY: ICD-10-CM

## 2023-04-26 PROCEDURE — 1159F PR MEDICATION LIST DOCUMENTED IN MEDICAL RECORD: ICD-10-PCS | Mod: CPTII,S$GLB,, | Performed by: STUDENT IN AN ORGANIZED HEALTH CARE EDUCATION/TRAINING PROGRAM

## 2023-04-26 PROCEDURE — 99999 PR PBB SHADOW E&M-EST. PATIENT-LVL V: CPT | Mod: PBBFAC,,, | Performed by: STUDENT IN AN ORGANIZED HEALTH CARE EDUCATION/TRAINING PROGRAM

## 2023-04-26 PROCEDURE — 1159F MED LIST DOCD IN RCRD: CPT | Mod: CPTII,S$GLB,, | Performed by: STUDENT IN AN ORGANIZED HEALTH CARE EDUCATION/TRAINING PROGRAM

## 2023-04-26 PROCEDURE — 99999 PR PBB SHADOW E&M-EST. PATIENT-LVL V: ICD-10-PCS | Mod: PBBFAC,,, | Performed by: STUDENT IN AN ORGANIZED HEALTH CARE EDUCATION/TRAINING PROGRAM

## 2023-04-26 PROCEDURE — 99214 PR OFFICE/OUTPT VISIT, EST, LEVL IV, 30-39 MIN: ICD-10-PCS | Mod: S$GLB,,, | Performed by: STUDENT IN AN ORGANIZED HEALTH CARE EDUCATION/TRAINING PROGRAM

## 2023-04-26 PROCEDURE — 3074F SYST BP LT 130 MM HG: CPT | Mod: CPTII,S$GLB,, | Performed by: STUDENT IN AN ORGANIZED HEALTH CARE EDUCATION/TRAINING PROGRAM

## 2023-04-26 PROCEDURE — 3008F BODY MASS INDEX DOCD: CPT | Mod: CPTII,S$GLB,, | Performed by: STUDENT IN AN ORGANIZED HEALTH CARE EDUCATION/TRAINING PROGRAM

## 2023-04-26 PROCEDURE — 3078F DIAST BP <80 MM HG: CPT | Mod: CPTII,S$GLB,, | Performed by: STUDENT IN AN ORGANIZED HEALTH CARE EDUCATION/TRAINING PROGRAM

## 2023-04-26 PROCEDURE — 3078F PR MOST RECENT DIASTOLIC BLOOD PRESSURE < 80 MM HG: ICD-10-PCS | Mod: CPTII,S$GLB,, | Performed by: STUDENT IN AN ORGANIZED HEALTH CARE EDUCATION/TRAINING PROGRAM

## 2023-04-26 PROCEDURE — 3008F PR BODY MASS INDEX (BMI) DOCUMENTED: ICD-10-PCS | Mod: CPTII,S$GLB,, | Performed by: STUDENT IN AN ORGANIZED HEALTH CARE EDUCATION/TRAINING PROGRAM

## 2023-04-26 PROCEDURE — 3074F PR MOST RECENT SYSTOLIC BLOOD PRESSURE < 130 MM HG: ICD-10-PCS | Mod: CPTII,S$GLB,, | Performed by: STUDENT IN AN ORGANIZED HEALTH CARE EDUCATION/TRAINING PROGRAM

## 2023-04-26 PROCEDURE — 99214 OFFICE O/P EST MOD 30 MIN: CPT | Mod: S$GLB,,, | Performed by: STUDENT IN AN ORGANIZED HEALTH CARE EDUCATION/TRAINING PROGRAM

## 2023-04-26 RX ORDER — FLUTICASONE FUROATE AND VILANTEROL 100; 25 UG/1; UG/1
POWDER RESPIRATORY (INHALATION)
Qty: 60 EACH | Refills: 5 | Status: SHIPPED | OUTPATIENT
Start: 2023-04-26 | End: 2023-05-01

## 2023-04-26 NOTE — PROGRESS NOTES
"Subjective:   Patient ID:  Ayanna Alicia is a 52 y.o. female who presents for cardiac consult of No chief complaint on file.    Referring Physician: Madeleine Enrique MD   Reason for consult: abnormal ECG    HPI  The patient came in today for cardiac consult of No chief complaint on file.      Ayanna Alicia is a 52 y.o. female  With HTN, ILD, HLD, obesity, asthma, GERD presents for follow up CV eval.     3/28/22  Pt had recent ER eval 3 days ago :My full Hx/PE/MDM: Patient is a 51-year-old female presenting for evaluation after an "abnormal EKG" in her PCPs office. She was seen for some intermittent chest tightness and shortness of breath, felt to be related to her asthma. An EKG was read as age undetermined possible anterior infarct. She denies any chest pain at this time, no current shortness of breath. She has an appointment scheduled with cardiology on Monday, 3 days from now. No fevers, chills. She has had a slight cough and some congestion as well. She denies abdominal pain,, vomiting. On exam, lungs clear bilaterally, heart regular rate and rhythm. Lower extremities without edema. Labs, EKG, chest x-ray obtained. Blood work unremarkable, troponin negative. Chest x-ray with likely atelectasis, low suspicion for pneumonia given normal white count, no fever, clear lungs. EKG with atrial enlargement, no evidence of ACS. She stable for discharge with instructions to follow-up with cardiology    Results reviewed, ECG with poor RWP. She has been having more HCIKS, thought due to asthma concerned due to angina. She gets more SOB when she walks and feels palpitations and hears in ears.     Hospital Course:   Patient was admitted for hypoxic respiratory failure. CTA showed siffuse interstitial thickening with ground-glass opacities predominately throughout the lower lobes with associated bronchiectasis concerning for interstitial pneumonia versus interstitial edema. She was treated empirically for CAP with " rocephin and azithromycin. Patient did not clinically improve. Sputum culture negative. She was then started on IV solumedrol with good clinical response. Inflammatory markers trended down post steroids. Chest xray showed improved. Of note multi-nodular goiter was found during stay. Patient has been seeing ENT and PCP. TSH WNL and anti-TPO negative. Rheumatoid factor elevated however anti-ccp within normal limits. Home O2 was obtained. Decision was made to discharge with prednisone taper x 5 weeks. Outpatient f/u with pulmonology.      22  She was admitted for resp failure in April treated with steroids and abx. Is seeing pulm, is on oxygen on 2L. She has occ CP/cough as well.     Patient feels  no leg swelling, no PND, no dizziness, no syncope, no CNS symptoms.    Patient has fairly good exercise tolerance. Is an , does rental properties.     Patient is compliant with medications.  FH - mother - had strokes, father - had MI; older sister -  of strokes -  in 60s         23  Comes in today, wants to switch providers   On chronic home O2 4-6L continuous  Intermittent chest pain, usually at rest, doesn't last long, thinks it may be acid reflux  Takes pepcid prn only  Follows up with Pulmonary    blood pressure stable    Denies syncope, palpitations      23  Had asthma attack today  HR elevated today  On continuous 02  Sees pulmonary regularly   BP good today  No chest pain  Reports pulled muscle         EKG 23 ST, biatrial enlargement, cannot r/o anterior infarct  ECG  Normal sinus rhythm   Possible Left atrial enlargement   Possible Anterior infarct ,age undetermined   Abnormal ECG   When compared with ECG of 02-OCT-2014 11:29,   Borderline criteria for Anterior infarct are now Present   QT has lengthened   Confirmed by CLAY BLACKBURN MD ([id number]) on 3/25/2022 11:45:17 AM       Summary    The left ventricle is normal in size with concentric remodeling and normal systolic  function.  The test was stopped because the patient experienced shortness of breath. The patient requested the test to be stopped.  The patient's exercise capacity was severely impaired.  There were no arrhythmias during stress.  The estimated ejection fraction is 60%.  Normal left ventricular diastolic function.  Normal right ventricular size with normal right ventricular systolic function.  Mild tricuspid regurgitation.  Normal central venous pressure (3 mmHg).  The estimated PA systolic pressure is 29 mmHg.  The stress echo portion of this study is negative for myocardial ischemia.  The ECG portion of this study is negative for myocardial ischemia.         Past Medical History:   Diagnosis Date    Abnormal Pap smear of cervix     in the past with repeat pap smear okay.    Acute interstitial pneumonitis     Allergic rhinitis, cause unspecified     Arthritis of both knees     Asthma     Eczema     Fatty liver 10/2014    Fibrocystic breast changes     Headache(784.0)     Hepatomegaly 10/2014    Hypertension     Liver cyst 10/2014    Multinodular goiter     Followed by ENT - Dr. Nicolas Gray    Polymenorrhea     TMJ (dislocation of temporomandibular joint)     Uterine fibroid     in the past    Vitamin D deficiency disease        Past Surgical History:   Procedure Laterality Date    BRONCHOSCOPY Bilateral 2023    Procedure: Bronchoscopy - insert lighted tube into airway to take a biopsy of lung;  Surgeon: Agustín Aviles MD;  Location: [facility];  Service: Pulmonary;  Laterality: Bilateral;     SECTION, CLASSIC      x 1    COLONOSCOPY N/A 2020    Procedure: COLONOSCOPY;  Surgeon: Angie Magana MD;  Location: [facility];  Service: Endoscopy;  Laterality: N/A;    HYSTERECTOMY      MULTIPLE TOOTH EXTRACTIONS      PARTIAL HYSTERECTOMY  2013    Due to fibroids       Social History     Tobacco Use    Smoking status: Never     Passive exposure: Past    Smokeless tobacco: Never   Substance  Use Topics    Alcohol use: Not Currently     Comment: last use 03/2022    Drug use: Not Currently     Frequency: 4.0 times per week     Types: Marijuana     Comment: last year was last use 03/2022       Family History   Problem Relation Age of Onset    Stroke Mother     Heart disease Mother     Diverticulitis Mother     Heart disease Father 63        MI/CAD    Hypertension Father     Stroke Sister     No Known Problems Brother     Cancer Maternal Grandmother 60        Rectal and stomach cancer    Stroke Maternal Grandfather     Diabetes Maternal Grandfather     Peripheral vascular disease Maternal Grandfather     Cancer Maternal Aunt 50        Stomach cancer    Cancer Maternal Aunt 66        Lung cancer (smoker)    Migraines Cousin     Cataracts Cousin        Patient's Medications   New Prescriptions    No medications on file   Previous Medications    ALBUTEROL (PROVENTIL/VENTOLIN HFA) 90 MCG/ACTUATION INHALER    INHALE 1 TO 2 PUFFS BY MOUTH INTO THE LUNGS EVERY 4 TO 6 HOURS AS NEEDED FOR WHEEZING OR SHORTNESS OF BREATH    ALBUTEROL-IPRATROPIUM (DUO-NEB) 2.5 MG-0.5 MG/3 ML NEBULIZER SOLUTION    Take 3 mLs by nebulization every 6 (six) hours as needed for Wheezing. Rescue    AMLODIPINE (NORVASC) 10 MG TABLET    Take 1 tablet (10 mg total) by mouth once daily.    ASCORBIC ACID, VITAMIN C, (VITAMIN C) 500 MG TABLET    Take 500 mg by mouth once daily.    BENZONATATE (TESSALON) 100 MG CAPSULE    Take 2 capsules (200 mg total) by mouth 3 (three) times daily as needed for Cough.    CALCIUM/MAGNESIUM/VIT B COMP (CALCIUM-MAGNESIUM-B COMPLEX ORAL)    Take 1 tablet by mouth once daily at 6am.    FLUTICASONE FUROATE-VILANTEROL (BREO ELLIPTA) 100-25 MCG/DOSE DISKUS INHALER    INHALE 1 PUFF INTO THE LUNGS ONCE DAILY    HYDROCHLOROTHIAZIDE (HYDRODIURIL) 12.5 MG TAB    Take 1 tablet (12.5 mg total) by mouth once daily.    IPRATROPIUM (ATROVENT) 0.02 % NEBULIZER SOLUTION    Take 2.5 mLs (500 mcg total) by nebulization 4 (four) times  daily. Rescue    LEVOCETIRIZINE (XYZAL) 5 MG TABLET    TAKE 1 TABLET(5 MG) BY MOUTH EVERY DAY    METOLAZONE (ZAROXOLYN) 2.5 MG TABLET    Take 1 tablet (2.5 mg total) by mouth every Mon, Wed, Fri.    MV-MINERALS/FA/OMEGA 3,6,9 #3 (WOMEN'S 50+ ADVANCED ORAL)    Take 1 tablet by mouth Daily.    NINTEDANIB (OFEV) 100 MG CAP    Take 100 mg by mouth 2 (two) times daily.    OMEGA-3S/DHA/EPA/FISH OIL/D3 (VITAMIN-D + OMEGA-3 ORAL)    Take 2 tablets by mouth once daily at 6am.    PANTOPRAZOLE (PROTONIX) 40 MG TABLET    Take 1 tablet (40 mg total) by mouth once daily.    PREDNISONE (DELTASONE) 10 MG TABLET    Take 1 tablet (10 mg total) by mouth once daily.    PREDNISONE (DELTASONE) 10 MG TABLET    Take 2 tablets (20 mg total) by mouth once daily.    SERTRALINE (ZOLOFT) 50 MG TABLET    Take 1 tablet (50 mg total) by mouth once daily.    VITAMIN D (VITAMIN D3) 1000 UNITS TAB    Take 1,000 Units by mouth once daily.   Modified Medications    No medications on file   Discontinued Medications    No medications on file       Review of Systems   Constitutional: Negative.    HENT: Negative.     Eyes: Negative.    Respiratory:  Positive for shortness of breath.    Cardiovascular:  Positive for chest pain.   Gastrointestinal: Negative.    Genitourinary: Negative.    Musculoskeletal: Negative.    Skin: Negative.    Neurological: Negative.    Endo/Heme/Allergies: Negative.    Psychiatric/Behavioral: Negative.     All 12 systems otherwise negative.    Wt Readings from Last 3 Encounters:   04/26/23 106.8 kg (235 lb 7.2 oz)   04/20/23 106 kg (233 lb 11 oz)   04/20/23 104.3 kg (230 lb)     Temp Readings from Last 3 Encounters:   04/20/23 97.1 °F (36.2 °C) (Tympanic)   04/05/23 99 °F (37.2 °C)   03/17/23 98.8 °F (37.1 °C) (Oral)     BP Readings from Last 3 Encounters:   04/26/23 114/76   04/20/23 112/86   04/20/23 115/78     Pulse Readings from Last 3 Encounters:   04/26/23 (!) 111   04/20/23 92   04/20/23 82       /76   Pulse (!) 111   " Ht 5' 6" (1.676 m)   Wt 106.8 kg (235 lb 7.2 oz)   SpO2 (!) 91%   BMI 38.00 kg/m²     Objective:   Physical Exam  Vitals and nursing note reviewed.   Constitutional:       General: She is not in acute distress.     Appearance: She is well-developed. She is obese. She is not diaphoretic.   HENT:      Head: Normocephalic and atraumatic.      Nose: Nose normal.   Eyes:      General: No scleral icterus.     Conjunctiva/sclera: Conjunctivae normal.   Neck:      Thyroid: No thyromegaly.      Vascular: No JVD.   Cardiovascular:      Rate and Rhythm: Normal rate and regular rhythm.      Heart sounds: S1 normal and S2 normal. No murmur heard.    No friction rub. No gallop. No S3 or S4 sounds.   Pulmonary:      Effort: Pulmonary effort is normal. No respiratory distress.      Breath sounds: No stridor. Rales present. No wheezing.   Chest:      Chest wall: No tenderness.   Abdominal:      General: Bowel sounds are normal. There is no distension.      Palpations: Abdomen is soft. There is no mass.      Tenderness: There is no abdominal tenderness. There is no rebound.   Genitourinary:     Comments: Deferred  Musculoskeletal:         General: No tenderness or deformity. Normal range of motion.      Cervical back: Normal range of motion and neck supple.   Lymphadenopathy:      Cervical: No cervical adenopathy.   Skin:     General: Skin is warm and dry.      Coloration: Skin is not pale.      Findings: No erythema or rash.   Neurological:      Mental Status: She is alert and oriented to person, place, and time.      Motor: No abnormal muscle tone.      Coordination: Coordination normal.   Psychiatric:         Behavior: Behavior normal.         Thought Content: Thought content normal.         Judgment: Judgment normal.       Lab Results   Component Value Date     03/16/2023    K 4.2 03/16/2023     03/16/2023    CO2 28 03/16/2023    BUN 12 03/16/2023    CREATININE 0.9 03/16/2023    GLU 67 (L) 03/16/2023    HGBA1C " 5.1 11/01/2022    MG 2.2 02/25/2014    AST 28 03/16/2023    ALT 27 03/16/2023    ALBUMIN 3.2 (L) 03/16/2023    PROT 7.6 03/16/2023    BILITOT 0.2 03/16/2023    WBC 16.45 (H) 03/16/2023    HGB 10.1 (L) 03/16/2023    HCT 33.6 (L) 03/16/2023    MCV 83 03/16/2023     (H) 03/16/2023    INR 1.0 04/27/2022    TSH 0.854 04/28/2022    CHOL 228 (H) 11/01/2022    HDL 37 (L) 11/01/2022    LDLCALC 171.8 (H) 11/01/2022    LDLCALC 134 (H) 02/01/2017    TRIG 96 11/01/2022    BNP 12 03/16/2023     Assessment:      1. Primary hypertension    2. Gastroesophageal reflux disease without esophagitis    3. GLENN on CPAP    4. Chronic respiratory failure with hypoxia    5. Restrictive lung disease    6. Chest pain, unspecified type    7. Severe obesity    8. Mild intermittent asthma without complication            Plan:     Abnormal EKG   - stress echo - neg 4/2022    HTN  Stable  Continue HCTZ, amlodipine   Obtain lipid panel    Obesity BMI 35-39.9  Low-salt, low-fat diet   Exercise as tolerated, at least 30 minutes daily  Seated exercises    GERD  - taking protonix     Asthma with ILD  - continue therapy  - consider rechecking IgE levels   - f/jackie pulm     GLENN  - intermittent use of CPAP  - f/u with pulm    Chest pain, atypical  - sec to inflammation or GERD - f/u rheum and pulm  - stable     Reviewed all labs     Rtc 6 months

## 2023-04-28 ENCOUNTER — PATIENT MESSAGE (OUTPATIENT)
Dept: OTOLARYNGOLOGY | Facility: CLINIC | Age: 53
End: 2023-04-28
Payer: COMMERCIAL

## 2023-04-28 ENCOUNTER — PATIENT MESSAGE (OUTPATIENT)
Dept: PULMONOLOGY | Facility: CLINIC | Age: 53
End: 2023-04-28
Payer: COMMERCIAL

## 2023-04-28 ENCOUNTER — TELEPHONE (OUTPATIENT)
Dept: OTOLARYNGOLOGY | Facility: CLINIC | Age: 53
End: 2023-04-28
Payer: COMMERCIAL

## 2023-04-28 DIAGNOSIS — J38.7 LARYNGEAL ULCERATION: Primary | ICD-10-CM

## 2023-05-01 ENCOUNTER — OFFICE VISIT (OUTPATIENT)
Dept: PULMONOLOGY | Facility: CLINIC | Age: 53
End: 2023-05-01
Payer: COMMERCIAL

## 2023-05-01 ENCOUNTER — PATIENT MESSAGE (OUTPATIENT)
Dept: OTOLARYNGOLOGY | Facility: CLINIC | Age: 53
End: 2023-05-01
Payer: COMMERCIAL

## 2023-05-01 ENCOUNTER — HOSPITAL ENCOUNTER (OUTPATIENT)
Dept: RADIOLOGY | Facility: HOSPITAL | Age: 53
Discharge: HOME | End: 2023-05-01
Attending: INTERNAL MEDICINE
Payer: COMMERCIAL

## 2023-05-01 ENCOUNTER — PATIENT MESSAGE (OUTPATIENT)
Dept: PULMONOLOGY | Facility: CLINIC | Age: 53
End: 2023-05-01

## 2023-05-01 ENCOUNTER — TELEPHONE (OUTPATIENT)
Dept: PULMONOLOGY | Facility: CLINIC | Age: 53
End: 2023-05-01
Payer: COMMERCIAL

## 2023-05-01 VITALS
SYSTOLIC BLOOD PRESSURE: 118 MMHG | BODY MASS INDEX: 37.45 KG/M2 | OXYGEN SATURATION: 94 % | RESPIRATION RATE: 19 BRPM | HEART RATE: 107 BPM | DIASTOLIC BLOOD PRESSURE: 74 MMHG | HEIGHT: 66 IN | WEIGHT: 233 LBS

## 2023-05-01 DIAGNOSIS — J84.112 UIP (USUAL INTERSTITIAL PNEUMONITIS): ICD-10-CM

## 2023-05-01 DIAGNOSIS — J45.20 MILD INTERMITTENT ASTHMA WITHOUT COMPLICATION: ICD-10-CM

## 2023-05-01 DIAGNOSIS — M05.7A RHEUMATOID ARTHRITIS OF OTHER SITE WITH POSITIVE RHEUMATOID FACTOR: ICD-10-CM

## 2023-05-01 DIAGNOSIS — J84.9 INTERSTITIAL LUNG DISEASE: ICD-10-CM

## 2023-05-01 DIAGNOSIS — J98.4 RESTRICTIVE LUNG DISEASE: ICD-10-CM

## 2023-05-01 DIAGNOSIS — J84.111 CHRONIC INTERSTITIAL PNEUMONIA: Primary | ICD-10-CM

## 2023-05-01 DIAGNOSIS — G47.33 OSA ON CPAP: ICD-10-CM

## 2023-05-01 DIAGNOSIS — R09.02 EXERCISE HYPOXEMIA: ICD-10-CM

## 2023-05-01 DIAGNOSIS — J45.909 ASTHMA IN ADULT, UNSPECIFIED ASTHMA SEVERITY, UNSPECIFIED WHETHER COMPLICATED, UNSPECIFIED WHETHER PERSISTENT: ICD-10-CM

## 2023-05-01 DIAGNOSIS — J84.9 INTERSTITIAL LUNG DISEASE: Primary | ICD-10-CM

## 2023-05-01 DIAGNOSIS — J96.10 CHRONIC RESPIRATORY FAILURE, UNSPECIFIED WHETHER WITH HYPOXIA OR HYPERCAPNIA: ICD-10-CM

## 2023-05-01 LAB
FUNGUS SPEC CULT: NORMAL
FUNGUS SPEC CULT: NORMAL

## 2023-05-01 PROCEDURE — 3074F PR MOST RECENT SYSTOLIC BLOOD PRESSURE < 130 MM HG: ICD-10-PCS | Mod: CPTII,S$GLB,, | Performed by: INTERNAL MEDICINE

## 2023-05-01 PROCEDURE — 1160F PR REVIEW ALL MEDS BY PRESCRIBER/CLIN PHARMACIST DOCUMENTED: ICD-10-PCS | Mod: CPTII,S$GLB,, | Performed by: INTERNAL MEDICINE

## 2023-05-01 PROCEDURE — 99214 OFFICE O/P EST MOD 30 MIN: CPT | Mod: S$GLB,,, | Performed by: INTERNAL MEDICINE

## 2023-05-01 PROCEDURE — 3074F SYST BP LT 130 MM HG: CPT | Mod: CPTII,S$GLB,, | Performed by: INTERNAL MEDICINE

## 2023-05-01 PROCEDURE — 71046 X-RAY EXAM CHEST 2 VIEWS: CPT | Mod: 26,,, | Performed by: RADIOLOGY

## 2023-05-01 PROCEDURE — 99214 PR OFFICE/OUTPT VISIT, EST, LEVL IV, 30-39 MIN: ICD-10-PCS | Mod: S$GLB,,, | Performed by: INTERNAL MEDICINE

## 2023-05-01 PROCEDURE — 71046 XR CHEST PA AND LATERAL: ICD-10-PCS | Mod: 26,,, | Performed by: RADIOLOGY

## 2023-05-01 PROCEDURE — 3008F BODY MASS INDEX DOCD: CPT | Mod: CPTII,S$GLB,, | Performed by: INTERNAL MEDICINE

## 2023-05-01 PROCEDURE — 1159F MED LIST DOCD IN RCRD: CPT | Mod: CPTII,S$GLB,, | Performed by: INTERNAL MEDICINE

## 2023-05-01 PROCEDURE — 3078F DIAST BP <80 MM HG: CPT | Mod: CPTII,S$GLB,, | Performed by: INTERNAL MEDICINE

## 2023-05-01 PROCEDURE — 99999 PR PBB SHADOW E&M-EST. PATIENT-LVL V: ICD-10-PCS | Mod: PBBFAC,,, | Performed by: INTERNAL MEDICINE

## 2023-05-01 PROCEDURE — 1159F PR MEDICATION LIST DOCUMENTED IN MEDICAL RECORD: ICD-10-PCS | Mod: CPTII,S$GLB,, | Performed by: INTERNAL MEDICINE

## 2023-05-01 PROCEDURE — 1160F RVW MEDS BY RX/DR IN RCRD: CPT | Mod: CPTII,S$GLB,, | Performed by: INTERNAL MEDICINE

## 2023-05-01 PROCEDURE — 71046 X-RAY EXAM CHEST 2 VIEWS: CPT | Mod: TC

## 2023-05-01 PROCEDURE — 99999 PR PBB SHADOW E&M-EST. PATIENT-LVL V: CPT | Mod: PBBFAC,,, | Performed by: INTERNAL MEDICINE

## 2023-05-01 PROCEDURE — 3078F PR MOST RECENT DIASTOLIC BLOOD PRESSURE < 80 MM HG: ICD-10-PCS | Mod: CPTII,S$GLB,, | Performed by: INTERNAL MEDICINE

## 2023-05-01 PROCEDURE — 3008F PR BODY MASS INDEX (BMI) DOCUMENTED: ICD-10-PCS | Mod: CPTII,S$GLB,, | Performed by: INTERNAL MEDICINE

## 2023-05-01 RX ORDER — MONTELUKAST SODIUM 10 MG/1
10 TABLET ORAL NIGHTLY
Qty: 30 TABLET | Refills: 11 | Status: SHIPPED | OUTPATIENT
Start: 2023-05-01 | End: 2023-09-12

## 2023-05-01 RX ORDER — FLUTICASONE FUROATE, UMECLIDINIUM BROMIDE AND VILANTEROL TRIFENATATE 200; 62.5; 25 UG/1; UG/1; UG/1
1 POWDER RESPIRATORY (INHALATION) DAILY
Qty: 60 EACH | Refills: 11 | Status: SHIPPED | OUTPATIENT
Start: 2023-05-01 | End: 2023-08-07

## 2023-05-01 RX ORDER — BENZONATATE 200 MG/1
200 CAPSULE ORAL 3 TIMES DAILY PRN
Qty: 90 CAPSULE | Refills: 11 | Status: ON HOLD | OUTPATIENT
Start: 2023-05-01 | End: 2023-08-05 | Stop reason: HOSPADM

## 2023-05-01 NOTE — PROGRESS NOTES
Pulmonary Outpatient  Visit     Subjective:       Patient ID: Ayanna Alicia is a 52 y.o. female.    Chief Complaint: Cough, Asthma, Interstitial Lung Disease, and Shortness of Breath        Ayanna Alicia is 51 y.o.  Post hospital f/u  Acute respiratory failure ILD, +ve RF  Was discharged on oxygen  Here to review results  Explained  PFT: severe restriction and reduced DLCO  ABG  6MWD: oxygen desat needs supplementary oxygen 3 LPM  Has some nose bleed will add AYR gel and humifier bottle  FMLA paper work given  Out of work letter   Added PEPCID and Bactrim  Adherent with inhaler    05/18/2022  Here to see today  Concern, Dyspnea with activity palpitations  Seen Rheumatology: Added CELLCEPT  On steriod taper  No cough wheezing  On oxygen 3 LPM  Had GLENN in 2019: was prescribed CPAP returned back  Needs PAP at night  Motivated to restart      07/15/2022  Last seen 05/18/2022  ACT score 10, Fox score 6  CPAP study: CPAP ordered  Await   Says cannot go to work, tied, focus off  6MWD: RA sat was 95%  O2 titrated to 4-6 LPM  Tachycardia noted : 145 BPM    09/20/2022  Last seen 07/15/2022  Here to review progress  Enrolled in pul rehab, session 8  Sat Stable @ 2-3 LPM  ACt score 5, Fox 6  No cough, better exercise tolerance  Stable on cellcept 1000mg BID + prednisone 10 mg  Will DW Rheum whether can titrate up cellcept to wean steriods  CPAP download shows adherence   CPAP 9 cm with resudual AHI 0.2  Usage > 4 hrs was 67%  Cehst CT reviewed  Repeat PFT pending      12/28/2022  Last seen 11/07/2022  Here to review chest CT done recently  Fatigue, SOB, on 6 LPM  SP rituxan 4 doses  ABG reveiwed  ESR and CRP were increased  On Prednisone 10 mg  Hoarse voice  Discussed utility of bronchoscopy if infection is considered  TBBX would have risk of flaring and acute resp failure:  Serologies sent  Added OFEV  Will also reduce fluid intake to approx 32 oz  Will  reenrol on Pul rehab      03/07/2023  Urgent visit: weak, easily desat  Subjectively better  Seen Dr Stallings: Diflucan and bactrim  Oral thrush: not present today  No fever  On 6 LPM  Dropped weight from 241Lb to 228 lb  Ofev increased: felt better onofev  Will decrease prednisone to 10 mg since concern for PJP  Discussed referal to advance lung disease  Sputum culture  In wheel chair  Adherent with CPAP  Schedule bronch for BAL next week    05/01/2023  Follow up visit  Care from multiple specialities noted  ENT referred to Dr Koenig, 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  Bronch specimens were negative for any infection, No PCP or fungus  [facility] declined w/u for transplant due to BMI  DW patient: reenrol in pul rehab since feels better and options for out of state transplant: [place] may be considered  GI : GERD, and esophagram reveiwed: reiterated role on GERD in advanced lung disease: behavioural interventions since last 6 weeks helped  Stable on POC 6 LPM  Disucssed options for scooter  Will increase OFEV to 150 BID  Keep prednisone at 10 mg daily ( no need for bactrim prophylaxis at thiss dose)  ACT 10  Changed BREO to TRELEGY  Logan next visit  Cough improved  Refill for tessalon  Encouaged to wear CPAP      Asthma Control Test  In the past 4  weeks, how much of the time did your asthma keep you from getting as much done at work, school or at home?: Some of the time  During the past 4 weeks, how often have you had shortness of breath?: More than once a day  During the past 4 weeks, how often did your asthma symptoms (wheezing, couging, shortness of breath, chest tightness or pain) wake you up at night or earlier than usual in the morning?: Once a week  During the past 4 weeks, how often have you used your rescue inhaler or nebulizer medication (such as albuterol)?: 1 or 2 times per day  How would you rate your asthma control during the past 4 weeks?: Not controlled at all  If your  score is 19 or less, your asthma may not be under control: 10          MMRC Dyspnea Scale (4 is worst)     [] MMRC 0: Dyspneic on strenuous excercise (0 points)    [] MMRC 1: Dyspneic on walking a slight hill (0 points)    [x] MMRC 2: Dyspneic on walking level ground; must stop occasionally due to breathlessness (1 point)    [] MMRC 3: Must stop for breathlessness after walking 100 yards or after a few minutes (2 points)    [] MMRC 4: Cannot leave house; breathless on dressing/undressing (3 points)          EPWORTH SLEEPINESS SCALE 5/1/2023   Sitting and reading 0   Watching TV 1   Sitting, inactive in a public place (e.g. a theatre or a meeting) 0   As a passenger in a car for an hour without a break 0   Lying down to rest in the afternoon when circumstances permit 1   Sitting and talking to someone 0   Sitting quietly after a lunch without alcohol 0   In a car, while stopped for a few minutes in traffic 0   Total score 2           Asthma Control Test  In the past 4  weeks, how much of the time did your asthma keep you from getting as much done at work, school or at home?: Some of the time  During the past 4 weeks, how often have you had shortness of breath?: More than once a day  During the past 4 weeks, how often did your asthma symptoms (wheezing, couging, shortness of breath, chest tightness or pain) wake you up at night or earlier than usual in the morning?: Once a week  During the past 4 weeks, how often have you used your rescue inhaler or nebulizer medication (such as albuterol)?: 1 or 2 times per day  How would you rate your asthma control during the past 4 weeks?: Not controlled at all  If your score is 19 or less, your asthma may not be under control: 10        The following portions of the patient's history were reviewed and updated as appropriate:   She  has a past medical history of Abnormal Pap smear of cervix, Acute interstitial pneumonitis, Allergic rhinitis, cause unspecified, Arthritis of both  knees, Asthma, Eczema, Fatty liver (10/2014), Fibrocystic breast changes, Headache(784.0), Hepatomegaly (10/2014), Hypertension, Liver cyst (10/2014), Multinodular goiter, Polymenorrhea (), TMJ (dislocation of temporomandibular joint), Uterine fibroid, and Vitamin D deficiency disease.  She does not have any pertinent problems on file.  She  has a past surgical history that includes  section, classic; Multiple tooth extractions; Partial hysterectomy (2013); Hysterectomy; Colonoscopy (N/A, 2020); and Bronchoscopy (Bilateral, 2023).  Her family history includes Cancer (age of onset: 50) in her maternal aunt; Cancer (age of onset: 60) in her maternal grandmother; Cancer (age of onset: 66) in her maternal aunt; Cataracts in her cousin; Diabetes in her maternal grandfather; Diverticulitis in her mother; Heart disease in her mother; Heart disease (age of onset: 63) in her father; Hypertension in her father; Migraines in her cousin; No Known Problems in her brother; Peripheral vascular disease in her maternal grandfather; Stroke in her maternal grandfather, mother, and sister.  She  reports that she has never smoked. She has been exposed to tobacco smoke. She has never used smokeless tobacco. She reports that she does not currently use alcohol. She reports that she does not currently use drugs after having used the following drugs: Marijuana. Frequency: 4.00 times per week.  She has a current medication list which includes the following prescription(s): albuterol, albuterol-ipratropium, amlodipine, ascorbic acid (vitamin c), calcium/magnesium/vit b comp, hydrochlorothiazide, ipratropium, levocetirizine, metolazone, mv-minerals/fa/omega 3,6,9 #3, omega-3s/dha/epa/fish oil/d3, pantoprazole, prednisone, prednisone, sertraline, vitamin d, benzonatate, trelegy ellipta, montelukast, and nintedanib, and the following Facility-Administered Medications: sodium chloride 0.9%.  Current Outpatient  Medications on File Prior to Visit   Medication Sig Dispense Refill    albuterol (PROVENTIL/VENTOLIN HFA) 90 mcg/actuation inhaler INHALE 1 TO 2 PUFFS BY MOUTH INTO THE LUNGS EVERY 4 TO 6 HOURS AS NEEDED FOR WHEEZING OR SHORTNESS OF BREATH 8.5 g 5    albuterol-ipratropium (DUO-NEB) 2.5 mg-0.5 mg/3 mL nebulizer solution Take 3 mLs by nebulization every 6 (six) hours as needed for Wheezing. Rescue 90 mL 11    amLODIPine (NORVASC) 10 MG tablet Take 1 tablet (10 mg total) by mouth once daily. 90 tablet 1    ascorbic acid, vitamin C, (VITAMIN C) 500 MG tablet Take 500 mg by mouth once daily.      calcium/magnesium/vit B comp (CALCIUM-MAGNESIUM-B COMPLEX ORAL) Take 1 tablet by mouth once daily at 6am.      hydroCHLOROthiazide (HYDRODIURIL) 12.5 MG Tab Take 1 tablet (12.5 mg total) by mouth once daily. 90 tablet 1    ipratropium (ATROVENT) 0.02 % nebulizer solution Take 2.5 mLs (500 mcg total) by nebulization 4 (four) times daily. Rescue 300 mL 11    levocetirizine (XYZAL) 5 MG tablet TAKE 1 TABLET(5 MG) BY MOUTH EVERY DAY 90 tablet 1    metOLazone (ZAROXOLYN) 2.5 MG tablet Take 1 tablet (2.5 mg total) by mouth every Mon, Wed, Fri. 36 tablet 3    mv-minerals/FA/omega 3,6,9 #3 (WOMEN'S 50+ ADVANCED ORAL) Take 1 tablet by mouth Daily.      omega-3s/dha/epa/fish oil/D3 (VITAMIN-D + OMEGA-3 ORAL) Take 2 tablets by mouth once daily at 6am.      pantoprazole (PROTONIX) 40 MG tablet Take 1 tablet (40 mg total) by mouth once daily. 30 tablet 11    predniSONE (DELTASONE) 10 MG tablet Take 1 tablet (10 mg total) by mouth once daily. 30 tablet 3    predniSONE (DELTASONE) 10 MG tablet Take 2 tablets (20 mg total) by mouth once daily. 60 tablet 3    sertraline (ZOLOFT) 50 MG tablet Take 1 tablet (50 mg total) by mouth once daily. 90 tablet 3    vitamin D (VITAMIN D3) 1000 units Tab Take 1,000 Units by mouth once daily.      [DISCONTINUED] fluticasone furoate-vilanteroL (BREO ELLIPTA) 100-25 mcg/dose diskus inhaler INHALE 1 PUFF INTO  THE LUNGS ONCE DAILY 60 each 5    [DISCONTINUED] nintedanib (OFEV) 100 mg Cap Take 100 mg by mouth 2 (two) times daily. 120 capsule 11    [] benzonatate (TESSALON) 100 MG capsule Take 2 capsules (200 mg total) by mouth 3 (three) times daily as needed for Cough. 60 capsule 0     Current Facility-Administered Medications on File Prior to Visit   Medication Dose Route Frequency Provider Last Rate Last Admin    sodium chloride 0.9% flush 10 mL  10 mL Intravenous PRN Agustín Aviles MD         She is allergic to doxycycline, fluticasone, and penicillins..      Review of Systems   Constitutional:  Negative for activity change and fatigue.   Respiratory:  Positive for cough, shortness of breath and dyspnea on extertion.    Gastrointestinal:  Negative for acid reflux.   All other systems reviewed and are negative.    Outpatient Encounter Medications as of 2023   Medication Sig Dispense Refill    albuterol (PROVENTIL/VENTOLIN HFA) 90 mcg/actuation inhaler INHALE 1 TO 2 PUFFS BY MOUTH INTO THE LUNGS EVERY 4 TO 6 HOURS AS NEEDED FOR WHEEZING OR SHORTNESS OF BREATH 8.5 g 5    albuterol-ipratropium (DUO-NEB) 2.5 mg-0.5 mg/3 mL nebulizer solution Take 3 mLs by nebulization every 6 (six) hours as needed for Wheezing. Rescue 90 mL 11    amLODIPine (NORVASC) 10 MG tablet Take 1 tablet (10 mg total) by mouth once daily. 90 tablet 1    ascorbic acid, vitamin C, (VITAMIN C) 500 MG tablet Take 500 mg by mouth once daily.      calcium/magnesium/vit B comp (CALCIUM-MAGNESIUM-B COMPLEX ORAL) Take 1 tablet by mouth once daily at 6am.      hydroCHLOROthiazide (HYDRODIURIL) 12.5 MG Tab Take 1 tablet (12.5 mg total) by mouth once daily. 90 tablet 1    ipratropium (ATROVENT) 0.02 % nebulizer solution Take 2.5 mLs (500 mcg total) by nebulization 4 (four) times daily. Rescue 300 mL 11    levocetirizine (XYZAL) 5 MG tablet TAKE 1 TABLET(5 MG) BY MOUTH EVERY DAY 90 tablet 1    metOLazone (ZAROXOLYN) 2.5 MG tablet Take 1 tablet (2.5  mg total) by mouth every Mon, Wed, Fri. 36 tablet 3    mv-minerals/FA/omega 3,6,9 #3 (WOMEN'S 50+ ADVANCED ORAL) Take 1 tablet by mouth Daily.      omega-3s/dha/epa/fish oil/D3 (VITAMIN-D + OMEGA-3 ORAL) Take 2 tablets by mouth once daily at 6am.      pantoprazole (PROTONIX) 40 MG tablet Take 1 tablet (40 mg total) by mouth once daily. 30 tablet 11    predniSONE (DELTASONE) 10 MG tablet Take 1 tablet (10 mg total) by mouth once daily. 30 tablet 3    predniSONE (DELTASONE) 10 MG tablet Take 2 tablets (20 mg total) by mouth once daily. 60 tablet 3    sertraline (ZOLOFT) 50 MG tablet Take 1 tablet (50 mg total) by mouth once daily. 90 tablet 3    vitamin D (VITAMIN D3) 1000 units Tab Take 1,000 Units by mouth once daily.      [DISCONTINUED] fluticasone furoate-vilanteroL (BREO ELLIPTA) 100-25 mcg/dose diskus inhaler INHALE 1 PUFF INTO THE LUNGS ONCE DAILY 60 each 5    [DISCONTINUED] nintedanib (OFEV) 100 mg Cap Take 100 mg by mouth 2 (two) times daily. 120 capsule 11    [] benzonatate (TESSALON) 100 MG capsule Take 2 capsules (200 mg total) by mouth 3 (three) times daily as needed for Cough. 60 capsule 0    benzonatate (TESSALON) 200 MG capsule Take 1 capsule (200 mg total) by mouth 3 (three) times daily as needed for Cough. 90 capsule 11    [] dextromethorphan (DELSYM 12 HOUR) 30 mg/5 mL liquid Take 10 mLs (60 mg total) by mouth 2 (two) times daily as needed for Cough. 200 mL 0    fluticasone-umeclidin-vilanter (TRELEGY ELLIPTA) 200-62.5-25 mcg inhaler Inhale 1 puff into the lungs once daily. 60 each 11    montelukast (SINGULAIR) 10 mg tablet Take 1 tablet (10 mg total) by mouth every evening. 30 tablet 11    nintedanib (OFEV) 150 mg Cap Take 1 capsule (150 mg total) by mouth 2 (two) times a day. 60 capsule 11    [] sucralfate (CARAFATE) 1 gram tablet Take 1 tablet (1 g total) by mouth 4 (four) times daily. 120 tablet 6    [DISCONTINUED] fluticasone furoate-vilanteroL (BREO ELLIPTA) 100-25  "mcg/dose diskus inhaler INHALE 1 PUFF INTO THE LUNGS ONCE DAILY 60 each 5    [DISCONTINUED] predniSONE (DELTASONE) 10 MG tablet Take 1 tablet (10 mg total) by mouth once daily.       Facility-Administered Encounter Medications as of 5/1/2023   Medication Dose Route Frequency Provider Last Rate Last Admin    sodium chloride 0.9% flush 10 mL  10 mL Intravenous PRN Agustín Aviles MD           Objective:     Vital Signs (Most Recent)  Vital Signs  Pulse: 107  Resp: 19  SpO2: (!) 94 % (6 LPM)  BP: 118/74  Height and Weight  Height: 5' 6" (167.6 cm)  Weight: 105.7 kg (233 lb)  BSA (Calculated - sq m): 2.22 sq meters  BMI (Calculated): 37.6  Weight in (lb) to have BMI = 25: 154.6]  Wt Readings from Last 2 Encounters:   05/01/23 105.7 kg (233 lb)   04/26/23 106.8 kg (235 lb 7.2 oz)       Physical Exam   Constitutional: She is oriented to person, place, and time. She appears well-developed and well-nourished.   HENT:   Head: Normocephalic.   Mouth/Throat: Mallampati Score: II.   Neck: No JVD present.   Cardiovascular: Normal rate and intact distal pulses.   No murmur heard.  Pulmonary/Chest: Normal expansion and effort normal. She has decreased breath sounds. She has rhonchi. She has no rales.   Abdominal: Soft. Bowel sounds are normal.   Musculoskeletal:         General: No edema. Normal range of motion.      Cervical back: Normal range of motion and neck supple.   Lymphadenopathy:     She has no axillary adenopathy.   Neurological: She is alert and oriented to person, place, and time.   Skin: Skin is warm and dry. No cyanosis. Nails show no clubbing.   Psychiatric: She has a normal mood and affect.   Nursing note and vitals reviewed.    Laboratory  Lab Results   Component Value Date    WBC 16.45 (H) 03/16/2023    RBC 4.05 03/16/2023    HGB 10.1 (L) 03/16/2023    HCT 33.6 (L) 03/16/2023    MCV 83 03/16/2023    MCH 24.9 (L) 03/16/2023    MCHC 30.1 (L) 03/16/2023    RDW 18.2 (H) 03/16/2023     (H) 03/16/2023    " MPV 9.1 (L) 03/16/2023    GRAN 13.4 (H) 03/16/2023    GRAN 81.5 (H) 03/16/2023    LYMPH 1.6 03/16/2023    LYMPH 9.6 (L) 03/16/2023    MONO 1.0 03/16/2023    MONO 6.0 03/16/2023    EOS 0.2 03/16/2023    BASO 0.10 03/16/2023    EOSINOPHIL 1.2 03/16/2023    BASOPHIL 0.6 03/16/2023       BMP  Lab Results   Component Value Date     03/16/2023    K 4.2 03/16/2023     03/16/2023    CO2 28 03/16/2023    BUN 12 03/16/2023    CREATININE 0.9 03/16/2023    CALCIUM 9.8 03/16/2023    ANIONGAP 11 03/16/2023    ESTGFRAFRICA >60 07/20/2022    EGFRNONAA >60 07/20/2022    AST 28 03/16/2023    ALT 27 03/16/2023    PROT 7.6 03/16/2023       Lab Results   Component Value Date    BNP 12 03/16/2023    BNP <10 04/27/2022       Lab Results   Component Value Date    TSH 0.854 04/28/2022       Lab Results   Component Value Date    SEDRATE 110 (H) 11/01/2022       Lab Results   Component Value Date    CRP 54.2 (H) 11/01/2022     Lab Results   Component Value Date     (H) 05/02/2022        Lab Results   Component Value Date    ASPERGILLUS Not Detected 03/17/2023     No results found for: AFUMIGATUSCL     Lab Results   Component Value Date    ACE 22 04/28/2022        Diagnostic Results:  I have personally reviewed today the following studies:  Office Spirometry Results:             X-Ray Chest PA And Lateral  Narrative: EXAMINATION:  XR CHEST PA AND LATERAL    CLINICAL HISTORY:  Interstitial pulmonary disease, unspecified    TECHNIQUE:  PA and lateral views of the chest were performed.    COMPARISON:  03/16/2023    FINDINGS:  The cardiac and mediastinal silhouettes appear within normal limits.   Bilateral patchy airspace opacities are again noted, remaining most severe in the bilateral lung bases and left mid lung.  There is been no appreciable change from prior.  No definite pleural effusion appreciated.  No acute osseous findings demonstrated.  Impression: Unchanged appearance of the chest as above    Electronically signed  by: Vladislav Reddy MD  Date:    05/01/2023  Time:    11:16        No results for input(s): PH, PCO2, PO2, HCO3, POCSATURATED, BE in the last 72 hours.        Component      Latest Ref Rng & Units 4/5/2023 4/5/2023          11:20 AM 11:20 AM   Respiratory Culture        Normal respiratory iris   Gram Stain (Respiratory)       No organisms seen Rare WBC's   AFB Culture & Smear           AFB CULTURE STAIN           Fungus (Mycology) Culture           YUMI Prep             Component      Latest Ref Rng & Units 4/5/2023          11:20 AM   Respiratory Culture       No S aureus or Pseudomonas isolated.   Gram Stain (Respiratory)       <10 epithelial cells per low power field.   AFB Culture & Smear          AFB CULTURE STAIN          Fungus (Mycology) Culture          KOH Prep            Component      Latest Ref Rng & Units 4/5/2023          11:20 AM   Respiratory Culture          Gram Stain (Respiratory)          AFB Culture & Smear       Culture in progress   AFB CULTURE STAIN       No acid fast bacilli seen.   Fungus (Mycology) Culture       No fungus isolated after 4 weeks   KOH Prep       No yeast or fungal elements seen     Component      Latest Ref Rng & Units 4/5/2023          11:11 AM   Respiratory Culture       Normal respiratory iris   Gram Stain (Respiratory)       No WBC's or organisms seen   AFB Culture & Smear          AFB CULTURE STAIN          Fungus (Mycology) Culture          KOH Prep            Component      Latest Ref Rng & Units 4/5/2023          11:11 AM   Respiratory Culture       No S aureus or Pseudomonas isolated.   Gram Stain (Respiratory)       <10 epithelial cells per low power field.   AFB Culture & Smear          AFB CULTURE STAIN          Fungus (Mycology) Culture          KOH Prep            Component      Latest Ref Rng & Units 4/5/2023          11:11 AM   Respiratory Culture          Gram Stain (Respiratory)          AFB Culture & Smear       Culture in progress   AFB CULTURE  STAIN       No acid fast bacilli seen.   Fungus (Mycology) Culture       No fungus isolated after 4 weeks   KOH Prep       No yeast or fungal elements seen       Assessment/Plan:     Problem List Items Addressed This Visit       Rheumatoid arthritis with positive rheumatoid factor    GLENN on CPAP    Exercise hypoxemia    Mild intermittent asthma    Relevant Medications    montelukast (SINGULAIR) 10 mg tablet    Chronic interstitial pneumonia - Primary    Relevant Medications    benzonatate (TESSALON) 200 MG capsule    nintedanib (OFEV) 150 mg Cap    Other Relevant Orders    Ambulatory referral/consult to Pulmonary Rehab    Chronic respiratory failure    Relevant Medications    benzonatate (TESSALON) 200 MG capsule    Other Relevant Orders    Stress test, pulmonary    Spirometry with/without bronchodilator    Restrictive lung disease    Interstitial lung disease    Relevant Medications    nintedanib (OFEV) 150 mg Cap    Other Relevant Orders    Ambulatory referral/consult to Pulmonary Rehab    UIP (usual interstitial pneumonitis)    Relevant Medications    nintedanib (OFEV) 150 mg Cap    Other Relevant Orders    Stress test, pulmonary    Spirometry with/without bronchodilator    Ambulatory referral/consult to Pulmonary Rehab    BMI 38.0-38.9,adult     Other Visit Diagnoses       Asthma in adult, unspecified asthma severity, unspecified whether complicated, unspecified whether persistent        Relevant Medications    fluticasone-umeclidin-vilanter (TRELEGY ELLIPTA) 200-62.5-25 mcg inhaler                Increased OFEV  Reduced prednisone to 10 mg   Pul rehab            Follow up in about 3 months (around 8/1/2023), or increase OFEV to 150 mg, pred 10 mg, DC bactrim, Logan, walk, Pul rehab.    This note was prepared using voice recognition system and is likely to have sound alike errors that may have been overlooked even after proof reading.  Please call me with any questions    Discussed diagnosis, its evaluation,  treatment and usual course. All questions answered.      Agustín Aviles MD     Requested Prescriptions     Signed Prescriptions Disp Refills    benzonatate (TESSALON) 200 MG capsule 90 capsule 11     Sig: Take 1 capsule (200 mg total) by mouth 3 (three) times daily as needed for Cough.    nintedanib (OFEV) 150 mg Cap 60 capsule 11     Sig: Take 1 capsule (150 mg total) by mouth 2 (two) times a day.    montelukast (SINGULAIR) 10 mg tablet 30 tablet 11     Sig: Take 1 tablet (10 mg total) by mouth every evening.    fluticasone-umeclidin-vilanter (TRELEGY ELLIPTA) 200-62.5-25 mcg inhaler 60 each 11     Sig: Inhale 1 puff into the lungs once daily.

## 2023-05-02 ENCOUNTER — TELEPHONE (OUTPATIENT)
Dept: SLEEP MEDICINE | Facility: CLINIC | Age: 53
End: 2023-05-02
Payer: COMMERCIAL

## 2023-05-02 ENCOUNTER — TELEPHONE (OUTPATIENT)
Dept: PHARMACY | Facility: CLINIC | Age: 53
End: 2023-05-02
Payer: COMMERCIAL

## 2023-05-02 NOTE — TELEPHONE ENCOUNTER
----- Message from Maria De Jesus Curiel, CRT sent at 5/2/2023  8:36 AM CDT -----  Hey Let me reach out to our [employer] [employer]. He works out of there and they would probably have it on file. I see an order for the scooter was put in yesterday at the store.      ----- Message -----  From: Agustín Aviles MD  Sent: 5/1/2023  12:06 PM CDT  To: Maria De Jesus Curiel, CRT    Hi, Did a jermaine? [facility] do scooter eval on her, dont see paper work

## 2023-05-02 NOTE — TELEPHONE ENCOUNTER
Samra, this is Berto Lucio, clinical pharmacist with [facility] that is part of your care team.  We have begun working on your prescription that your doctor has sent us. Our next steps include:     Working with your insurance company to obtain approval for your medication  Working with you to ensure your medication is affordable     We will be calling you along the way with updates on your medication but if you have any concerns or receive information that you would like to discuss please reach us at [phone].    Welcome call outcome: Left voicemail    Calling patient to inquire if she is still using [facility] for OFEV. Awaiting routing

## 2023-05-02 NOTE — TELEPHONE ENCOUNTER
Incoming call regarding Return call. Pt stated she does fill at O[facility]v with [facility]. Pt also stated the provider increased her dose of the Ofev.

## 2023-05-03 ENCOUNTER — SPECIALTY PHARMACY (OUTPATIENT)
Dept: PHARMACY | Facility: CLINIC | Age: 53
End: 2023-05-03
Payer: COMMERCIAL

## 2023-05-03 ENCOUNTER — TELEPHONE (OUTPATIENT)
Dept: PHARMACY | Facility: CLINIC | Age: 53
End: 2023-05-03
Payer: COMMERCIAL

## 2023-05-03 NOTE — TELEPHONE ENCOUNTER
Samra, this is Berto Lucio, clinical pharmacist with [facility] that is part of your care team.  We have begun working on your prescription that your doctor has sent us. Our next steps include:     Working with your insurance company to obtain approval for your medication  Working with you to ensure your medication is affordable     We will be calling you along the way with updates on your medication but if you have any concerns or receive information that you would like to discuss please reach us at [phone].    Welcome call outcome: Patient/caregiver reached

## 2023-05-03 NOTE — TELEPHONE ENCOUNTER
Rx received for OFEV 150mg - Benefits complete, no PA needed, $80 copay, patient requested medication be sent to [facility]. Closing enrollment

## 2023-05-07 ENCOUNTER — PATIENT MESSAGE (OUTPATIENT)
Dept: RHEUMATOLOGY | Facility: CLINIC | Age: 53
End: 2023-05-07
Payer: COMMERCIAL

## 2023-05-08 ENCOUNTER — PATIENT MESSAGE (OUTPATIENT)
Dept: PULMONOLOGY | Facility: CLINIC | Age: 53
End: 2023-05-08
Payer: COMMERCIAL

## 2023-05-10 ENCOUNTER — PATIENT MESSAGE (OUTPATIENT)
Dept: PULMONOLOGY | Facility: CLINIC | Age: 53
End: 2023-05-10
Payer: COMMERCIAL

## 2023-05-10 ENCOUNTER — HOSPITAL ENCOUNTER (OUTPATIENT)
Dept: RADIOLOGY | Facility: HOSPITAL | Age: 53
Discharge: HOME | End: 2023-05-10
Attending: INTERNAL MEDICINE
Payer: COMMERCIAL

## 2023-05-10 DIAGNOSIS — J45.909 ASTHMA IN ADULT, UNSPECIFIED ASTHMA SEVERITY, UNSPECIFIED WHETHER COMPLICATED, UNSPECIFIED WHETHER PERSISTENT: ICD-10-CM

## 2023-05-10 DIAGNOSIS — J84.9 INTERSTITIAL PULMONARY DISEASE, UNSPECIFIED: ICD-10-CM

## 2023-05-10 DIAGNOSIS — Z79.899 HIGH RISK MEDICATION USE: ICD-10-CM

## 2023-05-10 DIAGNOSIS — Z79.60 LONG-TERM USE OF IMMUNOSUPPRESSANT MEDICATION: ICD-10-CM

## 2023-05-10 PROCEDURE — 71250 CT THORAX DX C-: CPT | Mod: 26,,, | Performed by: RADIOLOGY

## 2023-05-10 PROCEDURE — 71250 CT THORAX DX C-: CPT | Mod: TC

## 2023-05-10 PROCEDURE — 71250 CT CHEST WITHOUT CONTRAST: ICD-10-PCS | Mod: 26,,, | Performed by: RADIOLOGY

## 2023-05-10 RX ORDER — ALBUTEROL SULFATE 90 UG/1
AEROSOL, METERED RESPIRATORY (INHALATION)
Qty: 8.5 G | Refills: 5 | Status: SHIPPED | OUTPATIENT
Start: 2023-05-10 | End: 2023-06-28 | Stop reason: SDUPTHER

## 2023-05-12 ENCOUNTER — PATIENT MESSAGE (OUTPATIENT)
Dept: PULMONOLOGY | Facility: CLINIC | Age: 53
End: 2023-05-12
Payer: COMMERCIAL

## 2023-05-12 ENCOUNTER — PATIENT MESSAGE (OUTPATIENT)
Dept: RHEUMATOLOGY | Facility: CLINIC | Age: 53
End: 2023-05-12
Payer: COMMERCIAL

## 2023-05-15 ENCOUNTER — PATIENT MESSAGE (OUTPATIENT)
Dept: RHEUMATOLOGY | Facility: CLINIC | Age: 53
End: 2023-05-15
Payer: COMMERCIAL

## 2023-05-15 ENCOUNTER — PATIENT MESSAGE (OUTPATIENT)
Dept: PULMONOLOGY | Facility: CLINIC | Age: 53
End: 2023-05-15
Payer: COMMERCIAL

## 2023-05-15 NOTE — TELEPHONE ENCOUNTER
"Dr. Lewis, Mrs. Urena would like to try Hayley Lafleur (Allye), [facility]  Rheumatology Department   "

## 2023-05-17 ENCOUNTER — PATIENT MESSAGE (OUTPATIENT)
Dept: PULMONOLOGY | Facility: CLINIC | Age: 53
End: 2023-05-17
Payer: COMMERCIAL

## 2023-05-18 ENCOUNTER — TELEPHONE (OUTPATIENT)
Dept: SLEEP MEDICINE | Facility: CLINIC | Age: 53
End: 2023-05-18
Payer: COMMERCIAL

## 2023-05-18 ENCOUNTER — PATIENT MESSAGE (OUTPATIENT)
Dept: PULMONOLOGY | Facility: CLINIC | Age: 53
End: 2023-05-18
Payer: COMMERCIAL

## 2023-05-18 ENCOUNTER — TELEPHONE (OUTPATIENT)
Dept: INFUSION THERAPY | Facility: HOSPITAL | Age: 53
End: 2023-05-18
Payer: COMMERCIAL

## 2023-05-18 PROBLEM — M05.9 SEROPOSITIVE RHEUMATOID ARTHRITIS: Status: ACTIVE | Noted: 2023-05-18

## 2023-05-18 RX ORDER — ACETAMINOPHEN 325 MG/1
650 TABLET ORAL
Status: CANCELLED | OUTPATIENT
Start: 2023-06-09

## 2023-05-18 RX ORDER — SODIUM CHLORIDE 0.9 % (FLUSH) 0.9 %
10 SYRINGE (ML) INJECTION
Status: CANCELLED | OUTPATIENT
Start: 2023-06-09

## 2023-05-18 RX ORDER — HEPARIN 100 UNIT/ML
500 SYRINGE INTRAVENOUS
Status: CANCELLED | OUTPATIENT
Start: 2023-06-09

## 2023-05-18 NOTE — TELEPHONE ENCOUNTER
Several calls left message  For P2P regarding Pul rehab      Reviewed most recent chest CT unchanged  DW Patient    CT Chest Without Contrast  Narrative: CT CHEST WITHOUT CONTRAST    CLINICAL HISTORY:  Interstitial lung disease;monitor response to therapy/disease progression;Interstitial pulmonary disease, unspecified.  Rheumatoid arthritis.    TECHNIQUE:  High-resolution CT chest performed consisting of inspiratory expiratory and prone imaging.  With or without    COMPARISON:  03/17/2023    FINDINGS:  Subpleural ground-glass opacities at the right upper lobe of not changed significantly in comparison the prior examination.  Subpleural reticular changes noted.  Additional severe ground-glass opacities throughout the right middle lobe and right lower lobe with reticular changes and subpleural bands.  Findings involve both the central and peripheral interstitium.  Varicose bronchiectasis noted within the right middle and right lower lobes, unchanged compared to the previous exam.  No honeycombing detected.    Mild subpleural ground-glass opacities left upper lobe with predominant subpleural reticular change.  Ground-glass opacities throughout the lingula and left lower lobe with reticular changes and subpleural bands.  Varicose bronchiectasis noted within the left lower lobe and left upper lobe.  Minimal bronchiectasis within the lingula.  Findings have also not changed significantly in comparison the previous examination.  No significant air trapping noted on expiratory imaging.    Heart enlarged.  Aorta normal.  Negative for adenopathy throughout the chest.    Multinodular goiter, unchanged compared to the previous exam.  Visualized upper abdomen with probable cyst left lobe liver measuring 8.5 mm, unchanged.  Additional probable cyst within the left lobe measures 1.7 cm and is also unchanged.    Chest wall soft tissues are normal.  Degenerative changes of the spine.  No suspicious osseous lesions.  Impression:  Severe interstitial pneumonitis with varicose bronchiectasis bilaterally.  Overall no significant interval change since 03/17/2023.    03/17/2023    Electronically signed by: Brien Mosqueda  Date:    05/10/2023  Time:    12:34

## 2023-05-18 NOTE — TELEPHONE ENCOUNTER
----- Message from Chicho Lewis MD sent at 5/18/2023  9:47 AM CDT -----  Actemra IV recommended at 4mg/kg q4 weeks, 6 months after RTX last given.

## 2023-05-18 NOTE — TELEPHONE ENCOUNTER
Discussed the actemra infusion protocol. Pt's first dose scheduled 6/9/23 pending ins. Approval. Last dose of rituxan was on 12/09/22.

## 2023-05-18 NOTE — TELEPHONE ENCOUNTER
Spoke with reviewer [employer]  Will need appeal   Will have staff forward to me denial letter to draft appeal

## 2023-05-19 ENCOUNTER — PATIENT MESSAGE (OUTPATIENT)
Dept: PULMONOLOGY | Facility: CLINIC | Age: 53
End: 2023-05-19
Payer: COMMERCIAL

## 2023-05-23 ENCOUNTER — PATIENT MESSAGE (OUTPATIENT)
Dept: PULMONOLOGY | Facility: CLINIC | Age: 53
End: 2023-05-23
Payer: COMMERCIAL

## 2023-05-23 DIAGNOSIS — J84.112 UIP (USUAL INTERSTITIAL PNEUMONITIS): ICD-10-CM

## 2023-05-23 DIAGNOSIS — J84.9 INTERSTITIAL LUNG DISEASE: ICD-10-CM

## 2023-05-23 DIAGNOSIS — J84.111 CHRONIC INTERSTITIAL PNEUMONIA: ICD-10-CM

## 2023-05-23 NOTE — TELEPHONE ENCOUNTER
Requested Prescriptions     Signed Prescriptions Disp Refills    nintedanib (OFEV) 150 mg Cap 60 capsule 11     Sig: Take 1 capsule (150 mg total) by mouth 2 (two) times a day.     Authorizing Provider: DANE BARRY

## 2023-05-24 ENCOUNTER — PATIENT MESSAGE (OUTPATIENT)
Dept: INFUSION THERAPY | Facility: HOSPITAL | Age: 53
End: 2023-05-24
Payer: COMMERCIAL

## 2023-05-25 LAB
ACID FAST MOD KINY STN SPEC: NORMAL
ACID FAST MOD KINY STN SPEC: NORMAL
MYCOBACTERIUM SPEC QL CULT: NORMAL
MYCOBACTERIUM SPEC QL CULT: NORMAL

## 2023-05-29 ENCOUNTER — PATIENT MESSAGE (OUTPATIENT)
Dept: PULMONOLOGY | Facility: CLINIC | Age: 53
End: 2023-05-29
Payer: COMMERCIAL

## 2023-05-29 DIAGNOSIS — J84.111 CHRONIC INTERSTITIAL PNEUMONIA: Primary | ICD-10-CM

## 2023-05-29 DIAGNOSIS — J84.9 INTERSTITIAL PULMONARY DISEASE, UNSPECIFIED: ICD-10-CM

## 2023-05-29 DIAGNOSIS — J98.4 RESTRICTIVE LUNG DISEASE: ICD-10-CM

## 2023-05-29 NOTE — TELEPHONE ENCOUNTER
Orders Placed This Encounter   Procedures    Ambulatory referral/consult to Pulmonology     Standing Status:   Future     Standing Expiration Date:   6/29/2024     Referral Priority:   Routine     Referral Type:   Consultation     Referral Reason:   Specialty Services Required     Referred to Provider:   Too Ashton MD     Requested Specialty:   Pulmonary Disease     Number of Visits Requested:   1

## 2023-05-30 ENCOUNTER — TELEPHONE (OUTPATIENT)
Dept: PULMONOLOGY | Facility: CLINIC | Age: 53
End: 2023-05-30
Payer: COMMERCIAL

## 2023-05-30 NOTE — TELEPHONE ENCOUNTER
Called and spoke with Vandana @ [facility] LUNG TRANSPLANT REFERRAL CRITERIA. [phone]. She stated to go ahead and fax the recommended records to [phone]. This was done (51 pages). Fax was confirmed. Vandana also stated for the patient to answer the call from them in 2-3 days for the follow up questions to schedule the next step in the procedure. They will only attempt to contact her 3 times then the referral will be cancelled. We will call the patient and let her know.

## 2023-06-01 ENCOUNTER — PATIENT MESSAGE (OUTPATIENT)
Dept: PULMONOLOGY | Facility: CLINIC | Age: 53
End: 2023-06-01
Payer: COMMERCIAL

## 2023-06-01 ENCOUNTER — TELEPHONE (OUTPATIENT)
Dept: SLEEP MEDICINE | Facility: CLINIC | Age: 53
End: 2023-06-01
Payer: COMMERCIAL

## 2023-06-01 DIAGNOSIS — K52.1 DIARRHEA DUE TO DRUG: Primary | ICD-10-CM

## 2023-06-01 DIAGNOSIS — J84.112 UIP (USUAL INTERSTITIAL PNEUMONITIS): ICD-10-CM

## 2023-06-01 DIAGNOSIS — J84.9 INTERSTITIAL PULMONARY DISEASE, UNSPECIFIED: ICD-10-CM

## 2023-06-01 RX ORDER — LOPERAMIDE HYDROCHLORIDE 2 MG/1
2 CAPSULE ORAL DAILY PRN
Qty: 90 CAPSULE | Refills: 0 | Status: SHIPPED | OUTPATIENT
Start: 2023-06-01 | End: 2023-08-29

## 2023-06-01 NOTE — TELEPHONE ENCOUNTER
Lft    Spoke with patient    Await carson Husain   ALSO referal to \Dr MCCURDY  HAVING di marianna  Added imodium    Requested Prescriptions     Signed Prescriptions Disp Refills    loperamide (IMODIUM) 2 mg capsule 90 capsule 0     Sig: Take 1 capsule (2 mg total) by mouth daily as needed for Diarrhea.     Authorizing Provider: DANE BARRY        Orders Placed This Encounter   Procedures    Hepatic Function Panel     Standing Status:   Future     Standing Expiration Date:   7/30/2024

## 2023-06-09 ENCOUNTER — LAB VISIT (OUTPATIENT)
Dept: LAB | Facility: HOSPITAL | Age: 53
End: 2023-06-09
Attending: FAMILY MEDICINE
Payer: COMMERCIAL

## 2023-06-09 ENCOUNTER — INFUSION (OUTPATIENT)
Dept: INFUSION THERAPY | Facility: HOSPITAL | Age: 53
End: 2023-06-09
Attending: INTERNAL MEDICINE
Payer: COMMERCIAL

## 2023-06-09 VITALS
WEIGHT: 220.69 LBS | TEMPERATURE: 98 F | DIASTOLIC BLOOD PRESSURE: 75 MMHG | RESPIRATION RATE: 18 BRPM | BODY MASS INDEX: 35.62 KG/M2 | SYSTOLIC BLOOD PRESSURE: 115 MMHG | OXYGEN SATURATION: 98 % | HEART RATE: 88 BPM

## 2023-06-09 DIAGNOSIS — R74.8 ELEVATED LIVER ENZYMES: ICD-10-CM

## 2023-06-09 DIAGNOSIS — M05.9 SEROPOSITIVE RHEUMATOID ARTHRITIS: Primary | ICD-10-CM

## 2023-06-09 DIAGNOSIS — J84.9 INTERSTITIAL LUNG DISEASE: ICD-10-CM

## 2023-06-09 DIAGNOSIS — M05.9 RHEUMATOID ARTHRITIS WITH POSITIVE RHEUMATOID FACTOR, INVOLVING UNSPECIFIED SITE: ICD-10-CM

## 2023-06-09 LAB
ALBUMIN SERPL BCP-MCNC: 2.8 G/DL (ref 3.5–5.2)
ALP SERPL-CCNC: 102 U/L (ref 55–135)
ALT SERPL W/O P-5'-P-CCNC: 80 U/L (ref 10–44)
ANION GAP SERPL CALC-SCNC: 14 MMOL/L (ref 8–16)
AST SERPL-CCNC: 66 U/L (ref 10–40)
BILIRUB SERPL-MCNC: 0.5 MG/DL (ref 0.1–1)
BUN SERPL-MCNC: 16 MG/DL (ref 6–20)
CALCIUM SERPL-MCNC: 10.2 MG/DL (ref 8.7–10.5)
CHLORIDE SERPL-SCNC: 94 MMOL/L (ref 95–110)
CK SERPL-CCNC: 551 U/L (ref 20–180)
CO2 SERPL-SCNC: 31 MMOL/L (ref 23–29)
CREAT SERPL-MCNC: 0.7 MG/DL (ref 0.5–1.4)
EST. GFR  (NO RACE VARIABLE): >60 ML/MIN/1.73 M^2
GGT SERPL-CCNC: 60 U/L (ref 8–55)
GLUCOSE SERPL-MCNC: 113 MG/DL (ref 70–110)
POTASSIUM SERPL-SCNC: 3.2 MMOL/L (ref 3.5–5.1)
PROT SERPL-MCNC: 7.9 G/DL (ref 6–8.4)
SODIUM SERPL-SCNC: 139 MMOL/L (ref 136–145)

## 2023-06-09 PROCEDURE — 63600175 PHARM REV CODE 636 W HCPCS: Performed by: INTERNAL MEDICINE

## 2023-06-09 PROCEDURE — 96365 THER/PROPH/DIAG IV INF INIT: CPT

## 2023-06-09 PROCEDURE — 82977 ASSAY OF GGT: CPT | Performed by: INTERNAL MEDICINE

## 2023-06-09 PROCEDURE — 96375 TX/PRO/DX INJ NEW DRUG ADDON: CPT

## 2023-06-09 PROCEDURE — 82550 ASSAY OF CK (CPK): CPT | Performed by: INTERNAL MEDICINE

## 2023-06-09 PROCEDURE — 25000003 PHARM REV CODE 250: Performed by: INTERNAL MEDICINE

## 2023-06-09 PROCEDURE — 80053 COMPREHEN METABOLIC PANEL: CPT | Performed by: INTERNAL MEDICINE

## 2023-06-09 RX ORDER — HEPARIN 100 UNIT/ML
500 SYRINGE INTRAVENOUS
Status: CANCELLED | OUTPATIENT
Start: 2023-07-07

## 2023-06-09 RX ORDER — SODIUM CHLORIDE 0.9 % (FLUSH) 0.9 %
10 SYRINGE (ML) INJECTION
Status: CANCELLED | OUTPATIENT
Start: 2023-07-07

## 2023-06-09 RX ORDER — METHYLPREDNISOLONE SOD SUCC 125 MG
40 VIAL (EA) INJECTION
Status: COMPLETED | OUTPATIENT
Start: 2023-06-09 | End: 2023-06-09

## 2023-06-09 RX ORDER — ACETAMINOPHEN 325 MG/1
650 TABLET ORAL
Status: CANCELLED | OUTPATIENT
Start: 2023-07-07

## 2023-06-09 RX ORDER — ACETAMINOPHEN 325 MG/1
650 TABLET ORAL
Status: COMPLETED | OUTPATIENT
Start: 2023-06-09 | End: 2023-06-09

## 2023-06-09 RX ORDER — DIPHENHYDRAMINE HYDROCHLORIDE 50 MG/ML
25 INJECTION INTRAMUSCULAR; INTRAVENOUS
Status: COMPLETED | OUTPATIENT
Start: 2023-06-09 | End: 2023-06-09

## 2023-06-09 RX ADMIN — TOCILIZUMAB 400 MG: 20 INJECTION, SOLUTION, CONCENTRATE INTRAVENOUS at 11:06

## 2023-06-09 RX ADMIN — ACETAMINOPHEN 650 MG: 325 TABLET ORAL at 10:06

## 2023-06-09 RX ADMIN — METHYLPREDNISOLONE SODIUM SUCCINATE 40 MG: 125 INJECTION, POWDER, FOR SOLUTION INTRAMUSCULAR; INTRAVENOUS at 10:06

## 2023-06-09 RX ADMIN — DIPHENHYDRAMINE HYDROCHLORIDE 25 MG: 50 INJECTION INTRAMUSCULAR; INTRAVENOUS at 11:06

## 2023-06-09 NOTE — NURSING
Infusion # 1 - 4 mg/kg q 4 weeks    Any:  -recent illness, infection, or antibiotic use in past week- denies  -open wounds or mouth sores- denies  -invasive procedures or surgeries in past 4 weeks or in upcoming 4 weeks- denies  -vaccinations in past week- denies  -any new symptoms/change in symptoms-denies  -chance you may be pregnant- n/a      Recent labs? 6/9/23  Last Rheumatology provider visit- Seen by Dr. Lewis on 2/28/23     Premeds-650 mg Tylenol PO, 40 mg Solumedrol IVP over 3 minutes, and 25 mg Benadryl IVP over 3 minutes.      Actemra 400 mg administered IV at a 60 minute rate per orders; see MAR and vitals for more details. Tolerated well without adverse events, discharged and ambulatory out of clinic.

## 2023-06-12 DIAGNOSIS — I10 ESSENTIAL HYPERTENSION: ICD-10-CM

## 2023-06-12 RX ORDER — AMLODIPINE BESYLATE 10 MG/1
10 TABLET ORAL DAILY
Qty: 90 TABLET | Refills: 1 | Status: ON HOLD | OUTPATIENT
Start: 2023-06-12 | End: 2023-08-05 | Stop reason: HOSPADM

## 2023-06-13 ENCOUNTER — PATIENT MESSAGE (OUTPATIENT)
Dept: PULMONOLOGY | Facility: CLINIC | Age: 53
End: 2023-06-13
Payer: COMMERCIAL

## 2023-06-13 DIAGNOSIS — R76.8 ELEVATED RHEUMATOID FACTOR: ICD-10-CM

## 2023-06-13 DIAGNOSIS — J98.4 RESTRICTIVE LUNG DISEASE: ICD-10-CM

## 2023-06-13 DIAGNOSIS — J84.111 CHRONIC INTERSTITIAL PNEUMONIA: ICD-10-CM

## 2023-06-13 RX ORDER — PREDNISONE 10 MG/1
10 TABLET ORAL DAILY
Qty: 30 TABLET | Refills: 3
Start: 2023-06-13 | End: 2023-06-15 | Stop reason: SDUPTHER

## 2023-06-15 ENCOUNTER — PATIENT MESSAGE (OUTPATIENT)
Dept: PULMONOLOGY | Facility: CLINIC | Age: 53
End: 2023-06-15
Payer: COMMERCIAL

## 2023-06-15 DIAGNOSIS — J84.111 CHRONIC INTERSTITIAL PNEUMONIA: ICD-10-CM

## 2023-06-15 DIAGNOSIS — J98.4 RESTRICTIVE LUNG DISEASE: ICD-10-CM

## 2023-06-15 DIAGNOSIS — R76.8 ELEVATED RHEUMATOID FACTOR: ICD-10-CM

## 2023-06-15 RX ORDER — PREDNISONE 10 MG/1
10 TABLET ORAL DAILY
Qty: 30 TABLET | Refills: 3 | Status: SHIPPED | OUTPATIENT
Start: 2023-06-15 | End: 2023-07-07 | Stop reason: SDUPTHER

## 2023-06-16 ENCOUNTER — TELEPHONE (OUTPATIENT)
Dept: PULMONOLOGY | Facility: CLINIC | Age: 53
End: 2023-06-16
Payer: COMMERCIAL

## 2023-06-16 NOTE — TELEPHONE ENCOUNTER
----- Message from Bryanna Pelayo sent at 6/15/2023  1:25 PM CDT -----  Contact: Rain/ Dr. Too Duff office  Iris with Dr. Too Ashton's office is calling to speak with the nurse in regards to referral. Reports missing information for referral that was sent over. Missing CT Chest scan,genomic report,and serology lab. Please give Rain a callback at [phone] or fax to [phone].

## 2023-06-19 PROBLEM — J84.111: Status: RESOLVED | Noted: 2022-04-13 | Resolved: 2023-06-19

## 2023-06-20 ENCOUNTER — PATIENT MESSAGE (OUTPATIENT)
Dept: PULMONOLOGY | Facility: CLINIC | Age: 53
End: 2023-06-20
Payer: COMMERCIAL

## 2023-06-20 ENCOUNTER — LAB VISIT (OUTPATIENT)
Dept: LAB | Facility: HOSPITAL | Age: 53
End: 2023-06-20
Attending: FAMILY MEDICINE
Payer: COMMERCIAL

## 2023-06-20 ENCOUNTER — TELEPHONE (OUTPATIENT)
Dept: SLEEP MEDICINE | Facility: CLINIC | Age: 53
End: 2023-06-20
Payer: COMMERCIAL

## 2023-06-20 DIAGNOSIS — R74.8 ELEVATED LIVER ENZYMES: ICD-10-CM

## 2023-06-20 LAB
ALBUMIN SERPL BCP-MCNC: 3.4 G/DL (ref 3.5–5.2)
ALP SERPL-CCNC: 99 U/L (ref 55–135)
ALT SERPL W/O P-5'-P-CCNC: 68 U/L (ref 10–44)
ANION GAP SERPL CALC-SCNC: 12 MMOL/L (ref 8–16)
AST SERPL-CCNC: 64 U/L (ref 10–40)
BILIRUB SERPL-MCNC: 0.4 MG/DL (ref 0.1–1)
BUN SERPL-MCNC: 16 MG/DL (ref 6–20)
CALCIUM SERPL-MCNC: 9.4 MG/DL (ref 8.7–10.5)
CHLORIDE SERPL-SCNC: 97 MMOL/L (ref 95–110)
CK SERPL-CCNC: 930 U/L (ref 20–180)
CO2 SERPL-SCNC: 29 MMOL/L (ref 23–29)
CREAT SERPL-MCNC: 0.8 MG/DL (ref 0.5–1.4)
EST. GFR  (NO RACE VARIABLE): >60 ML/MIN/1.73 M^2
GLUCOSE SERPL-MCNC: 121 MG/DL (ref 70–110)
POTASSIUM SERPL-SCNC: 3.4 MMOL/L (ref 3.5–5.1)
PROT SERPL-MCNC: 7.1 G/DL (ref 6–8.4)
SODIUM SERPL-SCNC: 138 MMOL/L (ref 136–145)

## 2023-06-20 PROCEDURE — 80053 COMPREHEN METABOLIC PANEL: CPT | Performed by: INTERNAL MEDICINE

## 2023-06-20 PROCEDURE — 82550 ASSAY OF CK (CPK): CPT | Performed by: INTERNAL MEDICINE

## 2023-06-20 NOTE — TELEPHONE ENCOUNTER
Spoke with patient  Seen Dr vasquez 2nd opinion  Has referal to Dr Ashton  Also has appt for [facility]  GERD improved  Tried using CPAP  Lost some weight from 281 to 220lb  Feels Actemra helps  Has yamil  Has appt next month

## 2023-06-23 ENCOUNTER — TELEPHONE (OUTPATIENT)
Dept: PULMONOLOGY | Facility: CLINIC | Age: 53
End: 2023-06-23
Payer: COMMERCIAL

## 2023-06-23 NOTE — TELEPHONE ENCOUNTER
----- Message from Agustín Aviles MD sent at 6/23/2023  9:01 AM CDT -----  Contact: meño  Find out why she did not go  ----- Message -----  From: Kary Randolph MA  Sent: 6/23/2023   8:52 AM CDT  To: Agustín Aviles MD    FY  ----- Message -----  From: Indu Cuellar  Sent: 6/23/2023   8:35 AM CDT  To: Stefan Mccarty is calling to state that patient did not come to appointment visit to see Dr. Ashton. No call back is needed.      Thanks  DD

## 2023-06-28 DIAGNOSIS — J45.909 ASTHMA IN ADULT, UNSPECIFIED ASTHMA SEVERITY, UNSPECIFIED WHETHER COMPLICATED, UNSPECIFIED WHETHER PERSISTENT: ICD-10-CM

## 2023-06-28 RX ORDER — ALBUTEROL SULFATE 90 UG/1
AEROSOL, METERED RESPIRATORY (INHALATION)
Qty: 8.5 G | Refills: 5 | Status: ON HOLD | OUTPATIENT
Start: 2023-06-28 | End: 2023-08-05 | Stop reason: HOSPADM

## 2023-06-29 DIAGNOSIS — I10 ESSENTIAL HYPERTENSION: ICD-10-CM

## 2023-06-30 ENCOUNTER — TELEPHONE (OUTPATIENT)
Dept: FAMILY MEDICINE | Facility: CLINIC | Age: 53
End: 2023-06-30
Payer: COMMERCIAL

## 2023-06-30 RX ORDER — HYDROCHLOROTHIAZIDE 12.5 MG/1
12.5 TABLET ORAL DAILY
Qty: 90 TABLET | Refills: 1 | Status: ON HOLD | OUTPATIENT
Start: 2023-06-30 | End: 2023-08-05 | Stop reason: HOSPADM

## 2023-06-30 NOTE — TELEPHONE ENCOUNTER
----- Message from Bryanna Pelayo sent at 6/30/2023 11:16 AM CDT -----  Contact: Sarah / [facility]  Sarah with the [facility]  is calling to speak with the nurse to verify referral was received and signed by provider for patient. Please give Sarah callback at [phone].

## 2023-07-06 ENCOUNTER — OFFICE VISIT (OUTPATIENT)
Dept: RHEUMATOLOGY | Facility: CLINIC | Age: 53
End: 2023-07-06
Payer: COMMERCIAL

## 2023-07-06 VITALS
HEIGHT: 66 IN | WEIGHT: 221.31 LBS | HEART RATE: 121 BPM | SYSTOLIC BLOOD PRESSURE: 146 MMHG | BODY MASS INDEX: 35.57 KG/M2 | DIASTOLIC BLOOD PRESSURE: 86 MMHG

## 2023-07-06 DIAGNOSIS — Z79.899 HIGH RISK MEDICATION USE: ICD-10-CM

## 2023-07-06 DIAGNOSIS — J84.9 INTERSTITIAL PULMONARY DISEASE, UNSPECIFIED: ICD-10-CM

## 2023-07-06 DIAGNOSIS — J84.112 UIP (USUAL INTERSTITIAL PNEUMONITIS): ICD-10-CM

## 2023-07-06 DIAGNOSIS — M05.9 SEROPOSITIVE RHEUMATOID ARTHRITIS: ICD-10-CM

## 2023-07-06 DIAGNOSIS — J98.4 RESTRICTIVE LUNG DISEASE: ICD-10-CM

## 2023-07-06 DIAGNOSIS — M35.09 SJOGREN'S SYNDROME WITH OTHER ORGAN INVOLVEMENT: Primary | ICD-10-CM

## 2023-07-06 DIAGNOSIS — K21.9 GASTROESOPHAGEAL REFLUX DISEASE, UNSPECIFIED WHETHER ESOPHAGITIS PRESENT: ICD-10-CM

## 2023-07-06 DIAGNOSIS — J96.10 CHRONIC RESPIRATORY FAILURE, UNSPECIFIED WHETHER WITH HYPOXIA OR HYPERCAPNIA: ICD-10-CM

## 2023-07-06 DIAGNOSIS — J84.9 INTERSTITIAL LUNG DISEASE: ICD-10-CM

## 2023-07-06 DIAGNOSIS — M33.10 AMYOPATHIC DERMATOMYOSITIS: ICD-10-CM

## 2023-07-06 DIAGNOSIS — Z51.81 MEDICATION MONITORING ENCOUNTER: ICD-10-CM

## 2023-07-06 DIAGNOSIS — R76.8 ELEVATED RHEUMATOID FACTOR: ICD-10-CM

## 2023-07-06 PROCEDURE — 99999 PR PBB SHADOW E&M-EST. PATIENT-LVL V: ICD-10-PCS | Mod: PBBFAC,,, | Performed by: INTERNAL MEDICINE

## 2023-07-06 PROCEDURE — 3079F DIAST BP 80-89 MM HG: CPT | Mod: CPTII,S$GLB,, | Performed by: INTERNAL MEDICINE

## 2023-07-06 PROCEDURE — 3008F BODY MASS INDEX DOCD: CPT | Mod: CPTII,S$GLB,, | Performed by: INTERNAL MEDICINE

## 2023-07-06 PROCEDURE — 3008F PR BODY MASS INDEX (BMI) DOCUMENTED: ICD-10-PCS | Mod: CPTII,S$GLB,, | Performed by: INTERNAL MEDICINE

## 2023-07-06 PROCEDURE — 1159F MED LIST DOCD IN RCRD: CPT | Mod: CPTII,S$GLB,, | Performed by: INTERNAL MEDICINE

## 2023-07-06 PROCEDURE — 3077F PR MOST RECENT SYSTOLIC BLOOD PRESSURE >= 140 MM HG: ICD-10-PCS | Mod: CPTII,S$GLB,, | Performed by: INTERNAL MEDICINE

## 2023-07-06 PROCEDURE — 99999 PR PBB SHADOW E&M-EST. PATIENT-LVL V: CPT | Mod: PBBFAC,,, | Performed by: INTERNAL MEDICINE

## 2023-07-06 PROCEDURE — 3079F PR MOST RECENT DIASTOLIC BLOOD PRESSURE 80-89 MM HG: ICD-10-PCS | Mod: CPTII,S$GLB,, | Performed by: INTERNAL MEDICINE

## 2023-07-06 PROCEDURE — 99215 OFFICE O/P EST HI 40 MIN: CPT | Mod: S$GLB,,, | Performed by: INTERNAL MEDICINE

## 2023-07-06 PROCEDURE — 99215 PR OFFICE/OUTPT VISIT, EST, LEVL V, 40-54 MIN: ICD-10-PCS | Mod: S$GLB,,, | Performed by: INTERNAL MEDICINE

## 2023-07-06 PROCEDURE — 1159F PR MEDICATION LIST DOCUMENTED IN MEDICAL RECORD: ICD-10-PCS | Mod: CPTII,S$GLB,, | Performed by: INTERNAL MEDICINE

## 2023-07-06 PROCEDURE — 3077F SYST BP >= 140 MM HG: CPT | Mod: CPTII,S$GLB,, | Performed by: INTERNAL MEDICINE

## 2023-07-06 NOTE — PROGRESS NOTES
RHEUMATOLOGY OUTPATIENT CLINIC NOTE    7/6/2023    Attending Rheumatologist: Chicho Lewis  Primary Care Provider/Physician Requesting Consultation: Madeleine Enrique MD   Chief Complaint/Reason For Consultation:  Rheumatoid Arthritis, IPD, and SJOGRENS      Subjective:     Ayanna Alicia is a 52 y.o. Black or  female with SPRA and ILD for f/u    No acute complaints.  Has been able to receive Actemra IV only once since last visit.  Progressive dysphonia since last visit, WIP by ENT.    Addendum 10/6: patient refers adequate but short lasting improvement of respiratory sx with Actmra infusions, currently 4mg/kg q 4 weeks.  Suggest increasing dose to 8mg/kg (maximum dose: 800 mg), keeping same frequency.  Labs 6 weeks after dose increase.    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  Antibiotic therapy anticipated to take up to 6 months.  Actemra has been discontinued.  Biologic therapy will not be resumed until at least 2 weeks after completion of antibiotic therapy.  Will monitor clinically and consider low dose PDN or cDMARD taking into account ILD; in the event of RA flares and clearance provided by ID.    Addendum 12/22: msg from ID  Good afternoon. ENT saw patient last week and determined resolution of endolarynegal exudate. Antibiotics can be stopped. Infection treated. Clear from my standpoint to resume Actemra. Thank you.    Orders for actemra updated.  Cbc, cmp 2 months after resuming biologic.    Review of Systems   Constitutional:  Negative for fever.   HENT:  Positive for nosebleeds (onset since on supp O2 by NC).    Respiratory:  Positive for cough. Negative for hemoptysis and shortness of breath.    Gastrointestinal:  Negative for blood in stool and melena.        Denies odinophagia.  Denies dysphagia   Genitourinary:  Negative for hematuria.   Musculoskeletal:  Negative for joint pain.   Skin:  Negative for rash.    Neurological:  Negative for focal weakness.     Chronic comorbid conditions affecting medical decision making today:  Past Medical History:   Diagnosis Date    Abnormal Pap smear of cervix     in the past with repeat pap smear okay.    Acute interstitial pneumonitis     Allergic rhinitis, cause unspecified     Arthritis of both knees     Asthma     Eczema     Fatty liver 10/2014    Fibrocystic breast changes     Headache(784.0)     Hepatomegaly 10/2014    Hypertension     Liver cyst 10/2014    Multinodular goiter     Followed by ENT - Dr. Nicolas Gray    Polymenorrhea     TMJ (dislocation of temporomandibular joint)     Uterine fibroid     in the past    Vitamin D deficiency disease      Past Surgical History:   Procedure Laterality Date    BRONCHOSCOPY Bilateral 2023    Procedure: Bronchoscopy - insert lighted tube into airway to take a biopsy of lung;  Surgeon: Agustín Aviles MD;  Location: [facility] ENDO;  Service: Pulmonary;  Laterality: Bilateral;     SECTION, CLASSIC      x 1    COLONOSCOPY N/A 2020    Procedure: COLONOSCOPY;  Surgeon: Angie Magana MD;  Location: [facility] ENDO;  Service: Endoscopy;  Laterality: N/A;    HYSTERECTOMY      MULTIPLE TOOTH EXTRACTIONS      PARTIAL HYSTERECTOMY  2013    Due to fibroids     Family History   Problem Relation Age of Onset    Stroke Mother     Heart disease Mother     Diverticulitis Mother     Heart disease Father 63        MI/CAD    Hypertension Father     Stroke Sister     No Known Problems Brother     Cancer Maternal Grandmother 60        Rectal and stomach cancer    Stroke Maternal Grandfather     Diabetes Maternal Grandfather     Peripheral vascular disease Maternal Grandfather     Cancer Maternal Aunt 50        Stomach cancer    Cancer Maternal Aunt 66        Lung cancer (smoker)    Migraines Cousin     Cataracts Cousin      Social History     Tobacco Use   Smoking Status Never    Passive exposure: Past   Smokeless Tobacco Never        Current Outpatient Medications:     albuterol (PROVENTIL/VENTOLIN HFA) 90 mcg/actuation inhaler, INHALE 1 TO 2 PUFFS BY MOUTH INTO THE LUNGS EVERY 4 TO 6 HOURS AS NEEDED FOR WHEEZING OR SHORTNESS OF BREATH, Disp: 8.5 g, Rfl: 5    amLODIPine (NORVASC) 10 MG tablet, Take 1 tablet (10 mg total) by mouth once daily., Disp: 90 tablet, Rfl: 1    ascorbic acid, vitamin C, (VITAMIN C) 500 MG tablet, Take 500 mg by mouth once daily., Disp: , Rfl:     benzonatate (TESSALON) 200 MG capsule, Take 1 capsule (200 mg total) by mouth 3 (three) times daily as needed for Cough., Disp: 90 capsule, Rfl: 11    calcium/magnesium/vit B comp (CALCIUM-MAGNESIUM-B COMPLEX ORAL), Take 1 tablet by mouth once daily at 6am., Disp: , Rfl:     fluticasone-umeclidin-vilanter (TRELEGY ELLIPTA) 200-62.5-25 mcg inhaler, Inhale 1 puff into the lungs once daily., Disp: 60 each, Rfl: 11    hydroCHLOROthiazide (HYDRODIURIL) 12.5 MG Tab, Take 1 tablet (12.5 mg total) by mouth once daily., Disp: 90 tablet, Rfl: 1    ipratropium (ATROVENT) 0.02 % nebulizer solution, Take 2.5 mLs (500 mcg total) by nebulization 4 (four) times daily. Rescue, Disp: 300 mL, Rfl: 11    levocetirizine (XYZAL) 5 MG tablet, TAKE 1 TABLET(5 MG) BY MOUTH EVERY DAY, Disp: 90 tablet, Rfl: 1    loperamide (IMODIUM) 2 mg capsule, Take 1 capsule (2 mg total) by mouth daily as needed for Diarrhea., Disp: 90 capsule, Rfl: 0    metOLazone (ZAROXOLYN) 2.5 MG tablet, Take 1 tablet (2.5 mg total) by mouth every Mon, Wed, Fri., Disp: 36 tablet, Rfl: 3    montelukast (SINGULAIR) 10 mg tablet, Take 1 tablet (10 mg total) by mouth every evening., Disp: 30 tablet, Rfl: 11    mv-minerals/FA/omega 3,6,9 #3 (WOMEN'S 50+ ADVANCED ORAL), Take 1 tablet by mouth Daily., Disp: , Rfl:     nintedanib (OFEV) 150 mg Cap, Take 1 capsule (150 mg total) by mouth 2 (two) times a day., Disp: 60 capsule, Rfl: 11    omega-3s/dha/epa/fish oil/D3 (VITAMIN-D + OMEGA-3 ORAL), Take 2 tablets by mouth once daily at  6am., Disp: , Rfl:     pantoprazole (PROTONIX) 40 MG tablet, Take 1 tablet (40 mg total) by mouth once daily., Disp: 30 tablet, Rfl: 11    predniSONE (DELTASONE) 10 MG tablet, Take 1 tablet (10 mg total) by mouth once daily., Disp: 30 tablet, Rfl: 3    sertraline (ZOLOFT) 50 MG tablet, Take 1 tablet (50 mg total) by mouth once daily., Disp: 90 tablet, Rfl: 3    vitamin D (VITAMIN D3) 1000 units Tab, Take 1,000 Units by mouth once daily., Disp: , Rfl:     Current Facility-Administered Medications:     sodium chloride 0.9% flush 10 mL, 10 mL, Intravenous, PRN, Agustín Aviles MD     Objective:     Vitals:    07/06/23 1324   BP: (!) 146/86   Pulse: (!) 121     Physical Exam   Constitutional: She appears obese.   Eyes: Conjunctivae are normal.   Pulmonary/Chest: Effort normal. No respiratory distress.   Musculoskeletal:         General: No swelling or tenderness. Normal range of motion.   Neurological: She displays no weakness.   Skin: Rash noted. There is erythema.     Reviewed available old and all outside pertinent medical records available.    All lab results personally reviewed and interpreted by me.       ASSESSMENT      Encounter Diagnoses   Name Primary?    Interstitial pulmonary disease, unspecified     Sjogren's syndrome with other organ involvement Yes    Chronic respiratory failure, unspecified whether with hypoxia or hypercapnia     Restrictive lung disease     Interstitial lung disease     UIP (usual interstitial pneumonitis)     Elevated rheumatoid factor     Seropositive rheumatoid arthritis     BMI 38.0-38.9,adult     Gastroesophageal reflux disease, unspecified whether esophagitis present     Amyopathic dermatomyositis     High risk medication use     Medication monitoring encounter       PLAN     Intermittent rashes, CK elevation, high titer 52kD SSA, presence of RF, UIP pattern ILD.  Sub-adequate response to MMF, RTX.  Undergoing Actemra IV trial, received only 1 dose thus far.  Exam non focal  w/o reproducible weakness or active synovitis/squeeze tenderness.  Faint heliotrope rash, facial erythema, and abnormal NFC on exam.  NEgative anca serologies.  Esophageal dysmotility with small hiatal hernia on esophagram.  CT chest 3/2023 Mildly progressive severe interstitial pneumonitis with worsening traction bronchiectasis.  Impression of SjS w/ manifestation of amyopathic dermatomyositis w/ ILD.  Recommend trial of Actemra IV for at least 4 consecutive months.  Plan to repeat CT after this timeframe to monitor response to therapy.  On ofev and systemic steroids per pulmonary medicine.  Consider Imuran or Prograf if no response to Actemra by next visit.  Repeat labs close to f/u visit.     Chicho Lewis M.D.

## 2023-07-07 ENCOUNTER — INFUSION (OUTPATIENT)
Dept: INFUSION THERAPY | Facility: HOSPITAL | Age: 53
End: 2023-07-07
Attending: INTERNAL MEDICINE
Payer: COMMERCIAL

## 2023-07-07 ENCOUNTER — LAB VISIT (OUTPATIENT)
Dept: LAB | Facility: HOSPITAL | Age: 53
End: 2023-07-07
Attending: FAMILY MEDICINE
Payer: COMMERCIAL

## 2023-07-07 ENCOUNTER — TELEPHONE (OUTPATIENT)
Dept: FAMILY MEDICINE | Facility: CLINIC | Age: 53
End: 2023-07-07
Payer: COMMERCIAL

## 2023-07-07 VITALS
HEART RATE: 95 BPM | RESPIRATION RATE: 18 BRPM | SYSTOLIC BLOOD PRESSURE: 118 MMHG | TEMPERATURE: 98 F | DIASTOLIC BLOOD PRESSURE: 74 MMHG | OXYGEN SATURATION: 95 %

## 2023-07-07 DIAGNOSIS — J84.111 CHRONIC INTERSTITIAL PNEUMONIA: ICD-10-CM

## 2023-07-07 DIAGNOSIS — J84.9 INTERSTITIAL LUNG DISEASE: ICD-10-CM

## 2023-07-07 DIAGNOSIS — J98.4 RESTRICTIVE LUNG DISEASE: ICD-10-CM

## 2023-07-07 DIAGNOSIS — Z79.60 LONG-TERM USE OF IMMUNOSUPPRESSANT MEDICATION: ICD-10-CM

## 2023-07-07 DIAGNOSIS — M05.9 SEROPOSITIVE RHEUMATOID ARTHRITIS: Primary | ICD-10-CM

## 2023-07-07 DIAGNOSIS — R76.8 ELEVATED RHEUMATOID FACTOR: ICD-10-CM

## 2023-07-07 DIAGNOSIS — Z79.899 HIGH RISK MEDICATION USE: ICD-10-CM

## 2023-07-07 DIAGNOSIS — J84.9 INTERSTITIAL PULMONARY DISEASE, UNSPECIFIED: ICD-10-CM

## 2023-07-07 DIAGNOSIS — M05.9 RHEUMATOID ARTHRITIS WITH POSITIVE RHEUMATOID FACTOR, INVOLVING UNSPECIFIED SITE: ICD-10-CM

## 2023-07-07 LAB
ALBUMIN SERPL BCP-MCNC: 3 G/DL (ref 3.5–5.2)
ALP SERPL-CCNC: 109 U/L (ref 55–135)
ALT SERPL W/O P-5'-P-CCNC: 76 U/L (ref 10–44)
ANION GAP SERPL CALC-SCNC: 14 MMOL/L (ref 8–16)
AST SERPL-CCNC: 61 U/L (ref 10–40)
BASOPHILS # BLD AUTO: 0.09 K/UL (ref 0–0.2)
BASOPHILS NFR BLD: 0.5 % (ref 0–1.9)
BILIRUB SERPL-MCNC: 0.4 MG/DL (ref 0.1–1)
BUN SERPL-MCNC: 8 MG/DL (ref 6–20)
CALCIUM SERPL-MCNC: 9.7 MG/DL (ref 8.7–10.5)
CHLORIDE SERPL-SCNC: 93 MMOL/L (ref 95–110)
CO2 SERPL-SCNC: 29 MMOL/L (ref 23–29)
CREAT SERPL-MCNC: 0.8 MG/DL (ref 0.5–1.4)
DIFFERENTIAL METHOD: ABNORMAL
EOSINOPHIL # BLD AUTO: 0.4 K/UL (ref 0–0.5)
EOSINOPHIL NFR BLD: 2.7 % (ref 0–8)
ERYTHROCYTE [DISTWIDTH] IN BLOOD BY AUTOMATED COUNT: 22.3 % (ref 11.5–14.5)
EST. GFR  (NO RACE VARIABLE): >60 ML/MIN/1.73 M^2
GLUCOSE SERPL-MCNC: 125 MG/DL (ref 70–110)
HCT VFR BLD AUTO: 36.7 % (ref 37–48.5)
HGB BLD-MCNC: 11.1 G/DL (ref 12–16)
IMM GRANULOCYTES # BLD AUTO: 0.08 K/UL (ref 0–0.04)
IMM GRANULOCYTES NFR BLD AUTO: 0.5 % (ref 0–0.5)
LYMPHOCYTES # BLD AUTO: 0.6 K/UL (ref 1–4.8)
LYMPHOCYTES NFR BLD: 3.9 % (ref 18–48)
MCH RBC QN AUTO: 27.1 PG (ref 27–31)
MCHC RBC AUTO-ENTMCNC: 30.2 G/DL (ref 32–36)
MCV RBC AUTO: 90 FL (ref 82–98)
MONOCYTES # BLD AUTO: 0.7 K/UL (ref 0.3–1)
MONOCYTES NFR BLD: 4.5 % (ref 4–15)
NEUTROPHILS # BLD AUTO: 14.5 K/UL (ref 1.8–7.7)
NEUTROPHILS NFR BLD: 87.9 % (ref 38–73)
NRBC BLD-RTO: 0 /100 WBC
PLATELET # BLD AUTO: 518 K/UL (ref 150–450)
PMV BLD AUTO: 9.7 FL (ref 9.2–12.9)
POTASSIUM SERPL-SCNC: 3.3 MMOL/L (ref 3.5–5.1)
PROT SERPL-MCNC: 7.4 G/DL (ref 6–8.4)
RBC # BLD AUTO: 4.1 M/UL (ref 4–5.4)
SODIUM SERPL-SCNC: 136 MMOL/L (ref 136–145)
WBC # BLD AUTO: 16.44 K/UL (ref 3.9–12.7)

## 2023-07-07 PROCEDURE — 36415 COLL VENOUS BLD VENIPUNCTURE: CPT | Performed by: INTERNAL MEDICINE

## 2023-07-07 PROCEDURE — 85025 COMPLETE CBC W/AUTO DIFF WBC: CPT | Performed by: INTERNAL MEDICINE

## 2023-07-07 PROCEDURE — 80053 COMPREHEN METABOLIC PANEL: CPT | Performed by: INTERNAL MEDICINE

## 2023-07-07 PROCEDURE — 96375 TX/PRO/DX INJ NEW DRUG ADDON: CPT

## 2023-07-07 PROCEDURE — 25000003 PHARM REV CODE 250: Performed by: INTERNAL MEDICINE

## 2023-07-07 PROCEDURE — 96365 THER/PROPH/DIAG IV INF INIT: CPT

## 2023-07-07 PROCEDURE — 63600175 PHARM REV CODE 636 W HCPCS: Mod: JZ,JG | Performed by: INTERNAL MEDICINE

## 2023-07-07 RX ORDER — HEPARIN 100 UNIT/ML
500 SYRINGE INTRAVENOUS
Status: CANCELLED | OUTPATIENT
Start: 2023-08-04

## 2023-07-07 RX ORDER — DIPHENHYDRAMINE HYDROCHLORIDE 50 MG/ML
25 INJECTION INTRAMUSCULAR; INTRAVENOUS
Status: CANCELLED
Start: 2023-08-04

## 2023-07-07 RX ORDER — ACETAMINOPHEN 325 MG/1
650 TABLET ORAL
Status: CANCELLED | OUTPATIENT
Start: 2023-08-04

## 2023-07-07 RX ORDER — PREDNISONE 10 MG/1
10 TABLET ORAL DAILY
Qty: 30 TABLET | Refills: 3 | Status: ON HOLD | OUTPATIENT
Start: 2023-07-07 | End: 2023-08-05 | Stop reason: HOSPADM

## 2023-07-07 RX ORDER — DIPHENHYDRAMINE HYDROCHLORIDE 50 MG/ML
25 INJECTION INTRAMUSCULAR; INTRAVENOUS
Status: COMPLETED | OUTPATIENT
Start: 2023-07-07 | End: 2023-07-07

## 2023-07-07 RX ORDER — SODIUM CHLORIDE 0.9 % (FLUSH) 0.9 %
10 SYRINGE (ML) INJECTION
Status: CANCELLED | OUTPATIENT
Start: 2023-08-04

## 2023-07-07 RX ORDER — METHYLPREDNISOLONE SOD SUCC 125 MG
40 VIAL (EA) INJECTION
Status: COMPLETED | OUTPATIENT
Start: 2023-07-07 | End: 2023-07-07

## 2023-07-07 RX ORDER — ACETAMINOPHEN 325 MG/1
650 TABLET ORAL
Status: COMPLETED | OUTPATIENT
Start: 2023-07-07 | End: 2023-07-07

## 2023-07-07 RX ADMIN — DIPHENHYDRAMINE HYDROCHLORIDE 25 MG: 50 INJECTION INTRAMUSCULAR; INTRAVENOUS at 12:07

## 2023-07-07 RX ADMIN — ACETAMINOPHEN 650 MG: 325 TABLET ORAL at 11:07

## 2023-07-07 RX ADMIN — TOCILIZUMAB 400 MG: 20 INJECTION, SOLUTION, CONCENTRATE INTRAVENOUS at 12:07

## 2023-07-07 RX ADMIN — SODIUM CHLORIDE: 9 INJECTION, SOLUTION INTRAVENOUS at 11:07

## 2023-07-07 RX ADMIN — METHYLPREDNISOLONE SODIUM SUCCINATE 40 MG: 125 INJECTION, POWDER, FOR SOLUTION INTRAMUSCULAR; INTRAVENOUS at 12:07

## 2023-07-07 NOTE — NURSING
Infusion # actemra #2 - 400 mg q 4wks  Last dose- 6/9/2023    Any:  -recent illness, infection, or antibiotic use in past week- denies  -open wounds or mouth sores- denies  -invasive procedures or surgeries in past 4 weeks or in upcoming 4 weeks- denies  -vaccinations in past week- denies  -any new symptoms/change in symptoms-denies  -chance you may be pregnant- denies      Recent labs? 7/7/2023  Last Rheumatology provider visit- Seen by Dr. Lewis on 7/7/2023     Premeds- Tylenol PO; solumedrol IVP; Benadryl IVP     Actemra 400 mg administered IV at a 60 minute rate per orders; see MAR and vitals for more details. Tolerated well without adverse events, discharged and ambulatory out of clinic.

## 2023-07-07 NOTE — DISCHARGE INSTRUCTIONS
WAYS TO HELP PREVENT INFECTION        WASH YOUR HANDS OFTEN DURING THE DAY, ESPECIALLY BEFORE YOU EAT, AFTER USING THE BATHROOM, AND AFTER TOUCHING ANIMALS    STAY AWAY FROM PEOPLE WHO HAVE ILLNESSES YOU CAN CATCH; SUCH AS COLDS, FLU, CHICKEN POX    TRY TO AVOID CROWDS    STAY AWAY FROM CHILDREN WHO RECENTLY HAVE RECEIVED LIVE VIRUS VACCINES    MAINTAIN GOOD MOUTH CARE    DO NOT SQUEEZE OR SCRATCH PIMPLES    CLEAN CUTS & SCRAPES RIGHT AWAY AND DAILY UNTIL HEALED WITH WARM WATER, SOAP & AN ANTISEPTIC    AVOID CONTACT WITH LITTER BOXES, BIRD CAGES, & FISH TANKS    AVOID STANDING WATER, IE., BIRD BATHS, FLOWER POTS/VASES, OR HUMIDIFIERS    WEAR GLOVES WHEN GARDENING OR CLEANING UP AFTER OTHERS, ESPECIALLY BABIES & SMALL CHILDREN    DO NOT EAT RAW FISH, SEAFOOD, MEAT, OR EGGS  FALL PREVENTION   Falls often occur due to slipping, tripping or losing your balance. Here are ways to reduce your risk of falling again.   Was there anything that caused your fall that can be fixed, removed or replaced?   Make your home safe by keeping walkways clear of objects you may trip over.   Use non-slip pads under rugs.   Do not walk in poorly lit areas.   Do not stand on chairs or wobbly ladders.   Use caution when reaching overhead or looking upward. This position can cause a loss of balance.   Be sure your shoes fit properly, have non-slip bottoms and are in good condition.   Be cautious when going up and down stairs, curbs, and when walking on uneven sidewalks.   If your balance is poor, consider using a cane or walker.   If your fall was related to alcohol use, stop or limit alcohol intake.   If your fall was related to use of sleeping medicines, talk to your doctor about this. You may need to reduce your dosage at bedtime if you awaken during the night to go to the bathroom.   To reduce the need for nighttime bathroom trips:   Avoid drinking fluids for several hours before going to bed   Empty your bladder before going to bed    Men can keep a urinal at the bedside   © 0885-7096 [employer], [address] [place] [address], [place] [address]. All rights reserved. This information is not intended as a substitute for professional medical care. Always follow your healthcare professional's instructions.

## 2023-07-07 NOTE — PLAN OF CARE
Problem: Adult Inpatient Plan of Care  Goal: Plan of Care Review  Outcome: Ongoing, Progressing  Flowsheets (Taken 7/7/2023 1248)  Plan of Care Reviewed With: patient  Goal: Patient-Specific Goal (Individualized)  Outcome: Ongoing, Progressing  Flowsheets (Taken 7/7/2023 1248)  Anxieties, Fears or Concerns: denies  Individualized Care Needs:   Feel elevated   warm blanket   snack/drink provided  Goal: Optimal Comfort and Wellbeing  Outcome: Ongoing, Progressing  Intervention: Monitor Pain and Promote Comfort  Flowsheets (Taken 7/7/2023 1248)  Pain Management Interventions:   warm blanket provided   quiet environment facilitated   relaxation techniques promoted  Intervention: Provide Person-Centered Care  Flowsheets (Taken 7/7/2023 1248)  Trust Relationship/Rapport:   care explained   questions encouraged   choices provided   reassurance provided   emotional support provided   thoughts/feelings acknowledged   empathic listening provided   questions answered     Problem: Infection  Goal: Absence of Infection Signs and Symptoms  Outcome: Ongoing, Progressing  Intervention: Prevent or Manage Infection  Flowsheets (Taken 7/7/2023 1248)  Infection Management: aseptic technique maintained  Isolation Precautions: contact     Problem: Fall Injury Risk  Goal: Absence of Fall and Fall-Related Injury  Outcome: Ongoing, Progressing  Intervention: Identify and Manage Contributors  Flowsheets (Taken 7/7/2023 1248)  Self-Care Promotion: BADL personal objects within reach  Medication Review/Management: medications reviewed  Intervention: Promote Injury-Free Environment  Flowsheets (Taken 7/7/2023 1248)  Safety Promotion/Fall Prevention:   assistive device/personal item within reach   medications reviewed   nonskid shoes/socks when out of bed   in recliner, wheels locked

## 2023-07-07 NOTE — TELEPHONE ENCOUNTER
----- Message from Lilly Barbosa sent at 7/7/2023  8:13 AM CDT -----  Contact: Silvia/ [facility]   Silvia is calling to speak to the nurse regarding a Pcp referral for lung transplant the insurance need approval, please give her a call back at  [phone]    Thanks  LJ

## 2023-07-10 PROBLEM — J96.10 CHRONIC RESPIRATORY FAILURE: Status: RESOLVED | Noted: 2022-04-27 | Resolved: 2023-07-10

## 2023-07-12 ENCOUNTER — TELEPHONE (OUTPATIENT)
Dept: FAMILY MEDICINE | Facility: CLINIC | Age: 53
End: 2023-07-12
Payer: COMMERCIAL

## 2023-07-12 NOTE — TELEPHONE ENCOUNTER
----- Message from Sylvester Connolly sent at 7/12/2023 10:40 AM CDT -----  Contact: Sarah/ Transplant Clinic  Sarah is needing a call back in regards to getting a PCP referral for the patient to see a pulmonologist for her insurance to approve. Please give her a call back at [phone], fax [phone]

## 2023-07-13 ENCOUNTER — OFFICE VISIT (OUTPATIENT)
Dept: FAMILY MEDICINE | Facility: CLINIC | Age: 53
End: 2023-07-13
Payer: COMMERCIAL

## 2023-07-13 ENCOUNTER — PATIENT MESSAGE (OUTPATIENT)
Dept: PULMONOLOGY | Facility: CLINIC | Age: 53
End: 2023-07-13
Payer: COMMERCIAL

## 2023-07-13 ENCOUNTER — LAB VISIT (OUTPATIENT)
Dept: LAB | Facility: HOSPITAL | Age: 53
End: 2023-07-13
Attending: NURSE PRACTITIONER
Payer: COMMERCIAL

## 2023-07-13 VITALS
OXYGEN SATURATION: 94 % | WEIGHT: 221.31 LBS | HEIGHT: 66 IN | DIASTOLIC BLOOD PRESSURE: 70 MMHG | SYSTOLIC BLOOD PRESSURE: 112 MMHG | HEART RATE: 110 BPM | RESPIRATION RATE: 18 BRPM | BODY MASS INDEX: 35.57 KG/M2 | TEMPERATURE: 98 F

## 2023-07-13 DIAGNOSIS — Z00.00 ROUTINE ADULT HEALTH MAINTENANCE: ICD-10-CM

## 2023-07-13 DIAGNOSIS — Z00.00 ROUTINE ADULT HEALTH MAINTENANCE: Primary | ICD-10-CM

## 2023-07-13 LAB
ALBUMIN SERPL BCP-MCNC: 3.4 G/DL (ref 3.5–5.2)
ALP SERPL-CCNC: 103 U/L (ref 55–135)
ALT SERPL W/O P-5'-P-CCNC: 77 U/L (ref 10–44)
ANION GAP SERPL CALC-SCNC: 12 MMOL/L (ref 8–16)
AST SERPL-CCNC: 66 U/L (ref 10–40)
BASOPHILS # BLD AUTO: 0.13 K/UL (ref 0–0.2)
BASOPHILS NFR BLD: 0.9 % (ref 0–1.9)
BILIRUB SERPL-MCNC: 0.3 MG/DL (ref 0.1–1)
BUN SERPL-MCNC: 17 MG/DL (ref 6–20)
CALCIUM SERPL-MCNC: 9.8 MG/DL (ref 8.7–10.5)
CHLORIDE SERPL-SCNC: 96 MMOL/L (ref 95–110)
CHOLEST SERPL-MCNC: 223 MG/DL (ref 120–199)
CHOLEST/HDLC SERPL: 6.8 {RATIO} (ref 2–5)
CO2 SERPL-SCNC: 30 MMOL/L (ref 23–29)
CREAT SERPL-MCNC: 0.8 MG/DL (ref 0.5–1.4)
DIFFERENTIAL METHOD: ABNORMAL
EOSINOPHIL # BLD AUTO: 0.3 K/UL (ref 0–0.5)
EOSINOPHIL NFR BLD: 2.2 % (ref 0–8)
ERYTHROCYTE [DISTWIDTH] IN BLOOD BY AUTOMATED COUNT: 23.5 % (ref 11.5–14.5)
EST. GFR  (NO RACE VARIABLE): >60 ML/MIN/1.73 M^2
ESTIMATED AVG GLUCOSE: 85 MG/DL (ref 68–131)
GLUCOSE SERPL-MCNC: 88 MG/DL (ref 70–110)
HBA1C MFR BLD: 4.6 % (ref 4–5.6)
HCT VFR BLD AUTO: 40.1 % (ref 37–48.5)
HDLC SERPL-MCNC: 33 MG/DL (ref 40–75)
HDLC SERPL: 14.8 % (ref 20–50)
HGB BLD-MCNC: 11.8 G/DL (ref 12–16)
IMM GRANULOCYTES # BLD AUTO: 0.16 K/UL (ref 0–0.04)
IMM GRANULOCYTES NFR BLD AUTO: 1.1 % (ref 0–0.5)
LDLC SERPL CALC-MCNC: 138.6 MG/DL (ref 63–159)
LYMPHOCYTES # BLD AUTO: 1.2 K/UL (ref 1–4.8)
LYMPHOCYTES NFR BLD: 8 % (ref 18–48)
MCH RBC QN AUTO: 28.1 PG (ref 27–31)
MCHC RBC AUTO-ENTMCNC: 29.4 G/DL (ref 32–36)
MCV RBC AUTO: 96 FL (ref 82–98)
MONOCYTES # BLD AUTO: 0.6 K/UL (ref 0.3–1)
MONOCYTES NFR BLD: 3.8 % (ref 4–15)
NEUTROPHILS # BLD AUTO: 12.5 K/UL (ref 1.8–7.7)
NEUTROPHILS NFR BLD: 84 % (ref 38–73)
NONHDLC SERPL-MCNC: 190 MG/DL
NRBC BLD-RTO: 0 /100 WBC
PLATELET # BLD AUTO: 743 K/UL (ref 150–450)
PMV BLD AUTO: 10.5 FL (ref 9.2–12.9)
POTASSIUM SERPL-SCNC: 3.9 MMOL/L (ref 3.5–5.1)
PROT SERPL-MCNC: 6.9 G/DL (ref 6–8.4)
RBC # BLD AUTO: 4.2 M/UL (ref 4–5.4)
SODIUM SERPL-SCNC: 138 MMOL/L (ref 136–145)
TRIGL SERPL-MCNC: 257 MG/DL (ref 30–150)
WBC # BLD AUTO: 14.84 K/UL (ref 3.9–12.7)

## 2023-07-13 PROCEDURE — 1160F PR REVIEW ALL MEDS BY PRESCRIBER/CLIN PHARMACIST DOCUMENTED: ICD-10-PCS | Mod: CPTII,S$GLB,, | Performed by: NURSE PRACTITIONER

## 2023-07-13 PROCEDURE — 99999 PR PBB SHADOW E&M-EST. PATIENT-LVL V: ICD-10-PCS | Mod: PBBFAC,,, | Performed by: NURSE PRACTITIONER

## 2023-07-13 PROCEDURE — 36415 COLL VENOUS BLD VENIPUNCTURE: CPT | Mod: PO | Performed by: NURSE PRACTITIONER

## 2023-07-13 PROCEDURE — 84443 ASSAY THYROID STIM HORMONE: CPT | Performed by: NURSE PRACTITIONER

## 2023-07-13 PROCEDURE — 1160F RVW MEDS BY RX/DR IN RCRD: CPT | Mod: CPTII,S$GLB,, | Performed by: NURSE PRACTITIONER

## 2023-07-13 PROCEDURE — 3078F DIAST BP <80 MM HG: CPT | Mod: CPTII,S$GLB,, | Performed by: NURSE PRACTITIONER

## 2023-07-13 PROCEDURE — 99999 PR PBB SHADOW E&M-EST. PATIENT-LVL V: CPT | Mod: PBBFAC,,, | Performed by: NURSE PRACTITIONER

## 2023-07-13 PROCEDURE — 99396 PREV VISIT EST AGE 40-64: CPT | Mod: S$GLB,,, | Performed by: NURSE PRACTITIONER

## 2023-07-13 PROCEDURE — 99396 PR PREVENTIVE VISIT,EST,40-64: ICD-10-PCS | Mod: S$GLB,,, | Performed by: NURSE PRACTITIONER

## 2023-07-13 PROCEDURE — 3008F PR BODY MASS INDEX (BMI) DOCUMENTED: ICD-10-PCS | Mod: CPTII,S$GLB,, | Performed by: NURSE PRACTITIONER

## 2023-07-13 PROCEDURE — 1159F MED LIST DOCD IN RCRD: CPT | Mod: CPTII,S$GLB,, | Performed by: NURSE PRACTITIONER

## 2023-07-13 PROCEDURE — 3074F SYST BP LT 130 MM HG: CPT | Mod: CPTII,S$GLB,, | Performed by: NURSE PRACTITIONER

## 2023-07-13 PROCEDURE — 80053 COMPREHEN METABOLIC PANEL: CPT | Performed by: NURSE PRACTITIONER

## 2023-07-13 PROCEDURE — 3078F PR MOST RECENT DIASTOLIC BLOOD PRESSURE < 80 MM HG: ICD-10-PCS | Mod: CPTII,S$GLB,, | Performed by: NURSE PRACTITIONER

## 2023-07-13 PROCEDURE — 84439 ASSAY OF FREE THYROXINE: CPT | Performed by: NURSE PRACTITIONER

## 2023-07-13 PROCEDURE — 80061 LIPID PANEL: CPT | Performed by: NURSE PRACTITIONER

## 2023-07-13 PROCEDURE — 3008F BODY MASS INDEX DOCD: CPT | Mod: CPTII,S$GLB,, | Performed by: NURSE PRACTITIONER

## 2023-07-13 PROCEDURE — 83036 HEMOGLOBIN GLYCOSYLATED A1C: CPT | Performed by: NURSE PRACTITIONER

## 2023-07-13 PROCEDURE — 85025 COMPLETE CBC W/AUTO DIFF WBC: CPT | Performed by: NURSE PRACTITIONER

## 2023-07-13 PROCEDURE — 3074F PR MOST RECENT SYSTOLIC BLOOD PRESSURE < 130 MM HG: ICD-10-PCS | Mod: CPTII,S$GLB,, | Performed by: NURSE PRACTITIONER

## 2023-07-13 PROCEDURE — 1159F PR MEDICATION LIST DOCUMENTED IN MEDICAL RECORD: ICD-10-PCS | Mod: CPTII,S$GLB,, | Performed by: NURSE PRACTITIONER

## 2023-07-13 NOTE — PROGRESS NOTES
Ayanna Alicia  2023  [id number]    Madeleine Enrique MD  Patient Care Team:  Madeleine Enrique MD as PCP - General (Family Medicine)  Jarad Contreras MD as Consulting Physician (Cardiology)  Chicho Lewis MD as Consulting Physician (Rheumatology)  Cullen Steele NP as Nurse Practitioner (Family Medicine)  Agustín Aviles MD as Consulting Physician (Pulmonary Disease)          Visit Type:an urgent visit for a new problem    Chief Complaint:  Chief Complaint   Patient presents with    Annual Exam       History of Present Illness:    52-year-old female presents today for annual exam and blood work.  Pt on 6L NC   Pt states she is going to [facility] for evaluation for lung transplant.    No other complaints experienced at this time    History:  Past Medical History:   Diagnosis Date    Abnormal Pap smear of cervix     in the past with repeat pap smear okay.    Acute interstitial pneumonitis     Allergic rhinitis, cause unspecified     Arthritis of both knees     Asthma     Eczema     Fatty liver 10/2014    Fibrocystic breast changes     Headache(784.0)     Hepatomegaly 10/2014    Hypertension     Liver cyst 10/2014    Multinodular goiter     Followed by ENT - Dr. Nicolas Gray    Polymenorrhea 2008    TMJ (dislocation of temporomandibular joint)     Uterine fibroid     in the past    Vitamin D deficiency disease      Past Surgical History:   Procedure Laterality Date    BRONCHOSCOPY Bilateral 2023    Procedure: Bronchoscopy - insert lighted tube into airway to take a biopsy of lung;  Surgeon: Agustín Aviles MD;  Location: [facility];  Service: Pulmonary;  Laterality: Bilateral;     SECTION, CLASSIC      x 1    COLONOSCOPY N/A 2020    Procedure: COLONOSCOPY;  Surgeon: Angie Magana MD;  Location: [facility];  Service: Endoscopy;  Laterality: N/A;    HYSTERECTOMY      MULTIPLE TOOTH EXTRACTIONS      PARTIAL HYSTERECTOMY  2013    Due to fibroids     Family History   Problem  Relation Age of Onset    Stroke Mother     Heart disease Mother     Diverticulitis Mother     Heart disease Father 63        MI/CAD    Hypertension Father     Stroke Sister     No Known Problems Brother     Cancer Maternal Grandmother 60        Rectal and stomach cancer    Stroke Maternal Grandfather     Diabetes Maternal Grandfather     Peripheral vascular disease Maternal Grandfather     Cancer Maternal Aunt 50        Stomach cancer    Cancer Maternal Aunt 66        Lung cancer (smoker)    Migraines Cousin     Cataracts Cousin      Social History     Socioeconomic History    Marital status: Single    Number of children: 0   Occupational History    Occupation:      Employer: [employer]     Comment: [employer]   Tobacco Use    Smoking status: Never     Passive exposure: Past    Smokeless tobacco: Never   Substance and Sexual Activity    Alcohol use: Not Currently     Comment: last use 03/2022    Drug use: Not Currently     Frequency: 4.0 times per week     Types: Marijuana     Comment: last year was last use 03/2022    Sexual activity: Yes     Partners: Female   Social History Narrative    She wears seatbelt. Her son was killed in 2010.     Patient Active Problem List   Diagnosis    HTN (hypertension)    Multinodular goiter    Seasonal allergies    GERD (gastroesophageal reflux disease)    Vitamin D deficiency    Surgical menopause    Patella-femoral syndrome    Localized osteoarthrosis not specified whether primary or secondary, lower leg    Benign colon polyp    GLENN on CPAP    Elevated IgE level    Elevated rheumatoid factor    Restrictive lung disease    PLMD (periodic limb movement disorder)    Exercise hypoxemia    Interstitial lung disease    Rheumatoid arthritis with positive rheumatoid factor    Cough    Interstitial pulmonary disease, unspecified    High risk medication use    Obesity with serious comorbidity    UIP (usual interstitial pneumonitis)    Mild intermittent asthma    BMI  38.0-38.9,adult    Seropositive rheumatoid arthritis     Review of patient's allergies indicates:   Allergen Reactions    Doxycycline Nausea Only     Other reaction(s): Nausea    Fluticasone Other (See Comments)     Other reaction(s): Epistaxis      Penicillins      Other reaction(s): unknown  Pt tolerated ceftriaxone       The following were reviewed at this visit: active problem list, medication list, allergies, family history, social history, and health maintenance.    Medications:  Current Outpatient Medications on File Prior to Visit   Medication Sig Dispense Refill    albuterol (PROVENTIL/VENTOLIN HFA) 90 mcg/actuation inhaler INHALE 1 TO 2 PUFFS BY MOUTH INTO THE LUNGS EVERY 4 TO 6 HOURS AS NEEDED FOR WHEEZING OR SHORTNESS OF BREATH 8.5 g 5    amLODIPine (NORVASC) 10 MG tablet Take 1 tablet (10 mg total) by mouth once daily. 90 tablet 1    ascorbic acid, vitamin C, (VITAMIN C) 500 MG tablet Take 500 mg by mouth once daily.      benzonatate (TESSALON) 200 MG capsule Take 1 capsule (200 mg total) by mouth 3 (three) times daily as needed for Cough. 90 capsule 11    calcium/magnesium/vit B comp (CALCIUM-MAGNESIUM-B COMPLEX ORAL) Take 1 tablet by mouth once daily at 6am.      fluticasone-umeclidin-vilanter (TRELEGY ELLIPTA) 200-62.5-25 mcg inhaler Inhale 1 puff into the lungs once daily. 60 each 11    hydroCHLOROthiazide (HYDRODIURIL) 12.5 MG Tab Take 1 tablet (12.5 mg total) by mouth once daily. 90 tablet 1    ipratropium (ATROVENT) 0.02 % nebulizer solution Take 2.5 mLs (500 mcg total) by nebulization 4 (four) times daily. Rescue 300 mL 11    levocetirizine (XYZAL) 5 MG tablet TAKE 1 TABLET(5 MG) BY MOUTH EVERY DAY 90 tablet 1    loperamide (IMODIUM) 2 mg capsule Take 1 capsule (2 mg total) by mouth daily as needed for Diarrhea. 90 capsule 0    metOLazone (ZAROXOLYN) 2.5 MG tablet Take 1 tablet (2.5 mg total) by mouth every Mon, Wed, Fri. 36 tablet 3    montelukast (SINGULAIR) 10 mg tablet Take 1 tablet (10 mg  total) by mouth every evening. 30 tablet 11    mv-minerals/FA/omega 3,6,9 #3 (WOMEN'S 50+ ADVANCED ORAL) Take 1 tablet by mouth Daily.      nintedanib (OFEV) 150 mg Cap Take 1 capsule (150 mg total) by mouth 2 (two) times a day. 60 capsule 11    omega-3s/dha/epa/fish oil/D3 (VITAMIN-D + OMEGA-3 ORAL) Take 2 tablets by mouth once daily at 6am.      pantoprazole (PROTONIX) 40 MG tablet Take 1 tablet (40 mg total) by mouth once daily. 30 tablet 11    predniSONE (DELTASONE) 10 MG tablet Take 1 tablet (10 mg total) by mouth once daily. 30 tablet 3    sertraline (ZOLOFT) 50 MG tablet Take 1 tablet (50 mg total) by mouth once daily. 90 tablet 3    vitamin D (VITAMIN D3) 1000 units Tab Take 1,000 Units by mouth once daily.       Current Facility-Administered Medications on File Prior to Visit   Medication Dose Route Frequency Provider Last Rate Last Admin    sodium chloride 0.9% flush 10 mL  10 mL Intravenous PRN Agustín Aviles MD           Medications have been reviewed and reconciled with patient at this visit.  Barriers to medications reviewed with patient.    Adverse reactions to current medications reviewed with patient..    Over the counter medications reviewed and reconciled with patient.    Exam:  Wt Readings from Last 3 Encounters:   07/13/23 100.4 kg (221 lb 5.5 oz)   07/06/23 100.4 kg (221 lb 5.5 oz)   06/09/23 100.1 kg (220 lb 10.9 oz)     Temp Readings from Last 3 Encounters:   07/13/23 98.3 °F (36.8 °C) (Oral)   07/07/23 97.7 °F (36.5 °C)   06/09/23 97.5 °F (36.4 °C)     BP Readings from Last 3 Encounters:   07/13/23 112/70   07/07/23 118/74   07/06/23 (!) 146/86     Pulse Readings from Last 3 Encounters:   07/13/23 110   07/07/23 95   07/06/23 (!) 121     Body mass index is 35.73 kg/m².      Review of Systems   Constitutional:  Negative for fever.   Respiratory:  Positive for shortness of breath. Negative for cough and wheezing.    Cardiovascular:  Negative for chest pain and palpitations.    Gastrointestinal:  Negative for nausea.   Neurological:  Negative for speech change, weakness and headaches.   All other systems reviewed and are negative.  Physical Exam  Vitals and nursing note reviewed.   Constitutional:       Appearance: Normal appearance. She is obese.   HENT:      Head: Normocephalic and atraumatic.      Right Ear: Tympanic membrane, ear canal and external ear normal.      Left Ear: Tympanic membrane, ear canal and external ear normal.      Nose: Nose normal.      Mouth/Throat:      Mouth: Mucous membranes are moist.      Pharynx: Oropharynx is clear.   Eyes:      Extraocular Movements: Extraocular movements intact.      Conjunctiva/sclera: Conjunctivae normal.      Pupils: Pupils are equal, round, and reactive to light.   Cardiovascular:      Rate and Rhythm: Normal rate and regular rhythm.      Pulses: Normal pulses.      Heart sounds: Normal heart sounds.   Pulmonary:      Effort: No respiratory distress.      Breath sounds: No stridor. No wheezing, rhonchi or rales.      Comments: SOB   6L NC  Chest:      Chest wall: No tenderness.   Abdominal:      General: Bowel sounds are normal.      Palpations: Abdomen is soft.   Musculoskeletal:         General: Normal range of motion.      Cervical back: Normal range of motion and neck supple.   Skin:     General: Skin is warm and dry.      Capillary Refill: Capillary refill takes less than 2 seconds.   Neurological:      General: No focal deficit present.      Mental Status: She is alert and oriented to person, place, and time.   Psychiatric:         Mood and Affect: Mood normal.         Behavior: Behavior normal.         Thought Content: Thought content normal.         Judgment: Judgment normal.       Laboratory Reviewed ({Yes)  Lab Results   Component Value Date    WBC 16.44 (H) 07/07/2023    HGB 11.1 (L) 07/07/2023    HCT 36.7 (L) 07/07/2023     (H) 07/07/2023    CHOL 232 (H) 05/01/2023    TRIG 92 05/01/2023    HDL 41 05/01/2023    ALT  76 (H) 07/07/2023    AST 61 (H) 07/07/2023     07/07/2023    K 3.3 (L) 07/07/2023    CL 93 (L) 07/07/2023    CREATININE 0.8 07/07/2023    BUN 8 07/07/2023    CO2 29 07/07/2023    TSH 0.854 04/28/2022    INR 1.0 04/27/2022    GLUF 77 01/03/2007    HGBA1C 5.1 11/01/2022       Ayanna was seen today for annual exam.    Diagnoses and all orders for this visit:    Routine adult health maintenance  -     Lipid Panel; Future  -     T4, Free; Future  -     TSH; Future  -     Hemoglobin A1C; Future  -     Comprehensive Metabolic Panel; Future  -     CBC Auto Differential; Future          The current medical regimen is effective;  continue present plan and medications.       Care Plan/Goals: Reviewed    Goals         Blood Pressure < 140/90 (pt-stated)         Ayanna was seen today for annual exam.    Diagnoses and all orders for this visit:    Routine adult health maintenance  -     Lipid Panel; Future  -     T4, Free; Future  -     TSH; Future  -     Hemoglobin A1C; Future  -     Comprehensive Metabolic Panel; Future  -     CBC Auto Differential; Future         Follow up: Follow up for with lexi ENGLISH in 3 months.    After visit summary was printed and given to patient upon discharge today.  Patient goals and care plan are included in After Visit Summary.

## 2023-07-13 NOTE — TELEPHONE ENCOUNTER
Called pt to inform of results   -RT   Orders Placed This Encounter   Procedures    Ambulatory referral/consult to Gastroenterology     Standing Status:   Future     Standing Expiration Date:   4/30/2024     Referral Priority:   Routine     Referral Type:   Consultation     Referral Reason:   Specialty Services Required     Requested Specialty:   Gastroenterology     Number of Visits Requested:   1

## 2023-07-14 ENCOUNTER — TELEPHONE (OUTPATIENT)
Dept: PULMONOLOGY | Facility: CLINIC | Age: 53
End: 2023-07-14
Payer: COMMERCIAL

## 2023-07-14 LAB
T4 FREE SERPL-MCNC: 1.05 NG/DL (ref 0.71–1.51)
TSH SERPL DL<=0.005 MIU/L-ACNC: 0.71 UIU/ML (ref 0.4–4)

## 2023-07-14 NOTE — TELEPHONE ENCOUNTER
----- Message from Meka Greenfield sent at 7/14/2023  8:41 AM CDT -----  Name of Who is Calling: [facility]        What is the request in detail: Is needing to speak regarding pt referral. Needing pt lung pathology, bronchoscopy . Stated they already have cytology. Can fax to .        Can the clinic reply by [facility]: no        What Number to Call Back [facility]: [phone] ask for Ms Rodrigez

## 2023-07-20 ENCOUNTER — HOSPITAL ENCOUNTER (OUTPATIENT)
Dept: RADIOLOGY | Facility: HOSPITAL | Age: 53
Discharge: HOME | End: 2023-07-20
Attending: FAMILY MEDICINE
Payer: COMMERCIAL

## 2023-07-20 VITALS — BODY MASS INDEX: 35.43 KG/M2 | WEIGHT: 220.44 LBS | HEIGHT: 66 IN

## 2023-07-20 DIAGNOSIS — Z12.31 OTHER SCREENING MAMMOGRAM: ICD-10-CM

## 2023-07-20 PROCEDURE — 77063 BREAST TOMOSYNTHESIS BI: CPT | Mod: 26,,, | Performed by: RADIOLOGY

## 2023-07-20 PROCEDURE — 77063 MAMMO DIGITAL SCREENING BILAT WITH TOMO: ICD-10-PCS | Mod: 26,,, | Performed by: RADIOLOGY

## 2023-07-20 PROCEDURE — 77067 SCR MAMMO BI INCL CAD: CPT | Mod: TC

## 2023-07-20 PROCEDURE — 77067 MAMMO DIGITAL SCREENING BILAT WITH TOMO: ICD-10-PCS | Mod: 26,,, | Performed by: RADIOLOGY

## 2023-07-20 PROCEDURE — 77067 SCR MAMMO BI INCL CAD: CPT | Mod: 26,,, | Performed by: RADIOLOGY

## 2023-07-24 ENCOUNTER — TELEPHONE (OUTPATIENT)
Dept: FAMILY MEDICINE | Facility: CLINIC | Age: 53
End: 2023-07-24
Payer: COMMERCIAL

## 2023-07-24 NOTE — TELEPHONE ENCOUNTER
Sarah at [facility] now states she is sending fax for PA for pt visit.

## 2023-07-24 NOTE — TELEPHONE ENCOUNTER
----- Message from Joslyn Sawant sent at 7/24/2023 11:24 AM CDT -----  Contact: pt/[phone]  Type:  Rep  Call    Who Called: Ms. Smith  ( Transplant  clinic)   Would the patient rather a call back or a response via [facility]? Call   Best Call Back Number:[phone]  Additional Information:  Ms. Smith  is  calling  regarding  PCP  referral that  was  sent to  this  office  which  was on  06/13 or  06/14. Please  call this  rep  regarding this  issue.

## 2023-07-24 NOTE — TELEPHONE ENCOUNTER
----- Message from Joslyn Sawant sent at 7/24/2023 11:24 AM CDT -----  Contact: pt/[phone]  Type:  Rep  Call    Who Called: Ms. Smith  ( Transplant  clinic)   Would the patient rather a call back or a response via [facility]? Call   Best Call Back Number:[phone]  Additional Information:  Ms. Smith  is  calling  regarding  PCP  referral that  was  sent to  this  office  which  was on  06/13 or  06/14. Please  call this  rep  regarding this  issue.

## 2023-07-25 ENCOUNTER — TELEPHONE (OUTPATIENT)
Dept: FAMILY MEDICINE | Facility: CLINIC | Age: 53
End: 2023-07-25
Payer: COMMERCIAL

## 2023-07-26 ENCOUNTER — PATIENT MESSAGE (OUTPATIENT)
Dept: GASTROENTEROLOGY | Facility: CLINIC | Age: 53
End: 2023-07-26

## 2023-07-26 ENCOUNTER — PATIENT MESSAGE (OUTPATIENT)
Dept: PULMONOLOGY | Facility: CLINIC | Age: 53
End: 2023-07-26
Payer: COMMERCIAL

## 2023-07-26 ENCOUNTER — OFFICE VISIT (OUTPATIENT)
Dept: GASTROENTEROLOGY | Facility: CLINIC | Age: 53
End: 2023-07-26
Payer: COMMERCIAL

## 2023-07-26 VITALS
SYSTOLIC BLOOD PRESSURE: 128 MMHG | DIASTOLIC BLOOD PRESSURE: 71 MMHG | HEART RATE: 112 BPM | HEIGHT: 66 IN | WEIGHT: 222.56 LBS | BODY MASS INDEX: 35.77 KG/M2

## 2023-07-26 DIAGNOSIS — K21.9 GASTROESOPHAGEAL REFLUX DISEASE, UNSPECIFIED WHETHER ESOPHAGITIS PRESENT: Primary | ICD-10-CM

## 2023-07-26 PROCEDURE — 3074F PR MOST RECENT SYSTOLIC BLOOD PRESSURE < 130 MM HG: ICD-10-PCS | Mod: CPTII,S$GLB,, | Performed by: INTERNAL MEDICINE

## 2023-07-26 PROCEDURE — 3078F PR MOST RECENT DIASTOLIC BLOOD PRESSURE < 80 MM HG: ICD-10-PCS | Mod: CPTII,S$GLB,, | Performed by: INTERNAL MEDICINE

## 2023-07-26 PROCEDURE — 3078F DIAST BP <80 MM HG: CPT | Mod: CPTII,S$GLB,, | Performed by: INTERNAL MEDICINE

## 2023-07-26 PROCEDURE — 99214 PR OFFICE/OUTPT VISIT, EST, LEVL IV, 30-39 MIN: ICD-10-PCS | Mod: S$GLB,,, | Performed by: INTERNAL MEDICINE

## 2023-07-26 PROCEDURE — 99999 PR PBB SHADOW E&M-EST. PATIENT-LVL IV: ICD-10-PCS | Mod: PBBFAC,,, | Performed by: INTERNAL MEDICINE

## 2023-07-26 PROCEDURE — 99214 OFFICE O/P EST MOD 30 MIN: CPT | Mod: S$GLB,,, | Performed by: INTERNAL MEDICINE

## 2023-07-26 PROCEDURE — 99999 PR PBB SHADOW E&M-EST. PATIENT-LVL IV: CPT | Mod: PBBFAC,,, | Performed by: INTERNAL MEDICINE

## 2023-07-26 PROCEDURE — 3008F BODY MASS INDEX DOCD: CPT | Mod: CPTII,S$GLB,, | Performed by: INTERNAL MEDICINE

## 2023-07-26 PROCEDURE — 3044F HG A1C LEVEL LT 7.0%: CPT | Mod: CPTII,S$GLB,, | Performed by: INTERNAL MEDICINE

## 2023-07-26 PROCEDURE — 3008F PR BODY MASS INDEX (BMI) DOCUMENTED: ICD-10-PCS | Mod: CPTII,S$GLB,, | Performed by: INTERNAL MEDICINE

## 2023-07-26 PROCEDURE — 3044F PR MOST RECENT HEMOGLOBIN A1C LEVEL <7.0%: ICD-10-PCS | Mod: CPTII,S$GLB,, | Performed by: INTERNAL MEDICINE

## 2023-07-26 PROCEDURE — 3074F SYST BP LT 130 MM HG: CPT | Mod: CPTII,S$GLB,, | Performed by: INTERNAL MEDICINE

## 2023-07-26 RX ORDER — OMEPRAZOLE 40 MG/1
40 CAPSULE, DELAYED RELEASE ORAL
Qty: 180 CAPSULE | Refills: 0 | Status: ON HOLD | OUTPATIENT
Start: 2023-07-26 | End: 2023-08-05 | Stop reason: HOSPADM

## 2023-07-31 ENCOUNTER — TELEPHONE (OUTPATIENT)
Dept: PULMONOLOGY | Facility: CLINIC | Age: 53
End: 2023-07-31
Payer: COMMERCIAL

## 2023-07-31 ENCOUNTER — HOSPITAL ENCOUNTER (INPATIENT)
Facility: HOSPITAL | Age: 53
LOS: 4 days | Discharge: HOME | DRG: 189 | End: 2023-08-05
Attending: EMERGENCY MEDICINE | Admitting: FAMILY MEDICINE
Payer: COMMERCIAL

## 2023-07-31 ENCOUNTER — PATIENT MESSAGE (OUTPATIENT)
Dept: PULMONOLOGY | Facility: CLINIC | Age: 53
End: 2023-07-31
Payer: COMMERCIAL

## 2023-07-31 ENCOUNTER — PATIENT MESSAGE (OUTPATIENT)
Dept: RHEUMATOLOGY | Facility: CLINIC | Age: 53
End: 2023-07-31
Payer: COMMERCIAL

## 2023-07-31 DIAGNOSIS — J18.9 PNEUMONIA OF BOTH LOWER LOBES DUE TO INFECTIOUS ORGANISM: ICD-10-CM

## 2023-07-31 DIAGNOSIS — G47.33 OSA ON CPAP: ICD-10-CM

## 2023-07-31 DIAGNOSIS — J84.9 INTERSTITIAL PULMONARY DISEASE, UNSPECIFIED: ICD-10-CM

## 2023-07-31 DIAGNOSIS — J98.4 RESTRICTIVE LUNG DISEASE: ICD-10-CM

## 2023-07-31 DIAGNOSIS — J84.112 UIP (USUAL INTERSTITIAL PNEUMONITIS): ICD-10-CM

## 2023-07-31 DIAGNOSIS — J45.20 MILD INTERMITTENT ASTHMA WITHOUT COMPLICATION: ICD-10-CM

## 2023-07-31 DIAGNOSIS — J96.21 ACUTE ON CHRONIC RESPIRATORY FAILURE WITH HYPOXIA: Primary | ICD-10-CM

## 2023-07-31 DIAGNOSIS — R49.0 DYSPHONIA: ICD-10-CM

## 2023-07-31 PROBLEM — R79.89 ELEVATED TROPONIN: Status: ACTIVE | Noted: 2023-07-31

## 2023-07-31 PROBLEM — J96.20 ACUTE ON CHRONIC RESPIRATORY FAILURE: Status: ACTIVE | Noted: 2022-04-27

## 2023-07-31 LAB
ALBUMIN SERPL BCP-MCNC: 3.2 G/DL (ref 3.5–5.2)
ALLENS TEST: ABNORMAL
ALP SERPL-CCNC: 90 U/L (ref 55–135)
ALT SERPL W/O P-5'-P-CCNC: 47 U/L (ref 10–44)
ANION GAP SERPL CALC-SCNC: 13 MMOL/L (ref 8–16)
AST SERPL-CCNC: 46 U/L (ref 10–40)
BASOPHILS # BLD AUTO: 0.06 K/UL (ref 0–0.2)
BASOPHILS NFR BLD: 0.4 % (ref 0–1.9)
BILIRUB SERPL-MCNC: 0.4 MG/DL (ref 0.1–1)
BNP SERPL-MCNC: 40 PG/ML (ref 0–99)
BUN SERPL-MCNC: 9 MG/DL (ref 6–20)
CALCIUM SERPL-MCNC: 10 MG/DL (ref 8.7–10.5)
CHLORIDE SERPL-SCNC: 98 MMOL/L (ref 95–110)
CO2 SERPL-SCNC: 28 MMOL/L (ref 23–29)
CREAT SERPL-MCNC: 0.9 MG/DL (ref 0.5–1.4)
DELSYS: ABNORMAL
DIFFERENTIAL METHOD: ABNORMAL
EOSINOPHIL # BLD AUTO: 0.1 K/UL (ref 0–0.5)
EOSINOPHIL NFR BLD: 0.8 % (ref 0–8)
ERYTHROCYTE [DISTWIDTH] IN BLOOD BY AUTOMATED COUNT: 20.9 % (ref 11.5–14.5)
EST. GFR  (NO RACE VARIABLE): >60 ML/MIN/1.73 M^2
FLOW: 6
GLUCOSE SERPL-MCNC: 125 MG/DL (ref 70–110)
HCO3 UR-SCNC: 28.1 MMOL/L (ref 24–28)
HCT VFR BLD AUTO: 39.6 % (ref 37–48.5)
HGB BLD-MCNC: 12.3 G/DL (ref 12–16)
IMM GRANULOCYTES # BLD AUTO: 0.08 K/UL (ref 0–0.04)
IMM GRANULOCYTES NFR BLD AUTO: 0.5 % (ref 0–0.5)
INFLUENZA A, MOLECULAR: NEGATIVE
INFLUENZA B, MOLECULAR: NEGATIVE
LDH SERPL L TO P-CCNC: 316 U/L (ref 110–260)
LYMPHOCYTES # BLD AUTO: 0.6 K/UL (ref 1–4.8)
LYMPHOCYTES NFR BLD: 3.6 % (ref 18–48)
MCH RBC QN AUTO: 29.2 PG (ref 27–31)
MCHC RBC AUTO-ENTMCNC: 31.1 G/DL (ref 32–36)
MCV RBC AUTO: 94 FL (ref 82–98)
MODE: ABNORMAL
MONOCYTES # BLD AUTO: 1 K/UL (ref 0.3–1)
MONOCYTES NFR BLD: 5.6 % (ref 4–15)
NEUTROPHILS # BLD AUTO: 15.2 K/UL (ref 1.8–7.7)
NEUTROPHILS NFR BLD: 89.1 % (ref 38–73)
NRBC BLD-RTO: 0 /100 WBC
PCO2 BLDA: 37.7 MMHG (ref 35–45)
PH SMN: 7.48 [PH] (ref 7.35–7.45)
PLATELET # BLD AUTO: 299 K/UL (ref 150–450)
PMV BLD AUTO: 9.4 FL (ref 9.2–12.9)
PO2 BLDA: 43 MMHG (ref 80–100)
POC BE: 5 MMOL/L
POC SATURATED O2: 82 % (ref 95–100)
POTASSIUM SERPL-SCNC: 3.5 MMOL/L (ref 3.5–5.1)
PROCALCITONIN SERPL IA-MCNC: 0.15 NG/ML
PROT SERPL-MCNC: 7.6 G/DL (ref 6–8.4)
RBC # BLD AUTO: 4.21 M/UL (ref 4–5.4)
SAMPLE: ABNORMAL
SARS-COV-2 RDRP RESP QL NAA+PROBE: NEGATIVE
SITE: ABNORMAL
SODIUM SERPL-SCNC: 139 MMOL/L (ref 136–145)
SP02: 92
SPECIMEN SOURCE: NORMAL
TROPONIN I SERPL DL<=0.01 NG/ML-MCNC: 0.07 NG/ML (ref 0–0.03)
TROPONIN I SERPL DL<=0.01 NG/ML-MCNC: 0.09 NG/ML (ref 0–0.03)
TROPONIN I SERPL DL<=0.01 NG/ML-MCNC: 0.1 NG/ML (ref 0–0.03)
WBC # BLD AUTO: 17.06 K/UL (ref 3.9–12.7)

## 2023-07-31 PROCEDURE — G0378 HOSPITAL OBSERVATION PER HR: HCPCS

## 2023-07-31 PROCEDURE — 25000003 PHARM REV CODE 250: Performed by: NURSE PRACTITIONER

## 2023-07-31 PROCEDURE — 94640 AIRWAY INHALATION TREATMENT: CPT

## 2023-07-31 PROCEDURE — 63600175 PHARM REV CODE 636 W HCPCS: Performed by: EMERGENCY MEDICINE

## 2023-07-31 PROCEDURE — 87502 INFLUENZA DNA AMP PROBE: CPT | Performed by: EMERGENCY MEDICINE

## 2023-07-31 PROCEDURE — 86698 HISTOPLASMA ANTIBODY: CPT | Performed by: NURSE PRACTITIONER

## 2023-07-31 PROCEDURE — 84145 PROCALCITONIN (PCT): CPT | Performed by: NURSE PRACTITIONER

## 2023-07-31 PROCEDURE — 84484 ASSAY OF TROPONIN QUANT: CPT | Performed by: EMERGENCY MEDICINE

## 2023-07-31 PROCEDURE — U0002 COVID-19 LAB TEST NON-CDC: HCPCS | Performed by: EMERGENCY MEDICINE

## 2023-07-31 PROCEDURE — 99900035 HC TECH TIME PER 15 MIN (STAT)

## 2023-07-31 PROCEDURE — 80053 COMPREHEN METABOLIC PANEL: CPT | Performed by: EMERGENCY MEDICINE

## 2023-07-31 PROCEDURE — 25000003 PHARM REV CODE 250: Performed by: FAMILY MEDICINE

## 2023-07-31 PROCEDURE — 87040 BLOOD CULTURE FOR BACTERIA: CPT | Mod: 59 | Performed by: EMERGENCY MEDICINE

## 2023-07-31 PROCEDURE — 25000242 PHARM REV CODE 250 ALT 637 W/ HCPCS: Performed by: FAMILY MEDICINE

## 2023-07-31 PROCEDURE — 25000242 PHARM REV CODE 250 ALT 637 W/ HCPCS: Performed by: EMERGENCY MEDICINE

## 2023-07-31 PROCEDURE — 94799 UNLISTED PULMONARY SVC/PX: CPT

## 2023-07-31 PROCEDURE — 83880 ASSAY OF NATRIURETIC PEPTIDE: CPT | Performed by: EMERGENCY MEDICINE

## 2023-07-31 PROCEDURE — 25000242 PHARM REV CODE 250 ALT 637 W/ HCPCS: Performed by: NURSE PRACTITIONER

## 2023-07-31 PROCEDURE — 27100171 HC OXYGEN HIGH FLOW UP TO 24 HOURS

## 2023-07-31 PROCEDURE — 84484 ASSAY OF TROPONIN QUANT: CPT | Mod: 91 | Performed by: FAMILY MEDICINE

## 2023-07-31 PROCEDURE — 94664 DEMO&/EVAL PT USE INHALER: CPT | Mod: XB

## 2023-07-31 PROCEDURE — 63600175 PHARM REV CODE 636 W HCPCS: Performed by: NURSE PRACTITIONER

## 2023-07-31 PROCEDURE — 87502 INFLUENZA DNA AMP PROBE: CPT

## 2023-07-31 PROCEDURE — 86606 ASPERGILLUS ANTIBODY: CPT | Performed by: NURSE PRACTITIONER

## 2023-07-31 PROCEDURE — 83615 LACTATE (LD) (LDH) ENZYME: CPT | Performed by: NURSE PRACTITIONER

## 2023-07-31 PROCEDURE — 27000190 HC CPAP FULL FACE MASK W/VALVE

## 2023-07-31 PROCEDURE — 36600 WITHDRAWAL OF ARTERIAL BLOOD: CPT

## 2023-07-31 PROCEDURE — 82803 BLOOD GASES ANY COMBINATION: CPT

## 2023-07-31 PROCEDURE — 85025 COMPLETE CBC W/AUTO DIFF WBC: CPT | Performed by: EMERGENCY MEDICINE

## 2023-07-31 PROCEDURE — 96372 THER/PROPH/DIAG INJ SC/IM: CPT | Performed by: EMERGENCY MEDICINE

## 2023-07-31 PROCEDURE — 63600175 PHARM REV CODE 636 W HCPCS: Performed by: FAMILY MEDICINE

## 2023-07-31 PROCEDURE — 27000646 HC AEROBIKA DEVICE

## 2023-07-31 PROCEDURE — 25000003 PHARM REV CODE 250: Performed by: HOSPITALIST

## 2023-07-31 PROCEDURE — 94761 N-INVAS EAR/PLS OXIMETRY MLT: CPT

## 2023-07-31 PROCEDURE — 99291 CRITICAL CARE FIRST HOUR: CPT

## 2023-07-31 PROCEDURE — 94640 AIRWAY INHALATION TREATMENT: CPT | Mod: XB

## 2023-07-31 PROCEDURE — 27000221 HC OXYGEN, UP TO 24 HOURS

## 2023-07-31 PROCEDURE — 94660 CPAP INITIATION&MGMT: CPT

## 2023-07-31 RX ORDER — SODIUM CHLORIDE 0.9 % (FLUSH) 0.9 %
.1-1 SYRINGE (ML) INJECTION
Status: DISCONTINUED | OUTPATIENT
Start: 2023-07-31 | End: 2023-08-05 | Stop reason: HOSPADM

## 2023-07-31 RX ORDER — PANTOPRAZOLE SODIUM 40 MG/1
40 TABLET, DELAYED RELEASE ORAL DAILY
Status: DISCONTINUED | OUTPATIENT
Start: 2023-07-31 | End: 2023-07-31 | Stop reason: ALTCHOICE

## 2023-07-31 RX ORDER — ARFORMOTEROL TARTRATE 15 UG/2ML
15 SOLUTION RESPIRATORY (INHALATION) 2 TIMES DAILY
Status: DISCONTINUED | OUTPATIENT
Start: 2023-07-31 | End: 2023-08-05 | Stop reason: HOSPADM

## 2023-07-31 RX ORDER — IPRATROPIUM BROMIDE AND ALBUTEROL SULFATE 2.5; .5 MG/3ML; MG/3ML
3 SOLUTION RESPIRATORY (INHALATION)
Status: COMPLETED | OUTPATIENT
Start: 2023-07-31 | End: 2023-07-31

## 2023-07-31 RX ORDER — SODIUM CHLORIDE 0.9 % (FLUSH) 0.9 %
10 SYRINGE (ML) INJECTION EVERY 12 HOURS PRN
Status: DISCONTINUED | OUTPATIENT
Start: 2023-07-31 | End: 2023-08-05 | Stop reason: HOSPADM

## 2023-07-31 RX ORDER — SODIUM CHLORIDE 0.9 % (FLUSH) 0.9 %
3 SYRINGE (ML) INJECTION
Status: DISCONTINUED | OUTPATIENT
Start: 2023-07-31 | End: 2023-08-05 | Stop reason: HOSPADM

## 2023-07-31 RX ORDER — LEVOCETIRIZINE DIHYDROCHLORIDE 5 MG/1
5 TABLET, FILM COATED ORAL DAILY
Status: DISCONTINUED | OUTPATIENT
Start: 2023-08-01 | End: 2023-07-31 | Stop reason: CLARIF

## 2023-07-31 RX ORDER — CETIRIZINE HYDROCHLORIDE 10 MG/1
10 TABLET ORAL DAILY
Status: DISCONTINUED | OUTPATIENT
Start: 2023-08-01 | End: 2023-08-05 | Stop reason: HOSPADM

## 2023-07-31 RX ORDER — TERBUTALINE SULFATE 1 MG/ML
0.25 INJECTION SUBCUTANEOUS ONCE
Status: COMPLETED | OUTPATIENT
Start: 2023-07-31 | End: 2023-07-31

## 2023-07-31 RX ORDER — FUROSEMIDE 10 MG/ML
40 INJECTION INTRAMUSCULAR; INTRAVENOUS
Status: DISCONTINUED | OUTPATIENT
Start: 2023-07-31 | End: 2023-08-02

## 2023-07-31 RX ORDER — PREDNISONE 20 MG/1
60 TABLET ORAL
Status: COMPLETED | OUTPATIENT
Start: 2023-07-31 | End: 2023-07-31

## 2023-07-31 RX ORDER — MONTELUKAST SODIUM 10 MG/1
10 TABLET ORAL NIGHTLY
Status: DISCONTINUED | OUTPATIENT
Start: 2023-07-31 | End: 2023-08-05 | Stop reason: HOSPADM

## 2023-07-31 RX ORDER — LEVALBUTEROL INHALATION SOLUTION 0.63 MG/3ML
0.31 SOLUTION RESPIRATORY (INHALATION) EVERY 8 HOURS
Status: DISCONTINUED | OUTPATIENT
Start: 2023-07-31 | End: 2023-08-02

## 2023-07-31 RX ORDER — OMEPRAZOLE 40 MG/1
40 CAPSULE, DELAYED RELEASE ORAL
Status: DISCONTINUED | OUTPATIENT
Start: 2023-08-01 | End: 2023-08-05 | Stop reason: HOSPADM

## 2023-07-31 RX ORDER — ACETAMINOPHEN 325 MG/1
650 TABLET ORAL EVERY 6 HOURS PRN
Status: DISCONTINUED | OUTPATIENT
Start: 2023-07-31 | End: 2023-08-05 | Stop reason: HOSPADM

## 2023-07-31 RX ORDER — LEVOFLOXACIN 5 MG/ML
750 INJECTION, SOLUTION INTRAVENOUS
Status: COMPLETED | OUTPATIENT
Start: 2023-07-31 | End: 2023-07-31

## 2023-07-31 RX ORDER — LINEZOLID 600 MG/1
600 TABLET, FILM COATED ORAL EVERY 12 HOURS
Status: DISCONTINUED | OUTPATIENT
Start: 2023-07-31 | End: 2023-08-05 | Stop reason: HOSPADM

## 2023-07-31 RX ORDER — IPRATROPIUM BROMIDE 0.5 MG/2.5ML
0.5 SOLUTION RESPIRATORY (INHALATION) EVERY 12 HOURS
Status: DISCONTINUED | OUTPATIENT
Start: 2023-07-31 | End: 2023-08-05 | Stop reason: HOSPADM

## 2023-07-31 RX ORDER — SODIUM CHLORIDE 9 MG/ML
INJECTION, SOLUTION INTRAVENOUS CONTINUOUS
Status: DISCONTINUED | OUTPATIENT
Start: 2023-07-31 | End: 2023-08-05 | Stop reason: HOSPADM

## 2023-07-31 RX ADMIN — LEVOFLOXACIN 750 MG: 750 INJECTION, SOLUTION INTRAVENOUS at 02:07

## 2023-07-31 RX ADMIN — CEFTRIAXONE 2 G: 2 INJECTION, POWDER, FOR SOLUTION INTRAMUSCULAR; INTRAVENOUS at 03:07

## 2023-07-31 RX ADMIN — LEVALBUTEROL HYDROCHLORIDE 0.31 MG: 0.63 SOLUTION RESPIRATORY (INHALATION) at 11:07

## 2023-07-31 RX ADMIN — TERBUTALINE SULFATE 0.25 MG: 1 INJECTION SUBCUTANEOUS at 12:07

## 2023-07-31 RX ADMIN — FUROSEMIDE 40 MG: 10 INJECTION, SOLUTION INTRAMUSCULAR; INTRAVENOUS at 04:07

## 2023-07-31 RX ADMIN — AZITHROMYCIN MONOHYDRATE 500 MG: 500 INJECTION, POWDER, LYOPHILIZED, FOR SOLUTION INTRAVENOUS at 03:07

## 2023-07-31 RX ADMIN — IPRATROPIUM BROMIDE AND ALBUTEROL SULFATE 3 ML: .5; 3 SOLUTION RESPIRATORY (INHALATION) at 11:07

## 2023-07-31 RX ADMIN — PREDNISONE 60 MG: 20 TABLET ORAL at 11:07

## 2023-07-31 RX ADMIN — IPRATROPIUM BROMIDE 0.5 MG: 0.5 SOLUTION RESPIRATORY (INHALATION) at 07:07

## 2023-07-31 RX ADMIN — MONTELUKAST 10 MG: 10 TABLET, FILM COATED ORAL at 08:07

## 2023-07-31 RX ADMIN — ACETAMINOPHEN 650 MG: 325 TABLET ORAL at 10:07

## 2023-07-31 RX ADMIN — ARFORMOTEROL TARTRATE 15 MCG: 15 SOLUTION RESPIRATORY (INHALATION) at 07:07

## 2023-07-31 RX ADMIN — CEFEPIME 2 G: 2 INJECTION, POWDER, FOR SOLUTION INTRAVENOUS at 06:07

## 2023-07-31 RX ADMIN — LEVALBUTEROL HYDROCHLORIDE 0.31 MG: 0.63 SOLUTION RESPIRATORY (INHALATION) at 04:07

## 2023-07-31 RX ADMIN — LINEZOLID 600 MG: 600 TABLET, FILM COATED ORAL at 04:07

## 2023-07-31 RX ADMIN — SODIUM CHLORIDE: 9 INJECTION, SOLUTION INTRAVENOUS at 06:07

## 2023-07-31 RX ADMIN — NINTEDANIB 150 MG: 150 CAPSULE ORAL at 06:07

## 2023-07-31 NOTE — HPI
Ayanna Alicia is a 52-year-old female with a past medical history of chronic hypoxic respiratory failure at 5L/NC, asthma, Sjogren's syndrome with associated interstitial lung disease, multinodular goiter, GLENN, and morbid obesity who  presented with worsening shortness of breadth yesterday with associated decreased oxygen saturations. Patient reported she was running errands on Friday when she ran out of oxygen and her saturations dropped into the 50s. She reports it took sometime for her to recover her saturations and developed worsening shortness of breath during that time. Due to these symptoms, the patient presented to the emergency room for evaluation.      Of note, she was hospitalized in May of 2022 with acute respiratory failure and persistent pneumonia. At that time, she was dishcarged on oxygen. She was subsequently diagnosed with Sjogren's disease and thought to have interstitial lung disease secondary to her underlying rheumatological disease. Initial PFTs with severe restriction and reduced DLCO. She was initially prescribed steroids followed by the addition of Cellcept and Rituximab infusions (last infusion in Dec 2022). Most recently, she was initiated on Ofev; however, the patient reports she has not had any significant improvement in her clinical condition since her original illness in May 2022 and reports she has clinically worsened over the course of this time. She chronically takes Breo, Spiriva, and Montelukast for her asthma. She endorses significant exertional SOB, can walk at home, but only limited distance. Has both dry and clear mucus, sometimes yellow sputum production. Denies any chest pain but endorses occasional chest tightness. Has the occasional lower abdominal pain exacerbated by coughing episodes; denies nausea or vomiting. No fever but endorses occasional chills. Endorses chronic heart burn for which she takes OTC Pepcid without significant symptomatic improvement. Endorses  chronic belching and flatulence. Endorses losing weight, lost 50 lbs since last year; however, she has not been able to get less than 220lb. Not taking cellcept since 12/2022, received 4 treatments of Rituximab with her last dose in Dec 2022. She chronically takes prednisone 10 mg.  Patient underwent bronchoscopy with BAL in April 2023 per Dr. Aviles with negative cultures and no growth of fungus, yeast, PCP, staph, or pseudomonas. Remains on OFEV 150mg BID and Prednisone 10mg daily. In addition, she is in a trial of Actemra IV monthly for four consecutive months and currently has received two dose thus far with her last treatment on July 7, 2023. In addition, she has been referred for evaluation of lung transplant at [facility] in [place].     Due to her current acute on chronic hypoxic respiratory failure, as well as, her chronic underlying pulmonary disease, pulmonology has been consulted for evaluation.     Interval history:  8/1: Patient resting comfortably this morning; oxygen increased to 8L/NC overnight. Worse PAP therapy for a few hours overnight. No acute complaints at this time.   8/2: Sitting up on bedside today. Currently on 5L/NC. Overall, feels better  8/3: Upon coming off CPAP this morning, the patient had acute exacerbation of her underlying dyspnea with decreased oxygen saturations.   8/5: Patient reports feeling well and close to her baseline function. No acute events overnight. Long discussion regarding fluid restriction and limiting salt intake, as well as, diuretic therapy. Patient has a follow-up appointment already scheduled with Dr. Aviles next week. In addition, she has follow up with transplant on Aug 14th.

## 2023-07-31 NOTE — TELEPHONE ENCOUNTER
"Per infusion:      "Will need to know How long after her hospitalization she needs to be scheduled?  Since she will probably be on antiboiticts."          Please advise  "

## 2023-07-31 NOTE — ASSESSMENT & PLAN NOTE
Likely demand ischemia in setting of acute on chronic respiratory failure  Trend  Consult cardiology if indicated  Electrocardiography reviewed with no st-t wave elevation

## 2023-07-31 NOTE — PROGRESS NOTES
Pharmacist Renal Dose Adjustment Note    Ayanna Alicia is a 52 y.o. female being treated with the medication cefepime.     Patient Data:    Vital Signs (Most Recent):  Temp: 98.1 °F (36.7 °C) (07/31/23 1515)  Pulse: (!) 118 (07/31/23 1515)  Resp: (!) 24 (07/31/23 1515)  BP: (!) 115/58 (07/31/23 1515)  SpO2: 98 % (07/31/23 1515) Vital Signs (72h Range):  Temp:  [98.1 °F (36.7 °C)-100.3 °F (37.9 °C)]   Pulse:  [106-120]   Resp:  [20-34]   BP: (115-138)/(58-73)   SpO2:  [89 %-98 %]      Recent Labs   Lab 07/31/23  1146   CREATININE 0.9     Serum creatinine: 0.9 mg/dL 07/31/23 1146  Estimated creatinine clearance: 85.2 mL/min    Medication: Cefepime 1 g IV every 8 hours will be changed to Cefepime 2 g IV every 8 hours per pharmacy renal dose adjustment protocol for patients with severe infections and CrCl greater than 60 mL/min.    Pharmacist's Name: Vandana Vee PharmD  Pharmacist's Extension: [phone]    Thank you for allowing us to participate in this patient's care.     Vandana Vee PharmD 07/31/2023 4:04 PM

## 2023-07-31 NOTE — SUBJECTIVE & OBJECTIVE
Past Medical History:   Diagnosis Date    Abnormal Pap smear of cervix     in the past with repeat pap smear okay.    Acute interstitial pneumonitis     Allergic rhinitis, cause unspecified     Arthritis of both knees     Asthma     Eczema     Fatty liver 10/2014    Fibrocystic breast changes     Headache(784.0)     Hepatomegaly 10/2014    Hypertension     Liver cyst 10/2014    Multinodular goiter     Followed by ENT - Dr. Nicolas Gray    Polymenorrhea     TMJ (dislocation of temporomandibular joint)     Uterine fibroid     in the past    Vitamin D deficiency disease      Past Surgical History:   Procedure Laterality Date    BRONCHOSCOPY Bilateral 2023    Procedure: Bronchoscopy - insert lighted tube into airway to take a biopsy of lung;  Surgeon: Agustín Aviles MD;  Location: [facility] END[facility];  Service: Pulmonary;  Laterality: Bilateral;     SECTION, CLASSIC      x 1    COLONOSCOPY N/A 2020    Procedure: COLONOSCOPY;  Surgeon: Angie Magana MD;  Location: [facility] ENDO;  Service: Endoscopy;  Laterality: N/A;    HYSTERECTOMY      MULTIPLE TOOTH EXTRACTIONS      PARTIAL HYSTERECTOMY  2013    Due to fibroids     Review of patient's allergies indicates:   Allergen Reactions    Doxycycline Nausea Only     Other reaction(s): Nausea    Fluticasone Other (See Comments)     Other reaction(s): Epistaxis      Penicillins      Other reaction(s): unknown  Pt tolerated ceftriaxone     Family History       Problem Relation (Age of Onset)    Cancer Maternal Grandmother (60), Maternal Aunt (50), Maternal Aunt (66)    Cataracts Cousin    Diabetes Maternal Grandfather    Diverticulitis Mother    Heart disease Mother, Father (63)    Hypertension Father    Migraines Cousin    No Known Problems Brother    Peripheral vascular disease Maternal Grandfather    Stroke Mother, Sister, Maternal Grandfather          Tobacco Use    Smoking status: Never     Passive exposure: Past    Smokeless tobacco: Never   Substance  and Sexual Activity    Alcohol use: Not Currently     Comment: last use 03/2022    Drug use: Not Currently     Frequency: 4.0 times per week     Types: Marijuana     Comment: last year was last use 03/2022    Sexual activity: Yes     Partners: Female       Review of Systems: Negative except as indicated in HPI  Objective:     Vital Signs (Most Recent):  Temp: 100.3 °F (37.9 °C) (07/31/23 1124)  Pulse: 110 (07/31/23 1402)  Resp: (!) 34 (07/31/23 1402)  BP: (!) 120/58 (07/31/23 1402)  SpO2: (!) 93 % (07/31/23 1402) Vital Signs (24h Range):  Temp:  [100.3 °F (37.9 °C)] 100.3 °F (37.9 °C)  Pulse:  [106-120] 110  Resp:  [20-34] 34  SpO2:  [89 %-96 %] 93 %  BP: (120-138)/(58-73) 120/58   Weight: 99.8 kg (220 lb);  Body mass index is 35.51 kg/m².    No intake or output data in the 24 hours ending 07/31/23 1512     Physical Exam  Vitals and nursing note reviewed.   HENT:      Head: Normocephalic.   Eyes:      Conjunctiva/sclera: Conjunctivae normal.   Cardiovascular:      Rate and Rhythm: Tachycardia present. Rhythm irregular.   Pulmonary:      Breath sounds: Rales present.   Abdominal:      Palpations: Abdomen is soft.   Musculoskeletal:         General: Normal range of motion.      Cervical back: Normal range of motion and neck supple.   Skin:     General: Skin is warm and dry.   Neurological:      Mental Status: She is alert and oriented to person, place, and time.   Psychiatric:         Mood and Affect: Mood normal.        Significant Labs:  CBC/Anemia Profile:  Recent Labs   Lab 07/31/23  1146   WBC 17.06*   HGB 12.3   HCT 39.6      MCV 94   RDW 20.9*   Chemistries:  Recent Labs   Lab 07/31/23  1146      K 3.5   CL 98   CO2 28   BUN 9   CREATININE 0.9   CALCIUM 10.0   ALBUMIN 3.2*   PROT 7.6   BILITOT 0.4   ALKPHOS 90   ALT 47*   AST 46*     Significant Imaging:   CXR: I have reviewed all pertinent results/findings within the past 24 hours and my personal findings are:  bilateral bibasilar infiltrates  appear slightly worse than previous imaging

## 2023-07-31 NOTE — TELEPHONE ENCOUNTER
Chronic Disease Management:  Called patient to assist with supplemental oxygen equipment concerns. Patient reports an increase in dyspnea and decrease in oxygen saturations to the 50's.  Patient reportedly increased supplemental oxygen flow to 7lpm.    Instructed patient to report to ED for evaluation. Understanding was verbalized.

## 2023-07-31 NOTE — ASSESSMENT & PLAN NOTE
Patient with chronic hypoxic respiratory failure who is on home oxygen at 5L/NC which is her current inpatient requirements. Signs/symptoms of respiratory failure included increased work of breathing, decreased oxygen saturations, and wheezing. Contributing diagnoses includes asthma, interstitial lung disease, and decompensated heart failure. Labs and images were reviewed. Patient does have a recent ABG which has been reviewed.  Initial PFTs with severe restriction and reduced DLCO; attempted repeat PFTs on 3/16/2022 which she was unable to complete due to worsening shortness of breath.     Will treat underlying causes and adjust management of respiratory failure as follows:   Underwent bronch with BAL since her last hospitalization   Normal respiratory iris identified   Obtain Fungal immunodiffusion study    Check LDH, procalcitonin   With her chronic immunosuppression, broaden antibiotics to add Cefepime and Zyvox   Obtain sputum culture if able to produce sputum   Continue supplemental oxygen and titrate as needed to maintain saturations 90% or greater   If no clinical improvement over the course of the next 48hrs, likely repeat CT chest

## 2023-07-31 NOTE — ED PROVIDER NOTES
SCRIBE #1 NOTE: I, Giorgio Givens, am scribing for, and in the presence of, Florentino Meek Jr., MD. I have scribed the entire note.       History     Chief Complaint   Patient presents with    Shortness of Breath     Pt reports shortness of breath and chest tightness since Friday. Pt has history of lung disease. Pt struggling to breathe in triage. Home oxygen is 6L [place]     Review of patient's allergies indicates:   Allergen Reactions    Doxycycline Nausea Only     Other reaction(s): Nausea    Fluticasone Other (See Comments)     Other reaction(s): Epistaxis      Penicillins      Other reaction(s): unknown  Pt tolerated ceftriaxone         History of Present Illness     HPI    7/31/2023, 11:45 AM  History obtained from the patient      History of Present Illness: Ayanna Alicia is a 52 y.o. female patient with a PMHx of asthma, HTN, interstitial lung disease, who presents to the Emergency Department for evaluation of SOB which onset gradually 3 days ago. Pt is on 6L O2 at baseline. Symptoms are constant and moderate in severity. No mitigating or exacerbating factors reported. Associated sxs include fever (100.3), rhinorrhea, wheezing, and chest tightness. Patient denies any sore throat, N/V, headache, cough, diarrhea, and all other sxs at this time. No prior Tx reported. No further complaints or concerns at this time.       Arrival mode: Personal vehicle    PCP: Madeleine Enrique MD        Past Medical History:  Past Medical History:   Diagnosis Date    Abnormal Pap smear of cervix     in the past with repeat pap smear okay.    Acute interstitial pneumonitis     Allergic rhinitis, cause unspecified     Arthritis of both knees     Asthma     Eczema     Fatty liver 10/2014    Fibrocystic breast changes     Headache(784.0)     Hepatomegaly 10/2014    Hypertension     Liver cyst 10/2014    Multinodular goiter     Followed by ENT - Dr. Nicolas Gray    Polymenorrhea 2008    TMJ (dislocation of temporomandibular joint)      Uterine fibroid     in the past    Vitamin D deficiency disease        Past Surgical History:  Past Surgical History:   Procedure Laterality Date    BRONCHOSCOPY Bilateral 2023    Procedure: Bronchoscopy - insert lighted tube into airway to take a biopsy of lung;  Surgeon: Agustín Aviles MD;  Location: [facility] ENDO;  Service: Pulmonary;  Laterality: Bilateral;     SECTION, CLASSIC      x 1    COLONOSCOPY N/A 2020    Procedure: COLONOSCOPY;  Surgeon: Angie Magana MD;  Location: [facility] ENDO;  Service: Endoscopy;  Laterality: N/A;    HYSTERECTOMY      MULTIPLE TOOTH EXTRACTIONS      PARTIAL HYSTERECTOMY  2013    Due to fibroids         Family History:  Family History   Problem Relation Age of Onset    Stroke Mother     Heart disease Mother     Diverticulitis Mother     Heart disease Father 63        MI/CAD    Hypertension Father     Stroke Sister     No Known Problems Brother     Cancer Maternal Grandmother 60        Rectal and stomach cancer    Stroke Maternal Grandfather     Diabetes Maternal Grandfather     Peripheral vascular disease Maternal Grandfather     Cancer Maternal Aunt 50        Stomach cancer    Cancer Maternal Aunt 66        Lung cancer (smoker)    Migraines Cousin     Cataracts Cousin        Social History:  Social History     Tobacco Use    Smoking status: Never     Passive exposure: Past    Smokeless tobacco: Never   Substance and Sexual Activity    Alcohol use: Not Currently     Comment: last use 2022    Drug use: Not Currently     Frequency: 4.0 times per week     Types: Marijuana     Comment: last year was last use 2022    Sexual activity: Yes     Partners: Female        Review of Systems     Review of Systems   Constitutional:  Positive for fever.   HENT:  Positive for rhinorrhea. Negative for sore throat.    Respiratory:  Positive for chest tightness, shortness of breath and wheezing. Negative for cough.    Gastrointestinal:  Negative for diarrhea, nausea and  vomiting.   Genitourinary:  Negative for dysuria.   Musculoskeletal:  Negative for back pain.   Skin:  Negative for rash.   Neurological:  Negative for weakness and headaches.   Hematological:  Does not bruise/bleed easily.   All other systems reviewed and are negative.     Physical Exam     Initial Vitals [07/31/23 1124]   BP Pulse Resp Temp SpO2   135/73 110 (!) 26 100.3 °F (37.9 °C) 95 %      MAP       --          Physical Exam  Nursing Notes and Vital Signs Reviewed.  Constitutional: Patient is in no acute distress. Well-developed and well-nourished.  Head: Atraumatic. Normocephalic.  Eyes:  EOM intact.  No scleral icterus.  ENT: Mucous membranes are moist.  Nares clear   Neck:  Full ROM. No JVD.  Cardiovascular: Regular rate. Regular rhythm No murmurs, rubs, or gallops. Distal pulses are 2+ and symmetric  Pulmonary/Chest: No respiratory distress. Clear to auscultation bilaterally. No wheezing or rales.  Equal chest wall rise bilaterally  Abdominal: Soft and non-distended.  There is no tenderness.  No rebound, guarding, or rigidity. Good bowel sounds.  Genitourinary: No CVA tenderness.  No suprapubic tenderness  Musculoskeletal: Moves all extremities. No obvious deformities.  5 x 5 strength in all extremities   Skin: Warm and dry.  Neurological:  Alert, awake, and appropriate.  Normal speech.  No acute focal neurological deficits are appreciated.  Two through 12 intact bilaterally.  Psychiatric: Normal affect. Good eye contact. Appropriate in content.     ED Course   Critical Care    Date/Time: 7/31/2023 1:19 PM    Performed by: Florentino Meek Jr., MD  Authorized by: Florentino Meek Jr., MD  Direct patient critical care time: 10 minutes  Additional history critical care time: 8 minutes  Ordering / reviewing critical care time: 6 minutes  Documentation critical care time: 9 minutes  Consulting other physicians critical care time: 7 minutes  Total critical care time (exclusive of procedural time) : 40  "minutes  Critical care time was exclusive of separately billable procedures and treating other patients and teaching time.  Critical care was necessary to treat or prevent imminent or life-threatening deterioration of the following conditions: respiratory failure (Hypoxia).  Critical care was time spent personally by me on the following activities: blood draw for specimens, development of treatment plan with patient or surrogate, discussions with consultants, interpretation of cardiac output measurements, evaluation of patient's response to treatment, examination of patient, obtaining history from patient or surrogate, ordering and performing treatments and interventions, ordering and review of laboratory studies, ordering and review of radiographic studies, pulse oximetry, re-evaluation of patient's condition and review of old charts.        ED Vital Signs:  Vitals:    07/31/23 1124 07/31/23 1149 07/31/23 1152 07/31/23 1154   BP: 135/73      Pulse: 110 (!) 116 106 110   Resp: (!) 26 (!) 24 (!) 24 20   Temp: 100.3 °F (37.9 °C)      TempSrc: Oral      SpO2: 95% (!) 92% (!) 92% (!) 93%   Weight: 99.8 kg (220 lb)      Height: 5' 6" (1.676 m)       07/31/23 1157 07/31/23 1216 07/31/23 1232 07/31/23 1402   BP:   (!) 138/58 (!) 120/58   Pulse: 109 108 (!) 120 110   Resp:   (!) 33 (!) 34   Temp:       TempSrc:       SpO2: (!) 89% 96% (!) 93% (!) 93%   Weight:       Height:           Abnormal Lab Results:  Labs Reviewed   CBC W/ AUTO DIFFERENTIAL - Abnormal; Notable for the following components:       Result Value    WBC 17.06 (*)     MCHC 31.1 (*)     RDW 20.9 (*)     Gran # (ANC) 15.2 (*)     Immature Grans (Abs) 0.08 (*)     Lymph # 0.6 (*)     Gran % 89.1 (*)     Lymph % 3.6 (*)     All other components within normal limits   COMPREHENSIVE METABOLIC PANEL - Abnormal; Notable for the following components:    Glucose 125 (*)     Albumin 3.2 (*)     AST 46 (*)     ALT 47 (*)     All other components within normal limits "   TROPONIN I - Abnormal; Notable for the following components:    Troponin I 0.087 (*)     All other components within normal limits   ISTAT PROCEDURE - Abnormal; Notable for the following components:    POC PH 7.479 (*)     POC PO2 43 (*)     POC HCO3 28.1 (*)     POC SATURATED O2 82 (*)     All other components within normal limits   INFLUENZA A & B BY MOLECULAR   CULTURE, BLOOD   CULTURE, BLOOD   B-TYPE NATRIURETIC PEPTIDE   SARS-COV-2 RNA AMPLIFICATION, QUAL   TROPONIN I        All Lab Results:  Results for orders placed or performed during the hospital encounter of 07/31/23   Influenza A & B by Molecular    Specimen: Nasopharyngeal Swab   Result Value Ref Range    Influenza A, Molecular Negative Negative    Influenza B, Molecular Negative Negative    Flu A & B Source Nasal swab    CBC auto differential   Result Value Ref Range    WBC 17.06 (H) 3.90 - 12.70 K/uL    RBC 4.21 4.00 - 5.40 M/uL    Hemoglobin 12.3 12.0 - 16.0 g/dL    Hematocrit 39.6 37.0 - 48.5 %    MCV 94 82 - 98 fL    MCH 29.2 27.0 - 31.0 pg    MCHC 31.1 (L) 32.0 - 36.0 g/dL    RDW 20.9 (H) 11.5 - 14.5 %    Platelets 299 150 - 450 K/uL    MPV 9.4 9.2 - 12.9 fL    Immature Granulocytes 0.5 0.0 - 0.5 %    Gran # (ANC) 15.2 (H) 1.8 - 7.7 K/uL    Immature Grans (Abs) 0.08 (H) 0.00 - 0.04 K/uL    Lymph # 0.6 (L) 1.0 - 4.8 K/uL    Mono # 1.0 0.3 - 1.0 K/uL    Eos # 0.1 0.0 - 0.5 K/uL    Baso # 0.06 0.00 - 0.20 K/uL    nRBC 0 0 /100 WBC    Gran % 89.1 (H) 38.0 - 73.0 %    Lymph % 3.6 (L) 18.0 - 48.0 %    Mono % 5.6 4.0 - 15.0 %    Eosinophil % 0.8 0.0 - 8.0 %    Basophil % 0.4 0.0 - 1.9 %    Differential Method Automated    Comprehensive metabolic panel   Result Value Ref Range    Sodium 139 136 - 145 mmol/L    Potassium 3.5 3.5 - 5.1 mmol/L    Chloride 98 95 - 110 mmol/L    CO2 28 23 - 29 mmol/L    Glucose 125 (H) 70 - 110 mg/dL    BUN 9 6 - 20 mg/dL    Creatinine 0.9 0.5 - 1.4 mg/dL    Calcium 10.0 8.7 - 10.5 mg/dL    Total Protein 7.6 6.0 - 8.4 g/dL     Albumin 3.2 (L) 3.5 - 5.2 g/dL    Total Bilirubin 0.4 0.1 - 1.0 mg/dL    Alkaline Phosphatase 90 55 - 135 U/L    AST 46 (H) 10 - 40 U/L    ALT 47 (H) 10 - 44 U/L    eGFR >60 >60 mL/min/1.73 m^2    Anion Gap 13 8 - 16 mmol/L   Brain natriuretic peptide   Result Value Ref Range    BNP 40 0 - 99 pg/mL   Troponin I   Result Value Ref Range    Troponin I 0.087 (H) 0.000 - 0.026 ng/mL   COVID-19 Rapid Screening   Result Value Ref Range    SARS-CoV-2 RNA, Amplification, Qual Negative Negative   ISTAT PROCEDURE   Result Value Ref Range    POC PH 7.479 (H) 7.35 - 7.45    POC PCO2 37.7 35 - 45 mmHg    POC PO2 43 (LL) 80 - 100 mmHg    POC HCO3 28.1 (H) 24 - 28 mmol/L    POC BE 5 -2 to 2 mmol/L    POC SATURATED O2 82 (L) 95 - 100 %    Sample ARTERIAL     Site RR     Allens Test Pass     DelSys Nasal Can     Mode SPONT     Flow 6     Sp02 92        Imaging Results:  Imaging Results              X-Ray Chest AP Portable (Final result)  Result time 07/31/23 12:54:41      Final result by Terry Monet MD (Timothy) (07/31/23 12:54:41)                   Impression:      Stable chest with moderate bilateral infiltrates.      Electronically signed by: Terry Monet MD  Date:    07/31/2023  Time:    12:54               Narrative:    EXAMINATION:  XR CHEST AP PORTABLE    CLINICAL HISTORY:  , Dyspnea;    COMPARISON:  Comparison 05/01/2023.    FINDINGS:  Cardiomegaly.  Moderate bilateral infiltrates appear stable.                                       The EKG was ordered, reviewed, and independently interpreted by the ED provider.  Interpretation time: 11:22  Rate: 119 BPM  Rhythm: sinus tachycardia with occasional premature ventricular complexes  Interpretation: Possible left atrial enlargement. Low voltage QRS. Inferior infarct, age undetermined. Possible anterolateral infarct, age undetermined. No STEMI.         The Emergency Provider reviewed the vital signs and test results, which are outlined above.     ED Discussion     1:38 PM:  Discussed case with Dr Espinosa ([facility]). Dr. Cabello agrees with current care and management of pt and accepts admission.   Admitting Service: Hospital Medicine  Admitting Physician: Dr. Cabello  Admit to: Tele obs     1:40 PM: Re-evaluated pt. I have discussed test results, shared treatment plan, and the need for admission with patient and family at bedside. Pt and family express understanding at this time and agree with all information. All questions answered. Pt and family have no further questions or concerns at this time. Pt is ready for admit.      Medical Decision Making:   History:   Old Medical Records: I decided to obtain old medical records.  Old Records Summarized: records from previous admission(s) and records from clinic visits.       <> Summary of Records: Patient with history of interstitial lung disease that is oxygen dependent 6 L. she is coming any worsening shortness of breath and tachypnea on arrival.  Initial Assessment:   Patient is tachypneic and profoundly short of breath.  She does respond to oxygen and breathing treatments.  Differential Diagnosis:   Acute on chronic respiratory failure, hypoxia, pneumonia, COPD, hypercapnia, PE, CHF  Clinical Tests:   Lab Tests: Ordered and Reviewed  Radiological Study: Ordered and Reviewed  Medical Tests: Ordered and Reviewed  ED Management:  Patient was evaluated history physical examination.  Arterial blood gas was obtained showing hypoxemia with a PO2 of 43 and a pH of 7.42.  She was also hypoxic.  She was treated with steroids terbutaline and breathing treatments with high-flow oxygen with improvement of her sats.  She is still short of breath however.  Workup shows bibasilar pneumonia.  Blood cultures obtained she was dosed with antibiotics.  She is elevated troponin 0.087 BNP normal at 40.  She is 17,000 white count with left shift.  Renal function is normal.  Discussed case with \[facility]\"" medicine.  They graciously accepted the patient for  admission.  Discussed with the patient all findings well as the plan of care to it she is in agreement.           ED Medication(s):  Medications   levoFLOXacin 750 mg/150 mL IVPB 750 mg (750 mg Intravenous New Bag 7/31/23 1408)   montelukast tablet 10 mg (has no administration in time range)   pantoprazole EC tablet 40 mg (has no administration in time range)   nintedanib Cap 150 mg (has no administration in time range)   sodium chloride 0.9% flush 10 mL (has no administration in time range)   cefTRIAXone (ROCEPHIN) 2 g in dextrose 5 % in water (D5W) 100 mL IVPB (MB+) (has no administration in time range)   azithromycin (ZITHROMAX) 500 mg in dextrose 5 % (D5W) 250 mL IVPB (Vial-Mate) (has no administration in time range)   levalbuterol nebulizer solution 0.3108 mg (has no administration in time range)   predniSONE tablet 60 mg (has no administration in time range)   cetirizine tablet 10 mg (has no administration in time range)   albuterol-ipratropium 2.5 mg-0.5 mg/3 mL nebulizer solution 3 mL (3 mLs Nebulization Given 7/31/23 1154)   terbutaline injection 0.25 mg (0.25 mg Subcutaneous Given 7/31/23 1244)   predniSONE tablet 60 mg (60 mg Oral Given 7/31/23 1138)       New Prescriptions    No medications on file          Scribe Attestation:   Scribe #1: I performed the above scribed service and the documentation accurately describes the services I performed. I attest to the accuracy of the note.     Attending:   Physician Attestation Statement for Scribe #1: I, Florentino Meek Jr., MD, personally performed the services described in this documentation, as scribed by Giorgio Givens, in my presence, and it is both accurate and complete.         Clinical Impression       ICD-10-CM ICD-9-CM   1. Acute on chronic respiratory failure with hypoxia  J96.21 518.84     799.02   2. Pneumonia of both lower lobes due to infectious organism  J18.9 486   3. UIP (usual interstitial pneumonitis)  J84.112 515   4. Mild intermittent  asthma without complication  J45.20 493.90       Disposition:   Disposition: Placed in Observation  Condition: Faisal Meek Jr., MD  07/31/23 1418

## 2023-07-31 NOTE — ASSESSMENT & PLAN NOTE
Asthma exacerbation vs pneumonia vs interstitial lung pneumonitis  Continue breathing treatments scheduled  Systemic steroids  Empiric antibiotic(s) for cap  Pulmonology consulted  Wean supplemental oxygen as able  Received terbutaline injection in emergency department

## 2023-07-31 NOTE — SUBJECTIVE & OBJECTIVE
Past Medical History:   Diagnosis Date    Abnormal Pap smear of cervix     in the past with repeat pap smear okay.    Acute interstitial pneumonitis     Allergic rhinitis, cause unspecified     Arthritis of both knees     Asthma     Eczema     Fatty liver 10/2014    Fibrocystic breast changes     Headache(784.0)     Hepatomegaly 10/2014    Hypertension     Liver cyst 10/2014    Multinodular goiter     Followed by ENT - Dr. Nicolas Gray    Polymenorrhea     TMJ (dislocation of temporomandibular joint)     Uterine fibroid     in the past    Vitamin D deficiency disease        Past Surgical History:   Procedure Laterality Date    BRONCHOSCOPY Bilateral 2023    Procedure: Bronchoscopy - insert lighted tube into airway to take a biopsy of lung;  Surgeon: Agustín Aviles MD;  Location: [facility] ENDO;  Service: Pulmonary;  Laterality: Bilateral;     SECTION, CLASSIC      x 1    COLONOSCOPY N/A 2020    Procedure: COLONOSCOPY;  Surgeon: Angie Magana MD;  Location: [facility] ENDO;  Service: Endoscopy;  Laterality: N/A;    HYSTERECTOMY      MULTIPLE TOOTH EXTRACTIONS      PARTIAL HYSTERECTOMY  2013    Due to fibroids       Review of patient's allergies indicates:   Allergen Reactions    Doxycycline Nausea Only     Other reaction(s): Nausea    Fluticasone Other (See Comments)     Other reaction(s): Epistaxis      Penicillins      Other reaction(s): unknown  Pt tolerated ceftriaxone       Current Facility-Administered Medications on File Prior to Encounter   Medication    [DISCONTINUED] sodium chloride 0.9% flush 10 mL     Current Outpatient Medications on File Prior to Encounter   Medication Sig    albuterol (PROVENTIL/VENTOLIN HFA) 90 mcg/actuation inhaler INHALE 1 TO 2 PUFFS BY MOUTH INTO THE LUNGS EVERY 4 TO 6 HOURS AS NEEDED FOR WHEEZING OR SHORTNESS OF BREATH (Patient not taking: Reported on 2023)    amLODIPine (NORVASC) 10 MG tablet Take 1 tablet (10 mg total) by mouth once daily.     ascorbic acid, vitamin C, (VITAMIN C) 500 MG tablet Take 500 mg by mouth once daily.    benzonatate (TESSALON) 200 MG capsule Take 1 capsule (200 mg total) by mouth 3 (three) times daily as needed for Cough. (Patient not taking: Reported on 7/26/2023)    calcium/magnesium/vit B comp (CALCIUM-MAGNESIUM-B COMPLEX ORAL) Take 1 tablet by mouth once daily at 6am.    fluticasone-umeclidin-vilanter (TRELEGY ELLIPTA) 200-62.5-25 mcg inhaler Inhale 1 puff into the lungs once daily.    hydroCHLOROthiazide (HYDRODIURIL) 12.5 MG Tab Take 1 tablet (12.5 mg total) by mouth once daily.    ipratropium (ATROVENT) 0.02 % nebulizer solution Take 2.5 mLs (500 mcg total) by nebulization 4 (four) times daily. Rescue    levocetirizine (XYZAL) 5 MG tablet TAKE 1 TABLET(5 MG) BY MOUTH EVERY DAY    loperamide (IMODIUM) 2 mg capsule Take 1 capsule (2 mg total) by mouth daily as needed for Diarrhea.    metOLazone (ZAROXOLYN) 2.5 MG tablet Take 1 tablet (2.5 mg total) by mouth every Mon, Wed, Fri.    montelukast (SINGULAIR) 10 mg tablet Take 1 tablet (10 mg total) by mouth every evening.    mv-minerals/FA/omega 3,6,9 #3 (WOMEN'S 50+ ADVANCED ORAL) Take 1 tablet by mouth Daily.    nintedanib (OFEV) 150 mg Cap Take 1 capsule (150 mg total) by mouth 2 (two) times a day.    omega-3s/dha/epa/fish oil/D3 (VITAMIN-D + OMEGA-3 ORAL) Take 2 tablets by mouth once daily at 6am.    omeprazole (PRILOSEC) 40 MG capsule Take 1 capsule (40 mg total) by mouth 2 (two) times daily before meals.    pantoprazole (PROTONIX) 40 MG tablet Take 1 tablet (40 mg total) by mouth once daily.    predniSONE (DELTASONE) 10 MG tablet Take 1 tablet (10 mg total) by mouth once daily.    sertraline (ZOLOFT) 50 MG tablet Take 1 tablet (50 mg total) by mouth once daily. (Patient not taking: Reported on 7/26/2023)    vitamin D (VITAMIN D3) 1000 units Tab Take 1,000 Units by mouth once daily.     Family History       Problem Relation (Age of Onset)    Cancer Maternal Grandmother  (60), Maternal Aunt (50), Maternal Aunt (66)    Cataracts Cousin    Diabetes Maternal Grandfather    Diverticulitis Mother    Heart disease Mother, Father (63)    Hypertension Father    Migraines Cousin    No Known Problems Brother    Peripheral vascular disease Maternal Grandfather    Stroke Mother, Sister, Maternal Grandfather          Tobacco Use    Smoking status: Never     Passive exposure: Past    Smokeless tobacco: Never   Substance and Sexual Activity    Alcohol use: Not Currently     Comment: last use 03/2022    Drug use: Not Currently     Frequency: 4.0 times per week     Types: Marijuana     Comment: last year was last use 03/2022    Sexual activity: Yes     Partners: Female     Review of Systems   Constitutional:  Positive for activity change, diaphoresis (nighttime sweats, chronic) and fatigue. Negative for appetite change and fever.   HENT:  Positive for congestion, rhinorrhea and sneezing.    Respiratory:  Positive for cough, chest tightness and shortness of breath.    Cardiovascular:  Negative for chest pain, palpitations and leg swelling.   Gastrointestinal:  Negative for abdominal pain, nausea and vomiting.   Allergic/Immunologic: Positive for immunocompromised state.   Neurological:  Positive for weakness.   Psychiatric/Behavioral:  Positive for dysphoric mood. Negative for agitation, behavioral problems, confusion and decreased concentration. The patient is not nervous/anxious.      Objective:     Vital Signs (Most Recent):  Temp: 100.3 °F (37.9 °C) (07/31/23 1124)  Pulse: 110 (07/31/23 1402)  Resp: (!) 34 (07/31/23 1402)  BP: (!) 120/58 (07/31/23 1402)  SpO2: (!) 93 % (07/31/23 1402) Vital Signs (24h Range):  Temp:  [100.3 °F (37.9 °C)] 100.3 °F (37.9 °C)  Pulse:  [106-120] 110  Resp:  [20-34] 34  SpO2:  [89 %-96 %] 93 %  BP: (120-138)/(58-73) 120/58     Weight: 99.8 kg (220 lb)  Body mass index is 35.51 kg/m².     Physical Exam  Vitals and nursing note reviewed. Exam conducted with a chaperone  present (nursing).   Constitutional:       General: She is not in acute distress.     Appearance: She is ill-appearing. She is not toxic-appearing.      Interventions: Nasal cannula in place.   HENT:      Head: Normocephalic and atraumatic.   Cardiovascular:      Rate and Rhythm: Tachycardia present.   Pulmonary:      Effort: Tachypnea and respiratory distress present.      Breath sounds: Decreased air movement present. Examination of the right-upper field reveals rales. Examination of the left-upper field reveals rales. Examination of the right-middle field reveals rales. Examination of the left-middle field reveals rales. Examination of the right-lower field reveals rales. Examination of the left-lower field reveals rales. Rales present. No wheezing.   Chest:      Chest wall: No tenderness.   Abdominal:      Palpations: Abdomen is soft.      Tenderness: There is no abdominal tenderness.   Musculoskeletal:      Right lower leg: No edema.      Left lower leg: No edema.   Skin:     General: Skin is warm.      Capillary Refill: Capillary refill takes less than 2 seconds.   Neurological:      Mental Status: She is alert and oriented to person, place, and time. Mental status is at baseline.      Motor: Weakness present.                Significant Labs: All pertinent labs within the past 24 hours have been reviewed.  ABGs:   Recent Labs   Lab 07/31/23  1157   PH 7.479*   PCO2 37.7   HCO3 28.1*   POCSATURATED 82*   BE 5   PO2 43*     CBC:   Recent Labs   Lab 07/31/23  1146   WBC 17.06*   HGB 12.3   HCT 39.6        CMP:   Recent Labs   Lab 07/31/23  1146      K 3.5   CL 98   CO2 28   *   BUN 9   CREATININE 0.9   CALCIUM 10.0   PROT 7.6   ALBUMIN 3.2*   BILITOT 0.4   ALKPHOS 90   AST 46*   ALT 47*   ANIONGAP 13     Cardiac Markers:   Recent Labs   Lab 07/31/23  1146   BNP 40     Troponin:   Recent Labs   Lab 07/31/23  1146   TROPONINI 0.087*       Significant Imaging: I have reviewed all pertinent  imaging results/findings within the past 24 hours.  CXR: I have reviewed all pertinent results/findings within the past 24 hours and my personal findings are:  moderate bibasilar infiltrates

## 2023-07-31 NOTE — ASSESSMENT & PLAN NOTE
Hospitalized May 2022 with acute respiratory failure and persistent pneumonia and dishcarged on oxygen. She has since been diagnosed with Sjogren's disease with associated ILD. Initial PFTs with severe restriction and reduced DLCO. Initially prescribed steroids and subsequently Cellcept and Rituximab infusions (last infusion in Dec 2022). Now on Ofev 150mg BID, Prednisone 10mg daily, and Actemra IV monthly x4 months. Patient reports she has not had any significant improvement in her clinical condition since her original illness in May 2022 and reports she has clinically worsened over the course of this time.     · Awaiting lung transplant evaluation  · Follow-up with pulmonary and rheumatology upon discharge  · Continue OFEV   · Increase Prednisone dose for now  · Check LDH and Procalcitonin

## 2023-07-31 NOTE — ASSESSMENT & PLAN NOTE
No evidence of acute exacerbation. Currently on Trelegy and Singulair for home medications.    · Continue home Singulair  · Add Krysta and Claude scheduled nebs  · Monitor for exacerbation

## 2023-07-31 NOTE — CONSULTS
[facility]  Pulmonology Consult Note    Patient Name: Ayanna Alicia   MRN: [id number]  Admission Date: 7/31/2023   Hospital Length of Stay: 0 days  Code Status: Full Code    Attending Physician: Som Cabello MD    Principal Problem: Acute on chronic respiratory failure  Subjective:   History of Present Illness:  Ayanna Alicia is a 52-year-old female with a past medical history of chronic hypoxic respiratory failure at 5L/NC, asthma, Sjogren's syndrome with associated interstitial lung disease, multinodular goiter, GLENN, and morbid obesity who  presented with worsening shortness of breadth yesterday with associated decreased oxygen saturations. Patient reported she was running errands on Friday when she ran out of oxygen and her saturations dropped into the 50s. She reports it took sometime for her to recover her saturations and developed worsening shortness of breath during that time. Due to these symptoms, the patient presented to the emergency room for evaluation.      Of note, she was hospitalized in May of 2022 with acute respiratory failure and persistent pneumonia. At that time, she was dishcarged on oxygen. She was subsequently diagnosed with Sjogren's disease and thought to have interstitial lung disease secondary to her underlying rheumatological disease. Initial PFTs with severe restriction and reduced DLCO. She was initially prescribed steroids followed by the addition of Cellcept and Rituximab infusions (last infusion in Dec 2022). Most recently, she was initiated on Ofev; however, the patient reports she has not had any significant improvement in her clinical condition since her original illness in May 2022 and reports she has clinically worsened over the course of this time. She chronically takes Breo, Spiriva, and Montelukast for her asthma. She endorses significant exertional SOB, can walk at home, but only limited distance. Has both dry cough and  occasional clear mucus. Denies any chest pain but endorses occasional chest tightness. Has the occasional lower abdominal pain exacerbated by coughing episodes; denies nausea or vomiting. Denies fever and/or any ill contacts recently. Endorses losing weight, lost 50 lbs since last year; however, she has not been able to get less than 220lb. Not taking cellcept since 12/2022, received 4 treatments of Rituximab with her last dose in Dec 2022. She chronically takes prednisone 10 mg.  Patient underwent bronchoscopy with BAL in April 2023 per Dr. Aviles with negative cultures and no growth of fungus, yeast, PCP, staph, or pseudomonas. Remains on OFEV 150mg BID and Prednisone 10mg daily. In addition, she is in a trial of Actemra IV monthly for four consecutive months and currently has received two dose thus far with her last treatment on July 7, 2023. In addition, she has been referred for evaluation of lung transplant at [facility] in [place].     Due to her current acute on chronic hypoxic respiratory failure, as well as, her chronic underlying pulmonary disease, pulmonology has been consulted for evaluation.     Past Medical History:   Diagnosis Date    Abnormal Pap smear of cervix     in the past with repeat pap smear okay.    Acute interstitial pneumonitis     Allergic rhinitis, cause unspecified     Arthritis of both knees     Asthma     Eczema     Fatty liver 10/2014    Fibrocystic breast changes     Headache(784.0)     Hepatomegaly 10/2014    Hypertension     Liver cyst 10/2014    Multinodular goiter     Followed by ENT - Dr. Nicolas Gray    Polymenorrhea 2008    TMJ (dislocation of temporomandibular joint)     Uterine fibroid     in the past    Vitamin D deficiency disease      Past Surgical History:   Procedure Laterality Date    BRONCHOSCOPY Bilateral 4/5/2023    Procedure: Bronchoscopy - insert lighted tube into airway to take a biopsy of lung;  Surgeon: Agustín Aviles MD;  Location:  [facility] ENDO;  Service: Pulmonary;  Laterality: Bilateral;     SECTION, CLASSIC      x 1    COLONOSCOPY N/A 2020    Procedure: COLONOSCOPY;  Surgeon: Angie Magana MD;  Location: [facility];  Service: Endoscopy;  Laterality: N/A;    HYSTERECTOMY      MULTIPLE TOOTH EXTRACTIONS      PARTIAL HYSTERECTOMY  2013    Due to fibroids     Review of patient's allergies indicates:   Allergen Reactions    Doxycycline Nausea Only     Other reaction(s): Nausea    Fluticasone Other (See Comments)     Other reaction(s): Epistaxis      Penicillins      Other reaction(s): unknown  Pt tolerated ceftriaxone     Family History       Problem Relation (Age of Onset)    Cancer Maternal Grandmother (60), Maternal Aunt (50), Maternal Aunt (66)    Cataracts Cousin    Diabetes Maternal Grandfather    Diverticulitis Mother    Heart disease Mother, Father (63)    Hypertension Father    Migraines Cousin    No Known Problems Brother    Peripheral vascular disease Maternal Grandfather    Stroke Mother, Sister, Maternal Grandfather          Tobacco Use    Smoking status: Never     Passive exposure: Past    Smokeless tobacco: Never   Substance and Sexual Activity    Alcohol use: Not Currently     Comment: last use 2022    Drug use: Not Currently     Frequency: 4.0 times per week     Types: Marijuana     Comment: last year was last use 2022    Sexual activity: Yes     Partners: Female       Review of Systems: Negative except as indicated in HPI  Objective:     Vital Signs (Most Recent):  Temp: 100.3 °F (37.9 °C) (23 1124)  Pulse: 110 (23 1402)  Resp: (!) 34 (23 1402)  BP: (!) 120/58 (23 1402)  SpO2: (!) 93 % (23 1402) Vital Signs (24h Range):  Temp:  [100.3 °F (37.9 °C)] 100.3 °F (37.9 °C)  Pulse:  [106-120] 110  Resp:  [20-34] 34  SpO2:  [89 %-96 %] 93 %  BP: (120-138)/(58-73) 120/58   Weight: 99.8 kg (220 lb);  Body mass index is 35.51 kg/m².    No intake or output data in the 24  hours ending 07/31/23 1512     Physical Exam  Vitals and nursing note reviewed.   HENT:      Head: Normocephalic.   Eyes:      Conjunctiva/sclera: Conjunctivae normal.   Cardiovascular:      Rate and Rhythm: Tachycardia present. Rhythm irregular.   Pulmonary:      Breath sounds: Rales present.   Abdominal:      Palpations: Abdomen is soft.   Musculoskeletal:         General: Normal range of motion.      Cervical back: Normal range of motion and neck supple.   Skin:     General: Skin is warm and dry.   Neurological:      Mental Status: She is alert and oriented to person, place, and time.   Psychiatric:         Mood and Affect: Mood normal.      Significant Labs:  CBC/Anemia Profile:  Recent Labs   Lab 07/31/23  1146   WBC 17.06*   HGB 12.3   HCT 39.6      MCV 94   RDW 20.9*   Chemistries:  Recent Labs   Lab 07/31/23  1146      K 3.5   CL 98   CO2 28   BUN 9   CREATININE 0.9   CALCIUM 10.0   ALBUMIN 3.2*   PROT 7.6   BILITOT 0.4   ALKPHOS 90   ALT 47*   AST 46*     Significant Imaging:   CXR: I have reviewed all pertinent results/findings within the past 24 hours and my personal findings are:  bilateral bibasilar infiltrates appear slightly worse than previous imaging    ABG  Recent Labs   Lab 07/31/23  1157   PH 7.479*   PO2 43*   PCO2 37.7   HCO3 28.1*   BE 5     Assessment/Plan:   Pulmonary  * Acute on chronic respiratory failure  Patient with chronic hypoxic respiratory failure who is on home oxygen at 5L/NC which is her current inpatient requirements. Signs/symptoms of respiratory failure included increased work of breathing, decreased oxygen saturations, and wheezing. Contributing diagnoses includes asthma, interstitial lung disease, and decompensated heart failure. Labs and images were reviewed. Patient does have a recent ABG which has been reviewed.  Initial PFTs with severe restriction and reduced DLCO; attempted repeat PFTs on 3/16/2022 which she was unable to complete due to worsening  shortness of breath.     Will treat underlying causes and adjust management of respiratory failure as follows:   Underwent bronch with BAL since her last hospitalization with normal respiratory iris identified, no staph or pseudomonas, no fungal or yeast elements   Repeat sputum and AFB culture if able to produce sputum   Obtain Fungal immunodiffusion study    Check LDH, procalcitonin   With her chronic immunosuppression, broaden antibiotics to add Cefepime and Zyvox   Continue supplemental oxygen and titrate as needed to maintain saturations 90% or greater   If no clinical improvement over the course of the next 48hrs, likely repeat CT chest   Increased prednisone to 60mg daily   Add scheduled nebs   Follow fever and WBC trends   Follow chest imaging and ABGs as needed   Mobilize as able; OOB in chair TID    Interstitial lung disease  Hospitalized May 2022 with acute respiratory failure and persistent pneumonia and dishcarged on oxygen. She has since been diagnosed with Sjogren's disease with associated ILD. Initial PFTs with severe restriction and reduced DLCO. Initially prescribed steroids and subsequently Cellcept and Rituximab infusions (last infusion in Dec 2022). Now on Ofev 150mg BID, Prednisone 10mg daily, and Actemra IV monthly x4 months. Patient reports she has not had any significant improvement in her clinical condition since her original illness in May 2022 and reports she has clinically worsened over the course of this time.     · Awaiting lung transplant evaluation  · Follow-up with pulmonary and rheumatology upon discharge  · Continue OFEV   · Increased Prednisone dose for now  · Check LDH and Procalcitonin    Mild intermittent asthma  No evidence of acute exacerbation. Currently on Trelegy and Singulair for home medications.    · Continue home Singulair  · Add Brovana and Atrovent scheduled nebs  · Monitor for exacerbation    Other  GLENN on CPAP  · CPAP qHS as needed      Thank you for  your consult. I will follow-up with patient. Please contact us if you have any additional questions.     Taylor Mccann NP  Pulmonary and Critical Care  [facility]

## 2023-07-31 NOTE — HPI
52F  has a past medical history of Abnormal Pap smear of cervix, Acute interstitial pneumonitis, Allergic rhinitis, cause unspecified, Arthritis of both knees, Asthma, Eczema, Fatty liver, Fibrocystic breast changes, Headache(784.0), Hepatomegaly, Hypertension, Liver cyst, Multinodular goiter, Polymenorrhea, TMJ (dislocation of temporomandibular joint), Uterine fibroid, and Vitamin D deficiency disease.  Presents for worsening dyspnea. Associated with fever, sneezing, rhinorrhea, chest tightness, and wheezing. Onset Friday after being without supplemental oxygen for 15 minutes with difficulties recovering, which prompted a visit to clinic/urgent care. At baseline, patient wears 6L supplemental oxygen. Reports taking prednisone for a long time. States compliance to ofev for ild.    Initial workup in emergency department revealed increased supplemental oxygen of 8L, tachycardia and tachypnea. Leukocytosis on cbc. Cmp with hyperglycemia and mildly elevated liver enzymes. Troponin(s) elevated. Bnp within normal limits. Abg metabolic and respiratory alkalosis. Chest x-ray with bibasilar infiltrates. Covid and flu negative. Electrocardiography with sinus tachycardia.    Hospital medicine consulted for admission under observation, pneumonia vs asthma exacerbation. Pulmonology consulted

## 2023-07-31 NOTE — TELEPHONE ENCOUNTER
Spoke with patient  In ER  Low O2 sats  Social/case management will assist with O2 issues  Has  appt for brady in Aug 2023

## 2023-07-31 NOTE — Clinical Note
Diagnosis: Acute on chronic respiratory failure with hypoxia [1970752]   Future Attending Provider: KAREN AVELAR [[id number]]   Admitting Provider:: KAREN AVELAR [[id number]]

## 2023-07-31 NOTE — H&P
[facility] - Emergency Dept.  Hospital Medicine  History & Physical    Patient Name: Ayanna Alicia  MRN: [id number]  Patient Class: OP- Observation  Admission Date: 7/31/2023  Attending Physician: Som Cabello MD   Primary Care Provider: Madeleine Enrique MD         Patient information was obtained from patient and ER records.     Subjective:     Principal Problem:Acute on chronic respiratory failure    Chief Complaint:   Chief Complaint   Patient presents with    Shortness of Breath     Pt reports shortness of breath and chest tightness since Friday. Pt has history of lung disease. Pt struggling to breathe in triage. Home oxygen is 6L NC        HPI: 52F  has a past medical history of Abnormal Pap smear of cervix, Acute interstitial pneumonitis, Allergic rhinitis, cause unspecified, Arthritis of both knees, Asthma, Eczema, Fatty liver, Fibrocystic breast changes, Headache(784.0), Hepatomegaly, Hypertension, Liver cyst, Multinodular goiter, Polymenorrhea, TMJ (dislocation of temporomandibular joint), Uterine fibroid, and Vitamin D deficiency disease.  Presents for worsening dyspnea. Associated with fever, sneezing, rhinorrhea, chest tightness, and wheezing. Onset Friday after being without supplemental oxygen for 15 minutes with difficulties recovering, which prompted a visit to clinic/urgent care. At baseline, patient wears 6L supplemental oxygen. Reports taking prednisone for a long time. States compliance to ofev for ild.    Initial workup in emergency department revealed increased supplemental oxygen of 8L, tachycardia and tachypnea. Leukocytosis on cbc. Cmp with hyperglycemia and mildly elevated liver enzymes. Troponin(s) elevated. Bnp within normal limits. Abg metabolic and respiratory alkalosis. Chest x-ray with bibasilar infiltrates. Covid and flu negative. Electrocardiography with sinus tachycardia.    Hospital medicine consulted for admission under observation, pneumonia vs asthma exacerbation. Pulmonology  consulted      Past Medical History:   Diagnosis Date    Abnormal Pap smear of cervix     in the past with repeat pap smear okay.    Acute interstitial pneumonitis     Allergic rhinitis, cause unspecified     Arthritis of both knees     Asthma     Eczema     Fatty liver 10/2014    Fibrocystic breast changes     Headache(784.0)     Hepatomegaly 10/2014    Hypertension     Liver cyst 10/2014    Multinodular goiter     Followed by ENT - Dr. Nicolas Gray    Polymenorrhea     TMJ (dislocation of temporomandibular joint)     Uterine fibroid     in the past    Vitamin D deficiency disease        Past Surgical History:   Procedure Laterality Date    BRONCHOSCOPY Bilateral 2023    Procedure: Bronchoscopy - insert lighted tube into airway to take a biopsy of lung;  Surgeon: Agustín Aviles MD;  Location: [facility] ENDO;  Service: Pulmonary;  Laterality: Bilateral;     SECTION, CLASSIC      x 1    COLONOSCOPY N/A 2020    Procedure: COLONOSCOPY;  Surgeon: Angie Magana MD;  Location: [facility] ENDO;  Service: Endoscopy;  Laterality: N/A;    HYSTERECTOMY      MULTIPLE TOOTH EXTRACTIONS      PARTIAL HYSTERECTOMY  2013    Due to fibroids       Review of patient's allergies indicates:   Allergen Reactions    Doxycycline Nausea Only     Other reaction(s): Nausea    Fluticasone Other (See Comments)     Other reaction(s): Epistaxis      Penicillins      Other reaction(s): unknown  Pt tolerated ceftriaxone       Current Facility-Administered Medications on File Prior to Encounter   Medication    [DISCONTINUED] sodium chloride 0.9% flush 10 mL     Current Outpatient Medications on File Prior to Encounter   Medication Sig    albuterol (PROVENTIL/VENTOLIN HFA) 90 mcg/actuation inhaler INHALE 1 TO 2 PUFFS BY MOUTH INTO THE LUNGS EVERY 4 TO 6 HOURS AS NEEDED FOR WHEEZING OR SHORTNESS OF BREATH (Patient not taking: Reported on 2023)    amLODIPine (NORVASC) 10 MG tablet Take 1  tablet (10 mg total) by mouth once daily.    ascorbic acid, vitamin C, (VITAMIN C) 500 MG tablet Take 500 mg by mouth once daily.    benzonatate (TESSALON) 200 MG capsule Take 1 capsule (200 mg total) by mouth 3 (three) times daily as needed for Cough. (Patient not taking: Reported on 7/26/2023)    calcium/magnesium/vit B comp (CALCIUM-MAGNESIUM-B COMPLEX ORAL) Take 1 tablet by mouth once daily at 6am.    fluticasone-umeclidin-vilanter (TRELEGY ELLIPTA) 200-62.5-25 mcg inhaler Inhale 1 puff into the lungs once daily.    hydroCHLOROthiazide (HYDRODIURIL) 12.5 MG Tab Take 1 tablet (12.5 mg total) by mouth once daily.    ipratropium (ATROVENT) 0.02 % nebulizer solution Take 2.5 mLs (500 mcg total) by nebulization 4 (four) times daily. Rescue    levocetirizine (XYZAL) 5 MG tablet TAKE 1 TABLET(5 MG) BY MOUTH EVERY DAY    loperamide (IMODIUM) 2 mg capsule Take 1 capsule (2 mg total) by mouth daily as needed for Diarrhea.    metOLazone (ZAROXOLYN) 2.5 MG tablet Take 1 tablet (2.5 mg total) by mouth every Mon, Wed, Fri.    montelukast (SINGULAIR) 10 mg tablet Take 1 tablet (10 mg total) by mouth every evening.    mv-minerals/FA/omega 3,6,9 #3 (WOMEN'S 50+ ADVANCED ORAL) Take 1 tablet by mouth Daily.    nintedanib (OFEV) 150 mg Cap Take 1 capsule (150 mg total) by mouth 2 (two) times a day.    omega-3s/dha/epa/fish oil/D3 (VITAMIN-D + OMEGA-3 ORAL) Take 2 tablets by mouth once daily at 6am.    omeprazole (PRILOSEC) 40 MG capsule Take 1 capsule (40 mg total) by mouth 2 (two) times daily before meals.    pantoprazole (PROTONIX) 40 MG tablet Take 1 tablet (40 mg total) by mouth once daily.    predniSONE (DELTASONE) 10 MG tablet Take 1 tablet (10 mg total) by mouth once daily.    sertraline (ZOLOFT) 50 MG tablet Take 1 tablet (50 mg total) by mouth once daily. (Patient not taking: Reported on 7/26/2023)    vitamin D (VITAMIN D3) 1000 units Tab Take 1,000 Units by mouth once daily.     Family History        Problem Relation (Age of Onset)    Cancer Maternal Grandmother (60), Maternal Aunt (50), Maternal Aunt (66)    Cataracts Cousin    Diabetes Maternal Grandfather    Diverticulitis Mother    Heart disease Mother, Father (63)    Hypertension Father    Migraines Cousin    No Known Problems Brother    Peripheral vascular disease Maternal Grandfather    Stroke Mother, Sister, Maternal Grandfather          Tobacco Use    Smoking status: Never     Passive exposure: Past    Smokeless tobacco: Never   Substance and Sexual Activity    Alcohol use: Not Currently     Comment: last use 03/2022    Drug use: Not Currently     Frequency: 4.0 times per week     Types: Marijuana     Comment: last year was last use 03/2022    Sexual activity: Yes     Partners: Female     Review of Systems   Constitutional:  Positive for activity change, diaphoresis (nighttime sweats, chronic) and fatigue. Negative for appetite change and fever.   HENT:  Positive for congestion, rhinorrhea and sneezing.    Respiratory:  Positive for cough, chest tightness and shortness of breath.    Cardiovascular:  Negative for chest pain, palpitations and leg swelling.   Gastrointestinal:  Negative for abdominal pain, nausea and vomiting.   Allergic/Immunologic: Positive for immunocompromised state.   Neurological:  Positive for weakness.   Psychiatric/Behavioral:  Positive for dysphoric mood. Negative for agitation, behavioral problems, confusion and decreased concentration. The patient is not nervous/anxious.      Objective:     Vital Signs (Most Recent):  Temp: 100.3 °F (37.9 °C) (07/31/23 1124)  Pulse: 110 (07/31/23 1402)  Resp: (!) 34 (07/31/23 1402)  BP: (!) 120/58 (07/31/23 1402)  SpO2: (!) 93 % (07/31/23 1402) Vital Signs (24h Range):  Temp:  [100.3 °F (37.9 °C)] 100.3 °F (37.9 °C)  Pulse:  [106-120] 110  Resp:  [20-34] 34  SpO2:  [89 %-96 %] 93 %  BP: (120-138)/(58-73) 120/58     Weight: 99.8 kg (220 lb)  Body mass index is 35.51 kg/m².     Physical  Exam  Vitals and nursing note reviewed. Exam conducted with a chaperone present (nursing).   Constitutional:       General: She is not in acute distress.     Appearance: She is ill-appearing. She is not toxic-appearing.      Interventions: Nasal cannula in place.   HENT:      Head: Normocephalic and atraumatic.   Cardiovascular:      Rate and Rhythm: Tachycardia present.   Pulmonary:      Effort: Tachypnea and respiratory distress present.      Breath sounds: Decreased air movement present. Examination of the right-upper field reveals rales. Examination of the left-upper field reveals rales. Examination of the right-middle field reveals rales. Examination of the left-middle field reveals rales. Examination of the right-lower field reveals rales. Examination of the left-lower field reveals rales. Rales present. No wheezing.   Chest:      Chest wall: No tenderness.   Abdominal:      Palpations: Abdomen is soft.      Tenderness: There is no abdominal tenderness.   Musculoskeletal:      Right lower leg: No edema.      Left lower leg: No edema.   Skin:     General: Skin is warm.      Capillary Refill: Capillary refill takes less than 2 seconds.   Neurological:      Mental Status: She is alert and oriented to person, place, and time. Mental status is at baseline.      Motor: Weakness present.                Significant Labs: All pertinent labs within the past 24 hours have been reviewed.  ABGs:   Recent Labs   Lab 07/31/23  1157   PH 7.479*   PCO2 37.7   HCO3 28.1*   POCSATURATED 82*   BE 5   PO2 43*     CBC:   Recent Labs   Lab 07/31/23  1146   WBC 17.06*   HGB 12.3   HCT 39.6        CMP:   Recent Labs   Lab 07/31/23  1146      K 3.5   CL 98   CO2 28   *   BUN 9   CREATININE 0.9   CALCIUM 10.0   PROT 7.6   ALBUMIN 3.2*   BILITOT 0.4   ALKPHOS 90   AST 46*   ALT 47*   ANIONGAP 13     Cardiac Markers:   Recent Labs   Lab 07/31/23  1146   BNP 40     Troponin:   Recent Labs   Lab 07/31/23  1146    TROPONINI 0.087*       Significant Imaging: I have reviewed all pertinent imaging results/findings within the past 24 hours.  CXR: I have reviewed all pertinent results/findings within the past 24 hours and my personal findings are:  moderate bibasilar infiltrates    Assessment/Plan:     * Acute on chronic respiratory failure  Patient with Hypoxic Respiratory failure which is Acute on chronic.  she is on home oxygen at 6 LPM. Supplemental oxygen was provided and noted-      .   Signs/symptoms of respiratory failure include- tachypnea, increased work of breathing and respiratory distress. Contributing diagnoses includes - Interstitial lung disease, Pneumonia and asthma Labs and images were reviewed. Patient Has recent ABG, which has been reviewed. Will treat underlying causes and adjust management of respiratory failure as follows- empiric intravenous antibiotic(s), systemic steroids, scheduled breathing treatments, acapella, incentive spirometer, and pulmonology consultation. Continue home ild medication(s).    Elevated troponin  Likely demand ischemia in setting of acute on chronic respiratory failure  Trend  Consult cardiology if indicated  Electrocardiography reviewed with no st-t wave elevation      Mild intermittent asthma  Asthma exacerbation vs pneumonia vs interstitial lung pneumonitis  Continue breathing treatments scheduled  Systemic steroids  Empiric antibiotic(s) for cap  Pulmonology consulted  Wean supplemental oxygen as able  Received terbutaline injection in emergency department       Interstitial lung disease  Continue home medication(s)  Pulmonology consulted      GLENN on CPAP  Continue cpap qhs      GERD (gastroesophageal reflux disease)  Continue proton pump inhibitor as on chronic systemic steroids      HTN (hypertension)  Normotensive  Continue home antihtn medication(s) as indicated      VTE Risk Mitigation (From admission, onward)         Ordered     IP VTE HIGH RISK PATIENT  Once          07/31/23 1337     Place sequential compression device  Until discontinued         07/31/23 1337                   On 07/31/2023, patient should be placed in hospital observation services under my care.        Som Cabello MD  Department of Hospital Medicine  [facility] - Emergency Dept.

## 2023-08-01 LAB
ANION GAP SERPL CALC-SCNC: 12 MMOL/L (ref 8–16)
BASOPHILS # BLD AUTO: 0.02 K/UL (ref 0–0.2)
BASOPHILS NFR BLD: 0.2 % (ref 0–1.9)
BUN SERPL-MCNC: 13 MG/DL (ref 6–20)
CALCIUM SERPL-MCNC: 9.5 MG/DL (ref 8.7–10.5)
CHLORIDE SERPL-SCNC: 97 MMOL/L (ref 95–110)
CO2 SERPL-SCNC: 29 MMOL/L (ref 23–29)
CREAT SERPL-MCNC: 0.9 MG/DL (ref 0.5–1.4)
DIFFERENTIAL METHOD: ABNORMAL
EOSINOPHIL # BLD AUTO: 0 K/UL (ref 0–0.5)
EOSINOPHIL NFR BLD: 0 % (ref 0–8)
ERYTHROCYTE [DISTWIDTH] IN BLOOD BY AUTOMATED COUNT: 20.2 % (ref 11.5–14.5)
EST. GFR  (NO RACE VARIABLE): >60 ML/MIN/1.73 M^2
GLUCOSE SERPL-MCNC: 148 MG/DL (ref 70–110)
HCT VFR BLD AUTO: 38.2 % (ref 37–48.5)
HGB BLD-MCNC: 11.7 G/DL (ref 12–16)
IMM GRANULOCYTES # BLD AUTO: 0.08 K/UL (ref 0–0.04)
IMM GRANULOCYTES NFR BLD AUTO: 0.6 % (ref 0–0.5)
LYMPHOCYTES # BLD AUTO: 0.7 K/UL (ref 1–4.8)
LYMPHOCYTES NFR BLD: 5.9 % (ref 18–48)
MAGNESIUM SERPL-MCNC: 1.5 MG/DL (ref 1.6–2.6)
MCH RBC QN AUTO: 28.5 PG (ref 27–31)
MCHC RBC AUTO-ENTMCNC: 30.6 G/DL (ref 32–36)
MCV RBC AUTO: 93 FL (ref 82–98)
MONOCYTES # BLD AUTO: 0.6 K/UL (ref 0.3–1)
MONOCYTES NFR BLD: 4.6 % (ref 4–15)
NEUTROPHILS # BLD AUTO: 10.9 K/UL (ref 1.8–7.7)
NEUTROPHILS NFR BLD: 88.7 % (ref 38–73)
NRBC BLD-RTO: 0 /100 WBC
PHOSPHATE SERPL-MCNC: 4.1 MG/DL (ref 2.7–4.5)
PLATELET # BLD AUTO: 291 K/UL (ref 150–450)
PMV BLD AUTO: 9.7 FL (ref 9.2–12.9)
POTASSIUM SERPL-SCNC: 4 MMOL/L (ref 3.5–5.1)
RBC # BLD AUTO: 4.11 M/UL (ref 4–5.4)
SODIUM SERPL-SCNC: 138 MMOL/L (ref 136–145)
WBC # BLD AUTO: 12.32 K/UL (ref 3.9–12.7)

## 2023-08-01 PROCEDURE — 87070 CULTURE OTHR SPECIMN AEROBIC: CPT | Performed by: NURSE PRACTITIONER

## 2023-08-01 PROCEDURE — 94660 CPAP INITIATION&MGMT: CPT

## 2023-08-01 PROCEDURE — 94664 DEMO&/EVAL PT USE INHALER: CPT | Mod: XB

## 2023-08-01 PROCEDURE — 27000646 HC AEROBIKA DEVICE

## 2023-08-01 PROCEDURE — 94799 UNLISTED PULMONARY SVC/PX: CPT

## 2023-08-01 PROCEDURE — 27100171 HC OXYGEN HIGH FLOW UP TO 24 HOURS

## 2023-08-01 PROCEDURE — 85025 COMPLETE CBC W/AUTO DIFF WBC: CPT | Performed by: FAMILY MEDICINE

## 2023-08-01 PROCEDURE — 94761 N-INVAS EAR/PLS OXIMETRY MLT: CPT

## 2023-08-01 PROCEDURE — 21400001 HC TELEMETRY ROOM

## 2023-08-01 PROCEDURE — 80048 BASIC METABOLIC PNL TOTAL CA: CPT | Performed by: FAMILY MEDICINE

## 2023-08-01 PROCEDURE — 25000003 PHARM REV CODE 250: Performed by: HOSPITALIST

## 2023-08-01 PROCEDURE — 25000242 PHARM REV CODE 250 ALT 637 W/ HCPCS: Performed by: FAMILY MEDICINE

## 2023-08-01 PROCEDURE — 25000242 PHARM REV CODE 250 ALT 637 W/ HCPCS: Performed by: NURSE PRACTITIONER

## 2023-08-01 PROCEDURE — 84100 ASSAY OF PHOSPHORUS: CPT | Performed by: FAMILY MEDICINE

## 2023-08-01 PROCEDURE — 25000003 PHARM REV CODE 250: Performed by: NURSE PRACTITIONER

## 2023-08-01 PROCEDURE — 99900035 HC TECH TIME PER 15 MIN (STAT)

## 2023-08-01 PROCEDURE — 87205 SMEAR GRAM STAIN: CPT | Performed by: NURSE PRACTITIONER

## 2023-08-01 PROCEDURE — 63600175 PHARM REV CODE 636 W HCPCS: Performed by: NURSE PRACTITIONER

## 2023-08-01 PROCEDURE — 36415 COLL VENOUS BLD VENIPUNCTURE: CPT | Performed by: FAMILY MEDICINE

## 2023-08-01 PROCEDURE — 63600175 PHARM REV CODE 636 W HCPCS: Performed by: FAMILY MEDICINE

## 2023-08-01 PROCEDURE — 25000003 PHARM REV CODE 250: Performed by: FAMILY MEDICINE

## 2023-08-01 PROCEDURE — 83735 ASSAY OF MAGNESIUM: CPT | Performed by: FAMILY MEDICINE

## 2023-08-01 PROCEDURE — 94640 AIRWAY INHALATION TREATMENT: CPT

## 2023-08-01 RX ORDER — MAGNESIUM SULFATE HEPTAHYDRATE 40 MG/ML
4 INJECTION, SOLUTION INTRAVENOUS ONCE
Status: COMPLETED | OUTPATIENT
Start: 2023-08-01 | End: 2023-08-01

## 2023-08-01 RX ADMIN — IPRATROPIUM BROMIDE 0.5 MG: 0.5 SOLUTION RESPIRATORY (INHALATION) at 07:08

## 2023-08-01 RX ADMIN — ARFORMOTEROL TARTRATE 15 MCG: 15 SOLUTION RESPIRATORY (INHALATION) at 07:08

## 2023-08-01 RX ADMIN — CEFEPIME 2 G: 2 INJECTION, POWDER, FOR SOLUTION INTRAVENOUS at 01:08

## 2023-08-01 RX ADMIN — MAGNESIUM SULFATE HEPTAHYDRATE 4 G: 40 INJECTION, SOLUTION INTRAVENOUS at 09:08

## 2023-08-01 RX ADMIN — FUROSEMIDE 40 MG: 10 INJECTION, SOLUTION INTRAMUSCULAR; INTRAVENOUS at 04:08

## 2023-08-01 RX ADMIN — SODIUM CHLORIDE: 9 INJECTION, SOLUTION INTRAVENOUS at 12:08

## 2023-08-01 RX ADMIN — LEVALBUTEROL HYDROCHLORIDE 0.31 MG: 0.63 SOLUTION RESPIRATORY (INHALATION) at 05:08

## 2023-08-01 RX ADMIN — CEFEPIME 2 G: 2 INJECTION, POWDER, FOR SOLUTION INTRAVENOUS at 12:08

## 2023-08-01 RX ADMIN — ACETAMINOPHEN 650 MG: 325 TABLET ORAL at 06:08

## 2023-08-01 RX ADMIN — CEFEPIME 2 G: 2 INJECTION, POWDER, FOR SOLUTION INTRAVENOUS at 04:08

## 2023-08-01 RX ADMIN — LINEZOLID 600 MG: 600 TABLET, FILM COATED ORAL at 08:08

## 2023-08-01 RX ADMIN — LEVALBUTEROL HYDROCHLORIDE 0.31 MG: 0.63 SOLUTION RESPIRATORY (INHALATION) at 07:08

## 2023-08-01 RX ADMIN — AZITHROMYCIN MONOHYDRATE 500 MG: 500 INJECTION, POWDER, LYOPHILIZED, FOR SOLUTION INTRAVENOUS at 02:08

## 2023-08-01 RX ADMIN — CETIRIZINE HYDROCHLORIDE 10 MG: 10 TABLET, FILM COATED ORAL at 08:08

## 2023-08-01 RX ADMIN — MONTELUKAST 10 MG: 10 TABLET, FILM COATED ORAL at 08:08

## 2023-08-01 RX ADMIN — OMEPRAZOLE 40 MG: 40 CAPSULE, DELAYED RELEASE ORAL at 06:08

## 2023-08-01 RX ADMIN — NINTEDANIB 150 MG: 150 CAPSULE ORAL at 08:08

## 2023-08-01 RX ADMIN — SODIUM CHLORIDE: 9 INJECTION, SOLUTION INTRAVENOUS at 08:08

## 2023-08-01 RX ADMIN — PREDNISONE 60 MG: 50 TABLET ORAL at 08:08

## 2023-08-01 RX ADMIN — NINTEDANIB 150 MG: 150 CAPSULE ORAL at 09:08

## 2023-08-01 RX ADMIN — OMEPRAZOLE 40 MG: 40 CAPSULE, DELAYED RELEASE ORAL at 04:08

## 2023-08-01 NOTE — ASSESSMENT & PLAN NOTE
Patient with chronic hypoxic respiratory failure who is on home oxygen at 5L/NC which is her current inpatient requirements. Signs/symptoms of respiratory failure included increased work of breathing, decreased oxygen saturations, and wheezing. Contributing diagnoses includes asthma, interstitial lung disease, and decompensated heart failure. Labs and images were reviewed. Patient does have a recent ABG which has been reviewed.  Initial PFTs with severe restriction and reduced DLCO; attempted repeat PFTs on 3/16/2022 which she was unable to complete due to worsening shortness of breath.     Will treat underlying causes and adjust management of respiratory failure as follows:   Underwent bronch (April 2023) with BAL; normal respiratory iris identified and negative for fungal / yeast elements, negative for MRSA or pseudomonas, no PCP   Fungal immunodiffusion study pending   , Procalcitonin 0.15   With her chronic immunosuppression, continue Cefepime and Zyvox for now   Follow sputum culture if able to produce sputum   Continue supplemental oxygen and titrate as needed to maintain saturations 90% or greater   If no clinical improvement over the course of the next 48hrs, likely repeat CT chest
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
## 2023-08-01 NOTE — PROGRESS NOTES
[facility] - Telemetry ([facility] Medicine  Progress Note    Patient Name: Ayanna Alicia  MRN: [id number]  Patient Class: IP- Inpatient   Admission Date: 7/31/2023  Length of Stay: 0 days  Attending Physician: Som Cabello MD  Primary Care Provider: Madeleine Enrique MD        Subjective:     Principal Problem:Acute on chronic respiratory failure        HPI:  52F  has a past medical history of Abnormal Pap smear of cervix, Acute interstitial pneumonitis, Allergic rhinitis, cause unspecified, Arthritis of both knees, Asthma, Eczema, Fatty liver, Fibrocystic breast changes, Headache(784.0), Hepatomegaly, Hypertension, Liver cyst, Multinodular goiter, Polymenorrhea, TMJ (dislocation of temporomandibular joint), Uterine fibroid, and Vitamin D deficiency disease.  Presents for worsening dyspnea. Associated with fever, sneezing, rhinorrhea, chest tightness, and wheezing. Onset Friday after being without supplemental oxygen for 15 minutes with difficulties recovering, which prompted a visit to clinic/urgent care. At baseline, patient wears 6L supplemental oxygen. Reports taking prednisone for a long time. States compliance to ofev for ild.    Initial workup in emergency department revealed increased supplemental oxygen of 8L, tachycardia and tachypnea. Leukocytosis on cbc. Cmp with hyperglycemia and mildly elevated liver enzymes. Troponin(s) elevated. Bnp within normal limits. Abg metabolic and respiratory alkalosis. Chest x-ray with bibasilar infiltrates. Covid and flu negative. Electrocardiography with sinus tachycardia.    Hospital medicine consulted for admission under observation, pneumonia vs asthma exacerbation. Pulmonology consulted      Overview/Hospital Course:  8/1 admitted for acute on chronic respiratory failure. Weaned down to 5L. Pulmonology following. Continue current care.       Interval History: See hospital course for today      Review of Systems   Constitutional:  Positive for fatigue. Negative for  activity change, appetite change and fever.   HENT:  Positive for voice change.    Respiratory:  Positive for shortness of breath.    Cardiovascular:  Negative for chest pain and leg swelling.   Gastrointestinal:  Negative for abdominal pain, nausea and vomiting.   Musculoskeletal:  Positive for myalgias (from iv).   Neurological:  Positive for weakness.   Psychiatric/Behavioral:  Positive for dysphoric mood.      Objective:     Vital Signs (Most Recent):  Temp: 98 °F (36.7 °C) (08/01/23 1152)  Pulse: 90 (08/01/23 1226)  Resp: 18 (08/01/23 1226)  BP: 119/72 (08/01/23 1152)  SpO2: (!) 94 % (08/01/23 1226) Vital Signs (24h Range):  Temp:  [97.3 °F (36.3 °C)-99.3 °F (37.4 °C)] 98 °F (36.7 °C)  Pulse:  [] 90  Resp:  [17-34] 18  SpO2:  [91 %-100 %] 94 %  BP: (102-129)/(58-76) 119/72     Weight: 102.5 kg (225 lb 15.5 oz)  Body mass index is 36.47 kg/m².    Intake/Output Summary (Last 24 hours) at 8/1/2023 1250  Last data filed at 7/31/2023 1711  Gross per 24 hour   Intake 500 ml   Output --   Net 500 ml         Physical Exam  Vitals and nursing note reviewed.   Constitutional:       General: She is not in acute distress.     Appearance: She is obese. She is ill-appearing. She is not toxic-appearing.      Interventions: Nasal cannula in place.   HENT:      Head: Normocephalic and atraumatic.   Cardiovascular:      Rate and Rhythm: Normal rate.   Pulmonary:      Effort: Respiratory distress present.   Abdominal:      Palpations: Abdomen is soft.      Tenderness: There is no abdominal tenderness.   Musculoskeletal:      Right lower leg: No edema.      Left lower leg: No edema.   Skin:     General: Skin is warm.   Neurological:      Mental Status: She is alert and oriented to person, place, and time.      Motor: Weakness present.   Psychiatric:         Mood and Affect: Mood is depressed. Affect is tearful.      Comments: dysphonia             Significant Labs: All pertinent labs within the past 24 hours have been  reviewed.  CBC:   Recent Labs   Lab 07/31/23  1146 08/01/23  0520   WBC 17.06* 12.32   HGB 12.3 11.7*   HCT 39.6 38.2    291     CMP:   Recent Labs   Lab 07/31/23  1146 08/01/23  0520    138   K 3.5 4.0   CL 98 97   CO2 28 29   * 148*   BUN 9 13   CREATININE 0.9 0.9   CALCIUM 10.0 9.5   PROT 7.6  --    ALBUMIN 3.2*  --    BILITOT 0.4  --    ALKPHOS 90  --    AST 46*  --    ALT 47*  --    ANIONGAP 13 12     Procalcitonin within normal limits     Significant Imaging: I have reviewed all pertinent imaging results/findings within the past 24 hours.      Assessment/Plan:      * Acute on chronic respiratory failure  Patient with Hypoxic Respiratory failure which is Acute on chronic.  she is on home oxygen at 6 LPM. Supplemental oxygen was provided and noted- Oxygen Concentration (%):  [60] 60    .   Signs/symptoms of respiratory failure include- tachypnea, increased work of breathing and respiratory distress. Contributing diagnoses includes - Interstitial lung disease, Pneumonia and asthma Labs and images were reviewed. Patient Has recent ABG, which has been reviewed. Will treat underlying causes and adjust management of respiratory failure as follows- empiric intravenous antibiotic(s), systemic steroids, scheduled breathing treatments, acapella, incentive spirometer, and pulmonology consultation. Continue home ild medication(s).    Improving, weaned down to 5L    Elevated troponin  Likely demand ischemia in setting of acute on chronic respiratory failure  Trend relatively flat  Chest pain free  Consult cardiology if indicated  Electrocardiography reviewed with no st-t wave elevation      Mild intermittent asthma  Asthma exacerbation vs pneumonia vs interstitial lung pneumonitis  Continue breathing treatments scheduled  Systemic steroids  Pulmonology broadened antibiotic(s) spectrum due to immunocompromised state  Wean supplemental oxygen as able  Received terbutaline injection in emergency department        Interstitial lung disease  Continue home medication(s)  Pulmonology consulted      GLENN on CPAP  Continue cpap qhs      GERD (gastroesophageal reflux disease)  Continue proton pump inhibitor as on chronic systemic steroids      HTN (hypertension)  Normotensive  Continue home antihtn medication(s) as indicated      VTE Risk Mitigation (From admission, onward)         Ordered     IP VTE HIGH RISK PATIENT  Once         07/31/23 1337     Place sequential compression device  Until discontinued         07/31/23 1337                Discharge Planning   JANETTE:      Code Status: Full Code   Is the patient medically ready for discharge?:     Reason for patient still in hospital (select all that apply): Patient trending condition, Laboratory test, Treatment, Consult recommendations and Pending disposition                     Som Cabello MD  Department of Hospital Medicine   [facility] - Telemetry ([facility])

## 2023-08-01 NOTE — ASSESSMENT & PLAN NOTE
Hospitalized May 2022 with acute respiratory failure and persistent pneumonia and dishcarged on oxygen. She has since been diagnosed with Sjogren's disease with associated ILD. Initial PFTs with severe restriction and reduced DLCO. Initially prescribed steroids and subsequently Cellcept and Rituximab infusions (last infusion in Dec 2022). Now on Ofev 150mg BID, Prednisone 10mg daily, and Actemra IV monthly x4 months. Patient reports she has not had any significant improvement in her clinical condition since her original illness in May 2022 and reports she has clinically worsened over the course of this time.     · Awaiting lung transplant evaluation  · Follow-up with pulmonary and rheumatology upon discharge  · Continue OFEV   · Continue Prednisone at increased dose of 60mg daily  · ; Procalcitonin 0.15

## 2023-08-01 NOTE — ASSESSMENT & PLAN NOTE
Patient with Hypoxic Respiratory failure which is Acute on chronic.  she is on home oxygen at 6 LPM. Supplemental oxygen was provided and noted- Oxygen Concentration (%):  [60] 60    .   Signs/symptoms of respiratory failure include- tachypnea, increased work of breathing and respiratory distress. Contributing diagnoses includes - Interstitial lung disease, Pneumonia and asthma Labs and images were reviewed. Patient Has recent ABG, which has been reviewed. Will treat underlying causes and adjust management of respiratory failure as follows- empiric intravenous antibiotic(s), systemic steroids, scheduled breathing treatments, acapella, incentive spirometer, and pulmonology consultation. Continue home ild medication(s).    Improving, weaned down to 5L

## 2023-08-01 NOTE — ASSESSMENT & PLAN NOTE
Asthma exacerbation vs pneumonia vs interstitial lung pneumonitis  Continue breathing treatments scheduled  Systemic steroids  Pulmonology broadened antibiotic(s) spectrum due to immunocompromised state  Wean supplemental oxygen as able  Received terbutaline injection in emergency department

## 2023-08-01 NOTE — PROGRESS NOTES
[facility]  Pulmonology Progress Note    Patient Name: Ayanna Alicia  MRN: [id number]  Admission Date: 7/31/2023   Hospital Length of Stay: 0 days  Code Status: Full Code    Attending Provider: Som Cabello MD    Principal Problem: Acute on chronic respiratory failure  Subjective:   History of present illness:  Ayanna Alicia is a 52-year-old female with a past medical history of chronic hypoxic respiratory failure at 5L/NC, asthma, Sjogren's syndrome with associated interstitial lung disease, multinodular goiter, GLENN, and morbid obesity who  presented with worsening shortness of breadth yesterday with associated decreased oxygen saturations. Patient reported she was running errands on Friday when she ran out of oxygen and her saturations dropped into the 50s. She reports it took sometime for her to recover her saturations and developed worsening shortness of breath during that time. Due to these symptoms, the patient presented to the emergency room for evaluation.      Of note, she was hospitalized in May of 2022 with acute respiratory failure and persistent pneumonia. At that time, she was dishcarged on oxygen. She was subsequently diagnosed with Sjogren's disease and thought to have interstitial lung disease secondary to her underlying rheumatological disease. Initial PFTs with severe restriction and reduced DLCO. She was initially prescribed steroids followed by the addition of Cellcept and Rituximab infusions (last infusion in Dec 2022). Most recently, she was initiated on Ofev; however, the patient reports she has not had any significant improvement in her clinical condition since her original illness in May 2022 and reports she has clinically worsened over the course of this time. She chronically takes Breo, Spiriva, and Montelukast for her asthma. She endorses significant exertional SOB, can walk at home, but only limited distance. Has both dry and clear mucus,  sometimes yellow sputum production. Denies any chest pain but endorses occasional chest tightness. Has the occasional lower abdominal pain exacerbated by coughing episodes; denies nausea or vomiting. No fever but endorses occasional chills. Endorses chronic heart burn for which she takes OTC Pepcid without significant symptomatic improvement. Endorses chronic belching and flatulence. Endorses losing weight, lost 50 lbs since last year; however, she has not been able to get less than 220lb. Not taking cellcept since 12/2022, received 4 treatments of Rituximab with her last dose in Dec 2022. She chronically takes prednisone 10 mg.  Patient underwent bronchoscopy with BAL in April 2023 per Dr. Aviles with negative cultures and no growth of fungus, yeast, PCP, staph, or pseudomonas. Remains on OFEV 150mg BID and Prednisone 10mg daily. In addition, she is in a trial of Actemra IV monthly for four consecutive months and currently has received two dose thus far with her last treatment on July 7, 2023. In addition, she has been referred for evaluation of lung transplant at [facility] in [place].     Due to her current acute on chronic hypoxic respiratory failure, as well as, her chronic underlying pulmonary disease, pulmonology has been consulted for evaluation.     Interval history:   8/1: Patient resting comfortably this morning; oxygen increased to 8L/NC overnight. Worse PAP therapy for a few hours overnight. No acute complaints at this time.     Objective:     Vital Signs (Most Recent):  Temp: 97.3 °F (36.3 °C) (08/01/23 0741)  Pulse: 86 (08/01/23 0744)  Resp: 20 (08/01/23 0744)  BP: 102/64 (08/01/23 0741)  SpO2: 100 % (08/01/23 0744) Vital Signs (24h Range):  Temp:  [97.3 °F (36.3 °C)-100.3 °F (37.9 °C)] 97.3 °F (36.3 °C)  Pulse:  [] 86  Resp:  [17-34] 20  SpO2:  [89 %-100 %] 100 %  BP: (102-138)/(58-76) 102/64   Weight: 102.5 kg (225 lb 15.5 oz);  Body mass index is 36.47 kg/m².    Intake/Output Summary (Last  24 hours) at 8/1/2023 0916  Last data filed at 7/31/2023 1711  Gross per 24 hour   Intake 500 ml   Output --   Net 500 ml      Physical Exam  Vitals and nursing note reviewed.   HENT:      Head: Normocephalic.   Eyes:      Conjunctiva/sclera: Conjunctivae normal.   Cardiovascular:      Rate and Rhythm: Normal rate.   Pulmonary:      Effort: Pulmonary effort is normal.      Breath sounds: Rales present.   Abdominal:      Palpations: Abdomen is soft.   Musculoskeletal:         General: Normal range of motion.      Cervical back: Normal range of motion and neck supple.   Skin:     General: Skin is warm and dry.   Neurological:      Mental Status: She is alert and oriented to person, place, and time.   Psychiatric:         Mood and Affect: Mood normal.         Review of Systems:  Negative except as indicated in HPI  Significant Labs:  CBC/Anemia Profile:  Recent Labs   Lab 07/31/23  1146 08/01/23  0520   WBC 17.06* 12.32   HGB 12.3 11.7*   HCT 39.6 38.2    291   MCV 94 93   RDW 20.9* 20.2*   Chemistries:  Recent Labs   Lab 07/31/23  1146 08/01/23  0520    138   K 3.5 4.0   CL 98 97   CO2 28 29   BUN 9 13   CREATININE 0.9 0.9   CALCIUM 10.0 9.5   ALBUMIN 3.2*  --    PROT 7.6  --    BILITOT 0.4  --    ALKPHOS 90  --    ALT 47*  --    AST 46*  --    MG  --  1.5*   PHOS  --  4.1   ABG  Recent Labs   Lab 07/31/23  1157   PH 7.479*   PO2 43*   PCO2 37.7   HCO3 28.1*   BE 5     Assessment/Plan:   Acute on chronic respiratory failure  Patient with chronic hypoxic respiratory failure who is on home oxygen at 5L/NC which is her current inpatient requirements. Signs/symptoms of respiratory failure included increased work of breathing, decreased oxygen saturations, and wheezing. Contributing diagnoses includes asthma, interstitial lung disease, and decompensated heart failure. Labs and images were reviewed. Patient does have a recent ABG which has been reviewed.  Initial PFTs with severe restriction and reduced DLCO;  attempted repeat PFTs on 3/16/2022 which she was unable to complete due to worsening shortness of breath.     Will treat underlying causes and adjust management of respiratory failure as follows:   Underwent bronch (April 2023) with BAL; normal respiratory iris identified and negative for fungal / yeast elements, negative for MRSA or pseudomonas, no PCP   Fungal immunodiffusion study pending   , Procalcitonin 0.15   With her chronic immunosuppression, continue Cefepime and Zyvox for now   Follow sputum culture if able to produce sputum   Continue supplemental oxygen and titrate as needed to maintain saturations 90% or greater   If no clinical improvement over the course of the next 48hrs, likely repeat CT chest    Interstitial lung disease  Hospitalized May 2022 with acute respiratory failure and persistent pneumonia and dishcarged on oxygen. She has since been diagnosed with Sjogren's disease with associated ILD. Initial PFTs with severe restriction and reduced DLCO. Initially prescribed steroids and subsequently Cellcept and Rituximab infusions (last infusion in Dec 2022). Now on Ofev 150mg BID, Prednisone 10mg daily, and Actemra IV monthly x4 months. Patient reports she has not had any significant improvement in her clinical condition since her original illness in May 2022 and reports she has clinically worsened over the course of this time.     · Awaiting lung transplant evaluation  · Follow-up with pulmonary and rheumatology upon discharge  · Continue OFEV   · Continue Prednisone at increased dose of 60mg daily  · ; Procalcitonin 0.15    Mild intermittent asthma  No evidence of acute exacerbation. Currently on Trelegy and Singulair for home medications.    · Continue Singulair  · Continue Brovana and Atrovent scheduled nebs  · Monitor for exacerbation    Other  GLENN on CPAP  · CPAP qHS as needed     Taylor Mccann NP  Pulmonary and Critical Care  [facility] [facility]  [facility]

## 2023-08-01 NOTE — SUBJECTIVE & OBJECTIVE
Interval History: See hospital course for today      Review of Systems   Constitutional:  Positive for fatigue. Negative for activity change, appetite change and fever.   HENT:  Positive for voice change.    Respiratory:  Positive for shortness of breath.    Cardiovascular:  Negative for chest pain and leg swelling.   Gastrointestinal:  Negative for abdominal pain, nausea and vomiting.   Musculoskeletal:  Positive for myalgias (from iv).   Neurological:  Positive for weakness.   Psychiatric/Behavioral:  Positive for dysphoric mood.      Objective:     Vital Signs (Most Recent):  Temp: 98 °F (36.7 °C) (08/01/23 1152)  Pulse: 90 (08/01/23 1226)  Resp: 18 (08/01/23 1226)  BP: 119/72 (08/01/23 1152)  SpO2: (!) 94 % (08/01/23 1226) Vital Signs (24h Range):  Temp:  [97.3 °F (36.3 °C)-99.3 °F (37.4 °C)] 98 °F (36.7 °C)  Pulse:  [] 90  Resp:  [17-34] 18  SpO2:  [91 %-100 %] 94 %  BP: (102-129)/(58-76) 119/72     Weight: 102.5 kg (225 lb 15.5 oz)  Body mass index is 36.47 kg/m².    Intake/Output Summary (Last 24 hours) at 8/1/2023 1250  Last data filed at 7/31/2023 1711  Gross per 24 hour   Intake 500 ml   Output --   Net 500 ml         Physical Exam  Vitals and nursing note reviewed.   Constitutional:       General: She is not in acute distress.     Appearance: She is obese. She is ill-appearing. She is not toxic-appearing.      Interventions: Nasal cannula in place.   HENT:      Head: Normocephalic and atraumatic.   Cardiovascular:      Rate and Rhythm: Normal rate.   Pulmonary:      Effort: Respiratory distress present.   Abdominal:      Palpations: Abdomen is soft.      Tenderness: There is no abdominal tenderness.   Musculoskeletal:      Right lower leg: No edema.      Left lower leg: No edema.   Skin:     General: Skin is warm.   Neurological:      Mental Status: She is alert and oriented to person, place, and time.      Motor: Weakness present.   Psychiatric:         Mood and Affect: Mood is depressed. Affect is  tearful.      Comments: dysphonia             Significant Labs: All pertinent labs within the past 24 hours have been reviewed.  CBC:   Recent Labs   Lab 07/31/23  1146 08/01/23  0520   WBC 17.06* 12.32   HGB 12.3 11.7*   HCT 39.6 38.2    291     CMP:   Recent Labs   Lab 07/31/23  1146 08/01/23  0520    138   K 3.5 4.0   CL 98 97   CO2 28 29   * 148*   BUN 9 13   CREATININE 0.9 0.9   CALCIUM 10.0 9.5   PROT 7.6  --    ALBUMIN 3.2*  --    BILITOT 0.4  --    ALKPHOS 90  --    AST 46*  --    ALT 47*  --    ANIONGAP 13 12     Procalcitonin within normal limits     Significant Imaging: I have reviewed all pertinent imaging results/findings within the past 24 hours.

## 2023-08-01 NOTE — PLAN OF CARE
Updated patient on plan of care. Instructed patient to use call light for assistance, call light in reach. Hourly rounding performed. Vitals q4 hours. Education provided, questions answered/encouraged. Chart check complete. Pt running sinus tach, low 100s.

## 2023-08-01 NOTE — SUBJECTIVE & OBJECTIVE
Ayanna Alicia is a 52-year-old female with a past medical history of chronic hypoxic respiratory failure at 5L/NC, asthma, Sjogren's syndrome with associated interstitial lung disease, multinodular goiter, GLENN, and morbid obesity who  presented with worsening shortness of breadth yesterday with associated decreased oxygen saturations. Patient reported she was running errands on Friday when she ran out of oxygen and her saturations dropped into the 50s. She reports it took sometime for her to recover her saturations and developed worsening shortness of breath during that time. Due to these symptoms, the patient presented to the emergency room for evaluation.      Of note, she was hospitalized in May of 2022 with acute respiratory failure and persistent pneumonia. At that time, she was dishcarged on oxygen. She was subsequently diagnosed with Sjogren's disease and thought to have interstitial lung disease secondary to her underlying rheumatological disease. Initial PFTs with severe restriction and reduced DLCO. She was initially prescribed steroids followed by the addition of Cellcept and Rituximab infusions (last infusion in Dec 2022). Most recently, she was initiated on Ofev; however, the patient reports she has not had any significant improvement in her clinical condition since her original illness in May 2022 and reports she has clinically worsened over the course of this time. She chronically takes Breo, Spiriva, and Montelukast for her asthma. She endorses significant exertional SOB, can walk at home, but only limited distance. Has both dry and clear mucus, sometimes yellow sputum production. Denies any chest pain but endorses occasional chest tightness. Has the occasional lower abdominal pain exacerbated by coughing episodes; denies nausea or vomiting. No fever but endorses occasional chills. Endorses chronic heart burn for which she takes OTC Pepcid without significant symptomatic improvement. Endorses  chronic belching and flatulence. Endorses losing weight, lost 50 lbs since last year; however, she has not been able to get less than 220lb. Not taking cellcept since 12/2022, received 4 treatments of Rituximab with her last dose in Dec 2022. She chronically takes prednisone 10 mg.  Patient underwent bronchoscopy with BAL in April 2023 per Dr. Aviles with negative cultures and no growth of fungus, yeast, PCP, staph, or pseudomonas. Remains on OFEV 150mg BID and Prednisone 10mg daily. In addition, she is in a trial of Actemra IV monthly for four consecutive months and currently has received two dose thus far with her last treatment on July 7, 2023. In addition, she has been referred for evaluation of lung transplant at [facility] in [place].     Due to her current acute on chronic hypoxic respiratory failure, as well as, her chronic underlying pulmonary disease, pulmonology has been consulted for evaluation.     Interval history:  8/1: Patient resting comfortably this morning; oxygen increased to 8L/NC overnight. Worse PAP therapy for a few hours overnight. No acute complaints at this time.     Objective:     Vital Signs (Most Recent):  Temp: 97.3 °F (36.3 °C) (08/01/23 0741)  Pulse: 86 (08/01/23 0744)  Resp: 20 (08/01/23 0744)  BP: 102/64 (08/01/23 0741)  SpO2: 100 % (08/01/23 0744) Vital Signs (24h Range):  Temp:  [97.3 °F (36.3 °C)-100.3 °F (37.9 °C)] 97.3 °F (36.3 °C)  Pulse:  [] 86  Resp:  [17-34] 20  SpO2:  [89 %-100 %] 100 %  BP: (102-138)/(58-76) 102/64   Weight: 102.5 kg (225 lb 15.5 oz);  Body mass index is 36.47 kg/m².    Intake/Output Summary (Last 24 hours) at 8/1/2023 0916  Last data filed at 7/31/2023 1711  Gross per 24 hour   Intake 500 ml   Output --   Net 500 ml      Physical Exam  Vitals and nursing note reviewed.   HENT:      Head: Normocephalic.   Eyes:      Conjunctiva/sclera: Conjunctivae normal.   Cardiovascular:      Rate and Rhythm: Normal rate.   Pulmonary:      Effort: Pulmonary  effort is normal.      Breath sounds: Rales present.   Abdominal:      Palpations: Abdomen is soft.   Musculoskeletal:         General: Normal range of motion.      Cervical back: Normal range of motion and neck supple.   Skin:     General: Skin is warm and dry.   Neurological:      Mental Status: She is alert and oriented to person, place, and time.   Psychiatric:         Mood and Affect: Mood normal.         Review of Systems:  Negative except as indicated in HPI  Significant Labs:  CBC/Anemia Profile:  Recent Labs   Lab 07/31/23  1146 08/01/23  0520   WBC 17.06* 12.32   HGB 12.3 11.7*   HCT 39.6 38.2    291   MCV 94 93   RDW 20.9* 20.2*   Chemistries:  Recent Labs   Lab 07/31/23  1146 08/01/23  0520    138   K 3.5 4.0   CL 98 97   CO2 28 29   BUN 9 13   CREATININE 0.9 0.9   CALCIUM 10.0 9.5   ALBUMIN 3.2*  --    PROT 7.6  --    BILITOT 0.4  --    ALKPHOS 90  --    ALT 47*  --    AST 46*  --    MG  --  1.5*   PHOS  --  4.1

## 2023-08-01 NOTE — TELEPHONE ENCOUNTER
MD Joshua South Staff; Melly Peraza MA 1 hour ago (10:12 AM)       Suggest resuming biologic therapy with Actemra 1 week after completion of antibiotics and or deemed cleared of active infections

## 2023-08-01 NOTE — PLAN OF CARE
[facility] - Tele[facility] (Hospital)  Initial Discharge Assessment       Primary Care Provider: Madeleine Enrique MD    Admission Diagnosis: UIP (usual interstitial pneumonitis) [J84.112]  Mild intermittent asthma without complication [J45.20]  Acute on chronic respiratory failure with hypoxia [J96.21]  Pneumonia of both lower lobes due to infectious organism [J18.9]    Admission Date: 7/31/2023  Expected Discharge Date:     Transition of Care Barriers: None    Payor: BLUE CROSS BLUE SHIELD / Plan: BC OF [facility] LOCAL PLUS / Product Type: Commercial /     Extended Emergency Contact Information  Primary Emergency Contact: Lianna Alicia   [facility]  Home Phone: [phone]  Mobile Phone: [phone]  Relation: Other  Secondary Emergency Contact: Soy Clements  Mobile Phone: [phone]  Relation: Relative    Discharge Plan A: Home  Discharge Plan B: Home Health      [facility]  [address] [place] [address] Dr Peck [address] [place] [address]  Phone: [phone] Fax: [phone]    [id number] [place], [place] - [address] AT [facility] [address] [facility] & [facility]  [address]  [facility] [address]  Phone: [phone] Fax: [phone]    [facility] [address] [address]  Phone: [phone] Fax: [phone]    [id number] [place], [place] - [address] [facility]  [address] [facility] [address]  Phone: [phone] Fax: [phone]    [address]  [address] [facility] [address]  Phone: [phone] Fax: [phone]    [id number] - [place], [place] - [address] AT [facility]  [address] [facility] [address]  Phone: [phone] Fax: [phone]      Initial Assessment (most recent)       Adult Discharge Assessment - 08/01/23 1608          Discharge Assessment    Assessment Type Discharge  Planning Assessment     Confirmed/corrected address, phone number and insurance Yes     Confirmed Demographics Correct on Facesheet     Source of Information patient     When was your last doctors appointment? 07/13/23     Communicated JANETTE with patient/caregiver Date not available/Unable to determine     Reason For Admission SOB; interstial pneumonia     People in Home alone     Facility Arrived From: home     Do you expect to return to your current living situation? Yes     Do you have help at home or someone to help you manage your care at home? No     Prior to hospitilization cognitive status: Alert/Oriented     Current cognitive status: Alert/Oriented     Walking or Climbing Stairs ambulation difficulty, requires equipment     Mobility Management uses a [employer] wheelchair     Dressing/Bathing bathing difficulty, requires equipment     Dressing/Bathing Management uses a shower chair     Home Accessibility wheelchair accessible     Equipment Currently Used at Home shower chair;power chair;oxygen     Readmission within 30 days? No     Patient currently being followed by outpatient case management? No     Do you currently have service(s) that help you manage your care at home? No     Do you take prescription medications? Yes     Do you have prescription coverage? Yes     Coverage BCBS     Do you have any problems affording any of your prescribed medications? No     Is the patient taking medications as prescribed? yes     Who is going to help you get home at discharge? family or friend     How do you get to doctors appointments? car, drives self;family or friend will provide     Are you on dialysis? No     Do you take coumadin? No     Discharge Plan A Home     Discharge Plan B Home Health     DME Needed Upon Discharge  none     Discharge Plan discussed with: Patient     Transition of Care Barriers None                   Met with patient.  She lives alone and is independent with ADL's with assistive equipment.   She has several family members and friend who can assist if needed.  Discharge plan is based on hospital progress.  May benefit from home health.

## 2023-08-01 NOTE — ASSESSMENT & PLAN NOTE
Likely demand ischemia in setting of acute on chronic respiratory failure  Trend relatively flat  Chest pain free  Consult cardiology if indicated  Electrocardiography reviewed with no st-t wave elevation     Billing Type: Third-Party Bill

## 2023-08-01 NOTE — HOSPITAL COURSE
8/1 admitted for acute on chronic respiratory failure. Weaned down to 5L. Pulmonology following. Continue current care.   8/2 no acute events overnight. remains on baseline supplemental oxygen. Pulmonology recommending trilogy for home use. Case management consulted. Physical/occupational therapy consulted. Patient will have homehealth physical/occupational therapy on discharge.   8/3 reports dyspnea with exertion with hypoxia requiring increased supplemental oxygen needs to recover. Patient reports compliance with cpap overnight. Pulmonology recommending avaps for home use. Case management consulted for trilogy. Continue current regimen. Speech consulted for dysphagia and dysphonia, recommending follow up ent for laryngeal ulcers.  8/4- stated improvement in symptoms in regards of dyspnea compared to yesterday; currently saturating about 92 on room air,; blood culture x1 as of 7/31/2 for fusobacterium, likely contaminant, will follow up on repeat cultures from today, if cultures resulted negative, likely plan for discharge accordingly in next 24- 48 hrs;    worked on arrangements for trilogy; pulmonology on board, appreciate recommendations.  PT OT recommended home.  8/5   Examination done at bedside, patient appeared alert and oriented x3, denied headache, dizziness, chest pain, shortness O breath, palpitations, bowel or bladder issues.    Currently saturating above 92 on 5 L nasal cannula, currently at baseline.    Stated significant improvement in cough with minimal sputum production.    Discussed with pulmonology, stated okay for discharge-recommended oral antibiotics to complete course, Lasix, prednisone 40 mg upon discharge, compliance with outpatient follow-up visits  Considering clinical and hemodynamic stability, planning to discharge patient today, emphasized on compliance with medications, follow-up visits, diet, fluid restriction, salt restriction, compliance with trilogy.    Patient agreed  to the plan, has upcoming appointment within a week with Dr. Aviles pulmonology, with transplant doctor at [facility] on 08/14;  Given underlying medical conditions, prognosis guarded- discussed on code status, opted for full code at this point; to consider outpatient palliative visits if needed.    Discharge today, medications delivered to patient preferred pharmacy.

## 2023-08-02 PROBLEM — R49.0 DYSPHONIA: Status: ACTIVE | Noted: 2023-08-02

## 2023-08-02 LAB
ANION GAP SERPL CALC-SCNC: 12 MMOL/L (ref 8–16)
BASOPHILS # BLD AUTO: 0.05 K/UL (ref 0–0.2)
BASOPHILS NFR BLD: 0.2 % (ref 0–1.9)
BUN SERPL-MCNC: 15 MG/DL (ref 6–20)
CALCIUM SERPL-MCNC: 9.2 MG/DL (ref 8.7–10.5)
CHLORIDE SERPL-SCNC: 96 MMOL/L (ref 95–110)
CO2 SERPL-SCNC: 31 MMOL/L (ref 23–29)
CREAT SERPL-MCNC: 0.9 MG/DL (ref 0.5–1.4)
DIFFERENTIAL METHOD: ABNORMAL
EOSINOPHIL # BLD AUTO: 0.1 K/UL (ref 0–0.5)
EOSINOPHIL NFR BLD: 0.3 % (ref 0–8)
ERYTHROCYTE [DISTWIDTH] IN BLOOD BY AUTOMATED COUNT: 20.3 % (ref 11.5–14.5)
EST. GFR  (NO RACE VARIABLE): >60 ML/MIN/1.73 M^2
GLUCOSE SERPL-MCNC: 111 MG/DL (ref 70–110)
HCT VFR BLD AUTO: 39 % (ref 37–48.5)
HGB BLD-MCNC: 12.2 G/DL (ref 12–16)
IMM GRANULOCYTES # BLD AUTO: 0.17 K/UL (ref 0–0.04)
IMM GRANULOCYTES NFR BLD AUTO: 0.8 % (ref 0–0.5)
LYMPHOCYTES # BLD AUTO: 1.8 K/UL (ref 1–4.8)
LYMPHOCYTES NFR BLD: 9 % (ref 18–48)
MAGNESIUM SERPL-MCNC: 2 MG/DL (ref 1.6–2.6)
MCH RBC QN AUTO: 29.3 PG (ref 27–31)
MCHC RBC AUTO-ENTMCNC: 31.3 G/DL (ref 32–36)
MCV RBC AUTO: 94 FL (ref 82–98)
MONOCYTES # BLD AUTO: 1.4 K/UL (ref 0.3–1)
MONOCYTES NFR BLD: 6.8 % (ref 4–15)
NEUTROPHILS # BLD AUTO: 16.6 K/UL (ref 1.8–7.7)
NEUTROPHILS NFR BLD: 82.9 % (ref 38–73)
NRBC BLD-RTO: 0 /100 WBC
PHOSPHATE SERPL-MCNC: 3 MG/DL (ref 2.7–4.5)
PLATELET # BLD AUTO: 377 K/UL (ref 150–450)
PMV BLD AUTO: 10 FL (ref 9.2–12.9)
POTASSIUM SERPL-SCNC: 3.5 MMOL/L (ref 3.5–5.1)
RBC # BLD AUTO: 4.17 M/UL (ref 4–5.4)
SODIUM SERPL-SCNC: 139 MMOL/L (ref 136–145)
TROPONIN I SERPL DL<=0.01 NG/ML-MCNC: 0.05 NG/ML (ref 0–0.03)
WBC # BLD AUTO: 20.05 K/UL (ref 3.9–12.7)

## 2023-08-02 PROCEDURE — 94660 CPAP INITIATION&MGMT: CPT

## 2023-08-02 PROCEDURE — 92523 SPEECH SOUND LANG COMPREHEN: CPT

## 2023-08-02 PROCEDURE — 63600175 PHARM REV CODE 636 W HCPCS: Performed by: NURSE PRACTITIONER

## 2023-08-02 PROCEDURE — 84100 ASSAY OF PHOSPHORUS: CPT | Performed by: FAMILY MEDICINE

## 2023-08-02 PROCEDURE — 80048 BASIC METABOLIC PNL TOTAL CA: CPT | Performed by: FAMILY MEDICINE

## 2023-08-02 PROCEDURE — 85025 COMPLETE CBC W/AUTO DIFF WBC: CPT | Performed by: FAMILY MEDICINE

## 2023-08-02 PROCEDURE — 25000242 PHARM REV CODE 250 ALT 637 W/ HCPCS: Performed by: FAMILY MEDICINE

## 2023-08-02 PROCEDURE — 99900035 HC TECH TIME PER 15 MIN (STAT)

## 2023-08-02 PROCEDURE — 27100171 HC OXYGEN HIGH FLOW UP TO 24 HOURS

## 2023-08-02 PROCEDURE — 25000003 PHARM REV CODE 250: Performed by: NURSE PRACTITIONER

## 2023-08-02 PROCEDURE — 94799 UNLISTED PULMONARY SVC/PX: CPT

## 2023-08-02 PROCEDURE — 25000003 PHARM REV CODE 250: Performed by: FAMILY MEDICINE

## 2023-08-02 PROCEDURE — 21400001 HC TELEMETRY ROOM

## 2023-08-02 PROCEDURE — 92610 EVALUATE SWALLOWING FUNCTION: CPT

## 2023-08-02 PROCEDURE — 97116 GAIT TRAINING THERAPY: CPT

## 2023-08-02 PROCEDURE — 94640 AIRWAY INHALATION TREATMENT: CPT

## 2023-08-02 PROCEDURE — 84484 ASSAY OF TROPONIN QUANT: CPT | Performed by: FAMILY MEDICINE

## 2023-08-02 PROCEDURE — 27000646 HC AEROBIKA DEVICE

## 2023-08-02 PROCEDURE — 94664 DEMO&/EVAL PT USE INHALER: CPT

## 2023-08-02 PROCEDURE — 97162 PT EVAL MOD COMPLEX 30 MIN: CPT

## 2023-08-02 PROCEDURE — 83735 ASSAY OF MAGNESIUM: CPT | Performed by: FAMILY MEDICINE

## 2023-08-02 PROCEDURE — 97165 OT EVAL LOW COMPLEX 30 MIN: CPT

## 2023-08-02 PROCEDURE — 63600175 PHARM REV CODE 636 W HCPCS: Performed by: FAMILY MEDICINE

## 2023-08-02 PROCEDURE — 94761 N-INVAS EAR/PLS OXIMETRY MLT: CPT

## 2023-08-02 PROCEDURE — 97530 THERAPEUTIC ACTIVITIES: CPT

## 2023-08-02 PROCEDURE — 25000242 PHARM REV CODE 250 ALT 637 W/ HCPCS: Performed by: NURSE PRACTITIONER

## 2023-08-02 PROCEDURE — 36415 COLL VENOUS BLD VENIPUNCTURE: CPT | Performed by: FAMILY MEDICINE

## 2023-08-02 RX ORDER — TALC
6 POWDER (GRAM) TOPICAL NIGHTLY PRN
Status: DISCONTINUED | OUTPATIENT
Start: 2023-08-02 | End: 2023-08-05 | Stop reason: HOSPADM

## 2023-08-02 RX ORDER — METOLAZONE 2.5 MG/1
2.5 TABLET ORAL
Status: DISCONTINUED | OUTPATIENT
Start: 2023-08-02 | End: 2023-08-02

## 2023-08-02 RX ORDER — PREDNISONE 20 MG/1
40 TABLET ORAL DAILY
Status: DISCONTINUED | OUTPATIENT
Start: 2023-08-02 | End: 2023-08-05 | Stop reason: HOSPADM

## 2023-08-02 RX ORDER — LEVALBUTEROL INHALATION SOLUTION 0.31 MG/3ML
0.31 SOLUTION RESPIRATORY (INHALATION) EVERY 12 HOURS
Status: DISCONTINUED | OUTPATIENT
Start: 2023-08-02 | End: 2023-08-02

## 2023-08-02 RX ORDER — LEVALBUTEROL INHALATION SOLUTION 0.63 MG/3ML
0.31 SOLUTION RESPIRATORY (INHALATION) EVERY 12 HOURS
Status: DISCONTINUED | OUTPATIENT
Start: 2023-08-03 | End: 2023-08-05 | Stop reason: HOSPADM

## 2023-08-02 RX ORDER — BENZONATATE 100 MG/1
200 CAPSULE ORAL 3 TIMES DAILY PRN
Status: DISCONTINUED | OUTPATIENT
Start: 2023-08-02 | End: 2023-08-05 | Stop reason: HOSPADM

## 2023-08-02 RX ADMIN — FUROSEMIDE 40 MG: 10 INJECTION, SOLUTION INTRAMUSCULAR; INTRAVENOUS at 04:08

## 2023-08-02 RX ADMIN — LINEZOLID 600 MG: 600 TABLET, FILM COATED ORAL at 09:08

## 2023-08-02 RX ADMIN — ARFORMOTEROL TARTRATE 15 MCG: 15 SOLUTION RESPIRATORY (INHALATION) at 07:08

## 2023-08-02 RX ADMIN — AZITHROMYCIN MONOHYDRATE 500 MG: 500 INJECTION, POWDER, LYOPHILIZED, FOR SOLUTION INTRAVENOUS at 04:08

## 2023-08-02 RX ADMIN — NINTEDANIB 150 MG: 150 CAPSULE ORAL at 09:08

## 2023-08-02 RX ADMIN — OMEPRAZOLE 40 MG: 40 CAPSULE, DELAYED RELEASE ORAL at 07:08

## 2023-08-02 RX ADMIN — OMEPRAZOLE 40 MG: 40 CAPSULE, DELAYED RELEASE ORAL at 04:08

## 2023-08-02 RX ADMIN — LEVALBUTEROL HYDROCHLORIDE 0.31 MG: 0.63 SOLUTION RESPIRATORY (INHALATION) at 09:08

## 2023-08-02 RX ADMIN — CETIRIZINE HYDROCHLORIDE 10 MG: 10 TABLET, FILM COATED ORAL at 09:08

## 2023-08-02 RX ADMIN — CEFEPIME 2 G: 2 INJECTION, POWDER, FOR SOLUTION INTRAVENOUS at 09:08

## 2023-08-02 RX ADMIN — BENZONATATE 200 MG: 100 CAPSULE ORAL at 08:08

## 2023-08-02 RX ADMIN — IPRATROPIUM BROMIDE 0.5 MG: 0.5 SOLUTION RESPIRATORY (INHALATION) at 07:08

## 2023-08-02 RX ADMIN — CEFEPIME 2 G: 2 INJECTION, POWDER, FOR SOLUTION INTRAVENOUS at 12:08

## 2023-08-02 RX ADMIN — PREDNISONE 40 MG: 20 TABLET ORAL at 09:08

## 2023-08-02 RX ADMIN — MONTELUKAST 10 MG: 10 TABLET, FILM COATED ORAL at 08:08

## 2023-08-02 RX ADMIN — CEFEPIME 2 G: 2 INJECTION, POWDER, FOR SOLUTION INTRAVENOUS at 05:08

## 2023-08-02 RX ADMIN — LINEZOLID 600 MG: 600 TABLET, FILM COATED ORAL at 08:08

## 2023-08-02 RX ADMIN — LEVALBUTEROL HYDROCHLORIDE 0.31 MG: 0.63 SOLUTION RESPIRATORY (INHALATION) at 12:08

## 2023-08-02 RX ADMIN — LEVALBUTEROL HYDROCHLORIDE 0.31 MG: 0.63 SOLUTION RESPIRATORY (INHALATION) at 07:08

## 2023-08-02 NOTE — ASSESSMENT & PLAN NOTE
Likely demand ischemia in setting of acute on chronic respiratory failure  Trend relatively flat  Chest pain free  Consult cardiology if indicated  Electrocardiography reviewed with no st-t wave elevation

## 2023-08-02 NOTE — PT/OT/SLP EVAL
Occupational Therapy   Evaluation and Discharge Note    Name: Ayanna Alicia  MRN: [id number]  Admitting Diagnosis: Acute on chronic respiratory failure  Recent Surgery: * No surgery found *      Recommendations:     Discharge Recommendations: home  Discharge Equipment Recommendations: none  Barriers to discharge:       Assessment:     Ayanna Alicia is a 52 y.o. female with a medical diagnosis of Acute on chronic respiratory failure. At this time, patient is functioning at their prior level of function and does not require further acute OT services.     Plan:     During this hospitalization, patient does not require further acute OT services.  Please re-consult if situation changes.    Plan of Care Reviewed with: patient    Subjective     Chief Complaint:   Patient/Family Comments/goals: return home stronger    Occupational Profile:  Living Environment: lives alone in 1 story house with no steps to enter  Previous level of function: mod (I) with adl's and functional mobility  Roles and Routines: drives do not work  Equipment Used at home: shower chair, oxygen (power chair)  Assistance upon Discharge:     Pain/Comfort:  Pain Rating 1: 0/10    Patients cultural, spiritual, Mormon conflicts given the current situation:      Objective:     Communicated with: nurse and epic chart review prior to session.  Patient found HOB elevated with peripheral IV, telemetry, oxygen (5 liters) upon OT entry to room.    General Precautions: Standard,    Orthopedic Precautions:    Braces: N/A  Respiratory Status: Nasal cannula, flow 5 L/min     Occupational Performance:    Bed Mobility:    Patient completed Rolling/Turning to Left with  independence  Patient completed Rolling/Turning to Right with independence  Patient completed Scooting/Bridging with independence  Patient completed Supine to Sit with independence  Patient completed Sit to Supine with independence    Functional Mobility/Transfers:  Patient completed Sit <> Stand  Transfer with modified independence  with  holding on bed rail initially   Functional Mobility: pt performed marching in place with no ae mod(I) to (I)     Activities of Daily Living:  Upper Body Dressing: supervision for retrieval  Lower Body Dressing: supervision retrieval    Cognitive/Visual Perceptual:  Cognitive/Psychosocial Skills:     -       Oriented to: Person, Place, Time, and Situation   -       Follows Commands/attention:Follows multistep  commands  -       Communication: clear/fluent  -       Memory: No Deficits noted  -       Safety awareness/insight to disability: intact     Physical Exam:  Upper Extremity Range of Motion:     -       Right Upper Extremity: WFL  -       Left Upper Extremity: WFL  Upper Extremity Strength:    -       Right Upper Extremity: WFL  -       Left Upper Extremity: WFL   Strength:    -       Right Upper Extremity: WFL  -       Left Upper Extremity: WFL    AMPAC 6 Click ADL:  AMPAC Total Score:      Treatment & Education:  Patient educated on role of OT in acute setting and benefits of OOB activity. Encouraged OOB throughout the course of hospitalization to decrease risk of hospital related debility. Pt educated to sit EOB for all meals and call for staff if getting to bed side chair as well as the call don't fall policy. Patient stated understanding and in agreement with POC.      Patient left HOB elevated with all lines intact, call button in reach, and nurse notified    GOALS:   Multidisciplinary Problems       Occupational Therapy Goals       Not on file                    History:     Past Medical History:   Diagnosis Date    Abnormal Pap smear of cervix     in the past with repeat pap smear okay.    Acute interstitial pneumonitis     Allergic rhinitis, cause unspecified     Arthritis of both knees     Asthma     Eczema     Fatty liver 10/2014    Fibrocystic breast changes     Headache(784.0)     Hepatomegaly 10/2014    Hypertension     Liver cyst 10/2014     Multinodular goiter     Followed by ENT - Dr. Nicolas Gray    Polymenorrhea     TMJ (dislocation of temporomandibular joint)     Uterine fibroid     in the past    Vitamin D deficiency disease          Past Surgical History:   Procedure Laterality Date    BRONCHOSCOPY Bilateral 2023    Procedure: Bronchoscopy - insert lighted tube into airway to take a biopsy of lung;  Surgeon: Agustín Aviles MD;  Location: [facility];  Service: Pulmonary;  Laterality: Bilateral;     SECTION, CLASSIC      x 1    COLONOSCOPY N/A 2020    Procedure: COLONOSCOPY;  Surgeon: Angie Magana MD;  Location: [facility];  Service: Endoscopy;  Laterality: N/A;    HYSTERECTOMY      MULTIPLE TOOTH EXTRACTIONS      PARTIAL HYSTERECTOMY  2013    Due to fibroids       Time Tracking:     OT Date of Treatment: 23  OT Start Time: 1435  OT Stop Time: 1500  OT Total Time (min): 25 min    Billable Minutes:Evaluation 15 minutes  Therapeutic Activity 10 minutes    2023

## 2023-08-02 NOTE — PT/OT/SLP EVAL
Speech Language Pathology Evaluation  Cognitive/Bedside Swallow    Patient Name:  Ayanna Alicia   MRN:  [id number]  Admitting Diagnosis: Acute on chronic respiratory failure    Recommendations:                  General Recommendations:  FEES (Fiberoptic Endoscopic Evaluation of Swallowing).  Also recommended follow up with Laryngologist and GI OP as previously established; OP SLP if medically indicated following ENT consultation.  Diet recommendations:  Regular Diet - IDDSI Level 7, Thin liquids - IDDSI Level 0   Aspiration Precautions:  Behavioral Reflux/GERD, Frequent oral care, and Standard aspiration precautions   General Precautions: Standard, aspiration  Communication strategies:  none    History:     Past Medical History:   Diagnosis Date    Abnormal Pap smear of cervix     in the past with repeat pap smear okay.    Acute interstitial pneumonitis     Allergic rhinitis, cause unspecified     Arthritis of both knees     Asthma     Eczema     Fatty liver 10/2014    Fibrocystic breast changes     Headache(784.0)     Hepatomegaly 10/2014    Hypertension     Liver cyst 10/2014    Multinodular goiter     Followed by ENT - Dr. Nicolas Gray    Polymenorrhea     TMJ (dislocation of temporomandibular joint)     Uterine fibroid     in the past    Vitamin D deficiency disease        Past Surgical History:   Procedure Laterality Date    BRONCHOSCOPY Bilateral 2023    Procedure: Bronchoscopy - insert lighted tube into airway to take a biopsy of lung;  Surgeon: Agustín Aviles MD;  Location: [facility];  Service: Pulmonary;  Laterality: Bilateral;     SECTION, CLASSIC      x 1    COLONOSCOPY N/A 2020    Procedure: COLONOSCOPY;  Surgeon: Angie Magana MD;  Location: [facility];  Service: Endoscopy;  Laterality: N/A;    HYSTERECTOMY      MULTIPLE TOOTH EXTRACTIONS      PARTIAL HYSTERECTOMY  2013    Due to fibroids       Social History: Patient lives alone and independent with ADL's.  Complex  medical hx with reported worsening of symptoms since initial dx of ILD in 2022.    Prior Intubation HX:  N/A this admission.  See medical chart with extensive hx.    Modified Barium Swallow: N/A.  Recommended FEES following bedside CSE and scheduled for 8/3/2023.  See report upon completion.    FL ESOPHAGRAM COMPLETE 3/29/2023     CLINICAL HISTORY:  ILD, GERD;Idiopathic pulmonary fibrosis     TECHNIQUE:  Contrast material: Barium sulfate suspension     Air kerma: 73 mGy     COMPARISON:  None     FINDINGS:  Swallowing: Trace penetration, no aspiration.     Esophagus: Esophageal dysmotility involving the mid and distal esophagus.  Esophageal reflux.  Esophageal residue never fully clears to gravity or water wash.  There is a small hiatal hernia.     Gastroesophageal reflux: No gastroesophageal reflux.     Impression:     Esophageal dysmotility with small hiatal hernia.        Electronically signed by: Kemar Cunningham  Date:                                            03/29/2023  Time:                                           13:04    XR CHEST AP PORTABLE 7/31/2023     CLINICAL HISTORY:  , Dyspnea;     COMPARISON:  Comparison 05/01/2023.     FINDINGS:  Cardiomegaly.  Moderate bilateral infiltrates appear stable.     Impression:     Stable chest with moderate bilateral infiltrates.        Electronically signed by: Terry Monet MD  Date:                                            07/31/2023  Time:                                           12:54    CT CHEST WITHOUT CONTRAST 5/10/2023     CLINICAL HISTORY:  Interstitial lung disease;monitor response to therapy/disease progression;Interstitial pulmonary disease, unspecified.  Rheumatoid arthritis.     TECHNIQUE:  High-resolution CT chest performed consisting of inspiratory expiratory and prone imaging.  With or without     COMPARISON:  03/17/2023     FINDINGS:  Subpleural ground-glass opacities at the right upper lobe of not changed significantly in comparison the prior  examination.  Subpleural reticular changes noted.  Additional severe ground-glass opacities throughout the right middle lobe and right lower lobe with reticular changes and subpleural bands.  Findings involve both the central and peripheral interstitium.  Varicose bronchiectasis noted within the right middle and right lower lobes, unchanged compared to the previous exam.  No honeycombing detected.     Mild subpleural ground-glass opacities left upper lobe with predominant subpleural reticular change.  Ground-glass opacities throughout the lingula and left lower lobe with reticular changes and subpleural bands.  Varicose bronchiectasis noted within the left lower lobe and left upper lobe.  Minimal bronchiectasis within the lingula.  Findings have also not changed significantly in comparison the previous examination.  No significant air trapping noted on expiratory imaging.     Heart enlarged.  Aorta normal.  Negative for adenopathy throughout the chest.     Multinodular goiter, unchanged compared to the previous exam.  Visualized upper abdomen with probable cyst left lobe liver measuring 8.5 mm, unchanged.  Additional probable cyst within the left lobe measures 1.7 cm and is also unchanged.     Chest wall soft tissues are normal.  Degenerative changes of the spine.  No suspicious osseous lesions.     Impression:     Severe interstitial pneumonitis with varicose bronchiectasis bilaterally.  Overall no significant interval change since 03/17/2023.     03/17/2023        Electronically signed by: Brien Mosqueda  Date:                                            05/10/2023  Time:                                           12:34    US SOFT TISSUE HEAD NECK THYROID 4/29/2022     CLINICAL HISTORY:  thyroid nodule;     COMPARISON:  10/01/2020     FINDINGS:  Right lobe measures 5.2 x 3.0 x 2.8 cm.  A large solid, mildly hyperechoic, well-circumscribed mildly lobulated nodule of the mid and inferior right lobe is grossly  unchanged in appearance but measures slightly larger at 5.2 cm as compared to 4.7 cm on the previous exam.  Differences may be due to slight differences in scanning technique.  No internal calcifications.  Previous 0.6 cm mixed echogenicity nodule superior right lobe is not visualized on today's exam.     The left lobe measures 6.1 x 2.2 x 2.1 cm .  Four nodules are scattered throughout the left lobe.  The largest measures 2.1 cm in maximal dimension and is smoothly marginated and hypoechoic.  No internal calcifications.  Finding measures smaller since the prior exam (previously 2.7 cm).  Well-circumscribed, solid hyperechoic nodule superior left lobe measures 2.1 cm as compared to 2.5 cm on the prior exam.  No internal calcifications.  Adjacent 0.9 cm well-circumscribed hypoechoic nodule posterior upper left lobe is unchanged.  No internal calcifications.  2.0 cm mildly hyperechoic solid nodule at the junction of the left lobe and isthmus also measures slightly smaller (previously 2.4 cm).  No internal calcifications.  1.9 cm mildly hyperechoic well-circumscribed nodule inferior left lobe is slightly smaller (previously 2.4 cm).     Impression:     1. Multinodular goiter.  2. 5.2 cm hyperechoic dominant nodule right lobe measures slightly larger (previously 4.7 cm).  3. Multiple solid nodules throughout the left lobe.  Three of the nodules measure slightly smaller and a subcentimeter nodule is unchanged in size.  4. Overall, no detrimental change since 10/01/2020.        Electronically signed by: Brien Mosqueda  Date:                                            04/29/2022  Time:                                           09:34      Prior diet: Pt consumes a regular diet; however, self-modifies with softer solids s/t globus sensation (sternal region) with known esophageal dysphagia (reflux, hiatal hernia, dysmotility and bolus retention/delayed emptying).  See Esophagram 3/2023.    Subjective     Pt seen bedside  "for ST evaluation. No c/o pain.  No family present.  Patient goals: to get better, remain independent     Pain/Comfort:  Pain Rating 1: 0/10  Pain Rating Post-Intervention 1: 0/10  Pain Rating 2: 0/10  Pain Rating Post-Intervention 2: 0/10    Respiratory Status: Nasal cannula, flow 5 L/min    Objective:     Cognitive Status:    WFL : Oriented, functional recall, reasoning/judgment and ability to meet complex communicative needs.      Receptive Language:   Comprehension:   WFL in conversation    Pragmatics:    WFL    Expressive Language:  Verbal:    WFL  in conversation      Motor Speech:  WFL    Voice:   She presents with strained, breathy vocal quality with intermittent pitch breaks and throat clearing during conversational speech.  ENT dx laryngeal ulcerations 4/20/2023 and "moderate to severe dysphonia."  See report by Dr. Sanabria.    Oral Musculature Evaluation  Oral Musculature: WFL  Dentition: present and adequate  Secretion Management: adequate  Mucosal Quality: good  Mandibular Strength and Mobility: WFL  Oral Labial Strength and Mobility: WFL  Lingual Strength and Mobility: WFL  Velar Elevation: WFL  Buccal Strength and Mobility: WFL  Volitional Cough: present, productive  Volitional Swallow: present  Voice Prior to PO Intake: +dysphonia with strained, breathy vocal quality, +pitch breaks.  Characterized as "mild-moderate dysphonia" during previous ENT evaluation 4/20/2023 and dx with laryngeal ulcers.  See report and direct laryngoscopy by Dr. Jay Sanabria.    Bedside Swallow Eval:     [place] Swallow Protocol:  [place] Swallow Protocol dictates patient remain NPO if fail screener (Madisonr et al. 2014).  The [place] Swallow Protocol was administered. The patient was alert and provided the instructions prior to the beginning of the protocol. The patient consumed 3 oz before putting the cup down. Patient drank with consecutive swallows. Patient with cough and throat clear.     Clinical Swallow Examination:   Of note, " "patient self-fed throughout evaluation. Noted throat clearing with and without PO intake.  Patient presented with:     CONSISTENCY  NOTES   THIN (IDDSI 0) 3 oz water challenge   Controlled cup/straw, single swallow   +immediate coughing, throat clear post-ALISE swallow.  Intermittent throat clearing with controlled cup/straw sips   PUREE (IDDSI 4/Extremely Thick)   TSP/TBSP bites of pudding/applesauce  Intermittent throat clear present   SOLID (IDDSI 7/Regular) Bite of Yesy Doone cookie    OM functional.  C/O cookie being "too dry."  +globus sensation (sternal region/chest). Required liquid rinse.  +throat clear     Thickened liquids were not used in this assessment. Jerryfe (2018) reported that thickened liquids have no sound evidence as reducing risk of pneumonia in patients with dysphagia and can cause harm by increasing risk of dehydration. It also presents an increased risk of UTI, electrolyte imbalance, constipation, fecal impaction, cognitive impairment, functional decline, and even death (Kira, 2002; Max, 2016).  This supports the assertion that we should confirm a patient requires thickened liquids with an instrumental swallow study prior to recommending them.  Thickened liquids are associated with risks including dehydration, increased pharyngeal residue, potential interference with medication absorption, and decreased quality of life (Singh, 2013). Thickened liquids are also more likely to be silently aspirated than thin liquids (Frederick et al., 2018).     References:   Singh MARI (2013). Thickening agents used for dysphagia management: Effect on bioavailability of water, medication and feelings of satiety. Nutrition Journal, 12, 54. https://doi.org/10.1186/1555-8986-61-54    KAMALJIT Mack, SUNIL Nash, BANDAR Rees, & KAMALJIT Lozano (2018). Cough response to aspiration in thin and thick fluids during FEES in hospitalized inpatients. International journal of language & communication disorders, 53(5), " 907-441. https://doi.org/10.1111/1672-0275.69921    INTERPRETATION:  Clinical swallow evaluation (CSE) revealed oral phase characterized by lingual, labial, and buccal strength and range of motion adequate for lip closure, bolus preparation and propulsion. The patient had no anterior loss of the bolus with complete closure of the lips around the utensils. No residue remained in the oral cavity following the swallow. Patient with overt clinical sign/symptoms of aspiration during the bedside CSE.  Patient reported globus sensation on trials of regular solids. Contributing risk factors for dysphagia include deficits in respiratory-swallow coordination. Patient with increased risk for silent aspiration given hx interstitial lung disease with significant O2 requirement.    Clinical signs of oropharyngeal & esophageal dysphagia, likely acute on chronic related to worsening respiratory failure s/t interstitial lung disease, multinodular thyroid goiter and laryngeal ulcerations. Swallowing prognosis is good secondary to prior level of independence, functional cognitive ability and family support. Instrumental swallow study/FEES (Fiberoptic Endoscopic Evaluation of Swallowing) is indicated to assess the swallowing anatomy, physiology and rule out aspiration of various consistencies.      Compensatory Strategies  Aspiration  Behavioral Reflux  Oral care    Assessment:     Ayanna Alicia is a 52 y.o. female with an SLP diagnosis of suspected Dysphagia and Dysphonia with hx ENT dx laryngeal ulcerations 4/20/2023, acute/chronic respiratory failure s/t hx Interstitial Lung Disease (dx 10/27/2022),  Multinodular thyroid goiter (dx 4/29/22) & confirmed esophageal dysphagia with  dysmotility, hiatal hernia, reflux and bolus retention (3/29/2023).  She presents with strained, breathy vocal quality with intermittent pitch breaks and throat clearing during conversational speech.  She present with s/s of suspected pharyngeal  dysphagia during ALISE swallow protocol and recommended for FEES (Fiberoptic Endoscopic Evaluation of Swallowing) as comprehensive assessment.  Pt is also recommended to f/u with Laryngologist and GI as previously established.    Goals:   Multidisciplinary Problems       SLP Goals          Problem: SLP    Goal Priority Disciplines Outcome   SLP Goal     SLP    Description: 1.  Pt will complete FEES (Fiberoptic Endoscopic Evaluation of Swallowing) as comprehensive diagnostic assessment.  Diet recs/goals to follow as clinically indicated.    2.  Pt recommended for OP referral to Laryngologist.                       Plan:     Patient to be seen:  2 x/week   Plan of Care expires:  08/09/23  Plan of Care reviewed with:  patient   SLP Follow-Up:  Yes       Discharge recommendations:  Discharge Facility/Level of Care Needs: outpatient speech therapy   Barriers to Discharge:  None    Time Tracking:     SLP Treatment Date:   08/02/23  Speech Start Time:  1330  Speech Stop Time:  1400     Speech Total Time (min):  30 min    Billable Minutes: Eval 15 minutes  and Eval Swallow and Oral Function 15 minutes    08/02/2023

## 2023-08-02 NOTE — ASSESSMENT & PLAN NOTE
Hospitalized May 2022 with acute respiratory failure and persistent pneumonia and dishcarged on oxygen. She has since been diagnosed with Sjogren's disease with associated ILD. Initial PFTs with severe restriction and reduced DLCO. Initially prescribed steroids and subsequently Cellcept and Rituximab infusions (last infusion in Dec 2022). Now on Ofev 150mg BID, Prednisone 10mg daily, and Actemra IV monthly x4 months. Patient reports she has not had any significant improvement in her clinical condition since her original illness in May 2022 and reports she has clinically worsened over the course of this time.     · Awaiting lung transplant evaluation  · Follow-up with pulmonary and rheumatology upon discharge  · Continue OFEV   · Decrease Prednisone dose to 40mg daily  · ; Procalcitonin 0.15

## 2023-08-02 NOTE — PROGRESS NOTES
[facility]  Pulmonology Progress Note    Patient Name: Ayanna Alicia   MRN: [id number]  Admission Date: 7/31/2023    Hospital Length of Stay: 1 days  Code Status: Full Code     Attending Provider: Som Cabello MD    Principal Problem: Acute on chronic respiratory failure  Subjective:   History of present illness:  Ayanna Alicia is a 52-year-old female with a past medical history of chronic hypoxic respiratory failure at 5L/NC, asthma, Sjogren's syndrome with associated interstitial lung disease, multinodular goiter, GLENN, and morbid obesity who  presented with worsening shortness of breadth yesterday with associated decreased oxygen saturations. Patient reported she was running errands on Friday when she ran out of oxygen and her saturations dropped into the 50s. She reports it took sometime for her to recover her saturations and developed worsening shortness of breath during that time. Due to these symptoms, the patient presented to the emergency room for evaluation.      Of note, she was hospitalized in May of 2022 with acute respiratory failure and persistent pneumonia. At that time, she was dishcarged on oxygen. She was subsequently diagnosed with Sjogren's disease and thought to have interstitial lung disease secondary to her underlying rheumatological disease. Initial PFTs with severe restriction and reduced DLCO. She was initially prescribed steroids followed by the addition of Cellcept and Rituximab infusions (last infusion in Dec 2022). Most recently, she was initiated on Ofev; however, the patient reports she has not had any significant improvement in her clinical condition since her original illness in May 2022 and reports she has clinically worsened over the course of this time. She chronically takes Breo, Spiriva, and Montelukast for her asthma. She endorses significant exertional SOB, can walk at home, but only limited distance. Has both dry and clear mucus,  sometimes yellow sputum production. Denies any chest pain but endorses occasional chest tightness. Has the occasional lower abdominal pain exacerbated by coughing episodes; denies nausea or vomiting. No fever but endorses occasional chills. Endorses chronic heart burn for which she takes OTC Pepcid without significant symptomatic improvement. Endorses chronic belching and flatulence. Endorses losing weight, lost 50 lbs since last year; however, she has not been able to get less than 220lb. Not taking cellcept since 12/2022, received 4 treatments of Rituximab with her last dose in Dec 2022. She chronically takes prednisone 10 mg.  Patient underwent bronchoscopy with BAL in April 2023 per Dr. Aviles with negative cultures and no growth of fungus, yeast, PCP, staph, or pseudomonas. Remains on OFEV 150mg BID and Prednisone 10mg daily. In addition, she is in a trial of Actemra IV monthly for four consecutive months and currently has received two dose thus far with her last treatment on July 7, 2023. In addition, she has been referred for evaluation of lung transplant at [facility] in [place].     Due to her current acute on chronic hypoxic respiratory failure, as well as, her chronic underlying pulmonary disease, pulmonology has been consulted for evaluation.     Interval history:   8/1: Patient resting comfortably this morning; oxygen increased to 8L/NC overnight. Worse PAP therapy for a few hours overnight. No acute complaints at this time.    8/2: Sitting up on bedside today. Currently on 5L/NC. Overall, feels better    Objective:     Vital Signs (Most Recent):  Temp: 98.2 °F (36.8 °C) (08/02/23 1122)  Pulse: 98 (08/02/23 1122)  Resp: 18 (08/02/23 1122)  BP: 104/62 (08/02/23 1122)  SpO2: 97 % (08/02/23 1122) Vital Signs (24h Range):  Temp:  [98 °F (36.7 °C)-98.4 °F (36.9 °C)] 98.2 °F (36.8 °C)  Pulse:  [] 98  Resp:  [18-20] 18  SpO2:  [92 %-100 %] 97 %  BP: ()/(52-74) 104/62   Weight: 102.5 kg (225 lb  15.5 oz);  Body mass index is 36.47 kg/m².    Intake/Output Summary (Last 24 hours) at 8/2/2023 1258  Last data filed at 8/2/2023 0830  Gross per 24 hour   Intake 700 ml   Output 700 ml   Net 0 ml      Physical Exam  Vitals and nursing note reviewed.   HENT:      Head: Normocephalic.   Eyes:      Conjunctiva/sclera: Conjunctivae normal.   Cardiovascular:      Rate and Rhythm: Normal rate.   Pulmonary:      Breath sounds: Rales present.      Comments: Increased work of breathing  Abdominal:      Palpations: Abdomen is soft.   Musculoskeletal:         General: Normal range of motion.      Cervical back: Normal range of motion.   Skin:     General: Skin is warm and dry.   Neurological:      Mental Status: She is alert and oriented to person, place, and time.   Psychiatric:         Mood and Affect: Mood normal.      Review of Systems: Negative except as indicated in HPI    Significant Labs:  CBC/Anemia Profile:  Recent Labs   Lab 08/01/23  0520 08/02/23  0546   WBC 12.32 20.05*   HGB 11.7* 12.2   HCT 38.2 39.0    377   MCV 93 94   RDW 20.2* 20.3*   Chemistries:  Recent Labs   Lab 08/01/23  0520 08/02/23  0546    139   K 4.0 3.5   CL 97 96   CO2 29 31*   BUN 13 15   CREATININE 0.9 0.9   CALCIUM 9.5 9.2   MG 1.5* 2.0   PHOS 4.1 3.0   ABG  Recent Labs   Lab 07/31/23  1157   PH 7.479*   PO2 43*   PCO2 37.7   HCO3 28.1*   BE 5     Assessment/Plan:   Acute on chronic respiratory failure  Patient with chronic hypoxic respiratory failure who is on home oxygen at 5L/NC which is her current inpatient requirements. Signs/symptoms of respiratory failure included increased work of breathing, decreased oxygen saturations, and wheezing. Contributing diagnoses includes asthma, interstitial lung disease, and decompensated heart failure. Labs and images were reviewed. Patient does have a recent ABG which has been reviewed.  Initial PFTs with severe restriction and reduced DLCO; attempted repeat PFTs on 3/16/2022 which she  was unable to complete due to worsening shortness of breath.    Will treat underlying causes and adjust management of respiratory failure as follows:  · Underwent bronch (April 2023) with BAL; normal respiratory iris identified and negative for fungal / yeast elements, negative for MRSA or pseudomonas, no PCP   · Fungal immunodiffusion study pending  · Sputum culture: Many gram + cocci; follow results  · , Procalcitonin 0.15  · With her chronic immunosuppression, continue Cefepime and Zyvox for now  · Discontinue Lasix  · Continue supplemental oxygen and titrate as needed to maintain saturations 90% or greater    Interstitial lung disease  Hospitalized May 2022 with acute respiratory failure and persistent pneumonia and dishcarged on oxygen. She has since been diagnosed with Sjogren's disease with associated ILD. Initial PFTs with severe restriction and reduced DLCO. Initially prescribed steroids and subsequently Cellcept and Rituximab infusions (last infusion in Dec 2022). Now on Ofev 150mg BID, Prednisone 10mg daily, and Actemra IV monthly x4 months. Patient reports she has not had any significant improvement in her clinical condition since her original illness in May 2022 and reports she has clinically worsened over the course of this time.     · Awaiting lung transplant evaluation  · Follow-up with pulmonary and rheumatology upon discharge  · Continue OFEV   · Decrease Prednisone dose to 40mg daily  · ; Procalcitonin 0.15    Mild intermittent asthma  No evidence of acute exacerbation. Currently on Trelegy and Singulair for home medications.    · Continue Singulair  · Continue Brovana and Atrovent scheduled nebs  · Monitor for exacerbation    GLENN on CPAP  Patient reports non-compliance with home PAP therapy. We discussed utilizing and compliance with home PAP therapy.     · CPAP qHS as needed     Taylor Mccann NP  Pulmonary and Critical Care  [facility]

## 2023-08-02 NOTE — ASSESSMENT & PLAN NOTE
Patient with chronic hypoxic respiratory failure who is on home oxygen at 5L/NC which is her current inpatient requirements. Signs/symptoms of respiratory failure included increased work of breathing, decreased oxygen saturations, and wheezing. Contributing diagnoses includes asthma, interstitial lung disease, and decompensated heart failure. Labs and images were reviewed. Patient does have a recent ABG which has been reviewed.  Initial PFTs with severe restriction and reduced DLCO; attempted repeat PFTs on 3/16/2022 which she was unable to complete due to worsening shortness of breath.    Will treat underlying causes and adjust management of respiratory failure as follows:  · Underwent bronch (April 2023) with BAL; normal respiratory iris identified and negative for fungal / yeast elements, negative for MRSA or pseudomonas, no PCP   · Fungal immunodiffusion study pending  · Sputum culture: Many gram + cocci; follow results  · , Procalcitonin 0.15  · With her chronic immunosuppression, continue Cefepime and Zyvox for now  · Discontinue Lasix  · Continue supplemental oxygen and titrate as needed to maintain saturations 90% or greater

## 2023-08-02 NOTE — ASSESSMENT & PLAN NOTE
Patient with Hypoxic Respiratory failure which is Acute on chronic.  she is on home oxygen at 6 LPM. Supplemental oxygen was provided and noted- Oxygen Concentration (%):  [44] 44    .   Signs/symptoms of respiratory failure include- tachypnea, increased work of breathing and respiratory distress. Contributing diagnoses includes - Interstitial lung disease, Pneumonia and asthma Labs and images were reviewed. Patient Has recent ABG, which has been reviewed. Will treat underlying causes and adjust management of respiratory failure as follows- empiric intravenous antibiotic(s), systemic steroids, scheduled breathing treatments, acapella, incentive spirometer, and pulmonology consultation. Continue home ild medication(s).    Improving, weaned down to 5L  Continue current regimen and begin deescalating treatment regimen   Pulmonology recommending trilogy

## 2023-08-02 NOTE — ASSESSMENT & PLAN NOTE
Patient reports non-compliance with home PAP therapy. We discussed utilizing and compliance with home PAP therapy.     · CPAP qHS as needed

## 2023-08-02 NOTE — ASSESSMENT & PLAN NOTE
No evidence of acute exacerbation. Currently on Trelegy and Singulair for home medications.    · Continue Singulair  · Continue Brovana and Atrovent scheduled nebs  · Monitor for exacerbation

## 2023-08-02 NOTE — SUBJECTIVE & OBJECTIVE
Ayanna Alicia is a 52-year-old female with a past medical history of chronic hypoxic respiratory failure at 5L/NC, asthma, Sjogren's syndrome with associated interstitial lung disease, multinodular goiter, GLENN, and morbid obesity who  presented with worsening shortness of breadth yesterday with associated decreased oxygen saturations. Patient reported she was running errands on Friday when she ran out of oxygen and her saturations dropped into the 50s. She reports it took sometime for her to recover her saturations and developed worsening shortness of breath during that time. Due to these symptoms, the patient presented to the emergency room for evaluation.      Of note, she was hospitalized in May of 2022 with acute respiratory failure and persistent pneumonia. At that time, she was dishcarged on oxygen. She was subsequently diagnosed with Sjogren's disease and thought to have interstitial lung disease secondary to her underlying rheumatological disease. Initial PFTs with severe restriction and reduced DLCO. She was initially prescribed steroids followed by the addition of Cellcept and Rituximab infusions (last infusion in Dec 2022). Most recently, she was initiated on Ofev; however, the patient reports she has not had any significant improvement in her clinical condition since her original illness in May 2022 and reports she has clinically worsened over the course of this time. She chronically takes Breo, Spiriva, and Montelukast for her asthma. She endorses significant exertional SOB, can walk at home, but only limited distance. Has both dry and clear mucus, sometimes yellow sputum production. Denies any chest pain but endorses occasional chest tightness. Has the occasional lower abdominal pain exacerbated by coughing episodes; denies nausea or vomiting. No fever but endorses occasional chills. Endorses chronic heart burn for which she takes OTC Pepcid without significant symptomatic improvement. Endorses  chronic belching and flatulence. Endorses losing weight, lost 50 lbs since last year; however, she has not been able to get less than 220lb. Not taking cellcept since 12/2022, received 4 treatments of Rituximab with her last dose in Dec 2022. She chronically takes prednisone 10 mg.  Patient underwent bronchoscopy with BAL in April 2023 per Dr. Aviles with negative cultures and no growth of fungus, yeast, PCP, staph, or pseudomonas. Remains on OFEV 150mg BID and Prednisone 10mg daily. In addition, she is in a trial of Actemra IV monthly for four consecutive months and currently has received two dose thus far with her last treatment on July 7, 2023. In addition, she has been referred for evaluation of lung transplant at [facility] in [place].     Due to her current acute on chronic hypoxic respiratory failure, as well as, her chronic underlying pulmonary disease, pulmonology has been consulted for evaluation.     Interval history:  8/1: Patient resting comfortably this morning; oxygen increased to 8L/NC overnight. Worse PAP therapy for a few hours overnight. No acute complaints at this time.   8/2: Sitting up on bedside today. Currently on 5L/NC. Overall, feels better    Objective:     Vital Signs (Most Recent):  Temp: 98.2 °F (36.8 °C) (08/02/23 1122)  Pulse: 98 (08/02/23 1122)  Resp: 18 (08/02/23 1122)  BP: 104/62 (08/02/23 1122)  SpO2: 97 % (08/02/23 1122) Vital Signs (24h Range):  Temp:  [98 °F (36.7 °C)-98.4 °F (36.9 °C)] 98.2 °F (36.8 °C)  Pulse:  [] 98  Resp:  [18-20] 18  SpO2:  [92 %-100 %] 97 %  BP: ()/(52-74) 104/62   Weight: 102.5 kg (225 lb 15.5 oz);  Body mass index is 36.47 kg/m².    Intake/Output Summary (Last 24 hours) at 8/2/2023 1258  Last data filed at 8/2/2023 0830  Gross per 24 hour   Intake 700 ml   Output 700 ml   Net 0 ml      Physical Exam  Vitals and nursing note reviewed.   HENT:      Head: Normocephalic.   Eyes:      Conjunctiva/sclera: Conjunctivae normal.   Cardiovascular:       Rate and Rhythm: Normal rate.   Pulmonary:      Breath sounds: Rales present.      Comments: Increased work of breathing  Abdominal:      Palpations: Abdomen is soft.   Musculoskeletal:         General: Normal range of motion.      Cervical back: Normal range of motion.   Skin:     General: Skin is warm and dry.   Neurological:      Mental Status: She is alert and oriented to person, place, and time.   Psychiatric:         Mood and Affect: Mood normal.         Review of Systems: Negative except as indicated in HPI    Significant Labs:  CBC/Anemia Profile:  Recent Labs   Lab 08/01/23 0520 08/02/23  0546   WBC 12.32 20.05*   HGB 11.7* 12.2   HCT 38.2 39.0    377   MCV 93 94   RDW 20.2* 20.3*   Chemistries:  Recent Labs   Lab 08/01/23 0520 08/02/23  0546    139   K 4.0 3.5   CL 97 96   CO2 29 31*   BUN 13 15   CREATININE 0.9 0.9   CALCIUM 9.5 9.2   MG 1.5* 2.0   PHOS 4.1 3.0

## 2023-08-02 NOTE — ASSESSMENT & PLAN NOTE
Patient with chronic hypoxic respiratory failure who is on home oxygen at 5L/NC which is her current inpatient requirements. Signs/symptoms of respiratory failure included increased work of breathing, decreased oxygen saturations, and wheezing. Contributing diagnoses includes asthma, interstitial lung disease, and decompensated heart failure. Labs and images were reviewed. Patient does have a recent ABG which has been reviewed.  Initial PFTs with severe restriction and reduced DLCO; attempted repeat PFTs on 3/16/2022 which she was unable to complete due to worsening shortness of breath.     Will treat underlying causes and adjust management of respiratory failure as follows:   Underwent bronch (April 2023) with BAL; normal respiratory iris identified and negative for fungal / yeast elements, negative for MRSA or pseudomonas, no PCP   Fungal immunodiffusion study pending   Sputum culture: Many gram + cocci; follow results   , Procalcitonin 0.15   With her chronic immunosuppression, continue Cefepime and Zyvox for now   Continue supplemental oxygen and titrate as needed to maintain saturations 90% or greater

## 2023-08-02 NOTE — PLAN OF CARE
Problem: Adult Inpatient Plan of Care  Goal: Plan of Care Review  Outcome: Ongoing, Progressing  Flowsheets (Taken 8/2/2023 0431)  Plan of Care Reviewed With: patient     Problem: Infection  Goal: Absence of Infection Signs and Symptoms  Outcome: Ongoing, Progressing  Intervention: Prevent or Manage Infection  Flowsheets (Taken 8/2/2023 0431)  Isolation Precautions: (recieving iv antibiotics) other (see comments)     Problem: Infection  Goal: Absence of Infection Signs and Symptoms  Outcome: Ongoing, Progressing  Intervention: Prevent or Manage Infection  Flowsheets (Taken 8/2/2023 0431)  Isolation Precautions: (recieving iv antibiotics) other (see comments)

## 2023-08-02 NOTE — PROGRESS NOTES
[facility] - Telemetry ([facility] Medicine  Progress Note    Patient Name: Ayanna Alicia  MRN: [id number]  Patient Class: IP- Inpatient   Admission Date: 7/31/2023  Length of Stay: 1 days  Attending Physician: Som Cabello MD  Primary Care Provider: Madeleine Enrique MD        Subjective:     Principal Problem:Acute on chronic respiratory failure        HPI:  52F  has a past medical history of Abnormal Pap smear of cervix, Acute interstitial pneumonitis, Allergic rhinitis, cause unspecified, Arthritis of both knees, Asthma, Eczema, Fatty liver, Fibrocystic breast changes, Headache(784.0), Hepatomegaly, Hypertension, Liver cyst, Multinodular goiter, Polymenorrhea, TMJ (dislocation of temporomandibular joint), Uterine fibroid, and Vitamin D deficiency disease.  Presents for worsening dyspnea. Associated with fever, sneezing, rhinorrhea, chest tightness, and wheezing. Onset Friday after being without supplemental oxygen for 15 minutes with difficulties recovering, which prompted a visit to clinic/urgent care. At baseline, patient wears 6L supplemental oxygen. Reports taking prednisone for a long time. States compliance to ofev for ild.    Initial workup in emergency department revealed increased supplemental oxygen of 8L, tachycardia and tachypnea. Leukocytosis on cbc. Cmp with hyperglycemia and mildly elevated liver enzymes. Troponin(s) elevated. Bnp within normal limits. Abg metabolic and respiratory alkalosis. Chest x-ray with bibasilar infiltrates. Covid and flu negative. Electrocardiography with sinus tachycardia.    Hospital medicine consulted for admission under observation, pneumonia vs asthma exacerbation. Pulmonology consulted      Overview/Hospital Course:  8/1 admitted for acute on chronic respiratory failure. Weaned down to 5L. Pulmonology following. Continue current care.   8/2 no acute events overnight. remains on baseline supplemental oxygen. Pulmonology recommending trilogy for home use. Case  management consulted. Physical/occupational therapy consulted. Patient will have homehealth physical/occupational therapy on discharge.       Interval History: See hospital course for today      Review of Systems   Constitutional:  Positive for activity change and fatigue. Negative for appetite change and fever.   HENT:  Positive for voice change.    Respiratory:  Positive for shortness of breath.    Gastrointestinal:  Negative for abdominal pain, diarrhea, nausea and vomiting.   Allergic/Immunologic: Positive for immunocompromised state.   Neurological:  Positive for weakness.   Psychiatric/Behavioral:  Positive for dysphoric mood. Negative for agitation, behavioral problems, confusion and decreased concentration. The patient is nervous/anxious.      Objective:     Vital Signs (Most Recent):  Temp: 98.2 °F (36.8 °C) (08/02/23 1122)  Pulse: 98 (08/02/23 1122)  Resp: 18 (08/02/23 1122)  BP: 104/62 (08/02/23 1122)  SpO2: 97 % (08/02/23 1122) Vital Signs (24h Range):  Temp:  [98 °F (36.7 °C)-98.4 °F (36.9 °C)] 98.2 °F (36.8 °C)  Pulse:  [] 98  Resp:  [18-20] 18  SpO2:  [92 %-100 %] 97 %  BP: ()/(52-74) 104/62     Weight: 102.5 kg (225 lb 15.5 oz)  Body mass index is 36.47 kg/m².    Intake/Output Summary (Last 24 hours) at 8/2/2023 1156  Last data filed at 8/2/2023 0830  Gross per 24 hour   Intake 940 ml   Output 700 ml   Net 240 ml         Physical Exam  Vitals and nursing note reviewed.   Constitutional:       General: She is not in acute distress.     Appearance: She is obese. She is ill-appearing. She is not toxic-appearing.      Interventions: Nasal cannula in place.   HENT:      Head: Normocephalic and atraumatic.   Cardiovascular:      Rate and Rhythm: Normal rate.   Pulmonary:      Effort: Respiratory distress present. No tachypnea.      Breath sounds: Rales present.   Abdominal:      Palpations: Abdomen is soft.      Tenderness: There is no abdominal tenderness.   Musculoskeletal:      Right lower  leg: No edema.      Left lower leg: No edema.   Skin:     General: Skin is warm.   Neurological:      Mental Status: She is alert. Mental status is at baseline.      Motor: Weakness present.   Psychiatric:         Mood and Affect: Mood is anxious and depressed. Affect is tearful.         Behavior: Behavior is cooperative.      Comments: dysphonia             Significant Labs: All pertinent labs within the past 24 hours have been reviewed.    Blood Culture: negative growth to date x 2 days    CBC:   Recent Labs   Lab 08/01/23  0520 08/02/23  0546   WBC 12.32 20.05*   HGB 11.7* 12.2   HCT 38.2 39.0    377     CMP:   Recent Labs   Lab 08/01/23  0520 08/02/23  0546    139   K 4.0 3.5   CL 97 96   CO2 29 31*   * 111*   BUN 13 15   CREATININE 0.9 0.9   CALCIUM 9.5 9.2   ANIONGAP 12 12     Respiratory Culture:   Recent Labs   Lab 08/01/23  0142   GSRESP Few WBC's  Many Gram positive cocci  Few Gram negative rods  Rare yeast   RESPIRATORYC Insufficient incubation, culture in progress       Significant Imaging: I have reviewed all pertinent imaging results/findings within the past 24 hours.      Assessment/Plan:      * Acute on chronic respiratory failure  Patient with Hypoxic Respiratory failure which is Acute on chronic.  she is on home oxygen at 6 LPM. Supplemental oxygen was provided and noted- Oxygen Concentration (%):  [44] 44    .   Signs/symptoms of respiratory failure include- tachypnea, increased work of breathing and respiratory distress. Contributing diagnoses includes - Interstitial lung disease, Pneumonia and asthma Labs and images were reviewed. Patient Has recent ABG, which has been reviewed. Will treat underlying causes and adjust management of respiratory failure as follows- empiric intravenous antibiotic(s), systemic steroids, scheduled breathing treatments, acapella, incentive spirometer, and pulmonology consultation. Continue home ild medication(s).    Improving, weaned down to  5L  Continue current regimen and begin deescalating treatment regimen   Pulmonology recommending trilogy     Dysphonia  Speech therapy      Elevated troponin  Likely demand ischemia in setting of acute on chronic respiratory failure  Trend relatively flat  Chest pain free  Consult cardiology if indicated  Electrocardiography reviewed with no st-t wave elevation      Mild intermittent asthma  Asthma exacerbation vs pneumonia vs interstitial lung pneumonitis  Continue breathing treatments scheduled  Systemic steroids  Pulmonology broadened antibiotic(s) spectrum due to immunocompromised state  Wean supplemental oxygen as able  Received terbutaline injection in emergency department       Interstitial lung disease  Continue home medication(s)  Pulmonology consulted      GLENN on CPAP  Continue cpap qhs  Pulmonology recommending trilogy for home use      GERD (gastroesophageal reflux disease)  Continue proton pump inhibitor as on chronic systemic steroids      HTN (hypertension)  Normotensive to hypotension  Continue home antihtn medication(s) as indicated        VTE Risk Mitigation (From admission, onward)         Ordered     IP VTE HIGH RISK PATIENT  Once         07/31/23 1337     Place sequential compression device  Until discontinued         07/31/23 1337                Discharge Planning   JANETTE:      Code Status: Full Code   Is the patient medically ready for discharge?:     Reason for patient still in hospital (select all that apply): Patient trending condition, Treatment, Consult recommendations, PT / OT recommendations and Pending disposition  Discharge Plan A: Home                  Som Cabello MD  Department of Hospital Medicine   [facility] - Telemetry ([facility])

## 2023-08-02 NOTE — PT/OT/SLP EVAL
Physical Therapy Evaluation    Patient Name:  Ayanna Alicia   MRN:  [id number]    Recommendations:     Discharge Recommendations: home   Discharge Equipment Recommendations: none   Barriers to discharge: None    Assessment:     Ayanna Alicia is a 52 y.o. female admitted with a medical diagnosis of Acute on chronic respiratory failure.  She presents with the following impairments/functional limitations: weakness, impaired endurance, impaired cardiopulmonary response to activity, impaired functional mobility .    Rehab Prognosis: Good; patient would benefit from acute skilled PT services to address these deficits and reach maximum level of function.    Recent Surgery: * No surgery found *      Plan:     During this hospitalization, patient to be seen 3 x/week to address the identified rehab impairments via gait training, therapeutic activities, therapeutic exercises and progress toward the following goals:    Plan of Care Expires:  08/16/23    Subjective     Chief Complaint: SOB   Patient/Family Comments/goals: DEC SOB  Pain/Comfort:  Pain Rating 1: 0/10  Pain Rating Post-Intervention 1: 0/10    Patients cultural, spiritual, Latter day conflicts given the current situation:      Living Environment:  PT LIVES AT HOME ALONE IN A ONE STORY HOME WITH NO STEPS TO ENTER  Prior to admission, patients level of function was IND, DRIVES AND DOESN'T WORK.  Equipment used at home: shower chair, oxygen, other (see comments) (POWER CHAIR).  DME owned (not currently used): none.  Upon discharge, patient will have assistance from NONE .    Objective:     Communicated with NURSE FRASER AND [facility] CHART REVIEW  prior to session.  Patient found sitting edge of bed with peripheral IV, telemetry, oxygen  upon PT entry to room.    General Precautions: Standard,    Orthopedic Precautions:N/A   Braces: N/A  Respiratory Status:  HIGH FLOW NASAL CANUAL 6L O2    Exams:  Cognitive Exam:  Patient is oriented to Person, Place, Time, and  Situation  RLE ROM: WNL  RLE Strength: WNL  LLE ROM: WNL  LLE Strength: WNL    Functional Mobility:  Bed Mobility:     Supine to Sit: independence  Transfers:     Sit to Stand:  stand by assistance with no AD  Bed to Chair: stand by assistance with  no AD  using  Stand Pivot  Gait: PT GT TRAINED X 100' WITH NO AD AND O2 IN TOW WITH SBA.       AM-PAC 6 CLICK MOBILITY  Total Score:21       Treatment & Education:  PT RETURNED TO RM O2 SATS MEASURED @ 62 ON 6L O2 AND NURSE CALLED TO ROOM. PT TOOK APPROX 5 MIN TO RECOVER TO 90 SEATED EOB WITH PURSED LIP BREATHING. PT EDUCATED ON ROLE OF P.T AND ON TE AP, TKE, AND MIP TO TOLERANCE THROUGHOUT THE DAY. PT REPORTED UNDERSTANDING.     Patient left sitting edge of bed with all lines intact, call button in reach, and NURSE present.    GOALS:   Multidisciplinary Problems       Physical Therapy Goals          Problem: Physical Therapy    Goal Priority Disciplines Outcome Goal Variances Interventions   Physical Therapy Goal     PT, PT/OT      Description: LT.16.23  1. PT WILL COMPLETE BED MOBILITY IND  2. PT WILL GT TRAIN X 100' WITH NO AD AND O2 IN TOW IND  3. PT WILL INC AMPAC SCORE BY 2 POINTS TO PROGRESS GROSS FUNC MOBILITY.                          History:     Past Medical History:   Diagnosis Date    Abnormal Pap smear of cervix     in the past with repeat pap smear okay.    Acute interstitial pneumonitis     Allergic rhinitis, cause unspecified     Arthritis of both knees     Asthma     Eczema     Fatty liver 10/2014    Fibrocystic breast changes     Headache(784.0)     Hepatomegaly 10/2014    Hypertension     Liver cyst 10/2014    Multinodular goiter     Followed by ENT - Dr. Nicolas Gray    Polymenorrhea     TMJ (dislocation of temporomandibular joint)     Uterine fibroid     in the past    Vitamin D deficiency disease        Past Surgical History:   Procedure Laterality Date    BRONCHOSCOPY Bilateral 2023    Procedure: Bronchoscopy - insert lighted tube into  airway to take a biopsy of lung;  Surgeon: Agustín Aviles MD;  Location: [facility];  Service: Pulmonary;  Laterality: Bilateral;     SECTION, CLASSIC      x 1    COLONOSCOPY N/A 2020    Procedure: COLONOSCOPY;  Surgeon: Angie Magana MD;  Location: [facility];  Service: Endoscopy;  Laterality: N/A;    HYSTERECTOMY      MULTIPLE TOOTH EXTRACTIONS      PARTIAL HYSTERECTOMY  2013    Due to fibroids       Time Tracking:     PT Received On: 23  PT Start Time: 1022     PT Stop Time: 1048  PT Total Time (min): 26 min     Billable Minutes: Evaluation 16 and Gait Training 10      2023

## 2023-08-03 ENCOUNTER — TELEPHONE (OUTPATIENT)
Dept: FAMILY MEDICINE | Facility: CLINIC | Age: 53
End: 2023-08-03
Payer: COMMERCIAL

## 2023-08-03 DIAGNOSIS — J84.9 INTERSTITIAL LUNG DISEASE: Primary | ICD-10-CM

## 2023-08-03 PROBLEM — R13.10 DYSPHAGIA: Status: ACTIVE | Noted: 2023-08-03

## 2023-08-03 LAB
ANION GAP SERPL CALC-SCNC: 11 MMOL/L (ref 8–16)
BACTERIA SPEC AEROBE CULT: NORMAL
BASOPHILS # BLD AUTO: 0.06 K/UL (ref 0–0.2)
BASOPHILS NFR BLD: 0.4 % (ref 0–1.9)
BUN SERPL-MCNC: 14 MG/DL (ref 6–20)
CALCIUM SERPL-MCNC: 9.3 MG/DL (ref 8.7–10.5)
CHLORIDE SERPL-SCNC: 98 MMOL/L (ref 95–110)
CO2 SERPL-SCNC: 30 MMOL/L (ref 23–29)
CREAT SERPL-MCNC: 0.8 MG/DL (ref 0.5–1.4)
DIFFERENTIAL METHOD: ABNORMAL
EOSINOPHIL # BLD AUTO: 0.2 K/UL (ref 0–0.5)
EOSINOPHIL NFR BLD: 1.1 % (ref 0–8)
ERYTHROCYTE [DISTWIDTH] IN BLOOD BY AUTOMATED COUNT: 19.9 % (ref 11.5–14.5)
EST. GFR  (NO RACE VARIABLE): >60 ML/MIN/1.73 M^2
GLUCOSE SERPL-MCNC: 106 MG/DL (ref 70–110)
GRAM STN SPEC: NORMAL
HCT VFR BLD AUTO: 38.2 % (ref 37–48.5)
HGB BLD-MCNC: 11.7 G/DL (ref 12–16)
IMM GRANULOCYTES # BLD AUTO: 0.15 K/UL (ref 0–0.04)
IMM GRANULOCYTES NFR BLD AUTO: 0.9 % (ref 0–0.5)
LYMPHOCYTES # BLD AUTO: 1.3 K/UL (ref 1–4.8)
LYMPHOCYTES NFR BLD: 8.3 % (ref 18–48)
MAGNESIUM SERPL-MCNC: 2 MG/DL (ref 1.6–2.6)
MCH RBC QN AUTO: 28.5 PG (ref 27–31)
MCHC RBC AUTO-ENTMCNC: 30.6 G/DL (ref 32–36)
MCV RBC AUTO: 93 FL (ref 82–98)
MONOCYTES # BLD AUTO: 1.1 K/UL (ref 0.3–1)
MONOCYTES NFR BLD: 6.9 % (ref 4–15)
NEUTROPHILS # BLD AUTO: 13.2 K/UL (ref 1.8–7.7)
NEUTROPHILS NFR BLD: 82.4 % (ref 38–73)
NRBC BLD-RTO: 0 /100 WBC
PHOSPHATE SERPL-MCNC: 2.6 MG/DL (ref 2.7–4.5)
PLATELET # BLD AUTO: 417 K/UL (ref 150–450)
PMV BLD AUTO: 9.3 FL (ref 9.2–12.9)
POTASSIUM SERPL-SCNC: 3.4 MMOL/L (ref 3.5–5.1)
RBC # BLD AUTO: 4.11 M/UL (ref 4–5.4)
SODIUM SERPL-SCNC: 139 MMOL/L (ref 136–145)
WBC # BLD AUTO: 16.04 K/UL (ref 3.9–12.7)

## 2023-08-03 PROCEDURE — 94761 N-INVAS EAR/PLS OXIMETRY MLT: CPT

## 2023-08-03 PROCEDURE — 25000003 PHARM REV CODE 250: Performed by: HOSPITALIST

## 2023-08-03 PROCEDURE — 27100171 HC OXYGEN HIGH FLOW UP TO 24 HOURS

## 2023-08-03 PROCEDURE — 36415 COLL VENOUS BLD VENIPUNCTURE: CPT | Performed by: FAMILY MEDICINE

## 2023-08-03 PROCEDURE — 25000003 PHARM REV CODE 250: Performed by: FAMILY MEDICINE

## 2023-08-03 PROCEDURE — 94799 UNLISTED PULMONARY SVC/PX: CPT

## 2023-08-03 PROCEDURE — 99900035 HC TECH TIME PER 15 MIN (STAT)

## 2023-08-03 PROCEDURE — 25000003 PHARM REV CODE 250: Performed by: NURSE PRACTITIONER

## 2023-08-03 PROCEDURE — 80048 BASIC METABOLIC PNL TOTAL CA: CPT | Performed by: FAMILY MEDICINE

## 2023-08-03 PROCEDURE — 27000646 HC AEROBIKA DEVICE

## 2023-08-03 PROCEDURE — 85025 COMPLETE CBC W/AUTO DIFF WBC: CPT | Performed by: FAMILY MEDICINE

## 2023-08-03 PROCEDURE — 92612 ENDOSCOPY SWALLOW (FEES) VID: CPT | Performed by: SPEECH-LANGUAGE PATHOLOGIST

## 2023-08-03 PROCEDURE — 84100 ASSAY OF PHOSPHORUS: CPT | Performed by: FAMILY MEDICINE

## 2023-08-03 PROCEDURE — 94664 DEMO&/EVAL PT USE INHALER: CPT

## 2023-08-03 PROCEDURE — 63600175 PHARM REV CODE 636 W HCPCS: Performed by: FAMILY MEDICINE

## 2023-08-03 PROCEDURE — 63600175 PHARM REV CODE 636 W HCPCS: Performed by: NURSE PRACTITIONER

## 2023-08-03 PROCEDURE — 83735 ASSAY OF MAGNESIUM: CPT | Performed by: FAMILY MEDICINE

## 2023-08-03 PROCEDURE — 94660 CPAP INITIATION&MGMT: CPT

## 2023-08-03 PROCEDURE — 97530 THERAPEUTIC ACTIVITIES: CPT

## 2023-08-03 PROCEDURE — 25000242 PHARM REV CODE 250 ALT 637 W/ HCPCS: Performed by: FAMILY MEDICINE

## 2023-08-03 PROCEDURE — 25000242 PHARM REV CODE 250 ALT 637 W/ HCPCS: Performed by: NURSE PRACTITIONER

## 2023-08-03 PROCEDURE — 94640 AIRWAY INHALATION TREATMENT: CPT

## 2023-08-03 PROCEDURE — 21400001 HC TELEMETRY ROOM

## 2023-08-03 RX ORDER — FUROSEMIDE 10 MG/ML
40 INJECTION INTRAMUSCULAR; INTRAVENOUS ONCE
Status: COMPLETED | OUTPATIENT
Start: 2023-08-03 | End: 2023-08-03

## 2023-08-03 RX ORDER — LEVALBUTEROL INHALATION SOLUTION 0.63 MG/3ML
0.63 SOLUTION RESPIRATORY (INHALATION) EVERY 6 HOURS PRN
Status: DISCONTINUED | OUTPATIENT
Start: 2023-08-03 | End: 2023-08-05 | Stop reason: HOSPADM

## 2023-08-03 RX ORDER — LEVALBUTEROL INHALATION SOLUTION 0.31 MG/3ML
0.31 SOLUTION RESPIRATORY (INHALATION) EVERY 6 HOURS PRN
Status: DISCONTINUED | OUTPATIENT
Start: 2023-08-03 | End: 2023-08-03

## 2023-08-03 RX ADMIN — MONTELUKAST 10 MG: 10 TABLET, FILM COATED ORAL at 08:08

## 2023-08-03 RX ADMIN — LINEZOLID 600 MG: 600 TABLET, FILM COATED ORAL at 08:08

## 2023-08-03 RX ADMIN — CETIRIZINE HYDROCHLORIDE 10 MG: 10 TABLET, FILM COATED ORAL at 09:08

## 2023-08-03 RX ADMIN — LEVALBUTEROL HYDROCHLORIDE 0.63 MG: 0.63 SOLUTION RESPIRATORY (INHALATION) at 11:08

## 2023-08-03 RX ADMIN — CEFEPIME 2 G: 2 INJECTION, POWDER, FOR SOLUTION INTRAVENOUS at 01:08

## 2023-08-03 RX ADMIN — OMEPRAZOLE 40 MG: 40 CAPSULE, DELAYED RELEASE ORAL at 05:08

## 2023-08-03 RX ADMIN — ACETAMINOPHEN 650 MG: 325 TABLET ORAL at 08:08

## 2023-08-03 RX ADMIN — HYPROMELLOSE 2910 1 DROP: 5 SOLUTION/ DROPS OPHTHALMIC at 08:08

## 2023-08-03 RX ADMIN — LINEZOLID 600 MG: 600 TABLET, FILM COATED ORAL at 09:08

## 2023-08-03 RX ADMIN — AZITHROMYCIN MONOHYDRATE 500 MG: 500 INJECTION, POWDER, LYOPHILIZED, FOR SOLUTION INTRAVENOUS at 02:08

## 2023-08-03 RX ADMIN — NINTEDANIB 150 MG: 150 CAPSULE ORAL at 09:08

## 2023-08-03 RX ADMIN — CEFEPIME 2 G: 2 INJECTION, POWDER, FOR SOLUTION INTRAVENOUS at 09:08

## 2023-08-03 RX ADMIN — ARFORMOTEROL TARTRATE 15 MCG: 15 SOLUTION RESPIRATORY (INHALATION) at 07:08

## 2023-08-03 RX ADMIN — ARFORMOTEROL TARTRATE 15 MCG: 15 SOLUTION RESPIRATORY (INHALATION) at 08:08

## 2023-08-03 RX ADMIN — CEFEPIME 2 G: 2 INJECTION, POWDER, FOR SOLUTION INTRAVENOUS at 05:08

## 2023-08-03 RX ADMIN — PREDNISONE 40 MG: 20 TABLET ORAL at 09:08

## 2023-08-03 RX ADMIN — IPRATROPIUM BROMIDE 0.5 MG: 0.5 SOLUTION RESPIRATORY (INHALATION) at 07:08

## 2023-08-03 RX ADMIN — LEVALBUTEROL HYDROCHLORIDE 0.31 MG: 0.63 SOLUTION RESPIRATORY (INHALATION) at 07:08

## 2023-08-03 RX ADMIN — FUROSEMIDE 40 MG: 10 INJECTION, SOLUTION INTRAMUSCULAR; INTRAVENOUS at 02:08

## 2023-08-03 RX ADMIN — OMEPRAZOLE 40 MG: 40 CAPSULE, DELAYED RELEASE ORAL at 06:08

## 2023-08-03 RX ADMIN — NINTEDANIB 150 MG: 150 CAPSULE ORAL at 08:08

## 2023-08-03 RX ADMIN — BENZONATATE 200 MG: 100 CAPSULE ORAL at 08:08

## 2023-08-03 NOTE — ASSESSMENT & PLAN NOTE
Hospitalized May 2022 with acute respiratory failure and persistent pneumonia and dishcarged on oxygen. She has since been diagnosed with Sjogren's disease with associated ILD. Initial PFTs with severe restriction and reduced DLCO. Initially prescribed steroids and subsequently Cellcept and Rituximab infusions (last infusion in Dec 2022). Now on Ofev 150mg BID, Prednisone 10mg daily, and Actemra IV monthly x4 months. Patient reports she has not had any significant improvement in her clinical condition since her original illness in May 2022 and reports she has clinically worsened over the course of this time. Patient demonstrates significant decline in clinical status over the past 4 to 6 months    · Awaiting lung transplant evaluation  · Follow-up with pulmonary and rheumatology upon discharge  · Continue OFEV   · Decreased Prednisone dose to 40mg daily  · Give additional Lasix 40mg IV x1  · Repeat CXR today; may need repeat CT chest  · Would benefit from nocturnal and PRN AVAPS  · Ordering nocturnal volume ventilator. Daytime use to reduce risk of exacerbation.   · Home BiPAP insufficient due to severity of condition. Absence of respiratory support would be life threatening.   · ; Procalcitonin 0.15

## 2023-08-03 NOTE — SUBJECTIVE & OBJECTIVE
Ayanna Alicia is a 52-year-old female with a past medical history of chronic hypoxic respiratory failure at 5L/NC, asthma, Sjogren's syndrome with associated interstitial lung disease, multinodular goiter, GLENN, and morbid obesity who  presented with worsening shortness of breadth yesterday with associated decreased oxygen saturations. Patient reported she was running errands on Friday when she ran out of oxygen and her saturations dropped into the 50s. She reports it took sometime for her to recover her saturations and developed worsening shortness of breath during that time. Due to these symptoms, the patient presented to the emergency room for evaluation.      Of note, she was hospitalized in May of 2022 with acute respiratory failure and persistent pneumonia. At that time, she was dishcarged on oxygen. She was subsequently diagnosed with Sjogren's disease and thought to have interstitial lung disease secondary to her underlying rheumatological disease. Initial PFTs with severe restriction and reduced DLCO. She was initially prescribed steroids followed by the addition of Cellcept and Rituximab infusions (last infusion in Dec 2022). Most recently, she was initiated on Ofev; however, the patient reports she has not had any significant improvement in her clinical condition since her original illness in May 2022 and reports she has clinically worsened over the course of this time. She chronically takes Breo, Spiriva, and Montelukast for her asthma. She endorses significant exertional SOB, can walk at home, but only limited distance. Has both dry and clear mucus, sometimes yellow sputum production. Denies any chest pain but endorses occasional chest tightness. Has the occasional lower abdominal pain exacerbated by coughing episodes; denies nausea or vomiting. No fever but endorses occasional chills. Endorses chronic heart burn for which she takes OTC Pepcid without significant symptomatic improvement. Endorses  chronic belching and flatulence. Endorses losing weight, lost 50 lbs since last year; however, she has not been able to get less than 220lb. Not taking cellcept since 12/2022, received 4 treatments of Rituximab with her last dose in Dec 2022. She chronically takes prednisone 10 mg.  Patient underwent bronchoscopy with BAL in April 2023 per Dr. Aviles with negative cultures and no growth of fungus, yeast, PCP, staph, or pseudomonas. Remains on OFEV 150mg BID and Prednisone 10mg daily. In addition, she is in a trial of Actemra IV monthly for four consecutive months and currently has received two dose thus far with her last treatment on July 7, 2023. In addition, she has been referred for evaluation of lung transplant at [facility] in [place].     Due to her current acute on chronic hypoxic respiratory failure, as well as, her chronic underlying pulmonary disease, pulmonology has been consulted for evaluation.     Interval history:  8/1: Patient resting comfortably this morning; oxygen increased to 8L/NC overnight. Worse PAP therapy for a few hours overnight. No acute complaints at this time.   8/2: Sitting up on bedside today. Currently on 5L/NC. Overall, feels better  8/3: Upon coming off CPAP this morning, the patient had acute exacerbation of her underlying dyspnea with decreased oxygen saturations.     Objective:     Vital Signs (Most Recent):  Temp: 98.8 °F (37.1 °C) (08/03/23 1117)  Pulse: 100 (08/03/23 1142)  Resp: 18 (08/03/23 1142)  BP: 111/72 (08/03/23 1117)  SpO2: (!) 94 % (08/03/23 1142) Vital Signs (24h Range):  Temp:  [97.8 °F (36.6 °C)-98.8 °F (37.1 °C)] 98.8 °F (37.1 °C)  Pulse:  [] 100  Resp:  [16-22] 18  SpO2:  [93 %-100 %] 94 %  BP: (101-119)/(51-75) 111/72   Weight: 102.5 kg (225 lb 15.5 oz);  Body mass index is 36.47 kg/m².    Intake/Output Summary (Last 24 hours) at 8/3/2023 1238  Last data filed at 8/3/2023 1159  Gross per 24 hour   Intake 640 ml   Output 1100 ml   Net -460 ml       Physical Exam  Vitals and nursing note reviewed.   Constitutional:       Appearance: She is obese.   HENT:      Head: Normocephalic.   Eyes:      Conjunctiva/sclera: Conjunctivae normal.   Cardiovascular:      Rate and Rhythm: Normal rate.   Pulmonary:      Effort: Pulmonary effort is normal.      Breath sounds: Rales present.   Abdominal:      Palpations: Abdomen is soft.   Musculoskeletal:         General: Normal range of motion.      Cervical back: Normal range of motion and neck supple.   Skin:     General: Skin is warm and dry.   Neurological:      Mental Status: She is alert and oriented to person, place, and time.   Psychiatric:         Mood and Affect: Mood normal.         Review of Systems: Negative except as indicated in HPI    Significant Labs:  CBC/Anemia Profile:  Recent Labs   Lab 08/02/23  0546 08/03/23  0603   WBC 20.05* 16.04*   HGB 12.2 11.7*   HCT 39.0 38.2    417   MCV 94 93   RDW 20.3* 19.9*   Chemistries:  Recent Labs   Lab 08/02/23  0546 08/03/23  0603    139   K 3.5 3.4*   CL 96 98   CO2 31* 30*   BUN 15 14   CREATININE 0.9 0.8   CALCIUM 9.2 9.3   MG 2.0 2.0   PHOS 3.0 2.6*

## 2023-08-03 NOTE — PT/OT/SLP PROGRESS
Physical Therapy  Treatment    Ayanna Alicia   MRN: [id number]   Admitting Diagnosis: Acute on chronic respiratory failure    PT Received On: 08/03/23  PT Start Time: 0730     PT Stop Time: 0755    PT Total Time (min): 25 min       Billable Minutes:  Therapeutic Activity 25    Treatment Type: Treatment  PT/PTA: PT     Number of PTA visits since last PT visit: 0       General Precautions: Standard,    Orthopedic Precautions: N/A  Braces: N/A  Respiratory Status: High flow, flow 5 L/min         Subjective:  Communicated with NURSE FRASER AND Epic CHART REVIEW   prior to session.   PT AGREED TO TX     Pain/Comfort  Pain Rating 1: 0/10  Pain Rating Post-Intervention 1: 0/10    Objective:   Patient found with: peripheral IV, telemetry, oxygen    Functional Mobility:  PT MET IN RM SUP IN BED. PT ON 5L 02. PT SUP>SIT EOB IND. PT GT TRAINED 2X8' TO AND FROM BATHROOM WITH S AND O2 IN TOW. PT LABORED WITH BREATHING AND O2 CHECKED @ 72 % ON 6 L. P.T. CALLED FOR RESPIRATORY.   Treatment and Education:  PT EDUCATED ON PURSED LIP BREATHING AND RESPIRATORY PRESENT AND INC O2 TO 10L. PT LEFT WITH RESPIRATORY THERAPY PRESENT SEATED EOB TO RECOVER.      AM-PAC 6 CLICK MOBILITY  How much help from another person does this patient currently need?   1 = Unable, Total/Dependent Assistance  2 = A lot, Maximum/Moderate Assistance  3 = A little, Minimum/Contact Guard/Supervision  4 = None, Modified Sac/Independent    Turning over in bed (including adjusting bedclothes, sheets and blankets)?: 4  Sitting down on and standing up from a chair with arms (e.g., wheelchair, bedside commode, etc.): 4  Moving from lying on back to sitting on the side of the bed?: 4  Moving to and from a bed to a chair (including a wheelchair)?: 4  Need to walk in hospital room?: 4  Climbing 3-5 steps with a railing?: 1 (NT)  Basic Mobility Total Score: 21    AM-PAC Raw Score CMS G-Code Modifier Level of Impairment Assistance   6 % Total / Unable    7 - 9 CM 80 - 100% Maximal Assist   10 - 14 CL 60 - 80% Moderate Assist   15 - 19 CK 40 - 60% Moderate Assist   20 - 22 CJ 20 - 40% Minimal Assist   23 CI 1-20% SBA / CGA   24 CH 0% Independent/ Mod I     Patient left sitting edge of bed with all lines intact, call button in reach, and RESPIRATORY  present.    Assessment:  PT SAMMI MIN MOBILITY AS LOW O2 SATS WOULD NOT ALLOW FOR PROGRESSING     Rehab identified problem list/impairments: weakness, impaired endurance, impaired self care skills, impaired functional mobility, gait instability, impaired cardiopulmonary response to activity    Rehab potential is good.    Activity tolerance: Poor    Discharge recommendations: home      Barriers to discharge:      Equipment recommendations: none     GOALS:   Multidisciplinary Problems       Physical Therapy Goals          Problem: Physical Therapy    Goal Priority Disciplines Outcome Goal Variances Interventions   Physical Therapy Goal     PT, PT/OT Ongoing, Progressing     Description: LT23  1. PT WILL COMPLETE BED MOBILITY IND  2. PT WILL GT TRAIN X 100' WITH NO AD AND O2 IN TOW IND  3. PT WILL INC AMPAC SCORE BY 2 POINTS TO PROGRESS GROSS FUNC MOBILITY.                          PLAN:    Patient to be seen 3 x/week to address the above listed problems via gait training, therapeutic activities  Plan of Care expires: 23  Plan of Care reviewed with: patient         2023

## 2023-08-03 NOTE — PT/OT/SLP EVAL
[facility]  Fiberoptic Endoscopic Evaluation of Swallowing (FEES)    Date: 7/31/2023     Name: Ayanna Alicia   MRN: [id number]    Physician Orders: Ambulatory referral/consult to speech therapy. FEES  Order Date: 8/3/2023      Time In: 07:50 AM  Time Out: 08:45 AM    Procedure Min.   Flexible fiberoptic endoscopic evaluation of  swallowing by cine or video recording (CPT 71829)  53     Precautions:Standard, Fall, and Respiratory   Subjective     History of Current Condition:  Ayanna Alicia was referred for a FEES (CPT 71676) to objectively assess anatomy and physiology of the pharyngeal swallow, rule out silent aspiration, determine the safest possible diet, and examine the effectiveness of compensatory strategies. Patient's current nutritional avenue is oral. Patient is currently on a thin liquid diet consistency; regular food diet consistency.     Patient's medical diagnosis significant for interstitial lung disease with chronic hypoxic respiratory failure requiring 5L O2, Sjogren's syndrome, laryngeal ulcerations, and esophageal dysphagia as seen on most recent esophagram. She has been referred for evaluation at [facility] for lung transplant. She presented to ER for worsening SOB and desatting on 7/31/23. Bedside swallow evaluation completed 8/2/23 significant for cranial nerve functioning WFL, overt signs of aspiration and reported globus sensation therefore FEES was recommended.     In consideration of the interrelationships between body systems and swallowing, History was provided by patient, and/or taken from chart review. The following are medical conditions present in the patient's history which can result in or be attributed to dysphagia:  Respiratory ILD  Asthma    Cardiovascular None noted in this category   Digestive GERD  Hiatal hernia   Infections  None noted in this category   Urinary None noted in this category   Endocrine Goiter   Nervous None noted in  this category   Skeletal None noted in this category   Immune Rheumatoid Arthritis   Sjogren's syndrome    Cancer None noted in this category   Psychiatric  None noted in this category   Congenital  None noted in this category   Other Laryngeal ulceration     The following observations were made:   -Mental status: Alert and Cooperative  -Factors affecting performance: no difficulties participating in the study  -Feeding Method: independent in self-feeding    Respiratory Status: nasal cannula; 8 L high flow during study    Past Medical History: Ayanna Alicia  has a past medical history of Abnormal Pap smear of cervix, Acute interstitial pneumonitis, Allergic rhinitis, cause unspecified, Arthritis of both knees, Asthma, Eczema, Fatty liver (10/2014), Fibrocystic breast changes, Headache(784.0), Hepatomegaly (10/2014), Hypertension, Liver cyst (10/2014), Multinodular goiter, Polymenorrhea (), TMJ (dislocation of temporomandibular joint), Uterine fibroid, and Vitamin D deficiency disease.  Ayanna Alicia  has a past surgical history that includes  section, classic; Multiple tooth extractions; Partial hysterectomy (2013); Hysterectomy; Colonoscopy (N/A, 2020); and Bronchoscopy (Bilateral, 2023).  Medical Hx and Allergies:  [facility]   Review of patient's allergies indicates:   Allergen Reactions    Doxycycline Nausea Only     Other reaction(s): Nausea    Fluticasone Other (See Comments)     Other reaction(s): Epistaxis      Penicillins      Other reaction(s): unknown  Pt tolerated ceftriaxone     Imaging:   FL ESOPHAGRAM COMPLETE 3/29/2023  FINDINGS:  Swallowing: Trace penetration, no aspiration.     Esophagus: Esophageal dysmotility involving the mid and distal esophagus.  Esophageal reflux.  Esophageal residue never fully clears to gravity or water wash.  There is a small hiatal hernia.     Gastroesophageal reflux: No gastroesophageal reflux.     Impression:  Esophageal  dysmotility with small hiatal hernia.    XR CHEST AP PORTABLE 7/31/2023  FINDINGS:  Cardiomegaly.  Moderate bilateral infiltrates appear stable.     Impression:     Stable chest with moderate bilateral infiltrates.     CT CHEST WITHOUT CONTRAST 5/10/2023  FINDINGS:  Subpleural ground-glass opacities at the right upper lobe of not changed significantly in comparison the prior examination.  Subpleural reticular changes noted.  Additional severe ground-glass opacities throughout the right middle lobe and right lower lobe with reticular changes and subpleural bands.  Findings involve both the central and peripheral interstitium.  Varicose bronchiectasis noted within the right middle and right lower lobes, unchanged compared to the previous exam.  No honeycombing detected.     Mild subpleural ground-glass opacities left upper lobe with predominant subpleural reticular change.  Ground-glass opacities throughout the lingula and left lower lobe with reticular changes and subpleural bands.  Varicose bronchiectasis noted within the left lower lobe and left upper lobe.  Minimal bronchiectasis within the lingula.  Findings have also not changed significantly in comparison the previous examination.  No significant air trapping noted on expiratory imaging.     Heart enlarged.  Aorta normal.  Negative for adenopathy throughout the chest.     Multinodular goiter, unchanged compared to the previous exam.  Visualized upper abdomen with probable cyst left lobe liver measuring 8.5 mm, unchanged.  Additional probable cyst within the left lobe measures 1.7 cm and is also unchanged.     Chest wall soft tissues are normal.  Degenerative changes of the spine.  No suspicious osseous lesions.     Impression:  Severe interstitial pneumonitis with varicose bronchiectasis bilaterally.  Overall no significant interval change since 03/17/2023.    Previous MBSS:  No  Previous FEES:   No  Prior Therapy:  NA  Patient's Therapy Goals:  evaluate  swallow safety/efficiency  Objective     Procedure: A flexible fiberoptic nasoendoscope was passed transnasally to view the nasopharynx, oropharynx, hypopharynx, and larynx. 16 swallow trials using 1/2 teaspoon to 3 ounce amounts of real foods of thin liquid, nectar-thick liquid, puree, soft-mechanical, and solid consistencies were video recorded and analyzed using transnasal endoscopy. A clinical swallow evaluation (CPT 00455) was completed in conjunction with the FEES in order to evaluate the oral structures required for functional swallowing.   Scope Time: 7:39     Results revealed:   Cranial Nerve Examination  Cranial Nerve 5: Trigeminal Nerve  Motor Jaw Posture at rest: Closed  Mandible Elevation/Depression: WFL  Mandible lateralization: WFL  Abnormal movement: absent Interpretation: Intact bilaterally    Sensory Forehead: WFL  Cheek: WFL  Jaw: WFL  Facial Pain: None noted Interpretation: Intact bilaterally      Cranial Nerve 7: Facial Nerve  Motor Facial Symmetry: WNL  Wrinkle Forehead: WFL  Close eyes tightly: WFL  Labial Protrusion: WFL  Labial Retraction: WFL  Buccal Strength with Labial Seal: WFL  Abnormal movement: absent Interpretation: Intact bilaterally    Sensory Formal testing not completed.       Cranial Nerves IX and X: Glossopharyngeal and Vagus Nerves  Motor Palatal Symmetry (Rest): WNL  Palatal Symmetry (Movement): WNL  Cough: Perceptually strong  Voice Prior to PO intake: Clear  Resonance: Normal  Abnormal movement: absent Interpretation: Intact bilaterally      Cranial Nerve XII: Hypoglossal Nerve  Motor Tongue at rest: WNL  Lingual Protrusion: WNL  Lingual Protrusion against Resistance: WNL  Lingual Lateralization: WNL  Abnormal movement: absent Interpretation: Intact bilaterally      Other information:  Volitional Swallow: Able to palpate laryngeal rise  Mucosal Quality: No abnormal findings  Secretion Management: no overt deficits noted/observed  Dentition: Edentulous         Nasopharyngoscopic, pharyngoscopic, and laryngeal findings:  Nasopharyngoscopic Findings Velopharyngeal function WFL    Anatomic findings Dried, thick and copious bloody secretions in both nares   Pharyngoscopic & laryngoscopic findings Secretions Within normal limits    Lyle Secretion Scale 0: Most normal rating. No visible secretions anywhere in the hypopharynx or some transient bubble visible in the valleculae and pyriform sinuses.    Vocal fold motion Lesion of the bilateral vocal folds. See ENT laryngoscopy findings consistent with laryngeal ulceration    Sensory integrity Appears functional- swallow initiated to penetration material, cough or throat clear to aspiration, and/or spontaneous swallow to significant residue    Anatomic findings Within normal limits       Consistency  Findings Rosenbeck's Penetration/Aspiration Scale (PAS) Strategies   Thin (IDDSI 0)    1/2 teaspoon x1  Full teaspoon x2  Self-regulated cup sip x3    Self-regulated straw sip x3  Vallecular residue: none present    Pyriform sinus residue: none present    Other residue: none present Best: (1) Material does not enter the airway    Worst: (1) Material does not enter the airway    None completed nor needed    Nectar thick (mildly thick/IDDSI 2)    1/2 teaspoon x1  Full teaspoon x2 Vallecular residue: none present    Pyriform sinus residue: none present    Other residue: none present Best: (1) Material does not enter the airway    Worst: (1) Material does not enter the airway   None completed nor needed    Puree (extremely thick/IDDSI 4)    1/2 teaspoon x1  Full teaspoon x2 Vallecular residue:  mild residue bilaterally reduced with several spontaneous sequential swallows    Pyriform sinus residue: none present    Other residue: none present Best: (1) Material does not enter the airway    Worst: (1) Material does not enter the airway   None completed nor needed    Solid (regular/ IDDSI 7)    - bite of cookie x2 Vallecular residue:   mild residue bilaterally reduced with several spontaneous sequential swallows    Pyriform sinus residue: mild residue bilaterally reduced with several spontaneous sequential swallows    Other residue: none present Best: (1) Material does not enter the airway    Worst: (1) Material does not enter the airway   None completed nor needed      Images:       Initial view. Laryngeal ulcers After nectar  Final airway view     Recommend MBSS: No    Treatment   Treatment Time In: n/a  Treatment Time Out: n/a  Total Treatment Time: n/a  No treatment performed 2/2 time to administer evaluation    Education: Plan of Care, role of SLP in care, and vocal hygeine were discussed with the patient. Patient and family members expressed understanding of all discussed.     Assessment   Ayanna is a 52 y.o. female referred to outpatient Speech Therapy with a medical diagnosis of acute on chronic respiratory failure. Results of this FEES revealted that the patient presents with mild pharyngeal dysphagia as defined by the dysphagia outcome and severity scale (adapted for FEES by LATANYA Virgen,  Swallowing Services, [facility] from O'Lopez et al 1999).     Oral phase findings Posterior containment Within normal limits    Mastication Within normal limits    Clearance Within normal limits   Pharyngeal phase findings Initiation of the swallow Timely    Base of tongue retraction mildly impaired    Epiglottic movement Within normal limits    Pharyngeal contraction mildly reduced pharyngeal wall contraction    Laryngeal vestibule closure Within normal limits    PES opening Within normal limits    Other findings Multiple spontaneous swallows per bolus      Mild pharyngeal dysphagia, likely acute on chronic related to acute respiratory failure resulting in hospitalization/deconditioning requiring increased O2 in the setting of chronic interstitial lung disease, autoimmune disease, laryngeal ulcers. Swallow safety is preserved; however, swallow efficiency  is mildly impaired. Patient appears to be at low-moderate risk for aspiration related pneumonia from a primary oropharyngeal dysphagia in consideration of three pillars of aspiration pneumonia ([place], 2005) including oral health status, overall health/immune status, and laryngeal vestibule closure/severity of dysphagia. However, unable to assess risk related to aspiration pneumonia cause by the aspiration of gastric content. Patient at low risk for malnutrition/dehydration. Patient appears to be a fair candidate for behavioral swallow rehabilitation; however, recommend prioritizing evaluation with laryngology to further assess dysphonia.      Consistency Recommendations:  Thin liquids (IDDSI 0) and Regular consistencies (IDDSI 7).  Medications should be taken Whole in thin liquids.     Functional Oral Intake Scale (FOIS)  The Functional Oral Intake Scale (FOIS) is an ordinal scale that is used to assess the current status and meaningful change in the oral intake. FOIS levels include:    TUBE DEPENDENT (levels 1-3) 1. No oral intake  2. Tube dependent with minimal/inconsistent oral intake  3. Tube supplements with consistent oral intake      TOTAL ORAL INTAKE (levels 4-7) 4. Total oral intake of a single consistency  5. Total oral intake of multiple consistencies requiring special preparation  6. Total oral intake with no special preparation, but must avoid specific foods or liquid items  7. Total oral intake with no restrictions     Patient is currently judged to be at FOIS level 7.      Plan     Recommendations:   - Aggressive oral care at least twice daily (morning and bedtime) is strongly recommended to reduce bacteria on oropharyngeal surfaces which may increase the risk of nosocomial infections, including pneumonia.   - Monitor for any signs/symptoms of aspiration (such as wet/gurgly voice that does not clear with coughing, inability to make any voice sounds, any persistent coughing with oral intake,  otherwise unexplained fever, unexplained increased or new difficulty or discomfort breathing, unexplained increase in sleepiness/lethargy/significant fatigue, unexplained increase or new onset confusion or change in cognitive functioning, or any other unexplained change in health or well-being that could be related to swallowing).  - Risk Management: use good oral hygiene, sit upright for all PO intake, increase physical mobility as tolerated, and alternate bites and sips  -Specialist Referrals: Laryngology   -Ancillary Tests: Consider videostroboscopy with laryngologist  -Follow-up exam: Follow up swallow study is not indicated at this time.    Therapist's Name:   Loli Prado MA, CCC-SLP  Speech Language Pathologist  8/3/2023

## 2023-08-03 NOTE — SUBJECTIVE & OBJECTIVE
Interval History: See hospital course for today      Review of Systems   Constitutional:  Positive for activity change and fatigue. Negative for appetite change and fever.   HENT:  Positive for voice change.    Respiratory:  Positive for shortness of breath.    Cardiovascular:  Negative for leg swelling.   Gastrointestinal:  Negative for abdominal pain, nausea and vomiting.   Neurological:  Positive for weakness.   Psychiatric/Behavioral:  Positive for dysphoric mood. Negative for agitation, behavioral problems, confusion and decreased concentration.      Objective:     Vital Signs (Most Recent):  Temp: 98.8 °F (37.1 °C) (08/03/23 1117)  Pulse: 100 (08/03/23 1142)  Resp: 18 (08/03/23 1142)  BP: 111/72 (08/03/23 1117)  SpO2: (!) 94 % (08/03/23 1142) Vital Signs (24h Range):  Temp:  [97.8 °F (36.6 °C)-98.8 °F (37.1 °C)] 98.8 °F (37.1 °C)  Pulse:  [] 100  Resp:  [16-22] 18  SpO2:  [93 %-100 %] 94 %  BP: (101-119)/(51-75) 111/72     Weight: 102.5 kg (225 lb 15.5 oz)  Body mass index is 36.47 kg/m².    Intake/Output Summary (Last 24 hours) at 8/3/2023 1329  Last data filed at 8/3/2023 1159  Gross per 24 hour   Intake 640 ml   Output 1100 ml   Net -460 ml         Physical Exam  Vitals and nursing note reviewed. Exam conducted with a chaperone present (visitor).   Constitutional:       General: She is not in acute distress.     Appearance: She is ill-appearing. She is not toxic-appearing.      Interventions: Nasal cannula in place.   HENT:      Head: Normocephalic and atraumatic.   Cardiovascular:      Rate and Rhythm: Normal rate.   Pulmonary:      Effort: Respiratory distress present. No tachypnea.   Abdominal:      Palpations: Abdomen is soft.      Tenderness: There is no abdominal tenderness.   Musculoskeletal:      Right lower leg: No edema.      Left lower leg: No edema.   Skin:     General: Skin is warm.      Capillary Refill: Capillary refill takes less than 2 seconds.   Neurological:      Mental Status: She  is alert and oriented to person, place, and time.      Motor: Weakness present.   Psychiatric:         Mood and Affect: Mood is depressed.      Comments: Dysphonia              Significant Labs: All pertinent labs within the past 24 hours have been reviewed.  CBC:   Recent Labs   Lab 08/02/23  0546 08/03/23  0603   WBC 20.05* 16.04*   HGB 12.2 11.7*   HCT 39.0 38.2    417     CMP:   Recent Labs   Lab 08/02/23  0546 08/03/23  0603    139   K 3.5 3.4*   CL 96 98   CO2 31* 30*   * 106   BUN 15 14   CREATININE 0.9 0.8   CALCIUM 9.2 9.3   ANIONGAP 12 11       Significant Imaging: I have reviewed all pertinent imaging results/findings within the past 24 hours.

## 2023-08-03 NOTE — ASSESSMENT & PLAN NOTE
Patient reports non-compliance with home PAP therapy in the past. We discussed utilizing and compliance with home PAP therapy. Attempt to transition from CPAP to home AVAPS as she has significant hypoxic respiratory failure at baseline with associated restrictive disease as evidenced by her PFTs. In addition, patient has had a rapid decline in her clinical condition over the last 4 to 6 months    · CPAP transitioned to qHS AVAPS

## 2023-08-03 NOTE — ASSESSMENT & PLAN NOTE
Patient with chronic hypoxic respiratory failure who is on home oxygen at 5L/NC which is her current inpatient requirements. Signs/symptoms of respiratory failure included increased work of breathing, decreased oxygen saturations, and wheezing. Contributing diagnoses includes asthma, interstitial lung disease, and decompensated heart failure. Labs and images were reviewed. Patient does have a recent ABG which has been reviewed.  Initial PFTs with severe restriction and reduced DLCO; attempted repeat PFTs on 3/16/2022 which she was unable to complete due to worsening shortness of breath.    Will treat underlying causes and adjust management of respiratory failure as follows:  · Underwent bronch (April 2023) with BAL; normal respiratory iris identified and negative for fungal / yeast elements, negative for MRSA or pseudomonas, no PCP   · Patient has had a decline overnight; wore CPAP overnight and upon removing this morning had increased work of breathing and increased supplemental oxygen needs  · Currently requiring 10L/NC  · Patient with restrictive pattern on her PFTs and chronic hypoxic respiratory failure. She would benefit from home ventilation to optimally support her and optimize her pulmonary status prior to transplant evaluation  · Ordering nocturnal volume ventilator. Daytime use to reduce risk of exacerbation.   · Home BiPAP insufficient due to severity of condition. Absence of respiratory support would be life threatening  · Fungal immunodiffusion study pending  · Sputum culture: Many gram + cocci; follow results  · , Procalcitonin 0.15  · With her chronic immunosuppression, continue Cefepime and Zyvox for now  · Give Lasix 40mg IV x1 today  · Continue supplemental oxygen and titrate as needed to maintain saturations 90% or greater

## 2023-08-03 NOTE — PLAN OF CARE
Received order for Trilogy. Faxed order and clinicals to [facility] with [facility].       08/03/23 1254   Post-Acute Status   Post-Acute Authorization E   E Status Referrals Sent     4:15pm Obtain all paperwork.  Seth submitting for authorization.

## 2023-08-03 NOTE — TELEPHONE ENCOUNTER
Please Fax referrals to [facility] at [phone].  Please call Emiliana Petty at [phone] for update of above.    NOTE:  Requested referral information from [facility] is as desk.    Thanks.    I have put the following orders and/or medications to this note.  Please advise pt and assist.    Orders Placed This Encounter   Procedures    Ambulatory referral/consult to Pulmonology     Standing Status:   Future     Standing Expiration Date:   9/3/2024     Referral Priority:   Routine     Referral Type:   Consultation     Referral Reason:   Specialty Services Required     Requested Specialty:   Pulmonary Disease     Number of Visits Requested:   1

## 2023-08-03 NOTE — PROGRESS NOTES
[facility] - Telemetry ([facility] Medicine  Progress Note    Patient Name: Ayanna Alicia  MRN: [id number]  Patient Class: IP- Inpatient   Admission Date: 7/31/2023  Length of Stay: 2 days  Attending Physician: Som Cabello MD  Primary Care Provider: Madeleine Enrique MD        Subjective:     Principal Problem:Acute on chronic respiratory failure        HPI:  52F  has a past medical history of Abnormal Pap smear of cervix, Acute interstitial pneumonitis, Allergic rhinitis, cause unspecified, Arthritis of both knees, Asthma, Eczema, Fatty liver, Fibrocystic breast changes, Headache(784.0), Hepatomegaly, Hypertension, Liver cyst, Multinodular goiter, Polymenorrhea, TMJ (dislocation of temporomandibular joint), Uterine fibroid, and Vitamin D deficiency disease.  Presents for worsening dyspnea. Associated with fever, sneezing, rhinorrhea, chest tightness, and wheezing. Onset Friday after being without supplemental oxygen for 15 minutes with difficulties recovering, which prompted a visit to clinic/urgent care. At baseline, patient wears 6L supplemental oxygen. Reports taking prednisone for a long time. States compliance to ofev for ild.    Initial workup in emergency department revealed increased supplemental oxygen of 8L, tachycardia and tachypnea. Leukocytosis on cbc. Cmp with hyperglycemia and mildly elevated liver enzymes. Troponin(s) elevated. Bnp within normal limits. Abg metabolic and respiratory alkalosis. Chest x-ray with bibasilar infiltrates. Covid and flu negative. Electrocardiography with sinus tachycardia.    Hospital medicine consulted for admission under observation, pneumonia vs asthma exacerbation. Pulmonology consulted      Overview/Hospital Course:  8/1 admitted for acute on chronic respiratory failure. Weaned down to 5L. Pulmonology following. Continue current care.   8/2 no acute events overnight. remains on baseline supplemental oxygen. Pulmonology recommending trilogy for home use. Case  management consulted. Physical/occupational therapy consulted. Patient will have homehealth physical/occupational therapy on discharge.   8/3 reports dyspnea with exertion with hypoxia requiring increased supplemental oxygen needs to recover. Patient reports compliance with cpap overnight. Pulmonology recommending avaps for home use. Case management consulted for trilogy. Continue current regimen. Speech consulted for dysphagia and dysphonia, recommending follow up ent for laryngeal ulcers.      Interval History: See hospital course for today      Review of Systems   Constitutional:  Positive for activity change and fatigue. Negative for appetite change and fever.   HENT:  Positive for voice change.    Respiratory:  Positive for shortness of breath.    Cardiovascular:  Negative for leg swelling.   Gastrointestinal:  Negative for abdominal pain, nausea and vomiting.   Neurological:  Positive for weakness.   Psychiatric/Behavioral:  Positive for dysphoric mood. Negative for agitation, behavioral problems, confusion and decreased concentration.      Objective:     Vital Signs (Most Recent):  Temp: 98.8 °F (37.1 °C) (08/03/23 1117)  Pulse: 100 (08/03/23 1142)  Resp: 18 (08/03/23 1142)  BP: 111/72 (08/03/23 1117)  SpO2: (!) 94 % (08/03/23 1142) Vital Signs (24h Range):  Temp:  [97.8 °F (36.6 °C)-98.8 °F (37.1 °C)] 98.8 °F (37.1 °C)  Pulse:  [] 100  Resp:  [16-22] 18  SpO2:  [93 %-100 %] 94 %  BP: (101-119)/(51-75) 111/72     Weight: 102.5 kg (225 lb 15.5 oz)  Body mass index is 36.47 kg/m².    Intake/Output Summary (Last 24 hours) at 8/3/2023 1329  Last data filed at 8/3/2023 1159  Gross per 24 hour   Intake 640 ml   Output 1100 ml   Net -460 ml         Physical Exam  Vitals and nursing note reviewed. Exam conducted with a chaperone present (visitor).   Constitutional:       General: She is not in acute distress.     Appearance: She is ill-appearing. She is not toxic-appearing.      Interventions: Nasal cannula in  place.   HENT:      Head: Normocephalic and atraumatic.   Cardiovascular:      Rate and Rhythm: Normal rate.   Pulmonary:      Effort: Respiratory distress present. No tachypnea.   Abdominal:      Palpations: Abdomen is soft.      Tenderness: There is no abdominal tenderness.   Musculoskeletal:      Right lower leg: No edema.      Left lower leg: No edema.   Skin:     General: Skin is warm.      Capillary Refill: Capillary refill takes less than 2 seconds.   Neurological:      Mental Status: She is alert and oriented to person, place, and time.      Motor: Weakness present.   Psychiatric:         Mood and Affect: Mood is depressed.      Comments: Dysphonia              Significant Labs: All pertinent labs within the past 24 hours have been reviewed.  CBC:   Recent Labs   Lab 08/02/23  0546 08/03/23  0603   WBC 20.05* 16.04*   HGB 12.2 11.7*   HCT 39.0 38.2    417     CMP:   Recent Labs   Lab 08/02/23  0546 08/03/23  0603    139   K 3.5 3.4*   CL 96 98   CO2 31* 30*   * 106   BUN 15 14   CREATININE 0.9 0.8   CALCIUM 9.2 9.3   ANIONGAP 12 11       Significant Imaging: I have reviewed all pertinent imaging results/findings within the past 24 hours.      Assessment/Plan:      * Acute on chronic respiratory failure  Patient with Hypoxic Respiratory failure which is Acute on chronic.  she is on home oxygen at 6 LPM. Supplemental oxygen was provided and noted- Oxygen Concentration (%):  [40-60] 60    .   Signs/symptoms of respiratory failure include- tachypnea, increased work of breathing and respiratory distress. Contributing diagnoses includes - Interstitial lung disease, Pneumonia and asthma Labs and images were reviewed. Patient Has recent ABG, which has been reviewed. Will treat underlying causes and adjust management of respiratory failure as follows- empiric intravenous antibiotic(s), systemic steroids, scheduled breathing treatments, acapella, incentive spirometer, and pulmonology consultation.  Continue home ild medication(s).    Improving, weaned down to 5L  Continue current regimen and begin deescalating treatment regimen   Pulmonology recommending trilogy     Dysphagia  Dysphagia diet  Continue speech therapy outpatient       Dysphonia due to laryngeal ulcers  Speech therapy  Follow up ent outpatient     Elevated troponin  Likely demand ischemia in setting of acute on chronic respiratory failure  Trend relatively flat  Chest pain free  Consult cardiology if indicated  Electrocardiography reviewed with no st-t wave elevation      Mild intermittent asthma  Asthma exacerbation vs pneumonia vs interstitial lung pneumonitis  Continue breathing treatments scheduled  Systemic steroids  Pulmonology broadened antibiotic(s) spectrum due to immunocompromised state  Wean supplemental oxygen as able  Received terbutaline injection in emergency department       Interstitial lung disease  Continue home medication(s)  Pulmonology consulted      GLENN on CPAP  Continue cpap qhs  Pulmonology recommending trilogy for home use      GERD (gastroesophageal reflux disease)  Continue proton pump inhibitor as on chronic systemic steroids      HTN (hypertension)  Normotensive to hypotension  Continue home antihtn medication(s) as indicated  Mild tachycardia in setting of duonebs    VTE Risk Mitigation (From admission, onward)         Ordered     IP VTE HIGH RISK PATIENT  Once         07/31/23 1337     Place sequential compression device  Until discontinued         07/31/23 1337                Discharge Planning   JANETTE:      Code Status: Full Code   Is the patient medically ready for discharge?:     Reason for patient still in hospital (select all that apply): Patient trending condition, Laboratory test, Treatment, Imaging, Consult recommendations, PT / OT recommendations and Pending disposition  Discharge Plan A: Home                  Som Cabello MD  Department of Hospital Medicine   [place] - Telemetry ([facility])

## 2023-08-03 NOTE — TELEPHONE ENCOUNTER
Called Emiliana LOPEZ to inform of referral faxed now. Emiliana states if anything is needed she will give us a call.

## 2023-08-03 NOTE — ASSESSMENT & PLAN NOTE
Patient with Hypoxic Respiratory failure which is Acute on chronic.  she is on home oxygen at 6 LPM. Supplemental oxygen was provided and noted- Oxygen Concentration (%):  [40-60] 60    .   Signs/symptoms of respiratory failure include- tachypnea, increased work of breathing and respiratory distress. Contributing diagnoses includes - Interstitial lung disease, Pneumonia and asthma Labs and images were reviewed. Patient Has recent ABG, which has been reviewed. Will treat underlying causes and adjust management of respiratory failure as follows- empiric intravenous antibiotic(s), systemic steroids, scheduled breathing treatments, acapella, incentive spirometer, and pulmonology consultation. Continue home ild medication(s).    Improving, weaned down to 5L  Continue current regimen and begin deescalating treatment regimen   Pulmonology recommending trilogy

## 2023-08-03 NOTE — ASSESSMENT & PLAN NOTE
Normotensive to hypotension  Continue home antihtn medication(s) as indicated  Mild tachycardia in setting of duonebs

## 2023-08-03 NOTE — PROGRESS NOTES
[facility]  Pulmonology Progress Note     Patient Name: Ayanna Alicia   MRN: [id number]  Admission Date: 7/31/2023    Hospital Length of Stay: 2 days  Code Status: Full Code     Attending Provider: Som Cabello MD    Principal Problem: Acute on chronic respiratory failure  Subjective:   History of present illness:  Ayanna Alicia is a 52-year-old female with a past medical history of chronic hypoxic respiratory failure at 5L/NC, asthma, Sjogren's syndrome with associated interstitial lung disease, multinodular goiter, GLENN, and morbid obesity who  presented with worsening shortness of breadth yesterday with associated decreased oxygen saturations. Patient reported she was running errands on Friday when she ran out of oxygen and her saturations dropped into the 50s. She reports it took sometime for her to recover her saturations and developed worsening shortness of breath during that time. Due to these symptoms, the patient presented to the emergency room for evaluation.      Of note, she was hospitalized in May of 2022 with acute respiratory failure and persistent pneumonia. At that time, she was dishcarged on oxygen. She was subsequently diagnosed with Sjogren's disease and thought to have interstitial lung disease secondary to her underlying rheumatological disease. Initial PFTs with severe restriction and reduced DLCO. She was initially prescribed steroids followed by the addition of Cellcept and Rituximab infusions (last infusion in Dec 2022). Most recently, she was initiated on Ofev; however, the patient reports she has not had any significant improvement in her clinical condition since her original illness in May 2022 and reports she has clinically worsened over the course of this time. She chronically takes Breo, Spiriva, and Montelukast for her asthma. She endorses significant exertional SOB, can walk at home, but only limited distance. Has both dry and clear  mucus, sometimes yellow sputum production. Denies any chest pain but endorses occasional chest tightness. Has the occasional lower abdominal pain exacerbated by coughing episodes; denies nausea or vomiting. No fever but endorses occasional chills. Endorses chronic heart burn for which she takes OTC Pepcid without significant symptomatic improvement. Endorses chronic belching and flatulence. Endorses losing weight, lost 50 lbs since last year; however, she has not been able to get less than 220lb. Not taking cellcept since 12/2022, received 4 treatments of Rituximab with her last dose in Dec 2022. She chronically takes prednisone 10 mg.  Patient underwent bronchoscopy with BAL in April 2023 per Dr. Aviles with negative cultures and no growth of fungus, yeast, PCP, staph, or pseudomonas. Remains on OFEV 150mg BID and Prednisone 10mg daily. In addition, she is in a trial of Actemra IV monthly for four consecutive months and currently has received two dose thus far with her last treatment on July 7, 2023. In addition, she has been referred for evaluation of lung transplant at [facility] in [place].     Due to her current acute on chronic hypoxic respiratory failure, as well as, her chronic underlying pulmonary disease, pulmonology has been consulted for evaluation.     Interval history:   8/1: Patient resting comfortably this morning; oxygen increased to 8L/NC overnight. Worse PAP therapy for a few hours overnight. No acute complaints at this time.    8/2: Sitting up on bedside today. Currently on 5L/NC. Overall, feels better   8/3: Upon coming off CPAP this morning, the patient had acute exacerbation of her underlying dyspnea with decreased oxygen saturations.     Objective:     Vital Signs (Most Recent):  Temp: 98.8 °F (37.1 °C) (08/03/23 1117)  Pulse: 100 (08/03/23 1142)  Resp: 18 (08/03/23 1142)  BP: 111/72 (08/03/23 1117)  SpO2: (!) 94 % (08/03/23 1142) Vital Signs (24h Range):  Temp:  [97.8 °F (36.6 °C)-98.8  °F (37.1 °C)] 98.8 °F (37.1 °C)  Pulse:  [] 100  Resp:  [16-22] 18  SpO2:  [93 %-100 %] 94 %  BP: (101-119)/(51-75) 111/72   Weight: 102.5 kg (225 lb 15.5 oz);  Body mass index is 36.47 kg/m².    Intake/Output Summary (Last 24 hours) at 8/3/2023 1238  Last data filed at 8/3/2023 1159  Gross per 24 hour   Intake 640 ml   Output 1100 ml   Net -460 ml      Physical Exam  Vitals and nursing note reviewed.   Constitutional:       Appearance: She is obese.   HENT:      Head: Normocephalic.   Eyes:      Conjunctiva/sclera: Conjunctivae normal.   Cardiovascular:      Rate and Rhythm: Normal rate.   Pulmonary:      Effort: Pulmonary effort is normal.      Breath sounds: Rales present.   Abdominal:      Palpations: Abdomen is soft.   Musculoskeletal:         General: Normal range of motion.      Cervical back: Normal range of motion and neck supple.   Skin:     General: Skin is warm and dry.   Neurological:      Mental Status: She is alert and oriented to person, place, and time.   Psychiatric:         Mood and Affect: Mood normal.         Review of Systems: Negative except as indicated in HPI    Significant Labs:  CBC/Anemia Profile:  Recent Labs   Lab 08/02/23  0546 08/03/23  0603   WBC 20.05* 16.04*   HGB 12.2 11.7*   HCT 39.0 38.2    417   MCV 94 93   RDW 20.3* 19.9*   Chemistries:  Recent Labs   Lab 08/02/23  0546 08/03/23  0603    139   K 3.5 3.4*   CL 96 98   CO2 31* 30*   BUN 15 14   CREATININE 0.9 0.8   CALCIUM 9.2 9.3   MG 2.0 2.0   PHOS 3.0 2.6*   ABG  Recent Labs   Lab 07/31/23  1157   PH 7.479*   PO2 43*   PCO2 37.7   HCO3 28.1*   BE 5     Assessment/Plan:     Pulmonary  * Acute on chronic respiratory failure  Patient with chronic hypoxic respiratory failure who is on home oxygen at 5L/NC which is her current inpatient requirements. Signs/symptoms of respiratory failure included increased work of breathing, decreased oxygen saturations, and wheezing. Contributing diagnoses includes asthma,  interstitial lung disease, and decompensated heart failure. Labs and images were reviewed. Patient does have a recent ABG which has been reviewed.  Initial PFTs with severe restriction and reduced DLCO; attempted repeat PFTs on 3/16/2022 which she was unable to complete due to worsening shortness of breath.    Will treat underlying causes and adjust management of respiratory failure as follows:  · Underwent bronch (April 2023) with BAL; normal respiratory iris identified and negative for fungal / yeast elements, negative for MRSA or pseudomonas, no PCP   · Patient has had a decline overnight; wore CPAP overnight and upon removing this morning had increased work of breathing and increased supplemental oxygen needs  · Currently requiring 10L/NC  · Patient with restrictive pattern on her PFTs and chronic hypoxic respiratory failure. She would benefit from home ventilation to optimally support her and optimize her pulmonary status prior to transplant evaluation  · Ordering nocturnal volume ventilator. Daytime use to reduce risk of exacerbation.   · Home BiPAP insufficient due to severity of condition. Absence of respiratory support would be life threatening  · Fungal immunodiffusion study pending  · Sputum culture: Many gram + cocci; follow results  · , Procalcitonin 0.15  · With her chronic immunosuppression, continue Cefepime and Zyvox for now  · Give Lasix 40mg IV x1 today  · Repeat CXR today; may need repeat CT chest  · Continue supplemental oxygen and titrate as needed to maintain saturations 90% or greater    Interstitial lung disease  Hospitalized May 2022 with acute respiratory failure and persistent pneumonia and dishcarged on oxygen. She has since been diagnosed with Sjogren's disease with associated ILD. Initial PFTs with severe restriction and reduced DLCO. Initially prescribed steroids and subsequently Cellcept and Rituximab infusions (last infusion in Dec 2022). Now on Ofev 150mg BID, Prednisone  10mg daily, and Actemra IV monthly x4 months. Patient reports she has not had any significant improvement in her clinical condition since her original illness in May 2022 and reports she has clinically worsened over the course of this time. Patient demonstrates significant decline in clinical status over the past 4 to 6 months    · Awaiting lung transplant evaluation  · Follow-up with pulmonary and rheumatology upon discharge  · Continue OFEV   · Decreased Prednisone dose to 40mg daily  · Give additional Lasix 40mg IV x1  · Repeat CXR today; may need repeat CT chest  · Would benefit from nocturnal and PRN AVAPS  · Ordering nocturnal volume ventilator. Daytime use to reduce risk of exacerbation.   · Home BiPAP insufficient due to severity of condition. Absence of respiratory support would be life threatening.   · ; Procalcitonin 0.15    Mild intermittent asthma  No evidence of acute exacerbation. Currently on Trelegy and Singulair for home medications.    · Continue Singulair  · Continue Brovana and Atrovent scheduled nebs  · Monitor for exacerbation    GLENN on CPAP  Patient reports non-compliance with home PAP therapy in the past. We discussed utilizing and compliance with home PAP therapy. Attempt to transition from CPAP to home AVAPS as she has significant hypoxic respiratory failure at baseline with associated restrictive disease as evidenced by her PFTs. In addition, patient has had a rapid decline in her clinical condition over the last 4 to 6 months    · CPAP transitioned to HS AVAPS     Taylor Mccann NP  Pulmonary and Critical Care  [facility]

## 2023-08-03 NOTE — TELEPHONE ENCOUNTER
----- Message from Madeleine Enrique MD sent at 8/3/2023 10:14 AM CDT -----  Contact: Sarah with [facility]  See desk and printer for referral information. Thanks.  ----- Message -----  From: Maria Guadalupe Higuera MA  Sent: 8/3/2023  10:02 AM CDT  To: Madeleine Enrique MD    I called back yesterday, got no response and did leave a message. I'll call back again today.  ----- Message -----  From: America Cowart  Sent: 8/2/2023   3:45 PM CDT  To: Klaus Nayolr Staff    Sarah with [facility] request to speak with someone regarding a referral. Sarah reports a referral request for a lung transplant has been faxed several times but hasn't been sent back. Please give Sarah a call back at [phone] to assist. Their fax number is [phone].

## 2023-08-04 ENCOUNTER — PATIENT MESSAGE (OUTPATIENT)
Dept: INFUSION THERAPY | Facility: HOSPITAL | Age: 53
End: 2023-08-04
Payer: COMMERCIAL

## 2023-08-04 LAB
ANION GAP SERPL CALC-SCNC: 10 MMOL/L (ref 8–16)
BACTERIA BLD CULT: ABNORMAL
BASOPHILS # BLD AUTO: 0.04 K/UL (ref 0–0.2)
BASOPHILS NFR BLD: 0.2 % (ref 0–1.9)
BUN SERPL-MCNC: 14 MG/DL (ref 6–20)
CALCIUM SERPL-MCNC: 9.1 MG/DL (ref 8.7–10.5)
CHLORIDE SERPL-SCNC: 99 MMOL/L (ref 95–110)
CO2 SERPL-SCNC: 31 MMOL/L (ref 23–29)
CREAT SERPL-MCNC: 0.7 MG/DL (ref 0.5–1.4)
DIFFERENTIAL METHOD: ABNORMAL
EOSINOPHIL # BLD AUTO: 0.3 K/UL (ref 0–0.5)
EOSINOPHIL NFR BLD: 1.9 % (ref 0–8)
ERYTHROCYTE [DISTWIDTH] IN BLOOD BY AUTOMATED COUNT: 19.8 % (ref 11.5–14.5)
EST. GFR  (NO RACE VARIABLE): >60 ML/MIN/1.73 M^2
GLUCOSE SERPL-MCNC: 101 MG/DL (ref 70–110)
HCT VFR BLD AUTO: 36.1 % (ref 37–48.5)
HGB BLD-MCNC: 11.2 G/DL (ref 12–16)
IMM GRANULOCYTES # BLD AUTO: 0.16 K/UL (ref 0–0.04)
IMM GRANULOCYTES NFR BLD AUTO: 1 % (ref 0–0.5)
LYMPHOCYTES # BLD AUTO: 1.8 K/UL (ref 1–4.8)
LYMPHOCYTES NFR BLD: 10.5 % (ref 18–48)
MAGNESIUM SERPL-MCNC: 2.1 MG/DL (ref 1.6–2.6)
MCH RBC QN AUTO: 29.2 PG (ref 27–31)
MCHC RBC AUTO-ENTMCNC: 31 G/DL (ref 32–36)
MCV RBC AUTO: 94 FL (ref 82–98)
MONOCYTES # BLD AUTO: 1.1 K/UL (ref 0.3–1)
MONOCYTES NFR BLD: 6.4 % (ref 4–15)
NEUTROPHILS # BLD AUTO: 13.3 K/UL (ref 1.8–7.7)
NEUTROPHILS NFR BLD: 80 % (ref 38–73)
NRBC BLD-RTO: 0 /100 WBC
PHOSPHATE SERPL-MCNC: 2.7 MG/DL (ref 2.7–4.5)
PLATELET # BLD AUTO: 433 K/UL (ref 150–450)
PMV BLD AUTO: 9.4 FL (ref 9.2–12.9)
POTASSIUM SERPL-SCNC: 3.4 MMOL/L (ref 3.5–5.1)
RBC # BLD AUTO: 3.84 M/UL (ref 4–5.4)
SODIUM SERPL-SCNC: 140 MMOL/L (ref 136–145)
WBC # BLD AUTO: 16.67 K/UL (ref 3.9–12.7)

## 2023-08-04 PROCEDURE — 85025 COMPLETE CBC W/AUTO DIFF WBC: CPT | Performed by: FAMILY MEDICINE

## 2023-08-04 PROCEDURE — 83735 ASSAY OF MAGNESIUM: CPT | Performed by: FAMILY MEDICINE

## 2023-08-04 PROCEDURE — 92526 ORAL FUNCTION THERAPY: CPT

## 2023-08-04 PROCEDURE — 84100 ASSAY OF PHOSPHORUS: CPT | Performed by: FAMILY MEDICINE

## 2023-08-04 PROCEDURE — 25000242 PHARM REV CODE 250 ALT 637 W/ HCPCS: Performed by: FAMILY MEDICINE

## 2023-08-04 PROCEDURE — 92507 TX SP LANG VOICE COMM INDIV: CPT

## 2023-08-04 PROCEDURE — 80048 BASIC METABOLIC PNL TOTAL CA: CPT | Performed by: FAMILY MEDICINE

## 2023-08-04 PROCEDURE — 63600175 PHARM REV CODE 636 W HCPCS: Performed by: NURSE PRACTITIONER

## 2023-08-04 PROCEDURE — 94640 AIRWAY INHALATION TREATMENT: CPT

## 2023-08-04 PROCEDURE — 87040 BLOOD CULTURE FOR BACTERIA: CPT | Performed by: STUDENT IN AN ORGANIZED HEALTH CARE EDUCATION/TRAINING PROGRAM

## 2023-08-04 PROCEDURE — 99900035 HC TECH TIME PER 15 MIN (STAT)

## 2023-08-04 PROCEDURE — 36415 COLL VENOUS BLD VENIPUNCTURE: CPT | Performed by: FAMILY MEDICINE

## 2023-08-04 PROCEDURE — 25000242 PHARM REV CODE 250 ALT 637 W/ HCPCS: Performed by: NURSE PRACTITIONER

## 2023-08-04 PROCEDURE — 94664 DEMO&/EVAL PT USE INHALER: CPT

## 2023-08-04 PROCEDURE — 25000003 PHARM REV CODE 250: Performed by: FAMILY MEDICINE

## 2023-08-04 PROCEDURE — 27100171 HC OXYGEN HIGH FLOW UP TO 24 HOURS

## 2023-08-04 PROCEDURE — 21400001 HC TELEMETRY ROOM

## 2023-08-04 PROCEDURE — 25000003 PHARM REV CODE 250: Performed by: NURSE PRACTITIONER

## 2023-08-04 PROCEDURE — 94761 N-INVAS EAR/PLS OXIMETRY MLT: CPT

## 2023-08-04 PROCEDURE — 97110 THERAPEUTIC EXERCISES: CPT

## 2023-08-04 PROCEDURE — 25000003 PHARM REV CODE 250: Performed by: HOSPITALIST

## 2023-08-04 PROCEDURE — 97530 THERAPEUTIC ACTIVITIES: CPT

## 2023-08-04 PROCEDURE — 94660 CPAP INITIATION&MGMT: CPT

## 2023-08-04 RX ORDER — POTASSIUM CHLORIDE 20 MEQ/1
40 TABLET, EXTENDED RELEASE ORAL 2 TIMES DAILY
Status: COMPLETED | OUTPATIENT
Start: 2023-08-04 | End: 2023-08-04

## 2023-08-04 RX ORDER — FUROSEMIDE 40 MG/1
40 TABLET ORAL DAILY
Status: DISCONTINUED | OUTPATIENT
Start: 2023-08-04 | End: 2023-08-05 | Stop reason: HOSPADM

## 2023-08-04 RX ADMIN — PREDNISONE 40 MG: 20 TABLET ORAL at 08:08

## 2023-08-04 RX ADMIN — LINEZOLID 600 MG: 600 TABLET, FILM COATED ORAL at 08:08

## 2023-08-04 RX ADMIN — OMEPRAZOLE 40 MG: 40 CAPSULE, DELAYED RELEASE ORAL at 08:08

## 2023-08-04 RX ADMIN — CETIRIZINE HYDROCHLORIDE 10 MG: 10 TABLET, FILM COATED ORAL at 08:08

## 2023-08-04 RX ADMIN — POTASSIUM CHLORIDE 40 MEQ: 1500 TABLET, EXTENDED RELEASE ORAL at 08:08

## 2023-08-04 RX ADMIN — NINTEDANIB 150 MG: 150 CAPSULE ORAL at 08:08

## 2023-08-04 RX ADMIN — IPRATROPIUM BROMIDE 0.5 MG: 0.5 SOLUTION RESPIRATORY (INHALATION) at 07:08

## 2023-08-04 RX ADMIN — LEVALBUTEROL HYDROCHLORIDE 0.31 MG: 0.63 SOLUTION RESPIRATORY (INHALATION) at 07:08

## 2023-08-04 RX ADMIN — BENZONATATE 200 MG: 100 CAPSULE ORAL at 08:08

## 2023-08-04 RX ADMIN — MONTELUKAST 10 MG: 10 TABLET, FILM COATED ORAL at 08:08

## 2023-08-04 RX ADMIN — ACETAMINOPHEN 650 MG: 325 TABLET ORAL at 08:08

## 2023-08-04 RX ADMIN — CEFEPIME 2 G: 2 INJECTION, POWDER, FOR SOLUTION INTRAVENOUS at 05:08

## 2023-08-04 RX ADMIN — ARFORMOTEROL TARTRATE 15 MCG: 15 SOLUTION RESPIRATORY (INHALATION) at 07:08

## 2023-08-04 RX ADMIN — CEFEPIME 2 G: 2 INJECTION, POWDER, FOR SOLUTION INTRAVENOUS at 08:08

## 2023-08-04 RX ADMIN — FUROSEMIDE 40 MG: 40 TABLET ORAL at 08:08

## 2023-08-04 RX ADMIN — OMEPRAZOLE 40 MG: 40 CAPSULE, DELAYED RELEASE ORAL at 04:08

## 2023-08-04 RX ADMIN — CEFEPIME 2 G: 2 INJECTION, POWDER, FOR SOLUTION INTRAVENOUS at 01:08

## 2023-08-04 NOTE — PLAN OF CARE
Pt tolerated interventions well. Pt MOD I for bed mobility, SPV for toilet transfer. Unable to progress functional mobility due to desat, 85% upon leaving, nurse notified. Recommending home upon d/c.

## 2023-08-04 NOTE — SUBJECTIVE & OBJECTIVE
Interval History:     Denied acute issues overnight  Remained afebrile, leukocytosis likely reactive from prednisone.    Currently at baseline supplemental oxygen requirement.    Sitting up in chair, denied acute issues at this time.    Anticipate discharge in next 24-48 hours    Review of Systems    Constitutional:  Positive for activity change and fatigue. Negative for appetite change and fever.   HENT:  Positive for voice change.    Respiratory:  Positive for shortness of breath.    Cardiovascular:  Negative for leg swelling.   Gastrointestinal:  Negative for abdominal pain, nausea and vomiting.   Neurological:  Positive for weakness.   Psychiatric/Behavioral:  Positive for dysphoric mood. Negative for agitation, behavioral problems, confusion and decreased concentration.        Objective:     Vital Signs (Most Recent):  Temp: 98.2 °F (36.8 °C) (08/04/23 1122)  Pulse: 90 (08/04/23 1356)  Resp: 18 (08/04/23 1356)  BP: 123/67 (08/04/23 1122)  SpO2: 97 % (08/04/23 1356) Vital Signs (24h Range):  Temp:  [97.6 °F (36.4 °C)-98.8 °F (37.1 °C)] 98.2 °F (36.8 °C)  Pulse:  [] 90  Resp:  [16-19] 18  SpO2:  [94 %-100 %] 97 %  BP: (113-133)/(62-78) 123/67     Weight: 102.5 kg (225 lb 15.5 oz)  Body mass index is 36.47 kg/m².  No intake or output data in the 24 hours ending 08/04/23 1432      Physical Exam      Constitutional:       General: She is not in acute distress.     Appearance: She is ill-appearing. She is not toxic-appearing.      Interventions: Nasal cannula in place.   HENT:      Head: Normocephalic and atraumatic.   Cardiovascular:      Rate and Rhythm: Normal rate.   Pulmonary:      Effort: Respiratory distress present. No tachypnea.   Abdominal:      Palpations: Abdomen is soft.      Tenderness: There is no abdominal tenderness.   Musculoskeletal:      Right lower leg: No edema.      Left lower leg: No edema.   Skin:     General: Skin is warm.      Capillary Refill: Capillary refill takes less than 2  seconds.   Neurological:      Mental Status: She is alert and oriented to person, place, and time.      Motor: Weakness present.   Psychiatric:         Mood and Affect: Mood is depressed.      Comments: Dysphonia       Significant Labs: All pertinent labs within the past 24 hours have been reviewed.  CBC:   Recent Labs   Lab 08/03/23 0603 08/04/23 0534   WBC 16.04* 16.67*   HGB 11.7* 11.2*   HCT 38.2 36.1*    433     CMP:   Recent Labs   Lab 08/03/23 0603 08/04/23 0534    140   K 3.4* 3.4*   CL 98 99   CO2 30* 31*    101   BUN 14 14   CREATININE 0.8 0.7   CALCIUM 9.3 9.1   ANIONGAP 11 10       Significant Imaging:     Imaging Results              X-Ray Chest AP Portable (Final result)  Result time 07/31/23 12:54:41      Final result by Terry MonetChi), MD (07/31/23 12:54:41)                   Impression:      Stable chest with moderate bilateral infiltrates.      Electronically signed by: Terry Monet MD  Date:    07/31/2023  Time:    12:54               Narrative:    EXAMINATION:  XR CHEST AP PORTABLE    CLINICAL HISTORY:  , Dyspnea;    COMPARISON:  Comparison 05/01/2023.    FINDINGS:  Cardiomegaly.  Moderate bilateral infiltrates appear stable.

## 2023-08-04 NOTE — PT/OT/SLP PROGRESS
Physical Therapy Treatment    Patient Name:  Ayanna Alicia   MRN:  [id number]    Recommendations:     Discharge Recommendations: home  Discharge Equipment Recommendations: none  Barriers to discharge: None    Assessment:     Ayanna Alicia is a 52 y.o. female admitted with a medical diagnosis of Acute on chronic respiratory failure.  She presents with the following impairments/functional limitations: weakness, impaired endurance, impaired functional mobility, impaired cardiopulmonary response to activity, gait instability, impaired self care skills.    Rehab Prognosis: Good; patient would benefit from acute skilled PT services to address these deficits and reach maximum level of function.    Recent Surgery: * No surgery found *      Plan:     During this hospitalization, patient to be seen 3 x/week to address the identified rehab impairments via gait training, therapeutic activities, therapeutic exercises and progress toward the following goals:    Plan of Care Expires:  08/16/23    Subjective     Chief Complaint: Pt is motivated to participate  Patient/Family Comments/goals: none stated  Pain/Comfort:  Pain Rating 1: 0/10    Objective:     Communicated with nurse prior to session.  Patient found supine with peripheral IV, telemetry, oxygen upon PT entry to room.     General Precautions: Standard, fall  Orthopedic Precautions: N/A  Braces: N/A  Respiratory Status: Nasal cannula, flow 5 L/min     Functional Mobility:  Bed Mobility:     Rolling Right: modified independence  Scooting: modified independence  Supine to Sit: modified independence  Transfers:     Sit to Stand:  modified independence with no AD  Bed to Chair: supervision with  no AD  using  Step Transfer  Toilet Transfer: supervision with  no AD  using  Step Transfer  Gait: Ambulated 8ft x2 SPV, no AD, limited due to desat, pulse ox showing in 60s but pt not symptomatic, once sitting recovered to 85%, nurse notified  Balance: Demonstrated good sitting  balance, good dynamic balance during gait    AM-PAC 6 CLICK MOBILITY  Turning over in bed (including adjusting bedclothes, sheets and blankets)?: 4  Sitting down on and standing up from a chair with arms (e.g., wheelchair, bedside commode, etc.): 4  Moving from lying on back to sitting on the side of the bed?: 4  Moving to and from a bed to a chair (including a wheelchair)?: 4  Need to walk in hospital room?: 4  Climbing 3-5 steps with a railing?: 1 (NT)  Basic Mobility Total Score: 21     Treatment & Education:  Pt tolerated interventions well. Completed seated marching, LAQ, and ankle pumps x10ea with frequent rest. Reviewed importance of OOB activities and HEP (hip flex, LAQ, ham curls, ankle pumps) in order to maintain/regain strength. Reviewed increased risk of falling due to weakness, instructed to utilize call bell for assistance for all transfers. Pt agreeable to all requests.    Patient left up in chair with all lines intact, call button in reach, and nurse notified.    GOALS:   Multidisciplinary Problems       Physical Therapy Goals          Problem: Physical Therapy    Goal Priority Disciplines Outcome Goal Variances Interventions   Physical Therapy Goal     PT, PT/OT Ongoing, Progressing     Description: LT.16.23  1. PT WILL COMPLETE BED MOBILITY IND  2. PT WILL GT TRAIN X 100' WITH NO AD AND O2 IN TOW IND  3. PT WILL INC AMPAC SCORE BY 2 POINTS TO PROGRESS GROSS FUNC MOBILITY.                          Time Tracking:     PT Received On: 23  PT Start Time: 931     PT Stop Time: 954  PT Total Time (min): 23 min     Billable Minutes: Therapeutic Activity 13min and Therapeutic Exercise 10min    Treatment Type: Treatment  PT/PTA: PT     Number of PTA visits since last PT visit: 0     2023

## 2023-08-04 NOTE — PT/OT/SLP PROGRESS
Speech Language Pathology Treatment/Discharge Summary    Patient Name:  Ayanna Alicia   MRN:  [id number]  Admitting Diagnosis: Acute on chronic respiratory failure    Recommendations:                 General Recommendations:  GI evaluation and ENT evaluation (Laryngology) as previously established.  Diet recommendations:  Regular Diet - IDDSI Level 7, Liquid Diet Level: Thin liquids - IDDSI Level 0   Aspiration Precautions:  Behavioral Reflux Precautions, Frequent oral care, and Standard aspiration precautions   General Precautions: Standard, aspiration  Communication strategies:  none    Subjective     Pt seen bedside for ST.  No c/o pain.  No family present.  Pt d/c today--recommended follow up OP laryngology.  Patient goals: to improve vocal function     Pain/Comfort:  Pain Rating 1: 0/10  Pain Rating Post-Intervention 1: 0/10  Pain Rating 2: 0/10  Pain Rating Post-Intervention 2: 0/10    Respiratory Status: Nasal cannula, flow 5 L/min    Objective:     Has the patient been evaluated by SLP for swallowing?   Yes  Keep patient NPO? No   Current Respiratory Status: 5L O2    Pt educated on FEES results, diet recommendations and aspiration/behavioral reflux precautions.    Pt provided with OP SLP card, pending f/u with Laryngologist (ENT referral sent prior to acute admission).      Goals:   Multidisciplinary Problems       SLP Goals       Not on file              Multidisciplinary Problems (Resolved)          Problem: SLP    Goal Priority Disciplines Outcome   SLP Goal   (Resolved)     SLP Met   Description: 1.  Pt will complete FEES (Fiberoptic Endoscopic Evaluation of Swallowing) as comprehensive diagnostic assessment.  Diet recs/goals to follow as clinically indicated.  FEES completed 8/3/23 and recommended IDDSI 7/0.    2.  Pt recommended for OP referral to Laryngologist.                       Plan:     Patient to be seen:  2 x/week   Plan of Care expires:  08/09/23  Plan of Care reviewed with:  patient   SLP  Follow-Up:  No       Discharge recommendations:  home (OP laryngology appt to be scheduled.  Then, pt can follow up with OP SLP if medically indicated.)   Barriers to Discharge:  None    Time Tracking:     SLP Treatment Date:   08/04/23  Speech Start Time:  1030  Speech Stop Time:  1100     Speech Total Time (min):  30 min    Billable Minutes: Speech Therapy Individual 15 minutes and Treatment Swallowing Dysfunction 15 minutes    08/04/2023

## 2023-08-04 NOTE — PROGRESS NOTES
[facility] - Telemetry ([facility] Medicine  Progress Note    Patient Name: Ayanna Alicia  MRN: [id number]  Patient Class: IP- Inpatient   Admission Date: 7/31/2023  Length of Stay: 3 days  Attending Physician: Rosa Heart,*  Primary Care Provider: Madeleine Enrique MD        Subjective:     Principal Problem:Acute on chronic respiratory failure        HPI:  52F  has a past medical history of Abnormal Pap smear of cervix, Acute interstitial pneumonitis, Allergic rhinitis, cause unspecified, Arthritis of both knees, Asthma, Eczema, Fatty liver, Fibrocystic breast changes, Headache(784.0), Hepatomegaly, Hypertension, Liver cyst, Multinodular goiter, Polymenorrhea, TMJ (dislocation of temporomandibular joint), Uterine fibroid, and Vitamin D deficiency disease.  Presents for worsening dyspnea. Associated with fever, sneezing, rhinorrhea, chest tightness, and wheezing. Onset Friday after being without supplemental oxygen for 15 minutes with difficulties recovering, which prompted a visit to clinic/urgent care. At baseline, patient wears 6L supplemental oxygen. Reports taking prednisone for a long time. States compliance to ofev for ild.    Initial workup in emergency department revealed increased supplemental oxygen of 8L, tachycardia and tachypnea. Leukocytosis on cbc. Cmp with hyperglycemia and mildly elevated liver enzymes. Troponin(s) elevated. Bnp within normal limits. Abg metabolic and respiratory alkalosis. Chest x-ray with bibasilar infiltrates. Covid and flu negative. Electrocardiography with sinus tachycardia.    Hospital medicine consulted for admission under observation, pneumonia vs asthma exacerbation. Pulmonology consulted      Overview/Hospital Course:  8/1 admitted for acute on chronic respiratory failure. Weaned down to 5L. Pulmonology following. Continue current care.   8/2 no acute events overnight. remains on baseline supplemental oxygen. Pulmonology recommending trilogy for home  use. Case management consulted. Physical/occupational therapy consulted. Patient will have homehealth physical/occupational therapy on discharge.   8/3 reports dyspnea with exertion with hypoxia requiring increased supplemental oxygen needs to recover. Patient reports compliance with cpap overnight. Pulmonology recommending avaps for home use. Case management consulted for trilogy. Continue current regimen. Speech consulted for dysphagia and dysphonia, recommending follow up ent for laryngeal ulcers.  8/4- stated improvement in symptoms in regards of dyspnea compared to yesterday; currently saturating about 92 on room air,; blood culture x1 as of 7/31/2 for fusobacterium, likely contaminant, will follow up on repeat cultures from today, if cultures resulted negative, likely plan for discharge accordingly in next 24- 48 hrs;    worked on arrangements for trilogy; pulmonology on board, appreciate recommendations.  PT OT recommended home.      Interval History:     Denied acute issues overnight  Remained afebrile, leukocytosis likely reactive from prednisone.    Currently at baseline supplemental oxygen requirement.    Sitting up in chair, denied acute issues at this time.    Anticipate discharge in next 24-48 hours    Review of Systems    Constitutional:  Positive for activity change and fatigue. Negative for appetite change and fever.   HENT:  Positive for voice change.    Respiratory:  Positive for shortness of breath.    Cardiovascular:  Negative for leg swelling.   Gastrointestinal:  Negative for abdominal pain, nausea and vomiting.   Neurological:  Positive for weakness.   Psychiatric/Behavioral:  Positive for dysphoric mood. Negative for agitation, behavioral problems, confusion and decreased concentration.        Objective:     Vital Signs (Most Recent):  Temp: 98.2 °F (36.8 °C) (08/04/23 1122)  Pulse: 90 (08/04/23 1356)  Resp: 18 (08/04/23 1356)  BP: 123/67 (08/04/23 1122)  SpO2: 97 % (08/04/23  1356) Vital Signs (24h Range):  Temp:  [97.6 °F (36.4 °C)-98.8 °F (37.1 °C)] 98.2 °F (36.8 °C)  Pulse:  [] 90  Resp:  [16-19] 18  SpO2:  [94 %-100 %] 97 %  BP: (113-133)/(62-78) 123/67     Weight: 102.5 kg (225 lb 15.5 oz)  Body mass index is 36.47 kg/m².  No intake or output data in the 24 hours ending 08/04/23 1432      Physical Exam      Constitutional:       General: She is not in acute distress.     Appearance: She is ill-appearing. She is not toxic-appearing.      Interventions: Nasal cannula in place.   HENT:      Head: Normocephalic and atraumatic.   Cardiovascular:      Rate and Rhythm: Normal rate.   Pulmonary:      Effort: Respiratory distress present. No tachypnea.   Abdominal:      Palpations: Abdomen is soft.      Tenderness: There is no abdominal tenderness.   Musculoskeletal:      Right lower leg: No edema.      Left lower leg: No edema.   Skin:     General: Skin is warm.      Capillary Refill: Capillary refill takes less than 2 seconds.   Neurological:      Mental Status: She is alert and oriented to person, place, and time.      Motor: Weakness present.   Psychiatric:         Mood and Affect: Mood is depressed.      Comments: Dysphonia       Significant Labs: All pertinent labs within the past 24 hours have been reviewed.  CBC:   Recent Labs   Lab 08/03/23  0603 08/04/23  0534   WBC 16.04* 16.67*   HGB 11.7* 11.2*   HCT 38.2 36.1*    433     CMP:   Recent Labs   Lab 08/03/23  0603 08/04/23  0534    140   K 3.4* 3.4*   CL 98 99   CO2 30* 31*    101   BUN 14 14   CREATININE 0.8 0.7   CALCIUM 9.3 9.1   ANIONGAP 11 10       Significant Imaging:     Imaging Results              X-Ray Chest AP Portable (Final result)  Result time 07/31/23 12:54:41      Final result by Terry Monet MD (Timothy) (07/31/23 12:54:41)                   Impression:      Stable chest with moderate bilateral infiltrates.      Electronically signed by: Terry Monet  MD  Date:    07/31/2023  Time:    12:54               Narrative:    EXAMINATION:  XR CHEST AP PORTABLE    CLINICAL HISTORY:  , Dyspnea;    COMPARISON:  Comparison 05/01/2023.    FINDINGS:  Cardiomegaly.  Moderate bilateral infiltrates appear stable.                                         Assessment/Plan:      * Acute on chronic respiratory failure  Patient with Hypoxic Respiratory failure which is Acute on chronic.  she is on home oxygen at 6 LPM. Supplemental oxygen was provided and noted- Oxygen Concentration (%):  [40-60] 60    .   Signs/symptoms of respiratory failure include- tachypnea, increased work of breathing and respiratory distress. Contributing diagnoses includes - Interstitial lung disease, Pneumonia and asthma Labs and images were reviewed. Patient Has recent ABG, which has been reviewed. Will treat underlying causes and adjust management of respiratory failure as follows- empiric intravenous antibiotic(s), systemic steroids, scheduled breathing treatments, acapella, incentive spirometer, and pulmonology consultation. Continue home ild medication(s).    Improving, weaned down to 5L  Continue current regimen and begin deescalating treatment regimen   Pulmonology recommending trilogy     Dysphagia  Dysphagia diet  Continue speech therapy outpatient       Dysphonia due to laryngeal ulcers  Speech therapy  Follow up ent outpatient     Elevated troponin  Likely demand ischemia in setting of acute on chronic respiratory failure  Trend relatively flat  Chest pain free  Consult cardiology if indicated  Electrocardiography reviewed with no st-t wave elevation      Mild intermittent asthma  Asthma exacerbation vs pneumonia vs interstitial lung pneumonitis  Continue breathing treatments scheduled  Systemic steroids  Pulmonology broadened antibiotic(s) spectrum due to immunocompromised state  Wean supplemental oxygen as able  Received terbutaline injection in emergency department       Interstitial lung  disease  Continue home medication(s)  Pulmonology consulted      GLENN on CPAP  Continue cpap qhs  Pulmonology recommending trilogy for home use      GERD (gastroesophageal reflux disease)  Continue proton pump inhibitor as on chronic systemic steroids      HTN (hypertension)  Normotensive to hypotension  Continue home antihtn medication(s) as indicated  Mild tachycardia in setting of duonebs      VTE Risk Mitigation (From admission, onward)         Ordered     IP VTE HIGH RISK PATIENT  Once         07/31/23 1337     Place sequential compression device  Until discontinued         07/31/23 1337                Discharge Planning   JANETTE: 8/4/2023     Code Status: Full Code   Is the patient medically ready for discharge?:     Reason for patient still in hospital (select all that apply): f/u BCx2; monitor clinical improvement   Discharge Plan A: Home                  Rosa Heart MD  Department of Hospital Medicine   [facility] - Telemetry ([facility])

## 2023-08-04 NOTE — PLAN OF CARE
Authorization received for Trilogy.  Will be delivered Today or Saturday.         08/04/23 165   Post-Acute Status   Post-Acute Authorization HME;Home Health   HME Status Set-up Complete/Auth obtained   Home Health Status Set-up Complete/Auth obtained

## 2023-08-05 VITALS
TEMPERATURE: 98 F | WEIGHT: 226 LBS | SYSTOLIC BLOOD PRESSURE: 100 MMHG | RESPIRATION RATE: 18 BRPM | HEART RATE: 93 BPM | HEIGHT: 66 IN | BODY MASS INDEX: 36.32 KG/M2 | DIASTOLIC BLOOD PRESSURE: 59 MMHG | OXYGEN SATURATION: 93 %

## 2023-08-05 LAB
ANION GAP SERPL CALC-SCNC: 9 MMOL/L (ref 8–16)
BACTERIA BLD CULT: NORMAL
BASOPHILS # BLD AUTO: 0.07 K/UL (ref 0–0.2)
BASOPHILS NFR BLD: 0.4 % (ref 0–1.9)
BUN SERPL-MCNC: 12 MG/DL (ref 6–20)
CALCIUM SERPL-MCNC: 9.3 MG/DL (ref 8.7–10.5)
CHLORIDE SERPL-SCNC: 101 MMOL/L (ref 95–110)
CO2 SERPL-SCNC: 29 MMOL/L (ref 23–29)
CREAT SERPL-MCNC: 0.8 MG/DL (ref 0.5–1.4)
DIFFERENTIAL METHOD: ABNORMAL
EOSINOPHIL # BLD AUTO: 0.5 K/UL (ref 0–0.5)
EOSINOPHIL NFR BLD: 2.5 % (ref 0–8)
ERYTHROCYTE [DISTWIDTH] IN BLOOD BY AUTOMATED COUNT: 19.9 % (ref 11.5–14.5)
EST. GFR  (NO RACE VARIABLE): >60 ML/MIN/1.73 M^2
GLUCOSE SERPL-MCNC: 94 MG/DL (ref 70–110)
HCT VFR BLD AUTO: 35 % (ref 37–48.5)
HGB BLD-MCNC: 10.6 G/DL (ref 12–16)
IMM GRANULOCYTES # BLD AUTO: 0.23 K/UL (ref 0–0.04)
IMM GRANULOCYTES NFR BLD AUTO: 1.3 % (ref 0–0.5)
LYMPHOCYTES # BLD AUTO: 2 K/UL (ref 1–4.8)
LYMPHOCYTES NFR BLD: 11.3 % (ref 18–48)
MAGNESIUM SERPL-MCNC: 2.1 MG/DL (ref 1.6–2.6)
MCH RBC QN AUTO: 29.2 PG (ref 27–31)
MCHC RBC AUTO-ENTMCNC: 30.3 G/DL (ref 32–36)
MCV RBC AUTO: 96 FL (ref 82–98)
MONOCYTES # BLD AUTO: 1.1 K/UL (ref 0.3–1)
MONOCYTES NFR BLD: 5.9 % (ref 4–15)
NEUTROPHILS # BLD AUTO: 14 K/UL (ref 1.8–7.7)
NEUTROPHILS NFR BLD: 78.6 % (ref 38–73)
NRBC BLD-RTO: 0 /100 WBC
PHOSPHATE SERPL-MCNC: 2.8 MG/DL (ref 2.7–4.5)
PLATELET # BLD AUTO: 443 K/UL (ref 150–450)
PMV BLD AUTO: 9.8 FL (ref 9.2–12.9)
POTASSIUM SERPL-SCNC: 4.2 MMOL/L (ref 3.5–5.1)
RBC # BLD AUTO: 3.63 M/UL (ref 4–5.4)
SODIUM SERPL-SCNC: 139 MMOL/L (ref 136–145)
WBC # BLD AUTO: 17.83 K/UL (ref 3.9–12.7)

## 2023-08-05 PROCEDURE — 99900035 HC TECH TIME PER 15 MIN (STAT)

## 2023-08-05 PROCEDURE — 36415 COLL VENOUS BLD VENIPUNCTURE: CPT | Performed by: FAMILY MEDICINE

## 2023-08-05 PROCEDURE — 84100 ASSAY OF PHOSPHORUS: CPT | Performed by: FAMILY MEDICINE

## 2023-08-05 PROCEDURE — 83735 ASSAY OF MAGNESIUM: CPT | Performed by: FAMILY MEDICINE

## 2023-08-05 PROCEDURE — 25000003 PHARM REV CODE 250: Performed by: FAMILY MEDICINE

## 2023-08-05 PROCEDURE — 25000242 PHARM REV CODE 250 ALT 637 W/ HCPCS: Performed by: NURSE PRACTITIONER

## 2023-08-05 PROCEDURE — 80048 BASIC METABOLIC PNL TOTAL CA: CPT | Performed by: FAMILY MEDICINE

## 2023-08-05 PROCEDURE — 25000003 PHARM REV CODE 250: Performed by: NURSE PRACTITIONER

## 2023-08-05 PROCEDURE — 94640 AIRWAY INHALATION TREATMENT: CPT

## 2023-08-05 PROCEDURE — 94761 N-INVAS EAR/PLS OXIMETRY MLT: CPT

## 2023-08-05 PROCEDURE — 27100171 HC OXYGEN HIGH FLOW UP TO 24 HOURS

## 2023-08-05 PROCEDURE — 94664 DEMO&/EVAL PT USE INHALER: CPT

## 2023-08-05 PROCEDURE — 94660 CPAP INITIATION&MGMT: CPT

## 2023-08-05 PROCEDURE — 94799 UNLISTED PULMONARY SVC/PX: CPT

## 2023-08-05 PROCEDURE — 25000242 PHARM REV CODE 250 ALT 637 W/ HCPCS: Performed by: FAMILY MEDICINE

## 2023-08-05 PROCEDURE — 85025 COMPLETE CBC W/AUTO DIFF WBC: CPT | Performed by: FAMILY MEDICINE

## 2023-08-05 PROCEDURE — 63600175 PHARM REV CODE 636 W HCPCS: Performed by: NURSE PRACTITIONER

## 2023-08-05 RX ORDER — PREDNISONE 20 MG/1
40 TABLET ORAL DAILY
Qty: 28 TABLET | Refills: 0 | Status: SHIPPED | OUTPATIENT
Start: 2023-08-05 | End: 2023-08-19

## 2023-08-05 RX ORDER — LINEZOLID 600 MG/1
600 TABLET, FILM COATED ORAL EVERY 12 HOURS
Qty: 9 TABLET | Refills: 0 | Status: SHIPPED | OUTPATIENT
Start: 2023-08-05 | End: 2023-08-09

## 2023-08-05 RX ORDER — FUROSEMIDE 40 MG/1
40 TABLET ORAL DAILY
Qty: 30 TABLET | Refills: 0 | Status: SHIPPED | OUTPATIENT
Start: 2023-08-06 | End: 2023-08-30 | Stop reason: SDUPTHER

## 2023-08-05 RX ADMIN — CEFEPIME 2 G: 2 INJECTION, POWDER, FOR SOLUTION INTRAVENOUS at 01:08

## 2023-08-05 RX ADMIN — PREDNISONE 40 MG: 20 TABLET ORAL at 08:08

## 2023-08-05 RX ADMIN — CETIRIZINE HYDROCHLORIDE 10 MG: 10 TABLET, FILM COATED ORAL at 08:08

## 2023-08-05 RX ADMIN — FUROSEMIDE 40 MG: 40 TABLET ORAL at 08:08

## 2023-08-05 RX ADMIN — CEFEPIME 2 G: 2 INJECTION, POWDER, FOR SOLUTION INTRAVENOUS at 08:08

## 2023-08-05 RX ADMIN — LINEZOLID 600 MG: 600 TABLET, FILM COATED ORAL at 08:08

## 2023-08-05 RX ADMIN — ARFORMOTEROL TARTRATE 15 MCG: 15 SOLUTION RESPIRATORY (INHALATION) at 07:08

## 2023-08-05 RX ADMIN — LEVALBUTEROL HYDROCHLORIDE 0.31 MG: 0.63 SOLUTION RESPIRATORY (INHALATION) at 07:08

## 2023-08-05 RX ADMIN — IPRATROPIUM BROMIDE 0.5 MG: 0.5 SOLUTION RESPIRATORY (INHALATION) at 07:08

## 2023-08-05 RX ADMIN — NINTEDANIB 150 MG: 150 CAPSULE ORAL at 08:08

## 2023-08-05 RX ADMIN — OMEPRAZOLE 40 MG: 40 CAPSULE, DELAYED RELEASE ORAL at 08:08

## 2023-08-05 NOTE — PROGRESS NOTES
[facility]  Pulmonology Progress Note    Patient Name: Ayanna Alicia  MRN: [id number]  Admission Date: 7/31/2023   Hospital Length of Stay: 4 days  Code Status: Full Code    Attending Provider: Rosa Heart,*    Principal Problem: Acute on chronic respiratory failure  Subjective:   History of present illness:  Ayanna Alicia is a 52-year-old female with a past medical history of chronic hypoxic respiratory failure at 5L/NC, asthma, Sjogren's syndrome with associated interstitial lung disease, multinodular goiter, GLENN, and morbid obesity who  presented with worsening shortness of breadth yesterday with associated decreased oxygen saturations. Patient reported she was running errands on Friday when she ran out of oxygen and her saturations dropped into the 50s. She reports it took sometime for her to recover her saturations and developed worsening shortness of breath during that time. Due to these symptoms, the patient presented to the emergency room for evaluation.      Of note, she was hospitalized in May of 2022 with acute respiratory failure and persistent pneumonia. At that time, she was dishcarged on oxygen. She was subsequently diagnosed with Sjogren's disease and thought to have interstitial lung disease secondary to her underlying rheumatological disease. Initial PFTs with severe restriction and reduced DLCO. She was initially prescribed steroids followed by the addition of Cellcept and Rituximab infusions (last infusion in Dec 2022). Most recently, she was initiated on Ofev; however, the patient reports she has not had any significant improvement in her clinical condition since her original illness in May 2022 and reports she has clinically worsened over the course of this time. She chronically takes Breo, Spiriva, and Montelukast for her asthma. She endorses significant exertional SOB, can walk at home, but only limited distance. Has both dry and clear  mucus, sometimes yellow sputum production. Denies any chest pain but endorses occasional chest tightness. Has the occasional lower abdominal pain exacerbated by coughing episodes; denies nausea or vomiting. No fever but endorses occasional chills. Endorses chronic heart burn for which she takes OTC Pepcid without significant symptomatic improvement. Endorses chronic belching and flatulence. Endorses losing weight, lost 50 lbs since last year; however, she has not been able to get less than 220lb. Not taking cellcept since 12/2022, received 4 treatments of Rituximab with her last dose in Dec 2022. She chronically takes prednisone 10 mg.  Patient underwent bronchoscopy with BAL in April 2023 per Dr. Aviles with negative cultures and no growth of fungus, yeast, PCP, staph, or pseudomonas. Remains on OFEV 150mg BID and Prednisone 10mg daily. In addition, she is in a trial of Actemra IV monthly for four consecutive months and currently has received two dose thus far with her last treatment on July 7, 2023. In addition, she has been referred for evaluation of lung transplant at [facility] in [place].     Due to her current acute on chronic hypoxic respiratory failure, as well as, her chronic underlying pulmonary disease, pulmonology has been consulted for evaluation.     Interval history:   8/1: Patient resting comfortably this morning; oxygen increased to 8L/NC overnight. Worse PAP therapy for a few hours overnight. No acute complaints at this time.    8/2: Sitting up on bedside today. Currently on 5L/NC. Overall, feels better   8/3: Upon coming off CPAP this morning, the patient had acute exacerbation of her underlying dyspnea with decreased oxygen saturations.    8/5: Patient reports feeling well and close to her baseline function. No acute events overnight. Long discussion regarding fluid restriction and limiting salt intake, as well as, diuretic therapy. Patient has a follow-up appointment already scheduled with  Dr. Aviles next week. In addition, she has follow up with transplant on Aug 14th.    Objective:     Vital Signs (Most Recent):  Temp: 97.7 °F (36.5 °C) (08/05/23 0737)  Pulse: 82 (08/05/23 0737)  Resp: 18 (08/05/23 0737)  BP: (!) 111/58 (08/05/23 0737)  SpO2: 96 % (08/05/23 0821) Vital Signs (24h Range):  Temp:  [97.5 °F (36.4 °C)-98.7 °F (37.1 °C)] 97.7 °F (36.5 °C)  Pulse:  [] 82  Resp:  [13-22] 18  SpO2:  [83 %-100 %] 96 %  BP: (101-135)/(58-80) 111/58   Weight: 102.5 kg (225 lb 15.5 oz);  Body mass index is 36.47 kg/m².    No intake or output data in the 24 hours ending 08/05/23 1020     Physical Exam  Vitals and nursing note reviewed.   Constitutional:       Appearance: She is obese.   HENT:      Head: Normocephalic.   Eyes:      Conjunctiva/sclera: Conjunctivae normal.   Cardiovascular:      Rate and Rhythm: Normal rate.   Pulmonary:      Effort: Pulmonary effort is normal.      Breath sounds: Rales present.   Abdominal:      Palpations: Abdomen is soft.   Musculoskeletal:         General: Normal range of motion.      Cervical back: Normal range of motion and neck supple.   Skin:     General: Skin is warm and dry.   Neurological:      Mental Status: She is alert and oriented to person, place, and time.   Psychiatric:         Mood and Affect: Mood normal.       Review of Systems: Negative except as indicated in HPI    Significant Labs:  CBC/Anemia Profile:  Recent Labs   Lab 08/04/23  0534 08/05/23  0539   WBC 16.67* 17.83*   HGB 11.2* 10.6*   HCT 36.1* 35.0*    443   MCV 94 96   RDW 19.8* 19.9*   Chemistries:  Recent Labs   Lab 08/04/23  0534 08/05/23  0539    139   K 3.4* 4.2   CL 99 101   CO2 31* 29   BUN 14 12   CREATININE 0.7 0.8   CALCIUM 9.1 9.3   MG 2.1 2.1   PHOS 2.7 2.8       ABG  Recent Labs   Lab 07/31/23  1157   PH 7.479*   PO2 43*   PCO2 37.7   HCO3 28.1*   BE 5     Assessment/Plan:   Pulmonary  * Acute on chronic respiratory failure  Patient with chronic hypoxic respiratory  failure who is on home oxygen at 5L/NC which is her current inpatient requirements. Signs/symptoms of respiratory failure included increased work of breathing, decreased oxygen saturations, and wheezing. Contributing diagnoses includes asthma, interstitial lung disease, and decompensated heart failure. Labs and images were reviewed. Patient does have a recent ABG which has been reviewed.  Initial PFTs with severe restriction and reduced DLCO; attempted repeat PFTs on 3/16/2022 which she was unable to complete due to worsening shortness of breath.    Will treat underlying causes and adjust management of respiratory failure as follows:  · Underwent bronch (April 2023) with BAL; normal respiratory iris identified and negative for fungal / yeast elements, negative for MRSA or pseudomonas, no PCP   · Currently requiring 6L/NC  · Patient with restrictive pattern on her PFTs and chronic hypoxic respiratory failure; home AVAPS ordered, approved, and awaiting delivery  · Fungal immunodiffusion study pending  · With her chronic immunosuppression  · Continue supplemental oxygen and titrate as needed to maintain saturations 90% or greater  · Recommend discharge on: Lasix 40mg daily, d/c home HCTZ, continue Metolazone  · Fluid restriction of 1.5L daily  · Complete 10-day course of Zyvox  · Continue Prednisone 40mg daily; uncertain at this time if patient will need prolonged high dose steroids or if will be able to wean. If continued high dose Prednisone, will need prophylactic antibiotics.     Interstitial lung disease  Hospitalized May 2022 with acute respiratory failure and persistent pneumonia and dishcarged on oxygen. She has since been diagnosed with Sjogren's disease with associated ILD. Initial PFTs with severe restriction and reduced DLCO. Initially prescribed steroids and subsequently Cellcept and Rituximab infusions (last infusion in Dec 2022). Now on Ofev 150mg BID, Prednisone 10mg daily, and Actemra IV monthly x4  months. Patient reports she has not had any significant improvement in her clinical condition since her original illness in May 2022 and reports she has clinically worsened over the course of this time. Patient demonstrates significant decline in clinical status over the past 4 to 6 months    · Awaiting lung transplant evaluation  · Follow-up with pulmonary and rheumatology upon discharge  · Continue OFEV   · Continue Prednisone dose to 40mg daily upon discharge  · Continue Lasix 40mg po daily upon discharge  · Repeat CXR today; may need repeat CT chest  · Home AVAPS approved; awaiting delivery  · ; Procalcitonin 0.15    Mild intermittent asthma  No evidence of acute exacerbation. Currently on Trelegy and Singulair for home medications.    · Continue Singulair  · Continue home trelegy upon discharge  · Monitor for exacerbation    Other  GLENN on CPAP  Patient reports non-compliance with home PAP therapy in the past. We discussed utilizing and compliance with home PAP therapy. Attempt to transition from CPAP to home AVAPS as she has significant hypoxic respiratory failure at baseline with associated restrictive disease as evidenced by her PFTs. In addition, patient has had a rapid decline in her clinical condition over the last 4 to 6 months    · qHS AVAPS as ordered       Taylor Mccann NP  Pulmonary and Critical Care  [facility]

## 2023-08-05 NOTE — ASSESSMENT & PLAN NOTE
No evidence of acute exacerbation. Currently on Trelegy and Singulair for home medications.    · Continue Singulair  · Continue home trelegy upon discharge  · Monitor for exacerbation

## 2023-08-05 NOTE — ASSESSMENT & PLAN NOTE
Patient with chronic hypoxic respiratory failure who is on home oxygen at 5L/NC which is her current inpatient requirements. Signs/symptoms of respiratory failure included increased work of breathing, decreased oxygen saturations, and wheezing. Contributing diagnoses includes asthma, interstitial lung disease, and decompensated heart failure. Labs and images were reviewed. Patient does have a recent ABG which has been reviewed.  Initial PFTs with severe restriction and reduced DLCO; attempted repeat PFTs on 3/16/2022 which she was unable to complete due to worsening shortness of breath.    Will treat underlying causes and adjust management of respiratory failure as follows:  · Underwent bronch (April 2023) with BAL; normal respiratory iris identified and negative for fungal / yeast elements, negative for MRSA or pseudomonas, no PCP   · Currently requiring 6L/NC  · Patient with restrictive pattern on her PFTs and chronic hypoxic respiratory failure; home AVAPS ordered, approved, and awaiting delivery  · Fungal immunodiffusion study pending  · With her chronic immunosuppression  · Continue supplemental oxygen and titrate as needed to maintain saturations 90% or greater  · Recommend discharge on: Lasix 40mg daily, d/c home HCTZ, continue Metolazone  · Fluid restriction of 1.5L daily  · Complete 10-day course of Zyvox  · Continue Prednisone 40mg daily; uncertain at this time if patient will need prolonged high dose steroids or if will be able to wean. If continued high dose Prednisone, will need prophylactic antibiotics.

## 2023-08-05 NOTE — ASSESSMENT & PLAN NOTE
Hospitalized May 2022 with acute respiratory failure and persistent pneumonia and dishcarged on oxygen. She has since been diagnosed with Sjogren's disease with associated ILD. Initial PFTs with severe restriction and reduced DLCO. Initially prescribed steroids and subsequently Cellcept and Rituximab infusions (last infusion in Dec 2022). Now on Ofev 150mg BID, Prednisone 10mg daily, and Actemra IV monthly x4 months. Patient reports she has not had any significant improvement in her clinical condition since her original illness in May 2022 and reports she has clinically worsened over the course of this time. Patient demonstrates significant decline in clinical status over the past 4 to 6 months    · Awaiting lung transplant evaluation  · Follow-up with pulmonary and rheumatology upon discharge  · Continue OFEV   · Continue Prednisone dose to 40mg daily upon discharge  · Continue Lasix 40mg po daily upon discharge  · Repeat CXR today; may need repeat CT chest  · Home AVAPS approved; awaiting delivery  · ; Procalcitonin 0.15

## 2023-08-05 NOTE — DISCHARGE SUMMARY
[facility] - Telemetry ([facility])  [facility] Medicine  Discharge Summary      Patient Name: Ayanna Alicia  MRN: [id number]  DARLENE: [id number]  Patient Class: IP- Inpatient  Admission Date: 7/31/2023  Hospital Length of Stay: 4 days  Discharge Date and Time: 8/5/2023  Attending Physician: Rosa Heart,*   Discharging Provider: Rosa Heart MD  Primary Care Provider: Madeleine Enrique MD    Primary Care Team: Networked reference to record PCT     HPI:   [id number]  has a past medical history of Abnormal Pap smear of cervix, Acute interstitial pneumonitis, Allergic rhinitis, cause unspecified, Arthritis of both knees, Asthma, Eczema, Fatty liver, Fibrocystic breast changes, Headache(784.0), Hepatomegaly, Hypertension, Liver cyst, Multinodular goiter, Polymenorrhea, TMJ (dislocation of temporomandibular joint), Uterine fibroid, and Vitamin D deficiency disease.  Presents for worsening dyspnea. Associated with fever, sneezing, rhinorrhea, chest tightness, and wheezing. Onset Friday after being without supplemental oxygen for 15 minutes with difficulties recovering, which prompted a visit to clinic/urgent care. At baseline, patient wears 6L supplemental oxygen. Reports taking prednisone for a long time. States compliance to ofev for ild.    Initial workup in emergency department revealed increased supplemental oxygen of 8L, tachycardia and tachypnea. Leukocytosis on cbc. Cmp with hyperglycemia and mildly elevated liver enzymes. Troponin(s) elevated. Bnp within normal limits. Abg metabolic and respiratory alkalosis. Chest x-ray with bibasilar infiltrates. Covid and flu negative. Electrocardiography with sinus tachycardia.    Hospital medicine consulted for admission under observation, pneumonia vs asthma exacerbation. Pulmonology consulted      * No surgery found *      Hospital Course:   8/1 admitted for acute on chronic respiratory failure. Weaned down to 5L. Pulmonology following. Continue current care.   8/2  no acute events overnight. remains on baseline supplemental oxygen. Pulmonology recommending trilogy for home use. Case management consulted. Physical/occupational therapy consulted. Patient will have homehealth physical/occupational therapy on discharge.   8/3 reports dyspnea with exertion with hypoxia requiring increased supplemental oxygen needs to recover. Patient reports compliance with cpap overnight. Pulmonology recommending avaps for home use. Case management consulted for trilogy. Continue current regimen. Speech consulted for dysphagia and dysphonia, recommending follow up ent for laryngeal ulcers.  8/4- stated improvement in symptoms in regards of dyspnea compared to yesterday; currently saturating about 92 on room air,; blood culture x1 as of 7/31/2 for fusobacterium, likely contaminant, will follow up on repeat cultures from today, if cultures resulted negative, likely plan for discharge accordingly in next 24- 48 hrs;    worked on arrangements for trilogy; pulmonology on board, appreciate recommendations.  PT OT recommended home.  8/5   Examination done at bedside, patient appeared alert and oriented x3, denied headache, dizziness, chest pain, shortness O breath, palpitations, bowel or bladder issues.    Currently saturating above 92 on 5 L nasal cannula, currently at baseline.    Stated significant improvement in cough with minimal sputum production.    Discussed with pulmonology, stated okay for discharge-recommended oral antibiotics to complete course, Lasix, prednisone 40 mg upon discharge, compliance with outpatient follow-up visits  Considering clinical and hemodynamic stability, planning to discharge patient today, emphasized on compliance with medications, follow-up visits, diet, fluid restriction, salt restriction, compliance with trilogy.    Patient agreed to the plan, has upcoming appointment within a week with Dr. Aviles pulmonology, with transplant doctor at [place] on  08/14;  Given underlying medical conditions, prognosis guarded- discussed on code status, opted for full code at this point; to consider outpatient palliative visits if needed.     PT OT recommended home.  Speech consulted for dysphagia and dysphonia, recommending follow up ent for laryngeal ulcers.  Discharge today, medications delivered to patient preferred pharmacy.         Goals of Care Treatment Preferences:  Code Status: Full Code      Consults:   Consults (From admission, onward)        Status Ordering Provider     Inpatient consult to Social Work  Once        Provider:  (Not yet assigned)    Completed KAREN AVELAR     Inpatient consult to Pulmonology  Once        Provider:  Taylor Mccann, NP    Completed KAREN AVELAR          No new Assessment & Plan notes have been filed under this hospital service since the last note was generated.  Service: Hospital Medicine    Final Active Diagnoses:    Diagnosis Date Noted POA    PRINCIPAL PROBLEM:  Acute on chronic respiratory failure [J96.20] 04/27/2022 Yes    Dysphagia [R13.10] 08/03/2023 Unknown    Dysphonia due to laryngeal ulcers [R49.0] 08/02/2023 Yes    Elevated troponin [R77.8] 07/31/2023 Yes    Mild intermittent asthma [J45.20] 03/17/2023 Yes    Interstitial lung disease [J84.9] 10/27/2022 Yes    GLENN on CPAP [G47.33, Z99.89] 04/13/2022 Not Applicable    GERD (gastroesophageal reflux disease) [K21.9] 11/03/2014 Yes    HTN (hypertension) [I10] 09/04/2013 Yes      Problems Resolved During this Admission:       Discharged Condition: good    Disposition:     Follow Up:   Follow-up Information     Dme, [facility] Follow up.    Specialty: DME Provider  Why: Eileen Leandra will follow up with you on Monday.  [facility] information:  [facility] [id number] [phone]             [facility] Follow up.    Specialties: Home Health Services, Home Therapy Services, Home Living Aide Services  Why: Home Health: Agency will  "contact you to set up appointment to visit on Sunday  Contact information:  [address]  [facility] [address] [phone]           [facility] Follow up.    Why: Trilogy provider  Contact information:  [address] [place] [address] [phone]                       Patient Instructions:      VENTILATOR FOR HOME USE     Order Specific Question Answer Comments   Height: 5' 6" (1.676 m)    Weight: 102.5 kg (225 lb 15.5 oz)    Does patient have medical equipment at home? shower chair power chair / power chair   Does patient have medical equipment at home? oxygen    Length of need (1-99 months): 99    Type (): Non-invasive    Interface needed: Full face mask    Mode: AVAPS    Tidal Volume 500    PEEP - EPAP 10.0 CM/H20    Humidifier needed? Yes    Vendor: [facility]    CRM Resolution Date: 8/4/2023      Ambulatory referral/consult to Home Health   Standing Status: Future   Referral Priority: Routine Referral Type: Home Health   Referral Reason: Specialty Services Required   Requested Specialty: Home Health Services   Number of Visits Requested: 1       Significant Diagnostic Studies:     Results for orders placed or performed during the hospital encounter of 07/31/23   Influenza A & B by Molecular    Specimen: Nasopharyngeal Swab   Result Value Ref Range    Influenza A, Molecular Negative Negative    Influenza B, Molecular Negative Negative    Flu A & B Source Nasal swab    Blood culture #1 **CANNOT BE ORDERED STAT**    Specimen: Peripheral, Upper Arm, Left; Blood   Result Value Ref Range    Blood Culture, Routine No Growth to date     Blood Culture, Routine No Growth to date     Blood Culture, Routine No Growth to date     Blood Culture, Routine No Growth to date     Blood Culture, Routine No Growth to date    Blood culture #2 **CANNOT BE ORDERED STAT**    Specimen: Peripheral, Upper Arm, Left; Blood   Result Value Ref Range    Blood Culture, Routine Gram stain isaac bottle: " Gram negative rods     Blood Culture, Routine       Results called to and read back by: Lulu Chavez RN 08/04/2023  13:04    Blood Culture, Routine FUSOBACTERIUM NUCLEATUM (A)    Culture, Respiratory with Gram Stain    Specimen: Endotracheal Aspirate; Respiratory   Result Value Ref Range    Respiratory Culture Normal respiratory iris     Gram Stain (Respiratory) Few WBC's     Gram Stain (Respiratory) Many Gram positive cocci     Gram Stain (Respiratory) Few Gram negative rods     Gram Stain (Respiratory) Rare yeast    Blood culture    Specimen: Antecubital, Right Arm; Blood   Result Value Ref Range    Blood Culture, Routine No Growth to date    Blood culture    Specimen: Antecubital, Left Arm; Blood   Result Value Ref Range    Blood Culture, Routine No Growth to date    CBC auto differential   Result Value Ref Range    WBC 17.06 (H) 3.90 - 12.70 K/uL    RBC 4.21 4.00 - 5.40 M/uL    Hemoglobin 12.3 12.0 - 16.0 g/dL    Hematocrit 39.6 37.0 - 48.5 %    MCV 94 82 - 98 fL    MCH 29.2 27.0 - 31.0 pg    MCHC 31.1 (L) 32.0 - 36.0 g/dL    RDW 20.9 (H) 11.5 - 14.5 %    Platelets 299 150 - 450 K/uL    MPV 9.4 9.2 - 12.9 fL    Immature Granulocytes 0.5 0.0 - 0.5 %    Gran # (ANC) 15.2 (H) 1.8 - 7.7 K/uL    Immature Grans (Abs) 0.08 (H) 0.00 - 0.04 K/uL    Lymph # 0.6 (L) 1.0 - 4.8 K/uL    Mono # 1.0 0.3 - 1.0 K/uL    Eos # 0.1 0.0 - 0.5 K/uL    Baso # 0.06 0.00 - 0.20 K/uL    nRBC 0 0 /100 WBC    Gran % 89.1 (H) 38.0 - 73.0 %    Lymph % 3.6 (L) 18.0 - 48.0 %    Mono % 5.6 4.0 - 15.0 %    Eosinophil % 0.8 0.0 - 8.0 %    Basophil % 0.4 0.0 - 1.9 %    Differential Method Automated    Comprehensive metabolic panel   Result Value Ref Range    Sodium 139 136 - 145 mmol/L    Potassium 3.5 3.5 - 5.1 mmol/L    Chloride 98 95 - 110 mmol/L    CO2 28 23 - 29 mmol/L    Glucose 125 (H) 70 - 110 mg/dL    BUN 9 6 - 20 mg/dL    Creatinine 0.9 0.5 - 1.4 mg/dL    Calcium 10.0 8.7 - 10.5 mg/dL    Total Protein 7.6 6.0 - 8.4 g/dL    Albumin 3.2  (L) 3.5 - 5.2 g/dL    Total Bilirubin 0.4 0.1 - 1.0 mg/dL    Alkaline Phosphatase 90 55 - 135 U/L    AST 46 (H) 10 - 40 U/L    ALT 47 (H) 10 - 44 U/L    eGFR >60 >60 mL/min/1.73 m^2    Anion Gap 13 8 - 16 mmol/L   Brain natriuretic peptide   Result Value Ref Range    BNP 40 0 - 99 pg/mL   Troponin I   Result Value Ref Range    Troponin I 0.087 (H) 0.000 - 0.026 ng/mL   COVID-19 Rapid Screening   Result Value Ref Range    SARS-CoV-2 RNA, Amplification, Qual Negative Negative   Troponin I   Result Value Ref Range    Troponin I 0.067 (H) 0.000 - 0.026 ng/mL   Procalcitonin   Result Value Ref Range    Procalcitonin 0.15 <0.25 ng/mL   Lactate dehydrogenase   Result Value Ref Range     (H) 110 - 260 U/L   Troponin I   Result Value Ref Range    Troponin I 0.095 (H) 0.000 - 0.026 ng/mL   Basic Metabolic Panel (BMP)   Result Value Ref Range    Sodium 138 136 - 145 mmol/L    Potassium 4.0 3.5 - 5.1 mmol/L    Chloride 97 95 - 110 mmol/L    CO2 29 23 - 29 mmol/L    Glucose 148 (H) 70 - 110 mg/dL    BUN 13 6 - 20 mg/dL    Creatinine 0.9 0.5 - 1.4 mg/dL    Calcium 9.5 8.7 - 10.5 mg/dL    Anion Gap 12 8 - 16 mmol/L    eGFR >60 >60 mL/min/1.73 m^2   Magnesium   Result Value Ref Range    Magnesium 1.5 (L) 1.6 - 2.6 mg/dL   Phosphorus   Result Value Ref Range    Phosphorus 4.1 2.7 - 4.5 mg/dL   CBC auto differential   Result Value Ref Range    WBC 12.32 3.90 - 12.70 K/uL    RBC 4.11 4.00 - 5.40 M/uL    Hemoglobin 11.7 (L) 12.0 - 16.0 g/dL    Hematocrit 38.2 37.0 - 48.5 %    MCV 93 82 - 98 fL    MCH 28.5 27.0 - 31.0 pg    MCHC 30.6 (L) 32.0 - 36.0 g/dL    RDW 20.2 (H) 11.5 - 14.5 %    Platelets 291 150 - 450 K/uL    MPV 9.7 9.2 - 12.9 fL    Immature Granulocytes 0.6 (H) 0.0 - 0.5 %    Gran # (ANC) 10.9 (H) 1.8 - 7.7 K/uL    Immature Grans (Abs) 0.08 (H) 0.00 - 0.04 K/uL    Lymph # 0.7 (L) 1.0 - 4.8 K/uL    Mono # 0.6 0.3 - 1.0 K/uL    Eos # 0.0 0.0 - 0.5 K/uL    Baso # 0.02 0.00 - 0.20 K/uL    nRBC 0 0 /100 WBC    Gran % 88.7  (H) 38.0 - 73.0 %    Lymph % 5.9 (L) 18.0 - 48.0 %    Mono % 4.6 4.0 - 15.0 %    Eosinophil % 0.0 0.0 - 8.0 %    Basophil % 0.2 0.0 - 1.9 %    Differential Method Automated    Basic Metabolic Panel (BMP)   Result Value Ref Range    Sodium 139 136 - 145 mmol/L    Potassium 3.5 3.5 - 5.1 mmol/L    Chloride 96 95 - 110 mmol/L    CO2 31 (H) 23 - 29 mmol/L    Glucose 111 (H) 70 - 110 mg/dL    BUN 15 6 - 20 mg/dL    Creatinine 0.9 0.5 - 1.4 mg/dL    Calcium 9.2 8.7 - 10.5 mg/dL    Anion Gap 12 8 - 16 mmol/L    eGFR >60 >60 mL/min/1.73 m^2   Magnesium   Result Value Ref Range    Magnesium 2.0 1.6 - 2.6 mg/dL   Phosphorus   Result Value Ref Range    Phosphorus 3.0 2.7 - 4.5 mg/dL   CBC auto differential   Result Value Ref Range    WBC 20.05 (H) 3.90 - 12.70 K/uL    RBC 4.17 4.00 - 5.40 M/uL    Hemoglobin 12.2 12.0 - 16.0 g/dL    Hematocrit 39.0 37.0 - 48.5 %    MCV 94 82 - 98 fL    MCH 29.3 27.0 - 31.0 pg    MCHC 31.3 (L) 32.0 - 36.0 g/dL    RDW 20.3 (H) 11.5 - 14.5 %    Platelets 377 150 - 450 K/uL    MPV 10.0 9.2 - 12.9 fL    Immature Granulocytes 0.8 (H) 0.0 - 0.5 %    Gran # (ANC) 16.6 (H) 1.8 - 7.7 K/uL    Immature Grans (Abs) 0.17 (H) 0.00 - 0.04 K/uL    Lymph # 1.8 1.0 - 4.8 K/uL    Mono # 1.4 (H) 0.3 - 1.0 K/uL    Eos # 0.1 0.0 - 0.5 K/uL    Baso # 0.05 0.00 - 0.20 K/uL    nRBC 0 0 /100 WBC    Gran % 82.9 (H) 38.0 - 73.0 %    Lymph % 9.0 (L) 18.0 - 48.0 %    Mono % 6.8 4.0 - 15.0 %    Eosinophil % 0.3 0.0 - 8.0 %    Basophil % 0.2 0.0 - 1.9 %    Differential Method Automated    Troponin I   Result Value Ref Range    Troponin I 0.048 (H) 0.000 - 0.026 ng/mL   Basic Metabolic Panel (BMP)   Result Value Ref Range    Sodium 139 136 - 145 mmol/L    Potassium 3.4 (L) 3.5 - 5.1 mmol/L    Chloride 98 95 - 110 mmol/L    CO2 30 (H) 23 - 29 mmol/L    Glucose 106 70 - 110 mg/dL    BUN 14 6 - 20 mg/dL    Creatinine 0.8 0.5 - 1.4 mg/dL    Calcium 9.3 8.7 - 10.5 mg/dL    Anion Gap 11 8 - 16 mmol/L    eGFR >60 >60 mL/min/1.73 m^2    Magnesium   Result Value Ref Range    Magnesium 2.0 1.6 - 2.6 mg/dL   Phosphorus   Result Value Ref Range    Phosphorus 2.6 (L) 2.7 - 4.5 mg/dL   CBC auto differential   Result Value Ref Range    WBC 16.04 (H) 3.90 - 12.70 K/uL    RBC 4.11 4.00 - 5.40 M/uL    Hemoglobin 11.7 (L) 12.0 - 16.0 g/dL    Hematocrit 38.2 37.0 - 48.5 %    MCV 93 82 - 98 fL    MCH 28.5 27.0 - 31.0 pg    MCHC 30.6 (L) 32.0 - 36.0 g/dL    RDW 19.9 (H) 11.5 - 14.5 %    Platelets 417 150 - 450 K/uL    MPV 9.3 9.2 - 12.9 fL    Immature Granulocytes 0.9 (H) 0.0 - 0.5 %    Gran # (ANC) 13.2 (H) 1.8 - 7.7 K/uL    Immature Grans (Abs) 0.15 (H) 0.00 - 0.04 K/uL    Lymph # 1.3 1.0 - 4.8 K/uL    Mono # 1.1 (H) 0.3 - 1.0 K/uL    Eos # 0.2 0.0 - 0.5 K/uL    Baso # 0.06 0.00 - 0.20 K/uL    nRBC 0 0 /100 WBC    Gran % 82.4 (H) 38.0 - 73.0 %    Lymph % 8.3 (L) 18.0 - 48.0 %    Mono % 6.9 4.0 - 15.0 %    Eosinophil % 1.1 0.0 - 8.0 %    Basophil % 0.4 0.0 - 1.9 %    Differential Method Automated    Basic Metabolic Panel (BMP)   Result Value Ref Range    Sodium 140 136 - 145 mmol/L    Potassium 3.4 (L) 3.5 - 5.1 mmol/L    Chloride 99 95 - 110 mmol/L    CO2 31 (H) 23 - 29 mmol/L    Glucose 101 70 - 110 mg/dL    BUN 14 6 - 20 mg/dL    Creatinine 0.7 0.5 - 1.4 mg/dL    Calcium 9.1 8.7 - 10.5 mg/dL    Anion Gap 10 8 - 16 mmol/L    eGFR >60 >60 mL/min/1.73 m^2   Magnesium   Result Value Ref Range    Magnesium 2.1 1.6 - 2.6 mg/dL   Phosphorus   Result Value Ref Range    Phosphorus 2.7 2.7 - 4.5 mg/dL   CBC auto differential   Result Value Ref Range    WBC 16.67 (H) 3.90 - 12.70 K/uL    RBC 3.84 (L) 4.00 - 5.40 M/uL    Hemoglobin 11.2 (L) 12.0 - 16.0 g/dL    Hematocrit 36.1 (L) 37.0 - 48.5 %    MCV 94 82 - 98 fL    MCH 29.2 27.0 - 31.0 pg    MCHC 31.0 (L) 32.0 - 36.0 g/dL    RDW 19.8 (H) 11.5 - 14.5 %    Platelets 433 150 - 450 K/uL    MPV 9.4 9.2 - 12.9 fL    Immature Granulocytes 1.0 (H) 0.0 - 0.5 %    Gran # (ANC) 13.3 (H) 1.8 - 7.7 K/uL    Immature Grans (Abs)  0.16 (H) 0.00 - 0.04 K/uL    Lymph # 1.8 1.0 - 4.8 K/uL    Mono # 1.1 (H) 0.3 - 1.0 K/uL    Eos # 0.3 0.0 - 0.5 K/uL    Baso # 0.04 0.00 - 0.20 K/uL    nRBC 0 0 /100 WBC    Gran % 80.0 (H) 38.0 - 73.0 %    Lymph % 10.5 (L) 18.0 - 48.0 %    Mono % 6.4 4.0 - 15.0 %    Eosinophil % 1.9 0.0 - 8.0 %    Basophil % 0.2 0.0 - 1.9 %    Differential Method Automated    Basic Metabolic Panel (BMP)   Result Value Ref Range    Sodium 139 136 - 145 mmol/L    Potassium 4.2 3.5 - 5.1 mmol/L    Chloride 101 95 - 110 mmol/L    CO2 29 23 - 29 mmol/L    Glucose 94 70 - 110 mg/dL    BUN 12 6 - 20 mg/dL    Creatinine 0.8 0.5 - 1.4 mg/dL    Calcium 9.3 8.7 - 10.5 mg/dL    Anion Gap 9 8 - 16 mmol/L    eGFR >60 >60 mL/min/1.73 m^2   Magnesium   Result Value Ref Range    Magnesium 2.1 1.6 - 2.6 mg/dL   Phosphorus   Result Value Ref Range    Phosphorus 2.8 2.7 - 4.5 mg/dL   CBC auto differential   Result Value Ref Range    WBC 17.83 (H) 3.90 - 12.70 K/uL    RBC 3.63 (L) 4.00 - 5.40 M/uL    Hemoglobin 10.6 (L) 12.0 - 16.0 g/dL    Hematocrit 35.0 (L) 37.0 - 48.5 %    MCV 96 82 - 98 fL    MCH 29.2 27.0 - 31.0 pg    MCHC 30.3 (L) 32.0 - 36.0 g/dL    RDW 19.9 (H) 11.5 - 14.5 %    Platelets 443 150 - 450 K/uL    MPV 9.8 9.2 - 12.9 fL    Immature Granulocytes 1.3 (H) 0.0 - 0.5 %    Gran # (ANC) 14.0 (H) 1.8 - 7.7 K/uL    Immature Grans (Abs) 0.23 (H) 0.00 - 0.04 K/uL    Lymph # 2.0 1.0 - 4.8 K/uL    Mono # 1.1 (H) 0.3 - 1.0 K/uL    Eos # 0.5 0.0 - 0.5 K/uL    Baso # 0.07 0.00 - 0.20 K/uL    nRBC 0 0 /100 WBC    Gran % 78.6 (H) 38.0 - 73.0 %    Lymph % 11.3 (L) 18.0 - 48.0 %    Mono % 5.9 4.0 - 15.0 %    Eosinophil % 2.5 0.0 - 8.0 %    Basophil % 0.4 0.0 - 1.9 %    Differential Method Automated    ISTAT PROCEDURE   Result Value Ref Range    POC PH 7.479 (H) 7.35 - 7.45    POC PCO2 37.7 35 - 45 mmHg    POC PO2 43 (LL) 80 - 100 mmHg    POC HCO3 28.1 (H) 24 - 28 mmol/L    POC BE 5 -2 to 2 mmol/L    POC SATURATED O2 82 (L) 95 - 100 %    Sample ARTERIAL      Site RR     Allens Test Pass     DelSys Nasal Can     Mode SPONT     Flow 6     Sp02 92      *Note: Due to a large number of results and/or encounters for the requested time period, some results have not been displayed. A complete set of results can be found in Results Review.       Imaging Results          X-Ray Chest AP Portable (Final result)  Result time 07/31/23 12:54:41    Final result by Terry Monet MD (Timothy) (07/31/23 12:54:41)                 Impression:      Stable chest with moderate bilateral infiltrates.      Electronically signed by: Terry Monet MD  Date:    07/31/2023  Time:    12:54             Narrative:    EXAMINATION:  XR CHEST AP PORTABLE    CLINICAL HISTORY:  , Dyspnea;    COMPARISON:  Comparison 05/01/2023.    FINDINGS:  Cardiomegaly.  Moderate bilateral infiltrates appear stable.                                Pending Diagnostic Studies:     Procedure Component Value Units Date/Time    Fungal Immunodiffusion - Blood [[id number]] Collected: 07/31/23 1841    Order Status: Sent Lab Status: In process Updated: 08/01/23 0137    Specimen: Blood     Narrative:      Collection has been rescheduled by GOLDY at 07/31/2023 18:20 Reason:   Using restroom  Collection has been rescheduled by GOLDY at 07/31/2023 18:33 Reason:   Unable to collect         Medications:         Medication List      START taking these medications    furosemide 40 MG tablet  Commonly known as: LASIX  Take 1 tablet (40 mg total) by mouth once daily.  Start taking on: August 6, 2023     linezolid 600 mg Tab  Commonly known as: ZYVOX  Take 1 tablet (600 mg total) by mouth every 12 (twelve) hours. for 4 days        CHANGE how you take these medications    predniSONE 20 MG tablet  Commonly known as: DELTASONE  Take 2 tablets (40 mg total) by mouth once daily. for 14 days  What changed:   · medication strength  · how much to take        CONTINUE taking these medications    ascorbic acid (vitamin C) 500 MG tablet  Commonly  known as: VITAMIN C     CALCIUM-MAGNESIUM-B COMPLEX ORAL     ipratropium 0.02 % nebulizer solution  Commonly known as: ATROVENT  Take 2.5 mLs (500 mcg total) by nebulization 4 (four) times daily. Rescue     levocetirizine 5 MG tablet  Commonly known as: XYZAL  TAKE 1 TABLET(5 MG) BY MOUTH EVERY DAY     loperamide 2 mg capsule  Commonly known as: IMODIUM  Take 1 capsule (2 mg total) by mouth daily as needed for Diarrhea.     montelukast 10 mg tablet  Commonly known as: SINGULAIR  Take 1 tablet (10 mg total) by mouth every evening.     nintedanib 150 mg Cap  Commonly known as: OFEV  Take 1 capsule (150 mg total) by mouth 2 (two) times a day.     pantoprazole 40 MG tablet  Commonly known as: PROTONIX  Take 1 tablet (40 mg total) by mouth once daily.     TRELEGY ELLIPTA 200-62.5-25 mcg inhaler  Generic drug: fluticasone-umeclidin-vilanter  Inhale 1 puff into the lungs once daily.     vitamin D 1000 units Tab  Commonly known as: VITAMIN D3     VITAMIN-D + OMEGA-3 ORAL     WOMEN'S 50+ ADVANCED ORAL        STOP taking these medications    albuterol 90 mcg/actuation inhaler  Commonly known as: PROVENTIL/VENTOLIN HFA     amLODIPine 10 MG tablet  Commonly known as: NORVASC     benzonatate 200 MG capsule  Commonly known as: TESSALON     hydroCHLOROthiazide 12.5 MG Tab  Commonly known as: HYDRODIURIL     metOLazone 2.5 MG tablet  Commonly known as: ZAROXOLYN     omeprazole 40 MG capsule  Commonly known as: PRILOSEC     sertraline 50 MG tablet  Commonly known as: ZOLOFT           Where to Get Your Medications      These medications were sent to [facility]/pharmacy #[id number] - POLLY Balderrama - [address] AT AT [facility]  [address] [facility] [address]    Phone: [phone]   · furosemide 40 MG tablet  · linezolid 600 mg Tab  · predniSONE 20 MG tablet         Indwelling Lines/Drains at time of discharge:   Lines/Drains/Airways     None                 Time spent on the discharge of patient: 87  kaden Heart MD  Department of Hospital Medicine  '[facility] - Telemetry ([facility])

## 2023-08-05 NOTE — DISCHARGE INSTRUCTIONS
Recommend compliance with medications, follow-up visits, diet, hydration     Recommend fluid restriction, salt restriction

## 2023-08-05 NOTE — SUBJECTIVE & OBJECTIVE
Ayanna Alicia is a 52-year-old female with a past medical history of chronic hypoxic respiratory failure at 5L/NC, asthma, Sjogren's syndrome with associated interstitial lung disease, multinodular goiter, GLENN, and morbid obesity who  presented with worsening shortness of breadth yesterday with associated decreased oxygen saturations. Patient reported she was running errands on Friday when she ran out of oxygen and her saturations dropped into the 50s. She reports it took sometime for her to recover her saturations and developed worsening shortness of breath during that time. Due to these symptoms, the patient presented to the emergency room for evaluation.      Of note, she was hospitalized in May of 2022 with acute respiratory failure and persistent pneumonia. At that time, she was dishcarged on oxygen. She was subsequently diagnosed with Sjogren's disease and thought to have interstitial lung disease secondary to her underlying rheumatological disease. Initial PFTs with severe restriction and reduced DLCO. She was initially prescribed steroids followed by the addition of Cellcept and Rituximab infusions (last infusion in Dec 2022). Most recently, she was initiated on Ofev; however, the patient reports she has not had any significant improvement in her clinical condition since her original illness in May 2022 and reports she has clinically worsened over the course of this time. She chronically takes Breo, Spiriva, and Montelukast for her asthma. She endorses significant exertional SOB, can walk at home, but only limited distance. Has both dry and clear mucus, sometimes yellow sputum production. Denies any chest pain but endorses occasional chest tightness. Has the occasional lower abdominal pain exacerbated by coughing episodes; denies nausea or vomiting. No fever but endorses occasional chills. Endorses chronic heart burn for which she takes OTC Pepcid without significant symptomatic improvement. Endorses  chronic belching and flatulence. Endorses losing weight, lost 50 lbs since last year; however, she has not been able to get less than 220lb. Not taking cellcept since 12/2022, received 4 treatments of Rituximab with her last dose in Dec 2022. She chronically takes prednisone 10 mg.  Patient underwent bronchoscopy with BAL in April 2023 per Dr. Aviles with negative cultures and no growth of fungus, yeast, PCP, staph, or pseudomonas. Remains on OFEV 150mg BID and Prednisone 10mg daily. In addition, she is in a trial of Actemra IV monthly for four consecutive months and currently has received two dose thus far with her last treatment on July 7, 2023. In addition, she has been referred for evaluation of lung transplant at [facility] in [place].     Due to her current acute on chronic hypoxic respiratory failure, as well as, her chronic underlying pulmonary disease, pulmonology has been consulted for evaluation.     Interval history:  8/1: Patient resting comfortably this morning; oxygen increased to 8L/NC overnight. Worse PAP therapy for a few hours overnight. No acute complaints at this time.   8/2: Sitting up on bedside today. Currently on 5L/NC. Overall, feels better  8/3: Upon coming off CPAP this morning, the patient had acute exacerbation of her underlying dyspnea with decreased oxygen saturations.   8/5: Patient reports feeling well and close to her baseline function. No acute events overnight. Long discussion regarding fluid restriction and limiting salt intake, as well as, diuretic therapy. Patient has a follow-up appointment already scheduled with Dr. Aviles next week. In addition, she has follow up with transplant on Aug 14th.    Objective:     Vital Signs (Most Recent):  Temp: 97.7 °F (36.5 °C) (08/05/23 0737)  Pulse: 82 (08/05/23 0737)  Resp: 18 (08/05/23 0737)  BP: (!) 111/58 (08/05/23 0737)  SpO2: 96 % (08/05/23 0821) Vital Signs (24h Range):  Temp:  [97.5 °F (36.4 °C)-98.7 °F (37.1 °C)] 97.7 °F (36.5  °C)  Pulse:  [] 82  Resp:  [13-22] 18  SpO2:  [83 %-100 %] 96 %  BP: (101-135)/(58-80) 111/58   Weight: 102.5 kg (225 lb 15.5 oz);  Body mass index is 36.47 kg/m².    No intake or output data in the 24 hours ending 08/05/23 1020     Physical Exam  Vitals and nursing note reviewed.   Constitutional:       Appearance: She is obese.   HENT:      Head: Normocephalic.   Eyes:      Conjunctiva/sclera: Conjunctivae normal.   Cardiovascular:      Rate and Rhythm: Normal rate.   Pulmonary:      Effort: Pulmonary effort is normal.      Breath sounds: Rales present.   Abdominal:      Palpations: Abdomen is soft.   Musculoskeletal:         General: Normal range of motion.      Cervical back: Normal range of motion and neck supple.   Skin:     General: Skin is warm and dry.   Neurological:      Mental Status: She is alert and oriented to person, place, and time.   Psychiatric:         Mood and Affect: Mood normal.       Review of Systems: Negative except as indicated in HPI    Significant Labs:  CBC/Anemia Profile:  Recent Labs   Lab 08/04/23  0534 08/05/23  0539   WBC 16.67* 17.83*   HGB 11.2* 10.6*   HCT 36.1* 35.0*    443   MCV 94 96   RDW 19.8* 19.9*   Chemistries:  Recent Labs   Lab 08/04/23  0534 08/05/23  0539    139   K 3.4* 4.2   CL 99 101   CO2 31* 29   BUN 14 12   CREATININE 0.7 0.8   CALCIUM 9.1 9.3   MG 2.1 2.1   PHOS 2.7 2.8

## 2023-08-05 NOTE — ASSESSMENT & PLAN NOTE
Patient reports non-compliance with home PAP therapy in the past. We discussed utilizing and compliance with home PAP therapy. Attempt to transition from CPAP to home AVAPS as she has significant hypoxic respiratory failure at baseline with associated restrictive disease as evidenced by her PFTs. In addition, patient has had a rapid decline in her clinical condition over the last 4 to 6 months    · qHS AVAPS as ordered

## 2023-08-05 NOTE — PLAN OF CARE
Pt oriented x4.  VSS.  Pt remained afebrile throughout this shift.   All meds administered per order.   Pt remained free of falls this shift.   Plan of care reviewed. Patient verbalizes understanding.   Pt moving/turning independently.   Bed low, side rails up x 2, wheels locked, call light in reach.   Patient instructed to call for assistance.  Will continue to monitor.       Problem: Adult Inpatient Plan of Care  Goal: Plan of Care Review  Outcome: Ongoing, Progressing  Goal: Patient-Specific Goal (Individualized)  Outcome: Ongoing, Progressing  Goal: Absence of Hospital-Acquired Illness or Injury  Outcome: Ongoing, Progressing  Goal: Optimal Comfort and Wellbeing  Outcome: Ongoing, Progressing  Goal: Readiness for Transition of Care  Outcome: Ongoing, Progressing     Problem: Infection  Goal: Absence of Infection Signs and Symptoms  Outcome: Ongoing, Progressing     Problem: Fall Injury Risk  Goal: Absence of Fall and Fall-Related Injury  Outcome: Ongoing, Progressing

## 2023-08-05 NOTE — PLAN OF CARE
Call from Haja Ann regarding discharge.  Notified that discharge order in place pending delivery of Trilogy.

## 2023-08-06 LAB
ASPERGILLUS AB SER QL ID: NOT DETECTED
B DERMAT AB SER QL ID: NOT DETECTED
C IMMITIS AB SER QL ID: NOT DETECTED
H CAPSUL AB SER QL ID: NOT DETECTED

## 2023-08-07 ENCOUNTER — PATIENT OUTREACH (OUTPATIENT)
Dept: ADMINISTRATIVE | Facility: CLINIC | Age: 53
End: 2023-08-07
Payer: COMMERCIAL

## 2023-08-07 ENCOUNTER — CLINICAL SUPPORT (OUTPATIENT)
Dept: PULMONOLOGY | Facility: CLINIC | Age: 53
End: 2023-08-07
Payer: COMMERCIAL

## 2023-08-07 ENCOUNTER — OFFICE VISIT (OUTPATIENT)
Dept: PULMONOLOGY | Facility: CLINIC | Age: 53
End: 2023-08-07
Payer: COMMERCIAL

## 2023-08-07 VITALS
DIASTOLIC BLOOD PRESSURE: 60 MMHG | WEIGHT: 225.5 LBS | SYSTOLIC BLOOD PRESSURE: 121 MMHG | HEART RATE: 115 BPM | HEIGHT: 66 IN | RESPIRATION RATE: 114 BRPM | BODY MASS INDEX: 36.24 KG/M2 | OXYGEN SATURATION: 94 %

## 2023-08-07 VITALS — BODY MASS INDEX: 36.26 KG/M2 | HEIGHT: 66 IN | WEIGHT: 225.63 LBS

## 2023-08-07 DIAGNOSIS — J96.11 CHRONIC RESPIRATORY FAILURE WITH HYPOXIA: Primary | ICD-10-CM

## 2023-08-07 DIAGNOSIS — J96.10 CHRONIC RESPIRATORY FAILURE, UNSPECIFIED WHETHER WITH HYPOXIA OR HYPERCAPNIA: ICD-10-CM

## 2023-08-07 DIAGNOSIS — J84.9 INTERSTITIAL LUNG DISEASE: ICD-10-CM

## 2023-08-07 DIAGNOSIS — J84.112 UIP (USUAL INTERSTITIAL PNEUMONITIS): ICD-10-CM

## 2023-08-07 DIAGNOSIS — R09.02 EXERCISE HYPOXEMIA: ICD-10-CM

## 2023-08-07 DIAGNOSIS — J45.20 MILD INTERMITTENT ASTHMA WITHOUT COMPLICATION: ICD-10-CM

## 2023-08-07 DIAGNOSIS — J98.4 RESTRICTIVE LUNG DISEASE: ICD-10-CM

## 2023-08-07 DIAGNOSIS — Z79.52 CURRENT CHRONIC USE OF SYSTEMIC STEROIDS: ICD-10-CM

## 2023-08-07 DIAGNOSIS — I10 PRIMARY HYPERTENSION: ICD-10-CM

## 2023-08-07 DIAGNOSIS — G47.33 OSA ON CPAP: ICD-10-CM

## 2023-08-07 DIAGNOSIS — J84.111 CHRONIC INTERSTITIAL PNEUMONIA: ICD-10-CM

## 2023-08-07 DIAGNOSIS — J84.9 INTERSTITIAL PULMONARY DISEASE, UNSPECIFIED: ICD-10-CM

## 2023-08-07 LAB
BRPFT: ABNORMAL
FEF 25 75 CHG: -9.1 %
FEF 25 75 LLN: 1.07
FEF 25 75 POST REF: 100.8 %
FEF 25 75 PRE REF: 110.9 %
FEF 25 75 REF: 2.32
FET100 CHG: 1.1 %
FEV1 CHG: 2.9 %
FEV1 FVC CHG: 2.3 %
FEV1 FVC LLN: 69
FEV1 FVC POST REF: 109.5 %
FEV1 FVC PRE REF: 107 %
FEV1 FVC REF: 80
FEV1 LLN: 1.81
FEV1 POST REF: 46.7 %
FEV1 PRE REF: 45.4 %
FEV1 REF: 2.44
FVC CHG: 0.5 %
FVC LLN: 2.3
FVC POST REF: 42.4 %
FVC PRE REF: 42.2 %
FVC REF: 3.06
PEF CHG: -7.8 %
PEF LLN: 4.19
PEF POST REF: 69.7 %
PEF PRE REF: 75.6 %
PEF REF: 6.33
POST FEF 25 75: 2.33 L/S (ref 1.07–3.56)
POST FET 100: 7.26 SEC
POST FEV1 FVC: 87.68 % (ref 68.95–91.23)
POST FEV1: 1.14 L (ref 1.81–3.06)
POST FVC: 1.3 L (ref 2.3–3.82)
POST PEF: 4.41 L/S (ref 4.19–8.47)
PRE FEF 25 75: 2.57 L/S (ref 1.07–3.56)
PRE FET 100: 7.18 SEC
PRE FEV1 FVC: 85.68 % (ref 68.95–91.23)
PRE FEV1: 1.11 L (ref 1.81–3.06)
PRE FVC: 1.29 L (ref 2.3–3.82)
PRE PEF: 4.78 L/S (ref 4.19–8.47)

## 2023-08-07 PROCEDURE — 94060 PR EVAL OF BRONCHOSPASM: ICD-10-PCS | Mod: S$GLB,,, | Performed by: INTERNAL MEDICINE

## 2023-08-07 PROCEDURE — 3078F DIAST BP <80 MM HG: CPT | Mod: CPTII,S$GLB,, | Performed by: INTERNAL MEDICINE

## 2023-08-07 PROCEDURE — 99999 PR PBB SHADOW E&M-EST. PATIENT-LVL V: ICD-10-PCS | Mod: PBBFAC,,, | Performed by: INTERNAL MEDICINE

## 2023-08-07 PROCEDURE — 94618 PULMONARY STRESS TESTING: ICD-10-PCS | Mod: S$GLB,,, | Performed by: INTERNAL MEDICINE

## 2023-08-07 PROCEDURE — 1159F MED LIST DOCD IN RCRD: CPT | Mod: CPTII,S$GLB,, | Performed by: INTERNAL MEDICINE

## 2023-08-07 PROCEDURE — 99999 PR PBB SHADOW E&M-EST. PATIENT-LVL I: ICD-10-PCS | Mod: PBBFAC,,,

## 2023-08-07 PROCEDURE — 99214 OFFICE O/P EST MOD 30 MIN: CPT | Mod: 25,S$GLB,, | Performed by: INTERNAL MEDICINE

## 2023-08-07 PROCEDURE — 99999 PR PBB SHADOW E&M-EST. PATIENT-LVL II: ICD-10-PCS | Mod: PBBFAC,,,

## 2023-08-07 PROCEDURE — 3074F PR MOST RECENT SYSTOLIC BLOOD PRESSURE < 130 MM HG: ICD-10-PCS | Mod: CPTII,S$GLB,, | Performed by: INTERNAL MEDICINE

## 2023-08-07 PROCEDURE — 3008F PR BODY MASS INDEX (BMI) DOCUMENTED: ICD-10-PCS | Mod: CPTII,S$GLB,, | Performed by: INTERNAL MEDICINE

## 2023-08-07 PROCEDURE — 1111F DSCHRG MED/CURRENT MED MERGE: CPT | Mod: CPTII,S$GLB,, | Performed by: INTERNAL MEDICINE

## 2023-08-07 PROCEDURE — 3078F PR MOST RECENT DIASTOLIC BLOOD PRESSURE < 80 MM HG: ICD-10-PCS | Mod: CPTII,S$GLB,, | Performed by: INTERNAL MEDICINE

## 2023-08-07 PROCEDURE — 94618 PULMONARY STRESS TESTING: CPT | Mod: S$GLB,,, | Performed by: INTERNAL MEDICINE

## 2023-08-07 PROCEDURE — 3044F HG A1C LEVEL LT 7.0%: CPT | Mod: CPTII,S$GLB,, | Performed by: INTERNAL MEDICINE

## 2023-08-07 PROCEDURE — 1159F PR MEDICATION LIST DOCUMENTED IN MEDICAL RECORD: ICD-10-PCS | Mod: CPTII,S$GLB,, | Performed by: INTERNAL MEDICINE

## 2023-08-07 PROCEDURE — 99999 PR PBB SHADOW E&M-EST. PATIENT-LVL I: CPT | Mod: PBBFAC,,,

## 2023-08-07 PROCEDURE — 3044F PR MOST RECENT HEMOGLOBIN A1C LEVEL <7.0%: ICD-10-PCS | Mod: CPTII,S$GLB,, | Performed by: INTERNAL MEDICINE

## 2023-08-07 PROCEDURE — 1160F RVW MEDS BY RX/DR IN RCRD: CPT | Mod: CPTII,S$GLB,, | Performed by: INTERNAL MEDICINE

## 2023-08-07 PROCEDURE — 1111F PR DISCHARGE MEDS RECONCILED W/ CURRENT OUTPATIENT MED LIST: ICD-10-PCS | Mod: CPTII,S$GLB,, | Performed by: INTERNAL MEDICINE

## 2023-08-07 PROCEDURE — 3074F SYST BP LT 130 MM HG: CPT | Mod: CPTII,S$GLB,, | Performed by: INTERNAL MEDICINE

## 2023-08-07 PROCEDURE — 94060 EVALUATION OF WHEEZING: CPT | Mod: S$GLB,,, | Performed by: INTERNAL MEDICINE

## 2023-08-07 PROCEDURE — 99999 PR PBB SHADOW E&M-EST. PATIENT-LVL V: CPT | Mod: PBBFAC,,, | Performed by: INTERNAL MEDICINE

## 2023-08-07 PROCEDURE — 99214 PR OFFICE/OUTPT VISIT, EST, LEVL IV, 30-39 MIN: ICD-10-PCS | Mod: 25,S$GLB,, | Performed by: INTERNAL MEDICINE

## 2023-08-07 PROCEDURE — 99999 PR PBB SHADOW E&M-EST. PATIENT-LVL II: CPT | Mod: PBBFAC,,,

## 2023-08-07 PROCEDURE — 1160F PR REVIEW ALL MEDS BY PRESCRIBER/CLIN PHARMACIST DOCUMENTED: ICD-10-PCS | Mod: CPTII,S$GLB,, | Performed by: INTERNAL MEDICINE

## 2023-08-07 PROCEDURE — 3008F BODY MASS INDEX DOCD: CPT | Mod: CPTII,S$GLB,, | Performed by: INTERNAL MEDICINE

## 2023-08-07 RX ORDER — SULFAMETHOXAZOLE AND TRIMETHOPRIM 800; 160 MG/1; MG/1
1 TABLET ORAL
Qty: 12 TABLET | Refills: 3 | Status: SHIPPED | OUTPATIENT
Start: 2023-08-07 | End: 2023-10-11

## 2023-08-07 NOTE — PROGRESS NOTES
"C3 nurse spoke with Ayanna Alicia  for a TCC post hospital discharge follow up call. The patient has a scheduled HOSFU appointment with [facility]  on 8/10/2023 @ 0800.    Pt. requested for [facility] HOSPFU, stating "send the nurse like you did last time".    Her address was not confirmed, as she told me she "had to go".  Contacted patient to confirm address, left message with requesting call back.   Informed patient of her HOSPFU appt. With [facility] on 08/10/2023 at 0800 via patient portal and requested that she contact me, contact information and instruction provided, to confirm her address.         "

## 2023-08-07 NOTE — PROGRESS NOTES
C3 nurse attempted to contact Ayanna Alicia and Lianna, for a TCC post hospital discharge follow up call. No answer. Left voicemail with callback information. The patient does not have a scheduled HOSFU appointment. Message sent to PCP staff for assistance with scheduling visit with patient.

## 2023-08-07 NOTE — PROCEDURES
"[facility] - Pulmonary Function  Six Minute Walk     SUMMARY     Ordering Provider: MD Stefan   Interpreting Provider: MD Stefan  Performing nurse/tech/RT: DALE Evans CRT  Diagnosis:  (Chronic Resp. Fail.)  Height: 5' 6" (167.6 cm)  Weight: 102.3 kg (225 lb 10.3 oz)  BMI (Calculated): 36.4   Patient Race:             Phase Oxygen Assessment Supplemental O2 Heart   Rate Blood Pressure Jessica Dyspnea Scale Rating   Resting 74 % (Placed on 2L, 82%, increased to 3L, recovered to 85%, increased to 4L recovered to 87%, increased to 6L, recovered to 90%) Room Air 115 bpm 121/60 3   Exercise        Minute        1 93 % 6 L/M 131 bpm     2 87 % (placed on 50% venti mask) 6 L/M 123 bpm     3 90 % 50% Venti Mask 15 L/M 117 bpm     4 93 % 50% Venti Mask 15 L/M 123 bpm     5 80 % (patient did not like venti mask, placed on 6L NC, recovered to 91%) 50% Venti Mask 15 L/M 133 bpm     6  91 % 6 L/M 136 bpm 138/71 7-8   Recovery        Minute        1 90 % 6 L/M 130 bpm     2 92 % 6 L/M 122 bpm     3 95 % 6 L/M 120 bpm     4 95 % 6 L/M 110 bpm 116/68 2     Six Minute Walk Summary  6MWT Status: completed with stops  Patient Reported: Dyspnea, Lightheadedness     Interpretation:  Did the patient stop or pause?: Yes  How many times did the patient stop or pause?: 1  Stop Time 1: 69  Restart Time 1: 247  Pause Time 1: 178 seconds            Total Time Walked (Calculated): 182 seconds  Final Partial Lap Distance (feet): 100 feet  Total Distance Meters (Calculated): 91.44 meters  Predicted Distance Meters (Calculated): 474.13 meters  Percentage of Predicted (Calculated): 19.29  Peak VO2 (Calculated): 6.72  Mets: 1.92  Has The Patient Had a Previous Six Minute Walk Test?: Yes       Previous 6MWT Results  Has The Patient Had a Previous Six Minute Walk Test?: Yes  Date of Previous Test: 10/10/22  Total Time Walked: 282 seconds  Total Distance (meters): 213.36  Predicted Distance (meters): 449.46 meters  Percentage of Predicted: " 47.47  Percent Change (Calculated): 0.57      Six minute walk distance is 91.44m /474.13 meters (19.29 % predicted) with heavy.Patient did not complete the study, walking 182 seconds of the 360 second test . During exercise, there was  significant desaturation while breathing room air .Lowest oxygen saturation was 74% and oxygen was titrated from beginning at rest  to 50% ventimask 15.0 LPM.Maximum heart rate during exercise was 136 bpm which is 81 % of maximum predicted heart rate of 166 bpm. Blood pressure remained stable and Heart rate increased significantly Based upon age and body mass index, exercise capacity is less than predicted.  Peak VO2 during walking was 6.72 ml/kg/min which is 29 % of predicted Peak VO2 max of 23 ml/kg/min based on a resting heart rate of 115/min. Significant exercise impairment is likely due to pulmonary causes.  Patient had a previous study. Since the previous study in 10/10/2022, exercise capacity is significantly worse.

## 2023-08-07 NOTE — ASSESSMENT & PLAN NOTE
Patient would benefit from weight loss and has tried to set realistic goals to achieve success. Lifestyle changes were discussed on eating healthy, exercising at least 150 minutes weekly, and reducing sedentary behavior.   Discussed the risk factors associated with obesity: Arthritis/GLENN/Diabetes/Fatty Liver/Cardiovascular disease/GERD/HTN/HLP.

## 2023-08-07 NOTE — ASSESSMENT & PLAN NOTE
PFT reviewed  · FEV1: 1.11L( 45.4%)  · Currently requiring 6L/NC  · Patient with restrictive pattern on her PFTs and chronic hypoxic respiratory failure;  ·  home AVAPS DME VIEMED  · With her chronic immunosuppression; Added Bactrim prophylaxis  · Continue supplemental oxygen and titrate as needed to maintain saturations 90% or greater  · Lasix 40mg daily   · Fluid restriction of 1.5L daily  · Complete 10-day course of Zyvox  Continue Prednisone 40mg daily;  .

## 2023-08-07 NOTE — PROGRESS NOTES
Pulmonary Outpatient  Visit     Subjective:       Patient ID: Ayanna Alicia is a 52 y.o. female.    Chief Complaint: Shortness of Breath and Interstitial Lung Disease        Ayanna Alicia is 51 y.o.  Post hospital f/u  Acute respiratory failure ILD, +ve RF  Was discharged on oxygen  Here to review results  Explained  PFT: severe restriction and reduced DLCO  ABG  6MWD: oxygen desat needs supplementary oxygen 3 LPM  Has some nose bleed will add AYR gel and humifier bottle  FMLA paper work given  Out of work letter   Added PEPCID and Bactrim  Adherent with inhaler    05/18/2022  Here to see today  Concern, Dyspnea with activity palpitations  Seen Rheumatology: Added CELLCEPT  On steriod taper  No cough wheezing  On oxygen 3 LPM  Had GLENN in 2019: was prescribed CPAP returned back  Needs PAP at night  Motivated to restart      07/15/2022  Last seen 05/18/2022  ACT score 10, [place] score 6  CPAP study: CPAP ordered  Await   Says cannot go to work, tied, focus off  6MWD: RA sat was 95%  O2 titrated to 4-6 LPM  Tachycardia noted : 145 BPM    09/20/2022  Last seen 07/15/2022  Here to review progress  Enrolled in pul rehab, session 8  Sat Stable @ 2-3 LPM  ACt score 5, [place] 6  No cough, better exercise tolerance  Stable on cellcept 1000mg BID + prednisone 10 mg  Will DW Rheum whether can titrate up cellcept to wean steriods  CPAP download shows adherence   CPAP 9 cm with resudual AHI 0.2  Usage > 4 hrs was 67%  Cehst CT reviewed  Repeat PFT pending      12/28/2022  Last seen 11/07/2022  Here to review chest CT done recently  Fatigue, SOB, on 6 LPM  SP rituxan 4 doses  ABG reveiwed  ESR and CRP were increased  On Prednisone 10 mg  Hoarse voice  Discussed utility of bronchoscopy if infection is considered  TBBX would have risk of flaring and acute resp failure:  Serologies sent  Added OFEV  Will also reduce fluid intake to approx 32 oz  Will reenrol on Pul  rehab      03/07/2023  Urgent visit: weak, easily desat  Subjectively better  Seen Dr Stallings: Diflucan and bactrim  Oral thrush: not present today  No fever  On 6 LPM  Dropped weight from 241Lb to 228 lb  Ofev increased: felt better onofev  Will decrease prednisone to 10 mg since concern for PJP  Discussed referal to advance lung disease  Sputum culture  In wheel chair  Adherent with CPAP  Schedule bronch for BAL next week    05/01/2023  Follow up visit  Care from multiple specialities noted  ENT referred to Dr Koenig, 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  Bronch specimens were negative for any infection, No PCP or fungus  [facility] lung clinic declined w/u for transplant due to BMI  DW patient: reenrol in pul rehab since feels better and options for out of state transplant: [place] may be considered  GI : GERD, and esophagram reveiwed: reiterated role on GERD in advanced lung disease: behavioural interventions since last 6 weeks helped  Stable on POC 6 LPM  Disucssed options for scooter  Will increase OFEV to 150 BID  Keep prednisone at 10 mg daily ( no need for bactrim prophylaxis at thiss dose)  ACT 10  Changed BREO to TRELEGY  [place] next visit  Cough improved  Refill for tessalon  Encouaged to wear CPAP      08/07/2023  Followup  Recent hospitalization  CPAP changed to AVAPS  Appt for Transplant eval 08/14  Here with nephew  Santa 10. Asthma score 11  Still HICKS, mRC score 2-3  Attempted 6MWD: low capacity  Office notes: Dr Ashton: possibility Sertraline ILD: case reports reviewed  In abundance : medication stopped  Stable OFEV 150 mg BID  Still has dysphonia  GERD has improved        Asthma Control Test  In the past 4  weeks, how much of the time did your asthma keep you from getting as much done at work, school or at home?: None of the time  During the past 4 weeks, how often have you had shortness of breath?: More than once a day  During the past 4 weeks, how often did your asthma symptoms  (wheezing, couging, shortness of breath, chest tightness or pain) wake you up at night or earlier than usual in the morning?: 4 or more nights a week  During the past 4 weeks, how often have you used your rescue inhaler or nebulizer medication (such as albuterol)?: 3 or more times per day  How would you rate your asthma control during the past 4 weeks?: Somewhat controlled  If your score is 19 or less, your asthma may not be under control: 11          MMRC Dyspnea Scale (4 is worst)     [] MMRC 0: Dyspneic on strenuous excercise (0 points)    [] MMRC 1: Dyspneic on walking a slight hill (0 points)    [x] MMRC 2: Dyspneic on walking level ground; must stop occasionally due to breathlessness (1 point)    [] MMRC 3: Must stop for breathlessness after walking 100 yards or after a few minutes (2 points)    [] MMRC 4: Cannot leave house; breathless on dressing/undressing (3 points)          EPWORTH SLEEPINESS SCALE 5/1/2023   Sitting and reading 0   Watching TV 1   Sitting, inactive in a public place (e.g. a theatre or a meeting) 0   As a passenger in a car for an hour without a break 0   Lying down to rest in the afternoon when circumstances permit 1   Sitting and talking to someone 0   Sitting quietly after a lunch without alcohol 0   In a car, while stopped for a few minutes in traffic 0   Total score 2         [facility] - Telemetry ([facility])  Hospital Medicine  Discharge Summary        Patient Name: Ayanna Ailcia  MRN: [id number]  DARLENE: [id number]  Patient Class: IP- Inpatient  Admission Date: 7/31/2023  Hospital Length of Stay: 4 days  Discharge Date and Time: 8/5/2023  Attending Physician: Rosa Heart,*   Discharging Provider: Rosa Heart MD  Primary Care Provider: Madeleine Enrique MD     Primary Care Team: Networked reference to record PCT      HPI:   52F  has a past medical history of Abnormal Pap smear of cervix, Acute interstitial pneumonitis, Allergic rhinitis, cause unspecified,  Arthritis of both knees, Asthma, Eczema, Fatty liver, Fibrocystic breast changes, Headache(784.0), Hepatomegaly, Hypertension, Liver cyst, Multinodular goiter, Polymenorrhea, TMJ (dislocation of temporomandibular joint), Uterine fibroid, and Vitamin D deficiency disease.  Presents for worsening dyspnea. Associated with fever, sneezing, rhinorrhea, chest tightness, and wheezing. Onset Friday after being without supplemental oxygen for 15 minutes with difficulties recovering, which prompted a visit to clinic/urgent care. At baseline, patient wears 6L supplemental oxygen. Reports taking prednisone for a long time. States compliance to ofev for ild.     Initial workup in emergency department revealed increased supplemental oxygen of 8L, tachycardia and tachypnea. Leukocytosis on cbc. Cmp with hyperglycemia and mildly elevated liver enzymes. Troponin(s) elevated. Bnp within normal limits. Abg metabolic and respiratory alkalosis. Chest x-ray with bibasilar infiltrates. Covid and flu negative. Electrocardiography with sinus tachycardia.     Hospital medicine consulted for admission under observation, pneumonia vs asthma exacerbation. Pulmonology consulted        * No surgery found *       Hospital Course:   8/1 admitted for acute on chronic respiratory failure. Weaned down to 5L. Pulmonology following. Continue current care.   8/2 no acute events overnight. remains on baseline supplemental oxygen. Pulmonology recommending trilogy for home use. Case management consulted. Physical/occupational therapy consulted. Patient will have homehealth physical/occupational therapy on discharge.   8/3 reports dyspnea with exertion with hypoxia requiring increased supplemental oxygen needs to recover. Patient reports compliance with cpap overnight. Pulmonology recommending avaps for home use. Case management consulted for trilogy. Continue current regimen. Speech consulted for dysphagia and dysphonia, recommending follow up ent for  laryngeal ulcers.  8/4- stated improvement in symptoms in regards of dyspnea compared to yesterday; currently saturating about 92 on room air,; blood culture x1 as of 7/31/2 for fusobacterium, likely contaminant, will follow up on repeat cultures from today, if cultures resulted negative, likely plan for discharge accordingly in next 24- 48 hrs;    worked on arrangements for trilogy; pulmonology on board, appreciate recommendations.  PT OT recommended home.  8/5   Examination done at bedside, patient appeared alert and oriented x3, denied headache, dizziness, chest pain, shortness O breath, palpitations, bowel or bladder issues.    Currently saturating above 92 on 5 L nasal cannula, currently at baseline.    Stated significant improvement in cough with minimal sputum production.    Discussed with pulmonology, stated okay for discharge-recommended oral antibiotics to complete course, Lasix, prednisone 40 mg upon discharge, compliance with outpatient follow-up visits  Considering clinical and hemodynamic stability, planning to discharge patient today, emphasized on compliance with medications, follow-up visits, diet, fluid restriction, salt restriction, compliance with trilogy.    Patient agreed to the plan, has upcoming appointment within a week with Dr. Aviles pulmonology, with transplant doctor at [facility] on 08/14;  Given underlying medical conditions, prognosis guarded- discussed on code status, opted for full code at this point; to consider outpatient palliative visits if needed.     PT OT recommended home.  Speech consulted for dysphagia and dysphonia, recommending follow up ent for laryngeal ulcers.  Discharge today, medications delivered to patient preferred pharmacy.     Asthma Control Test  In the past 4  weeks, how much of the time did your asthma keep you from getting as much done at work, school or at home?: None of the time  During the past 4 weeks, how often have you had shortness of  breath?: More than once a day  During the past 4 weeks, how often did your asthma symptoms (wheezing, couging, shortness of breath, chest tightness or pain) wake you up at night or earlier than usual in the morning?: 4 or more nights a week  During the past 4 weeks, how often have you used your rescue inhaler or nebulizer medication (such as albuterol)?: 3 or more times per day  How would you rate your asthma control during the past 4 weeks?: Somewhat controlled  If your score is 19 or less, your asthma may not be under control: 11        The following portions of the patient's history were reviewed and updated as appropriate:   She  has a past medical history of Abnormal Pap smear of cervix, Acute interstitial pneumonitis, Allergic rhinitis, cause unspecified, Arthritis of both knees, Asthma, Eczema, Fatty liver (10/2014), Fibrocystic breast changes, Headache(784.0), Hepatomegaly (10/2014), Hypertension, Liver cyst (10/2014), Multinodular goiter, Polymenorrhea (), TMJ (dislocation of temporomandibular joint), Uterine fibroid, and Vitamin D deficiency disease.  She does not have any pertinent problems on file.  She  has a past surgical history that includes  section, classic; Multiple tooth extractions; Partial hysterectomy (2013); Hysterectomy; Colonoscopy (N/A, 2020); and Bronchoscopy (Bilateral, 2023).  Her family history includes Cancer (age of onset: 50) in her maternal aunt; Cancer (age of onset: 60) in her maternal grandmother; Cancer (age of onset: 66) in her maternal aunt; Cataracts in her cousin; Diabetes in her maternal grandfather; Diverticulitis in her mother; Heart disease in her mother; Heart disease (age of onset: 63) in her father; Hypertension in her father; Migraines in her cousin; No Known Problems in her brother; Peripheral vascular disease in her maternal grandfather; Stroke in her maternal grandfather, mother, and sister.  She  reports that she has never smoked. She  has been exposed to tobacco smoke. She has never used smokeless tobacco. She reports that she does not currently use alcohol. She reports that she does not currently use drugs after having used the following drugs: Marijuana. Frequency: 4.00 times per week.  She has a current medication list which includes the following prescription(s): ascorbic acid (vitamin c), calcium/magnesium/vit b comp, furosemide, ipratropium, levocetirizine, linezolid, loperamide, montelukast, mv-minerals/fa/omega 3,6,9 #3, nintedanib, omega-3s/dha/epa/fish oil/d3, pantoprazole, prednisone, vitamin d, salmeterol, and sulfamethoxazole-trimethoprim 800-160mg.  Current Outpatient Medications on File Prior to Visit   Medication Sig Dispense Refill    ascorbic acid, vitamin C, (VITAMIN C) 500 MG tablet Take 500 mg by mouth once daily.      calcium/magnesium/vit B comp (CALCIUM-MAGNESIUM-B COMPLEX ORAL) Take 1 tablet by mouth once daily at 6am.      furosemide (LASIX) 40 MG tablet Take 1 tablet (40 mg total) by mouth once daily. 30 tablet 0    ipratropium (ATROVENT) 0.02 % nebulizer solution Take 2.5 mLs (500 mcg total) by nebulization 4 (four) times daily. Rescue (Patient not taking: Reported on 8/7/2023) 300 mL 11    levocetirizine (XYZAL) 5 MG tablet TAKE 1 TABLET(5 MG) BY MOUTH EVERY DAY 90 tablet 1    linezolid (ZYVOX) 600 mg Tab Take 1 tablet (600 mg total) by mouth every 12 (twelve) hours. for 4 days 9 tablet 0    loperamide (IMODIUM) 2 mg capsule Take 1 capsule (2 mg total) by mouth daily as needed for Diarrhea. (Patient not taking: Reported on 8/7/2023) 90 capsule 0    montelukast (SINGULAIR) 10 mg tablet Take 1 tablet (10 mg total) by mouth every evening. 30 tablet 11    mv-minerals/FA/omega 3,6,9 #3 (WOMEN'S 50+ ADVANCED ORAL) Take 1 tablet by mouth Daily.      nintedanib (OFEV) 150 mg Cap Take 1 capsule (150 mg total) by mouth 2 (two) times a day. 60 capsule 11    omega-3s/dha/epa/fish oil/D3 (VITAMIN-D + OMEGA-3 ORAL) Take 2 tablets  by mouth once daily at 6am.      pantoprazole (PROTONIX) 40 MG tablet Take 1 tablet (40 mg total) by mouth once daily. 30 tablet 11    predniSONE (DELTASONE) 20 MG tablet Take 2 tablets (40 mg total) by mouth once daily. for 14 days 28 tablet 0    vitamin D (VITAMIN D3) 1000 units Tab Take 1,000 Units by mouth once daily.      [DISCONTINUED] fluticasone-umeclidin-vilanter (TRELEGY ELLIPTA) 200-62.5-25 mcg inhaler Inhale 1 puff into the lungs once daily. 60 each 11    salmeteroL (SEREVENT) 50 mcg/dose diskus inhaler Inhale 1 puff into the lungs 2 (two) times daily. Controller       No current facility-administered medications on file prior to visit.     She is allergic to doxycycline, fluticasone, and penicillins..      Review of Systems   Constitutional:  Negative for activity change and fatigue.   Respiratory:  Positive for cough, shortness of breath and dyspnea on extertion.    Gastrointestinal:  Negative for acid reflux.   All other systems reviewed and are negative.      Outpatient Encounter Medications as of 8/7/2023   Medication Sig Dispense Refill    ascorbic acid, vitamin C, (VITAMIN C) 500 MG tablet Take 500 mg by mouth once daily.      calcium/magnesium/vit B comp (CALCIUM-MAGNESIUM-B COMPLEX ORAL) Take 1 tablet by mouth once daily at 6am.      furosemide (LASIX) 40 MG tablet Take 1 tablet (40 mg total) by mouth once daily. 30 tablet 0    ipratropium (ATROVENT) 0.02 % nebulizer solution Take 2.5 mLs (500 mcg total) by nebulization 4 (four) times daily. Rescue (Patient not taking: Reported on 8/7/2023) 300 mL 11    levocetirizine (XYZAL) 5 MG tablet TAKE 1 TABLET(5 MG) BY MOUTH EVERY DAY 90 tablet 1    linezolid (ZYVOX) 600 mg Tab Take 1 tablet (600 mg total) by mouth every 12 (twelve) hours. for 4 days 9 tablet 0    loperamide (IMODIUM) 2 mg capsule Take 1 capsule (2 mg total) by mouth daily as needed for Diarrhea. (Patient not taking: Reported on 8/7/2023) 90 capsule 0    montelukast (SINGULAIR) 10 mg  tablet Take 1 tablet (10 mg total) by mouth every evening. 30 tablet 11    mv-minerals/FA/omega 3,6,9 #3 (WOMEN'S 50+ ADVANCED ORAL) Take 1 tablet by mouth Daily.      nintedanib (OFEV) 150 mg Cap Take 1 capsule (150 mg total) by mouth 2 (two) times a day. 60 capsule 11    omega-3s/dha/epa/fish oil/D3 (VITAMIN-D + OMEGA-3 ORAL) Take 2 tablets by mouth once daily at 6am.      pantoprazole (PROTONIX) 40 MG tablet Take 1 tablet (40 mg total) by mouth once daily. 30 tablet 11    predniSONE (DELTASONE) 20 MG tablet Take 2 tablets (40 mg total) by mouth once daily. for 14 days 28 tablet 0    vitamin D (VITAMIN D3) 1000 units Tab Take 1,000 Units by mouth once daily.      [DISCONTINUED] fluticasone-umeclidin-vilanter (TRELEGY ELLIPTA) 200-62.5-25 mcg inhaler Inhale 1 puff into the lungs once daily. 60 each 11    salmeteroL (SEREVENT) 50 mcg/dose diskus inhaler Inhale 1 puff into the lungs 2 (two) times daily. Controller      sulfamethoxazole-trimethoprim 800-160mg (BACTRIM DS) 800-160 mg Tab Take 1 tablet by mouth every Mon, Wed, Fri. 12 tablet 3    [DISCONTINUED] albuterol (PROVENTIL/VENTOLIN HFA) 90 mcg/actuation inhaler INHALE 1 TO 2 PUFFS BY MOUTH INTO THE LUNGS EVERY 4 TO 6 HOURS AS NEEDED FOR WHEEZING OR SHORTNESS OF BREATH (Patient not taking: Reported on 7/26/2023) 8.5 g 5    [DISCONTINUED] amLODIPine (NORVASC) 10 MG tablet Take 1 tablet (10 mg total) by mouth once daily. 90 tablet 1    [DISCONTINUED] benzonatate (TESSALON) 200 MG capsule Take 1 capsule (200 mg total) by mouth 3 (three) times daily as needed for Cough. (Patient not taking: Reported on 7/26/2023) 90 capsule 11    [DISCONTINUED] hydroCHLOROthiazide (HYDRODIURIL) 12.5 MG Tab Take 1 tablet (12.5 mg total) by mouth once daily. 90 tablet 1    [DISCONTINUED] metOLazone (ZAROXOLYN) 2.5 MG tablet Take 1 tablet (2.5 mg total) by mouth every Mon, Wed, Fri. 36 tablet 3    [DISCONTINUED] omeprazole (PRILOSEC) 40 MG capsule Take 1 capsule (40 mg total) by mouth  2 (two) times daily before meals. 180 capsule 0    [DISCONTINUED] predniSONE (DELTASONE) 10 MG tablet Take 1 tablet (10 mg total) by mouth once daily. 30 tablet 3    [DISCONTINUED] sertraline (ZOLOFT) 50 MG tablet Take 1 tablet (50 mg total) by mouth once daily. (Patient not taking: Reported on 7/26/2023) 90 tablet 3    [DISCONTINUED] 0.9%  NaCl infusion       [DISCONTINUED] acetaminophen tablet 650 mg       [DISCONTINUED] arformoteroL nebulizer solution 15 mcg       [DISCONTINUED] artificial tears 0.5 % ophthalmic solution 1 drop       [DISCONTINUED] azithromycin (ZITHROMAX) 500 mg in dextrose 5 % (D5W) 250 mL IVPB (Vial-Mate)       [DISCONTINUED] benzonatate capsule 200 mg       [DISCONTINUED] ceFEPIme (MAXIPIME) 2 g in dextrose 5 % in water (D5W) 100 mL IVPB (MB+)       [DISCONTINUED] cetirizine tablet 10 mg       [DISCONTINUED] furosemide injection 40 mg       [DISCONTINUED] furosemide tablet 40 mg       [DISCONTINUED] ipratropium 0.02 % nebulizer solution 0.5 mg       [DISCONTINUED] levalbuterol nebulizer solution 0.3108 mg       [DISCONTINUED] levalbuterol nebulizer solution 0.3108 mg       [DISCONTINUED] levalbuterol nebulizer solution 0.63 mg       [DISCONTINUED] linezolid tablet 600 mg       [DISCONTINUED] melatonin tablet 6 mg       [DISCONTINUED] montelukast tablet 10 mg       [DISCONTINUED] nintedanib Cap 150 mg       [DISCONTINUED] omeprazole capsule 40 mg       [DISCONTINUED] predniSONE tablet 40 mg       [DISCONTINUED] predniSONE tablet 60 mg       [DISCONTINUED] sodium chloride 0.9% flush 0.1-10 mL       [DISCONTINUED] sodium chloride 0.9% flush 10 mL       [DISCONTINUED] sodium chloride 0.9% flush 10 mL       [DISCONTINUED] sodium chloride 0.9% flush 3 mL        No facility-administered encounter medications on file as of 8/7/2023.       Objective:     Vital Signs (Most Recent)  Vital Signs  Pulse: (!) 115  Resp: (!) 114  SpO2: (!) 94 % (6.0 LPM)  BP: 121/60  BP Location: Right arm  Patient  "Position: Sitting  Pain Score: 0-No pain  Height and Weight  Height: 5' 6" (167.6 cm)  Weight: 102.3 kg (225 lb 8.5 oz)  BSA (Calculated - sq m): 2.18 sq meters  BMI (Calculated): 36.4  Weight in (lb) to have BMI = 25: 154.6]  Wt Readings from Last 2 Encounters:   08/07/23 102.3 kg (225 lb 8.5 oz)   08/07/23 102.3 kg (225 lb 10.3 oz)       Physical Exam   Constitutional: She is oriented to person, place, and time. She appears well-developed and well-nourished.   HENT:   Head: Normocephalic.   Mouth/Throat: Mallampati Score: II.   Neck: No JVD present.   Cardiovascular: Normal rate and intact distal pulses.   No murmur heard.  Pulmonary/Chest: Normal expansion and effort normal. She has decreased breath sounds. She has no rhonchi. She has no rales.   Abdominal: Soft. Bowel sounds are normal.   Musculoskeletal:         General: No edema. Normal range of motion.      Cervical back: Normal range of motion and neck supple.   Lymphadenopathy:     She has no axillary adenopathy.   Neurological: She is alert and oriented to person, place, and time.   Skin: Skin is warm and dry. No cyanosis. Nails show no clubbing.   Psychiatric: She has a normal mood and affect.   Nursing note and vitals reviewed.      Laboratory  Lab Results   Component Value Date    WBC 17.83 (H) 08/05/2023    RBC 3.63 (L) 08/05/2023    HGB 10.6 (L) 08/05/2023    HCT 35.0 (L) 08/05/2023    MCV 96 08/05/2023    MCH 29.2 08/05/2023    MCHC 30.3 (L) 08/05/2023    RDW 19.9 (H) 08/05/2023     08/05/2023    MPV 9.8 08/05/2023    GRAN 14.0 (H) 08/05/2023    GRAN 78.6 (H) 08/05/2023    LYMPH 2.0 08/05/2023    LYMPH 11.3 (L) 08/05/2023    MONO 1.1 (H) 08/05/2023    MONO 5.9 08/05/2023    EOS 0.5 08/05/2023    BASO 0.07 08/05/2023    EOSINOPHIL 2.5 08/05/2023    BASOPHIL 0.4 08/05/2023       BMP  Lab Results   Component Value Date     08/05/2023    K 4.2 08/05/2023     08/05/2023    CO2 29 08/05/2023    BUN 12 08/05/2023    CREATININE 0.8 " "08/05/2023    CALCIUM 9.3 08/05/2023    ANIONGAP 9 08/05/2023    ESTGFRAFRICA >60 07/20/2022    EGFRNONAA >60 07/20/2022    AST 46 (H) 07/31/2023    ALT 47 (H) 07/31/2023    PROT 7.6 07/31/2023       Lab Results   Component Value Date    BNP 40 07/31/2023    BNP 12 03/16/2023    BNP <10 04/27/2022    BNP <10 (L) 03/25/2022       Lab Results   Component Value Date    TSH 0.708 07/13/2023       Lab Results   Component Value Date    SEDRATE 50 (H) 05/10/2023       Lab Results   Component Value Date    CRP 54.6 (H) 05/10/2023     Lab Results   Component Value Date     (H) 05/02/2022        Lab Results   Component Value Date    ASPERGILLUS Not Detected 07/31/2023     No results found for: "AFUMIGATUSCL"     Lab Results   Component Value Date    ACE 22 04/28/2022        Diagnostic Results:  I have personally reviewed today the following studies:  Office Spirometry Results:             X-Ray Chest 1 View  Narrative: EXAMINATION:  XR CHEST 1 VIEW    CLINICAL HISTORY:  Pulmonary edema;    TECHNIQUE:  Single frontal view of the chest was performed.    COMPARISON:  07/31/2023.    FINDINGS:  Cardiomegaly with pulmonary vascular congestion and bilateral pulmonary infiltrates greatest within the lower lobes most consistent with pulmonary edema.  No large pleural effusions.  No pneumothorax.  Bones demonstrate degenerative change in comparison to the prior study, there is no adverse interval changes  Impression: In comparison to the prior study, there is no adverse interval changes    Electronically signed by: Jerry Skelton MD  Date:    08/03/2023  Time:    14:23        No results for input(s): "PH", "PCO2", "PO2", "HCO3", "POCSATURATED", "BE" in the last 72 hours.       Ordering Provider: MD Stefan        Interpreting Provider: MD Stefan  Performing nurse/tech/RT: DALE Evans, GRICELDA  Diagnosis:  (Chronic Resp. Fail.)  Height: 5' 6" (167.6 cm)  Weight: 102.3 kg (225 lb 10.3 oz)  BMI (Calculated): 36.4              Patient " Race:                                                                 Phase Oxygen Assessment Supplemental O2 Heart   Rate Blood Pressure Jessica Dyspnea Scale Rating   Resting 74 % (Placed on 2L, 82%, increased to 3L, recovered to 85%, increased to 4L recovered to 87%, increased to 6L, recovered to 90%) Room Air 115 bpm 121/60 3   Exercise             Minute             1 93 % 6 L/M 131 bpm       2 87 % (placed on 50% venti mask) 6 L/M 123 bpm       3 90 % 50% Venti Mask 15 L/M 117 bpm       4 93 % 50% Venti Mask 15 L/M 123 bpm       5 80 % (patient did not like venti mask, placed on 6L NC, recovered to 91%) 50% Venti Mask 15 L/M 133 bpm       6  91 % 6 L/M 136 bpm 138/71 7-8   Recovery             Minute             1 90 % 6 L/M 130 bpm       2 92 % 6 L/M 122 bpm       3 95 % 6 L/M 120 bpm       4 95 % 6 L/M 110 bpm 116/68 2      Six Minute Walk Summary  6MWT Status: completed with stops  Patient Reported: Dyspnea, Lightheadedness               Interpretation:  Did the patient stop or pause?: Yes  How many times did the patient stop or pause?: 1  Stop Time 1: 69  Restart Time 1: 247  Pause Time 1: 178 seconds  Total Time Walked (Calculated): 182 seconds  Final Partial Lap Distance (feet): 100 feet  Total Distance Meters (Calculated): 91.44 meters  Predicted Distance Meters (Calculated): 474.13 meters  Percentage of Predicted (Calculated): 19.29  Peak VO2 (Calculated): 6.72  Mets: 1.92  Has The Patient Had a Previous Six Minute Walk Test?: Yes     Previous 6MWT Results  Has The Patient Had a Previous Six Minute Walk Test?: Yes  Date of Previous Test: 10/10/22  Total Time Walked: 282 seconds  Total Distance (meters): 213.36  Predicted Distance (meters): 449.46 meters  Percentage of Predicted: 47.47  Percent Change (Calculated): 0.57      Spirometry  FEV1: 1.11L( 45.4%), FVC 1.29L( 42.2%)  FEV1/FVC 86      Assessment/Plan:     Problem List Items Addressed This Visit       Restrictive lung disease     Interstitial pulmonary disease, unspecified    HTN (hypertension)    GLENN on CPAP     CPAP transitioned to AVAPS         Chronic respiratory failure with hypoxia - Primary     PFT reviewed  FEV1: 1.11L( 45.4%)  Currently requiring 6L/NC  Patient with restrictive pattern on her PFTs and chronic hypoxic respiratory failure;   home AVAPS DME VIEMED  With her chronic immunosuppression; Added Bactrim prophylaxis  Continue supplemental oxygen and titrate as needed to maintain saturations 90% or greater  Lasix 40mg daily   Fluid restriction of 1.5L daily  Complete 10-day course of Zyvox  Continue Prednisone 40mg daily;  .          Exercise hypoxemia     POC 6 LPM           Interstitial lung disease     On going since May 2022  Dr Ashton note reviewed  ZOLOFT was discontinued  Sjogrens ILD: treatment progression: prednisone, cellcept, Rituxamab, OFEV and recently Actemra  Awaiting transplant le Marshall: was decline locally           UIP (usual interstitial pneumonitis)     Radiological consistent         Relevant Medications    sulfamethoxazole-trimethoprim 800-160mg (BACTRIM DS) 800-160 mg Tab    Mild intermittent asthma     Trelegy switched to SEREVENT diskus         BMI 36.0-36.9,adult     Patient would benefit from weight loss and has tried to set realistic goals to achieve success. Lifestyle changes were discussed on eating healthy, exercising at least 150 minutes weekly, and reducing sedentary behavior.   Discussed the risk factors associated with obesity: Arthritis/GLENN/Diabetes/Fatty Liver/Cardiovascular disease/GERD/HTN/HLP.           Other Visit Diagnoses       Chronic interstitial pneumonia        Current chronic use of systemic steroids        Relevant Medications    sulfamethoxazole-trimethoprim 800-160mg (BACTRIM DS) 800-160 mg Tab                         Agustín Aviles MD     Requested Prescriptions     Signed Prescriptions Disp Refills    sulfamethoxazole-trimethoprim 800-160mg (BACTRIM DS) 800-160 mg  Tab 12 tablet 3     Sig: Take 1 tablet by mouth every Mon, Wed, Fri.            The patient was given open opportunity to ask questions and/or express concerns about treatment plan.   All questions/concerns were discussed.     Follow up in about 2 months (around 10/7/2023).    This note was prepared using voice recognition system and is likely to have sound alike errors that may have been overlooked even after proof reading.  Please call me with any questions    Discussed diagnosis, its evaluation, treatment and usual course. All questions answered.    Thank you for the courtesy of participating in the care of this patient    Agustín Aviles MD      Personal Diagnostic Review  []  CXR    []  ECHO    []  ONSAT    []  6MWD    []  LABS    []  CHEST CT    []  PET CT    []  Biopsy results

## 2023-08-07 NOTE — ASSESSMENT & PLAN NOTE
On going since May 2022  Dr Ashton note reviewed  ZOLOFT was discontinued  Sjogrens ILD: treatment progression: prednisone, cellcept, Rituxamab, OFEV and recently Actemra  Awaiting transplant le Marshall: was decline locally

## 2023-08-08 ENCOUNTER — TELEPHONE (OUTPATIENT)
Dept: FAMILY MEDICINE | Facility: CLINIC | Age: 53
End: 2023-08-08
Payer: COMMERCIAL

## 2023-08-09 LAB
BACTERIA BLD CULT: NORMAL
BACTERIA BLD CULT: NORMAL

## 2023-08-10 DIAGNOSIS — J45.909 ASTHMA IN ADULT, UNSPECIFIED ASTHMA SEVERITY, UNSPECIFIED WHETHER COMPLICATED, UNSPECIFIED WHETHER PERSISTENT: ICD-10-CM

## 2023-08-10 RX ORDER — ALBUTEROL SULFATE 90 UG/1
AEROSOL, METERED RESPIRATORY (INHALATION)
Qty: 8.5 G | Refills: 5 | Status: SHIPPED | OUTPATIENT
Start: 2023-08-10 | End: 2023-09-25 | Stop reason: SDUPTHER

## 2023-08-11 ENCOUNTER — IMMUNIZATION (OUTPATIENT)
Dept: PHARMACY | Facility: CLINIC | Age: 53
End: 2023-08-11
Payer: COMMERCIAL

## 2023-08-11 DIAGNOSIS — Z23 NEED FOR VACCINATION: Primary | ICD-10-CM

## 2023-08-11 NOTE — PHYSICIAN QUERY
PT Name: Ayanna Alicia  MR #: [id number]     DOCUMENTATION CLARIFICATION     CDS/: Kimberly Velazquez               Contact information:  This form is a permanent document in the medical record.    Query Date: August 11, 2023    By submitting this query, we are merely seeking further clarification of documentation.  Please utilize your independent clinical judgment when addressing the question(s) below.  The Medical Record contains the following:   Indicators   Supporting Clinical Findings Location in Medical Record   x Pneumonia documented Asthma exacerbation vs pneumonia vs interstitial lung pneumonitis    Continue breathing treatments scheduled  Systemic steroids  Pulmonology broadened antibiotic(s) spectrum due to immunocompromised state  Wean supplemental oxygen as able  Received terbutaline injection in emergency department   H&P-Discharge Summary   x Chest X-Ray/CT Scan Cardiomegaly.Moderate bilateral infiltrates appear stable.  Cardiomegaly with pulmonary vascular congestion and bilateral pulmonary infiltrates greatest within the lower lobes most consistent with pulmonary edema   CXR 7/31    CXR 8/3    PaO2    PaCO2     O2 sat     x WBC  07/31 08/01 08/02 08/03 08/04 08/05   WBC 17.06 (H) 12.32 20.05 (H) 16.04 (H) 16.67 (H) 17.83 (H)    Lab 7/31-8/5   x Vital Signs Resp:  20-34  SpO2:  89 %-96 % on 8 LPM weaned down to 5/6 LPM (home setting)   VS range per H&P    Cultures      x Treatment   Underwent bronch with BAL since her last hospitalization with normal respiratory iris identified, no staph or pseudomonas, no fungal or yeast elements   Repeat sputum and AFB culture if able to produce sputum   Obtain Fungal immunodiffusion study   Check LDH, procalcitonin   With her chronic immunosuppression, broaden antibiotics to add Cefepime and Zyvox   Continue supplemental oxygen and titrate as needed to maintain saturations 90% or greater   If no clinical improvement over the course of the next 48hrs, likely  repeat CT chest   Increased prednisone to 60mg daily   Add scheduled nebs   Follow fever and WBC trends    Levaquin IV x 1  Rocephin IV  Zithromax IV  Cefepime IV  Then   Linezolid PO outpatient RX   Pulmonary CN 7/31                                    MAR   x Supplemental O2 5 LPM-8 LPM and CPAP at night Nursing Flow Sheets     x Dysphagia/Swallow study  Speech consulted for dysphagia and dysphonia, recommending follow up ent for laryngeal ulcers.    Hospital medicine PN 8/3   x Other Stated significant improvement in cough with minimal sputum production.    Hospital medicine PN 8/5     After study, please clarify the possible Pneumonia.  Thank you.    [   ] Suspected Bacterial, gram negative organism Pneumonia Ruled In     [   ] Suspected Bacterial, gram positive organism Pneumonia Ruled In     [   ] Aspiration Pneumonia/Pneumonitis Ruled In     [  x ] Unspecified Pneumonia Ruled In     [   ] Pneumonia Ruled Out     [   ] Other:____________________________________     [  ] Clinically undetermined           Please document in your progress notes daily for the duration of treatment, until resolved, and include in your discharge summary.     Form No. [id number]

## 2023-08-11 NOTE — ASSESSMENT & PLAN NOTE
Plan:   -Will increase PPI to BID dosing   -GERD lifestyle modifications   -Patient counseled on weight loss

## 2023-08-11 NOTE — PROGRESS NOTES
"Clinic Progress Note:  [facility] Progress Note    Reason for Visit:  The encounter diagnosis was Gastroesophageal reflux disease, unspecified whether esophagitis present.    PCP: Madeleine Enrique       HPI:  Ayanna Alicia is a 52 y.o. female  with history if interstitial pneumonitis, chronic respiratory failure on home O2 here for evaluation of GERD, hiatal hernia and esophageal dysmotility. Patient has had worsening reflux symptoms  and burning pain in RUQ. Protonix worked for the first few months. Since increasing her pulmonary medication, she has been experiencing lots of burping in her substernal area.   She also "feels a sensation in her rectum" and wants it examined.       ROS:  As above HPI       Allergies: Reviewed    Home Medications:   Medication List with Changes/Refills   New Medications    FUROSEMIDE (LASIX) 40 MG TABLET    Take 1 tablet (40 mg total) by mouth once daily.    PREDNISONE (DELTASONE) 20 MG TABLET    Take 2 tablets (40 mg total) by mouth once daily. for 14 days    SULFAMETHOXAZOLE-TRIMETHOPRIM 800-160MG (BACTRIM DS) 800-160 MG TAB    Take 1 tablet by mouth every Mon, Wed, Fri.   Current Medications    ASCORBIC ACID, VITAMIN C, (VITAMIN C) 500 MG TABLET    Take 500 mg by mouth once daily.    CALCIUM/MAGNESIUM/VIT B COMP (CALCIUM-MAGNESIUM-B COMPLEX ORAL)    Take 1 tablet by mouth once daily at 6am.    IPRATROPIUM (ATROVENT) 0.02 % NEBULIZER SOLUTION    Take 2.5 mLs (500 mcg total) by nebulization 4 (four) times daily. Rescue    LEVOCETIRIZINE (XYZAL) 5 MG TABLET    TAKE 1 TABLET(5 MG) BY MOUTH EVERY DAY    LOPERAMIDE (IMODIUM) 2 MG CAPSULE    Take 1 capsule (2 mg total) by mouth daily as needed for Diarrhea.    MONTELUKAST (SINGULAIR) 10 MG TABLET    Take 1 tablet (10 mg total) by mouth every evening.    MV-MINERALS/FA/OMEGA 3,6,9 #3 (WOMEN'S 50+ ADVANCED ORAL)    Take 1 tablet by mouth Daily.    NINTEDANIB (OFEV) 150 MG CAP    Take 1 capsule (150 mg total) by mouth 2 " "(two) times a day.    OMEGA-3S/DHA/EPA/FISH OIL/D3 (VITAMIN-D + OMEGA-3 ORAL)    Take 2 tablets by mouth once daily at 6am.    PANTOPRAZOLE (PROTONIX) 40 MG TABLET    Take 1 tablet (40 mg total) by mouth once daily.    SALMETEROL (SEREVENT) 50 MCG/DOSE DISKUS INHALER    Inhale 1 puff into the lungs 2 (two) times daily. Controller    VITAMIN D (VITAMIN D3) 1000 UNITS TAB    Take 1,000 Units by mouth once daily.   Changed and/or Refilled Medications    Modified Medication Previous Medication    ALBUTEROL (PROVENTIL/VENTOLIN HFA) 90 MCG/ACTUATION INHALER albuterol (PROVENTIL/VENTOLIN HFA) 90 mcg/actuation inhaler       INHALE 1 TO 2 PUFFS BY MOUTH INTO THE LUNGS EVERY 4 TO 6 HOURS AS NEEDED FOR WHEEZING OR SHORTNESS OF BREATH    INHALE 1 TO 2 PUFFS BY MOUTH INTO THE LUNGS EVERY 4 TO 6 HOURS AS NEEDED FOR WHEEZING OR SHORTNESS OF BREATH   Discontinued Medications    AMLODIPINE (NORVASC) 10 MG TABLET    Take 1 tablet (10 mg total) by mouth once daily.    BENZONATATE (TESSALON) 200 MG CAPSULE    Take 1 capsule (200 mg total) by mouth 3 (three) times daily as needed for Cough.    FLUTICASONE-UMECLIDIN-VILANTER (TRELEGY ELLIPTA) 200-62.5-25 MCG INHALER    Inhale 1 puff into the lungs once daily.    HYDROCHLOROTHIAZIDE (HYDRODIURIL) 12.5 MG TAB    Take 1 tablet (12.5 mg total) by mouth once daily.    METOLAZONE (ZAROXOLYN) 2.5 MG TABLET    Take 1 tablet (2.5 mg total) by mouth every Mon, Wed, Fri.    PREDNISONE (DELTASONE) 10 MG TABLET    Take 1 tablet (10 mg total) by mouth once daily.    SERTRALINE (ZOLOFT) 50 MG TABLET    Take 1 tablet (50 mg total) by mouth once daily.         Physical Exam:  Vital Signs:  /71   Pulse (!) 112   Ht 5' 6" (1.676 m)   Wt 101 kg (222 lb 8.9 oz)   BMI 35.92 kg/m²   Body mass index is 35.92 kg/m².    Physical Exam  Constitutional:       Appearance: Normal appearance.   HENT:      Head: Normocephalic.      Mouth/Throat:      Mouth: Mucous membranes are moist.      Pharynx: Oropharynx " is clear.   Eyes:      Conjunctiva/sclera: Conjunctivae normal.   Pulmonary:      Effort: Pulmonary effort is normal.   Abdominal:      General: There is no distension.      Palpations: Abdomen is soft.      Tenderness: There is no abdominal tenderness. There is no guarding.   Genitourinary:     Comments: DEVANTE is normal.   Neurological:      Mental Status: She is alert.          Labs: Pertinent labs reviewed.        Assessment:  Problem List Items Addressed This Visit          GI    Gastroesophageal reflux disease without esophagitis - Primary    Current Assessment & Plan     Plan:   -Will increase PPI to BID dosing   -GERD lifestyle modifications   -Patient counseled on weight loss           Gastroesophageal reflux disease, unspecified whether esophagitis present  -     Discontinue: omeprazole (PRILOSEC) 40 MG capsule; Take 1 capsule (40 mg total) by mouth 2 (two) times daily before meals.  Dispense: 180 capsule; Refill: 0              Order summary:  No orders of the defined types were placed in this encounter.      Thank you so much for allowing me to participate in the care of Ayanna Janet Ashford MD  Gastroenterology and Hepatology  [facility]

## 2023-08-14 ENCOUNTER — PATIENT MESSAGE (OUTPATIENT)
Dept: PULMONOLOGY | Facility: CLINIC | Age: 53
End: 2023-08-14
Payer: COMMERCIAL

## 2023-08-15 ENCOUNTER — PATIENT MESSAGE (OUTPATIENT)
Dept: OTOLARYNGOLOGY | Facility: CLINIC | Age: 53
End: 2023-08-15
Payer: COMMERCIAL

## 2023-08-16 ENCOUNTER — PATIENT MESSAGE (OUTPATIENT)
Dept: PULMONOLOGY | Facility: CLINIC | Age: 53
End: 2023-08-16
Payer: COMMERCIAL

## 2023-08-17 ENCOUNTER — TELEPHONE (OUTPATIENT)
Dept: INFUSION THERAPY | Facility: HOSPITAL | Age: 53
End: 2023-08-17
Payer: COMMERCIAL

## 2023-08-17 ENCOUNTER — PATIENT MESSAGE (OUTPATIENT)
Dept: PULMONOLOGY | Facility: CLINIC | Age: 53
End: 2023-08-17
Payer: COMMERCIAL

## 2023-08-17 NOTE — TELEPHONE ENCOUNTER
Tried to contact patient.  She had wanted a 10 am appointment.  I left her a message that the only other apt I had was an 8 am.  Asked patient to return my call.

## 2023-08-17 NOTE — TELEPHONE ENCOUNTER
Pt completed antibiotics for pneumonia > 1 week and denies any s/s of infections. Actemra will resume on 8/22.

## 2023-08-21 ENCOUNTER — PATIENT MESSAGE (OUTPATIENT)
Dept: PULMONOLOGY | Facility: CLINIC | Age: 53
End: 2023-08-21
Payer: COMMERCIAL

## 2023-08-21 DIAGNOSIS — J84.112 UIP (USUAL INTERSTITIAL PNEUMONITIS): ICD-10-CM

## 2023-08-21 DIAGNOSIS — J84.9 INTERSTITIAL PULMONARY DISEASE, UNSPECIFIED: Primary | ICD-10-CM

## 2023-08-21 DIAGNOSIS — Z88.0 PENICILLIN ALLERGY: ICD-10-CM

## 2023-08-21 DIAGNOSIS — Z01.419 WELL WOMAN EXAM: ICD-10-CM

## 2023-08-21 NOTE — TELEPHONE ENCOUNTER
Cough, congestions  Rios sputum  Temp 98.3  On Bactrim  Due for infusion: last 7 weeks ago  Missed infusion due hospital stay  Nebulizer x 4 yesterday      Orders Placed This Encounter   Procedures    Culture, Respiratory with Gram Stain     Bring sample from home to lab in the morning     Standing Status:   Future     Standing Expiration Date:   10/19/2024    Culture, Respiratory with Gram Stain     Standing Status:   Future     Standing Expiration Date:   10/19/2024    X-Ray Chest PA And Lateral     Standing Status:   Future     Standing Expiration Date:   8/20/2024

## 2023-08-22 ENCOUNTER — HOSPITAL ENCOUNTER (OUTPATIENT)
Dept: RADIOLOGY | Facility: HOSPITAL | Age: 53
Discharge: HOME | End: 2023-08-22
Attending: INTERNAL MEDICINE
Payer: COMMERCIAL

## 2023-08-22 ENCOUNTER — INFUSION (OUTPATIENT)
Dept: INFUSION THERAPY | Facility: HOSPITAL | Age: 53
End: 2023-08-22
Attending: INTERNAL MEDICINE
Payer: COMMERCIAL

## 2023-08-22 ENCOUNTER — PATIENT MESSAGE (OUTPATIENT)
Dept: PULMONOLOGY | Facility: CLINIC | Age: 53
End: 2023-08-22
Payer: COMMERCIAL

## 2023-08-22 VITALS
RESPIRATION RATE: 22 BRPM | DIASTOLIC BLOOD PRESSURE: 73 MMHG | WEIGHT: 220.88 LBS | TEMPERATURE: 98 F | SYSTOLIC BLOOD PRESSURE: 119 MMHG | OXYGEN SATURATION: 93 % | HEART RATE: 103 BPM | BODY MASS INDEX: 35.65 KG/M2

## 2023-08-22 DIAGNOSIS — J96.11 CHRONIC RESPIRATORY FAILURE WITH HYPOXIA: ICD-10-CM

## 2023-08-22 DIAGNOSIS — J84.9 INTERSTITIAL LUNG DISEASE: ICD-10-CM

## 2023-08-22 DIAGNOSIS — J84.9 INTERSTITIAL PULMONARY DISEASE, UNSPECIFIED: ICD-10-CM

## 2023-08-22 DIAGNOSIS — M05.9 SEROPOSITIVE RHEUMATOID ARTHRITIS: Primary | ICD-10-CM

## 2023-08-22 DIAGNOSIS — J84.112 UIP (USUAL INTERSTITIAL PNEUMONITIS): ICD-10-CM

## 2023-08-22 DIAGNOSIS — M05.9 RHEUMATOID ARTHRITIS WITH POSITIVE RHEUMATOID FACTOR, INVOLVING UNSPECIFIED SITE: ICD-10-CM

## 2023-08-22 DIAGNOSIS — J84.9 INTERSTITIAL PULMONARY DISEASE, UNSPECIFIED: Primary | ICD-10-CM

## 2023-08-22 PROCEDURE — 25000003 PHARM REV CODE 250: Performed by: INTERNAL MEDICINE

## 2023-08-22 PROCEDURE — 71046 XR CHEST PA AND LATERAL: ICD-10-PCS | Mod: 26,,, | Performed by: RADIOLOGY

## 2023-08-22 PROCEDURE — 71046 X-RAY EXAM CHEST 2 VIEWS: CPT | Mod: 26,,, | Performed by: RADIOLOGY

## 2023-08-22 PROCEDURE — 63600175 PHARM REV CODE 636 W HCPCS: Mod: JZ,JG | Performed by: INTERNAL MEDICINE

## 2023-08-22 PROCEDURE — 96375 TX/PRO/DX INJ NEW DRUG ADDON: CPT

## 2023-08-22 PROCEDURE — 96365 THER/PROPH/DIAG IV INF INIT: CPT

## 2023-08-22 PROCEDURE — 71046 X-RAY EXAM CHEST 2 VIEWS: CPT | Mod: TC

## 2023-08-22 RX ORDER — DIPHENHYDRAMINE HYDROCHLORIDE 50 MG/ML
25 INJECTION INTRAMUSCULAR; INTRAVENOUS
Status: COMPLETED | OUTPATIENT
Start: 2023-08-22 | End: 2023-08-22

## 2023-08-22 RX ORDER — SODIUM CHLORIDE 0.9 % (FLUSH) 0.9 %
10 SYRINGE (ML) INJECTION
Status: CANCELLED | OUTPATIENT
Start: 2023-08-29

## 2023-08-22 RX ORDER — DIPHENHYDRAMINE HYDROCHLORIDE 50 MG/ML
25 INJECTION INTRAMUSCULAR; INTRAVENOUS
Status: CANCELLED
Start: 2023-08-29

## 2023-08-22 RX ORDER — ACETAMINOPHEN 325 MG/1
650 TABLET ORAL
Status: CANCELLED | OUTPATIENT
Start: 2023-08-29

## 2023-08-22 RX ORDER — HEPARIN 100 UNIT/ML
500 SYRINGE INTRAVENOUS
Status: CANCELLED | OUTPATIENT
Start: 2023-08-29

## 2023-08-22 RX ORDER — ACETAMINOPHEN 325 MG/1
650 TABLET ORAL
Status: COMPLETED | OUTPATIENT
Start: 2023-08-22 | End: 2023-08-22

## 2023-08-22 RX ORDER — METHYLPREDNISOLONE SOD SUCC 125 MG
40 VIAL (EA) INJECTION
Status: DISCONTINUED | OUTPATIENT
Start: 2023-08-22 | End: 2023-08-22

## 2023-08-22 RX ADMIN — TOCILIZUMAB 400 MG: 20 INJECTION, SOLUTION, CONCENTRATE INTRAVENOUS at 01:08

## 2023-08-22 RX ADMIN — ACETAMINOPHEN 650 MG: 325 TABLET ORAL at 01:08

## 2023-08-22 RX ADMIN — METHYLPREDNISOLONE SODIUM SUCCINATE 40 MG: 40 INJECTION, POWDER, FOR SOLUTION INTRAMUSCULAR; INTRAVENOUS at 01:08

## 2023-08-22 RX ADMIN — DIPHENHYDRAMINE HYDROCHLORIDE 25 MG: 50 INJECTION INTRAMUSCULAR; INTRAVENOUS at 01:08

## 2023-08-22 NOTE — DISCHARGE INSTRUCTIONS
[facility]  [id number] [facility]  [address]  [phone] phone     [phone] fax  Hours of Operation: Monday- Friday 8:00am- 5:00pm  After hours phone  [phone]  Hematology / Oncology Physicians on call    Dr. Duc Lozano      Nurse Practitioners:    Belinda Capone, AWAIS Nunes, AWAIS Polk, AWAIS Quintero, AWAIS Raines, PA      Please don't hesitate to call if you have any concerns.   FALL PREVENTION   Falls often occur due to slipping, tripping or losing your balance. Here are ways to reduce your risk of falling again.   Was there anything that caused your fall that can be fixed, removed or replaced?   Make your home safe by keeping walkways clear of objects you may trip over.   Use non-slip pads under rugs.   Do not walk in poorly lit areas.   Do not stand on chairs or wobbly ladders.   Use caution when reaching overhead or looking upward. This position can cause a loss of balance.   Be sure your shoes fit properly, have non-slip bottoms and are in good condition.   Be cautious when going up and down stairs, curbs, and when walking on uneven sidewalks.   If your balance is poor, consider using a cane or walker.   If your fall was related to alcohol use, stop or limit alcohol intake.   If your fall was related to use of sleeping medicines, talk to your doctor about this. You may need to reduce your dosage at bedtime if you awaken during the night to go to the bathroom.   To reduce the need for nighttime bathroom trips:   Avoid drinking fluids for several hours before going to bed   Empty your bladder before going to bed   Men can keep a urinal at the bedside   © 1621-5157 [facility], [address] [place] [address], [place] [address]. All rights reserved. This information is not intended as a substitute for professional medical care. Always follow your healthcare professional's instructions.

## 2023-08-22 NOTE — TELEPHONE ENCOUNTER
Spoke with patient  Yellow sputum  No fever  On prophylactic bactrim  Await sputum  Encourage Mucinex and hydrate  Asked for face mask  Flow meter capable upto 10 LPM    Orders Placed This Encounter   Procedures    HME - OTHER     Provide face mask for oxygen     Order Specific Question:   Type of Equipment:     Answer:   Oxygen     Order Specific Question:   Height:     Answer:   5 6     Order Specific Question:   Weight:     Answer:   220    OXYGEN FOR HOME USE     Order Specific Question:   Liter Flow     Answer:   10     Order Specific Question:   Duration     Answer:   Continuous     Order Specific Question:   Qualifying Test Performed at:     Answer:   Rest     Order Specific Question:   Oxygen saturation:     Answer:   74     Order Specific Question:   Portable mode:     Answer:   continuous     Order Specific Question:   Route     Answer:   mask     Order Specific Question:   Device:     Answer:   home concentrator with portable tanks     Order Specific Question:   Length of need (in months):     Answer:   99 mos     Order Specific Question:   Patient condition with qualifying saturation     Answer:   Other - List qualifying diagnosis and code     Order Specific Question:   Select a diagnosis & list the code in the comments     Answer:   ILD (interstitial lung disease) [313905]     Order Specific Question:   Height:     Answer:   5 6     Order Specific Question:   Weight:     Answer:   220     Order Specific Question:   Alternative treatment measures have been tried or considered and deemed clinically ineffective.     Answer:   Yes    Ambulatory referral/consult to Pulmonary Rehab     Standing Status:   Future     Standing Expiration Date:   9/22/2024     Referral Priority:   Routine     Referral Type:   Consultation     Referral Reason:   Specialty Services Required     Requested Specialty:   Respiratory Therapy     Number of Visits Requested:   1

## 2023-08-22 NOTE — PLAN OF CARE
Plan of care reviewed with patient. Discussed if there are any new or ongoing concerns. Denies.    Problem: Adult Inpatient Plan of Care  Goal: Plan of Care Review  Outcome: Ongoing, Progressing  Goal: Patient-Specific Goal (Individualized)  Outcome: Ongoing, Progressing  Goal: Absence of Hospital-Acquired Illness or Injury  Outcome: Ongoing, Progressing  Intervention: Identify and Manage Fall Risk  Flowsheets (Taken 8/22/2023 1412)  Safety Promotion/Fall Prevention: in recliner, wheels locked  Goal: Optimal Comfort and Wellbeing  Outcome: Ongoing, Progressing  Intervention: Provide Person-Centered Care  Flowsheets (Taken 8/22/2023 1412)  Trust Relationship/Rapport:   care explained   reassurance provided   thoughts/feelings acknowledged   emotional support provided   choices provided   empathic listening provided   questions answered   questions encouraged

## 2023-08-23 ENCOUNTER — PATIENT MESSAGE (OUTPATIENT)
Dept: FAMILY MEDICINE | Facility: CLINIC | Age: 53
End: 2023-08-23
Payer: COMMERCIAL

## 2023-08-23 ENCOUNTER — LAB VISIT (OUTPATIENT)
Dept: LAB | Facility: HOSPITAL | Age: 53
End: 2023-08-23
Attending: INTERNAL MEDICINE
Payer: COMMERCIAL

## 2023-08-23 DIAGNOSIS — J84.9 INTERSTITIAL PULMONARY DISEASE, UNSPECIFIED: ICD-10-CM

## 2023-08-23 DIAGNOSIS — J84.112 UIP (USUAL INTERSTITIAL PNEUMONITIS): ICD-10-CM

## 2023-08-23 PROCEDURE — 87070 CULTURE OTHR SPECIMN AEROBIC: CPT | Performed by: INTERNAL MEDICINE

## 2023-08-23 PROCEDURE — 87205 SMEAR GRAM STAIN: CPT | Performed by: INTERNAL MEDICINE

## 2023-08-23 NOTE — TELEPHONE ENCOUNTER
Orders Placed This Encounter   Procedures    Culture, Respiratory with Gram Stain     Bring sample from home to lab in the morning     Standing Status:   Future     Standing Expiration Date:   10/19/2024    Culture, Respiratory with Gram Stain     Standing Status:   Future     Standing Expiration Date:   10/19/2024    X-Ray Chest PA And Lateral     Standing Status:   Future     Number of Occurrences:   1     Standing Expiration Date:   8/20/2024    Ambulatory referral/consult to Gynecology     Standing Status:   Future     Standing Expiration Date:   9/23/2024     Referral Priority:   Routine     Referral Type:   Consultation     Referral Reason:   Specialty Services Required     Requested Specialty:   Gynecology     Number of Visits Requested:   1    Ambulatory referral/consult to Allergy     Standing Status:   Future     Standing Expiration Date:   9/23/2024     Referral Priority:   Routine     Referral Type:   Allergy Testing     Referral Reason:   Specialty Services Required     Requested Specialty:   Allergy     Number of Visits Requested:   1

## 2023-08-23 NOTE — TELEPHONE ENCOUNTER
Patient call returned. She stated that she is trying to get on the transplant list for her lungs. She stated the facility will need more information on her health before she can be placed on the list. Patient stated she needs a vaginal exam, her mammogram report, etc faxed over. She also inquired about her catapult form. She stated that she will bring in another form on Friday when she comes in to see Dr. Garcia.

## 2023-08-24 ENCOUNTER — PATIENT MESSAGE (OUTPATIENT)
Dept: FAMILY MEDICINE | Facility: CLINIC | Age: 53
End: 2023-08-24
Payer: COMMERCIAL

## 2023-08-24 ENCOUNTER — TELEPHONE (OUTPATIENT)
Dept: PULMONOLOGY | Facility: CLINIC | Age: 53
End: 2023-08-24
Payer: COMMERCIAL

## 2023-08-24 NOTE — TELEPHONE ENCOUNTER
----- Message from Lilly Barbosa sent at 8/24/2023  1:29 PM CDT -----  Contact: Gertrudis/ pt [facility]  Gertrudis is calling to speak to the nurse regarding the patient oxygen is desatting when walk during physical therapy, she would like to know what to do. Please give her a call at     Thanks  LJ

## 2023-08-24 NOTE — TELEPHONE ENCOUNTER
Spoke with Gertrudis. She called asking for parameters for pt home health physical therapy. Advised her that we were trying to get her back in pulmonary rehab which I think would be best for her. She stated that she agree because there really isn't much they can do for her at home with her stats. Advised her that we were waiting for approval for insurance. She stated that she will go ahead and put the discontinue order for home health physical therapy.

## 2023-08-25 ENCOUNTER — PATIENT MESSAGE (OUTPATIENT)
Dept: PULMONOLOGY | Facility: CLINIC | Age: 53
End: 2023-08-25
Payer: COMMERCIAL

## 2023-08-25 ENCOUNTER — OFFICE VISIT (OUTPATIENT)
Dept: FAMILY MEDICINE | Facility: CLINIC | Age: 53
End: 2023-08-25
Payer: COMMERCIAL

## 2023-08-25 VITALS
BODY MASS INDEX: 35.26 KG/M2 | HEIGHT: 66 IN | DIASTOLIC BLOOD PRESSURE: 80 MMHG | HEART RATE: 107 BPM | SYSTOLIC BLOOD PRESSURE: 124 MMHG | OXYGEN SATURATION: 97 % | TEMPERATURE: 98 F | WEIGHT: 219.38 LBS

## 2023-08-25 DIAGNOSIS — Z01.419 WELL WOMAN EXAM: Primary | ICD-10-CM

## 2023-08-25 PROCEDURE — 3008F BODY MASS INDEX DOCD: CPT | Mod: CPTII,S$GLB,, | Performed by: NURSE PRACTITIONER

## 2023-08-25 PROCEDURE — 1111F PR DISCHARGE MEDS RECONCILED W/ CURRENT OUTPATIENT MED LIST: ICD-10-PCS | Mod: CPTII,S$GLB,, | Performed by: NURSE PRACTITIONER

## 2023-08-25 PROCEDURE — 99396 PREV VISIT EST AGE 40-64: CPT | Mod: S$GLB,,, | Performed by: NURSE PRACTITIONER

## 2023-08-25 PROCEDURE — 3044F HG A1C LEVEL LT 7.0%: CPT | Mod: CPTII,S$GLB,, | Performed by: NURSE PRACTITIONER

## 2023-08-25 PROCEDURE — 1159F MED LIST DOCD IN RCRD: CPT | Mod: CPTII,S$GLB,, | Performed by: NURSE PRACTITIONER

## 2023-08-25 PROCEDURE — 99999 PR PBB SHADOW E&M-EST. PATIENT-LVL IV: CPT | Mod: PBBFAC,,, | Performed by: NURSE PRACTITIONER

## 2023-08-25 PROCEDURE — 3079F DIAST BP 80-89 MM HG: CPT | Mod: CPTII,S$GLB,, | Performed by: NURSE PRACTITIONER

## 2023-08-25 PROCEDURE — 3079F PR MOST RECENT DIASTOLIC BLOOD PRESSURE 80-89 MM HG: ICD-10-PCS | Mod: CPTII,S$GLB,, | Performed by: NURSE PRACTITIONER

## 2023-08-25 PROCEDURE — 3074F SYST BP LT 130 MM HG: CPT | Mod: CPTII,S$GLB,, | Performed by: NURSE PRACTITIONER

## 2023-08-25 PROCEDURE — 99396 PR PREVENTIVE VISIT,EST,40-64: ICD-10-PCS | Mod: S$GLB,,, | Performed by: NURSE PRACTITIONER

## 2023-08-25 PROCEDURE — 3074F PR MOST RECENT SYSTOLIC BLOOD PRESSURE < 130 MM HG: ICD-10-PCS | Mod: CPTII,S$GLB,, | Performed by: NURSE PRACTITIONER

## 2023-08-25 PROCEDURE — 99999 PR PBB SHADOW E&M-EST. PATIENT-LVL IV: ICD-10-PCS | Mod: PBBFAC,,, | Performed by: NURSE PRACTITIONER

## 2023-08-25 PROCEDURE — 88175 CYTOPATH C/V AUTO FLUID REDO: CPT | Performed by: NURSE PRACTITIONER

## 2023-08-25 PROCEDURE — 87624 HPV HI-RISK TYP POOLED RSLT: CPT | Performed by: NURSE PRACTITIONER

## 2023-08-25 PROCEDURE — 3008F PR BODY MASS INDEX (BMI) DOCUMENTED: ICD-10-PCS | Mod: CPTII,S$GLB,, | Performed by: NURSE PRACTITIONER

## 2023-08-25 PROCEDURE — 1159F PR MEDICATION LIST DOCUMENTED IN MEDICAL RECORD: ICD-10-PCS | Mod: CPTII,S$GLB,, | Performed by: NURSE PRACTITIONER

## 2023-08-25 PROCEDURE — 3044F PR MOST RECENT HEMOGLOBIN A1C LEVEL <7.0%: ICD-10-PCS | Mod: CPTII,S$GLB,, | Performed by: NURSE PRACTITIONER

## 2023-08-25 PROCEDURE — 1160F RVW MEDS BY RX/DR IN RCRD: CPT | Mod: CPTII,S$GLB,, | Performed by: NURSE PRACTITIONER

## 2023-08-25 PROCEDURE — 1111F DSCHRG MED/CURRENT MED MERGE: CPT | Mod: CPTII,S$GLB,, | Performed by: NURSE PRACTITIONER

## 2023-08-25 PROCEDURE — 1160F PR REVIEW ALL MEDS BY PRESCRIBER/CLIN PHARMACIST DOCUMENTED: ICD-10-PCS | Mod: CPTII,S$GLB,, | Performed by: NURSE PRACTITIONER

## 2023-08-25 NOTE — PROGRESS NOTES
Ayanna Alicia  2023  [id number]    Madeleine Enrique MD  Patient Care Team:  Madeleine Enrique MD as PCP - General (Family Medicine)  Jarad Contreras MD as Consulting Physician (Cardiology)  Chicho Lewis MD as Consulting Physician (Rheumatology)  Cullen Steele NP as Nurse Practitioner (Family Medicine)  Agustín Aviles MD as Consulting Physician (Pulmonary Disease)          Visit Type:an urgent visit for a new problem    Chief Complaint:  Chief Complaint   Patient presents with    Gynecologic Exam       History of Present Illness:    52-year-old female presents today for well-woman exam prior to her lung transplant.  Patient has no complaints or concerns at this time and is not requesting any medication refills.    History:  Past Medical History:   Diagnosis Date    Abnormal Pap smear of cervix     in the past with repeat pap smear ok.    Acute interstitial pneumonitis     Allergic rhinitis, cause unspecified     Arthritis of both knees     Asthma     Eczema     Fatty liver 10/2014    Fibrocystic breast changes     Headache(784.0)     Hepatomegaly 10/2014    Hypertension     Liver cyst 10/2014    Multinodular goiter     Followed by ENT - Dr. Nicolas Gray    Polymenorrhea 2008    TMJ (dislocation of temporomandibular joint)     Uterine fibroid     in the past    Vitamin D deficiency disease      Past Surgical History:   Procedure Laterality Date    BRONCHOSCOPY Bilateral 2023    Procedure: Bronchoscopy - insert lighted tube into airway to take a biopsy of lung;  Surgeon: Agustín Aviles MD;  Location: [facility];  Service: Pulmonary;  Laterality: Bilateral;     SECTION, CLASSIC      x 1    COLONOSCOPY N/A 2020    Procedure: COLONOSCOPY;  Surgeon: Angie Magana MD;  Location: [facility];  Service: Endoscopy;  Laterality: N/A;    HYSTERECTOMY      MULTIPLE TOOTH EXTRACTIONS      PARTIAL HYSTERECTOMY  2013    Due to fibroids     Family History   Problem Relation Age  of Onset    Stroke Mother     Heart disease Mother     Diverticulitis Mother     Heart disease Father 63        MI/CAD    Hypertension Father     Stroke Sister     No Known Problems Brother     Cancer Maternal Grandmother 60        Rectal and stomach cancer    Stroke Maternal Grandfather     Diabetes Maternal Grandfather     Peripheral vascular disease Maternal Grandfather     Cancer Maternal Aunt 50        Stomach cancer    Cancer Maternal Aunt 66        Lung cancer (smoker)    Migraines Cousin     Cataracts Cousin      Social History     Socioeconomic History    Marital status: Single    Number of children: 0   Occupational History    Occupation:      Employer: [employer]     Comment: [employer]   Tobacco Use    Smoking status: Never     Passive exposure: Past    Smokeless tobacco: Never   Substance and Sexual Activity    Alcohol use: Not Currently     Comment: last use 03/2022    Drug use: Not Currently     Frequency: 4.0 times per week     Types: Marijuana     Comment: last year was last use 03/2022    Sexual activity: Yes     Partners: Female   Social History Narrative    She wears seatbelt. Her son was killed in 2010.     Social Determinants of Health     Financial Resource Strain: Low Risk  (4/2/2020)    Overall Financial Resource Strain (CARDIA)     Difficulty of Paying Living Expenses: Not very hard   Food Insecurity: No Food Insecurity (4/2/2020)    Hunger Vital Sign     Worried About Running Out of Food in the Last Year: Never true     Ran Out of Food in the Last Year: Never true   Transportation Needs: No Transportation Needs (4/2/2020)    PRAPARE - Transportation     Lack of Transportation (Medical): No     Lack of Transportation (Non-Medical): No   Physical Activity: Inactive (3/24/2022)    Exercise Vital Sign     Days of Exercise per Week: 0 days     Minutes of Exercise per Session: 0 min   Stress: No Stress Concern Present (8/15/2019)    [employer] -  Occupational Stress Questionnaire     Feeling of Stress : Not at all   Social Connections: Moderately Isolated (4/2/2020)    Social Connection and Isolation Panel [NHANES]     Frequency of Communication with Friends and Family: More than three times a week     Frequency of Social Gatherings with Friends and Family: Twice a week     Attends Sikh Services: 1 to 4 times per year     Active Member of Clubs or Organizations: No     Attends Club or Organization Meetings: Never     Marital Status: Never      Patient Active Problem List   Diagnosis    HTN (hypertension)    Multinodular goiter    Seasonal allergies    Gastroesophageal reflux disease without esophagitis    Vitamin D deficiency    Surgical menopause    Patella-femoral syndrome    Localized osteoarthrosis not specified whether primary or secondary, lower leg    Benign colon polyp    GLENN on CPAP    Chronic respiratory failure with hypoxia    Elevated IgE level    Elevated rheumatoid factor    Restrictive lung disease    PLMD (periodic limb movement disorder)    Exercise hypoxemia    Interstitial lung disease    Rheumatoid arthritis with positive rheumatoid factor    Cough    Interstitial pulmonary disease, unspecified    High risk medication use    Obesity with serious comorbidity    UIP (usual interstitial pneumonitis)    Mild intermittent asthma    BMI 36.0-36.9,adult    Seropositive rheumatoid arthritis    Elevated troponin    Dysphonia due to laryngeal ulcers    Dysphagia     Review of patient's allergies indicates:   Allergen Reactions    Doxycycline Nausea Only     Other reaction(s): Nausea    Fluticasone Other (See Comments)     Other reaction(s): Epistaxis      Penicillins      Other reaction(s): unknown  Pt tolerated ceftriaxone       The following were reviewed at this visit: active problem list, medication list, allergies, family history, social history, and health maintenance.    Medications:  Current Outpatient Medications on File Prior  to Visit   Medication Sig Dispense Refill    albuterol (PROVENTIL/VENTOLIN HFA) 90 mcg/actuation inhaler INHALE 1 TO 2 PUFFS BY MOUTH INTO THE LUNGS EVERY 4 TO 6 HOURS AS NEEDED FOR WHEEZING OR SHORTNESS OF BREATH 8.5 g 5    ascorbic acid, vitamin C, (VITAMIN C) 500 MG tablet Take 500 mg by mouth once daily.      calcium/magnesium/vit B comp (CALCIUM-MAGNESIUM-B COMPLEX ORAL) Take 1 tablet by mouth once daily at 6am.      furosemide (LASIX) 40 MG tablet Take 1 tablet (40 mg total) by mouth once daily. 30 tablet 0    ipratropium (ATROVENT) 0.02 % nebulizer solution Take 2.5 mLs (500 mcg total) by nebulization 4 (four) times daily. Rescue (Patient not taking: Reported on 8/7/2023) 300 mL 11    levocetirizine (XYZAL) 5 MG tablet TAKE 1 TABLET(5 MG) BY MOUTH EVERY DAY 90 tablet 1    loperamide (IMODIUM) 2 mg capsule Take 1 capsule (2 mg total) by mouth daily as needed for Diarrhea. (Patient not taking: Reported on 8/7/2023) 90 capsule 0    montelukast (SINGULAIR) 10 mg tablet Take 1 tablet (10 mg total) by mouth every evening. 30 tablet 11    mv-minerals/FA/omega 3,6,9 #3 (WOMEN'S 50+ ADVANCED ORAL) Take 1 tablet by mouth Daily.      nintedanib (OFEV) 150 mg Cap Take 1 capsule (150 mg total) by mouth 2 (two) times a day. 60 capsule 11    omega-3s/dha/epa/fish oil/D3 (VITAMIN-D + OMEGA-3 ORAL) Take 2 tablets by mouth once daily at 6am.      pantoprazole (PROTONIX) 40 MG tablet Take 1 tablet (40 mg total) by mouth once daily. 30 tablet 11    salmeteroL (SEREVENT) 50 mcg/dose diskus inhaler Inhale 1 puff into the lungs 2 (two) times daily. Controller      sars-cov-2, covid-19 omicron, (MODERNA COVID BIVAL,6M UP,,PF,) 50 mcg/0.5 mL injection Inject into the muscle. 0.5 mL 0    sulfamethoxazole-trimethoprim 800-160mg (BACTRIM DS) 800-160 mg Tab Take 1 tablet by mouth every Mon, Wed, Fri. 12 tablet 3    vitamin D (VITAMIN D3) 1000 units Tab Take 1,000 Units by mouth once daily.       No current facility-administered  medications on file prior to visit.       Medications have been reviewed and reconciled with patient at this visit.  Barriers to medications reviewed with patient.    Adverse reactions to current medications reviewed with patient..    Over the counter medications reviewed and reconciled with patient.    Exam:  Wt Readings from Last 3 Encounters:   08/25/23 99.5 kg (219 lb 5.7 oz)   08/22/23 100.2 kg (220 lb 14.4 oz)   08/07/23 102.3 kg (225 lb 8.5 oz)     Temp Readings from Last 3 Encounters:   08/25/23 97.6 °F (36.4 °C) (Temporal)   08/22/23 98.3 °F (36.8 °C)   08/05/23 98.2 °F (36.8 °C)     BP Readings from Last 3 Encounters:   08/25/23 124/80   08/22/23 119/73   08/07/23 121/60     Pulse Readings from Last 3 Encounters:   08/25/23 107   08/22/23 103   08/07/23 (!) 115     Body mass index is 35.41 kg/m².      Review of Systems   Constitutional:  Negative for fever.   Respiratory:  Positive for shortness of breath. Negative for cough and wheezing.    Cardiovascular:  Negative for chest pain and palpitations.   Gastrointestinal:  Negative for nausea.   Neurological:  Negative for speech change, weakness and headaches.   All other systems reviewed and are negative.    Physical Exam  Vitals and nursing note reviewed.   Constitutional:       Appearance: Normal appearance. She is obese.   HENT:      Head: Normocephalic and atraumatic.      Right Ear: Tympanic membrane, ear canal and external ear normal.      Left Ear: Tympanic membrane, ear canal and external ear normal.      Nose: Nose normal.      Mouth/Throat:      Mouth: Mucous membranes are moist.      Pharynx: Oropharynx is clear.   Eyes:      Extraocular Movements: Extraocular movements intact.      Conjunctiva/sclera: Conjunctivae normal.      Pupils: Pupils are equal, round, and reactive to light.   Cardiovascular:      Rate and Rhythm: Normal rate and regular rhythm.      Pulses: Normal pulses.      Heart sounds: Normal heart sounds.   Pulmonary:      Effort:  Pulmonary effort is normal.      Breath sounds: Normal breath sounds.      Comments: Patient using portable oxygen  Abdominal:      General: Bowel sounds are normal.      Palpations: Abdomen is soft.   Musculoskeletal:         General: Normal range of motion.      Cervical back: Normal range of motion and neck supple.   Skin:     General: Skin is warm and dry.      Capillary Refill: Capillary refill takes less than 2 seconds.   Neurological:      General: No focal deficit present.      Mental Status: She is alert and oriented to person, place, and time.   Psychiatric:         Mood and Affect: Mood normal.         Behavior: Behavior normal.         Thought Content: Thought content normal.         Judgment: Judgment normal.         Laboratory Reviewed ({Yes)  Lab Results   Component Value Date    WBC 17.83 (H) 08/05/2023    HGB 10.6 (L) 08/05/2023    HCT 35.0 (L) 08/05/2023     08/05/2023    CHOL 223 (H) 07/13/2023    TRIG 257 (H) 07/13/2023    HDL 33 (L) 07/13/2023    ALT 47 (H) 07/31/2023    AST 46 (H) 07/31/2023     08/05/2023    K 4.2 08/05/2023     08/05/2023    CREATININE 0.8 08/05/2023    BUN 12 08/05/2023    CO2 29 08/05/2023    TSH 0.708 07/13/2023    INR 1.0 04/27/2022    GLUF 77 01/03/2007    HGBA1C 5.6 08/15/2023       Ayanna was seen today for gynecologic exam.    Diagnoses and all orders for this visit:    Well woman exam  -     Liquid-Based Pap Smear, Screening  -     HPV High Risk Genotypes, PCR        The current medical regimen is effective;  continue present plan and medications.     Care Plan/Goals: Reviewed    Goals         Blood Pressure < 140/90 (pt-stated)         Ayanna was seen today for gynecologic exam.    Diagnoses and all orders for this visit:    Well woman exam  -     Liquid-Based Pap Smear, Screening  -     HPV High Risk Genotypes, PCR         Follow up: Follow up for with  for 3 month follow up.    After visit summary was printed and given to patient upon  discharge today.  Patient goals and care plan are included in After Visit Summary.

## 2023-08-26 ENCOUNTER — PATIENT MESSAGE (OUTPATIENT)
Dept: PULMONOLOGY | Facility: CLINIC | Age: 53
End: 2023-08-26
Payer: COMMERCIAL

## 2023-08-26 LAB
BACTERIA SPEC AEROBE CULT: NORMAL
BACTERIA SPEC AEROBE CULT: NORMAL
GRAM STN SPEC: NORMAL

## 2023-08-27 DIAGNOSIS — J84.9 INTERSTITIAL PULMONARY DISEASE, UNSPECIFIED: ICD-10-CM

## 2023-08-27 DIAGNOSIS — J84.112 UIP (USUAL INTERSTITIAL PNEUMONITIS): ICD-10-CM

## 2023-08-27 DIAGNOSIS — K52.1 DIARRHEA DUE TO DRUG: ICD-10-CM

## 2023-08-29 DIAGNOSIS — J84.112 UIP (USUAL INTERSTITIAL PNEUMONITIS): Primary | ICD-10-CM

## 2023-08-29 DIAGNOSIS — J96.11 CHRONIC RESPIRATORY FAILURE WITH HYPOXIA: ICD-10-CM

## 2023-08-29 DIAGNOSIS — J98.4 RESTRICTIVE LUNG DISEASE: ICD-10-CM

## 2023-08-29 DIAGNOSIS — J84.9 INTERSTITIAL LUNG DISEASE: ICD-10-CM

## 2023-08-29 RX ORDER — LOPERAMIDE HYDROCHLORIDE 2 MG/1
2 CAPSULE ORAL DAILY PRN
Qty: 90 CAPSULE | Refills: 0 | Status: SHIPPED | OUTPATIENT
Start: 2023-08-29 | End: 2023-09-29

## 2023-08-29 NOTE — PROGRESS NOTES
Orders Placed This Encounter   Procedures    Ambulatory referral/consult to Pulmonary Rehab     Standing Status:   Future     Standing Expiration Date:   9/29/2024     Referral Priority:   Routine     Referral Type:   Consultation     Referral Reason:   Specialty Services Required     Requested Specialty:   Respiratory Therapy     Number of Visits Requested:   1

## 2023-08-30 ENCOUNTER — PATIENT MESSAGE (OUTPATIENT)
Dept: PULMONOLOGY | Facility: CLINIC | Age: 53
End: 2023-08-30
Payer: COMMERCIAL

## 2023-08-30 DIAGNOSIS — R76.8 ELEVATED RHEUMATOID FACTOR: ICD-10-CM

## 2023-08-30 DIAGNOSIS — J84.111 CHRONIC INTERSTITIAL PNEUMONIA: ICD-10-CM

## 2023-08-30 DIAGNOSIS — J98.4 RESTRICTIVE LUNG DISEASE: ICD-10-CM

## 2023-08-30 RX ORDER — FUROSEMIDE 40 MG/1
40 TABLET ORAL EVERY OTHER DAY
Qty: 45 TABLET | Refills: 0 | Status: ON HOLD | OUTPATIENT
Start: 2023-08-30 | End: 2023-09-17 | Stop reason: SDUPTHER

## 2023-08-30 RX ORDER — PREDNISONE 10 MG/1
10 TABLET ORAL DAILY
Qty: 60 TABLET | Refills: 0 | Status: ON HOLD | OUTPATIENT
Start: 2023-08-30 | End: 2023-09-17 | Stop reason: HOSPADM

## 2023-08-31 ENCOUNTER — PATIENT MESSAGE (OUTPATIENT)
Dept: PULMONOLOGY | Facility: CLINIC | Age: 53
End: 2023-08-31
Payer: COMMERCIAL

## 2023-09-01 LAB
FINAL PATHOLOGIC DIAGNOSIS: NORMAL
HPV HR 12 DNA SPEC QL NAA+PROBE: NEGATIVE
HPV16 AG SPEC QL: NEGATIVE
HPV18 DNA SPEC QL NAA+PROBE: NEGATIVE
Lab: NORMAL

## 2023-09-05 ENCOUNTER — PATIENT MESSAGE (OUTPATIENT)
Dept: PULMONOLOGY | Facility: CLINIC | Age: 53
End: 2023-09-05
Payer: COMMERCIAL

## 2023-09-05 ENCOUNTER — PATIENT MESSAGE (OUTPATIENT)
Dept: OTOLARYNGOLOGY | Facility: CLINIC | Age: 53
End: 2023-09-05
Payer: COMMERCIAL

## 2023-09-05 NOTE — TELEPHONE ENCOUNTER
Pt wanting to do pulmonary rehab.  She stated that it has already been ordered but there might be an insurance issue.  She said that you had more info about it.

## 2023-09-06 ENCOUNTER — OFFICE VISIT (OUTPATIENT)
Dept: ALLERGY | Facility: CLINIC | Age: 53
End: 2023-09-06
Payer: COMMERCIAL

## 2023-09-06 VITALS
HEART RATE: 92 BPM | BODY MASS INDEX: 35.41 KG/M2 | TEMPERATURE: 99 F | DIASTOLIC BLOOD PRESSURE: 72 MMHG | SYSTOLIC BLOOD PRESSURE: 104 MMHG | HEIGHT: 66 IN

## 2023-09-06 DIAGNOSIS — Z88.0 PENICILLIN ALLERGY: ICD-10-CM

## 2023-09-06 DIAGNOSIS — H61.23 IMPACTED CERUMEN OF BOTH EARS: ICD-10-CM

## 2023-09-06 DIAGNOSIS — T50.905A ADVERSE EFFECT OF DRUG, INITIAL ENCOUNTER: Primary | ICD-10-CM

## 2023-09-06 DIAGNOSIS — J31.0 CHRONIC RHINITIS: ICD-10-CM

## 2023-09-06 PROBLEM — J45.20 MILD INTERMITTENT ASTHMA: Status: RESOLVED | Noted: 2023-03-17 | Resolved: 2023-09-06

## 2023-09-06 PROCEDURE — 3078F PR MOST RECENT DIASTOLIC BLOOD PRESSURE < 80 MM HG: ICD-10-PCS | Mod: CPTII,S$GLB,, | Performed by: STUDENT IN AN ORGANIZED HEALTH CARE EDUCATION/TRAINING PROGRAM

## 2023-09-06 PROCEDURE — 3044F HG A1C LEVEL LT 7.0%: CPT | Mod: CPTII,S$GLB,, | Performed by: STUDENT IN AN ORGANIZED HEALTH CARE EDUCATION/TRAINING PROGRAM

## 2023-09-06 PROCEDURE — 3008F PR BODY MASS INDEX (BMI) DOCUMENTED: ICD-10-PCS | Mod: CPTII,S$GLB,, | Performed by: STUDENT IN AN ORGANIZED HEALTH CARE EDUCATION/TRAINING PROGRAM

## 2023-09-06 PROCEDURE — 3044F PR MOST RECENT HEMOGLOBIN A1C LEVEL <7.0%: ICD-10-PCS | Mod: CPTII,S$GLB,, | Performed by: STUDENT IN AN ORGANIZED HEALTH CARE EDUCATION/TRAINING PROGRAM

## 2023-09-06 PROCEDURE — 3078F DIAST BP <80 MM HG: CPT | Mod: CPTII,S$GLB,, | Performed by: STUDENT IN AN ORGANIZED HEALTH CARE EDUCATION/TRAINING PROGRAM

## 2023-09-06 PROCEDURE — 3074F PR MOST RECENT SYSTOLIC BLOOD PRESSURE < 130 MM HG: ICD-10-PCS | Mod: CPTII,S$GLB,, | Performed by: STUDENT IN AN ORGANIZED HEALTH CARE EDUCATION/TRAINING PROGRAM

## 2023-09-06 PROCEDURE — 1160F PR REVIEW ALL MEDS BY PRESCRIBER/CLIN PHARMACIST DOCUMENTED: ICD-10-PCS | Mod: CPTII,S$GLB,, | Performed by: STUDENT IN AN ORGANIZED HEALTH CARE EDUCATION/TRAINING PROGRAM

## 2023-09-06 PROCEDURE — 99999 PR PBB SHADOW E&M-EST. PATIENT-LVL V: ICD-10-PCS | Mod: PBBFAC,,, | Performed by: STUDENT IN AN ORGANIZED HEALTH CARE EDUCATION/TRAINING PROGRAM

## 2023-09-06 PROCEDURE — 99204 PR OFFICE/OUTPT VISIT, NEW, LEVL IV, 45-59 MIN: ICD-10-PCS | Mod: S$GLB,,, | Performed by: STUDENT IN AN ORGANIZED HEALTH CARE EDUCATION/TRAINING PROGRAM

## 2023-09-06 PROCEDURE — 3074F SYST BP LT 130 MM HG: CPT | Mod: CPTII,S$GLB,, | Performed by: STUDENT IN AN ORGANIZED HEALTH CARE EDUCATION/TRAINING PROGRAM

## 2023-09-06 PROCEDURE — 3008F BODY MASS INDEX DOCD: CPT | Mod: CPTII,S$GLB,, | Performed by: STUDENT IN AN ORGANIZED HEALTH CARE EDUCATION/TRAINING PROGRAM

## 2023-09-06 PROCEDURE — 1159F MED LIST DOCD IN RCRD: CPT | Mod: CPTII,S$GLB,, | Performed by: STUDENT IN AN ORGANIZED HEALTH CARE EDUCATION/TRAINING PROGRAM

## 2023-09-06 PROCEDURE — 99204 OFFICE O/P NEW MOD 45 MIN: CPT | Mod: S$GLB,,, | Performed by: STUDENT IN AN ORGANIZED HEALTH CARE EDUCATION/TRAINING PROGRAM

## 2023-09-06 PROCEDURE — 99999 PR PBB SHADOW E&M-EST. PATIENT-LVL V: CPT | Mod: PBBFAC,,, | Performed by: STUDENT IN AN ORGANIZED HEALTH CARE EDUCATION/TRAINING PROGRAM

## 2023-09-06 PROCEDURE — 1160F RVW MEDS BY RX/DR IN RCRD: CPT | Mod: CPTII,S$GLB,, | Performed by: STUDENT IN AN ORGANIZED HEALTH CARE EDUCATION/TRAINING PROGRAM

## 2023-09-06 PROCEDURE — 1159F PR MEDICATION LIST DOCUMENTED IN MEDICAL RECORD: ICD-10-PCS | Mod: CPTII,S$GLB,, | Performed by: STUDENT IN AN ORGANIZED HEALTH CARE EDUCATION/TRAINING PROGRAM

## 2023-09-06 RX ORDER — AMOXICILLIN 400 MG/5ML
500 POWDER, FOR SUSPENSION ORAL ONCE
Qty: 50 ML | Refills: 0 | Status: SHIPPED | OUTPATIENT
Start: 2023-09-06 | End: 2023-09-09

## 2023-09-06 RX ORDER — OMEPRAZOLE 40 MG/1
40 CAPSULE, DELAYED RELEASE ORAL DAILY
COMMUNITY
End: 2023-09-14 | Stop reason: SDUPTHER

## 2023-09-06 NOTE — PROGRESS NOTES
Allergy and Immunology  New Patient Clinic Note    Date: 9/6/2023  Chief Complaint   Patient presents with    Other     Referred by: Agustín Aviles MD  [address] [facility] [place] [address]    History  Ayanna Alicia is a 52 y.o. female being seen as a New Patient today.    Rhinitis   - Onset: Childhood   - Symptoms: Nasal congestion, sneezing   - Suspected triggers include: multiple SPT positives in the past but not working up at this time due to pending lung transplant    - Symptoms do not appear to be bothersome or main concern of patient at this time   - [place] noting patient was previous followed by Dr. Harrell in [place] at on AIT for unclear amount of time   - Medications: Not on medications at this time - not requesting   - Patient is open to following up on this topic after resolution of lung situation - transplant   - Not candidate for AIT at this time or skin testing     Urticaria  - No hx of chronic urticaria     Asthma   - No hx of asthma   - Chronic resp failure 2/2 ILD managed by Pulm with plans for lung transplant    CRSwNP  - No hx of CRSwNP     Eczema   - No hx of eczema     Food Allergy  - No hx of food allergy     Drug Allergy  - PCN: Told by mother as a child she was allergic   - Unclear if ever reaction and may have since but hx not clear enough to remove from chart without challenge    - No reported hx of anaphylaxis and no exposure in over 10 years   - Doxycycline: Patient knows she is not allergic - she gets nauseated with doxycycline     Recurrent Infections  - No hx of recurrent infections  - Patient on Prednisone 20 mg daily for ILD and strict adherent to ppx Bactrim for PJP PNA ppx   - Pending transplant and to follow for immune function with immunosuppressants in the future     Venom Allergy  - No hx of venom allergy    Family History  - Brother: Multiple food allergies; PCN allergy (anaphylaxis)     Allergies, PMH, PSH, Social, and Family History were reviewed.    Review of  patient's allergies indicates:   Allergen Reactions    Doxycycline Nausea Only     Other reaction(s): Nausea    Fluticasone Other (See Comments)     Other reaction(s): Epistaxis      Penicillins      Other reaction(s): unknown  Pt tolerated ceftriaxone      Past Medical History:   Diagnosis Date    Abnormal Pap smear of cervix     in the past with repeat pap smear okay.    Acute interstitial pneumonitis     Allergic rhinitis, cause unspecified     Arthritis of both knees     Asthma     Eczema     Fatty liver 10/2014    Fibrocystic breast changes     Headache(784.0)     Hepatomegaly 10/2014    Hypertension     Liver cyst 10/2014    Multinodular goiter     Followed by ENT - Dr. Nicolas Gray    Polymenorrhea     TMJ (dislocation of temporomandibular joint)     Uterine fibroid     in the past    Vitamin D deficiency disease      Past Surgical History:   Procedure Laterality Date    BRONCHOSCOPY Bilateral 2023    Procedure: Bronchoscopy - insert lighted tube into airway to take a biopsy of lung;  Surgeon: Agustín Aviles MD;  Location: [facility];  Service: Pulmonary;  Laterality: Bilateral;     SECTION, CLASSIC      x 1    COLONOSCOPY N/A 2020    Procedure: COLONOSCOPY;  Surgeon: Angie Magana MD;  Location: [facility];  Service: Endoscopy;  Laterality: N/A;    HYSTERECTOMY      MULTIPLE TOOTH EXTRACTIONS      PARTIAL HYSTERECTOMY  2013    Due to fibroids     Social History     Social History Narrative    She wears seatbelt. Her son was killed in .     S/he reports that she has never smoked. She has been exposed to tobacco smoke. She has never used smokeless tobacco. She reports that she does not currently use alcohol. She reports that she does not currently use drugs after having used the following drugs: Marijuana. Frequency: 4.00 times per week.    Current Outpatient Medications on File Prior to Visit   Medication Sig Dispense Refill    albuterol (PROVENTIL/VENTOLIN HFA) 90  mcg/actuation inhaler INHALE 1 TO 2 PUFFS BY MOUTH INTO THE LUNGS EVERY 4 TO 6 HOURS AS NEEDED FOR WHEEZING OR SHORTNESS OF BREATH 8.5 g 5    ascorbic acid, vitamin C, (VITAMIN C) 500 MG tablet Take 500 mg by mouth once daily.      calcium/magnesium/vit B comp (CALCIUM-MAGNESIUM-B COMPLEX ORAL) Take 1 tablet by mouth once daily at 6am.      furosemide (LASIX) 40 MG tablet Take 1 tablet (40 mg total) by mouth every other day. 45 tablet 0    ipratropium (ATROVENT) 0.02 % nebulizer solution Take 2.5 mLs (500 mcg total) by nebulization 4 (four) times daily. Rescue 300 mL 11    loperamide (IMODIUM) 2 mg capsule TAKE 1 CAPSULE (2 MG TOTAL) BY MOUTH DAILY AS NEEDED FOR DIARRHEA. 90 capsule 0    montelukast (SINGULAIR) 10 mg tablet Take 1 tablet (10 mg total) by mouth every evening. 30 tablet 11    mv-minerals/FA/omega 3,6,9 #3 (WOMEN'S 50+ ADVANCED ORAL) Take 1 tablet by mouth Daily.      nintedanib (OFEV) 150 mg Cap Take 1 capsule (150 mg total) by mouth 2 (two) times a day. 60 capsule 11    omega-3s/dha/epa/fish oil/D3 (VITAMIN-D + OMEGA-3 ORAL) Take 2 tablets by mouth once daily at 6am.      omeprazole (PRILOSEC) 40 MG capsule Take 40 mg by mouth once daily.      predniSONE (DELTASONE) 10 MG tablet Take 1 tablet (10 mg total) by mouth once daily. 60 tablet 0    salmeteroL (SEREVENT) 50 mcg/dose diskus inhaler Inhale 1 puff into the lungs 2 (two) times daily. Controller      vitamin D (VITAMIN D3) 1000 units Tab Take 1,000 Units by mouth once daily.      levocetirizine (XYZAL) 5 MG tablet TAKE 1 TABLET(5 MG) BY MOUTH EVERY DAY 90 tablet 1    pantoprazole (PROTONIX) 40 MG tablet Take 1 tablet (40 mg total) by mouth once daily. (Patient not taking: Reported on 9/6/2023) 30 tablet 11    sars-cov-2, covid-19 omicron, (MODERNA COVID BIVAL,6M UP,,PF,) 50 mcg/0.5 mL injection Inject into the muscle. 0.5 mL 0    sulfamethoxazole-trimethoprim 800-160mg (BACTRIM DS) 800-160 mg Tab Take 1 tablet by mouth every Mon, Wed, Fri.  (Patient not taking: Reported on 9/6/2023) 12 tablet 3     No current facility-administered medications on file prior to visit.     Physical Examination  Vitals:    09/06/23 1119   BP: 104/72   Pulse: 92   Temp: 99.1 °F (37.3 °C)     GENERAL:  female in no apparent distress and well developed and well nourished - NC O2 in place - speaking in full sentences    - Patient in motorized scooter with O2   HEAD:  Normocephalic, without obvious abnormality, atraumatic  EYES: sclera anicteric, conjunctiva normochromic  EARS: Right - heavy wax - TM nml; Left - impacted cerumen unable to visualize TM  NOSE: erythematous and bleeding, clear discharge, turbinates red, slight crusting - scabs     OROPHARYNX: moist mucous membranes without erythema, exudates or petechiae  LYMPH NODES: normal, supple, no lymphadenopathy  LUNGS: Slight crackles - good air movement in all 4 quadrants.  HEART: normal rate, regular rhythm, normal S1, S2, no murmurs, rubs, clicks or gallops.  ABDOMEN: soft, nontender, nondistended, no masses or organomegaly  not examined.  MUSCULOSKELETAL: no gross joint deformity or swelling.  NEURO: alert, oriented, normal speech, no focal findings or movement disorder noted.  SKIN: normal coloration and turgor, no rashes, no suspicious skin lesions noted.     Assessment/Plan:   Problem List Items Addressed This Visit          ENT    Chronic rhinitis    Current Assessment & Plan     - Hx of positives in the past with Dr. Harrell ([place]) and AIT   - Not wanting medications at this time  - Will pursue in the future s/p transplant   - Not a great candidate for SPT or AIT at this time  - Flonase causes nasal bleeding especially in setting of NC O2           Impacted cerumen of both ears    Relevant Medications    carbamide peroxide (DEBROX) 6.5 % otic solution       Other    Penicillin allergy    Relevant Medications    amoxicillin (AMOXIL) 400 mg/5 mL suspension    Drug reaction - Primary    Overview     - PCN: Told by mother  as a child she was allergic   - Unclear if ever reaction and may have since but hx not clear enough to remove from chart without challenge    - No reported hx of anaphylaxis and no exposure in over 10 years   - Doxycycline: Patient knows she is not allergic - she gets nauseated with doxycycline          Current Assessment & Plan     - Patient with plan for oral challenge in office  - Patient needs formal clearing given pending possible lung transplant   - Will continue to monitor and reassess          Follow up:  Follow up in about 2 days (around 9/8/2023) for 2 Step Oral Challenge - Amoxicillin .    Mey Varghese MD   [facility]  Allergy and Immunology

## 2023-09-06 NOTE — ASSESSMENT & PLAN NOTE
- Hx of positives in the past with Dr. Harrell ([place]) and AIT   - Not wanting medications at this time  - Will pursue in the future s/p transplant   - Not a great candidate for SPT or AIT at this time  - Flonase causes nasal bleeding especially in setting of NC O2

## 2023-09-06 NOTE — ASSESSMENT & PLAN NOTE
- Patient with plan for oral challenge in office  - Patient needs formal clearing given pending possible lung transplant   - Will continue to monitor and reassess   Sent in for 150mg. He can take 1.5 tablets of what he has until he gets a new prescription which will be the same instructions but with 45 tablets per month. We may need to get to 200mg eventually if this makes a difference but does not quite get us to good control of symptoms.

## 2023-09-07 ENCOUNTER — TELEPHONE (OUTPATIENT)
Dept: PULMONOLOGY | Facility: HOSPITAL | Age: 53
End: 2023-09-07
Payer: COMMERCIAL

## 2023-09-08 ENCOUNTER — DOCUMENT SCAN (OUTPATIENT)
Dept: HOME HEALTH SERVICES | Facility: HOSPITAL | Age: 53
End: 2023-09-08
Payer: COMMERCIAL

## 2023-09-08 ENCOUNTER — OFFICE VISIT (OUTPATIENT)
Dept: ALLERGY | Facility: CLINIC | Age: 53
End: 2023-09-08
Payer: COMMERCIAL

## 2023-09-08 ENCOUNTER — PATIENT MESSAGE (OUTPATIENT)
Dept: RHEUMATOLOGY | Facility: CLINIC | Age: 53
End: 2023-09-08
Payer: COMMERCIAL

## 2023-09-08 VITALS
OXYGEN SATURATION: 97 % | SYSTOLIC BLOOD PRESSURE: 124 MMHG | HEIGHT: 66 IN | BODY MASS INDEX: 35.36 KG/M2 | DIASTOLIC BLOOD PRESSURE: 77 MMHG | WEIGHT: 220 LBS | HEART RATE: 108 BPM | TEMPERATURE: 98 F

## 2023-09-08 DIAGNOSIS — T50.905A ADVERSE EFFECT OF DRUG, INITIAL ENCOUNTER: Primary | ICD-10-CM

## 2023-09-08 DIAGNOSIS — L30.9 HAND ECZEMA: ICD-10-CM

## 2023-09-08 PROBLEM — Z88.0 PENICILLIN ALLERGY: Status: RESOLVED | Noted: 2023-09-06 | Resolved: 2023-09-08

## 2023-09-08 PROCEDURE — 3008F PR BODY MASS INDEX (BMI) DOCUMENTED: ICD-10-PCS | Mod: CPTII,S$GLB,, | Performed by: STUDENT IN AN ORGANIZED HEALTH CARE EDUCATION/TRAINING PROGRAM

## 2023-09-08 PROCEDURE — 3044F HG A1C LEVEL LT 7.0%: CPT | Mod: CPTII,S$GLB,, | Performed by: STUDENT IN AN ORGANIZED HEALTH CARE EDUCATION/TRAINING PROGRAM

## 2023-09-08 PROCEDURE — 95076 PR INGESTION CHALLENGE TEST; INITIAL 120 MIN: ICD-10-PCS | Mod: S$GLB,,, | Performed by: STUDENT IN AN ORGANIZED HEALTH CARE EDUCATION/TRAINING PROGRAM

## 2023-09-08 PROCEDURE — 1160F RVW MEDS BY RX/DR IN RCRD: CPT | Mod: CPTII,S$GLB,, | Performed by: STUDENT IN AN ORGANIZED HEALTH CARE EDUCATION/TRAINING PROGRAM

## 2023-09-08 PROCEDURE — 1159F MED LIST DOCD IN RCRD: CPT | Mod: CPTII,S$GLB,, | Performed by: STUDENT IN AN ORGANIZED HEALTH CARE EDUCATION/TRAINING PROGRAM

## 2023-09-08 PROCEDURE — 1160F PR REVIEW ALL MEDS BY PRESCRIBER/CLIN PHARMACIST DOCUMENTED: ICD-10-PCS | Mod: CPTII,S$GLB,, | Performed by: STUDENT IN AN ORGANIZED HEALTH CARE EDUCATION/TRAINING PROGRAM

## 2023-09-08 PROCEDURE — 3074F PR MOST RECENT SYSTOLIC BLOOD PRESSURE < 130 MM HG: ICD-10-PCS | Mod: CPTII,S$GLB,, | Performed by: STUDENT IN AN ORGANIZED HEALTH CARE EDUCATION/TRAINING PROGRAM

## 2023-09-08 PROCEDURE — 3074F SYST BP LT 130 MM HG: CPT | Mod: CPTII,S$GLB,, | Performed by: STUDENT IN AN ORGANIZED HEALTH CARE EDUCATION/TRAINING PROGRAM

## 2023-09-08 PROCEDURE — 95076 INGEST CHALLENGE INI 120 MIN: CPT | Mod: S$GLB,,, | Performed by: STUDENT IN AN ORGANIZED HEALTH CARE EDUCATION/TRAINING PROGRAM

## 2023-09-08 PROCEDURE — 1159F PR MEDICATION LIST DOCUMENTED IN MEDICAL RECORD: ICD-10-PCS | Mod: CPTII,S$GLB,, | Performed by: STUDENT IN AN ORGANIZED HEALTH CARE EDUCATION/TRAINING PROGRAM

## 2023-09-08 PROCEDURE — 3044F PR MOST RECENT HEMOGLOBIN A1C LEVEL <7.0%: ICD-10-PCS | Mod: CPTII,S$GLB,, | Performed by: STUDENT IN AN ORGANIZED HEALTH CARE EDUCATION/TRAINING PROGRAM

## 2023-09-08 PROCEDURE — 3078F PR MOST RECENT DIASTOLIC BLOOD PRESSURE < 80 MM HG: ICD-10-PCS | Mod: CPTII,S$GLB,, | Performed by: STUDENT IN AN ORGANIZED HEALTH CARE EDUCATION/TRAINING PROGRAM

## 2023-09-08 PROCEDURE — 99999 PR PBB SHADOW E&M-EST. PATIENT-LVL V: CPT | Mod: PBBFAC,,, | Performed by: STUDENT IN AN ORGANIZED HEALTH CARE EDUCATION/TRAINING PROGRAM

## 2023-09-08 PROCEDURE — 99999 PR PBB SHADOW E&M-EST. PATIENT-LVL V: ICD-10-PCS | Mod: PBBFAC,,, | Performed by: STUDENT IN AN ORGANIZED HEALTH CARE EDUCATION/TRAINING PROGRAM

## 2023-09-08 PROCEDURE — 3008F BODY MASS INDEX DOCD: CPT | Mod: CPTII,S$GLB,, | Performed by: STUDENT IN AN ORGANIZED HEALTH CARE EDUCATION/TRAINING PROGRAM

## 2023-09-08 PROCEDURE — 3078F DIAST BP <80 MM HG: CPT | Mod: CPTII,S$GLB,, | Performed by: STUDENT IN AN ORGANIZED HEALTH CARE EDUCATION/TRAINING PROGRAM

## 2023-09-08 RX ORDER — HYDROCORTISONE 25 MG/G
OINTMENT TOPICAL 2 TIMES DAILY
Qty: 28.35 G | Refills: 11 | Status: SHIPPED | OUTPATIENT
Start: 2023-09-08 | End: 2023-10-11

## 2023-09-08 NOTE — PROGRESS NOTES
Allergy and Immunology  Established Patient Clinic Note    Date: 9/8/2023  Chief Complaint   Patient presents with    Other     Oral Drug Challenge    Injections     History  Ayanna Alicia is a 52 y.o. female being seen for follow-up today.    Allergy Oral Challenge  Date: 09/08/2023  Medications within the last 12 hours: No  Is a psychological component likely? No  Challenge method: Open  Placebo: N/A  Active Material: Amoxicillin   Preparation: 400 mg/5mL     Dose Given Time Given Time Evaluated Resulting Reaction   80 mg 9:39 AM 10:09 AM No reaction - tolerated    400 mg  10:09 AM 11: AM  No reaction - tolerated      Conclusion: The patient performed an office oral challenge to Amoxicillin. The challenge was passed without any adverse effects. The patient is NOT at increased risk of reaction to this substance. For full details of challenge, see flowsheet above.     Allergies, PMH, PSH, Social, and Family History were reviewed.    Current Outpatient Medications on File Prior to Visit   Medication Sig Dispense Refill    albuterol (PROVENTIL/VENTOLIN HFA) 90 mcg/actuation inhaler INHALE 1 TO 2 PUFFS BY MOUTH INTO THE LUNGS EVERY 4 TO 6 HOURS AS NEEDED FOR WHEEZING OR SHORTNESS OF BREATH 8.5 g 5    ascorbic acid, vitamin C, (VITAMIN C) 500 MG tablet Take 500 mg by mouth once daily.      calcium/magnesium/vit B comp (CALCIUM-MAGNESIUM-B COMPLEX ORAL) Take 1 tablet by mouth once daily at 6am.      carbamide peroxide (DEBROX) 6.5 % otic solution Place 5 drops into both ears 2 (two) times daily. for 5 days 15 mL 0    furosemide (LASIX) 40 MG tablet Take 1 tablet (40 mg total) by mouth every other day. 45 tablet 0    ipratropium (ATROVENT) 0.02 % nebulizer solution Take 2.5 mLs (500 mcg total) by nebulization 4 (four) times daily. Rescue 300 mL 11    levocetirizine (XYZAL) 5 MG tablet TAKE 1 TABLET(5 MG) BY MOUTH EVERY DAY 90 tablet 1    loperamide (IMODIUM) 2 mg capsule TAKE 1 CAPSULE (2 MG TOTAL) BY MOUTH DAILY AS  NEEDED FOR DIARRHEA. 90 capsule 0    montelukast (SINGULAIR) 10 mg tablet Take 1 tablet (10 mg total) by mouth every evening. 30 tablet 11    mv-minerals/FA/omega 3,6,9 #3 (WOMEN'S 50+ ADVANCED ORAL) Take 1 tablet by mouth Daily.      nintedanib (OFEV) 150 mg Cap Take 1 capsule (150 mg total) by mouth 2 (two) times a day. 60 capsule 11    omega-3s/dha/epa/fish oil/D3 (VITAMIN-D + OMEGA-3 ORAL) Take 2 tablets by mouth once daily at 6am.      omeprazole (PRILOSEC) 40 MG capsule Take 40 mg by mouth once daily.      pantoprazole (PROTONIX) 40 MG tablet Take 1 tablet (40 mg total) by mouth once daily. 30 tablet 11    predniSONE (DELTASONE) 10 MG tablet Take 1 tablet (10 mg total) by mouth once daily. 60 tablet 0    salmeteroL (SEREVENT) 50 mcg/dose diskus inhaler Inhale 1 puff into the lungs 2 (two) times daily. Controller      sars-cov-2, covid-19 omicron, (MODERNA COVID BIVAL,6M UP,,PF,) 50 mcg/0.5 mL injection Inject into the muscle. 0.5 mL 0    sulfamethoxazole-trimethoprim 800-160mg (BACTRIM DS) 800-160 mg Tab Take 1 tablet by mouth every Mon, Wed, Fri. 12 tablet 3    vitamin D (VITAMIN D3) 1000 units Tab Take 1,000 Units by mouth once daily.      amoxicillin (AMOXIL) 400 mg/5 mL suspension Take 6.3 mLs (504 mg total) by mouth once for 1 dose. Do NOT take at home. Medication to be taken in supervision of Allergist. Thank you. 50 mL 0     No current facility-administered medications on file prior to visit.     Physical Examination  Vitals:    09/08/23 1017   BP: 124/77   Pulse: 108   Temp:      GENERAL:  female in no apparent distress and well developed and well nourished - [place] O2   HEAD:  Normocephalic, without obvious abnormality, atraumatic  EYES: sclera anicteric, conjunctiva normochromic  EARS: normal TM's and external ear canals both ears  NOSE: without erythema or discharge, clear discharge, turbinates normal    OROPHARYNX: moist mucous membranes without erythema, exudates or petechiae  LYMPH NODES: normal,  supple, no lymphadenopathy  LUNGS: clear to auscultation, no wheezes, rales or rhonchi, symmetric air entry.  HEART: normal rate, regular rhythm, normal S1, S2, no murmurs, rubs, clicks or gallops.  ABDOMEN: soft, nontender, nondistended, no masses or organomegaly.  MUSCULOSKELETAL: no gross joint deformity or swelling.  NEURO: alert, oriented, normal speech, no focal findings or movement disorder noted.  SKIN: normal coloration and turgor, no rashes, no suspicious skin lesions noted.     Assessment/Plan:   Problem List Items Addressed This Visit          Other    Drug reaction - Primary    Overview     - 09/08/2023: Completed oral challenge to Amoxicillin without reaction    - Patient is at AVERAGE risk for allergic reaction         Current Assessment & Plan     - 5 copies of letters provided to patient to give to providers           Follow up:  Follow up in about 3 months (around 12/8/2023) for Immune re-evaluation.    Mey Varghese MD   [facility]  [facility]

## 2023-09-08 NOTE — ASSESSMENT & PLAN NOTE
- Mild eczema of knuckles   - Ordered Hydrocortisone PRN   - Will continue to monitor and reassess

## 2023-09-08 NOTE — LETTER
[facility]  [id number] [place] [id number]  Phone: [phone]  Fax: [phone] September 8, 2023     Patient: Ayanna Alicia   YOB: 1970   Date of Visit: 9/8/2023       To Whom It May Concern:    Ayanna Alicia (1970) is a patient established with the [facility] for management of drug allergy - Penicilli/Amoxicillin. On 09/08/2023, patient completed 2 step oral challenge to Amoxicillin without reaction. Patient is at AVERAGE risk for allergic reaction to Penicillin/Amoxicillin. Penicillin removed from patient's chart as an allergy.     If you have any questions or concerns, please don't hesitate to contact my office.    Sincerely,          Mey Varghese MD  Allergy and Immunology   [facility]

## 2023-09-10 ENCOUNTER — PATIENT MESSAGE (OUTPATIENT)
Dept: ALLERGY | Facility: CLINIC | Age: 53
End: 2023-09-10
Payer: COMMERCIAL

## 2023-09-11 ENCOUNTER — HOSPITAL ENCOUNTER (OUTPATIENT)
Dept: PULMONOLOGY | Facility: HOSPITAL | Age: 53
Discharge: HOME | End: 2023-09-11
Payer: COMMERCIAL

## 2023-09-11 VITALS — BODY MASS INDEX: 35.63 KG/M2 | HEIGHT: 66 IN | WEIGHT: 221.69 LBS

## 2023-09-11 DIAGNOSIS — J84.112 UIP (USUAL INTERSTITIAL PNEUMONITIS): ICD-10-CM

## 2023-09-11 DIAGNOSIS — J96.11 CHRONIC RESPIRATORY FAILURE WITH HYPOXIA: ICD-10-CM

## 2023-09-11 DIAGNOSIS — J84.9 INTERSTITIAL PULMONARY DISEASE, UNSPECIFIED: ICD-10-CM

## 2023-09-11 PROCEDURE — G0239 OTH RESP PROC, GROUP: HCPCS

## 2023-09-11 RX ORDER — AMOXICILLIN AND CLAVULANATE POTASSIUM 875; 125 MG/1; MG/1
1 TABLET, FILM COATED ORAL EVERY 12 HOURS
Qty: 10 TABLET | Refills: 0 | Status: SHIPPED | OUTPATIENT
Start: 2023-09-11 | End: 2023-09-12

## 2023-09-11 NOTE — TELEPHONE ENCOUNTER
Allergy and Immunology  Plan of Care Note    Name: Ayanna Ailcia  : 1970  MRN: [id number]    - Message sent directly to patient regarding antibiotics  - Ordered 5 days of Augmentin BID    Kesler Bourgoyne, MD [facility]  [facility]ology

## 2023-09-11 NOTE — PROGRESS NOTES
[facility]  Pulmonary Rehab  Initial Consult    SUMMARY     Referring Physician: Agustín Aviles MD  Pt presented to pulmonary rehab for intake appt. Pt has Interstitial pulmonary disease, Usual Interstitial Pneumonitis, Chronic Resp Failure with hypoxia, Interstitial lung disease, HTN, GLENN, among other medical issues. Pt is on a Trilogy, uses an Atrovent nebulizer, and takes Serevent for her breathing. Pt was recently hospitalized for breathing issues and is a lung transplant candidate. Pt required a 100% Non-rebreather on 6 MWT with O2 sat in the 60's. She was only able to walk 300 ft today and had to rest. Pt hopes to gain strength, endurance and to condition herself better for her possible upcoming lung transplant. She is set to start rehab on 9/18/23.     Diagnosis:   Problem List as of 9/11/2023 Reviewed: 9/8/2023 11:32 AM by Mey Varghese MD         ENT    Dysphonia due to laryngeal ulcers    Last Assessment & Plan 7/31/2023 Hospital Encounter Edited 8/3/2023  1:34 PM by Som Cabello MD     Speech therapy  Follow up ent outpatient          Chronic rhinitis    Last Assessment & Plan 9/6/2023 Office Visit Written 9/6/2023  2:32 PM by Mey Varghese MD     - Hx of positives in the past with Dr. Harrell () and AIT   - Not wanting medications at this time  - Will pursue in the future s/p transplant   - Not a great candidate for SPT or AIT at this time  - Flonase causes nasal bleeding especially in setting of NC O2           Impacted cerumen of both ears       Derm    Hand eczema    Last Assessment & Plan 9/8/2023 Office Visit Written 9/8/2023 11:34 AM by Mey Varghese MD     - Mild eczema of knuckles   - Ordered Hydrocortisone PRN   - Will continue to monitor and reassess             Pulmonary    Chronic respiratory failure with hypoxia    Last Assessment & Plan 8/7/2023 Office Visit Written 8/7/2023  5:08 PM by Agustín Aviles MD     PFT reviewed  FEV1: 1.11L(  45.4%)  Currently requiring 6L/NC  Patient with restrictive pattern on her PFTs and chronic hypoxic respiratory failure;   home AVAPS DME VIEMED  With her chronic immunosuppression; Added Bactrim prophylaxis  Continue supplemental oxygen and titrate as needed to maintain saturations 90% or greater  Lasix 40mg daily   Fluid restriction of 1.5L daily  Complete 10-day course of Zyvox  Continue Prednisone 40mg daily;  .          Restrictive lung disease    Last Assessment & Plan 12/28/2022 Office Visit Written 12/28/2022  1:41 PM by Agustín Aviles MD     Encouraged to rejoin pul rehab  Discussed LUNG transplantation criteria         Exercise hypoxemia    Last Assessment & Plan 8/7/2023 Office Visit Written 8/7/2023  5:08 PM by Agustín Aviles MD     POC 6 LPM           Interstitial lung disease    Last Assessment & Plan 8/7/2023 Office Visit Written 8/7/2023  5:11 PM by Agustín Aviles MD     On going since May 2022  Dr Ashton note reviewed  ZOLOFT was discontinued  Sjogrens ILD: treatment progression: prednisone, cellcept, Rituxamab, OFEV and recently Actemra  [facility]           Cough    Last Assessment & Plan 3/28/2023 Office Visit Written 3/28/2023  5:53 PM by Gilberto Ren, NP     Reports occasional cough  Recent throat irritation due to excessive coughing  Antitussives to pharmacy  Monitor         Interstitial pulmonary disease, unspecified    UIP (usual interstitial pneumonitis)    Last Assessment & Plan 8/7/2023 Office Visit Written 8/7/2023  5:12 PM by Agustín Aviles MD     Radiological consistent            Cardiac/Vascular    HTN (hypertension)    Last Assessment & Plan 7/31/2023 Hospital Encounter Written 8/3/2023  1:32 PM by Som Cabello MD     Normotensive to hypotension  Continue home antihtn medication(s) as indicated  Mild tachycardia in setting of duonebs         Elevated troponin    Last Assessment & Plan 7/31/2023 Hospital Encounter  Written 8/3/2023  1:33 PM by Som Cabello MD     Likely demand ischemia in setting of acute on chronic respiratory failure  Trend relatively flat  Chest pain free  Consult cardiology if indicated  Electrocardiography reviewed with no st-t wave elevation              Renal/    Surgical menopause       Immunology/Multi System    Elevated IgE level    Last Assessment & Plan 5/18/2022 Office Visit Written 5/18/2022 10:07 AM by Agustín Aviles MD     Will moniotor  With bronchiectasis likely consistent with ABPA  Already on steriods  Trend: if refractory will consult allergy         Elevated rheumatoid factor    Last Assessment & Plan 7/15/2022 Office Visit Written 7/15/2022  6:47 AM by Agustín Aviles MD     Followed with rheumatology         Rheumatoid arthritis with positive rheumatoid factor    Seropositive rheumatoid arthritis       Endocrine    Multinodular goiter    Last Assessment & Plan 4/27/2022 Hospital Encounter Written 5/1/2022  2:03 PM by Mode Thompson MD     4/30 ultrasound of the thyroid noted.  Outpatient ENT evaluation.  Discussed with hospital medicine attending  5/1 ENT evaluation         Vitamin D deficiency    Obesity with serious comorbidity    Last Assessment & Plan 3/16/2023 Hospital Encounter Written 3/17/2023  5:27 AM by Dejon Freeman NP     Body mass index is 37.12 kg/m². Morbid obesity complicates all aspects of disease management from diagnostic modalities to treatment. Weight loss encouraged and health benefits explained to patient.              BMI 36.0-36.9,adult    Last Assessment & Plan 8/7/2023 Office Visit Written 8/7/2023  5:12 PM by Agustín Aviles MD     Patient would benefit from weight loss and has tried to set realistic goals to achieve success. Lifestyle changes were discussed on eating healthy, exercising at least 150 minutes weekly, and reducing sedentary behavior.   Discussed the risk factors associated with obesity:  Arthritis/GLENN/Diabetes/Fatty Liver/Cardiovascular disease/GERD/HTN/HLP.             GI    Gastroesophageal reflux disease without esophagitis    Last Assessment & Plan 7/26/2023 Office Visit Written 8/11/2023  9:43 AM by Mack Ashford MD     Plan:   -Will increase PPI to BID dosing   -GERD lifestyle modifications   -Patient counseled on weight loss          Benign colon polyp    Dysphagia    Last Assessment & Plan 7/31/2023 Hospital Encounter Written 8/3/2023  1:34 PM by Som Cabello MD     Dysphagia diet  Continue speech therapy outpatient               Orthopedic    Patella-femoral syndrome    Localized osteoarthrosis not specified whether primary or secondary, lower leg       Palliative Care    High risk medication use       Other    GLENN on CPAP    Last Assessment & Plan 8/7/2023 Office Visit Written 8/7/2023  5:13 PM by Agustín Aviles MD     CPAP transitioned to AVAPS         PLMD (periodic limb movement disorder)    Drug reaction    Last Assessment & Plan 9/8/2023 Office Visit Written 9/8/2023 11:32 AM by Mey Varghese MD     - 5 copies of letters provided to patient to give to providers             History of Present Illness:     Smoking History:   Social History     Tobacco Use   Smoking Status Never    Passive exposure: Past   Smokeless Tobacco Never       Medications:   Current Outpatient Medications Ordered in Epic   Medication Sig Dispense Refill    albuterol (PROVENTIL/VENTOLIN HFA) 90 mcg/actuation inhaler INHALE 1 TO 2 PUFFS BY MOUTH INTO THE LUNGS EVERY 4 TO 6 HOURS AS NEEDED FOR WHEEZING OR SHORTNESS OF BREATH 8.5 g 5    ascorbic acid, vitamin C, (VITAMIN C) 500 MG tablet Take 500 mg by mouth once daily.      calcium/magnesium/vit B comp (CALCIUM-MAGNESIUM-B COMPLEX ORAL) Take 1 tablet by mouth once daily at 6am.      carbamide peroxide (DEBROX) 6.5 % otic solution Place 5 drops into both ears 2 (two) times daily. for 5 days 15 mL 0    furosemide (LASIX) 40 MG tablet Take 1 tablet  (40 mg total) by mouth every other day. 45 tablet 0    hydrocortisone 2.5 % ointment Apply topically 2 (two) times daily. 28.35 g 11    ipratropium (ATROVENT) 0.02 % nebulizer solution Take 2.5 mLs (500 mcg total) by nebulization 4 (four) times daily. Rescue 300 mL 11    levocetirizine (XYZAL) 5 MG tablet TAKE 1 TABLET(5 MG) BY MOUTH EVERY DAY 90 tablet 1    loperamide (IMODIUM) 2 mg capsule TAKE 1 CAPSULE (2 MG TOTAL) BY MOUTH DAILY AS NEEDED FOR DIARRHEA. 90 capsule 0    montelukast (SINGULAIR) 10 mg tablet Take 1 tablet (10 mg total) by mouth every evening. 30 tablet 11    mv-minerals/FA/omega 3,6,9 #3 (WOMEN'S 50+ ADVANCED ORAL) Take 1 tablet by mouth Daily.      nintedanib (OFEV) 150 mg Cap Take 1 capsule (150 mg total) by mouth 2 (two) times a day. 60 capsule 11    omega-3s/dha/epa/fish oil/D3 (VITAMIN-D + OMEGA-3 ORAL) Take 2 tablets by mouth once daily at 6am.      omeprazole (PRILOSEC) 40 MG capsule Take 40 mg by mouth once daily.      pantoprazole (PROTONIX) 40 MG tablet Take 1 tablet (40 mg total) by mouth once daily. 30 tablet 11    predniSONE (DELTASONE) 10 MG tablet Take 1 tablet (10 mg total) by mouth once daily. 60 tablet 0    salmeteroL (SEREVENT) 50 mcg/dose diskus inhaler Inhale 1 puff into the lungs 2 (two) times daily. Controller      sars-cov-2, covid-19 omicron, (MODERNA COVID BIVAL,6M UP,,PF,) 50 mcg/0.5 mL injection Inject into the muscle. 0.5 mL 0    sulfamethoxazole-trimethoprim 800-160mg (BACTRIM DS) 800-160 mg Tab Take 1 tablet by mouth every Mon, Wed, Fri. 12 tablet 3    vitamin D (VITAMIN D3) 1000 units Tab Take 1,000 Units by mouth once daily.       No current Epic-ordered facility-administered medications on file.        Pulmonary History Questionnaire:    Pulmonary History  How many times have you been hospitalized in the last year as a result of lung problems?: 1  How many days were you in the hospital in the last year as a result of breathing difficulty?: 5  How many ER visits  have you had in the past year as a result of breathing difficulty?: 2  Last hospital admission: 08/01/23  Have you ever attended a pulmonary rehabilitation program?: Yes  Have you ever had any chest injuries or surgeries?: No    Major Symptomatology  What are your symptoms today?: worse, more SOB nasal passage issues, increased O2 ususage  What were your symptoms last year?: same but not as bad  What were your symptoms 5 years ago?: no symptoms  When did you realize that you had lung problems?: 2/2022    Disease Impact  Do you sleep with your head elevated?: Elevated  If elevated, how high?: adjustable bed  Do you awaken during the night?: Yes  How often?: 3-4  Why?: just wake up and go to bathroom  Do your ankles ever swell up?: Yes  When?: anytime  Do you cough?: Yes  What part of the day?: anytime  Do you cough up sputum?: Yes  When?: with cought  Describe: white to yellow, small, thick  Have you ever coughed up blood?: No  Do you use oxygen?: Yes  How often?: continuously  Liters per minute: 6 l/min  Type of oxygen delivery system: concentrator and tanks  Supplier: [facility]  Are you on any home respiratory theraphy?: Yes  Type: nebulizer, Triology  How do you clean the equipment?: soap and water  Do you exercise?: Yes  If yes, how?: restistance band, leg exercises, steps  How often?: 3 times a week  Do you have exercise equipment?: Yes  Type: bike  Has your physician limited your activities?: No    Depression PHQ:    Depression Patient Health Questionnaire (PHQ-9)  Over the last two weeks how often have you been bothered by little interest or pleasure in doing things: (P) Not at all  Over the last two weeks how often have you been bothered by feeling down, depressed or hopeless: (P) Several days  Over the last two weeks how often have you been bothered by trouble falling or staying asleep, or sleeping too much: (P) Several days  Over the last two weeks how often have you been bothered by feeling tired or having  little energy: (P) Several days  Over the last two weeks how often have you been bothered by a poor appetite or overeating: (P) Several days  Over the last two weeks how often have you been bothered by feeling bad about yourself - or that you are a failure or have let yourself or your family down: (P) Not at all  Over the last two weeks how often have you been bothered by trouble concentrating on things, such as reading the newspaper or watching television: (P) Not at all  Over the last two weeks how often have you been bothered by moving or speaking so slowly that other people could have noticed. Or the opposite - being so fidgety or restless that you have been moving around a lot more than usual.: (P) Not at all  Over the last two weeks how often have you been bothered by thoughts that you would be better off dead, or of hurting yourself: (P) Not at all  If you checked off any problems, how difficult have these problems made it for you to do your work, take care of things at home or get along with other people?: (P) Somewhat difficult  PHQ-9 Score: (P) 4  PHQ-9 Interpretation: (P) Minimal or None    Questionnaire Score:   Pulmonary Knowledge Test: 68  Rate Your Plate: 53  SF36 Physical Functionin  SF36 Mental Health: 54    Physical Health Summary  Your Score: 34  Your current score indicates that you are well below the general population of similar age and gender.  Compared to this population your health is:    Well Below the average in:  Overall physical functioning  Performance at work, home, or school due to physical problems  Below the average in:  General health  Same or Better than the average in:  Ability to participate in activities due to bodily pain      Mental Health Summary  Your Score: 54  Your current score indicates that you are the same or better than the general population of similar age and gender.  Compared to this population your health is:    Well Below the average in:  Performance at  "work, home, or school due to emotional problems  Same or Better than the average in:  Level of energy  Ability to participate in social activities  Emotional well-being    Progress  Physical Health Progress   Date Score   Current: Sep 11, 2023 34 Your score is better than your previous survey  Previous: Aug 17, 2022 26  Mental Health Progress   Date Score   Current: Sep 11, 2023 54 Your score is about the same as your previous survey  Previous: Aug 17, 2022 52    Self Evaluated Transition  Compared to one year ago, you rated your health in general as: Somewhat worse    Pulmonary Function Test Data:     Date: 8/7/23 FEV1: 45.4 FVC: 42.2      Six Minute Walk    Height: 5' 6" (167.6 cm) Weight: 100.6 kg (221 lb 11.2 oz)  Performing RT: RB, RRT     Phase Oxygen Assessment Supplemental O2 Heart   Rate Blood Pressure Jessica Dyspnea Scale Rating   Resting 93 % Other (see comments) (NC 10 lpm) 111 bpm 136/79 4   Exercise        Minute        1 89 %  (NC 10 lpm) 117 bpm     2 83 %  (NC 10 lpm) 117 bpm     3 61 %  (NC 10 lpm, changed to 50 % VM, O2 sat 61 changed to 100% NRB) 137 bpm     4 65 % 100% Non Rebreather 134 bpm     5 86 % 100% Non Rebreather 120 bpm     6  94 % 100% Non Rebreather 118 bpm 141/68 7-8   Recovery        Minute        1 94 % Other (see comments) (NC 10 lpm) 116 bpm     2 98 % Other (see comments) (NC 10 lpm) 116 bpm     3 96 % Other (see comments) (NC 8lpm) 114 bpm     4 93 % Other (see comments) (NC 8lpm) 114 bpm 126/67 4       Six Minute Walk Summary    Total Distance Meters (Calculated): 91.44 meters  Predicted Distance Meters (Calculated): 478.23 meters  Percentage of Predicted (Calculated): 19.12  Peak VO2 (Calculated): 6.72  Mets: 1.92  Symptoms: dyspnea, see note above    Fall Risk:  Fall Risk Assessment (every shift)  History Of Fall (W/I 3 Mos): N  Dizziness/Vertigo: N    Individual Goal Review  Individual Goal Review  Are you exercising on a regular basis at home?: Yes  Dietary goal:: eat less " sweet, smaller portion  Are you a diabetic?: No  Are you better able to manage your daily activities, i.e. grooming, bathing, cooking, etc.?: Yes  Are you smoke free?: Yes  Has your knowledge of stress management increased?: N/A  Do you have a positive support system in place?: Yes  Your short term goal was:: have more energy, decrease O2 usuage  Your long term goal was:: be stronger and increase endurance for lung transplant    Education: Pulmonary rehab program, what it consists of, benefits, pt expectations, compliance policy, questions answered. Pt expressed understanding of all discussed.     Short Term Goals: Have more energy, decrease O2 requirements/usage.  Long Term Goals: Be stronger, increase endurance for lung transplant  Dietary Goals: Eat less sweets, eat smaller portions      Checklist                                                                           Response  Chronic Stable Pulmonary Impairment                             Yes  Medical Regimen Optimized                                             Yes  PFT within 12 months                                                       Yes  Impaired Function due to Pulmonary Disease                  Yes  Motivated and Physically able to Participate                     Yes  Rehab likely to result in improvement                               Yes    Orders: Begin pulmonary rehabilitation 2 times a week, see exercise prescription.    [facility]  Pulmonary Rehab  Exercise Prescription    SUMMARY     General Guidelines     Record Pulse, SPO2, and Blood Pressure before during and after exercise   Hold exercise for: Oxygen saturation <90% despite supplemental Oxygen, S/Sx Exacerbation, Blood Pressure >180/95 or <80/60, Resting pulse 100>133 Max target heart rate  Maintain SPO2>90% with exercise    Outpatient Exercises  Days per week: 2 Days  Minutes: 45    Home Exercise  Days per week: 2 Days  Minutes: 45    Exercise Prescription  Initial Exercise  Prescription: Yes    Modality  Modality: Arm Ergometer, Hand Free Weights, Nustep    Arm Ergometer  Time: 10 minutes  Level: 1      Nustep  Time: 20 minutes  Steps: 1000  Load: 3       Biceps  lbs: 3 lbs  Sets: 2  Reps: 10  Weight Type : dumbbell    Chest  lbs: 3 lbs  Sets: 2  Reps: 10  Weight Type : dumbbell    I certify the patient is physically and psychologically able to participate in pulmonary rehabilitation and is medically stable.

## 2023-09-12 RX ORDER — AMOXICILLIN AND CLAVULANATE POTASSIUM 875; 125 MG/1; MG/1
1 TABLET, FILM COATED ORAL EVERY 12 HOURS
Qty: 10 TABLET | Refills: 0 | Status: ON HOLD | OUTPATIENT
Start: 2023-09-12 | End: 2023-09-17 | Stop reason: HOSPADM

## 2023-09-14 ENCOUNTER — TELEPHONE (OUTPATIENT)
Dept: FAMILY MEDICINE | Facility: CLINIC | Age: 53
End: 2023-09-14
Payer: COMMERCIAL

## 2023-09-14 ENCOUNTER — HOSPITAL ENCOUNTER (OUTPATIENT)
Facility: HOSPITAL | Age: 53
Discharge: HOME HEALTH | End: 2023-09-17
Attending: EMERGENCY MEDICINE | Admitting: FAMILY MEDICINE
Payer: COMMERCIAL

## 2023-09-14 DIAGNOSIS — M05.7A RHEUMATOID ARTHRITIS OF OTHER SITE WITH POSITIVE RHEUMATOID FACTOR: ICD-10-CM

## 2023-09-14 DIAGNOSIS — J84.9 INTERSTITIAL LUNG DISEASE: ICD-10-CM

## 2023-09-14 DIAGNOSIS — I27.20 PULMONARY HYPERTENSION: ICD-10-CM

## 2023-09-14 DIAGNOSIS — J96.21 ACUTE ON CHRONIC RESPIRATORY FAILURE WITH HYPOXEMIA: Primary | ICD-10-CM

## 2023-09-14 DIAGNOSIS — I10 PRIMARY HYPERTENSION: ICD-10-CM

## 2023-09-14 DIAGNOSIS — J98.4 RESTRICTIVE LUNG DISEASE: ICD-10-CM

## 2023-09-14 DIAGNOSIS — E55.9 VITAMIN D DEFICIENCY: ICD-10-CM

## 2023-09-14 DIAGNOSIS — R07.9 CHEST PAIN: ICD-10-CM

## 2023-09-14 DIAGNOSIS — K21.9 GASTROESOPHAGEAL REFLUX DISEASE WITHOUT ESOPHAGITIS: ICD-10-CM

## 2023-09-14 DIAGNOSIS — R09.02 HYPOXIA: ICD-10-CM

## 2023-09-14 DIAGNOSIS — E66.01 CLASS 2 SEVERE OBESITY WITH SERIOUS COMORBIDITY AND BODY MASS INDEX (BMI) OF 37.0 TO 37.9 IN ADULT, UNSPECIFIED OBESITY TYPE: ICD-10-CM

## 2023-09-14 DIAGNOSIS — J84.111 CHRONIC INTERSTITIAL PNEUMONIA: ICD-10-CM

## 2023-09-14 DIAGNOSIS — R76.8 ELEVATED RHEUMATOID FACTOR: ICD-10-CM

## 2023-09-14 DIAGNOSIS — R06.02 SOB (SHORTNESS OF BREATH): ICD-10-CM

## 2023-09-14 LAB
ALBUMIN SERPL BCP-MCNC: 3.4 G/DL (ref 3.5–5.2)
ALLENS TEST: ABNORMAL
ALP SERPL-CCNC: 93 U/L (ref 55–135)
ALT SERPL W/O P-5'-P-CCNC: 27 U/L (ref 10–44)
ANION GAP SERPL CALC-SCNC: 16 MMOL/L (ref 8–16)
AORTIC ROOT ANNULUS: 2.73 CM
ASCENDING AORTA: 3.11 CM
AST SERPL-CCNC: 27 U/L (ref 10–40)
AV INDEX (PROSTH): 0.72
AV MEAN GRADIENT: 6 MMHG
AV PEAK GRADIENT: 10 MMHG
AV VALVE AREA BY VELOCITY RATIO: 2.43 CM²
AV VALVE AREA: 2.58 CM²
AV VELOCITY RATIO: 0.68
BASOPHILS # BLD AUTO: 0.06 K/UL (ref 0–0.2)
BASOPHILS NFR BLD: 0.4 % (ref 0–1.9)
BILIRUB SERPL-MCNC: 0.3 MG/DL (ref 0.1–1)
BILIRUB UR QL STRIP: NEGATIVE
BNP SERPL-MCNC: 36 PG/ML (ref 0–99)
BSA FOR ECHO PROCEDURE: 2.19 M2
BUN SERPL-MCNC: 12 MG/DL (ref 6–20)
CALCIUM SERPL-MCNC: 9.2 MG/DL (ref 8.7–10.5)
CHLORIDE SERPL-SCNC: 102 MMOL/L (ref 95–110)
CLARITY UR: CLEAR
CO2 SERPL-SCNC: 24 MMOL/L (ref 23–29)
COLOR UR: COLORLESS
CREAT SERPL-MCNC: 0.9 MG/DL (ref 0.5–1.4)
CV ECHO LV RWT: 0.77 CM
DELSYS: ABNORMAL
DIFFERENTIAL METHOD: ABNORMAL
DOP CALC AO PEAK VEL: 1.58 M/S
DOP CALC AO VTI: 26.2 CM
DOP CALC LVOT AREA: 3.6 CM2
DOP CALC LVOT DIAMETER: 2.14 CM
DOP CALC LVOT PEAK VEL: 1.07 M/S
DOP CALC LVOT STROKE VOLUME: 67.59 CM3
DOP CALC RVOT PEAK VEL: 0.76 M/S
DOP CALC RVOT VTI: 15.5 CM
DOP CALCLVOT PEAK VEL VTI: 18.8 CM
E WAVE DECELERATION TIME: 162.61 MSEC
E/A RATIO: 1.06
E/E' RATIO: 13.7 M/S
ECHO LV POSTERIOR WALL: 1.55 CM (ref 0.6–1.1)
EOSINOPHIL # BLD AUTO: 0.2 K/UL (ref 0–0.5)
EOSINOPHIL NFR BLD: 1.1 % (ref 0–8)
ERYTHROCYTE [DISTWIDTH] IN BLOOD BY AUTOMATED COUNT: 18.1 % (ref 11.5–14.5)
EST. GFR  (NO RACE VARIABLE): >60 ML/MIN/1.73 M^2
FLOW: 7
FRACTIONAL SHORTENING: 33 % (ref 28–44)
GLUCOSE SERPL-MCNC: 160 MG/DL (ref 70–110)
GLUCOSE UR QL STRIP: NEGATIVE
HCO3 UR-SCNC: 28.6 MMOL/L (ref 24–28)
HCT VFR BLD AUTO: 40.7 % (ref 37–48.5)
HGB BLD-MCNC: 12.6 G/DL (ref 12–16)
HGB UR QL STRIP: NEGATIVE
IMM GRANULOCYTES # BLD AUTO: 0.16 K/UL (ref 0–0.04)
IMM GRANULOCYTES NFR BLD AUTO: 1 % (ref 0–0.5)
INFLUENZA A, MOLECULAR: NEGATIVE
INFLUENZA B, MOLECULAR: NEGATIVE
INTERVENTRICULAR SEPTUM: 1.13 CM (ref 0.6–1.1)
IVC DIAMETER: 1.11 CM
IVRT: 85.63 MSEC
KETONES UR QL STRIP: NEGATIVE
LA MAJOR: 5.99 CM
LA MINOR: 6.17 CM
LA WIDTH: 3.4 CM
LACTATE SERPL-SCNC: 1.7 MMOL/L (ref 0.5–2.2)
LDH SERPL L TO P-CCNC: 489 U/L (ref 110–260)
LEFT ATRIUM SIZE: 3.06 CM
LEFT ATRIUM VOLUME INDEX MOD: 14.6 ML/M2
LEFT ATRIUM VOLUME INDEX: 25.5 ML/M2
LEFT ATRIUM VOLUME MOD: 30.91 CM3
LEFT ATRIUM VOLUME: 53.76 CM3
LEFT INTERNAL DIMENSION IN SYSTOLE: 2.68 CM (ref 2.1–4)
LEFT VENTRICLE DIASTOLIC VOLUME INDEX: 33.67 ML/M2
LEFT VENTRICLE DIASTOLIC VOLUME: 71.05 ML
LEFT VENTRICLE MASS INDEX: 94 G/M2
LEFT VENTRICLE SYSTOLIC VOLUME INDEX: 12.6 ML/M2
LEFT VENTRICLE SYSTOLIC VOLUME: 26.63 ML
LEFT VENTRICULAR INTERNAL DIMENSION IN DIASTOLE: 4.03 CM (ref 3.5–6)
LEFT VENTRICULAR MASS: 197.5 G
LEUKOCYTE ESTERASE UR QL STRIP: NEGATIVE
LIPASE SERPL-CCNC: 20 U/L (ref 4–60)
LV LATERAL E/E' RATIO: 15.22 M/S
LV SEPTAL E/E' RATIO: 12.45 M/S
LVOT MG: 2.63 MMHG
LVOT MV: 0.77 CM/S
LYMPHOCYTES # BLD AUTO: 0.6 K/UL (ref 1–4.8)
LYMPHOCYTES NFR BLD: 4.1 % (ref 18–48)
MAGNESIUM SERPL-MCNC: 1.7 MG/DL (ref 1.6–2.6)
MCH RBC QN AUTO: 30.9 PG (ref 27–31)
MCHC RBC AUTO-ENTMCNC: 31 G/DL (ref 32–36)
MCV RBC AUTO: 100 FL (ref 82–98)
MODE: ABNORMAL
MONOCYTES # BLD AUTO: 0.8 K/UL (ref 0.3–1)
MONOCYTES NFR BLD: 5.4 % (ref 4–15)
MV PEAK A VEL: 1.29 M/S
MV PEAK E VEL: 1.37 M/S
MV STENOSIS PRESSURE HALF TIME: 47.16 MS
MV VALVE AREA P 1/2 METHOD: 4.66 CM2
NEUTROPHILS # BLD AUTO: 13.6 K/UL (ref 1.8–7.7)
NEUTROPHILS NFR BLD: 88 % (ref 38–73)
NITRITE UR QL STRIP: NEGATIVE
NRBC BLD-RTO: 0 /100 WBC
PCO2 BLDA: 42.8 MMHG (ref 35–45)
PH SMN: 7.43 [PH] (ref 7.35–7.45)
PH UR STRIP: 6 [PH] (ref 5–8)
PISA TR MAX VEL: 3 M/S
PLATELET # BLD AUTO: 354 K/UL (ref 150–450)
PMV BLD AUTO: 9.5 FL (ref 9.2–12.9)
PO2 BLDA: 70 MMHG (ref 80–100)
POC BE: 4 MMOL/L
POC SATURATED O2: 94 % (ref 95–100)
POTASSIUM SERPL-SCNC: 3.6 MMOL/L (ref 3.5–5.1)
PROCALCITONIN SERPL IA-MCNC: 0.06 NG/ML
PROCALCITONIN SERPL IA-MCNC: 0.07 NG/ML
PROT SERPL-MCNC: 7.5 G/DL (ref 6–8.4)
PROT UR QL STRIP: NEGATIVE
PV MEAN GRADIENT: 1 MMHG
PV MV: 0.73 M/S
PV PEAK GRADIENT: 3 MMHG
PV PEAK VELOCITY: 0.89 M/S
RA MAJOR: 6.9 CM
RA PRESSURE ESTIMATED: 8 MMHG
RA WIDTH: 4.5 CM
RBC # BLD AUTO: 4.08 M/UL (ref 4–5.4)
RIGHT VENTRICULAR END-DIASTOLIC DIMENSION: 3 CM
RV TB RVSP: 11 MMHG
RV TISSUE DOPPLER FREE WALL SYSTOLIC VELOCITY 1 (APICAL 4 CHAMBER VIEW): 14.83 CM/S
SAMPLE: ABNORMAL
SARS-COV-2 RDRP RESP QL NAA+PROBE: NEGATIVE
SITE: ABNORMAL
SODIUM SERPL-SCNC: 142 MMOL/L (ref 136–145)
SP GR UR STRIP: 1.01 (ref 1–1.03)
SPECIMEN SOURCE: NORMAL
STJ: 2.81 CM
TASV: 15 CM/S
TDI LATERAL: 0.09 M/S
TDI SEPTAL: 0.11 M/S
TDI: 0.1 M/S
TR MAX PG: 36 MMHG
TRICUSPID ANNULAR PLANE SYSTOLIC EXCURSION: 2.74 CM
TROPONIN I SERPL DL<=0.01 NG/ML-MCNC: 0.06 NG/ML (ref 0–0.03)
TV REST PULMONARY ARTERY PRESSURE: 44 MMHG
URN SPEC COLLECT METH UR: ABNORMAL
UROBILINOGEN UR STRIP-ACNC: NEGATIVE EU/DL
WBC # BLD AUTO: 15.45 K/UL (ref 3.9–12.7)
Z-SCORE OF LEFT VENTRICULAR DIMENSION IN END DIASTOLE: -4.95
Z-SCORE OF LEFT VENTRICULAR DIMENSION IN END SYSTOLE: -3.24

## 2023-09-14 PROCEDURE — 87081 CULTURE SCREEN ONLY: CPT | Performed by: NURSE PRACTITIONER

## 2023-09-14 PROCEDURE — 93005 ELECTROCARDIOGRAM TRACING: CPT

## 2023-09-14 PROCEDURE — 63600175 PHARM REV CODE 636 W HCPCS: Performed by: NURSE PRACTITIONER

## 2023-09-14 PROCEDURE — 83735 ASSAY OF MAGNESIUM: CPT | Performed by: NURSE PRACTITIONER

## 2023-09-14 PROCEDURE — U0002 COVID-19 LAB TEST NON-CDC: HCPCS | Performed by: EMERGENCY MEDICINE

## 2023-09-14 PROCEDURE — 96375 TX/PRO/DX INJ NEW DRUG ADDON: CPT | Mod: 59

## 2023-09-14 PROCEDURE — 83615 LACTATE (LD) (LDH) ENZYME: CPT | Performed by: NURSE PRACTITIONER

## 2023-09-14 PROCEDURE — 84484 ASSAY OF TROPONIN QUANT: CPT | Performed by: EMERGENCY MEDICINE

## 2023-09-14 PROCEDURE — 87040 BLOOD CULTURE FOR BACTERIA: CPT | Mod: 59 | Performed by: EMERGENCY MEDICINE

## 2023-09-14 PROCEDURE — 96376 TX/PRO/DX INJ SAME DRUG ADON: CPT

## 2023-09-14 PROCEDURE — 25000003 PHARM REV CODE 250: Performed by: NURSE PRACTITIONER

## 2023-09-14 PROCEDURE — 83690 ASSAY OF LIPASE: CPT | Performed by: EMERGENCY MEDICINE

## 2023-09-14 PROCEDURE — 93010 ELECTROCARDIOGRAM REPORT: CPT | Mod: ,,, | Performed by: INTERNAL MEDICINE

## 2023-09-14 PROCEDURE — 82803 BLOOD GASES ANY COMBINATION: CPT

## 2023-09-14 PROCEDURE — 36600 WITHDRAWAL OF ARTERIAL BLOOD: CPT

## 2023-09-14 PROCEDURE — 99900035 HC TECH TIME PER 15 MIN (STAT)

## 2023-09-14 PROCEDURE — 84145 PROCALCITONIN (PCT): CPT | Performed by: EMERGENCY MEDICINE

## 2023-09-14 PROCEDURE — 85025 COMPLETE CBC W/AUTO DIFF WBC: CPT | Performed by: EMERGENCY MEDICINE

## 2023-09-14 PROCEDURE — 27100171 HC OXYGEN HIGH FLOW UP TO 24 HOURS

## 2023-09-14 PROCEDURE — 25000242 PHARM REV CODE 250 ALT 637 W/ HCPCS: Performed by: NURSE PRACTITIONER

## 2023-09-14 PROCEDURE — G0378 HOSPITAL OBSERVATION PER HR: HCPCS

## 2023-09-14 PROCEDURE — 87502 INFLUENZA DNA AMP PROBE: CPT | Performed by: EMERGENCY MEDICINE

## 2023-09-14 PROCEDURE — 96374 THER/PROPH/DIAG INJ IV PUSH: CPT

## 2023-09-14 PROCEDURE — 94640 AIRWAY INHALATION TREATMENT: CPT | Mod: XB

## 2023-09-14 PROCEDURE — 94640 AIRWAY INHALATION TREATMENT: CPT

## 2023-09-14 PROCEDURE — 80053 COMPREHEN METABOLIC PANEL: CPT | Performed by: EMERGENCY MEDICINE

## 2023-09-14 PROCEDURE — 84145 PROCALCITONIN (PCT): CPT | Mod: 91 | Performed by: NURSE PRACTITIONER

## 2023-09-14 PROCEDURE — 99285 EMERGENCY DEPT VISIT HI MDM: CPT | Mod: 25

## 2023-09-14 PROCEDURE — 81003 URINALYSIS AUTO W/O SCOPE: CPT | Performed by: EMERGENCY MEDICINE

## 2023-09-14 PROCEDURE — 63600175 PHARM REV CODE 636 W HCPCS: Performed by: EMERGENCY MEDICINE

## 2023-09-14 PROCEDURE — 83880 ASSAY OF NATRIURETIC PEPTIDE: CPT | Performed by: EMERGENCY MEDICINE

## 2023-09-14 PROCEDURE — 25000242 PHARM REV CODE 250 ALT 637 W/ HCPCS: Performed by: FAMILY MEDICINE

## 2023-09-14 PROCEDURE — 94761 N-INVAS EAR/PLS OXIMETRY MLT: CPT

## 2023-09-14 PROCEDURE — 96372 THER/PROPH/DIAG INJ SC/IM: CPT | Performed by: NURSE PRACTITIONER

## 2023-09-14 PROCEDURE — 83605 ASSAY OF LACTIC ACID: CPT | Performed by: EMERGENCY MEDICINE

## 2023-09-14 PROCEDURE — 93010 EKG 12-LEAD: ICD-10-PCS | Mod: ,,, | Performed by: INTERNAL MEDICINE

## 2023-09-14 PROCEDURE — 25000242 PHARM REV CODE 250 ALT 637 W/ HCPCS: Performed by: EMERGENCY MEDICINE

## 2023-09-14 RX ORDER — GUAIFENESIN 100 MG/5ML
200 SOLUTION ORAL EVERY 4 HOURS PRN
Status: DISCONTINUED | OUTPATIENT
Start: 2023-09-14 | End: 2023-09-17 | Stop reason: HOSPADM

## 2023-09-14 RX ORDER — METHYLPREDNISOLONE SOD SUCC 125 MG
60 VIAL (EA) INJECTION EVERY 6 HOURS
Status: DISCONTINUED | OUTPATIENT
Start: 2023-09-14 | End: 2023-09-17 | Stop reason: HOSPADM

## 2023-09-14 RX ORDER — IPRATROPIUM BROMIDE 0.5 MG/2.5ML
0.5 SOLUTION RESPIRATORY (INHALATION) EVERY 8 HOURS
Status: DISCONTINUED | OUTPATIENT
Start: 2023-09-14 | End: 2023-09-17 | Stop reason: HOSPADM

## 2023-09-14 RX ORDER — IBUPROFEN 200 MG
24 TABLET ORAL
Status: DISCONTINUED | OUTPATIENT
Start: 2023-09-14 | End: 2023-09-17 | Stop reason: HOSPADM

## 2023-09-14 RX ORDER — POTASSIUM CHLORIDE 20 MEQ/1
40 TABLET, EXTENDED RELEASE ORAL 2 TIMES DAILY
Status: COMPLETED | OUTPATIENT
Start: 2023-09-14 | End: 2023-09-15

## 2023-09-14 RX ORDER — SODIUM CHLORIDE 0.9 % (FLUSH) 0.9 %
10 SYRINGE (ML) INJECTION EVERY 12 HOURS PRN
Status: DISCONTINUED | OUTPATIENT
Start: 2023-09-14 | End: 2023-09-17 | Stop reason: HOSPADM

## 2023-09-14 RX ORDER — ENOXAPARIN SODIUM 100 MG/ML
40 INJECTION SUBCUTANEOUS EVERY 24 HOURS
Status: DISCONTINUED | OUTPATIENT
Start: 2023-09-14 | End: 2023-09-17 | Stop reason: HOSPADM

## 2023-09-14 RX ORDER — CETIRIZINE HYDROCHLORIDE 5 MG/1
5 TABLET ORAL DAILY
Status: DISCONTINUED | OUTPATIENT
Start: 2023-09-15 | End: 2023-09-17 | Stop reason: HOSPADM

## 2023-09-14 RX ORDER — ARFORMOTEROL TARTRATE 15 UG/2ML
15 SOLUTION RESPIRATORY (INHALATION) 2 TIMES DAILY
Status: DISCONTINUED | OUTPATIENT
Start: 2023-09-14 | End: 2023-09-17 | Stop reason: HOSPADM

## 2023-09-14 RX ORDER — IPRATROPIUM BROMIDE AND ALBUTEROL SULFATE 2.5; .5 MG/3ML; MG/3ML
3 SOLUTION RESPIRATORY (INHALATION)
Status: COMPLETED | OUTPATIENT
Start: 2023-09-14 | End: 2023-09-14

## 2023-09-14 RX ORDER — BENZONATATE 100 MG/1
100 CAPSULE ORAL 3 TIMES DAILY PRN
Status: DISCONTINUED | OUTPATIENT
Start: 2023-09-14 | End: 2023-09-17 | Stop reason: HOSPADM

## 2023-09-14 RX ORDER — AMOXICILLIN 250 MG
1 CAPSULE ORAL 2 TIMES DAILY
Status: DISCONTINUED | OUTPATIENT
Start: 2023-09-14 | End: 2023-09-17 | Stop reason: HOSPADM

## 2023-09-14 RX ORDER — FUROSEMIDE 10 MG/ML
40 INJECTION INTRAMUSCULAR; INTRAVENOUS EVERY 12 HOURS
Status: DISCONTINUED | OUTPATIENT
Start: 2023-09-14 | End: 2023-09-17 | Stop reason: HOSPADM

## 2023-09-14 RX ORDER — METHYLPREDNISOLONE SOD SUCC 125 MG
125 VIAL (EA) INJECTION
Status: COMPLETED | OUTPATIENT
Start: 2023-09-14 | End: 2023-09-14

## 2023-09-14 RX ORDER — IBUPROFEN 200 MG
200 TABLET ORAL EVERY 6 HOURS PRN
COMMUNITY

## 2023-09-14 RX ORDER — METHOCARBAMOL 500 MG/1
500 TABLET, FILM COATED ORAL 4 TIMES DAILY PRN
Status: DISCONTINUED | OUTPATIENT
Start: 2023-09-14 | End: 2023-09-17 | Stop reason: HOSPADM

## 2023-09-14 RX ORDER — IBUPROFEN 200 MG
16 TABLET ORAL
Status: DISCONTINUED | OUTPATIENT
Start: 2023-09-14 | End: 2023-09-17 | Stop reason: HOSPADM

## 2023-09-14 RX ORDER — OMEPRAZOLE 20 MG/1
20 CAPSULE, DELAYED RELEASE ORAL DAILY
Qty: 30 CAPSULE | Refills: 1 | Status: SHIPPED | OUTPATIENT
Start: 2023-09-14 | End: 2024-09-13

## 2023-09-14 RX ORDER — TALC
6 POWDER (GRAM) TOPICAL NIGHTLY PRN
Status: DISCONTINUED | OUTPATIENT
Start: 2023-09-14 | End: 2023-09-17 | Stop reason: HOSPADM

## 2023-09-14 RX ORDER — FAMOTIDINE 20 MG/1
20 TABLET, FILM COATED ORAL 2 TIMES DAILY
Status: DISCONTINUED | OUTPATIENT
Start: 2023-09-14 | End: 2023-09-17 | Stop reason: HOSPADM

## 2023-09-14 RX ORDER — NALOXONE HCL 0.4 MG/ML
0.02 VIAL (ML) INJECTION
Status: DISCONTINUED | OUTPATIENT
Start: 2023-09-14 | End: 2023-09-17 | Stop reason: HOSPADM

## 2023-09-14 RX ORDER — GLUCAGON 1 MG
1 KIT INJECTION
Status: DISCONTINUED | OUTPATIENT
Start: 2023-09-14 | End: 2023-09-17 | Stop reason: HOSPADM

## 2023-09-14 RX ORDER — ONDANSETRON 2 MG/ML
4 INJECTION INTRAMUSCULAR; INTRAVENOUS EVERY 8 HOURS PRN
Status: DISCONTINUED | OUTPATIENT
Start: 2023-09-14 | End: 2023-09-17 | Stop reason: HOSPADM

## 2023-09-14 RX ORDER — ACETAMINOPHEN 325 MG/1
650 TABLET ORAL EVERY 4 HOURS PRN
Status: DISCONTINUED | OUTPATIENT
Start: 2023-09-14 | End: 2023-09-17 | Stop reason: HOSPADM

## 2023-09-14 RX ORDER — LOPERAMIDE HYDROCHLORIDE 2 MG/1
2 CAPSULE ORAL 4 TIMES DAILY PRN
Status: DISCONTINUED | OUTPATIENT
Start: 2023-09-14 | End: 2023-09-17 | Stop reason: HOSPADM

## 2023-09-14 RX ADMIN — FUROSEMIDE 40 MG: 10 INJECTION, SOLUTION INTRAMUSCULAR; INTRAVENOUS at 03:09

## 2023-09-14 RX ADMIN — GUAIFENESIN 200 MG: 200 SOLUTION ORAL at 03:09

## 2023-09-14 RX ADMIN — GUAIFENESIN 200 MG: 200 SOLUTION ORAL at 09:09

## 2023-09-14 RX ADMIN — ARFORMOTEROL TARTRATE 15 MCG: 15 SOLUTION RESPIRATORY (INHALATION) at 08:09

## 2023-09-14 RX ADMIN — FAMOTIDINE 20 MG: 20 TABLET, FILM COATED ORAL at 09:09

## 2023-09-14 RX ADMIN — METHOCARBAMOL 500 MG: 500 TABLET ORAL at 06:09

## 2023-09-14 RX ADMIN — METHYLPREDNISOLONE SODIUM SUCCINATE 60 MG: 125 INJECTION, POWDER, FOR SOLUTION INTRAMUSCULAR; INTRAVENOUS at 05:09

## 2023-09-14 RX ADMIN — ENOXAPARIN SODIUM 40 MG: 40 INJECTION SUBCUTANEOUS at 04:09

## 2023-09-14 RX ADMIN — POTASSIUM CHLORIDE 40 MEQ: 1500 TABLET, EXTENDED RELEASE ORAL at 03:09

## 2023-09-14 RX ADMIN — IPRATROPIUM BROMIDE AND ALBUTEROL SULFATE 3 ML: .5; 3 SOLUTION RESPIRATORY (INHALATION) at 12:09

## 2023-09-14 RX ADMIN — Medication 6 MG: at 09:09

## 2023-09-14 RX ADMIN — METHYLPREDNISOLONE SODIUM SUCCINATE 125 MG: 125 INJECTION, POWDER, FOR SOLUTION INTRAMUSCULAR; INTRAVENOUS at 12:09

## 2023-09-14 RX ADMIN — NINTEDANIB 150 MG: 150 CAPSULE ORAL at 09:09

## 2023-09-14 RX ADMIN — BENZONATATE 100 MG: 100 CAPSULE ORAL at 05:09

## 2023-09-14 RX ADMIN — IPRATROPIUM BROMIDE 0.5 MG: 0.5 SOLUTION RESPIRATORY (INHALATION) at 04:09

## 2023-09-14 NOTE — ED NOTES
Patient requested something to eat. Spoke with Dr. Soto about patient's diet. Received permission to give pt a sandwich.

## 2023-09-14 NOTE — ASSESSMENT & PLAN NOTE
Patient with Hypoxic Respiratory failure which is Acute on chronic.  she is on home oxygen at 6-8 LPM. Supplemental oxygen was provided and noted-      .   Signs/symptoms of respiratory failure include- tachypnea, increased work of breathing and use of accessory muscles. Contributing diagnoses includes - Interstitial lung disease Labs and images were reviewed. Patient Has recent ABG, which has been reviewed. Will treat underlying causes and adjust management of respiratory failure as follows    - Hx of ILD, follows with Dr. Aviles  - continue o2 supplementation  - CT chest showed stable chronic interstitial opacities and ground glass densities with diffuse bronchiectasis  - breath treatments/iv steroids  - elevated WBC but on daily steroids, will check procal and ldh  - sputum culture if able   - continue home ofev, patient may take home medication   - pulm consulted, will follow up further recs

## 2023-09-14 NOTE — SUBJECTIVE & OBJECTIVE
Past Medical History:   Diagnosis Date    Abnormal Pap smear of cervix     in the past with repeat pap smear okay.    Acute interstitial pneumonitis     Allergic rhinitis, cause unspecified     Arthritis of both knees     Asthma     Eczema     Fatty liver 10/2014    Fibrocystic breast changes     Headache(784.0)     Hepatomegaly 10/2014    Hypertension     Liver cyst 10/2014    Multinodular goiter     Followed by ENT - Dr. Nicolas Gray    Polymenorrhea     TMJ (dislocation of temporomandibular joint)     Uterine fibroid     in the past    Vitamin D deficiency disease      Past Surgical History:   Procedure Laterality Date    BRONCHOSCOPY Bilateral 2023    Procedure: Bronchoscopy - insert lighted tube into airway to take a biopsy of lung;  Surgeon: Agustín Aviles MD;  Location: [facility] ENDO;  Service: Pulmonary;  Laterality: Bilateral;     SECTION, CLASSIC      x 1    COLONOSCOPY N/A 2020    Procedure: COLONOSCOPY;  Surgeon: Angie Magana MD;  Location: [facility] ENDO;  Service: Endoscopy;  Laterality: N/A;    HYSTERECTOMY      MULTIPLE TOOTH EXTRACTIONS      PARTIAL HYSTERECTOMY  2013    Due to fibroids     Review of patient's allergies indicates:   Allergen Reactions    Doxycycline Nausea Only     Other reaction(s): Nausea    Fluticasone Other (See Comments)     Other reaction(s): Epistaxis       Family History       Problem Relation (Age of Onset)    Cancer Maternal Grandmother (60), Maternal Aunt (50), Maternal Aunt (66)    Cataracts Cousin    Diabetes Maternal Grandfather    Diverticulitis Mother    Heart disease Mother, Father (63)    Hypertension Father    Migraines Cousin    No Known Problems Brother    Peripheral vascular disease Maternal Grandfather    Stroke Mother, Sister, Maternal Grandfather          Tobacco Use    Smoking status: Never     Passive exposure: Past    Smokeless tobacco: Never   Substance and Sexual Activity    Alcohol use: Not Currently     Comment: last use  03/2022    Drug use: Not Currently     Frequency: 4.0 times per week     Types: Marijuana     Comment: last year was last use 03/2022    Sexual activity: Yes     Partners: Female       Review of Systems   Constitutional:  Positive for activity change. Negative for chills, fatigue, fever and unexpected weight change.   HENT:  Positive for congestion and voice change.    Respiratory:  Positive for cough and shortness of breath.    Gastrointestinal:  Positive for abdominal distention and diarrhea.   : Negative except as indicated in HPI  Objective:     Vital Signs (Most Recent):  Temp: 97.8 °F (36.6 °C) (09/14/23 1536)  Pulse: 107 (09/14/23 1536)  Resp: (!) 22 (09/14/23 1536)  BP: 120/75 (09/14/23 1536)  SpO2: 95 % (09/14/23 1536) Vital Signs (24h Range):  Temp:  [97.8 °F (36.6 °C)-98.6 °F (37 °C)] 97.8 °F (36.6 °C)  Pulse:  [105-122] 107  Resp:  [20-24] 22  SpO2:  [84 %-100 %] 95 %  BP: (110-180)/(67-85) 120/75   Weight: 102.9 kg (226 lb 13.7 oz);  Body mass index is 36.62 kg/m².    No intake or output data in the 24 hours ending 09/14/23 1614     Physical Exam  Vitals and nursing note reviewed.   Constitutional:       Appearance: She is obese.   HENT:      Head: Normocephalic.   Eyes:      Conjunctiva/sclera: Conjunctivae normal.   Cardiovascular:      Rate and Rhythm: Normal rate.   Pulmonary:      Effort: Pulmonary effort is normal.      Breath sounds: Normal breath sounds.   Abdominal:      Palpations: Abdomen is soft.   Musculoskeletal:         General: Normal range of motion.      Cervical back: Normal range of motion.   Skin:     General: Skin is warm and dry.   Neurological:      Mental Status: She is alert and oriented to person, place, and time.   Psychiatric:         Mood and Affect: Mood normal.        Significant Labs:  CBC/Anemia Profile:  Recent Labs   Lab 09/14/23  1136   WBC 15.45*   HGB 12.6   HCT 40.7      *   RDW 18.1*   Chemistries:  Recent Labs   Lab 09/14/23  1135      K 3.6       CO2 24   BUN 12   CREATININE 0.9   CALCIUM 9.2   ALBUMIN 3.4*   PROT 7.5   BILITOT 0.3   ALKPHOS 93   ALT 27   AST 27     Significant Imaging:   CXR: I have reviewed all pertinent results/findings within the past 24 hours and my personal findings are:  diffuse bibasilar fibrotic changes noted; no acute infiltrates/opacities. No pleural effusion or pneumothorax evident.

## 2023-09-14 NOTE — ASSESSMENT & PLAN NOTE
Patient reports non-compliance with home PAP therapy in the past. We discussed utilizing and compliance with home PAP therapy. Following discharge from prior hospitalization in August 2023, the patient was transitioned from CPAP to home AVAPS as she has significant hypoxic respiratory failure at baseline with associated restrictive disease as evidenced by her PFTs. Patient reports decreased use with home AVAPS due to lack of humidification. Patient reports she just received her humidification and reports using AVAPS for a few hours at night.     · Nocturnal AVAPS as ordered

## 2023-09-14 NOTE — TELEPHONE ENCOUNTER
----- Message from Suki Pelayo sent at 9/14/2023  9:15 AM CDT -----  Maggie with home health called in this morning the patient is in respiratory distress and the patient refused to go to the emergency room by ambulance. Oxygen level was 61% the highest was 85%. Please call back [phone]

## 2023-09-14 NOTE — PLAN OF CARE
POC reviewed with pt. Pt verbalizes understanding of POC. No questions at this time.  AAOx4. NADN.  Sinus tachycardia on cardiac monitor.  Pt remains free of falls. Up to BSC with standby assist.   8L hi rosalba.  PRN cough meds given.   No complaints at this time.  Safety measures in place. Will continue to monitor.  Informed pt to call for assistance before getting up. Pt verbalizes understanding.   Hourly rounding and chart check complete.

## 2023-09-14 NOTE — HPI
Ayanna Alicia is a 52-year-old female with a past medical history of chronic hypoxic respiratory failure on 5L/NC, asthma, Sjogren's syndrome with associated interstitial lung disease, multinodular goiter, GLENN, and morbid obesity who  presented with worsening shortness of breadth yesterday with associated decreased oxygen saturations. Patient reported over the last several days she noted worsening shortness of breath. In addition, she noted increased cough with clear to pale yellow sputum production. She noted difficulty with sleeping and a sense of restlessness at night. She endorses some increased fluid retention with increased abdominal girth and tightness in her hands bilaterally. She denies any chest pain, fever, or chills. Reports diarrhea which may be increased from baseline (as she reports chronic diarrhea since starting OFEV).      Of note, she was hospitalized in May of 2022 with acute respiratory failure and persistent pneumonia. At that time, she was dishcharged on oxygen. She was subsequently diagnosed with Sjogren's disease and thought to have interstitial lung disease secondary to her underlying rheumatological disease. Initial PFTs with severe restriction and reduced DLCO. She was initially prescribed steroids followed by the addition of Cellcept and Rituximab infusions (last infusion in Dec 2022). Most recently, she was initiated on Ofev; however, the patient reports she has not had any significant improvement in her clinical condition since her original illness in May 2022 and reports she has clinically worsened over the course of this time. Endorses losing weight, lost 65 lbs since last year; however, she has not been able to get less than 220lb. Not taking cellcept since 12/2022, received 4 treatments of Rituximab with her last dose in Dec 2022. She chronically takes prednisone 10 mg.  Patient underwent bronchoscopy with BAL in April 2023 per Dr. Aviles with negative cultures and no growth  of fungus, yeast, PCP, staph, or pseudomonas. Remains on OFEV 150mg BID and Prednisone 10mg daily. In addition, she is in a trial of Actemra IV monthly for four consecutive months. In addition, she has been referred for evaluation of lung transplant at [facility] in [place] and was seen by them in August. At that time, she was told she is not currently a candidate for lung transplant secondary to her weight. She has a goal of an additional 10lb weight loss for consideration for transplant.       Due to her current acute on chronic hypoxic respiratory failure, as well as, her chronic underlying pulmonary disease, pulmonology has been consulted for evaluation.     Interval history:   9/15: Pulmonary status stable on 6L/NC. Persistent dry, non-productive cough. No acute events overnight.   9/17: Pulmonary status remains stable on 6L/NC. Non-productive cough. No acute events. Anxious for discharge to home. Plans to begin pulmonary rehab on Monday, Actemra infusion on Tuesday, return to [place] [facility] on Wednesday.

## 2023-09-14 NOTE — ASSESSMENT & PLAN NOTE
Patient with chronic hypoxic respiratory failure who is on home oxygen at 5L/NC which is her current inpatient requirements. Signs/symptoms of respiratory failure included increased work of breathing, decreased oxygen saturations, and wheezing. Contributing diagnoses includes asthma, interstitial lung disease, and decompensated heart failure. Labs and images were reviewed. Patient does have a recent ABG which has been reviewed.  Initial PFTs with severe restriction and reduced DLCO; attempted repeat PFTs on 3/16/2022 which she was unable to complete due to worsening shortness of breath.    Will treat underlying causes and adjust management of respiratory failure as follows:  · Patient with restrictive pattern on her PFTs and chronic hypoxic respiratory failure. On chronic immunosuppression including OFEV, Prednisone, and Actemra  · On Bactrim ppx  · Add scheduled IV Lasix  · Continue scheduled nebs  · Continue scheduled IV steroids  · Continue supplemental oxygen and titrate as needed to maintain saturations 90% or greater  · Fluid restriction of 1.5L daily  · Obtain sputum culture  · Add procalcitonin and LDH  · Add CT chest without contrast  · Follow fever and WBC trends  · Follow chest imaging and ABGs as needed

## 2023-09-14 NOTE — ED PROVIDER NOTES
SCRIBE #1 NOTE: I, Mariano Villa, am scribing for, and in the presence of, Saulo Soto MD. I have scribed the entire note.      History      Chief Complaint   Patient presents with    Shortness of Breath     Pt with history of interstitial lung disease c/o difficulty breathing, worse x 4 days.       Review of patient's allergies indicates:   Allergen Reactions    Doxycycline Nausea Only     Other reaction(s): Nausea    Fluticasone Other (See Comments)     Other reaction(s): Epistaxis          HPI   HPI    9/14/2023, 11:17 AM   History obtained from the patient      History of Present Illness: Ayanna Alicia is a 52 y.o. female patient with a PMHx of HTN, interstitial lung disease who presents to the Emergency Department for SOB, onset 4 days PTA. Symptoms are constant and moderate in severity. No mitigating or exacerbating factors reported. Associated sxs include cough. Patient denies any fever, chills, n/v/d, CP, weakness, numbness, dizziness, headache, and all other sxs at this time. No further complaints or concerns at this time.     Arrival mode: Personal vehicle    PCP: Madeleine Enrique MD       Past Medical History:  Past Medical History:   Diagnosis Date    Abnormal Pap smear of cervix     in the past with repeat pap smear okay.    Acute interstitial pneumonitis     Allergic rhinitis, cause unspecified     Arthritis of both knees     Asthma     Eczema     Fatty liver 10/2014    Fibrocystic breast changes     Headache(784.0)     Hepatomegaly 10/2014    Hypertension     Liver cyst 10/2014    Multinodular goiter     Followed by ENT - Dr. Nicolas Gray    Polymenorrhea 2008    TMJ (dislocation of temporomandibular joint)     Uterine fibroid     in the past    Vitamin D deficiency disease        Past Surgical History:  Past Surgical History:   Procedure Laterality Date    BRONCHOSCOPY Bilateral 4/5/2023    Procedure: Bronchoscopy - insert lighted tube into airway to take a biopsy of lung;  Surgeon:  Agustín Aviles MD;  Location: [facility] ENDO;  Service: Pulmonary;  Laterality: Bilateral;     SECTION, CLASSIC      x 1    COLONOSCOPY N/A 2020    Procedure: COLONOSCOPY;  Surgeon: Angie Magana MD;  Location: [facility] ENDO;  Service: Endoscopy;  Laterality: N/A;    HYSTERECTOMY      MULTIPLE TOOTH EXTRACTIONS      PARTIAL HYSTERECTOMY  2013    Due to fibroids         Family History:  Family History   Problem Relation Age of Onset    Stroke Mother     Heart disease Mother     Diverticulitis Mother     Heart disease Father 63        MI/CAD    Hypertension Father     Stroke Sister     No Known Problems Brother     Cancer Maternal Grandmother 60        Rectal and stomach cancer    Stroke Maternal Grandfather     Diabetes Maternal Grandfather     Peripheral vascular disease Maternal Grandfather     Cancer Maternal Aunt 50        Stomach cancer    Cancer Maternal Aunt 66        Lung cancer (smoker)    Migraines Cousin     Cataracts Cousin        Social History:  Social History     Tobacco Use    Smoking status: Never     Passive exposure: Past    Smokeless tobacco: Never   Substance and Sexual Activity    Alcohol use: Not Currently     Comment: last use 2022    Drug use: Not Currently     Frequency: 4.0 times per week     Types: Marijuana     Comment: last year was last use 2022    Sexual activity: Yes     Partners: Female       ROS   Review of Systems   Constitutional:  Negative for chills and fever.   HENT:  Negative for sore throat.    Respiratory:  Positive for cough and shortness of breath.    Cardiovascular:  Negative for chest pain.   Gastrointestinal:  Negative for diarrhea, nausea and vomiting.   Genitourinary:  Negative for dysuria.   Musculoskeletal:  Negative for back pain.   Skin:  Negative for rash.   Neurological:  Negative for dizziness, weakness, numbness and headaches.   Hematological:  Does not bruise/bleed easily.   All other systems reviewed and are negative.    Physical  Exam      Initial Vitals [09/14/23 1111]   BP Pulse Resp Temp SpO2   (!) 180/78 (!) 122 (!) 24 98.6 °F (37 °C) (!) 84 %      MAP       --          Physical Exam  Nursing Notes and Vital Signs Reviewed.  Constitutional: Patient is in moderate distress. Well-developed and well-nourished.  Head: Atraumatic. Normocephalic.  Eyes: PERRL. EOM intact. Conjunctivae are not pale. No scleral icterus.  ENT: Mucous membranes are moist. Oropharynx is clear and symmetric.    Neck: Supple. Full ROM. No lymphadenopathy.  Cardiovascular: Regular rate. Regular rhythm. No murmurs, rubs, or gallops. Distal pulses are 2+ and symmetric.  Pulmonary/Chest: Tachypneic. Decreased breath sounds bilaterally. No wheezing or rales.  Abdominal: Soft and non-distended.  There is no tenderness.  No rebound, guarding, or rigidity.   Musculoskeletal: Moves all extremities. No obvious deformities. No edema.  Skin: Warm and dry.  Neurological:  Alert, awake, and appropriate.  Normal speech.  No acute focal neurological deficits are appreciated.  Psychiatric: Normal affect. Good eye contact. Appropriate in content.    ED Course    Critical Care    Date/Time: 9/14/2023 1:48 PM    Performed by: Saulo Soto MD  Authorized by: Saulo Soto MD  Direct patient critical care time: 35 minutes  Additional history critical care time: 10 minutes  Ordering / reviewing critical care time: 25 minutes  Documentation critical care time: 10 minutes  Consulting other physicians critical care time: 10 minutes  Total critical care time (exclusive of procedural time) : 90 minutes  Critical care time was exclusive of separately billable procedures and treating other patients and teaching time.  Critical care was necessary to treat or prevent imminent or life-threatening deterioration of the following conditions: respiratory failure.  Critical care was time spent personally by me on the following activities: blood draw for specimens, development of treatment  plan with patient or surrogate, discussions with consultants, interpretation of cardiac output measurements, evaluation of patient's response to treatment, examination of patient, obtaining history from patient or surrogate, ordering and performing treatments and interventions, ordering and review of laboratory studies, ordering and review of radiographic studies, pulse oximetry, re-evaluation of patient's condition and review of old charts.        ED Vital Signs:  Vitals:    09/14/23 1111 09/14/23 1128 09/14/23 1203 09/14/23 1239   BP: (!) 180/78      Pulse: (!) 122 (!) 118 (!) 118 (!) 114   Resp: (!) 24   20   Temp: 98.6 °F (37 °C)      TempSrc: Oral      SpO2: (!) 84% (!) 93%  99%    09/14/23 1242 09/14/23 1243 09/14/23 1255 09/14/23 1315   BP: 134/85      Pulse:  (!) 114 107 (!) 114   Resp:  20 20    Temp:       TempSrc:       SpO2:  99% 100% 96%    09/14/23 1327 09/14/23 1342   BP:     Pulse: 109 108   Resp:     Temp:     TempSrc:     SpO2: 98% 98%       Abnormal Lab Results:  Labs Reviewed   CBC W/ AUTO DIFFERENTIAL - Abnormal; Notable for the following components:       Result Value    WBC 15.45 (*)      (*)     MCHC 31.0 (*)     RDW 18.1 (*)     Immature Granulocytes 1.0 (*)     Gran # (ANC) 13.6 (*)     Immature Grans (Abs) 0.16 (*)     Lymph # 0.6 (*)     Gran % 88.0 (*)     Lymph % 4.1 (*)     All other components within normal limits   COMPREHENSIVE METABOLIC PANEL - Abnormal; Notable for the following components:    Glucose 160 (*)     Albumin 3.4 (*)     All other components within normal limits   URINALYSIS, REFLEX TO URINE CULTURE - Abnormal; Notable for the following components:    Color, UA Colorless (*)     All other components within normal limits    Narrative:     Specimen Source->Urine   TROPONIN I - Abnormal; Notable for the following components:    Troponin I 0.059 (*)     All other components within normal limits   ISTAT PROCEDURE - Abnormal; Notable for the following components:     POC PO2 70 (*)     POC HCO3 28.6 (*)     POC SATURATED O2 94 (*)     All other components within normal limits   INFLUENZA A & B BY MOLECULAR   CULTURE, BLOOD   CULTURE, BLOOD   LACTIC ACID, PLASMA   PROCALCITONIN   B-TYPE NATRIURETIC PEPTIDE   LIPASE   SARS-COV-2 RNA AMPLIFICATION, QUAL   LACTIC ACID, PLASMA        All Lab Results:  Results for orders placed or performed during the hospital encounter of 09/14/23   Influenza A & B by Molecular    Specimen: Nasopharyngeal Swab   Result Value Ref Range    Influenza A, Molecular Negative Negative    Influenza B, Molecular Negative Negative    Flu A & B Source Nasal swab    CBC auto differential   Result Value Ref Range    WBC 15.45 (H) 3.90 - 12.70 K/uL    RBC 4.08 4.00 - 5.40 M/uL    Hemoglobin 12.6 12.0 - 16.0 g/dL    Hematocrit 40.7 37.0 - 48.5 %     (H) 82 - 98 fL    MCH 30.9 27.0 - 31.0 pg    MCHC 31.0 (L) 32.0 - 36.0 g/dL    RDW 18.1 (H) 11.5 - 14.5 %    Platelets 354 150 - 450 K/uL    MPV 9.5 9.2 - 12.9 fL    Immature Granulocytes 1.0 (H) 0.0 - 0.5 %    Gran # (ANC) 13.6 (H) 1.8 - 7.7 K/uL    Immature Grans (Abs) 0.16 (H) 0.00 - 0.04 K/uL    Lymph # 0.6 (L) 1.0 - 4.8 K/uL    Mono # 0.8 0.3 - 1.0 K/uL    Eos # 0.2 0.0 - 0.5 K/uL    Baso # 0.06 0.00 - 0.20 K/uL    nRBC 0 0 /100 WBC    Gran % 88.0 (H) 38.0 - 73.0 %    Lymph % 4.1 (L) 18.0 - 48.0 %    Mono % 5.4 4.0 - 15.0 %    Eosinophil % 1.1 0.0 - 8.0 %    Basophil % 0.4 0.0 - 1.9 %    Differential Method Automated    Comprehensive metabolic panel   Result Value Ref Range    Sodium 142 136 - 145 mmol/L    Potassium 3.6 3.5 - 5.1 mmol/L    Chloride 102 95 - 110 mmol/L    CO2 24 23 - 29 mmol/L    Glucose 160 (H) 70 - 110 mg/dL    BUN 12 6 - 20 mg/dL    Creatinine 0.9 0.5 - 1.4 mg/dL    Calcium 9.2 8.7 - 10.5 mg/dL    Total Protein 7.5 6.0 - 8.4 g/dL    Albumin 3.4 (L) 3.5 - 5.2 g/dL    Total Bilirubin 0.3 0.1 - 1.0 mg/dL    Alkaline Phosphatase 93 55 - 135 U/L    AST 27 10 - 40 U/L    ALT 27 10 - 44 U/L     eGFR >60 >60 mL/min/1.73 m^2    Anion Gap 16 8 - 16 mmol/L   Lactic acid, plasma #2   Result Value Ref Range    Lactate (Lactic Acid) 1.7 0.5 - 2.2 mmol/L   Urinalysis, Reflex to Urine Culture Urine, Clean Catch    Specimen: Urine   Result Value Ref Range    Specimen UA Urine, Clean Catch     Color, UA Colorless (A) Yellow, Straw, Hallie    Appearance, UA Clear Clear    pH, UA 6.0 5.0 - 8.0    Specific Gravity, UA 1.010 1.005 - 1.030    Protein, UA Negative Negative    Glucose, UA Negative Negative    Ketones, UA Negative Negative    Bilirubin (UA) Negative Negative    Occult Blood UA Negative Negative    Nitrite, UA Negative Negative    Urobilinogen, UA Negative <2.0 EU/dL    Leukocytes, UA Negative Negative   Procalcitonin   Result Value Ref Range    Procalcitonin 0.06 <0.25 ng/mL   Troponin I   Result Value Ref Range    Troponin I 0.059 (H) 0.000 - 0.026 ng/mL   Brain natriuretic peptide   Result Value Ref Range    BNP 36 0 - 99 pg/mL   Lipase   Result Value Ref Range    Lipase 20 4 - 60 U/L   COVID-19 Rapid Screening   Result Value Ref Range    SARS-CoV-2 RNA, Amplification, Qual Negative Negative   ISTAT PROCEDURE   Result Value Ref Range    POC PH 7.433 7.35 - 7.45    POC PCO2 42.8 35 - 45 mmHg    POC PO2 70 (L) 80 - 100 mmHg    POC HCO3 28.6 (H) 24 - 28 mmol/L    POC BE 4 -2 to 2 mmol/L    POC SATURATED O2 94 (L) 95 - 100 %    Sample ARTERIAL     Site RR     Allens Test Pass     DelSys Nasal Can     Mode SPONT     Flow 7      *Note: Due to a large number of results and/or encounters for the requested time period, some results have not been displayed. A complete set of results can be found in Results Review.     Imaging Results:  Imaging Results              X-Ray Chest AP Portable (Final result)  Result time 09/14/23 12:10:48      Final result by Jerry Skelton MD (09/14/23 12:10:48)                   Impression:      In comparison to the prior study, there is no adverse interval changes      Electronically  signed by: Jerry Skelton MD  Date:    09/14/2023  Time:    12:10               Narrative:    EXAMINATION:  XR CHEST AP PORTABLE    CLINICAL HISTORY:  Sepsis;    TECHNIQUE:  Single frontal view of the chest was performed.    COMPARISON:  08/22/2023    FINDINGS:  Cardiomegaly is stable.  The diffuse pulmonary fibrotic stranding and scarring bilaterally is again present and appears stable.  Thoracic spondylosis is stable.  In comparison to the prior study, there is no adverse interval changes.                                     The EKG was ordered, reviewed, and independently interpreted by the ED provider.  Interpretation time: 12:03  Rate: 114 BPM  Rhythm: sinus tachycardia  Interpretation: Possible left atrial enlargement. Low voltage QRS. Possible anterolateral infarct, age undetermined. No STEMI.           The Emergency Provider reviewed the vital signs and test results, which are outlined above.    ED Discussion     1:43 PM: Discussed case with aMrlee German NP (Hospital Medicine). Dr. Espinosa agrees with current care and management of pt and accepts admission.   Admitting Service: Hospital Medicine  Admitting Physician: Dr. Espinosa  Admit to: Obs    1:44 PM: Re-evaluated pt. I have discussed test results, shared treatment plan, and the need for admission with patient and family at bedside. Pt and family express understanding at this time and agree with all information. All questions answered. Pt and family have no further questions or concerns at this time. Pt is ready for admit.         ED Medication(s):  Medications   albuterol-ipratropium 2.5 mg-0.5 mg/3 mL nebulizer solution 3 mL (3 mLs Nebulization Given 9/14/23 1239)   methylPREDNISolone sodium succinate injection 125 mg (125 mg Intravenous Given 9/14/23 1254)   albuterol-ipratropium 2.5 mg-0.5 mg/3 mL nebulizer solution 3 mL (3 mLs Nebulization Given 9/14/23 1255)        Follow-up Information       Madeleine Enrique MD.    Specialty: Family  Medicine  Contact information:  [address] [address]  [phone]                           New Prescriptions    OMEPRAZOLE (PRILOSEC) 20 MG CAPSULE    Take 1 capsule (20 mg total) by mouth once daily.         Medical Decision Making    Medical Decision Making  Hx of interstitial lung disease presents with worsening sob  DDx: SOB, hypoxia, wheezing    Amount and/or Complexity of Data Reviewed  Labs: ordered. Decision-making details documented in ED Course.  Radiology: ordered. Decision-making details documented in ED Course.  ECG/medicine tests: ordered and independent interpretation performed. Decision-making details documented in ED Course.    Risk  Prescription drug management.  Decision regarding hospitalization.                Scribe Attestation:   Scribe #1: I performed the above scribed service and the documentation accurately describes the services I performed. I attest to the accuracy of the note.    Attending:   Physician Attestation Statement for Scribe #1: I, Saulo Soto MD, personally performed the services described in this documentation, as scribed by Mariano Villa, in my presence, and it is both accurate and complete.          Clinical Impression       ICD-10-CM ICD-9-CM   1. SOB (shortness of breath)  R06.02 786.05   2. Hypoxia  R09.02 799.02       Disposition:   Disposition: Placed in Observation  Condition: Saulo Bunn MD  09/14/23 3849

## 2023-09-14 NOTE — PHARMACY MED REC
"Admission Medication History     The home medication history was taken by Chhaya Raza.    You may go to "Admission" then "Reconcile Home Medications" tabs to review and/or act upon these items.     The home medication list has been updated by the Pharmacy department.   Please read ALL comments highlighted in yellow.   Please address this information as you see fit.    Feel free to contact us if you have any questions or require assistance.      The medications listed below were removed from the home medication list. Please reorder if appropriate:  Patient reports no longer taking the following medication(s):  CALCIUM-MAGNESIUM-B COMPLEX ORAL  HYDROCORTISONE 2.5 OITNMENT  VITAMIN-D OMEGA-3ORAL  PROTONIX 40 MG3    Medications listed below were obtained from: Patient/family and Analytic software- [employer]  (Not in a hospital admission)      Chhaya Raza  [id number]    Current Outpatient Medications on File Prior to Encounter   Medication Sig Dispense Refill Last Dose    albuterol (PROVENTIL/VENTOLIN HFA) 90 mcg/actuation inhaler INHALE 1 TO 2 PUFFS BY MOUTH INTO THE LUNGS EVERY 4 TO 6 HOURS AS NEEDED FOR WHEEZING OR SHORTNESS OF BREATH 8.5 g 5 Past Week    ascorbic acid, vitamin C, (VITAMIN C) 500 MG tablet Take 500 mg by mouth once daily.   Past Week    furosemide (LASIX) 40 MG tablet Take 1 tablet (40 mg total) by mouth every other day. 45 tablet 0 9/14/2023    ipratropium (ATROVENT) 0.02 % nebulizer solution Take 2.5 mLs (500 mcg total) by nebulization 4 (four) times daily. Rescue 300 mL 11 9/13/2023    levocetirizine (XYZAL) 5 MG tablet TAKE 1 TABLET(5 MG) BY MOUTH EVERY DAY 90 tablet 1 9/14/2023    mv-minerals/FA/omega 3,6,9 #3 (WOMEN'S 50+ ADVANCED ORAL) Take 1 tablet by mouth Daily.   9/14/2023    nintedanib (OFEV) 150 mg Cap Take 1 capsule (150 mg total) by mouth 2 (two) times a day. 60 capsule 11 9/13/2023    omeprazole (PRILOSEC) 40 MG capsule Take 40 mg by mouth once daily.   9/13/2023    predniSONE " (DELTASONE) 10 MG tablet Take 1 tablet (10 mg total) by mouth once daily. 60 tablet 0 9/14/2023    salmeteroL (SEREVENT) 50 mcg/dose diskus inhaler Inhale 1 puff into the lungs 2 (two) times daily. Controller   9/14/2023    sulfamethoxazole-trimethoprim 800-160mg (BACTRIM DS) 800-160 mg Tab Take 1 tablet by mouth every Mon, Wed, Fri. 12 tablet 3 Past Week    vitamin D (VITAMIN D3) 1000 units Tab Take 1,000 Units by mouth once daily.   9/13/2023    amoxicillin-clavulanate 875-125mg (AUGMENTIN) 875-125 mg per tablet Take 1 tablet by mouth every 12 (twelve) hours. for 5 days (Patient taking differently: Take 1 tablet by mouth every 12 (twelve) hours. Patient has not picked up medication yet) 10 tablet 0     hydrocortisone 2.5 % ointment Apply topically 2 (two) times daily. (Patient not taking: Reported on 9/14/2023) 28.35 g 11 Not Taking    ibuprofen (ADVIL,MOTRIN) 200 MG tablet Take 200 mg by mouth every 6 (six) hours as needed for Pain.       loperamide (IMODIUM) 2 mg capsule TAKE 1 CAPSULE (2 MG TOTAL) BY MOUTH DAILY AS NEEDED FOR DIARRHEA. 90 capsule 0 Unknown    omega-3s/dha/epa/fish oil/D3 (VITAMIN-D + OMEGA-3 ORAL) Take 2 tablets by mouth once daily at 6am.   Unknown    pantoprazole (PROTONIX) 40 MG tablet Take 1 tablet (40 mg total) by mouth once daily. (Patient not taking: Reported on 9/14/2023) 30 tablet 11 Not Taking    sars-cov-2, covid-19 omicron, (MODERNA COVID BIVAL,6M UP,,PF,) 50 mcg/0.5 mL injection Inject into the muscle. 0.5 mL 0                            .

## 2023-09-14 NOTE — H&P
"[facility] Medicine  History & Physical    Patient Name: Ayanna Alicia  MRN: [id number]  Patient Class: OP- Observation  Admission Date: 9/14/2023  Attending Physician: Hammad Espinosa MD   Primary Care Provider: Madeleine Enrique MD         Patient information was obtained from patient and ER records.     Subjective:     Principal Problem:Acute on chronic respiratory failure with hypoxemia    Chief Complaint:   Chief Complaint   Patient presents with    Shortness of Breath     Pt with history of interstitial lung disease c/o difficulty breathing, worse x 4 days.        HPI: Ms. Alicia is a 52-year-old female with a past medical history of Sjogren's syndrome, ILD, RA, chronic hypoxic respiratory failure at 6-8L/NC, asthma, GLENN (Cpap),  who presented to the ED with worsening dyspnea over the last day, associated wtih decreased o2 sats in the 80's with ambulation.  She reports she has been "dealing with a cough" for the last month.  She was seen by an allergist and put on amoxicillin with some improvement.  Cough is productive with tanish colored sputum.  She denies any fever/chills.  In the ED,  patient tachypenic and tachycardia, o2 92-94 % on 7 liters.  Chest xray essentially unchanged from previous and showed diffuse fibrotic stranding and scarring bilaterally.  Patient will be admitted to observation for acute on chronic hypoxic resp failure.      Of note, patient is followed by DR. Aviles in pulmonary clinic, on Ofev and daily prednisone.  She has had hospitalization August this year for the same.  She is currently going through work-up for lung transplant.  She is to start pulmonary rehab with [facility] on Monday.      Code Status full          Past Medical History:   Diagnosis Date    Abnormal Pap smear of cervix     in the past with repeat pap smear okay.    Acute interstitial pneumonitis     Allergic rhinitis, cause unspecified     Arthritis of both knees     Asthma     " Eczema     Fatty liver 10/2014    Fibrocystic breast changes     Headache(784.0)     Hepatomegaly 10/2014    Hypertension     Liver cyst 10/2014    Multinodular goiter     Followed by ENT - Dr. Nicolas Gray    Polymenorrhea     TMJ (dislocation of temporomandibular joint)     Uterine fibroid     in the past    Vitamin D deficiency disease        Past Surgical History:   Procedure Laterality Date    BRONCHOSCOPY Bilateral 2023    Procedure: Bronchoscopy - insert lighted tube into airway to take a biopsy of lung;  Surgeon: Agustín Aviles MD;  Location: [facility] ENDO;  Service: Pulmonary;  Laterality: Bilateral;     SECTION, CLASSIC      x 1    COLONOSCOPY N/A 2020    Procedure: COLONOSCOPY;  Surgeon: Angie Magana MD;  Location: [facility] ENDO;  Service: Endoscopy;  Laterality: N/A;    HYSTERECTOMY      MULTIPLE TOOTH EXTRACTIONS      PARTIAL HYSTERECTOMY  2013    Due to fibroids       Review of patient's allergies indicates:   Allergen Reactions    Doxycycline Nausea Only     Other reaction(s): Nausea    Fluticasone Other (See Comments)     Other reaction(s): Epistaxis         No current facility-administered medications on file prior to encounter.     Current Outpatient Medications on File Prior to Encounter   Medication Sig    albuterol (PROVENTIL/VENTOLIN HFA) 90 mcg/actuation inhaler INHALE 1 TO 2 PUFFS BY MOUTH INTO THE LUNGS EVERY 4 TO 6 HOURS AS NEEDED FOR WHEEZING OR SHORTNESS OF BREATH    ascorbic acid, vitamin C, (VITAMIN C) 500 MG tablet Take 500 mg by mouth once daily.    furosemide (LASIX) 40 MG tablet Take 1 tablet (40 mg total) by mouth every other day.    ipratropium (ATROVENT) 0.02 % nebulizer solution Take 2.5 mLs (500 mcg total) by nebulization 4 (four) times daily. Rescue    levocetirizine (XYZAL) 5 MG tablet TAKE 1 TABLET(5 MG) BY MOUTH EVERY DAY    mv-minerals/FA/omega 3,6,9 #3 (WOMEN'S 50+ ADVANCED ORAL) Take 1 tablet by mouth Daily.     nintedanib (OFEV) 150 mg Cap Take 1 capsule (150 mg total) by mouth 2 (two) times a day.    predniSONE (DELTASONE) 10 MG tablet Take 1 tablet (10 mg total) by mouth once daily.    salmeteroL (SEREVENT) 50 mcg/dose diskus inhaler Inhale 1 puff into the lungs 2 (two) times daily. Controller    sulfamethoxazole-trimethoprim 800-160mg (BACTRIM DS) 800-160 mg Tab Take 1 tablet by mouth every Mon, Wed, Fri.    vitamin D (VITAMIN D3) 1000 units Tab Take 1,000 Units by mouth once daily.    [DISCONTINUED] omeprazole (PRILOSEC) 40 MG capsule Take 40 mg by mouth once daily.    amoxicillin-clavulanate 875-125mg (AUGMENTIN) 875-125 mg per tablet Take 1 tablet by mouth every 12 (twelve) hours. for 5 days    hydrocortisone 2.5 % ointment Apply topically 2 (two) times daily. (Patient not taking: Reported on 9/14/2023)    ibuprofen (ADVIL,MOTRIN) 200 MG tablet Take 200 mg by mouth every 6 (six) hours as needed for Pain.    loperamide (IMODIUM) 2 mg capsule TAKE 1 CAPSULE (2 MG TOTAL) BY MOUTH DAILY AS NEEDED FOR DIARRHEA.    omega-3s/dha/epa/fish oil/D3 (VITAMIN-D + OMEGA-3 ORAL) Take 2 tablets by mouth once daily at 6am.    pantoprazole (PROTONIX) 40 MG tablet Take 1 tablet (40 mg total) by mouth once daily. (Patient not taking: Reported on 9/14/2023)    sars-cov-2, covid-19 omicron, (MODERNA COVID BIVAL,6M UP,,PF,) 50 mcg/0.5 mL injection Inject into the muscle.    [DISCONTINUED] calcium/magnesium/vit B comp (CALCIUM-MAGNESIUM-B COMPLEX ORAL) Take 1 tablet by mouth once daily at 6am.     Family History       Problem Relation (Age of Onset)    Cancer Maternal Grandmother (60), Maternal Aunt (50), Maternal Aunt (66)    Cataracts Cousin    Diabetes Maternal Grandfather    Diverticulitis Mother    Heart disease Mother, Father (63)    Hypertension Father    Migraines Cousin    No Known Problems Brother    Peripheral vascular disease Maternal Grandfather    Stroke Mother, Sister, Maternal Grandfather          Tobacco Use     Smoking status: Never     Passive exposure: Past    Smokeless tobacco: Never   Substance and Sexual Activity    Alcohol use: Not Currently     Comment: last use 03/2022    Drug use: Not Currently     Frequency: 4.0 times per week     Types: Marijuana     Comment: last year was last use 03/2022    Sexual activity: Yes     Partners: Female     Review of Systems   Constitutional:  Positive for fatigue.   Respiratory:  Positive for cough and shortness of breath.      Objective:     Vital Signs (Most Recent):  Temp: 97.8 °F (36.6 °C) (09/14/23 1536)  Pulse: 107 (09/14/23 1536)  Resp: (!) 22 (09/14/23 1536)  BP: 120/75 (09/14/23 1536)  SpO2: 95 % (09/14/23 1536) Vital Signs (24h Range):  Temp:  [97.8 °F (36.6 °C)-98.6 °F (37 °C)] 97.8 °F (36.6 °C)  Pulse:  [105-122] 107  Resp:  [20-24] 22  SpO2:  [84 %-100 %] 95 %  BP: (110-180)/(67-85) 120/75     Weight: 102.9 kg (226 lb 13.7 oz)  Body mass index is 36.62 kg/m².     Physical Exam  Vitals reviewed.   Constitutional:       Appearance: Normal appearance.   HENT:      Mouth/Throat:      Comments: Dysphonia   Cardiovascular:      Rate and Rhythm: Regular rhythm. Tachycardia present.      Pulses: Normal pulses.      Heart sounds: Normal heart sounds.   Pulmonary:      Comments: Tachypnea, decreased breath sounds, rales  Abdominal:      General: Bowel sounds are normal.      Palpations: Abdomen is soft.   Musculoskeletal:         General: No swelling. Normal range of motion.   Skin:     General: Skin is warm and dry.   Neurological:      Mental Status: She is alert and oriented to person, place, and time.           Significant Labs: All pertinent labs within the past 24 hours have been reviewed.    Significant Imaging: I have reviewed all pertinent imaging results/findings within the past 24 hours.    Assessment/Plan:     * Acute on chronic respiratory failure with hypoxemia  Patient with Hypoxic Respiratory failure which is Acute on chronic.  she is on home oxygen at  6-8 LPM. Supplemental oxygen was provided and noted-      .   Signs/symptoms of respiratory failure include- tachypnea, increased work of breathing and use of accessory muscles. Contributing diagnoses includes - Interstitial lung disease Labs and images were reviewed. Patient Has recent ABG, which has been reviewed. Will treat underlying causes and adjust management of respiratory failure as follows    - Hx of ILD, follows with Dr. Aviles  - continue o2 supplementation  - CT chest showed stable chronic interstitial opacities and ground glass densities with diffuse bronchiectasis  - breath treatments/iv steroids  - elevated WBC but on daily steroids, will check procal and ldh  - sputum culture if able   - continue home ofev, patient may take home medication   - pulm consulted, will follow up further recs     GLENN on CPAP  - cpap q hs        Gastroesophageal reflux disease without esophagitis  - pepcid BID      HTN (hypertension)  - normotensive      VTE Risk Mitigation (From admission, onward)         Ordered     enoxaparin injection 40 mg  Daily         09/14/23 1431     IP VTE HIGH RISK PATIENT  Once         09/14/23 1431     Place sequential compression device  Until discontinued         09/14/23 1431                     On 09/14/2023, patient should be placed in hospital observation services under my care in collaboration with Dr. Jesús German, NP  Department of Hospital Medicine  [facility] - Telemetry ([facility])

## 2023-09-14 NOTE — HPI
"Ms. Alicia is a 52-year-old female with a past medical history of Sjogren's syndrome, ILD, RA, chronic hypoxic respiratory failure at 6-8L/NC, asthma, GLENN (Cpap),  who presented to the ED with worsening dyspnea over the last day, associated wtih decreased o2 sats in the 80's with ambulation.  She reports she has been "dealing with a cough" for the last month.  She was seen by an allergist and put on amoxicillin with some improvement.  Cough is productive with tanish colored sputum.  She denies any fever/chills.  In the ED,  patient tachypenic and tachycardia, o2 92-94 % on 7 liters.  Chest xray essentially unchanged from previous and showed diffuse fibrotic stranding and scarring bilaterally.  Patient will be admitted to observation for acute on chronic hypoxic resp failure.      Of note, patient is followed by DR. Aviles in pulmonary clinic, on Ofev and daily prednisone.  She has had hospitalization August this year for the same.  She is currently going through work-up for lung transplant.  She is to start pulmonary rehab with [facility] on Monday.      Code Status full      "

## 2023-09-14 NOTE — SUBJECTIVE & OBJECTIVE
Past Medical History:   Diagnosis Date    Abnormal Pap smear of cervix     in the past with repeat pap smear okay.    Acute interstitial pneumonitis     Allergic rhinitis, cause unspecified     Arthritis of both knees     Asthma     Eczema     Fatty liver 10/2014    Fibrocystic breast changes     Headache(784.0)     Hepatomegaly 10/2014    Hypertension     Liver cyst 10/2014    Multinodular goiter     Followed by ENT - Dr. Nicolas Gray    Polymenorrhea     TMJ (dislocation of temporomandibular joint)     Uterine fibroid     in the past    Vitamin D deficiency disease        Past Surgical History:   Procedure Laterality Date    BRONCHOSCOPY Bilateral 2023    Procedure: Bronchoscopy - insert lighted tube into airway to take a biopsy of lung;  Surgeon: Agustín Aviles MD;  Location: [facility] ENDO;  Service: Pulmonary;  Laterality: Bilateral;     SECTION, CLASSIC      x 1    COLONOSCOPY N/A 2020    Procedure: COLONOSCOPY;  Surgeon: Angie Magana MD;  Location: [facility] ENDO;  Service: Endoscopy;  Laterality: N/A;    HYSTERECTOMY      MULTIPLE TOOTH EXTRACTIONS      PARTIAL HYSTERECTOMY  2013    Due to fibroids       Review of patient's allergies indicates:   Allergen Reactions    Doxycycline Nausea Only     Other reaction(s): Nausea    Fluticasone Other (See Comments)     Other reaction(s): Epistaxis         No current facility-administered medications on file prior to encounter.     Current Outpatient Medications on File Prior to Encounter   Medication Sig    albuterol (PROVENTIL/VENTOLIN HFA) 90 mcg/actuation inhaler INHALE 1 TO 2 PUFFS BY MOUTH INTO THE LUNGS EVERY 4 TO 6 HOURS AS NEEDED FOR WHEEZING OR SHORTNESS OF BREATH    ascorbic acid, vitamin C, (VITAMIN C) 500 MG tablet Take 500 mg by mouth once daily.    furosemide (LASIX) 40 MG tablet Take 1 tablet (40 mg total) by mouth every other day.    ipratropium (ATROVENT) 0.02 % nebulizer solution Take 2.5 mLs (500 mcg total) by  nebulization 4 (four) times daily. Rescue    levocetirizine (XYZAL) 5 MG tablet TAKE 1 TABLET(5 MG) BY MOUTH EVERY DAY    mv-minerals/FA/omega 3,6,9 #3 (WOMEN'S 50+ ADVANCED ORAL) Take 1 tablet by mouth Daily.    nintedanib (OFEV) 150 mg Cap Take 1 capsule (150 mg total) by mouth 2 (two) times a day.    predniSONE (DELTASONE) 10 MG tablet Take 1 tablet (10 mg total) by mouth once daily.    salmeteroL (SEREVENT) 50 mcg/dose diskus inhaler Inhale 1 puff into the lungs 2 (two) times daily. Controller    sulfamethoxazole-trimethoprim 800-160mg (BACTRIM DS) 800-160 mg Tab Take 1 tablet by mouth every Mon, Wed, Fri.    vitamin D (VITAMIN D3) 1000 units Tab Take 1,000 Units by mouth once daily.    [DISCONTINUED] omeprazole (PRILOSEC) 40 MG capsule Take 40 mg by mouth once daily.    amoxicillin-clavulanate 875-125mg (AUGMENTIN) 875-125 mg per tablet Take 1 tablet by mouth every 12 (twelve) hours. for 5 days    hydrocortisone 2.5 % ointment Apply topically 2 (two) times daily. (Patient not taking: Reported on 9/14/2023)    ibuprofen (ADVIL,MOTRIN) 200 MG tablet Take 200 mg by mouth every 6 (six) hours as needed for Pain.    loperamide (IMODIUM) 2 mg capsule TAKE 1 CAPSULE (2 MG TOTAL) BY MOUTH DAILY AS NEEDED FOR DIARRHEA.    omega-3s/dha/epa/fish oil/D3 (VITAMIN-D + OMEGA-3 ORAL) Take 2 tablets by mouth once daily at 6am.    pantoprazole (PROTONIX) 40 MG tablet Take 1 tablet (40 mg total) by mouth once daily. (Patient not taking: Reported on 9/14/2023)    sars-cov-2, covid-19 omicron, (MODERNA COVID BIVAL,6M UP,,PF,) 50 mcg/0.5 mL injection Inject into the muscle.    [DISCONTINUED] calcium/magnesium/vit B comp (CALCIUM-MAGNESIUM-B COMPLEX ORAL) Take 1 tablet by mouth once daily at 6am.     Family History       Problem Relation (Age of Onset)    Cancer Maternal Grandmother (60), Maternal Aunt (50), Maternal Aunt (66)    Cataracts Cousin    Diabetes Maternal Grandfather    Diverticulitis Mother    Heart disease Mother, Father  (63)    Hypertension Father    Migraines Cousin    No Known Problems Brother    Peripheral vascular disease Maternal Grandfather    Stroke Mother, Sister, Maternal Grandfather          Tobacco Use    Smoking status: Never     Passive exposure: Past    Smokeless tobacco: Never   Substance and Sexual Activity    Alcohol use: Not Currently     Comment: last use 03/2022    Drug use: Not Currently     Frequency: 4.0 times per week     Types: Marijuana     Comment: last year was last use 03/2022    Sexual activity: Yes     Partners: Female     Review of Systems   Constitutional:  Positive for fatigue.   Respiratory:  Positive for cough and shortness of breath.      Objective:     Vital Signs (Most Recent):  Temp: 97.8 °F (36.6 °C) (09/14/23 1536)  Pulse: 107 (09/14/23 1536)  Resp: (!) 22 (09/14/23 1536)  BP: 120/75 (09/14/23 1536)  SpO2: 95 % (09/14/23 1536) Vital Signs (24h Range):  Temp:  [97.8 °F (36.6 °C)-98.6 °F (37 °C)] 97.8 °F (36.6 °C)  Pulse:  [105-122] 107  Resp:  [20-24] 22  SpO2:  [84 %-100 %] 95 %  BP: (110-180)/(67-85) 120/75     Weight: 102.9 kg (226 lb 13.7 oz)  Body mass index is 36.62 kg/m².     Physical Exam  Vitals reviewed.   Constitutional:       Appearance: Normal appearance.   HENT:      Mouth/Throat:      Comments: Dysphonia   Cardiovascular:      Rate and Rhythm: Regular rhythm. Tachycardia present.      Pulses: Normal pulses.      Heart sounds: Normal heart sounds.   Pulmonary:      Comments: Tachypnea, decreased breath sounds, rales  Abdominal:      General: Bowel sounds are normal.      Palpations: Abdomen is soft.   Musculoskeletal:         General: No swelling. Normal range of motion.   Skin:     General: Skin is warm and dry.   Neurological:      Mental Status: She is alert and oriented to person, place, and time.           Significant Labs: All pertinent labs within the past 24 hours have been reviewed.    Significant Imaging: I have reviewed all pertinent imaging results/findings within  the past 24 hours.

## 2023-09-14 NOTE — CONSULTS
[facility]  Pulmonology Consult Note    Patient Name: Ayanna Alicia  MRN: [id number]  Admission Date: 9/14/2023   Hospital Length of Stay: 0 days  Code Status: Full Code    Attending Physician: Hammad Espinosa MD  Primary Care Provider: Madeleine Enrique MD   Principal Problem: Acute on chronic respiratory failure with hypoxemia  Subjective:   HPI:  Ayanna Alicia is a 52-year-old female with a past medical history of chronic hypoxic respiratory failure on 5L/NC, asthma, Sjogren's syndrome with associated interstitial lung disease, multinodular goiter, GLENN, and morbid obesity who  presented with worsening shortness of breadth yesterday with associated decreased oxygen saturations. Patient reported over the last several days she noted worsening shortness of breath. In addition, she noted increased cough with clear to pale yellow sputum production. She noted difficulty with sleeping and a sense of restlessness at night. She endorses some increased fluid retention with increased abdominal girth and tightness in her hands bilaterally. She denies any chest pain, fever, or chills. Reports diarrhea which may be increased from baseline (as she reports chronic diarrhea since starting OFEV).      Of note, she was hospitalized in May of 2022 with acute respiratory failure and persistent pneumonia. At that time, she was dishcharged on oxygen. She was subsequently diagnosed with Sjogren's disease and thought to have interstitial lung disease secondary to her underlying rheumatological disease. Initial PFTs with severe restriction and reduced DLCO. She was initially prescribed steroids followed by the addition of Cellcept and Rituximab infusions (last infusion in Dec 2022). Most recently, she was initiated on Ofev; however, the patient reports she has not had any significant improvement in her clinical condition since her original illness in May 2022 and reports she has clinically  worsened over the course of this time. Endorses losing weight, lost 65 lbs since last year; however, she has not been able to get less than 220lb. Not taking cellcept since 12/2022, received 4 treatments of Rituximab with her last dose in Dec 2022. She chronically takes prednisone 10 mg.  Patient underwent bronchoscopy with BAL in April 2023 per Dr. Aviles with negative cultures and no growth of fungus, yeast, PCP, staph, or pseudomonas. Remains on OFEV 150mg BID and Prednisone 10mg daily. In addition, she is in a trial of Actemra IV monthly for four consecutive months. In addition, she has been referred for evaluation of lung transplant at [facility] in [place] and was seen by them in August. At that time, she was told she is not currently a candidate for lung transplant secondary to her weight. She has a goal of an additional 10lb weight loss for consideration for transplant.       Due to her current acute on chronic hypoxic respiratory failure, as well as, her chronic underlying pulmonary disease, pulmonology has been consulted for evaluation.     Past Medical History:   Diagnosis Date    Abnormal Pap smear of cervix     in the past with repeat pap smear okay.    Acute interstitial pneumonitis     Allergic rhinitis, cause unspecified     Arthritis of both knees     Asthma     Eczema     Fatty liver 10/2014    Fibrocystic breast changes     Headache(784.0)     Hepatomegaly 10/2014    Hypertension     Liver cyst 10/2014    Multinodular goiter     Followed by ENT - Dr. Nicolas Gray    Polymenorrhea 2008    TMJ (dislocation of temporomandibular joint)     Uterine fibroid     in the past    Vitamin D deficiency disease      Past Surgical History:   Procedure Laterality Date    BRONCHOSCOPY Bilateral 4/5/2023    Procedure: Bronchoscopy - insert lighted tube into airway to take a biopsy of lung;  Surgeon: Agustín Aviles MD;  Location: [facility];  Service: Pulmonary;  Laterality: Bilateral;      SECTION, CLASSIC      x 1    COLONOSCOPY N/A 2020    Procedure: COLONOSCOPY;  Surgeon: Angie Magana MD;  Location: [facility];  Service: Endoscopy;  Laterality: N/A;    HYSTERECTOMY      MULTIPLE TOOTH EXTRACTIONS      PARTIAL HYSTERECTOMY  2013    Due to fibroids     Review of patient's allergies indicates:   Allergen Reactions    Doxycycline Nausea Only     Other reaction(s): Nausea    Fluticasone Other (See Comments)     Other reaction(s): Epistaxis       Family History       Problem Relation (Age of Onset)    Cancer Maternal Grandmother (60), Maternal Aunt (50), Maternal Aunt (66)    Cataracts Cousin    Diabetes Maternal Grandfather    Diverticulitis Mother    Heart disease Mother, Father (63)    Hypertension Father    Migraines Cousin    No Known Problems Brother    Peripheral vascular disease Maternal Grandfather    Stroke Mother, Sister, Maternal Grandfather          Tobacco Use    Smoking status: Never     Passive exposure: Past    Smokeless tobacco: Never   Substance and Sexual Activity    Alcohol use: Not Currently     Comment: last use 2022    Drug use: Not Currently     Frequency: 4.0 times per week     Types: Marijuana     Comment: last year was last use 2022    Sexual activity: Yes     Partners: Female       Review of Systems   Constitutional:  Positive for activity change. Negative for chills, fatigue, fever and unexpected weight change.   HENT:  Positive for congestion and voice change.    Respiratory:  Positive for cough and shortness of breath.    Gastrointestinal:  Positive for abdominal distention and diarrhea.   : Negative except as indicated in HPI  Objective:     Vital Signs (Most Recent):  Temp: 97.8 °F (36.6 °C) (23 1536)  Pulse: 107 (23 1536)  Resp: (!) 22 (23 1536)  BP: 120/75 (23 1536)  SpO2: 95 % (23 1536) Vital Signs (24h Range):  Temp:  [97.8 °F (36.6 °C)-98.6 °F (37 °C)] 97.8 °F (36.6 °C)  Pulse:  [105-122]  107  Resp:  [20-24] 22  SpO2:  [84 %-100 %] 95 %  BP: (110-180)/(67-85) 120/75   Weight: 102.9 kg (226 lb 13.7 oz);  Body mass index is 36.62 kg/m².    No intake or output data in the 24 hours ending 09/14/23 1614     Physical Exam  Vitals and nursing note reviewed.   Constitutional:       Appearance: She is obese.   HENT:      Head: Normocephalic.   Eyes:      Conjunctiva/sclera: Conjunctivae normal.   Cardiovascular:      Rate and Rhythm: Normal rate.   Pulmonary:      Effort: Pulmonary effort is normal.      Breath sounds: Normal breath sounds.   Abdominal:      Palpations: Abdomen is soft.   Musculoskeletal:         General: Normal range of motion.      Cervical back: Normal range of motion.   Skin:     General: Skin is warm and dry.   Neurological:      Mental Status: She is alert and oriented to person, place, and time.   Psychiatric:         Mood and Affect: Mood normal.        Significant Labs:  CBC/Anemia Profile:  Recent Labs   Lab 09/14/23  1136   WBC 15.45*   HGB 12.6   HCT 40.7      *   RDW 18.1*   Chemistries:  Recent Labs   Lab 09/14/23  1135      K 3.6      CO2 24   BUN 12   CREATININE 0.9   CALCIUM 9.2   ALBUMIN 3.4*   PROT 7.5   BILITOT 0.3   ALKPHOS 93   ALT 27   AST 27     Significant Imaging:   CXR: I have reviewed all pertinent results/findings within the past 24 hours and my personal findings are:  diffuse bibasilar fibrotic changes noted; no acute infiltrates/opacities. No pleural effusion or pneumothorax evident.  ABG  Recent Labs   Lab 09/14/23  1351   PH 7.433   PO2 70*   PCO2 42.8   HCO3 28.6*   BE 4     Assessment/Plan:   Acute on chronic respiratory failure with hypoxemia  Mild intermittent asthma  Patient with chronic hypoxic respiratory failure who is on home oxygen at 5L/NC which is her current inpatient requirements. Signs/symptoms of respiratory failure included increased work of breathing, decreased oxygen saturations, and wheezing. Contributing diagnoses  includes asthma, interstitial lung disease, and decompensated heart failure. Labs and images were reviewed. Patient does have a recent ABG which has been reviewed.  Initial PFTs with severe restriction and reduced DLCO; attempted repeat PFTs on 3/16/2022 which she was unable to complete due to worsening shortness of breath.    Will treat underlying causes and adjust management of respiratory failure as follows:  · Patient with restrictive pattern on her PFTs and chronic hypoxic respiratory failure. On chronic immunosuppression including OFEV, Prednisone, and Actemra  · No evidence of wheezing on exam; does not appear to be in acute asthma exacerbation  · On Bactrim ppx  · Add scheduled IV Lasix  · Continue scheduled nebs  · Continue scheduled IV steroids  · Continue supplemental oxygen and titrate as needed to maintain saturations 90% or greater  · Fluid restriction of 1.5L daily  · Obtain sputum culture  · Add procalcitonin and LDH  · Add CT chest without contrast  · Follow fever and WBC trends  · Follow chest imaging and ABGs as needed    UIP (usual interstitial pneumonitis)  Hospitalized May 2022 with acute respiratory failure and persistent pneumonia and dishcarged on oxygen. She has since been diagnosed with Sjogren's disease with associated ILD. Initial PFTs revealed severe restriction and reduced DLCO. Initially prescribed steroids and subsequently Cellcept and Rituximab infusions (last infusion in Dec 2022). Now on Ofev 150mg BID, Prednisone 10mg daily, and Actemra IV monthly x4 months.      · Started lung transplant evaluation at [facility]  · Not a current candidate for transplant due to weight  · Continue IV steroids  · Continue nocturnal AVAPS and prn  · Add IV Lasix to optimize volume status  · Followed by rheumatology and pulmonary as outpatient  · Repeat CT chest to evaluate for worsening fibrosis, honeycombing    GLENN on CPAP  Patient reports non-compliance with home PAP therapy in the past. We  discussed utilizing and compliance with home PAP therapy. Following discharge from prior hospitalization in August 2023, the patient was transitioned from CPAP to home AVAPS as she has significant hypoxic respiratory failure at baseline with associated restrictive disease as evidenced by her PFTs. Patient reports decreased use with home AVAPS due to lack of humidification. Patient reports she just received her humidification and reports using AVAPS for a few hours at night.     · Nocturnal AVAPS as ordered    Thank you for your consult. I will follow-up with patient. Please contact us if you have any additional questions.     Taylor Mccann, NP  Pulmonary and Critical Care  [facility]

## 2023-09-14 NOTE — TELEPHONE ENCOUNTER
Maggie with home health ([facility]) called in this morning the patient is in respiratory distress and the patient refused to go to the emergency room by ambulance. Oxygen level was 61% the highest was 85% Pulse: 120. Patient is also having labored breathing. Her BP was 140/70. Left lung clear; RT lung has crackles. Maggie [phone] stated she just wants to inform you of the matter.

## 2023-09-14 NOTE — ASSESSMENT & PLAN NOTE
Hospitalized May 2022 with acute respiratory failure and persistent pneumonia and dishcarged on oxygen. She has since been diagnosed with Sjogren's disease with associated ILD. Initial PFTs revealed severe restriction and reduced DLCO. Initially prescribed steroids and subsequently Cellcept and Rituximab infusions (last infusion in Dec 2022). Now on Ofev 150mg BID, Prednisone 10mg daily, and Actemra IV monthly x4 months.      · Started lung transplant evaluation at [facility]  · Not a current candidate for transplant due to weight  · Continue IV steroids  · Continue nocturnal AVAPS and prn  · Add IV Lasix to optimize volume status  · Followed by rheumatology and pulmonary as outpatient  · Repeat CT chest to evaluate for worsening fibrosis, honeycombing

## 2023-09-15 LAB
ANION GAP SERPL CALC-SCNC: 12 MMOL/L (ref 8–16)
BASOPHILS # BLD AUTO: 0.02 K/UL (ref 0–0.2)
BASOPHILS NFR BLD: 0.1 % (ref 0–1.9)
BUN SERPL-MCNC: 10 MG/DL (ref 6–20)
CALCIUM SERPL-MCNC: 9.6 MG/DL (ref 8.7–10.5)
CHLORIDE SERPL-SCNC: 104 MMOL/L (ref 95–110)
CO2 SERPL-SCNC: 24 MMOL/L (ref 23–29)
CREAT SERPL-MCNC: 0.7 MG/DL (ref 0.5–1.4)
DIFFERENTIAL METHOD: ABNORMAL
EOSINOPHIL # BLD AUTO: 0 K/UL (ref 0–0.5)
EOSINOPHIL NFR BLD: 0 % (ref 0–8)
ERYTHROCYTE [DISTWIDTH] IN BLOOD BY AUTOMATED COUNT: 17.6 % (ref 11.5–14.5)
EST. GFR  (NO RACE VARIABLE): >60 ML/MIN/1.73 M^2
GLUCOSE SERPL-MCNC: 145 MG/DL (ref 70–110)
HCT VFR BLD AUTO: 38.1 % (ref 37–48.5)
HGB BLD-MCNC: 11.7 G/DL (ref 12–16)
IMM GRANULOCYTES # BLD AUTO: 0.1 K/UL (ref 0–0.04)
IMM GRANULOCYTES NFR BLD AUTO: 0.7 % (ref 0–0.5)
LYMPHOCYTES # BLD AUTO: 0.8 K/UL (ref 1–4.8)
LYMPHOCYTES NFR BLD: 5.5 % (ref 18–48)
MCH RBC QN AUTO: 30.8 PG (ref 27–31)
MCHC RBC AUTO-ENTMCNC: 30.7 G/DL (ref 32–36)
MCV RBC AUTO: 100 FL (ref 82–98)
MONOCYTES # BLD AUTO: 0.3 K/UL (ref 0.3–1)
MONOCYTES NFR BLD: 2.4 % (ref 4–15)
NEUTROPHILS # BLD AUTO: 12.4 K/UL (ref 1.8–7.7)
NEUTROPHILS NFR BLD: 91.3 % (ref 38–73)
NRBC BLD-RTO: 0 /100 WBC
PLATELET # BLD AUTO: 334 K/UL (ref 150–450)
PMV BLD AUTO: 10.4 FL (ref 9.2–12.9)
POTASSIUM SERPL-SCNC: 4.5 MMOL/L (ref 3.5–5.1)
RBC # BLD AUTO: 3.8 M/UL (ref 4–5.4)
SODIUM SERPL-SCNC: 140 MMOL/L (ref 136–145)
WBC # BLD AUTO: 13.58 K/UL (ref 3.9–12.7)

## 2023-09-15 PROCEDURE — 25000242 PHARM REV CODE 250 ALT 637 W/ HCPCS: Performed by: EMERGENCY MEDICINE

## 2023-09-15 PROCEDURE — 25000003 PHARM REV CODE 250: Performed by: NURSE PRACTITIONER

## 2023-09-15 PROCEDURE — 96376 TX/PRO/DX INJ SAME DRUG ADON: CPT

## 2023-09-15 PROCEDURE — 27100171 HC OXYGEN HIGH FLOW UP TO 24 HOURS

## 2023-09-15 PROCEDURE — 96372 THER/PROPH/DIAG INJ SC/IM: CPT | Performed by: NURSE PRACTITIONER

## 2023-09-15 PROCEDURE — 63600175 PHARM REV CODE 636 W HCPCS: Performed by: NURSE PRACTITIONER

## 2023-09-15 PROCEDURE — 99900035 HC TECH TIME PER 15 MIN (STAT)

## 2023-09-15 PROCEDURE — 25000242 PHARM REV CODE 250 ALT 637 W/ HCPCS: Performed by: NURSE PRACTITIONER

## 2023-09-15 PROCEDURE — 25000242 PHARM REV CODE 250 ALT 637 W/ HCPCS: Performed by: FAMILY MEDICINE

## 2023-09-15 PROCEDURE — 94761 N-INVAS EAR/PLS OXIMETRY MLT: CPT

## 2023-09-15 PROCEDURE — 85025 COMPLETE CBC W/AUTO DIFF WBC: CPT | Performed by: NURSE PRACTITIONER

## 2023-09-15 PROCEDURE — 94640 AIRWAY INHALATION TREATMENT: CPT | Mod: XB

## 2023-09-15 PROCEDURE — 80048 BASIC METABOLIC PNL TOTAL CA: CPT | Performed by: NURSE PRACTITIONER

## 2023-09-15 PROCEDURE — G0378 HOSPITAL OBSERVATION PER HR: HCPCS

## 2023-09-15 PROCEDURE — 36415 COLL VENOUS BLD VENIPUNCTURE: CPT | Performed by: NURSE PRACTITIONER

## 2023-09-15 RX ORDER — BUDESONIDE 0.5 MG/2ML
0.5 INHALANT ORAL EVERY 6 HOURS
Status: DISCONTINUED | OUTPATIENT
Start: 2023-09-15 | End: 2023-09-15

## 2023-09-15 RX ORDER — ALBUTEROL SULFATE 0.83 MG/ML
2.5 SOLUTION RESPIRATORY (INHALATION) EVERY 4 HOURS PRN
Status: DISCONTINUED | OUTPATIENT
Start: 2023-09-15 | End: 2023-09-17 | Stop reason: HOSPADM

## 2023-09-15 RX ORDER — BUDESONIDE 0.5 MG/2ML
0.5 INHALANT ORAL EVERY 12 HOURS
Status: DISCONTINUED | OUTPATIENT
Start: 2023-09-15 | End: 2023-09-17 | Stop reason: HOSPADM

## 2023-09-15 RX ORDER — BUDESONIDE 0.5 MG/2ML
0.5 INHALANT ORAL EVERY 12 HOURS
Status: DISCONTINUED | OUTPATIENT
Start: 2023-09-15 | End: 2023-09-15

## 2023-09-15 RX ADMIN — CETIRIZINE HYDROCHLORIDE 5 MG: 5 TABLET ORAL at 08:09

## 2023-09-15 RX ADMIN — IPRATROPIUM BROMIDE 0.5 MG: 0.5 SOLUTION RESPIRATORY (INHALATION) at 04:09

## 2023-09-15 RX ADMIN — ARFORMOTEROL TARTRATE 15 MCG: 15 SOLUTION RESPIRATORY (INHALATION) at 07:09

## 2023-09-15 RX ADMIN — NINTEDANIB 150 MG: 150 CAPSULE ORAL at 09:09

## 2023-09-15 RX ADMIN — FUROSEMIDE 40 MG: 10 INJECTION, SOLUTION INTRAMUSCULAR; INTRAVENOUS at 08:09

## 2023-09-15 RX ADMIN — FAMOTIDINE 20 MG: 20 TABLET, FILM COATED ORAL at 08:09

## 2023-09-15 RX ADMIN — IPRATROPIUM BROMIDE 0.5 MG: 0.5 SOLUTION RESPIRATORY (INHALATION) at 12:09

## 2023-09-15 RX ADMIN — ENOXAPARIN SODIUM 40 MG: 40 INJECTION SUBCUTANEOUS at 05:09

## 2023-09-15 RX ADMIN — GUAIFENESIN 200 MG: 200 SOLUTION ORAL at 05:09

## 2023-09-15 RX ADMIN — IPRATROPIUM BROMIDE 0.5 MG: 0.5 SOLUTION RESPIRATORY (INHALATION) at 07:09

## 2023-09-15 RX ADMIN — GUAIFENESIN 200 MG: 200 SOLUTION ORAL at 01:09

## 2023-09-15 RX ADMIN — Medication 6 MG: at 08:09

## 2023-09-15 RX ADMIN — METHYLPREDNISOLONE SODIUM SUCCINATE 60 MG: 125 INJECTION, POWDER, FOR SOLUTION INTRAMUSCULAR; INTRAVENOUS at 05:09

## 2023-09-15 RX ADMIN — METHYLPREDNISOLONE SODIUM SUCCINATE 60 MG: 125 INJECTION, POWDER, FOR SOLUTION INTRAMUSCULAR; INTRAVENOUS at 12:09

## 2023-09-15 RX ADMIN — BENZONATATE 100 MG: 100 CAPSULE ORAL at 05:09

## 2023-09-15 RX ADMIN — BENZONATATE 100 MG: 100 CAPSULE ORAL at 12:09

## 2023-09-15 RX ADMIN — POTASSIUM CHLORIDE 40 MEQ: 1500 TABLET, EXTENDED RELEASE ORAL at 08:09

## 2023-09-15 RX ADMIN — GUAIFENESIN 200 MG: 200 SOLUTION ORAL at 07:09

## 2023-09-15 RX ADMIN — SENNOSIDES AND DOCUSATE SODIUM 1 TABLET: 50; 8.6 TABLET ORAL at 08:09

## 2023-09-15 RX ADMIN — BUDESONIDE INHALATION 0.5 MG: 0.5 SUSPENSION RESPIRATORY (INHALATION) at 07:09

## 2023-09-15 RX ADMIN — NINTEDANIB 150 MG: 150 CAPSULE ORAL at 08:09

## 2023-09-15 NOTE — TELEPHONE ENCOUNTER
I saw information from message yesterday.  However, I see today patient did to to ER.  Please call to check on her. Thanks.

## 2023-09-15 NOTE — UM SECONDARY REVIEW
Physician Advisor Internal    Level of Care Issue        Episode Information  Chart Information  Recommendation  Facility Contact  Notes  0  Documents  1  Summary  JENNY ALICIA    [id number]  DOB1970    52 YEARS OLD  GENDER  COMPLETING Evangelina Aguirre  CHECKED OUT TO    Completed  READY FOR REVIEW DATE9/15/2023 8:40:00 AM    COMPLETED DATE09/15/2023 09:42:55 AM  [facility]  AUTHORIZATION OBTAINEDNo Authorization  CASE STATUSReady  ASSIGNED TO  Episode Information  SUBMITTED DATE09/15/2023 08:40:30 AM  ACCOUNT [id number]  PRESENTATION DATE9/14/2023  DISCHARGE DATE  HOSPITALIZATION STATUS*InHouse  LOCATIONFL - Floor  CURRENT ORDEROBS - Observation  ADDITIONAL DETAIL  Chart Information  Medical Diagnosis  SYSTEMPulmonary  DIAGNOSISAcute Respiratory Failure  EMR Systems  Click an EMR below to open in new window.  [facility] ([employer])     Additional Information  Information Request  REQUEST TYPE  INFO TEXT      Recommendation   Disclaimer  Our recommendation for your physician is  Outpatient        Physician Advisor Notes    Ayanna Alicia is a 52-year-old female with PMH of hypertension and interstitial lung disease hospitalized in observation services at 1422 on 9/14/23 with working diagnosis of acute on chronic respiratory failure with hypoxemia after presenting on 9/14/23 at 1117 when care was initiated for evaluation of shortness of breath and started four days prior associated with cough. Pertinent findings include tachycardia, tachypnea, hypoxia with decrease breath sounds bilaterally. Labs showed WBC 15.45, troponin 0.059 and sinus tachycardia on EKG. She was given supplemental oxygen IV Solu-Medrol, DuoNeb X 2 and hospitalized with orders to include IV steroids, bronchodilators, pulmonary consult and to continue home medications. On 9/15/23, vitals show normal heart rate and respiratory rate with saturations of 98% on 6L. Earlier to this 92% on room air. She is  ordered for IV Solu-Medrol and bronchodilators at this time. No further notes. While there is need for medical management on presentation given her respiratory distress, at this time current documentation is not supportive of inpatient status. Should updated information become available to support inpatient kindly resubmit him case for review. Observation services may be appropriate - refer to payor policy guidelines.    Our recommendation for your physician is outpatient services for Ayanna Alicia. Outpatient.  Physician Reviewer(s)    SECONDARY REVIEWEREvangelina Sawant  TERTIARY REVIEWER  REVIEW COMPLETE  Facility Contact    Attending Physician Information  LAST NAME  FIRST NAME   PHONE  PHONE EXTENSION   Physician Lookup  Contact Information  REQUEST SPONSOR ( STAFF)Kervin Dodd   PHONE [phone]  CM PHONE EXT.    Facility Communication Protocol    CM:  contact on all concurrent cases with a change in status  -From 7a to 5p Central Time call CM listed  -From 5p to 7a Central time call the secure line [phone] and leave a voice mail    Physician:  contact physician listed on concurrent cases converted from IN to OUT/OBS ONLY  -ONLY call between the hours of 7a and 5p Central time  CONTACTED   Not Indicated to Contact  DATE  9/15/2023      NOTES  INDICATION FOR PHYSICIAN TO PHYSICIAN CONTACT (I.E. CHANGE IN STATUS)?  Not Indicated to Contact  DATE  9/15/2023      NOTES :  DID THE PHYSICIAN CONCUR WITH R1 RECOMMENDATION?Not Specified  NOTES

## 2023-09-15 NOTE — SUBJECTIVE & OBJECTIVE
Interval History: Pt was initially admitted on 8 liters HFNC. She has improved and currently on 6 liters NFNC. Blood cultures show NGTD. Sputum culture pending. Will continue current treatment    Review of Systems   Constitutional:  Negative for appetite change, chills, diaphoresis, fatigue, fever and unexpected weight change.   HENT:  Positive for congestion and voice change (hoarse voice). Negative for nosebleeds, sinus pressure and sore throat.    Eyes:  Negative for pain, discharge and visual disturbance.   Respiratory:  Positive for cough and shortness of breath. Negative for chest tightness, wheezing and stridor.    Cardiovascular:  Negative for chest pain, palpitations and leg swelling.   Gastrointestinal:  Negative for abdominal distention, abdominal pain, blood in stool, constipation, diarrhea, nausea and vomiting.   Endocrine: Negative for cold intolerance and heat intolerance.   Genitourinary:  Negative for difficulty urinating, dysuria, flank pain, frequency and urgency.   Musculoskeletal:  Negative for arthralgias, back pain, joint swelling, myalgias, neck pain and neck stiffness.   Skin:  Negative for rash and wound.   Allergic/Immunologic: Negative for food allergies and immunocompromised state.   Neurological:  Negative for dizziness, seizures, syncope, facial asymmetry, speech difficulty, weakness, light-headedness, numbness and headaches.   Hematological:  Negative for adenopathy.   Psychiatric/Behavioral:  Negative for agitation, confusion and hallucinations.      Objective:     Vital Signs (Most Recent):  Temp: 98 °F (36.7 °C) (09/15/23 1119)  Pulse: 81 (09/15/23 1119)  Resp: 17 (09/15/23 1119)  BP: 111/65 (09/15/23 1119)  SpO2: 96 % (09/15/23 1119) Vital Signs (24h Range):  Temp:  [97.4 °F (36.3 °C)-98.1 °F (36.7 °C)] 98 °F (36.7 °C)  Pulse:  [] 81  Resp:  [17-22] 17  SpO2:  [92 %-99 %] 96 %  BP: (110-126)/(65-75) 111/65     Weight: 102.9 kg (226 lb 13.7 oz)  Body mass index is 36.62  kg/m².  No intake or output data in the 24 hours ending 09/15/23 1442      Physical Exam  Vitals and nursing note reviewed.   Constitutional:       General: She is not in acute distress.     Appearance: She is well-developed. She is not diaphoretic.   HENT:      Head: Normocephalic and atraumatic.      Nose: Nose normal.      Mouth/Throat:      Comments: Dysphonia   Eyes:      General: No scleral icterus.     Conjunctiva/sclera: Conjunctivae normal.   Neck:      Trachea: No tracheal deviation.   Cardiovascular:      Rate and Rhythm: Normal rate and regular rhythm.      Heart sounds: Normal heart sounds. No murmur heard.     No friction rub. No gallop.   Pulmonary:      Effort: Pulmonary effort is normal. No respiratory distress.      Breath sounds: No stridor. Rales present. No wheezing.      Comments: Fine crackles bilaterally   +conversational dyspnea   Chest:      Chest wall: No tenderness.   Abdominal:      General: Bowel sounds are normal. There is no distension.      Palpations: Abdomen is soft. There is no mass.      Tenderness: There is no abdominal tenderness. There is no guarding or rebound.   Musculoskeletal:         General: No tenderness or deformity. Normal range of motion.      Cervical back: Normal range of motion and neck supple.   Skin:     General: Skin is warm and dry.      Coloration: Skin is not pale.      Findings: No erythema or rash.   Neurological:      Mental Status: She is alert and oriented to person, place, and time.      Cranial Nerves: No cranial nerve deficit.      Motor: No abnormal muscle tone.      Coordination: Coordination normal.   Psychiatric:         Behavior: Behavior normal.         Thought Content: Thought content normal.             Significant Labs: All pertinent labs within the past 24 hours have been reviewed.    Significant Imaging: I have reviewed all pertinent imaging results/findings within the past 24 hours.

## 2023-09-15 NOTE — SUBJECTIVE & OBJECTIVE
Objective:     Vital Signs (Most Recent):  Temp: 98 °F (36.7 °C) (09/15/23 1526)  Pulse: 92 (09/15/23 1630)  Resp: 18 (09/15/23 1630)  BP: 125/67 (09/15/23 1526)  SpO2: 97 % (09/15/23 1630) Vital Signs (24h Range):  Temp:  [97.4 °F (36.3 °C)-98.1 °F (36.7 °C)] 98 °F (36.7 °C)  Pulse:  [] 92  Resp:  [17-22] 18  SpO2:  [92 %-99 %] 97 %  BP: (111-126)/(65-73) 125/67   Weight: 102.9 kg (226 lb 13.7 oz);  Body mass index is 36.62 kg/m².    No intake or output data in the 24 hours ending 09/15/23 1646     Physical Exam  Vitals and nursing note reviewed.   Constitutional:       Appearance: She is obese.   HENT:      Head: Normocephalic.   Eyes:      Conjunctiva/sclera: Conjunctivae normal.   Cardiovascular:      Rate and Rhythm: Normal rate.   Pulmonary:      Effort: Pulmonary effort is normal.   Abdominal:      Palpations: Abdomen is soft.   Musculoskeletal:         General: Normal range of motion.      Cervical back: Normal range of motion and neck supple.   Skin:     General: Skin is warm and dry.   Neurological:      Mental Status: She is alert and oriented to person, place, and time.   Psychiatric:         Mood and Affect: Mood normal.         Review of Systems: ROS negative except as indicated in HPI    Significant Labs:  CBC/Anemia Profile:  Recent Labs   Lab 09/14/23  1136 09/15/23  0427   WBC 15.45* 13.58*   HGB 12.6 11.7*   HCT 40.7 38.1    334   * 100*   RDW 18.1* 17.6*   Chemistries:  Recent Labs   Lab 09/14/23  1135 09/14/23  1641 09/15/23  0426     --  140   K 3.6  --  4.5     --  104   CO2 24  --  24   BUN 12  --  10   CREATININE 0.9  --  0.7   CALCIUM 9.2  --  9.6   ALBUMIN 3.4*  --   --    PROT 7.5  --   --    BILITOT 0.3  --   --    ALKPHOS 93  --   --    ALT 27  --   --    AST 27  --   --    MG  --  1.7  --

## 2023-09-15 NOTE — ASSESSMENT & PLAN NOTE
Patient with chronic hypoxic respiratory failure who is on home oxygen at 5L/NC which is her current inpatient requirements. Signs/symptoms of respiratory failure included increased work of breathing, decreased oxygen saturations, and wheezing. Contributing diagnoses includes asthma, interstitial lung disease, and decompensated heart failure. Labs and images were reviewed. Patient does have a recent ABG which has been reviewed.  Initial PFTs with severe restriction and reduced DLCO; attempted repeat PFTs on 3/16/2022 which she was unable to complete due to worsening shortness of breath.    Will treat underlying causes and adjust management of respiratory failure as follows:  · Patient with restrictive pattern on her PFTs and chronic hypoxic respiratory failure. On chronic immunosuppression including OFEV, Prednisone, and Actemra  · On Bactrim ppx  · Add scheduled IV Lasix  · Continue scheduled nebs  · Continue scheduled IV steroids  · Continue supplemental oxygen and titrate as needed to maintain saturations 90% or greater  · Fluid restriction of 1.5L daily  · CT chest without contrast reviewed without acute worsening / progression of disease  · Follow fever and WBC trends  · Follow chest imaging and ABGs as needed   DISPLAY PLAN FREE TEXT

## 2023-09-15 NOTE — PLAN OF CARE
Problem: Adult Inpatient Plan of Care  Goal: Plan of Care Review  Outcome: Ongoing, Progressing     Problem: Breathing Pattern Ineffective  Goal: Effective Breathing Pattern  Outcome: Ongoing, Progressing     Problem: Airway Clearance Ineffective  Goal: Effective Airway Clearance  Outcome: Ongoing, Progressing     Problem: Airway Clearance Ineffective  Goal: Effective Airway Clearance  Outcome: Ongoing, Progressing     Problem: Airway Clearance Ineffective  Goal: Effective Airway Clearance  Outcome: Ongoing, Progressing     Problem: Gas Exchange Impaired  Goal: Optimal Gas Exchange  Outcome: Ongoing, Progressing

## 2023-09-15 NOTE — ASSESSMENT & PLAN NOTE
Cont IV Steroids   AVAPS HS   IV lasix           Chronic leukocytosis -afebrile, procalcitonin normal, no new infiltrates on CT chest   Blood cultures show NGTD  Sputum culture pending

## 2023-09-15 NOTE — PLAN OF CARE
[facility] - Telemetry (Hospital)  Initial Discharge Assessment       Primary Care Provider: Madeleine Enrique MD    Admission Diagnosis: SOB (shortness of breath) [R06.02]  Hypoxia [R09.02]  Pulmonary hypertension [I27.20]  Chest pain [R07.9]  Acute on chronic respiratory failure with hypoxemia [J96.21]    Admission Date: 9/14/2023  Expected Discharge Date:     Transition of Care Barriers: None    Payor: BLUE CROSS BLUE SHIELD / Plan: BC OF [facility] LOCAL PLUS / Product Type: Commercial /     Extended Emergency Contact Information  Primary Emergency Contact: Lianna Alicia   [facility]  Home Phone: [phone]  Mobile Phone: [phone]  Relation: Other    Discharge Plan A: Home Health  Discharge Plan B: Home with family      [facility]  [address] [place] [address] [address]  [facility] [address]  Phone: [phone] Fax: [phone]    [id number] [place] - [address] AT [facility]  [address] [facility] [address]  Phone: [phone] Fax: [phone]    [facility] [address] [address]  Phone: [phone] Fax: [phone]    [id number] [place] - [address] [facility]  [address] [facility] [address]  Phone: [phone] Fax: [phone]    [facility] [address] [facility]  [address] [facility] [address]  Phone: [phone] Fax: [phone]    [id number] [place] - [address] AT [facility]  [address] [facility] [address]  Phone: [phone] Fax: [phone]      Initial Assessment (most recent)       Adult Discharge Assessment - 09/15/23 1121          Discharge Assessment    Assessment Type Discharge Planning Assessment     Confirmed/corrected address, phone number and insurance Yes     Confirmed Demographics Correct on Facesheet      Source of Information patient     When was your last doctors appointment? 09/08/23   Quintin Varghese MD - Allergy    Communicated JANETTE with patient/caregiver Date not available/Unable to determine     Reason For Admission Acute on chronic respiratory failure with hypoxemia     People in Home sibling(s)     Facility Arrived From: home     Do you expect to return to your current living situation? Yes     Do you have help at home or someone to help you manage your care at home? Yes     Who are your caregiver(s) and their phone number(s)? Brother     Prior to hospitilization cognitive status: Alert/Oriented     Current cognitive status: Alert/Oriented     Walking or Climbing Stairs --   independent    Dressing/Bathing bathing difficulty, requires equipment     Dressing/Bathing Management shower chair     Home Accessibility wheelchair accessible     Home Layout Able to live on 1st floor     Equipment Currently Used at Home shower chair;oxygen     Readmission within 30 days? No     Patient currently being followed by outpatient case management? No     Do you currently have service(s) that help you manage your care at home? Yes     Name and Contact number of agency [facility] - Nursing only     Is the pt/caregiver preference to resume services with current agency Yes     Do you take prescription medications? Yes     Do you have prescription coverage? Yes     Coverage BCBS - Commercial     Do you have any problems affording any of your prescribed medications? No     Is the patient taking medications as prescribed? yes     Who is going to help you get home at discharge? brother     How do you get to doctors appointments? car, drives self     Are you on dialysis? No     Do you take coumadin? No     DME Needed Upon Discharge  bedside commode;shower chair     Discharge Plan discussed with: Patient     Transition of Care Barriers None     Discharge Plan A Home Health     Discharge Plan B Home with family                    Anticipated DC dispo: home health   Prior Level of Function: independent with ADL's. Patient uses shower chair and is on 6-8 L on oxygen at home through [employer]  People in home:  brother    Comments:  SW met with patient at bedside to introduce role and discuss discharge planning. Patient's brother will be help at home and can provide transport at time of discharge. Confirmed demographics, insurance ,and emergency contacts. SW updated Patient's whiteboard with contact information and anticipated DC plan. CM discharge needs depends on hospital progress. SW will continue following to assist with other needs.

## 2023-09-15 NOTE — PROGRESS NOTES
"[facility] ([facility] Medicine  Progress Note    Patient Name: Ayanna Alicia  MRN: [id number]  Patient Class: OP- Observation   Admission Date: 9/14/2023  Length of Stay: 0 days  Attending Physician: Chantel Escobar MD  Primary Care Provider: Madeleine Enrique MD        Subjective:     Principal Problem:Acute on chronic respiratory failure with hypoxemia        HPI:  Ms. Alicia is a 52-year-old female with a past medical history of Sjogren's syndrome, ILD, RA, chronic hypoxic respiratory failure at 6-8L/NC, asthma, GLENN (Cpap),  who presented to the ED with worsening dyspnea over the last day, associated wtih decreased o2 sats in the 80's with ambulation.  She reports she has been "dealing with a cough" for the last month.  She was seen by an allergist and put on amoxicillin with some improvement.  Cough is productive with tanish colored sputum.  She denies any fever/chills.  In the ED,  patient tachypenic and tachycardia, o2 92-94 % on 7 liters.  Chest xray essentially unchanged from previous and showed diffuse fibrotic stranding and scarring bilaterally.  Patient will be admitted to observation for acute on chronic hypoxic resp failure.      Of note, patient is followed by DR. Aviles in pulmonary clinic, on Ofev and daily prednisone.  She has had hospitalization August this year for the same.  She is currently going through work-up for lung transplant.  She is to start pulmonary rehab with [facility] on Monday.      Code Status full          Overview/Hospital Course:  The patient is a 51 yo female with chronic hypoxemic respiratory failure- on 6 liters NC home oxygen (has home Trilogy), Sjogrens, ILD and GLENN who was admitted for ILD/COPD exacerbation and decompensated diastolic CHF on Oxygen, IV Steroids, IV LAsix and Neb txs. Pulmonology evaluated pt and recommended CT chest which showed stable ILD. Pt has chronic leukocytosis. Procalcitonin normal. Afebrile.   Pt was initially admitted on 8 " liters HFNC. She has improved and currently on 6 liters NFNC. Blood cultures show NGTD. Sputum culture pending. Will continue current treatment      Interval History: Pt was initially admitted on 8 liters HFNC. She has improved and currently on 6 liters NFNC. Blood cultures show NGTD. Sputum culture pending. Will continue current treatment    Review of Systems   Constitutional:  Negative for appetite change, chills, diaphoresis, fatigue, fever and unexpected weight change.   HENT:  Positive for congestion and voice change (hoarse voice). Negative for nosebleeds, sinus pressure and sore throat.    Eyes:  Negative for pain, discharge and visual disturbance.   Respiratory:  Positive for cough and shortness of breath. Negative for chest tightness, wheezing and stridor.    Cardiovascular:  Negative for chest pain, palpitations and leg swelling.   Gastrointestinal:  Negative for abdominal distention, abdominal pain, blood in stool, constipation, diarrhea, nausea and vomiting.   Endocrine: Negative for cold intolerance and heat intolerance.   Genitourinary:  Negative for difficulty urinating, dysuria, flank pain, frequency and urgency.   Musculoskeletal:  Negative for arthralgias, back pain, joint swelling, myalgias, neck pain and neck stiffness.   Skin:  Negative for rash and wound.   Allergic/Immunologic: Negative for food allergies and immunocompromised state.   Neurological:  Negative for dizziness, seizures, syncope, facial asymmetry, speech difficulty, weakness, light-headedness, numbness and headaches.   Hematological:  Negative for adenopathy.   Psychiatric/Behavioral:  Negative for agitation, confusion and hallucinations.      Objective:     Vital Signs (Most Recent):  Temp: 98 °F (36.7 °C) (09/15/23 1119)  Pulse: 81 (09/15/23 1119)  Resp: 17 (09/15/23 1119)  BP: 111/65 (09/15/23 1119)  SpO2: 96 % (09/15/23 1119) Vital Signs (24h Range):  Temp:  [97.4 °F (36.3 °C)-98.1 °F (36.7 °C)] 98 °F (36.7 °C)  Pulse:   [] 81  Resp:  [17-22] 17  SpO2:  [92 %-99 %] 96 %  BP: (110-126)/(65-75) 111/65     Weight: 102.9 kg (226 lb 13.7 oz)  Body mass index is 36.62 kg/m².  No intake or output data in the 24 hours ending 09/15/23 1442      Physical Exam  Vitals and nursing note reviewed.   Constitutional:       General: She is not in acute distress.     Appearance: She is well-developed. She is not diaphoretic.   HENT:      Head: Normocephalic and atraumatic.      Nose: Nose normal.      Mouth/Throat:      Comments: Dysphonia   Eyes:      General: No scleral icterus.     Conjunctiva/sclera: Conjunctivae normal.   Neck:      Trachea: No tracheal deviation.   Cardiovascular:      Rate and Rhythm: Normal rate and regular rhythm.      Heart sounds: Normal heart sounds. No murmur heard.     No friction rub. No gallop.   Pulmonary:      Effort: Pulmonary effort is normal. No respiratory distress.      Breath sounds: No stridor. Rales present. No wheezing.      Comments: Fine crackles bilaterally   +conversational dyspnea   Chest:      Chest wall: No tenderness.   Abdominal:      General: Bowel sounds are normal. There is no distension.      Palpations: Abdomen is soft. There is no mass.      Tenderness: There is no abdominal tenderness. There is no guarding or rebound.   Musculoskeletal:         General: No tenderness or deformity. Normal range of motion.      Cervical back: Normal range of motion and neck supple.   Skin:     General: Skin is warm and dry.      Coloration: Skin is not pale.      Findings: No erythema or rash.   Neurological:      Mental Status: She is alert and oriented to person, place, and time.      Cranial Nerves: No cranial nerve deficit.      Motor: No abnormal muscle tone.      Coordination: Coordination normal.   Psychiatric:         Behavior: Behavior normal.         Thought Content: Thought content normal.             Significant Labs: All pertinent labs within the past 24 hours have been  reviewed.    Significant Imaging: I have reviewed all pertinent imaging results/findings within the past 24 hours.      Assessment/Plan:      * Acute on chronic respiratory failure with hypoxemia  Patient with Hypoxic Respiratory failure which is Acute on chronic.  she is on home oxygen at 6-8 LPM. Supplemental oxygen was provided and noted-      .   Signs/symptoms of respiratory failure include- tachypnea, increased work of breathing and use of accessory muscles. Contributing diagnoses includes - Interstitial lung disease Labs and images were reviewed. Patient Has recent ABG, which has been reviewed. Will treat underlying causes and adjust management of respiratory failure as follows    - Hx of ILD, follows with Dr. Aviles  - continue o2 supplementation  - CT chest showed stable chronic interstitial opacities and ground glass densities with diffuse bronchiectasis  - breath treatments/iv steroids  - elevated WBC -chronic. Afebrile, Normal pro calcitonin.   - sputum culture pending   - continue home ofev and prednisone    - pulm consulted-CT chest showed stable, chronic interstitial opacities.     UIP (usual interstitial pneumonitis)  Cont IV Steroids   AVAPS HS   IV lasix           Chronic leukocytosis -afebrile, procalcitonin normal, no new infiltrates on CT chest   Blood cultures show NGTD  Sputum culture pending                                                                                                                                                                                                                          COPD exacerbation  IV Steroiods  LABA,  ICS neb txs      GLENN on CPAP  AVAPS q hs        Gastroesophageal reflux disease without esophagitis  - pepcid BID      HTN (hypertension)  - normotensive        VTE Risk Mitigation (From admission, onward)         Ordered     enoxaparin injection 40 mg  Daily         09/14/23 1431     IP VTE HIGH RISK PATIENT  Once         09/14/23 1431     Place  sequential compression device  Until discontinued         09/14/23 1431                Discharge Planning   JANETTE:      Code Status: Full Code   Is the patient medically ready for discharge?:     Reason for patient still in hospital (select all that apply): Patient trending condition  Discharge Plan A: Home Health                  Elsy Do NP  Department of Hospital Medicine   [facility])

## 2023-09-15 NOTE — HOSPITAL COURSE
The patient is a 51 yo female with chronic hypoxemic respiratory failure- on 6 liters NC home oxygen (has home Trilogy), Sjogrens, ILD and GLENN who was admitted for ILD/COPD exacerbation and decompensated diastolic CHF on Oxygen, IV Steroids, IV Lasix and Neb txs. She was initially admitted on 8L HFNC.  Pulmonology evaluated pt and recommended CT chest which showed stable ILD. Pt has chronic leukocytosis, likely related to steroids. Procalcitonin normal. Afebrile. Pulmonology started on Lasix IV, with improved SOB. Will increase dose on discharge to daily instead of every other day.  She is weaned to home requirements, 6 liters NFNC. Blood cultures show NGTD. Sputum culture NGTD. No MRSA with nasal culture.   Echo EF 60-65%, grade II diastolic dysfunction, mild MVR, TVR.    Will discharge on a steroid taper per pulmonology.   Will increase her home Lasix to 40 mg daily for now.   Cardiac diet with 1.5 L fluid restriction.     Follow up with pulmonology after discharge.   Follow up with cardiology as scheduled.   Follow up with PCP in 3-5 days.     Patient seen and examined on the day of discharge.  All questions and concerns were addressed prior to discharge.    Face to face encounter with patient: 35 minutes

## 2023-09-15 NOTE — ASSESSMENT & PLAN NOTE
Patient with Hypoxic Respiratory failure which is Acute on chronic.  she is on home oxygen at 6-8 LPM. Supplemental oxygen was provided and noted-      .   Signs/symptoms of respiratory failure include- tachypnea, increased work of breathing and use of accessory muscles. Contributing diagnoses includes - Interstitial lung disease Labs and images were reviewed. Patient Has recent ABG, which has been reviewed. Will treat underlying causes and adjust management of respiratory failure as follows    - Hx of ILD, follows with Dr. Aviles  - continue o2 supplementation  - CT chest showed stable chronic interstitial opacities and ground glass densities with diffuse bronchiectasis  - breath treatments/iv steroids  - elevated WBC -chronic. Afebrile, Normal pro calcitonin.   - sputum culture pending   - continue home ofev and prednisone    - pulm consulted-CT chest showed stable, chronic interstitial opacities.

## 2023-09-15 NOTE — PLAN OF CARE
Problem: Adult Inpatient Plan of Care  Goal: Plan of Care Review  Outcome: Adequate for Care Transition  Goal: Patient-Specific Goal (Individualized)  Outcome: Adequate for Care Transition  Goal: Absence of Hospital-Acquired Illness or Injury  Outcome: Adequate for Care Transition  Goal: Optimal Comfort and Wellbeing  Outcome: Adequate for Care Transition  Goal: Readiness for Transition of Care  Outcome: Adequate for Care Transition     Problem: Breathing Pattern Ineffective  Goal: Effective Breathing Pattern  Outcome: Adequate for Care Transition     Problem: Airway Clearance Ineffective  Goal: Effective Airway Clearance  Outcome: Adequate for Care Transition     Problem: Gas Exchange Impaired  Goal: Optimal Gas Exchange  Outcome: Adequate for Care Transition

## 2023-09-15 NOTE — PROGRESS NOTES
[facility]  Pulmonology Progress Note    Patient Name: Ayanna Alicia  MRN: [id number]  Admission Date: 9/14/2023   Hospital Length of Stay: 0 days  Code Status: Full Code    Attending Provider: Chantel Escobar MD  Primary Care Provider: Madeleine Enrique MD   Principal Problem: Acute on chronic respiratory failure with hypoxemia  Subjective:   Ayanna Alicia is a 52-year-old female with a past medical history of chronic hypoxic respiratory failure on 5L/NC, asthma, Sjogren's syndrome with associated interstitial lung disease, multinodular goiter, GLENN, and morbid obesity who  presented with worsening shortness of breadth yesterday with associated decreased oxygen saturations. Patient reported over the last several days she noted worsening shortness of breath. In addition, she noted increased cough with clear to pale yellow sputum production. She noted difficulty with sleeping and a sense of restlessness at night. She endorses some increased fluid retention with increased abdominal girth and tightness in her hands bilaterally. She denies any chest pain, fever, or chills. Reports diarrhea which may be increased from baseline (as she reports chronic diarrhea since starting OFEV).      Of note, she was hospitalized in May of 2022 with acute respiratory failure and persistent pneumonia. At that time, she was dishcharged on oxygen. She was subsequently diagnosed with Sjogren's disease and thought to have interstitial lung disease secondary to her underlying rheumatological disease. Initial PFTs with severe restriction and reduced DLCO. She was initially prescribed steroids followed by the addition of Cellcept and Rituximab infusions (last infusion in Dec 2022). Most recently, she was initiated on Ofev; however, the patient reports she has not had any significant improvement in her clinical condition since her original illness in May 2022 and reports she has clinically worsened  over the course of this time. Endorses losing weight, lost 65 lbs since last year; however, she has not been able to get less than 220lb. Not taking cellcept since 12/2022, received 4 treatments of Rituximab with her last dose in Dec 2022. She chronically takes prednisone 10 mg.  Patient underwent bronchoscopy with BAL in April 2023 per Dr. Aviles with negative cultures and no growth of fungus, yeast, PCP, staph, or pseudomonas. Remains on OFEV 150mg BID and Prednisone 10mg daily. In addition, she is in a trial of Actemra IV monthly for four consecutive months. In addition, she has been referred for evaluation of lung transplant at [facility] in [place] and was seen by them in August. At that time, she was told she is not currently a candidate for lung transplant secondary to her weight. She has a goal of an additional 10lb weight loss for consideration for transplant.       Due to her current acute on chronic hypoxic respiratory failure, as well as, her chronic underlying pulmonary disease, pulmonology has been consulted for evaluation.     Interval history:    9/15: Pulmonary status stable on 6L/NC. Persistent dry, non-productive cough. No acute events overnight.   Objective:     Vital Signs (Most Recent):  Temp: 98 °F (36.7 °C) (09/15/23 1526)  Pulse: 92 (09/15/23 1630)  Resp: 18 (09/15/23 1630)  BP: 125/67 (09/15/23 1526)  SpO2: 97 % (09/15/23 1630) Vital Signs (24h Range):  Temp:  [97.4 °F (36.3 °C)-98.1 °F (36.7 °C)] 98 °F (36.7 °C)  Pulse:  [] 92  Resp:  [17-22] 18  SpO2:  [92 %-99 %] 97 %  BP: (111-126)/(65-73) 125/67   Weight: 102.9 kg (226 lb 13.7 oz);  Body mass index is 36.62 kg/m².    No intake or output data in the 24 hours ending 09/15/23 1646     Physical Exam  Vitals and nursing note reviewed.   Constitutional:       Appearance: She is obese.   HENT:      Head: Normocephalic.   Eyes:      Conjunctiva/sclera: Conjunctivae normal.   Cardiovascular:      Rate and Rhythm: Normal rate.    Pulmonary:      Effort: Pulmonary effort is normal.   Abdominal:      Palpations: Abdomen is soft.   Musculoskeletal:         General: Normal range of motion.      Cervical back: Normal range of motion and neck supple.   Skin:     General: Skin is warm and dry.   Neurological:      Mental Status: She is alert and oriented to person, place, and time.   Psychiatric:         Mood and Affect: Mood normal.         Review of Systems: ROS negative except as indicated in HPI    Significant Labs:  CBC/Anemia Profile:  Recent Labs   Lab 09/14/23  1136 09/15/23  0427   WBC 15.45* 13.58*   HGB 12.6 11.7*   HCT 40.7 38.1    334   * 100*   RDW 18.1* 17.6*   Chemistries:  Recent Labs   Lab 09/14/23  1135 09/14/23  1641 09/15/23  0426     --  140   K 3.6  --  4.5     --  104   CO2 24  --  24   BUN 12  --  10   CREATININE 0.9  --  0.7   CALCIUM 9.2  --  9.6   ALBUMIN 3.4*  --   --    PROT 7.5  --   --    BILITOT 0.3  --   --    ALKPHOS 93  --   --    ALT 27  --   --    AST 27  --   --    MG  --  1.7  --    ABG  Recent Labs   Lab 09/14/23  1351   PH 7.433   PO2 70*   PCO2 42.8   HCO3 28.6*   BE 4     Assessment/Plan:     Pulmonary  * Acute on chronic respiratory failure with hypoxemia  Patient with chronic hypoxic respiratory failure who is on home oxygen at 5L/NC which is her current inpatient requirements. Signs/symptoms of respiratory failure included increased work of breathing, decreased oxygen saturations, and wheezing. Contributing diagnoses includes asthma, interstitial lung disease, and decompensated heart failure. Labs and images were reviewed. Patient does have a recent ABG which has been reviewed.  Initial PFTs with severe restriction and reduced DLCO; attempted repeat PFTs on 3/16/2022 which she was unable to complete due to worsening shortness of breath.    Will treat underlying causes and adjust management of respiratory failure as follows:  · Patient with restrictive pattern on her PFTs and  chronic hypoxic respiratory failure. On chronic immunosuppression including OFEV, Prednisone, and Actemra  · On Bactrim ppx  · Add scheduled IV Lasix  · Continue scheduled nebs  · Continue scheduled IV steroids  · Continue supplemental oxygen and titrate as needed to maintain saturations 90% or greater  · Fluid restriction of 1.5L daily  · CT chest without contrast reviewed without acute worsening / progression of disease  · Follow fever and WBC trends  · Follow chest imaging and ABGs as needed    Other  GLENN on CPAP  Patient reports non-compliance with home PAP therapy in the past. We discussed utilizing and compliance with home PAP therapy. Following discharge from prior hospitalization in August 2023, the patient was transitioned from CPAP to home AVAPS as she has significant hypoxic respiratory failure at baseline with associated restrictive disease as evidenced by her PFTs. Patient reports decreased use with home AVAPS due to lack of humidification. Patient reports she just received her humidification and reports using AVAPS for a few hours at night.     · Nocturnal AVAPS as ordered      Taylor Mccann NP  Pulmonary and Critical Care  [facility]   Crescentic Advancement Flap Text: The defect edges were debeveled with a #15 scalpel blade.  Given the location of the defect and the proximity to free margins a crescentic advancement flap was deemed most appropriate.  Using a sterile surgical marker, the appropriate advancement flap was drawn incorporating the defect and placing the expected incisions within the relaxed skin tension lines where possible.    The area thus outlined was incised deep to adipose tissue with a #15 scalpel blade.  The skin margins were undermined to an appropriate distance in all directions utilizing iris scissors.

## 2023-09-16 LAB
ALBUMIN SERPL BCP-MCNC: 3 G/DL (ref 3.5–5.2)
ALP SERPL-CCNC: 78 U/L (ref 55–135)
ALT SERPL W/O P-5'-P-CCNC: 24 U/L (ref 10–44)
ANION GAP SERPL CALC-SCNC: 14 MMOL/L (ref 8–16)
ANION GAP SERPL CALC-SCNC: 14 MMOL/L (ref 8–16)
AST SERPL-CCNC: 18 U/L (ref 10–40)
BASOPHILS # BLD AUTO: 0.04 K/UL (ref 0–0.2)
BASOPHILS # BLD AUTO: 0.04 K/UL (ref 0–0.2)
BASOPHILS NFR BLD: 0.1 % (ref 0–1.9)
BASOPHILS NFR BLD: 0.1 % (ref 0–1.9)
BILIRUB SERPL-MCNC: 0.2 MG/DL (ref 0.1–1)
BUN SERPL-MCNC: 18 MG/DL (ref 6–20)
BUN SERPL-MCNC: 18 MG/DL (ref 6–20)
CALCIUM SERPL-MCNC: 9.7 MG/DL (ref 8.7–10.5)
CALCIUM SERPL-MCNC: 9.7 MG/DL (ref 8.7–10.5)
CHLORIDE SERPL-SCNC: 103 MMOL/L (ref 95–110)
CHLORIDE SERPL-SCNC: 103 MMOL/L (ref 95–110)
CO2 SERPL-SCNC: 23 MMOL/L (ref 23–29)
CO2 SERPL-SCNC: 23 MMOL/L (ref 23–29)
CREAT SERPL-MCNC: 0.8 MG/DL (ref 0.5–1.4)
CREAT SERPL-MCNC: 0.8 MG/DL (ref 0.5–1.4)
DIFFERENTIAL METHOD: ABNORMAL
DIFFERENTIAL METHOD: ABNORMAL
EOSINOPHIL # BLD AUTO: 0 K/UL (ref 0–0.5)
EOSINOPHIL # BLD AUTO: 0 K/UL (ref 0–0.5)
EOSINOPHIL NFR BLD: 0 % (ref 0–8)
EOSINOPHIL NFR BLD: 0 % (ref 0–8)
ERYTHROCYTE [DISTWIDTH] IN BLOOD BY AUTOMATED COUNT: 17.6 % (ref 11.5–14.5)
ERYTHROCYTE [DISTWIDTH] IN BLOOD BY AUTOMATED COUNT: 17.6 % (ref 11.5–14.5)
EST. GFR  (NO RACE VARIABLE): >60 ML/MIN/1.73 M^2
EST. GFR  (NO RACE VARIABLE): >60 ML/MIN/1.73 M^2
GLUCOSE SERPL-MCNC: 140 MG/DL (ref 70–110)
GLUCOSE SERPL-MCNC: 140 MG/DL (ref 70–110)
HCT VFR BLD AUTO: 38.9 % (ref 37–48.5)
HCT VFR BLD AUTO: 38.9 % (ref 37–48.5)
HGB BLD-MCNC: 12 G/DL (ref 12–16)
HGB BLD-MCNC: 12 G/DL (ref 12–16)
IMM GRANULOCYTES # BLD AUTO: 0.27 K/UL (ref 0–0.04)
IMM GRANULOCYTES # BLD AUTO: 0.27 K/UL (ref 0–0.04)
IMM GRANULOCYTES NFR BLD AUTO: 1 % (ref 0–0.5)
IMM GRANULOCYTES NFR BLD AUTO: 1 % (ref 0–0.5)
LYMPHOCYTES # BLD AUTO: 1.1 K/UL (ref 1–4.8)
LYMPHOCYTES # BLD AUTO: 1.1 K/UL (ref 1–4.8)
LYMPHOCYTES NFR BLD: 4.3 % (ref 18–48)
LYMPHOCYTES NFR BLD: 4.3 % (ref 18–48)
MCH RBC QN AUTO: 31 PG (ref 27–31)
MCH RBC QN AUTO: 31 PG (ref 27–31)
MCHC RBC AUTO-ENTMCNC: 30.8 G/DL (ref 32–36)
MCHC RBC AUTO-ENTMCNC: 30.8 G/DL (ref 32–36)
MCV RBC AUTO: 101 FL (ref 82–98)
MCV RBC AUTO: 101 FL (ref 82–98)
MONOCYTES # BLD AUTO: 1.2 K/UL (ref 0.3–1)
MONOCYTES # BLD AUTO: 1.2 K/UL (ref 0.3–1)
MONOCYTES NFR BLD: 4.4 % (ref 4–15)
MONOCYTES NFR BLD: 4.4 % (ref 4–15)
MRSA SPEC QL CULT: NORMAL
NEUTROPHILS # BLD AUTO: 24.2 K/UL (ref 1.8–7.7)
NEUTROPHILS # BLD AUTO: 24.2 K/UL (ref 1.8–7.7)
NEUTROPHILS NFR BLD: 90.2 % (ref 38–73)
NEUTROPHILS NFR BLD: 90.2 % (ref 38–73)
NRBC BLD-RTO: 0 /100 WBC
NRBC BLD-RTO: 0 /100 WBC
PLATELET # BLD AUTO: 410 K/UL (ref 150–450)
PLATELET # BLD AUTO: 410 K/UL (ref 150–450)
PMV BLD AUTO: 9.9 FL (ref 9.2–12.9)
PMV BLD AUTO: 9.9 FL (ref 9.2–12.9)
POTASSIUM SERPL-SCNC: 4.9 MMOL/L (ref 3.5–5.1)
POTASSIUM SERPL-SCNC: 4.9 MMOL/L (ref 3.5–5.1)
PROT SERPL-MCNC: 6.8 G/DL (ref 6–8.4)
RBC # BLD AUTO: 3.87 M/UL (ref 4–5.4)
RBC # BLD AUTO: 3.87 M/UL (ref 4–5.4)
SODIUM SERPL-SCNC: 140 MMOL/L (ref 136–145)
SODIUM SERPL-SCNC: 140 MMOL/L (ref 136–145)
WBC # BLD AUTO: 26.78 K/UL (ref 3.9–12.7)
WBC # BLD AUTO: 26.78 K/UL (ref 3.9–12.7)

## 2023-09-16 PROCEDURE — 36415 COLL VENOUS BLD VENIPUNCTURE: CPT | Performed by: NURSE PRACTITIONER

## 2023-09-16 PROCEDURE — 96376 TX/PRO/DX INJ SAME DRUG ADON: CPT

## 2023-09-16 PROCEDURE — 25000003 PHARM REV CODE 250: Performed by: NURSE PRACTITIONER

## 2023-09-16 PROCEDURE — 86606 ASPERGILLUS ANTIBODY: CPT | Performed by: NURSE PRACTITIONER

## 2023-09-16 PROCEDURE — 85025 COMPLETE CBC W/AUTO DIFF WBC: CPT | Performed by: NURSE PRACTITIONER

## 2023-09-16 PROCEDURE — 96372 THER/PROPH/DIAG INJ SC/IM: CPT | Performed by: NURSE PRACTITIONER

## 2023-09-16 PROCEDURE — G0378 HOSPITAL OBSERVATION PER HR: HCPCS

## 2023-09-16 PROCEDURE — 99900035 HC TECH TIME PER 15 MIN (STAT)

## 2023-09-16 PROCEDURE — 80053 COMPREHEN METABOLIC PANEL: CPT | Performed by: NURSE PRACTITIONER

## 2023-09-16 PROCEDURE — 25000242 PHARM REV CODE 250 ALT 637 W/ HCPCS: Performed by: EMERGENCY MEDICINE

## 2023-09-16 PROCEDURE — 63600175 PHARM REV CODE 636 W HCPCS: Performed by: NURSE PRACTITIONER

## 2023-09-16 PROCEDURE — 94761 N-INVAS EAR/PLS OXIMETRY MLT: CPT

## 2023-09-16 PROCEDURE — 27100171 HC OXYGEN HIGH FLOW UP TO 24 HOURS

## 2023-09-16 PROCEDURE — 25000242 PHARM REV CODE 250 ALT 637 W/ HCPCS: Performed by: NURSE PRACTITIONER

## 2023-09-16 PROCEDURE — 87449 NOS EACH ORGANISM AG IA: CPT | Performed by: NURSE PRACTITIONER

## 2023-09-16 PROCEDURE — 94640 AIRWAY INHALATION TREATMENT: CPT | Mod: XB

## 2023-09-16 PROCEDURE — 87798 DETECT AGENT NOS DNA AMP: CPT | Performed by: NURSE PRACTITIONER

## 2023-09-16 PROCEDURE — 86698 HISTOPLASMA ANTIBODY: CPT | Performed by: NURSE PRACTITIONER

## 2023-09-16 PROCEDURE — 25000242 PHARM REV CODE 250 ALT 637 W/ HCPCS: Performed by: FAMILY MEDICINE

## 2023-09-16 RX ADMIN — METHYLPREDNISOLONE SODIUM SUCCINATE 60 MG: 125 INJECTION, POWDER, FOR SOLUTION INTRAMUSCULAR; INTRAVENOUS at 11:09

## 2023-09-16 RX ADMIN — METHYLPREDNISOLONE SODIUM SUCCINATE 60 MG: 125 INJECTION, POWDER, FOR SOLUTION INTRAMUSCULAR; INTRAVENOUS at 03:09

## 2023-09-16 RX ADMIN — ENOXAPARIN SODIUM 40 MG: 40 INJECTION SUBCUTANEOUS at 04:09

## 2023-09-16 RX ADMIN — IPRATROPIUM BROMIDE 0.5 MG: 0.5 SOLUTION RESPIRATORY (INHALATION) at 09:09

## 2023-09-16 RX ADMIN — BENZONATATE 100 MG: 100 CAPSULE ORAL at 04:09

## 2023-09-16 RX ADMIN — SENNOSIDES AND DOCUSATE SODIUM 1 TABLET: 50; 8.6 TABLET ORAL at 08:09

## 2023-09-16 RX ADMIN — ALBUTEROL SULFATE 2.5 MG: 2.5 SOLUTION RESPIRATORY (INHALATION) at 02:09

## 2023-09-16 RX ADMIN — GUAIFENESIN 200 MG: 200 SOLUTION ORAL at 04:09

## 2023-09-16 RX ADMIN — GUAIFENESIN 200 MG: 200 SOLUTION ORAL at 08:09

## 2023-09-16 RX ADMIN — Medication 6 MG: at 08:09

## 2023-09-16 RX ADMIN — BENZONATATE 100 MG: 100 CAPSULE ORAL at 03:09

## 2023-09-16 RX ADMIN — CETIRIZINE HYDROCHLORIDE 5 MG: 5 TABLET ORAL at 10:09

## 2023-09-16 RX ADMIN — FUROSEMIDE 40 MG: 10 INJECTION, SOLUTION INTRAMUSCULAR; INTRAVENOUS at 10:09

## 2023-09-16 RX ADMIN — BUDESONIDE INHALATION 0.5 MG: 0.5 SUSPENSION RESPIRATORY (INHALATION) at 09:09

## 2023-09-16 RX ADMIN — ARFORMOTEROL TARTRATE 15 MCG: 15 SOLUTION RESPIRATORY (INHALATION) at 09:09

## 2023-09-16 RX ADMIN — METHYLPREDNISOLONE SODIUM SUCCINATE 60 MG: 125 INJECTION, POWDER, FOR SOLUTION INTRAMUSCULAR; INTRAVENOUS at 05:09

## 2023-09-16 RX ADMIN — FAMOTIDINE 20 MG: 20 TABLET, FILM COATED ORAL at 10:09

## 2023-09-16 RX ADMIN — GUAIFENESIN 200 MG: 200 SOLUTION ORAL at 03:09

## 2023-09-16 RX ADMIN — NINTEDANIB 150 MG: 150 CAPSULE ORAL at 08:09

## 2023-09-16 RX ADMIN — FAMOTIDINE 20 MG: 20 TABLET, FILM COATED ORAL at 08:09

## 2023-09-16 RX ADMIN — FUROSEMIDE 40 MG: 10 INJECTION, SOLUTION INTRAMUSCULAR; INTRAVENOUS at 08:09

## 2023-09-16 RX ADMIN — METHYLPREDNISOLONE SODIUM SUCCINATE 60 MG: 125 INJECTION, POWDER, FOR SOLUTION INTRAMUSCULAR; INTRAVENOUS at 09:09

## 2023-09-16 RX ADMIN — SENNOSIDES AND DOCUSATE SODIUM 1 TABLET: 50; 8.6 TABLET ORAL at 10:09

## 2023-09-16 RX ADMIN — IPRATROPIUM BROMIDE 0.5 MG: 0.5 SOLUTION RESPIRATORY (INHALATION) at 01:09

## 2023-09-16 RX ADMIN — NINTEDANIB 150 MG: 150 CAPSULE ORAL at 10:09

## 2023-09-16 NOTE — ASSESSMENT & PLAN NOTE
Patient with Hypoxic Respiratory failure which is Acute on chronic.  she is on home oxygen at 6-8 LPM. Supplemental oxygen was provided and noted-      .   Signs/symptoms of respiratory failure include- tachypnea, increased work of breathing and use of accessory muscles. Contributing diagnoses includes - Interstitial lung disease Labs and images were reviewed. Patient Has recent ABG, which has been reviewed. Will treat underlying causes and adjust management of respiratory failure as follows    - Hx of ILD, follows with Dr. Aviles  - continue o2 supplementation  - CT chest showed stable chronic interstitial opacities and ground glass densities with diffuse bronchiectasis  - breath treatments/iv steroids  - elevated WBC -chronic. Afebrile, Normal pro calcitonin.   - sputum culture pending   - continue home ofev and prednisone    - Pulmonology following

## 2023-09-16 NOTE — SUBJECTIVE & OBJECTIVE
Interval History: Patient seen and examined. She is still experiencing intermittent dry cough with scant yellow sputum. She reports SOB unchanged. She has no complaints at this time.     Review of Systems   Constitutional:  Negative for appetite change, chills, diaphoresis, fatigue, fever and unexpected weight change.   HENT:  Positive for congestion and voice change (hoarse voice). Negative for nosebleeds, sinus pressure and sore throat.    Eyes:  Negative for pain, discharge and visual disturbance.   Respiratory:  Positive for cough and shortness of breath. Negative for chest tightness, wheezing and stridor.    Cardiovascular:  Negative for chest pain, palpitations and leg swelling.   Gastrointestinal:  Negative for abdominal distention, abdominal pain, blood in stool, constipation, diarrhea, nausea and vomiting.   Endocrine: Negative for cold intolerance and heat intolerance.   Genitourinary:  Negative for difficulty urinating, dysuria, flank pain, frequency and urgency.   Musculoskeletal:  Negative for arthralgias, back pain, joint swelling, myalgias, neck pain and neck stiffness.   Skin:  Negative for rash and wound.   Allergic/Immunologic: Negative for food allergies and immunocompromised state.   Neurological:  Negative for dizziness, seizures, syncope, facial asymmetry, speech difficulty, weakness, light-headedness, numbness and headaches.   Hematological:  Negative for adenopathy.   Psychiatric/Behavioral:  Negative for agitation, confusion and hallucinations.      Objective:     Vital Signs (Most Recent):  Temp: 97.6 °F (36.4 °C) (09/16/23 0747)  Pulse: 81 (09/16/23 0928)  Resp: 16 (09/16/23 0928)  BP: 109/62 (09/16/23 0747)  SpO2: (!) 93 % (09/16/23 0928) Vital Signs (24h Range):  Temp:  [97.6 °F (36.4 °C)-98.9 °F (37.2 °C)] 97.6 °F (36.4 °C)  Pulse:  [] 81  Resp:  [16-20] 16  SpO2:  [92 %-99 %] 93 %  BP: (109-126)/(62-73) 109/62     Weight: 100.3 kg (221 lb 1.9 oz)  Body mass index is 35.69  kg/m².    Intake/Output Summary (Last 24 hours) at 9/16/2023 1109  Last data filed at 9/15/2023 1700  Gross per 24 hour   Intake 600 ml   Output --   Net 600 ml         Physical Exam  Vitals and nursing note reviewed.   Constitutional:       General: She is not in acute distress.     Appearance: She is well-developed. She is not diaphoretic.   HENT:      Head: Normocephalic and atraumatic.      Nose: Nose normal.      Mouth/Throat:      Comments: Dysphonia   Eyes:      General: No scleral icterus.     Conjunctiva/sclera: Conjunctivae normal.   Neck:      Trachea: No tracheal deviation.   Cardiovascular:      Rate and Rhythm: Normal rate and regular rhythm.      Heart sounds: Normal heart sounds. No murmur heard.     No friction rub. No gallop.   Pulmonary:      Effort: Pulmonary effort is normal. No respiratory distress.      Breath sounds: Normal breath sounds. No stridor. No wheezing.      Comments: +conversational dyspnea   Chest:      Chest wall: No tenderness.   Abdominal:      General: Bowel sounds are normal. There is no distension.      Palpations: Abdomen is soft. There is no mass.      Tenderness: There is no abdominal tenderness. There is no guarding or rebound.   Musculoskeletal:         General: No tenderness or deformity. Normal range of motion.      Cervical back: Normal range of motion and neck supple.   Skin:     General: Skin is warm and dry.      Coloration: Skin is not pale.      Findings: No erythema or rash.   Neurological:      Mental Status: She is alert and oriented to person, place, and time.      Cranial Nerves: No cranial nerve deficit.      Motor: No abnormal muscle tone.      Coordination: Coordination normal.   Psychiatric:         Behavior: Behavior normal.         Thought Content: Thought content normal.             Significant Labs: All pertinent labs within the past 24 hours have been reviewed.  CBC:   Recent Labs   Lab 09/14/23  1136 09/15/23  0427 09/16/23  0435   WBC 15.45*  13.58* 26.78*  26.78*   HGB 12.6 11.7* 12.0  12.0   HCT 40.7 38.1 38.9  38.9    334 410  410     CMP:   Recent Labs   Lab 09/14/23  1135 09/15/23  0426 09/16/23  0435    140 140  140   K 3.6 4.5 4.9  4.9    104 103  103   CO2 24 24 23  23   * 145* 140*  140*   BUN 12 10 18  18   CREATININE 0.9 0.7 0.8  0.8   CALCIUM 9.2 9.6 9.7  9.7   PROT 7.5  --  6.8   ALBUMIN 3.4*  --  3.0*   BILITOT 0.3  --  0.2   ALKPHOS 93  --  78   AST 27  --  18   ALT 27  --  24   ANIONGAP 16 12 14  14       Significant Imaging: I have reviewed all pertinent imaging results/findings within the past 24 hours.

## 2023-09-16 NOTE — PROGRESS NOTES
"[facility] ([facility] Medicine  Progress Note    Patient Name: Ayanna Alicia  MRN: [id number]  Patient Class: OP- Observation   Admission Date: 9/14/2023  Length of Stay: 0 days  Attending Physician: Chantel Escobar MD  Primary Care Provider: Madeleine Enrique MD        Subjective:     Principal Problem:Acute on chronic respiratory failure with hypoxemia        HPI:  Ms. Alicia is a 52-year-old female with a past medical history of Sjogren's syndrome, ILD, RA, chronic hypoxic respiratory failure at 6-8L/NC, asthma, GLENN (Cpap),  who presented to the ED with worsening dyspnea over the last day, associated wtih decreased o2 sats in the 80's with ambulation.  She reports she has been "dealing with a cough" for the last month.  She was seen by an allergist and put on amoxicillin with some improvement.  Cough is productive with tanish colored sputum.  She denies any fever/chills.  In the ED,  patient tachypenic and tachycardia, o2 92-94 % on 7 liters.  Chest xray essentially unchanged from previous and showed diffuse fibrotic stranding and scarring bilaterally.  Patient will be admitted to observation for acute on chronic hypoxic resp failure.      Of note, patient is followed by DR. Aviles in pulmonary clinic, on Ofev and daily prednisone.  She has had hospitalization August this year for the same.  She is currently going through work-up for lung transplant.  She is to start pulmonary rehab with [facility] on Monday.      Code Status full          Overview/Hospital Course:  The patient is a 53 yo female with chronic hypoxemic respiratory failure- on 6 liters NC home oxygen (has home Trilogy), Sjogrens, ILD and GLENN who was admitted for ILD/COPD exacerbation and decompensated diastolic CHF on Oxygen, IV Steroids, IV LAsix and Neb txs. Pulmonology evaluated pt and recommended CT chest which showed stable ILD. Pt has chronic leukocytosis. Procalcitonin normal. Afebrile.   Pt was initially admitted on 8 " liters HFNC. She has improved and currently on 6 liters NFNC. Blood cultures show NGTD. Sputum culture pending. Will continue current treatment  9/16/23-patient currently on 6L NC. Sating 93-99%. Sputum culture pending. No MRSA with nasal culture. Continue current regimen.       Interval History: Patient seen and examined. She is still experiencing intermittent dry cough with scant yellow sputum. She reports SOB unchanged. She has no complaints at this time.     Review of Systems   Constitutional:  Negative for appetite change, chills, diaphoresis, fatigue, fever and unexpected weight change.   HENT:  Positive for congestion and voice change (hoarse voice). Negative for nosebleeds, sinus pressure and sore throat.    Eyes:  Negative for pain, discharge and visual disturbance.   Respiratory:  Positive for cough and shortness of breath. Negative for chest tightness, wheezing and stridor.    Cardiovascular:  Negative for chest pain, palpitations and leg swelling.   Gastrointestinal:  Negative for abdominal distention, abdominal pain, blood in stool, constipation, diarrhea, nausea and vomiting.   Endocrine: Negative for cold intolerance and heat intolerance.   Genitourinary:  Negative for difficulty urinating, dysuria, flank pain, frequency and urgency.   Musculoskeletal:  Negative for arthralgias, back pain, joint swelling, myalgias, neck pain and neck stiffness.   Skin:  Negative for rash and wound.   Allergic/Immunologic: Negative for food allergies and immunocompromised state.   Neurological:  Negative for dizziness, seizures, syncope, facial asymmetry, speech difficulty, weakness, light-headedness, numbness and headaches.   Hematological:  Negative for adenopathy.   Psychiatric/Behavioral:  Negative for agitation, confusion and hallucinations.      Objective:     Vital Signs (Most Recent):  Temp: 97.6 °F (36.4 °C) (09/16/23 0747)  Pulse: 81 (09/16/23 0928)  Resp: 16 (09/16/23 0928)  BP: 109/62 (09/16/23 0747)  SpO2:  (!) 93 % (09/16/23 0928) Vital Signs (24h Range):  Temp:  [97.6 °F (36.4 °C)-98.9 °F (37.2 °C)] 97.6 °F (36.4 °C)  Pulse:  [] 81  Resp:  [16-20] 16  SpO2:  [92 %-99 %] 93 %  BP: (109-126)/(62-73) 109/62     Weight: 100.3 kg (221 lb 1.9 oz)  Body mass index is 35.69 kg/m².    Intake/Output Summary (Last 24 hours) at 9/16/2023 1109  Last data filed at 9/15/2023 1700  Gross per 24 hour   Intake 600 ml   Output --   Net 600 ml         Physical Exam  Vitals and nursing note reviewed.   Constitutional:       General: She is not in acute distress.     Appearance: She is well-developed. She is not diaphoretic.   HENT:      Head: Normocephalic and atraumatic.      Nose: Nose normal.      Mouth/Throat:      Comments: Dysphonia   Eyes:      General: No scleral icterus.     Conjunctiva/sclera: Conjunctivae normal.   Neck:      Trachea: No tracheal deviation.   Cardiovascular:      Rate and Rhythm: Normal rate and regular rhythm.      Heart sounds: Normal heart sounds. No murmur heard.     No friction rub. No gallop.   Pulmonary:      Effort: Pulmonary effort is normal. No respiratory distress.      Breath sounds: Normal breath sounds. No stridor. No wheezing.      Comments: +conversational dyspnea   Chest:      Chest wall: No tenderness.   Abdominal:      General: Bowel sounds are normal. There is no distension.      Palpations: Abdomen is soft. There is no mass.      Tenderness: There is no abdominal tenderness. There is no guarding or rebound.   Musculoskeletal:         General: No tenderness or deformity. Normal range of motion.      Cervical back: Normal range of motion and neck supple.   Skin:     General: Skin is warm and dry.      Coloration: Skin is not pale.      Findings: No erythema or rash.   Neurological:      Mental Status: She is alert and oriented to person, place, and time.      Cranial Nerves: No cranial nerve deficit.      Motor: No abnormal muscle tone.      Coordination: Coordination normal.    Psychiatric:         Behavior: Behavior normal.         Thought Content: Thought content normal.             Significant Labs: All pertinent labs within the past 24 hours have been reviewed.  CBC:   Recent Labs   Lab 09/14/23  1136 09/15/23  0427 09/16/23  0435   WBC 15.45* 13.58* 26.78*  26.78*   HGB 12.6 11.7* 12.0  12.0   HCT 40.7 38.1 38.9  38.9    334 410  410     CMP:   Recent Labs   Lab 09/14/23  1135 09/15/23  0426 09/16/23  0435    140 140  140   K 3.6 4.5 4.9  4.9    104 103  103   CO2 24 24 23  23   * 145* 140*  140*   BUN 12 10 18  18   CREATININE 0.9 0.7 0.8  0.8   CALCIUM 9.2 9.6 9.7  9.7   PROT 7.5  --  6.8   ALBUMIN 3.4*  --  3.0*   BILITOT 0.3  --  0.2   ALKPHOS 93  --  78   AST 27  --  18   ALT 27  --  24   ANIONGAP 16 12 14  14       Significant Imaging: I have reviewed all pertinent imaging results/findings within the past 24 hours.      Assessment/Plan:      * Acute on chronic respiratory failure with hypoxemia  Patient with Hypoxic Respiratory failure which is Acute on chronic.  she is on home oxygen at 6-8 LPM. Supplemental oxygen was provided and noted-      .   Signs/symptoms of respiratory failure include- tachypnea, increased work of breathing and use of accessory muscles. Contributing diagnoses includes - Interstitial lung disease Labs and images were reviewed. Patient Has recent ABG, which has been reviewed. Will treat underlying causes and adjust management of respiratory failure as follows    - Hx of ILD, follows with Dr. Aviles  - continue o2 supplementation  - CT chest showed stable chronic interstitial opacities and ground glass densities with diffuse bronchiectasis  - breath treatments/iv steroids  - elevated WBC -chronic. Afebrile, Normal pro calcitonin.   - sputum culture pending   - continue home ofev and prednisone    - Pulmonology following     UIP (usual interstitial pneumonitis)  Cont IV Steroids   AVAPS HS   IV lasix            Chronic leukocytosis -afebrile, procalcitonin normal, no new infiltrates on CT chest   Blood cultures show NGTD  Sputum culture pending                                                                                                                                                                                                                          COPD exacerbation  IV Steroiods  LABA,  ICS neb txs  Pulmonology following      GLENN on CPAP  AVAPS q hs        Gastroesophageal reflux disease without esophagitis  - pepcid BID      HTN (hypertension)  - normotensive        VTE Risk Mitigation (From admission, onward)         Ordered     enoxaparin injection 40 mg  Daily         09/14/23 1431     IP VTE HIGH RISK PATIENT  Once         09/14/23 1431     Place sequential compression device  Until discontinued         09/14/23 1431                Discharge Planning   JANETTE:      Code Status: Full Code   Is the patient medically ready for discharge?:     Reason for patient still in hospital (select all that apply): Patient trending condition and Laboratory test  Discharge Plan A: Home Health                  RACHEL Stone  Department of Hospital Medicine   [facility] Tele[facility] ([facility])

## 2023-09-16 NOTE — PLAN OF CARE
Problem: Adult Inpatient Plan of Care  Goal: Plan of Care Review  Outcome: Ongoing, Progressing  Goal: Patient-Specific Goal (Individualized)  Outcome: Ongoing, Progressing  Goal: Absence of Hospital-Acquired Illness or Injury  Outcome: Ongoing, Progressing  Goal: Optimal Comfort and Wellbeing  Outcome: Ongoing, Progressing  Goal: Readiness for Transition of Care  Outcome: Ongoing, Progressing     Problem: Breathing Pattern Ineffective  Goal: Effective Breathing Pattern  Outcome: Ongoing, Progressing     Problem: Airway Clearance Ineffective  Goal: Effective Airway Clearance  Outcome: Ongoing, Progressing     Problem: Gas Exchange Impaired  Goal: Optimal Gas Exchange  Outcome: Ongoing, Progressing

## 2023-09-17 VITALS
DIASTOLIC BLOOD PRESSURE: 74 MMHG | WEIGHT: 221.13 LBS | TEMPERATURE: 98 F | RESPIRATION RATE: 20 BRPM | SYSTOLIC BLOOD PRESSURE: 123 MMHG | HEIGHT: 66 IN | HEART RATE: 99 BPM | BODY MASS INDEX: 35.54 KG/M2 | OXYGEN SATURATION: 93 %

## 2023-09-17 LAB
ANION GAP SERPL CALC-SCNC: 10 MMOL/L (ref 8–16)
BASOPHILS # BLD AUTO: 0.05 K/UL (ref 0–0.2)
BASOPHILS NFR BLD: 0.2 % (ref 0–1.9)
BUN SERPL-MCNC: 19 MG/DL (ref 6–20)
CALCIUM SERPL-MCNC: 9.5 MG/DL (ref 8.7–10.5)
CHLORIDE SERPL-SCNC: 100 MMOL/L (ref 95–110)
CO2 SERPL-SCNC: 28 MMOL/L (ref 23–29)
CREAT SERPL-MCNC: 1 MG/DL (ref 0.5–1.4)
DIFFERENTIAL METHOD: ABNORMAL
EOSINOPHIL # BLD AUTO: 0 K/UL (ref 0–0.5)
EOSINOPHIL NFR BLD: 0 % (ref 0–8)
ERYTHROCYTE [DISTWIDTH] IN BLOOD BY AUTOMATED COUNT: 17.2 % (ref 11.5–14.5)
EST. GFR  (NO RACE VARIABLE): >60 ML/MIN/1.73 M^2
GLUCOSE SERPL-MCNC: 145 MG/DL (ref 70–110)
HCT VFR BLD AUTO: 39.8 % (ref 37–48.5)
HGB BLD-MCNC: 12.4 G/DL (ref 12–16)
IMM GRANULOCYTES # BLD AUTO: 0.28 K/UL (ref 0–0.04)
IMM GRANULOCYTES NFR BLD AUTO: 1.1 % (ref 0–0.5)
LYMPHOCYTES # BLD AUTO: 0.8 K/UL (ref 1–4.8)
LYMPHOCYTES NFR BLD: 3.3 % (ref 18–48)
MCH RBC QN AUTO: 31.6 PG (ref 27–31)
MCHC RBC AUTO-ENTMCNC: 31.2 G/DL (ref 32–36)
MCV RBC AUTO: 101 FL (ref 82–98)
MONOCYTES # BLD AUTO: 0.7 K/UL (ref 0.3–1)
MONOCYTES NFR BLD: 2.7 % (ref 4–15)
NEUTROPHILS # BLD AUTO: 22.7 K/UL (ref 1.8–7.7)
NEUTROPHILS NFR BLD: 92.7 % (ref 38–73)
NRBC BLD-RTO: 0 /100 WBC
PLATELET # BLD AUTO: 457 K/UL (ref 150–450)
PMV BLD AUTO: 10.6 FL (ref 9.2–12.9)
POTASSIUM SERPL-SCNC: 4.5 MMOL/L (ref 3.5–5.1)
RBC # BLD AUTO: 3.93 M/UL (ref 4–5.4)
SODIUM SERPL-SCNC: 138 MMOL/L (ref 136–145)
WBC # BLD AUTO: 24.5 K/UL (ref 3.9–12.7)

## 2023-09-17 PROCEDURE — 63600175 PHARM REV CODE 636 W HCPCS: Performed by: NURSE PRACTITIONER

## 2023-09-17 PROCEDURE — 25000003 PHARM REV CODE 250: Performed by: NURSE PRACTITIONER

## 2023-09-17 PROCEDURE — 25000242 PHARM REV CODE 250 ALT 637 W/ HCPCS: Performed by: NURSE PRACTITIONER

## 2023-09-17 PROCEDURE — G0378 HOSPITAL OBSERVATION PER HR: HCPCS

## 2023-09-17 PROCEDURE — 94761 N-INVAS EAR/PLS OXIMETRY MLT: CPT

## 2023-09-17 PROCEDURE — 85025 COMPLETE CBC W/AUTO DIFF WBC: CPT | Performed by: NURSE PRACTITIONER

## 2023-09-17 PROCEDURE — 80048 BASIC METABOLIC PNL TOTAL CA: CPT | Performed by: NURSE PRACTITIONER

## 2023-09-17 PROCEDURE — 94640 AIRWAY INHALATION TREATMENT: CPT | Mod: XB

## 2023-09-17 PROCEDURE — 36415 COLL VENOUS BLD VENIPUNCTURE: CPT | Performed by: NURSE PRACTITIONER

## 2023-09-17 PROCEDURE — 27100171 HC OXYGEN HIGH FLOW UP TO 24 HOURS

## 2023-09-17 PROCEDURE — 25000242 PHARM REV CODE 250 ALT 637 W/ HCPCS: Performed by: EMERGENCY MEDICINE

## 2023-09-17 PROCEDURE — 99900035 HC TECH TIME PER 15 MIN (STAT)

## 2023-09-17 PROCEDURE — 25000242 PHARM REV CODE 250 ALT 637 W/ HCPCS: Performed by: FAMILY MEDICINE

## 2023-09-17 PROCEDURE — 96376 TX/PRO/DX INJ SAME DRUG ADON: CPT

## 2023-09-17 PROCEDURE — 94640 AIRWAY INHALATION TREATMENT: CPT

## 2023-09-17 RX ORDER — BENZONATATE 100 MG/1
100 CAPSULE ORAL 3 TIMES DAILY PRN
Qty: 20 CAPSULE | Refills: 0 | Status: SHIPPED | OUTPATIENT
Start: 2023-09-17 | End: 2023-09-27

## 2023-09-17 RX ORDER — FUROSEMIDE 40 MG/1
40 TABLET ORAL DAILY
Qty: 30 TABLET | Refills: 0 | Status: SHIPPED | OUTPATIENT
Start: 2023-09-17 | End: 2023-09-21 | Stop reason: SDUPTHER

## 2023-09-17 RX ORDER — FUROSEMIDE 40 MG/1
40 TABLET ORAL DAILY
Qty: 30 TABLET | Refills: 0 | Status: SHIPPED | OUTPATIENT
Start: 2023-09-17 | End: 2023-09-17 | Stop reason: HOSPADM

## 2023-09-17 RX ORDER — PREDNISONE 10 MG/1
TABLET ORAL
Qty: 126 TABLET | Refills: 0 | Status: SHIPPED | OUTPATIENT
Start: 2023-09-17 | End: 2023-09-21 | Stop reason: SDUPTHER

## 2023-09-17 RX ORDER — FUROSEMIDE 40 MG/1
40 TABLET ORAL DAILY
Qty: 30 TABLET | Refills: 0 | Status: SHIPPED | OUTPATIENT
Start: 2023-09-17 | End: 2023-09-17 | Stop reason: SDUPTHER

## 2023-09-17 RX ADMIN — FAMOTIDINE 20 MG: 20 TABLET, FILM COATED ORAL at 08:09

## 2023-09-17 RX ADMIN — BENZONATATE 100 MG: 100 CAPSULE ORAL at 08:09

## 2023-09-17 RX ADMIN — FUROSEMIDE 40 MG: 10 INJECTION, SOLUTION INTRAMUSCULAR; INTRAVENOUS at 08:09

## 2023-09-17 RX ADMIN — BUDESONIDE INHALATION 0.5 MG: 0.5 SUSPENSION RESPIRATORY (INHALATION) at 08:09

## 2023-09-17 RX ADMIN — GUAIFENESIN 200 MG: 200 SOLUTION ORAL at 06:09

## 2023-09-17 RX ADMIN — NINTEDANIB 150 MG: 150 CAPSULE ORAL at 09:09

## 2023-09-17 RX ADMIN — SENNOSIDES AND DOCUSATE SODIUM 1 TABLET: 50; 8.6 TABLET ORAL at 08:09

## 2023-09-17 RX ADMIN — IPRATROPIUM BROMIDE 0.5 MG: 0.5 SOLUTION RESPIRATORY (INHALATION) at 12:09

## 2023-09-17 RX ADMIN — GUAIFENESIN 200 MG: 200 SOLUTION ORAL at 01:09

## 2023-09-17 RX ADMIN — CETIRIZINE HYDROCHLORIDE 5 MG: 5 TABLET ORAL at 08:09

## 2023-09-17 RX ADMIN — BENZONATATE 100 MG: 100 CAPSULE ORAL at 01:09

## 2023-09-17 RX ADMIN — METHYLPREDNISOLONE SODIUM SUCCINATE 60 MG: 125 INJECTION, POWDER, FOR SOLUTION INTRAMUSCULAR; INTRAVENOUS at 06:09

## 2023-09-17 RX ADMIN — ARFORMOTEROL TARTRATE 15 MCG: 15 SOLUTION RESPIRATORY (INHALATION) at 08:09

## 2023-09-17 RX ADMIN — METHYLPREDNISOLONE SODIUM SUCCINATE 60 MG: 125 INJECTION, POWDER, FOR SOLUTION INTRAMUSCULAR; INTRAVENOUS at 12:09

## 2023-09-17 RX ADMIN — IPRATROPIUM BROMIDE 0.5 MG: 0.5 SOLUTION RESPIRATORY (INHALATION) at 08:09

## 2023-09-17 NOTE — ASSESSMENT & PLAN NOTE
-Cont IV Steroids   -AVAPS HS   -IV lasix, increase to 40 mg daily on discharge          -Chronic leukocytosis likely d/t chronic steroid use  -Blood cultures show NGTD  -Sputum culture NGTD

## 2023-09-17 NOTE — SUBJECTIVE & OBJECTIVE
Ayanna Alicia is a 52-year-old female with a past medical history of chronic hypoxic respiratory failure on 5L/NC, asthma, Sjogren's syndrome with associated interstitial lung disease, multinodular goiter, GLENN, and morbid obesity who  presented with worsening shortness of breadth yesterday with associated decreased oxygen saturations. Patient reported over the last several days she noted worsening shortness of breath. In addition, she noted increased cough with clear to pale yellow sputum production. She noted difficulty with sleeping and a sense of restlessness at night. She endorses some increased fluid retention with increased abdominal girth and tightness in her hands bilaterally. She denies any chest pain, fever, or chills. Reports diarrhea which may be increased from baseline (as she reports chronic diarrhea since starting OFEV).      Of note, she was hospitalized in May of 2022 with acute respiratory failure and persistent pneumonia. At that time, she was dishcharged on oxygen. She was subsequently diagnosed with Sjogren's disease and thought to have interstitial lung disease secondary to her underlying rheumatological disease. Initial PFTs with severe restriction and reduced DLCO. She was initially prescribed steroids followed by the addition of Cellcept and Rituximab infusions (last infusion in Dec 2022). Most recently, she was initiated on Ofev; however, the patient reports she has not had any significant improvement in her clinical condition since her original illness in May 2022 and reports she has clinically worsened over the course of this time. Endorses losing weight, lost 65 lbs since last year; however, she has not been able to get less than 220lb. Not taking cellcept since 12/2022, received 4 treatments of Rituximab with her last dose in Dec 2022. She chronically takes prednisone 10 mg.  Patient underwent bronchoscopy with BAL in April 2023 per Dr. Aviles with negative cultures and no growth  of fungus, yeast, PCP, staph, or pseudomonas. Remains on OFEV 150mg BID and Prednisone 10mg daily. In addition, she is in a trial of Actemra IV monthly for four consecutive months. In addition, she has been referred for evaluation of lung transplant at [facility] in [place] and was seen by them in August. At that time, she was told she is not currently a candidate for lung transplant secondary to her weight. She has a goal of an additional 10lb weight loss for consideration for transplant.       Due to her current acute on chronic hypoxic respiratory failure, as well as, her chronic underlying pulmonary disease, pulmonology has been consulted for evaluation.     Interval history:   9/15: Pulmonary status stable on 6L/NC. Persistent dry, non-productive cough. No acute events overnight.   9/17: Pulmonary status remains stable on 6L/NC. Non-productive cough. No acute events. Anxious for discharge to home. Plans to begin pulmonary rehab on Monday, Actemra infusion on Tuesday, return to [place] [facility] on Wednesday.    Objective:     Vital Signs (Most Recent):  Temp: 97.4 °F (36.3 °C) (09/17/23 0712)  Pulse: 79 (09/17/23 0819)  Resp: 18 (09/17/23 0819)  BP: 120/65 (09/17/23 0712)  SpO2: 99 % (09/17/23 0819) Vital Signs (24h Range):  Temp:  [97.4 °F (36.3 °C)-98.3 °F (36.8 °C)] 97.4 °F (36.3 °C)  Pulse:  [71-89] 79  Resp:  [16-20] 18  SpO2:  [92 %-100 %] 99 %  BP: (111-147)/(54-83) 120/65   Weight: 100.3 kg (221 lb 1.9 oz);  Body mass index is 35.69 kg/m².    Intake/Output Summary (Last 24 hours) at 9/17/2023 0858  Last data filed at 9/16/2023 1737  Gross per 24 hour   Intake 360 ml   Output --   Net 360 ml      Physical Exam  Vitals and nursing note reviewed.   Constitutional:       Appearance: She is obese.   HENT:      Head: Normocephalic.   Eyes:      Conjunctiva/sclera: Conjunctivae normal.   Cardiovascular:      Rate and Rhythm: Normal rate.   Pulmonary:      Effort: Pulmonary effort is normal.      Breath sounds:  Normal breath sounds.   Abdominal:      Palpations: Abdomen is soft.   Musculoskeletal:         General: Normal range of motion.      Cervical back: Normal range of motion.   Skin:     General: Skin is warm and dry.   Neurological:      Mental Status: She is alert and oriented to person, place, and time.   Psychiatric:         Mood and Affect: Mood normal.         Review of Systems: Negative except as indicated in HPI    Significant Labs:  CBC/Anemia Profile:  Recent Labs   Lab 09/16/23 0435 09/17/23 0432   WBC 26.78*  26.78* 24.50*   HGB 12.0  12.0 12.4   HCT 38.9  38.9 39.8     410 457*   *  101* 101*   RDW 17.6*  17.6* 17.2*   Chemistries:  Recent Labs   Lab 09/16/23 0435 09/17/23 0432     140 138   K 4.9  4.9 4.5     103 100   CO2 23  23 28   BUN 18  18 19   CREATININE 0.8  0.8 1.0   CALCIUM 9.7  9.7 9.5   ALBUMIN 3.0*  --    PROT 6.8  --    BILITOT 0.2  --    ALKPHOS 78  --    ALT 24  --    AST 18  --

## 2023-09-17 NOTE — ASSESSMENT & PLAN NOTE
Chronic, controlled  -Latest blood pressure and vitals reviewed-   Temp:  [97.4 °F (36.3 °C)-98.2 °F (36.8 °C)]   Pulse:  [71-99]   Resp:  [16-20]   BP: (111-123)/(54-74)   SpO2:  [92 %-100 %] .   -Home meds for hypertension were reviewed and noted below.   Hypertension Medications             furosemide (LASIX) 40 MG tablet Take 1 tablet (40 mg total) by mouth once daily.        -While in the hospital, will manage blood pressure as follows; Adjust home antihypertensive regimen as follows- change to Lasix 40 mg daily on discharge  -optimize pain management

## 2023-09-17 NOTE — DISCHARGE SUMMARY
"[facility] - Telemetry ([facility] Medicine  Discharge Summary      Patient Name: Ayanna Alicia  MRN: [id number]  DARLENE: [id number]  Patient Class: OP- Observation  Admission Date: 9/14/2023  Hospital Length of Stay: 0 days  Discharge Date and Time: 9/17/2023  1:30 PM  Attending Physician: Chantel Escobar MD  Discharging Provider: Eileen Us NP  Primary Care Provider: Madeleine Enrique MD    Primary Care Team: [facility] MEDICINE B    HPI:   Ms. Alicia is a 52-year-old female with a past medical history of Sjogren's syndrome, ILD, RA, chronic hypoxic respiratory failure at 6-8L/NC, asthma, GLENN (Cpap),  who presented to the ED with worsening dyspnea over the last day, associated wtih decreased o2 sats in the 80's with ambulation.  She reports she has been "dealing with a cough" for the last month.  She was seen by an allergist and put on amoxicillin with some improvement.  Cough is productive with tanish colored sputum.  She denies any fever/chills.  In the ED,  patient tachypenic and tachycardia, o2 92-94 % on 7 liters.  Chest xray essentially unchanged from previous and showed diffuse fibrotic stranding and scarring bilaterally.  Patient will be admitted to observation for acute on chronic hypoxic resp failure.      Of note, patient is followed by DR. Aviles in pulmonary clinic, on Ofev and daily prednisone.  She has had hospitalization August this year for the same.  She is currently going through work-up for lung transplant.  She is to start pulmonary rehab with [facility] on Monday.      Code Status full          * No surgery found *      Hospital Course:   The patient is a 53 yo female with chronic hypoxemic respiratory failure- on 6 liters NC home oxygen (has home Trilogy), Sjogrens, ILD and GLENN who was admitted for ILD/COPD exacerbation and decompensated diastolic CHF on Oxygen, IV Steroids, IV Lasix and Neb txs. She was initially admitted on 8L HFNC.  Pulmonology evaluated pt and recommended CT " chest which showed stable ILD. Pt has chronic leukocytosis, likely related to steroids. Procalcitonin normal. Afebrile. Pulmonology started on Lasix IV, with improved SOB. Will increase dose on discharge to daily instead of every other day.  She is weaned to home requirements, 6 liters NFNC. Blood cultures show NGTD. Sputum culture NGTD. No MRSA with nasal culture.   Echo EF 60-65%, grade II diastolic dysfunction, mild MVR, TVR.    Will discharge on a steroid taper per pulmonology.   Will increase her home Lasix to 40 mg daily for now.   Cardiac diet with 1.5 L fluid restriction.     Follow up with pulmonology after discharge.   Follow up with cardiology as scheduled.   Follow up with PCP in 3-5 days.     Patient seen and examined on the day of discharge.  All questions and concerns were addressed prior to discharge.    Face to face encounter with patient: 35 minutes         Goals of Care Treatment Preferences:  Code Status: Full Code      Consults:   Consults (From admission, onward)          Status Ordering Provider     Inpatient consult to Pulmonology  Once        Provider:  Carmelina Tuttle MD    Completed MYRA AGARWAL            Pulmonary  * Acute on chronic respiratory failure with hypoxemia  Patient with Hypoxic Respiratory failure which is Acute on chronic.  she is on home oxygen at 6-8 LPM. Supplemental oxygen was provided and noted-      .   Signs/symptoms of respiratory failure include- tachypnea, increased work of breathing and use of accessory muscles. Contributing diagnoses includes - Interstitial lung disease Labs and images were reviewed. Patient Has recent ABG, which has been reviewed. Will treat underlying causes and adjust management of respiratory failure as follows    -Hx of ILD, follows with Dr. Aviles  -continue o2 supplementation  -CT chest showed stable chronic interstitial opacities and ground glass densities with diffuse bronchiectasis  -breath treatments/iv  steroids  -elevated WBC -chronic. Afebrile, Normal pro calcitonin.   -sputum culture NGTD  -continue home OFEV, prednisone taper then restart home dose, and Actemra infusions  -Pulmonology following, follow up on discharge    UIP (usual interstitial pneumonitis)  -Cont IV Steroids   -AVAPS HS   -IV lasix, increase to 40 mg daily on discharge          -Chronic leukocytosis likely d/t chronic steroid use  -Blood cultures show NGTD  -Sputum culture NGTD        COPD exacerbation  -IV Steroiods  -LABA,  ICS neb txs  -follow up with pulmonology OP as scheduled    Interstitial lung disease  -being evaluated OP for transplant  -follows with Dr. Aviles and Dr. Ashton OP    Cardiac/Vascular  HTN (hypertension)  Chronic, controlled  -Latest blood pressure and vitals reviewed-   Temp:  [97.4 °F (36.3 °C)-98.2 °F (36.8 °C)]   Pulse:  [71-99]   Resp:  [16-20]   BP: (111-123)/(54-74)   SpO2:  [92 %-100 %] .   -Home meds for hypertension were reviewed and noted below.   Hypertension Medications               furosemide (LASIX) 40 MG tablet Take 1 tablet (40 mg total) by mouth once daily.          -While in the hospital, will manage blood pressure as follows; Adjust home antihypertensive regimen as follows- change to Lasix 40 mg daily on discharge  -optimize pain management    GI  Gastroesophageal reflux disease without esophagitis  -cont home PPI    Other  GLENN on CPAP  -AVAPS q hs  -follow up with Dr. Aviles OP      Final Active Diagnoses:    Diagnosis Date Noted POA    PRINCIPAL PROBLEM:  Acute on chronic respiratory failure with hypoxemia [J96.21] 04/27/2022 Yes    UIP (usual interstitial pneumonitis) [J84.112] 03/17/2023 Yes    COPD exacerbation [J44.1] 03/17/2023 Yes    Interstitial lung disease [J84.9] 10/27/2022 Yes    GLENN on CPAP [G47.33, Z99.89] 04/13/2022 Not Applicable    Gastroesophageal reflux disease without esophagitis [K21.9] 11/03/2014 Yes    HTN (hypertension) [I10] 09/04/2013 Yes      Problems Resolved During  this Admission:       Discharged Condition: good    Disposition: Home-Health Care [facility]    Follow Up:   Follow-up Information       Madeleine Enrique MD .    Specialty: Family Medicine  Contact information:  [address]  [facility] [id number] [phone]               Too Ashton MD Follow up.    Specialty: Pulmonary Disease  Why: keep scheduled appointment  Contact information:  [address]  [facility] [address]  [phone]               Agustín Aviles MD Follow up on 10/24/2023.    Specialties: Critical Care Medicine, Pulmonary Disease  Why: @ 11:20 am  Contact information:  [address]  [facility] [address]  [phone]               [facility] - Pulmonary Rehab Follow up on 9/18/2023.    Specialty: Pulmonology  Contact information:  [address] [facility] [address] [phone]  Additional information:  Please take [address] for the Medical Office Building. Check in on the 3rd floor, to the left.                         Patient Instructions:      Diet Cardiac   Order Comments: 1.5 Liter fluid restriction     HOME HEALTH ORDERS     Order Specific Question Answer Comments   What Home Health Agency is the patient currently using? Other/External      Activity as tolerated       Significant Diagnostic Studies: Labs: CMP   Recent Labs   Lab 09/16/23  0435 09/17/23 0432     140 138   K 4.9  4.9 4.5     103 100   CO2 23  23 28   *  140* 145*   BUN 18  18 19   CREATININE 0.8  0.8 1.0   CALCIUM 9.7  9.7 9.5   PROT 6.8  --    ALBUMIN 3.0*  --    BILITOT 0.2  --    ALKPHOS 78  --    AST 18  --    ALT 24  --    ANIONGAP 14  14 10    and CBC   Recent Labs   Lab 09/16/23  0435 09/17/23  0432   WBC 26.78*  26.78* 24.50*   HGB 12.0  12.0 12.4   HCT 38.9  38.9 39.8     410 457*       Pending Diagnostic Studies:       Procedure Component Value Units Date/Time    Fungal Immunodiffusion - Blood [[id number]] Collected: 09/16/23 0854     Order Status: Sent Lab Status: In process Updated: 09/16/23 1544    Specimen: Blood     Fungitell Assay For (1.3)-B-D-Glucans [[id number]] Collected: 09/16/23 0854    Order Status: Sent Lab Status: In process Updated: 09/16/23 1544    Specimen: Blood     Lactic acid, plasma #1 [[id number]] Collected: 09/14/23 1135    Order Status: Sent Lab Status: In process Updated: 09/14/23 1136    Specimen: Blood            Medications:  Reconciled Home Medications:      Medication List        START taking these medications      benzonatate 100 MG capsule  Commonly known as: TESSALON  Take 1 capsule (100 mg total) by mouth 3 (three) times daily as needed for Cough.     pantoprazole 40 MG tablet  Commonly known as: PROTONIX  Take 1 tablet (40 mg total) by mouth once daily.            CHANGE how you take these medications      furosemide 40 MG tablet  Commonly known as: LASIX  Take 1 tablet (40 mg total) by mouth once daily.  What changed: when to take this     omeprazole 20 MG capsule  Commonly known as: PRILOSEC  Take 1 capsule (20 mg total) by mouth once daily.  What changed:   medication strength  how much to take     predniSONE 10 MG tablet  Commonly known as: DELTASONE  Take 6 tablets (60 mg total) by mouth once daily for 3 days, THEN 4 tablets (40 mg total) once daily for 3 days, THEN 2 tablets (20 mg total) once daily for 3 days, THEN 1 tablet (10 mg total) once daily.  Start taking on: September 17, 2023  What changed: See the new instructions.  Notes to patient: Hold until prednisone taper given on hospital discharge completed            CONTINUE taking these medications      albuterol 90 mcg/actuation inhaler  Commonly known as: PROVENTIL/VENTOLIN HFA  INHALE 1 TO 2 PUFFS BY MOUTH INTO THE LUNGS EVERY 4 TO 6 HOURS AS NEEDED FOR WHEEZING OR SHORTNESS OF BREATH     ascorbic acid (vitamin C) 500 MG tablet  Commonly known as: VITAMIN C  Take 500 mg by mouth once daily.     hydrocortisone 2.5 % ointment  Apply topically 2  (two) times daily.     ibuprofen 200 MG tablet  Commonly known as: ADVIL,MOTRIN  Take 200 mg by mouth every 6 (six) hours as needed for Pain.     ipratropium 0.02 % nebulizer solution  Commonly known as: ATROVENT  Take 2.5 mLs (500 mcg total) by nebulization 4 (four) times daily. Rescue     levocetirizine 5 MG tablet  Commonly known as: XYZAL  TAKE 1 TABLET(5 MG) BY MOUTH EVERY DAY     loperamide 2 mg capsule  Commonly known as: IMODIUM  TAKE 1 CAPSULE (2 MG TOTAL) BY MOUTH DAILY AS NEEDED FOR DIARRHEA.     MODERNA COVID BIVAL(6M UP)(PF) 50 mcg/0.5 mL injection  Generic drug: sars-cov-2 (covid-19 omicron)  Inject into the muscle.     nintedanib 150 mg Cap  Commonly known as: OFEV  Take 1 capsule (150 mg total) by mouth 2 (two) times a day.     salmeteroL 50 mcg/dose diskus inhaler  Commonly known as: SEREVENT  Inhale 1 puff into the lungs 2 (two) times daily. Controller     sulfamethoxazole-trimethoprim 800-160mg 800-160 mg Tab  Commonly known as: BACTRIM DS  Take 1 tablet by mouth every Mon, Wed, Fri.     vitamin D 1000 units Tab  Commonly known as: VITAMIN D3  Take 1,000 Units by mouth once daily.     VITAMIN-D + OMEGA-3 ORAL  Take 2 tablets by mouth once daily at 6am.     WOMEN'S 50+ ADVANCED ORAL  Take 1 tablet by mouth Daily.            STOP taking these medications      amoxicillin-clavulanate 875-125mg 875-125 mg per tablet  Commonly known as: AUGMENTIN              Indwelling Lines/Drains at time of discharge:   Lines/Drains/Airways       None                   Time spent on the discharge of patient: 45 minutes         Eileen Us NP  Department of Hospital Medicine  '[facility] - Telemetry ([facility])

## 2023-09-17 NOTE — ASSESSMENT & PLAN NOTE
Patient with Hypoxic Respiratory failure which is Acute on chronic.  she is on home oxygen at 6-8 LPM. Supplemental oxygen was provided and noted-      .   Signs/symptoms of respiratory failure include- tachypnea, increased work of breathing and use of accessory muscles. Contributing diagnoses includes - Interstitial lung disease Labs and images were reviewed. Patient Has recent ABG, which has been reviewed. Will treat underlying causes and adjust management of respiratory failure as follows    -Hx of ILD, follows with Dr. Aviles  -continue o2 supplementation  -CT chest showed stable chronic interstitial opacities and ground glass densities with diffuse bronchiectasis  -breath treatments/iv steroids  -elevated WBC -chronic. Afebrile, Normal pro calcitonin.   -sputum culture NGTD  -continue home OFEV, prednisone taper then restart home dose, and Actemra infusions  -Pulmonology following, follow up on discharge

## 2023-09-17 NOTE — ASSESSMENT & PLAN NOTE
Patient with chronic hypoxic respiratory failure who is on home oxygen at 5L/NC which is her current inpatient requirements. Signs/symptoms of respiratory failure included increased work of breathing, decreased oxygen saturations, and wheezing. Contributing diagnoses includes asthma, interstitial lung disease, and decompensated heart failure. Labs and images were reviewed. Patient does have a recent ABG which has been reviewed.  Initial PFTs with severe restriction and reduced DLCO; attempted repeat PFTs on 3/16/2022 which she was unable to complete due to worsening shortness of breath.    Will treat underlying causes and adjust management of respiratory failure as follows:  · Patient with restrictive pattern on her PFTs and chronic hypoxic respiratory failure. On chronic immunosuppression including OFEV, Prednisone, and Actemra  · Continue Bactrim ppx upon discharge  · Continue Lasix  · Continue inhaler upon discharge  · Prednisone taper as ordered  · Continue supplemental oxygen and titrate as needed to maintain saturations 90% or greater  · Fluid restriction of 1.5L daily  · CT chest without contrast reviewed without acute worsening / progression of disease  · Follow fever and WBC trends  · Follow chest imaging and ABGs as needed

## 2023-09-17 NOTE — ASSESSMENT & PLAN NOTE
Hospitalized May 2022 with acute respiratory failure and persistent pneumonia and dishcarged on oxygen. She has since been diagnosed with Sjogren's disease with associated ILD. Initial PFTs revealed severe restriction and reduced DLCO. Initially prescribed steroids and subsequently Cellcept and Rituximab infusions (last infusion in Dec 2022). Now on Ofev 150mg BID, Prednisone 10mg daily, and Actemra IV monthly x4 months.      · Started lung transplant evaluation at [facility]  · Not a current candidate for transplant due to weight  · Prednisone taper as ordered  · Continue nocturnal AVAPS and prn upon discharge  · Continue oral lasix upon discharge  · Followed by rheumatology and pulmonary as outpatient  · Follow-up with Dr. Ashton in [place]  · Repeat CT chest stable  · Plan for Actemra infusion on Wednesday

## 2023-09-17 NOTE — PROGRESS NOTES
[facility]  Pulmonology Progress Note    Patient Name: Ayanna Alicia   MRN: [id number]  Admission Date: 9/14/2023   Hospital Length of Stay: 0 days  Code Status: Full Code    Attending Provider: Chantel Escobar MD  Primary Care Provider: Madeleine Enrique MD   Principal Problem: Acute on chronic respiratory failure with hypoxemia  Subjective:   History of present illness:  Ayanna Alicia is a 52-year-old female with a past medical history of chronic hypoxic respiratory failure on 5L/NC, asthma, Sjogren's syndrome with associated interstitial lung disease, multinodular goiter, GLENN, and morbid obesity who  presented with worsening shortness of breadth yesterday with associated decreased oxygen saturations. Patient reported over the last several days she noted worsening shortness of breath. In addition, she noted increased cough with clear to pale yellow sputum production. She noted difficulty with sleeping and a sense of restlessness at night. She endorses some increased fluid retention with increased abdominal girth and tightness in her hands bilaterally. She denies any chest pain, fever, or chills. Reports diarrhea which may be increased from baseline (as she reports chronic diarrhea since starting OFEV).      Of note, she was hospitalized in May of 2022 with acute respiratory failure and persistent pneumonia. At that time, she was dishcharged on oxygen. She was subsequently diagnosed with Sjogren's disease and thought to have interstitial lung disease secondary to her underlying rheumatological disease. Initial PFTs with severe restriction and reduced DLCO. She was initially prescribed steroids followed by the addition of Cellcept and Rituximab infusions (last infusion in Dec 2022). Most recently, she was initiated on Ofev; however, the patient reports she has not had any significant improvement in her clinical condition since her original illness in May 2022 and reports  she has clinically worsened over the course of this time. Endorses losing weight, lost 65 lbs since last year; however, she has not been able to get less than 220lb. Not taking cellcept since 12/2022, received 4 treatments of Rituximab with her last dose in Dec 2022. She chronically takes prednisone 10 mg.  Patient underwent bronchoscopy with BAL in April 2023 per Dr. Aviles with negative cultures and no growth of fungus, yeast, PCP, staph, or pseudomonas. Remains on OFEV 150mg BID and Prednisone 10mg daily. In addition, she is in a trial of Actemra IV monthly for four consecutive months. In addition, she has been referred for evaluation of lung transplant at [facility] in [place] and was seen by them in August. At that time, she was told she is not currently a candidate for lung transplant secondary to her weight. She has a goal of an additional 10lb weight loss for consideration for transplant.       Due to her current acute on chronic hypoxic respiratory failure, as well as, her chronic underlying pulmonary disease, pulmonology has been consulted for evaluation.     Interval history:    9/15: Pulmonary status stable on 6L/NC. Persistent dry, non-productive cough. No acute events overnight.    9/17: Pulmonary status remains stable on 6L/NC. Non-productive cough. No acute events. Anxious for discharge to home. Plans to begin pulmonary rehab on Monday, Actemra infusion on Tuesday, return to [place] [facility] on Wednesday.    Objective:     Vital Signs (Most Recent):  Temp: 97.4 °F (36.3 °C) (09/17/23 0712)  Pulse: 79 (09/17/23 0819)  Resp: 18 (09/17/23 0819)  BP: 120/65 (09/17/23 0712)  SpO2: 99 % (09/17/23 0819) Vital Signs (24h Range):  Temp:  [97.4 °F (36.3 °C)-98.3 °F (36.8 °C)] 97.4 °F (36.3 °C)  Pulse:  [71-89] 79  Resp:  [16-20] 18  SpO2:  [92 %-100 %] 99 %  BP: (111-147)/(54-83) 120/65   Weight: 100.3 kg (221 lb 1.9 oz);  Body mass index is 35.69 kg/m².    Intake/Output Summary (Last 24 hours) at  9/17/2023 0858  Last data filed at 9/16/2023 1739  Gross per 24 hour   Intake 360 ml   Output --   Net 360 ml      Physical Exam  Vitals and nursing note reviewed.   Constitutional:       Appearance: She is obese.   HENT:      Head: Normocephalic.   Eyes:      Conjunctiva/sclera: Conjunctivae normal.   Cardiovascular:      Rate and Rhythm: Normal rate.   Pulmonary:      Effort: Pulmonary effort is normal.      Breath sounds: Normal breath sounds.   Abdominal:      Palpations: Abdomen is soft.   Musculoskeletal:         General: Normal range of motion.      Cervical back: Normal range of motion.   Skin:     General: Skin is warm and dry.   Neurological:      Mental Status: She is alert and oriented to person, place, and time.   Psychiatric:         Mood and Affect: Mood normal.       Review of Systems: Negative except as indicated in HPI    Significant Labs:  CBC/Anemia Profile:  Recent Labs   Lab 09/16/23 0435 09/17/23 0432   WBC 26.78*  26.78* 24.50*   HGB 12.0  12.0 12.4   HCT 38.9  38.9 39.8     410 457*   *  101* 101*   RDW 17.6*  17.6* 17.2*   Chemistries:  Recent Labs   Lab 09/16/23 0435 09/17/23 0432     140 138   K 4.9  4.9 4.5     103 100   CO2 23  23 28   BUN 18  18 19   CREATININE 0.8  0.8 1.0   CALCIUM 9.7  9.7 9.5   ALBUMIN 3.0*  --    PROT 6.8  --    BILITOT 0.2  --    ALKPHOS 78  --    ALT 24  --    AST 18  --    ABG  Recent Labs   Lab 09/14/23  1351   PH 7.433   PO2 70*   PCO2 42.8   HCO3 28.6*   BE 4     Assessment/Plan:     Pulmonary  * Acute on chronic respiratory failure with hypoxemia  Patient with chronic hypoxic respiratory failure who is on home oxygen at 5L/NC which is her current inpatient requirements. Signs/symptoms of respiratory failure included increased work of breathing, decreased oxygen saturations, and wheezing. Contributing diagnoses includes asthma, interstitial lung disease, and decompensated heart failure. Labs and images were  reviewed. Patient does have a recent ABG which has been reviewed.  Initial PFTs with severe restriction and reduced DLCO; attempted repeat PFTs on 3/16/2022 which she was unable to complete due to worsening shortness of breath.    Will treat underlying causes and adjust management of respiratory failure as follows:  · Patient with restrictive pattern on her PFTs and chronic hypoxic respiratory failure. On chronic immunosuppression including OFEV, Prednisone, and Actemra  · Continue Bactrim ppx upon discharge  · Continue Lasix  · Continue inhaler upon discharge  · Prednisone taper as ordered  · Continue supplemental oxygen and titrate as needed to maintain saturations 90% or greater  · Fluid restriction of 1.5L daily  · CT chest without contrast reviewed without acute worsening / progression of disease  · Follow fever and WBC trends  · Follow chest imaging and ABGs as needed    UIP (usual interstitial pneumonitis)  Hospitalized May 2022 with acute respiratory failure and persistent pneumonia and dishcarged on oxygen. She has since been diagnosed with Sjogren's disease with associated ILD. Initial PFTs revealed severe restriction and reduced DLCO. Initially prescribed steroids and subsequently Cellcept and Rituximab infusions (last infusion in Dec 2022). Now on Ofev 150mg BID, Prednisone 10mg daily, and Actemra IV monthly x4 months.      · Started lung transplant evaluation at [facility]  · Not a current candidate for transplant due to weight  · Prednisone taper as ordered  · Continue nocturnal AVAPS and prn upon discharge  · Continue oral lasix upon discharge  · Followed by rheumatology and pulmonary as outpatient  · Follow-up with Dr. Ashton in [place]  · Repeat CT chest stable  · Plan for Actemra infusion on Wednesday    Other  GLENN on CPAP  Patient reports non-compliance with home PAP therapy in the past. We discussed utilizing and compliance with home PAP therapy. Following discharge from prior  hospitalization in August 2023, the patient was transitioned from CPAP to home AVAPS as she has significant hypoxic respiratory failure at baseline with associated restrictive disease as evidenced by her PFTs. Patient reports decreased use with home AVAPS due to lack of humidification. Patient reports she just received her humidification and reports using AVAPS for a few hours at night.     · Nocturnal AVAPS as ordered       Taylor Mccann NP  Pulmonary and Critical Care  [facility]

## 2023-09-17 NOTE — PLAN OF CARE
[facility] (H[facility])  Discharge Final Note    Primary Care Provider: Madeleine Enrique MD    Expected Discharge Date: 9/17/2023    Final Discharge Note (most recent)       Final Note - 09/17/23 1023          Final Note    Assessment Type Final Discharge Note     Anticipated Discharge Disposition Home or Self Care        Post-Acute Status    Discharge Delays None known at this time                     Important Message from Medicare             Contact Info       Madeleine Enrique MD   Specialty: Family Medicine   Relationship: PCP - General    [address] [facility] [address]   Phone: [phone]       Next Steps: Follow up    Too Ashton MD   Specialty: Pulmonary Disease    [address] [facility] [address]   Phone: [phone]       Next Steps: Follow up    Instructions: keep scheduled appointment    Agustín Aviles MD   Specialty: Critical Care Medicine, Pulmonary Disease   Relationship: Consulting Physician    [address]  [place] [address]   Phone: [phone]       Next Steps: Follow up on 10/24/2023    Instructions: @ 11:20 am    [facility]   Specialty: Pulmonology    [address] [place] [id number]   Phone: [phone]       Next Steps: Follow up on 9/18/2023          Pt has no discharge needs at the time of chart review.

## 2023-09-18 ENCOUNTER — HOSPITAL ENCOUNTER (OUTPATIENT)
Dept: PULMONOLOGY | Facility: HOSPITAL | Age: 53
Discharge: HOME | End: 2023-09-18
Payer: COMMERCIAL

## 2023-09-18 ENCOUNTER — PATIENT MESSAGE (OUTPATIENT)
Dept: PULMONOLOGY | Facility: CLINIC | Age: 53
End: 2023-09-18
Payer: COMMERCIAL

## 2023-09-18 VITALS — BODY MASS INDEX: 35.7 KG/M2 | WEIGHT: 221.19 LBS

## 2023-09-18 PROCEDURE — G0239 OTH RESP PROC, GROUP: HCPCS

## 2023-09-18 NOTE — PROGRESS NOTES
[facility]  Pulmonary Rehab  Session Summary    SUMMARY     Session Data  Session Number: 2  Time In: 1115  Time Out: 1200  Weight: 100.3 kg (221 lb 3.2 oz)  Target Heart Rate Zone: Minimum: 100 bpm  Target Heart Rate Zone: Maximum: 133 bpm  Patient Motivation: Good  Patient Effort: Good      Pre Exercise Vitals  SpO2: 92 %  Supplemental O2?: Yes  O2 Device: nonrebreather mask  O2 Flow (L/min): 15  Pulse: 112  BP: 126/71  Jessica Dyspnea Rating : 3      Post Exercise Vitals  SpO2: (!) 89 %  Supplemental O2?: Yes  O2 Device: nasal cannula  O2 Flow (L/min): 8  Pulse: 114  BP: 132/72  Jessica Dyspnea Rating : 4       Modality  Modality: Hand Free Weights, Nustep      Nustep  Time: 20 minutes  Steps: 1331  Load: 3  Mets: 1.9      Biceps  lbs: 3 lbs  Sets: 2  Reps: 10  Weight Type : dumbbell      Chest  lbs: 3 lbs  Sets: 2  Reps: 10  Weight Type : dumbbell      Education  Medication mgmt      Therapist Notes   Good effort. Today was pt's first exercise day. She was very dypsenic, reporting a 7 in mid exercise. Pt was late and had to take frequent breaks during exercise so only exercised on Nustep and with dumbbells. She required a 100% NRB with exercise. Will continue ot motivate and educate pt in rehab.    Dr. Aviles immediately available as needed.     Pulmonary Rehab COVID19 Screening Checklist    1. Are you having any of the following physical symptoms?   Yes [] No [x]     Fever or chills  Unexplained muscle or body aches  Cough  Shortness of breath or difficulty breathing  Fatigue  Headache  New loss of taste or smell  Sore throat  Congestion or runny nose  Nausea or vomiting  Diarrhea      2.  Have you come in close contact with anyone with the above symptoms or with known COVID19 in the last 14 days? Yes [] No [x]        3.  Have you tested positive for COVID19 in the last 30 days?   Yes [] No [x]

## 2023-09-19 ENCOUNTER — LAB VISIT (OUTPATIENT)
Dept: LAB | Facility: HOSPITAL | Age: 53
End: 2023-09-19
Attending: FAMILY MEDICINE
Payer: COMMERCIAL

## 2023-09-19 ENCOUNTER — PATIENT MESSAGE (OUTPATIENT)
Dept: PULMONOLOGY | Facility: CLINIC | Age: 53
End: 2023-09-19
Payer: COMMERCIAL

## 2023-09-19 ENCOUNTER — INFUSION (OUTPATIENT)
Dept: INFUSION THERAPY | Facility: HOSPITAL | Age: 53
End: 2023-09-19
Attending: INTERNAL MEDICINE
Payer: COMMERCIAL

## 2023-09-19 VITALS
SYSTOLIC BLOOD PRESSURE: 113 MMHG | TEMPERATURE: 98 F | HEART RATE: 86 BPM | BODY MASS INDEX: 36.01 KG/M2 | DIASTOLIC BLOOD PRESSURE: 76 MMHG | OXYGEN SATURATION: 100 % | WEIGHT: 223.13 LBS | RESPIRATION RATE: 16 BRPM

## 2023-09-19 DIAGNOSIS — M05.9 SEROPOSITIVE RHEUMATOID ARTHRITIS: Primary | ICD-10-CM

## 2023-09-19 DIAGNOSIS — J84.9 INTERSTITIAL LUNG DISEASE: ICD-10-CM

## 2023-09-19 DIAGNOSIS — M35.09 SJOGREN'S SYNDROME WITH OTHER ORGAN INVOLVEMENT: ICD-10-CM

## 2023-09-19 DIAGNOSIS — M05.9 RHEUMATOID ARTHRITIS WITH POSITIVE RHEUMATOID FACTOR, INVOLVING UNSPECIFIED SITE: ICD-10-CM

## 2023-09-19 LAB
1,3 BETA GLUCAN SER-MCNC: <31 PG/ML
ALBUMIN SERPL BCP-MCNC: 3 G/DL (ref 3.5–5.2)
ALP SERPL-CCNC: 77 U/L (ref 55–135)
ALT SERPL W/O P-5'-P-CCNC: 23 U/L (ref 10–44)
ANION GAP SERPL CALC-SCNC: 11 MMOL/L (ref 8–16)
AST SERPL-CCNC: 15 U/L (ref 10–40)
BACTERIA BLD CULT: NORMAL
BACTERIA BLD CULT: NORMAL
BASOPHILS # BLD AUTO: 0.06 K/UL (ref 0–0.2)
BASOPHILS NFR BLD: 0.3 % (ref 0–1.9)
BILIRUB SERPL-MCNC: 0.4 MG/DL (ref 0.1–1)
BUN SERPL-MCNC: 14 MG/DL (ref 6–20)
CALCIUM SERPL-MCNC: 9.2 MG/DL (ref 8.7–10.5)
CHLORIDE SERPL-SCNC: 97 MMOL/L (ref 95–110)
CO2 SERPL-SCNC: 29 MMOL/L (ref 23–29)
CREAT SERPL-MCNC: 0.8 MG/DL (ref 0.5–1.4)
DIFFERENTIAL METHOD: ABNORMAL
EOSINOPHIL # BLD AUTO: 0.1 K/UL (ref 0–0.5)
EOSINOPHIL NFR BLD: 0.6 % (ref 0–8)
ERYTHROCYTE [DISTWIDTH] IN BLOOD BY AUTOMATED COUNT: 17.5 % (ref 11.5–14.5)
EST. GFR  (NO RACE VARIABLE): >60 ML/MIN/1.73 M^2
FUNGITELL COMMENTS: NEGATIVE
GLUCOSE SERPL-MCNC: 125 MG/DL (ref 70–110)
HCT VFR BLD AUTO: 39.5 % (ref 37–48.5)
HGB BLD-MCNC: 12.2 G/DL (ref 12–16)
IMM GRANULOCYTES # BLD AUTO: 0.42 K/UL (ref 0–0.04)
IMM GRANULOCYTES NFR BLD AUTO: 2.2 % (ref 0–0.5)
LYMPHOCYTES # BLD AUTO: 0.8 K/UL (ref 1–4.8)
LYMPHOCYTES NFR BLD: 4 % (ref 18–48)
MCH RBC QN AUTO: 30.6 PG (ref 27–31)
MCHC RBC AUTO-ENTMCNC: 30.9 G/DL (ref 32–36)
MCV RBC AUTO: 99 FL (ref 82–98)
MONOCYTES # BLD AUTO: 1.2 K/UL (ref 0.3–1)
MONOCYTES NFR BLD: 6.4 % (ref 4–15)
NEUTROPHILS # BLD AUTO: 16.5 K/UL (ref 1.8–7.7)
NEUTROPHILS NFR BLD: 86.5 % (ref 38–73)
NRBC BLD-RTO: 0 /100 WBC
PLATELET # BLD AUTO: 367 K/UL (ref 150–450)
PMV BLD AUTO: 10.5 FL (ref 9.2–12.9)
POTASSIUM SERPL-SCNC: 3.7 MMOL/L (ref 3.5–5.1)
PROT SERPL-MCNC: 6.5 G/DL (ref 6–8.4)
RBC # BLD AUTO: 3.99 M/UL (ref 4–5.4)
SODIUM SERPL-SCNC: 137 MMOL/L (ref 136–145)
WBC # BLD AUTO: 19.03 K/UL (ref 3.9–12.7)

## 2023-09-19 PROCEDURE — 63600175 PHARM REV CODE 636 W HCPCS: Mod: JZ,JG | Performed by: INTERNAL MEDICINE

## 2023-09-19 PROCEDURE — 96365 THER/PROPH/DIAG IV INF INIT: CPT

## 2023-09-19 PROCEDURE — 36415 COLL VENOUS BLD VENIPUNCTURE: CPT | Performed by: INTERNAL MEDICINE

## 2023-09-19 PROCEDURE — 80053 COMPREHEN METABOLIC PANEL: CPT | Performed by: INTERNAL MEDICINE

## 2023-09-19 PROCEDURE — 25000003 PHARM REV CODE 250: Performed by: INTERNAL MEDICINE

## 2023-09-19 PROCEDURE — 96375 TX/PRO/DX INJ NEW DRUG ADDON: CPT

## 2023-09-19 PROCEDURE — 85025 COMPLETE CBC W/AUTO DIFF WBC: CPT | Performed by: INTERNAL MEDICINE

## 2023-09-19 RX ORDER — ACETAMINOPHEN 325 MG/1
650 TABLET ORAL
Status: COMPLETED | OUTPATIENT
Start: 2023-09-19 | End: 2023-09-19

## 2023-09-19 RX ORDER — DIPHENHYDRAMINE HYDROCHLORIDE 50 MG/ML
25 INJECTION INTRAMUSCULAR; INTRAVENOUS
Status: CANCELLED
Start: 2023-09-26

## 2023-09-19 RX ORDER — SODIUM CHLORIDE 0.9 % (FLUSH) 0.9 %
10 SYRINGE (ML) INJECTION
Status: DISCONTINUED | OUTPATIENT
Start: 2023-09-19 | End: 2023-09-19 | Stop reason: HOSPADM

## 2023-09-19 RX ORDER — DIPHENHYDRAMINE HYDROCHLORIDE 50 MG/ML
25 INJECTION INTRAMUSCULAR; INTRAVENOUS
Status: COMPLETED | OUTPATIENT
Start: 2023-09-19 | End: 2023-09-19

## 2023-09-19 RX ORDER — HEPARIN 100 UNIT/ML
500 SYRINGE INTRAVENOUS
Status: CANCELLED | OUTPATIENT
Start: 2023-09-26

## 2023-09-19 RX ORDER — SODIUM CHLORIDE 0.9 % (FLUSH) 0.9 %
10 SYRINGE (ML) INJECTION
Status: CANCELLED | OUTPATIENT
Start: 2023-09-26

## 2023-09-19 RX ORDER — ACETAMINOPHEN 325 MG/1
650 TABLET ORAL
Status: CANCELLED | OUTPATIENT
Start: 2023-09-26

## 2023-09-19 RX ADMIN — ACETAMINOPHEN 650 MG: 325 TABLET ORAL at 01:09

## 2023-09-19 RX ADMIN — METHYLPREDNISOLONE SODIUM SUCCINATE 40 MG: 40 INJECTION, POWDER, FOR SOLUTION INTRAMUSCULAR; INTRAVENOUS at 01:09

## 2023-09-19 RX ADMIN — TOCILIZUMAB 404 MG: 20 INJECTION, SOLUTION, CONCENTRATE INTRAVENOUS at 01:09

## 2023-09-19 RX ADMIN — DIPHENHYDRAMINE HYDROCHLORIDE 25 MG: 50 INJECTION INTRAMUSCULAR; INTRAVENOUS at 01:09

## 2023-09-19 NOTE — NURSING
Infusion # 4 - Actemra 4 mg/kg q 4 weeks  Last dose- 8/22/23    Any:  -recent illness, infection, or antibiotic use in past week- denies  -open wounds or mouth sores- denies  -invasive procedures or surgeries in past 4 weeks or in upcoming 4 weeks- denies  -vaccinations in past week- denies  -any new symptoms/change in symptoms-denies  -chance you may be pregnant- n/a      Recent labs? 9/19/23  Last Rheumatology provider visit- Seen by Dr. Lewis on 7/6/23     Premeds-650 Tylenol PO, 40 Solumedrol IVP over 3 minutes, and 25 mg Benadryl IVP over 3 minutes  **pt to only get Tylenol and Benadryl as premeds next infusion. Dr. Lewis would like to keep Solumedrol PRN if she has no reactions.        Actemra 404 mg administered IV at a 60 minute rate per orders; see MAR and vitals for more details. Tolerated well without adverse events, discharged and ambulatory out of clinic.

## 2023-09-19 NOTE — PLAN OF CARE
Plan of care reviewed with patient. Discussed if there are any new or ongoing concerns. Denies.  Problem: Adult Inpatient Plan of Care  Goal: Plan of Care Review  Outcome: Ongoing, Progressing  Flowsheets (Taken 9/19/2023 1453)  Plan of Care Reviewed With: patient  Goal: Patient-Specific Goal (Individualized)  Outcome: Ongoing, Progressing  Goal: Absence of Hospital-Acquired Illness or Injury  Outcome: Ongoing, Progressing  Intervention: Identify and Manage Fall Risk  Flowsheets (Taken 9/19/2023 1453)  Safety Promotion/Fall Prevention: in recliner, wheels locked  Goal: Optimal Comfort and Wellbeing  Outcome: Ongoing, Progressing  Intervention: Provide Person-Centered Care  Flowsheets (Taken 9/19/2023 1453)  Trust Relationship/Rapport:   care explained   reassurance provided   choices provided   thoughts/feelings acknowledged   emotional support provided   empathic listening provided   questions answered   questions encouraged  Goal: Readiness for Transition of Care  Outcome: Ongoing, Progressing

## 2023-09-20 ENCOUNTER — HOSPITAL ENCOUNTER (OUTPATIENT)
Dept: PULMONOLOGY | Facility: HOSPITAL | Age: 53
Discharge: HOME | End: 2023-09-20
Payer: COMMERCIAL

## 2023-09-20 VITALS — WEIGHT: 224 LBS | BODY MASS INDEX: 36.15 KG/M2

## 2023-09-20 PROCEDURE — G0239 OTH RESP PROC, GROUP: HCPCS

## 2023-09-20 NOTE — PROGRESS NOTES
[facility]  Pulmonary Rehab  Session Summary    SUMMARY     Session Data  Session Number: 3  Time In: 1100  Time Out: 1200  Weight: 101.6 kg (224 lb)  Target Heart Rate Zone: Minimum: 100 bpm  Target Heart Rate Zone: Maximum: 133 bpm  Patient Motivation: Good  Patient Effort: Good      Pre Exercise Vitals  SpO2: (!) 72 %  Supplemental O2?: Yes  O2 Device: nasal cannula  O2 Flow (L/min): 8  Pulse: 83  BP: 132/80  Jessica Dyspnea Rating : 2      During Exercise Vitals  SpO2: 93 %  Supplemental O2?: Yes  O2 Device: nonrebreather mask  O2 Flow (L/min): 15  Pulse: 100  BP: 111/71  Jessica Dyspnea Rating : 4      Post Exercise Vitals  SpO2: 94 %  Supplemental O2?: Yes  O2 Device: nonrebreather mask  O2 Flow (L/min): 15  Pulse: 91  BP: 135/75  Jessica Dyspnea Rating : 4       Modality  Modality: Arm Ergometer, Hand Free Weights, Nustep      Arm Ergometer  Time: 12 minutes  Level: 1  Mets: 1.7  Distance: 0.91 Miles      Nustep  Time: 20 minutes  Steps: 1502  Load: 3  Mets: 2       Biceps  lbs: 3 lbs  Sets: 2  Reps: 10  Weight Type : dumbbell      Chest  lbs: 3 lbs  Sets: 2  Reps: 10  Weight Type : dumbbell    Education  Avoiding allergens and irritants    Therapist Notes     Good effort. Pt's O2 sat when arriving at rehab was 72% on NC 8lpm. She was changed to 100% NRB to use with exertion/exercise. She felt better today, looked and oxygenated better. Took frequent breaks during session. Will continue ot motivate and educate pt in rehab.     Dr. Keenan immediately available as needed.     Pulmonary Rehab COVID19 Screening Checklist     1. Are you having any of the following physical symptoms?              Yes [] No [x]      Fever or chills  Unexplained muscle or body aches  Cough  Shortness of breath or difficulty breathing  Fatigue  Headache  New loss of taste or smell  Sore throat  Congestion or runny nose  Nausea or vomiting  Diarrhea        2.  Have you come in close contact with anyone with the above symptoms or  with known COVID19 in the last 14 days?           Yes [] No [x]                    3.  Have you tested positive for COVID19 in the last 30 days?   Yes [] No [x]

## 2023-09-21 ENCOUNTER — PATIENT MESSAGE (OUTPATIENT)
Dept: PULMONOLOGY | Facility: CLINIC | Age: 53
End: 2023-09-21
Payer: COMMERCIAL

## 2023-09-21 ENCOUNTER — PATIENT MESSAGE (OUTPATIENT)
Dept: SLEEP MEDICINE | Facility: CLINIC | Age: 53
End: 2023-09-21

## 2023-09-21 ENCOUNTER — OFFICE VISIT (OUTPATIENT)
Dept: SLEEP MEDICINE | Facility: CLINIC | Age: 53
End: 2023-09-21
Payer: COMMERCIAL

## 2023-09-21 VITALS
BODY MASS INDEX: 36.15 KG/M2 | SYSTOLIC BLOOD PRESSURE: 130 MMHG | HEIGHT: 66 IN | RESPIRATION RATE: 18 BRPM | DIASTOLIC BLOOD PRESSURE: 72 MMHG | HEART RATE: 97 BPM | OXYGEN SATURATION: 97 %

## 2023-09-21 DIAGNOSIS — J84.9 INTERSTITIAL PULMONARY DISEASE, UNSPECIFIED: ICD-10-CM

## 2023-09-21 DIAGNOSIS — J98.4 RESTRICTIVE LUNG DISEASE: ICD-10-CM

## 2023-09-21 DIAGNOSIS — J84.112 UIP (USUAL INTERSTITIAL PNEUMONITIS): Primary | ICD-10-CM

## 2023-09-21 DIAGNOSIS — J84.9 INTERSTITIAL LUNG DISEASE: ICD-10-CM

## 2023-09-21 LAB
ANNOTATION COMMENT IMP: NORMAL
P JIROVECII DNA L RESP QL NAA+NON-PROBE: NEGATIVE
PNEUMOCYSTIS REPORT STATUS: NORMAL
SPECIMEN SOURCE: NORMAL

## 2023-09-21 PROCEDURE — 1159F MED LIST DOCD IN RCRD: CPT | Mod: CPTII,95,, | Performed by: INTERNAL MEDICINE

## 2023-09-21 PROCEDURE — 3075F PR MOST RECENT SYSTOLIC BLOOD PRESS GE 130-139MM HG: ICD-10-PCS | Mod: CPTII,95,, | Performed by: INTERNAL MEDICINE

## 2023-09-21 PROCEDURE — 99214 PR OFFICE/OUTPT VISIT, EST, LEVL IV, 30-39 MIN: ICD-10-PCS | Mod: 95,,, | Performed by: INTERNAL MEDICINE

## 2023-09-21 PROCEDURE — 3078F PR MOST RECENT DIASTOLIC BLOOD PRESSURE < 80 MM HG: ICD-10-PCS | Mod: CPTII,95,, | Performed by: INTERNAL MEDICINE

## 2023-09-21 PROCEDURE — 3044F PR MOST RECENT HEMOGLOBIN A1C LEVEL <7.0%: ICD-10-PCS | Mod: CPTII,95,, | Performed by: INTERNAL MEDICINE

## 2023-09-21 PROCEDURE — 3075F SYST BP GE 130 - 139MM HG: CPT | Mod: CPTII,95,, | Performed by: INTERNAL MEDICINE

## 2023-09-21 PROCEDURE — 1160F PR REVIEW ALL MEDS BY PRESCRIBER/CLIN PHARMACIST DOCUMENTED: ICD-10-PCS | Mod: CPTII,95,, | Performed by: INTERNAL MEDICINE

## 2023-09-21 PROCEDURE — 99214 OFFICE O/P EST MOD 30 MIN: CPT | Mod: 95,,, | Performed by: INTERNAL MEDICINE

## 2023-09-21 PROCEDURE — 3008F BODY MASS INDEX DOCD: CPT | Mod: CPTII,95,, | Performed by: INTERNAL MEDICINE

## 2023-09-21 PROCEDURE — 1160F RVW MEDS BY RX/DR IN RCRD: CPT | Mod: CPTII,95,, | Performed by: INTERNAL MEDICINE

## 2023-09-21 PROCEDURE — 3078F DIAST BP <80 MM HG: CPT | Mod: CPTII,95,, | Performed by: INTERNAL MEDICINE

## 2023-09-21 PROCEDURE — 3044F HG A1C LEVEL LT 7.0%: CPT | Mod: CPTII,95,, | Performed by: INTERNAL MEDICINE

## 2023-09-21 PROCEDURE — 3008F PR BODY MASS INDEX (BMI) DOCUMENTED: ICD-10-PCS | Mod: CPTII,95,, | Performed by: INTERNAL MEDICINE

## 2023-09-21 PROCEDURE — 1159F PR MEDICATION LIST DOCUMENTED IN MEDICAL RECORD: ICD-10-PCS | Mod: CPTII,95,, | Performed by: INTERNAL MEDICINE

## 2023-09-21 RX ORDER — PREDNISONE 10 MG/1
TABLET ORAL
Qty: 126 TABLET | Refills: 0 | Status: ON HOLD | OUTPATIENT
Start: 2023-09-21 | End: 2023-10-17 | Stop reason: HOSPADM

## 2023-09-21 RX ORDER — SODIUM, POTASSIUM,MAG SULFATES 17.5-3.13G
SOLUTION, RECONSTITUTED, ORAL ORAL
COMMUNITY
Start: 2023-09-21 | End: 2023-10-11

## 2023-09-21 RX ORDER — FUROSEMIDE 40 MG/1
40 TABLET ORAL DAILY
Qty: 30 TABLET | Refills: 0 | Status: ON HOLD | OUTPATIENT
Start: 2023-09-21 | End: 2023-12-07 | Stop reason: SDUPTHER

## 2023-09-21 NOTE — PATIENT INSTRUCTIONS
Heart healthy diet  Limit fluid intake 50-60 oz   Daily weights and to notify clinic if weight increases by more than 3 lbs in 1 day or 5 lbs in 1 week.

## 2023-09-21 NOTE — PROGRESS NOTES
The patient location is: [facility]  The chief complaint leading to consultation is: Followup    Visit type: audiovisual    Face to Face time with patient: 45 min  90 minutes of total time spent on the encounter, which includes face to face time and non-face to face time preparing to see the patient (eg, review of tests), Obtaining and/or reviewing separately obtained history, Documenting clinical information in the electronic or other health record, Independently interpreting results (not separately reported) and communicating results to the patient/family/caregiver, or Care coordination (not separately reported).         Each patient to whom he or she provides medical services by telemedicine is:  (1) informed of the relationship between the physician and patient and the respective role of any other health care provider with respect to management of the patient; and (2) notified that he or she may decline to receive medical services by telemedicine and may withdraw from such care at any time.    Notes:                                                  Pulmonary Outpatient  Visit     Subjective:       Patient ID: Ayanna Alicia is a 53 y.o. female.    Chief Complaint: Chronic respiratory failure with hypoxia        Ayanna Alicia is 51 y.o.  Post hospital f/u  Acute respiratory failure ILD, +ve RF  Was discharged on oxygen  Here to review results  Explained  PFT: severe restriction and reduced DLCO  ABG  6MWD: oxygen desat needs supplementary oxygen 3 LPM  Has some nose bleed will add AYR gel and humifier bottle  FMLA paper work given  Out of work letter   Added PEPCID and Bactrim  Adherent with inhaler    05/18/2022  Here to see today  Concern, Dyspnea with activity palpitations  Seen Rheumatology: Added CELLCEPT  On steriod taper  No cough wheezing  On oxygen 3 LPM  Had GLENN in 2019: was prescribed CPAP returned back  Needs PAP at night  Motivated to restart      07/15/2022  Last seen 05/18/2022  ACT  score 10, Howe score 6  CPAP study: CPAP ordered  Await   Says cannot go to work, tied, focus off  6MWD: RA sat was 95%  O2 titrated to 4-6 LPM  Tachycardia noted : 145 BPM    09/20/2022  Last seen 07/15/2022  Here to review progress  Enrolled in pul rehab, session 8  Sat Stable @ 2-3 LPM  ACt score 5, Howe 6  No cough, better exercise tolerance  Stable on cellcept 1000mg BID + prednisone 10 mg  Will DW Rheum whether can titrate up cellcept to wean steriods  CPAP download shows adherence   CPAP 9 cm with resudual AHI 0.2  Usage > 4 hrs was 67%  Cehst CT reviewed  Repeat PFT pending      12/28/2022  Last seen 11/07/2022  Here to review chest CT done recently  Fatigue, SOB, on 6 LPM  SP rituxan 4 doses  ABG reveiwed  ESR and CRP were increased  On Prednisone 10 mg  Hoarse voice  Discussed utility of bronchoscopy if infection is considered  TBBX would have risk of flaring and acute resp failure:  Serologies sent  Added OFEV  Will also reduce fluid intake to approx 32 oz  Will reenrol on Pul rehab      03/07/2023  Urgent visit: weak, easily desat  Subjectively better  Seen Dr Stallings: Diflucan and bactrim  Oral thrush: not present today  No fever  On 6 LPM  Dropped weight from 241Lb to 228 lb  Ofev increased: felt better onofev  Will decrease prednisone to 10 mg since concern for PJP  Discussed referal to advance lung disease  Sputum culture  In wheel chair  Adherent with CPAP  Schedule bronch for BAL next week    05/01/2023  Follow up visit  Care from multiple specialities noted  ENT referred to Dr Koenig, 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  Bronch specimens were negative for any infection, No PCP or fungus  Advanced lung clinic declined w/u for transplant due to BMI  DW patient: reenrol in pul rehab since feels better and options for out of state transplant: [place] may be considered  GI : GERD, and esophagram reveiwed: reiterated role on GERD in advanced lung disease: behavioural  interventions since last 6 weeks helped  Stable on POC 6 LPM  Disucssed options for scooter  Will increase OFEV to 150 BID  Keep prednisone at 10 mg daily ( no need for bactrim prophylaxis at thiss dose)  ACT 10  Changed BREO to TRELEGY  [place] next visit  Cough improved  Refill for tessalon  Encouaged to wear CPAP      08/07/2023  Followup  Recent hospitalization  CPAP changed to AVAPS  Appt for Transplant eval 08/14  Here with nephew  Pollard 10. Asthma score 11  Still HICKS, mRC score 2-3  Attempted 6MWD: low capacity  Office notes: Dr Ashton: possibility Sertraline ILD: case reports reviewed  In abundance : medication stopped  Stable OFEV 150 mg BID  Still has dysphonia  GERD has improved      09/21/2023  Follow :  was in the emergency room over the weekend  Seen ENT [facility] office notes reviewed.  Has home noninvasive ventilation  Tired SOB  In pul rehab; had 2   On 6-8 LPM  During. rehab escalated to 15 liters/minute  Needs more than 7 tanks a week  Will ask for par to be increased to 15 LPM  Tolerating OFEV  Colonoscopy scheduled oct 16th, reflux procedure was supposed to be today: Rescheduled  Lasix was increased to 40 mg.    Recent echocardiogram diastolic dysfunction grade 2  Recent infusion:tocilizumab   SpO2: 93 %  Supplemental O2?: Yes  O2 Device: nonrebreather mask  O2 Flow (L/min): 15  Pulse: 100  BP: 111/71  Jessica Dyspnea Rating : 4         [facility] - Telemetry ([facility])  [facility] Medicine  Discharge Summary        Patient Name: Ayanna Alicia  MRN: [id number]  DARLENE: [id number]  Patient Class: OP- Observation  Admission Date: 9/14/2023  Hospital Length of Stay: 0 days  Discharge Date and Time: 9/17/2023  1:30 PM  Attending Physician: Chantel Escobar MD  Discharging Provider: Eileen Us NP  Primary Care Provider: Madeleine Enrique MD     Primary Care Team: [facility] MEDICINE B     HPI:   Ms. Alicia is a 52-year-old female with a past medical history of Sjogren's syndrome, ILD, RA, chronic hypoxic  "respiratory failure at 6-8L/NC, asthma, GLENN (Cpap),  who presented to the ED with worsening dyspnea over the last day, associated wtih decreased o2 sats in the 80's with ambulation.  She reports she has been "dealing with a cough" for the last month.  She was seen by an allergist and put on amoxicillin with some improvement.  Cough is productive with tanish colored sputum.  She denies any fever/chills.  In the ED,  patient tachypenic and tachycardia, o2 92-94 % on 7 liters.  Chest xray essentially unchanged from previous and showed diffuse fibrotic stranding and scarring bilaterally.  Patient will be admitted to observation for acute on chronic hypoxic resp failure.       Of note, patient is followed by DR. Aviles in pulmonary clinic, on Ofev and daily prednisone.  She has had hospitalization August this year for the same.  She is currently going through work-up for lung transplant.  She is to start pulmonary rehab with [facility] on Monday.       Code Status full            * No surgery found *       Hospital Course:   The patient is a 51 yo female with chronic hypoxemic respiratory failure- on 6 liters NC home oxygen (has home Trilogy), Sjogrens, ILD and GLENN who was admitted for ILD/COPD exacerbation and decompensated diastolic CHF on Oxygen, IV Steroids, IV Lasix and Neb txs. She was initially admitted on 8L HFNC.  Pulmonology evaluated pt and recommended CT chest which showed stable ILD. Pt has chronic leukocytosis, likely related to steroids. Procalcitonin normal. Afebrile. Pulmonology started on Lasix IV, with improved SOB. Will increase dose on discharge to daily instead of every other day.  She is weaned to home requirements, 6 liters NFNC. Blood cultures show NGTD. Sputum culture NGTD. No MRSA with nasal culture.   Echo EF 60-65%, grade II diastolic dysfunction, mild MVR, TVR.     Will discharge on a steroid taper per pulmonology.   Will increase her home Lasix to 40 mg daily for now.   Cardiac diet with 1.5 " L fluid restriction.      Follow up with pulmonology after discharge.   Follow up with cardiology as scheduled.   Follow up with PCP in 3-5 days.      Patient seen and examined on the day of discharge.  All questions and concerns were addressed prior to discharge.     Face to face encounter with patient: 35 minutes           Goals of Care Treatment Preferences:  Code Status: Full Code           MMRC Dyspnea Scale (4 is worst)     [] MMRC 0: Dyspneic on strenuous excercise (0 points)    [] MMRC 1: Dyspneic on walking a slight hill (0 points)    [x] MMRC 2: Dyspneic on walking level ground; must stop occasionally due to breathlessness (1 point)    [] MMRC 3: Must stop for breathlessness after walking 100 yards or after a few minutes (2 points)    [] MMRC 4: Cannot leave house; breathless on dressing/undressing (3 points)          EPWORTH SLEEPINESS SCALE 5/1/2023   Sitting and reading 0   Watching TV 1   Sitting, inactive in a public place (e.g. a theatre or a meeting) 0   As a passenger in a car for an hour without a break 0   Lying down to rest in the afternoon when circumstances permit 1   Sitting and talking to someone 0   Sitting quietly after a lunch without alcohol 0   In a car, while stopped for a few minutes in traffic 0   Total score 2         [facility] - Telemetry ([facility])  Hospital Medicine  Discharge Summary        Patient Name: Ayanna Alicia  MRN: [id number]  DARLENE: [id number]  Patient Class: IP- Inpatient  Admission Date: 7/31/2023  Hospital Length of Stay: 4 days  Discharge Date and Time: 8/5/2023  Attending Physician: Rosa Heart,*   Discharging Provider: Rosa Heart MD  Primary Care Provider: Madeleine Enrique MD     Primary Care Team: Networked reference to record PCT      HPI:   [id number]  has a past medical history of Abnormal Pap smear of cervix, Acute interstitial pneumonitis, Allergic rhinitis, cause unspecified, Arthritis of both knees, Asthma, Eczema, Fatty liver,  Fibrocystic breast changes, Headache(784.0), Hepatomegaly, Hypertension, Liver cyst, Multinodular goiter, Polymenorrhea, TMJ (dislocation of temporomandibular joint), Uterine fibroid, and Vitamin D deficiency disease.  Presents for worsening dyspnea. Associated with fever, sneezing, rhinorrhea, chest tightness, and wheezing. Onset Friday after being without supplemental oxygen for 15 minutes with difficulties recovering, which prompted a visit to clinic/urgent care. At baseline, patient wears 6L supplemental oxygen. Reports taking prednisone for a long time. States compliance to ofev for ild.     Initial workup in emergency department revealed increased supplemental oxygen of 8L, tachycardia and tachypnea. Leukocytosis on cbc. Cmp with hyperglycemia and mildly elevated liver enzymes. Troponin(s) elevated. Bnp within normal limits. Abg metabolic and respiratory alkalosis. Chest x-ray with bibasilar infiltrates. Covid and flu negative. Electrocardiography with sinus tachycardia.     Hospital medicine consulted for admission under observation, pneumonia vs asthma exacerbation. Pulmonology consulted        * No surgery found *       Hospital Course:   8/1 admitted for acute on chronic respiratory failure. Weaned down to 5L. Pulmonology following. Continue current care.   8/2 no acute events overnight. remains on baseline supplemental oxygen. Pulmonology recommending trilogy for home use. Case management consulted. Physical/occupational therapy consulted. Patient will have homehealth physical/occupational therapy on discharge.   8/3 reports dyspnea with exertion with hypoxia requiring increased supplemental oxygen needs to recover. Patient reports compliance with cpap overnight. Pulmonology recommending avaps for home use. Case management consulted for trilogy. Continue current regimen. Speech consulted for dysphagia and dysphonia, recommending follow up ent for laryngeal ulcers.  8/4- stated improvement in symptoms in  regards of dyspnea compared to yesterday; currently saturating about 92 on room air,; blood culture x1 as of 7/31/2 for fusobacterium, likely contaminant, will follow up on repeat cultures from today, if cultures resulted negative, likely plan for discharge accordingly in next 24- 48 hrs;    worked on arrangements for trilogy; pulmonology on board, appreciate recommendations.  PT OT recommended home.  8/5   Examination done at bedside, patient appeared alert and oriented x3, denied headache, dizziness, chest pain, shortness O breath, palpitations, bowel or bladder issues.    Currently saturating above 92 on 5 L nasal cannula, currently at baseline.    Stated significant improvement in cough with minimal sputum production.    Discussed with pulmonology, stated okay for discharge-recommended oral antibiotics to complete course, Lasix, prednisone 40 mg upon discharge, compliance with outpatient follow-up visits  Considering clinical and hemodynamic stability, planning to discharge patient today, emphasized on compliance with medications, follow-up visits, diet, fluid restriction, salt restriction, compliance with trilogy.    Patient agreed to the plan, has upcoming appointment within a week with Dr. Aviles pulmonology, with transplant doctor at [place] on 08/14;  Given underlying medical conditions, prognosis guarded- discussed on code status, opted for full code at this point; to consider outpatient palliative visits if needed.     PT OT recommended home.  Speech consulted for dysphagia and dysphonia, recommending follow up ent for laryngeal ulcers.  Discharge today, medications delivered to patient preferred pharmacy.              The following portions of the patient's history were reviewed and updated as appropriate:   She  has a past medical history of Abnormal Pap smear of cervix, Acute interstitial pneumonitis, Allergic rhinitis, cause unspecified, Arthritis of both knees, Asthma, Eczema, Fatty  liver (10/2014), Fibrocystic breast changes, Headache(784.0), Hepatomegaly (10/2014), Hypertension, Liver cyst (10/2014), Multinodular goiter, Polymenorrhea (), TMJ (dislocation of temporomandibular joint), Uterine fibroid, and Vitamin D deficiency disease.  She does not have any pertinent problems on file.  She  has a past surgical history that includes  section, classic; Multiple tooth extractions; Partial hysterectomy (2013); Hysterectomy; Colonoscopy (N/A, 2020); and Bronchoscopy (Bilateral, 2023).  Her family history includes Cancer (age of onset: 50) in her maternal aunt; Cancer (age of onset: 60) in her maternal grandmother; Cancer (age of onset: 66) in her maternal aunt; Cataracts in her cousin; Diabetes in her maternal grandfather; Diverticulitis in her mother; Heart disease in her mother; Heart disease (age of onset: 63) in her father; Hypertension in her father; Migraines in her cousin; No Known Problems in her brother; Peripheral vascular disease in her maternal grandfather; Stroke in her maternal grandfather, mother, and sister.  She  reports that she has never smoked. She has been exposed to tobacco smoke. She has never used smokeless tobacco. She reports that she does not currently use alcohol. She reports that she does not currently use drugs after having used the following drugs: Marijuana. Frequency: 4.00 times per week.  She has a current medication list which includes the following prescription(s): albuterol, ascorbic acid (vitamin c), benzonatate, hydrocortisone, ibuprofen, ipratropium, levocetirizine, loperamide, mv-minerals/fa/omega 3,6,9 #3, nintedanib, omega-3s/dha/epa/fish oil/d3, omeprazole, pantoprazole, salmeterol, moderna covid bival(6m up)(pf), sodium,potassium,mag sulfates, sulfamethoxazole-trimethoprim 800-160mg, vitamin d, furosemide, and prednisone.  Current Outpatient Medications on File Prior to Visit   Medication Sig Dispense Refill    albuterol  (PROVENTIL/VENTOLIN HFA) 90 mcg/actuation inhaler INHALE 1 TO 2 PUFFS BY MOUTH INTO THE LUNGS EVERY 4 TO 6 HOURS AS NEEDED FOR WHEEZING OR SHORTNESS OF BREATH 8.5 g 5    ascorbic acid, vitamin C, (VITAMIN C) 500 MG tablet Take 500 mg by mouth once daily.      benzonatate (TESSALON) 100 MG capsule Take 1 capsule (100 mg total) by mouth 3 (three) times daily as needed for Cough. 20 capsule 0    hydrocortisone 2.5 % ointment Apply topically 2 (two) times daily. 28.35 g 11    ibuprofen (ADVIL,MOTRIN) 200 MG tablet Take 200 mg by mouth every 6 (six) hours as needed for Pain.      ipratropium (ATROVENT) 0.02 % nebulizer solution Take 2.5 mLs (500 mcg total) by nebulization 4 (four) times daily. Rescue 300 mL 11    levocetirizine (XYZAL) 5 MG tablet TAKE 1 TABLET(5 MG) BY MOUTH EVERY DAY 90 tablet 1    loperamide (IMODIUM) 2 mg capsule TAKE 1 CAPSULE (2 MG TOTAL) BY MOUTH DAILY AS NEEDED FOR DIARRHEA. 90 capsule 0    mv-minerals/FA/omega 3,6,9 #3 (WOMEN'S 50+ ADVANCED ORAL) Take 1 tablet by mouth Daily.      nintedanib (OFEV) 150 mg Cap Take 1 capsule (150 mg total) by mouth 2 (two) times a day. 60 capsule 11    omega-3s/dha/epa/fish oil/D3 (VITAMIN-D + OMEGA-3 ORAL) Take 2 tablets by mouth once daily at 6am.      omeprazole (PRILOSEC) 20 MG capsule Take 1 capsule (20 mg total) by mouth once daily. 30 capsule 1    pantoprazole (PROTONIX) 40 MG tablet Take 1 tablet (40 mg total) by mouth once daily. 30 tablet 11    salmeteroL (SEREVENT) 50 mcg/dose diskus inhaler Inhale 1 puff into the lungs 2 (two) times daily. Controller      sars-cov-2, covid-19 omicron, (MODERNA COVID BIVAL,6M UP,,PF,) 50 mcg/0.5 mL injection Inject into the muscle. 0.5 mL 0    sodium,potassium,mag sulfates (SUPREP BOWEL PREP KIT) 17.5-3.13-1.6 gram SolR Take by mouth.      sulfamethoxazole-trimethoprim 800-160mg (BACTRIM DS) 800-160 mg Tab Take 1 tablet by mouth every Mon, Wed, Fri. 12 tablet 3    vitamin D (VITAMIN D3) 1000 units Tab Take 1,000 Units  by mouth once daily.      [DISCONTINUED] furosemide (LASIX) 40 MG tablet Take 1 tablet (40 mg total) by mouth once daily. 30 tablet 0    [DISCONTINUED] predniSONE (DELTASONE) 10 MG tablet Take 6 tablets (60 mg total) by mouth once daily for 3 days, THEN 4 tablets (40 mg total) once daily for 3 days, THEN 2 tablets (20 mg total) once daily for 3 days, THEN 1 tablet (10 mg total) once daily. 126 tablet 0     No current facility-administered medications on file prior to visit.     She is allergic to doxycycline and fluticasone..      Review of Systems   Constitutional:  Positive for fatigue. Negative for activity change.   Respiratory:  Positive for cough, shortness of breath and dyspnea on extertion.    Gastrointestinal:  Negative for acid reflux.   All other systems reviewed and are negative.      Outpatient Encounter Medications as of 9/21/2023   Medication Sig Dispense Refill    albuterol (PROVENTIL/VENTOLIN HFA) 90 mcg/actuation inhaler INHALE 1 TO 2 PUFFS BY MOUTH INTO THE LUNGS EVERY 4 TO 6 HOURS AS NEEDED FOR WHEEZING OR SHORTNESS OF BREATH 8.5 g 5    ascorbic acid, vitamin C, (VITAMIN C) 500 MG tablet Take 500 mg by mouth once daily.      benzonatate (TESSALON) 100 MG capsule Take 1 capsule (100 mg total) by mouth 3 (three) times daily as needed for Cough. 20 capsule 0    hydrocortisone 2.5 % ointment Apply topically 2 (two) times daily. 28.35 g 11    ibuprofen (ADVIL,MOTRIN) 200 MG tablet Take 200 mg by mouth every 6 (six) hours as needed for Pain.      ipratropium (ATROVENT) 0.02 % nebulizer solution Take 2.5 mLs (500 mcg total) by nebulization 4 (four) times daily. Rescue 300 mL 11    levocetirizine (XYZAL) 5 MG tablet TAKE 1 TABLET(5 MG) BY MOUTH EVERY DAY 90 tablet 1    loperamide (IMODIUM) 2 mg capsule TAKE 1 CAPSULE (2 MG TOTAL) BY MOUTH DAILY AS NEEDED FOR DIARRHEA. 90 capsule 0    mv-minerals/FA/omega 3,6,9 #3 (WOMEN'S 50+ ADVANCED ORAL) Take 1 tablet by mouth Daily.      nintedanib (OFEV) 150 mg Cap  "Take 1 capsule (150 mg total) by mouth 2 (two) times a day. 60 capsule 11    omega-3s/dha/epa/fish oil/D3 (VITAMIN-D + OMEGA-3 ORAL) Take 2 tablets by mouth once daily at 6am.      omeprazole (PRILOSEC) 20 MG capsule Take 1 capsule (20 mg total) by mouth once daily. 30 capsule 1    pantoprazole (PROTONIX) 40 MG tablet Take 1 tablet (40 mg total) by mouth once daily. 30 tablet 11    salmeteroL (SEREVENT) 50 mcg/dose diskus inhaler Inhale 1 puff into the lungs 2 (two) times daily. Controller      sars-cov-2, covid-19 omicron, (MODERNA COVID BIVAL,6M UP,,PF,) 50 mcg/0.5 mL injection Inject into the muscle. 0.5 mL 0    sodium,potassium,mag sulfates (SUPREP BOWEL PREP KIT) 17.5-3.13-1.6 gram SolR Take by mouth.      sulfamethoxazole-trimethoprim 800-160mg (BACTRIM DS) 800-160 mg Tab Take 1 tablet by mouth every Mon, Wed, Fri. 12 tablet 3    vitamin D (VITAMIN D3) 1000 units Tab Take 1,000 Units by mouth once daily.      [DISCONTINUED] furosemide (LASIX) 40 MG tablet Take 1 tablet (40 mg total) by mouth once daily. 30 tablet 0    [DISCONTINUED] predniSONE (DELTASONE) 10 MG tablet Take 6 tablets (60 mg total) by mouth once daily for 3 days, THEN 4 tablets (40 mg total) once daily for 3 days, THEN 2 tablets (20 mg total) once daily for 3 days, THEN 1 tablet (10 mg total) once daily. 126 tablet 0    furosemide (LASIX) 40 MG tablet Take 1 tablet (40 mg total) by mouth once daily. 30 tablet 0    predniSONE (DELTASONE) 10 MG tablet Take 6 tablets (60 mg total) by mouth once daily for 3 days, THEN 4 tablets (40 mg total) once daily for 3 days, THEN 2 tablets (20 mg total) once daily for 3 days, THEN 1 tablet (10 mg total) once daily. 126 tablet 0     No facility-administered encounter medications on file as of 9/21/2023.       Objective:     Vital Signs (Most Recent)  Vital Signs  Pulse: 97  Resp: 18  SpO2: 97 % (8 liters of oxygen)  BP: 130/72  Height and Weight  Height: 5' 6" (167.6 cm)  Weight in (lb) to have BMI = 25: " 154.6]  Wt Readings from Last 2 Encounters:   09/20/23 101.6 kg (224 lb)   09/19/23 101.2 kg (223 lb 1.7 oz)       Physical Exam   Constitutional: She is oriented to person, place, and time. She appears well-developed and well-nourished.   HENT:   Head: Normocephalic.   Mouth/Throat: Mallampati Score: II.   Neck: No JVD present.   Cardiovascular: Normal rate and intact distal pulses.   No murmur heard.  Pulmonary/Chest: Normal expansion and effort normal. She has decreased breath sounds. She has no rhonchi. She has no rales.   Abdominal: Soft. Bowel sounds are normal.   Musculoskeletal:         General: No edema. Normal range of motion.      Cervical back: Normal range of motion and neck supple.   Lymphadenopathy:     She has no axillary adenopathy.   Neurological: She is alert and oriented to person, place, and time.   Skin: Skin is warm and dry. No cyanosis. Nails show no clubbing.   Psychiatric: She has a normal mood and affect.   Nursing note and vitals reviewed.      Laboratory  Lab Results   Component Value Date    WBC 19.03 (H) 09/19/2023    RBC 3.99 (L) 09/19/2023    HGB 12.2 09/19/2023    HCT 39.5 09/19/2023    MCV 99 (H) 09/19/2023    MCH 30.6 09/19/2023    MCHC 30.9 (L) 09/19/2023    RDW 17.5 (H) 09/19/2023     09/19/2023    MPV 10.5 09/19/2023    GRAN 16.5 (H) 09/19/2023    GRAN 86.5 (H) 09/19/2023    LYMPH 0.8 (L) 09/19/2023    LYMPH 4.0 (L) 09/19/2023    MONO 1.2 (H) 09/19/2023    MONO 6.4 09/19/2023    EOS 0.1 09/19/2023    BASO 0.06 09/19/2023    EOSINOPHIL 0.6 09/19/2023    BASOPHIL 0.3 09/19/2023       BMP  Lab Results   Component Value Date     09/19/2023    K 3.7 09/19/2023    CL 97 09/19/2023    CO2 29 09/19/2023    BUN 14 09/19/2023    CREATININE 0.8 09/19/2023    CALCIUM 9.2 09/19/2023    ANIONGAP 11 09/19/2023    ESTGFRAFRICA >60 07/20/2022    EGFRNONAA >60 07/20/2022    AST 15 09/19/2023    ALT 23 09/19/2023    PROT 6.5 09/19/2023       Lab Results   Component Value Date    BNP  "36 09/14/2023    BNP 40 07/31/2023    BNP 12 03/16/2023    BNP <10 04/27/2022    BNP <10 (L) 03/25/2022       Lab Results   Component Value Date    TSH 0.708 07/13/2023       Lab Results   Component Value Date    SEDRATE 50 (H) 05/10/2023       Lab Results   Component Value Date    CRP 54.6 (H) 05/10/2023     Lab Results   Component Value Date     (H) 05/02/2022        Lab Results   Component Value Date    ASPERGILLUS Not Detected 07/31/2023     No results found for: "AFUMIGATUSCL"     Lab Results   Component Value Date    ACE 22 04/28/2022        Diagnostic Results:  I have personally reviewed today the following studies:  Office Spirometry Results:             Echo    Left Ventricle: The left ventricle is normal in size. Normal wall   thickness. There is concentric remodeling. Normal wall motion. There is   normal systolic function with a visually estimated ejection fraction of 60   - 65%. Grade II diastolic dysfunction. Elevated left ventricular filling   pressure.    Right Ventricle: Right ventricle was not well visualized due to poor   acoustic window. Normal right ventricular cavity size. Wall thickness is   normal. Right ventricle wall motion  is normal. Systolic function is   mildly reduced.    Mitral Valve: There is mild regurgitation.    Tricuspid Valve: There is mild to moderate regurgitation.    Pulmonary Artery: The estimated pulmonary artery systolic pressure is   44 mmHg.    IVC/SVC: Intermediate venous pressure at 8 mmHg.    Pericardium: There is a trivial circumferential effusion. No indication   of cardiac tamponade.  CT Chest Without Contrast  Narrative: EXAMINATION:  CT CHEST WITHOUT CONTRAST    CLINICAL HISTORY:  Pneumonia, unresolved;    TECHNIQUE:  Standard noncontrast CT of the chest.  All CT scans at this facility use dose modulation, iterative reconstruction and/or weight based dosing when appropriate to reduce radiation dose to as low as reasonably achievable.    COMPARISON:  CT " "scan chest, 05/10/2023.    FINDINGS:  There are stable chronic interstitial opacities and ground-glass density noted bilaterally.  There is a lower lobe predominance.  There appears to be associated diffuse bronchiectasis.  No pleural effusions.  Multiple small bilateral mediastinal lymph nodes are similar to previous exam and likely reactive.    Heart is mildly enlarged.  No coronary artery calcifications.  No pericardial effusions.    No significant osseous abnormality.  Stable small liver cysts.  Impression: Stable chronic interstitial opacities and ground-glass densities with diffuse bronchiectasis.    Electronically signed by: Terry Monet MD  Date:    09/14/2023  Time:    15:44  X-Ray Chest AP Portable  Narrative: EXAMINATION:  XR CHEST AP PORTABLE    CLINICAL HISTORY:  Sepsis;    TECHNIQUE:  Single frontal view of the chest was performed.    COMPARISON:  08/22/2023    FINDINGS:  Cardiomegaly is stable.  The diffuse pulmonary fibrotic stranding and scarring bilaterally is again present and appears stable.  Thoracic spondylosis is stable.  In comparison to the prior study, there is no adverse interval changes.  Impression: In comparison to the prior study, there is no adverse interval changes    Electronically signed by: Jerry Skelton MD  Date:    09/14/2023  Time:    12:10        No results for input(s): "PH", "PCO2", "PO2", "HCO3", "POCSATURATED", "BE" in the last 72 hours.              Assessment/Plan:     Problem List Items Addressed This Visit       Interstitial pulmonary disease, unspecified    Relevant Medications    furosemide (LASIX) 40 MG tablet    predniSONE (DELTASONE) 10 MG tablet    Restrictive lung disease    Relevant Medications    furosemide (LASIX) 40 MG tablet    predniSONE (DELTASONE) 10 MG tablet    Interstitial lung disease    Relevant Medications    furosemide (LASIX) 40 MG tablet    predniSONE (DELTASONE) 10 MG tablet    UIP (usual interstitial pneumonitis) - Primary    Relevant " Medications    furosemide (LASIX) 40 MG tablet    predniSONE (DELTASONE) 10 MG tablet         We were unable to see Ms. Mack in person due to patient being at a different office location   Physical examination will be completed on Monday   Continue Lasix regimen   Continue Bactrim for PCP prophylaxis   I will contact pharmacy to fill her prednisone prescription which was not picked up today   Contacted DME to increase oxygen par  Home concentrator will be increased from 10 liters/minute to 15 liters/minute                Agustín Aviles MD     Requested Prescriptions     Signed Prescriptions Disp Refills    furosemide (LASIX) 40 MG tablet 30 tablet 0     Sig: Take 1 tablet (40 mg total) by mouth once daily.    predniSONE (DELTASONE) 10 MG tablet 126 tablet 0     Sig: Take 6 tablets (60 mg total) by mouth once daily for 3 days, THEN 4 tablets (40 mg total) once daily for 3 days, THEN 2 tablets (20 mg total) once daily for 3 days, THEN 1 tablet (10 mg total) once daily.            The patient was given open opportunity to ask questions and/or express concerns about treatment plan.   All questions/concerns were discussed.     Follow up in about 4 weeks (around 10/19/2023), or refills, increase O2 par, home oxygen 15 liters/minute discussed with DME, mateo-peter Marshall, for Additional Lasix if weight increases more than 3 lb 2-3 days continuous.    This note was prepared using voice recognition system and is likely to have sound alike errors that may have been overlooked even after proof reading.  Please call me with any questions    Discussed diagnosis, its evaluation, treatment and usual course. All questions answered.    Thank you for the courtesy of participating in the care of this patient    Agsutín Aviles MD      Personal Diagnostic Review  []  CXR    []  ECHO    []  ONSAT    []  6MWD    []  LABS    []  CHEST CT    []  PET CT    []  Biopsy results

## 2023-09-25 ENCOUNTER — HOSPITAL ENCOUNTER (OUTPATIENT)
Dept: PULMONOLOGY | Facility: HOSPITAL | Age: 53
Discharge: HOME | End: 2023-09-25
Payer: COMMERCIAL

## 2023-09-25 VITALS — WEIGHT: 228.38 LBS | BODY MASS INDEX: 36.86 KG/M2

## 2023-09-25 DIAGNOSIS — J45.909 ASTHMA IN ADULT, UNSPECIFIED ASTHMA SEVERITY, UNSPECIFIED WHETHER COMPLICATED, UNSPECIFIED WHETHER PERSISTENT: ICD-10-CM

## 2023-09-25 PROCEDURE — G0239 OTH RESP PROC, GROUP: HCPCS

## 2023-09-25 RX ORDER — ALBUTEROL SULFATE 90 UG/1
AEROSOL, METERED RESPIRATORY (INHALATION)
Qty: 8.5 G | Refills: 5 | Status: SHIPPED | OUTPATIENT
Start: 2023-09-25

## 2023-09-25 NOTE — PROGRESS NOTES
[facility]  Pulmonary Rehab  Session Summary    SUMMARY     Session Data  Session Number: 4  Time In: 1100  Time Out: 1200  Weight: 103.6 kg (228 lb 6.4 oz)  Target Heart Rate Zone: Minimum: 99 bpm  Target Heart Rate Zone: Maximum: 132 bpm  Patient Motivation: Excellent  Patient Effort: Excellent      Pre Exercise Vitals  SpO2: 95 %  Supplemental O2?: Yes  O2 Device: high-flow nasal cannula  O2 Flow (L/min): 8  Pulse: 99  BP: 135/78  Jessica Dyspnea Rating : 5-6      During Exercise Vitals  SpO2: 90 %  Supplemental O2?: Yes  O2 Device: high-flow nasal cannula  O2 Flow (L/min): 8  Pulse: 106  BP: 153/81  Jessica Dyspnea Rating : 5-6      Post Exercise Vitals  SpO2: 90 %  Supplemental O2?: Yes  O2 Device: nonrebreather mask  O2 Flow (L/min): 15  Pulse: 110  BP: 138/80  Jessica Dyspnea Rating : 4       Modality  Modality: Arm Ergometer, Hand Free Weights, Nustep      Arm Ergometer  Time: 5 minutes  Level: 1  Mets: 1.6      Nustep  Time: 20 minutes  Steps: 1510  Load: 3  Mets: 2      Biceps  lbs: 3 lbs  Sets: 2  Reps: 10  Weight Type : dumbbell      Chest  lbs: 3 lbs  Sets: 2  Reps: 10  Weight Type : dumbbell      Education  Breathing techniques and exercises      Therapist Notes   Excellent effort. Pt's O2 sat when arriving at rehab was 94% on high flow NC at 8lpm. She felt better today, looked and oxygenated better. Took frequent breaks during session. Dr. Brandt talked to her during session today. Will continue to motivate and educate pt in rehab.     Dr. Aviles immediately available as needed.     Pulmonary Rehab COVID19 Screening Checklist     1. Are you having any of the following physical symptoms?              Yes [] No [x]      Fever or chills  Unexplained muscle or body aches  Cough  Shortness of breath or difficulty breathing  Fatigue  Headache  New loss of taste or smell  Sore throat  Congestion or runny nose  Nausea or vomiting  Diarrhea        2.  Have you come in close contact with anyone with  the above symptoms or with known COVID19 in the last 14 days?           Yes [] No [x]                    3.  Have you tested positive for COVID19 in the last 30 days?   Yes [] No [x]

## 2023-09-27 ENCOUNTER — PATIENT MESSAGE (OUTPATIENT)
Dept: PULMONOLOGY | Facility: CLINIC | Age: 53
End: 2023-09-27
Payer: COMMERCIAL

## 2023-09-28 ENCOUNTER — HOSPITAL ENCOUNTER (OUTPATIENT)
Dept: PULMONOLOGY | Facility: HOSPITAL | Age: 53
Discharge: HOME | End: 2023-09-28
Payer: COMMERCIAL

## 2023-09-28 ENCOUNTER — PATIENT MESSAGE (OUTPATIENT)
Dept: PULMONOLOGY | Facility: CLINIC | Age: 53
End: 2023-09-28
Payer: COMMERCIAL

## 2023-09-28 VITALS — BODY MASS INDEX: 35.62 KG/M2 | WEIGHT: 220.69 LBS

## 2023-09-28 PROCEDURE — G0239 OTH RESP PROC, GROUP: HCPCS

## 2023-09-28 NOTE — PROGRESS NOTES
[facility]  Pulmonary Rehab  Session Summary    SUMMARY     Session Data  Session Number: 5  Time In: 1315  Time Out: 1415  Weight: 100.1 kg (220 lb 11.2 oz)  Target Heart Rate Zone: Minimum: 99 bpm  Target Heart Rate Zone: Maximum: 132 bpm  Patient Motivation: Excellent  Patient Effort: Excellent      Pre Exercise Vitals  SpO2: 93 %  Supplemental O2?: Yes  O2 Device: high-flow nasal cannula  O2 Flow (L/min): 8  Pulse: 115  BP: 126/76  Jessica Dyspnea Rating : 2      During Exercise Vitals  SpO2: 91 %  Supplemental O2?: Yes  O2 Device: high-flow nasal cannula  O2 Flow (L/min): 8  Pulse: 119  BP: 130/67  Jessica Dyspnea Rating : 3      Post Exercise Vitals  SpO2: 98 %  Supplemental O2?: Yes  O2 Device: high-flow nasal cannula  O2 Flow (L/min): 8  Pulse: 88  BP: 129/65  Jessica Dyspnea Rating : 2       Modality  Modality: Arm Ergometer, Hand Free Weights, Nustep      Arm Ergometer  Time: 10 minutes  Level: 1  Mets: 1.4  Distance: 0.85 Miles    Nustep  Time: 21 minutes  Steps: 1580  Load: 3  Mets: 2      Biceps  lbs: 3 lbs  Sets: 2  Reps: 10  Weight Type : dumbbell      Chest  lbs: 3 lbs  Sets: 2  Reps: 10  Weight Type : dumbbell    Education  Pulm knowledge test review      Therapist Notes   Excellent effort. Pt did well today, stayed on high flow cannula at 8 lpm all session. She was able to exercise 10 mins on arm ergometer today. Vitals were stable. Will continue to motivate and educate pt in rehab.     Dr. Love immediately available as needed.     Pulmonary Rehab COVID19 Screening Checklist     1. Are you having any of the following physical symptoms?              Yes [] No [x]      Fever or chills  Unexplained muscle or body aches  Cough  Shortness of breath or difficulty breathing  Fatigue  Headache  New loss of taste or smell  Sore throat  Congestion or runny nose  Nausea or vomiting  Diarrhea        2.  Have you come in close contact with anyone with the above symptoms or with known COVID19 in the last  14 days?           Yes [] No [x]                    3.  Have you tested positive for COVID19 in the last 30 days?   Yes [] No [x]

## 2023-09-29 DIAGNOSIS — K52.1 DIARRHEA DUE TO DRUG: ICD-10-CM

## 2023-09-29 DIAGNOSIS — J84.9 INTERSTITIAL PULMONARY DISEASE, UNSPECIFIED: ICD-10-CM

## 2023-09-29 DIAGNOSIS — J84.112 UIP (USUAL INTERSTITIAL PNEUMONITIS): ICD-10-CM

## 2023-09-29 RX ORDER — LOPERAMIDE HYDROCHLORIDE 2 MG/1
2 CAPSULE ORAL DAILY PRN
Qty: 90 CAPSULE | Refills: 0 | Status: SHIPPED | OUTPATIENT
Start: 2023-09-29 | End: 2023-10-11

## 2023-10-02 ENCOUNTER — PATIENT MESSAGE (OUTPATIENT)
Dept: PULMONOLOGY | Facility: CLINIC | Age: 53
End: 2023-10-02
Payer: COMMERCIAL

## 2023-10-03 DIAGNOSIS — J84.112 UIP (USUAL INTERSTITIAL PNEUMONITIS): Primary | ICD-10-CM

## 2023-10-03 DIAGNOSIS — J44.1 COPD EXACERBATION: ICD-10-CM

## 2023-10-03 RX ORDER — SALMETEROL XINAFOATE 50 UG/1
1 POWDER, METERED ORAL; RESPIRATORY (INHALATION) 2 TIMES DAILY
Qty: 60 EACH | Refills: 11 | Status: SHIPPED | OUTPATIENT
Start: 2023-10-03

## 2023-10-03 NOTE — PROGRESS NOTES
Spoke with patient  Discussed RHC    Findings:   LM: no disease   LAD: no disease   LCX: no disease   RCA: no disease   LVEDP: 8 mmHG     RA: 1/4/0   RV: 50/0   PA: 50/20/30   PCWP: 12/14/10   PASAT: 71.7%   TDCO/CI: 7.6/3.67   ANKIT/CI: 5.71/2.76     Needs to follow with Dr Uriarte        Requested Prescriptions     Signed Prescriptions Disp Refills    salmeteroL (SEREVENT) 50 mcg/dose diskus inhaler 60 each 11     Sig: Inhale 1 puff into the lungs 2 (two) times daily. Controller

## 2023-10-04 NOTE — PROGRESS NOTES
[facility]  Pulmonary Rehab  30 Day Evaluation and Recertification     SUMMARY             Summary of Progress:   Ms. Ayanna Alicia has been doing excellent in pulmonary rehab and is increasing her exercise tolerance. She will continue to benefit from the program. Pt has been doing well with attendance and engagement in the program. She participates in educational discussions. Pt exercises at home, outside of rehab. She is a possible lung transplant pt and is doing pre-op workup at [facility]. Will continue to motivate and educate pt while striving to increase her strength and endurance in rehab.     Attends:   Patient attends  2 days a week and has completed 5 visits. The patient has missed    1 visits. The patients current compliance is  83% .     Referring Provider:  Dr. Aviles      Start date:  23     Problems this Certification Period:   high O2 requirements during exercise sessions. 100% Non-rebreather. Arm pain on arm ergometer     Exercise Capacity Summary :                      Nustep Date:  23 Time Load Steps Date:  23 Time Load Steps     20 3 1502  21 3 1580   Recumbent Bike Date:   Time Level Date:   Time Level            Treadmill Date:       Time Speed Grade Date:    Time Speed Grade                    Arm Ergometer Date:  23  (0.91 miles)   Time Level Date:  23  (0.88 miles) Time Level     12 1  10 1   Free Weights Date:  23  (Dumb)    Bicep Curls  Chest Press  Triceps  Legs lbs sets reps Date:  23  (Dumb) Bicep Curls  Chest Press  Triceps  Legs lbs Sets Reps      3 2 10   3 3 10                   Education:   Equipment use and safety  Pursed lip breathing  Maximizing energy  Proper heart rate zone  Breathing techniques and exercises  Avoiding allergens and irritants  Pulm knowledge test review  Medication mgmt     Goals:  met        Updated Exercise Prescription:  Endurance Training: Nustep, load 3, 25 mins, 1800 steps  Strength Trainin lb  dumbbells, 2 sets, 10 reps, chest and biceps           I certify that the patient is making progress in pulmonary rehabilitation, is physically able to participate, medically stable and remains motivated.

## 2023-10-05 ENCOUNTER — HOSPITAL ENCOUNTER (OUTPATIENT)
Dept: PULMONOLOGY | Facility: HOSPITAL | Age: 53
Discharge: HOME | End: 2023-10-05
Payer: COMMERCIAL

## 2023-10-05 ENCOUNTER — PATIENT MESSAGE (OUTPATIENT)
Dept: RHEUMATOLOGY | Facility: CLINIC | Age: 53
End: 2023-10-05
Payer: COMMERCIAL

## 2023-10-05 ENCOUNTER — PATIENT MESSAGE (OUTPATIENT)
Dept: PULMONOLOGY | Facility: CLINIC | Age: 53
End: 2023-10-05
Payer: COMMERCIAL

## 2023-10-05 VITALS — WEIGHT: 226 LBS | BODY MASS INDEX: 36.48 KG/M2

## 2023-10-05 DIAGNOSIS — J84.9 INTERSTITIAL PULMONARY DISEASE, UNSPECIFIED: Primary | ICD-10-CM

## 2023-10-05 PROCEDURE — G0239 OTH RESP PROC, GROUP: HCPCS

## 2023-10-05 NOTE — TELEPHONE ENCOUNTER
Orders Placed This Encounter   Procedures    Comprehensive Metabolic Panel     Standing Status:   Standing     Number of Occurrences:   20     Standing Expiration Date:   12/3/2024

## 2023-10-06 ENCOUNTER — LAB VISIT (OUTPATIENT)
Dept: LAB | Facility: HOSPITAL | Age: 53
End: 2023-10-06
Attending: INTERNAL MEDICINE
Payer: COMMERCIAL

## 2023-10-06 ENCOUNTER — OFFICE VISIT (OUTPATIENT)
Dept: OTOLARYNGOLOGY | Facility: CLINIC | Age: 53
End: 2023-10-06
Payer: COMMERCIAL

## 2023-10-06 VITALS — DIASTOLIC BLOOD PRESSURE: 75 MMHG | SYSTOLIC BLOOD PRESSURE: 105 MMHG | HEART RATE: 120 BPM

## 2023-10-06 DIAGNOSIS — B37.89 LARYNGEAL CANDIDIASIS: ICD-10-CM

## 2023-10-06 DIAGNOSIS — J38.7 LARYNGEAL ULCERATION: Primary | ICD-10-CM

## 2023-10-06 DIAGNOSIS — J04.0 LARYNGITIS: ICD-10-CM

## 2023-10-06 DIAGNOSIS — J84.9 INTERSTITIAL PULMONARY DISEASE, UNSPECIFIED: ICD-10-CM

## 2023-10-06 DIAGNOSIS — R49.0 DYSPHONIA: ICD-10-CM

## 2023-10-06 LAB
ALBUMIN SERPL BCP-MCNC: 3.5 G/DL (ref 3.5–5.2)
ALP SERPL-CCNC: 94 U/L (ref 55–135)
ALT SERPL W/O P-5'-P-CCNC: 26 U/L (ref 10–44)
ANION GAP SERPL CALC-SCNC: 8 MMOL/L (ref 8–16)
AST SERPL-CCNC: 24 U/L (ref 10–40)
BILIRUB SERPL-MCNC: 0.3 MG/DL (ref 0.1–1)
BUN SERPL-MCNC: 12 MG/DL (ref 6–20)
CALCIUM SERPL-MCNC: 9.3 MG/DL (ref 8.7–10.5)
CHLORIDE SERPL-SCNC: 105 MMOL/L (ref 95–110)
CO2 SERPL-SCNC: 29 MMOL/L (ref 23–29)
CREAT SERPL-MCNC: 1 MG/DL (ref 0.5–1.4)
EST. GFR  (NO RACE VARIABLE): >60 ML/MIN/1.73 M^2
GLUCOSE SERPL-MCNC: 136 MG/DL (ref 70–110)
POTASSIUM SERPL-SCNC: 4.5 MMOL/L (ref 3.5–5.1)
PROT SERPL-MCNC: 6.3 G/DL (ref 6–8.4)
SODIUM SERPL-SCNC: 142 MMOL/L (ref 136–145)

## 2023-10-06 PROCEDURE — 3074F SYST BP LT 130 MM HG: CPT | Mod: CPTII,S$GLB,, | Performed by: OTOLARYNGOLOGY

## 2023-10-06 PROCEDURE — 87102 FUNGUS ISOLATION CULTURE: CPT | Performed by: OTOLARYNGOLOGY

## 2023-10-06 PROCEDURE — 31579 LARYNGOSCOPY TELESCOPIC: CPT | Mod: S$GLB,,, | Performed by: OTOLARYNGOLOGY

## 2023-10-06 PROCEDURE — 87076 CULTURE ANAEROBE IDENT EACH: CPT | Performed by: OTOLARYNGOLOGY

## 2023-10-06 PROCEDURE — 3078F DIAST BP <80 MM HG: CPT | Mod: CPTII,S$GLB,, | Performed by: OTOLARYNGOLOGY

## 2023-10-06 PROCEDURE — 99999 PR PBB SHADOW E&M-EST. PATIENT-LVL III: CPT | Mod: PBBFAC,,, | Performed by: OTOLARYNGOLOGY

## 2023-10-06 PROCEDURE — 1160F RVW MEDS BY RX/DR IN RCRD: CPT | Mod: CPTII,S$GLB,, | Performed by: OTOLARYNGOLOGY

## 2023-10-06 PROCEDURE — 99999 PR PBB SHADOW E&M-EST. PATIENT-LVL III: ICD-10-PCS | Mod: PBBFAC,,, | Performed by: OTOLARYNGOLOGY

## 2023-10-06 PROCEDURE — 87075 CULTR BACTERIA EXCEPT BLOOD: CPT | Performed by: OTOLARYNGOLOGY

## 2023-10-06 PROCEDURE — 1160F PR REVIEW ALL MEDS BY PRESCRIBER/CLIN PHARMACIST DOCUMENTED: ICD-10-PCS | Mod: CPTII,S$GLB,, | Performed by: OTOLARYNGOLOGY

## 2023-10-06 PROCEDURE — 3044F HG A1C LEVEL LT 7.0%: CPT | Mod: CPTII,S$GLB,, | Performed by: OTOLARYNGOLOGY

## 2023-10-06 PROCEDURE — 99214 OFFICE O/P EST MOD 30 MIN: CPT | Mod: 25,S$GLB,, | Performed by: OTOLARYNGOLOGY

## 2023-10-06 PROCEDURE — 3078F PR MOST RECENT DIASTOLIC BLOOD PRESSURE < 80 MM HG: ICD-10-PCS | Mod: CPTII,S$GLB,, | Performed by: OTOLARYNGOLOGY

## 2023-10-06 PROCEDURE — 3044F PR MOST RECENT HEMOGLOBIN A1C LEVEL <7.0%: ICD-10-PCS | Mod: CPTII,S$GLB,, | Performed by: OTOLARYNGOLOGY

## 2023-10-06 PROCEDURE — 80053 COMPREHEN METABOLIC PANEL: CPT | Performed by: INTERNAL MEDICINE

## 2023-10-06 PROCEDURE — 31579 PR LARYNGOSCOPY, FLEX/RIGID TELESCOPIC, W/STROBOSCOPY: ICD-10-PCS | Mod: S$GLB,,, | Performed by: OTOLARYNGOLOGY

## 2023-10-06 PROCEDURE — 1159F MED LIST DOCD IN RCRD: CPT | Mod: CPTII,S$GLB,, | Performed by: OTOLARYNGOLOGY

## 2023-10-06 PROCEDURE — 36415 COLL VENOUS BLD VENIPUNCTURE: CPT | Performed by: INTERNAL MEDICINE

## 2023-10-06 PROCEDURE — 1159F PR MEDICATION LIST DOCUMENTED IN MEDICAL RECORD: ICD-10-PCS | Mod: CPTII,S$GLB,, | Performed by: OTOLARYNGOLOGY

## 2023-10-06 PROCEDURE — 87106 FUNGI IDENTIFICATION YEAST: CPT | Performed by: OTOLARYNGOLOGY

## 2023-10-06 PROCEDURE — 99214 PR OFFICE/OUTPT VISIT, EST, LEVL IV, 30-39 MIN: ICD-10-PCS | Mod: 25,S$GLB,, | Performed by: OTOLARYNGOLOGY

## 2023-10-06 PROCEDURE — 3074F PR MOST RECENT SYSTOLIC BLOOD PRESSURE < 130 MM HG: ICD-10-PCS | Mod: CPTII,S$GLB,, | Performed by: OTOLARYNGOLOGY

## 2023-10-06 NOTE — Clinical Note
This lady has a bad chronic laryngitis, suspected to be related to her chronic steroid usage in her chronic immune suppressants, in addition to her intubation event this past spring. I got a culture in the office today, but it is possible the culture will be negative She might benefit from protracted treatment with systemic antifungals.

## 2023-10-06 NOTE — PROGRESS NOTES
[facility]  Department of Otorhinolaryngology and Communication Sciences    Ayanna Alicia is a 53 y.o. female who presents to the [facility] for consultation at the kind request of Dr. Jay Sanabria for further management of  laryngitis .     She complains of a hoarse voice since January, associated with a bad cough at the time. She has O2 dependent ILD, suspected to have a rheumatologic basis. She is planning on meeting with the lung transplant team in [place] soon. She is chronically on inhaled steroids and has been on PO steroids for at least a year. She is also on rhematologic disease modifying agents    She has a remote history of smoking MJ but not tobacco.    Dr. Sanabria noted laryngitis changes earlier this year. He treated her with diflucan, which did seem to help a bit, but her treatment was disrupted by a hospitalization in the spring. She was hospitalized with respiratory decompensation. Bronchoscopy was performed at that time on 2023. She has also been empirically treated with PO bactrim.    LFTs normal 2023      Past Medical History  She has a past medical history of Abnormal Pap smear of cervix, Acute interstitial pneumonitis, Allergic rhinitis, cause unspecified, Arthritis of both knees, Asthma, Eczema, Fatty liver, Fibrocystic breast changes, Headache(784.0), Hepatomegaly, Hypertension, Liver cyst, Multinodular goiter, Polymenorrhea, TMJ (dislocation of temporomandibular joint), Uterine fibroid, and Vitamin D deficiency disease.    Past Surgical History  She has a past surgical history that includes  section, classic; Multiple tooth extractions; Partial hysterectomy (2013); Hysterectomy; Colonoscopy (N/A, 2020); and Bronchoscopy (Bilateral, 2023).    Family History  Her family history includes Cancer (age of onset: 50) in her maternal aunt; Cancer (age of onset: 60) in her maternal grandmother; Cancer (age of onset: 66) in her maternal aunt;  Cataracts in her cousin; Diabetes in her maternal grandfather; Diverticulitis in her mother; Heart disease in her mother; Heart disease (age of onset: 63) in her father; Hypertension in her father; Migraines in her cousin; No Known Problems in her brother; Peripheral vascular disease in her maternal grandfather; Stroke in her maternal grandfather, mother, and sister.    Social History  She reports that she has never smoked. She has been exposed to tobacco smoke. She has never used smokeless tobacco. She reports that she does not currently use alcohol. She reports that she does not currently use drugs after having used the following drugs: Marijuana. Frequency: 4.00 times per week.    Allergies  She is allergic to doxycycline and fluticasone.    Medications  She has a current medication list which includes the following prescription(s): albuterol, ascorbic acid (vitamin c), furosemide, hydrocortisone, ibuprofen, ipratropium, levocetirizine, loperamide, mv-minerals/fa/omega 3,6,9 #3, nintedanib, omega-3s/dha/epa/fish oil/d3, omeprazole, pantoprazole, prednisone, moderna covid bival(6m up)(pf), serevent diskus, sodium,potassium,mag sulfates, sulfamethoxazole-trimethoprim 800-160mg, and vitamin d.    Review of Systems   Constitutional:  Negative for fever.   HENT:  Negative for sore throat.    Eyes:  Negative for visual disturbance.   Respiratory:  Negative for wheezing.    Cardiovascular:  Negative for chest pain.   Gastrointestinal:  Negative for nausea.   Musculoskeletal:  Negative for arthralgias.   Skin:  Negative for rash.   Neurological:  Negative for tremors.   Hematological:  Does not bruise/bleed easily.   Psychiatric/Behavioral:  The patient is not nervous/anxious.           Objective:     VS reviewed     Physical Exam    Constitutional: comfortable, well dressed  Psychiatric: appropriate affect  Respiratory: comfortably breathing, symmetric chest rise, no stridor  Voice: severe dense aphonia  Cardiovascular:  upper extremities non-edematous  Lymphatic: no cervical lymphadenopathy  Neurologic: alert and oriented to time, place, person, and situation; cranial nerves 3-12 grossly intact  Head: normocephalic  Eyes: conjunctivae and sclerae clear  Ears: normal pinnae, normal external auditory canals, tympanic membranes intact  Nose: mucosa pink and noncongested, no masses, no mucopurulence, no polyps  Oral cavity / oropharynx: no mucosal lesions  Neck: soft, full range of motion, laryngotracheal complex palpable with appropriate landmarks, larynx elevates on swallowing  Indirect laryngoscopy: limited due to gag    Procedure  Rigid Laryngeal Videostroboscopy (83459): Laryngeal videostroboscopy is indicated to assess the laryngeal vibratory biomechanics and vocal fold oscillation, which cannot be assessed with a plain light examination. This was carried out with a 70 degree endoscope. After verbal consent was obtained, the patient was positioned and the tongue was gently secured with a gauze sponge. The endoscope was passed transorally and positioned to image the larynx and hypopharynx in detail. The following features were examined: laryngeal and hypopharyngeal masses; vocal fold range and symmetry of motion; laryngeal mucosal edema, erythema, inflammation, and hydration; salivary pooling; and gross laryngeal sensation. During phonation, the vocal folds were assessed for glottal closure; mucosal wave; vocal fold lesions; vibratory periodicity, amplitude, and phase symmetry; and vertical height match. The equipment was removed. The patient tolerated the procedure well without complication. All findings were normal except:  - bilateral true vocal folds with diffuse inflammation with fibrinous exudate, sulcus deformity along middle 3rd of left true vocal fold  - severe impairment mucosal wave propagation with mild phonatory insufficiency    Under topical laryngeal anesthesia, a culture was obtained of the endolarynx.  This was  sent off for aerobic and fungal cultures.    Assessment:     Ayanna Alicia is a 53 y.o. female with chronic laryngitis, suspected to be related to chronic steroid usage and an intubation event in spring.  At this point, differential diagnosis is infectious/suspected fungal versus idiopathic ulcerative laryngitis, with negative impacts from ET tube related trauma.     Plan:        I had a discussion with the patient regarding her condition and the further workup and management options.      I counseled her regarding the complexity of her condition and the low likelihood of an easy resolution of the problem, especially in light of ongoing dependence on systemic and inhaled steroids as well as immunosuppressants.    I will contact her with results of the culture.    Should it prove to be negative, she might still benefit from empiric protracted treatment with an antifungal agent, under the direction of her medical specialists and/or potentially Infectious Diseases colleague.    All questions were answered, and the patient is in agreement with the above.     Jose A Kelly M.D.  [facility]  Department of Otorhinolaryngology and Communication Sciences

## 2023-10-09 DIAGNOSIS — B37.0 ORAL THRUSH: Primary | ICD-10-CM

## 2023-10-09 RX ORDER — NYSTATIN 100000 [USP'U]/ML
4 SUSPENSION ORAL
Qty: 240 ML | Refills: 1 | Status: SHIPPED | OUTPATIENT
Start: 2023-10-09 | End: 2023-10-11

## 2023-10-09 NOTE — PROGRESS NOTES
Spoke with patient   Patient missed pulmonary rehab because she was not feeling well  Her weight has trended up to 225 lb, 227, 227   Advised patient to take an extra fluid pill Lasix  Labs were recently reviewed potassium was 4.7.    In view of the right heart catheterization results awaiting recommendations from [facility] left message for Dr. ROBLERO  Need to verify whether they want us to initiate therapy here or there will initiate therapy at [facility]  Notes from ENT reviewed   Notes from Dr. Llamas reviewed      Requested Prescriptions     Signed Prescriptions Disp Refills    nystatin (MYCOSTATIN) 100,000 unit/mL suspension 240 mL 1     Sig: Take 4 mLs (400,000 Units total) by mouth 4 (four) times daily with meals and nightly. Swish and spit

## 2023-10-10 ENCOUNTER — PATIENT MESSAGE (OUTPATIENT)
Dept: PULMONOLOGY | Facility: CLINIC | Age: 53
End: 2023-10-10
Payer: COMMERCIAL

## 2023-10-10 LAB
BACTERIA SPEC ANAEROBE CULT: ABNORMAL
BACTERIA SPEC ANAEROBE CULT: ABNORMAL

## 2023-10-11 ENCOUNTER — HOSPITAL ENCOUNTER (INPATIENT)
Facility: HOSPITAL | Age: 53
LOS: 6 days | Discharge: HOME HEALTH | DRG: 871 | End: 2023-10-17
Attending: EMERGENCY MEDICINE | Admitting: EMERGENCY MEDICINE
Payer: COMMERCIAL

## 2023-10-11 ENCOUNTER — PATIENT MESSAGE (OUTPATIENT)
Dept: PULMONOLOGY | Facility: CLINIC | Age: 53
End: 2023-10-11
Payer: COMMERCIAL

## 2023-10-11 ENCOUNTER — TELEPHONE (OUTPATIENT)
Dept: PULMONOLOGY | Facility: CLINIC | Age: 53
End: 2023-10-11
Payer: COMMERCIAL

## 2023-10-11 DIAGNOSIS — J84.9 PULMONARY HYPERTENSION DUE TO INTERSTITIAL LUNG DISEASE: ICD-10-CM

## 2023-10-11 DIAGNOSIS — J84.112 UIP (USUAL INTERSTITIAL PNEUMONITIS): Primary | ICD-10-CM

## 2023-10-11 DIAGNOSIS — A42.9 ACTINOMYCES INFECTION: ICD-10-CM

## 2023-10-11 DIAGNOSIS — I27.23 PULMONARY HYPERTENSION DUE TO INTERSTITIAL LUNG DISEASE: ICD-10-CM

## 2023-10-11 DIAGNOSIS — J96.21 ACUTE ON CHRONIC RESPIRATORY FAILURE WITH HYPOXEMIA: ICD-10-CM

## 2023-10-11 DIAGNOSIS — R06.02 SOB (SHORTNESS OF BREATH): ICD-10-CM

## 2023-10-11 DIAGNOSIS — M05.7A RHEUMATOID ARTHRITIS OF OTHER SITE WITH POSITIVE RHEUMATOID FACTOR: ICD-10-CM

## 2023-10-11 DIAGNOSIS — J98.4 RESTRICTIVE LUNG DISEASE: ICD-10-CM

## 2023-10-11 DIAGNOSIS — J96.21 ACUTE ON CHRONIC RESPIRATORY FAILURE WITH HYPOXIA: Primary | ICD-10-CM

## 2023-10-11 DIAGNOSIS — J84.9 INTERSTITIAL LUNG DISEASE: ICD-10-CM

## 2023-10-11 DIAGNOSIS — G47.33 OSA ON CPAP: ICD-10-CM

## 2023-10-11 PROBLEM — R65.20 SEVERE SEPSIS: Status: ACTIVE | Noted: 2023-10-11

## 2023-10-11 PROBLEM — A41.9 SEVERE SEPSIS: Status: ACTIVE | Noted: 2023-10-11

## 2023-10-11 LAB
ALBUMIN SERPL BCP-MCNC: 3.4 G/DL (ref 3.5–5.2)
ALLENS TEST: ABNORMAL
ALP SERPL-CCNC: 95 U/L (ref 55–135)
ALT SERPL W/O P-5'-P-CCNC: 27 U/L (ref 10–44)
ANION GAP SERPL CALC-SCNC: 15 MMOL/L (ref 8–16)
AST SERPL-CCNC: 25 U/L (ref 10–40)
BASOPHILS # BLD AUTO: 0.06 K/UL (ref 0–0.2)
BASOPHILS NFR BLD: 0.5 % (ref 0–1.9)
BILIRUB SERPL-MCNC: 0.4 MG/DL (ref 0.1–1)
BILIRUB UR QL STRIP: NEGATIVE
BNP SERPL-MCNC: <10 PG/ML (ref 0–99)
BUN SERPL-MCNC: 9 MG/DL (ref 6–20)
CALCIUM SERPL-MCNC: 9.7 MG/DL (ref 8.7–10.5)
CHLORIDE SERPL-SCNC: 99 MMOL/L (ref 95–110)
CK SERPL-CCNC: 257 U/L (ref 20–180)
CLARITY UR: CLEAR
CO2 SERPL-SCNC: 27 MMOL/L (ref 23–29)
COLOR UR: COLORLESS
CREAT SERPL-MCNC: 0.9 MG/DL (ref 0.5–1.4)
DELSYS: ABNORMAL
DIFFERENTIAL METHOD: ABNORMAL
EOSINOPHIL # BLD AUTO: 0.5 K/UL (ref 0–0.5)
EOSINOPHIL NFR BLD: 3.5 % (ref 0–8)
EP: 6
ERYTHROCYTE [DISTWIDTH] IN BLOOD BY AUTOMATED COUNT: 17.2 % (ref 11.5–14.5)
ERYTHROCYTE [SEDIMENTATION RATE] IN BLOOD BY WESTERGREN METHOD: 24 MM/H
EST. GFR  (NO RACE VARIABLE): >60 ML/MIN/1.73 M^2
FIO2: 100
GLUCOSE SERPL-MCNC: 167 MG/DL (ref 70–110)
GLUCOSE UR QL STRIP: NEGATIVE
HCO3 UR-SCNC: 32.8 MMOL/L (ref 24–28)
HCT VFR BLD AUTO: 43.1 % (ref 37–48.5)
HGB BLD-MCNC: 13.4 G/DL (ref 12–16)
HGB UR QL STRIP: NEGATIVE
IMM GRANULOCYTES # BLD AUTO: 0.08 K/UL (ref 0–0.04)
IMM GRANULOCYTES NFR BLD AUTO: 0.6 % (ref 0–0.5)
INFLUENZA A, MOLECULAR: NEGATIVE
INFLUENZA B, MOLECULAR: NEGATIVE
IP: 12
KETONES UR QL STRIP: NEGATIVE
LACTATE SERPL-SCNC: 1.5 MMOL/L (ref 0.5–2.2)
LACTATE SERPL-SCNC: 1.8 MMOL/L (ref 0.5–2.2)
LEUKOCYTE ESTERASE UR QL STRIP: NEGATIVE
LYMPHOCYTES # BLD AUTO: 0.9 K/UL (ref 1–4.8)
LYMPHOCYTES NFR BLD: 6.8 % (ref 18–48)
MCH RBC QN AUTO: 30.9 PG (ref 27–31)
MCHC RBC AUTO-ENTMCNC: 31.1 G/DL (ref 32–36)
MCV RBC AUTO: 100 FL (ref 82–98)
MODE: ABNORMAL
MONOCYTES # BLD AUTO: 0.6 K/UL (ref 0.3–1)
MONOCYTES NFR BLD: 4.7 % (ref 4–15)
NEUTROPHILS # BLD AUTO: 11.2 K/UL (ref 1.8–7.7)
NEUTROPHILS NFR BLD: 83.9 % (ref 38–73)
NITRITE UR QL STRIP: NEGATIVE
NRBC BLD-RTO: 0 /100 WBC
PCO2 BLDA: 49 MMHG (ref 35–45)
PH SMN: 7.43 [PH] (ref 7.35–7.45)
PH UR STRIP: 7 [PH] (ref 5–8)
PLATELET # BLD AUTO: 227 K/UL (ref 150–450)
PMV BLD AUTO: 9.8 FL (ref 9.2–12.9)
PO2 BLDA: 459 MMHG (ref 80–100)
POC BE: 9 MMOL/L
POC SATURATED O2: 100 % (ref 95–100)
POCT GLUCOSE: 156 MG/DL (ref 70–110)
POCT GLUCOSE: 184 MG/DL (ref 70–110)
POCT GLUCOSE: 205 MG/DL (ref 70–110)
POTASSIUM SERPL-SCNC: 4.3 MMOL/L (ref 3.5–5.1)
PROT SERPL-MCNC: 7.2 G/DL (ref 6–8.4)
PROT UR QL STRIP: NEGATIVE
RBC # BLD AUTO: 4.33 M/UL (ref 4–5.4)
SAMPLE: ABNORMAL
SARS-COV-2 RDRP RESP QL NAA+PROBE: NEGATIVE
SITE: ABNORMAL
SODIUM SERPL-SCNC: 141 MMOL/L (ref 136–145)
SP GR UR STRIP: 1.01 (ref 1–1.03)
SPECIMEN SOURCE: NORMAL
TROPONIN I SERPL DL<=0.01 NG/ML-MCNC: 0.02 NG/ML (ref 0–0.03)
TROPONIN I SERPL DL<=0.01 NG/ML-MCNC: 0.04 NG/ML (ref 0–0.03)
URN SPEC COLLECT METH UR: ABNORMAL
UROBILINOGEN UR STRIP-ACNC: NEGATIVE EU/DL
WBC # BLD AUTO: 13.29 K/UL (ref 3.9–12.7)

## 2023-10-11 PROCEDURE — 87070 CULTURE OTHR SPECIMN AEROBIC: CPT | Performed by: NURSE PRACTITIONER

## 2023-10-11 PROCEDURE — 94640 AIRWAY INHALATION TREATMENT: CPT

## 2023-10-11 PROCEDURE — 84484 ASSAY OF TROPONIN QUANT: CPT | Mod: 91 | Performed by: NURSE PRACTITIONER

## 2023-10-11 PROCEDURE — 87502 INFLUENZA DNA AMP PROBE: CPT | Performed by: EMERGENCY MEDICINE

## 2023-10-11 PROCEDURE — 82550 ASSAY OF CK (CPK): CPT | Performed by: EMERGENCY MEDICINE

## 2023-10-11 PROCEDURE — 25000003 PHARM REV CODE 250: Performed by: NURSE PRACTITIONER

## 2023-10-11 PROCEDURE — 11000001 HC ACUTE MED/SURG PRIVATE ROOM

## 2023-10-11 PROCEDURE — 87205 SMEAR GRAM STAIN: CPT | Performed by: NURSE PRACTITIONER

## 2023-10-11 PROCEDURE — 93010 EKG 12-LEAD: ICD-10-PCS | Mod: ,,, | Performed by: INTERNAL MEDICINE

## 2023-10-11 PROCEDURE — 27000190 HC CPAP FULL FACE MASK W/VALVE

## 2023-10-11 PROCEDURE — 63600175 PHARM REV CODE 636 W HCPCS: Performed by: NURSE PRACTITIONER

## 2023-10-11 PROCEDURE — 94761 N-INVAS EAR/PLS OXIMETRY MLT: CPT

## 2023-10-11 PROCEDURE — 27100171 HC OXYGEN HIGH FLOW UP TO 24 HOURS

## 2023-10-11 PROCEDURE — 80053 COMPREHEN METABOLIC PANEL: CPT | Performed by: EMERGENCY MEDICINE

## 2023-10-11 PROCEDURE — 96365 THER/PROPH/DIAG IV INF INIT: CPT

## 2023-10-11 PROCEDURE — 63600175 PHARM REV CODE 636 W HCPCS: Performed by: EMERGENCY MEDICINE

## 2023-10-11 PROCEDURE — 99900035 HC TECH TIME PER 15 MIN (STAT)

## 2023-10-11 PROCEDURE — 82803 BLOOD GASES ANY COMBINATION: CPT

## 2023-10-11 PROCEDURE — 84484 ASSAY OF TROPONIN QUANT: CPT | Performed by: EMERGENCY MEDICINE

## 2023-10-11 PROCEDURE — 25000242 PHARM REV CODE 250 ALT 637 W/ HCPCS: Performed by: EMERGENCY MEDICINE

## 2023-10-11 PROCEDURE — 85025 COMPLETE CBC W/AUTO DIFF WBC: CPT | Performed by: EMERGENCY MEDICINE

## 2023-10-11 PROCEDURE — 25000242 PHARM REV CODE 250 ALT 637 W/ HCPCS: Performed by: NURSE PRACTITIONER

## 2023-10-11 PROCEDURE — 94799 UNLISTED PULMONARY SVC/PX: CPT

## 2023-10-11 PROCEDURE — 36600 WITHDRAWAL OF ARTERIAL BLOOD: CPT

## 2023-10-11 PROCEDURE — 27000249 HC VAPOTHERM CIRCUIT

## 2023-10-11 PROCEDURE — 36415 COLL VENOUS BLD VENIPUNCTURE: CPT | Performed by: NURSE PRACTITIONER

## 2023-10-11 PROCEDURE — U0002 COVID-19 LAB TEST NON-CDC: HCPCS | Performed by: EMERGENCY MEDICINE

## 2023-10-11 PROCEDURE — 93005 ELECTROCARDIOGRAM TRACING: CPT

## 2023-10-11 PROCEDURE — 87040 BLOOD CULTURE FOR BACTERIA: CPT | Mod: 59 | Performed by: EMERGENCY MEDICINE

## 2023-10-11 PROCEDURE — 82962 GLUCOSE BLOOD TEST: CPT

## 2023-10-11 PROCEDURE — 83605 ASSAY OF LACTIC ACID: CPT | Performed by: NURSE PRACTITIONER

## 2023-10-11 PROCEDURE — 94660 CPAP INITIATION&MGMT: CPT

## 2023-10-11 PROCEDURE — 93010 ELECTROCARDIOGRAM REPORT: CPT | Mod: ,,, | Performed by: INTERNAL MEDICINE

## 2023-10-11 PROCEDURE — 81003 URINALYSIS AUTO W/O SCOPE: CPT | Performed by: NURSE PRACTITIONER

## 2023-10-11 PROCEDURE — 99291 CRITICAL CARE FIRST HOUR: CPT

## 2023-10-11 PROCEDURE — 96375 TX/PRO/DX INJ NEW DRUG ADDON: CPT

## 2023-10-11 PROCEDURE — 83880 ASSAY OF NATRIURETIC PEPTIDE: CPT | Performed by: EMERGENCY MEDICINE

## 2023-10-11 RX ORDER — GUAIFENESIN/DEXTROMETHORPHAN 100-10MG/5
10 SYRUP ORAL EVERY 4 HOURS PRN
Status: DISCONTINUED | OUTPATIENT
Start: 2023-10-12 | End: 2023-10-17 | Stop reason: HOSPADM

## 2023-10-11 RX ORDER — ARFORMOTEROL TARTRATE 15 UG/2ML
15 SOLUTION RESPIRATORY (INHALATION) 2 TIMES DAILY
Status: DISCONTINUED | OUTPATIENT
Start: 2023-10-11 | End: 2023-10-17 | Stop reason: HOSPADM

## 2023-10-11 RX ORDER — IPRATROPIUM BROMIDE AND ALBUTEROL SULFATE 2.5; .5 MG/3ML; MG/3ML
3 SOLUTION RESPIRATORY (INHALATION) EVERY 4 HOURS PRN
Status: DISCONTINUED | OUTPATIENT
Start: 2023-10-12 | End: 2023-10-17 | Stop reason: HOSPADM

## 2023-10-11 RX ORDER — MONTELUKAST SODIUM 10 MG/1
10 TABLET ORAL NIGHTLY
Status: DISCONTINUED | OUTPATIENT
Start: 2023-10-11 | End: 2023-10-17 | Stop reason: HOSPADM

## 2023-10-11 RX ORDER — PREDNISONE 10 MG/1
10 TABLET ORAL DAILY
Status: DISCONTINUED | OUTPATIENT
Start: 2023-10-12 | End: 2023-10-17 | Stop reason: HOSPADM

## 2023-10-11 RX ORDER — ENOXAPARIN SODIUM 100 MG/ML
40 INJECTION SUBCUTANEOUS EVERY 24 HOURS
Status: DISCONTINUED | OUTPATIENT
Start: 2023-10-11 | End: 2023-10-17 | Stop reason: HOSPADM

## 2023-10-11 RX ORDER — IPRATROPIUM BROMIDE AND ALBUTEROL SULFATE 2.5; .5 MG/3ML; MG/3ML
3 SOLUTION RESPIRATORY (INHALATION)
Status: COMPLETED | OUTPATIENT
Start: 2023-10-11 | End: 2023-10-11

## 2023-10-11 RX ORDER — FAMOTIDINE 20 MG/1
20 TABLET, FILM COATED ORAL 2 TIMES DAILY
Status: DISCONTINUED | OUTPATIENT
Start: 2023-10-11 | End: 2023-10-17 | Stop reason: HOSPADM

## 2023-10-11 RX ORDER — ENOXAPARIN SODIUM 100 MG/ML
40 INJECTION SUBCUTANEOUS EVERY 12 HOURS
Status: DISCONTINUED | OUTPATIENT
Start: 2023-10-11 | End: 2023-10-11 | Stop reason: DRUGHIGH

## 2023-10-11 RX ORDER — LEVOFLOXACIN 5 MG/ML
750 INJECTION, SOLUTION INTRAVENOUS
Status: COMPLETED | OUTPATIENT
Start: 2023-10-11 | End: 2023-10-11

## 2023-10-11 RX ORDER — FUROSEMIDE 10 MG/ML
40 INJECTION INTRAMUSCULAR; INTRAVENOUS ONCE
Status: COMPLETED | OUTPATIENT
Start: 2023-10-11 | End: 2023-10-11

## 2023-10-11 RX ORDER — LINEZOLID 600 MG/1
600 TABLET, FILM COATED ORAL EVERY 12 HOURS
Status: DISCONTINUED | OUTPATIENT
Start: 2023-10-11 | End: 2023-10-13

## 2023-10-11 RX ORDER — METHYLPREDNISOLONE SOD SUCC 125 MG
125 VIAL (EA) INJECTION
Status: COMPLETED | OUTPATIENT
Start: 2023-10-11 | End: 2023-10-11

## 2023-10-11 RX ORDER — BUDESONIDE 0.5 MG/2ML
0.5 INHALANT ORAL EVERY 12 HOURS
Status: DISCONTINUED | OUTPATIENT
Start: 2023-10-11 | End: 2023-10-12

## 2023-10-11 RX ORDER — TREPROSTINIL 16-32-48
16 KIT INHALATION 4 TIMES DAILY
Qty: 252 EACH | Refills: 0 | Status: ON HOLD
Start: 2023-10-11 | End: 2023-11-14

## 2023-10-11 RX ADMIN — ENOXAPARIN SODIUM 40 MG: 40 INJECTION SUBCUTANEOUS at 05:10

## 2023-10-11 RX ADMIN — METHYLPREDNISOLONE SODIUM SUCCINATE 125 MG: 125 INJECTION, POWDER, FOR SOLUTION INTRAMUSCULAR; INTRAVENOUS at 11:10

## 2023-10-11 RX ADMIN — LINEZOLID 600 MG: 600 TABLET, FILM COATED ORAL at 08:10

## 2023-10-11 RX ADMIN — LINEZOLID 600 MG: 600 TABLET, FILM COATED ORAL at 03:10

## 2023-10-11 RX ADMIN — ARFORMOTEROL TARTRATE 15 MCG: 15 SOLUTION RESPIRATORY (INHALATION) at 07:10

## 2023-10-11 RX ADMIN — LEVOFLOXACIN 750 MG: 750 INJECTION, SOLUTION INTRAVENOUS at 01:10

## 2023-10-11 RX ADMIN — PIPERACILLIN AND TAZOBACTAM 4.5 G: 4; .5 INJECTION, POWDER, LYOPHILIZED, FOR SOLUTION INTRAVENOUS; PARENTERAL at 03:10

## 2023-10-11 RX ADMIN — FAMOTIDINE 20 MG: 20 TABLET ORAL at 10:10

## 2023-10-11 RX ADMIN — BUDESONIDE INHALATION 0.5 MG: 0.5 SUSPENSION RESPIRATORY (INHALATION) at 07:10

## 2023-10-11 RX ADMIN — MONTELUKAST 10 MG: 10 TABLET, FILM COATED ORAL at 08:10

## 2023-10-11 RX ADMIN — IPRATROPIUM BROMIDE AND ALBUTEROL SULFATE 3 ML: 2.5; .5 SOLUTION RESPIRATORY (INHALATION) at 11:10

## 2023-10-11 RX ADMIN — PIPERACILLIN AND TAZOBACTAM 4.5 G: 4; .5 INJECTION, POWDER, LYOPHILIZED, FOR SOLUTION INTRAVENOUS; PARENTERAL at 10:10

## 2023-10-11 RX ADMIN — FUROSEMIDE 40 MG: 10 INJECTION, SOLUTION INTRAMUSCULAR; INTRAVENOUS at 01:10

## 2023-10-11 NOTE — TELEPHONE ENCOUNTER
Spoke with Bindu Skinner: RN, BSN.  [phone].    Narrative  Performed by [employer]  Findings:   LM: no disease   LAD: no disease   LCX: no disease   RCA: no disease   LVEDP: 8 mmHG     RA: 1/4/0   RV: 50/0   PA: 50/20/30   PCWP: 12/14/10   PASAT: 71.7%   TDCO/CI: 7.6/3.67   ANKIT/CI: 5.71/2.76       ECHO    Left Ventricle: The left ventricle is normal in size. Normal wall thickness. There is concentric remodeling. Normal wall motion. There is normal systolic function with a visually estimated ejection fraction of 60 - 65%. Grade II diastolic dysfunction. Elevated left ventricular filling pressure.    Right Ventricle: Right ventricle was not well visualized due to poor acoustic window. Normal right ventricular cavity size. Wall thickness is normal. Right ventricle wall motion  is normal. Systolic function is mildly reduced.    Mitral Valve: There is mild regurgitation.    Tricuspid Valve: There is mild to moderate regurgitation.    Pulmonary Artery: The estimated pulmonary artery systolic pressure is 44 mmHg.    IVC/SVC: Intermediate venous pressure at 8 mmHg.    Pericardium: There is a trivial circumferential effusion. No indication of cardiac tamponade.    RHC results were revewied  Since Ms Alicia was not ready for board presentation was okay with us starting PAH therapy in [facility]  Ms Alicia is currently in hospital:   Informed patient will be starting Tyvaso once discharged     Enrollment forms  filled    Will coordinate with Nurse visit for medication adminstration and tapering       Problem List Items Addressed This Visit       Rheumatoid arthritis with positive rheumatoid factor    Restrictive lung disease    GLENN on CPAP     On TRILOGY         UIP (usual interstitial pneumonitis) - Primary    Pulmonary hypertension due to interstitial lung disease     Narrative  Performed by [employer]  Findings:   LM: no disease   LAD: no disease   LCX: no disease   RCA: no disease   LVEDP: 8 mmHG     RA: 1/4/0   RV:  50/0   PA: 50/20/30   PCWP: 12/14/10   PASAT: 71.7%   TDCO/CI: 7.6/3.67   ANKIT/CI: 5.71/2.76       Will start TYVASCO

## 2023-10-11 NOTE — SUBJECTIVE & OBJECTIVE
Past Medical History:   Diagnosis Date    Abnormal Pap smear of cervix     in the past with repeat pap smear okay.    Acute interstitial pneumonitis     Allergic rhinitis, cause unspecified     Arthritis of both knees     Asthma     Eczema     Fatty liver 10/2014    Fibrocystic breast changes     Headache(784.0)     Hepatomegaly 10/2014    Hypertension     Liver cyst 10/2014    Multinodular goiter     Followed by ENT - Dr. Nicolas Gray    Polymenorrhea     TMJ (dislocation of temporomandibular joint)     Uterine fibroid     in the past    Vitamin D deficiency disease      Past Surgical History:   Procedure Laterality Date    BRONCHOSCOPY Bilateral 2023    Procedure: Bronchoscopy - insert lighted tube into airway to take a biopsy of lung;  Surgeon: Agustín Aviles MD;  Location: [facility] ENDO;  Service: Pulmonary;  Laterality: Bilateral;     SECTION, CLASSIC      x 1    COLONOSCOPY N/A 2020    Procedure: COLONOSCOPY;  Surgeon: Angie Magana MD;  Location: [facility] ENDO;  Service: Endoscopy;  Laterality: N/A;    HYSTERECTOMY      MULTIPLE TOOTH EXTRACTIONS      PARTIAL HYSTERECTOMY  2013    Due to fibroids     Review of patient's allergies indicates:   Allergen Reactions    Doxycycline Nausea Only     Other reaction(s): Nausea    Fluticasone Other (See Comments)     Other reaction(s): Epistaxis       Family History       Problem Relation (Age of Onset)    Cancer Maternal Grandmother (60), Maternal Aunt (50), Maternal Aunt (66)    Cataracts Cousin    Diabetes Maternal Grandfather    Diverticulitis Mother    Heart disease Mother, Father (63)    Hypertension Father    Migraines Cousin    No Known Problems Brother    Peripheral vascular disease Maternal Grandfather    Stroke Mother, Sister, Maternal Grandfather          Tobacco Use    Smoking status: Never     Passive exposure: Past    Smokeless tobacco: Never   Substance and Sexual Activity    Alcohol use: Not Currently     Comment: last use  03/2022    Drug use: Not Currently     Frequency: 4.0 times per week     Types: Marijuana     Comment: last year was last use 03/2022    Sexual activity: Yes     Partners: Female      Review of Systems:  Negative except as indicated in HPI  Objective:     Vital Signs (Most Recent):  Temp: (S) (!) 100.4 °F (38 °C) (10/11/23 1406)  Pulse: (!) 121 (10/11/23 1440)  Resp: (!) 24 (10/11/23 1440)  BP: 114/66 (10/11/23 1400)  SpO2: (!) 93 % (10/11/23 1440) Vital Signs (24h Range):  Temp:  [100.4 °F (38 °C)-101.6 °F (38.7 °C)] 100.4 °F (38 °C)  Pulse:  [117-140] 121  Resp:  [21-34] 24  SpO2:  [49 %-100 %] 93 %  BP: (114-147)/(66-85) 114/66   Weight: 105.1 kg (231 lb 11.3 oz); Body mass index is 37.4 kg/m².    No intake or output data in the 24 hours ending 10/11/23 1514     Physical Exam  Vitals and nursing note reviewed.   Constitutional:       Appearance: She is obese. She is ill-appearing.   HENT:      Head: Normocephalic.   Eyes:      Conjunctiva/sclera: Conjunctivae normal.   Cardiovascular:      Rate and Rhythm: Tachycardia present.   Pulmonary:      Comments: Increased work of breathing, fine posterior crackles noted on exam  Abdominal:      Palpations: Abdomen is soft.   Musculoskeletal:         General: Normal range of motion.      Cervical back: Normal range of motion and neck supple.   Skin:     General: Skin is warm and dry.   Neurological:      Mental Status: She is alert and oriented to person, place, and time.   Psychiatric:         Mood and Affect: Mood normal.     Significant Labs:  CBC/Anemia Profile:  Recent Labs   Lab 10/11/23  1139   WBC 13.29*   HGB 13.4   HCT 43.1      *   RDW 17.2*   Chemistries:  Recent Labs   Lab 10/11/23  1139      K 4.3   CL 99   CO2 27   BUN 9   CREATININE 0.9   CALCIUM 9.7   ALBUMIN 3.4*   PROT 7.2   BILITOT 0.4   ALKPHOS 95   ALT 27   AST 25   Significant Imaging:   CXR: I have reviewed all pertinent results/findings within the past 24 hours and my personal  findings are:  bilateral infiltrates appear stable from prior imaging

## 2023-10-11 NOTE — ASSESSMENT & PLAN NOTE
Hospitalized May 2022 with acute respiratory failure and persistent pneumonia and dishcarged on oxygen. She has since been diagnosed with Sjogren's disease with associated ILD. Initial PFTs revealed severe restriction and reduced DLCO. Initially prescribed steroids and subsequently Cellcept and Rituximab infusions (last infusion in Dec 2022). Now on Ofev 150mg BID, Prednisone 10mg daily, and Actemra infusions      · Concern for underlying acute pulmonary infection  · Obtain sputum culture if able; patient endorses a dry nonproductive cough  · Empiric Zosyn and Zyvox  · Blood cultures pending  · [facility]  · Not a current candidate for transplant due to weight  · Continue home Prednisone  · Continue nocturnal AVAPS  · Continue oral lasix  · Followed by rheumatology and pulmonary as outpatient  · Follow-up with Dr. Ashton in [place]  · Unfortunately, patient is near her max home oxygen abilities. She has had repeated hospitalizations within the past several months.  She is been seen and evaluated by transplant in [place].  She underwent left and right heart catheterization in [place] and found to have mild pulmonary hypertension.  · Discussions regarding plan of care and long term goals.  Patient is aware her pulmonary disease has been optimized to the best of treatment abilities.  She understands that should she require intubation and mechanical ventilation she has a high likelihood of not being able to be weaned from the ventilator.  Patient indicates she would not find this acceptable and would prefer to initiate comfort measures in the setting of pulmonary decline.

## 2023-10-11 NOTE — PROGRESS NOTES
Pharmacist Renal Dose Adjustment Note    Ayanna Alicia is a 53 y.o. female being treated with the medication lovenox     Patient Data:    Vital Signs (Most Recent):  Temp: (S) (!) 100.4 °F (38 °C) (10/11/23 1406)  Pulse: (!) 121 (10/11/23 1440)  Resp: (!) 24 (10/11/23 1440)  BP: 114/66 (10/11/23 1400)  SpO2: (!) 93 % (10/11/23 1440) Vital Signs (72h Range):  Temp:  [100.4 °F (38 °C)-101.6 °F (38.7 °C)]   Pulse:  [117-140]   Resp:  [21-34]   BP: (114-147)/(66-85)   SpO2:  [49 %-100 %]      Recent Labs   Lab 10/06/23  1247 10/11/23  1139   CREATININE 1.0 0.9     Serum creatinine: 0.9 mg/dL 10/11/23 1139  Estimated creatinine clearance: 86.5 mL/min    Medication:lovenox 40mg BID will be changed to lovenox 40mg daily for BMI <40    Pharmacist's Name: Kary Jarrett  Pharmacist's Extension: [phone]

## 2023-10-11 NOTE — ASSESSMENT & PLAN NOTE
Patient with chronic hypoxic respiratory failure who is on home oxygen at 6 to 8 L/NC who is her currently on Vapotherm 20/0.50. Signs/symptoms of respiratory failure included increased work of breathing and decreased oxygen saturations. Contributing diagnoses includes pneumonia, asthma, interstitial lung disease, and decompensated heart failure. Labs and images were reviewed. Patient does have a recent ABG which has been reviewed.  Initial PFTs with severe restriction and reduced DLCO; attempted repeat PFTs on 3/16/2022 which she was unable to complete due to worsening shortness of breath.     Will treat underlying causes and adjust management of respiratory failure as follows:  · Patient with restrictive pattern on her PFTs and chronic hypoxic respiratory failure  · Also found to have mild pulmonary hypertension per right heart cath in [place]  · On chronic immunosuppression including OFEV, Prednisone, and Actemra  · Patient reports the Actermra infusions are the only treatment that seems to have any added benefit  · Transition to Vapotherm and titrate as needed to maintain sats 88% or greater  · Add Zosyn and Zyvox  · Give IV Lasix x1 now  · Add Brovana and Pulmicort scheduled nebs  · Continue home Prednisone 10mg daily  · Fluid restriction of 1.5L daily  · CT chest without contrast reviewed without acute worsening / progression of disease  · Follow fever and WBC trends  · Follow chest imaging and ABGs as needed

## 2023-10-11 NOTE — ED PROVIDER NOTES
SCRIBE #1 NOTE: I, Kami Smith, am scribing for, and in the presence of, Saulo Soto MD. I have scribed the entire note.       History     Chief Complaint   Patient presents with    Respiratory Distress     Tachypneic, labored in triage. O2 sat 83% on 8L via NC. Triage deferred for pt. Stabilization in ED 05.     Review of patient's allergies indicates:   Allergen Reactions    Doxycycline Nausea Only     Other reaction(s): Nausea    Fluticasone Other (See Comments)     Other reaction(s): Epistaxis           History of Present Illness     HPI    10/11/2023, 12:22 PM  History obtained from the patient      History of Present Illness: Ayanna Alicia is a 53 y.o. female patient with a PMHx of Sjogren's syndrome and HTN who presents to the Emergency Department for evaluation of SOB which onset gradually. Pt had an O2 saturartion of 83% on 8L via NC. Symptoms are constant and moderate in severity. No mitigating or exacerbating factors reported. Associated sxs include fever. Patient denies any N/V/D, HA,weakness, numbness, and all other sxs at this time. No prior tx reported. No further complaints or concerns at this time.       Arrival mode: Personal vehicle    PCP: Madeleine Enrique MD        Past Medical History:  Past Medical History:   Diagnosis Date    Abnormal Pap smear of cervix     in the past with repeat pap smear okay.    Acute interstitial pneumonitis     Allergic rhinitis, cause unspecified     Arthritis of both knees     Asthma     Eczema     Fatty liver 10/2014    Fibrocystic breast changes     Headache(784.0)     Hepatomegaly 10/2014    Hypertension     Liver cyst 10/2014    Multinodular goiter     Followed by ENT - Dr. Nicolas Gray    Polymenorrhea 2008    TMJ (dislocation of temporomandibular joint)     Uterine fibroid     in the past    Vitamin D deficiency disease        Past Surgical History:  Past Surgical History:   Procedure Laterality Date    BRONCHOSCOPY Bilateral 4/5/2023     Procedure: Bronchoscopy - insert lighted tube into airway to take a biopsy of lung;  Surgeon: Agustín Aviles MD;  Location: [facility] ENDO;  Service: Pulmonary;  Laterality: Bilateral;     SECTION, CLASSIC      x 1    COLONOSCOPY N/A 2020    Procedure: COLONOSCOPY;  Surgeon: Angie Magana MD;  Location: [facility] ENDO;  Service: Endoscopy;  Laterality: N/A;    HYSTERECTOMY      MULTIPLE TOOTH EXTRACTIONS      PARTIAL HYSTERECTOMY  2013    Due to fibroids         Family History:  Family History   Problem Relation Age of Onset    Stroke Mother     Heart disease Mother     Diverticulitis Mother     Heart disease Father 63        MI/CAD    Hypertension Father     Stroke Sister     No Known Problems Brother     Cancer Maternal Grandmother 60        Rectal and stomach cancer    Stroke Maternal Grandfather     Diabetes Maternal Grandfather     Peripheral vascular disease Maternal Grandfather     Cancer Maternal Aunt 50        Stomach cancer    Cancer Maternal Aunt 66        Lung cancer (smoker)    Migraines Cousin     Cataracts Cousin        Social History:  Social History     Tobacco Use    Smoking status: Never     Passive exposure: Past    Smokeless tobacco: Never   Substance and Sexual Activity    Alcohol use: Not Currently     Comment: last use 2022    Drug use: Not Currently     Frequency: 4.0 times per week     Types: Marijuana     Comment: last year was last use 2022    Sexual activity: Yes     Partners: Female        Review of Systems     Review of Systems   Constitutional:  Positive for fever.   HENT:  Negative for sore throat.    Respiratory:  Negative for shortness of breath.    Cardiovascular:  Negative for chest pain.   Gastrointestinal:  Negative for diarrhea, nausea and vomiting.   Genitourinary:  Negative for dysuria.   Musculoskeletal:  Negative for back pain.   Skin:  Negative for rash.   Neurological:  Negative for weakness, numbness and headaches.   Hematological:  Does not  bruise/bleed easily.   All other systems reviewed and are negative.       Physical Exam     Initial Vitals   BP Pulse Resp Temp SpO2   10/11/23 1130 10/11/23 1126 10/11/23 1126 10/11/23 1137 10/11/23 1126   (!) 147/85 (!) 136 (!) 30 (!) 101.6 °F (38.7 °C) (!) 83 %      MAP       --                 Physical Exam  Nursing Notes and Vital Signs Reviewed.  Constitutional: Patient is in no acute distress. Well-developed and well-nourished.  Head: Atraumatic. Normocephalic.  Eyes: PERRL. EOM intact. Conjunctivae are not pale. No scleral icterus.  ENT: Mucous membranes are moist. Oropharynx is clear and symmetric.    Neck: Supple. Full ROM. No lymphadenopathy.  Cardiovascular: Tachycardic. Regular rhythm. No murmurs, rubs, or gallops. Distal pulses are 2+ and symmetric.  Pulmonary/Chest: Tachypneic.   Severe respiratory distress.Decreased breath sounds bilaterally. No wheezing or rales.  Abdominal: Soft and non-distended.  There is no tenderness.  No rebound, guarding, or rigidity. Good bowel sounds.  Genitourinary: No CVA tenderness  Musculoskeletal: Moves all extremities. No obvious deformities. No edema. No calf tenderness.  Skin: Warm and dry.  Neurological:  Alert, awake, and appropriate.  Normal speech.  No acute focal neurological deficits are appreciated.  Psychiatric: Normal affect. Good eye contact. Appropriate in content.     ED Course   Critical Care    Date/Time: 10/11/2023 12:40 PM    Performed by: Saulo Soto MD  Authorized by: Saulo Soto MD  Direct patient critical care time: 20 minutes  Additional history critical care time: 15 minutes  Ordering / reviewing critical care time: 20 minutes  Documentation critical care time: 15 minutes  Consulting other physicians critical care time: 20 minutes  Total critical care time (exclusive of procedural time) : 90 minutes  Critical care was necessary to treat or prevent imminent or life-threatening deterioration of the following conditions:  respiratory failure (hypoxia).  Critical care was time spent personally by me on the following activities: blood draw for specimens, development of treatment plan with patient or surrogate, discussions with consultants, interpretation of cardiac output measurements, evaluation of patient's response to treatment, examination of patient, ordering and performing treatments and interventions, obtaining history from patient or surrogate, ordering and review of laboratory studies, ordering and review of radiographic studies, pulse oximetry, review of old charts and re-evaluation of patient's condition.        ED Vital Signs:  Vitals:    10/11/23 1126 10/11/23 1130 10/11/23 1135 10/11/23 1137   BP:  (!) 147/85 (!) 147/85    Pulse: (!) 136 (!) 140 (!) 133    Resp: (!) 30 (!) 34     Temp:    (!) 101.6 °F (38.7 °C)   TempSrc:    (S) Axillary   SpO2: (!) 83% (!) 49%     Weight:   105.1 kg (231 lb 11.3 oz)     10/11/23 1142 10/11/23 1145 10/11/23 1146 10/11/23 1213   BP:       Pulse: (!) 125 (!) 130 (!) 124 (!) 121   Resp:  (!) 31 (!) 33 (!) 31   Temp:       TempSrc:       SpO2:  96% 100% 100%   Weight:        10/11/23 1254 10/11/23 1300 10/11/23 1400 10/11/23 1406   BP:  127/68 114/66    Pulse: (!) 117 (!) 124 (!) 123    Resp: (!) 32 (!) 25 (!) 25    Temp:    (S) (!) 100.4 °F (38 °C)   TempSrc:    (S) Oral   SpO2: 100% 99% 100%    Weight:        10/11/23 1435 10/11/23 1440   BP:     Pulse: (!) 126 (!) 121   Resp: (!) 21 (!) 24   Temp:     TempSrc:     SpO2: (!) 89% (!) 93%   Weight:         Abnormal Lab Results:  Labs Reviewed   CBC W/ AUTO DIFFERENTIAL - Abnormal; Notable for the following components:       Result Value    WBC 13.29 (*)      (*)     MCHC 31.1 (*)     RDW 17.2 (*)     Immature Granulocytes 0.6 (*)     Gran # (ANC) 11.2 (*)     Immature Grans (Abs) 0.08 (*)     Lymph # 0.9 (*)     Gran % 83.9 (*)     Lymph % 6.8 (*)     All other components within normal limits   COMPREHENSIVE METABOLIC PANEL - Abnormal;  Notable for the following components:    Glucose 167 (*)     Albumin 3.4 (*)     All other components within normal limits   CK - Abnormal; Notable for the following components:     (*)     All other components within normal limits   TROPONIN I - Abnormal; Notable for the following components:    Troponin I 0.039 (*)     All other components within normal limits   POCT GLUCOSE - Abnormal; Notable for the following components:    POCT Glucose 156 (*)     All other components within normal limits   ISTAT PROCEDURE - Abnormal; Notable for the following components:    POC PCO2 49.0 (*)     POC PO2 459 (*)     POC HCO3 32.8 (*)     All other components within normal limits   INFLUENZA A & B BY MOLECULAR   CULTURE, BLOOD   CULTURE, BLOOD   CULTURE, RESPIRATORY   RESPIRATORY VIRAL PANEL BY PCR   B-TYPE NATRIURETIC PEPTIDE   SARS-COV-2 RNA AMPLIFICATION, QUAL   URINALYSIS, REFLEX TO URINE CULTURE   LACTIC ACID, PLASMA   POCT GLUCOSE MONITORING CONTINUOUS        All Lab Results:  Results for orders placed or performed during the hospital encounter of 10/11/23   Influenza A & B by Molecular    Specimen: Nasopharyngeal Swab   Result Value Ref Range    Influenza A, Molecular Negative Negative    Influenza B, Molecular Negative Negative    Flu A & B Source Nasal swab    CBC Auto Differential   Result Value Ref Range    WBC 13.29 (H) 3.90 - 12.70 K/uL    RBC 4.33 4.00 - 5.40 M/uL    Hemoglobin 13.4 12.0 - 16.0 g/dL    Hematocrit 43.1 37.0 - 48.5 %     (H) 82 - 98 fL    MCH 30.9 27.0 - 31.0 pg    MCHC 31.1 (L) 32.0 - 36.0 g/dL    RDW 17.2 (H) 11.5 - 14.5 %    Platelets 227 150 - 450 K/uL    MPV 9.8 9.2 - 12.9 fL    Immature Granulocytes 0.6 (H) 0.0 - 0.5 %    Gran # (ANC) 11.2 (H) 1.8 - 7.7 K/uL    Immature Grans (Abs) 0.08 (H) 0.00 - 0.04 K/uL    Lymph # 0.9 (L) 1.0 - 4.8 K/uL    Mono # 0.6 0.3 - 1.0 K/uL    Eos # 0.5 0.0 - 0.5 K/uL    Baso # 0.06 0.00 - 0.20 K/uL    nRBC 0 0 /100 WBC    Gran % 83.9 (H) 38.0 - 73.0  %    Lymph % 6.8 (L) 18.0 - 48.0 %    Mono % 4.7 4.0 - 15.0 %    Eosinophil % 3.5 0.0 - 8.0 %    Basophil % 0.5 0.0 - 1.9 %    Differential Method Automated    Comprehensive Metabolic Panel   Result Value Ref Range    Sodium 141 136 - 145 mmol/L    Potassium 4.3 3.5 - 5.1 mmol/L    Chloride 99 95 - 110 mmol/L    CO2 27 23 - 29 mmol/L    Glucose 167 (H) 70 - 110 mg/dL    BUN 9 6 - 20 mg/dL    Creatinine 0.9 0.5 - 1.4 mg/dL    Calcium 9.7 8.7 - 10.5 mg/dL    Total Protein 7.2 6.0 - 8.4 g/dL    Albumin 3.4 (L) 3.5 - 5.2 g/dL    Total Bilirubin 0.4 0.1 - 1.0 mg/dL    Alkaline Phosphatase 95 55 - 135 U/L    AST 25 10 - 40 U/L    ALT 27 10 - 44 U/L    eGFR >60 >60 mL/min/1.73 m^2    Anion Gap 15 8 - 16 mmol/L   BNP   Result Value Ref Range    BNP <10 0 - 99 pg/mL   CK   Result Value Ref Range     (H) 20 - 180 U/L   Troponin I   Result Value Ref Range    Troponin I 0.039 (H) 0.000 - 0.026 ng/mL   COVID-19 Rapid Screening   Result Value Ref Range    SARS-CoV-2 RNA, Amplification, Qual Negative Negative   POCT glucose   Result Value Ref Range    POCT Glucose 156 (H) 70 - 110 mg/dL   ISTAT PROCEDURE   Result Value Ref Range    POC PH 7.434 7.35 - 7.45    POC PCO2 49.0 (H) 35 - 45 mmHg    POC PO2 459 (H) 80 - 100 mmHg    POC HCO3 32.8 (H) 24 - 28 mmol/L    POC BE 9 -2 to 2 mmol/L    POC SATURATED O2 100 95 - 100 %    Rate 24     Sample ARTERIAL     Site LR     Allens Test Pass     DelSys CPAP/BiPAP     Mode BiPAP     FiO2 100     IP 12     EP 6      *Note: Due to a large number of results and/or encounters for the requested time period, some results have not been displayed. A complete set of results can be found in Results Review.        Imaging Results:  Imaging Results              X-Ray Chest AP Portable (Final result)  Result time 10/11/23 12:16:37      Final result by Terry Monet MD (Timothy) (10/11/23 12:16:37)                   Impression:      Stable moderate bilateral infiltrates.      Electronically  signed by: Terry Monet MD  Date:    10/11/2023  Time:    12:16               Narrative:    EXAMINATION:  XR CHEST AP PORTABLE    CLINICAL HISTORY:  Shortness of breath, sob;    COMPARISON:  Chest, 09/14/2023.    FINDINGS:  Stable cardiomegaly.  Moderate bilateral infiltrates appear stable                                       The EKG was ordered, reviewed, and independently interpreted by the ED provider.  Interpretation time: 11:35  Rate: 126 BPM  Rhythm: sinus tachycardia  Interpretation: No acute ST changes. No STEMI.           The Emergency Provider reviewed the vital signs and test results, which are outlined above.     ED Discussion       1:11 PM: Consulted with Dr. Tuttle (Pulmonary Medicine) who suggests weaning pt before considering admission to ICU.    1:14 PM: Taylor Mccann NP suggests transition to Vapotherm 25L, 100%. She will visit pt at bedside to see if she is tolerating vapotherm to consider admission.    2:10 PM: Taylor Mccann NP recommends cultures and empiric antibiotics for pt's chronic immunosuppression.    2:36 PM: Discussed case with Taylor Mccann NP (Hospital Medicine). Dr. Escobar agrees with current care and management of pt and accepts admission.   Admitting Service: Hospital Medicine  Admitting Physician: Dr. Escobar  Admit to: med/tele     2:37 PM: Re-evaluated pt. I have discussed test results, shared treatment plan, and the need for admission with patient and family at bedside. Pt and family express understanding at this time and agree with all information. All questions answered. Pt and family have no further questions or concerns at this time. Pt is ready for admit.        Medical Decision Making  History of Sjogren's syndrome, ILD presents with worsening SOB.  DDx: Hypoxia, Respiratory failure.    Problems Addressed:  Acute on chronic respiratory failure with hypoxia: acute illness or injury that poses a threat to life or bodily functions    Amount and/or Complexity of  Data Reviewed  Labs: ordered. Decision-making details documented in ED Course.     Details: Sats 83% on 8 liters. Dropped into the 50s when ambulated. Now on BIPAP, and at 100%. After vapotherm, she was down to 15L 40% FiO2 , with sats at 96; if pt remains doing well she will be weaned down to HiFlo Nasal cannula. 14:39 Pt is still breathing fast on vapotherm and HR is in the 120s.  Radiology: ordered. Decision-making details documented in ED Course.  ECG/medicine tests: ordered. Decision-making details documented in ED Course.    Risk  Prescription drug management.  Decision regarding hospitalization.                ED Medication(s):  Medications   levoFLOXacin 750 mg/150 mL IVPB 750 mg (750 mg Intravenous New Bag 10/11/23 1327)   linezolid tablet 600 mg (has no administration in time range)   enoxaparin injection 40 mg (has no administration in time range)   piperacillin-tazobactam (ZOSYN) 4.5 g in dextrose 5 % in water (D5W) 100 mL IVPB (MB+) (has no administration in time range)   albuterol-ipratropium 2.5 mg-0.5 mg/3 mL nebulizer solution 3 mL (3 mLs Nebulization Given 10/11/23 1146)   methylPREDNISolone sodium succinate injection 125 mg (125 mg Intravenous Given 10/11/23 1139)   furosemide injection 40 mg (40 mg Intravenous Given 10/11/23 1319)       New Prescriptions    No medications on file               Scribe Attestation:   Scribe #1: I performed the above scribed service and the documentation accurately describes the services I performed. I attest to the accuracy of the note.     Attending:   Physician Attestation Statement for Scribe #1: I, Saulo Soto MD, personally performed the services described in this documentation, as scribed by Kami Smith, in my presence, and it is both accurate and complete.           Clinical Impression       ICD-10-CM ICD-9-CM   1. Acute on chronic respiratory failure with hypoxia  J96.21 518.84     799.02   2. SOB (shortness of breath)  R06.02 786.05        Disposition:   Disposition: Admitted  Condition: Saulo Bunn MD  10/11/23 6596

## 2023-10-11 NOTE — PHARMACY MED REC
"Admission Medication History     The home medication history was taken by Chhaya Raza.    You may go to "Admission" then "Reconcile Home Medications" tabs to review and/or act upon these items.     The home medication list has been updated by the Pharmacy department.   Please read ALL comments highlighted in yellow.   Please address this information as you see fit.    Feel free to contact us if you have any questions or require assistance.      The medications listed below were removed from the home medication list. Please reorder if appropriate:  Patient reports no longer taking the following medication(s):  Vitamin C 500mg  Hydrocortisone 2.5% ointment  Imodium 2mg  Mycostatin 100,000 units/ml suspension  Bactrim 800-160mg    Medications listed below were obtained from: Patient/family, Medications brought from home, and Analytic software- [employer]  (Not in a hospital admission)          Chhayanishi Rainesson  [id number]    Current Outpatient Medications on File Prior to Encounter   Medication Sig Dispense Refill Last Dose    albuterol (PROVENTIL/VENTOLIN HFA) 90 mcg/actuation inhaler INHALE 1 TO 2 PUFFS BY MOUTH INTO THE LUNGS EVERY 4 TO 6 HOURS AS NEEDED FOR WHEEZING OR SHORTNESS OF BREATH 8.5 g 5 Past Week    furosemide (LASIX) 40 MG tablet Take 1 tablet (40 mg total) by mouth once daily. 30 tablet 0 Past Week    ibuprofen (ADVIL,MOTRIN) 200 MG tablet Take 200 mg by mouth every 6 (six) hours as needed for Pain.   Past Week    ipratropium (ATROVENT) 0.02 % nebulizer solution Take 2.5 mLs (500 mcg total) by nebulization 4 (four) times daily. Rescue 300 mL 11 Past Week    levocetirizine (XYZAL) 5 MG tablet TAKE 1 TABLET(5 MG) BY MOUTH EVERY DAY 90 tablet 1 Past Week    mv-minerals/FA/omega 3,6,9 #3 (WOMEN'S 50+ ADVANCED ORAL) Take 1 tablet by mouth Daily.   Past Week    nintedanib (OFEV) 150 mg Cap Take 1 capsule (150 mg total) by mouth 2 (two) times a day. 60 capsule 11 Past Week    omega-3s/dha/epa/fish oil/D3 " (VITAMIN-D + OMEGA-3 ORAL) Take 2 tablets by mouth once daily at 6am.   Past Week    omeprazole (PRILOSEC) 20 MG capsule Take 1 capsule (20 mg total) by mouth once daily. (Patient taking differently: Take 40 mg by mouth once daily.) 30 capsule 1 Past Week    pantoprazole (PROTONIX) 40 MG tablet Take 1 tablet (40 mg total) by mouth once daily. 30 tablet 11 Past Week    predniSONE (DELTASONE) 10 MG tablet Take 6 tablets (60 mg total) by mouth once daily for 3 days, THEN 4 tablets (40 mg total) once daily for 3 days, THEN 2 tablets (20 mg total) once daily for 3 days, THEN 1 tablet (10 mg total) once daily. 126 tablet 0 Past Week    SEREVENT DISKUS 50 mcg/dose diskus inhaler Inhale 1 puff into the lungs 2 (two) times daily. Controller 60 each 11 10/10/2023    vitamin D (VITAMIN D3) 1000 units Tab Take 1,000 Units by mouth once daily.   10/10/2023                           .

## 2023-10-11 NOTE — HPI
Ayanna Alicia is a 53-year-old AAF with a PMH of Sjogren's syndrome, ILD, RA, chronic hypoxic respiratory failure on 6-8L/NC, asthma, GLENN (CPAP), possible Lung Tx candidate, who presented to ER for evaluation of SOB which started gradually over last couple of days. Pt had an O2 saturartion of 83% on 8L via NC. Symptoms are constant and moderate in severity. No mitigating or exacerbating factors reported. Associated sxs include fever. Patient denies any N/V/D, HA,weakness, numbness, and all other sxs at this time. No prior tx reported. No further complaints or concerns at this time. She had a similar admission here from 9/14-/17/23.      Initial VS temp 101.6, , 147/85, 34, 83% on 8 L NC. Labs WBC 13.4, H/H 13/43, BNP <10, Trop 0.039, CXR showed stable cardiomegaly, moderate bilateral infiltrates appear stable. Pt received nebs, Solumedrol 125 mg IV plus Zosyn/Zyvox and Levaquin plus lasix 40 mg and was placed on Bipap at 100% O2 and ABG showed 7.43/459/49/34/100%    She is being admitted to hosp med as acute on ch hypoxemic resp failure

## 2023-10-11 NOTE — ASSESSMENT & PLAN NOTE
This patient does have evidence of infective focus and my overall impression is sepsis.  Source: Respiratory  Antibiotics given: Levaquin, Zosyn, and Zyvox  Latest lactate reviewed: lactate pending  Organ dysfunction: acute respiratory failure  Fluid challenge: Not needed - patient is not hypotensive    Post- resuscitation assessment No - Post resuscitation assessment not needed   Will not start vasopressors: Levophed for MAP of 65    Source control achieved by:   · Obtain blood and sputum cultures  · Obtain urinalysis and culture if needed  · Obtain full respiratory viral panel  · Negative for flu and COVID  · Add empiric Zosyn and Zyvox  · Follow fever and WBC trends  · Follow chest imaging as needed  · Monitoring hemodynamics for acute changes and/or decompensation

## 2023-10-11 NOTE — ASSESSMENT & PLAN NOTE
FC 3  WHO grp 3  ILD    RHC done at [facility]  Performed by  ROSALINE  Findings:   LM: no disease   LAD: no disease   LCX: no disease   RCA: no disease   LVEDP: 8 mmHG     RA: 1/4/0   RV: 50/0   PA: 50/20/30   PCWP: 12/14/10   PASAT: 71.7%   TDCO/CI: 7.6/3.67   ANKIT/CI: 5.71/2.76       Will start TYVASCO DPI

## 2023-10-11 NOTE — CONSULTS
[facility]  Pulmonology Consult Note    Patient Name: Ayanna Alicia   MRN: [id number]  Admission Date: 10/11/2023   Hospital Length of Stay: 0 days  Code Status: DNR     Attending Physician: Chantel Escobar MD  Principal Problem: Acute on chronic respiratory failure with hypoxemia  Subjective:   History of Present Illness:  Ayanna Alicia is a 52-year-old female with a past medical history of chronic hypoxic respiratory failure on 5L/NC, asthma, Sjogren's syndrome with associated interstitial lung disease, multinodular goiter, GLENN, and morbid obesity who  presented with worsening shortness of breadth with associated body aches, chills, and subjective fever.     Of note, she was hospitalized in May of 2022 with acute respiratory failure and persistent pneumonia. At that time, she was dishcharged on oxygen. She was subsequently diagnosed with Sjogren's disease and thought to have interstitial lung disease secondary to her underlying rheumatological disease. Initial PFTs with severe restriction and reduced DLCO. She was initially prescribed steroids followed by the addition of Cellcept and Rituximab infusions (last infusion in Dec 2022). Most recently, she was initiated on Ofev; however, the patient reports she has not had any significant improvement in her clinical condition since her original illness in May 2022 and reports she has clinically worsened over the course of this time. Endorses losing weight, lost 65 lbs since last year; however, she has not been able to get less than 220lb. Not taking cellcept since 12/2022, received 4 treatments of Rituximab with her last dose in Dec 2022. She chronically takes prednisone 10 mg.  Patient underwent bronchoscopy with BAL in April 2023 per Dr. Aviles with negative cultures and no growth of fungus, yeast, PCP, staph, or pseudomonas. Remains on OFEV 150mg BID and Prednisone 10mg daily. In addition, she is in a trial of Actemra IV  monthly for four consecutive months. In addition, she has been referred for evaluation of lung transplant at [facility] in [place] and was seen by them in August. At that time, she was told she is not currently a candidate for lung transplant secondary to her weight. She has a goal of an additional 10lb weight loss for consideration for transplant.      Past Medical History:   Diagnosis Date    Abnormal Pap smear of cervix     in the past with repeat pap smear okay.    Acute interstitial pneumonitis     Allergic rhinitis, cause unspecified     Arthritis of both knees     Asthma     Eczema     Fatty liver 10/2014    Fibrocystic breast changes     Headache(784.0)     Hepatomegaly 10/2014    Hypertension     Liver cyst 10/2014    Multinodular goiter     Followed by ENT - Dr. Nicolas Gray    Polymenorrhea     TMJ (dislocation of temporomandibular joint)     Uterine fibroid     in the past    Vitamin D deficiency disease      Past Surgical History:   Procedure Laterality Date    BRONCHOSCOPY Bilateral 2023    Procedure: Bronchoscopy - insert lighted tube into airway to take a biopsy of lung;  Surgeon: Agustín Aviles MD;  Location[facility] ENDO;  Service: Pulmonary;  Laterality: Bilateral;     SECTION, CLASSIC      x 1    COLONOSCOPY N/A 2020    Procedure: COLONOSCOPY;  Surgeon: Angie Magana MD;  Location: [facility];  Service: Endoscopy;  Laterality: N/A;    HYSTERECTOMY      MULTIPLE TOOTH EXTRACTIONS      PARTIAL HYSTERECTOMY  2013    Due to fibroids     Review of patient's allergies indicates:   Allergen Reactions    Doxycycline Nausea Only     Other reaction(s): Nausea    Fluticasone Other (See Comments)     Other reaction(s): Epistaxis       Family History       Problem Relation (Age of Onset)    Cancer Maternal Grandmother (60), Maternal Aunt (50), Maternal Aunt (66)    Cataracts Cousin    Diabetes Maternal Grandfather    Diverticulitis Mother    Heart  disease Mother, Father (63)    Hypertension Father    Migraines Cousin    No Known Problems Brother    Peripheral vascular disease Maternal Grandfather    Stroke Mother, Sister, Maternal Grandfather          Tobacco Use    Smoking status: Never     Passive exposure: Past    Smokeless tobacco: Never   Substance and Sexual Activity    Alcohol use: Not Currently     Comment: last use 03/2022    Drug use: Not Currently     Frequency: 4.0 times per week     Types: Marijuana     Comment: last year was last use 03/2022    Sexual activity: Yes     Partners: Female      Review of Systems:  Negative except as indicated in HPI  Objective:     Vital Signs (Most Recent):  Temp: (S) (!) 100.4 °F (38 °C) (10/11/23 1406)  Pulse: (!) 121 (10/11/23 1440)  Resp: (!) 24 (10/11/23 1440)  BP: 114/66 (10/11/23 1400)  SpO2: (!) 93 % (10/11/23 1440) Vital Signs (24h Range):  Temp:  [100.4 °F (38 °C)-101.6 °F (38.7 °C)] 100.4 °F (38 °C)  Pulse:  [117-140] 121  Resp:  [21-34] 24  SpO2:  [49 %-100 %] 93 %  BP: (114-147)/(66-85) 114/66   Weight: 105.1 kg (231 lb 11.3 oz); Body mass index is 37.4 kg/m².    No intake or output data in the 24 hours ending 10/11/23 1514     Physical Exam  Vitals and nursing note reviewed.   Constitutional:       Appearance: She is obese. She is ill-appearing.   HENT:      Head: Normocephalic.   Eyes:      Conjunctiva/sclera: Conjunctivae normal.   Cardiovascular:      Rate and Rhythm: Tachycardia present.   Pulmonary:      Comments: Increased work of breathing, fine posterior crackles noted on exam  Abdominal:      Palpations: Abdomen is soft.   Musculoskeletal:         General: Normal range of motion.      Cervical back: Normal range of motion and neck supple.   Skin:     General: Skin is warm and dry.   Neurological:      Mental Status: She is alert and oriented to person, place, and time.   Psychiatric:         Mood and Affect: Mood normal.     Significant Labs:  CBC/Anemia Profile:  Recent Labs   Lab  10/11/23  1139   WBC 13.29*   HGB 13.4   HCT 43.1      *   RDW 17.2*   Chemistries:  Recent Labs   Lab 10/11/23  1139      K 4.3   CL 99   CO2 27   BUN 9   CREATININE 0.9   CALCIUM 9.7   ALBUMIN 3.4*   PROT 7.2   BILITOT 0.4   ALKPHOS 95   ALT 27   AST 25   Significant Imaging:   CXR: I have reviewed all pertinent results/findings within the past 24 hours and my personal findings are:  bilateral infiltrates appear stable from prior imaging   ABG  Recent Labs   Lab 10/11/23  1209   PH 7.434   PO2 459*   PCO2 49.0*   HCO3 32.8*   BE 9     Assessment/Plan:     Pulmonary  * Acute on chronic respiratory failure with hypoxemia  Patient with chronic hypoxic respiratory failure who is on home oxygen at 6 to 8 L/NC who is her currently on Vapotherm 20/0.50. Signs/symptoms of respiratory failure included increased work of breathing and decreased oxygen saturations. Contributing diagnoses includes pneumonia, asthma, interstitial lung disease, and decompensated heart failure. Labs and images were reviewed. Patient does have a recent ABG which has been reviewed.  Initial PFTs with severe restriction and reduced DLCO; attempted repeat PFTs on 3/16/2022 which she was unable to complete due to worsening shortness of breath.     Will treat underlying causes and adjust management of respiratory failure as follows:  · Patient with restrictive pattern on her PFTs and chronic hypoxic respiratory failure  · Also found to have mild pulmonary hypertension per right heart cath in [place]  · On chronic immunosuppression including OFEV, Prednisone, and Actemra  · Patient reports the Actermra infusions are the only treatment that seems to have any added benefit  · Transition to Vapotherm and titrate as needed to maintain sats 88% or greater  · Add Zosyn and Zyvox  · Give IV Lasix x1 now  · Add Brovana and Pulmicort scheduled nebs  · Continue home Prednisone 10mg daily  · Fluid restriction of 1.5L daily  · CT chest without  contrast reviewed without acute worsening / progression of disease  · Follow fever and WBC trends  · Follow chest imaging and ABGs as needed    UIP (usual interstitial pneumonitis)  Hospitalized May 2022 with acute respiratory failure and persistent pneumonia and dishcarged on oxygen. She has since been diagnosed with Sjogren's disease with associated ILD. Initial PFTs revealed severe restriction and reduced DLCO. Initially prescribed steroids and subsequently Cellcept and Rituximab infusions (last infusion in Dec 2022). Now on Ofev 150mg BID, Prednisone 10mg daily, and Actemra infusions      · Concern for underlying acute pulmonary infection  · Obtain sputum culture if able; patient endorses a dry nonproductive cough  · Empiric Zosyn and Zyvox  · Blood cultures pending  · Started lung transplant evaluation at [facility]  · Not a current candidate for transplant due to weight  · Continue home Prednisone  · Continue nocturnal AVAPS  · Continue oral lasix  · Followed by rheumatology and pulmonary as outpatient  · Follow-up with Dr. Ashton in [place]  · Unfortunately, patient is near her max home oxygen abilities. She has had repeated hospitalizations within the past several months.  She is been seen and evaluated by transplant in [place].  She underwent left and right heart catheterization in [place] and found to have mild pulmonary hypertension.  · Discussions regarding plan of care and long term goals.  Patient is aware her pulmonary disease has been optimized to the best of treatment abilities.  She understands that should she require intubation and mechanical ventilation she has a high likelihood of not being able to be weaned from the ventilator.  Patient indicates she would not find this acceptable and would prefer to initiate comfort measures in the setting of pulmonary decline.    Severe sepsis  This patient does have evidence of infective focus and my overall impression is sepsis.  Source:  Respiratory  Antibiotics given: Levaquin, Zosyn, and Zyvox  Latest lactate reviewed: lactate pending  Organ dysfunction: acute respiratory failure  Fluid challenge: Not needed - patient is not hypotensive    Post- resuscitation assessment No - Post resuscitation assessment not needed   Will not start vasopressors: Levophed for MAP of 65    Source control achieved by:   · Obtain blood and sputum cultures  · Obtain urinalysis and culture if needed  · Obtain full respiratory viral panel  · Negative for flu and COVID  · Add empiric Zosyn and Zyvox  · Follow fever and WBC trends  · Follow chest imaging as needed  · Monitoring hemodynamics for acute changes and/or decompensation    GLENN on CPAP  Patient reports non-compliance with home PAP therapy in the past. We discussed utilizing and compliance with home PAP therapy. Following discharge from prior hospitalization in August 2023, the patient was transitioned from CPAP to home AVAPS as she has significant hypoxic respiratory failure at baseline with associated restrictive disease as evidenced by her PFTs. Patient reports decreased use with home AVAPS due to lack of humidification. Patient reports she just received her humidification and reports using AVAPS for a few hours at night.      · Nocturnal AVAPS as ordered    Thank you for your consult. I will follow-up with patient. Please contact us if you have any additional questions.     Taylor Mccann NP  Pulmonary and Critical Care  [facility]

## 2023-10-11 NOTE — PLAN OF CARE
Discussed poc with pt, pt verbalized understanding  Purposeful rounding every 2hours  VS wnl  Cardiac monitoring in use, pt is SARBJIT, tele monitor #[id number]  Blood glucose monitoring   Fall precautions in place, remains injury free  Pain and nausea under control with PRN meds  IVFs  Accurate I&Os  Abx given as prescribed  Bed locked at lowest position  Call light within reach  Chart check complete  Will cont with POC  Problem: Adult Inpatient Plan of Care  Goal: Plan of Care Review  Outcome: Ongoing, Progressing  Goal: Patient-Specific Goal (Individualized)  Outcome: Ongoing, Progressing  Goal: Absence of Hospital-Acquired Illness or Injury  Outcome: Ongoing, Progressing  Goal: Optimal Comfort and Wellbeing  Outcome: Ongoing, Progressing  Goal: Readiness for Transition of Care  Outcome: Ongoing, Progressing     Problem: Adjustment to Illness (Sepsis/Septic Shock)  Goal: Optimal Coping  Outcome: Ongoing, Progressing     Problem: Bleeding (Sepsis/Septic Shock)  Goal: Absence of Bleeding  Outcome: Ongoing, Progressing     Problem: Glycemic Control Impaired (Sepsis/Septic Shock)  Goal: Blood Glucose Level Within Desired Range  Outcome: Ongoing, Progressing     Problem: Infection Progression (Sepsis/Septic Shock)  Goal: Absence of Infection Signs and Symptoms  Outcome: Ongoing, Progressing     Problem: Nutrition Impaired (Sepsis/Septic Shock)  Goal: Optimal Nutrition Intake  Outcome: Ongoing, Progressing

## 2023-10-11 NOTE — HPI
Ayanna Alicia is a 52-year-old female with a past medical history of chronic hypoxic respiratory failure on 5L/NC, asthma, Sjogren's syndrome with associated interstitial lung disease, multinodular goiter, GLENN, and morbid obesity who  presented with worsening shortness of breadth with associated body aches, chills, and subjective fever.     Of note, she was hospitalized in May of 2022 with acute respiratory failure and persistent pneumonia. At that time, she was dishcharged on oxygen. She was subsequently diagnosed with Sjogren's disease and thought to have interstitial lung disease secondary to her underlying rheumatological disease. Initial PFTs with severe restriction and reduced DLCO. She was initially prescribed steroids followed by the addition of Cellcept and Rituximab infusions (last infusion in Dec 2022). Most recently, she was initiated on Ofev; however, the patient reports she has not had any significant improvement in her clinical condition since her original illness in May 2022 and reports she has clinically worsened over the course of this time. Endorses losing weight, lost 65 lbs since last year; however, she has not been able to get less than 220lb. Not taking cellcept since 12/2022, received 4 treatments of Rituximab with her last dose in Dec 2022. She chronically takes prednisone 10 mg.  Patient underwent bronchoscopy with BAL in April 2023 per Dr. Aviles with negative cultures and no growth of fungus, yeast, PCP, staph, or pseudomonas. Remains on OFEV 150mg BID and Prednisone 10mg daily. In addition, she is in a trial of Actemra IV monthly for four consecutive months. In addition, she has been referred for evaluation of lung transplant at [facility] in [place] and was seen by them in August. At that time, she was told she is not currently a candidate for lung transplant secondary to her weight. She has a goal of an additional 10lb weight loss for consideration for transplant.      Interval  history:  10/12: Down to 8l, feels better  10/13: Weaned to 6L/NC this morning; denies acute complaints. Overall feels better. Fever trends improved.  10/15: Sitting up in bedside chair without acute complaints. Currently on 6L/NC. Overall, feels ok. No acute events overnight. Some diarrhea but seems improved from approximately 2 days ago  10/16: remains on 6L NC, reports feeling a little worse than she did yesterday with some increased reported WOB - CXR pending, voice is hoarse, some scant sputum production from time to time, no family at bedside

## 2023-10-12 PROBLEM — A42.9 ACTINOMYCES INFECTION: Status: ACTIVE | Noted: 2023-10-12

## 2023-10-12 LAB
ALBUMIN SERPL BCP-MCNC: 2.9 G/DL (ref 3.5–5.2)
ALP SERPL-CCNC: 84 U/L (ref 55–135)
ALT SERPL W/O P-5'-P-CCNC: 24 U/L (ref 10–44)
ANION GAP SERPL CALC-SCNC: 13 MMOL/L (ref 8–16)
AST SERPL-CCNC: 19 U/L (ref 10–40)
BASOPHILS # BLD AUTO: 0.02 K/UL (ref 0–0.2)
BASOPHILS NFR BLD: 0.2 % (ref 0–1.9)
BILIRUB SERPL-MCNC: 0.3 MG/DL (ref 0.1–1)
BUN SERPL-MCNC: 14 MG/DL (ref 6–20)
CALCIUM SERPL-MCNC: 9.4 MG/DL (ref 8.7–10.5)
CHLORIDE SERPL-SCNC: 99 MMOL/L (ref 95–110)
CO2 SERPL-SCNC: 27 MMOL/L (ref 23–29)
CREAT SERPL-MCNC: 0.9 MG/DL (ref 0.5–1.4)
DIFFERENTIAL METHOD: ABNORMAL
EOSINOPHIL # BLD AUTO: 0 K/UL (ref 0–0.5)
EOSINOPHIL NFR BLD: 0 % (ref 0–8)
ERYTHROCYTE [DISTWIDTH] IN BLOOD BY AUTOMATED COUNT: 16.6 % (ref 11.5–14.5)
EST. GFR  (NO RACE VARIABLE): >60 ML/MIN/1.73 M^2
GLUCOSE SERPL-MCNC: 164 MG/DL (ref 70–110)
HCT VFR BLD AUTO: 39.2 % (ref 37–48.5)
HGB BLD-MCNC: 12 G/DL (ref 12–16)
IMM GRANULOCYTES # BLD AUTO: 0.08 K/UL (ref 0–0.04)
IMM GRANULOCYTES NFR BLD AUTO: 0.6 % (ref 0–0.5)
LYMPHOCYTES # BLD AUTO: 0.7 K/UL (ref 1–4.8)
LYMPHOCYTES NFR BLD: 5.3 % (ref 18–48)
MAGNESIUM SERPL-MCNC: 2 MG/DL (ref 1.6–2.6)
MCH RBC QN AUTO: 30.2 PG (ref 27–31)
MCHC RBC AUTO-ENTMCNC: 30.6 G/DL (ref 32–36)
MCV RBC AUTO: 99 FL (ref 82–98)
MONOCYTES # BLD AUTO: 0.6 K/UL (ref 0.3–1)
MONOCYTES NFR BLD: 4.7 % (ref 4–15)
NEUTROPHILS # BLD AUTO: 11.5 K/UL (ref 1.8–7.7)
NEUTROPHILS NFR BLD: 89.2 % (ref 38–73)
NRBC BLD-RTO: 0 /100 WBC
PHOSPHATE SERPL-MCNC: 4.3 MG/DL (ref 2.7–4.5)
PLATELET # BLD AUTO: 236 K/UL (ref 150–450)
PMV BLD AUTO: 10.4 FL (ref 9.2–12.9)
POTASSIUM SERPL-SCNC: 4 MMOL/L (ref 3.5–5.1)
PROT SERPL-MCNC: 6.6 G/DL (ref 6–8.4)
RBC # BLD AUTO: 3.97 M/UL (ref 4–5.4)
SODIUM SERPL-SCNC: 139 MMOL/L (ref 136–145)
TROPONIN I SERPL DL<=0.01 NG/ML-MCNC: 0.02 NG/ML (ref 0–0.03)
TROPONIN I SERPL DL<=0.01 NG/ML-MCNC: 0.02 NG/ML (ref 0–0.03)
TROPONIN I SERPL DL<=0.01 NG/ML-MCNC: 0.06 NG/ML (ref 0–0.03)
WBC # BLD AUTO: 12.87 K/UL (ref 3.9–12.7)

## 2023-10-12 PROCEDURE — 25000242 PHARM REV CODE 250 ALT 637 W/ HCPCS: Performed by: NURSE PRACTITIONER

## 2023-10-12 PROCEDURE — 63600175 PHARM REV CODE 636 W HCPCS: Performed by: STUDENT IN AN ORGANIZED HEALTH CARE EDUCATION/TRAINING PROGRAM

## 2023-10-12 PROCEDURE — 63600175 PHARM REV CODE 636 W HCPCS: Performed by: EMERGENCY MEDICINE

## 2023-10-12 PROCEDURE — 11000001 HC ACUTE MED/SURG PRIVATE ROOM

## 2023-10-12 PROCEDURE — 25000003 PHARM REV CODE 250: Performed by: NURSE PRACTITIONER

## 2023-10-12 PROCEDURE — 87632 RESP VIRUS 6-11 TARGETS: CPT | Performed by: NURSE PRACTITIONER

## 2023-10-12 PROCEDURE — 36415 COLL VENOUS BLD VENIPUNCTURE: CPT | Performed by: NURSE PRACTITIONER

## 2023-10-12 PROCEDURE — 94660 CPAP INITIATION&MGMT: CPT

## 2023-10-12 PROCEDURE — 80053 COMPREHEN METABOLIC PANEL: CPT | Performed by: NURSE PRACTITIONER

## 2023-10-12 PROCEDURE — 84100 ASSAY OF PHOSPHORUS: CPT | Performed by: NURSE PRACTITIONER

## 2023-10-12 PROCEDURE — 27100171 HC OXYGEN HIGH FLOW UP TO 24 HOURS

## 2023-10-12 PROCEDURE — 25000003 PHARM REV CODE 250: Performed by: HOSPITALIST

## 2023-10-12 PROCEDURE — 84484 ASSAY OF TROPONIN QUANT: CPT | Performed by: NURSE PRACTITIONER

## 2023-10-12 PROCEDURE — 63600175 PHARM REV CODE 636 W HCPCS: Performed by: NURSE PRACTITIONER

## 2023-10-12 PROCEDURE — 99223 1ST HOSP IP/OBS HIGH 75: CPT | Mod: ,,, | Performed by: STUDENT IN AN ORGANIZED HEALTH CARE EDUCATION/TRAINING PROGRAM

## 2023-10-12 PROCEDURE — 94640 AIRWAY INHALATION TREATMENT: CPT

## 2023-10-12 PROCEDURE — 25000242 PHARM REV CODE 250 ALT 637 W/ HCPCS: Performed by: EMERGENCY MEDICINE

## 2023-10-12 PROCEDURE — 99900035 HC TECH TIME PER 15 MIN (STAT)

## 2023-10-12 PROCEDURE — 25000003 PHARM REV CODE 250: Performed by: STUDENT IN AN ORGANIZED HEALTH CARE EDUCATION/TRAINING PROGRAM

## 2023-10-12 PROCEDURE — 94761 N-INVAS EAR/PLS OXIMETRY MLT: CPT

## 2023-10-12 PROCEDURE — 85025 COMPLETE CBC W/AUTO DIFF WBC: CPT | Performed by: NURSE PRACTITIONER

## 2023-10-12 PROCEDURE — 84484 ASSAY OF TROPONIN QUANT: CPT | Mod: 91 | Performed by: NURSE PRACTITIONER

## 2023-10-12 PROCEDURE — 99223 PR INITIAL HOSPITAL CARE,LEVL III: ICD-10-PCS | Mod: ,,, | Performed by: STUDENT IN AN ORGANIZED HEALTH CARE EDUCATION/TRAINING PROGRAM

## 2023-10-12 PROCEDURE — 83735 ASSAY OF MAGNESIUM: CPT | Performed by: NURSE PRACTITIONER

## 2023-10-12 RX ORDER — METRONIDAZOLE 500 MG/1
500 TABLET ORAL EVERY 12 HOURS
Status: DISCONTINUED | OUTPATIENT
Start: 2023-10-12 | End: 2023-10-17 | Stop reason: HOSPADM

## 2023-10-12 RX ORDER — CYANOCOBALAMIN 1000 UG/ML
1000 INJECTION, SOLUTION INTRAMUSCULAR; SUBCUTANEOUS DAILY
Status: DISPENSED | OUTPATIENT
Start: 2023-10-12 | End: 2023-10-14

## 2023-10-12 RX ADMIN — GUAIFENESIN AND DEXTROMETHORPHAN 10 ML: 100; 10 SYRUP ORAL at 12:10

## 2023-10-12 RX ADMIN — PIPERACILLIN AND TAZOBACTAM 4.5 G: 4; .5 INJECTION, POWDER, LYOPHILIZED, FOR SOLUTION INTRAVENOUS; PARENTERAL at 07:10

## 2023-10-12 RX ADMIN — IPRATROPIUM BROMIDE AND ALBUTEROL SULFATE 3 ML: 2.5; .5 SOLUTION RESPIRATORY (INHALATION) at 01:10

## 2023-10-12 RX ADMIN — PREDNISONE 10 MG: 10 TABLET ORAL at 09:10

## 2023-10-12 RX ADMIN — MICAFUNGIN SODIUM 100 MG: 100 INJECTION, POWDER, LYOPHILIZED, FOR SOLUTION INTRAVENOUS at 04:10

## 2023-10-12 RX ADMIN — ARFORMOTEROL TARTRATE 15 MCG: 15 SOLUTION RESPIRATORY (INHALATION) at 09:10

## 2023-10-12 RX ADMIN — FAMOTIDINE 20 MG: 20 TABLET ORAL at 09:10

## 2023-10-12 RX ADMIN — CEFTRIAXONE SODIUM 2 G: 2 INJECTION, POWDER, FOR SOLUTION INTRAMUSCULAR; INTRAVENOUS at 09:10

## 2023-10-12 RX ADMIN — FAMOTIDINE 20 MG: 20 TABLET ORAL at 08:10

## 2023-10-12 RX ADMIN — METRONIDAZOLE 500 MG: 500 TABLET ORAL at 08:10

## 2023-10-12 RX ADMIN — BUDESONIDE INHALATION 0.5 MG: 0.5 SUSPENSION RESPIRATORY (INHALATION) at 07:10

## 2023-10-12 RX ADMIN — IPRATROPIUM BROMIDE AND ALBUTEROL SULFATE 3 ML: 2.5; .5 SOLUTION RESPIRATORY (INHALATION) at 09:10

## 2023-10-12 RX ADMIN — GUAIFENESIN AND DEXTROMETHORPHAN 10 ML: 100; 10 SYRUP ORAL at 05:10

## 2023-10-12 RX ADMIN — MONTELUKAST 10 MG: 10 TABLET, FILM COATED ORAL at 08:10

## 2023-10-12 RX ADMIN — LINEZOLID 600 MG: 600 TABLET, FILM COATED ORAL at 09:10

## 2023-10-12 RX ADMIN — DEXTROSE MONOHYDRATE 20 MILLION UNITS: 50 INJECTION, SOLUTION INTRAVENOUS at 04:10

## 2023-10-12 RX ADMIN — LINEZOLID 600 MG: 600 TABLET, FILM COATED ORAL at 08:10

## 2023-10-12 RX ADMIN — ARFORMOTEROL TARTRATE 15 MCG: 15 SOLUTION RESPIRATORY (INHALATION) at 07:10

## 2023-10-12 RX ADMIN — METRONIDAZOLE 500 MG: 500 TABLET ORAL at 09:10

## 2023-10-12 RX ADMIN — ENOXAPARIN SODIUM 40 MG: 40 INJECTION SUBCUTANEOUS at 04:10

## 2023-10-12 RX ADMIN — GUAIFENESIN AND DEXTROMETHORPHAN 10 ML: 100; 10 SYRUP ORAL at 10:10

## 2023-10-12 NOTE — ASSESSMENT & PLAN NOTE
Hospitalized May 2022 with acute respiratory failure and persistent pneumonia and dishcarged on oxygen. She has since been diagnosed with Sjogren's disease with associated ILD. Initial PFTs revealed severe restriction and reduced DLCO. Initially prescribed steroids and subsequently Cellcept and Rituximab infusions (last infusion in Dec 2022). Now on Ofev 150mg BID, Prednisone 10mg daily, and Actemra infusions      · Concern for underlying acute pulmonary infection  · Obtain sputum culture if able; patient endorses a dry nonproductive cough  · Empiric Zosyn and Zyvox  · Blood cultures pending  · Started lung transplant evaluation at [facility]  · Not a current candidate for transplant due to weight  · Continue home Prednisone  · Continue nocturnal AVAPS  · Continue oral lasix  · Followed by rheumatology and pulmonary as outpatient  · Follow-up with Dr. Ashton in [place]  · Unfortunately, patient is near her max home oxygen abilities. She has had repeated hospitalizations within the past several months.  She is been seen and evaluated by transplant in [place].  She underwent left and right heart catheterization in [place] and found to have mild pulmonary hypertension.  · See above

## 2023-10-12 NOTE — ASSESSMENT & PLAN NOTE
Patient with Hypercapnic Respiratory failure which is Acute on chronic.  she is on home oxygen at 8 LPM. Supplemental oxygen was provided and noted- Oxygen Concentration (%):  [50] 50    .   Signs/symptoms of respiratory failure include- tachypnea, increased work of breathing, wheezing and lethargy. Contributing diagnoses includes - Interstitial lung disease and Obesity Hypoventilation Labs and images were reviewed. Patient Has recent ABG, which has been reviewed. Will treat underlying causes and adjust management of respiratory failure as follows-     Bipap, O2, prednisone  PT/OT    Stable to improved- cont present care

## 2023-10-12 NOTE — ASSESSMENT & PLAN NOTE
Seen by ent  Reviewed cultures and actinomyces and now yeast- discussed with ID , appreciate assistance  Dc inhaled steroids   Consider dc other nebs / inhalers if not helping

## 2023-10-12 NOTE — ASSESSMENT & PLAN NOTE
This patient does have evidence of infective focus  My overall impression is sepsis.  Source: Respiratory  Antibiotics given-   Antibiotics (72h ago, onward)    Start     Stop Route Frequency Ordered    10/12/23 1530  penicillin G potassium 20 Million Units in dextrose 5 % (D5W) 500 mL CONTINUOUS INFUSION         11/23/23 1529 IV Every 24 hours (non-standard times) 10/12/23 1410    10/12/23 0915  metroNIDAZOLE tablet 500 mg         10/26/23 0859 Oral Every 12 hours 10/12/23 0810    10/11/23 1445  linezolid tablet 600 mg         -- Oral Every 12 hours 10/11/23 1439        Latest lactate reviewed-  Recent Labs   Lab 10/11/23  1550 10/11/23  1752   LACTATE 1.5 1.8     Organ dysfunction indicated by Acute respiratory failure    Fluid challenge Actual Body weight- Patient will receive 30ml/kg actual body weight to calculate fluid bolus for treatment of septic shock.     Post- resuscitation assessment No - Post resuscitation assessment not needed       Will Not start Pressors- Levophed for MAP of 65  Source control achieved by: IV Abx    Improved w Abx and steroids

## 2023-10-12 NOTE — PROGRESS NOTES
Pharmacist Renal Dose Adjustment Note    Ayanna Alicia is a 53 y.o. female being treated with the medication famotidine    Patient Data:    Vital Signs (Most Recent):  Temp: 98.1 °F (36.7 °C) (10/11/23 2009)  Pulse: 95 (10/11/23 2009)  Resp: 18 (10/11/23 2009)  BP: 117/69 (10/11/23 2009)  SpO2: 99 % (10/11/23 2009) Vital Signs (72h Range):  Temp:  [98.1 °F (36.7 °C)-101.6 °F (38.7 °C)]   Pulse:  []   Resp:  [18-34]   BP: (109-147)/(66-85)   SpO2:  [49 %-100 %]      Recent Labs   Lab 10/06/23  1247 10/11/23  1139   CREATININE 1.0 0.9     Serum creatinine: 0.9 mg/dL 10/11/23 1139  Estimated creatinine clearance: 85.2 mL/min    Famotidine 20 mg QD will be changed to famotidine 20 mg BID for CrCl >50 mL/min.     Pharmacist's Name: Janelle Tejeda  Pharmacist's Extension: [phone]

## 2023-10-12 NOTE — ASSESSMENT & PLAN NOTE
This patient does have evidence of infective focus  My overall impression is sepsis.  Source: Respiratory  Antibiotics given-   Antibiotics (72h ago, onward)    Start     Stop Route Frequency Ordered    10/11/23 1530  piperacillin-tazobactam (ZOSYN) 4.5 g in dextrose 5 % in water (D5W) 100 mL IVPB (MB+)         -- IV Every 8 hours (non-standard times) 10/11/23 1445    10/11/23 1445  linezolid tablet 600 mg         -- Oral Every 12 hours 10/11/23 1439        Latest lactate reviewed-  Recent Labs   Lab 10/11/23  1550 10/11/23  1752   LACTATE 1.5 1.8     Organ dysfunction indicated by Acute respiratory failure    Fluid challenge Actual Body weight- Patient will receive 30ml/kg actual body weight to calculate fluid bolus for treatment of septic shock.     Post- resuscitation assessment No - Post resuscitation assessment not needed       Will Not start Pressors- Levophed for MAP of 65  Source control achieved by: IV Abx

## 2023-10-12 NOTE — H&P
[facility] Medicine  History & Physical    Patient Name: Ayanna Alicia  MRN: [id number]  Patient Class: IP- Inpatient  Admission Date: 10/11/2023  Attending Physician: Chantel Escobar MD   Primary Care Provider: Madeleine Enrique MD         Patient information was obtained from patient, past medical records, ER records and primary team.     Subjective:     Principal Problem:Acute on chronic respiratory failure with hypoxemia    Chief Complaint:   Chief Complaint   Patient presents with    Respiratory Distress     Tachypneic, labored in triage. O2 sat 83% on 8L via NC. Triage deferred for pt. Stabilization in ED 05.        HPI: Ayanna Alicia is a 53-year-old AAF with a PMH of Sjogren's syndrome, ILD, RA, chronic hypoxic respiratory failure on 6-8L/NC, asthma, GLENN (CPAP), possible Lung Tx candidate, who presented to ER for evaluation of SOB which started gradually over last couple of days. Pt had an O2 saturartion of 83% on 8L via NC. Symptoms are constant and moderate in severity. No mitigating or exacerbating factors reported. Associated sxs include fever. Patient denies any N/V/D, HA,weakness, numbness, and all other sxs at this time. No prior tx reported. No further complaints or concerns at this time. She had a similar admission here from 9/14-/17/23.      Initial VS temp 101.6, , 147/85, 34, 83% on 8 L NC. Labs WBC 13.4, H/H 13/43, BNP <10, Trop 0.039, CXR showed stable cardiomegaly, moderate bilateral infiltrates appear stable. Pt received nebs, Solumedrol 125 mg IV plus Zosyn/Zyvox and Levaquin plus lasix 40 mg and was placed on Bipap at 100% O2 and ABG showed 7.43/459/49/34/100%    She is being admitted to hosp med as acute on ch hypoxemic resp failure         Past Medical History:   Diagnosis Date    Abnormal Pap smear of cervix     in the past with repeat pap smear okay.    Acute interstitial pneumonitis     Allergic rhinitis, cause unspecified     Arthritis of both knees      Asthma     Eczema     Fatty liver 10/2014    Fibrocystic breast changes     Headache(784.0)     Hepatomegaly 10/2014    Hypertension     Liver cyst 10/2014    Multinodular goiter     Followed by ENT - Dr. Nicolas Gray    Polymenorrhea     TMJ (dislocation of temporomandibular joint)     Uterine fibroid     in the past    Vitamin D deficiency disease        Past Surgical History:   Procedure Laterality Date    BRONCHOSCOPY Bilateral 2023    Procedure: Bronchoscopy - insert lighted tube into airway to take a biopsy of lung;  Surgeon: Agustín Aviles MD;  Location: [facility] ENDO;  Service: Pulmonary;  Laterality: Bilateral;     SECTION, CLASSIC      x 1    COLONOSCOPY N/A 2020    Procedure: COLONOSCOPY;  Surgeon: Angie Magana MD;  Location: [facility] ENDO;  Service: Endoscopy;  Laterality: N/A;    HYSTERECTOMY      MULTIPLE TOOTH EXTRACTIONS      PARTIAL HYSTERECTOMY  2013    Due to fibroids       Review of patient's allergies indicates:   Allergen Reactions    Doxycycline Nausea Only     Other reaction(s): Nausea    Fluticasone Other (See Comments)     Other reaction(s): Epistaxis         No current facility-administered medications on file prior to encounter.     Current Outpatient Medications on File Prior to Encounter   Medication Sig    albuterol (PROVENTIL/VENTOLIN HFA) 90 mcg/actuation inhaler INHALE 1 TO 2 PUFFS BY MOUTH INTO THE LUNGS EVERY 4 TO 6 HOURS AS NEEDED FOR WHEEZING OR SHORTNESS OF BREATH    furosemide (LASIX) 40 MG tablet Take 1 tablet (40 mg total) by mouth once daily.    ibuprofen (ADVIL,MOTRIN) 200 MG tablet Take 200 mg by mouth every 6 (six) hours as needed for Pain.    ipratropium (ATROVENT) 0.02 % nebulizer solution Take 2.5 mLs (500 mcg total) by nebulization 4 (four) times daily. Rescue    levocetirizine (XYZAL) 5 MG tablet TAKE 1 TABLET(5 MG) BY MOUTH EVERY DAY    mv-minerals/FA/omega 3,6,9 #3 (WOMEN'S 50+ ADVANCED ORAL) Take 1 tablet  by mouth Daily.    nintedanib (OFEV) 150 mg Cap Take 1 capsule (150 mg total) by mouth 2 (two) times a day.    omega-3s/dha/epa/fish oil/D3 (VITAMIN-D + OMEGA-3 ORAL) Take 2 tablets by mouth once daily at 6am.    omeprazole (PRILOSEC) 20 MG capsule Take 1 capsule (20 mg total) by mouth once daily. (Patient taking differently: Take 40 mg by mouth once daily.)    pantoprazole (PROTONIX) 40 MG tablet Take 1 tablet (40 mg total) by mouth once daily.    predniSONE (DELTASONE) 10 MG tablet Take 6 tablets (60 mg total) by mouth once daily for 3 days, THEN 4 tablets (40 mg total) once daily for 3 days, THEN 2 tablets (20 mg total) once daily for 3 days, THEN 1 tablet (10 mg total) once daily.    SEREVENT DISKUS 50 mcg/dose diskus inhaler Inhale 1 puff into the lungs 2 (two) times daily. Controller    vitamin D (VITAMIN D3) 1000 units Tab Take 1,000 Units by mouth once daily.    [DISCONTINUED] ascorbic acid, vitamin C, (VITAMIN C) 500 MG tablet Take 500 mg by mouth once daily.    [DISCONTINUED] hydrocortisone 2.5 % ointment Apply topically 2 (two) times daily.    [DISCONTINUED] loperamide (IMODIUM) 2 mg capsule TAKE 1 CAPSULE (2 MG TOTAL) BY MOUTH DAILY AS NEEDED FOR DIARRHEA.    [DISCONTINUED] nystatin (MYCOSTATIN) 100,000 unit/mL suspension Take 4 mLs (400,000 Units total) by mouth 4 (four) times daily with meals and nightly. Swish and spit    [DISCONTINUED] sars-cov-2, covid-19 omicron, (MODERNA COVID BIVAL,6M UP,,PF,) 50 mcg/0.5 mL injection Inject into the muscle.    [DISCONTINUED] sodium,potassium,mag sulfates (SUPREP BOWEL PREP KIT) 17.5-3.13-1.6 gram SolR Take by mouth.    [DISCONTINUED] sulfamethoxazole-trimethoprim 800-160mg (BACTRIM DS) 800-160 mg Tab Take 1 tablet by mouth every Mon, Wed, Fri.     Family History       Problem Relation (Age of Onset)    Cancer Maternal Grandmother (60), Maternal Aunt (50), Maternal Aunt (66)    Cataracts Cousin    Diabetes Maternal Grandfather    Diverticulitis  Mother    Heart disease Mother, Father (63)    Hypertension Father    Migraines Cousin    No Known Problems Brother    Peripheral vascular disease Maternal Grandfather    Stroke Mother, Sister, Maternal Grandfather          Tobacco Use    Smoking status: Never     Passive exposure: Past    Smokeless tobacco: Never   Substance and Sexual Activity    Alcohol use: Not Currently     Comment: last use 03/2022    Drug use: Not Currently     Frequency: 4.0 times per week     Types: Marijuana     Comment: last year was last use 03/2022    Sexual activity: Yes     Partners: Female     Review of Systems   Constitutional:  Positive for activity change, appetite change, fatigue and fever.   HENT: Negative.     Eyes: Negative.  Negative for visual disturbance.   Respiratory:  Positive for cough and shortness of breath. Negative for chest tightness and stridor.    Cardiovascular: Negative.    Gastrointestinal: Negative.    Endocrine: Negative.    Genitourinary: Negative.    Musculoskeletal:  Negative for arthralgias and gait problem.   Skin: Negative.    Allergic/Immunologic: Negative.    Neurological: Negative.    Hematological: Negative.    Psychiatric/Behavioral:  The patient is nervous/anxious.      Objective:     Vital Signs (Most Recent):  Temp: 98.4 °F (36.9 °C) (10/11/23 1636)  Pulse: (!) 111 (10/11/23 1822)  Resp: 18 (10/11/23 1636)  BP: 110/69 (10/11/23 1636)  SpO2: 99 % (10/11/23 1636) Vital Signs (24h Range):  Temp:  [98.4 °F (36.9 °C)-101.6 °F (38.7 °C)] 98.4 °F (36.9 °C)  Pulse:  [111-140] 111  Resp:  [18-34] 18  SpO2:  [49 %-100 %] 99 %  BP: (109-147)/(66-85) 110/69     Weight: 101.4 kg (223 lb 8.7 oz)  Body mass index is 36.08 kg/m².     Physical Exam  Vitals and nursing note reviewed.   Constitutional:       General: She is not in acute distress.     Appearance: Normal appearance. She is obese. She is ill-appearing. She is not toxic-appearing.   HENT:      Head: Normocephalic and atraumatic.      Right Ear:  External ear normal.      Left Ear: External ear normal.      Nose: Nose normal.      Mouth/Throat:      Mouth: Mucous membranes are moist.      Pharynx: Oropharynx is clear.   Eyes:      General: No scleral icterus.     Extraocular Movements: Extraocular movements intact.      Conjunctiva/sclera: Conjunctivae normal.      Pupils: Pupils are equal, round, and reactive to light.   Cardiovascular:      Rate and Rhythm: Normal rate and regular rhythm.      Pulses: Normal pulses.      Heart sounds: Normal heart sounds. No murmur heard.     No friction rub.   Pulmonary:      Effort: Respiratory distress present.      Breath sounds: No stridor. Wheezing present. No rhonchi or rales.   Abdominal:      General: Abdomen is flat. Bowel sounds are normal. There is no distension.      Palpations: Abdomen is soft.   Genitourinary:     Comments: deferred  Musculoskeletal:         General: No swelling, tenderness, deformity or signs of injury. Normal range of motion.      Cervical back: Normal range of motion and neck supple.   Skin:     General: Skin is warm and dry.      Capillary Refill: Capillary refill takes less than 2 seconds.      Coloration: Skin is not pale.      Findings: No erythema or rash.   Neurological:      General: No focal deficit present.      Mental Status: She is alert and oriented to person, place, and time.   Psychiatric:         Mood and Affect: Mood normal.         Behavior: Behavior normal.         Thought Content: Thought content normal.         Judgment: Judgment normal.              CRANIAL NERVES     CN III, IV, VI   Pupils are equal, round, and reactive to light.    Results for orders placed or performed during the hospital encounter of 10/11/23   Influenza A & B by Molecular    Specimen: Nasopharyngeal Swab   Result Value Ref Range    Influenza A, Molecular Negative Negative    Influenza B, Molecular Negative Negative    Flu A & B Source Nasal swab    CBC Auto Differential   Result Value Ref Range     WBC 13.29 (H) 3.90 - 12.70 K/uL    RBC 4.33 4.00 - 5.40 M/uL    Hemoglobin 13.4 12.0 - 16.0 g/dL    Hematocrit 43.1 37.0 - 48.5 %     (H) 82 - 98 fL    MCH 30.9 27.0 - 31.0 pg    MCHC 31.1 (L) 32.0 - 36.0 g/dL    RDW 17.2 (H) 11.5 - 14.5 %    Platelets 227 150 - 450 K/uL    MPV 9.8 9.2 - 12.9 fL    Immature Granulocytes 0.6 (H) 0.0 - 0.5 %    Gran # (ANC) 11.2 (H) 1.8 - 7.7 K/uL    Immature Grans (Abs) 0.08 (H) 0.00 - 0.04 K/uL    Lymph # 0.9 (L) 1.0 - 4.8 K/uL    Mono # 0.6 0.3 - 1.0 K/uL    Eos # 0.5 0.0 - 0.5 K/uL    Baso # 0.06 0.00 - 0.20 K/uL    nRBC 0 0 /100 WBC    Gran % 83.9 (H) 38.0 - 73.0 %    Lymph % 6.8 (L) 18.0 - 48.0 %    Mono % 4.7 4.0 - 15.0 %    Eosinophil % 3.5 0.0 - 8.0 %    Basophil % 0.5 0.0 - 1.9 %    Differential Method Automated    Comprehensive Metabolic Panel   Result Value Ref Range    Sodium 141 136 - 145 mmol/L    Potassium 4.3 3.5 - 5.1 mmol/L    Chloride 99 95 - 110 mmol/L    CO2 27 23 - 29 mmol/L    Glucose 167 (H) 70 - 110 mg/dL    BUN 9 6 - 20 mg/dL    Creatinine 0.9 0.5 - 1.4 mg/dL    Calcium 9.7 8.7 - 10.5 mg/dL    Total Protein 7.2 6.0 - 8.4 g/dL    Albumin 3.4 (L) 3.5 - 5.2 g/dL    Total Bilirubin 0.4 0.1 - 1.0 mg/dL    Alkaline Phosphatase 95 55 - 135 U/L    AST 25 10 - 40 U/L    ALT 27 10 - 44 U/L    eGFR >60 >60 mL/min/1.73 m^2    Anion Gap 15 8 - 16 mmol/L   BNP   Result Value Ref Range    BNP <10 0 - 99 pg/mL   CK   Result Value Ref Range     (H) 20 - 180 U/L   Troponin I   Result Value Ref Range    Troponin I 0.039 (H) 0.000 - 0.026 ng/mL   COVID-19 Rapid Screening   Result Value Ref Range    SARS-CoV-2 RNA, Amplification, Qual Negative Negative   Urinalysis, Reflex to Urine Culture Urine, Clean Catch    Specimen: Urine   Result Value Ref Range    Specimen UA Urine, Clean Catch     Color, UA Colorless (A) Yellow, Straw, Hallie    Appearance, UA Clear Clear    pH, UA 7.0 5.0 - 8.0    Specific Gravity, UA 1.010 1.005 - 1.030    Protein, UA Negative Negative     Glucose, UA Negative Negative    Ketones, UA Negative Negative    Bilirubin (UA) Negative Negative    Occult Blood UA Negative Negative    Nitrite, UA Negative Negative    Urobilinogen, UA Negative <2.0 EU/dL    Leukocytes, UA Negative Negative   Lactic acid, plasma #1   Result Value Ref Range    Lactate (Lactic Acid) 1.5 0.5 - 2.2 mmol/L   Lactic acid, plasma #2   Result Value Ref Range    Lactate (Lactic Acid) 1.8 0.5 - 2.2 mmol/L   Troponin I   Result Value Ref Range    Troponin I 0.024 0.000 - 0.026 ng/mL   POCT glucose   Result Value Ref Range    POCT Glucose 156 (H) 70 - 110 mg/dL   ISTAT PROCEDURE   Result Value Ref Range    POC PH 7.434 7.35 - 7.45    POC PCO2 49.0 (H) 35 - 45 mmHg    POC PO2 459 (H) 80 - 100 mmHg    POC HCO3 32.8 (H) 24 - 28 mmol/L    POC BE 9 -2 to 2 mmol/L    POC SATURATED O2 100 95 - 100 %    Rate 24     Sample ARTERIAL     Site LR     Allens Test Pass     DelSys CPAP/BiPAP     Mode BiPAP     FiO2 100     IP 12     EP 6    POCT glucose   Result Value Ref Range    POCT Glucose 184 (H) 70 - 110 mg/dL     *Note: Due to a large number of results and/or encounters for the requested time period, some results have not been displayed. A complete set of results can be found in Results Review.      Significant Labs: All pertinent labs within the past 24 hours have been reviewed.    Imaging Results              X-Ray Chest AP Portable (Final result)  Result time 10/11/23 12:16:37      Final result by Terry MonetChi), MD (10/11/23 12:16:37)                   Impression:      Stable moderate bilateral infiltrates.      Electronically signed by: Terry Monet MD  Date:    10/11/2023  Time:    12:16               Narrative:    EXAMINATION:  XR CHEST AP PORTABLE    CLINICAL HISTORY:  Shortness of breath, sob;    COMPARISON:  Chest, 09/14/2023.    FINDINGS:  Stable cardiomegaly.  Moderate bilateral infiltrates appear stable                                     Significant Imaging: I have  reviewed all pertinent imaging results/findings within the past 24 hours.    Assessment/Plan:     * Acute on chronic respiratory failure with hypoxemia  Patient with Hypercapnic Respiratory failure which is Acute on chronic.  she is on home oxygen at 8 LPM. Supplemental oxygen was provided and noted- Oxygen Concentration (%):  [] 60    .   Signs/symptoms of respiratory failure include- tachypnea, increased work of breathing, wheezing and lethargy. Contributing diagnoses includes - Interstitial lung disease and Obesity Hypoventilation Labs and images were reviewed. Patient Has recent ABG, which has been reviewed. Will treat underlying causes and adjust management of respiratory failure as follows-     Bipap, O2, prednisone  PT/OT    Severe sepsis  This patient does have evidence of infective focus  My overall impression is sepsis.  Source: Respiratory  Antibiotics given-   Antibiotics (72h ago, onward)    Start     Stop Route Frequency Ordered    10/11/23 1530  piperacillin-tazobactam (ZOSYN) 4.5 g in dextrose 5 % in water (D5W) 100 mL IVPB (MB+)         -- IV Every 8 hours (non-standard times) 10/11/23 1445    10/11/23 1445  linezolid tablet 600 mg         -- Oral Every 12 hours 10/11/23 1439        Latest lactate reviewed-  Recent Labs   Lab 10/11/23  1550 10/11/23  1752   LACTATE 1.5 1.8     Organ dysfunction indicated by Acute respiratory failure    Fluid challenge Actual Body weight- Patient will receive 30ml/kg actual body weight to calculate fluid bolus for treatment of septic shock.     Post- resuscitation assessment No - Post resuscitation assessment not needed       Will Not start Pressors- Levophed for MAP of 65  Source control achieved by: IV Abx    UIP (usual interstitial pneumonitis)  Cont Prednisone, pt on OFEV as out pt.   Being evaluated for Lung Tx      GLENN on CPAP  Cont Bipap        VTE Risk Mitigation (From admission, onward)         Ordered     enoxaparin injection 40 mg  Every 24 hours          10/11/23 1445     IP VTE HIGH RISK PATIENT  Once         10/11/23 1443     Place sequential compression device  Until discontinued         10/11/23 1444                  Chantel Escobar MD  Department of Hospital Medicine  [facility]

## 2023-10-12 NOTE — HPI
This is a 53-year-old AAF with a PMH of Sjogren's syndrome, ILD, RA, chronic hypoxic respiratory failure on 6-8L/NC, asthma, GLENN (CPAP), possible Lung Tx candidate, who presented to ER for evaluation of SOB which started gradually over last couple of days.  Patient was seen by ENT on 10/06 and underwent a scope which noted a vocal cords covered and exudate that was sent for culture that grew both Actinomyces and prevotella.  Patient febrile on admission to 101.6 F. currently afebrile.  Leukocytosis is improving to 12,000. Renal function has been within normal limits.  Infectious diseases has been consulted for assistance with management of vocal cord infection.

## 2023-10-12 NOTE — PROGRESS NOTES
[facility] Medicine  Progress Note    Patient Name: Ayanna Alicia  MRN: [id number]  Patient Class: IP- Inpatient   Admission Date: 10/11/2023  Length of Stay: 1 days  Attending Physician: Chantel Escobar MD  Primary Care Provider: Madeleine Enrique MD        Subjective:     Principal Problem:Acute on chronic respiratory failure with hypoxemia        HPI:  Ayanna Alicia is a 53-year-old AAF with a PMH of Sjogren's syndrome, ILD, RA, chronic hypoxic respiratory failure on 6-8L/NC, asthma, GLENN (CPAP), possible Lung Tx candidate, who presented to ER for evaluation of SOB which started gradually over last couple of days. Pt had an O2 saturartion of 83% on 8L via NC. Symptoms are constant and moderate in severity. No mitigating or exacerbating factors reported. Associated sxs include fever. Patient denies any N/V/D, HA,weakness, numbness, and all other sxs at this time. No prior tx reported. No further complaints or concerns at this time. She had a similar admission here from 9/14-/17/23.      Initial VS temp 101.6, , 147/85, 34, 83% on 8 L NC. Labs WBC 13.4, H/H 13/43, BNP <10, Trop 0.039, CXR showed stable cardiomegaly, moderate bilateral infiltrates appear stable. Pt received nebs, Solumedrol 125 mg IV plus Zosyn/Zyvox and Levaquin plus lasix 40 mg and was placed on Bipap at 100% O2 and ABG showed 7.43/459/49/34/100%    She is being admitted to [facility] as acute on ch hypoxemic resp failure         Overview/Hospital Course:  53-year-old AAF with Sjogren's syndrome, ILD, RA, chronic hypoxic respiratory failure on 6-8L/NC, asthma, GLENN (CPAP), possible Lung Tx candidate, who presented to ER for evaluation of SOB which started gradually over last couple of days. Pt had an O2 saturartion of 83% on 8L via NC. She had a similar admission here from 9/14-/17/23.      Initial VS temp 101.6, , 147/85, 34, 83% on 8 L NC. Labs WBC 13.4, H/H 13/43, BNP <10, Trop 0.039, CXR showed stable cardiomegaly,  moderate bilateral infiltrates appear stable. Pt received nebs, Solumedrol 125 mg IV plus Zosyn/Zyvox and Levaquin plus lasix 40 mg and was placed on Bipap at 100% O2 and ABG showed 7.43/459/49/34/100%. She is being admitted to [facility] as acute on ch hypoxemic resp failure.    10/12- looks and feels a little better, SOB improved a little. Was overnite on Bipap 50%, now down to 6-7 L NC, maintaining sats. Got another dose of Lasix today. Also got PICC line for IV Abx for her hoarseness sec to Vocal Cord Dysfunction-    Per ID: vocal cord infection with cultures that have grown Actinomyces, prevotella, and Candida glabrata so far.  ENT saw patient in clinic and performed a scope which noted exudate on the vocal cords with culture sent.  Actinomyces can prove very difficult to treat and will require a long duration of antibiotics.  The other pathogens can likely be treated with a short course of antimicrobials.  Patient febrile on admission to 101.6.  Currently afebrile.  Leukocytosis trending down to 12,000 on review of CBC.  CMP shows normal creatinine.     Recommendations:  --Will target Actino with penicillin G 20 MU continuous infusion daily   --Will discuss with pharmacy to see if ceftriaxone can be used as an equivalent outpatient   --Will need 6 weeks of IV therapy followed by PO amoxicillin for at least 2-6 months   --For Prevotella recommend PO metronidazole 500 mg BID to complete 14 day course   --For C glabrata, recommend IV micafungin 100 mg daily for at least 14 days; typically azole resistant        Interval History: looks and feels a little better, SOB improved a little. Was overnite on Bipap 50%, now down to 6-7 L NC, maintaining sats. Got another dose of Lasix today. Also got PICC line for IV Abx for her hoarseness sec to Vocal Cord Infection. Cx have grown Actinomyces, Prevotella, and Candida glabrata.      --Will target Actino with penicillin G 20 MU continuous infusion daily   --Will need 6 weeks  of IV therapy followed by PO amoxicillin for at least 2-6 months   --For Prevotella recommend PO metronidazole 500 mg BID to complete 14 day course   --For C glabrata, recommend IV micafungin 100 mg daily for at least 14 days; typically azole resistant    Review of Systems   Constitutional:  Positive for activity change, appetite change and fatigue.   HENT:  Positive for congestion, sore throat and voice change.    Respiratory:  Positive for cough and shortness of breath.    All other systems reviewed and are negative.    Objective:     Vital Signs (Most Recent):  Temp: 97.9 °F (36.6 °C) (10/12/23 1716)  Pulse: 88 (10/12/23 1716)  Resp: 20 (10/12/23 1716)  BP: 120/79 (10/12/23 1716)  SpO2: 99 % (10/12/23 1704) Vital Signs (24h Range):  Temp:  [97.4 °F (36.3 °C)-98.1 °F (36.7 °C)] 97.9 °F (36.6 °C)  Pulse:  [] 88  Resp:  [18-28] 20  SpO2:  [97 %-100 %] 99 %  BP: (112-137)/(56-90) 120/79     Weight: 102.1 kg (225 lb 1.4 oz)  Body mass index is 36.33 kg/m².    Intake/Output Summary (Last 24 hours) at 10/12/2023 1734  Last data filed at 10/12/2023 1313  Gross per 24 hour   Intake 1159.69 ml   Output 2300 ml   Net -1140.31 ml         Physical Exam  Vitals and nursing note reviewed.   Constitutional:       General: She is not in acute distress.     Appearance: Normal appearance. She is obese. She is ill-appearing. She is not toxic-appearing.   HENT:      Head: Normocephalic and atraumatic.      Right Ear: External ear normal.      Left Ear: External ear normal.      Nose: Nose normal.      Mouth/Throat:      Mouth: Mucous membranes are moist.      Pharynx: Oropharynx is clear.   Eyes:      General: No scleral icterus.     Extraocular Movements: Extraocular movements intact.      Conjunctiva/sclera: Conjunctivae normal.      Pupils: Pupils are equal, round, and reactive to light.   Cardiovascular:      Rate and Rhythm: Normal rate and regular rhythm.      Pulses: Normal pulses.      Heart sounds: Normal heart sounds.  No murmur heard.     No friction rub.   Pulmonary:      Effort: No respiratory distress.      Breath sounds: No stridor. Wheezing present. No rhonchi or rales.   Abdominal:      General: Abdomen is flat. Bowel sounds are normal. There is no distension.      Palpations: Abdomen is soft.   Genitourinary:     Comments: deferred  Musculoskeletal:         General: No swelling, tenderness, deformity or signs of injury. Normal range of motion.      Cervical back: Normal range of motion and neck supple.   Skin:     General: Skin is warm and dry.      Capillary Refill: Capillary refill takes less than 2 seconds.      Coloration: Skin is not pale.      Findings: No erythema or rash.   Neurological:      General: No focal deficit present.      Mental Status: She is alert and oriented to person, place, and time.   Psychiatric:         Mood and Affect: Mood normal.         Behavior: Behavior normal.         Thought Content: Thought content normal.         Judgment: Judgment normal.             Significant Labs: All pertinent labs within the past 24 hours have been reviewed.  Blood Culture:   Recent Labs   Lab 10/11/23  1318 10/11/23  1326   LABBLOO No Growth to date No Growth to date     BMP:   Recent Labs   Lab 10/12/23  0504   *      K 4.0   CL 99   CO2 27   BUN 14   CREATININE 0.9   CALCIUM 9.4   MG 2.0     CBC:   Recent Labs   Lab 10/11/23  1139 10/12/23  0504   WBC 13.29* 12.87*   HGB 13.4 12.0   HCT 43.1 39.2    236     Respiratory Culture:   Recent Labs   Lab 10/11/23  1630   GSRESP <10 epithelial cells per low power field.  Few WBC's  Moderate Gram positive cocci       Significant Imaging: I have reviewed all pertinent imaging results/findings within the past 24 hours.      Assessment/Plan:      * Acute on chronic respiratory failure with hypoxemia  Patient with Hypercapnic Respiratory failure which is Acute on chronic.  she is on home oxygen at 8 LPM. Supplemental oxygen was provided and noted-  Oxygen Concentration (%):  [50] 50    .   Signs/symptoms of respiratory failure include- tachypnea, increased work of breathing, wheezing and lethargy. Contributing diagnoses includes - Interstitial lung disease and Obesity Hypoventilation Labs and images were reviewed. Patient Has recent ABG, which has been reviewed. Will treat underlying causes and adjust management of respiratory failure as follows-     Bipap, O2, prednisone  PT/OT    Stable to improved- cont present care    Severe sepsis  This patient does have evidence of infective focus  My overall impression is sepsis.  Source: Respiratory  Antibiotics given-   Antibiotics (72h ago, onward)    Start     Stop Route Frequency Ordered    10/12/23 1530  penicillin G potassium 20 Million Units in dextrose 5 % (D5W) 500 mL CONTINUOUS INFUSION         11/23/23 1529 IV Every 24 hours (non-standard times) 10/12/23 1410    10/12/23 0915  metroNIDAZOLE tablet 500 mg         10/26/23 0859 Oral Every 12 hours 10/12/23 0810    10/11/23 1445  linezolid tablet 600 mg         -- Oral Every 12 hours 10/11/23 1439        Latest lactate reviewed-  Recent Labs   Lab 10/11/23  1550 10/11/23  1752   LACTATE 1.5 1.8     Organ dysfunction indicated by Acute respiratory failure    Fluid challenge Actual Body weight- Patient will receive 30ml/kg actual body weight to calculate fluid bolus for treatment of septic shock.     Post- resuscitation assessment No - Post resuscitation assessment not needed       Will Not start Pressors- Levophed for MAP of 65  Source control achieved by: IV Abx    Improved w Abx and steroids    Actinomyces infection  vocal cord infection with cultures that have grown Actinomyces, prevotella, and Candida glabrata so far.  ENT saw patient in clinic and performed a scope which noted exudate on the vocal cords with culture sent.  Actinomyces can prove very difficult to treat and will require a long duration of antibiotics.  The other pathogens can likely be  treated with a short course of antimicrobials.  Patient febrile on admission to 101.6.  Currently afebrile.  Leukocytosis trending down to 12,000 on review of CBC.  CMP shows normal creatinine.     Recommendations:  --Will target Actino with penicillin G 20 MU continuous infusion daily   --Will discuss with pharmacy to see if ceftriaxone can be used as an equivalent outpatient   --Will need 6 weeks of IV therapy followed by PO amoxicillin for at least 2-6 months   --For Prevotella recommend PO metronidazole 500 mg BID to complete 14 day course   --For C glabrata, recommend IV micafungin 100 mg daily for at least 14 days; typically azole resistant      Dysphonia due to laryngeal ulcers  S/p laryngoscopy with Vocal cord exudates- Polymicrobial infection- see below      UIP (usual interstitial pneumonitis)  Cont Prednisone, pt on OFEV as out pt.   Being evaluated for Lung Tx      GLENN on CPAP  Cont Bipap        VTE Risk Mitigation (From admission, onward)         Ordered     enoxaparin injection 40 mg  Every 24 hours         10/11/23 1445     IP VTE HIGH RISK PATIENT  Once         10/11/23 1443     Place sequential compression device  Until discontinued         10/11/23 1444                Discharge Planning   JANETTE:      Code Status: DNR   Is the patient medically ready for discharge?:     Reason for patient still in hospital (select all that apply): Patient trending condition, Laboratory test, Treatment, Imaging, Consult recommendations and PT / OT recommendations  Discharge Plan A: Home with family        Chantel Escobar MD  Department of Hospital Medicine   [facility] [facility]

## 2023-10-12 NOTE — SUBJECTIVE & OBJECTIVE
Interval History: looks and feels a little better, SOB improved a little. Was overnite on Bipap 50%, now down to 6-7 L NC, maintaining sats. Got another dose of Lasix today. Also got PICC line for IV Abx for her hoarseness sec to Vocal Cord Infection. Cx have grown Actinomyces, Prevotella, and Candida glabrata.      --Will target Actino with penicillin G 20 MU continuous infusion daily   --Will need 6 weeks of IV therapy followed by PO amoxicillin for at least 2-6 months   --For Prevotella recommend PO metronidazole 500 mg BID to complete 14 day course   --For C glabrata, recommend IV micafungin 100 mg daily for at least 14 days; typically azole resistant    Review of Systems   Constitutional:  Positive for activity change, appetite change and fatigue.   HENT:  Positive for congestion, sore throat and voice change.    Respiratory:  Positive for cough and shortness of breath.    All other systems reviewed and are negative.    Objective:     Vital Signs (Most Recent):  Temp: 97.9 °F (36.6 °C) (10/12/23 1716)  Pulse: 88 (10/12/23 1716)  Resp: 20 (10/12/23 1716)  BP: 120/79 (10/12/23 1716)  SpO2: 99 % (10/12/23 1704) Vital Signs (24h Range):  Temp:  [97.4 °F (36.3 °C)-98.1 °F (36.7 °C)] 97.9 °F (36.6 °C)  Pulse:  [] 88  Resp:  [18-28] 20  SpO2:  [97 %-100 %] 99 %  BP: (112-137)/(56-90) 120/79     Weight: 102.1 kg (225 lb 1.4 oz)  Body mass index is 36.33 kg/m².    Intake/Output Summary (Last 24 hours) at 10/12/2023 1734  Last data filed at 10/12/2023 1313  Gross per 24 hour   Intake 1159.69 ml   Output 2300 ml   Net -1140.31 ml         Physical Exam  Vitals and nursing note reviewed.   Constitutional:       General: She is not in acute distress.     Appearance: Normal appearance. She is obese. She is ill-appearing. She is not toxic-appearing.   HENT:      Head: Normocephalic and atraumatic.      Right Ear: External ear normal.      Left Ear: External ear normal.      Nose: Nose normal.      Mouth/Throat:       Mouth: Mucous membranes are moist.      Pharynx: Oropharynx is clear.   Eyes:      General: No scleral icterus.     Extraocular Movements: Extraocular movements intact.      Conjunctiva/sclera: Conjunctivae normal.      Pupils: Pupils are equal, round, and reactive to light.   Cardiovascular:      Rate and Rhythm: Normal rate and regular rhythm.      Pulses: Normal pulses.      Heart sounds: Normal heart sounds. No murmur heard.     No friction rub.   Pulmonary:      Effort: No respiratory distress.      Breath sounds: No stridor. Wheezing present. No rhonchi or rales.   Abdominal:      General: Abdomen is flat. Bowel sounds are normal. There is no distension.      Palpations: Abdomen is soft.   Genitourinary:     Comments: deferred  Musculoskeletal:         General: No swelling, tenderness, deformity or signs of injury. Normal range of motion.      Cervical back: Normal range of motion and neck supple.   Skin:     General: Skin is warm and dry.      Capillary Refill: Capillary refill takes less than 2 seconds.      Coloration: Skin is not pale.      Findings: No erythema or rash.   Neurological:      General: No focal deficit present.      Mental Status: She is alert and oriented to person, place, and time.   Psychiatric:         Mood and Affect: Mood normal.         Behavior: Behavior normal.         Thought Content: Thought content normal.         Judgment: Judgment normal.             Significant Labs: All pertinent labs within the past 24 hours have been reviewed.  Blood Culture:   Recent Labs   Lab 10/11/23  1318 10/11/23  1326   LABBLOO No Growth to date No Growth to date     BMP:   Recent Labs   Lab 10/12/23  0504   *      K 4.0   CL 99   CO2 27   BUN 14   CREATININE 0.9   CALCIUM 9.4   MG 2.0     CBC:   Recent Labs   Lab 10/11/23  1139 10/12/23  0504   WBC 13.29* 12.87*   HGB 13.4 12.0   HCT 43.1 39.2    236     Respiratory Culture:   Recent Labs   Lab 10/11/23  1630   GSRESP <10  epithelial cells per low power field.  Few WBC's  Moderate Gram positive cocci       Significant Imaging: I have reviewed all pertinent imaging results/findings within the past 24 hours.

## 2023-10-12 NOTE — HOSPITAL COURSE
53-year-old  female with a complex medical history, was admitted to the hospital due to a concerning episode of shortness of breath. She had been managing multiple chronic conditions, including Sjogren's syndrome, Interstitial Lung Disease (ILD), Rheumatoid Arthritis (RA), chronic hypoxic respiratory failure requiring 6-8 liters of oxygen via nasal cannula (NC), asthma, and Obstructive Sleep Apnea (GLENN) for which she used Continuous Positive Airway Pressure (CPAP). Furthermore, she was being considered as a potential lung transplant candidate.    The patient's symptoms had been escalating over the previous couple of days, leading her to seek care in the emergency room. Upon admission, her oxygen saturation was alarmingly low at 83% on 8 liters of NC oxygen support. This admission was not her first encounter with such respiratory distress, as she had been previously hospitalized from 9/14 to 9/17/23 with a similar presentation.    During her initial hospitalization, the patient displayed fever with a temperature of 101.6°F and a rapid heart rate of 133 beats per minute. Her blood pressure measured 147/85, respiratory rate was 34 breaths per minute, and she was still reliant on 8 liters of oxygen via NC to maintain oxygen saturation. Laboratory findings showed an elevated white blood cell count (13.4), and her hemoglobin/hematocrit levels were within an acceptable range. Her B-type natriuretic peptide (BNP) levels were less than 10, and troponin levels were within normal limits. Chest X-rays revealed stable cardiomegaly and moderate bilateral infiltrates that remained unchanged. Treatment included nebulized medications, intravenous Solumedrol (125 mg), and a combination of antibiotics such as Zosyn and Zyvox, as well as Levaquin. Additionally, Lasix (40 mg) was administered, and she was placed on BiPAP with 100% oxygen. Arterial blood gas (ABG) analysis demonstrated a pH of 7.43, pO2 of 459, pCO2 of  49, bicarbonate of 34, and oxygen saturation of 100%. This constellation of findings led to her admission for acute-on-chronic hypoxemic respiratory failure.    Over the next few days, the patient exhibited slight improvements. Her oxygen requirements decreased from overnight BiPAP to 6-7 liters via NC while maintaining satisfactory oxygen saturation levels. A PICC line was placed to facilitate intravenous antibiotic therapy due to hoarseness attributed to Vocal Cord Dysfunction. Infectious disease specialists identified a vocal cord infection with cultures growing Actinomyces, Prevotella, and Candida glabrata. Actinomyces was identified as a challenging pathogen to treat, necessitating a prolonged course of penicillin G (20 million units) delivered via continuous infusion. Efforts were made to consider ceftriaxone for equivalent outpatient therapy. The treatment plan for Candida glabrata included IV micafungin (100 mg daily) for at least 14 days. Prevotella was addressed with a 14-day course of oral metronidazole (500 mg BID). The patient's fever, which was present upon admission, had since resolved, and leukocytosis was trending downward. Renal function, as reflected in the comprehensive metabolic panel (CMP), remained normal.    On 10/13, the patient continued to improve, reporting enhanced strength and vital signs remaining stable. She remained afebrile. Her oxygen needs further decreased, and she was engaged in physical therapy and occupational therapy while diligently performing incentive spirometry exercises. A second dose of IV penicillin G and two weeks of IV micafungin along with a two-week course of oral Flagyl was initiated. Plans were set in motion for home health arrangements through [facility]. The patient was encouraged to engage in physical activity and ambulation.    By 10/14, the patient continued to experience shortness of breath, especially upon exertion. Although she responded  positively to intravenous Lasix, a repeat chest X-ray indicated persistent pulmonary edema and signs of congestive heart failure. As a therapeutic measure, Metolazone was added to the Lasix regimen. The patient was encouraged to persist in incentive spirometry exercises and ambulation. She continued to tolerate intravenous Rocephin without experiencing nausea or restlessness. Her hoarseness, however, persisted, and her oxygen requirements decreased to 6 liters via NC. Discharge planning was considered, and a transition to oral Lasix was anticipated for the coming days.    On 10/16, the patient reported chest congestion and muscle cramps. Laboratory findings indicated metabolic alkalosis. She was maintained on 6 liters of oxygen via NC, and a chest X-ray  diod not show any new finding .    Pt was seen and examined at bedside . She was determined to  be suitable for d/c .  She is is on 6 lt by nasal canula which is her baseline   She respond well to IV diuresis with lasix and will be d/c on po lasix  Case D/W Pulmonology and ID which agree to d/c home . She will be d/c on IVAB  as above .   She was advised to f/u with her PCP , pulmonology and ID in 1 to 2 weeks .

## 2023-10-12 NOTE — SUBJECTIVE & OBJECTIVE
Past Medical History:   Diagnosis Date    Abnormal Pap smear of cervix     in the past with repeat pap smear okay.    Acute interstitial pneumonitis     Allergic rhinitis, cause unspecified     Arthritis of both knees     Asthma     Eczema     Fatty liver 10/2014    Fibrocystic breast changes     Headache(784.0)     Hepatomegaly 10/2014    Hypertension     Liver cyst 10/2014    Multinodular goiter     Followed by ENT - Dr. Nicolas Gray    Polymenorrhea     TMJ (dislocation of temporomandibular joint)     Uterine fibroid     in the past    Vitamin D deficiency disease        Past Surgical History:   Procedure Laterality Date    BRONCHOSCOPY Bilateral 2023    Procedure: Bronchoscopy - insert lighted tube into airway to take a biopsy of lung;  Surgeon: Agustín Aviles MD;  Location: [facility] ENDO;  Service: Pulmonary;  Laterality: Bilateral;     SECTION, CLASSIC      x 1    COLONOSCOPY N/A 2020    Procedure: COLONOSCOPY;  Surgeon: Angie Magana MD;  Location: [facility] ENDO;  Service: Endoscopy;  Laterality: N/A;    HYSTERECTOMY      MULTIPLE TOOTH EXTRACTIONS      PARTIAL HYSTERECTOMY  2013    Due to fibroids       Review of patient's allergies indicates:   Allergen Reactions    Doxycycline Nausea Only     Other reaction(s): Nausea    Fluticasone Other (See Comments)     Other reaction(s): Epistaxis         Medications:  Medications Prior to Admission   Medication Sig    albuterol (PROVENTIL/VENTOLIN HFA) 90 mcg/actuation inhaler INHALE 1 TO 2 PUFFS BY MOUTH INTO THE LUNGS EVERY 4 TO 6 HOURS AS NEEDED FOR WHEEZING OR SHORTNESS OF BREATH    furosemide (LASIX) 40 MG tablet Take 1 tablet (40 mg total) by mouth once daily.    ibuprofen (ADVIL,MOTRIN) 200 MG tablet Take 200 mg by mouth every 6 (six) hours as needed for Pain.    ipratropium (ATROVENT) 0.02 % nebulizer solution Take 2.5 mLs (500 mcg total) by nebulization 4 (four) times daily. Rescue    levocetirizine (XYZAL) 5 MG tablet TAKE 1  TABLET(5 MG) BY MOUTH EVERY DAY    mv-minerals/FA/omega 3,6,9 #3 (WOMEN'S 50+ ADVANCED ORAL) Take 1 tablet by mouth Daily.    nintedanib (OFEV) 150 mg Cap Take 1 capsule (150 mg total) by mouth 2 (two) times a day.    omega-3s/dha/epa/fish oil/D3 (VITAMIN-D + OMEGA-3 ORAL) Take 2 tablets by mouth once daily at 6am.    omeprazole (PRILOSEC) 20 MG capsule Take 1 capsule (20 mg total) by mouth once daily. (Patient taking differently: Take 40 mg by mouth once daily.)    pantoprazole (PROTONIX) 40 MG tablet Take 1 tablet (40 mg total) by mouth once daily.    predniSONE (DELTASONE) 10 MG tablet Take 6 tablets (60 mg total) by mouth once daily for 3 days, THEN 4 tablets (40 mg total) once daily for 3 days, THEN 2 tablets (20 mg total) once daily for 3 days, THEN 1 tablet (10 mg total) once daily.    SEREVENT DISKUS 50 mcg/dose diskus inhaler Inhale 1 puff into the lungs 2 (two) times daily. Controller    vitamin D (VITAMIN D3) 1000 units Tab Take 1,000 Units by mouth once daily.     Antibiotics (From admission, onward)      Start     Stop Route Frequency Ordered    10/12/23 1530  penicillin G potassium 20 Million Units in dextrose 5 % (D5W) 500 mL CONTINUOUS INFUSION         11/23/23 1529 IV Every 24 hours (non-standard times) 10/12/23 1410    10/12/23 0915  metroNIDAZOLE tablet 500 mg         10/26/23 0859 Oral Every 12 hours 10/12/23 0810    10/11/23 1445  linezolid tablet 600 mg         -- Oral Every 12 hours 10/11/23 1439          Antifungals (From admission, onward)      Start     Stop Route Frequency Ordered    10/12/23 1700  micafungin 100 mg in sodium chloride 0.9 % 100 mL IVPB (MB+)         -- IV Every 24 hours (non-standard times) 10/12/23 1547          Antivirals (From admission, onward)      None             Immunization History   Administered Date(s) Administered    COVID-19 Vaccine 08/20/2021, 10/14/2021    COVID-19, MRNA, LN-S, PF (MODERNA FULL 0.5 ML DOSE) 08/20/2021, 10/14/2021    COVID-19, mRNA, LNP-S,  bivalent booster, PF (Moderna Omicron)12 + YEARS 08/11/2023    Influenza 10/13/2010    Influenza (FLUAD) - Quadrivalent - Adjuvanted - PF *Preferred* (65+) 11/01/2022    Influenza - Quadrivalent 10/02/2014, 01/12/2016, 02/01/2017    Influenza - Quadrivalent - PF *Preferred* (6 months and older) 09/22/2020, 10/14/2021    Influenza - Trivalent (ADULT) 10/13/2010    Pneumococcal Conjugate - 20 Valent 11/01/2022    Tdap 03/23/2012       Family History       Problem Relation (Age of Onset)    Cancer Maternal Grandmother (60), Maternal Aunt (50), Maternal Aunt (66)    Cataracts Cousin    Diabetes Maternal Grandfather    Diverticulitis Mother    Heart disease Mother, Father (63)    Hypertension Father    Migraines Cousin    No Known Problems Brother    Peripheral vascular disease Maternal Grandfather    Stroke Mother, Sister, Maternal Grandfather          Social History     Socioeconomic History    Marital status: Single    Number of children: 0   Occupational History    Occupation:      Employer: [employer]     Comment: [employer]   Tobacco Use    Smoking status: Never     Passive exposure: Past    Smokeless tobacco: Never   Substance and Sexual Activity    Alcohol use: Not Currently     Comment: last use 03/2022    Drug use: Not Currently     Frequency: 4.0 times per week     Types: Marijuana     Comment: last year was last use 03/2022    Sexual activity: Yes     Partners: Female   Social History Narrative    She wears seatbelt. Her son was killed in 2010.     Social Determinants of Health     Financial Resource Strain: Low Risk  (4/2/2020)    Overall Financial Resource Strain (CARDIA)     Difficulty of Paying Living Expenses: Not very hard   Food Insecurity: No Food Insecurity (4/2/2020)    Hunger Vital Sign     Worried About Running Out of Food in the Last Year: Never true     Ran Out of Food in the Last Year: Never true   Transportation Needs: No Transportation Needs (4/2/2020)    PRAPARE -  Transportation     Lack of Transportation (Medical): No     Lack of Transportation (Non-Medical): No   Physical Activity: Inactive (3/24/2022)    Exercise Vital Sign     Days of Exercise per Week: 0 days     Minutes of Exercise per Session: 0 min   Stress: No Stress Concern Present (8/15/2019)    [employer] - Occupational Stress Questionnaire     Feeling of Stress : Not at all   Social Connections: Moderately Isolated (4/2/2020)    Social Connection and Isolation Panel [NHANES]     Frequency of Communication with Friends and Family: More than three times a week     Frequency of Social Gatherings with Friends and Family: Twice a week     Attends Anglican Services: 1 to 4 times per year     Active Member of Clubs or Organizations: No     Attends Club or Organization Meetings: Never     Marital Status: Never      Review of Systems   HENT:  Positive for congestion, sore throat and voice change.    All other systems reviewed and are negative.    Objective:     Vital Signs (Most Recent):  Temp: 97.4 °F (36.3 °C) (10/12/23 1200)  Pulse: 90 (10/12/23 1313)  Resp: 20 (10/12/23 1247)  BP: 120/78 (10/12/23 1200)  SpO2: 100 % (10/12/23 1247) Vital Signs (24h Range):  Temp:  [97.4 °F (36.3 °C)-98.4 °F (36.9 °C)] 97.4 °F (36.3 °C)  Pulse:  [] 90  Resp:  [18-28] 20  SpO2:  [97 %-100 %] 100 %  BP: (109-137)/(56-90) 120/78     Weight: 102.1 kg (225 lb 1.4 oz)  Body mass index is 36.33 kg/m².    Estimated Creatinine Clearance: 85.2 mL/min (based on SCr of 0.9 mg/dL).     Physical Exam  Constitutional:       General: She is not in acute distress.     Appearance: Normal appearance. She is not ill-appearing.   Cardiovascular:      Rate and Rhythm: Normal rate and regular rhythm.      Pulses: Normal pulses.      Heart sounds: Normal heart sounds. No murmur heard.     No friction rub. No gallop.   Pulmonary:      Effort: Pulmonary effort is normal. No respiratory distress.      Breath sounds:  Rales present.   Abdominal:      General: Abdomen is flat. Bowel sounds are normal. There is no distension.      Palpations: Abdomen is soft.      Tenderness: There is no abdominal tenderness.   Skin:     General: Skin is warm and dry.   Neurological:      Mental Status: She is alert.          Significant Labs:   Microbiology Results (last 7 days)       Procedure Component Value Units Date/Time    Blood culture [[id number]] Collected: 10/11/23 1326    Order Status: Completed Specimen: Blood from Peripheral, Forearm, Right Updated: 10/12/23 0515     Blood Culture, Routine No Growth to date    Blood culture [[id number]] Collected: 10/11/23 1318    Order Status: Completed Specimen: Blood from Peripheral, Upper Arm, Left Updated: 10/12/23 0515     Blood Culture, Routine No Growth to date    Culture, Respiratory with Gram Stain [[id number]] Collected: 10/11/23 1630    Order Status: Completed Specimen: Respiratory from Sputum, Expectorated Updated: 10/12/23 0338     Gram Stain (Respiratory) <10 epithelial cells per low power field.     Gram Stain (Respiratory) Few WBC's     Gram Stain (Respiratory) Moderate Gram positive cocci    Respiratory Viral Panel by PCR (Sources other than NP Swab) [facility]ils[facility]; Nasal Swab [[id number]] Collected: 10/12/23 0134    Order Status: Sent Specimen: Respiratory Updated: 10/12/23 0246    Influenza A & B by Molecular [[id number]] Collected: 10/11/23 1142    Order Status: Completed Specimen: Nasopharyngeal Swab Updated: 10/11/23 1208     Influenza A, Molecular Negative     Influenza B, Molecular Negative     Flu A & B Source Nasal swab            Significant Imaging: I have reviewed all pertinent imaging results/findings within the past 24 hours.

## 2023-10-12 NOTE — PLAN OF CARE
Discussed poc with pt, pt verbalized understanding    Purposeful rounding every 2hours    VS wnl  Cardiac monitoring in use, pt is SARBJIT, tele monitor # [id number]  Blood glucose monitoring   Fall precautions in place, remains injury free  Coughing under control with PRN meds  Accurate I&Os  Abx given as prescribed  Bed locked at lowest position  Call light within reach    Chart check complete  Will cont with POC     Problem: Adult Inpatient Plan of Care  Goal: Plan of Care Review  Outcome: Ongoing, Progressing  Goal: Patient-Specific Goal (Individualized)  Outcome: Ongoing, Progressing  Goal: Absence of Hospital-Acquired Illness or Injury  Outcome: Ongoing, Progressing  Goal: Optimal Comfort and Wellbeing  Outcome: Ongoing, Progressing  Goal: Readiness for Transition of Care  Outcome: Ongoing, Progressing     Problem: Adjustment to Illness (Sepsis/Septic Shock)  Goal: Optimal Coping  Outcome: Ongoing, Progressing     Problem: Bleeding (Sepsis/Septic Shock)  Goal: Absence of Bleeding  Outcome: Ongoing, Progressing     Problem: Glycemic Control Impaired (Sepsis/Septic Shock)  Goal: Blood Glucose Level Within Desired Range  Outcome: Ongoing, Progressing     Problem: Infection Progression (Sepsis/Septic Shock)  Goal: Absence of Infection Signs and Symptoms  Outcome: Ongoing, Progressing     Problem: Nutrition Impaired (Sepsis/Septic Shock)  Goal: Optimal Nutrition Intake  Outcome: Ongoing, Progressing

## 2023-10-12 NOTE — CONSULTS
[facility] - Med Surg  Infectious Disease  Consult Note    Patient Name: Ayanna Alicia  MRN: [id number]  Admission Date: 10/11/2023  Hospital Length of Stay: 1 days  Attending Physician: Chantel Escobar MD  Primary Care Provider: Madeleine Enrique MD     Isolation Status: No active isolations    Patient information was obtained from patient, ER records and primary team.      Consults  Assessment/Plan:     Pulmonary  * Acute on chronic respiratory failure with hypoxemia  Requiring supplemental oxygen. Follow with pulmonary.     ID  Actinomyces infection  53-year-old female with history of interstitial lung disease due to Sjogren's who currently receives infusion therapy here for shortness of breath and findings of vocal cord infection with cultures that have grown Actinomyces, prevotella, and Candida glabrata so far.  ENT saw patient in clinic and performed a scope which noted exudate on the vocal cords with culture sent.  Actinomyces can prove very difficult to treat and will require a long duration of antibiotics.  The other pathogens can likely be treated with a short course of antimicrobials.  Patient febrile on admission to 101.6.  Currently afebrile.  Leukocytosis trending down to 12,000 on review of CBC.  CMP shows normal creatinine.    Recommendations:  --Will target Actino with penicillin G 20 MU continuous infusion daily   --Will discuss with pharmacy to see if ceftriaxone can be used as an equivalent outpatient   --Will need 6 weeks of IV therapy followed by PO amoxicillin for at least 2-6 months   --For Prevotella recommend PO metronidazole 500 mg BID to complete 14 day course   --For C glabrata, recommend IV micafungin 100 mg daily for at least 14 days; typically azole resistant   --PICC placement per primary   --Above d/w primary team.       Thank you for your consult. I will follow-up with patient. Please contact us if you have any additional questions.    Sarabjit Wayne, DO  Infectious Disease  [facility]  - Med Surg    Subjective:     Principal Problem: Acute on chronic respiratory failure with hypoxemia    HPI: This is a 53-year-old AAF with a PMH of Sjogren's syndrome, ILD, RA, chronic hypoxic respiratory failure on 6-8L/NC, asthma, GLENN (CPAP), possible Lung Tx candidate, who presented to ER for evaluation of SOB which started gradually over last couple of days.  Patient was seen by ENT on 10/06 and underwent a scope which noted a vocal cords covered and exudate that was sent for culture that grew both Actinomyces and prevotella.  Patient febrile on admission to 101.6 F. currently afebrile.  Leukocytosis is improving to 12,000. Renal function has been within normal limits.  Infectious diseases has been consulted for assistance with management of vocal cord infection.      Past Medical History:   Diagnosis Date    Abnormal Pap smear of cervix     in the past with repeat pap smear okay.    Acute interstitial pneumonitis     Allergic rhinitis, cause unspecified     Arthritis of both knees     Asthma     Eczema     Fatty liver 10/2014    Fibrocystic breast changes     Headache(784.0)     Hepatomegaly 10/2014    Hypertension     Liver cyst 10/2014    Multinodular goiter     Followed by ENT - Dr. Nicolas Gray    Polymenorrhea 2008    TMJ (dislocation of temporomandibular joint)     Uterine fibroid     in the past    Vitamin D deficiency disease        Past Surgical History:   Procedure Laterality Date    BRONCHOSCOPY Bilateral 2023    Procedure: Bronchoscopy - insert lighted tube into airway to take a biopsy of lung;  Surgeon: Agustín Aviles MD;  Location: [facility];  Service: Pulmonary;  Laterality: Bilateral;     SECTION, CLASSIC      x 1    COLONOSCOPY N/A 2020    Procedure: COLONOSCOPY;  Surgeon: Angie Magana MD;  Location: [facility];  Service: Endoscopy;  Laterality: N/A;    HYSTERECTOMY      MULTIPLE TOOTH EXTRACTIONS      PARTIAL HYSTERECTOMY  2013    Due  to fibroids       Review of patient's allergies indicates:   Allergen Reactions    Doxycycline Nausea Only     Other reaction(s): Nausea    Fluticasone Other (See Comments)     Other reaction(s): Epistaxis         Medications:  Medications Prior to Admission   Medication Sig    albuterol (PROVENTIL/VENTOLIN HFA) 90 mcg/actuation inhaler INHALE 1 TO 2 PUFFS BY MOUTH INTO THE LUNGS EVERY 4 TO 6 HOURS AS NEEDED FOR WHEEZING OR SHORTNESS OF BREATH    furosemide (LASIX) 40 MG tablet Take 1 tablet (40 mg total) by mouth once daily.    ibuprofen (ADVIL,MOTRIN) 200 MG tablet Take 200 mg by mouth every 6 (six) hours as needed for Pain.    ipratropium (ATROVENT) 0.02 % nebulizer solution Take 2.5 mLs (500 mcg total) by nebulization 4 (four) times daily. Rescue    levocetirizine (XYZAL) 5 MG tablet TAKE 1 TABLET(5 MG) BY MOUTH EVERY DAY    mv-minerals/FA/omega 3,6,9 #3 (WOMEN'S 50+ ADVANCED ORAL) Take 1 tablet by mouth Daily.    nintedanib (OFEV) 150 mg Cap Take 1 capsule (150 mg total) by mouth 2 (two) times a day.    omega-3s/dha/epa/fish oil/D3 (VITAMIN-D + OMEGA-3 ORAL) Take 2 tablets by mouth once daily at 6am.    omeprazole (PRILOSEC) 20 MG capsule Take 1 capsule (20 mg total) by mouth once daily. (Patient taking differently: Take 40 mg by mouth once daily.)    pantoprazole (PROTONIX) 40 MG tablet Take 1 tablet (40 mg total) by mouth once daily.    predniSONE (DELTASONE) 10 MG tablet Take 6 tablets (60 mg total) by mouth once daily for 3 days, THEN 4 tablets (40 mg total) once daily for 3 days, THEN 2 tablets (20 mg total) once daily for 3 days, THEN 1 tablet (10 mg total) once daily.    SEREVENT DISKUS 50 mcg/dose diskus inhaler Inhale 1 puff into the lungs 2 (two) times daily. Controller    vitamin D (VITAMIN D3) 1000 units Tab Take 1,000 Units by mouth once daily.     Antibiotics (From admission, onward)      Start     Stop Route Frequency Ordered    10/12/23 1530  penicillin G potassium 20 Million  Units in dextrose 5 % (D5W) 500 mL CONTINUOUS INFUSION         11/23/23 1529 IV Every 24 hours (non-standard times) 10/12/23 1410    10/12/23 0915  metroNIDAZOLE tablet 500 mg         10/26/23 0859 Oral Every 12 hours 10/12/23 0810    10/11/23 1445  linezolid tablet 600 mg         -- Oral Every 12 hours 10/11/23 1439          Antifungals (From admission, onward)      Start     Stop Route Frequency Ordered    10/12/23 1700  micafungin 100 mg in sodium chloride 0.9 % 100 mL IVPB (MB+)         -- IV Every 24 hours (non-standard times) 10/12/23 1547          Antivirals (From admission, onward)      None             Immunization History   Administered Date(s) Administered    COVID-19 Vaccine 08/20/2021, 10/14/2021    COVID-19, MRNA, LN-S, PF (MODERNA FULL 0.5 ML DOSE) 08/20/2021, 10/14/2021    COVID-19, mRNA, LNP-S, bivalent booster, PF (Moderna Omicron)12 + YEARS 08/11/2023    Influenza 10/13/2010    Influenza (FLUAD) - Quadrivalent - Adjuvanted - PF *Preferred* (65+) 11/01/2022    Influenza - Quadrivalent 10/02/2014, 01/12/2016, 02/01/2017    Influenza - Quadrivalent - PF *Preferred* (6 months and older) 09/22/2020, 10/14/2021    Influenza - Trivalent (ADULT) 10/13/2010    Pneumococcal Conjugate - 20 Valent 11/01/2022    Tdap 03/23/2012       Family History       Problem Relation (Age of Onset)    Cancer Maternal Grandmother (60), Maternal Aunt (50), Maternal Aunt (66)    Cataracts Cousin    Diabetes Maternal Grandfather    Diverticulitis Mother    Heart disease Mother, Father (63)    Hypertension Father    Migraines Cousin    No Known Problems Brother    Peripheral vascular disease Maternal Grandfather    Stroke Mother, Sister, Maternal Grandfather          Social History     Socioeconomic History    Marital status: Single    Number of children: 0   Occupational History    Occupation:      Employer: [employer]     Comment: [employer]   Tobacco Use    Smoking status: Never     Passive  exposure: Past    Smokeless tobacco: Never   Substance and Sexual Activity    Alcohol use: Not Currently     Comment: last use 03/2022    Drug use: Not Currently     Frequency: 4.0 times per week     Types: Marijuana     Comment: last year was last use 03/2022    Sexual activity: Yes     Partners: Female   Social History Narrative    She wears seatbelt. Her son was killed in 2010.     Social Determinants of Health     Financial Resource Strain: Low Risk  (4/2/2020)    Overall Financial Resource Strain (CARDIA)     Difficulty of Paying Living Expenses: Not very hard   Food Insecurity: No Food Insecurity (4/2/2020)    Hunger Vital Sign     Worried About Running Out of Food in the Last Year: Never true     Ran Out of Food in the Last Year: Never true   Transportation Needs: No Transportation Needs (4/2/2020)    PRAPARE - Transportation     Lack of Transportation (Medical): No     Lack of Transportation (Non-Medical): No   Physical Activity: Inactive (3/24/2022)    Exercise Vital Sign     Days of Exercise per Week: 0 days     Minutes of Exercise per Session: 0 min   Stress: No Stress Concern Present (8/15/2019)    [employer] - Occupational Stress Questionnaire     Feeling of Stress : Not at all   Social Connections: Moderately Isolated (4/2/2020)    Social Connection and Isolation Panel [NHANES]     Frequency of Communication with Friends and Family: More than three times a week     Frequency of Social Gatherings with Friends and Family: Twice a week     Attends Catholic Services: 1 to 4 times per year     Active Member of Clubs or Organizations: No     Attends Club or Organization Meetings: Never     Marital Status: Never      Review of Systems   HENT:  Positive for congestion, sore throat and voice change.    All other systems reviewed and are negative.    Objective:     Vital Signs (Most Recent):  Temp: 97.4 °F (36.3 °C) (10/12/23 1200)  Pulse: 90 (10/12/23  1313)  Resp: 20 (10/12/23 1247)  BP: 120/78 (10/12/23 1200)  SpO2: 100 % (10/12/23 1247) Vital Signs (24h Range):  Temp:  [97.4 °F (36.3 °C)-98.4 °F (36.9 °C)] 97.4 °F (36.3 °C)  Pulse:  [] 90  Resp:  [18-28] 20  SpO2:  [97 %-100 %] 100 %  BP: (109-137)/(56-90) 120/78     Weight: 102.1 kg (225 lb 1.4 oz)  Body mass index is 36.33 kg/m².    Estimated Creatinine Clearance: 85.2 mL/min (based on SCr of 0.9 mg/dL).     Physical Exam  Constitutional:       General: She is not in acute distress.     Appearance: Normal appearance. She is not ill-appearing.   Cardiovascular:      Rate and Rhythm: Normal rate and regular rhythm.      Pulses: Normal pulses.      Heart sounds: Normal heart sounds. No murmur heard.     No friction rub. No gallop.   Pulmonary:      Effort: Pulmonary effort is normal. No respiratory distress.      Breath sounds: Rales present.   Abdominal:      General: Abdomen is flat. Bowel sounds are normal. There is no distension.      Palpations: Abdomen is soft.      Tenderness: There is no abdominal tenderness.   Skin:     General: Skin is warm and dry.   Neurological:      Mental Status: She is alert.          Significant Labs:   Microbiology Results (last 7 days)       Procedure Component Value Units Date/Time    Blood culture [[id number]] Collected: 10/11/23 1326    Order Status: Completed Specimen: Blood from Peripheral, Forearm, Right Updated: 10/12/23 0515     Blood Culture, Routine No Growth to date    Blood culture [[id number]] Collected: 10/11/23 1318    Order Status: Completed Specimen: Blood from Peripheral, Upper Arm, Left Updated: 10/12/23 0515     Blood Culture, Routine No Growth to date    Culture, Respiratory with Gram Stain [[id number]] Collected: 10/11/23 1630    Order Status: Completed Specimen: Respiratory from Sputum, Expectorated Updated: 10/12/23 0338     Gram Stain (Respiratory) <10 epithelial cells per low power field.     Gram Stain (Respiratory) Few WBC's     Gram Stain  (Respiratory) Moderate Gram positive cocci    Respiratory Viral Panel by PCR (Sources other than NP Swab) [facility]; Nasal Swab [[id number]] Collected: 10/12/23 0134    Order Status: Sent Specimen: Respiratory Updated: 10/12/23 0246    Influenza A & B by Molecular [[id number]] Collected: 10/11/23 1142    Order Status: Completed Specimen: Nasopharyngeal Swab Updated: 10/11/23 1208     Influenza A, Molecular Negative     Influenza B, Molecular Negative     Flu A & B Source Nasal swab            Significant Imaging: I have reviewed all pertinent imaging results/findings within the past 24 hours.

## 2023-10-12 NOTE — ASSESSMENT & PLAN NOTE
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  ENT saw patient in clinic and performed a scope which noted exudate on the vocal cords with culture sent.  Actinomyces can prove very difficult to treat and will require a long duration of antibiotics.  The other pathogens can likely be treated with a short course of antimicrobials.  Patient febrile on admission to 101.6.  Currently afebrile.  Leukocytosis trending down to 12,000 on review of CBC.  CMP shows normal creatinine.    Recommendations:  --Will target Actino with penicillin G 20 MU continuous infusion daily   --Will discuss with pharmacy to see if ceftriaxone can be used as an equivalent outpatient   --Will need 6 weeks of IV therapy followed by PO amoxicillin for at least 2-6 months   --For Prevotella recommend PO metronidazole 500 mg BID to complete 14 day course   --For C glabrata, recommend IV micafungin 100 mg daily for at least 14 days; typically azole resistant   --PICC placement per primary   --Above d/w primary team.

## 2023-10-12 NOTE — ASSESSMENT & PLAN NOTE
Patient with Hypercapnic Respiratory failure which is Acute on chronic.  she is on home oxygen at 8 LPM. Supplemental oxygen was provided and noted- Oxygen Concentration (%):  [] 60    .   Signs/symptoms of respiratory failure include- tachypnea, increased work of breathing, wheezing and lethargy. Contributing diagnoses includes - Interstitial lung disease and Obesity Hypoventilation Labs and images were reviewed. Patient Has recent ABG, which has been reviewed. Will treat underlying causes and adjust management of respiratory failure as follows-     Bipap, O2, prednisone  PT/OT

## 2023-10-12 NOTE — SUBJECTIVE & OBJECTIVE
Interval History: no acute events  Feels better   Lasix again today  On 8l nc   No fever       Objective:     Vital Signs (Most Recent):  Temp: 97.4 °F (36.3 °C) (10/12/23 1200)  Pulse: 90 (10/12/23 1313)  Resp: 20 (10/12/23 1247)  BP: 120/78 (10/12/23 1200)  SpO2: 100 % (10/12/23 1247) Vital Signs (24h Range):  Temp:  [97.4 °F (36.3 °C)-98.4 °F (36.9 °C)] 97.4 °F (36.3 °C)  Pulse:  [] 90  Resp:  [18-28] 20  SpO2:  [97 %-100 %] 100 %  BP: (110-137)/(56-90) 120/78     Weight: 102.1 kg (225 lb 1.4 oz)  Body mass index is 36.33 kg/m².      Intake/Output Summary (Last 24 hours) at 10/12/2023 1620  Last data filed at 10/12/2023 1313  Gross per 24 hour   Intake 1159.69 ml   Output 2300 ml   Net -1140.31 ml        Physical Exam  Vitals and nursing note reviewed.   Constitutional:       General: She is not in acute distress.  HENT:      Head: Normocephalic and atraumatic.   Eyes:      General:         Right eye: No discharge.         Left eye: No discharge.   Cardiovascular:      Rate and Rhythm: Normal rate and regular rhythm.   Pulmonary:      Effort: No respiratory distress.      Breath sounds: No wheezing or rales.   Abdominal:      General: Bowel sounds are normal.      Palpations: Abdomen is soft.   Musculoskeletal:         General: No swelling or tenderness.      Cervical back: Neck supple.   Neurological:      General: No focal deficit present.      Mental Status: She is alert and oriented to person, place, and time.           Review of Systems   Constitutional: Negative.    HENT: Negative.     Respiratory:  Negative for wheezing.    Cardiovascular: Negative.    Gastrointestinal: Negative.        Vents:  Oxygen Concentration (%): 50 (10/12/23 0114)    Lines/Drains/Airways       Drain  Duration             Female External Urinary Catheter 10/12/23 0240 <1 day              Peripheral Intravenous Line  Duration                  Peripheral IV - Single Lumen 10/11/23 1136 20 G Left Antecubital 1 day          Peripheral IV - Single Lumen 10/11/23 1330 18 G Anterior;Right Forearm 1 day                    Significant Labs:    CBC/Anemia Profile:  Recent Labs   Lab 10/11/23  1139 10/12/23  0504   WBC 13.29* 12.87*   HGB 13.4 12.0   HCT 43.1 39.2    236   * 99*   RDW 17.2* 16.6*        Chemistries:  Recent Labs   Lab 10/11/23  1139 10/12/23  0504    139   K 4.3 4.0   CL 99 99   CO2 27 27   BUN 9 14   CREATININE 0.9 0.9   CALCIUM 9.7 9.4   ALBUMIN 3.4* 2.9*   PROT 7.2 6.6   BILITOT 0.4 0.3   ALKPHOS 95 84   ALT 27 24   AST 25 19   MG  --  2.0   PHOS  --  4.3       All pertinent labs within the past 24 hours have been reviewed.    Significant Imaging:  I have reviewed all pertinent imaging results/findings within the past 24 hours.

## 2023-10-12 NOTE — ASSESSMENT & PLAN NOTE
vocal cord infection with cultures that have grown Actinomyces, prevotella, and Candida glabrata so far.  ENT saw patient in clinic and performed a scope which noted exudate on the vocal cords with culture sent.  Actinomyces can prove very difficult to treat and will require a long duration of antibiotics.  The other pathogens can likely be treated with a short course of antimicrobials.  Patient febrile on admission to 101.6.  Currently afebrile.  Leukocytosis trending down to 12,000 on review of CBC.  CMP shows normal creatinine.     Recommendations:  --Will target Actino with penicillin G 20 MU continuous infusion daily   --Will discuss with pharmacy to see if ceftriaxone can be used as an equivalent outpatient   --Will need 6 weeks of IV therapy followed by PO amoxicillin for at least 2-6 months   --For Prevotella recommend PO metronidazole 500 mg BID to complete 14 day course   --For C glabrata, recommend IV micafungin 100 mg daily for at least 14 days; typically azole resistant

## 2023-10-12 NOTE — PLAN OF CARE
Discussed poc with pt, pt verbalized understanding    Purposeful rounding every 2hours    VS wnl  Cardiac monitoring in use, pt is NSR  Fall precautions in place, remains injury free  Pain under control with PRN meds  CPAP on to sleep    IVFs  Accurate I&Os  Abx given as prescribed  Bed locked at lowest position  Call light within reach    Chart check complete  Will cont with POC

## 2023-10-12 NOTE — ASSESSMENT & PLAN NOTE
Patient with chronic hypoxic respiratory failure who is on home oxygen at 6 to 8 L/NC who is her currently on Vapotherm 20/0.50. Signs/symptoms of respiratory failure included increased work of breathing and decreased oxygen saturations. Contributing diagnoses includes pneumonia, asthma, interstitial lung disease, and decompensated heart failure. Labs and images were reviewed. Patient does have a recent ABG which has been reviewed.  Initial PFTs with severe restriction and reduced DLCO; attempted repeat PFTs on 3/16/2022 which she was unable to complete due to worsening shortness of breath.     Will treat underlying causes and adjust management of respiratory failure as follows:  · Patient with restrictive pattern on her PFTs and chronic hypoxic respiratory failure  · Also found to have mild pulmonary hypertension per right heart cath in [place]  · On chronic immunosuppression including OFEV, Prednisone, and Actemra  · Patient reports the Actermra infusions are the only treatment that seems to have any added benefit  · No fever , only on pred 10mg and down to 8l , feeling better  · Getting lasix  · Appreciate ID assistance as well

## 2023-10-12 NOTE — PLAN OF CARE
[facility]  Initial Discharge Assessment       Primary Care Provider: Madeleine Enrique MD    Admission Diagnosis: SOB (shortness of breath) [R06.02]  Acute on chronic respiratory failure with hypoxia [J96.21]    Admission Date: 10/11/2023  Expected Discharge Date: Per Attending     Transition of Care Barriers: None    Payor: BLUE CROSS BLUE SHIELD / Plan: [place] / Product Type: Commercial /     Extended Emergency Contact Information  Primary Emergency Contact: Lianna Alicia   [facility]  Home Phone: [phone]  Mobile Phone: [phone]  Relation: Other    Discharge Plan A: Home with family         [facility] [address]  [facility] [address]  Phone: [phone] Fax: [phone]    [id number] [place] [facility] [address] [facility]  [address] [facility] [address]  Phone: [phone] Fax: [phone]    [facility] [address] [address]  Phone: [phone] Fax: [phone]    [id number] [place] - [address] [facility] [address] [place] [address] [facility] [address]  Phone: [phone] Fax: [phone]    [address] [address]  Phone: [phone] Fax: [phone]    [id number] [place], [place] - [address] AT [facility]  [address] [facility] [address]  Phone: [phone] Fax: [phone]      Initial Assessment (most recent)       Adult Discharge Assessment - 10/12/23 0903          Discharge Assessment    Assessment Type Discharge Planning Assessment     Confirmed/corrected address, phone number and insurance Yes     Confirmed Demographics Correct on Facesheet     Source of Information patient     Communicated JANETTE with patient/caregiver Date not  available/Unable to determine     Reason For Admission acute on chronic respiratory failure with hypoxemia     People in Home sibling(s)     Do you expect to return to your current living situation? Yes     Do you have help at home or someone to help you manage your care at home? Yes     Who are your caregiver(s) and their phone number(s)? Sibling     Prior to hospitilization cognitive status: Alert/Oriented     Current cognitive status: Alert/Oriented     Walking or Climbing Stairs ambulation difficulty, requires equipment     Home Accessibility wheelchair accessible     Home Layout Able to live on 1st floor     Equipment Currently Used at Home oxygen;power chair     Readmission within 30 days? No     Patient currently being followed by outpatient case management? No     Do you currently have service(s) that help you manage your care at home? No     Do you take prescription medications? Yes     Do you have prescription coverage? Yes     Coverage BCBS     Do you have any problems affording any of your prescribed medications? No     Is the patient taking medications as prescribed? yes     Who is going to help you get home at discharge? Family     How do you get to doctors appointments? family or friend will provide;car, drives self     Are you on dialysis? No     Do you take coumadin? No     DME Needed Upon Discharge  none     Discharge Plan discussed with: Patient     Transition of Care Barriers None     Discharge Plan A Home with family                      Pt has oxygen through [facility]; normal Lpm is 6-8.

## 2023-10-12 NOTE — PROGRESS NOTES
[facility]  Pulmonology  Progress Note    Patient Name: Ayanna Alicia  MRN: [id number]  Admission Date: 10/11/2023  Hospital Length of Stay: 1 days  Code Status: DNR  Attending Provider: Chantel Escobar MD  Primary Care Provider: Madeleine Enrique MD   Principal Problem: Acute on chronic respiratory failure with hypoxemia    Subjective:     Interval History: no acute events  Feels better   Lasix again today  On 8l nc   No fever       Objective:     Vital Signs (Most Recent):  Temp: 97.4 °F (36.3 °C) (10/12/23 1200)  Pulse: 90 (10/12/23 1313)  Resp: 20 (10/12/23 1247)  BP: 120/78 (10/12/23 1200)  SpO2: 100 % (10/12/23 1247) Vital Signs (24h Range):  Temp:  [97.4 °F (36.3 °C)-98.4 °F (36.9 °C)] 97.4 °F (36.3 °C)  Pulse:  [] 90  Resp:  [18-28] 20  SpO2:  [97 %-100 %] 100 %  BP: (110-137)/(56-90) 120/78     Weight: 102.1 kg (225 lb 1.4 oz)  Body mass index is 36.33 kg/m².      Intake/Output Summary (Last 24 hours) at 10/12/2023 1620  Last data filed at 10/12/2023 1313  Gross per 24 hour   Intake 1159.69 ml   Output 2300 ml   Net -1140.31 ml        Physical Exam  Vitals and nursing note reviewed.   Constitutional:       General: She is not in acute distress.  HENT:      Head: Normocephalic and atraumatic.   Eyes:      General:         Right eye: No discharge.         Left eye: No discharge.   Cardiovascular:      Rate and Rhythm: Normal rate and regular rhythm.   Pulmonary:      Effort: No respiratory distress.      Breath sounds: No wheezing or rales.   Abdominal:      General: Bowel sounds are normal.      Palpations: Abdomen is soft.   Musculoskeletal:         General: No swelling or tenderness.      Cervical back: Neck supple.   Neurological:      General: No focal deficit present.      Mental Status: She is alert and oriented to person, place, and time.           Review of Systems   Constitutional: Negative.    HENT: Negative.     Respiratory:  Negative for wheezing.    Cardiovascular: Negative.     Gastrointestinal: Negative.        Vents:  Oxygen Concentration (%): 50 (10/12/23 0114)    Lines/Drains/Airways       Drain  Duration             Female External Urinary Catheter 10/12/23 0240 <1 day              Peripheral Intravenous Line  Duration                  Peripheral IV - Single Lumen 10/11/23 1136 20 G Left Antecubital 1 day         Peripheral IV - Single Lumen 10/11/23 1330 18 G Anterior;Right Forearm 1 day                    Significant Labs:    CBC/Anemia Profile:  Recent Labs   Lab 10/11/23  1139 10/12/23  0504   WBC 13.29* 12.87*   HGB 13.4 12.0   HCT 43.1 39.2    236   * 99*   RDW 17.2* 16.6*        Chemistries:  Recent Labs   Lab 10/11/23  1139 10/12/23  0504    139   K 4.3 4.0   CL 99 99   CO2 27 27   BUN 9 14   CREATININE 0.9 0.9   CALCIUM 9.7 9.4   ALBUMIN 3.4* 2.9*   PROT 7.2 6.6   BILITOT 0.4 0.3   ALKPHOS 95 84   ALT 27 24   AST 25 19   MG  --  2.0   PHOS  --  4.3       All pertinent labs within the past 24 hours have been reviewed.    Significant Imaging:  I have reviewed all pertinent imaging results/findings within the past 24 hours.      ABG  Recent Labs   Lab 10/11/23  1209   PH 7.434   PO2 459*   PCO2 49.0*   HCO3 32.8*   BE 9     Assessment/Plan:     ENT  Dysphonia due to laryngeal ulcers  Seen by ent  Reviewed cultures and actinomyces and now yeast- discussed with ID , appreciate assistance  Dc inhaled steroids   Consider dc other nebs / inhalers if not helping      Pulmonary  * Acute on chronic respiratory failure with hypoxemia  Patient with chronic hypoxic respiratory failure who is on home oxygen at 6 to 8 L/NC who is her currently on Vapotherm 20/0.50. Signs/symptoms of respiratory failure included increased work of breathing and decreased oxygen saturations. Contributing diagnoses includes pneumonia, asthma, interstitial lung disease, and decompensated heart failure. Labs and images were reviewed. Patient does have a recent ABG which has been  reviewed.  Initial PFTs with severe restriction and reduced DLCO; attempted repeat PFTs on 3/16/2022 which she was unable to complete due to worsening shortness of breath.     Will treat underlying causes and adjust management of respiratory failure as follows:  · Patient with restrictive pattern on her PFTs and chronic hypoxic respiratory failure  · Also found to have mild pulmonary hypertension per right heart cath in [place]  · On chronic immunosuppression including OFEV, Prednisone, and Actemra  · Patient reports the Actermra infusions are the only treatment that seems to have any added benefit  · No fever , only on pred 10mg and down to 8l , feeling better  · Getting lasix  · Appreciate ID assistance as well       UIP (usual interstitial pneumonitis)  Hospitalized May 2022 with acute respiratory failure and persistent pneumonia and dishcarged on oxygen. She has since been diagnosed with Sjogren's disease with associated ILD. Initial PFTs revealed severe restriction and reduced DLCO. Initially prescribed steroids and subsequently Cellcept and Rituximab infusions (last infusion in Dec 2022). Now on Ofev 150mg BID, Prednisone 10mg daily, and Actemra infusions      · Concern for underlying acute pulmonary infection  · Obtain sputum culture if able; patient endorses a dry nonproductive cough  · Empiric Zosyn and Zyvox  · Blood cultures pending  · Started lung transplant evaluation at [place] [facility]  · Not a current candidate for transplant due to weight  · Continue home Prednisone  · Continue nocturnal AVAPS  · Continue oral lasix  · Followed by rheumatology and pulmonary as outpatient  · Follow-up with Dr. Ashton in [place]  · Unfortunately, patient is near her max home oxygen abilities. She has had repeated hospitalizations within the past several months.  She is been seen and evaluated by transplant in [place].  She underwent left and right heart catheterization in [place] and found to have mild pulmonary  hypertension.  · See above    ID  Actinomyces infection  Appreciate ID            Carmelina Tuttle MD  Pulmonology  [facility]

## 2023-10-13 ENCOUNTER — EXTERNAL HOME HEALTH (OUTPATIENT)
Dept: HOME HEALTH SERVICES | Facility: HOSPITAL | Age: 53
End: 2023-10-13
Payer: COMMERCIAL

## 2023-10-13 LAB
ALBUMIN SERPL BCP-MCNC: 3.3 G/DL (ref 3.5–5.2)
ALP SERPL-CCNC: 81 U/L (ref 55–135)
ALT SERPL W/O P-5'-P-CCNC: 26 U/L (ref 10–44)
ANION GAP SERPL CALC-SCNC: 15 MMOL/L (ref 8–16)
AST SERPL-CCNC: 21 U/L (ref 10–40)
BASOPHILS # BLD AUTO: 0.08 K/UL (ref 0–0.2)
BASOPHILS NFR BLD: 0.7 % (ref 0–1.9)
BILIRUB SERPL-MCNC: 0.3 MG/DL (ref 0.1–1)
BUN SERPL-MCNC: 13 MG/DL (ref 6–20)
CALCIUM SERPL-MCNC: 9.7 MG/DL (ref 8.7–10.5)
CHLORIDE SERPL-SCNC: 101 MMOL/L (ref 95–110)
CO2 SERPL-SCNC: 26 MMOL/L (ref 23–29)
CREAT SERPL-MCNC: 0.9 MG/DL (ref 0.5–1.4)
DIFFERENTIAL METHOD: ABNORMAL
EOSINOPHIL # BLD AUTO: 0.3 K/UL (ref 0–0.5)
EOSINOPHIL NFR BLD: 2.4 % (ref 0–8)
ERYTHROCYTE [DISTWIDTH] IN BLOOD BY AUTOMATED COUNT: 16.8 % (ref 11.5–14.5)
EST. GFR  (NO RACE VARIABLE): >60 ML/MIN/1.73 M^2
GLUCOSE SERPL-MCNC: 114 MG/DL (ref 70–110)
HCT VFR BLD AUTO: 48 % (ref 37–48.5)
HGB BLD-MCNC: 14 G/DL (ref 12–16)
IMM GRANULOCYTES # BLD AUTO: 0.12 K/UL (ref 0–0.04)
IMM GRANULOCYTES NFR BLD AUTO: 1.1 % (ref 0–0.5)
LYMPHOCYTES # BLD AUTO: 1.5 K/UL (ref 1–4.8)
LYMPHOCYTES NFR BLD: 12.7 % (ref 18–48)
MAGNESIUM SERPL-MCNC: 1.9 MG/DL (ref 1.6–2.6)
MCH RBC QN AUTO: 30.8 PG (ref 27–31)
MCHC RBC AUTO-ENTMCNC: 29.2 G/DL (ref 32–36)
MCV RBC AUTO: 106 FL (ref 82–98)
MONOCYTES # BLD AUTO: 0.9 K/UL (ref 0.3–1)
MONOCYTES NFR BLD: 8.2 % (ref 4–15)
NEUTROPHILS # BLD AUTO: 8.6 K/UL (ref 1.8–7.7)
NEUTROPHILS NFR BLD: 74.9 % (ref 38–73)
NRBC BLD-RTO: 0 /100 WBC
PHOSPHATE SERPL-MCNC: 3.5 MG/DL (ref 2.7–4.5)
PLATELET # BLD AUTO: 220 K/UL (ref 150–450)
PMV BLD AUTO: 9.7 FL (ref 9.2–12.9)
POTASSIUM SERPL-SCNC: 4 MMOL/L (ref 3.5–5.1)
PROT SERPL-MCNC: 6.9 G/DL (ref 6–8.4)
RBC # BLD AUTO: 4.54 M/UL (ref 4–5.4)
SODIUM SERPL-SCNC: 142 MMOL/L (ref 136–145)
WBC # BLD AUTO: 11.41 K/UL (ref 3.9–12.7)

## 2023-10-13 PROCEDURE — 96372 THER/PROPH/DIAG INJ SC/IM: CPT

## 2023-10-13 PROCEDURE — 63600175 PHARM REV CODE 636 W HCPCS: Performed by: NURSE PRACTITIONER

## 2023-10-13 PROCEDURE — 27100171 HC OXYGEN HIGH FLOW UP TO 24 HOURS

## 2023-10-13 PROCEDURE — 84100 ASSAY OF PHOSPHORUS: CPT | Performed by: NURSE PRACTITIONER

## 2023-10-13 PROCEDURE — 97530 THERAPEUTIC ACTIVITIES: CPT

## 2023-10-13 PROCEDURE — 97165 OT EVAL LOW COMPLEX 30 MIN: CPT

## 2023-10-13 PROCEDURE — 94761 N-INVAS EAR/PLS OXIMETRY MLT: CPT

## 2023-10-13 PROCEDURE — 25000242 PHARM REV CODE 250 ALT 637 W/ HCPCS: Performed by: EMERGENCY MEDICINE

## 2023-10-13 PROCEDURE — 25000003 PHARM REV CODE 250: Performed by: STUDENT IN AN ORGANIZED HEALTH CARE EDUCATION/TRAINING PROGRAM

## 2023-10-13 PROCEDURE — 36415 COLL VENOUS BLD VENIPUNCTURE: CPT | Performed by: NURSE PRACTITIONER

## 2023-10-13 PROCEDURE — 83735 ASSAY OF MAGNESIUM: CPT | Performed by: NURSE PRACTITIONER

## 2023-10-13 PROCEDURE — 25000003 PHARM REV CODE 250: Performed by: NURSE PRACTITIONER

## 2023-10-13 PROCEDURE — 25000242 PHARM REV CODE 250 ALT 637 W/ HCPCS: Performed by: NURSE PRACTITIONER

## 2023-10-13 PROCEDURE — 36569 INSJ PICC 5 YR+ W/O IMAGING: CPT

## 2023-10-13 PROCEDURE — 94660 CPAP INITIATION&MGMT: CPT

## 2023-10-13 PROCEDURE — 63600175 PHARM REV CODE 636 W HCPCS: Performed by: STUDENT IN AN ORGANIZED HEALTH CARE EDUCATION/TRAINING PROGRAM

## 2023-10-13 PROCEDURE — C1751 CATH, INF, PER/CENT/MIDLINE: HCPCS

## 2023-10-13 PROCEDURE — 11000001 HC ACUTE MED/SURG PRIVATE ROOM

## 2023-10-13 PROCEDURE — 99233 PR SUBSEQUENT HOSPITAL CARE,LEVL III: ICD-10-PCS | Mod: ,,, | Performed by: STUDENT IN AN ORGANIZED HEALTH CARE EDUCATION/TRAINING PROGRAM

## 2023-10-13 PROCEDURE — 85025 COMPLETE CBC W/AUTO DIFF WBC: CPT | Performed by: NURSE PRACTITIONER

## 2023-10-13 PROCEDURE — 97162 PT EVAL MOD COMPLEX 30 MIN: CPT

## 2023-10-13 PROCEDURE — 94640 AIRWAY INHALATION TREATMENT: CPT

## 2023-10-13 PROCEDURE — 99233 SBSQ HOSP IP/OBS HIGH 50: CPT | Mod: ,,, | Performed by: STUDENT IN AN ORGANIZED HEALTH CARE EDUCATION/TRAINING PROGRAM

## 2023-10-13 PROCEDURE — 25000003 PHARM REV CODE 250: Performed by: HOSPITALIST

## 2023-10-13 PROCEDURE — 63600175 PHARM REV CODE 636 W HCPCS: Performed by: EMERGENCY MEDICINE

## 2023-10-13 PROCEDURE — 80053 COMPREHEN METABOLIC PANEL: CPT | Performed by: NURSE PRACTITIONER

## 2023-10-13 PROCEDURE — 99900035 HC TECH TIME PER 15 MIN (STAT)

## 2023-10-13 PROCEDURE — 97116 GAIT TRAINING THERAPY: CPT

## 2023-10-13 RX ORDER — FUROSEMIDE 10 MG/ML
40 INJECTION INTRAMUSCULAR; INTRAVENOUS 2 TIMES DAILY
Status: DISCONTINUED | OUTPATIENT
Start: 2023-10-13 | End: 2023-10-16

## 2023-10-13 RX ORDER — FUROSEMIDE 40 MG/1
40 TABLET ORAL DAILY
Status: DISCONTINUED | OUTPATIENT
Start: 2023-10-13 | End: 2023-10-13

## 2023-10-13 RX ORDER — LANOLIN ALCOHOL/MO/W.PET/CERES
800 CREAM (GRAM) TOPICAL 2 TIMES DAILY
Status: DISCONTINUED | OUTPATIENT
Start: 2023-10-13 | End: 2023-10-17 | Stop reason: HOSPADM

## 2023-10-13 RX ADMIN — MAGNESIUM OXIDE TAB 400 MG (241.3 MG ELEMENTAL MG) 800 MG: 400 (241.3 MG) TAB at 08:10

## 2023-10-13 RX ADMIN — GUAIFENESIN AND DEXTROMETHORPHAN 10 ML: 100; 10 SYRUP ORAL at 08:10

## 2023-10-13 RX ADMIN — ARFORMOTEROL TARTRATE 15 MCG: 15 SOLUTION RESPIRATORY (INHALATION) at 07:10

## 2023-10-13 RX ADMIN — PREDNISONE 10 MG: 10 TABLET ORAL at 08:10

## 2023-10-13 RX ADMIN — FAMOTIDINE 20 MG: 20 TABLET ORAL at 08:10

## 2023-10-13 RX ADMIN — GUAIFENESIN AND DEXTROMETHORPHAN 10 ML: 100; 10 SYRUP ORAL at 09:10

## 2023-10-13 RX ADMIN — DEXTROSE MONOHYDRATE 20 MILLION UNITS: 50 INJECTION, SOLUTION INTRAVENOUS at 04:10

## 2023-10-13 RX ADMIN — GUAIFENESIN AND DEXTROMETHORPHAN 10 ML: 100; 10 SYRUP ORAL at 05:10

## 2023-10-13 RX ADMIN — MICAFUNGIN SODIUM 100 MG: 100 INJECTION, POWDER, LYOPHILIZED, FOR SOLUTION INTRAVENOUS at 06:10

## 2023-10-13 RX ADMIN — MONTELUKAST 10 MG: 10 TABLET, FILM COATED ORAL at 08:10

## 2023-10-13 RX ADMIN — ENOXAPARIN SODIUM 40 MG: 40 INJECTION SUBCUTANEOUS at 04:10

## 2023-10-13 RX ADMIN — LINEZOLID 600 MG: 600 TABLET, FILM COATED ORAL at 08:10

## 2023-10-13 RX ADMIN — FUROSEMIDE 40 MG: 10 INJECTION, SOLUTION INTRAMUSCULAR; INTRAVENOUS at 08:10

## 2023-10-13 RX ADMIN — FUROSEMIDE 40 MG: 40 TABLET ORAL at 03:10

## 2023-10-13 RX ADMIN — IPRATROPIUM BROMIDE AND ALBUTEROL SULFATE 3 ML: 2.5; .5 SOLUTION RESPIRATORY (INHALATION) at 01:10

## 2023-10-13 RX ADMIN — METRONIDAZOLE 500 MG: 500 TABLET ORAL at 08:10

## 2023-10-13 RX ADMIN — ARFORMOTEROL TARTRATE 15 MCG: 15 SOLUTION RESPIRATORY (INHALATION) at 08:10

## 2023-10-13 RX ADMIN — CYANOCOBALAMIN 1000 MCG: 1000 INJECTION, SOLUTION INTRAMUSCULAR; SUBCUTANEOUS at 08:10

## 2023-10-13 RX ADMIN — GUAIFENESIN AND DEXTROMETHORPHAN 10 ML: 100; 10 SYRUP ORAL at 01:10

## 2023-10-13 NOTE — SUBJECTIVE & OBJECTIVE
Interval History: looks and feels better, getting stronger, VSS, has remained afeb, O2 down to 6 L NC, hoarseness persists. Getting PT/OT, doing IS. Got PICC line for IV PCN G Infusion plus IV Mycafungin x 2 weeks plus oral Flagyl x 2 weeks. SW arranging HH with Home Infusion services. Pt encouraged to exercise and ambulate. CXR post PICC line shows pulm edema- will start IV lasix. Labs improved.     Review of Systems   Constitutional:  Positive for activity change, appetite change and fatigue.   HENT:  Positive for voice change. Negative for congestion and sore throat.    Respiratory:  Positive for cough and shortness of breath.    Cardiovascular:  Negative for chest pain, palpitations and leg swelling.   All other systems reviewed and are negative.    Objective:     Vital Signs (Most Recent):  Temp: 97 °F (36.1 °C) (10/13/23 1741)  Pulse: 106 (10/13/23 1741)  Resp: 20 (10/13/23 1741)  BP: 127/89 (10/13/23 1741)  SpO2: 100 % (10/13/23 1324) Vital Signs (24h Range):  Temp:  [97 °F (36.1 °C)-98.2 °F (36.8 °C)] 97 °F (36.1 °C)  Pulse:  [] 106  Resp:  [16-22] 20  SpO2:  [94 %-100 %] 100 %  BP: (122-129)/(63-89) 127/89     Weight: 102.1 kg (225 lb 1.4 oz)  Body mass index is 36.33 kg/m².    Intake/Output Summary (Last 24 hours) at 10/13/2023 1843  Last data filed at 10/13/2023 0901  Gross per 24 hour   Intake 240 ml   Output 700 ml   Net -460 ml         Physical Exam  Vitals and nursing note reviewed.   Constitutional:       General: She is not in acute distress.     Appearance: Normal appearance. She is obese. She is ill-appearing. She is not toxic-appearing.   HENT:      Head: Normocephalic and atraumatic.      Right Ear: External ear normal.      Left Ear: External ear normal.      Nose: Nose normal.      Mouth/Throat:      Mouth: Mucous membranes are moist.      Pharynx: Oropharynx is clear.   Eyes:      General: No scleral icterus.     Extraocular Movements: Extraocular movements intact.       Conjunctiva/sclera: Conjunctivae normal.      Pupils: Pupils are equal, round, and reactive to light.   Cardiovascular:      Rate and Rhythm: Normal rate and regular rhythm.      Pulses: Normal pulses.      Heart sounds: Normal heart sounds. No murmur heard.     No friction rub.   Pulmonary:      Effort: No respiratory distress.      Breath sounds: No stridor. Wheezing present. No rhonchi or rales.   Abdominal:      General: Abdomen is flat. Bowel sounds are normal. There is no distension.      Palpations: Abdomen is soft.   Genitourinary:     Comments: deferred  Musculoskeletal:         General: No swelling, tenderness, deformity or signs of injury. Normal range of motion.      Cervical back: Normal range of motion and neck supple.   Skin:     General: Skin is warm and dry.      Capillary Refill: Capillary refill takes less than 2 seconds.      Coloration: Skin is not pale.      Findings: No erythema or rash.   Neurological:      General: No focal deficit present.      Mental Status: She is alert and oriented to person, place, and time.   Psychiatric:         Mood and Affect: Mood normal.         Behavior: Behavior normal.         Thought Content: Thought content normal.         Judgment: Judgment normal.             Significant Labs: All pertinent labs within the past 24 hours have been reviewed.  CBC:   Recent Labs   Lab 10/12/23  0504 10/13/23  0755   WBC 12.87* 11.41   HGB 12.0 14.0   HCT 39.2 48.0    220     CMP:   Recent Labs   Lab 10/12/23  0504 10/13/23  0755    142   K 4.0 4.0   CL 99 101   CO2 27 26   * 114*   BUN 14 13   CREATININE 0.9 0.9   CALCIUM 9.4 9.7   PROT 6.6 6.9   ALBUMIN 2.9* 3.3*   BILITOT 0.3 0.3   ALKPHOS 84 81   AST 19 21   ALT 24 26   ANIONGAP 13 15       Significant Imaging: I have reviewed all pertinent imaging results/findings within the past 24 hours.

## 2023-10-13 NOTE — SUBJECTIVE & OBJECTIVE
Interval History: Patient seen at bedside. Afebrile with no white count. Discussed home IV abx. Explained that gold standard for Actinomyces is penicillin though it is a continuous infusion. If she is unable to tolerate at home we can switch to ceftriaxone outpatient. Goal is to get her to transplant. Will also do micafungin for C glabrata. She voiced understanding.     Review of Systems   HENT:  Positive for sore throat and voice change. Negative for congestion.    All other systems reviewed and are negative.    Objective:     Vital Signs (Most Recent):  Temp: 97.4 °F (36.3 °C) (10/13/23 1324)  Pulse: 109 (10/13/23 1324)  Resp: 20 (10/13/23 1324)  BP: 122/63 (10/13/23 1324)  SpO2: 100 % (10/13/23 1324) Vital Signs (24h Range):  Temp:  [97.4 °F (36.3 °C)-98.2 °F (36.8 °C)] 97.4 °F (36.3 °C)  Pulse:  [] 109  Resp:  [16-22] 20  SpO2:  [94 %-100 %] 100 %  BP: (120-129)/(63-79) 122/63     Weight: 102.1 kg (225 lb 1.4 oz)  Body mass index is 36.33 kg/m².    Estimated Creatinine Clearance: 85.2 mL/min (based on SCr of 0.9 mg/dL).     Physical Exam  Constitutional:       General: She is not in acute distress.     Appearance: Normal appearance. She is not ill-appearing.   Cardiovascular:      Rate and Rhythm: Normal rate and regular rhythm.      Pulses: Normal pulses.      Heart sounds: Normal heart sounds. No murmur heard.     No friction rub. No gallop.   Pulmonary:      Effort: Pulmonary effort is normal. No respiratory distress.      Breath sounds: Rales present.   Abdominal:      General: Abdomen is flat. Bowel sounds are normal. There is no distension.      Palpations: Abdomen is soft.      Tenderness: There is no abdominal tenderness.   Skin:     General: Skin is warm and dry.   Neurological:      Mental Status: She is alert.          Significant Labs:   Microbiology Results (last 7 days)       Procedure Component Value Units Date/Time    Culture, Respiratory with Gram Stain [[id number]] Collected: 10/11/23  1630    Order Status: Completed Specimen: Respiratory from Sputum, Expectorated Updated: 10/13/23 0835     Respiratory Culture Normal respiratory iris     Gram Stain (Respiratory) <10 epithelial cells per low power field.     Gram Stain (Respiratory) Few WBC's     Gram Stain (Respiratory) Moderate Gram positive cocci    Blood culture [[id number]] Collected: 10/11/23 1318    Order Status: Completed Specimen: Blood from Peripheral, Upper Arm, Left Updated: 10/12/23 2212     Blood Culture, Routine No Growth to date      No Growth to date    Blood culture [[id number]] Collected: 10/11/23 1326    Order Status: Completed Specimen: Blood from Peripheral, Forearm, Right Updated: 10/12/23 2212     Blood Culture, Routine No Growth to date      No Growth to date    Respiratory Viral Panel by PCR (Sources other than NP Swab) [facility]; Nasal Swab [[id number]] Collected: 10/12/23 0134    Order Status: Sent Specimen: Respiratory Updated: 10/12/23 0246    Influenza A & B by Molecular [[id number]] Collected: 10/11/23 1142    Order Status: Completed Specimen: Nasopharyngeal Swab Updated: 10/11/23 1208     Influenza A, Molecular Negative     Influenza B, Molecular Negative     Flu A & B Source Nasal swab            Significant Imaging: I have reviewed all pertinent imaging results/findings within the past 24 hours.

## 2023-10-13 NOTE — PLAN OF CARE
Problem: Adult Inpatient Plan of Care  Goal: Plan of Care Review  Outcome: Ongoing, Progressing  Flowsheets (Taken 10/13/2023 0564)  Plan of Care Reviewed With:   patient   spouse     Problem: Adjustment to Illness (Sepsis/Septic Shock)  Goal: Optimal Coping  Outcome: Ongoing, Progressing     Problem: Infection Progression (Sepsis/Septic Shock)  Goal: Absence of Infection Signs and Symptoms  Outcome: Ongoing, Progressing     Problem: Nutrition Impaired (Sepsis/Septic Shock)  Goal: Optimal Nutrition Intake  Outcome: Ongoing, Progressing       POC reviewed with pt. verbalized understanding. IV Abx infusing. 7L NC. Purposeful rounding q2h. I &Os. PRN used to control cough. all safety measures maintained. Will continue to monitor.

## 2023-10-13 NOTE — PROGRESS NOTES
[facility]  Infectious Disease  Progress Note    Patient Name: Ayanna Alicia  MRN: [id number]  Admission Date: 10/11/2023  Length of Stay: 2 days  Attending Physician: Chantel Escobar MD  Primary Care Provider: Madeleine Enrique MD    Isolation Status: No active isolations  Assessment/Plan:      Pulmonary  * Acute on chronic respiratory failure with hypoxemia  Requiring supplemental oxygen. Follow with pulmonary.     ID  Actinomyces infection  53-year-old female with history of interstitial lung disease due to Sjogren's who currently receives infusion therapy (Actemra) here for shortness of breath and findings of vocal cord infection with cultures that have grown Actinomyces, Prevotella, and Candida glabrata so far. ENT saw patient in clinic and performed a scope which noted exudate on the vocal cords with culture sent. Actinomyces can prove very difficult to treat and will require a long duration of antibiotics. The other pathogens can likely be treated with a short course of antimicrobials.     Review of temperature curve shows patient remains afebrile. Leukocytosis resolved to 11K on review of CBC. CMP shows normal creatinine.     Recommendations:  --Will target Actino with penicillin G 20 MU continuous infusion daily   --Ceftriaxone can be used as an equivalent outpatient if patient unable to tolerate penicillin   --Will need 6 weeks of IV therapy followed by PO amoxicillin for at least 2-6 months   --For Prevotella recommend PO metronidazole 500 mg BID to complete 14 day course   --For C glabrata, recommend IV micafungin 100 mg daily for at least 14 days; typically azole resistant   --PICC placement per primary   --Above d/w primary team.    Outpatient Antibiotic Therapy Plan:    Please send referral to [facility].    1) Infection: Polymicrobial laryngeal infection     2) Discharge Antimicrobials:    Intravenous antibiotics:   IV penicillin G 20 million units continuous infusion daily    IV  micafungin 100 mg daily     Oral antibiotics:   PO metronidazole 500 mg BID (stop date 10/25)     3) Therapy Duration:  6 weeks for penicillin; 14 days micafungin and metronidazole     Estimated end date of IV antibiotics: Penicillin 11/22, micafungin 10/25    4) Outpatient Weekly Labs:    Order the following labs to be drawn on Mondays:    CBC   CMP     5) Fax Lab Results to Infectious Diseases Provider: Dr. [facility] Fax Number: [phone]      Thank you for your consult. I will sign off. Please contact us if you have any additional questions.    Sarabjit Wayne, DO  Infectious Disease  [facility]    Subjective:     Principal Problem:Acute on chronic respiratory failure with hypoxemia    HPI: This is a 53-year-old AAF with a PMH of Sjogren's syndrome, ILD, RA, chronic hypoxic respiratory failure on 6-8L/NC, asthma, GLENN (CPAP), possible Lung Tx candidate, who presented to ER for evaluation of SOB which started gradually over last couple of days.  Patient was seen by ENT on 10/06 and underwent a scope which noted a vocal cords covered and exudate that was sent for culture that grew both Actinomyces and prevotella.  Patient febrile on admission to 101.6 F. currently afebrile.  Leukocytosis is improving to 12,000. Renal function has been within normal limits.  Infectious diseases has been consulted for assistance with management of vocal cord infection.    Interval History: Patient seen at bedside. Afebrile with no white count. Discussed home IV abx. Explained that gold standard for Actinomyces is penicillin though it is a continuous infusion. If she is unable to tolerate at home we can switch to ceftriaxone outpatient. Goal is to get her to transplant. Will also do micafungin for C glabrata. She voiced understanding.     Review of Systems   HENT:  Positive for sore throat and voice change. Negative for congestion.    All other systems reviewed and are negative.    Objective:     Vital  Signs (Most Recent):  Temp: 97.4 °F (36.3 °C) (10/13/23 1324)  Pulse: 109 (10/13/23 1324)  Resp: 20 (10/13/23 1324)  BP: 122/63 (10/13/23 1324)  SpO2: 100 % (10/13/23 1324) Vital Signs (24h Range):  Temp:  [97.4 °F (36.3 °C)-98.2 °F (36.8 °C)] 97.4 °F (36.3 °C)  Pulse:  [] 109  Resp:  [16-22] 20  SpO2:  [94 %-100 %] 100 %  BP: (120-129)/(63-79) 122/63     Weight: 102.1 kg (225 lb 1.4 oz)  Body mass index is 36.33 kg/m².    Estimated Creatinine Clearance: 85.2 mL/min (based on SCr of 0.9 mg/dL).     Physical Exam  Constitutional:       General: She is not in acute distress.     Appearance: Normal appearance. She is not ill-appearing.   Cardiovascular:      Rate and Rhythm: Normal rate and regular rhythm.      Pulses: Normal pulses.      Heart sounds: Normal heart sounds. No murmur heard.     No friction rub. No gallop.   Pulmonary:      Effort: Pulmonary effort is normal. No respiratory distress.      Breath sounds: Rales present.   Abdominal:      General: Abdomen is flat. Bowel sounds are normal. There is no distension.      Palpations: Abdomen is soft.      Tenderness: There is no abdominal tenderness.   Skin:     General: Skin is warm and dry.   Neurological:      Mental Status: She is alert.          Significant Labs:   Microbiology Results (last 7 days)       Procedure Component Value Units Date/Time    Culture, Respiratory with Gram Stain [[id number]] Collected: 10/11/23 1630    Order Status: Completed Specimen: Respiratory from Sputum, Expectorated Updated: 10/13/23 0835     Respiratory Culture Normal respiratory iris     Gram Stain (Respiratory) <10 epithelial cells per low power field.     Gram Stain (Respiratory) Few WBC's     Gram Stain (Respiratory) Moderate Gram positive cocci    Blood culture [[id number]] Collected: 10/11/23 1318    Order Status: Completed Specimen: Blood from Peripheral, Upper Arm, Left Updated: 10/12/23 2212     Blood Culture, Routine No Growth to date      No Growth to date     Blood culture [[id number]] Collected: 10/11/23 1326    Order Status: Completed Specimen: Blood from Peripheral, Forearm, Right Updated: 10/12/23 2212     Blood Culture, Routine No Growth to date      No Growth to date    Respiratory Viral Panel by PCR (Sources other than NP Swab) [facility]; Nasal Swab [[id number]] Collected: 10/12/23 0134    Order Status: Sent Specimen: Respiratory Updated: 10/12/23 0246    Influenza A & B by Molecular [[id number]] Collected: 10/11/23 1142    Order Status: Completed Specimen: Nasopharyngeal Swab Updated: 10/11/23 1208     Influenza A, Molecular Negative     Influenza B, Molecular Negative     Flu A & B Source Nasal swab            Significant Imaging: I have reviewed all pertinent imaging results/findings within the past 24 hours.

## 2023-10-13 NOTE — PT/OT/SLP EVAL
Occupational Therapy   Evaluation and Discharge Note    Name: Ayanna Alicia  MRN: [id number]  Admitting Diagnosis: Acute on chronic respiratory failure with hypoxemia  Recent Surgery: * No surgery found *      Recommendations:     Discharge Recommendations: home  Discharge Equipment Recommendations: none  Barriers to discharge:       Assessment:     Ayanna Alicia is a 53 y.o. female with a medical diagnosis of Acute on chronic respiratory failure with hypoxemia. At this time, patient is functioning at their prior level of function and does not require further acute OT services.     Plan:     During this hospitalization, patient does not require further acute OT services.  Please re-consult if situation changes.    Plan of Care Reviewed with: patient    Subjective     Chief Complaint:   Patient/Family Comments/goals: RETURN HOME. sHE REPORTS SHE FUNCTIONS THE SAME WAY    Occupational Profile:  Living Environment: BROTHER  Previous level of function: MOD (I) WITH FUNCTIONAL MOBILITY AND ADL'S  Roles and Routines:   Equipment Used at home: oxygen  Assistance upon Discharge: INTERMITTENT ASSISTANCE IF NEEDED    Pain/Comfort:  Pain Rating 1: 0/10    Patients cultural, spiritual, Mu-ism conflicts given the current situation:      Objective:     Communicated with: NURSE AND Epic CHART REVIEW prior to session.  Patient found HOB elevated with   upon OT entry to room.    General Precautions: Standard, fall  Orthopedic Precautions: N/A  Braces: N/A  Respiratory Status: Nasal cannula, flow 6 L/min     Occupational Performance:    Bed Mobility:    Patient completed Rolling/Turning to Left with  independence  Patient completed Rolling/Turning to Right with independence  Patient completed Scooting/Bridging with independence  Patient completed Supine to Sit with independence  Patient completed Sit to Supine with independence    Functional Mobility/Transfers:  Patient completed Sit <> Stand Transfer with modified  independence  with  BED RAIL   Functional Mobility: HAND HELD ASSIST INTERMITTENTLY WITH PRE GAIT ACTIVITIES    Activities of Daily Living:  Upper Body Dressing: supervision FOR RETRIEVAL ONLY  Lower Body Dressing: supervision SET UP ONLY    Cognitive/Visual Perceptual:  Cognitive/Psychosocial Skills:     -       Oriented to: Person, Place, Time, and Situation   -       Follows Commands/attention:Follows multistep  commands  -       Communication: clear/fluent  -       Memory: No Deficits noted  -       Safety awareness/insight to disability: intact     Physical Exam:  Upper Extremity Range of Motion:     -       Right Upper Extremity: WFL  -       Left Upper Extremity: WFL  Upper Extremity Strength: -       Right Upper Extremity: WFL  -       Left Upper Extremity: WFL   Strength:    -       Right Upper Extremity: WFL  -       Left Upper Extremity: WFL    AMPAC 6 Click ADL:  AMPAC Total Score: 24    Treatment & Education:  Patient educated on role of OT in acute setting and benefits of OOB activity. Encouraged OOB throughout the course of hospitalization to decrease risk of hospital related debility. Patient stated understanding and in agreement with POC.      Patient left HOB elevated with all lines intact, call button in reach, bed alarm on, and NURSE  notified    GOALS:   Multidisciplinary Problems       Occupational Therapy Goals       Not on file                    History:     Past Medical History:   Diagnosis Date    Abnormal Pap smear of cervix     in the past with repeat pap smear okay.    Acute interstitial pneumonitis     Allergic rhinitis, cause unspecified     Arthritis of both knees     Asthma     Eczema     Fatty liver 10/2014    Fibrocystic breast changes     Headache(784.0)     Hepatomegaly 10/2014    Hypertension     Liver cyst 10/2014    Multinodular goiter     Followed by ENT - Dr. Nicolas Gray    Polymenorrhea 2008    TMJ (dislocation of temporomandibular joint)     Uterine fibroid     in  the past    Vitamin D deficiency disease          Past Surgical History:   Procedure Laterality Date    BRONCHOSCOPY Bilateral 2023    Procedure: Bronchoscopy - insert lighted tube into airway to take a biopsy of lung;  Surgeon: Agustín Aviles MD;  Location: [facility] ENDO;  Service: Pulmonary;  Laterality: Bilateral;     SECTION, CLASSIC      x 1    COLONOSCOPY N/A 2020    Procedure: COLONOSCOPY;  Surgeon: Angie Magana MD;  Location: [facility] ENDO;  Service: Endoscopy;  Laterality: N/A;    HYSTERECTOMY      MULTIPLE TOOTH EXTRACTIONS      PARTIAL HYSTERECTOMY  2013    Due to fibroids       Time Tracking:     OT Date of Treatment: 10/13/23  OT Start Time: 1000  OT Stop Time: 1023  OT Total Time (min): 23 min    Billable Minutes:Evaluation 13 MINUTES  Therapeutic Activity 10 MINUTES    10/13/2023

## 2023-10-13 NOTE — PT/OT/SLP EVAL
Physical Therapy Evaluation    Patient Name:  Ayanna Alicia   MRN:  [id number]    Recommendations:     Discharge Recommendations: outpatient PT   Discharge Equipment Recommendations: none   Barriers to discharge: None    Assessment:     Ayanna Alicia is a 53 y.o. female admitted with a medical diagnosis of Acute on chronic respiratory failure with hypoxemia.  She presents with the following impairments/functional limitations: impaired endurance, impaired functional mobility which limits mobility and increases risk of falls and other issues related to immobility. Pt will benefit from continued Pt services in order to improve functional status. Recommend outpatient .    Rehab Prognosis: Good; patient would benefit from acute skilled PT services to address these deficits and reach maximum level of function.    Recent Surgery: * No surgery found *      Plan:     During this hospitalization, patient to be seen 3 x/week to address the identified rehab impairments via gait training, therapeutic activities, therapeutic exercises, neuromuscular re-education and progress toward the following goals:    Plan of Care Expires:  10/27/23    Subjective     Chief Complaint: acute on chronic respiratory failure with hypoxemia  Patient/Family Comments/goals: to go home/get better  Pain/Comfort:  Pain Rating 1: 0/10  Pain Rating Post-Intervention 1: 0/10    Patients cultural, spiritual, Sikhism conflicts given the current situation: no    Living Environment:  Pt's brother lives with her in a [facility] with no s/s at entry.  Prior to admission, patients level of function was independent with ADLs, limited household mobility secondary to O2 needs.  Equipment used at home: oxygen.  DME owned (not currently used): none.  Upon discharge, patient will have assistance from brother.    Objective:     Communicated with nursing (Hallie) and performed chart review via epic prior to session.  Patient found supine with peripheral IV, telemetry   upon PT entry to room.    General Precautions: Standard, fall  Orthopedic Precautions:N/A   Braces: N/A  Respiratory Status: Nasal cannula, flow 6 L/min    Exams:  Cognitive Exam:  Patient is oriented to Person, Place, Time, and Situation  Gross Motor Coordination:  WFL  Postural Exam:  Patient presented with the following abnormalities:    -       Rounded shoulders  -       Forward head  RLE ROM: WFL  RLE Strength: WFL  LLE ROM: WFL  LLE Strength: WFL    Functional Mobility:  Bed Mobility:     Scooting: stand by assistance  Supine to Sit: stand by assistance  Transfers:     Sit to Stand:  stand by assistance with rolling walker  Bed to Chair: stand by assistance with  rolling walker  using  Stand Pivot  Gait: 10ft x 4 SBA with no AD, limited by lines and SOB but pt able to pace self.   Balance: good sitting balance, tolerated sitting EOB x 15-20 mins with no LOB or distress. Increased time needed to recover from SOB      AM-PAC 6 CLICK MOBILITY  Total Score:16       Treatment & Education:  Educated pt on benefits of consistent participation in PT services to meet functional goals. Educated pt on seated therex to promote strength and joint mobility. Exercises included AP, LAQ, marching. Educated to perform exercises intermittently throughout day to tolerance. Educated pt on importance of sitting OOB to promote endurance and overall activity tolerance. Educated pt on call don't fall policy and use of call button to alert nursing staff of needs (including to assist with returning back to bed). Pt expressed understanding.      Patient left up in chair with all lines intact, call button in reach, and nursing notified.    GOALS:   Multidisciplinary Problems       Physical Therapy Goals          Problem: Physical Therapy    Goal Priority Disciplines Outcome Goal Variances Interventions   Physical Therapy Goal     PT, PT/OT      Description: Pt will perform bed mobility independently in order to participate in EOB  activity.  Pt will perform transfers independently in order to participate in OOB activity.   Pt will ambulate 100ft mod I with LRAD and supplemental O2 (6-8 L)in order to participate in daily tasks.                         History:     Past Medical History:   Diagnosis Date    Abnormal Pap smear of cervix     in the past with repeat pap smear okay.    Acute interstitial pneumonitis     Allergic rhinitis, cause unspecified     Arthritis of both knees     Asthma     Eczema     Fatty liver 10/2014    Fibrocystic breast changes     Headache(784.0)     Hepatomegaly 10/2014    Hypertension     Liver cyst 10/2014    Multinodular goiter     Followed by ENT - Dr. Nicolas Gray    Polymenorrhea     TMJ (dislocation of temporomandibular joint)     Uterine fibroid     in the past    Vitamin D deficiency disease        Past Surgical History:   Procedure Laterality Date    BRONCHOSCOPY Bilateral 2023    Procedure: Bronchoscopy - insert lighted tube into airway to take a biopsy of lung;  Surgeon: Agustín Aviles MD;  Location: [facility];  Service: Pulmonary;  Laterality: Bilateral;     SECTION, CLASSIC      x 1    COLONOSCOPY N/A 2020    Procedure: COLONOSCOPY;  Surgeon: Angie Magana MD;  Location: [facility];  Service: Endoscopy;  Laterality: N/A;    HYSTERECTOMY      MULTIPLE TOOTH EXTRACTIONS      PARTIAL HYSTERECTOMY  2013    Due to fibroids       Time Tracking:     PT Received On: 10/13/23  PT Start Time: 1135     PT Stop Time: 1215  PT Total Time (min): 40 min     Billable Minutes: Evaluation 10, Gait Training 15, and Therapeutic Activity 15      10/13/2023

## 2023-10-13 NOTE — PLAN OF CARE
[facility]  Discharge Final Note    Primary Care Provider: Madeleine Enrique MD    Expected Discharge Date:     Final Discharge Note (most recent)       Final Note - 10/13/23 1406          Final Note    Assessment Type Final Discharge Note     Anticip[place] Disposition [place] [facility] [place] Resources/Appts/Education Provided Appointments scheduled and added to AVS;Post-Acute resouces added to AVS        Post-Acute Status    Post-Acute Authorization Home Health;[facility] Infusion     Home Health Status Set-up Complete/Auth obtained     IV Infusion Status Set-up Complete/Auth obtained     Discharge Delays None known at this time                   After-discharge care                [facility]       [facility]   Service: Home Health Services    [address]  [facility] [address]   Phone: [phone]                     HH and IV abx arranged. Plan for pt to d/c this afternoon pending IV abx teaching.     No d/c needs at this time.     Per Attending MD, Dr. Escobar, plan changed for d/c tomorrow instead of today. Mary Ellen with [facility] notified. [facility] to meet with pt today to complete teaching prior to d/c tomorrow.

## 2023-10-13 NOTE — ASSESSMENT & PLAN NOTE
This patient does have evidence of infective focus and my overall impression is sepsis.  Source: Respiratory  Antibiotics given: Levaquin, Zosyn, and Zyvox  Latest lactate reviewed: lactate pending  Organ dysfunction: acute respiratory failure  Fluid challenge: Not needed - patient is not hypotensive    Post- resuscitation assessment No - Post resuscitation assessment not needed   Will not start vasopressors: Levophed for MAP of 65    Source control achieved by:   · Follow blood and sputum cultures  · Full respiratory viral panel pending  · Negative for flu and COVID  · Continue antibiotics per ID recs  · Follow fever and WBC trends  · Follow chest imaging as needed  · Monitoring hemodynamics for acute changes and/or decompensation

## 2023-10-13 NOTE — ASSESSMENT & PLAN NOTE
Patient with Hypercapnic Respiratory failure which is Acute on chronic.  she is on home oxygen at 8 LPM. Supplemental oxygen was provided and noted- Oxygen Concentration (%):  [50] 50    .   Signs/symptoms of respiratory failure include- tachypnea, increased work of breathing, wheezing and lethargy. Contributing diagnoses includes - Interstitial lung disease and Obesity Hypoventilation Labs and images were reviewed. Patient Has recent ABG, which has been reviewed. Will treat underlying causes and adjust management of respiratory failure as follows-     Bipap, O2, prednisone  PT/OT    Stable to improved- cont present care    Although Fio2 down to 6 L but CXR shows Pulm Edema- will give IV lasix

## 2023-10-13 NOTE — PLAN OF CARE
10/13/23 1050   Post-Acute Status   Post-Acute Authorization Home Health;IV Infusion   Home Health Status Referrals Sent   IV Infusion Status Pending payor review/awaiting authorization (if required)   Discharge Delays None known at this time   Discharge Plan   Discharge Plan A Home Health     Sw spoke with pt this morning to discuss IV abx and HH need upon d/c. Pt preference is [facility] for d/c and [facility] for IV abx.     Referrals sent; pending acceptance.     1135 Sw noted [facility] accepted; pending MD orders.

## 2023-10-13 NOTE — PLAN OF CARE
EVAL AND TX COMPLETED: facilitated bed mobility with SBA, transfers with SBA. Ambulated 10 ft x 4 with SBA and no AD. Recommend outpatient PT (cardiopulmulary focus)

## 2023-10-13 NOTE — SUBJECTIVE & OBJECTIVE
Ayanna Alicia is a 52-year-old female with a past medical history of chronic hypoxic respiratory failure on 5L/NC, asthma, Sjogren's syndrome with associated interstitial lung disease, multinodular goiter, GLENN, and morbid obesity who  presented with worsening shortness of breadth with associated body aches, chills, and subjective fever.     Of note, she was hospitalized in May of 2022 with acute respiratory failure and persistent pneumonia. At that time, she was dishcharged on oxygen. She was subsequently diagnosed with Sjogren's disease and thought to have interstitial lung disease secondary to her underlying rheumatological disease. Initial PFTs with severe restriction and reduced DLCO. She was initially prescribed steroids followed by the addition of Cellcept and Rituximab infusions (last infusion in Dec 2022). Most recently, she was initiated on Ofev; however, the patient reports she has not had any significant improvement in her clinical condition since her original illness in May 2022 and reports she has clinically worsened over the course of this time. Endorses losing weight, lost 65 lbs since last year; however, she has not been able to get less than 220lb. Not taking cellcept since 12/2022, received 4 treatments of Rituximab with her last dose in Dec 2022. She chronically takes prednisone 10 mg.  Patient underwent bronchoscopy with BAL in April 2023 per Dr. Aviles with negative cultures and no growth of fungus, yeast, PCP, staph, or pseudomonas. Remains on OFEV 150mg BID and Prednisone 10mg daily. In addition, she is in a trial of Actemra IV monthly for four consecutive months. In addition, she has been referred for evaluation of lung transplant at [facility] in [place] and was seen by them in August. At that time, she was told she is not currently a candidate for lung transplant secondary to her weight. She has a goal of an additional 10lb weight loss for consideration for transplant.      Interval  history:  10/12: Down to 8l, feels better  10/13: Weaned to 6L/NC this morning; denies acute complaints. Overall feels better. Fever trends improved.    Objective:     Vital Signs (Most Recent):  Temp: 97.4 °F (36.3 °C) (10/13/23 1324)  Pulse: 109 (10/13/23 1324)  Resp: 20 (10/13/23 1324)  BP: 122/63 (10/13/23 1324)  SpO2: 100 % (10/13/23 1324) Vital Signs (24h Range):  Temp:  [97.4 °F (36.3 °C)-98.2 °F (36.8 °C)] 97.4 °F (36.3 °C)  Pulse:  [] 109  Resp:  [16-22] 20  SpO2:  [94 %-100 %] 100 %  BP: (120-129)/(63-79) 122/63   Weight: 102.1 kg (225 lb 1.4 oz);  Body mass index is 36.33 kg/m².    Intake/Output Summary (Last 24 hours) at 10/13/2023 1338  Last data filed at 10/13/2023 0901  Gross per 24 hour   Intake 442.69 ml   Output 850 ml   Net -407.31 ml      Physical Exam  Vitals and nursing note reviewed.   Constitutional:       Appearance: She is obese.   HENT:      Head: Normocephalic.   Eyes:      Conjunctiva/sclera: Conjunctivae normal.   Cardiovascular:      Rate and Rhythm: Normal rate.   Pulmonary:      Effort: Pulmonary effort is normal.      Breath sounds: Normal breath sounds.   Abdominal:      Palpations: Abdomen is soft.   Musculoskeletal:         General: Normal range of motion.      Cervical back: Normal range of motion.   Skin:     General: Skin is warm and dry.   Neurological:      Mental Status: She is alert and oriented to person, place, and time.   Psychiatric:         Mood and Affect: Mood normal.         Review of Systems: Negative except as indicated in HPI    Significant Labs:  CBC/Anemia Profile:  Recent Labs   Lab 10/12/23  0504 10/13/23  0755   WBC 12.87* 11.41   HGB 12.0 14.0   HCT 39.2 48.0    220   MCV 99* 106*   RDW 16.6* 16.8*   Chemistries:  Recent Labs   Lab 10/12/23  0504 10/13/23  0755    142   K 4.0 4.0   CL 99 101   CO2 27 26   BUN 14 13   CREATININE 0.9 0.9   CALCIUM 9.4 9.7   ALBUMIN 2.9* 3.3*   PROT 6.6 6.9   BILITOT 0.3 0.3   ALKPHOS 84 81   ALT 24 26    AST 19 21   MG 2.0 1.9   PHOS 4.3 3.5

## 2023-10-13 NOTE — ASSESSMENT & PLAN NOTE
· Seen by ENT as outpatient  · Previous vocal cord cultures --> actinomyces and yeast  · Case discussed with ID, appreciate assistance  · Continue antibiotics per ID recs

## 2023-10-13 NOTE — PROCEDURES
"Ayanna Alicia is a 53 y.o. female patient.    Temp: 97.4 °F (36.3 °C) (10/13/23 1324)  Pulse: 109 (10/13/23 1324)  Resp: 20 (10/13/23 1324)  BP: 122/63 (10/13/23 1517)  SpO2: 100 % (10/13/23 1324)  Weight: 102.1 kg (225 lb 1.4 oz) (10/11/23 2009)  Height: 5' 6" (167.6 cm) (10/11/23 2009)    PICC  Date/Time: 10/13/2023 4:27 PM  Performed by: Viral Ball RN  Supervising provider: Hallie Childs RN  Consent Done: Yes  Time out: Immediately prior to procedure a time out was called to verify the correct patient, procedure, equipment, support staff and site/side marked as required  Indications: med administration and vascular access  Anesthesia: local infiltration  Local anesthetic: lidocaine 1% without epinephrine  Anesthetic Total (mL): 3  Preparation: skin prepped with ChloraPrep  Skin prep agent dried: skin prep agent completely dried prior to procedure  Sterile barriers: all five maximum sterile barriers used - cap, mask, sterile gown, sterile gloves, and large sterile sheet  Hand hygiene: hand hygiene performed prior to central venous catheter insertion  Location details: right basilic  Catheter type: double lumen  Catheter size: 4 Fr  Catheter Length: 39cm    Ultrasound guidance: yes  Vessel Caliber: medium and patent, compressibility normal  Vascular Doppler: not done  Needle advanced into vessel with real time Ultrasound guidance.  Guidewire confirmed in vessel.  Sterile sheath used.  no esophageal manometryNumber of attempts: 1  Post-procedure: blood return through all ports, sterile dressing applied and chlorhexidine patch  Estimated blood loss (mL): 0  Specimens: No  Implants: No  Assessment: placement verified by x-ray (see rad report)  Complications: none          Name Viral Ball RN  10/13/2023    "

## 2023-10-13 NOTE — PLAN OF CARE
Pt Awake, Alert, and oriented to Person, Place, Time  , remains free from falls/injuries this shift. Safety precautions maintained. Pain managed with rest. No s/s of acute distress noted. Continue with plan of care. Chart check complete.       Problem: Nutrition Impaired (Sepsis/Septic Shock)  Goal: Optimal Nutrition Intake  Outcome: Ongoing, Progressing     Problem: Infection Progression (Sepsis/Septic Shock)  Goal: Absence of Infection Signs and Symptoms  Outcome: Ongoing, Progressing     Problem: Adjustment to Illness (Sepsis/Septic Shock)  Goal: Optimal Coping  Outcome: Ongoing, Progressing

## 2023-10-13 NOTE — ASSESSMENT & PLAN NOTE
53-year-old female with history of interstitial lung disease due to Sjogren's who currently receives infusion therapy (Actemra) here for shortness of breath and findings of vocal cord infection with cultures that have grown Actinomyces, Prevotella, and Candida glabrata so far. ENT saw patient in clinic and performed a scope which noted exudate on the vocal cords with culture sent. Actinomyces can prove very difficult to treat and will require a long duration of antibiotics. The other pathogens can likely be treated with a short course of antimicrobials.     Review of temperature curve shows patient remains afebrile. Leukocytosis resolved to 11K on review of CBC. CMP shows normal creatinine.     Recommendations:  --Will target Actino with penicillin G 20 MU continuous infusion daily   --Ceftriaxone can be used as an equivalent outpatient if patient unable to tolerate penicillin   --Will need 6 weeks of IV therapy followed by PO amoxicillin for at least 2-6 months   --For Prevotella recommend PO metronidazole 500 mg BID to complete 14 day course   --For C glabrata, recommend IV micafungin 100 mg daily for at least 14 days; typically azole resistant   --PICC placement per primary   --Above d/w primary team.    Outpatient Antibiotic Therapy Plan:    Please send referral to [facility].    1) Infection: Polymicrobial laryngeal infection     2) Discharge Antimicrobials:    Intravenous antibiotics:   IV penicillin G 20 million units continuous infusion daily    IV micafungin 100 mg daily     Oral antibiotics:   PO metronidazole 500 mg BID (stop date 10/25)     3) Therapy Duration:  6 weeks for penicillin; 14 days micafungin and metronidazole     Estimated end date of IV antibiotics: Penicillin 11/22, micafungin 10/25    4) Outpatient Weekly Labs:    Order the following labs to be drawn on Mondays:    CBC   CMP     5) Fax Lab Results to Infectious Diseases Provider: Dr. Tone [facility] Fax Number:  [phone]

## 2023-10-13 NOTE — ASSESSMENT & PLAN NOTE
This patient does have evidence of infective focus  My overall impression is sepsis.  Source: Respiratory  Antibiotics given-   Antibiotics (72h ago, onward)    Start     Stop Route Frequency Ordered    10/12/23 1530  penicillin G potassium 20 Million Units in dextrose 5 % (D5W) 500 mL CONTINUOUS INFUSION         11/23/23 1529 IV Every 24 hours (non-standard times) 10/12/23 1410    10/12/23 0915  metroNIDAZOLE tablet 500 mg         10/26/23 0859 Oral Every 12 hours 10/12/23 0810        Latest lactate reviewed-  Recent Labs   Lab 10/11/23  1550 10/11/23  1752   LACTATE 1.5 1.8     Organ dysfunction indicated by Acute respiratory failure    Fluid challenge Actual Body weight- Patient will receive 30ml/kg actual body weight to calculate fluid bolus for treatment of septic shock.     Post- resuscitation assessment No - Post resuscitation assessment not needed       Will Not start Pressors- Levophed for MAP of 65  Source control achieved by: IV Abx    Improved w Abx and steroids    Getting PCG G IV plus Mycafungin and PO Flagyl

## 2023-10-13 NOTE — ASSESSMENT & PLAN NOTE
vocal cord infection with cultures that have grown Actinomyces, prevotella, and Candida glabrata so far.  ENT saw patient in clinic and performed a scope which noted exudate on the vocal cords with culture sent.  Actinomyces can prove very difficult to treat and will require a long duration of antibiotics.  The other pathogens can likely be treated with a short course of antimicrobials.  Patient febrile on admission to 101.6.  Currently afebrile.  Leukocytosis trending down to 12,000 on review of CBC.  CMP shows normal creatinine.     Recommendations:  --Will target Actino with penicillin G 20 MU continuous infusion daily   --Will discuss with pharmacy to see if ceftriaxone can be used as an equivalent outpatient   --Will need 6 weeks of IV therapy followed by PO amoxicillin for at least 2-6 months   --For Prevotella recommend PO metronidazole 500 mg BID to complete 14 day course   --For C glabrata, recommend IV micafungin 100 mg daily for at least 14 days; typically azole resistant    Day 2 of PCN G- cont

## 2023-10-13 NOTE — ASSESSMENT & PLAN NOTE
Patient with chronic hypoxic respiratory failure who is on home oxygen at 6 to 8 L/NC who is her currently on Vapotherm 20/0.50. Signs/symptoms of respiratory failure included increased work of breathing and decreased oxygen saturations. Contributing diagnoses includes pneumonia, asthma, interstitial lung disease, and decompensated heart failure. Labs and images were reviewed. Patient does have a recent ABG which has been reviewed.  Initial PFTs with severe restriction and reduced DLCO; attempted repeat PFTs on 3/16/2022 which she was unable to complete due to worsening shortness of breath.     Will treat underlying causes and adjust management of respiratory failure as follows:  · Patient with restrictive pattern on her PFTs and chronic hypoxic respiratory failure  · Also found to have mild pulmonary hypertension per right heart cath in [place]  · On chronic immunosuppression including OFEV, Prednisone, and Actemra  · Patient reports the Actermra infusions are the only treatment that seems to have any added benefit  · Supplemental oxygen weaned to 6L  · WBC trends improved  · Resume Lasix 40mg po daily  · Continue antibiotics per ID recs  · Follow chest imaging / ABGs as appropriate  · Follow WBC and fever trends

## 2023-10-13 NOTE — PROGRESS NOTES
[facility]  Pulmonology Progress Note    Patient Name: Ayanna Alicia  MRN: [id number]  Admission Date: 10/11/2023   Hospital Length of Stay: 2 days  Code Status: DNR     Attending Provider: Chantel Escobar MD  Primary Care Provider: Madeleine Enrique MD   Principal Problem: Acute on chronic respiratory failure with hypoxemia  Subjective:   History of present illness:  Ayanna Alicia is a 52-year-old female with a past medical history of chronic hypoxic respiratory failure on 5L/NC, asthma, Sjogren's syndrome with associated interstitial lung disease, multinodular goiter, GLENN, and morbid obesity who  presented with worsening shortness of breadth with associated body aches, chills, and subjective fever.     Of note, she was hospitalized in May of 2022 with acute respiratory failure and persistent pneumonia. At that time, she was dishcharged on oxygen. She was subsequently diagnosed with Sjogren's disease and thought to have interstitial lung disease secondary to her underlying rheumatological disease. Initial PFTs with severe restriction and reduced DLCO. She was initially prescribed steroids followed by the addition of Cellcept and Rituximab infusions (last infusion in Dec 2022). Most recently, she was initiated on Ofev; however, the patient reports she has not had any significant improvement in her clinical condition since her original illness in May 2022 and reports she has clinically worsened over the course of this time. Endorses losing weight, lost 65 lbs since last year; however, she has not been able to get less than 220lb. Not taking cellcept since 12/2022, received 4 treatments of Rituximab with her last dose in Dec 2022. She chronically takes prednisone 10 mg.  Patient underwent bronchoscopy with BAL in April 2023 per Dr. Aviles with negative cultures and no growth of fungus, yeast, PCP, staph, or pseudomonas. Remains on OFEV 150mg BID and Prednisone 10mg  daily. In addition, she is in a trial of Actemra IV monthly for four consecutive months. In addition, she has been referred for evaluation of lung transplant at [facility] in [place] and was seen by them in August. At that time, she was told she is not currently a candidate for lung transplant secondary to her weight. She has a goal of an additional 10lb weight loss for consideration for transplant.      Interval history:   10/12: Down to 8l, feels better   10/13: Weaned to 6L/NC this morning; denies acute complaints. Overall feels better. Fever trends improved.    Objective:     Vital Signs (Most Recent):  Temp: 97.4 °F (36.3 °C) (10/13/23 1324)  Pulse: 109 (10/13/23 1324)  Resp: 20 (10/13/23 1324)  BP: 122/63 (10/13/23 1324)  SpO2: 100 % (10/13/23 1324) Vital Signs (24h Range):  Temp:  [97.4 °F (36.3 °C)-98.2 °F (36.8 °C)] 97.4 °F (36.3 °C)  Pulse:  [] 109  Resp:  [16-22] 20  SpO2:  [94 %-100 %] 100 %  BP: (120-129)/(63-79) 122/63   Weight: 102.1 kg (225 lb 1.4 oz);  Body mass index is 36.33 kg/m².    Intake/Output Summary (Last 24 hours) at 10/13/2023 1338  Last data filed at 10/13/2023 0901  Gross per 24 hour   Intake 442.69 ml   Output 850 ml   Net -407.31 ml      Physical Exam  Vitals and nursing note reviewed.   Constitutional:       Appearance: She is obese.   HENT:      Head: Normocephalic.   Eyes:      Conjunctiva/sclera: Conjunctivae normal.   Cardiovascular:      Rate and Rhythm: Normal rate.   Pulmonary:      Effort: Pulmonary effort is normal.      Breath sounds: Normal breath sounds.   Abdominal:      Palpations: Abdomen is soft.   Musculoskeletal:         General: Normal range of motion.      Cervical back: Normal range of motion.   Skin:     General: Skin is warm and dry.   Neurological:      Mental Status: She is alert and oriented to person, place, and time.   Psychiatric:         Mood and Affect: Mood normal.       Review of Systems: Negative except as indicated in HPI    Significant  Labs:  CBC/Anemia Profile:  Recent Labs   Lab 10/12/23  0504 10/13/23  0755   WBC 12.87* 11.41   HGB 12.0 14.0   HCT 39.2 48.0    220   MCV 99* 106*   RDW 16.6* 16.8*   Chemistries:  Recent Labs   Lab 10/12/23  0504 10/13/23  0755    142   K 4.0 4.0   CL 99 101   CO2 27 26   BUN 14 13   CREATININE 0.9 0.9   CALCIUM 9.4 9.7   ALBUMIN 2.9* 3.3*   PROT 6.6 6.9   BILITOT 0.3 0.3   ALKPHOS 84 81   ALT 24 26   AST 19 21   MG 2.0 1.9   PHOS 4.3 3.5     Assessment/Plan:   Acute on chronic respiratory failure with hypoxemia  Patient with chronic hypoxic respiratory failure who is on home oxygen at 6 to 8 L/NC who is her currently on Vapotherm 20/0.50. Signs/symptoms of respiratory failure included increased work of breathing and decreased oxygen saturations. Contributing diagnoses includes pneumonia, asthma, interstitial lung disease, and decompensated heart failure. Labs and images were reviewed. Patient does have a recent ABG which has been reviewed.  Initial PFTs with severe restriction and reduced DLCO; attempted repeat PFTs on 3/16/2022 which she was unable to complete due to worsening shortness of breath.     Will treat underlying causes and adjust management of respiratory failure as follows:  · Patient with restrictive pattern on her PFTs and chronic hypoxic respiratory failure  · Also found to have mild pulmonary hypertension per right heart cath in [place]  · On chronic immunosuppression including OFEV, Prednisone, and Actemra  · Patient reports the Actermra infusions are the only treatment that seems to have any added benefit  · Supplemental oxygen weaned to 6L  · WBC trends improved  · Resume Lasix 40mg po daily  · Continue antibiotics per ID recs  · Follow chest imaging / ABGs as appropriate  · Follow WBC and fever trends    Dysphonia due to laryngeal ulcers  Actinomyces infection  · Seen by ENT as outpatient  · Previous vocal cord cultures --> actinomyces and yeast  · Case discussed with ID,  appreciate assistance  · Continue antibiotics per ID recs    UIP (usual interstitial pneumonitis)  Hospitalized May 2022 with acute respiratory failure and persistent pneumonia and dishcarged on oxygen. She has since been diagnosed with Sjogren's disease with associated ILD. Initial PFTs revealed severe restriction and reduced DLCO. Initially prescribed steroids and subsequently Cellcept and Rituximab infusions (last infusion in Dec 2022). Now on Ofev 150mg BID, Prednisone 10mg daily, and Actemra infusions      · Concern for underlying acute pulmonary infection  · Sputum culture and respiratory viral panel pending  · Continue antibiotics   · [facility]  · Continue home Prednisone; avoiding up titration of steroids  · Continue nocturnal AVAPS  · Resume oral lasix  · Followed by rheumatology and pulmonary as outpatient  · Follow-up with Dr. Ashton in [place]  · Unfortunately, patient is near her max home oxygen abilities. She has had repeated hospitalizations within the past several months.  She is been seen and evaluated by transplant in [place].  She underwent left and right heart catheterization in [place] and found to have mild pulmonary hypertension.    Severe sepsis  This patient does have evidence of infective focus and my overall impression is sepsis.  Source: Respiratory  Antibiotics given: Levaquin, Zosyn, and Zyvox  Latest lactate reviewed: lactate pending  Organ dysfunction: acute respiratory failure  Fluid challenge: Not needed - patient is not hypotensive    Post- resuscitation assessment No - Post resuscitation assessment not needed   Will not start vasopressors: Levophed for MAP of 65    Source control achieved by:   · Follow blood and sputum cultures  · Full respiratory viral panel pending  · Negative for flu and COVID  · Continue antibiotics per ID recs  · Follow fever and WBC trends  · Follow chest imaging as  needed  · Monitoring hemodynamics for acute changes and/or decompensation    GLENN on CPAP  Patient reports non-compliance with home PAP therapy in the past. We discussed utilizing and compliance with home PAP therapy. Following discharge from prior hospitalization in August 2023, the patient was transitioned from CPAP to home AVAPS as she has significant hypoxic respiratory failure at baseline with associated restrictive disease as evidenced by her PFTs. Patient reports decreased use with home AVAPS due to lack of humidification. Patient reports she just received her humidification and reports using AVAPS for a few hours at night.      · Nocturnal AVAPS as ordered    Taylor Mccann NP  Pulmonary and Critical Care  [facility]

## 2023-10-13 NOTE — ASSESSMENT & PLAN NOTE
Hospitalized May 2022 with acute respiratory failure and persistent pneumonia and dishcarged on oxygen. She has since been diagnosed with Sjogren's disease with associated ILD. Initial PFTs revealed severe restriction and reduced DLCO. Initially prescribed steroids and subsequently Cellcept and Rituximab infusions (last infusion in Dec 2022). Now on Ofev 150mg BID, Prednisone 10mg daily, and Actemra infusions      · Concern for underlying acute pulmonary infection  · Sputum culture and respiratory viral panel pending  · Continue antibiotics   · [facility]  · Continue home Prednisone; avoiding up titration of steroids  · Continue nocturnal AVAPS  · Resume oral lasix  · Followed by rheumatology and pulmonary as outpatient  · Follow-up with Dr. Ashton in [place]  · Unfortunately, patient is near her max home oxygen abilities. She has had repeated hospitalizations within the past several months.  She is been seen and evaluated by transplant in [place].  She underwent left and right heart catheterization in [place] and found to have mild pulmonary hypertension.

## 2023-10-13 NOTE — PROGRESS NOTES
[facility] Medicine  Progress Note    Patient Name: Ayanna Alicia  MRN: [id number]  Patient Class: IP- Inpatient   Admission Date: 10/11/2023  Length of Stay: 2 days  Attending Physician: Chantel Escobar MD  Primary Care Provider: Madeleine Enrique MD        Subjective:     Principal Problem:Acute on chronic respiratory failure with hypoxemia        HPI:  Ayanna Alicia is a 53-year-old AAF with a PMH of Sjogren's syndrome, ILD, RA, chronic hypoxic respiratory failure on 6-8L/NC, asthma, GLENN (CPAP), possible Lung Tx candidate, who presented to ER for evaluation of SOB which started gradually over last couple of days. Pt had an O2 saturartion of 83% on 8L via NC. Symptoms are constant and moderate in severity. No mitigating or exacerbating factors reported. Associated sxs include fever. Patient denies any N/V/D, HA,weakness, numbness, and all other sxs at this time. No prior tx reported. No further complaints or concerns at this time. She had a similar admission here from 9/14-/17/23.      Initial VS temp 101.6, , 147/85, 34, 83% on 8 L NC. Labs WBC 13.4, H/H 13/43, BNP <10, Trop 0.039, CXR showed stable cardiomegaly, moderate bilateral infiltrates appear stable. Pt received nebs, Solumedrol 125 mg IV plus Zosyn/Zyvox and Levaquin plus lasix 40 mg and was placed on Bipap at 100% O2 and ABG showed 7.43/459/49/34/100%    She is being admitted to [facility] as acute on ch hypoxemic resp failure         Overview/Hospital Course:  53-year-old AAF with Sjogren's syndrome, ILD, RA, chronic hypoxic respiratory failure on 6-8L/NC, asthma, GLENN (CPAP), possible Lung Tx candidate, who presented to ER for evaluation of SOB which started gradually over last couple of days. Pt had an O2 saturartion of 83% on 8L via NC. She had a similar admission here from 9/14-/17/23.      Initial VS temp 101.6, , 147/85, 34, 83% on 8 L NC. Labs WBC 13.4, H/H 13/43, BNP <10, Trop 0.039, CXR showed stable cardiomegaly,  moderate bilateral infiltrates appear stable. Pt received nebs, Solumedrol 125 mg IV plus Zosyn/Zyvox and Levaquin plus lasix 40 mg and was placed on Bipap at 100% O2 and ABG showed 7.43/459/49/34/100%. She is being admitted to [facility] [place] as acute on ch hypoxemic resp failure.    10/12- looks and feels a little better, SOB improved a little. Was overnite on Bipap 50%, now down to 6-7 L NC, maintaining sats. Got another dose of Lasix today. Also got PICC line for IV Abx for her hoarseness sec to Vocal Cord Dysfunction-    Per ID: vocal cord infection with cultures that have grown Actinomyces, prevotella, and Candida glabrata so far.  ENT saw patient in clinic and performed a scope which noted exudate on the vocal cords with culture sent.  Actinomyces can prove very difficult to treat and will require a long duration of antibiotics.  The other pathogens can likely be treated with a short course of antimicrobials.  Patient febrile on admission to 101.6.  Currently afebrile.  Leukocytosis trending down to 12,000 on review of CBC.  CMP shows normal creatinine.     Recommendations:  --Will target Actino with penicillin G 20 MU continuous infusion daily   --Will discuss with pharmacy to see if ceftriaxone can be used as an equivalent outpatient   --Will need 6 weeks of IV therapy followed by PO amoxicillin for at least 2-6 months   --For Prevotella recommend PO metronidazole 500 mg BID to complete 14 day course   --For C glabrata, recommend IV micafungin 100 mg daily for at least 14 days; typically azole resistant    10/13- looks and feels better, getting stronger, VSS, has remained afeb, O2 down to 6 L NC, hoarseness persists. Getting PT/OT, doing IS. Got PICC line for IV PCN G Infusion plus IV Mycafungin x 2 weeks plus oral Flagyl x 2 weeks. SW arranging HH with Home Infusion services. Pt encouraged to exercise and ambulate. CXR post PICC line shows pulm edema- will start IV lasix. Labs improved.       Interval History:  looks and feels better, getting stronger, VSS, has remained afeb, O2 down to 6 L NC, hoarseness persists. Getting PT/OT, doing IS. Got PICC line for IV PCN G Infusion plus IV Mycafungin x 2 weeks plus oral Flagyl x 2 weeks. SW arranging HH with Home Infusion services. Pt encouraged to exercise and ambulate. CXR post PICC line shows pulm edema- will start IV lasix. Labs improved.     Review of Systems   Constitutional:  Positive for activity change, appetite change and fatigue.   HENT:  Positive for voice change. Negative for congestion and sore throat.    Respiratory:  Positive for cough and shortness of breath.    Cardiovascular:  Negative for chest pain, palpitations and leg swelling.   All other systems reviewed and are negative.    Objective:     Vital Signs (Most Recent):  Temp: 97 °F (36.1 °C) (10/13/23 1741)  Pulse: 106 (10/13/23 1741)  Resp: 20 (10/13/23 1741)  BP: 127/89 (10/13/23 1741)  SpO2: 100 % (10/13/23 1324) Vital Signs (24h Range):  Temp:  [97 °F (36.1 °C)-98.2 °F (36.8 °C)] 97 °F (36.1 °C)  Pulse:  [] 106  Resp:  [16-22] 20  SpO2:  [94 %-100 %] 100 %  BP: (122-129)/(63-89) 127/89     Weight: 102.1 kg (225 lb 1.4 oz)  Body mass index is 36.33 kg/m².    Intake/Output Summary (Last 24 hours) at 10/13/2023 1849  Last data filed at 10/13/2023 0901  Gross per 24 hour   Intake 240 ml   Output 700 ml   Net -460 ml         Physical Exam  Vitals and nursing note reviewed.   Constitutional:       General: She is not in acute distress.     Appearance: Normal appearance. She is obese. She is ill-appearing. She is not toxic-appearing.   HENT:      Head: Normocephalic and atraumatic.      Right Ear: External ear normal.      Left Ear: External ear normal.      Nose: Nose normal.      Mouth/Throat:      Mouth: Mucous membranes are moist.      Pharynx: Oropharynx is clear.   Eyes:      General: No scleral icterus.     Extraocular Movements: Extraocular movements intact.      Conjunctiva/sclera: Conjunctivae  normal.      Pupils: Pupils are equal, round, and reactive to light.   Cardiovascular:      Rate and Rhythm: Normal rate and regular rhythm.      Pulses: Normal pulses.      Heart sounds: Normal heart sounds. No murmur heard.     No friction rub.   Pulmonary:      Effort: No respiratory distress.      Breath sounds: No stridor. Wheezing present. No rhonchi or rales.   Abdominal:      General: Abdomen is flat. Bowel sounds are normal. There is no distension.      Palpations: Abdomen is soft.   Genitourinary:     Comments: deferred  Musculoskeletal:         General: No swelling, tenderness, deformity or signs of injury. Normal range of motion.      Cervical back: Normal range of motion and neck supple.   Skin:     General: Skin is warm and dry.      Capillary Refill: Capillary refill takes less than 2 seconds.      Coloration: Skin is not pale.      Findings: No erythema or rash.   Neurological:      General: No focal deficit present.      Mental Status: She is alert and oriented to person, place, and time.   Psychiatric:         Mood and Affect: Mood normal.         Behavior: Behavior normal.         Thought Content: Thought content normal.         Judgment: Judgment normal.             Significant Labs: All pertinent labs within the past 24 hours have been reviewed.  CBC:   Recent Labs   Lab 10/12/23  0504 10/13/23  0755   WBC 12.87* 11.41   HGB 12.0 14.0   HCT 39.2 48.0    220     CMP:   Recent Labs   Lab 10/12/23  0504 10/13/23  0755    142   K 4.0 4.0   CL 99 101   CO2 27 26   * 114*   BUN 14 13   CREATININE 0.9 0.9   CALCIUM 9.4 9.7   PROT 6.6 6.9   ALBUMIN 2.9* 3.3*   BILITOT 0.3 0.3   ALKPHOS 84 81   AST 19 21   ALT 24 26   ANIONGAP 13 15       Significant Imaging: I have reviewed all pertinent imaging results/findings within the past 24 hours.      Assessment/Plan:      * Acute on chronic respiratory failure with hypoxemia  Patient with Hypercapnic Respiratory failure which is Acute on  chronic.  she is on home oxygen at 8 LPM. Supplemental oxygen was provided and noted- Oxygen Concentration (%):  [50] 50    .   Signs/symptoms of respiratory failure include- tachypnea, increased work of breathing, wheezing and lethargy. Contributing diagnoses includes - Interstitial lung disease and Obesity Hypoventilation Labs and images were reviewed. Patient Has recent ABG, which has been reviewed. Will treat underlying causes and adjust management of respiratory failure as follows-     Bipap, O2, prednisone  PT/OT    Stable to improved- cont present care    Although Fio2 down to 6 L but CXR shows Pulm Edema- will give IV lasix    Severe sepsis  This patient does have evidence of infective focus  My overall impression is sepsis.  Source: Respiratory  Antibiotics given-   Antibiotics (72h ago, onward)    Start     Stop Route Frequency Ordered    10/12/23 1530  penicillin G potassium 20 Million Units in dextrose 5 % (D5W) 500 mL CONTINUOUS INFUSION         11/23/23 1529 IV Every 24 hours (non-standard times) 10/12/23 1410    10/12/23 0915  metroNIDAZOLE tablet 500 mg         10/26/23 0859 Oral Every 12 hours 10/12/23 0810        Latest lactate reviewed-  Recent Labs   Lab 10/11/23  1550 10/11/23  1752   LACTATE 1.5 1.8     Organ dysfunction indicated by Acute respiratory failure    Fluid challenge Actual Body weight- Patient will receive 30ml/kg actual body weight to calculate fluid bolus for treatment of septic shock.     Post- resuscitation assessment No - Post resuscitation assessment not needed       Will Not start Pressors- Levophed for MAP of 65  Source control achieved by: IV Abx    Improved w Abx and steroids    Getting PCG G IV plus Mycafungin and PO Flagyl    Actinomyces infection  vocal cord infection with cultures that have grown Actinomyces, prevotella, and Candida glabrata so far.  ENT saw patient in clinic and performed a scope which noted exudate on the vocal cords with culture sent.  Actinomyces  can prove very difficult to treat and will require a long duration of antibiotics.  The other pathogens can likely be treated with a short course of antimicrobials.  Patient febrile on admission to 101.6.  Currently afebrile.  Leukocytosis trending down to 12,000 on review of CBC.  CMP shows normal creatinine.     Recommendations:  --Will target Actino with penicillin G 20 MU continuous infusion daily   --Will discuss with pharmacy to see if ceftriaxone can be used as an equivalent outpatient   --Will need 6 weeks of IV therapy followed by PO amoxicillin for at least 2-6 months   --For Prevotella recommend PO metronidazole 500 mg BID to complete 14 day course   --For C glabrata, recommend IV micafungin 100 mg daily for at least 14 days; typically azole resistant    Day 2 of PCN G- cont    Dysphonia due to laryngeal ulcers  S/p laryngoscopy with Vocal cord exudates- Polymicrobial infection- see below      UIP (usual interstitial pneumonitis)  Cont Prednisone, pt on OFEV as out pt.   Being evaluated for Lung Tx      GLENN on CPAP  Cont Bipap        VTE Risk Mitigation (From admission, onward)         Ordered     enoxaparin injection 40 mg  Every 24 hours         10/11/23 1445     IP VTE HIGH RISK PATIENT  Once         10/11/23 1443     Place sequential compression device  Until discontinued         10/11/23 1444                Discharge Planning   JANETTE:      Code Status: DNR   Is the patient medically ready for discharge?:     Reason for patient still in hospital (select all that apply): Patient new problem, Patient trending condition, Laboratory test, Treatment, Imaging, Consult recommendations, PT / OT recommendations and Pending disposition  Discharge Plan A: Home Health   Discharge Delays: None known at this time      Chantel Escobar MD  Department of Hospital Medicine   '[facility]

## 2023-10-14 PROCEDURE — 97530 THERAPEUTIC ACTIVITIES: CPT | Mod: CQ

## 2023-10-14 PROCEDURE — 99900035 HC TECH TIME PER 15 MIN (STAT)

## 2023-10-14 PROCEDURE — 25000003 PHARM REV CODE 250: Performed by: NURSE PRACTITIONER

## 2023-10-14 PROCEDURE — 25000003 PHARM REV CODE 250: Performed by: HOSPITALIST

## 2023-10-14 PROCEDURE — 27100171 HC OXYGEN HIGH FLOW UP TO 24 HOURS

## 2023-10-14 PROCEDURE — 25000242 PHARM REV CODE 250 ALT 637 W/ HCPCS: Performed by: EMERGENCY MEDICINE

## 2023-10-14 PROCEDURE — 63600175 PHARM REV CODE 636 W HCPCS: Performed by: NURSE PRACTITIONER

## 2023-10-14 PROCEDURE — 25000003 PHARM REV CODE 250: Performed by: STUDENT IN AN ORGANIZED HEALTH CARE EDUCATION/TRAINING PROGRAM

## 2023-10-14 PROCEDURE — 94761 N-INVAS EAR/PLS OXIMETRY MLT: CPT

## 2023-10-14 PROCEDURE — 94640 AIRWAY INHALATION TREATMENT: CPT

## 2023-10-14 PROCEDURE — 63600175 PHARM REV CODE 636 W HCPCS: Performed by: STUDENT IN AN ORGANIZED HEALTH CARE EDUCATION/TRAINING PROGRAM

## 2023-10-14 PROCEDURE — 97116 GAIT TRAINING THERAPY: CPT | Mod: CQ

## 2023-10-14 PROCEDURE — 11000001 HC ACUTE MED/SURG PRIVATE ROOM

## 2023-10-14 PROCEDURE — 25000003 PHARM REV CODE 250: Performed by: EMERGENCY MEDICINE

## 2023-10-14 PROCEDURE — 63600175 PHARM REV CODE 636 W HCPCS: Performed by: EMERGENCY MEDICINE

## 2023-10-14 PROCEDURE — 25000242 PHARM REV CODE 250 ALT 637 W/ HCPCS: Performed by: NURSE PRACTITIONER

## 2023-10-14 RX ORDER — ACETAMINOPHEN 325 MG/1
650 TABLET ORAL EVERY 6 HOURS PRN
Status: DISCONTINUED | OUTPATIENT
Start: 2023-10-14 | End: 2023-10-17 | Stop reason: HOSPADM

## 2023-10-14 RX ADMIN — METRONIDAZOLE 500 MG: 500 TABLET ORAL at 08:10

## 2023-10-14 RX ADMIN — HYPROMELLOSE 2910 1 DROP: 5 SOLUTION/ DROPS OPHTHALMIC at 06:10

## 2023-10-14 RX ADMIN — MONTELUKAST 10 MG: 10 TABLET, FILM COATED ORAL at 08:10

## 2023-10-14 RX ADMIN — FAMOTIDINE 20 MG: 20 TABLET ORAL at 08:10

## 2023-10-14 RX ADMIN — PREDNISONE 10 MG: 10 TABLET ORAL at 08:10

## 2023-10-14 RX ADMIN — FUROSEMIDE 40 MG: 10 INJECTION, SOLUTION INTRAMUSCULAR; INTRAVENOUS at 09:10

## 2023-10-14 RX ADMIN — IPRATROPIUM BROMIDE AND ALBUTEROL SULFATE 3 ML: 2.5; .5 SOLUTION RESPIRATORY (INHALATION) at 01:10

## 2023-10-14 RX ADMIN — ACETAMINOPHEN 650 MG: 325 TABLET ORAL at 12:10

## 2023-10-14 RX ADMIN — GUAIFENESIN AND DEXTROMETHORPHAN 10 ML: 100; 10 SYRUP ORAL at 12:10

## 2023-10-14 RX ADMIN — FUROSEMIDE 40 MG: 10 INJECTION, SOLUTION INTRAMUSCULAR; INTRAVENOUS at 08:10

## 2023-10-14 RX ADMIN — ENOXAPARIN SODIUM 40 MG: 40 INJECTION SUBCUTANEOUS at 04:10

## 2023-10-14 RX ADMIN — MICAFUNGIN SODIUM 100 MG: 100 INJECTION, POWDER, LYOPHILIZED, FOR SOLUTION INTRAVENOUS at 04:10

## 2023-10-14 RX ADMIN — ARFORMOTEROL TARTRATE 15 MCG: 15 SOLUTION RESPIRATORY (INHALATION) at 07:10

## 2023-10-14 RX ADMIN — GUAIFENESIN AND DEXTROMETHORPHAN 10 ML: 100; 10 SYRUP ORAL at 07:10

## 2023-10-14 RX ADMIN — MAGNESIUM OXIDE TAB 400 MG (241.3 MG ELEMENTAL MG) 800 MG: 400 (241.3 MG) TAB at 08:10

## 2023-10-14 RX ADMIN — CEFTRIAXONE 2 G: 2 INJECTION, POWDER, FOR SOLUTION INTRAMUSCULAR; INTRAVENOUS at 12:10

## 2023-10-14 NOTE — ASSESSMENT & PLAN NOTE
vocal cord infection with cultures that have grown Actinomyces, prevotella, and Candida glabrata so far.  ENT saw patient in clinic and performed a scope which noted exudate on the vocal cords with culture sent.  Actinomyces can prove very difficult to treat and will require a long duration of antibiotics.  The other pathogens can likely be treated with a short course of antimicrobials.  Patient febrile on admission to 101.6.  Currently afebrile.  Leukocytosis trending down to 12,000 on review of CBC.  CMP shows normal creatinine.     Recommendations:  --Will target Actino with penicillin G 20 MU continuous infusion daily   --Will discuss with pharmacy to see if ceftriaxone can be used as an equivalent outpatient   --Will need 6 weeks of IV therapy followed by PO amoxicillin for at least 2-6 months   --For Prevotella recommend PO metronidazole 500 mg BID to complete 14 day course   --For C glabrata, recommend IV micafungin 100 mg daily for at least 14 days; typically azole resistant    Day 2 of PCN G- cont    Day 3- PCN G changed to Rocephin

## 2023-10-14 NOTE — PLAN OF CARE
Updated OPAT    CXR post PICC placement reviewed today with notable pulmonary edema. Patient also mentioned diffuse cramping and anxiety overnight after receiving infusion. Therefore, for lung penetration and to reduce fluid load, will switch penicillin G to ceftriaxone 2 g daily for Actinomyces coverage. Discussed with patient and primary team who are in agreement. Have also updated hospitals about the change.     Outpatient Antibiotic Therapy Plan:    Please send referral to hospitals Infusion.    1) Infection: Polymicrobial laryngeal infection     2) Discharge Antimicrobials:    Intravenous antimicrobials:  IV ceftriaxone 2 g daily   IV micafungin 100 mg daily     Oral antibiotics:  PO metronidazole 500 mg BID (stop date 10/25/23)    3) Therapy Duration:  6 weeks for ceftriaxone; 14 days micafungin and metronidazole     Estimated end date of IV antimicrobials: ceftriaxone 11/22, micafungin 10/25    4) Outpatient Weekly Labs:    Order the following labs to be drawn on Mondays:   CBC  CMP     5) Fax Lab Results to Infectious Diseases Provider: Dr. [facility] Fax Number: [phone]

## 2023-10-14 NOTE — PROGRESS NOTES
[facility] Medicine  Progress Note    Patient Name: Ayanna Alicia  MRN: [id number]  Patient Class: IP- Inpatient   Admission Date: 10/11/2023  Length of Stay: 3 days  Attending Physician: Chantel Ecsobar MD  Primary Care Provider: Madeleine Enrique MD        Subjective:     Principal Problem:Acute on chronic respiratory failure with hypoxemia        HPI:  Ayanna Alicia is a 53-year-old AAF with a PMH of Sjogren's syndrome, ILD, RA, chronic hypoxic respiratory failure on 6-8L/NC, asthma, GLENN (CPAP), possible Lung Tx candidate, who presented to ER for evaluation of SOB which started gradually over last couple of days. Pt had an O2 saturartion of 83% on 8L via NC. Symptoms are constant and moderate in severity. No mitigating or exacerbating factors reported. Associated sxs include fever. Patient denies any N/V/D, HA,weakness, numbness, and all other sxs at this time. No prior tx reported. No further complaints or concerns at this time. She had a similar admission here from 9/14-/17/23.      Initial VS temp 101.6, , 147/85, 34, 83% on 8 L NC. Labs WBC 13.4, H/H 13/43, BNP <10, Trop 0.039, CXR showed stable cardiomegaly, moderate bilateral infiltrates appear stable. Pt received nebs, Solumedrol 125 mg IV plus Zosyn/Zyvox and Levaquin plus lasix 40 mg and was placed on Bipap at 100% O2 and ABG showed 7.43/459/49/34/100%    She is being admitted to [facility] as acute on ch hypoxemic resp failure         Overview/Hospital Course:  53-year-old AAF with Sjogren's syndrome, ILD, RA, chronic hypoxic respiratory failure on 6-8L/NC, asthma, GLENN (CPAP), possible Lung Tx candidate, who presented to ER for evaluation of SOB which started gradually over last couple of days. Pt had an O2 saturartion of 83% on 8L via NC. She had a similar admission here from 9/14-/17/23.      Initial VS temp 101.6, , 147/85, 34, 83% on 8 L NC. Labs WBC 13.4, H/H 13/43, BNP <10, Trop 0.039, CXR showed stable cardiomegaly,  moderate bilateral infiltrates appear stable. Pt received nebs, Solumedrol 125 mg IV plus Zosyn/Zyvox and Levaquin plus lasix 40 mg and was placed on Bipap at 100% O2 and ABG showed 7.43/459/49/34/100%. She is being admitted to [facility] as acute on ch hypoxemic resp failure.    10/12- looks and feels a little better, SOB improved a little. Was overnite on Bipap 50%, now down to 6-7 L NC, maintaining sats. Got another dose of Lasix today. Also got PICC line for IV Abx for her hoarseness sec to Vocal Cord Dysfunction-    Per ID: vocal cord infection with cultures that have grown Actinomyces, prevotella, and Candida glabrata so far.  ENT saw patient in clinic and performed a scope which noted exudate on the vocal cords with culture sent.  Actinomyces can prove very difficult to treat and will require a long duration of antibiotics.  The other pathogens can likely be treated with a short course of antimicrobials.  Patient febrile on admission to 101.6.  Currently afebrile.  Leukocytosis trending down to 12,000 on review of CBC.  CMP shows normal creatinine.     Recommendations:  --Will target Actino with penicillin G 20 MU continuous infusion daily   --Will discuss with pharmacy to see if ceftriaxone can be used as an equivalent outpatient   --Will need 6 weeks of IV therapy followed by PO amoxicillin for at least 2-6 months   --For Prevotella recommend PO metronidazole 500 mg BID to complete 14 day course   --For C glabrata, recommend IV micafungin 100 mg daily for at least 14 days; typically azole resistant    10/13- looks and feels better, getting stronger, VSS, has remained afeb, O2 down to 6 L NC, hoarseness persists. Getting PT/OT, doing IS. Got PICC line for IV PCN G Infusion plus IV Mycafungin x 2 weeks plus oral Flagyl x 2 weeks. SW arranging HH with Home Infusion services. Pt encouraged to exercise and ambulate. CXR post PICC line shows pulm edema- will start IV lasix. Labs improved.     10/14- Appreciate   Tone. Pt still SOB, easy HICKS- good response to IV lasix, over 3 L out so far. ID changed her from PCN G to Rocephin due to Pulm Edema- pt agreeable. Encouraged to do IS and ambulate in the room. Cont Bipap qhs.       Interval History:  Appreciate Dr. Wayne. Pt still SOB, easy HICKS- good response to IV lasix, over 3 L out so far. ID changed her from PCN G to Rocephin due to Pulm Edema- pt agreeable. Encouraged to do IS and ambulate in the room. Cont Bipap qhs.     Review of Systems   Constitutional:  Positive for activity change, appetite change and fatigue.   HENT:  Positive for voice change. Negative for congestion and sore throat.    Respiratory:  Positive for cough and shortness of breath.    Cardiovascular:  Negative for chest pain, palpitations and leg swelling.   All other systems reviewed and are negative.    Objective:     Vital Signs (Most Recent):  Temp: 99.5 °F (37.5 °C) (10/14/23 1614)  Pulse: 105 (10/14/23 1626)  Resp: 18 (10/14/23 1626)  BP: (!) 140/56 (10/14/23 1614)  SpO2: 95 % (10/14/23 1626) Vital Signs (24h Range):  Temp:  [97 °F (36.1 °C)-99.5 °F (37.5 °C)] 99.5 °F (37.5 °C)  Pulse:  [] 105  Resp:  [16-22] 18  SpO2:  [93 %-100 %] 95 %  BP: (110-140)/(56-95) 140/56     Weight: 102.1 kg (225 lb 1.4 oz)  Body mass index is 36.33 kg/m².    Intake/Output Summary (Last 24 hours) at 10/14/2023 1730  Last data filed at 10/14/2023 1220  Gross per 24 hour   Intake 600 ml   Output 1000 ml   Net -400 ml         Physical Exam  Vitals and nursing note reviewed.   Constitutional:       General: She is not in acute distress.     Appearance: Normal appearance. She is obese. She is ill-appearing. She is not toxic-appearing.   HENT:      Head: Normocephalic and atraumatic.      Right Ear: External ear normal.      Left Ear: External ear normal.      Nose: Nose normal.      Mouth/Throat:      Mouth: Mucous membranes are moist.      Pharynx: Oropharynx is clear.   Eyes:      General: No scleral icterus.      Extraocular Movements: Extraocular movements intact.      Conjunctiva/sclera: Conjunctivae normal.      Pupils: Pupils are equal, round, and reactive to light.   Cardiovascular:      Rate and Rhythm: Normal rate and regular rhythm.      Pulses: Normal pulses.      Heart sounds: Normal heart sounds. No murmur heard.     No friction rub.   Pulmonary:      Effort: No respiratory distress.      Breath sounds: No stridor. Rales present. No wheezing or rhonchi.   Abdominal:      General: Abdomen is flat. Bowel sounds are normal. There is no distension.      Palpations: Abdomen is soft.   Genitourinary:     Comments: deferred  Musculoskeletal:         General: No swelling, tenderness, deformity or signs of injury. Normal range of motion.      Cervical back: Normal range of motion and neck supple.   Skin:     General: Skin is warm and dry.      Capillary Refill: Capillary refill takes less than 2 seconds.      Coloration: Skin is not pale.      Findings: No erythema or rash.   Neurological:      General: No focal deficit present.      Mental Status: She is alert and oriented to person, place, and time.   Psychiatric:         Mood and Affect: Mood normal.         Behavior: Behavior normal.         Thought Content: Thought content normal.         Judgment: Judgment normal.             Significant Labs: All pertinent labs within the past 24 hours have been reviewed.  BMP:   Recent Labs   Lab 10/13/23  0755   *      K 4.0      CO2 26   BUN 13   CREATININE 0.9   CALCIUM 9.7   MG 1.9     CBC:   Recent Labs   Lab 10/13/23  0755   WBC 11.41   HGB 14.0   HCT 48.0          Significant Imaging: I have reviewed all pertinent imaging results/findings within the past 24 hours.      Assessment/Plan:      * Acute on chronic respiratory failure with hypoxemia  Patient with Hypercapnic Respiratory failure which is Acute on chronic.  she is on home oxygen at 8 LPM. Supplemental oxygen was provided and noted-      .    Signs/symptoms of respiratory failure include- tachypnea, increased work of breathing, wheezing and lethargy. Contributing diagnoses includes - Interstitial lung disease and Obesity Hypoventilation Labs and images were reviewed. Patient Has recent ABG, which has been reviewed. Will treat underlying causes and adjust management of respiratory failure as follows-     Bipap, O2, prednisone  PT/OT    Stable to improved- cont present care    Although Fio2 down to 6 L but CXR shows Pulm Edema- will give IV lasix    Stable- cont current tx including lasix  Abx changed to Rocephin    Severe sepsis  This patient does have evidence of infective focus- infected Vocal Cords  My overall impression is sepsis.  Source: Respiratory  Antibiotics given-   Antibiotics (72h ago, onward)    Start     Stop Route Frequency Ordered    10/14/23 1230  cefTRIAXone (ROCEPHIN) 2 g in dextrose 5 % in water (D5W) 100 mL IVPB (MB+)         -- IV Every 24 hours (non-standard times) 10/14/23 1119    10/12/23 0915  metroNIDAZOLE tablet 500 mg         10/26/23 0859 Oral Every 12 hours 10/12/23 0810        Latest lactate reviewed-  Recent Labs   Lab 10/11/23  1752   LACTATE 1.8     Organ dysfunction indicated by Acute respiratory failure    Fluid challenge Actual Body weight- Patient will receive 30ml/kg actual body weight to calculate fluid bolus for treatment of septic shock.     Post- resuscitation assessment No - Post resuscitation assessment not needed       Will Not start Pressors- Levophed for MAP of 65  Source control achieved by: IV Abx    Improved w Abx and steroids    Getting PCG G IV plus Mycafungin and PO Flagyl    PCH G changed to Rocephin sec to pulm edema, restlessness and cramps    Actinomyces infection  vocal cord infection with cultures that have grown Actinomyces, prevotella, and Candida glabrata so far.  ENT saw patient in clinic and performed a scope which noted exudate on the vocal cords with culture sent.  Actinomyces can  prove very difficult to treat and will require a long duration of antibiotics.  The other pathogens can likely be treated with a short course of antimicrobials.  Patient febrile on admission to 101.6.  Currently afebrile.  Leukocytosis trending down to 12,000 on review of CBC.  CMP shows normal creatinine.     Recommendations:  --Will target Actino with penicillin G 20 MU continuous infusion daily   --Will discuss with pharmacy to see if ceftriaxone can be used as an equivalent outpatient   --Will need 6 weeks of IV therapy followed by PO amoxicillin for at least 2-6 months   --For Prevotella recommend PO metronidazole 500 mg BID to complete 14 day course   --For C glabrata, recommend IV micafungin 100 mg daily for at least 14 days; typically azole resistant    Day 2 of PCN G- cont    Day 3- PCN G changed to Rocephin    Dysphonia due to laryngeal ulcers  S/p laryngoscopy with Vocal cord exudates- Polymicrobial infection- see below      UIP (usual interstitial pneumonitis)  Cont Prednisone, pt on OFEV as out pt.   Being evaluated for Lung Tx      GLENN on CPAP  Cont Bipap        VTE Risk Mitigation (From admission, onward)         Ordered     enoxaparin injection 40 mg  Every 24 hours         10/11/23 1445     IP VTE HIGH RISK PATIENT  Once         10/11/23 1443     Place sequential compression device  Until discontinued         10/11/23 1444                Discharge Planning   JANETTE:      Code Status: DNR   Is the patient medically ready for discharge?:     Reason for patient still in hospital (select all that apply): Patient new problem, Patient trending condition, Laboratory test, Treatment, Imaging, Consult recommendations and PT / OT recommendations  Discharge Plan A: Home Health   Discharge Delays: None known at this time    Chantel Escobar MD  Department of Hospital Medicine   [facility]

## 2023-10-14 NOTE — PLAN OF CARE
Pt  resting in bed . No distress . Hob elevated . O2 per Nasal cannula . Pt independent with ADL'S . Safety measures in place . Chart check complete , will continue to monitor .

## 2023-10-14 NOTE — ASSESSMENT & PLAN NOTE
Patient with Hypercapnic Respiratory failure which is Acute on chronic.  she is on home oxygen at 8 LPM. Supplemental oxygen was provided and noted-      .   Signs/symptoms of respiratory failure include- tachypnea, increased work of breathing, wheezing and lethargy. Contributing diagnoses includes - Interstitial lung disease and Obesity Hypoventilation Labs and images were reviewed. Patient Has recent ABG, which has been reviewed. Will treat underlying causes and adjust management of respiratory failure as follows-     Bipap, O2, prednisone  PT/OT    Stable to improved- cont present care    Although Fio2 down to 6 L but CXR shows Pulm Edema- will give IV lasix    Stable- cont current tx including lasix  Abx changed to Rocephin

## 2023-10-14 NOTE — ASSESSMENT & PLAN NOTE
This patient does have evidence of infective focus- infected Vocal Cords  My overall impression is sepsis.  Source: Respiratory  Antibiotics given-   Antibiotics (72h ago, onward)    Start     Stop Route Frequency Ordered    10/14/23 1230  cefTRIAXone (ROCEPHIN) 2 g in dextrose 5 % in water (D5W) 100 mL IVPB (MB+)         -- IV Every 24 hours (non-standard times) 10/14/23 1119    10/12/23 0915  metroNIDAZOLE tablet 500 mg         10/26/23 0859 Oral Every 12 hours 10/12/23 0810        Latest lactate reviewed-  Recent Labs   Lab 10/11/23  1752   LACTATE 1.8     Organ dysfunction indicated by Acute respiratory failure    Fluid challenge Actual Body weight- Patient will receive 30ml/kg actual body weight to calculate fluid bolus for treatment of septic shock.     Post- resuscitation assessment No - Post resuscitation assessment not needed       Will Not start Pressors- Levophed for MAP of 65  Source control achieved by: IV Abx    Improved w Abx and steroids    Getting PCG G IV plus Mycafungin and PO Flagyl    PCH G changed to Rocephin sec to pulm edema, restlessness and cramps

## 2023-10-14 NOTE — SUBJECTIVE & OBJECTIVE
Interval History:  Appreciate Dr. Wayne. Pt still SOB, easy HICKS- good response to IV lasix, over 3 L out so far. ID changed her from PCN G to Rocephin due to Pulm Edema- pt agreeable. Encouraged to do IS and ambulate in the room. Cont Bipap qhs.     Review of Systems   Constitutional:  Positive for activity change, appetite change and fatigue.   HENT:  Positive for voice change. Negative for congestion and sore throat.    Respiratory:  Positive for cough and shortness of breath.    Cardiovascular:  Negative for chest pain, palpitations and leg swelling.   All other systems reviewed and are negative.    Objective:     Vital Signs (Most Recent):  Temp: 99.5 °F (37.5 °C) (10/14/23 1614)  Pulse: 105 (10/14/23 1626)  Resp: 18 (10/14/23 1626)  BP: (!) 140/56 (10/14/23 1614)  SpO2: 95 % (10/14/23 1626) Vital Signs (24h Range):  Temp:  [97 °F (36.1 °C)-99.5 °F (37.5 °C)] 99.5 °F (37.5 °C)  Pulse:  [] 105  Resp:  [16-22] 18  SpO2:  [93 %-100 %] 95 %  BP: (110-140)/(56-95) 140/56     Weight: 102.1 kg (225 lb 1.4 oz)  Body mass index is 36.33 kg/m².    Intake/Output Summary (Last 24 hours) at 10/14/2023 1730  Last data filed at 10/14/2023 1220  Gross per 24 hour   Intake 600 ml   Output 1000 ml   Net -400 ml         Physical Exam  Vitals and nursing note reviewed.   Constitutional:       General: She is not in acute distress.     Appearance: Normal appearance. She is obese. She is ill-appearing. She is not toxic-appearing.   HENT:      Head: Normocephalic and atraumatic.      Right Ear: External ear normal.      Left Ear: External ear normal.      Nose: Nose normal.      Mouth/Throat:      Mouth: Mucous membranes are moist.      Pharynx: Oropharynx is clear.   Eyes:      General: No scleral icterus.     Extraocular Movements: Extraocular movements intact.      Conjunctiva/sclera: Conjunctivae normal.      Pupils: Pupils are equal, round, and reactive to light.   Cardiovascular:      Rate and Rhythm: Normal rate and  regular rhythm.      Pulses: Normal pulses.      Heart sounds: Normal heart sounds. No murmur heard.     No friction rub.   Pulmonary:      Effort: No respiratory distress.      Breath sounds: No stridor. Rales present. No wheezing or rhonchi.   Abdominal:      General: Abdomen is flat. Bowel sounds are normal. There is no distension.      Palpations: Abdomen is soft.   Genitourinary:     Comments: deferred  Musculoskeletal:         General: No swelling, tenderness, deformity or signs of injury. Normal range of motion.      Cervical back: Normal range of motion and neck supple.   Skin:     General: Skin is warm and dry.      Capillary Refill: Capillary refill takes less than 2 seconds.      Coloration: Skin is not pale.      Findings: No erythema or rash.   Neurological:      General: No focal deficit present.      Mental Status: She is alert and oriented to person, place, and time.   Psychiatric:         Mood and Affect: Mood normal.         Behavior: Behavior normal.         Thought Content: Thought content normal.         Judgment: Judgment normal.             Significant Labs: All pertinent labs within the past 24 hours have been reviewed.  BMP:   Recent Labs   Lab 10/13/23  0755   *      K 4.0      CO2 26   BUN 13   CREATININE 0.9   CALCIUM 9.7   MG 1.9     CBC:   Recent Labs   Lab 10/13/23  0755   WBC 11.41   HGB 14.0   HCT 48.0          Significant Imaging: I have reviewed all pertinent imaging results/findings within the past 24 hours.

## 2023-10-14 NOTE — PLAN OF CARE
Patient remained free of any injury throughout shift. VSS. No complaints of any pain. Nebs scheduled and PRN. Monitoring respiratory status. 6LNC. PICC ASHUTOSH. Call light in reach and side rails x2. Will continue with plan of care.     Problem: Adult Inpatient Plan of Care  Goal: Optimal Comfort and Wellbeing  Outcome: Ongoing, Progressing     Problem: Infection Progression (Sepsis/Septic Shock)  Goal: Absence of Infection Signs and Symptoms  Outcome: Ongoing, Progressing

## 2023-10-15 ENCOUNTER — PATIENT MESSAGE (OUTPATIENT)
Dept: INFECTIOUS DISEASES | Facility: CLINIC | Age: 53
End: 2023-10-15
Payer: COMMERCIAL

## 2023-10-15 ENCOUNTER — PATIENT MESSAGE (OUTPATIENT)
Dept: CARDIOLOGY | Facility: CLINIC | Age: 53
End: 2023-10-15
Payer: COMMERCIAL

## 2023-10-15 PROCEDURE — 94761 N-INVAS EAR/PLS OXIMETRY MLT: CPT

## 2023-10-15 PROCEDURE — 94640 AIRWAY INHALATION TREATMENT: CPT

## 2023-10-15 PROCEDURE — 25000003 PHARM REV CODE 250: Performed by: EMERGENCY MEDICINE

## 2023-10-15 PROCEDURE — 63600175 PHARM REV CODE 636 W HCPCS: Performed by: EMERGENCY MEDICINE

## 2023-10-15 PROCEDURE — 25000003 PHARM REV CODE 250: Performed by: STUDENT IN AN ORGANIZED HEALTH CARE EDUCATION/TRAINING PROGRAM

## 2023-10-15 PROCEDURE — 63600175 PHARM REV CODE 636 W HCPCS: Performed by: STUDENT IN AN ORGANIZED HEALTH CARE EDUCATION/TRAINING PROGRAM

## 2023-10-15 PROCEDURE — 25000003 PHARM REV CODE 250: Performed by: NURSE PRACTITIONER

## 2023-10-15 PROCEDURE — 11000001 HC ACUTE MED/SURG PRIVATE ROOM

## 2023-10-15 PROCEDURE — 25000242 PHARM REV CODE 250 ALT 637 W/ HCPCS: Performed by: NURSE PRACTITIONER

## 2023-10-15 PROCEDURE — 99900035 HC TECH TIME PER 15 MIN (STAT)

## 2023-10-15 PROCEDURE — 63600175 PHARM REV CODE 636 W HCPCS: Performed by: NURSE PRACTITIONER

## 2023-10-15 PROCEDURE — 27100171 HC OXYGEN HIGH FLOW UP TO 24 HOURS

## 2023-10-15 PROCEDURE — 94660 CPAP INITIATION&MGMT: CPT

## 2023-10-15 PROCEDURE — 25000242 PHARM REV CODE 250 ALT 637 W/ HCPCS: Performed by: EMERGENCY MEDICINE

## 2023-10-15 PROCEDURE — 25000003 PHARM REV CODE 250: Performed by: HOSPITALIST

## 2023-10-15 RX ORDER — METOLAZONE 5 MG/1
5 TABLET ORAL DAILY
Status: COMPLETED | OUTPATIENT
Start: 2023-10-15 | End: 2023-10-16

## 2023-10-15 RX ORDER — SODIUM CHLORIDE 9 MG/ML
INJECTION, SOLUTION INTRAVENOUS
Status: DISCONTINUED | OUTPATIENT
Start: 2023-10-15 | End: 2023-10-17 | Stop reason: HOSPADM

## 2023-10-15 RX ADMIN — FAMOTIDINE 20 MG: 20 TABLET ORAL at 09:10

## 2023-10-15 RX ADMIN — FAMOTIDINE 20 MG: 20 TABLET ORAL at 08:10

## 2023-10-15 RX ADMIN — IPRATROPIUM BROMIDE AND ALBUTEROL SULFATE 3 ML: 2.5; .5 SOLUTION RESPIRATORY (INHALATION) at 04:10

## 2023-10-15 RX ADMIN — MONTELUKAST 10 MG: 10 TABLET, FILM COATED ORAL at 08:10

## 2023-10-15 RX ADMIN — GUAIFENESIN AND DEXTROMETHORPHAN 10 ML: 100; 10 SYRUP ORAL at 07:10

## 2023-10-15 RX ADMIN — METOLAZONE 5 MG: 5 TABLET ORAL at 04:10

## 2023-10-15 RX ADMIN — GUAIFENESIN AND DEXTROMETHORPHAN 10 ML: 100; 10 SYRUP ORAL at 10:10

## 2023-10-15 RX ADMIN — MAGNESIUM OXIDE TAB 400 MG (241.3 MG ELEMENTAL MG) 800 MG: 400 (241.3 MG) TAB at 08:10

## 2023-10-15 RX ADMIN — ARFORMOTEROL TARTRATE 15 MCG: 15 SOLUTION RESPIRATORY (INHALATION) at 07:10

## 2023-10-15 RX ADMIN — ENOXAPARIN SODIUM 40 MG: 40 INJECTION SUBCUTANEOUS at 04:10

## 2023-10-15 RX ADMIN — ARFORMOTEROL TARTRATE 15 MCG: 15 SOLUTION RESPIRATORY (INHALATION) at 08:10

## 2023-10-15 RX ADMIN — MICAFUNGIN SODIUM 100 MG: 100 INJECTION, POWDER, LYOPHILIZED, FOR SOLUTION INTRAVENOUS at 04:10

## 2023-10-15 RX ADMIN — PREDNISONE 10 MG: 10 TABLET ORAL at 09:10

## 2023-10-15 RX ADMIN — GUAIFENESIN AND DEXTROMETHORPHAN 10 ML: 100; 10 SYRUP ORAL at 06:10

## 2023-10-15 RX ADMIN — FUROSEMIDE 40 MG: 10 INJECTION, SOLUTION INTRAMUSCULAR; INTRAVENOUS at 09:10

## 2023-10-15 RX ADMIN — METRONIDAZOLE 500 MG: 500 TABLET ORAL at 08:10

## 2023-10-15 RX ADMIN — CEFTRIAXONE 2 G: 2 INJECTION, POWDER, FOR SOLUTION INTRAMUSCULAR; INTRAVENOUS at 12:10

## 2023-10-15 RX ADMIN — FUROSEMIDE 40 MG: 10 INJECTION, SOLUTION INTRAMUSCULAR; INTRAVENOUS at 08:10

## 2023-10-15 RX ADMIN — MAGNESIUM OXIDE TAB 400 MG (241.3 MG ELEMENTAL MG) 800 MG: 400 (241.3 MG) TAB at 09:10

## 2023-10-15 RX ADMIN — METRONIDAZOLE 500 MG: 500 TABLET ORAL at 09:10

## 2023-10-15 NOTE — PLAN OF CARE
Call from [facility] with hospitals infusion services.  If patient dc today need to know as soon as possible for pharmacist to mix meds.  Dr Escobar notified via secure chat.

## 2023-10-15 NOTE — ASSESSMENT & PLAN NOTE
vocal cord infection with cultures that have grown Actinomyces, prevotella, and Candida glabrata so far.  ENT saw patient in clinic and performed a scope which noted exudate on the vocal cords with culture sent.  Actinomyces can prove very difficult to treat and will require a long duration of antibiotics.  The other pathogens can likely be treated with a short course of antimicrobials.  Patient febrile on admission to 101.6.  Currently afebrile.  Leukocytosis trending down to 12,000 on review of CBC.  CMP shows normal creatinine.     Recommendations:  --Will target Actino with penicillin G 20 MU continuous infusion daily   --Will discuss with pharmacy to see if ceftriaxone can be used as an equivalent outpatient   --Will need 6 weeks of IV therapy followed by PO amoxicillin for at least 2-6 months   --For Prevotella recommend PO metronidazole 500 mg BID to complete 14 day course   --For C glabrata, recommend IV micafungin 100 mg daily for at least 14 days; typically azole resistant    Day 2 of PCN G- cont    Day 3- PCN G changed to Rocephin    Day 4 on rocephin plus Flagyl and micafungin

## 2023-10-15 NOTE — ASSESSMENT & PLAN NOTE
Hospitalized May 2022 with acute respiratory failure and persistent pneumonia and dishcarged on oxygen. She has since been diagnosed with Sjogren's disease with associated ILD. Initial PFTs revealed severe restriction and reduced DLCO. Initially prescribed steroids and subsequently Cellcept and Rituximab infusions (last infusion in Dec 2022). Now on Ofev 150mg BID, Prednisone 10mg daily, and Actemra infusions      · Concern for underlying acute pulmonary infection  · Sputum culture and respiratory viral panel pending  · Continue antibiotics   · [facility], not a current candidate  · Continue home Prednisone; avoiding up titration of steroids  · Continue nocturnal AVAPS  · Continue oral lasix  · Followed by rheumatology and pulmonary as outpatient  · Follow-up with Dr. Ashton in [place]  · She has had repeated hospitalizations within the past several months.  She is been seen and evaluated by transplant in [place].  She underwent left and right heart catheterization in [place] and found to have mild pulmonary hypertension.

## 2023-10-15 NOTE — PLAN OF CARE
Care plan reviewed with patient. Ambulated to the bathroom unassisted. Still on highflow. Free from falls. No other complaints made. All orders checked and reviewed.

## 2023-10-15 NOTE — ASSESSMENT & PLAN NOTE
Patient with chronic hypoxic respiratory failure who is on home oxygen at 6 to 8 L/NC who is her currently on Vapotherm 20/0.50. Signs/symptoms of respiratory failure included increased work of breathing and decreased oxygen saturations. Contributing diagnoses includes pneumonia, asthma, interstitial lung disease, and decompensated heart failure. Labs and images were reviewed. Patient does have a recent ABG which has been reviewed.  Initial PFTs with severe restriction and reduced DLCO; attempted repeat PFTs on 3/16/2022 which she was unable to complete due to worsening shortness of breath.     Will treat underlying causes and adjust management of respiratory failure as follows:  · Patient with restrictive pattern on her PFTs and chronic hypoxic respiratory failure  · Also found to have mild pulmonary hypertension per right heart cath in [place]  · On chronic immunosuppression including OFEV, Prednisone, and Actemra  · Patient reports the Actermra infusions are the only treatment that seems to have any added benefit  · Supplemental oxygen weaned to 6L  · WBC trends improved  · Continue Lasix 40mg po daily  · Continue antibiotics per ID recs  · Follow chest imaging / ABGs as appropriate  · Follow WBC and fever trends  · Fungitell, Fungal studies, and PCP on 9/14/23: Negative

## 2023-10-15 NOTE — ASSESSMENT & PLAN NOTE
"This patient does have evidence of infective focus- infected Vocal Cords  My overall impression is sepsis.  Source: Respiratory  Antibiotics given-   Antibiotics (72h ago, onward)    Start     Stop Route Frequency Ordered    10/14/23 1230  cefTRIAXone (ROCEPHIN) 2 g in dextrose 5 % in water (D5W) 100 mL IVPB (MB+)         -- IV Every 24 hours (non-standard times) 10/14/23 1119    10/12/23 0915  metroNIDAZOLE tablet 500 mg         10/26/23 0859 Oral Every 12 hours 10/12/23 0810        Latest lactate reviewed-  No results for input(s): "LACTATE" in the last 72 hours.  Organ dysfunction indicated by Acute respiratory failure    Fluid challenge Actual Body weight- Patient will receive 30ml/kg actual body weight to calculate fluid bolus for treatment of septic shock.     Post- resuscitation assessment No - Post resuscitation assessment not needed       Will Not start Pressors- Levophed for MAP of 65  Source control achieved by: IV Abx    Improved w Abx and steroids    Getting PCG G IV plus Mycafungin and PO Flagyl    PCH G changed to Rocephin sec to pulm edema, restlessness and cramps    Improved, afeb, normal WBCs  "

## 2023-10-15 NOTE — PROGRESS NOTES
[facility] Medicine  Progress Note    Patient Name: Ayanna Alicia  MRN: [id number]  Patient Class: IP- Inpatient   Admission Date: 10/11/2023  Length of Stay: 4 days  Attending Physician: Chantel Escobar MD  Primary Care Provider: Madeleine Enrique MD        Subjective:     Principal Problem:Actinomyces infection        HPI:  Ayanna Alicia is a 53-year-old AAF with a PMH of Sjogren's syndrome, ILD, RA, chronic hypoxic respiratory failure on 6-8L/NC, asthma, GLENN (CPAP), possible Lung Tx candidate, who presented to ER for evaluation of SOB which started gradually over last couple of days. Pt had an O2 saturartion of 83% on 8L via NC. Symptoms are constant and moderate in severity. No mitigating or exacerbating factors reported. Associated sxs include fever. Patient denies any N/V/D, HA,weakness, numbness, and all other sxs at this time. No prior tx reported. No further complaints or concerns at this time. She had a similar admission here from 9/14-/17/23.      Initial VS temp 101.6, , 147/85, 34, 83% on 8 L NC. Labs WBC 13.4, H/H 13/43, BNP <10, Trop 0.039, CXR showed stable cardiomegaly, moderate bilateral infiltrates appear stable. Pt received nebs, Solumedrol 125 mg IV plus Zosyn/Zyvox and Levaquin plus lasix 40 mg and was placed on Bipap at 100% O2 and ABG showed 7.43/459/49/34/100%    She is being admitted to [facility] as acute on ch hypoxemic resp failure         Overview/Hospital Course:  53-year-old AAF with Sjogren's syndrome, ILD, RA, chronic hypoxic respiratory failure on 6-8L/NC, asthma, GLENN (CPAP), possible Lung Tx candidate, who presented to ER for evaluation of SOB which started gradually over last couple of days. Pt had an O2 saturartion of 83% on 8L via NC. She had a similar admission here from 9/14-/17/23.      Initial VS temp 101.6, , 147/85, 34, 83% on 8 L NC. Labs WBC 13.4, H/H 13/43, BNP <10, Trop 0.039, CXR showed stable cardiomegaly, moderate bilateral infiltrates  appear stable. Pt received nebs, Solumedrol 125 mg IV plus Zosyn/Zyvox and Levaquin plus lasix 40 mg and was placed on Bipap at 100% O2 and ABG showed 7.43/459/49/34/100%. She is being admitted to [facility] as acute on ch hypoxemic resp failure.    10/12- looks and feels a little better, SOB improved a little. Was overnite on Bipap 50%, now down to 6-7 L NC, maintaining sats. Got another dose of Lasix today. Also got PICC line for IV Abx for her hoarseness sec to Vocal Cord Dysfunction-    Per ID: vocal cord infection with cultures that have grown Actinomyces, prevotella, and Candida glabrata so far.  ENT saw patient in clinic and performed a scope which noted exudate on the vocal cords with culture sent.  Actinomyces can prove very difficult to treat and will require a long duration of antibiotics.  The other pathogens can likely be treated with a short course of antimicrobials.  Patient febrile on admission to 101.6.  Currently afebrile.  Leukocytosis trending down to 12,000 on review of CBC.  CMP shows normal creatinine.     Recommendations:  --Will target Actino with penicillin G 20 MU continuous infusion daily   --Will discuss with pharmacy to see if ceftriaxone can be used as an equivalent outpatient   --Will need 6 weeks of IV therapy followed by PO amoxicillin for at least 2-6 months   --For Prevotella recommend PO metronidazole 500 mg BID to complete 14 day course   --For C glabrata, recommend IV micafungin 100 mg daily for at least 14 days; typically azole resistant    10/13- looks and feels better, getting stronger, VSS, has remained afeb, O2 down to 6 L NC, hoarseness persists. Getting PT/OT, doing IS. Got PICC line for IV PCN G Infusion plus IV Mycafungin x 2 weeks plus oral Flagyl x 2 weeks. SW arranging HH with Home Infusion services. Pt encouraged to exercise and ambulate. CXR post PICC line shows pulm edema- will start IV lasix. Labs improved.     10/14- Appreciate Dr. Wayne. Pt still SOB, easy  HICKS- good response to IV lasix, over 3 L out so far. ID changed her from PCN G to Rocephin due to Pulm Edema- pt agreeable. Encouraged to do IS and ambulate in the room. Cont Bipap qhs.     10/15- looks a little better but still SOB. About 3 L fluid neg so far. Repeat CXR still shows Pulm edema/CHF. Will add Metolazone to Lasix. Pt to cont IS and ambulate as much as possible. Tolerating Rocephin better, no NV, restlessness. Hoarseness persists. O2 down to 6 l NC. D/c soon on oral lasix for a few days.       Interval History: looks a little better but still SOB. About 3 L fluid neg so far. Repeat CXR still shows Pulm edema/CHF. Will add Metolazone to Lasix. Pt to cont IS and ambulate as much as possible. Tolerating Rocephin better, no NV, restlessness. Hoarseness persists. O2 down to 6 l NC. D/c soon on oral lasix for a few days.     Review of Systems   Constitutional:  Positive for activity change and fatigue. Negative for appetite change.   HENT:  Positive for voice change. Negative for congestion and sore throat.    Respiratory:  Positive for cough and shortness of breath.    Cardiovascular:  Negative for chest pain, palpitations and leg swelling.   All other systems reviewed and are negative.    Objective:     Vital Signs (Most Recent):  Temp: 98.6 °F (37 °C) (10/15/23 1132)  Pulse: 108 (10/15/23 1530)  Resp: 18 (10/15/23 1132)  BP: 114/66 (10/15/23 1132)  SpO2: (!) 91 % (10/15/23 1132) Vital Signs (24h Range):  Temp:  [97.7 °F (36.5 °C)-99.5 °F (37.5 °C)] 98.6 °F (37 °C)  Pulse:  [] 108  Resp:  [17-20] 18  SpO2:  [91 %-98 %] 91 %  BP: (103-140)/(56-66) 114/66     Weight: 96.8 kg (213 lb 6.5 oz)  Body mass index is 34.44 kg/m².    Intake/Output Summary (Last 24 hours) at 10/15/2023 1604  Last data filed at 10/15/2023 1531  Gross per 24 hour   Intake 790 ml   Output --   Net 790 ml         Physical Exam  Vitals and nursing note reviewed.   Constitutional:       General: She is not in acute distress.      Appearance: Normal appearance. She is obese. She is ill-appearing. She is not toxic-appearing.   HENT:      Head: Normocephalic and atraumatic.      Right Ear: External ear normal.      Left Ear: External ear normal.      Nose: Nose normal.      Mouth/Throat:      Mouth: Mucous membranes are moist.      Pharynx: Oropharynx is clear.   Eyes:      General: No scleral icterus.     Extraocular Movements: Extraocular movements intact.      Conjunctiva/sclera: Conjunctivae normal.      Pupils: Pupils are equal, round, and reactive to light.   Cardiovascular:      Rate and Rhythm: Normal rate and regular rhythm.      Pulses: Normal pulses.      Heart sounds: Normal heart sounds. No murmur heard.     No friction rub.   Pulmonary:      Effort: No respiratory distress.      Breath sounds: No stridor. Rales present. No wheezing or rhonchi.      Comments: Fines bibasilar rales yesterday have improved to her chronic velcro type rales today  Abdominal:      General: Abdomen is flat. Bowel sounds are normal. There is no distension.      Palpations: Abdomen is soft.   Genitourinary:     Comments: deferred  Musculoskeletal:         General: No swelling, tenderness, deformity or signs of injury. Normal range of motion.      Cervical back: Normal range of motion and neck supple.   Skin:     General: Skin is warm and dry.      Capillary Refill: Capillary refill takes less than 2 seconds.      Coloration: Skin is not pale.      Findings: No erythema or rash.   Neurological:      General: No focal deficit present.      Mental Status: She is alert and oriented to person, place, and time.   Psychiatric:         Mood and Affect: Mood normal.         Behavior: Behavior normal.         Thought Content: Thought content normal.         Judgment: Judgment normal.             Significant Labs: All pertinent labs within the past 24 hours have been reviewed.  BMP:   CBC:       Significant Imaging: I have reviewed all pertinent imaging  results/findings within the past 24 hours.      Assessment/Plan:      * Actinomyces infection  vocal cord infection with cultures that have grown Actinomyces, prevotella, and Candida glabrata so far.  ENT saw patient in clinic and performed a scope which noted exudate on the vocal cords with culture sent.  Actinomyces can prove very difficult to treat and will require a long duration of antibiotics.  The other pathogens can likely be treated with a short course of antimicrobials.  Patient febrile on admission to 101.6.  Currently afebrile.  Leukocytosis trending down to 12,000 on review of CBC.  CMP shows normal creatinine.     Recommendations:  --Will target Actino with penicillin G 20 MU continuous infusion daily   --Will discuss with pharmacy to see if ceftriaxone can be used as an equivalent outpatient   --Will need 6 weeks of IV therapy followed by PO amoxicillin for at least 2-6 months   --For Prevotella recommend PO metronidazole 500 mg BID to complete 14 day course   --For C glabrata, recommend IV micafungin 100 mg daily for at least 14 days; typically azole resistant    Day 2 of PCN G- cont    Day 3- PCN G changed to Rocephin    Day 4 on rocephin plus Flagyl and micafungin    Acute on chronic respiratory failure with hypoxemia  Patient with Hypercapnic Respiratory failure which is Acute on chronic.  she is on home oxygen at 8 LPM. Supplemental oxygen was provided and noted-      .   Signs/symptoms of respiratory failure include- tachypnea, increased work of breathing, wheezing and lethargy. Contributing diagnoses includes - Interstitial lung disease and Obesity Hypoventilation Labs and images were reviewed. Patient Has recent ABG, which has been reviewed. Will treat underlying causes and adjust management of respiratory failure as follows-     Bipap, O2, prednisone  PT/OT    Stable to improved- cont present care    Although Fio2 down to 6 L but CXR shows Pulm Edema- will give IV lasix    Stable- cont  "current tx including lasix  Abx changed to Rocephin    Cont lasix, add Zaroxolyn for a couple of days    Severe sepsis  This patient does have evidence of infective focus- infected Vocal Cords  My overall impression is sepsis.  Source: Respiratory  Antibiotics given-   Antibiotics (72h ago, onward)    Start     Stop Route Frequency Ordered    10/14/23 1230  cefTRIAXone (ROCEPHIN) 2 g in dextrose 5 % in water (D5W) 100 mL IVPB (MB+)         -- IV Every 24 hours (non-standard times) 10/14/23 1119    10/12/23 0915  metroNIDAZOLE tablet 500 mg         10/26/23 0859 Oral Every 12 hours 10/12/23 0810        Latest lactate reviewed-  No results for input(s): "LACTATE" in the last 72 hours.  Organ dysfunction indicated by Acute respiratory failure    Fluid challenge Actual Body weight- Patient will receive 30ml/kg actual body weight to calculate fluid bolus for treatment of septic shock.     Post- resuscitation assessment No - Post resuscitation assessment not needed       Will Not start Pressors- Levophed for MAP of 65  Source control achieved by: IV Abx    Improved w Abx and steroids    Getting PCG G IV plus Mycafungin and PO Flagyl    PCH G changed to Rocephin sec to pulm edema, restlessness and cramps    Improved, afeb, normal WBCs    Dysphonia due to laryngeal ulcers  S/p laryngoscopy with Vocal cord exudates- Polymicrobial infection- see below      UIP (usual interstitial pneumonitis)  Cont Prednisone, pt on OFEV as out pt.   Being evaluated for Lung Tx      GLENN on CPAP  Cont Bipap        VTE Risk Mitigation (From admission, onward)         Ordered     enoxaparin injection 40 mg  Every 24 hours         10/11/23 1445     IP VTE HIGH RISK PATIENT  Once         10/11/23 1443     Place sequential compression device  Until discontinued         10/11/23 1444                Discharge Planning   JANETTE:      Code Status: DNR   Is the patient medically ready for discharge?:     Reason for patient still in hospital (select all " that apply): Patient trending condition, Laboratory test, Treatment, Imaging and Consult recommendations  Discharge Plan A: Home Health   Discharge Delays: None known at this time      Chantel Escobar MD  Department of Hospital Medicine   [facility]

## 2023-10-15 NOTE — ASSESSMENT & PLAN NOTE
Patient with Hypercapnic Respiratory failure which is Acute on chronic.  she is on home oxygen at 8 LPM. Supplemental oxygen was provided and noted-      .   Signs/symptoms of respiratory failure include- tachypnea, increased work of breathing, wheezing and lethargy. Contributing diagnoses includes - Interstitial lung disease and Obesity Hypoventilation Labs and images were reviewed. Patient Has recent ABG, which has been reviewed. Will treat underlying causes and adjust management of respiratory failure as follows-     Bipap, O2, prednisone  PT/OT    Stable to improved- cont present care    Although Fio2 down to 6 L but CXR shows Pulm Edema- will give IV lasix    Stable- cont current tx including lasix  Abx changed to Rocephin    Cont lasix, add Zaroxolyn for a couple of days

## 2023-10-15 NOTE — PROGRESS NOTES
[facility]  Pulmonology Progress Note    Patient Name: Ayanna Alicia  MRN: [id number]  Admission Date: 10/11/2023   Hospital Length of Stay: 4 days  Code Status: DNR     Attending Provider: Chantel Escobar MD  Principal Problem: Acute on chronic respiratory failure with hypoxemia  Subjective:   History of present illness:  Ayanna Alicia is a 52-year-old female with a past medical history of chronic hypoxic respiratory failure on 5L/NC, asthma, Sjogren's syndrome with associated interstitial lung disease, multinodular goiter, GLENN, and morbid obesity who  presented with worsening shortness of breadth with associated body aches, chills, and subjective fever.     Of note, she was hospitalized in May of 2022 with acute respiratory failure and persistent pneumonia. At that time, she was dishcharged on oxygen. She was subsequently diagnosed with Sjogren's disease and thought to have interstitial lung disease secondary to her underlying rheumatological disease. Initial PFTs with severe restriction and reduced DLCO. She was initially prescribed steroids followed by the addition of Cellcept and Rituximab infusions (last infusion in Dec 2022). Most recently, she was initiated on Ofev; however, the patient reports she has not had any significant improvement in her clinical condition since her original illness in May 2022 and reports she has clinically worsened over the course of this time. Endorses losing weight, lost 65 lbs since last year; however, she has not been able to get less than 220lb. Not taking cellcept since 12/2022, received 4 treatments of Rituximab with her last dose in Dec 2022. She chronically takes prednisone 10 mg.  Patient underwent bronchoscopy with BAL in April 2023 per Dr. Aviles with negative cultures and no growth of fungus, yeast, PCP, staph, or pseudomonas. Remains on OFEV 150mg BID and Prednisone 10mg daily. In addition, she is in a trial of Actemra IV  monthly for four consecutive months. In addition, she has been referred for evaluation of lung transplant at [facility] in [place] and was seen by them in August. At that time, she was told she is not currently a candidate for lung transplant secondary to her weight. She has a goal of an additional 10lb weight loss for consideration for transplant.      Interval history:   10/12: Down to 8l, feels better   10/13: Weaned to 6L/NC this morning; denies acute complaints. Overall feels better. Fever trends improved.   10/15: Sitting up in bedside chair without acute complaints. Currently on 6L/NC. Overall, feels ok. No acute events overnight. Some diarrhea but seems improved from approximately 2 days ago  Objective:     Vital Signs (Most Recent):  Temp: 98.6 °F (37 °C) (10/15/23 1132)  Pulse: (!) 119 (10/15/23 1132)  Resp: 18 (10/15/23 1132)  BP: 114/66 (10/15/23 1132)  SpO2: (!) 91 % (10/15/23 1132) Vital Signs (24h Range):  Temp:  [97.7 °F (36.5 °C)-99.5 °F (37.5 °C)] 98.6 °F (37 °C)  Pulse:  [] 119  Resp:  [17-20] 18  SpO2:  [91 %-98 %] 91 %  BP: (103-140)/(56-66) 114/66   Weight: 96.8 kg (213 lb 6.5 oz);  Body mass index is 34.44 kg/m².    Intake/Output Summary (Last 24 hours) at 10/15/2023 1248  Last data filed at 10/15/2023 0958  Gross per 24 hour   Intake 640 ml   Output --   Net 640 ml      Physical Exam  Vitals and nursing note reviewed.   Constitutional:       Appearance: She is obese.   HENT:      Head: Normocephalic.   Eyes:      Conjunctiva/sclera: Conjunctivae normal.   Cardiovascular:      Rate and Rhythm: Normal rate.   Pulmonary:      Effort: Pulmonary effort is normal.   Abdominal:      Palpations: Abdomen is soft.   Musculoskeletal:         General: Normal range of motion.      Cervical back: Normal range of motion and neck supple.   Skin:     General: Skin is warm and dry.   Neurological:      Mental Status: She is alert and oriented to person, place, and time.   Psychiatric:         Mood and  Affect: Mood normal.         Review of Systems: Negative except as indicated in HPI      Assessment/Plan:   Severe sepsis  This patient does have evidence of infective focus and my overall impression is sepsis.  Source: Respiratory  Antibiotics given: Levaquin, Zosyn, and Zyvox  Latest lactate reviewed: lactate pending  Organ dysfunction: acute respiratory failure  Fluid challenge: Not needed - patient is not hypotensive    Post- resuscitation assessment No - Post resuscitation assessment not needed   Will not start vasopressors: Levophed for MAP of 65    Source control achieved by:   · Follow blood and sputum cultures  · Full respiratory viral panel pending  · Negative for flu and COVID  · Continue antibiotics per ID recs  · Follow fever and WBC trends  · Follow chest imaging as needed  · Monitoring hemodynamics for acute changes and/or decompensation    Acute on chronic respiratory failure with hypoxemia  Patient with chronic hypoxic respiratory failure who is on home oxygen at 6 to 8 L/NC who is her currently on Vapotherm 20/0.50. Signs/symptoms of respiratory failure included increased work of breathing and decreased oxygen saturations. Contributing diagnoses includes pneumonia, asthma, interstitial lung disease, and decompensated heart failure. Labs and images were reviewed. Patient does have a recent ABG which has been reviewed.  Initial PFTs with severe restriction and reduced DLCO; attempted repeat PFTs on 3/16/2022 which she was unable to complete due to worsening shortness of breath.     Will treat underlying causes and adjust management of respiratory failure as follows:  · Patient with restrictive pattern on her PFTs and chronic hypoxic respiratory failure  · Also found to have mild pulmonary hypertension per right heart cath in [place]  · On chronic immunosuppression including OFEV, Prednisone, and Actemra  · Patient reports the Actermra infusions are the only treatment that seems to have any added  benefit  · Supplemental oxygen weaned to 6L  · WBC trends improved  · Continue Lasix 40mg po daily  · Continue antibiotics per ID recs  · Follow chest imaging / ABGs as appropriate  · Follow WBC and fever trends  · Fungitell, Fungal studies, and PCP on 9/14/23: Negative    UIP (usual interstitial pneumonitis)  Hospitalized May 2022 with acute respiratory failure and persistent pneumonia and dishcarged on oxygen. She has since been diagnosed with Sjogren's disease with associated ILD. Initial PFTs revealed severe restriction and reduced DLCO. Initially prescribed steroids and subsequently Cellcept and Rituximab infusions (last infusion in Dec 2022). Now on Ofev 150mg BID, Prednisone 10mg daily, and Actemra infusions      · Concern for underlying acute pulmonary infection  · Sputum culture and respiratory viral panel pending  · Continue antibiotics   · Started lung transplant evaluation at [facility], not a current candidate  · Continue home Prednisone; avoiding up titration of steroids  · Continue nocturnal AVAPS  · Continue oral lasix  · Followed by rheumatology and pulmonary as outpatient  · Follow-up with Dr. Ashton in [place]  · She has had repeated hospitalizations within the past several months.  She is been seen and evaluated by transplant in [place].  She underwent left and right heart catheterization in [place] and found to have mild pulmonary hypertension.    Dysphonia due to laryngeal ulcers  Actinomyces infection  · Seen by ENT as outpatient  · Previous vocal cord cultures --> actinomyces and yeast  · Case discussed with ID, appreciate assistance  · Continue antibiotics per ID recs      GLENN on CPAP  Patient reports non-compliance with home PAP therapy in the past. We discussed utilizing and compliance with home PAP therapy. Following discharge from prior hospitalization in August 2023, the patient was transitioned from CPAP to home AVAPS as she has significant hypoxic respiratory failure at  baseline with associated restrictive disease as evidenced by her PFTs. Patient reports decreased use with home AVAPS due to lack of humidification. Patient reports she just received her humidification and reports using AVAPS for a few hours at night.      · Nocturnal AVAPS as ordered    Taylor Mccann NP  Pulmonary and Critical Care  [facility]

## 2023-10-15 NOTE — SUBJECTIVE & OBJECTIVE
Interval History: looks a little better but still SOB. About 3 L fluid neg so far. Repeat CXR still shows Pulm edema/CHF. Will add Metolazone to Lasix. Pt to cont IS and ambulate as much as possible. Tolerating Rocephin better, no NV, restlessness. Hoarseness persists. O2 down to 6 l NC. D/c soon on oral lasix for a few days.     Review of Systems   Constitutional:  Positive for activity change and fatigue. Negative for appetite change.   HENT:  Positive for voice change. Negative for congestion and sore throat.    Respiratory:  Positive for cough and shortness of breath.    Cardiovascular:  Negative for chest pain, palpitations and leg swelling.   All other systems reviewed and are negative.    Objective:     Vital Signs (Most Recent):  Temp: 98.6 °F (37 °C) (10/15/23 1132)  Pulse: 108 (10/15/23 1530)  Resp: 18 (10/15/23 1132)  BP: 114/66 (10/15/23 1132)  SpO2: (!) 91 % (10/15/23 1132) Vital Signs (24h Range):  Temp:  [97.7 °F (36.5 °C)-99.5 °F (37.5 °C)] 98.6 °F (37 °C)  Pulse:  [] 108  Resp:  [17-20] 18  SpO2:  [91 %-98 %] 91 %  BP: (103-140)/(56-66) 114/66     Weight: 96.8 kg (213 lb 6.5 oz)  Body mass index is 34.44 kg/m².    Intake/Output Summary (Last 24 hours) at 10/15/2023 1604  Last data filed at 10/15/2023 1531  Gross per 24 hour   Intake 790 ml   Output --   Net 790 ml         Physical Exam  Vitals and nursing note reviewed.   Constitutional:       General: She is not in acute distress.     Appearance: Normal appearance. She is obese. She is ill-appearing. She is not toxic-appearing.   HENT:      Head: Normocephalic and atraumatic.      Right Ear: External ear normal.      Left Ear: External ear normal.      Nose: Nose normal.      Mouth/Throat:      Mouth: Mucous membranes are moist.      Pharynx: Oropharynx is clear.   Eyes:      General: No scleral icterus.     Extraocular Movements: Extraocular movements intact.      Conjunctiva/sclera: Conjunctivae normal.      Pupils: Pupils are equal, round,  and reactive to light.   Cardiovascular:      Rate and Rhythm: Normal rate and regular rhythm.      Pulses: Normal pulses.      Heart sounds: Normal heart sounds. No murmur heard.     No friction rub.   Pulmonary:      Effort: No respiratory distress.      Breath sounds: No stridor. Rales present. No wheezing or rhonchi.      Comments: Fines bibasilar rales yesterday have improved to her chronic velcro type rales today  Abdominal:      General: Abdomen is flat. Bowel sounds are normal. There is no distension.      Palpations: Abdomen is soft.   Genitourinary:     Comments: deferred  Musculoskeletal:         General: No swelling, tenderness, deformity or signs of injury. Normal range of motion.      Cervical back: Normal range of motion and neck supple.   Skin:     General: Skin is warm and dry.      Capillary Refill: Capillary refill takes less than 2 seconds.      Coloration: Skin is not pale.      Findings: No erythema or rash.   Neurological:      General: No focal deficit present.      Mental Status: She is alert and oriented to person, place, and time.   Psychiatric:         Mood and Affect: Mood normal.         Behavior: Behavior normal.         Thought Content: Thought content normal.         Judgment: Judgment normal.             Significant Labs: All pertinent labs within the past 24 hours have been reviewed.  BMP:   CBC:       Significant Imaging: I have reviewed all pertinent imaging results/findings within the past 24 hours.

## 2023-10-15 NOTE — PLAN OF CARE
Patient is in stable condition, no acute distress, remained free from injuries, receiving antibiotics, PICC to R arm, no pain reported this shift, on cardiac monitoring (NSR), receiving breathing treatments through respiratory therapy, VSS and all active orders reviewed. 24 hr chart check performed.

## 2023-10-15 NOTE — PT/OT/SLP PROGRESS
Physical Therapy      Patient Name:  Ayanna Alicia   MRN:  [id number]    Patient not seen today secondary to PATIENT NOT WANTING TO PARTICIPATE WITH STAFF TODAY. SHE REPORTS REHAB GOT TO HER ROOM TOO LATE AND SHE HAS ALREADY WALKED IN HER ROOM, SAT IN CHAIR FOR 3+ HOURS AND HAS ALREADY PERFORMED HER LOWER EXTREMITY EXERCISES . Will follow-up IN THE FUTURE.

## 2023-10-15 NOTE — SUBJECTIVE & OBJECTIVE
Ayanna Alicia is a 52-year-old female with a past medical history of chronic hypoxic respiratory failure on 5L/NC, asthma, Sjogren's syndrome with associated interstitial lung disease, multinodular goiter, GLENN, and morbid obesity who  presented with worsening shortness of breadth with associated body aches, chills, and subjective fever.     Of note, she was hospitalized in May of 2022 with acute respiratory failure and persistent pneumonia. At that time, she was dishcharged on oxygen. She was subsequently diagnosed with Sjogren's disease and thought to have interstitial lung disease secondary to her underlying rheumatological disease. Initial PFTs with severe restriction and reduced DLCO. She was initially prescribed steroids followed by the addition of Cellcept and Rituximab infusions (last infusion in Dec 2022). Most recently, she was initiated on Ofev; however, the patient reports she has not had any significant improvement in her clinical condition since her original illness in May 2022 and reports she has clinically worsened over the course of this time. Endorses losing weight, lost 65 lbs since last year; however, she has not been able to get less than 220lb. Not taking cellcept since 12/2022, received 4 treatments of Rituximab with her last dose in Dec 2022. She chronically takes prednisone 10 mg.  Patient underwent bronchoscopy with BAL in April 2023 per Dr. Aviles with negative cultures and no growth of fungus, yeast, PCP, staph, or pseudomonas. Remains on OFEV 150mg BID and Prednisone 10mg daily. In addition, she is in a trial of Actemra IV monthly for four consecutive months. In addition, she has been referred for evaluation of lung transplant at [facility] in [place] and was seen by them in August. At that time, she was told she is not currently a candidate for lung transplant secondary to her weight. She has a goal of an additional 10lb weight loss for consideration for transplant.      Interval  history:  10/12: Down to 8l, feels better  10/13: Weaned to 6L/NC this morning; denies acute complaints. Overall feels better. Fever trends improved.  10/15: Sitting up in bedside chair without acute complaints. Currently on 6L/NC. Overall, feels ok. No acute events overnight. Some diarrhea but seems improved from approximately 2 days ago  Objective:     Vital Signs (Most Recent):  Temp: 98.6 °F (37 °C) (10/15/23 1132)  Pulse: (!) 119 (10/15/23 1132)  Resp: 18 (10/15/23 1132)  BP: 114/66 (10/15/23 1132)  SpO2: (!) 91 % (10/15/23 1132) Vital Signs (24h Range):  Temp:  [97.7 °F (36.5 °C)-99.5 °F (37.5 °C)] 98.6 °F (37 °C)  Pulse:  [] 119  Resp:  [17-20] 18  SpO2:  [91 %-98 %] 91 %  BP: (103-140)/(56-66) 114/66   Weight: 96.8 kg (213 lb 6.5 oz);  Body mass index is 34.44 kg/m².    Intake/Output Summary (Last 24 hours) at 10/15/2023 1248  Last data filed at 10/15/2023 0958  Gross per 24 hour   Intake 640 ml   Output --   Net 640 ml      Physical Exam  Vitals and nursing note reviewed.   Constitutional:       Appearance: She is obese.   HENT:      Head: Normocephalic.   Eyes:      Conjunctiva/sclera: Conjunctivae normal.   Cardiovascular:      Rate and Rhythm: Normal rate.   Pulmonary:      Effort: Pulmonary effort is normal.   Abdominal:      Palpations: Abdomen is soft.   Musculoskeletal:         General: Normal range of motion.      Cervical back: Normal range of motion and neck supple.   Skin:     General: Skin is warm and dry.   Neurological:      Mental Status: She is alert and oriented to person, place, and time.   Psychiatric:         Mood and Affect: Mood normal.         Review of Systems: Negative except as indicated in HPI

## 2023-10-16 ENCOUNTER — TELEPHONE (OUTPATIENT)
Dept: RHEUMATOLOGY | Facility: CLINIC | Age: 53
End: 2023-10-16
Payer: COMMERCIAL

## 2023-10-16 ENCOUNTER — PATIENT MESSAGE (OUTPATIENT)
Dept: INFECTIOUS DISEASES | Facility: CLINIC | Age: 53
End: 2023-10-16
Payer: COMMERCIAL

## 2023-10-16 LAB
ANION GAP SERPL CALC-SCNC: 12 MMOL/L (ref 8–16)
ANION GAP SERPL CALC-SCNC: 14 MMOL/L (ref 8–16)
BACTERIA BLD CULT: NORMAL
BACTERIA BLD CULT: NORMAL
BACTERIA SPEC AEROBE CULT: NORMAL
BASOPHILS # BLD AUTO: 0.1 K/UL (ref 0–0.2)
BASOPHILS NFR BLD: 0.6 % (ref 0–1.9)
BUN SERPL-MCNC: 13 MG/DL (ref 6–20)
BUN SERPL-MCNC: 13 MG/DL (ref 6–20)
CALCIUM SERPL-MCNC: 10 MG/DL (ref 8.7–10.5)
CALCIUM SERPL-MCNC: 9.6 MG/DL (ref 8.7–10.5)
CHLORIDE SERPL-SCNC: 91 MMOL/L (ref 95–110)
CHLORIDE SERPL-SCNC: 94 MMOL/L (ref 95–110)
CO2 SERPL-SCNC: 35 MMOL/L (ref 23–29)
CO2 SERPL-SCNC: 36 MMOL/L (ref 23–29)
CREAT SERPL-MCNC: 0.9 MG/DL (ref 0.5–1.4)
CREAT SERPL-MCNC: 0.9 MG/DL (ref 0.5–1.4)
DIFFERENTIAL METHOD: ABNORMAL
ENTEROVIRUS/RHINOVIRUS: NOT DETECTED
EOSINOPHIL # BLD AUTO: 0.5 K/UL (ref 0–0.5)
EOSINOPHIL NFR BLD: 3 % (ref 0–8)
ERYTHROCYTE [DISTWIDTH] IN BLOOD BY AUTOMATED COUNT: 16.5 % (ref 11.5–14.5)
EST. GFR  (NO RACE VARIABLE): >60 ML/MIN/1.73 M^2
EST. GFR  (NO RACE VARIABLE): >60 ML/MIN/1.73 M^2
GLUCOSE SERPL-MCNC: 117 MG/DL (ref 70–110)
GLUCOSE SERPL-MCNC: 129 MG/DL (ref 70–110)
GRAM STN SPEC: NORMAL
HCT VFR BLD AUTO: 43.5 % (ref 37–48.5)
HGB BLD-MCNC: 13.3 G/DL (ref 12–16)
HUMAN BOCAVIRUS: NOT DETECTED
HUMAN CORONAVIRUS, COMMON COLD VIRUS: NOT DETECTED
IMM GRANULOCYTES # BLD AUTO: 0.44 K/UL (ref 0–0.04)
IMM GRANULOCYTES NFR BLD AUTO: 2.5 % (ref 0–0.5)
INFLUENZA A - H1N1-09: NOT DETECTED
LYMPHOCYTES # BLD AUTO: 1.6 K/UL (ref 1–4.8)
LYMPHOCYTES NFR BLD: 9.1 % (ref 18–48)
MAGNESIUM SERPL-MCNC: 1.9 MG/DL (ref 1.6–2.6)
MCH RBC QN AUTO: 30.1 PG (ref 27–31)
MCHC RBC AUTO-ENTMCNC: 30.6 G/DL (ref 32–36)
MCV RBC AUTO: 98 FL (ref 82–98)
MONOCYTES # BLD AUTO: 1.3 K/UL (ref 0.3–1)
MONOCYTES NFR BLD: 7.4 % (ref 4–15)
NEUTROPHILS # BLD AUTO: 13.4 K/UL (ref 1.8–7.7)
NEUTROPHILS NFR BLD: 77.4 % (ref 38–73)
NRBC BLD-RTO: 0 /100 WBC
PARAINFLUENZA: NOT DETECTED
PLATELET # BLD AUTO: 381 K/UL (ref 150–450)
PLATELET BLD QL SMEAR: ABNORMAL
PMV BLD AUTO: 9.3 FL (ref 9.2–12.9)
POTASSIUM SERPL-SCNC: 3.5 MMOL/L (ref 3.5–5.1)
POTASSIUM SERPL-SCNC: 3.5 MMOL/L (ref 3.5–5.1)
RBC # BLD AUTO: 4.42 M/UL (ref 4–5.4)
RVP - ADENOVIRUS: NOT DETECTED
RVP - HUMAN METAPNEUMOVIRUS (HMPV): NOT DETECTED
RVP - INFLUENZA A: NOT DETECTED
RVP - INFLUENZA B: NOT DETECTED
RVP - RESPIRATORY SYNCTIAL VIRUS (RSV) A: NOT DETECTED
RVP - RESPIRATORY VIRAL PANEL, SOURCE: NORMAL
SODIUM SERPL-SCNC: 140 MMOL/L (ref 136–145)
SODIUM SERPL-SCNC: 142 MMOL/L (ref 136–145)
WBC # BLD AUTO: 17.39 K/UL (ref 3.9–12.7)

## 2023-10-16 PROCEDURE — 25000003 PHARM REV CODE 250: Performed by: EMERGENCY MEDICINE

## 2023-10-16 PROCEDURE — 87205 SMEAR GRAM STAIN: CPT | Performed by: NURSE PRACTITIONER

## 2023-10-16 PROCEDURE — 94761 N-INVAS EAR/PLS OXIMETRY MLT: CPT

## 2023-10-16 PROCEDURE — 63600175 PHARM REV CODE 636 W HCPCS: Performed by: EMERGENCY MEDICINE

## 2023-10-16 PROCEDURE — 87070 CULTURE OTHR SPECIMN AEROBIC: CPT | Performed by: NURSE PRACTITIONER

## 2023-10-16 PROCEDURE — 25000003 PHARM REV CODE 250: Performed by: STUDENT IN AN ORGANIZED HEALTH CARE EDUCATION/TRAINING PROGRAM

## 2023-10-16 PROCEDURE — 94640 AIRWAY INHALATION TREATMENT: CPT

## 2023-10-16 PROCEDURE — 83735 ASSAY OF MAGNESIUM: CPT | Performed by: INTERNAL MEDICINE

## 2023-10-16 PROCEDURE — 25000003 PHARM REV CODE 250: Performed by: INTERNAL MEDICINE

## 2023-10-16 PROCEDURE — 63600175 PHARM REV CODE 636 W HCPCS: Performed by: NURSE PRACTITIONER

## 2023-10-16 PROCEDURE — 25000242 PHARM REV CODE 250 ALT 637 W/ HCPCS: Performed by: NURSE PRACTITIONER

## 2023-10-16 PROCEDURE — 25000003 PHARM REV CODE 250: Performed by: NURSE PRACTITIONER

## 2023-10-16 PROCEDURE — 94660 CPAP INITIATION&MGMT: CPT

## 2023-10-16 PROCEDURE — 80048 BASIC METABOLIC PNL TOTAL CA: CPT | Performed by: EMERGENCY MEDICINE

## 2023-10-16 PROCEDURE — 97116 GAIT TRAINING THERAPY: CPT | Mod: CQ

## 2023-10-16 PROCEDURE — 99900035 HC TECH TIME PER 15 MIN (STAT)

## 2023-10-16 PROCEDURE — 80048 BASIC METABOLIC PNL TOTAL CA: CPT | Mod: 91 | Performed by: INTERNAL MEDICINE

## 2023-10-16 PROCEDURE — 27000221 HC OXYGEN, UP TO 24 HOURS

## 2023-10-16 PROCEDURE — 27100171 HC OXYGEN HIGH FLOW UP TO 24 HOURS

## 2023-10-16 PROCEDURE — 11000001 HC ACUTE MED/SURG PRIVATE ROOM

## 2023-10-16 PROCEDURE — 25000003 PHARM REV CODE 250: Performed by: HOSPITALIST

## 2023-10-16 PROCEDURE — 63600175 PHARM REV CODE 636 W HCPCS: Performed by: STUDENT IN AN ORGANIZED HEALTH CARE EDUCATION/TRAINING PROGRAM

## 2023-10-16 PROCEDURE — 97530 THERAPEUTIC ACTIVITIES: CPT | Mod: CQ

## 2023-10-16 PROCEDURE — 94799 UNLISTED PULMONARY SVC/PX: CPT

## 2023-10-16 PROCEDURE — 85025 COMPLETE CBC W/AUTO DIFF WBC: CPT | Performed by: INTERNAL MEDICINE

## 2023-10-16 RX ORDER — FUROSEMIDE 10 MG/ML
40 INJECTION INTRAMUSCULAR; INTRAVENOUS 2 TIMES DAILY
Status: DISCONTINUED | OUTPATIENT
Start: 2023-10-17 | End: 2023-10-16

## 2023-10-16 RX ORDER — FUROSEMIDE 10 MG/ML
40 INJECTION INTRAMUSCULAR; INTRAVENOUS DAILY
Status: DISCONTINUED | OUTPATIENT
Start: 2023-10-17 | End: 2023-10-17 | Stop reason: HOSPADM

## 2023-10-16 RX ORDER — MAG HYDROX/ALUMINUM HYD/SIMETH 200-200-20
30 SUSPENSION, ORAL (FINAL DOSE FORM) ORAL EVERY 6 HOURS PRN
Status: DISCONTINUED | OUTPATIENT
Start: 2023-10-16 | End: 2023-10-17 | Stop reason: HOSPADM

## 2023-10-16 RX ADMIN — METRONIDAZOLE 500 MG: 500 TABLET ORAL at 10:10

## 2023-10-16 RX ADMIN — PREDNISONE 10 MG: 10 TABLET ORAL at 09:10

## 2023-10-16 RX ADMIN — POTASSIUM BICARBONATE 25 MEQ: 978 TABLET, EFFERVESCENT ORAL at 03:10

## 2023-10-16 RX ADMIN — ALUMINUM HYDROXIDE, MAGNESIUM HYDROXIDE, AND DIMETHICONE 30 ML: 200; 20; 200 SUSPENSION ORAL at 03:10

## 2023-10-16 RX ADMIN — GUAIFENESIN AND DEXTROMETHORPHAN 10 ML: 100; 10 SYRUP ORAL at 10:10

## 2023-10-16 RX ADMIN — ENOXAPARIN SODIUM 40 MG: 40 INJECTION SUBCUTANEOUS at 05:10

## 2023-10-16 RX ADMIN — CEFTRIAXONE 2 G: 2 INJECTION, POWDER, FOR SOLUTION INTRAMUSCULAR; INTRAVENOUS at 11:10

## 2023-10-16 RX ADMIN — METRONIDAZOLE 500 MG: 500 TABLET ORAL at 09:10

## 2023-10-16 RX ADMIN — MAGNESIUM OXIDE TAB 400 MG (241.3 MG ELEMENTAL MG) 800 MG: 400 (241.3 MG) TAB at 09:10

## 2023-10-16 RX ADMIN — FUROSEMIDE 40 MG: 10 INJECTION, SOLUTION INTRAMUSCULAR; INTRAVENOUS at 09:10

## 2023-10-16 RX ADMIN — FAMOTIDINE 20 MG: 20 TABLET ORAL at 09:10

## 2023-10-16 RX ADMIN — ARFORMOTEROL TARTRATE 15 MCG: 15 SOLUTION RESPIRATORY (INHALATION) at 07:10

## 2023-10-16 RX ADMIN — MICAFUNGIN SODIUM 100 MG: 100 INJECTION, POWDER, LYOPHILIZED, FOR SOLUTION INTRAVENOUS at 05:10

## 2023-10-16 RX ADMIN — MONTELUKAST 10 MG: 10 TABLET, FILM COATED ORAL at 10:10

## 2023-10-16 RX ADMIN — ALUMINUM HYDROXIDE, MAGNESIUM HYDROXIDE, AND DIMETHICONE 30 ML: 200; 20; 200 SUSPENSION ORAL at 08:10

## 2023-10-16 RX ADMIN — MAGNESIUM OXIDE TAB 400 MG (241.3 MG ELEMENTAL MG) 800 MG: 400 (241.3 MG) TAB at 10:10

## 2023-10-16 RX ADMIN — ARFORMOTEROL TARTRATE 15 MCG: 15 SOLUTION RESPIRATORY (INHALATION) at 10:10

## 2023-10-16 RX ADMIN — FAMOTIDINE 20 MG: 20 TABLET ORAL at 10:10

## 2023-10-16 RX ADMIN — GUAIFENESIN AND DEXTROMETHORPHAN 10 ML: 100; 10 SYRUP ORAL at 06:10

## 2023-10-16 RX ADMIN — METOLAZONE 5 MG: 5 TABLET ORAL at 09:10

## 2023-10-16 NOTE — SUBJECTIVE & OBJECTIVE
Ayanna Alicia is a 52-year-old female with a past medical history of chronic hypoxic respiratory failure on 5L/NC, asthma, Sjogren's syndrome with associated interstitial lung disease, multinodular goiter, GLENN, and morbid obesity who  presented with worsening shortness of breadth with associated body aches, chills, and subjective fever.     Of note, she was hospitalized in May of 2022 with acute respiratory failure and persistent pneumonia. At that time, she was dishcharged on oxygen. She was subsequently diagnosed with Sjogren's disease and thought to have interstitial lung disease secondary to her underlying rheumatological disease. Initial PFTs with severe restriction and reduced DLCO. She was initially prescribed steroids followed by the addition of Cellcept and Rituximab infusions (last infusion in Dec 2022). Most recently, she was initiated on Ofev; however, the patient reports she has not had any significant improvement in her clinical condition since her original illness in May 2022 and reports she has clinically worsened over the course of this time. Endorses losing weight, lost 65 lbs since last year; however, she has not been able to get less than 220lb. Not taking cellcept since 12/2022, received 4 treatments of Rituximab with her last dose in Dec 2022. She chronically takes prednisone 10 mg.  Patient underwent bronchoscopy with BAL in April 2023 per Dr. Aviles with negative cultures and no growth of fungus, yeast, PCP, staph, or pseudomonas. Remains on OFEV 150mg BID and Prednisone 10mg daily. In addition, she is in a trial of Actemra IV monthly for four consecutive months. In addition, she has been referred for evaluation of lung transplant at [facility] in [place] and was seen by them in August. At that time, she was told she is not currently a candidate for lung transplant secondary to her weight. She has a goal of an additional 10lb weight loss for consideration for transplant.      Interval  history:  10/12: Down to 8l, feels better  10/13: Weaned to 6L/NC this morning; denies acute complaints. Overall feels better. Fever trends improved.  10/15: Sitting up in bedside chair without acute complaints. Currently on 6L/NC. Overall, feels ok. No acute events overnight. Some diarrhea but seems improved from approximately 2 days ago  10/16: remains on 6L NC, reports feeling a little worse than she did yesterday with some increased reported WOB - CXR pending, voice is hoarse, some scant sputum production from time to time, no family at bedside     ROS complete and negative unless stated in the interval HPI    Objective:     Vital Signs (Most Recent):  Temp: 97.7 °F (36.5 °C) (10/16/23 1225)  Pulse: 87 (10/16/23 1318)  Resp: 19 (10/16/23 1225)  BP: 102/72 (10/16/23 1225)  SpO2: 99 % (10/16/23 1225) Vital Signs (24h Range):  Temp:  [97.7 °F (36.5 °C)-99.9 °F (37.7 °C)] 97.7 °F (36.5 °C)  Pulse:  [] 87  Resp:  [16-20] 19  SpO2:  [72 %-100 %] 99 %  BP: (102-158)/(61-84) 102/72     Weight: 99 kg (218 lb 4.1 oz)  Body mass index is 35.23 kg/m².      Intake/Output Summary (Last 24 hours) at 10/16/2023 1346  Last data filed at 10/16/2023 1038  Gross per 24 hour   Intake 770 ml   Output 2400 ml   Net -1630 ml        Physical Exam  Vitals reviewed.   Constitutional:       General: She is awake. She is not in acute distress.     Appearance: She is morbidly obese. She is ill-appearing.      Comments: Chronically ill appearing female semi-upright in bed on 6L NC   Pulmonary:      Effort: Pulmonary effort is normal. No tachypnea, bradypnea, accessory muscle usage or respiratory distress.      Breath sounds: Decreased air movement present. Examination of the right-lower field reveals rhonchi. Examination of the left-lower field reveals rhonchi. Decreased breath sounds and rhonchi present. No wheezing or rales.   Neurological:      Mental Status: She is alert.   Psychiatric:         Behavior: Behavior is cooperative.  "          Vents:  Oxygen Concentration (%): 36 (10/16/23 1037)    Lines/Drains/Airways       Peripherally Inserted Central Catheter Line  Duration             PICC Double Lumen 10/13/23 1627 right basilic 2 days                    Significant Labs:    CBC/Anemia Profile:  No results for input(s): "WBC", "HGB", "HCT", "PLT", "MCV", "RDW", "IRON", "FERRITIN", "RETIC", "FOLATE", "[id number]", "OCCULTBLOOD" in the last 48 hours.     Chemistries:  Recent Labs   Lab 10/16/23  0557 10/16/23  1307    140   K 3.5 3.5   CL 94* 91*   CO2 36* 35*   BUN 13 13   CREATININE 0.9 0.9   CALCIUM 9.6 10.0   MG  --  1.9       All pertinent labs within the past 24 hours have been reviewed.    Significant Imaging:  I have reviewed all pertinent imaging results/findings within the past 24 hours.  "

## 2023-10-16 NOTE — NURSING
Pt is resting in bed. No signs of distress noted. No adverse events overnight. Pt remains free from falls. Chart check complete.

## 2023-10-16 NOTE — SUBJECTIVE & OBJECTIVE
Interval History:     Review of Systems   Constitutional:  Positive for activity change and fatigue. Negative for appetite change.   HENT:  Positive for voice change. Negative for congestion and sore throat.    Respiratory:  Positive for cough and shortness of breath.    Cardiovascular:  Negative for chest pain, palpitations and leg swelling.   All other systems reviewed and are negative.    Objective:     Vital Signs (Most Recent):  Temp: 97.7 °F (36.5 °C) (10/16/23 1225)  Pulse: 87 (10/16/23 1318)  Resp: 19 (10/16/23 1225)  BP: 102/72 (10/16/23 1225)  SpO2: 99 % (10/16/23 1225) Vital Signs (24h Range):  Temp:  [97.7 °F (36.5 °C)-99.9 °F (37.7 °C)] 97.7 °F (36.5 °C)  Pulse:  [] 87  Resp:  [16-20] 19  SpO2:  [72 %-100 %] 99 %  BP: (102-158)/(61-84) 102/72     Weight: 99 kg (218 lb 4.1 oz)  Body mass index is 35.23 kg/m².    Intake/Output Summary (Last 24 hours) at 10/16/2023 1459  Last data filed at 10/16/2023 1038  Gross per 24 hour   Intake 770 ml   Output 2400 ml   Net -1630 ml         Physical Exam  Vitals reviewed.   Constitutional:       General: She is awake. She is not in acute distress.     Appearance: She is morbidly obese. She is ill-appearing.      Comments: Chronically ill appearing female semi-upright in bed on 6L NC   Pulmonary:      Effort: Pulmonary effort is normal. No tachypnea, bradypnea, accessory muscle usage or respiratory distress.      Breath sounds: Decreased air movement present. Examination of the right-lower field reveals rhonchi. Examination of the left-lower field reveals rhonchi. Decreased breath sounds and rhonchi present. No wheezing or rales.   Neurological:      Mental Status: She is alert.   Psychiatric:         Behavior: Behavior is cooperative.             Significant Labs: All pertinent labs within the past 24 hours have been reviewed.  Recent Lab Results         10/16/23  1332   10/16/23  1307   10/16/23  0557        Anion Gap   14   12       Baso # 0.10            Basophil % 0.6           BUN   13   13       Calcium   10.0   9.6       Chloride   91   94       CO2   35   36       Creatinine   0.9   0.9       Differential Method Automated           eGFR   >60   >60       Eos # 0.5           Eosinophil % 3.0           Glucose   129   117       Gran # (ANC) 13.4           Gran % 77.4           Hematocrit 43.5           Hemoglobin 13.3           Immature Grans (Abs) 0.44  Comment: Mild elevation in immature granulocytes is non specific and   can be seen in a variety of conditions including stress response,   acute inflammation, trauma and pregnancy. Correlation with other   laboratory and clinical findings is essential.             Immature Granulocytes 2.5           Lymph # 1.6           Lymph % 9.1           Magnesium    1.9         MCH 30.1           MCHC 30.6           MCV 98  Comment: Results confirmed, test repeated           Mono # 1.3           Mono % 7.4           MPV 9.3           nRBC 0           Platelet Estimate Appears normal           Platelet Count 381  Comment: Results confirmed, test repeated           Potassium   3.5   3.5       RBC 4.42           RDW 16.5           Sodium   140   142       WBC 17.39                   Significant Imaging: I have reviewed all pertinent imaging results/findings within the past 24 hours.

## 2023-10-16 NOTE — ASSESSMENT & PLAN NOTE
Hospitalized May 2022 with acute respiratory failure and persistent pneumonia and dishcarged on oxygen. She has since been diagnosed with Sjogren's disease with associated ILD. Initial PFTs revealed severe restriction and reduced DLCO. Initially prescribed steroids and subsequently Cellcept and Rituximab infusions (last infusion in Dec 2022). Now on Ofev 150mg BID, Prednisone 10mg daily, and Actemra infusions      · Concern for underlying acute pulmonary infection  · Sputum culture and respiratory viral panel negative   · Continue antibiotics per ID recs  · [facility]  · Continue home Prednisone; avoiding up titration of steroids  · Continue nocturnal AVAPS  · Continue oral lasix  · Followed by rheumatology and pulmonary as outpatient; follow-up with Dr. Ashton in [place]  · She has had repeated hospitalizations within the past several months.  She is been seen and evaluated by transplant in [place].  She underwent left and right heart catheterization in [place] and found to have mild pulmonary hypertension.

## 2023-10-16 NOTE — ASSESSMENT & PLAN NOTE
Patient with chronic hypoxic respiratory failure who is on home oxygen at 6 to 8 L/NC who is her currently on Vapotherm 20/0.50. Signs/symptoms of respiratory failure included increased work of breathing and decreased oxygen saturations. Contributing diagnoses includes pneumonia, asthma, interstitial lung disease, and decompensated heart failure. Labs and images were reviewed. Patient does have a recent ABG which has been reviewed.  Initial PFTs with severe restriction and reduced DLCO; attempted repeat PFTs on 3/16/2022 which she was unable to complete due to worsening shortness of breath.     Will treat underlying causes and adjust management of respiratory failure as follows:  · Patient with restrictive pattern on her PFTs and chronic hypoxic respiratory failure on 6-8L NC, follows with Dr. Aviles in clinic   · Also found to have mild pulmonary hypertension per right heart cath in [place]  · On chronic immunosuppression including OFEV, Prednisone, and Actemra; patient reports the Actermra infusions are the only treatment that seems to have any added benefit  · Continue Lasix and antibiotics per ID recs  · Follow chest imaging / ABGs as appropriate  · Fungitell, Fungal studies, and PJP on 9/14/23: Negative

## 2023-10-16 NOTE — TELEPHONE ENCOUNTER
----- Message from Timo Carter MD sent at 10/15/2023 10:30 AM CDT -----  Regarding: RE: IV antimicrobials  Sure Matthew. I am covering for Chicho while he is on vacation. I agree, we should hold her actemra until she clears the infection . I will request our infusion coordinator to discontinue actemra and restart 2 weeks after completion of antibiotic therapy.   Tyler.   ----- Message -----  From: Sarabjit Wayne DO  Sent: 10/13/2023   7:59 AM CDT  To: Chicho Lewis MD  Subject: IV antimicrobials                                Good morning this is Matthew with ID. Seeing mutual patient Ms. 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 This is an organism which requires 6 weeks of IV therapy followed by 2-6 months of oral therapy. I wanted to coordinate the IV abx with her Actemra infusions. There was concern that both may interfere with one another.     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 The micafungin should only be 14 days while the ceftriaxone would be the 6 weeks. Is there any way we can make this work so patient is treated safely and effectively?     Appreciate all your help.     Matthew

## 2023-10-16 NOTE — PT/OT/SLP PROGRESS
"Physical Therapy  Treatment    Ayanna Alicia   MRN: [id number]   Admitting Diagnosis: Actinomyces infection    PT Received On: 10/16/23  PT Start Time: 0750     PT Stop Time: 0815    PT Total Time (min): 25 min       Billable Minutes:  Gait Training 10 and Therapeutic Activity 15    Treatment Type: Treatment  PT/PTA: PTA     Number of PTA visits since last PT visit: 2       General Precautions: Standard, fall, respiratory  Orthopedic Precautions: N/A  Braces: N/A  Respiratory Status: Nasal cannula, flow 6 L/min (high flow NC)    Spiritual, Cultural Beliefs, Mu-ism Practices, Values that Affect Care: no    Subjective:  Communicated with charge nurse, Adrianna, and completed Epic chart review prior to session.  Patient agreed to PT session.   "I can move around just fine I just can't breathe."    Pain/Comfort  Pain Rating 1: 0/10  Pain Rating Post-Intervention 1: 0/10    Objective:   Patient found with: telemetry, PICC line, oxygen    Supine > sit EOB: Independent    Forward scoot towards EOB: Independent    STS from EOB No AD: Independent    100ft No AD Independent    Stand pivot T/F to chair No AD: Independent    Completed x10 reps AROM TE to BLE: LAQ, Hip Flex, AP   Intermittent cues given as needed to maintain correct form during repetitions    Educated patient on importance of increased tolerance to upright position and direct impact on CV endurance and strength. Patient encouraged to sit up in chair/ EOB, for a minimum of 2 consecutive hours, 3x per day. Encouraged patient to perform AROM TE to BLE throughout the day within all available planes of motion. Also encouraged patient to request assistance from nursing staff to ambulate 1-2x more throughout the day. Re enforced importance of utilizing call light to meet needs in room and not attempt to get up without staff assistance. Patient verbalized understanding and agreed to comply.      AM-PAC 6 CLICK MOBILITY  How much help from another person does this " patient currently need?   1 = Unable, Total/Dependent Assistance  2 = A lot, Maximum/Moderate Assistance  3 = A little, Minimum/Contact Guard/Supervision  4 = None, Modified [place]/Independent    Turning over in bed (including adjusting bedclothes, sheets and blankets)?: 4  Sitting down on and standing up from a chair with arms (e.g., wheelchair, bedside commode, etc.): 4  Moving from lying on back to sitting on the side of the bed?: 4  Moving to and from a bed to a chair (including a wheelchair)?: 4  Need to walk in hospital room?: 4  Climbing 3-5 steps with a railing?: 1 (NT)  Basic Mobility Total Score: 21    AM-PAC Raw Score CMS G-Code Modifier Level of Impairment Assistance   6 % Total / Unable   7 - 9 CM 80 - 100% Maximal Assist   10 - 14 CL 60 - 80% Moderate Assist   15 - 19 CK 40 - 60% Moderate Assist   20 - 22 CJ 20 - 40% Minimal Assist   23 CI 1-20% SBA / CGA   24 CH 0% Independent/ Mod I     Patient left up in chair with call button in reach.    Assessment:  Ayanna Alicia is a 53 y.o. female with a medical diagnosis of Actinomyces infection and presents with overall decline in functional mobility. Patient would continue to benefit from skilled PT to address functional limitations listed below in order to return to PLOF/decrease caregiver burden. At this time, patient has met all goals established within PT POC. Plan to speak with [facility] PT to determine if patient is appropriate for D/C.     Rehab identified problem list/impairments: impaired endurance, impaired cardiopulmonary response to activity    Rehab potential is good.    Activity tolerance: Good    Discharge recommendations: Low Intensity Therapy      Barriers to discharge:      Equipment recommendations: none     GOALS:   Multidisciplinary Problems       Physical Therapy Goals          Problem: Physical Therapy    Goal Priority Disciplines Outcome Goal Variances Interventions   Physical Therapy Goal     PT, PT/OT      Description:  Pt will perform bed mobility independently in order to participate in EOB activity.  Pt will perform transfers independently in order to participate in OOB activity.   Pt will ambulate 100ft mod I with LRAD and supplemental O2 (6-8 L)in order to participate in daily tasks.                         PLAN:    Patient to be seen 3 x/week to address the above listed problems via gait training, therapeutic activities, therapeutic exercises  Plan of Care expires: 10/27/23  Plan of Care reviewed with: patient         10/16/2023

## 2023-10-16 NOTE — PROGRESS NOTES
[facility] Medicine  Progress Note    Patient Name: Ayanna Alicia  MRN: [id number]  Patient Class: IP- Inpatient   Admission Date: 10/11/2023  Length of Stay: 5 days  Attending Physician: Phillip Alvarez,*  Primary Care Provider: Madeleine Enrique MD        Subjective:     Principal Problem:Actinomyces infection        HPI:  Ayanna Alicia is a 53-year-old AAF with a PMH of Sjogren's syndrome, ILD, RA, chronic hypoxic respiratory failure on 6-8L/NC, asthma, GLENN (CPAP), possible Lung Tx candidate, who presented to ER for evaluation of SOB which started gradually over last couple of days. Pt had an O2 saturartion of 83% on 8L via NC. Symptoms are constant and moderate in severity. No mitigating or exacerbating factors reported. Associated sxs include fever. Patient denies any N/V/D, HA,weakness, numbness, and all other sxs at this time. No prior tx reported. No further complaints or concerns at this time. She had a similar admission here from 9/14-/17/23.      Initial VS temp 101.6, , 147/85, 34, 83% on 8 L NC. Labs WBC 13.4, H/H 13/43, BNP <10, Trop 0.039, CXR showed stable cardiomegaly, moderate bilateral infiltrates appear stable. Pt received nebs, Solumedrol 125 mg IV plus Zosyn/Zyvox and Levaquin plus lasix 40 mg and was placed on Bipap at 100% O2 and ABG showed 7.43/459/49/34/100%    She is being admitted to [facility] as acute on ch hypoxemic resp failure         Overview/Hospital Course:  53-year-old AAF with Sjogren's syndrome, ILD, RA, chronic hypoxic respiratory failure on 6-8L/NC, asthma, GLENN (CPAP), possible Lung Tx candidate, who presented to ER for evaluation of SOB which started gradually over last couple of days. Pt had an O2 saturartion of 83% on 8L via NC. She had a similar admission here from 9/14-/17/23.      Initial VS temp 101.6, , 147/85, 34, 83% on 8 L NC. Labs WBC 13.4, H/H 13/43, BNP <10, Trop 0.039, CXR showed stable cardiomegaly, moderate bilateral  infiltrates appear stable. Pt received nebs, Solumedrol 125 mg IV plus Zosyn/Zyvox and Levaquin plus lasix 40 mg and was placed on Bipap at 100% O2 and ABG showed 7.43/459/49/34/100%. She is being admitted to [facility] as acute on ch hypoxemic resp failure.    10/12- looks and feels a little better, SOB improved a little. Was overnite on Bipap 50%, now down to 6-7 L NC, maintaining sats. Got another dose of Lasix today. Also got PICC line for IV Abx for her hoarseness sec to Vocal Cord Dysfunction-    Per ID: vocal cord infection with cultures that have grown Actinomyces, prevotella, and Candida glabrata so far.  ENT saw patient in clinic and performed a scope which noted exudate on the vocal cords with culture sent.  Actinomyces can prove very difficult to treat and will require a long duration of antibiotics.  The other pathogens can likely be treated with a short course of antimicrobials.  Patient febrile on admission to 101.6.  Currently afebrile.  Leukocytosis trending down to 12,000 on review of CBC.  CMP shows normal creatinine.     Recommendations:  --Will target Actino with penicillin G 20 MU continuous infusion daily   --Will discuss with pharmacy to see if ceftriaxone can be used as an equivalent outpatient   --Will need 6 weeks of IV therapy followed by PO amoxicillin for at least 2-6 months   --For Prevotella recommend PO metronidazole 500 mg BID to complete 14 day course   --For C glabrata, recommend IV micafungin 100 mg daily for at least 14 days; typically azole resistant    10/13- looks and feels better, getting stronger, VSS, has remained afeb, O2 down to 6 L NC, hoarseness persists. Getting PT/OT, doing IS. Got PICC line for IV PCN G Infusion plus IV Mycafungin x 2 weeks plus oral Flagyl x 2 weeks. SW arranging HH with [facility] services. Pt encouraged to exercise and ambulate. CXR post PICC line shows pulm edema- will start IV lasix. Labs improved.     10/14- Appreciate Dr. Wayne. Pt still  SOB, easy HICKS- good response to IV lasix, over 3 L out so far. ID changed her from PCN G to Rocephin due to Pulm Edema- pt agreeable. Encouraged to do IS and ambulate in the room. Cont Bipap qhs.     10/15- looks a little better but still SOB. About 3 L fluid neg so far. Repeat CXR still shows Pulm edema/CHF. Will add Metolazone to Lasix. Pt to cont IS and ambulate as much as possible. Tolerating Rocephin better, no NV, restlessness. Hoarseness persists. O2 down to 6 l NC. D/c soon on oral lasix for a few days.     10/6 She is complaining of chest congestion and muscle cramp . Lab work show metabolic alkalosis . She is on 6 lt by nasal canula . CXR pending .      Interval History:     Review of Systems   Constitutional:  Positive for activity change and fatigue. Negative for appetite change.   HENT:  Positive for voice change. Negative for congestion and sore throat.    Respiratory:  Positive for cough and shortness of breath.    Cardiovascular:  Negative for chest pain, palpitations and leg swelling.   All other systems reviewed and are negative.    Objective:     Vital Signs (Most Recent):  Temp: 97.7 °F (36.5 °C) (10/16/23 1225)  Pulse: 87 (10/16/23 1318)  Resp: 19 (10/16/23 1225)  BP: 102/72 (10/16/23 1225)  SpO2: 99 % (10/16/23 1225) Vital Signs (24h Range):  Temp:  [97.7 °F (36.5 °C)-99.9 °F (37.7 °C)] 97.7 °F (36.5 °C)  Pulse:  [] 87  Resp:  [16-20] 19  SpO2:  [72 %-100 %] 99 %  BP: (102-158)/(61-84) 102/72     Weight: 99 kg (218 lb 4.1 oz)  Body mass index is 35.23 kg/m².    Intake/Output Summary (Last 24 hours) at 10/16/2023 7919  Last data filed at 10/16/2023 1038  Gross per 24 hour   Intake 770 ml   Output 2400 ml   Net -1630 ml         Physical Exam  Vitals reviewed.   Constitutional:       General: She is awake. She is not in acute distress.     Appearance: She is morbidly obese. She is ill-appearing.      Comments: Chronically ill appearing female semi-upright in bed on 6L NC   Pulmonary:       Effort: Pulmonary effort is normal. No tachypnea, bradypnea, accessory muscle usage or respiratory distress.      Breath sounds: Decreased air movement present. Examination of the right-lower field reveals rhonchi. Examination of the left-lower field reveals rhonchi. Decreased breath sounds and rhonchi present. No wheezing or rales.   Neurological:      Mental Status: She is alert.   Psychiatric:         Behavior: Behavior is cooperative.             Significant Labs: All pertinent labs within the past 24 hours have been reviewed.  Recent Lab Results         10/16/23  1332   10/16/23  1307   10/16/23  0557        Anion Gap   14   12       Baso # 0.10           Basophil % 0.6           BUN   13   13       Calcium   10.0   9.6       Chloride   91   94       CO2   35   36       Creatinine   0.9   0.9       Differential Method Automated           eGFR   >60   >60       Eos # 0.5           Eosinophil % 3.0           Glucose   129   117       Gran # (ANC) 13.4           Gran % 77.4           Hematocrit 43.5           Hemoglobin 13.3           Immature Grans (Abs) 0.44  Comment: Mild elevation in immature granulocytes is non specific and   can be seen in a variety of conditions including stress response,   acute inflammation, trauma and pregnancy. Correlation with other   laboratory and clinical findings is essential.             Immature Granulocytes 2.5           Lymph # 1.6           Lymph % 9.1           Magnesium    1.9         MCH 30.1           MCHC 30.6           MCV 98  Comment: Results confirmed, test repeated           Mono # 1.3           Mono % 7.4           MPV 9.3           nRBC 0           Platelet Estimate Appears normal           Platelet Count 381  Comment: Results confirmed, test repeated           Potassium   3.5   3.5       RBC 4.42           RDW 16.5           Sodium   140   142       WBC 17.39                   Significant Imaging: I have reviewed all pertinent imaging results/findings within the  "past 24 hours.      Assessment/Plan:      * Actinomyces infection  vocal cord infection with cultures that have grown Actinomyces, prevotella, and Candida glabrata so far.  ENT saw patient in clinic and performed a scope which noted exudate on the vocal cords with culture sent.  Actinomyces can prove very difficult to treat and will require a long duration of antibiotics.  The other pathogens can likely be treated with a short course of antimicrobials.  Patient febrile on admission to 101.6.  Currently afebrile.  Leukocytosis trending down to 12,000 on review of CBC.  CMP shows normal creatinine.     Recommendations:  --Will target Actino with penicillin G 20 MU continuous infusion daily   --Will discuss with pharmacy to see if ceftriaxone can be used as an equivalent outpatient   --Will need 6 weeks of IV therapy followed by PO amoxicillin for at least 2-6 months   --For Prevotella recommend PO metronidazole 500 mg BID to complete 14 day course   --For C glabrata, recommend IV micafungin 100 mg daily for at least 14 days; typically azole resistant    Day 2 of PCN G- cont    Day 3- PCN G changed to Rocephin    Day 4 on rocephin plus Flagyl and micafungin    Cont current tx     Severe sepsis  This patient does have evidence of infective focus- infected Vocal Cords  My overall impression is sepsis.  Source: Respiratory  Antibiotics given-   Antibiotics (72h ago, onward)    Start     Stop Route Frequency Ordered    10/14/23 1230  cefTRIAXone (ROCEPHIN) 2 g in dextrose 5 % in water (D5W) 100 mL IVPB (MB+)         -- IV Every 24 hours (non-standard times) 10/14/23 1119    10/12/23 0915  metroNIDAZOLE tablet 500 mg         10/26/23 0859 Oral Every 12 hours 10/12/23 0810        Latest lactate reviewed-  No results for input(s): "LACTATE" in the last 72 hours.  Organ dysfunction indicated by Acute respiratory failure    Fluid challenge Actual Body weight- Patient will receive 30ml/kg actual body weight to calculate fluid " bolus for treatment of septic shock.     Post- resuscitation assessment No - Post resuscitation assessment not needed       Will Not start Pressors- Levophed for MAP of 65  Source control achieved by: IV Abx    Improved w Abx and steroids    Getting PCG G IV plus Mycafungin and PO Flagyl    PCH G changed to Rocephin sec to pulm edema, restlessness and cramps    Improved, afeb, normal WBCs    Dysphonia due to laryngeal ulcers  S/p laryngoscopy with Vocal cord exudates- Polymicrobial infection- see below      UIP (usual interstitial pneumonitis)  Cont Prednisone, pt on OFEV as out pt.   Being evaluated for Lung Tx      Acute on chronic respiratory failure with hypoxemia  Patient with Hypercapnic Respiratory failure which is Acute on chronic.  she is on home oxygen at 8 LPM. Supplemental oxygen was provided and noted- Oxygen Concentration (%):  [36-50] 36    .   Signs/symptoms of respiratory failure include- tachypnea, increased work of breathing, wheezing and lethargy. Contributing diagnoses includes - Interstitial lung disease and Obesity Hypoventilation Labs and images were reviewed. Patient Has recent ABG, which has been reviewed. Will treat underlying causes and adjust management of respiratory failure as follows-     Bipap, O2, prednisone  PT/OT    Stable to improved- cont present care    Although Fio2 down to 6 L but CXR shows Pulm Edema- will give IV lasix    Stable- cont current tx including lasix  Abx changed to Rocephin   change lasix  To qd     GLENN on CPAP  Cont Bipap        VTE Risk Mitigation (From admission, onward)         Ordered     enoxaparin injection 40 mg  Every 24 hours         10/11/23 1445     IP VTE HIGH RISK PATIENT  Once         10/11/23 1443     Place sequential compression device  Until discontinued         10/11/23 1444                Discharge Planning   JANETTE:      Code Status: DNR   Is the patient medically ready for discharge?:     Reason for patient still in hospital (select all that  apply): Treatment  Discharge Plan A: Home Health   Discharge Delays: None known at this time              Phillip Gallegos MD  Department of Hospital Medicine   [facility]

## 2023-10-16 NOTE — PLAN OF CARE
Patient is stable. No signs or symptoms of acute distress. Meds and IV antibiotics given as ordered. Continuous cardiac monitoring in place running sinus tachy. Bed in lowest position, call light in reach. Chart orders reviewed.

## 2023-10-16 NOTE — ASSESSMENT & PLAN NOTE
Patient with Hypercapnic Respiratory failure which is Acute on chronic.  she is on home oxygen at 8 LPM. Supplemental oxygen was provided and noted- Oxygen Concentration (%):  [36-50] 36    .   Signs/symptoms of respiratory failure include- tachypnea, increased work of breathing, wheezing and lethargy. Contributing diagnoses includes - Interstitial lung disease and Obesity Hypoventilation Labs and images were reviewed. Patient Has recent ABG, which has been reviewed. Will treat underlying causes and adjust management of respiratory failure as follows-     Bipap, O2, prednisone  PT/OT    Stable to improved- cont present care    Although Fio2 down to 6 L but CXR shows Pulm Edema- will give IV lasix    Stable- cont current tx including lasix  Abx changed to Rocephin   change lasix  To qd

## 2023-10-16 NOTE — ASSESSMENT & PLAN NOTE
vocal cord infection with cultures that have grown Actinomyces, prevotella, and Candida glabrata so far.  ENT saw patient in clinic and performed a scope which noted exudate on the vocal cords with culture sent.  Actinomyces can prove very difficult to treat and will require a long duration of antibiotics.  The other pathogens can likely be treated with a short course of antimicrobials.  Patient febrile on admission to 101.6.  Currently afebrile.  Leukocytosis trending down to 12,000 on review of CBC.  CMP shows normal creatinine.     Recommendations:  --Will target Actino with penicillin G 20 MU continuous infusion daily   --Will discuss with pharmacy to see if ceftriaxone can be used as an equivalent outpatient   --Will need 6 weeks of IV therapy followed by PO amoxicillin for at least 2-6 months   --For Prevotella recommend PO metronidazole 500 mg BID to complete 14 day course   --For C glabrata, recommend IV micafungin 100 mg daily for at least 14 days; typically azole resistant    Day 2 of PCN G- cont    Day 3- PCN G changed to Rocephin    Day 4 on rocephin plus Flagyl and micafungin    Cont current tx

## 2023-10-16 NOTE — PROGRESS NOTES
[facility] Surg  Pulmonology  Progress Note    Patient Name: Ayanna Alicia  MRN: [id number]  Admission Date: 10/11/2023  Hospital Length of Stay: 5 days  Code Status: DNR  Attending Provider: Phillip Alvarez,*  Primary Care Provider: Madeleine Enrique MD   Principal Problem: Actinomyces infection    Subjective:     Ayanna Alicia is a 52-year-old female with a past medical history of chronic hypoxic respiratory failure on 5L/NC, asthma, Sjogren's syndrome with associated interstitial lung disease, multinodular goiter, GLENN, and morbid obesity who  presented with worsening shortness of breadth with associated body aches, chills, and subjective fever.     Of note, she was hospitalized in May of 2022 with acute respiratory failure and persistent pneumonia. At that time, she was dishcharged on oxygen. She was subsequently diagnosed with Sjogren's disease and thought to have interstitial lung disease secondary to her underlying rheumatological disease. Initial PFTs with severe restriction and reduced DLCO. She was initially prescribed steroids followed by the addition of Cellcept and Rituximab infusions (last infusion in Dec 2022). Most recently, she was initiated on Ofev; however, the patient reports she has not had any significant improvement in her clinical condition since her original illness in May 2022 and reports she has clinically worsened over the course of this time. Endorses losing weight, lost 65 lbs since last year; however, she has not been able to get less than 220lb. Not taking cellcept since 12/2022, received 4 treatments of Rituximab with her last dose in Dec 2022. She chronically takes prednisone 10 mg.  Patient underwent bronchoscopy with BAL in April 2023 per Dr. Aviles with negative cultures and no growth of fungus, yeast, PCP, staph, or pseudomonas. Remains on OFEV 150mg BID and Prednisone 10mg daily. In addition, she is in a trial of Actemra IV monthly for four consecutive months.  In addition, she has been referred for evaluation of lung transplant at [facility] in [place] and was seen by them in August. At that time, she was told she is not currently a candidate for lung transplant secondary to her weight. She has a goal of an additional 10lb weight loss for consideration for transplant.      Interval history:   10/12: Down to 8l, feels better   10/13: Weaned to 6L/NC this morning; denies acute complaints. Overall feels better. Fever trends improved.   10/15: Sitting up in bedside chair without acute complaints. Currently on 6L/NC. Overall, feels ok. No acute events overnight. Some diarrhea but seems improved from approximately 2 days ago   10/16: remains on 6L NC, reports feeling a little worse than she did yesterday with some increased reported WOB - CXR pending, voice is hoarse, some scant sputum production from time to time, no family at bedside     ROS complete and negative unless stated in the interval HPI    Objective:     Vital Signs (Most Recent):  Temp: 97.7 °F (36.5 °C) (10/16/23 1225)  Pulse: 87 (10/16/23 1318)  Resp: 19 (10/16/23 1225)  BP: 102/72 (10/16/23 1225)  SpO2: 99 % (10/16/23 1225) Vital Signs (24h Range):  Temp:  [97.7 °F (36.5 °C)-99.9 °F (37.7 °C)] 97.7 °F (36.5 °C)  Pulse:  [] 87  Resp:  [16-20] 19  SpO2:  [72 %-100 %] 99 %  BP: (102-158)/(61-84) 102/72     Weight: 99 kg (218 lb 4.1 oz)  Body mass index is 35.23 kg/m².      Intake/Output Summary (Last 24 hours) at 10/16/2023 1346  Last data filed at 10/16/2023 1038  Gross per 24 hour   Intake 770 ml   Output 2400 ml   Net -1630 ml        Physical Exam  Vitals reviewed.   Constitutional:       General: She is awake. She is not in acute distress.     Appearance: She is morbidly obese. She is ill-appearing.      Comments: Chronically ill appearing female semi-upright in bed on 6L NC   Pulmonary:      Effort: Pulmonary effort is normal. No tachypnea, bradypnea, accessory muscle usage or respiratory distress.     "  Breath sounds: Decreased air movement present. Examination of the right-lower field reveals rhonchi. Examination of the left-lower field reveals rhonchi. Decreased breath sounds and rhonchi present. No wheezing or rales.   Neurological:      Mental Status: She is alert.   Psychiatric:         Behavior: Behavior is cooperative.           Vents:  Oxygen Concentration (%): 36 (10/16/23 1037)    Lines/Drains/Airways       Peripherally Inserted Central Catheter Line  Duration             PICC Double Lumen 10/13/23 1627 right basilic 2 days                    Significant Labs:    CBC/Anemia Profile:  No results for input(s): "WBC", "HGB", "HCT", "PLT", "MCV", "RDW", "IRON", "FERRITIN", "RETIC", "FOLATE", "[id number]", "OCCULTBLOOD" in the last 48 hours.     Chemistries:  Recent Labs   Lab 10/16/23  0557 10/16/23  1307    140   K 3.5 3.5   CL 94* 91*   CO2 36* 35*   BUN 13 13   CREATININE 0.9 0.9   CALCIUM 9.6 10.0   MG  --  1.9       All pertinent labs within the past 24 hours have been reviewed.    Significant Imaging:  I have reviewed all pertinent imaging results/findings within the past 24 hours.      ABG  Recent Labs   Lab 10/11/23  1209   PH 7.434   PO2 459*   PCO2 49.0*   HCO3 32.8*   BE 9     Assessment/Plan:     ENT  Dysphonia due to laryngeal ulcers  · Seen by ENT as outpatient  · Previous vocal cord cultures --> actinomyces and yeast  · Continue antibiotics per ID recs    Pulmonary  UIP (usual interstitial pneumonitis)  Hospitalized May 2022 with acute respiratory failure and persistent pneumonia and dishcarged on oxygen. She has since been diagnosed with Sjogren's disease with associated ILD. Initial PFTs revealed severe restriction and reduced DLCO. Initially prescribed steroids and subsequently Cellcept and Rituximab infusions (last infusion in Dec 2022). Now on Ofev 150mg BID, Prednisone 10mg daily, and Actemra infusions      · Concern for underlying acute pulmonary infection  · Sputum culture and " respiratory viral panel negative   · Continue antibiotics per ID recs  · Started lung transplant evaluation at [facility], not a current candidate  · Continue home Prednisone; avoiding up titration of steroids  · Continue nocturnal AVAPS  · Continue oral lasix  · Followed by rheumatology and pulmonary as outpatient; follow-up with Dr. Ashton in [place]  · She has had repeated hospitalizations within the past several months.  She is been seen and evaluated by transplant in [place].  She underwent left and right heart catheterization in [place] and found to have mild pulmonary hypertension.    Acute on chronic respiratory failure with hypoxemia  Patient with chronic hypoxic respiratory failure who is on home oxygen at 6 to 8 L/NC who is her currently on Vapotherm 20/0.50. Signs/symptoms of respiratory failure included increased work of breathing and decreased oxygen saturations. Contributing diagnoses includes pneumonia, asthma, interstitial lung disease, and decompensated heart failure. Labs and images were reviewed. Patient does have a recent ABG which has been reviewed.  Initial PFTs with severe restriction and reduced DLCO; attempted repeat PFTs on 3/16/2022 which she was unable to complete due to worsening shortness of breath.     Will treat underlying causes and adjust management of respiratory failure as follows:  · Patient with restrictive pattern on her PFTs and chronic hypoxic respiratory failure on 6-8L NC, follows with Dr. Aviles in clinic   · Also found to have mild pulmonary hypertension per right heart cath in [place]  · On chronic immunosuppression including OFEV, Prednisone, and Actemra; patient reports the Actermra infusions are the only treatment that seems to have any added benefit  · Continue Lasix and antibiotics per ID recs  · Follow chest imaging / ABGs as appropriate  · Fungitell, Fungal studies, and PJP on 9/14/23: Negative      ID  * Actinomyces infection  - see  dysphonia    Severe sepsis  - see plan for acute on chronic resp failure    Other  GLENN on CPAP  Patient reports non-compliance with home PAP therapy in the past. We discussed utilizing and compliance with home PAP therapy. Following discharge from prior hospitalization in August 2023, the patient was transitioned from CPAP to home AVAPS as she has significant hypoxic respiratory failure at baseline with associated restrictive disease as evidenced by her PFTs. Patient reports decreased use with home AVAPS due to lack of humidification. Patient reports she just received her humidification and reports using AVAPS for a few hours at night.      · Nocturnal AVAPS as ordered      Pulmonary team will continue to follow along. Please call with questions.       Cullen Fisher NP  Pulmonology  [facility] - Med Surg

## 2023-10-16 NOTE — TELEPHONE ENCOUNTER
Reviewed MD orders with pt. Will hold IV actemra until 2 weeks after completion of antibiotic treatments. Verbalized understanding.

## 2023-10-16 NOTE — ASSESSMENT & PLAN NOTE
· Seen by ENT as outpatient  · Previous vocal cord cultures --> actinomyces and yeast  · Continue antibiotics per ID recs

## 2023-10-17 VITALS
OXYGEN SATURATION: 97 % | RESPIRATION RATE: 18 BRPM | HEIGHT: 66 IN | TEMPERATURE: 99 F | HEART RATE: 97 BPM | DIASTOLIC BLOOD PRESSURE: 67 MMHG | WEIGHT: 218.25 LBS | BODY MASS INDEX: 35.08 KG/M2 | SYSTOLIC BLOOD PRESSURE: 117 MMHG

## 2023-10-17 LAB
ALBUMIN SERPL BCP-MCNC: 3.1 G/DL (ref 3.5–5.2)
ALP SERPL-CCNC: 83 U/L (ref 55–135)
ALT SERPL W/O P-5'-P-CCNC: 20 U/L (ref 10–44)
ANION GAP SERPL CALC-SCNC: 12 MMOL/L (ref 8–16)
AST SERPL-CCNC: 17 U/L (ref 10–40)
BASOPHILS # BLD AUTO: 0.08 K/UL (ref 0–0.2)
BASOPHILS NFR BLD: 0.7 % (ref 0–1.9)
BILIRUB SERPL-MCNC: 0.5 MG/DL (ref 0.1–1)
BNP SERPL-MCNC: <10 PG/ML (ref 0–99)
BUN SERPL-MCNC: 14 MG/DL (ref 6–20)
CALCIUM SERPL-MCNC: 10.1 MG/DL (ref 8.7–10.5)
CHLORIDE SERPL-SCNC: 92 MMOL/L (ref 95–110)
CO2 SERPL-SCNC: 35 MMOL/L (ref 23–29)
CREAT SERPL-MCNC: 1 MG/DL (ref 0.5–1.4)
DIFFERENTIAL METHOD: ABNORMAL
EOSINOPHIL # BLD AUTO: 0.4 K/UL (ref 0–0.5)
EOSINOPHIL NFR BLD: 3.4 % (ref 0–8)
ERYTHROCYTE [DISTWIDTH] IN BLOOD BY AUTOMATED COUNT: 16.3 % (ref 11.5–14.5)
EST. GFR  (NO RACE VARIABLE): >60 ML/MIN/1.73 M^2
GLUCOSE SERPL-MCNC: 155 MG/DL (ref 70–110)
HCT VFR BLD AUTO: 40 % (ref 37–48.5)
HGB BLD-MCNC: 12.5 G/DL (ref 12–16)
IMM GRANULOCYTES # BLD AUTO: 0.44 K/UL (ref 0–0.04)
IMM GRANULOCYTES NFR BLD AUTO: 3.6 % (ref 0–0.5)
LYMPHOCYTES # BLD AUTO: 1.6 K/UL (ref 1–4.8)
LYMPHOCYTES NFR BLD: 13.5 % (ref 18–48)
MCH RBC QN AUTO: 30.3 PG (ref 27–31)
MCHC RBC AUTO-ENTMCNC: 31.3 G/DL (ref 32–36)
MCV RBC AUTO: 97 FL (ref 82–98)
MONOCYTES # BLD AUTO: 1.1 K/UL (ref 0.3–1)
MONOCYTES NFR BLD: 9.3 % (ref 4–15)
NEUTROPHILS # BLD AUTO: 8.4 K/UL (ref 1.8–7.7)
NEUTROPHILS NFR BLD: 69.5 % (ref 38–73)
NRBC BLD-RTO: 0 /100 WBC
PLATELET # BLD AUTO: 432 K/UL (ref 150–450)
PMV BLD AUTO: 9.2 FL (ref 9.2–12.9)
POTASSIUM SERPL-SCNC: 3.6 MMOL/L (ref 3.5–5.1)
PROT SERPL-MCNC: 7.1 G/DL (ref 6–8.4)
RBC # BLD AUTO: 4.13 M/UL (ref 4–5.4)
SODIUM SERPL-SCNC: 139 MMOL/L (ref 136–145)
WBC # BLD AUTO: 12.09 K/UL (ref 3.9–12.7)

## 2023-10-17 PROCEDURE — 25000242 PHARM REV CODE 250 ALT 637 W/ HCPCS: Performed by: NURSE PRACTITIONER

## 2023-10-17 PROCEDURE — 80053 COMPREHEN METABOLIC PANEL: CPT | Performed by: INTERNAL MEDICINE

## 2023-10-17 PROCEDURE — 83880 ASSAY OF NATRIURETIC PEPTIDE: CPT | Performed by: INTERNAL MEDICINE

## 2023-10-17 PROCEDURE — 25000003 PHARM REV CODE 250: Performed by: HOSPITALIST

## 2023-10-17 PROCEDURE — 25000003 PHARM REV CODE 250: Performed by: NURSE PRACTITIONER

## 2023-10-17 PROCEDURE — 85025 COMPLETE CBC W/AUTO DIFF WBC: CPT | Performed by: INTERNAL MEDICINE

## 2023-10-17 PROCEDURE — 25000242 PHARM REV CODE 250 ALT 637 W/ HCPCS: Performed by: EMERGENCY MEDICINE

## 2023-10-17 PROCEDURE — 27100171 HC OXYGEN HIGH FLOW UP TO 24 HOURS

## 2023-10-17 PROCEDURE — 97116 GAIT TRAINING THERAPY: CPT

## 2023-10-17 PROCEDURE — 63600175 PHARM REV CODE 636 W HCPCS: Performed by: NURSE PRACTITIONER

## 2023-10-17 PROCEDURE — 94640 AIRWAY INHALATION TREATMENT: CPT

## 2023-10-17 PROCEDURE — 63600175 PHARM REV CODE 636 W HCPCS: Performed by: STUDENT IN AN ORGANIZED HEALTH CARE EDUCATION/TRAINING PROGRAM

## 2023-10-17 PROCEDURE — 63600175 PHARM REV CODE 636 W HCPCS: Performed by: INTERNAL MEDICINE

## 2023-10-17 PROCEDURE — 97530 THERAPEUTIC ACTIVITIES: CPT

## 2023-10-17 PROCEDURE — 94761 N-INVAS EAR/PLS OXIMETRY MLT: CPT

## 2023-10-17 PROCEDURE — 25000003 PHARM REV CODE 250: Performed by: STUDENT IN AN ORGANIZED HEALTH CARE EDUCATION/TRAINING PROGRAM

## 2023-10-17 PROCEDURE — 99900035 HC TECH TIME PER 15 MIN (STAT)

## 2023-10-17 RX ORDER — POTASSIUM CHLORIDE 20 MEQ/1
20 TABLET, EXTENDED RELEASE ORAL DAILY
Qty: 30 TABLET | Refills: 0 | Status: SHIPPED | OUTPATIENT
Start: 2023-10-17 | End: 2023-11-16

## 2023-10-17 RX ORDER — IPRATROPIUM BROMIDE AND ALBUTEROL SULFATE 2.5; .5 MG/3ML; MG/3ML
3 SOLUTION RESPIRATORY (INHALATION) EVERY 6 HOURS PRN
Qty: 90 ML | Refills: 0 | Status: SHIPPED | OUTPATIENT
Start: 2023-10-17 | End: 2023-10-24

## 2023-10-17 RX ORDER — MONTELUKAST SODIUM 10 MG/1
10 TABLET ORAL NIGHTLY
Qty: 30 TABLET | Refills: 2 | Status: SHIPPED | OUTPATIENT
Start: 2023-10-17 | End: 2023-12-07

## 2023-10-17 RX ORDER — LANOLIN ALCOHOL/MO/W.PET/CERES
400 CREAM (GRAM) TOPICAL DAILY
Qty: 30 TABLET | Refills: 0 | Status: SHIPPED | OUTPATIENT
Start: 2023-10-17 | End: 2023-12-18 | Stop reason: SDUPTHER

## 2023-10-17 RX ORDER — PREDNISONE 10 MG/1
10 TABLET ORAL DAILY
Qty: 30 TABLET | Refills: 0
Start: 2023-10-18 | End: 2023-11-17

## 2023-10-17 RX ORDER — METRONIDAZOLE 500 MG/1
500 TABLET ORAL EVERY 12 HOURS
Qty: 18 TABLET | Refills: 0 | Status: SHIPPED | OUTPATIENT
Start: 2023-10-17 | End: 2023-10-26

## 2023-10-17 RX ORDER — GUAIFENESIN/DEXTROMETHORPHAN 100-10MG/5
10 SYRUP ORAL EVERY 4 HOURS PRN
Qty: 236 ML | Refills: 0 | Status: SHIPPED | OUTPATIENT
Start: 2023-10-17 | End: 2023-10-27

## 2023-10-17 RX ADMIN — CEFTRIAXONE 2 G: 2 INJECTION, POWDER, FOR SOLUTION INTRAMUSCULAR; INTRAVENOUS at 02:10

## 2023-10-17 RX ADMIN — GUAIFENESIN AND DEXTROMETHORPHAN 10 ML: 100; 10 SYRUP ORAL at 05:10

## 2023-10-17 RX ADMIN — FUROSEMIDE 40 MG: 10 INJECTION, SOLUTION INTRAMUSCULAR; INTRAVENOUS at 08:10

## 2023-10-17 RX ADMIN — METRONIDAZOLE 500 MG: 500 TABLET ORAL at 08:10

## 2023-10-17 RX ADMIN — FAMOTIDINE 20 MG: 20 TABLET ORAL at 08:10

## 2023-10-17 RX ADMIN — MAGNESIUM OXIDE TAB 400 MG (241.3 MG ELEMENTAL MG) 800 MG: 400 (241.3 MG) TAB at 08:10

## 2023-10-17 RX ADMIN — IPRATROPIUM BROMIDE AND ALBUTEROL SULFATE 3 ML: 2.5; .5 SOLUTION RESPIRATORY (INHALATION) at 12:10

## 2023-10-17 RX ADMIN — PREDNISONE 10 MG: 10 TABLET ORAL at 08:10

## 2023-10-17 RX ADMIN — ARFORMOTEROL TARTRATE 15 MCG: 15 SOLUTION RESPIRATORY (INHALATION) at 07:10

## 2023-10-17 NOTE — MEDICAL/APP STUDENT
[facility] Medicine  Progress Note    Patient Name: Ayanna Alicia  MRN: [id number]  Patient Class: IP- Inpatient   Admission Date: 10/11/2023  Length of Stay: 6 days  Attending Physician: Phillip Alvarez,*  Primary Care Provider: Madeleine Enrique MD        Subjective:     Principal Problem:Actinomyces infection    HPI:  Ayanna Alicia is a 53-year-old AAF with a PMH of Sjogren's syndrome, ILD, RA, chronic hypoxic respiratory failure on 6-8L/NC, asthma, GLENN (CPAP), possible Lung Tx candidate, who presented to ER for evaluation of SOB which started gradually over last couple of days. Pt had an O2 saturation of 83% on 8L via NC. Symptoms are constant and moderate in severity. No mitigating or exacerbating factors reported. Associated sxs include fever. Patient denies any N/V/D, HA, weakness, numbness, and all other sxs at this time. No prior tx reported. No further complaints or concerns at this time. She had a similar admission here from 9/14-/17/23.      Initial VS temp 101.6, , 147/85, 34, 83% on 8 L NC. Labs WBC 13.4, H/H 13/43, BNP <10, Trop 0.039, CXR showed stable cardiomegaly, moderate bilateral infiltrates appear stable. Pt received nebs, Solumedrol 125 mg IV plus Zosyn/Zyvox and Levaquin plus lasix 40 mg and was placed on Bipap at 100% O2 and ABG showed 7.43/459/49/34/100%    She is admitted to \[facility]\"" as acute on chronic hypoxemic resp failure.    Overview/Hospital Course:  53-year-old AAF with Sjogren's syndrome, ILD, RA, chronic hypoxic respiratory failure on 6-8L/NC, asthma, GLENN (CPAP), possible Lung Tx candidate, who presented to ER for evaluation of SOB which started gradually over last couple of days. Pt had an O2 saturartion of 83% on 8L via NC. She had a similar admission here from 9/14-/17/23.      Initial VS temp 101.6, , 147/85, 34, 83% on 8 L NC. Labs WBC 13.4, H/H 13/43, BNP <10, Trop 0.039, CXR showed stable cardiomegaly, moderate bilateral infiltrates  appear stable. Pt received nebs, Solumedrol 125 mg IV plus Zosyn/Zyvox and Levaquin plus lasix 40 mg and was placed on Bipap at 100% O2 and ABG showed 7.43/459/49/34/100%. She is being admitted to [facility] as acute on ch hypoxemic resp failure.    10/12- looks and feels a little better, SOB improved a little. Was overnite on Bipap 50%, now down to 6-7 L NC, maintaining sats. Got another dose of Lasix today. Also got PICC line for IV Abx for her hoarseness sec to Vocal Cord Dysfunction-    Per ID: vocal cord infection with cultures that have grown Actinomyces, prevotella, and Candida glabrata so far.  ENT saw patient in clinic and performed a scope which noted exudate on the vocal cords with culture sent.  Actinomyces can prove very difficult to treat and will require a long duration of antibiotics.  The other pathogens can likely be treated with a short course of antimicrobials.  Patient febrile on admission to 101.6.  Currently afebrile.  Leukocytosis trending down to 12,000 on review of CBC.  CMP shows normal creatinine.     Recommendations:  --Will target Actino with penicillin G 20 MU continuous infusion daily   --Will discuss with pharmacy to see if ceftriaxone can be used as an equivalent outpatient   --Will need 6 weeks of IV therapy followed by PO amoxicillin for at least 2-6 months   --For Prevotella recommend PO metronidazole 500 mg BID to complete 14 day course   --For C. glabrata, recommend IV micafungin 100 mg daily for at least 14 days; typically azole resistant    10/13- looks and feels better, getting stronger, VSS, has remained afeb, O2 down to 6 L NC, hoarseness persists. Getting PT/OT, doing IS. Got PICC line for IV PCN G Infusion plus IV Mycafungin x 2 weeks plus oral Flagyl x 2 weeks. SW arranging HH with Home Infusion services. Pt encouraged to exercise and ambulate. CXR post PICC line shows pulm edema- will start IV lasix. Labs improved.     10/14- Appreciate Dr. Wayne. Pt still SOB, easy  HICKS- good response to IV lasix, over 3 L out so far. ID changed her from PCN G to Rocephin due to Pulm Edema- pt agreeable. Encouraged to do IS and ambulate in the room. Cont Bipap qhs.     10/15- looks a little better but still SOB. About 3 L fluid neg so far. Repeat CXR still shows Pulm edema/CHF. Will add Metolazone to Lasix. Pt to cont IS and ambulate as much as possible. Tolerating Rocephin better, no NV, restlessness. Hoarseness persists. O2 down to 6 l NC. D/c soon on oral lasix for a few days.     10/16 She is complaining of chest congestion and muscle cramp . Lab work show metabolic alkalosis . She is on 6 lt by nasal canula . CXR pending.    Interval History: NAEON. Reports one episode of brown diarrhea that sinks and is not bloody this AM. Still producing some sputum but reports feeling better. Still on 6 L NC. Down to Lasix 40 mg QD. Pending D/C today.    Review of Systems   Constitutional:  Negative for chills and fever.   Respiratory:  Negative for shortness of breath.    Cardiovascular:  Negative for chest pain.   Gastrointestinal:  Positive for diarrhea. Negative for abdominal pain, blood in stool, nausea and vomiting.   Genitourinary:  Negative for difficulty urinating, dysuria, frequency and hematuria.     Objective:   [unfilled]Weight: 99 kg (218 lb 4.1 oz)  Body mass index is 35.23 kg/m².    Intake/Output Summary (Last 24 hours) at 10/17/2023 1429  Last data filed at 10/17/2023 0612  Gross per 24 hour   Intake 600 ml   Output 0 ml   Net 600 ml     Physical Exam  Constitutional:       General: She is not in acute distress.     Appearance: She is obese. She is not ill-appearing, toxic-appearing or diaphoretic.   HENT:      Head: Normocephalic and atraumatic.   Cardiovascular:      Rate and Rhythm: Regular rhythm. Tachycardia present.   Pulmonary:      Breath sounds: Rales present.   Abdominal:      General: Bowel sounds are normal.      Palpations: Abdomen is soft.      Tenderness: There is no  abdominal tenderness. There is no guarding or rebound.   Musculoskeletal:      Right lower leg: Edema present.      Left lower leg: Edema present.   Skin:     General: Skin is warm and dry.   Neurological:      General: No focal deficit present.      Mental Status: She is alert.   Psychiatric:         Behavior: Behavior normal.       Significant Labs: All pertinent labs within the past 24 hours have been reviewed.  CBC:   Recent Labs   Lab 10/16/23  1332 10/17/23  0632   WBC 17.39* 12.09   HGB 13.3 12.5   HCT 43.5 40.0    432     CMP:   Recent Labs   Lab 10/16/23  0557 10/16/23  1307 10/17/23  0632    140 139   K 3.5 3.5 3.6   CL 94* 91* 92*   CO2 36* 35* 35*   * 129* 155*   BUN 13 13 14   CREATININE 0.9 0.9 1.0   CALCIUM 9.6 10.0 10.1   PROT  --   --  7.1   ALBUMIN  --   --  3.1*   BILITOT  --   --  0.5   ALKPHOS  --   --  83   AST  --   --  17   ALT  --   --  20   ANIONGAP 12 14 12     Cardiac Markers:   Recent Labs   Lab 10/17/23  0632   BNP <10     Respiratory Culture:   Recent Labs   Lab 10/16/23  1728   GSRESP <10 epithelial cells per low power field.  Few WBC's  Moderate Gram positive cocci  Rare Gram positive rods  Rare Gram negative rods  Rare budding yeast       Significant Imaging:  Imaging Results              X-Ray Chest AP Portable (Final result)  Result time 10/11/23 12:16:37      Final result by Terry MonetChi), MD (10/11/23 12:16:37)                   Impression:      Stable moderate bilateral infiltrates.      Electronically signed by: Terry Monet MD  Date:    10/11/2023  Time:    12:16               Narrative:    EXAMINATION:  XR CHEST AP PORTABLE    CLINICAL HISTORY:  Shortness of breath, sob;    COMPARISON:  Chest, 09/14/2023.    FINDINGS:  Stable cardiomegaly.  Moderate bilateral infiltrates appear stable                                       Assessment/Plan:      * Actinomyces infection  vocal cord infection with cultures that have grown Actinomyces,  "prevotella, and Candida glabrata so far. ENT saw patient in clinic and performed a scope which noted exudate on the vocal cords with culture sent. Actinomyces can prove very difficult to treat and will require a long duration of antibiotics. The other pathogens can likely be treated with a short course of antimicrobials. Patient febrile on admission to 101.6. Currently afebrile. Leukocytosis trending down to 12,000 on review of CBC. CMP shows normal creatinine.     Recommendations:  --Will target Actino with penicillin G 20 MU continuous infusion daily   --Will discuss with pharmacy to see if ceftriaxone can be used as an equivalent outpatient   --Will need 6 weeks of IV therapy followed by PO amoxicillin for at least 2-6 months   --For Prevotella recommend PO metronidazole 500 mg BID to complete 14 day course   --For C glabrata, recommend IV micafungin 100 mg daily for at least 14 days; typically azole resistant    Day 2 of PCN G- cont    Day 3- PCN G changed to Rocephin    Day 4 on rocephin plus Flagyl and micafungin    Cont current tx     Severe sepsis  This patient does have evidence of infective focus- infected Vocal Cords  My overall impression is sepsis.  Source: Respiratory  Antibiotics given-   Antibiotics (72h ago, onward)      Start     Stop Route Frequency Ordered    10/14/23 1230  cefTRIAXone (ROCEPHIN) 2 g in dextrose 5 % in water (D5W) 100 mL IVPB (MB+)         -- IV Every 24 hours (non-standard times) 10/14/23 1119    10/12/23 0915  metroNIDAZOLE tablet 500 mg         10/26/23 0859 Oral Every 12 hours 10/12/23 0810          Latest lactate reviewed-  No results for input(s): "LACTATE" in the last 72 hours.  Organ dysfunction indicated by Acute respiratory failure    Fluid challenge Actual Body weight- Patient will receive 30ml/kg actual body weight to calculate fluid bolus for treatment of septic shock.     Post- resuscitation assessment No - Post resuscitation assessment not needed     Will Not start " Pressors- Levophed for MAP of 65  Source control achieved by: IV Abx    Improved w Abx and steroids    Getting PCG G IV plus Mycafungin and PO Flagyl    PCH G changed to Rocephin sec to pulm edema, restlessness and cramps    Improved, afeb, normal WBCs    Dysphonia due to laryngeal ulcers  S/p laryngoscopy with Vocal cord exudates- Polymicrobial infection- see below      UIP (usual interstitial pneumonitis)  Cont Prednisone, pt on OFEV as out pt.   Being evaluated for Lung Tx      Acute on chronic respiratory failure with hypoxemia  Patient with Hypercapnic Respiratory failure which is Acute on chronic.  she is on home oxygen at 8 LPM. Supplemental oxygen was provided and noted- Oxygen Concentration (%): [36-50] 50.     Signs/symptoms of respiratory failure include- tachypnea, increased work of breathing, wheezing and lethargy. Contributing diagnoses includes - Interstitial lung disease and Obesity Hypoventilation Labs and images were reviewed. Patient Has recent ABG, which has been reviewed. Will treat underlying causes and adjust management of respiratory failure as follows-     Bipap, O2, prednisone  PT/OT    Stable to improved- cont present care    Although Fio2 down to 6 L but CXR shows pulm edema - will give IV lasix    Stable- cont current tx including lasix  Abx changed to Rocephin  Now lasix qd     GLENN on CPAP  Cont Bipap        VTE Risk Mitigation (From admission, onward)           Ordered     enoxaparin injection 40 mg  Every 24 hours         10/11/23 1445     IP VTE HIGH RISK PATIENT  Once         10/11/23 1443     Place sequential compression device  Until discontinued         10/11/23 1444                    Discharge Planning   JANETTE: 10/17/2023     Code Status: DNR   Is the patient medically ready for discharge?:     Reason for patient still in hospital (select all that apply): Other (specify) Patient ready for discharge  Discharge Plan A: Home Health   Discharge Delays: None known at this  time      Saulo Arroyo, MS3  Department of Hospital Medicine   '[facility] Surg

## 2023-10-17 NOTE — PLAN OF CARE
[facility]  Discharge Final Note    Primary Care Provider: Madeleine Enrique MD    Expected Discharge Date: 10/17/2023    Final Discharge Note (most recent)       Final Note - 10/17/23 1009          Final Note    Assessment Type Final Discharge Note     Anticipated Discharge Disposition Home-Health Care [facility]     Hospital Resources/Appts/Education Provided Appointments scheduled and added to AVS;Post-Acute resouces added to AVS        Post-Acute Status    Post-Acute Authorization Home Health;IV Infusion     Home Health Status Set-up Complete/Auth obtained     IV Infusion Status Set-up Complete/Auth obtained     Discharge Delays None known at this time                   After-discharge care                Dialysis/Infusion       [facility]   Service: Home Infusion and Injection    [address] [address]   Phone: [phone]                 Home Medical Care       [facility]   Service: Home Health Services    [address] [facility] [address]   Phone: [phone]                     No d/c needs at this time.

## 2023-10-17 NOTE — PLAN OF CARE
Patient remained free of any injury throughout shift. VSS. No complaints of any pain. Monitoring resp status. Scheduled/ PRN nebs. Lasix IV daily. Call light in reach and side rails x2. Will continue with plan of care.     Problem: Adult Inpatient Plan of Care  Goal: Optimal Comfort and Wellbeing  Outcome: Ongoing, Progressing     Problem: Infection  Goal: Absence of Infection Signs and Symptoms  Outcome: Ongoing, Progressing

## 2023-10-17 NOTE — PT/OT/SLP PROGRESS
"Physical Therapy  Treatment    Ayanna Alicia   MRN: [id number]   Admitting Diagnosis: Actinomyces infection    PT Received On: 10/17/23  PT Start Time: 0855     PT Stop Time: 0920    PT Total Time (min): 25 min       Billable Minutes:  Gait Training 15 and Therapeutic Activity 10    Treatment Type: Treatment  PT/PTA: PT     Number of PTA visits since last PT visit: 0       General Precautions: Standard, respiratory  Orthopedic Precautions: N/A  Braces: N/A  Respiratory Status: Nasal cannula, flow 6 L/min    Spiritual, Cultural Beliefs, Church Practices, Values that Affect Care: no    Subjective:  Communicated with NURSE SAMANTHA AND Epic CHART REVIEW  prior to session.   PT MET IN , " WHAT DO YOU WANT ME TO DO."     Pain/Comfort  Pain Rating 1: 0/10  Pain Rating Post-Intervention 1: 0/10    Objective:   Patient found with: telemetry, PICC line, oxygen    Functional Mobility:  PT SUP>SIT EOB IND. PT WITH 6L O2 ON. PT STOOD AND T/F TO CHAIR IND. PT THEN STOOD AND DONNED GOWN. PT GT TRAINED X 280' WITH O2 IN TOW IND WITH NO LOB. PT REPORTED INC SOB HOWEVER MOTIVATED AND TOLERATED GT WELL. PT RETURNED TO  T/F TO CHAIR FOR REST BREAK.     Treatment and Education:  P.T. RE-EDUCATED PT ON TE TO CONT TO COMPLETE DAILY AND PT DEMONSTRATED UNDERSTANDING OF TE. P.T. ALSO EDUCATED PT ON POC FOR D/C FROM SKILLED P.T. AS PT IS IND (MOD I WITH O2) AND NO NEED FOR SKILLED P.T. SERVICES. PT AGREED. PT LEFT SEATED IN CHAIR WITH ALL NEEDS MET. P.T. ENCOURAGED PT TO CONT CALLING FOR NURSING ASSIST WITH O2 TANK FOR FREQUENT AMBULATION IN HALLWAYS. PT AGREED.      AM-PAC 6 CLICK MOBILITY  How much help from another person does this patient currently need?   1 = Unable, Total/Dependent Assistance  2 = A lot, Maximum/Moderate Assistance  3 = A little, Minimum/Contact Guard/Supervision  4 = None, Modified [place]/Independent    Turning over in bed (including adjusting bedclothes, sheets and blankets)?: 4  Sitting down on and " standing up from a chair with arms (e.g., wheelchair, bedside commode, etc.): 4  Moving from lying on back to sitting on the side of the bed?: 4  Moving to and from a bed to a chair (including a wheelchair)?: 4  Need to walk in hospital room?: 4  Climbing 3-5 steps with a railing?: 1 (nt)  Basic Mobility Total Score: 21    AM-PAC Raw Score CMS G-Code Modifier Level of Impairment Assistance   6 % Total / Unable   7 - 9 CM 80 - 100% Maximal Assist   10 - 14 CL 60 - 80% Moderate Assist   15 - 19 CK 40 - 60% Moderate Assist   20 - 22 CJ 20 - 40% Minimal Assist   23 CI 1-20% SBA / CGA   24 CH 0% Independent/ Mod I     Patient left up in chair with call button in reach.    Assessment:  PT GOALS MET AND AT PLOF. PT WILL BE D/C FROM P.T.     Rehab identified problem list/impairments: impaired endurance, impaired cardiopulmonary response to activity    Discharge recommendations: No Therapy Indicated        Equipment recommendations: none     GOALS:   Multidisciplinary Problems       Physical Therapy Goals       Not on file              Multidisciplinary Problems (Resolved)          Problem: Physical Therapy    Goal Priority Disciplines Outcome Goal Variances Interventions   Physical Therapy Goal   (Resolved)     PT, PT/OT Met     Description: Pt will perform bed mobility independently in order to participate in EOB activity.  Pt will perform transfers independently in order to participate in OOB activity.   Pt will ambulate 100ft mod I with LRAD and supplemental O2 (6-8 L)in order to participate in daily tasks.                         PLAN:    D/C FROM SKILLED P.T. SERVICES TODAY 10/17/2023  Plan of Care reviewed with: patient         10/17/2023

## 2023-10-18 ENCOUNTER — PATIENT MESSAGE (OUTPATIENT)
Dept: PULMONOLOGY | Facility: CLINIC | Age: 53
End: 2023-10-18
Payer: COMMERCIAL

## 2023-10-18 ENCOUNTER — PATIENT OUTREACH (OUTPATIENT)
Dept: ADMINISTRATIVE | Facility: CLINIC | Age: 53
End: 2023-10-18
Payer: COMMERCIAL

## 2023-10-18 PROCEDURE — G0180 PR HOME HEALTH MD CERTIFICATION: ICD-10-PCS | Mod: ,,, | Performed by: FAMILY MEDICINE

## 2023-10-18 PROCEDURE — G0180 MD CERTIFICATION HHA PATIENT: HCPCS | Mod: ,,, | Performed by: FAMILY MEDICINE

## 2023-10-18 NOTE — PROGRESS NOTES
C3 nurse spoke with Ayanna Alicia for a TCC post hospital discharge follow up call. The patient has a scheduled HOSFU appointment with Madeleine Enrique MD on 10/26/23 @ 9004.

## 2023-10-18 NOTE — PLAN OF CARE
Medication correction     sodium chloride 0.9 % PgBk 100 mL with micafungin 100 mg SolR 100 mg  Inject 100 mg into the vein 2 (two) times a day. for 10 days    Corrected  to    Micafungin 100 mg iv daily  for 10 days       Home health and costal infusion  are aware of the correction .

## 2023-10-18 NOTE — DISCHARGE SUMMARY
[facility] Medicine  Discharge Summary      Patient Name: Ayanna Alicia  MRN: [id number]  DARLENE: [id number]  Patient Class: IP- Inpatient  Admission Date: 10/11/2023  Hospital Length of Stay: 6 days  Discharge Date and Time: 10/17/2023  3:48 PM  Attending Physician: No att. providers found   Discharging Provider: Phillip Gallegos MD  Primary Care Provider: Madeleine Enrique MD    Primary Care Team: Networked reference to record PCT     HPI:   Ayanna Alicia is a 53-year-old AAF with a PMH of Sjogren's syndrome, ILD, RA, chronic hypoxic respiratory failure on 6-8L/NC, asthma, GLENN (CPAP), possible Lung Tx candidate, who presented to ER for evaluation of SOB which started gradually over last couple of days. Pt had an O2 saturartion of 83% on 8L via NC. Symptoms are constant and moderate in severity. No mitigating or exacerbating factors reported. Associated sxs include fever. Patient denies any N/V/D, HA,weakness, numbness, and all other sxs at this time. No prior tx reported. No further complaints or concerns at this time. She had a similar admission here from 9/14-/17/23.      Initial VS temp 101.6, , 147/85, 34, 83% on 8 L NC. Labs WBC 13.4, H/H 13/43, BNP <10, Trop 0.039, CXR showed stable cardiomegaly, moderate bilateral infiltrates appear stable. Pt received nebs, Solumedrol 125 mg IV plus Zosyn/Zyvox and Levaquin plus lasix 40 mg and was placed on Bipap at 100% O2 and ABG showed 7.43/459/49/34/100%    She is being admitted to hosp [facility] as acute on ch hypoxemic resp failure         * No surgery found *      Hospital Course:   53-year-old  female with a complex medical history, was admitted to the hospital due to a concerning episode of shortness of breath. She had been managing multiple chronic conditions, including Sjogren's syndrome, Interstitial Lung Disease (ILD), Rheumatoid Arthritis (RA), chronic hypoxic respiratory failure requiring 6-8 liters of oxygen via  nasal cannula (NC), asthma, and Obstructive Sleep Apnea (GLENN) for which she used Continuous Positive Airway Pressure (CPAP). Furthermore, she was being considered as a potential lung transplant candidate.    The patient's symptoms had been escalating over the previous couple of days, leading her to seek care in the emergency room. Upon admission, her oxygen saturation was alarmingly low at 83% on 8 liters of NC oxygen support. This admission was not her first encounter with such respiratory distress, as she had been previously hospitalized from 9/14 to 9/17/23 with a similar presentation.    During her initial hospitalization, the patient displayed fever with a temperature of 101.6°F and a rapid heart rate of 133 beats per minute. Her blood pressure measured 147/85, respiratory rate was 34 breaths per minute, and she was still reliant on 8 liters of oxygen via NC to maintain oxygen saturation. Laboratory findings showed an elevated white blood cell count (13.4), and her hemoglobin/hematocrit levels were within an acceptable range. Her B-type natriuretic peptide (BNP) levels were less than 10, and troponin levels were within normal limits. Chest X-rays revealed stable cardiomegaly and moderate bilateral infiltrates that remained unchanged. Treatment included nebulized medications, intravenous Solumedrol (125 mg), and a combination of antibiotics such as Zosyn and Zyvox, as well as Levaquin. Additionally, Lasix (40 mg) was administered, and she was placed on BiPAP with 100% oxygen. Arterial blood gas (ABG) analysis demonstrated a pH of 7.43, pO2 of 459, pCO2 of 49, bicarbonate of 34, and oxygen saturation of 100%. This constellation of findings led to her admission for acute-on-chronic hypoxemic respiratory failure.    Over the next few days, the patient exhibited slight improvements. Her oxygen requirements decreased from overnight BiPAP to 6-7 liters via NC while maintaining satisfactory oxygen saturation levels. A  PICC line was placed to facilitate intravenous antibiotic therapy due to hoarseness attributed to Vocal Cord Dysfunction. Infectious disease specialists identified a vocal cord infection with cultures growing Actinomyces, Prevotella, and Candida glabrata. Actinomyces was identified as a challenging pathogen to treat, necessitating a prolonged course of penicillin G (20 million units) delivered via continuous infusion. Efforts were made to consider ceftriaxone for equivalent outpatient therapy. The treatment plan for Candida glabrata included IV micafungin (100 mg daily) for at least 14 days. Prevotella was addressed with a 14-day course of oral metronidazole (500 mg BID). The patient's fever, which was present upon admission, had since resolved, and leukocytosis was trending downward. Renal function, as reflected in the comprehensive metabolic panel (CMP), remained normal.    On 10/13, the patient continued to improve, reporting enhanced strength and vital signs remaining stable. She remained afebrile. Her oxygen needs further decreased, and she was engaged in physical therapy and occupational therapy while diligently performing incentive spirometry exercises. A second dose of IV penicillin G and two weeks of IV micafungin along with a two-week course of oral Flagyl was initiated. Plans were set in motion for home health arrangements through [facility]. The patient was encouraged to engage in physical activity and ambulation.    By 10/14, the patient continued to experience shortness of breath, especially upon exertion. Although she responded positively to intravenous Lasix, a repeat chest X-ray indicated persistent pulmonary edema and signs of congestive heart failure. As a therapeutic measure, Metolazone was added to the Lasix regimen. The patient was encouraged to persist in incentive spirometry exercises and ambulation. She continued to tolerate intravenous Rocephin without experiencing nausea or  restlessness. Her hoarseness, however, persisted, and her oxygen requirements decreased to 6 liters via NC. Discharge planning was considered, and a transition to oral Lasix was anticipated for the coming days.    On 10/16, the patient reported chest congestion and muscle cramps. Laboratory findings indicated metabolic alkalosis. She was maintained on 6 liters of oxygen via NC, and a chest X-ray  diod not show any new finding .    Pt was seen and examined at bedside . She was determined to  be suitable for d/c .  She is is on 6 lt by nasal canula which is her baseline   She respond well to IV diuresis with lasix and will be d/c on po lasix  Case D/W Pulmonology and ID which agree to d/c home . She will be d/c on IVAB  as above .   She was advised to f/u with her PCP , pulmonology and ID in 1 to 2 weeks .       Goals of Care Treatment Preferences:  Code Status: DNR      Consults:   Consults (From admission, onward)        Status Ordering Provider     Inpatient consult to Social Work  Once        Provider:  (Not yet assigned)    GERMÁN Phelps          Pulmonary  UIP (usual interstitial pneumonitis)  Cont Prednisone, pt on OFEV as out pt.   Being evaluated for Lung Tx      Acute on chronic respiratory failure with hypoxemia  Patient with Hypercapnic Respiratory failure which is Acute on chronic.  she is on home oxygen at 8 LPM. Supplemental oxygen was provided and noted-      .   Signs/symptoms of respiratory failure include- tachypnea, increased work of breathing, wheezing and lethargy. Contributing diagnoses includes - Interstitial lung disease and Obesity Hypoventilation Labs and images were reviewed. Patient Has recent ABG, which has been reviewed. Will treat underlying causes and adjust management of respiratory failure as follows-     Bipap, O2, prednisone  PT/OT    Stable to improved- cont present care    Although Fio2 down to 6 L but CXR shows Pulm Edema- will give IV lasix    Stable- cont current  "tx including lasix  Abx changed to Rocephin   change lasix  To qd     ID  * Actinomyces infection  vocal cord infection with cultures that have grown Actinomyces, prevotella, and Candida glabrata so far.  ENT saw patient in clinic and performed a scope which noted exudate on the vocal cords with culture sent.  Actinomyces can prove very difficult to treat and will require a long duration of antibiotics.  The other pathogens can likely be treated with a short course of antimicrobials.  Patient febrile on admission to 101.6.  Currently afebrile.  Leukocytosis trending down to 12,000 on review of CBC.  CMP shows normal creatinine.     Recommendations:  --Will target Actino with penicillin G 20 MU continuous infusion daily   --Will discuss with pharmacy to see if ceftriaxone can be used as an equivalent outpatient   --Will need 6 weeks of IV therapy followed by PO amoxicillin for at least 2-6 months   --For Prevotella recommend PO metronidazole 500 mg BID to complete 14 day course   --For C glabrata, recommend IV micafungin 100 mg daily for at least 14 days; typically azole resistant    Day 2 of PCN G- cont    Day 3- PCN G changed to Rocephin    Day 4 on rocephin plus Flagyl and micafungin    Cont current tx     Severe sepsis  This patient does have evidence of infective focus- infected Vocal Cords  My overall impression is sepsis.  Source: Respiratory  Antibiotics given-   Antibiotics (72h ago, onward)    None        Latest lactate reviewed-  No results for input(s): "LACTATE" in the last 72 hours.  Organ dysfunction indicated by Acute respiratory failure    Fluid challenge Actual Body weight- Patient will receive 30ml/kg actual body weight to calculate fluid bolus for treatment of septic shock.     Post- resuscitation assessment No - Post resuscitation assessment not needed       Will Not start Pressors- Levophed for MAP of 65  Source control achieved by: IV Abx    Improved w Abx and steroids    Getting PCG G IV plus " Mycafungin and PO Flagyl    PCH G changed to Rocephin sec to pulm edema, restlessness and cramps    Improved, afeb, normal WBCs    Other  Dysphonia due to laryngeal ulcers  S/p laryngoscopy with Vocal cord exudates- Polymicrobial infection- see below      GLENN on CPAP  Cont Bipap        Final Active Diagnoses:    Diagnosis Date Noted POA    PRINCIPAL PROBLEM:  Actinomyces infection [A42.9] 10/12/2023 Yes    Severe sepsis [A41.9, R65.20] 10/11/2023 Yes    Dysphonia due to laryngeal ulcers [R49.0] 08/02/2023 Yes    UIP (usual interstitial pneumonitis) [J84.112] 03/17/2023 Yes    Acute on chronic respiratory failure with hypoxemia [J96.21] 04/27/2022 Yes    GLENN on CPAP [G47.33] 04/13/2022 Yes      Problems Resolved During this Admission:       Discharged Condition: stable    Disposition: Home-Health Care Svc    Follow Up:   Contact information for follow-up providers     Madeleine Enrique MD Follow up in 1 week(s).    Specialty: Family Medicine  Contact information:  [address] [facility] [address] [phone]                   Contact information for after-discharge care     Dialysis/Infusion     [facility] .    Service: Home Infusion and Injection  Contact information:  [address] [address] [phone]                 Home Medical Care     [facility] .    Service: Home Health Services  Contact information:  [facility] [facility] [address] [phone]                           Patient Instructions:      Ambulatory referral/consult to Home Health   Standing Status: Future   Referral Priority: Routine Referral Type: Home Health   Referral Reason: Specialty Services Required   Requested Specialty: Home Health Services   Number of Visits Requested: 1     Activity as tolerated       Significant Diagnostic Studies: Labs:   BMP:   Recent Labs   Lab 10/16/23  1307 10/17/23  0632   * 155*     139   K 3.5 3.6   CL 91* 92*   CO2 35* 35*   BUN 13 14   CREATININE 0.9 1.0   CALCIUM 10.0 10.1   MG 1.9  --    , CMP   Recent Labs   Lab 10/16/23  1307 10/17/23  0632    139   K 3.5 3.6   CL 91* 92*   CO2 35* 35*   * 155*   BUN 13 14   CREATININE 0.9 1.0   CALCIUM 10.0 10.1   PROT  --  7.1   ALBUMIN  --  3.1*   BILITOT  --  0.5   ALKPHOS  --  83   AST  --  17   ALT  --  20   ANIONGAP 14 12    and CBC   Recent Labs   Lab 10/16/23  1332 10/17/23  0632   WBC 17.39* 12.09   HGB 13.3 12.5   HCT 43.5 40.0    432       Pending Diagnostic Studies:     None         Medications:  Reconciled Home Medications:      Medication List      START taking these medications    albuterol-ipratropium 2.5 mg-0.5 mg/3 mL nebulizer solution  Commonly known as: DUO-NEB  Take 3 mLs by nebulization every 6 (six) hours as needed for Wheezing. Rescue     CHEST CONGESTION RELIEF DM  mg/5 mL liquid  Generic drug: dextromethorphan-guaiFENesin  mg/5 ml  Take 10 mLs by mouth every 4 (four) hours as needed (cough).     dextrose 5 % in water (D5W) PgBk 100 mL with cefTRIAXone 2 gram SolR 2 g  Inject 2 g into the vein once daily.     magnesium oxide 400 mg (241.3 mg magnesium) tablet  Commonly known as: MAG-OX  Take 1 tablet (400 mg total) by mouth once daily.     metroNIDAZOLE 500 MG tablet  Commonly known as: FLAGYL  Take 1 tablet (500 mg total) by mouth every 12 (twelve) hours. for 9 days     montelukast 10 mg tablet  Commonly known as: SINGULAIR  Take 1 tablet (10 mg total) by mouth every evening.     potassium chloride SA 20 MEQ tablet  Commonly known as: K-DUR,KLOR-CON  Take 1 tablet (20 mEq total) by mouth once daily.     sodium chloride 0.9 % PgBk 100 mL with micafungin 100 mg SolR 100 mg  Inject 100 mg into the vein 2 (two) times a day. for 10 days     TYVASO DPI 16(112)-32(112) -48(28) mcg Ctdv  Generic drug: treprostiniL  Inhale 16 mcg into the lungs 4 (four) times daily.        CHANGE how you take  these medications    omeprazole 20 MG capsule  Commonly known as: PRILOSEC  Take 1 capsule (20 mg total) by mouth once daily.  What changed: how much to take     predniSONE 10 MG tablet  Commonly known as: DELTASONE  Take 1 tablet (10 mg total) by mouth once daily.  What changed: See the new instructions.        CONTINUE taking these medications    albuterol 90 mcg/actuation inhaler  Commonly known as: PROVENTIL/VENTOLIN HFA  INHALE 1 TO 2 PUFFS BY MOUTH INTO THE LUNGS EVERY 4 TO 6 HOURS AS NEEDED FOR WHEEZING OR SHORTNESS OF BREATH     furosemide 40 MG tablet  Commonly known as: LASIX  Take 1 tablet (40 mg total) by mouth once daily.     ibuprofen 200 MG tablet  Commonly known as: ADVIL,MOTRIN  Take 200 mg by mouth every 6 (six) hours as needed for Pain.     ipratropium 0.02 % nebulizer solution  Commonly known as: ATROVENT  Take 2.5 mLs (500 mcg total) by nebulization 4 (four) times daily. Rescue     levocetirizine 5 MG tablet  Commonly known as: XYZAL  TAKE 1 TABLET(5 MG) BY MOUTH EVERY DAY     nintedanib 150 mg Cap  Commonly known as: OFEV  Take 1 capsule (150 mg total) by mouth 2 (two) times a day.     pantoprazole 40 MG tablet  Commonly known as: PROTONIX  Take 1 tablet (40 mg total) by mouth once daily.     SEREVENT DISKUS 50 mcg/dose diskus inhaler  Generic drug: salmeteroL  Inhale 1 puff into the lungs 2 (two) times daily. Controller     vitamin D 1000 units Tab  Commonly known as: VITAMIN D3  Take 1,000 Units by mouth once daily.     VITAMIN-D + OMEGA-3 ORAL  Take 2 tablets by mouth once daily at 6am.     WOMEN'S 50+ ADVANCED ORAL  Take 1 tablet by mouth Daily.            Indwelling Lines/Drains at time of discharge:   Lines/Drains/Airways     Peripherally Inserted Central Catheter Line  Duration           PICC Double Lumen 10/13/23 1627 right basilic 4 days                Time spent on the discharge of patient: 33 minutes         Phillip Gallegos MD  Department of [facility] Medicine  [facility]  Surg

## 2023-10-18 NOTE — ASSESSMENT & PLAN NOTE
"This patient does have evidence of infective focus- infected Vocal Cords  My overall impression is sepsis.  Source: Respiratory  Antibiotics given-   Antibiotics (72h ago, onward)    None        Latest lactate reviewed-  No results for input(s): "LACTATE" in the last 72 hours.  Organ dysfunction indicated by Acute respiratory failure    Fluid challenge Actual Body weight- Patient will receive 30ml/kg actual body weight to calculate fluid bolus for treatment of septic shock.     Post- resuscitation assessment No - Post resuscitation assessment not needed       Will Not start Pressors- Levophed for MAP of 65  Source control achieved by: IV Abx    Improved w Abx and steroids    Getting PCG G IV plus Mycafungin and PO Flagyl    PCH G changed to Rocephin sec to pulm edema, restlessness and cramps    Improved, afeb, normal WBCs  "

## 2023-10-18 NOTE — ASSESSMENT & PLAN NOTE
Patient with Hypercapnic Respiratory failure which is Acute on chronic.  she is on home oxygen at 8 LPM. Supplemental oxygen was provided and noted-      .   Signs/symptoms of respiratory failure include- tachypnea, increased work of breathing, wheezing and lethargy. Contributing diagnoses includes - Interstitial lung disease and Obesity Hypoventilation Labs and images were reviewed. Patient Has recent ABG, which has been reviewed. Will treat underlying causes and adjust management of respiratory failure as follows-     Bipap, O2, prednisone  PT/OT    Stable to improved- cont present care    Although Fio2 down to 6 L but CXR shows Pulm Edema- will give IV lasix    Stable- cont current tx including lasix  Abx changed to Rocephin   change lasix  To qd

## 2023-10-19 LAB
BACTERIA SPEC AEROBE CULT: NORMAL
BACTERIA SPEC AEROBE CULT: NORMAL
GRAM STN SPEC: NORMAL

## 2023-10-23 ENCOUNTER — HOSPITAL ENCOUNTER (OUTPATIENT)
Dept: PULMONOLOGY | Facility: HOSPITAL | Age: 53
Discharge: HOME | End: 2023-10-23
Payer: COMMERCIAL

## 2023-10-23 ENCOUNTER — LAB VISIT (OUTPATIENT)
Dept: LAB | Facility: HOSPITAL | Age: 53
End: 2023-10-23
Attending: STUDENT IN AN ORGANIZED HEALTH CARE EDUCATION/TRAINING PROGRAM
Payer: COMMERCIAL

## 2023-10-23 VITALS — BODY MASS INDEX: 35.19 KG/M2 | WEIGHT: 218 LBS

## 2023-10-23 DIAGNOSIS — N30.80 CYSTITIS WITH ACTINOMYCOSIS: Primary | ICD-10-CM

## 2023-10-23 DIAGNOSIS — A42.9 CYSTITIS WITH ACTINOMYCOSIS: Primary | ICD-10-CM

## 2023-10-23 PROCEDURE — 80053 COMPREHEN METABOLIC PANEL: CPT | Performed by: STUDENT IN AN ORGANIZED HEALTH CARE EDUCATION/TRAINING PROGRAM

## 2023-10-23 PROCEDURE — G0239 OTH RESP PROC, GROUP: HCPCS

## 2023-10-23 PROCEDURE — 36415 COLL VENOUS BLD VENIPUNCTURE: CPT | Mod: PN | Performed by: STUDENT IN AN ORGANIZED HEALTH CARE EDUCATION/TRAINING PROGRAM

## 2023-10-23 PROCEDURE — 85025 COMPLETE CBC W/AUTO DIFF WBC: CPT | Performed by: STUDENT IN AN ORGANIZED HEALTH CARE EDUCATION/TRAINING PROGRAM

## 2023-10-23 NOTE — PROGRESS NOTES
[facility] - Pul[facility] Rehab  Pulmonary Rehab  Session Summary    SUMMARY     Session Data  Session Number: 7  Time In: 0930  Time Out: 1030  Weight: 98.9 kg (218 lb)  Target Heart Rate Zone: Minimum: 99 bpm  Target Heart Rate Zone: Maximum: 132 bpm  Patient Motivation: Excellent  Patient Effort: Excellent      Pre Exercise Vitals  SpO2: 92 %  Supplemental O2?: Yes  O2 Device: nasal cannula  O2 Flow (L/min): 8  Pulse: 122  BP: 123/64  Jessica Dyspnea Rating : 3      During Exercise Vitals  SpO2: 92 %  Supplemental O2?: Yes  O2 Device: nasal cannula  O2 Flow (L/min): 8  Pulse: 126  BP: 118/64  Jessica Dyspnea Rating : 3      Post Exercise Vitals  SpO2: 92 %  Supplemental O2?: Yes  O2 Device: nasal cannula  O2 Flow (L/min): 8  Pulse: 125  BP: 114/65  Jessica Dyspnea Rating : 3       Modality  Modality: Arm Ergometer, Hand Free Weights, Nustep      Arm Ergometer  Time: 10 minutes  Level: 1    Nustep  Time: 25 minutes  Steps: 1845  Load: 3  Mets: 1.9        Biceps  lbs: 4 lbs  Sets: 2  Reps: 10  Weight Type : dumbbell      Chest  lbs: 4 lbs  Sets: 2  Reps: 10  Weight Type : dumbbell      Education  Pulm know test review      Therapist Notes   Excellent effort. Vitals were stable. today was pt's first day back after hospitalization. She did well. Will continue to motivate and educate pt in rehab.     Dr. Aviles immediately available as needed.     Pulmonary Rehab COVID19 Screening Checklist     1. Are you having any of the following physical symptoms?              Yes [] No [x]      Fever or chills  Unexplained muscle or body aches  Cough  Shortness of breath or difficulty breathing  Fatigue  Headache  New loss of taste or smell  Sore throat  Congestion or runny nose  Nausea or vomiting  Diarrhea        2.  Have you come in close contact with anyone with the above symptoms or with known COVID19 in the last 14 days?           Yes [] No [x]                    3.  Have you tested positive for COVID19 in the last 30 days?   Yes [] No  [x]

## 2023-10-24 ENCOUNTER — PATIENT MESSAGE (OUTPATIENT)
Dept: FAMILY MEDICINE | Facility: CLINIC | Age: 53
End: 2023-10-24
Payer: COMMERCIAL

## 2023-10-24 ENCOUNTER — OFFICE VISIT (OUTPATIENT)
Dept: PULMONOLOGY | Facility: CLINIC | Age: 53
End: 2023-10-24
Payer: COMMERCIAL

## 2023-10-24 ENCOUNTER — TELEPHONE (OUTPATIENT)
Dept: PULMONOLOGY | Facility: CLINIC | Age: 53
End: 2023-10-24
Payer: COMMERCIAL

## 2023-10-24 VITALS
OXYGEN SATURATION: 90 % | WEIGHT: 219.25 LBS | BODY MASS INDEX: 35.24 KG/M2 | RESPIRATION RATE: 18 BRPM | DIASTOLIC BLOOD PRESSURE: 84 MMHG | SYSTOLIC BLOOD PRESSURE: 138 MMHG | HEIGHT: 66 IN | HEART RATE: 126 BPM

## 2023-10-24 DIAGNOSIS — R09.02 EXERCISE HYPOXEMIA: ICD-10-CM

## 2023-10-24 DIAGNOSIS — J84.9 PULMONARY HYPERTENSION DUE TO INTERSTITIAL LUNG DISEASE: ICD-10-CM

## 2023-10-24 DIAGNOSIS — I10 PRIMARY HYPERTENSION: ICD-10-CM

## 2023-10-24 DIAGNOSIS — J44.1 COPD EXACERBATION: ICD-10-CM

## 2023-10-24 DIAGNOSIS — G47.33 OSA ON CPAP: ICD-10-CM

## 2023-10-24 DIAGNOSIS — A42.9 ACTINOMYCES INFECTION: Primary | ICD-10-CM

## 2023-10-24 DIAGNOSIS — J96.11 CHRONIC RESPIRATORY FAILURE WITH HYPOXIA: ICD-10-CM

## 2023-10-24 DIAGNOSIS — I27.23 PULMONARY HYPERTENSION DUE TO INTERSTITIAL LUNG DISEASE: ICD-10-CM

## 2023-10-24 DIAGNOSIS — H61.20 IMPACTED CERUMEN, UNSPECIFIED LATERALITY: ICD-10-CM

## 2023-10-24 DIAGNOSIS — J44.9 COPD MIXED TYPE: ICD-10-CM

## 2023-10-24 DIAGNOSIS — M05.7A RHEUMATOID ARTHRITIS OF OTHER SITE WITH POSITIVE RHEUMATOID FACTOR: ICD-10-CM

## 2023-10-24 DIAGNOSIS — J84.112 UIP (USUAL INTERSTITIAL PNEUMONITIS): ICD-10-CM

## 2023-10-24 DIAGNOSIS — J98.4 RESTRICTIVE LUNG DISEASE: ICD-10-CM

## 2023-10-24 LAB
ALBUMIN SERPL BCP-MCNC: 3.1 G/DL (ref 3.5–5.2)
ALP SERPL-CCNC: 71 U/L (ref 55–135)
ALT SERPL W/O P-5'-P-CCNC: 23 U/L (ref 10–44)
ANION GAP SERPL CALC-SCNC: 16 MMOL/L (ref 8–16)
ANISOCYTOSIS BLD QL SMEAR: SLIGHT
AST SERPL-CCNC: 29 U/L (ref 10–40)
BASOPHILS # BLD AUTO: 0.18 K/UL (ref 0–0.2)
BASOPHILS NFR BLD: 0.8 % (ref 0–1.9)
BILIRUB SERPL-MCNC: 0.3 MG/DL (ref 0.1–1)
BUN SERPL-MCNC: 13 MG/DL (ref 6–20)
BURR CELLS BLD QL SMEAR: ABNORMAL
CALCIUM SERPL-MCNC: 9.7 MG/DL (ref 8.7–10.5)
CHLORIDE SERPL-SCNC: 102 MMOL/L (ref 95–110)
CO2 SERPL-SCNC: 26 MMOL/L (ref 23–29)
CREAT SERPL-MCNC: 0.9 MG/DL (ref 0.5–1.4)
DIFFERENTIAL METHOD: ABNORMAL
EOSINOPHIL # BLD AUTO: 0.1 K/UL (ref 0–0.5)
EOSINOPHIL NFR BLD: 0.4 % (ref 0–8)
ERYTHROCYTE [DISTWIDTH] IN BLOOD BY AUTOMATED COUNT: 17.3 % (ref 11.5–14.5)
EST. GFR  (NO RACE VARIABLE): >60 ML/MIN/1.73 M^2
GLUCOSE SERPL-MCNC: 110 MG/DL (ref 70–110)
HCT VFR BLD AUTO: 38.4 % (ref 37–48.5)
HGB BLD-MCNC: 11.6 G/DL (ref 12–16)
IMM GRANULOCYTES # BLD AUTO: 0.47 K/UL (ref 0–0.04)
IMM GRANULOCYTES NFR BLD AUTO: 2 % (ref 0–0.5)
LYMPHOCYTES # BLD AUTO: 1.1 K/UL (ref 1–4.8)
LYMPHOCYTES NFR BLD: 4.7 % (ref 18–48)
MCH RBC QN AUTO: 31.2 PG (ref 27–31)
MCHC RBC AUTO-ENTMCNC: 30.2 G/DL (ref 32–36)
MCV RBC AUTO: 103 FL (ref 82–98)
MONOCYTES # BLD AUTO: 0.9 K/UL (ref 0.3–1)
MONOCYTES NFR BLD: 3.8 % (ref 4–15)
NEUTROPHILS # BLD AUTO: 21.2 K/UL (ref 1.8–7.7)
NEUTROPHILS NFR BLD: 88.3 % (ref 38–73)
NRBC BLD-RTO: 0 /100 WBC
PLATELET # BLD AUTO: 451 K/UL (ref 150–450)
PLATELET BLD QL SMEAR: ABNORMAL
PMV BLD AUTO: 10.9 FL (ref 9.2–12.9)
POIKILOCYTOSIS BLD QL SMEAR: SLIGHT
POLYCHROMASIA BLD QL SMEAR: ABNORMAL
POTASSIUM SERPL-SCNC: 3.4 MMOL/L (ref 3.5–5.1)
PROT SERPL-MCNC: 6.8 G/DL (ref 6–8.4)
RBC # BLD AUTO: 3.72 M/UL (ref 4–5.4)
SODIUM SERPL-SCNC: 144 MMOL/L (ref 136–145)
WBC # BLD AUTO: 23.97 K/UL (ref 3.9–12.7)

## 2023-10-24 PROCEDURE — 99999 PR PBB SHADOW E&M-EST. PATIENT-LVL V: ICD-10-PCS | Mod: PBBFAC,,, | Performed by: INTERNAL MEDICINE

## 2023-10-24 PROCEDURE — 3075F SYST BP GE 130 - 139MM HG: CPT | Mod: CPTII,S$GLB,, | Performed by: INTERNAL MEDICINE

## 2023-10-24 PROCEDURE — 99214 OFFICE O/P EST MOD 30 MIN: CPT | Mod: S$GLB,,, | Performed by: INTERNAL MEDICINE

## 2023-10-24 PROCEDURE — 3075F PR MOST RECENT SYSTOLIC BLOOD PRESS GE 130-139MM HG: ICD-10-PCS | Mod: CPTII,S$GLB,, | Performed by: INTERNAL MEDICINE

## 2023-10-24 PROCEDURE — 3079F DIAST BP 80-89 MM HG: CPT | Mod: CPTII,S$GLB,, | Performed by: INTERNAL MEDICINE

## 2023-10-24 PROCEDURE — 99214 PR OFFICE/OUTPT VISIT, EST, LEVL IV, 30-39 MIN: ICD-10-PCS | Mod: S$GLB,,, | Performed by: INTERNAL MEDICINE

## 2023-10-24 PROCEDURE — 3044F HG A1C LEVEL LT 7.0%: CPT | Mod: CPTII,S$GLB,, | Performed by: INTERNAL MEDICINE

## 2023-10-24 PROCEDURE — 1159F MED LIST DOCD IN RCRD: CPT | Mod: CPTII,S$GLB,, | Performed by: INTERNAL MEDICINE

## 2023-10-24 PROCEDURE — 1160F PR REVIEW ALL MEDS BY PRESCRIBER/CLIN PHARMACIST DOCUMENTED: ICD-10-PCS | Mod: CPTII,S$GLB,, | Performed by: INTERNAL MEDICINE

## 2023-10-24 PROCEDURE — 3008F PR BODY MASS INDEX (BMI) DOCUMENTED: ICD-10-PCS | Mod: CPTII,S$GLB,, | Performed by: INTERNAL MEDICINE

## 2023-10-24 PROCEDURE — 1111F DSCHRG MED/CURRENT MED MERGE: CPT | Mod: CPTII,S$GLB,, | Performed by: INTERNAL MEDICINE

## 2023-10-24 PROCEDURE — 1111F PR DISCHARGE MEDS RECONCILED W/ CURRENT OUTPATIENT MED LIST: ICD-10-PCS | Mod: CPTII,S$GLB,, | Performed by: INTERNAL MEDICINE

## 2023-10-24 PROCEDURE — 1159F PR MEDICATION LIST DOCUMENTED IN MEDICAL RECORD: ICD-10-PCS | Mod: CPTII,S$GLB,, | Performed by: INTERNAL MEDICINE

## 2023-10-24 PROCEDURE — 3044F PR MOST RECENT HEMOGLOBIN A1C LEVEL <7.0%: ICD-10-PCS | Mod: CPTII,S$GLB,, | Performed by: INTERNAL MEDICINE

## 2023-10-24 PROCEDURE — 99999 PR PBB SHADOW E&M-EST. PATIENT-LVL V: CPT | Mod: PBBFAC,,, | Performed by: INTERNAL MEDICINE

## 2023-10-24 PROCEDURE — 3079F PR MOST RECENT DIASTOLIC BLOOD PRESSURE 80-89 MM HG: ICD-10-PCS | Mod: CPTII,S$GLB,, | Performed by: INTERNAL MEDICINE

## 2023-10-24 PROCEDURE — 1160F RVW MEDS BY RX/DR IN RCRD: CPT | Mod: CPTII,S$GLB,, | Performed by: INTERNAL MEDICINE

## 2023-10-24 PROCEDURE — 3008F BODY MASS INDEX DOCD: CPT | Mod: CPTII,S$GLB,, | Performed by: INTERNAL MEDICINE

## 2023-10-24 NOTE — PROGRESS NOTES
Pulmonary Outpatient  Visit     Subjective:       Patient ID: Ayanna Alicia is a 53 y.o. female.    Chief Complaint: COPD, Sleep Apnea, and Cough        Ayanna Alicia is 51 y.o.  Post hospital f/u  Acute respiratory failure ILD, +ve RF  Was discharged on oxygen  Here to review results  Explained  PFT: severe restriction and reduced DLCO  ABG  6MWD: oxygen desat needs supplementary oxygen 3 LPM  Has some nose bleed will add AYR gel and humifier bottle  FMLA paper work given  Out of work letter   Added PEPCID and Bactrim  Adherent with inhaler    05/18/2022  Here to see today  Concern, Dyspnea with activity palpitations  Seen Rheumatology: Added CELLCEPT  On steriod taper  No cough wheezing  On oxygen 3 LPM  Had GLENN in 2019: was prescribed CPAP returned back  Needs PAP at night  Motivated to restart      07/15/2022  Last seen 05/18/2022  ACT score 10, [place] score 6  CPAP study: CPAP ordered  Await   Says cannot go to work, tied, focus off  6MWD: RA sat was 95%  O2 titrated to 4-6 LPM  Tachycardia noted : 145 BPM    09/20/2022  Last seen 07/15/2022  Here to review progress  Enrolled in pul rehab, session 8  Sat Stable @ 2-3 LPM  ACt score 5, [place] 6  No cough, better exercise tolerance  Stable on cellcept 1000mg BID + prednisone 10 mg  Will DW Rheum whether can titrate up cellcept to wean steriods  CPAP download shows adherence   CPAP 9 cm with resudual AHI 0.2  Usage > 4 hrs was 67%  Cehst CT reviewed  Repeat PFT pending      12/28/2022  Last seen 11/07/2022  Here to review chest CT done recently  Fatigue, SOB, on 6 LPM  SP rituxan 4 doses  ABG reveiwed  ESR and CRP were increased  On Prednisone 10 mg  Hoarse voice  Discussed utility of bronchoscopy if infection is considered  TBBX would have risk of flaring and acute resp failure:  Serologies sent  Added OFEV  Will also reduce fluid intake to approx 32 oz  Will reenrol on Pul  rehab      03/07/2023  Urgent visit: weak, easily desat  Subjectively better  Seen Dr Stallings: Diflucan and bactrim  Oral thrush: not present today  No fever  On 6 LPM  Dropped weight from 241Lb to 228 lb  Ofev increased: felt better onofev  Will decrease prednisone to 10 mg since concern for PJP  Discussed referal to advance lung disease  Sputum culture  In wheel chair  Adherent with CPAP  Schedule bronch for BAL next week    05/01/2023  Follow up visit  Care from multiple specialities noted  ENT referred to Dr Koenig, 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  Bronch specimens were negative for any infection, No PCP or fungus  [facility] lung clinic declined w/u for transplant due to BMI  DW patient: reenrol in pul rehab since feels better and options for out of state transplant: [place] may be considered  GI : GERD, and esophagram reveiwed: reiterated role on GERD in advanced lung disease: behavioural interventions since last 6 weeks helped  Stable on POC 6 LPM  Disucssed options for scooter  Will increase OFEV to 150 BID  Keep prednisone at 10 mg daily ( no need for bactrim prophylaxis at thiss dose)  ACT 10  Changed BREO to TRELEGY  [place] next visit  Cough improved  Refill for tessalon  Encouaged to wear CPAP      08/07/2023  Followup  Recent hospitalization  CPAP changed to AVAPS  Appt for Transplant eval 08/14  Here with nephew  Santa 10. Asthma score 11  Still HICKS, mRC score 2-3  Attempted 6MWD: low capacity  Office notes: Dr Ashton: possibility Sertraline ILD: case reports reviewed  In abundance : medication stopped  Stable OFEV 150 mg BID  Still has dysphonia  GERD has improved      09/21/2023  Follow :  was in the emergency room over the weekend  Seen ENT [facility] office notes reviewed.  Has home noninvasive ventilation  Tired SOB  In pul rehab; had 2   On 6-8 LPM  During. rehab escalated to 15 liters/minute  Needs more than 7 tanks a week  Will ask for par to be increased to 15 LPM  Tolerating  OFEV  Colonoscopy scheduled oct 16th, reflux procedure was supposed to be today: Rescheduled  Lasix was increased to 40 mg.    Recent echocardiogram diastolic dysfunction grade 2  Recent infusion:tocilizumab   SpO2: 93 %  Supplemental O2?: Yes  O2 Device: nonrebreather mask  O2 Flow (L/min): 15  Pulse: 100  BP: 111/71  Jessica Dyspnea Rating : 4      10/24/2023   Follow-up visit   Last seen 09/21/2023   Recent hospitalization   Was discharged with a PICC line infusions micafungin, ceftriaxone  Cultures from throat also positive for Actinomyces  Duration of IV antibiotics it before weeks   Currently on 8 liters/minute but can ambulate with 6 liters/minute in the home   Adherent with nocturnal home ventilation.    tocilizumab  infusions on hold   Right heart catheterization performed in [place] was reviewed with patient   I discussed with [place] team and they were okay with ask commencing TYVASCO  Risks benefits side-effects and titration protocol of this medication discussed with patient   Will commence this titration once completed IV micafungin and ceftriaxone   Patient tolerating OFEV potassium today was 3.5   Additional potassium instructions were given   Will recheck her potassium on Monday   Will discontinue DuoNeb and ipratropium and start her on YULPERI  Muscle cramps have resolved   Will wean prednisone 10 mg x 30 days, 5 mg x 30 days, 2.5 mg x 30 days and stop   Aim is to avoid any steroids because of risk of fungal infections   Patient is currently engaged in pulmonary rehab    Session Data  Session Number: 7  Time In: 0930  Time Out: 1030  Weight: 98.9 kg (218 lb)  Target Heart Rate Zone: Minimum: 99 bpm  Target Heart Rate Zone: Maximum: 132 bpm  Patient Motivation: Excellent  Patient Effort: Excellent        Pre Exercise Vitals  SpO2: 92 %  Supplemental O2?: Yes  O2 Device: nasal cannula  O2 Flow (L/min): 8  Pulse: 122  BP: 123/64  Jessica Dyspnea Rating : 3                 [facility]  Medicine  Discharge Summary        Patient Name: Ayanna Alicia  MRN: [id number]  DARLENE: [id number]  Patient Class: IP- Inpatient  Admission Date: 10/11/2023  Hospital Length of Stay: 6 days  Discharge Date and Time: 10/17/2023  3:48 PM  Attending Physician: No att. providers found   Discharging Provider: Phillip Gallegos MD  Primary Care Provider: Madeleine Enrique MD     Primary Care Team: Networked reference to record PCT      HPI:   Ayanna Alicia is a 53-year-old AAF with a PMH of Sjogren's syndrome, ILD, RA, chronic hypoxic respiratory failure on 6-8L/NC, asthma, GLENN (CPAP), possible Lung Tx candidate, who presented to ER for evaluation of SOB which started gradually over last couple of days. Pt had an O2 saturartion of 83% on 8L via NC. Symptoms are constant and moderate in severity. No mitigating or exacerbating factors reported. Associated sxs include fever. Patient denies any N/V/D, HA,weakness, numbness, and all other sxs at this time. No prior tx reported. No further complaints or concerns at this time. She had a similar admission here from 9/14-/17/23.       Initial VS temp 101.6, , 147/85, 34, 83% on 8 L NC. Labs WBC 13.4, H/H 13/43, BNP <10, Trop 0.039, CXR showed stable cardiomegaly, moderate bilateral infiltrates appear stable. Pt received nebs, Solumedrol 125 mg IV plus Zosyn/Zyvox and Levaquin plus lasix 40 mg and was placed on Bipap at 100% O2 and ABG showed 7.43/459/49/34/100%     She is being admitted to hosp med as acute on ch hypoxemic resp failure            * No surgery found *       Hospital Course:   53-year-old  female with a complex medical history, was admitted to the hospital due to a concerning episode of shortness of breath. She had been managing multiple chronic conditions, including Sjogren's syndrome, Interstitial Lung Disease (ILD), Rheumatoid Arthritis (RA), chronic hypoxic respiratory failure requiring 6-8 liters of oxygen via nasal cannula (NC),  asthma, and Obstructive Sleep Apnea (GLENN) for which she used Continuous Positive Airway Pressure (CPAP). Furthermore, she was being considered as a potential lung transplant candidate.     The patient's symptoms had been escalating over the previous couple of days, leading her to seek care in the emergency room. Upon admission, her oxygen saturation was alarmingly low at 83% on 8 liters of NC oxygen support. This admission was not her first encounter with such respiratory distress, as she had been previously hospitalized from 9/14 to 9/17/23 with a similar presentation.     During her initial hospitalization, the patient displayed fever with a temperature of 101.6°F and a rapid heart rate of 133 beats per minute. Her blood pressure measured 147/85, respiratory rate was 34 breaths per minute, and she was still reliant on 8 liters of oxygen via NC to maintain oxygen saturation. Laboratory findings showed an elevated white blood cell count (13.4), and her hemoglobin/hematocrit levels were within an acceptable range. Her B-type natriuretic peptide (BNP) levels were less than 10, and troponin levels were within normal limits. Chest X-rays revealed stable cardiomegaly and moderate bilateral infiltrates that remained unchanged. Treatment included nebulized medications, intravenous Solumedrol (125 mg), and a combination of antibiotics such as Zosyn and Zyvox, as well as Levaquin. Additionally, Lasix (40 mg) was administered, and she was placed on BiPAP with 100% oxygen. Arterial blood gas (ABG) analysis demonstrated a pH of 7.43, pO2 of 459, pCO2 of 49, bicarbonate of 34, and oxygen saturation of 100%. This constellation of findings led to her admission for acute-on-chronic hypoxemic respiratory failure.     Over the next few days, the patient exhibited slight improvements. Her oxygen requirements decreased from overnight BiPAP to 6-7 liters via NC while maintaining satisfactory oxygen saturation levels. A PICC line was  placed to facilitate intravenous antibiotic therapy due to hoarseness attributed to Vocal Cord Dysfunction. Infectious disease specialists identified a vocal cord infection with cultures growing Actinomyces, Prevotella, and Candida glabrata. Actinomyces was identified as a challenging pathogen to treat, necessitating a prolonged course of penicillin G (20 million units) delivered via continuous infusion. Efforts were made to consider ceftriaxone for equivalent outpatient therapy. The treatment plan for Candida glabrata included IV micafungin (100 mg daily) for at least 14 days. Prevotella was addressed with a 14-day course of oral metronidazole (500 mg BID). The patient's fever, which was present upon admission, had since resolved, and leukocytosis was trending downward. Renal function, as reflected in the comprehensive metabolic panel (CMP), remained normal.     On 10/13, the patient continued to improve, reporting enhanced strength and vital signs remaining stable. She remained afebrile. Her oxygen needs further decreased, and she was engaged in physical therapy and occupational therapy while diligently performing incentive spirometry exercises. A second dose of IV penicillin G and two weeks of IV micafungin along with a two-week course of oral Flagyl was initiated. Plans were set in motion for home health arrangements through [facility]. The patient was encouraged to engage in physical activity and ambulation.     By 10/14, the patient continued to experience shortness of breath, especially upon exertion. Although she responded positively to intravenous Lasix, a repeat chest X-ray indicated persistent pulmonary edema and signs of congestive heart failure. As a therapeutic measure, Metolazone was added to the Lasix regimen. The patient was encouraged to persist in incentive spirometry exercises and ambulation. She continued to tolerate intravenous Rocephin without experiencing nausea or restlessness.  Her hoarseness, however, persisted, and her oxygen requirements decreased to 6 liters via NC. Discharge planning was considered, and a transition to oral Lasix was anticipated for the coming days.     On 10/16, the patient reported chest congestion and muscle cramps. Laboratory findings indicated metabolic alkalosis. She was maintained on 6 liters of oxygen via NC, and a chest X-ray  diod not show any new finding .     Pt was seen and examined at bedside . She was determined to  be suitable for d/c .  She is is on 6 lt by nasal canula which is her baseline   She respond well to IV diuresis with lasix and will be d/c on po lasix  Case D/W Pulmonology and ID which agree to d/c home . She will be d/c on IVAB  as above .   She was advised to f/u with her PCP , pulmonology and ID in 1 to 2 weeks .        Goals of Care Treatment Preferences:  Code Status: DNR              Goals of Care Treatment Preferences:  Code Status: Full Code       COPD Questionnaire  How often do you cough?: All of the time  How often do you have phlegm (mucus) in your chest?: Some of the time  How often does your chest feel tight?: Some of the time  When you walk up a hill or one flight of stairs, how often are you breathless?: Never  How often are you limited doing any activities at home?: All of the time  How often are you confident leaving the house despite your lung condition?: All of the time  How often do you sleep soundly?: Some of the time  How often do you have energy?: Some of the time  Total score: 20   MMRC Dyspnea Scale (4 is worst)     [] MMRC 0: Dyspneic on strenuous excercise (0 points)    [] MMRC 1: Dyspneic on walking a slight hill (0 points)    [x] MMRC 2: Dyspneic on walking level ground; must stop occasionally due to breathlessness (1 point)    [] MMRC 3: Must stop for breathlessness after walking 100 yards or after a few minutes (2 points)    [] MMRC 4: Cannot leave house; breathless on dressing/undressing (3 points)               10/24/2023    11:37 AM   EPWORTH SLEEPINESS SCALE   Sitting and reading 0   Watching TV 0   Sitting, inactive in a public place (e.g. a theatre or a meeting) 0   As a passenger in a car for an hour without a break 0   Lying down to rest in the afternoon when circumstances permit 3   Sitting and talking to someone 0   Sitting quietly after a lunch without alcohol 0   In a car, while stopped for a few minutes in traffic 0   Total score 3            [facility] ([facility])  Hospital Medicine  Discharge Summary        Patient Name: Ayanna Alicia  MRN: [id number]  DARLENE: [id number]  Patient Class: IP- Inpatient  Admission Date: 7/31/2023  Hospital Length of Stay: 4 days  Discharge Date and Time: 8/5/2023  Attending Physician: Rosa Heart,*   Discharging Provider: Rosa Heart MD  Primary Care Provider: Madeleine Enrique MD     Primary Care Team: Networked reference to record PCT      HPI:   [id number]  has a past medical history of Abnormal Pap smear of cervix, Acute interstitial pneumonitis, Allergic rhinitis, cause unspecified, Arthritis of both knees, Asthma, Eczema, Fatty liver, Fibrocystic breast changes, Headache(784.0), Hepatomegaly, Hypertension, Liver cyst, Multinodular goiter, Polymenorrhea, TMJ (dislocation of temporomandibular joint), Uterine fibroid, and Vitamin D deficiency disease.  Presents for worsening dyspnea. Associated with fever, sneezing, rhinorrhea, chest tightness, and wheezing. Onset Friday after being without supplemental oxygen for 15 minutes with difficulties recovering, which prompted a visit to clinic/urgent care. At baseline, patient wears 6L supplemental oxygen. Reports taking prednisone for a long time. States compliance to ofev for ild.     Initial workup in emergency department revealed increased supplemental oxygen of 8L, tachycardia and tachypnea. Leukocytosis on cbc. Cmp with hyperglycemia and mildly elevated liver enzymes. Troponin(s) elevated. Bnp  within normal limits. Abg metabolic and respiratory alkalosis. Chest x-ray with bibasilar infiltrates. Covid and flu negative. Electrocardiography with sinus tachycardia.     Hospital medicine consulted for admission under observation, pneumonia vs asthma exacerbation. Pulmonology consulted        * No surgery found *       Hospital Course:   8/1 admitted for acute on chronic respiratory failure. Weaned down to 5L. Pulmonology following. Continue current care.   8/2 no acute events overnight. remains on baseline supplemental oxygen. Pulmonology recommending trilogy for home use. Case management consulted. Physical/occupational therapy consulted. Patient will have homehealth physical/occupational therapy on discharge.   8/3 reports dyspnea with exertion with hypoxia requiring increased supplemental oxygen needs to recover. Patient reports compliance with cpap overnight. Pulmonology recommending avaps for home use. Case management consulted for trilogy. Continue current regimen. Speech consulted for dysphagia and dysphonia, recommending follow up ent for laryngeal ulcers.  8/4- stated improvement in symptoms in regards of dyspnea compared to yesterday; currently saturating about 92 on room air,; blood culture x1 as of 7/31/2 for fusobacterium, likely contaminant, will follow up on repeat cultures from today, if cultures resulted negative, likely plan for discharge accordingly in next 24- 48 hrs;    worked on arrangements for trilogy; pulmonology on board, appreciate recommendations.  PT OT recommended home.  8/5   Examination done at bedside, patient appeared alert and oriented x3, denied headache, dizziness, chest pain, shortness O breath, palpitations, bowel or bladder issues.    Currently saturating above 92 on 5 L nasal cannula, currently at baseline.    Stated significant improvement in cough with minimal sputum production.    Discussed with pulmonology, stated okay for discharge-recommended oral  antibiotics to complete course, Lasix, prednisone 40 mg upon discharge, compliance with outpatient follow-up visits  Considering clinical and hemodynamic stability, planning to discharge patient today, emphasized on compliance with medications, follow-up visits, diet, fluid restriction, salt restriction, compliance with trilogy.    Patient agreed to the plan, has upcoming appointment within a week with Dr. Aviles pulmonology, with transplant doctor at [facility] on ;  Given underlying medical conditions, prognosis guarded- discussed on code status, opted for full code at this point; to consider outpatient palliative visits if needed.     PT OT recommended home.  Speech consulted for dysphagia and dysphonia, recommending follow up ent for laryngeal ulcers.  Discharge today, medications delivered to patient preferred pharmacy.              The following portions of the patient's history were reviewed and updated as appropriate:   She  has a past medical history of Abnormal Pap smear of cervix, Acute interstitial pneumonitis, Allergic rhinitis, cause unspecified, Arthritis of both knees, Asthma, Eczema, Fatty liver (10/2014), Fibrocystic breast changes, Headache(784.0), Hepatomegaly (10/2014), Hypertension, Liver cyst (10/2014), Multinodular goiter, Polymenorrhea (), TMJ (dislocation of temporomandibular joint), Uterine fibroid, and Vitamin D deficiency disease.  She does not have any pertinent problems on file.  She  has a past surgical history that includes  section, classic; Multiple tooth extractions; Partial hysterectomy (2013); Hysterectomy; Colonoscopy (N/A, 2020); and Bronchoscopy (Bilateral, 2023).  Her family history includes Cancer (age of onset: 50) in her maternal aunt; Cancer (age of onset: 60) in her maternal grandmother; Cancer (age of onset: 66) in her maternal aunt; Cataracts in her cousin; Diabetes in her maternal grandfather; Diverticulitis in her mother; Heart disease  in her mother; Heart disease (age of onset: 63) in her father; Hypertension in her father; Migraines in her cousin; No Known Problems in her brother; Peripheral vascular disease in her maternal grandfather; Stroke in her maternal grandfather, mother, and sister.  She  reports that she has never smoked. She has been exposed to tobacco smoke. She has never used smokeless tobacco. She reports that she does not currently use alcohol. She reports that she does not currently use drugs after having used the following drugs: Marijuana. Frequency: 4.00 times per week.  She has a current medication list which includes the following prescription(s): albuterol, dextromethorphan-guaifenesin  mg/5 ml, dextrose 5 % in water (D5W) PgBk 100 mL with cefTRIAXone 2 gram SolR 2 g, furosemide, ibuprofen, levocetirizine, magnesium oxide, metronidazole, montelukast, mv-minerals/fa/omega 3,6,9 #3, nintedanib, omega-3s/dha/epa/fish oil/d3, omeprazole, pantoprazole, potassium chloride sa, prednisone, revefenacin, serevent diskus, sodium chloride 0.9 % PgBk 100 mL with micafungin 100 mg SolR 100 mg, tyvaso dpi, and vitamin d.  Current Outpatient Medications on File Prior to Visit   Medication Sig Dispense Refill    albuterol (PROVENTIL/VENTOLIN HFA) 90 mcg/actuation inhaler INHALE 1 TO 2 PUFFS BY MOUTH INTO THE LUNGS EVERY 4 TO 6 HOURS AS NEEDED FOR WHEEZING OR SHORTNESS OF BREATH 8.5 g 5    dextromethorphan-guaiFENesin  mg/5 ml (ROBITUSSIN-DM)  mg/5 mL liquid Take 10 mLs by mouth every 4 (four) hours as needed (cough). 236 mL 0    dextrose 5 % in water (D5W) PgBk 100 mL with cefTRIAXone 2 gram SolR 2 g Inject 2 g into the vein once daily.      furosemide (LASIX) 40 MG tablet Take 1 tablet (40 mg total) by mouth once daily. 30 tablet 0    ibuprofen (ADVIL,MOTRIN) 200 MG tablet Take 200 mg by mouth every 6 (six) hours as needed for Pain.      levocetirizine (XYZAL) 5 MG tablet TAKE 1 TABLET(5 MG) BY MOUTH EVERY DAY 90 tablet 1     magnesium oxide (MAG-OX) 400 mg (241.3 mg magnesium) tablet Take 1 tablet (400 mg total) by mouth once daily. 30 tablet 0    metroNIDAZOLE (FLAGYL) 500 MG tablet Take 1 tablet (500 mg total) by mouth every 12 (twelve) hours. for 9 days 18 tablet 0    montelukast (SINGULAIR) 10 mg tablet Take 1 tablet (10 mg total) by mouth every evening. 30 tablet 2    mv-minerals/FA/omega 3,6,9 #3 (WOMEN'S 50+ ADVANCED ORAL) Take 1 tablet by mouth Daily.      nintedanib (OFEV) 150 mg Cap Take 1 capsule (150 mg total) by mouth 2 (two) times a day. 60 capsule 11    omega-3s/dha/epa/fish oil/D3 (VITAMIN-D + OMEGA-3 ORAL) Take 2 tablets by mouth once daily at 6am.      omeprazole (PRILOSEC) 20 MG capsule Take 1 capsule (20 mg total) by mouth once daily. 30 capsule 1    pantoprazole (PROTONIX) 40 MG tablet Take 1 tablet (40 mg total) by mouth once daily. 30 tablet 11    potassium chloride SA (K-DUR,KLOR-CON) 20 MEQ tablet Take 1 tablet (20 mEq total) by mouth once daily. 30 tablet 0    predniSONE (DELTASONE) 10 MG tablet Take 1 tablet (10 mg total) by mouth once daily. 30 tablet 0    SEREVENT DISKUS 50 mcg/dose diskus inhaler Inhale 1 puff into the lungs 2 (two) times daily. Controller 60 each 11    sodium chloride 0.9 % PgBk 100 mL with micafungin 100 mg SolR 100 mg Inject 100 mg into the vein 2 (two) times a day. for 10 days      treprostiniL (TYVASO DPI) 16(112)-32(112) -48(28) mcg CtDv Inhale 16 mcg into the lungs 4 (four) times daily. 252 each 0    vitamin D (VITAMIN D3) 1000 units Tab Take 1,000 Units by mouth once daily.      [DISCONTINUED] albuterol-ipratropium (DUO-NEB) 2.5 mg-0.5 mg/3 mL nebulizer solution Take 3 mLs by nebulization every 6 (six) hours as needed for Wheezing. Rescue 90 mL 0    [DISCONTINUED] ipratropium (ATROVENT) 0.02 % nebulizer solution Take 2.5 mLs (500 mcg total) by nebulization 4 (four) times daily. Rescue (Patient not taking: Reported on 10/18/2023) 300 mL 11     No current facility-administered  medications on file prior to visit.     She is allergic to doxycycline and fluticasone..      Review of Systems   Constitutional:  Positive for fatigue. Negative for activity change.   Respiratory:  Positive for dyspnea on extertion. Negative for cough and shortness of breath.    Gastrointestinal:  Negative for acid reflux.   All other systems reviewed and are negative.      Outpatient Encounter Medications as of 10/24/2023   Medication Sig Dispense Refill    albuterol (PROVENTIL/VENTOLIN HFA) 90 mcg/actuation inhaler INHALE 1 TO 2 PUFFS BY MOUTH INTO THE LUNGS EVERY 4 TO 6 HOURS AS NEEDED FOR WHEEZING OR SHORTNESS OF BREATH 8.5 g 5    [] benzonatate (TESSALON) 100 MG capsule Take 1 capsule (100 mg total) by mouth 3 (three) times daily as needed for Cough. 20 capsule 0    dextromethorphan-guaiFENesin  mg/5 ml (ROBITUSSIN-DM)  mg/5 mL liquid Take 10 mLs by mouth every 4 (four) hours as needed (cough). 236 mL 0    dextrose 5 % in water (D5W) PgBk 100 mL with cefTRIAXone 2 gram SolR 2 g Inject 2 g into the vein once daily.      furosemide (LASIX) 40 MG tablet Take 1 tablet (40 mg total) by mouth once daily. 30 tablet 0    ibuprofen (ADVIL,MOTRIN) 200 MG tablet Take 200 mg by mouth every 6 (six) hours as needed for Pain.      levocetirizine (XYZAL) 5 MG tablet TAKE 1 TABLET(5 MG) BY MOUTH EVERY DAY 90 tablet 1    magnesium oxide (MAG-OX) 400 mg (241.3 mg magnesium) tablet Take 1 tablet (400 mg total) by mouth once daily. 30 tablet 0    metroNIDAZOLE (FLAGYL) 500 MG tablet Take 1 tablet (500 mg total) by mouth every 12 (twelve) hours. for 9 days 18 tablet 0    montelukast (SINGULAIR) 10 mg tablet Take 1 tablet (10 mg total) by mouth every evening. 30 tablet 2    mv-minerals/FA/omega 3,6,9 #3 (WOMEN'S 50+ ADVANCED ORAL) Take 1 tablet by mouth Daily.      nintedanib (OFEV) 150 mg Cap Take 1 capsule (150 mg total) by mouth 2 (two) times a day. 60 capsule 11    omega-3s/dha/epa/fish oil/D3 (VITAMIN-D +  OMEGA-3 ORAL) Take 2 tablets by mouth once daily at 6am.      omeprazole (PRILOSEC) 20 MG capsule Take 1 capsule (20 mg total) by mouth once daily. 30 capsule 1    pantoprazole (PROTONIX) 40 MG tablet Take 1 tablet (40 mg total) by mouth once daily. 30 tablet 11    potassium chloride SA (K-DUR,KLOR-CON) 20 MEQ tablet Take 1 tablet (20 mEq total) by mouth once daily. 30 tablet 0    predniSONE (DELTASONE) 10 MG tablet Take 1 tablet (10 mg total) by mouth once daily. 30 tablet 0    revefenacin 175 mcg/3 mL Nebu Inhale 175 mcg into the lungs once daily. 90 mL 3    SEREVENT DISKUS 50 mcg/dose diskus inhaler Inhale 1 puff into the lungs 2 (two) times daily. Controller 60 each 11    sodium chloride 0.9 % PgBk 100 mL with micafungin 100 mg SolR 100 mg Inject 100 mg into the vein 2 (two) times a day. for 10 days      treprostiniL (TYVASO DPI) 16(112)-32(112) -48(28) mcg CtDv Inhale 16 mcg into the lungs 4 (four) times daily. 252 each 0    vitamin D (VITAMIN D3) 1000 units Tab Take 1,000 Units by mouth once daily.      [DISCONTINUED] albuterol-ipratropium (DUO-NEB) 2.5 mg-0.5 mg/3 mL nebulizer solution Take 3 mLs by nebulization every 6 (six) hours as needed for Wheezing. Rescue 90 mL 0    [DISCONTINUED] ascorbic acid, vitamin C, (VITAMIN C) 500 MG tablet Take 500 mg by mouth once daily.      [DISCONTINUED] hydrocortisone 2.5 % ointment Apply topically 2 (two) times daily. 28.35 g 11    [DISCONTINUED] ipratropium (ATROVENT) 0.02 % nebulizer solution Take 2.5 mLs (500 mcg total) by nebulization 4 (four) times daily. Rescue (Patient not taking: Reported on 10/18/2023) 300 mL 11    [DISCONTINUED] loperamide (IMODIUM) 2 mg capsule TAKE 1 CAPSULE (2 MG TOTAL) BY MOUTH DAILY AS NEEDED FOR DIARRHEA. 90 capsule 0    [DISCONTINUED] loperamide (IMODIUM) 2 mg capsule TAKE 1 CAPSULE (2 MG TOTAL) BY MOUTH DAILY AS NEEDED FOR DIARRHEA. 90 capsule 0    [DISCONTINUED] nystatin (MYCOSTATIN) 100,000 unit/mL suspension Take 4 mLs (400,000 Units  total) by mouth 4 (four) times daily with meals and nightly. Swish and spit 240 mL 1    [DISCONTINUED] predniSONE (DELTASONE) 10 MG tablet Take 6 tablets (60 mg total) by mouth once daily for 3 days, THEN 4 tablets (40 mg total) once daily for 3 days, THEN 2 tablets (20 mg total) once daily for 3 days, THEN 1 tablet (10 mg total) once daily. 126 tablet 0    [DISCONTINUED] salmeteroL (SEREVENT) 50 mcg/dose diskus inhaler Inhale 1 puff into the lungs 2 (two) times daily. Controller      [DISCONTINUED] sars-cov-2, covid-19 omicron, (MODERNA COVID BIVAL,6M UP,,PF,) 50 mcg/0.5 mL injection Inject into the muscle. 0.5 mL 0    [DISCONTINUED] sodium,potassium,mag sulfates (SUPREP BOWEL PREP KIT) 17.5-3.13-1.6 gram SolR Take by mouth.      [DISCONTINUED] sulfamethoxazole-trimethoprim 800-160mg (BACTRIM DS) 800-160 mg Tab Take 1 tablet by mouth every Mon, Wed, Fri. 12 tablet 3    [] cyanocobalamin injection 1,000 mcg       [] metOLazone tablet 5 mg       [DISCONTINUED] 0.9%  NaCl infusion       [DISCONTINUED] acetaminophen tablet 650 mg       [DISCONTINUED] albuterol-ipratropium 2.5 mg-0.5 mg/3 mL nebulizer solution 3 mL       [DISCONTINUED] aluminum-magnesium hydroxide-simethicone 200-200-20 mg/5 mL suspension 30 mL       [DISCONTINUED] arformoteroL nebulizer solution 15 mcg       [DISCONTINUED] artificial tears 0.5 % ophthalmic solution 1 drop       [DISCONTINUED] budesonide nebulizer solution 0.5 mg       [DISCONTINUED] cefTRIAXone (ROCEPHIN) 2 g in dextrose 5 % in water (D5W) 100 mL IVPB (MB+)       [DISCONTINUED] dextromethorphan-guaiFENesin  mg/5 ml liquid 10 mL       [DISCONTINUED] enoxaparin injection 40 mg       [DISCONTINUED] famotidine tablet 20 mg       [DISCONTINUED] furosemide injection 40 mg       [DISCONTINUED] furosemide injection 40 mg       [DISCONTINUED] influenza (QUADRIVALENT PF) vaccine 0.5 mL       [DISCONTINUED] linezolid tablet 600 mg       [DISCONTINUED] magnesium oxide tablet  "800 mg       [DISCONTINUED] metroNIDAZOLE tablet 500 mg       [DISCONTINUED] micafungin 100 mg in sodium chloride 0.9 % 100 mL IVPB (MB+)       [DISCONTINUED] montelukast tablet 10 mg       [DISCONTINUED] penicillin G potassium 20 Million Units in dextrose 5 % (D5W) 500 mL CONTINUOUS INFUSION       [DISCONTINUED] piperacillin-tazobactam (ZOSYN) 4.5 g in dextrose 5 % in water (D5W) 100 mL IVPB (MB+)       [DISCONTINUED] predniSONE tablet 10 mg        No facility-administered encounter medications on file as of 10/24/2023.       Objective:     Vital Signs (Most Recent)  Vital Signs  Pulse: (!) 126  Resp: 18  SpO2: (!) 90 %  BP: 138/84  Height and Weight  Height: 5' 6" (167.6 cm)  Weight: 99.5 kg (219 lb 4 oz)  BSA (Calculated - sq m): 2.15 sq meters  BMI (Calculated): 35.4  Weight in (lb) to have BMI = 25: 154.6]  Wt Readings from Last 2 Encounters:   10/24/23 99.5 kg (219 lb 4 oz)   10/23/23 98.9 kg (218 lb)       Physical Exam   Constitutional: She is oriented to person, place, and time. She appears well-developed and well-nourished.   HENT:   Head: Normocephalic.   Mouth/Throat: Mallampati Score: II.   Neck: No JVD present.   Cardiovascular: Normal rate and intact distal pulses.   No murmur heard.  Pulmonary/Chest: Normal expansion and effort normal. She has decreased breath sounds. She has no rhonchi. She has no rales.   Abdominal: Soft. Bowel sounds are normal.   Musculoskeletal:         General: No edema. Normal range of motion.      Cervical back: Normal range of motion and neck supple.   Lymphadenopathy:     She has no axillary adenopathy.   Neurological: She is alert and oriented to person, place, and time.   Skin: Skin is warm and dry. No cyanosis. Nails show no clubbing.   Psychiatric: She has a normal mood and affect.   Nursing note and vitals reviewed.      Laboratory  Lab Results   Component Value Date    WBC 23.97 (H) 10/23/2023    RBC 3.72 (L) 10/23/2023    HGB 11.6 (L) 10/23/2023    HCT 38.4 " "10/23/2023     (H) 10/23/2023    MCH 31.2 (H) 10/23/2023    MCHC 30.2 (L) 10/23/2023    RDW 17.3 (H) 10/23/2023     (H) 10/23/2023    MPV 10.9 10/23/2023    GRAN 21.2 (H) 10/23/2023    GRAN 88.3 (H) 10/23/2023    LYMPH 1.1 10/23/2023    LYMPH 4.7 (L) 10/23/2023    MONO 0.9 10/23/2023    MONO 3.8 (L) 10/23/2023    EOS 0.1 10/23/2023    BASO 0.18 10/23/2023    EOSINOPHIL 0.4 10/23/2023    BASOPHIL 0.8 10/23/2023       BMP  Lab Results   Component Value Date     10/23/2023    K 3.4 (L) 10/23/2023     10/23/2023    CO2 26 10/23/2023    BUN 13 10/23/2023    CREATININE 0.9 10/23/2023    CALCIUM 9.7 10/23/2023    ANIONGAP 16 10/23/2023    ESTGFRAFRICA >60 07/20/2022    EGFRNONAA >60 07/20/2022    AST 29 10/23/2023    ALT 23 10/23/2023    PROT 6.8 10/23/2023       Lab Results   Component Value Date    BNP <10 10/17/2023    BNP <10 10/11/2023    BNP 36 09/14/2023    BNP 40 07/31/2023    BNP 12 03/16/2023    BNP <10 04/27/2022       Lab Results   Component Value Date    TSH 0.708 07/13/2023       Lab Results   Component Value Date    SEDRATE 50 (H) 05/10/2023       Lab Results   Component Value Date    CRP 54.6 (H) 05/10/2023     Lab Results   Component Value Date     (H) 05/02/2022        Lab Results   Component Value Date    ASPERGILLUS Not Detected 09/16/2023     No results found for: "AFUMIGATUSCL"     Lab Results   Component Value Date    ACE 22 04/28/2022        Diagnostic Results:  I have personally reviewed today the following studies:  Office Spirometry Results:             X-Ray Chest 1 View  Narrative: EXAMINATION:  XR CHEST 1 VIEW    CLINICAL HISTORY:  sob;    TECHNIQUE:  Single frontal view of the chest was performed.    COMPARISON:  10/15/2023    FINDINGS:  Unchanged right PICC line and bilateral pulmonary opacities.  The cardiac silhouette and mediastinum are unchanged.  No pneumothorax identified.  Impression: No interval change    Electronically signed by: Connor" "Nicolettevivien  Date:    10/16/2023  Time:    15:22        No results for input(s): "PH", "PCO2", "PO2", "HCO3", "POCSATURATED", "BE" in the last 72 hours.              Assessment/Plan:     Problem List Items Addressed This Visit       Rheumatoid arthritis with positive rheumatoid factor    Exercise hypoxemia    Restrictive lung disease    Relevant Medications    revefenacin 175 mcg/3 mL Nebu    Other Relevant Orders    X-Ray Chest PA And Lateral    Spirometry with/without bronchodilator    HTN (hypertension)    COPD exacerbation    UIP (usual interstitial pneumonitis)    Relevant Medications    revefenacin 175 mcg/3 mL Nebu    Other Relevant Orders    X-Ray Chest PA And Lateral    Spirometry with/without bronchodilator    Basic Metabolic Panel    GLENN on CPAP     Continue home noninvasive ventilation         Chronic respiratory failure with hypoxia    Relevant Medications    revefenacin 175 mcg/3 mL Nebu    Other Relevant Orders    X-Ray Chest PA And Lateral    Spirometry with/without bronchodilator    BMI 35.0-35.9,adult     Weight loss still remains barrier for transplantation         Pulmonary hypertension due to interstitial lung disease     FC 3  WHO grp 3  ILD    RHC done at [facility] Narrative  Performed by ILIA WAGGONER  Findings:   LM: no disease   LAD: no disease   LCX: no disease   RCA: no disease   LVEDP: 8 mmHG     RA: 1/4/0   RV: 50/0   PA: 50/20/30   PCWP: 12/14/10   PASAT: 71.7%   TDCO/CI: 7.6/3.67   ANKIT/CI: 5.71/2.76       Will start TYVASCO DPI         Actinomyces infection - Primary     On ceftriaxone , Micafungin and metronidazole         Relevant Orders    X-Ray Chest PA And Lateral     Other Visit Diagnoses       COPD mixed type        Relevant Medications    revefenacin 175 mcg/3 mL Nebu    Impacted cerumen, unspecified laterality        Relevant Orders    Ambulatory referral/consult to ENT              Subjectively appears improved   Continue Lasix regimen potassium supplementation  For Actinomyces " outlined by Infectious Diseases  Will aim to wean off prednisone  Current oxygen needs are being met  Prescription for treprostinil               Agustín Aviles MD     Requested Prescriptions     Signed Prescriptions Disp Refills    revefenacin 175 mcg/3 mL Nebu 90 mL 3     Sig: Inhale 175 mcg into the lungs once daily.            The patient was given open opportunity to ask questions and/or express concerns about treatment plan.   All questions/concerns were discussed.     Follow up in about 6 weeks (around 12/5/2023), or lab monday, [place], CXR, ENT today.    This note was prepared using voice recognition system and is likely to have sound alike errors that may have been overlooked even after proof reading.  Please call me with any questions    Discussed diagnosis, its evaluation, treatment and usual course. All questions answered.    Thank you for the courtesy of participating in the care of this patient    Agustín Aviles MD      Personal Diagnostic Review  []  CXR    []  ECHO    []  ONSAT    []  6MWD    []  LABS    []  CHEST CT    []  PET CT    []  Biopsy results

## 2023-10-24 NOTE — TELEPHONE ENCOUNTER
Initiated prior authorization request with patient's insurance for -treprostiniL (TYVASO DPI) [id number](112) -48(28) mcg CtDv  prescription. Submitted online via [email or web address] (request key- Awaiting decision -- PA case ID

## 2023-10-24 NOTE — PATIENT INSTRUCTIONS
Take Klor 20 meq 2 tabs Tue, Wed Thurs  Prednisone 10 mg x 30 days, 5mg x 30 days, 2.5 mg x 30 days  Start TYVASCO after completing IV ABX  DC DUONEB and Atrovent once on [place]

## 2023-10-24 NOTE — ASSESSMENT & PLAN NOTE
FC 3  WHO grp 3  ILD    RHC done at [facility]  Performed by  ROSALINE  Findings:   LM: no disease   LAD: no disease   LCX: no disease   RCA: no disease   LVEDP: 8 mmHG     RA: 1/4/0   RV: 50/0   PA: 50/20/30   PCWP: 12/14/10   PASAT: 71.7%   TDCO/CI: 7.6/3.67   ANKIT/CI: 5.71/2.76       Will start TYVASCO DPI

## 2023-10-25 NOTE — PHARMACY MED REC
"Admission Medication History     The home medication history was taken by Donovan Pelayo.    You may go to "Admission" then "Reconcile Home Medications" tabs to review and/or act upon these items.      The home medication list has been updated by the Pharmacy department.    Please read ALL comments highlighted in yellow.    Please address this information as you see fit.     Feel free to contact us if you have any questions or require assistance.    Medications listed below were obtained from: Patient/family      Donovan Pelayo  [id number]    Current Outpatient Medications on File Prior to Encounter   Medication Sig Dispense Refill Last Dose    albuterol (ACCUNEB) 0.63 mg/3 mL Nebu Take 3 mLs (0.63 mg total) by nebulization every 4 to 6 hours as needed (asthma). Rescue 75 mL 6 Past Week at Unknown time    albuterol (PROVENTIL/VENTOLIN HFA) 90 mcg/actuation inhaler INHALE 1 TO 2 PUFFS BY MOUTH INTO THE LUNGS EVERY 4 TO 6 HOURS AS NEEDED FOR WHEEZING OR SHORTNESS OF BREATH 18 g 5 Past Week at Unknown time    amLODIPine (NORVASC) 10 MG tablet Take 1 tablet (10 mg total) by mouth once daily. 90 tablet 1 4/27/2022 at Unknown time    ascorbic acid, vitamin C, (VITAMIN C) 500 MG tablet Take 500 mg by mouth once daily.   4/27/2022 at Unknown time    calcium/magnesium/vit B comp (CALCIUM-MAGNESIUM-B COMPLEX ORAL) Take 1 tablet by mouth once daily at 6am.   4/27/2022 at Unknown time    doxycycline (MONODOX) 100 MG capsule Take 1 capsule (100 mg total) by mouth every 12 (twelve) hours. 20 capsule 1 Past Week at Unknown time    fluticasone furoate-vilanteroL (BREO ELLIPTA) 100-25 mcg/dose diskus inhaler INHALE 1 PUFF INTO THE LUNGS ONCE DAILY 1 each 5 4/27/2022 at Unknown time    hydroCHLOROthiazide (HYDRODIURIL) 12.5 MG Tab Take 1 tablet (12.5 mg total) by mouth once daily. 90 tablet 1 4/27/2022 at Unknown time    levocetirizine (XYZAL) 5 MG tablet Take 1 tablet (5 mg total) by mouth once daily. 90 tablet 1 " 4/27/2022 at Unknown time    montelukast (SINGULAIR) 10 mg tablet Take 1 tablet (10 mg total) by mouth every evening. 90 tablet 1 4/26/2022 at Unknown time    omega-3s/dha/epa/fish oil/D3 (VITAMIN-D + OMEGA-3 ORAL) Take 1 tablet by mouth once daily at 6am.   4/27/2022 at Unknown time    ondansetron (ZOFRAN-ODT) 4 MG TbDL Dissolve 1 tablet (4 mg total) by mouth every 6 (six) hours as needed (nausea). 90 tablet 0 Past Week at Unknown time    sertraline (ZOLOFT) 50 MG tablet Take 1 tablet (50 mg total) by mouth once daily. 90 tablet 1 4/27/2022 at Unknown time                             .           No

## 2023-10-26 ENCOUNTER — PATIENT MESSAGE (OUTPATIENT)
Dept: INFECTIOUS DISEASES | Facility: CLINIC | Age: 53
End: 2023-10-26
Payer: COMMERCIAL

## 2023-10-27 ENCOUNTER — PATIENT MESSAGE (OUTPATIENT)
Dept: PULMONOLOGY | Facility: CLINIC | Age: 53
End: 2023-10-27
Payer: COMMERCIAL

## 2023-10-27 ENCOUNTER — INFUSION (OUTPATIENT)
Dept: INFUSION THERAPY | Facility: HOSPITAL | Age: 53
End: 2023-10-27
Attending: INTERNAL MEDICINE
Payer: COMMERCIAL

## 2023-10-27 ENCOUNTER — PATIENT MESSAGE (OUTPATIENT)
Dept: INFECTIOUS DISEASES | Facility: CLINIC | Age: 53
End: 2023-10-27
Payer: COMMERCIAL

## 2023-10-27 ENCOUNTER — LAB VISIT (OUTPATIENT)
Dept: LAB | Facility: HOSPITAL | Age: 53
End: 2023-10-27
Attending: STUDENT IN AN ORGANIZED HEALTH CARE EDUCATION/TRAINING PROGRAM
Payer: COMMERCIAL

## 2023-10-27 ENCOUNTER — OFFICE VISIT (OUTPATIENT)
Dept: OTOLARYNGOLOGY | Facility: CLINIC | Age: 53
End: 2023-10-27
Payer: COMMERCIAL

## 2023-10-27 DIAGNOSIS — H65.92 OME (OTITIS MEDIA WITH EFFUSION), LEFT: Primary | ICD-10-CM

## 2023-10-27 DIAGNOSIS — D72.829 LEUKOCYTOSIS, UNSPECIFIED TYPE: ICD-10-CM

## 2023-10-27 DIAGNOSIS — A42.9 ACTINOMYCES INFECTION: Primary | ICD-10-CM

## 2023-10-27 DIAGNOSIS — H61.23 BILATERAL IMPACTED CERUMEN: ICD-10-CM

## 2023-10-27 DIAGNOSIS — A42.9 ACTINOMYCES INFECTION: ICD-10-CM

## 2023-10-27 DIAGNOSIS — M05.9 RHEUMATOID ARTHRITIS WITH POSITIVE RHEUMATOID FACTOR, INVOLVING UNSPECIFIED SITE: ICD-10-CM

## 2023-10-27 DIAGNOSIS — J84.9 INTERSTITIAL LUNG DISEASE: ICD-10-CM

## 2023-10-27 DIAGNOSIS — M05.9 SEROPOSITIVE RHEUMATOID ARTHRITIS: ICD-10-CM

## 2023-10-27 LAB
BASOPHILS # BLD AUTO: 0.12 K/UL (ref 0–0.2)
BASOPHILS NFR BLD: 0.5 % (ref 0–1.9)
DIFFERENTIAL METHOD: ABNORMAL
EOSINOPHIL # BLD AUTO: 0.4 K/UL (ref 0–0.5)
EOSINOPHIL NFR BLD: 1.7 % (ref 0–8)
ERYTHROCYTE [DISTWIDTH] IN BLOOD BY AUTOMATED COUNT: 17.3 % (ref 11.5–14.5)
HCT VFR BLD AUTO: 37.3 % (ref 37–48.5)
HGB BLD-MCNC: 11.5 G/DL (ref 12–16)
IMM GRANULOCYTES # BLD AUTO: 0.37 K/UL (ref 0–0.04)
IMM GRANULOCYTES NFR BLD AUTO: 1.7 % (ref 0–0.5)
LYMPHOCYTES # BLD AUTO: 1.1 K/UL (ref 1–4.8)
LYMPHOCYTES NFR BLD: 4.9 % (ref 18–48)
MCH RBC QN AUTO: 30.8 PG (ref 27–31)
MCHC RBC AUTO-ENTMCNC: 30.8 G/DL (ref 32–36)
MCV RBC AUTO: 100 FL (ref 82–98)
MONOCYTES # BLD AUTO: 1.3 K/UL (ref 0.3–1)
MONOCYTES NFR BLD: 6 % (ref 4–15)
NEUTROPHILS # BLD AUTO: 19 K/UL (ref 1.8–7.7)
NEUTROPHILS NFR BLD: 85.2 % (ref 38–73)
NRBC BLD-RTO: 0 /100 WBC
PLATELET # BLD AUTO: 522 K/UL (ref 150–450)
PMV BLD AUTO: 9.8 FL (ref 9.2–12.9)
RBC # BLD AUTO: 3.73 M/UL (ref 4–5.4)
WBC # BLD AUTO: 22.32 K/UL (ref 3.9–12.7)

## 2023-10-27 PROCEDURE — 69210 REMOVE IMPACTED EAR WAX UNI: CPT | Mod: S$GLB,,, | Performed by: STUDENT IN AN ORGANIZED HEALTH CARE EDUCATION/TRAINING PROGRAM

## 2023-10-27 PROCEDURE — 1159F MED LIST DOCD IN RCRD: CPT | Mod: CPTII,S$GLB,, | Performed by: STUDENT IN AN ORGANIZED HEALTH CARE EDUCATION/TRAINING PROGRAM

## 2023-10-27 PROCEDURE — 99999 PR PBB SHADOW E&M-EST. PATIENT-LVL III: ICD-10-PCS | Mod: PBBFAC,,, | Performed by: STUDENT IN AN ORGANIZED HEALTH CARE EDUCATION/TRAINING PROGRAM

## 2023-10-27 PROCEDURE — 25000003 PHARM REV CODE 250: Performed by: STUDENT IN AN ORGANIZED HEALTH CARE EDUCATION/TRAINING PROGRAM

## 2023-10-27 PROCEDURE — 99214 PR OFFICE/OUTPT VISIT, EST, LEVL IV, 30-39 MIN: ICD-10-PCS | Mod: 25,S$GLB,, | Performed by: STUDENT IN AN ORGANIZED HEALTH CARE EDUCATION/TRAINING PROGRAM

## 2023-10-27 PROCEDURE — 85025 COMPLETE CBC W/AUTO DIFF WBC: CPT | Performed by: STUDENT IN AN ORGANIZED HEALTH CARE EDUCATION/TRAINING PROGRAM

## 2023-10-27 PROCEDURE — 1159F PR MEDICATION LIST DOCUMENTED IN MEDICAL RECORD: ICD-10-PCS | Mod: CPTII,S$GLB,, | Performed by: STUDENT IN AN ORGANIZED HEALTH CARE EDUCATION/TRAINING PROGRAM

## 2023-10-27 PROCEDURE — 36415 COLL VENOUS BLD VENIPUNCTURE: CPT | Performed by: STUDENT IN AN ORGANIZED HEALTH CARE EDUCATION/TRAINING PROGRAM

## 2023-10-27 PROCEDURE — 1111F DSCHRG MED/CURRENT MED MERGE: CPT | Mod: CPTII,S$GLB,, | Performed by: STUDENT IN AN ORGANIZED HEALTH CARE EDUCATION/TRAINING PROGRAM

## 2023-10-27 PROCEDURE — A4216 STERILE WATER/SALINE, 10 ML: HCPCS | Performed by: STUDENT IN AN ORGANIZED HEALTH CARE EDUCATION/TRAINING PROGRAM

## 2023-10-27 PROCEDURE — 69210 PR REMOVAL IMPACTED CERUMEN REQUIRING INSTRUMENTATION, UNILATERAL: ICD-10-PCS | Mod: S$GLB,,, | Performed by: STUDENT IN AN ORGANIZED HEALTH CARE EDUCATION/TRAINING PROGRAM

## 2023-10-27 PROCEDURE — 1111F PR DISCHARGE MEDS RECONCILED W/ CURRENT OUTPATIENT MED LIST: ICD-10-PCS | Mod: CPTII,S$GLB,, | Performed by: STUDENT IN AN ORGANIZED HEALTH CARE EDUCATION/TRAINING PROGRAM

## 2023-10-27 PROCEDURE — 99999 PR PBB SHADOW E&M-EST. PATIENT-LVL III: CPT | Mod: PBBFAC,,, | Performed by: STUDENT IN AN ORGANIZED HEALTH CARE EDUCATION/TRAINING PROGRAM

## 2023-10-27 PROCEDURE — 36592 COLLECT BLOOD FROM PICC: CPT

## 2023-10-27 PROCEDURE — 3044F PR MOST RECENT HEMOGLOBIN A1C LEVEL <7.0%: ICD-10-PCS | Mod: CPTII,S$GLB,, | Performed by: STUDENT IN AN ORGANIZED HEALTH CARE EDUCATION/TRAINING PROGRAM

## 2023-10-27 PROCEDURE — 3044F HG A1C LEVEL LT 7.0%: CPT | Mod: CPTII,S$GLB,, | Performed by: STUDENT IN AN ORGANIZED HEALTH CARE EDUCATION/TRAINING PROGRAM

## 2023-10-27 PROCEDURE — 99214 OFFICE O/P EST MOD 30 MIN: CPT | Mod: 25,S$GLB,, | Performed by: STUDENT IN AN ORGANIZED HEALTH CARE EDUCATION/TRAINING PROGRAM

## 2023-10-27 RX ORDER — SODIUM CHLORIDE 0.9 % (FLUSH) 0.9 %
10 SYRINGE (ML) INJECTION
Status: DISCONTINUED | OUTPATIENT
Start: 2023-10-27 | End: 2023-10-27 | Stop reason: HOSPADM

## 2023-10-27 RX ORDER — SODIUM CHLORIDE 0.9 % (FLUSH) 0.9 %
10 SYRINGE (ML) INJECTION
Status: CANCELLED | OUTPATIENT
Start: 2023-10-27

## 2023-10-27 RX ORDER — HEPARIN 100 UNIT/ML
500 SYRINGE INTRAVENOUS
Status: CANCELLED | OUTPATIENT
Start: 2023-10-27

## 2023-10-27 RX ORDER — HEPARIN 100 UNIT/ML
500 SYRINGE INTRAVENOUS
Status: DISCONTINUED | OUTPATIENT
Start: 2023-10-27 | End: 2023-10-27 | Stop reason: HOSPADM

## 2023-10-27 RX ORDER — SODIUM CHLORIDE 9 MG/ML
INJECTION, SOLUTION INTRAVENOUS ONCE
Status: DISCONTINUED | OUTPATIENT
Start: 2023-10-27 | End: 2023-11-14 | Stop reason: HOSPADM

## 2023-10-27 RX ADMIN — Medication 10 ML: at 10:10

## 2023-10-27 NOTE — NURSING
Lab obtained from right upper arm PICC line, 2 patient identifiers obtained, labeled and sent to lab.  Adequate blood return noted in proximal port. Distal port clogged. Patient and Dr. Wayne made aware.  Proximal lumen flushed with 10ml NS. Dressing changed via sterile technique. Site without redness, swelling or drainage noted.

## 2023-10-27 NOTE — DISCHARGE INSTRUCTIONS
FALL PREVENTION   Falls often occur due to slipping, tripping or losing your balance. Here are ways to reduce your risk of falling again.   Was there anything that caused your fall that can be fixed, removed or replaced?   Make your home safe by keeping walkways clear of objects you may trip over.   Use non-slip pads under rugs.   Do not walk in poorly lit areas.   Do not stand on chairs or wobbly ladders.   Use caution when reaching overhead or looking upward. This position can cause a loss of balance.   Be sure your shoes fit properly, have non-slip bottoms and are in good condition.   Be cautious when going up and down stairs, curbs, and when walking on uneven sidewalks.   If your balance is poor, consider using a cane or walker.   If your fall was related to alcohol use, stop or limit alcohol intake.   If your fall was related to use of sleeping medicines, talk to your doctor about this. You may need to reduce your dosage at bedtime if you awaken during the night to go to the bathroom.   To reduce the need for nighttime bathroom trips:   Avoid drinking fluids for several hours before going to bed   Empty your bladder before going to bed   Men can keep a urinal at the bedside   © 5328-9587 [employer] [facility], [address] [place] [address], [place] [address]. All rights reserved. This information is not intended as a substitute for professional medical care. Always follow your healthcare professional's instructions.

## 2023-10-27 NOTE — PROGRESS NOTES
Chief complaint:   Chief Complaint   Patient presents with    Cerumen Impaction    Nasal Congestion     Pt complains she has been having nasal congestion off and on for weeks now           Referring Provider:  Agustín Aviles Md  [address] [place] [address]    History of Present Illness:     Ms. Alicia is a 53 y.o. female presenting for evaluation of left > right ear fullness. Onset of this chief complaint was about 3 weeks ago.  Additional symptoms that also have been associated are muffled hearing. The patient denies otalgia, vertigo, drainage.  She is currently on IV antibiotics and anitfungals. Just finished PO metronidazole.     History        Past Medical History:   Past Medical History:   Diagnosis Date    Abnormal Pap smear of cervix     in the past with repeat pap smear okay.    Acute interstitial pneumonitis     Allergic rhinitis, cause unspecified     Arthritis of both knees     Asthma     Eczema     Fatty liver 10/2014    Fibrocystic breast changes     Headache(784.0)     Hepatomegaly 10/2014    Hypertension     Liver cyst 10/2014    Multinodular goiter     Followed by ENT - Dr. Nicolas Gray    Polymenorrhea     TMJ (dislocation of temporomandibular joint)     Uterine fibroid     in the past    Vitamin D deficiency disease     .          Past Surgical History:  Past Surgical History:   Procedure Laterality Date    BRONCHOSCOPY Bilateral 2023    Procedure: Bronchoscopy - insert lighted tube into airway to take a biopsy of lung;  Surgeon: Agustín Aviles MD;  Location: [facility];  Service: Pulmonary;  Laterality: Bilateral;     SECTION, CLASSIC      x 1    COLONOSCOPY N/A 2020    Procedure: COLONOSCOPY;  Surgeon: Angie Magana MD;  Location: [facility];  Service: Endoscopy;  Laterality: N/A;    HYSTERECTOMY      MULTIPLE TOOTH EXTRACTIONS      PARTIAL HYSTERECTOMY  2013    Due to fibroids   .         Medications: Medication list was reviewed. She   has a current medication list which includes the following prescription(s): albuterol, dextromethorphan-guaifenesin  mg/5 ml, dextrose 5 % in water (D5W) PgBk 100 mL with cefTRIAXone 2 gram SolR 2 g, furosemide, ibuprofen, levocetirizine, magnesium oxide, montelukast, mv-minerals/fa/omega 3,6,9 #3, nintedanib, omega-3s/dha/epa/fish oil/d3, omeprazole, pantoprazole, potassium chloride sa, prednisone, revefenacin, serevent diskus, sodium chloride 0.9 % PgBk 100 mL with micafungin 100 mg SolR 100 mg, tyvaso dpi, vitamin d, and fluticasone.         Allergies:   Review of patient's allergies indicates:   Allergen Reactions    Doxycycline Nausea Only     Other reaction(s): Nausea    Fluticasone Other (See Comments)     Other reaction(s): Epistaxis              Family history: family history includes Cancer (age of onset: 50) in her maternal aunt; Cancer (age of onset: 60) in her maternal grandmother; Cancer (age of onset: 66) in her maternal aunt; Cataracts in her cousin; Diabetes in her maternal grandfather; Diverticulitis in her mother; Heart disease in her mother; Heart disease (age of onset: 63) in her father; Hypertension in her father; Migraines in her cousin; No Known Problems in her brother; Peripheral vascular disease in her maternal grandfather; Stroke in her maternal grandfather, mother, and sister.         Social History          Alcohol use:  reports that she does not currently use alcohol.            Tobacco:  reports that she has never smoked. She has been exposed to tobacco smoke. She has never used smokeless tobacco.         Please see the patient's intake form for full details of past medical history, past surgical history, family history, social history and review of systems. ?This information was reviewed by me and noted.      Physical Examination     General: Well developed, well nourished, well hydrated. Verbal with a strong voice and not dysphonic.     Head/Face: Normocephalic, atraumatic. No  scars or lesions. Facial musculature equal.     Eyes: No scleral icterus or conjunctival hemorrhage. EOMI. PERRLA.     Ears: after disimpaction    Right ear: No gross deformity. EAC is clear of debris and erythema. The TM is intact with a pneumatized middle ear. No signs of retraction, fluid or infection.      Left ear: No gross deformity. EAC is clear of debris and erythema. The TM is intact with a mucoid effusion    Hearing: grossly intact    Nose: No gross deformity or lesions. No purulent discharge. No significant NSD.      Mouth/Oropharynx: Lips without any lesions. No mucosal lesions within the oropharynx. No tonsillar exudate or lesions. Pharyngeal walls symmetrical. Uvula midline. Tongue midline without lesions.     Neck: Trachea midline. No masses. No thyromegaly or nodules palpated.     Lymphatic: No lymphadenopathy in the neck.     Extremities: No cyanosis. Warm and well-perfused.     Skin: No scars or lesions on face or neck.      Neurologic: Moving all extremities without gross abnormality.CN II-XII grossly intact. House-Brackmann 1/6. No signs of nystagmus.      Psych: Alert and oriented to person, place, and time with an appropriate mood and affect.     Data review:    Review of records:      I reviewed records from the referring provider's office visits.  These describe the history, workup, and/or treatment of this problem thus far.    ID  IV ceftriaxone 2 g once daily, IV micafungin 100 mg once daily, and oral metronidazole 500 mg twice daily.      Procedure: ear microscopy with removal of cerumen    Description: The patient was in agreement with the examination and debridement of the ears. Removal of the cerumen required use of an operating microscope and multiple micro-instruments.     With the patient in the supine position, we used the operating microscope to examine both ears with the appropriate sized ear speculum.  A variety of sterile, micro-instruments were utilized to remove the cerumen  atraumatically.  I performed the procedure which required a significant amount of time and effort. The tympanic membrane was then well visualized.  The patient tolerated the procedure well and there were no complications.    Findings:   Right ear had significant wax, the EAC was normal, and the tympanic membrane was intact with no evidence of middle ear fluid.    Left ear had significant wax, the EAC was normal, and the tympanic membrane  was intact with mucoid effusion .         Assessment/Plan:      1. OME (otitis media with effusion), left    2. Bilateral impacted cerumen       Will not add additional antibiotics due to current IV regimen  Add flonase sensimist  F/u with audio in 1 month        Jay Sanabria MD  [facility]   [facility]  [id number] [place] [address].  POLLY Balderrama [id number]  P: [phone]  F: [phone]

## 2023-10-30 ENCOUNTER — PATIENT MESSAGE (OUTPATIENT)
Dept: PULMONOLOGY | Facility: CLINIC | Age: 53
End: 2023-10-30
Payer: COMMERCIAL

## 2023-10-30 ENCOUNTER — HOSPITAL ENCOUNTER (OUTPATIENT)
Dept: PULMONOLOGY | Facility: HOSPITAL | Age: 53
Discharge: HOME | End: 2023-10-30
Payer: COMMERCIAL

## 2023-10-30 VITALS — BODY MASS INDEX: 35.57 KG/M2 | WEIGHT: 220.38 LBS

## 2023-10-30 DIAGNOSIS — R53.83 FATIGUE, UNSPECIFIED TYPE: Primary | ICD-10-CM

## 2023-10-30 PROCEDURE — G0239 OTH RESP PROC, GROUP: HCPCS

## 2023-10-30 NOTE — PROGRESS NOTES
[facility]  Pulmonary Rehab  Session Summary    SUMMARY     Session Data  Session Number: 8  Time In: 1115  Time Out: 1200  Weight: 100 kg (220 lb 6.4 oz)  Target Heart Rate Zone: Minimum: 99 bpm  Target Heart Rate Zone: Maximum: 132 bpm  Patient Motivation: Good  Patient Effort: Good      Pre Exercise Vitals  SpO2: (!) 89 %  Supplemental O2?: Yes  O2 Device: nasal cannula  O2 Flow (L/min): 6  Pulse: 118  BP: 96/62  Jessica Dyspnea Rating : 3      Post Exercise Vitals  Supplemental O2?: Yes  O2 Device: nasal cannula  O2 Flow (L/min): 8  Pulse: 126  BP: 156/69  Jessica Dyspnea Rating : 5-6       Modality  Modality: Hand Free Weights, Nustep        Nustep  Time: 25 minutes  Steps: 1594  Load: 3  Mets: 1.5         Biceps  lbs: 4 lbs  Sets: 2  Reps: 10  Weight Type : dumbbell      Chest  lbs: 4 lbs  Sets: 2  Reps: 10  Weight Type : dumbbell      Education  Conserving and maximizing energy      Therapist Notes   Good effort. Pt was late today so only exercised on Nustep and with free weights. She took frequent breaks and stated she did not feel well today and that she has been having stomach issues from antibiotics.  Could not obtain SpO2 after exercises. Pt stated she felt ok when she left rehab. Will continue to motivate and educate pt in rehab.    Dr. Aviles immediately available as needed.       Pulmonary Rehab COVID19 Screening Checklist    1. Are you having any of the following physical symptoms?   Yes [] No [x]     Fever or chills  Unexplained muscle or body aches  Cough  Shortness of breath or difficulty breathing  Fatigue  Headache  New loss of taste or smell  Sore throat  Congestion or runny nose  Nausea or vomiting  Diarrhea      2.  Have you come in close contact with anyone with the above symptoms or with known COVID19 in the last 14 days? Yes [] No [x]        3.  Have you tested positive for COVID19 in the last 30 days?   Yes [] No [x]

## 2023-10-30 NOTE — TELEPHONE ENCOUNTER
Orders Placed This Encounter   Procedures    X-Ray Chest PA And Lateral     Standing Status:   Future     Standing Expiration Date:   10/29/2024    VITAMIN B12     Standing Status:   Future     Standing Expiration Date:   12/28/2024

## 2023-10-31 ENCOUNTER — PATIENT MESSAGE (OUTPATIENT)
Dept: INFECTIOUS DISEASES | Facility: CLINIC | Age: 53
End: 2023-10-31
Payer: COMMERCIAL

## 2023-10-31 NOTE — PROGRESS NOTES
[facility]  Pulmonary Rehab  30 Day Evaluation and Recertification     SUMMARY             Summary of Progress:   Ms. Ayanna Alicia has been doing good in pulmonary rehab and is increasing her exercise tolerance. She will continue to benefit from the program. Pt has missed several sessions this period due to a hospitalization and side effects from antibiotics. She is attentive in educational discussions. Pt states she exercises at home, outside of rehab. She is a possible lung transplant pt and is doing pre-op workup at [facility]. Will continue to motivate and educate pt while striving to increase her strength and endurance in rehab.     Attends:   Patient attends  2 days a week and has completed 7 visits. The patient has missed 3 visits. The patients current compliance is  70% .     Referring Provider:  Dr. Aviles      Start date:  9/11/23     Problems this Certification Period:   hospitalization for vocal cord infection, breathing issues.  Arm pain on arm ergometer     Exercise Capacity Summary :                                     Nustep Date:  9/28/23 Time Load Steps Date:  10/23/23 Time Load Steps     21 3 1580  25 3 1845   Recumbent Bike Date:    Time Level Date:    Time Level                Treadmill Date:       Time Speed Grade Date:    Time Speed Grade                    Arm Ergometer Date:  9/28/23  (0.85 miles)    Time Level Date:  10/23/23  (Wall) Time Level     11 1  10 2   Free Weights Date:  9/28/23  (Dumb)    Bicep Curls  Chest Press  Triceps  Legs lbs sets reps Date:  10/23/23  (Dumb) Bicep Curls  Chest Press  Triceps  Legs lbs Sets Reps      3 2 10   4 3 10                      Education:   Tips for safe exercise  Pursed lip breathing  Maximizing energy  Breathing techniques and exercises  Pulm knowledge test review     Goals:  met        Updated Exercise Prescription:  Endurance Training: Arm ergometer, 12 mins, level 2  Strength Training: Nustep, load 4, 10 mins, 500  steps           I certify that the patient is making progress in pulmonary rehabilitation, is physically able to participate, medically stable and remains motivated.

## 2023-11-01 ENCOUNTER — PATIENT MESSAGE (OUTPATIENT)
Dept: INFECTIOUS DISEASES | Facility: CLINIC | Age: 53
End: 2023-11-01
Payer: COMMERCIAL

## 2023-11-01 ENCOUNTER — PATIENT MESSAGE (OUTPATIENT)
Dept: PULMONOLOGY | Facility: CLINIC | Age: 53
End: 2023-11-01

## 2023-11-01 ENCOUNTER — HOSPITAL ENCOUNTER (INPATIENT)
Facility: HOSPITAL | Age: 53
LOS: 13 days | Discharge: NURSING, REHAB OR LONG-TERM CARE | DRG: 193 | End: 2023-11-14
Attending: EMERGENCY MEDICINE | Admitting: INTERNAL MEDICINE
Payer: COMMERCIAL

## 2023-11-01 DIAGNOSIS — E87.20 LACTIC ACIDOSIS: ICD-10-CM

## 2023-11-01 DIAGNOSIS — J98.4 RESTRICTIVE LUNG DISEASE: ICD-10-CM

## 2023-11-01 DIAGNOSIS — E04.2 MULTINODULAR GOITER: ICD-10-CM

## 2023-11-01 DIAGNOSIS — I27.23 PULMONARY HYPERTENSION DUE TO INTERSTITIAL LUNG DISEASE: ICD-10-CM

## 2023-11-01 DIAGNOSIS — R07.9 CHEST PAIN: ICD-10-CM

## 2023-11-01 DIAGNOSIS — D50.9 IRON DEFICIENCY ANEMIA, UNSPECIFIED IRON DEFICIENCY ANEMIA TYPE: ICD-10-CM

## 2023-11-01 DIAGNOSIS — J84.112 UIP (USUAL INTERSTITIAL PNEUMONITIS): ICD-10-CM

## 2023-11-01 DIAGNOSIS — J96.21 ACUTE ON CHRONIC RESPIRATORY FAILURE WITH HYPOXIA: Primary | ICD-10-CM

## 2023-11-01 DIAGNOSIS — R79.89 TROPONIN LEVEL ELEVATED: ICD-10-CM

## 2023-11-01 DIAGNOSIS — J96.11 CHRONIC RESPIRATORY FAILURE WITH HYPOXIA: ICD-10-CM

## 2023-11-01 DIAGNOSIS — J84.9 PULMONARY HYPERTENSION DUE TO INTERSTITIAL LUNG DISEASE: ICD-10-CM

## 2023-11-01 DIAGNOSIS — J44.9 COPD MIXED TYPE: ICD-10-CM

## 2023-11-01 DIAGNOSIS — J10.1 INFLUENZA A: ICD-10-CM

## 2023-11-01 LAB
ALBUMIN SERPL BCP-MCNC: 3.2 G/DL (ref 3.5–5.2)
ALLENS TEST: ABNORMAL
ALP SERPL-CCNC: 84 U/L (ref 55–135)
ALT SERPL W/O P-5'-P-CCNC: 31 U/L (ref 10–44)
ANION GAP SERPL CALC-SCNC: 18 MMOL/L (ref 8–16)
AST SERPL-CCNC: 30 U/L (ref 10–40)
BASOPHILS # BLD AUTO: 0.1 K/UL (ref 0–0.2)
BASOPHILS NFR BLD: 0.5 % (ref 0–1.9)
BILIRUB SERPL-MCNC: 0.2 MG/DL (ref 0.1–1)
BNP SERPL-MCNC: 16 PG/ML (ref 0–99)
BNP SERPL-MCNC: 16 PG/ML (ref 0–99)
BUN SERPL-MCNC: 7 MG/DL (ref 6–20)
CALCIUM SERPL-MCNC: 9.9 MG/DL (ref 8.7–10.5)
CHLORIDE SERPL-SCNC: 102 MMOL/L (ref 95–110)
CO2 SERPL-SCNC: 25 MMOL/L (ref 23–29)
CORTIS SERPL-MCNC: 26.1 UG/DL
CREAT SERPL-MCNC: 0.9 MG/DL (ref 0.5–1.4)
DELSYS: ABNORMAL
DIFFERENTIAL METHOD: ABNORMAL
EOSINOPHIL # BLD AUTO: 0.6 K/UL (ref 0–0.5)
EOSINOPHIL NFR BLD: 3.4 % (ref 0–8)
ERYTHROCYTE [DISTWIDTH] IN BLOOD BY AUTOMATED COUNT: 16.9 % (ref 11.5–14.5)
EST. GFR  (NO RACE VARIABLE): >60 ML/MIN/1.73 M^2
FIO2: 85
FLOW: 20
GLUCOSE SERPL-MCNC: 121 MG/DL (ref 70–110)
HCO3 UR-SCNC: 26.9 MMOL/L (ref 24–28)
HCT VFR BLD AUTO: 40.9 % (ref 37–48.5)
HCV AB SERPL QL IA: NEGATIVE
HEP C VIRUS HOLD SPECIMEN: NORMAL
HGB BLD-MCNC: 12.4 G/DL (ref 12–16)
HIV 1+2 AB+HIV1 P24 AG SERPL QL IA: NEGATIVE
IMM GRANULOCYTES # BLD AUTO: 0.12 K/UL (ref 0–0.04)
IMM GRANULOCYTES NFR BLD AUTO: 0.7 % (ref 0–0.5)
INFLUENZA A, MOLECULAR: POSITIVE
INFLUENZA B, MOLECULAR: NEGATIVE
LACTATE SERPL-SCNC: 2.6 MMOL/L (ref 0.5–2.2)
LYMPHOCYTES # BLD AUTO: 1.2 K/UL (ref 1–4.8)
LYMPHOCYTES NFR BLD: 6.8 % (ref 18–48)
MCH RBC QN AUTO: 31 PG (ref 27–31)
MCHC RBC AUTO-ENTMCNC: 30.3 G/DL (ref 32–36)
MCV RBC AUTO: 102 FL (ref 82–98)
MODE: ABNORMAL
MONOCYTES # BLD AUTO: 1 K/UL (ref 0.3–1)
MONOCYTES NFR BLD: 5.3 % (ref 4–15)
NEUTROPHILS # BLD AUTO: 15.2 K/UL (ref 1.8–7.7)
NEUTROPHILS NFR BLD: 83.3 % (ref 38–73)
NRBC BLD-RTO: 0 /100 WBC
PCO2 BLDA: 37.3 MMHG (ref 35–45)
PH SMN: 7.46 [PH] (ref 7.35–7.45)
PLATELET # BLD AUTO: 421 K/UL (ref 150–450)
PMV BLD AUTO: 10 FL (ref 9.2–12.9)
PO2 BLDA: 62 MMHG (ref 80–100)
POC BE: 3 MMOL/L
POC SATURATED O2: 93 % (ref 95–100)
POTASSIUM SERPL-SCNC: 4.2 MMOL/L (ref 3.5–5.1)
PROT SERPL-MCNC: 7.7 G/DL (ref 6–8.4)
RBC # BLD AUTO: 4 M/UL (ref 4–5.4)
SAMPLE: ABNORMAL
SARS-COV-2 RDRP RESP QL NAA+PROBE: NEGATIVE
SITE: ABNORMAL
SODIUM SERPL-SCNC: 145 MMOL/L (ref 136–145)
SPECIMEN SOURCE: ABNORMAL
TROPONIN I SERPL DL<=0.01 NG/ML-MCNC: 0.03 NG/ML (ref 0–0.03)
WBC # BLD AUTO: 18.29 K/UL (ref 3.9–12.7)

## 2023-11-01 PROCEDURE — 99900035 HC TECH TIME PER 15 MIN (STAT)

## 2023-11-01 PROCEDURE — 25000242 PHARM REV CODE 250 ALT 637 W/ HCPCS: Performed by: EMERGENCY MEDICINE

## 2023-11-01 PROCEDURE — 96374 THER/PROPH/DIAG INJ IV PUSH: CPT

## 2023-11-01 PROCEDURE — 25000003 PHARM REV CODE 250: Performed by: INTERNAL MEDICINE

## 2023-11-01 PROCEDURE — 83880 ASSAY OF NATRIURETIC PEPTIDE: CPT | Mod: 91 | Performed by: EMERGENCY MEDICINE

## 2023-11-01 PROCEDURE — 96372 THER/PROPH/DIAG INJ SC/IM: CPT | Performed by: EMERGENCY MEDICINE

## 2023-11-01 PROCEDURE — 36415 COLL VENOUS BLD VENIPUNCTURE: CPT | Performed by: REGISTERED NURSE

## 2023-11-01 PROCEDURE — 82533 TOTAL CORTISOL: CPT | Performed by: INTERNAL MEDICINE

## 2023-11-01 PROCEDURE — 93005 ELECTROCARDIOGRAM TRACING: CPT

## 2023-11-01 PROCEDURE — 84484 ASSAY OF TROPONIN QUANT: CPT | Mod: 91 | Performed by: INTERNAL MEDICINE

## 2023-11-01 PROCEDURE — 36600 WITHDRAWAL OF ARTERIAL BLOOD: CPT

## 2023-11-01 PROCEDURE — 87040 BLOOD CULTURE FOR BACTERIA: CPT | Performed by: EMERGENCY MEDICINE

## 2023-11-01 PROCEDURE — 93010 ELECTROCARDIOGRAM REPORT: CPT | Mod: ,,, | Performed by: INTERNAL MEDICINE

## 2023-11-01 PROCEDURE — 80053 COMPREHEN METABOLIC PANEL: CPT | Performed by: REGISTERED NURSE

## 2023-11-01 PROCEDURE — 94640 AIRWAY INHALATION TREATMENT: CPT

## 2023-11-01 PROCEDURE — 63600175 PHARM REV CODE 636 W HCPCS: Performed by: EMERGENCY MEDICINE

## 2023-11-01 PROCEDURE — 63600175 PHARM REV CODE 636 W HCPCS: Performed by: NURSE PRACTITIONER

## 2023-11-01 PROCEDURE — 93010 EKG 12-LEAD: ICD-10-PCS | Mod: ,,, | Performed by: INTERNAL MEDICINE

## 2023-11-01 PROCEDURE — 82803 BLOOD GASES ANY COMBINATION: CPT

## 2023-11-01 PROCEDURE — 27000249 HC VAPOTHERM CIRCUIT

## 2023-11-01 PROCEDURE — 21400001 HC TELEMETRY ROOM

## 2023-11-01 PROCEDURE — 86803 HEPATITIS C AB TEST: CPT | Performed by: EMERGENCY MEDICINE

## 2023-11-01 PROCEDURE — U0002 COVID-19 LAB TEST NON-CDC: HCPCS | Performed by: EMERGENCY MEDICINE

## 2023-11-01 PROCEDURE — 27100092 HC HIGH FLOW DELIVERY CANNULA

## 2023-11-01 PROCEDURE — 25000003 PHARM REV CODE 250: Performed by: NURSE PRACTITIONER

## 2023-11-01 PROCEDURE — 94761 N-INVAS EAR/PLS OXIMETRY MLT: CPT

## 2023-11-01 PROCEDURE — 27100171 HC OXYGEN HIGH FLOW UP TO 24 HOURS

## 2023-11-01 PROCEDURE — 87502 INFLUENZA DNA AMP PROBE: CPT | Performed by: EMERGENCY MEDICINE

## 2023-11-01 PROCEDURE — 99285 EMERGENCY DEPT VISIT HI MDM: CPT | Mod: 25

## 2023-11-01 PROCEDURE — 87389 HIV-1 AG W/HIV-1&-2 AB AG IA: CPT | Performed by: EMERGENCY MEDICINE

## 2023-11-01 PROCEDURE — 83880 ASSAY OF NATRIURETIC PEPTIDE: CPT | Performed by: REGISTERED NURSE

## 2023-11-01 PROCEDURE — 25000003 PHARM REV CODE 250: Performed by: EMERGENCY MEDICINE

## 2023-11-01 PROCEDURE — 99223 1ST HOSP IP/OBS HIGH 75: CPT | Mod: ,,, | Performed by: INTERNAL MEDICINE

## 2023-11-01 PROCEDURE — 84484 ASSAY OF TROPONIN QUANT: CPT | Mod: 91 | Performed by: REGISTERED NURSE

## 2023-11-01 PROCEDURE — 85025 COMPLETE CBC W/AUTO DIFF WBC: CPT | Performed by: REGISTERED NURSE

## 2023-11-01 PROCEDURE — 96361 HYDRATE IV INFUSION ADD-ON: CPT

## 2023-11-01 PROCEDURE — 36415 COLL VENOUS BLD VENIPUNCTURE: CPT | Performed by: INTERNAL MEDICINE

## 2023-11-01 PROCEDURE — 99223 PR INITIAL HOSPITAL CARE,LEVL III: ICD-10-PCS | Mod: ,,, | Performed by: INTERNAL MEDICINE

## 2023-11-01 PROCEDURE — 83605 ASSAY OF LACTIC ACID: CPT | Performed by: EMERGENCY MEDICINE

## 2023-11-01 RX ORDER — PREDNISONE 10 MG/1
10 TABLET ORAL DAILY
Status: DISCONTINUED | OUTPATIENT
Start: 2023-11-02 | End: 2023-11-14 | Stop reason: HOSPADM

## 2023-11-01 RX ORDER — TALC
6 POWDER (GRAM) TOPICAL NIGHTLY PRN
Status: DISCONTINUED | OUTPATIENT
Start: 2023-11-01 | End: 2023-11-14 | Stop reason: HOSPADM

## 2023-11-01 RX ORDER — MORPHINE SULFATE 4 MG/ML
2 INJECTION, SOLUTION INTRAMUSCULAR; INTRAVENOUS ONCE
Status: COMPLETED | OUTPATIENT
Start: 2023-11-01 | End: 2023-11-01

## 2023-11-01 RX ORDER — DOCUSATE SODIUM 100 MG/1
100 CAPSULE, LIQUID FILLED ORAL 2 TIMES DAILY
Status: ON HOLD | COMMUNITY
End: 2023-11-14 | Stop reason: HOSPADM

## 2023-11-01 RX ORDER — BENZONATATE 200 MG/1
200 CAPSULE ORAL 3 TIMES DAILY PRN
Status: ON HOLD | COMMUNITY
End: 2023-11-14 | Stop reason: HOSPADM

## 2023-11-01 RX ORDER — IBUPROFEN 200 MG
24 TABLET ORAL
Status: DISCONTINUED | OUTPATIENT
Start: 2023-11-01 | End: 2023-11-14 | Stop reason: HOSPADM

## 2023-11-01 RX ORDER — BIOTIN 10 MG
1 TABLET ORAL DAILY
COMMUNITY

## 2023-11-01 RX ORDER — MONTELUKAST SODIUM 10 MG/1
10 TABLET ORAL NIGHTLY
Status: DISCONTINUED | OUTPATIENT
Start: 2023-11-01 | End: 2023-11-14 | Stop reason: HOSPADM

## 2023-11-01 RX ORDER — LANOLIN ALCOHOL/MO/W.PET/CERES
400 CREAM (GRAM) TOPICAL DAILY
Status: DISCONTINUED | OUTPATIENT
Start: 2023-11-02 | End: 2023-11-14 | Stop reason: HOSPADM

## 2023-11-01 RX ORDER — PROCHLORPERAZINE EDISYLATE 5 MG/ML
5 INJECTION INTRAMUSCULAR; INTRAVENOUS EVERY 6 HOURS PRN
Status: DISCONTINUED | OUTPATIENT
Start: 2023-11-01 | End: 2023-11-14 | Stop reason: HOSPADM

## 2023-11-01 RX ORDER — METHYLPREDNISOLONE SOD SUCC 125 MG
125 VIAL (EA) INJECTION
Status: COMPLETED | OUTPATIENT
Start: 2023-11-01 | End: 2023-11-01

## 2023-11-01 RX ORDER — ENOXAPARIN SODIUM 100 MG/ML
40 INJECTION SUBCUTANEOUS EVERY 24 HOURS
Status: DISCONTINUED | OUTPATIENT
Start: 2023-11-01 | End: 2023-11-14 | Stop reason: HOSPADM

## 2023-11-01 RX ORDER — ACETAMINOPHEN 325 MG/1
650 TABLET ORAL EVERY 4 HOURS PRN
Status: DISCONTINUED | OUTPATIENT
Start: 2023-11-01 | End: 2023-11-14 | Stop reason: HOSPADM

## 2023-11-01 RX ORDER — MAG HYDROX/ALUMINUM HYD/SIMETH 200-200-20
30 SUSPENSION, ORAL (FINAL DOSE FORM) ORAL 4 TIMES DAILY PRN
Status: DISCONTINUED | OUTPATIENT
Start: 2023-11-01 | End: 2023-11-14 | Stop reason: HOSPADM

## 2023-11-01 RX ORDER — ONDANSETRON 2 MG/ML
4 INJECTION INTRAMUSCULAR; INTRAVENOUS EVERY 8 HOURS PRN
Status: DISCONTINUED | OUTPATIENT
Start: 2023-11-01 | End: 2023-11-14 | Stop reason: HOSPADM

## 2023-11-01 RX ORDER — OSELTAMIVIR PHOSPHATE 75 MG/1
75 CAPSULE ORAL 2 TIMES DAILY
Status: COMPLETED | OUTPATIENT
Start: 2023-11-01 | End: 2023-11-08

## 2023-11-01 RX ORDER — ACETAMINOPHEN 500 MG
500 TABLET ORAL EVERY 6 HOURS PRN
COMMUNITY

## 2023-11-01 RX ORDER — TERBUTALINE SULFATE 1 MG/ML
0.25 INJECTION SUBCUTANEOUS ONCE
Status: COMPLETED | OUTPATIENT
Start: 2023-11-01 | End: 2023-11-01

## 2023-11-01 RX ORDER — POLYETHYLENE GLYCOL 3350 17 G/17G
17 POWDER, FOR SOLUTION ORAL DAILY
Status: DISCONTINUED | OUTPATIENT
Start: 2023-11-01 | End: 2023-11-12

## 2023-11-01 RX ORDER — BENZONATATE 100 MG/1
200 CAPSULE ORAL 3 TIMES DAILY PRN
Status: DISCONTINUED | OUTPATIENT
Start: 2023-11-01 | End: 2023-11-02

## 2023-11-01 RX ORDER — GLUCAGON 1 MG
1 KIT INJECTION
Status: DISCONTINUED | OUTPATIENT
Start: 2023-11-01 | End: 2023-11-14 | Stop reason: HOSPADM

## 2023-11-01 RX ORDER — SODIUM CHLORIDE 0.9 % (FLUSH) 0.9 %
10 SYRINGE (ML) INJECTION EVERY 12 HOURS PRN
Status: DISCONTINUED | OUTPATIENT
Start: 2023-11-01 | End: 2023-11-14 | Stop reason: HOSPADM

## 2023-11-01 RX ORDER — NALOXONE HCL 0.4 MG/ML
0.02 VIAL (ML) INJECTION
Status: DISCONTINUED | OUTPATIENT
Start: 2023-11-01 | End: 2023-11-14 | Stop reason: HOSPADM

## 2023-11-01 RX ORDER — HYDROCODONE BITARTRATE AND ACETAMINOPHEN 5; 325 MG/1; MG/1
1 TABLET ORAL EVERY 6 HOURS PRN
Status: DISCONTINUED | OUTPATIENT
Start: 2023-11-01 | End: 2023-11-14 | Stop reason: HOSPADM

## 2023-11-01 RX ORDER — IBUPROFEN 200 MG
16 TABLET ORAL
Status: DISCONTINUED | OUTPATIENT
Start: 2023-11-01 | End: 2023-11-14 | Stop reason: HOSPADM

## 2023-11-01 RX ORDER — DOCUSATE SODIUM 100 MG/1
100 CAPSULE, LIQUID FILLED ORAL 2 TIMES DAILY
Status: DISCONTINUED | OUTPATIENT
Start: 2023-11-01 | End: 2023-11-14 | Stop reason: HOSPADM

## 2023-11-01 RX ORDER — PANTOPRAZOLE SODIUM 40 MG/1
40 TABLET, DELAYED RELEASE ORAL DAILY
Status: DISCONTINUED | OUTPATIENT
Start: 2023-11-02 | End: 2023-11-10

## 2023-11-01 RX ORDER — IPRATROPIUM BROMIDE AND ALBUTEROL SULFATE 2.5; .5 MG/3ML; MG/3ML
3 SOLUTION RESPIRATORY (INHALATION)
Status: COMPLETED | OUTPATIENT
Start: 2023-11-01 | End: 2023-11-01

## 2023-11-01 RX ADMIN — ENOXAPARIN SODIUM 40 MG: 40 INJECTION SUBCUTANEOUS at 06:11

## 2023-11-01 RX ADMIN — IPRATROPIUM BROMIDE AND ALBUTEROL SULFATE 3 ML: 2.5; .5 SOLUTION RESPIRATORY (INHALATION) at 12:11

## 2023-11-01 RX ADMIN — MONTELUKAST 10 MG: 10 TABLET, FILM COATED ORAL at 08:11

## 2023-11-01 RX ADMIN — TERBUTALINE SULFATE 0.25 MG: 1 INJECTION, SOLUTION SUBCUTANEOUS at 01:11

## 2023-11-01 RX ADMIN — NINTEDANIB 150 MG: 150 CAPSULE ORAL at 08:11

## 2023-11-01 RX ADMIN — HYDROCODONE BITARTRATE AND ACETAMINOPHEN 1 TABLET: 5; 325 TABLET ORAL at 08:11

## 2023-11-01 RX ADMIN — OSELTAMIVIR PHOSPHATE 75 MG: 75 CAPSULE ORAL at 03:11

## 2023-11-01 RX ADMIN — METHYLPREDNISOLONE SODIUM SUCCINATE 125 MG: 125 INJECTION, POWDER, FOR SOLUTION INTRAMUSCULAR; INTRAVENOUS at 01:11

## 2023-11-01 RX ADMIN — MORPHINE SULFATE 2 MG: 4 INJECTION INTRAVENOUS at 04:11

## 2023-11-01 RX ADMIN — DOCUSATE SODIUM 100 MG: 100 CAPSULE, LIQUID FILLED ORAL at 08:11

## 2023-11-01 RX ADMIN — SODIUM CHLORIDE 1000 ML: 9 INJECTION, SOLUTION INTRAVENOUS at 01:11

## 2023-11-01 NOTE — PHARMACY MED REC
"Admission Medication History     The home medication history was taken by Chhaya Raza.    You may go to "Admission" then "Reconcile Home Medications" tabs to review and/or act upon these items.     The home medication list has been updated by the Pharmacy department.   Please read ALL comments highlighted in yellow.   Please address this information as you see fit.    Feel free to contact us if you have any questions or require assistance.        Medications listed below were obtained from: Patient/family and Analytic software- First,Patient brought meds from home   (Not in a hospital admission)          Chhaya Raza  [id number]      Current Outpatient Medications on File Prior to Encounter   Medication Sig Dispense Refill Last Dose    acetaminophen (TYLENOL) 500 MG tablet Take 500 mg by mouth every 6 (six) hours as needed for Pain. Only use when needed   Past Week    acidophilus-pectin, citrus 100 million cell-10 mg Cap Take 1 capsule by mouth once daily.   10/31/2023    albuterol (PROVENTIL/VENTOLIN HFA) 90 mcg/actuation inhaler INHALE 1 TO 2 PUFFS BY MOUTH INTO THE LUNGS EVERY 4 TO 6 HOURS AS NEEDED FOR WHEEZING OR SHORTNESS OF BREATH 8.5 g 5 10/31/2023    docusate sodium (COLACE) 100 MG capsule Take 100 mg by mouth 2 (two) times daily.   Past Week    fluticasone (VERAMYST) 27.5 mcg/actuation nasal spray 2 sprays by Nasal route once daily. 9.1 mL 3 10/31/2023    furosemide (LASIX) 40 MG tablet Take 1 tablet (40 mg total) by mouth once daily. 30 tablet 0 10/31/2023    ibuprofen (ADVIL,MOTRIN) 200 MG tablet Take 200 mg by mouth every 6 (six) hours as needed for Pain.   10/31/2023    levocetirizine (XYZAL) 5 MG tablet TAKE 1 TABLET(5 MG) BY MOUTH EVERY DAY 90 tablet 1 10/31/2023    magnesium oxide (MAG-OX) 400 mg (241.3 mg magnesium) tablet Take 1 tablet (400 mg total) by mouth once daily. 30 tablet 0 11/1/2023    montelukast (SINGULAIR) 10 mg tablet Take 1 tablet (10 mg total) by mouth every evening. 30 " tablet 2 10/31/2023    mv-minerals/FA/omega 3,6,9 #3 (WOMEN'S 50+ ADVANCED ORAL) Take 1 tablet by mouth Daily.   10/31/2023    nintedanib (OFEV) 150 mg Cap Take 1 capsule (150 mg total) by mouth 2 (two) times a day. 60 capsule 11 10/31/2023    omega-3s/dha/epa/fish oil/D3 (VITAMIN-D + OMEGA-3 ORAL) Take 2 tablets by mouth once daily at 6am.   11/1/2023    omeprazole (PRILOSEC) 20 MG capsule Take 1 capsule (20 mg total) by mouth once daily. 30 capsule 1 10/31/2023    potassium chloride SA (K-DUR,KLOR-CON) 20 MEQ tablet Take 1 tablet (20 mEq total) by mouth once daily. 30 tablet 0 11/1/2023    predniSONE (DELTASONE) 10 MG tablet Take 1 tablet (10 mg total) by mouth once daily. 30 tablet 0 Past Week    SEREVENT DISKUS 50 mcg/dose diskus inhaler Inhale 1 puff into the lungs 2 (two) times daily. Controller 60 each 11 Past Week    vitamin D (VITAMIN D3) 1000 units Tab Take 1,000 Units by mouth once daily.   10/31/2023    benzonatate (TESSALON) 200 MG capsule Take 200 mg by mouth 3 (three) times daily as needed for Cough.   Unknown    dextrose 5 % in water (D5W) PgBk 100 mL with cefTRIAXone 2 gram SolR 2 g Inject 2 g into the vein once daily.       pantoprazole (PROTONIX) 40 MG tablet Take 1 tablet (40 mg total) by mouth once daily. (Patient not taking: Reported on 11/1/2023) 30 tablet 11 Not Taking    revefenacin 175 mcg/3 mL Nebu Inhale 175 mcg into the lungs once daily. (Patient taking differently: Inhale 175 mcg into the lungs once daily. Patient has not received medication yet) 90 mL 3     treprostiniL (TYVASO DPI) 16(112)-32(112) -48(28) mcg CtDv Inhale 16 mcg into the lungs 4 (four) times daily. (Patient taking differently: Inhale 16 mcg into the lungs 4 (four) times daily. Patient has not received medication  yet) 252 each 0                            .

## 2023-11-01 NOTE — SUBJECTIVE & OBJECTIVE
Past Medical History:   Diagnosis Date    Abnormal Pap smear of cervix     in the past with repeat pap smear okay.    Acute interstitial pneumonitis     Allergic rhinitis, cause unspecified     Arthritis of both knees     Asthma     Eczema     Fatty liver 10/2014    Fibrocystic breast changes     Headache(784.0)     Hepatomegaly 10/2014    Hypertension     Liver cyst 10/2014    Multinodular goiter     Followed by ENT - Dr. Nicolas Gray    Polymenorrhea     TMJ (dislocation of temporomandibular joint)     Uterine fibroid     in the past    Vitamin D deficiency disease        Past Surgical History:   Procedure Laterality Date    BRONCHOSCOPY Bilateral 2023    Procedure: Bronchoscopy - insert lighted tube into airway to take a biopsy of lung;  Surgeon: Agustín Aviles MD;  Location: [facility];  Service: Pulmonary;  Laterality: Bilateral;     SECTION, CLASSIC      x 1    COLONOSCOPY N/A 2020    Procedure: COLONOSCOPY;  Surgeon: Angie Magana MD;  Location: [facility] END[facility];  Service: Endoscopy;  Laterality: N/A;    HYSTERECTOMY      MULTIPLE TOOTH EXTRACTIONS      PARTIAL HYSTERECTOMY  2013    Due to fibroids       Review of patient's allergies indicates:   Allergen Reactions    Doxycycline Nausea Only     Other reaction(s): Nausea    Fluticasone Other (See Comments)     Other reaction(s): Epistaxis         Family History       Problem Relation (Age of Onset)    Cancer Maternal Grandmother (60), Maternal Aunt (50), Maternal Aunt (66)    Cataracts Cousin    Diabetes Maternal Grandfather    Diverticulitis Mother    Heart disease Mother, Father (63)    Hypertension Father    Migraines Cousin    No Known Problems Brother    Peripheral vascular disease Maternal Grandfather    Stroke Mother, Sister, Maternal Grandfather          Tobacco Use    Smoking status: Never     Passive exposure: Past    Smokeless tobacco: Never   Substance and Sexual Activity    Alcohol use: Not Currently     Comment:  last use 03/2022    Drug use: Not Currently     Frequency: 4.0 times per week     Types: Marijuana     Comment: last year was last use 03/2022    Sexual activity: Yes     Partners: Female         Review of Systems   Constitutional:  Positive for fatigue.   Respiratory:  Positive for shortness of breath.    Neurological:  Positive for weakness.   All other systems reviewed and are negative.    Objective:     Vital Signs (Most Recent):  Temp: 99.4 °F (37.4 °C) (11/01/23 1540)  Pulse: (!) 118 (11/01/23 1534)  Resp: (!) 24 (11/01/23 1534)  BP: 138/71 (11/01/23 1510)  SpO2: 100 % (11/01/23 1534) Vital Signs (24h Range):  Temp:  [99.1 °F (37.3 °C)-99.4 °F (37.4 °C)] 99.4 °F (37.4 °C)  Pulse:  [118-138] 118  Resp:  [16-28] 24  SpO2:  [54 %-100 %] 100 %  BP: (138-155)/(71-93) 138/71     Weight: 98.9 kg (218 lb)  Body mass index is 35.19 kg/m².    No intake or output data in the 24 hours ending 11/01/23 1547     Physical Exam  Vitals and nursing note reviewed.   Constitutional:       Appearance: She is well-developed. She is obese. She is ill-appearing.      Interventions: Nasal cannula in place.       HENT:      Head: Normocephalic and atraumatic.      Nose: Nose normal.      Mouth/Throat:      Pharynx: No oropharyngeal exudate.   Eyes:      General: No scleral icterus.     Conjunctiva/sclera: Conjunctivae normal.      Pupils: Pupils are equal, round, and reactive to light.   Neck:      Thyroid: No thyromegaly.      Vascular: No JVD.      Trachea: No tracheal deviation.   Cardiovascular:      Rate and Rhythm: Normal rate and regular rhythm.      Heart sounds: Normal heart sounds, S1 normal and S2 normal. No murmur heard.  Pulmonary:      Effort: Pulmonary effort is normal. No tachypnea, accessory muscle usage or respiratory distress.      Breath sounds: Normal breath sounds. No stridor.   Abdominal:      General: Bowel sounds are normal. There is no distension.      Palpations: Abdomen is soft. There is no hepatomegaly,  "splenomegaly or mass.      Tenderness: There is no abdominal tenderness. There is no guarding or rebound.   Musculoskeletal:         General: No tenderness. Normal range of motion.      Cervical back: Normal range of motion and neck supple.   Lymphadenopathy:      Upper Body:      Right upper body: No supraclavicular adenopathy.      Left upper body: No supraclavicular adenopathy.   Skin:     General: Skin is warm and dry.      Findings: No rash.      Nails: There is no clubbing.   Neurological:      Mental Status: She is alert and oriented to person, place, and time.      Coordination: Coordination normal.      Gait: Gait normal.      Deep Tendon Reflexes: Reflexes are normal and symmetric.          Vents:  Oxygen Concentration (%): (S) 90 (11/01/23 1500)    Lines/Drains/Airways       Peripherally Inserted Central Catheter Line  Duration             PICC Double Lumen 10/13/23 1627 right basilic 18 days              Peripheral Intravenous Line  Duration                  Peripheral IV - Single Lumen 11/01/23 1205 18 G Left Antecubital <1 day                    Significant Labs:    CBC/Anemia Profile:  Recent Labs   Lab 11/01/23  1220   WBC 18.29*   HGB 12.4   HCT 40.9      *   RDW 16.9*        Chemistries:  Recent Labs   Lab 11/01/23  1220      K 4.2      CO2 25   BUN 7   CREATININE 0.9   CALCIUM 9.9   ALBUMIN 3.2*   PROT 7.7   BILITOT 0.2   ALKPHOS 84   ALT 31   AST 30       ABGs:   Recent Labs   Lab 11/01/23  1229   PH 7.465*   PCO2 37.3   HCO3 26.9   POCSATURATED 93   BE 3*     Blood Culture: No results for input(s): "LABBLOO" in the last 48 hours.  Respiratory Culture: No results for input(s): "GSRESP", "RESPIRATORYC" in the last 48 hours.  All pertinent labs within the past 24 hours have been reviewed.    Significant Imaging:   I have reviewed all pertinent imaging results/findings within the past 24 hours.    X-Ray Chest AP Portable  Narrative: EXAMINATION:  XR CHEST AP " PORTABLE    CLINICAL HISTORY:  Chest Pain;    TECHNIQUE:  Single frontal view of the chest was performed.    COMPARISON:  10/16/2023    FINDINGS:  Right PICC line is unchanged.  The cardiac silhouette and mediastinum are unchanged.    Unchanged bilateral pulmonary opacities suggesting vascular congestion, infectious or inflammatory process.  Impression: No interval change    Electronically signed by: Connor Encarnacion  Date:    11/01/2023  Time:    12:47

## 2023-11-01 NOTE — ASSESSMENT & PLAN NOTE
Chronic, controlled. Latest blood pressure and vitals reviewed-     Temp:  [99.1 °F (37.3 °C)-99.4 °F (37.4 °C)]   Pulse:  [118-138]   Resp:  [16-28]   BP: (138-155)/(71-93)   SpO2:  [54 %-100 %] .   Home meds for hypertension were reviewed and noted below.   Hypertension Medications             furosemide (LASIX) 40 MG tablet Take 1 tablet (40 mg total) by mouth once daily.          While in the hospital, will manage blood pressure as follows; Continue home antihypertensive regimen    Will utilize p.r.n. blood pressure medication only if patient's blood pressure greater than 180/110 and she develops symptoms such as worsening chest pain or shortness of breath.

## 2023-11-01 NOTE — ASSESSMENT & PLAN NOTE
Patient with Hypoxic Respiratory failure which is Acute on chronic.  she is on home oxygen at 8 LPM. Supplemental oxygen was provided and noted- Oxygen Concentration (%):  [] 90    .   Signs/symptoms of respiratory failure include- tachypnea, increased work of breathing and respiratory distress. Contributing diagnoses includes - CHF and Interstitial lung disease Labs and images were reviewed. Patient Has recent ABG, which has been reviewed. Will treat underlying causes and adjust management of respiratory failure as follows-     --Vapotherm  --Consult Pulm  --Cont home meds where possible

## 2023-11-01 NOTE — FIRST PROVIDER EVALUATION
Medical screening examination initiated.  I have conducted a focused provider triage encounter, findings are as follows:    Brief history of present illness:  Chest tightness, cough and shortness of breath that began yesterday    There were no vitals filed for this visit.    Pertinent physical exam:  Mild distress, patient alert and oriented    Brief workup plan:  Workup and further evaluation    Preliminary workup initiated; this workup will be continued and followed by the physician or advanced practice provider that is assigned to the patient when roomed.

## 2023-11-01 NOTE — ASSESSMENT & PLAN NOTE
Likely secondary to demand ischemia, hypoxia, resp failure, fever, infuenza    --trend trop  --tele

## 2023-11-01 NOTE — SUBJECTIVE & OBJECTIVE
Past Medical History:   Diagnosis Date    Abnormal Pap smear of cervix     in the past with repeat pap smear okay.    Acute interstitial pneumonitis     Allergic rhinitis, cause unspecified     Arthritis of both knees     Asthma     Eczema     Fatty liver 10/2014    Fibrocystic breast changes     Headache(784.0)     Hepatomegaly 10/2014    Hypertension     Liver cyst 10/2014    Multinodular goiter     Followed by ENT - Dr. Nicolas Gray    Polymenorrhea     TMJ (dislocation of temporomandibular joint)     Uterine fibroid     in the past    Vitamin D deficiency disease        Past Surgical History:   Procedure Laterality Date    BRONCHOSCOPY Bilateral 2023    Procedure: Bronchoscopy - insert lighted tube into airway to take a biopsy of lung;  Surgeon: Agustín Aviles MD;  Location: [facility] ENDO;  Service: Pulmonary;  Laterality: Bilateral;     SECTION, CLASSIC      x 1    COLONOSCOPY N/A 2020    Procedure: COLONOSCOPY;  Surgeon: Angie Magana MD;  Location: [facility] ENDO;  Service: Endoscopy;  Laterality: N/A;    HYSTERECTOMY      MULTIPLE TOOTH EXTRACTIONS      PARTIAL HYSTERECTOMY  2013    Due to fibroids       Review of patient's allergies indicates:   Allergen Reactions    Doxycycline Nausea Only     Other reaction(s): Nausea    Fluticasone Other (See Comments)     Other reaction(s): Epistaxis         Current Facility-Administered Medications on File Prior to Encounter   Medication    0.9%  NaCl infusion     Current Outpatient Medications on File Prior to Encounter   Medication Sig    acetaminophen (TYLENOL) 500 MG tablet Take 500 mg by mouth every 6 (six) hours as needed for Pain. Only use when needed    acidophilus-pectin, citrus 100 million cell-10 mg Cap Take 1 capsule by mouth once daily.    albuterol (PROVENTIL/VENTOLIN HFA) 90 mcg/actuation inhaler INHALE 1 TO 2 PUFFS BY MOUTH INTO THE LUNGS EVERY 4 TO 6 HOURS AS NEEDED FOR WHEEZING OR SHORTNESS OF BREATH    docusate sodium  (COLACE) 100 MG capsule Take 100 mg by mouth 2 (two) times daily.    fluticasone (VERAMYST) 27.5 mcg/actuation nasal spray 2 sprays by Nasal route once daily.    furosemide (LASIX) 40 MG tablet Take 1 tablet (40 mg total) by mouth once daily.    ibuprofen (ADVIL,MOTRIN) 200 MG tablet Take 200 mg by mouth every 6 (six) hours as needed for Pain.    levocetirizine (XYZAL) 5 MG tablet TAKE 1 TABLET(5 MG) BY MOUTH EVERY DAY    magnesium oxide (MAG-OX) 400 mg (241.3 mg magnesium) tablet Take 1 tablet (400 mg total) by mouth once daily.    montelukast (SINGULAIR) 10 mg tablet Take 1 tablet (10 mg total) by mouth every evening.    mv-minerals/FA/omega 3,6,9 #3 (WOMEN'S 50+ ADVANCED ORAL) Take 1 tablet by mouth Daily.    nintedanib (OFEV) 150 mg Cap Take 1 capsule (150 mg total) by mouth 2 (two) times a day.    omega-3s/dha/epa/fish oil/D3 (VITAMIN-D + OMEGA-3 ORAL) Take 2 tablets by mouth once daily at 6am.    omeprazole (PRILOSEC) 20 MG capsule Take 1 capsule (20 mg total) by mouth once daily.    potassium chloride SA (K-DUR,KLOR-CON) 20 MEQ tablet Take 1 tablet (20 mEq total) by mouth once daily.    predniSONE (DELTASONE) 10 MG tablet Take 1 tablet (10 mg total) by mouth once daily.    SEREVENT DISKUS 50 mcg/dose diskus inhaler Inhale 1 puff into the lungs 2 (two) times daily. Controller    vitamin D (VITAMIN D3) 1000 units Tab Take 1,000 Units by mouth once daily.    benzonatate (TESSALON) 200 MG capsule Take 200 mg by mouth 3 (three) times daily as needed for Cough.    dextrose 5 % in water (D5W) PgBk 100 mL with cefTRIAXone 2 gram SolR 2 g Inject 2 g into the vein once daily.    pantoprazole (PROTONIX) 40 MG tablet Take 1 tablet (40 mg total) by mouth once daily. (Patient not taking: Reported on 11/1/2023)    revefenacin 175 mcg/3 mL Nebu Inhale 175 mcg into the lungs once daily. (Patient taking differently: Inhale 175 mcg into the lungs once daily. Patient has not received medication yet)    treprostiniL (TYVASO DPI)  16(112)-32(112) -48(28) mcg CtDv Inhale 16 mcg into the lungs 4 (four) times daily. (Patient taking differently: Inhale 16 mcg into the lungs 4 (four) times daily. Patient has not received medication  yet)     Family History       Problem Relation (Age of Onset)    Cancer Maternal Grandmother (60), Maternal Aunt (50), Maternal Aunt (66)    Cataracts Cousin    Diabetes Maternal Grandfather    Diverticulitis Mother    Heart disease Mother, Father (63)    Hypertension Father    Migraines Cousin    No Known Problems Brother    Peripheral vascular disease Maternal Grandfather    Stroke Mother, Sister, Maternal Grandfather          Tobacco Use    Smoking status: Never     Passive exposure: Past    Smokeless tobacco: Never   Substance and Sexual Activity    Alcohol use: Not Currently     Comment: last use 03/2022    Drug use: Not Currently     Frequency: 4.0 times per week     Types: Marijuana     Comment: last year was last use 03/2022    Sexual activity: Yes     Partners: Female     Review of Systems   Constitutional:  Positive for chills, diaphoresis and fatigue.   Respiratory:  Positive for cough, chest tightness and shortness of breath.    Cardiovascular:  Positive for chest pain.   Neurological:  Positive for headaches.   All other systems reviewed and are negative.    Objective:     Vital Signs (Most Recent):  Temp: 99.4 °F (37.4 °C) (11/01/23 1540)  Pulse: (!) 118 (11/01/23 1534)  Resp: (!) 26 (11/01/23 1600)  BP: 138/71 (11/01/23 1510)  SpO2: 100 % (11/01/23 1534) Vital Signs (24h Range):  Temp:  [99.1 °F (37.3 °C)-99.4 °F (37.4 °C)] 99.4 °F (37.4 °C)  Pulse:  [118-138] 118  Resp:  [16-28] 26  SpO2:  [54 %-100 %] 100 %  BP: (138-155)/(71-93) 138/71     Weight: 98.9 kg (218 lb)  Body mass index is 35.19 kg/m².     Physical Exam  Vitals and nursing note reviewed.   Constitutional:       General: She is in acute distress.      Appearance: She is overweight. She is ill-appearing.      Comments: Zohreh VERA:       Head: Normocephalic and atraumatic.      Right Ear: Hearing and external ear normal.      Left Ear: Hearing and external ear normal.      Nose: No rhinorrhea.      Right Sinus: No maxillary sinus tenderness or frontal sinus tenderness.      Left Sinus: No maxillary sinus tenderness or frontal sinus tenderness.      Mouth/Throat:      Mouth: No oral lesions.      Pharynx: Uvula midline.   Eyes:      General:         Right eye: No discharge.         Left eye: No discharge.      Conjunctiva/sclera: Conjunctivae normal.      Pupils: Pupils are equal, round, and reactive to light.   Neck:      Thyroid: No thyromegaly.      Vascular: No carotid bruit.      Trachea: No tracheal deviation.   Cardiovascular:      Rate and Rhythm: Regular rhythm. Tachycardia present.      Pulses:           Dorsalis pedis pulses are 2+ on the right side and 2+ on the left side.      Heart sounds: Normal heart sounds, S1 normal and S2 normal. No murmur heard.  Pulmonary:      Effort: Respiratory distress present.      Breath sounds: Examination of the right-lower field reveals rales. Examination of the left-lower field reveals rales. Rales present.   Abdominal:      General: Bowel sounds are decreased. There is distension.      Palpations: Abdomen is soft. There is no mass.      Tenderness: There is no abdominal tenderness.   Musculoskeletal:         General: Normal range of motion.      Cervical back: Normal range of motion.   Lymphadenopathy:      Cervical: No cervical adenopathy.      Upper Body:      Right upper body: No supraclavicular adenopathy.      Left upper body: No supraclavicular adenopathy.   Skin:     General: Skin is warm and dry.      Capillary Refill: Capillary refill takes less than 2 seconds.      Findings: No rash.   Neurological:      Mental Status: She is alert and oriented to person, place, and time.      Sensory: No sensory deficit.      Coordination: Coordination normal.      Gait: Gait normal.   Psychiatric:          Mood and Affect: Mood is not anxious or depressed.         Speech: Speech normal.         Behavior: Behavior normal.         Thought Content: Thought content normal.         Judgment: Judgment normal.              CRANIAL NERVES     CN III, IV, VI   Pupils are equal, round, and reactive to light.       Significant Labs: All pertinent labs within the past 24 hours have been reviewed.  CBC:   Recent Labs   Lab 11/01/23  1220   WBC 18.29*   HGB 12.4   HCT 40.9        CMP:   Recent Labs   Lab 11/01/23  1220      K 4.2      CO2 25   *   BUN 7   CREATININE 0.9   CALCIUM 9.9   PROT 7.7   ALBUMIN 3.2*   BILITOT 0.2   ALKPHOS 84   AST 30   ALT 31   ANIONGAP 18*     Cardiac Markers:   Recent Labs   Lab 11/01/23  1250   BNP 16     Lactic Acid:   Recent Labs   Lab 11/01/23  1250   LACTATE 2.6*     Troponin:   Recent Labs   Lab 11/01/23  1220 11/01/23  1450   TROPONINI 0.029* 0.030*       Significant Imaging: I have reviewed all pertinent imaging results/findings within the past 24 hours.

## 2023-11-01 NOTE — ED PROVIDER NOTES
Encounter Date: 11/1/2023    SCRIBE #1 NOTE: I, Kami Smith, am scribing for, and in the presence of,  Florentino Meek Jr., MD.. I have scribed the entire note.       History     Chief Complaint   Patient presents with    Shortness of Breath     Pt states shes been having shortness of breath since yesterday and shaking. Pt wares o2 at home and is on 8lpm NC right now.     HPI    53-year-old white female with a history of pulmonary fibrosis and pulmonary hypertension presenting with worsening shortness of breath over the past 48 hours.  The patient was in the hospital 2 weeks ago and since has been weaning off of steroids.  She is oxygen dependent at home usually at 8 liters/minute.  She was progressive shortness of breath with orthopnea.  There is no chest pain or pedal edema.  She denies any fevers or chills though now has a productive cough.  Patient was compliant with her medications.  Denies any calf pain or swelling      Review of patient's allergies indicates:   Allergen Reactions    Doxycycline Nausea Only     Other reaction(s): Nausea    Fluticasone Other (See Comments)     Other reaction(s): Epistaxis       Past Medical History:   Diagnosis Date    Abnormal Pap smear of cervix     in the past with repeat pap smear okay.    Acute interstitial pneumonitis     Allergic rhinitis, cause unspecified     Arthritis of both knees     Asthma     Eczema     Fatty liver 10/2014    Fibrocystic breast changes     Headache(784.0)     Hepatomegaly 10/2014    Hypertension     Liver cyst 10/2014    Multinodular goiter     Followed by ENT - Dr. Nicolas Gray    Polymenorrhea 2008    TMJ (dislocation of temporomandibular joint)     Uterine fibroid     in the past    Vitamin D deficiency disease      Past Surgical History:   Procedure Laterality Date    BRONCHOSCOPY Bilateral 4/5/2023    Procedure: Bronchoscopy - insert lighted tube into airway to take a biopsy of lung;  Surgeon: Agustín Aviles MD;  Location: [facility]  ENDO;  Service: Pulmonary;  Laterality: Bilateral;     SECTION, CLASSIC      x 1    COLONOSCOPY N/A 2020    Procedure: COLONOSCOPY;  Surgeon: Angie Magana MD;  Location: [facility] ENDO;  Service: Endoscopy;  Laterality: N/A;    HYSTERECTOMY      MULTIPLE TOOTH EXTRACTIONS      PARTIAL HYSTERECTOMY  2013    Due to fibroids     Family History   Problem Relation Age of Onset    Stroke Mother     Heart disease Mother     Diverticulitis Mother     Heart disease Father 63        MI/CAD    Hypertension Father     Stroke Sister     No Known Problems Brother     Cancer Maternal Grandmother 60        Rectal and stomach cancer    Stroke Maternal Grandfather     Diabetes Maternal Grandfather     Peripheral vascular disease Maternal Grandfather     Cancer Maternal Aunt 50        Stomach cancer    Cancer Maternal Aunt 66        Lung cancer (smoker)    Migraines Cousin     Cataracts Cousin      Social History     Tobacco Use    Smoking status: Never     Passive exposure: Past    Smokeless tobacco: Never   Substance Use Topics    Alcohol use: Not Currently     Comment: last use 2022    Drug use: Not Currently     Frequency: 4.0 times per week     Types: Marijuana     Comment: last year was last use 2022     Review of Systems   Constitutional:  Negative for chills and fever.   HENT:  Negative for congestion and rhinorrhea.    Respiratory:  Positive for cough, chest tightness, shortness of breath and wheezing.    Cardiovascular:  Negative for chest pain, palpitations and leg swelling.   Gastrointestinal:  Negative for abdominal pain, diarrhea, nausea and vomiting.       Physical Exam     Initial Vitals [23 1152]   BP Pulse Resp Temp SpO2   (!) 155/93 (!) 130 16 99.1 °F (37.3 °C) (!) 54 %      MAP       --         Physical Exam  Nursing Notes and Vital Signs Reviewed.  Constitutional: Patient is in moderate distress. Well-developed and well-nourished.  Head: Atraumatic. Normocephalic.  Eyes:  EOM  intact.  No scleral icterus.  ENT: Mucous membranes are moist.  Nares clear   Neck:  Full ROM. No JVD.  Cardiovascular: Regular rate. Regular rhythm No murmurs, rubs, or gallops. Distal pulses are 2+ and symmetric  Pulmonary/Chest:  Patient is tachypneic.  There is wheezing in all lung fields.  Accessory muscle use present.  Abdominal: Soft and non-distended.  There is no tenderness.  No rebound, guarding, or rigidity. Good bowel sounds.  Genitourinary: No CVA tenderness.  No suprapubic tenderness  Musculoskeletal: Moves all extremities. No obvious deformities.  5 x 5 strength in all extremities   Skin: Warm and dry.  Neurological:  Alert, awake, and appropriate.  Normal speech.  No acute focal neurological deficits are appreciated.  Two through 12 intact bilaterally.  Psychiatric: Normal affect. Good eye contact. Appropriate in content.    ED Course   Critical Care    Date/Time: 11/1/2023 12:03 PM    Performed by: Florentino Meek Jr., MD  Authorized by: Florentino Meek Jr., MD  Direct patient critical care time: 12 minutes  Additional history critical care time: 5 minutes  Ordering / reviewing critical care time: 8 minutes  Documentation critical care time: 10 minutes  Consulting other physicians critical care time: 5 minutes  Consult with family critical care time: 5 minutes  Total critical care time (exclusive of procedural time) : 45 minutes  Critical care time was exclusive of separately billable procedures and treating other patients and teaching time.  Critical care was necessary to treat or prevent imminent or life-threatening deterioration of the following conditions: respiratory failure.  Critical care was time spent personally by me on the following activities: development of treatment plan with patient or surrogate, discussions with consultants, interpretation of cardiac output measurements, evaluation of patient's response to treatment, examination of patient, obtaining history from patient or  surrogate, ordering and performing treatments and interventions, ordering and review of laboratory studies, ordering and review of radiographic studies, pulse oximetry, re-evaluation of patient's condition and review of old charts.        Labs Reviewed   INFLUENZA A & B BY MOLECULAR - Abnormal; Notable for the following components:       Result Value    Influenza A, Molecular Positive (*)     All other components within normal limits   CBC W/ AUTO DIFFERENTIAL - Abnormal; Notable for the following components:    WBC 18.29 (*)      (*)     MCHC 30.3 (*)     RDW 16.9 (*)     Immature Granulocytes 0.7 (*)     Gran # (ANC) 15.2 (*)     Immature Grans (Abs) 0.12 (*)     Eos # 0.6 (*)     Gran % 83.3 (*)     Lymph % 6.8 (*)     All other components within normal limits    Narrative:     Release to patient->Immediate   COMPREHENSIVE METABOLIC PANEL - Abnormal; Notable for the following components:    Glucose 121 (*)     Albumin 3.2 (*)     Anion Gap 18 (*)     All other components within normal limits    Narrative:     Release to patient->Immediate   TROPONIN I - Abnormal; Notable for the following components:    Troponin I 0.029 (*)     All other components within normal limits    Narrative:     Release to patient->Immediate   LACTIC ACID, PLASMA - Abnormal; Notable for the following components:    Lactate (Lactic Acid) 2.6 (*)     All other components within normal limits   ISTAT PROCEDURE - Abnormal; Notable for the following components:    POC PH 7.465 (*)     POC PO2 62 (*)     POC BE 3 (*)     All other components within normal limits   CULTURE, BLOOD   CULTURE, BLOOD   B-TYPE NATRIURETIC PEPTIDE    Narrative:     Release to patient->Immediate   B-TYPE NATRIURETIC PEPTIDE   SARS-COV-2 RNA AMPLIFICATION, QUAL   TROPONIN I   HIV 1 / 2 ANTIBODY   HEPATITIS C ANTIBODY   HEP C VIRUS HOLD SPECIMEN        ECG Results              EKG 12-lead (In process)  Result time 11/01/23 12:41:01      In process by Interface,  Lab In [facility] (11/01/23 12:41:01)                   Narrative:    Test Reason : R07.9,    Vent. Rate : 139 BPM     Atrial Rate : 139 BPM     P-R Int : 154 ms          QRS Dur : 080 ms      QT Int : 256 ms       P-R-T Axes : 049 225 060 degrees     QTc Int : 389 ms    Sinus tachycardia  Possible Left atrial enlargement  Possible Right ventricular hypertrophy  Inferior infarct ,age undetermined  Anterolateral infarct (cited on or before 27-APR-2022)  Abnormal ECG  When compared with ECG of 11-OCT-2023 11:35,  Inferior infarct is now Present    Referred By: LAYLA   SELF           Confirmed By:                                   Imaging Results              X-Ray Chest AP Portable (Final result)  Result time 11/01/23 12:47:38      Final result by Leodan Encarnacion III, MD (11/01/23 12:47:38)                   Impression:      No interval change      Electronically signed by: Connor Encarnacion  Date:    11/01/2023  Time:    12:47               Narrative:    EXAMINATION:  XR CHEST AP PORTABLE    CLINICAL HISTORY:  Chest Pain;    TECHNIQUE:  Single frontal view of the chest was performed.    COMPARISON:  10/16/2023    FINDINGS:  Right PICC line is unchanged.  The cardiac silhouette and mediastinum are unchanged.    Unchanged bilateral pulmonary opacities suggesting vascular congestion, infectious or inflammatory process.                                       Medications   morphine injection 2 mg (has no administration in time range)   benzonatate capsule 200 mg (has no administration in time range)   docusate sodium capsule 100 mg (has no administration in time range)   magnesium oxide tablet 400 mg (has no administration in time range)   montelukast tablet 10 mg (has no administration in time range)   pantoprazole EC tablet 40 mg (has no administration in time range)   predniSONE tablet 10 mg (has no administration in time range)   sodium chloride 0.9% flush 10 mL (has no administration in time range)    melatonin tablet 6 mg (has no administration in time range)   ondansetron injection 4 mg (has no administration in time range)   prochlorperazine injection Soln 5 mg (has no administration in time range)   polyethylene glycol packet 17 g (has no administration in time range)   aluminum-magnesium hydroxide-simethicone 200-200-20 mg/5 mL suspension 30 mL (has no administration in time range)   acetaminophen tablet 650 mg (has no administration in time range)   naloxone 0.4 mg/mL injection 0.02 mg (has no administration in time range)   glucose chewable tablet 16 g (has no administration in time range)   glucose chewable tablet 24 g (has no administration in time range)   glucagon (human recombinant) injection 1 mg (has no administration in time range)   enoxaparin injection 40 mg (has no administration in time range)   HYDROcodone-acetaminophen 5-325 mg per tablet 1 tablet (has no administration in time range)   dextrose 10% bolus 125 mL 125 mL (has no administration in time range)   dextrose 10% bolus 250 mL 250 mL (has no administration in time range)   sodium chloride 0.9% bolus 1,000 mL 1,000 mL (1,000 mLs Intravenous New Bag 11/1/23 1312)   methylPREDNISolone sodium succinate injection 125 mg (125 mg Intravenous Given 11/1/23 1301)   albuterol-ipratropium 2.5 mg-0.5 mg/3 mL nebulizer solution 3 mL (3 mLs Nebulization Given 11/1/23 1218)   terbutaline injection 0.25 mg (0.25 mg Subcutaneous Given 11/1/23 1300)     Medical Decision Making  Differential diagnosis:  CHF, respiratory failure, hypoxia, pneumonia, noncompliance with medication    53-year-old  female oxygen-dependent at 8 L at home due to pulmonary fibrosis and interstitial lung disease.  Patient has had worsening shortness of breath. Sats were in the upper 70s on arrival.  This is improved with the mid 90s with Vapotherm. She is stable on Vapotherm. Underlying issue is influenza type A.     Amount and/or Complexity of Data  Reviewed  Independent Historian: spouse  External Data Reviewed: labs, radiology, ECG and notes.  Labs: ordered. Decision-making details documented in ED Course.  Radiology: ordered. Decision-making details documented in ED Course.  ECG/medicine tests: ordered. Decision-making details documented in ED Course.  Discussion of management or test interpretation with external provider(s): Discussed the case with both the critical care team and Hospital Medicine.  She is been evaluated in the ED by critical care hospital Medicine will be admitting to the floor.    Risk  OTC drugs.  Prescription drug management.  Drug therapy requiring intensive monitoring for toxicity.  Decision regarding hospitalization.  Diagnosis or treatment significantly limited by social determinants of health.  Risk Details: Oxygen dependent at home    Critical Care  Total time providing critical care: 45 minutes       ED Discussion:    2:01 PM: Discussed case with Dr. Aviles who will visit pt at bedside.    2:21 PM: Cullen Fisher NP walked pt's vapotherm to 20 and 50%.    1:44 PM: Discussed case with Dr. Aviles (Pulmonary Medicine). Dr. Aviles agrees with current care and management of pt and accepts admission.   Admitting Service: Pulmonary Medicine  Admitting Physician: Dr. Aviles  Admit to: inpatient      1:44 PM: Re-evaluated pt. I have discussed test results, shared treatment plan, and the need for admission with patient and family at bedside. Pt and family express understanding at this time and agree with all information. All questions answered. Pt and family have no further questions or concerns at this time. Pt is ready for admit.          Attending Attestation:           Physician Attestation for Scribe:  Physician Attestation Statement for Scribe #1: I, Florentino Meek Jr., MD., reviewed documentation, as scribed by Kami Smith in my presence, and it is both accurate and complete.                             Clinical  Impression:   Final diagnoses:  [R07.9] Chest pain  [J96.21] Acute on chronic respiratory failure with hypoxia (Primary)  [J10.1] Influenza A  [E87.20] Lactic acidosis  [R79.89] Troponin level elevated        ED Disposition Condition    Admit Stable                Florentino Meek Jr., MD  11/01/23 6401

## 2023-11-01 NOTE — CONSULTS
[facility] - Emergency Dept.  Pulmonology  Consult Note    Patient Name: Ayanna Alicia  MRN: [id number]  Admission Date: 11/1/2023  Hospital Length of Stay: 0 days  Code Status: Full Code  Attending Physician: Florentino Meek Jr., MD  Primary Care Provider: Madeleine Enrique MD   Principal Problem: <principal problem not specified>    [unfilled]  Subjective:     HPI:  Ayanna Alicia is 53 y.o.  Known to pulmonary with advanced ILD, Pul HTN WHO grp 3  Evaluation for lung transplant [facility]  Recent hospitalization for resp exacerbation discharged on micafungin ( complteted), Ceftriaxone ( active), metronidazole ( completed ) for actinomyces in setting of chr steriod use  She is on POC 6-8 LPM, bronchodilators Albuterol, Serevent and Yulperi  Has chr stable loose stolls while on OFEV  Actemra infusions on hold due to infection  Has PulHTN and TYVASO pending   Prednisone 10 mg on weaning  Also has Home NIV , in pul rehab  Present with increased oxygen demands, weakness, desaturation, low grade fever  Tested +ve for influenza A  Denies fever, chest pain      Past Medical History:   Diagnosis Date    Abnormal Pap smear of cervix     in the past with repeat pap smear okay.    Acute interstitial pneumonitis     Allergic rhinitis, cause unspecified     Arthritis of both knees     Asthma     Eczema     Fatty liver 10/2014    Fibrocystic breast changes     Headache(784.0)     Hepatomegaly 10/2014    Hypertension     Liver cyst 10/2014    Multinodular goiter     Followed by ENT - Dr. Nicolas Gray    Polymenorrhea 2008    TMJ (dislocation of temporomandibular joint)     Uterine fibroid     in the past    Vitamin D deficiency disease        Past Surgical History:   Procedure Laterality Date    BRONCHOSCOPY Bilateral 4/5/2023    Procedure: Bronchoscopy - insert lighted tube into airway to take a biopsy of lung;  Surgeon: Agustín Aviles MD;  Location: [facility];  Service: Pulmonary;   Laterality: Bilateral;     SECTION, CLASSIC      x 1    COLONOSCOPY N/A 2020    Procedure: COLONOSCOPY;  Surgeon: Angie Magana MD;  Location: [facility];  Service: Endoscopy;  Laterality: N/A;    HYSTERECTOMY      MULTIPLE TOOTH EXTRACTIONS      PARTIAL HYSTERECTOMY  2013    Due to fibroids       Review of patient's allergies indicates:   Allergen Reactions    Doxycycline Nausea Only     Other reaction(s): Nausea    Fluticasone Other (See Comments)     Other reaction(s): Epistaxis         Family History       Problem Relation (Age of Onset)    Cancer Maternal Grandmother (60), Maternal Aunt (50), Maternal Aunt (66)    Cataracts Cousin    Diabetes Maternal Grandfather    Diverticulitis Mother    Heart disease Mother, Father (63)    Hypertension Father    Migraines Cousin    No Known Problems Brother    Peripheral vascular disease Maternal Grandfather    Stroke Mother, Sister, Maternal Grandfather          Tobacco Use    Smoking status: Never     Passive exposure: Past    Smokeless tobacco: Never   Substance and Sexual Activity    Alcohol use: Not Currently     Comment: last use 2022    Drug use: Not Currently     Frequency: 4.0 times per week     Types: Marijuana     Comment: last year was last use 2022    Sexual activity: Yes     Partners: Female         Review of Systems   Constitutional:  Positive for fatigue.   Respiratory:  Positive for shortness of breath.    Neurological:  Positive for weakness.   All other systems reviewed and are negative.    Objective:     Vital Signs (Most Recent):  Temp: 99.4 °F (37.4 °C) (23 1540)  Pulse: (!) 118 (23 1534)  Resp: (!) 24 (23 1534)  BP: 138/71 (23 1510)  SpO2: 100 % (23 1534) Vital Signs (24h Range):  Temp:  [99.1 °F (37.3 °C)-99.4 °F (37.4 °C)] 99.4 °F (37.4 °C)  Pulse:  [118-138] 118  Resp:  [16-28] 24  SpO2:  [54 %-100 %] 100 %  BP: (138-155)/(71-93) 138/71     Weight: 98.9 kg (218 lb)  Body mass  index is 35.19 kg/m².    No intake or output data in the 24 hours ending 11/01/23 1547     Physical Exam  Vitals and nursing note reviewed.   Constitutional:       Appearance: She is well-developed. She is obese. She is ill-appearing.      Interventions: Nasal cannula in place.       HENT:      Head: Normocephalic and atraumatic.      Nose: Nose normal.      Mouth/Throat:      Pharynx: No oropharyngeal exudate.   Eyes:      General: No scleral icterus.     Conjunctiva/sclera: Conjunctivae normal.      Pupils: Pupils are equal, round, and reactive to light.   Neck:      Thyroid: No thyromegaly.      Vascular: No JVD.      Trachea: No tracheal deviation.   Cardiovascular:      Rate and Rhythm: Normal rate and regular rhythm.      Heart sounds: Normal heart sounds, S1 normal and S2 normal. No murmur heard.  Pulmonary:      Effort: Pulmonary effort is normal. No tachypnea, accessory muscle usage or respiratory distress.      Breath sounds: Normal breath sounds. No stridor.   Abdominal:      General: Bowel sounds are normal. There is no distension.      Palpations: Abdomen is soft. There is no hepatomegaly, splenomegaly or mass.      Tenderness: There is no abdominal tenderness. There is no guarding or rebound.   Musculoskeletal:         General: No tenderness. Normal range of motion.      Cervical back: Normal range of motion and neck supple.   Lymphadenopathy:      Upper Body:      Right upper body: No supraclavicular adenopathy.      Left upper body: No supraclavicular adenopathy.   Skin:     General: Skin is warm and dry.      Findings: No rash.      Nails: There is no clubbing.   Neurological:      Mental Status: She is alert and oriented to person, place, and time.      Coordination: Coordination normal.      Gait: Gait normal.      Deep Tendon Reflexes: Reflexes are normal and symmetric.          Vents:  Oxygen Concentration (%): (S) 90 (11/01/23 1500)    Lines/Drains/Airways       Peripherally Inserted Central  "Catheter Line  Duration             PICC Double Lumen 10/13/23 1627 right basilic 18 days              Peripheral Intravenous Line  Duration                  Peripheral IV - Single Lumen 11/01/23 1205 18 G Left Antecubital <1 day                    Significant Labs:    CBC/Anemia Profile:  Recent Labs   Lab 11/01/23  1220   WBC 18.29*   HGB 12.4   HCT 40.9      *   RDW 16.9*        Chemistries:  Recent Labs   Lab 11/01/23  1220      K 4.2      CO2 25   BUN 7   CREATININE 0.9   CALCIUM 9.9   ALBUMIN 3.2*   PROT 7.7   BILITOT 0.2   ALKPHOS 84   ALT 31   AST 30       ABGs:   Recent Labs   Lab 11/01/23  1229   PH 7.465*   PCO2 37.3   HCO3 26.9   POCSATURATED 93   BE 3*     Blood Culture: No results for input(s): "LABBLOO" in the last 48 hours.  Respiratory Culture: No results for input(s): "GSRESP", "RESPIRATORYC" in the last 48 hours.  All pertinent labs within the past 24 hours have been reviewed.    Significant Imaging:   I have reviewed all pertinent imaging results/findings within the past 24 hours.    X-Ray Chest AP Portable  Narrative: EXAMINATION:  XR CHEST AP PORTABLE    CLINICAL HISTORY:  Chest Pain;    TECHNIQUE:  Single frontal view of the chest was performed.    COMPARISON:  10/16/2023    FINDINGS:  Right PICC line is unchanged.  The cardiac silhouette and mediastinum are unchanged.    Unchanged bilateral pulmonary opacities suggesting vascular congestion, infectious or inflammatory process.  Impression: No interval change    Electronically signed by: Connor Encarnacion  Date:    11/01/2023  Time:    12:47         ABG  Recent Labs   Lab 11/01/23  1229   PH 7.465*   PO2 62*   PCO2 37.3   HCO3 26.9   BE 3*     Assessment/Plan:     ENT  Dysphonia due to laryngeal ulcers  Avoid steriods  On CEFTRIXONE for actinomyces infection    Pulmonary  Acute on chronic respiratory failure with hypoxia  Patient with Hypoxic Respiratory failure which is Acute on chronic.  she is on home oxygen at 8 " LPM. Supplemental oxygen was provided and noted- Oxygen Concentration (%):  [] 90     ON VAPOTHERM WEAN AS TOLERATED  .   Signs/symptoms of respiratory failure include- increased work of breathing. Contributing diagnoses includes - Pneumonia and VIRAL PNEUMONIA Labs and images were reviewed. Patient Has recent ABG, which has been reviewed. Will treat underlying causes and adjust management of respiratory failure as follows-      · Wean VAPOTHERM per PROTOCOL  · Nocturnal NIPPV/AVAPS on home settings  · Bronchodilators  · Lasix    Interstitial lung disease  ON OFEV    ID  Influenza A with respiratory manifestations  tamiflu  Airborne droplet isolation    Actinomyces infection  Complete treatment plan per ID  CEFTRIAXONE  Has PICC        Thank you for your consult. I will follow-up with patient. Please contact us if you have any additional questions.     Agustín Aviles MD  Pulmonology  [facility] - Emergency Dept.

## 2023-11-01 NOTE — ASSESSMENT & PLAN NOTE
Patient with Hypoxic Respiratory failure which is Acute on chronic.  she is on home oxygen at 8 LPM. Supplemental oxygen was provided and noted- Oxygen Concentration (%):  [] 90     ON VAPOTHERM WEAN AS TOLERATED  .   Signs/symptoms of respiratory failure include- increased work of breathing. Contributing diagnoses includes - Pneumonia and VIRAL PNEUMONIA Labs and images were reviewed. Patient Has recent ABG, which has been reviewed. Will treat underlying causes and adjust management of respiratory failure as follows-      · Wean VAPOTHERM per PROTOCOL  · Nocturnal NIPPV/AVAPS on home settings  · Bronchodilators  · Lasix  · PO prednisone

## 2023-11-01 NOTE — RESPIRATORY THERAPY
Called by RN for sat in the 70s. Pt found on 20 L 50 % and spo2 70%. Pt increased to 20L 100% and pt now with spo2 95% and feeling better.

## 2023-11-01 NOTE — ASSESSMENT & PLAN NOTE
Patient with Hypoxic Respiratory failure which is Acute on chronic.  she is on home oxygen at 8 LPM. Supplemental oxygen was provided and noted- Oxygen Concentration (%):  [] 90    .   Signs/symptoms of respiratory failure include- tachypnea, increased work of breathing and respiratory distress. Contributing diagnoses includes - CHF and Interstitial lung disease Labs and images were reviewed. Patient Has recent ABG, which has been reviewed. Will treat underlying causes and adjust management of respiratory failure as follows-     --Vapotherm  --Consult Pulm  --Cont home meds where possible  --JESSI  --Bipap @ HS  --Avoid steroid    11/2:  Cont duonebs   brovana budesonide nebs  Resume home lasix   Wean o2 as tolerated  Will check procal and d dimer

## 2023-11-01 NOTE — ASSESSMENT & PLAN NOTE
Managed with OFEV as OP, followed by Pulmonology as OP    --Cont home regimen, request family to bring meds not available on formulary

## 2023-11-01 NOTE — HPI
"Ayanna Alicia is 53 year-old female known to pulmonary service with advanced ILD and pulmonary hypertension WHO grp 3 who is undergoing evaluation for lung transplant [facility]. Recent hospitalization for acute ILD exacerbation discharged on micafungin ( complteted), Ceftriaxone ( active), metronidazole ( completed ) for actinomyces in setting of chronic steriod use. She is on home oxygen of 6-8 LPM and bronchodilators Albuterol, Serevent and Yulperi. Has chronic stable loose stools while on OFEV. Actemra infusions on hold due to infection. Has pulmonary hypertension and TYVASO pending. Prednisone 10 mg on weaning. Also has home NIPPV and pulmonary rehab. Patient presented with increased oxygen demands, weakness, desaturation, low grade fever. Tested + for influenza A. Denied fever or chest pain at time of presentation.    Interval History:  11/2: no new issues or c/o noted, OOB standing at the sink brushing her teeth upon mu arrival to her room this AM, some SOB when returned to bed, vapotherm 20L 50%  11/3: pt reports a "rough night" with coughing and desats that required increased O2 support, c/o cough and feelings of "air hunger" despite good sats, also with reflux   11/4: down to 20L 40%, dose of lasix IV this AM, continues with HA, no family at bedside   11/5: remains at 20L and 40% on vapotherm, dose lasix IV again this AM, wondering if she'll be able to make her Thursday appt in [place], no new issues noted   11/6 on vapotherm 20/50 , dry cough dyspnea on exertion  11/10: Sitting up in bed this afternoon. Currently on Vapotherm 15/0.60. Patient reports desats when oxygen decreased to 0.50. Nonproductive dry cough. Patient concerned with blood loss and decrease in H&H trends. Discussed blood transfusions and patient advised transfusion not needed at this time and we would monitor the trends closely. Monitoring for acute blood loss and FOBT pending.   "

## 2023-11-01 NOTE — HPI
53-year-old white female with a history of pulmonary fibrosis, pulmonary HTN, RA, Actinomyces infection, COPD, GLENN. Presented with worsening shortness of breath over the past 48 hours.  The patient was in the hospital 2 weeks ago and since has been weaning off of steroids.  She is oxygen dependent at home usually at 8 liters/minute.  She was progressive shortness of breath with orthopnea.  There is no chest pain or pedal edema.  She denies any fevers or chills though now has a productive cough.  Patient was compliant with her medications.  In the ED, vitals: 155/93, 130, 16, 99.1, 54% RA. Placed on vapotherm in ED. Labs: WBC: 18.29, A, Lactic: 2.6, Trop; 0.029, +FluA, CXR: unchanged. EKG: Neg for STEMI. Treated with IV fluids, Steroid, Nebs. Vapotherm set to 20L @ 90% FiO2. Patient is a full code. Admitted to Hospital Medicine for management of Acute on Chronic Resp Failure, Influenza A.

## 2023-11-01 NOTE — H&P
[facility] - Emergency Dept.  [facility] Medicine  History & Physical    Patient Name: Ayanna Alicia  MRN: [id number]  Patient Class: IP- Inpatient  Admission Date: 2023  Attending Physician: Edith Barbosa MD   Primary Care Provider: Madeleine Enrique MD         Patient information was obtained from patient, relative(s), past medical records and ER records.     Subjective:     Principal Problem:Acute on chronic respiratory failure with hypoxia    Chief Complaint:   Chief Complaint   Patient presents with    Shortness of Breath     Pt states shes been having shortness of breath since yesterday and shaking. Pt wares o2 at home and is on 8lpm NC right now.        HPI: 53-year-old white female with a history of pulmonary fibrosis, pulmonary HTN, RA, Actinomyces infection, COPD, GLENN. Presented with worsening shortness of breath over the past 48 hours.  The patient was in the hospital 2 weeks ago and since has been weaning off of steroids.  She is oxygen dependent at home usually at 8 liters/minute.  She was progressive shortness of breath with orthopnea.  There is no chest pain or pedal edema.  She denies any fevers or chills though now has a productive cough.  Patient was compliant with her medications.  In the ED, vitals: 155/93, 130, 16, 99.1, 54% RA. Placed on vapotherm in ED. Labs: WBC: 18.29, A, Lactic: 2.6, Trop; 0.029, +FluA, CXR: unchanged. EKG: Neg for STEMI. Treated with IV fluids, Steroid, Nebs. Vapotherm set to 20L @ 90% FiO2. Patient is a full code. Admitted to Hospital Medicine for management of Acute on Chronic Resp Failure, Influenza A.       Past Medical History:   Diagnosis Date    Abnormal Pap smear of cervix     in the past with repeat pap smear okay.    Acute interstitial pneumonitis     Allergic rhinitis, cause unspecified     Arthritis of both knees     Asthma     Eczema     Fatty liver 10/2014    Fibrocystic breast changes     Headache(784.0)     Hepatomegaly 10/2014    Hypertension      Liver cyst 10/2014    Multinodular goiter     Followed by ENT - Dr. Nicolas Gray    Polymenorrhea     TMJ (dislocation of temporomandibular joint)     Uterine fibroid     in the past    Vitamin D deficiency disease        Past Surgical History:   Procedure Laterality Date    BRONCHOSCOPY Bilateral 2023    Procedure: Bronchoscopy - insert lighted tube into airway to take a biopsy of lung;  Surgeon: Agustín Aviles MD;  Location: [facility] ENDO;  Service: Pulmonary;  Laterality: Bilateral;     SECTION, CLASSIC      x 1    COLONOSCOPY N/A 2020    Procedure: COLONOSCOPY;  Surgeon: Angie Magana MD;  Location: [facility] ENDO;  Service: Endoscopy;  Laterality: N/A;    HYSTERECTOMY      MULTIPLE TOOTH EXTRACTIONS      PARTIAL HYSTERECTOMY  2013    Due to fibroids       Review of patient's allergies indicates:   Allergen Reactions    Doxycycline Nausea Only     Other reaction(s): Nausea    Fluticasone Other (See Comments)     Other reaction(s): Epistaxis         Current Facility-Administered Medications on File Prior to Encounter   Medication    0.9%  NaCl infusion     Current Outpatient Medications on File Prior to Encounter   Medication Sig    acetaminophen (TYLENOL) 500 MG tablet Take 500 mg by mouth every 6 (six) hours as needed for Pain. Only use when needed    acidophilus-pectin, citrus 100 million cell-10 mg Cap Take 1 capsule by mouth once daily.    albuterol (PROVENTIL/VENTOLIN HFA) 90 mcg/actuation inhaler INHALE 1 TO 2 PUFFS BY MOUTH INTO THE LUNGS EVERY 4 TO 6 HOURS AS NEEDED FOR WHEEZING OR SHORTNESS OF BREATH    docusate sodium (COLACE) 100 MG capsule Take 100 mg by mouth 2 (two) times daily.    fluticasone (VERAMYST) 27.5 mcg/actuation nasal spray 2 sprays by Nasal route once daily.    furosemide (LASIX) 40 MG tablet Take 1 tablet (40 mg total) by mouth once daily.    ibuprofen (ADVIL,MOTRIN) 200 MG tablet Take 200 mg by mouth every 6 (six) hours as needed for Pain.     levocetirizine (XYZAL) 5 MG tablet TAKE 1 TABLET(5 MG) BY MOUTH EVERY DAY    magnesium oxide (MAG-OX) 400 mg (241.3 mg magnesium) tablet Take 1 tablet (400 mg total) by mouth once daily.    montelukast (SINGULAIR) 10 mg tablet Take 1 tablet (10 mg total) by mouth every evening.    mv-minerals/FA/omega 3,6,9 #3 (WOMEN'S 50+ ADVANCED ORAL) Take 1 tablet by mouth Daily.    nintedanib (OFEV) 150 mg Cap Take 1 capsule (150 mg total) by mouth 2 (two) times a day.    omega-3s/dha/epa/fish oil/D3 (VITAMIN-D + OMEGA-3 ORAL) Take 2 tablets by mouth once daily at 6am.    omeprazole (PRILOSEC) 20 MG capsule Take 1 capsule (20 mg total) by mouth once daily.    potassium chloride SA (K-DUR,KLOR-CON) 20 MEQ tablet Take 1 tablet (20 mEq total) by mouth once daily.    predniSONE (DELTASONE) 10 MG tablet Take 1 tablet (10 mg total) by mouth once daily.    SEREVENT DISKUS 50 mcg/dose diskus inhaler Inhale 1 puff into the lungs 2 (two) times daily. Controller    vitamin D (VITAMIN D3) 1000 units Tab Take 1,000 Units by mouth once daily.    benzonatate (TESSALON) 200 MG capsule Take 200 mg by mouth 3 (three) times daily as needed for Cough.    dextrose 5 % in water (D5W) PgBk 100 mL with cefTRIAXone 2 gram SolR 2 g Inject 2 g into the vein once daily.    pantoprazole (PROTONIX) 40 MG tablet Take 1 tablet (40 mg total) by mouth once daily. (Patient not taking: Reported on 11/1/2023)    revefenacin 175 mcg/3 mL Nebu Inhale 175 mcg into the lungs once daily. (Patient taking differently: Inhale 175 mcg into the lungs once daily. Patient has not received medication yet)    treprostiniL (TYVASO DPI) 16(112)-32(112) -48(28) mcg CtDv Inhale 16 mcg into the lungs 4 (four) times daily. (Patient taking differently: Inhale 16 mcg into the lungs 4 (four) times daily. Patient has not received medication  yet)     Family History       Problem Relation (Age of Onset)    Cancer Maternal Grandmother (60), Maternal Aunt (50), Maternal Aunt (66)     Cataracts Cousin    Diabetes Maternal Grandfather    Diverticulitis Mother    Heart disease Mother, Father (63)    Hypertension Father    Migraines Cousin    No Known Problems Brother    Peripheral vascular disease Maternal Grandfather    Stroke Mother, Sister, Maternal Grandfather          Tobacco Use    Smoking status: Never     Passive exposure: Past    Smokeless tobacco: Never   Substance and Sexual Activity    Alcohol use: Not Currently     Comment: last use 03/2022    Drug use: Not Currently     Frequency: 4.0 times per week     Types: Marijuana     Comment: last year was last use 03/2022    Sexual activity: Yes     Partners: Female     Review of Systems   Constitutional:  Positive for chills, diaphoresis and fatigue.   Respiratory:  Positive for cough, chest tightness and shortness of breath.    Cardiovascular:  Positive for chest pain.   Neurological:  Positive for headaches.   All other systems reviewed and are negative.    Objective:     Vital Signs (Most Recent):  Temp: 99.4 °F (37.4 °C) (11/01/23 1540)  Pulse: (!) 118 (11/01/23 1534)  Resp: (!) 26 (11/01/23 1600)  BP: 138/71 (11/01/23 1510)  SpO2: 100 % (11/01/23 1534) Vital Signs (24h Range):  Temp:  [99.1 °F (37.3 °C)-99.4 °F (37.4 °C)] 99.4 °F (37.4 °C)  Pulse:  [118-138] 118  Resp:  [16-28] 26  SpO2:  [54 %-100 %] 100 %  BP: (138-155)/(71-93) 138/71     Weight: 98.9 kg (218 lb)  Body mass index is 35.19 kg/m².     Physical Exam  Vitals and nursing note reviewed.   Constitutional:       General: She is in acute distress.      Appearance: She is overweight. She is ill-appearing.      Comments: Vapotherm   HENT:      Head: Normocephalic and atraumatic.      Right Ear: Hearing and external ear normal.      Left Ear: Hearing and external ear normal.      Nose: No rhinorrhea.      Right Sinus: No maxillary sinus tenderness or frontal sinus tenderness.      Left Sinus: No maxillary sinus tenderness or frontal sinus tenderness.      Mouth/Throat:      Mouth:  No oral lesions.      Pharynx: Uvula midline.   Eyes:      General:         Right eye: No discharge.         Left eye: No discharge.      Conjunctiva/sclera: Conjunctivae normal.      Pupils: Pupils are equal, round, and reactive to light.   Neck:      Thyroid: No thyromegaly.      Vascular: No carotid bruit.      Trachea: No tracheal deviation.   Cardiovascular:      Rate and Rhythm: Regular rhythm. Tachycardia present.      Pulses:           Dorsalis pedis pulses are 2+ on the right side and 2+ on the left side.      Heart sounds: Normal heart sounds, S1 normal and S2 normal. No murmur heard.  Pulmonary:      Effort: Respiratory distress present.      Breath sounds: Examination of the right-lower field reveals rales. Examination of the left-lower field reveals rales. Rales present.   Abdominal:      General: Bowel sounds are decreased. There is distension.      Palpations: Abdomen is soft. There is no mass.      Tenderness: There is no abdominal tenderness.   Musculoskeletal:         General: Normal range of motion.      Cervical back: Normal range of motion.   Lymphadenopathy:      Cervical: No cervical adenopathy.      Upper Body:      Right upper body: No supraclavicular adenopathy.      Left upper body: No supraclavicular adenopathy.   Skin:     General: Skin is warm and dry.      Capillary Refill: Capillary refill takes less than 2 seconds.      Findings: No rash.   Neurological:      Mental Status: She is alert and oriented to person, place, and time.      Sensory: No sensory deficit.      Coordination: Coordination normal.      Gait: Gait normal.   Psychiatric:         Mood and Affect: Mood is not anxious or depressed.         Speech: Speech normal.         Behavior: Behavior normal.         Thought Content: Thought content normal.         Judgment: Judgment normal.              CRANIAL NERVES     CN III, IV, VI   Pupils are equal, round, and reactive to light.       Significant Labs: All pertinent labs  within the past 24 hours have been reviewed.  CBC:   Recent Labs   Lab 11/01/23  1220   WBC 18.29*   HGB 12.4   HCT 40.9        CMP:   Recent Labs   Lab 11/01/23  1220      K 4.2      CO2 25   *   BUN 7   CREATININE 0.9   CALCIUM 9.9   PROT 7.7   ALBUMIN 3.2*   BILITOT 0.2   ALKPHOS 84   AST 30   ALT 31   ANIONGAP 18*     Cardiac Markers:   Recent Labs   Lab 11/01/23  1250   BNP 16     Lactic Acid:   Recent Labs   Lab 11/01/23  1250   LACTATE 2.6*     Troponin:   Recent Labs   Lab 11/01/23  1220 11/01/23  1450   TROPONINI 0.029* 0.030*       Significant Imaging: I have reviewed all pertinent imaging results/findings within the past 24 hours.    Assessment/Plan:     * Acute on chronic respiratory failure with hypoxia  Patient with Hypoxic Respiratory failure which is Acute on chronic.  she is on home oxygen at 8 LPM. Supplemental oxygen was provided and noted- Oxygen Concentration (%):  [] 90    .   Signs/symptoms of respiratory failure include- tachypnea, increased work of breathing and respiratory distress. Contributing diagnoses includes - CHF and Interstitial lung disease Labs and images were reviewed. Patient Has recent ABG, which has been reviewed. Will treat underlying causes and adjust management of respiratory failure as follows-     --Vapotherm  --Consult Pulm  --Cont home meds where possible  --JESSI  --Bipap @ HS  --Avoid steroids    Influenza A with respiratory manifestations  +FluA on admssion    --Airborne/droplet isolation  --Tamiflu      Actinomyces infection  Followed by ID as OP    --cont Ceftriaxone  --PICC line care       Dysphonia due to laryngeal ulcers    --avoid steroids per Pulmonology due to actinomyces infection  --throat spray prn      Elevated troponin  Likely secondary to demand ischemia, hypoxia, resp failure, fever, infuenza    --trend trop  --tele      Interstitial lung disease  Managed with OFEV as OP, followed by Pulmonology as OP    --Cont home  regimen, request family to bring meds not available on formulary      HTN (hypertension)  Chronic, controlled. Latest blood pressure and vitals reviewed-     Temp:  [99.1 °F (37.3 °C)-99.4 °F (37.4 °C)]   Pulse:  [118-138]   Resp:  [16-28]   BP: (138-155)/(71-93)   SpO2:  [54 %-100 %] .   Home meds for hypertension were reviewed and noted below.   Hypertension Medications               furosemide (LASIX) 40 MG tablet Take 1 tablet (40 mg total) by mouth once daily.            While in the hospital, will manage blood pressure as follows; Continue home antihypertensive regimen    Will utilize p.r.n. blood pressure medication only if patient's blood pressure greater than 180/110 and she develops symptoms such as worsening chest pain or shortness of breath.      VTE Risk Mitigation (From admission, onward)           Ordered     enoxaparin injection 40 mg  Daily         11/01/23 1507     IP VTE HIGH RISK PATIENT  Once         11/01/23 1507     Place sequential compression device  Until discontinued         11/01/23 1507 April YURI Laura NP  Department of Hospital Medicine  [facility] - Emergency Dept.

## 2023-11-02 LAB
ALBUMIN SERPL BCP-MCNC: 2.6 G/DL (ref 3.5–5.2)
ALP SERPL-CCNC: 67 U/L (ref 55–135)
ALT SERPL W/O P-5'-P-CCNC: 23 U/L (ref 10–44)
ANION GAP SERPL CALC-SCNC: 9 MMOL/L (ref 8–16)
AST SERPL-CCNC: 18 U/L (ref 10–40)
BASOPHILS # BLD AUTO: 0.02 K/UL (ref 0–0.2)
BASOPHILS NFR BLD: 0.1 % (ref 0–1.9)
BILIRUB SERPL-MCNC: 0.3 MG/DL (ref 0.1–1)
BUN SERPL-MCNC: 9 MG/DL (ref 6–20)
CALCIUM SERPL-MCNC: 9.2 MG/DL (ref 8.7–10.5)
CHLORIDE SERPL-SCNC: 103 MMOL/L (ref 95–110)
CO2 SERPL-SCNC: 28 MMOL/L (ref 23–29)
CREAT SERPL-MCNC: 0.7 MG/DL (ref 0.5–1.4)
D DIMER PPP IA.FEU-MCNC: 0.86 MG/L FEU
DIFFERENTIAL METHOD: ABNORMAL
EOSINOPHIL # BLD AUTO: 0 K/UL (ref 0–0.5)
EOSINOPHIL NFR BLD: 0.1 % (ref 0–8)
ERYTHROCYTE [DISTWIDTH] IN BLOOD BY AUTOMATED COUNT: 16.6 % (ref 11.5–14.5)
EST. GFR  (NO RACE VARIABLE): >60 ML/MIN/1.73 M^2
GLUCOSE SERPL-MCNC: 117 MG/DL (ref 70–110)
HCT VFR BLD AUTO: 33.7 % (ref 37–48.5)
HGB BLD-MCNC: 10.3 G/DL (ref 12–16)
IMM GRANULOCYTES # BLD AUTO: 0.09 K/UL (ref 0–0.04)
IMM GRANULOCYTES NFR BLD AUTO: 0.6 % (ref 0–0.5)
LYMPHOCYTES # BLD AUTO: 1.1 K/UL (ref 1–4.8)
LYMPHOCYTES NFR BLD: 7 % (ref 18–48)
MAGNESIUM SERPL-MCNC: 2 MG/DL (ref 1.6–2.6)
MCH RBC QN AUTO: 30.4 PG (ref 27–31)
MCHC RBC AUTO-ENTMCNC: 30.6 G/DL (ref 32–36)
MCV RBC AUTO: 99 FL (ref 82–98)
MONOCYTES # BLD AUTO: 0.7 K/UL (ref 0.3–1)
MONOCYTES NFR BLD: 4.3 % (ref 4–15)
NEUTROPHILS # BLD AUTO: 13.4 K/UL (ref 1.8–7.7)
NEUTROPHILS NFR BLD: 87.9 % (ref 38–73)
NRBC BLD-RTO: 0 /100 WBC
PHOSPHATE SERPL-MCNC: 4.4 MG/DL (ref 2.7–4.5)
PLATELET # BLD AUTO: 355 K/UL (ref 150–450)
PMV BLD AUTO: 10 FL (ref 9.2–12.9)
POTASSIUM SERPL-SCNC: 4.7 MMOL/L (ref 3.5–5.1)
PROCALCITONIN SERPL IA-MCNC: 0.12 NG/ML
PROT SERPL-MCNC: 6.4 G/DL (ref 6–8.4)
RBC # BLD AUTO: 3.39 M/UL (ref 4–5.4)
SODIUM SERPL-SCNC: 140 MMOL/L (ref 136–145)
TROPONIN I SERPL DL<=0.01 NG/ML-MCNC: 0.02 NG/ML (ref 0–0.03)
WBC # BLD AUTO: 15.22 K/UL (ref 3.9–12.7)

## 2023-11-02 PROCEDURE — 85379 FIBRIN DEGRADATION QUANT: CPT | Performed by: FAMILY MEDICINE

## 2023-11-02 PROCEDURE — 99233 SBSQ HOSP IP/OBS HIGH 50: CPT | Mod: ,,, | Performed by: INTERNAL MEDICINE

## 2023-11-02 PROCEDURE — 63600175 PHARM REV CODE 636 W HCPCS: Performed by: NURSE PRACTITIONER

## 2023-11-02 PROCEDURE — 25000003 PHARM REV CODE 250: Performed by: INTERNAL MEDICINE

## 2023-11-02 PROCEDURE — 84484 ASSAY OF TROPONIN QUANT: CPT | Performed by: INTERNAL MEDICINE

## 2023-11-02 PROCEDURE — 94761 N-INVAS EAR/PLS OXIMETRY MLT: CPT

## 2023-11-02 PROCEDURE — 85025 COMPLETE CBC W/AUTO DIFF WBC: CPT | Performed by: NURSE PRACTITIONER

## 2023-11-02 PROCEDURE — 27000249 HC VAPOTHERM CIRCUIT

## 2023-11-02 PROCEDURE — 25000003 PHARM REV CODE 250: Performed by: FAMILY MEDICINE

## 2023-11-02 PROCEDURE — 63600175 PHARM REV CODE 636 W HCPCS: Performed by: INTERNAL MEDICINE

## 2023-11-02 PROCEDURE — 27100092 HC HIGH FLOW DELIVERY CANNULA

## 2023-11-02 PROCEDURE — 84100 ASSAY OF PHOSPHORUS: CPT | Performed by: NURSE PRACTITIONER

## 2023-11-02 PROCEDURE — 25000003 PHARM REV CODE 250: Performed by: NURSE PRACTITIONER

## 2023-11-02 PROCEDURE — 94640 AIRWAY INHALATION TREATMENT: CPT

## 2023-11-02 PROCEDURE — 27000207 HC ISOLATION

## 2023-11-02 PROCEDURE — 25000242 PHARM REV CODE 250 ALT 637 W/ HCPCS: Performed by: NURSE PRACTITIONER

## 2023-11-02 PROCEDURE — 94660 CPAP INITIATION&MGMT: CPT

## 2023-11-02 PROCEDURE — 99233 PR SUBSEQUENT HOSPITAL CARE,LEVL III: ICD-10-PCS | Mod: ,,, | Performed by: INTERNAL MEDICINE

## 2023-11-02 PROCEDURE — 27100171 HC OXYGEN HIGH FLOW UP TO 24 HOURS

## 2023-11-02 PROCEDURE — 84145 PROCALCITONIN (PCT): CPT | Performed by: FAMILY MEDICINE

## 2023-11-02 PROCEDURE — 80053 COMPREHEN METABOLIC PANEL: CPT | Performed by: NURSE PRACTITIONER

## 2023-11-02 PROCEDURE — 94799 UNLISTED PULMONARY SVC/PX: CPT

## 2023-11-02 PROCEDURE — 99900035 HC TECH TIME PER 15 MIN (STAT)

## 2023-11-02 PROCEDURE — 21400001 HC TELEMETRY ROOM

## 2023-11-02 PROCEDURE — 27000190 HC CPAP FULL FACE MASK W/VALVE

## 2023-11-02 PROCEDURE — 83735 ASSAY OF MAGNESIUM: CPT | Performed by: NURSE PRACTITIONER

## 2023-11-02 RX ORDER — FUROSEMIDE 40 MG/1
40 TABLET ORAL DAILY
Status: DISCONTINUED | OUTPATIENT
Start: 2023-11-02 | End: 2023-11-14 | Stop reason: HOSPADM

## 2023-11-02 RX ORDER — MUPIROCIN 20 MG/G
OINTMENT TOPICAL 2 TIMES DAILY
Status: DISPENSED | OUTPATIENT
Start: 2023-11-02 | End: 2023-11-07

## 2023-11-02 RX ORDER — IPRATROPIUM BROMIDE AND ALBUTEROL SULFATE 2.5; .5 MG/3ML; MG/3ML
3 SOLUTION RESPIRATORY (INHALATION) EVERY 6 HOURS
Status: DISCONTINUED | OUTPATIENT
Start: 2023-11-02 | End: 2023-11-14 | Stop reason: HOSPADM

## 2023-11-02 RX ORDER — ARFORMOTEROL TARTRATE 15 UG/2ML
15 SOLUTION RESPIRATORY (INHALATION) 2 TIMES DAILY
Status: DISCONTINUED | OUTPATIENT
Start: 2023-11-02 | End: 2023-11-14 | Stop reason: HOSPADM

## 2023-11-02 RX ORDER — IPRATROPIUM BROMIDE AND ALBUTEROL SULFATE 2.5; .5 MG/3ML; MG/3ML
3 SOLUTION RESPIRATORY (INHALATION) EVERY 6 HOURS
Status: DISCONTINUED | OUTPATIENT
Start: 2023-11-02 | End: 2023-11-02

## 2023-11-02 RX ORDER — PROMETHAZINE HYDROCHLORIDE AND CODEINE PHOSPHATE 6.25; 1 MG/5ML; MG/5ML
5 SOLUTION ORAL EVERY 4 HOURS PRN
Status: DISCONTINUED | OUTPATIENT
Start: 2023-11-02 | End: 2023-11-14 | Stop reason: HOSPADM

## 2023-11-02 RX ORDER — BUDESONIDE 0.5 MG/2ML
0.5 INHALANT ORAL 2 TIMES DAILY
Status: DISCONTINUED | OUTPATIENT
Start: 2023-11-02 | End: 2023-11-03

## 2023-11-02 RX ADMIN — IPRATROPIUM BROMIDE AND ALBUTEROL SULFATE 3 ML: 2.5; .5 SOLUTION RESPIRATORY (INHALATION) at 07:11

## 2023-11-02 RX ADMIN — CEFTRIAXONE 2 G: 2 INJECTION, POWDER, FOR SOLUTION INTRAMUSCULAR; INTRAVENOUS at 09:11

## 2023-11-02 RX ADMIN — BUDESONIDE INHALATION 0.5 MG: 0.5 SUSPENSION RESPIRATORY (INHALATION) at 10:11

## 2023-11-02 RX ADMIN — DOCUSATE SODIUM 100 MG: 100 CAPSULE, LIQUID FILLED ORAL at 08:11

## 2023-11-02 RX ADMIN — MUPIROCIN: 20 OINTMENT TOPICAL at 08:11

## 2023-11-02 RX ADMIN — OSELTAMIVIR PHOSPHATE 75 MG: 75 CAPSULE ORAL at 08:11

## 2023-11-02 RX ADMIN — PROMETHAZINE HYDROCHLORIDE AND CODEINE PHOSPHATE 5 ML: 6.25; 1 SOLUTION ORAL at 07:11

## 2023-11-02 RX ADMIN — FUROSEMIDE 40 MG: 40 TABLET ORAL at 10:11

## 2023-11-02 RX ADMIN — HYDROCODONE BITARTRATE AND ACETAMINOPHEN 1 TABLET: 5; 325 TABLET ORAL at 05:11

## 2023-11-02 RX ADMIN — NINTEDANIB 150 MG: 150 CAPSULE ORAL at 09:11

## 2023-11-02 RX ADMIN — MUPIROCIN: 20 OINTMENT TOPICAL at 10:11

## 2023-11-02 RX ADMIN — MONTELUKAST 10 MG: 10 TABLET, FILM COATED ORAL at 08:11

## 2023-11-02 RX ADMIN — OSELTAMIVIR PHOSPHATE 75 MG: 75 CAPSULE ORAL at 09:11

## 2023-11-02 RX ADMIN — Medication 400 MG: at 09:11

## 2023-11-02 RX ADMIN — IPRATROPIUM BROMIDE AND ALBUTEROL SULFATE 3 ML: 2.5; .5 SOLUTION RESPIRATORY (INHALATION) at 10:11

## 2023-11-02 RX ADMIN — PROMETHAZINE HYDROCHLORIDE AND CODEINE PHOSPHATE 5 ML: 6.25; 1 SOLUTION ORAL at 02:11

## 2023-11-02 RX ADMIN — PREDNISONE 10 MG: 10 TABLET ORAL at 09:11

## 2023-11-02 RX ADMIN — ARFORMOTEROL TARTRATE 15 MCG: 15 SOLUTION RESPIRATORY (INHALATION) at 07:11

## 2023-11-02 RX ADMIN — BUDESONIDE INHALATION 0.5 MG: 0.5 SUSPENSION RESPIRATORY (INHALATION) at 07:11

## 2023-11-02 RX ADMIN — NINTEDANIB 150 MG: 150 CAPSULE ORAL at 08:11

## 2023-11-02 RX ADMIN — ENOXAPARIN SODIUM 40 MG: 40 INJECTION SUBCUTANEOUS at 05:11

## 2023-11-02 RX ADMIN — PANTOPRAZOLE SODIUM 40 MG: 40 TABLET, DELAYED RELEASE ORAL at 09:11

## 2023-11-02 RX ADMIN — DOCUSATE SODIUM 100 MG: 100 CAPSULE, LIQUID FILLED ORAL at 09:11

## 2023-11-02 NOTE — SUBJECTIVE & OBJECTIVE
Ayanna Alicia is 53 y.o.  Known to pulmonary with advanced ILD, Pul HTN WHO grp 3  Evaluation for lung transplant Rolando Muslim  Recent hospitalization for resp exacerbation discharged on micafungin ( complteted), Ceftriaxone ( active), metronidazole ( completed ) for actinomyces in setting of chr steriod use  She is on POC 6-8 LPM, bronchodilators Albuterol, Serevent and Yulperi  Has chr stable loose stolls while on OFEV  Actemra infusions on hold due to infection  Has PulAURE and TYVASO pending   Prednisone 10 mg on weaning  Also has Home NIV , in pul rehab  Present with increased oxygen demands, weakness, desaturation, low grade fever  Tested +ve for influenza A  Denies fever, chest pain    Interval History:  11/2: no new issues or c/o noted, OOB standing at the sink brushing her teeth upon mu arrival to her room this AM, some SOB when returned to bed, vapotherm 20L 50%    ROS complete and negative unless stated in the interval HPI     Objective:     Vital Signs (Most Recent):  Temp: 97.9 °F (36.6 °C) (11/02/23 0806)  Pulse: 95 (11/02/23 1019)  Resp: 18 (11/02/23 1019)  BP: 113/62 (11/02/23 0806)  SpO2: 95 % (11/02/23 1019) Vital Signs (24h Range):  Temp:  [97.8 °F (36.6 °C)-99.4 °F (37.4 °C)] 97.9 °F (36.6 °C)  Pulse:  [] 95  Resp:  [16-28] 18  SpO2:  [54 %-100 %] 95 %  BP: (113-155)/(62-93) 113/62     Weight: 103.5 kg (228 lb 2.8 oz)  Body mass index is 36.83 kg/m².      Intake/Output Summary (Last 24 hours) at 11/2/2023 1105  Last data filed at 11/2/2023 0654  Gross per 24 hour   Intake --   Output 1500 ml   Net -1500 ml        Physical Exam  Vitals reviewed.   Constitutional:       General: She is awake.      Appearance: She is well-developed.   Pulmonary:      Breath sounds: Examination of the right-middle field reveals rales. Examination of the left-middle field reveals rales. Examination of the right-lower field reveals rales. Examination of the left-lower field reveals rales. Decreased breath  sounds and rales present. No wheezing.      Comments: Vapotherm 20L 50%, some dyspnea with exertion but recovers   Neurological:      Mental Status: She is alert.   Psychiatric:         Behavior: Behavior is cooperative.           Vents:  Oxygen Concentration (%): 50 (11/02/23 1019)    Lines/Drains/Airways       Peripherally Inserted Central Catheter Line  Duration             PICC Double Lumen 10/13/23 1627 right basilic 19 days              Peripheral Intravenous Line  Duration                  Peripheral IV - Single Lumen 11/01/23 1205 18 G Left Antecubital <1 day                    Significant Labs:    CBC/Anemia Profile:  Recent Labs   Lab 11/01/23  1220 11/02/23  0537   WBC 18.29* 15.22*   HGB 12.4 10.3*   HCT 40.9 33.7*    355   * 99*   RDW 16.9* 16.6*        Chemistries:  Recent Labs   Lab 11/01/23  1220 11/02/23  0537    140   K 4.2 4.7    103   CO2 25 28   BUN 7 9   CREATININE 0.9 0.7   CALCIUM 9.9 9.2   ALBUMIN 3.2* 2.6*   PROT 7.7 6.4   BILITOT 0.2 0.3   ALKPHOS 84 67   ALT 31 23   AST 30 18   MG  --  2.0   PHOS  --  4.4       All pertinent labs within the past 24 hours have been reviewed.    Significant Imaging:  I have reviewed all pertinent imaging results/findings within the past 24 hours.

## 2023-11-02 NOTE — SUBJECTIVE & OBJECTIVE
Interval History: No issues overnight.     Review of Systems   Constitutional:  Negative for fatigue and fever.   HENT:  Negative for sinus pressure.    Eyes:  Negative for visual disturbance.   Respiratory:  Positive for cough and shortness of breath.    Cardiovascular:  Negative for chest pain.   Gastrointestinal:  Negative for nausea and vomiting.   Genitourinary:  Negative for difficulty urinating.   Musculoskeletal:  Negative for back pain.   Skin:  Negative for rash.   Neurological:  Negative for headaches.   Psychiatric/Behavioral:  Negative for confusion.      Objective:     Vital Signs (Most Recent):  Temp: 97.9 °F (36.6 °C) (11/02/23 0806)  Pulse: 79 (11/02/23 0806)  Resp: (!) 24 (11/02/23 0806)  BP: 113/62 (11/02/23 0806)  SpO2: (!) 93 % (11/02/23 0807) Vital Signs (24h Range):  Temp:  [97.8 °F (36.6 °C)-99.4 °F (37.4 °C)] 97.9 °F (36.6 °C)  Pulse:  [] 79  Resp:  [16-28] 24  SpO2:  [54 %-100 %] 93 %  BP: (113-155)/(62-93) 113/62     Weight: 103.5 kg (228 lb 2.8 oz)  Body mass index is 36.83 kg/m².    Intake/Output Summary (Last 24 hours) at 11/2/2023 1014  Last data filed at 11/2/2023 0654  Gross per 24 hour   Intake --   Output 1500 ml   Net -1500 ml         Physical Exam  Vitals and nursing note reviewed.   Constitutional:       General: She is in acute distress.      Appearance: She is overweight. She is ill-appearing.      Comments: Vapotherm   HENT:      Head: Normocephalic and atraumatic.      Right Ear: Hearing and external ear normal.      Left Ear: Hearing and external ear normal.      Nose: No rhinorrhea.      Right Sinus: No maxillary sinus tenderness or frontal sinus tenderness.      Left Sinus: No maxillary sinus tenderness or frontal sinus tenderness.      Mouth/Throat:      Mouth: No oral lesions.      Pharynx: Uvula midline.   Eyes:      General:         Right eye: No discharge.         Left eye: No discharge.      Conjunctiva/sclera: Conjunctivae normal.      Pupils: Pupils are  equal, round, and reactive to light.   Neck:      Thyroid: No thyromegaly.      Vascular: No carotid bruit.      Trachea: No tracheal deviation.   Cardiovascular:      Rate and Rhythm: Regular rhythm. Tachycardia present.      Pulses:           Dorsalis pedis pulses are 2+ on the right side and 2+ on the left side.      Heart sounds: Normal heart sounds, S1 normal and S2 normal. No murmur heard.  Pulmonary:      Effort: Respiratory distress present.      Breath sounds: Examination of the right-lower field reveals rales. Examination of the left-lower field reveals rales. Rales present.   Abdominal:      General: Bowel sounds are decreased. There is distension.      Palpations: Abdomen is soft. There is no mass.      Tenderness: There is no abdominal tenderness.   Musculoskeletal:         General: Normal range of motion.      Cervical back: Normal range of motion.   Lymphadenopathy:      Cervical: No cervical adenopathy.      Upper Body:      Right upper body: No supraclavicular adenopathy.      Left upper body: No supraclavicular adenopathy.   Skin:     General: Skin is warm and dry.      Capillary Refill: Capillary refill takes less than 2 seconds.      Findings: No rash.   Neurological:      Mental Status: She is alert and oriented to person, place, and time.      Sensory: No sensory deficit.      Coordination: Coordination normal.      Gait: Gait normal.   Psychiatric:         Mood and Affect: Mood is not anxious or depressed.         Speech: Speech normal.         Behavior: Behavior normal.         Thought Content: Thought content normal.         Judgment: Judgment normal.             Significant Labs: All pertinent labs within the past 24 hours have been reviewed.    Significant Imaging: I have reviewed all pertinent imaging results/findings within the past 24 hours.

## 2023-11-02 NOTE — HOSPITAL COURSE
11/2: pt currently on vapotherm. Cont duonebs, budesonide, and brovana nebs. Wean O2 as tolerated. Cont tamiflu. Will order procal and d dimer.   11/3: CTA negative for PE. Cont current management. Wean O2 as tolerated. Pulm on case.   11/4: cont current management, requiring increased vapotherm.   11/5: vapotherm being weaned down. Cont current management.   11/6: cont supportive care. Currently on vapotherm 20L 50% fio2.   11/7: pt continues to require vapotherm, will start LTAC placement process for O2 weaning. Cont supportive care. Pt reports feeling swelling in her neck, will obtain CT scan.   11/8: LTAC placement pending. Still on 20L 50%fio2. CT scan of neck did not reveal abscess or obstruction. Pt with hx of multinodular goiter. Will obtain tsh and anti-tpo. Outpatient f/u with surgery to discuss thyroidectomy given symptoms.   11/9: weaned down to 15L 50% fio2. TSH and anti tpo normal. Thyroid u/s reviewed, will need outpatient FNA. LTAC placement pending however pt may improve quickly and not require it. Pt reports dark stools however she was started on iron tablets. Will trend h/h and consider consulting GI.   11/10: FOBT pending. Cont to trend h/h. Pt still on 15L, LTAC accepted awaiting medical stability. Consult GI if FOBT positive and h/h continues to trend down. Increase PPI to bid.   11/11 fobt positive. Hb/hct stable. Cscope performed in 2020 with normal pathology. Continue fe supplement. Follow up outpatient gastroenterology but will need pulmonology clearance for procedure. Continue proton pump inhibitor as chronically on steroids. Awaiting ltac placement  11/12 febrile overnight associated with fullness in ear and headache. Reports diarrhea. On intravenous rocephin until approximately 11/22. Discontinue rocephin and bolus fluids. Repeat blood culture due to immunocompromised status and recent flu positive, concerns for double infection. Reconsult infectious disease.   11/13 afebrile overnight.  Patient states possibly drinking hot coffee when temperature was taken yesterday. Intravenous rocephin discontinued to diarrhea. Infectious diarrhea workup unrevealing thus far. Now with partially formed stools. Awaiting ltac placement for continued weaning. Start po doxy per infectious disease recommendations     11/14  Received insurance authorization for ltac placement for continued weaning of supplemental oxygen. Case management present.    On exam, no acute distress. Respiratory distress on vapotherm. Dysphonia. AO3. Depressed.    Patient seen and evaluated by me. Patient was determined to be suitable for discharge. Patient deemed stable for discharge to ltac.

## 2023-11-02 NOTE — PROGRESS NOTES
[facility] - Telemetry ([facility])  Pulmonology  Progress Note    Patient Name: Ayanna Alicia  MRN: [id number]  Admission Date: 11/1/2023  Hospital Length of Stay: 1 days  Code Status: Full Code  Attending Provider: Thaddeus Nassar MD  Primary Care Provider: Madeleine Enrique MD   Principal Problem: Acute on chronic respiratory failure with hypoxia    Subjective:     Ayanna Alicia is 53 y.o.  Known to pulmonary with advanced ILD, Pul HTN WHO grp 3  Evaluation for lung transplant [facility]  Recent hospitalization for resp exacerbation discharged on micafungin ( complteted), Ceftriaxone ( active), metronidazole ( completed ) for actinomyces in setting of chr steriod use  She is on POC 6-8 LPM, bronchodilators Albuterol, Serevent and Yulperi  Has chr stable loose stolls while on OFEV  Actemra infusions on hold due to infection  Has PulHTN and TYVASO pending   Prednisone 10 mg on weaning  Also has Home NIV , in pul rehab  Present with increased oxygen demands, weakness, desaturation, low grade fever  Tested +ve for influenza A  Denies fever, chest pain    Interval History:  11/2: no new issues or c/o noted, OOB standing at the sink brushing her teeth upon mu arrival to her room this AM, some SOB when returned to bed, vapotherm 20L 50%    ROS complete and negative unless stated in the interval HPI     Objective:     Vital Signs (Most Recent):  Temp: 97.9 °F (36.6 °C) (11/02/23 0806)  Pulse: 95 (11/02/23 1019)  Resp: 18 (11/02/23 1019)  BP: 113/62 (11/02/23 0806)  SpO2: 95 % (11/02/23 1019) Vital Signs (24h Range):  Temp:  [97.8 °F (36.6 °C)-99.4 °F (37.4 °C)] 97.9 °F (36.6 °C)  Pulse:  [] 95  Resp:  [16-28] 18  SpO2:  [54 %-100 %] 95 %  BP: (113-155)/(62-93) 113/62     Weight: 103.5 kg (228 lb 2.8 oz)  Body mass index is 36.83 kg/m².      Intake/Output Summary (Last 24 hours) at 11/2/2023 1105  Last data filed at 11/2/2023 0654  Gross per 24 hour   Intake --   Output 1500 ml   Net -1500 ml        Physical  Exam  Vitals reviewed.   Constitutional:       General: She is awake.      Appearance: She is well-developed.   Pulmonary:      Breath sounds: Examination of the right-middle field reveals rales. Examination of the left-middle field reveals rales. Examination of the right-lower field reveals rales. Examination of the left-lower field reveals rales. Decreased breath sounds and rales present. No wheezing.      Comments: Vapotherm 20L 50%, some dyspnea with exertion but recovers   Neurological:      Mental Status: She is alert.   Psychiatric:         Behavior: Behavior is cooperative.           Vents:  Oxygen Concentration (%): 50 (11/02/23 1019)    Lines/Drains/Airways       Peripherally Inserted Central Catheter Line  Duration             PICC Double Lumen 10/13/23 1627 right basilic 19 days              Peripheral Intravenous Line  Duration                  Peripheral IV - Single Lumen 11/01/23 1205 18 G Left Antecubital <1 day                    Significant Labs:    CBC/Anemia Profile:  Recent Labs   Lab 11/01/23  1220 11/02/23  0537   WBC 18.29* 15.22*   HGB 12.4 10.3*   HCT 40.9 33.7*    355   * 99*   RDW 16.9* 16.6*        Chemistries:  Recent Labs   Lab 11/01/23  1220 11/02/23  0537    140   K 4.2 4.7    103   CO2 25 28   BUN 7 9   CREATININE 0.9 0.7   CALCIUM 9.9 9.2   ALBUMIN 3.2* 2.6*   PROT 7.7 6.4   BILITOT 0.2 0.3   ALKPHOS 84 67   ALT 31 23   AST 30 18   MG  --  2.0   PHOS  --  4.4       All pertinent labs within the past 24 hours have been reviewed.    Significant Imaging:  I have reviewed all pertinent imaging results/findings within the past 24 hours.      ABG  Recent Labs   Lab 11/01/23  1229   PH 7.465*   PO2 62*   PCO2 37.3   HCO3 26.9   BE 3*     Assessment/Plan:     ENT  Dysphonia due to laryngeal ulcers  Avoid steriods  On CEFTRIXONE for actinomyces infection    Pulmonary  * Acute on chronic respiratory failure with hypoxia  Patient with Hypoxic Respiratory failure  which is Acute on chronic.  she is on home oxygen at 8 LPM. Supplemental oxygen was provided and noted- Oxygen Concentration (%):  [] 50     ON VAPOTHERM, WEAN AS TOLERATED    Signs/symptoms of respiratory failure include- increased work of breathing. Contributing diagnoses includes - Pneumonia and VIRAL PNEUMONIA Labs and images were reviewed. Patient Has recent ABG, which has been reviewed. Will treat underlying causes and adjust management of respiratory failure as follows-      · Wean VAPOTHERM per PROTOCOL  · Nocturnal NIPPV/AVAPS on home settings  · Bronchodilators  · Lasix  · PO prednisone    Interstitial lung disease  ON OFEV  Undergoing lung transplant eval in [place]     ID  Influenza A with respiratory manifestations  tamiflu  Airborne droplet isolation    Actinomyces infection  Complete treatment plan per ID  CEFTRIAXONE  Has PICC    Pulmonary team will continue to follow along. Please call with questions.       Agustín Aviles MD  Pulmonology  [facility] - Telemetry ([facility])

## 2023-11-02 NOTE — PLAN OF CARE
[facility] - Telemetry (Hospital)  Initial Discharge Assessment       Primary Care Provider: Madeleine Enrique MD    Admission Diagnosis: Lactic acidosis [E87.20]  Restrictive lung disease [J98.4]  Influenza A [J10.1]  Troponin level elevated [R79.89]  Chest pain [R07.9]  Acute on chronic respiratory failure with hypoxia [J96.21]    Admission Date: 11/1/2023  Expected Discharge Date:     Transition of Care Barriers: None    Payor: BLUE CROSS BLUE SHIELD / Plan: [facility] LOCAL PLUS / Product Type: Commercial /     Extended Emergency Contact Information  Primary Emergency Contact: Lianna Alicia   [facility]  Home Phone: [phone]  Mobile Phone: [phone]  Relation: Other              [facility] [address]  [facility] [address]  Phone: [phone] Fax: [phone]    [id number] [place] - [address] [facility]  [address] [facility] [address]  Phone: [phone] Fax: [phone]    [facility] [address] [address]  Phone: [phone] Fax: [phone]    [id number] [place], [place] - [address] [facility]  [address] [place] [address]  Phone: [phone] Fax: [phone]    [address]  [address] [facility] [address]  Phone: [phone] Fax: [phone]    [id number] [place] - [address] [facility]  [address] [facility] [address]  Phone: [phone] Fax: [phone]      Initial Assessment (most recent)       Adult Discharge Assessment - 11/02/23 1605          Discharge Assessment    Assessment Type Discharge Planning Assessment     Confirmed/corrected address, phone number and insurance Yes     Confirmed Demographics Correct on Facesheet     Source of Information health  record     When was your last doctors appointment? 10/24/23     Communicated JANETTE with patient/caregiver Date not available/Unable to determine     Reason For Admission Hypoxia     People in Home sibling(s)     Facility Arrived From: home     Do you expect to return to your current living situation? Yes     Do you have help at home or someone to help you manage your care at home? Yes     Who are your caregiver(s) and their phone number(s)? brother     Prior to hospitilization cognitive status: Alert/Oriented     Current cognitive status: Alert/Oriented     Dressing/Bathing bathing difficulty, requires equipment     Dressing/Bathing Management uses shower chair     Equipment Currently Used at Home oxygen;shower chair     Readmission within 30 days? Yes     Patient currently being followed by outpatient case management? No     Do you currently have service(s) that help you manage your care at home? Yes     Name and Contact number of agency [employer]     Is the pt/caregiver preference to resume services with current agency Yes     Do you take prescription medications? Yes     Do you have prescription coverage? Yes     Coverage BCBS     Do you have any problems affording any of your prescribed medications? No     Who is going to help you get home at discharge? family     How do you get to doctors appointments? car, drives self     Are you on dialysis? No     Do you take coumadin? No     DME Needed Upon Discharge  none     Transition of Care Barriers None                   Medical record review.  Patient [facility] home [facility] with [facility]. She is current with [facility].

## 2023-11-02 NOTE — PROGRESS NOTES
[facility] - Telemetry ([facility])  [facility]  Progress Note    Patient Name: Ayanna Alicia  MRN: [id number]  Patient Class: IP- Inpatient   Admission Date: 2023  Length of Stay: 1 days  Attending Physician: Thaddeus Nassar MD  Primary Care Provider: Madeleine Enrique MD        Subjective:     Principal Problem:Acute on chronic respiratory failure with hypoxia        HPI:  53-year-old white female with a history of pulmonary fibrosis, pulmonary HTN, RA, Actinomyces infection, COPD, GLENN. Presented with worsening shortness of breath over the past 48 hours.  The patient was in the hospital 2 weeks ago and since has been weaning off of steroids.  She is oxygen dependent at home usually at 8 liters/minute.  She was progressive shortness of breath with orthopnea.  There is no chest pain or pedal edema.  She denies any fevers or chills though now has a productive cough.  Patient was compliant with her medications.  In the ED, vitals: 155/93, 130, 16, 99.1, 54% RA. Placed on vapotherm in ED. Labs: WBC: 18.29, A, Lactic: 2.6, Trop; 0.029, +FluA, CXR: unchanged. EKG: Neg for STEMI. Treated with IV fluids, Steroid, Nebs. Vapotherm set to 20L @ 90% FiO2. Patient is a full code. Admitted to Hospital Medicine for management of Acute on Chronic Resp Failure, Influenza A.       Overview/Hospital Course:  : pt currently on vapotherm. Cont duonebs, budesonide, and brovana nebs. Wean O2 as tolerated. Cont tamiflu. Will order procal and d dimer.       Interval History: No issues overnight.     Review of Systems   Constitutional:  Negative for fatigue and fever.   HENT:  Negative for sinus pressure.    Eyes:  Negative for visual disturbance.   Respiratory:  Positive for cough and shortness of breath.    Cardiovascular:  Negative for chest pain.   Gastrointestinal:  Negative for nausea and vomiting.   Genitourinary:  Negative for difficulty urinating.   Musculoskeletal:  Negative for back pain.   Skin:  Negative for rash.    Neurological:  Negative for headaches.   Psychiatric/Behavioral:  Negative for confusion.      Objective:     Vital Signs (Most Recent):  Temp: 97.9 °F (36.6 °C) (11/02/23 0806)  Pulse: 79 (11/02/23 0806)  Resp: (!) 24 (11/02/23 0806)  BP: 113/62 (11/02/23 0806)  SpO2: (!) 93 % (11/02/23 0807) Vital Signs (24h Range):  Temp:  [97.8 °F (36.6 °C)-99.4 °F (37.4 °C)] 97.9 °F (36.6 °C)  Pulse:  [] 79  Resp:  [16-28] 24  SpO2:  [54 %-100 %] 93 %  BP: (113-155)/(62-93) 113/62     Weight: 103.5 kg (228 lb 2.8 oz)  Body mass index is 36.83 kg/m².    Intake/Output Summary (Last 24 hours) at 11/2/2023 1014  Last data filed at 11/2/2023 0654  Gross per 24 hour   Intake --   Output 1500 ml   Net -1500 ml         Physical Exam  Vitals and nursing note reviewed.   Constitutional:       General: She is in acute distress.      Appearance: She is overweight. She is ill-appearing.      Comments: Vapotherm   HENT:      Head: Normocephalic and atraumatic.      Right Ear: Hearing and external ear normal.      Left Ear: Hearing and external ear normal.      Nose: No rhinorrhea.      Right Sinus: No maxillary sinus tenderness or frontal sinus tenderness.      Left Sinus: No maxillary sinus tenderness or frontal sinus tenderness.      Mouth/Throat:      Mouth: No oral lesions.      Pharynx: Uvula midline.   Eyes:      General:         Right eye: No discharge.         Left eye: No discharge.      Conjunctiva/sclera: Conjunctivae normal.      Pupils: Pupils are equal, round, and reactive to light.   Neck:      Thyroid: No thyromegaly.      Vascular: No carotid bruit.      Trachea: No tracheal deviation.   Cardiovascular:      Rate and Rhythm: Regular rhythm. Tachycardia present.      Pulses:           Dorsalis pedis pulses are 2+ on the right side and 2+ on the left side.      Heart sounds: Normal heart sounds, S1 normal and S2 normal. No murmur heard.  Pulmonary:      Effort: Respiratory distress present.      Breath sounds:  Examination of the right-lower field reveals rales. Examination of the left-lower field reveals rales. Rales present.   Abdominal:      General: Bowel sounds are decreased. There is distension.      Palpations: Abdomen is soft. There is no mass.      Tenderness: There is no abdominal tenderness.   Musculoskeletal:         General: Normal range of motion.      Cervical back: Normal range of motion.   Lymphadenopathy:      Cervical: No cervical adenopathy.      Upper Body:      Right upper body: No supraclavicular adenopathy.      Left upper body: No supraclavicular adenopathy.   Skin:     General: Skin is warm and dry.      Capillary Refill: Capillary refill takes less than 2 seconds.      Findings: No rash.   Neurological:      Mental Status: She is alert and oriented to person, place, and time.      Sensory: No sensory deficit.      Coordination: Coordination normal.      Gait: Gait normal.   Psychiatric:         Mood and Affect: Mood is not anxious or depressed.         Speech: Speech normal.         Behavior: Behavior normal.         Thought Content: Thought content normal.         Judgment: Judgment normal.             Significant Labs: All pertinent labs within the past 24 hours have been reviewed.    Significant Imaging: I have reviewed all pertinent imaging results/findings within the past 24 hours.        Assessment/Plan:      * Acute on chronic respiratory failure with hypoxia  Patient with Hypoxic Respiratory failure which is Acute on chronic.  she is on home oxygen at 8 LPM. Supplemental oxygen was provided and noted- Oxygen Concentration (%):  [] 90    .   Signs/symptoms of respiratory failure include- tachypnea, increased work of breathing and respiratory distress. Contributing diagnoses includes - CHF and Interstitial lung disease Labs and images were reviewed. Patient Has recent ABG, which has been reviewed. Will treat underlying causes and adjust management of respiratory failure as follows-      --Vapotherm  --Consult Pulm  --Cont home meds where possible  --JESSI  --Bipap @ HS  --Avoid steroid    11/2:  Cont duonebs   brovana budesonide nebs  Resume home lasix   Wean o2 as tolerated  Will check procal and d dimer     Influenza A with respiratory manifestations  +FluA on admssion    --Airborne/droplet isolation  --Tamiflu      Actinomyces infection  Followed by ID as OP    --cont Ceftriaxone  --PICC line care       Dysphonia due to laryngeal ulcers    --avoid steroids per Pulmonology due to actinomyces infection  --throat spray prn      Elevated troponin  Likely secondary to demand ischemia, hypoxia, resp failure, fever, infuenza    --trend trop  --tele      Interstitial lung disease  Managed with OFEV as OP, followed by Pulmonology as OP    --Cont home regimen, request family to bring meds not available on formulary      HTN (hypertension)  Chronic, controlled. Latest blood pressure and vitals reviewed-     Temp:  [99.1 °F (37.3 °C)-99.4 °F (37.4 °C)]   Pulse:  [118-138]   Resp:  [16-28]   BP: (138-155)/(71-93)   SpO2:  [54 %-100 %] .   Home meds for hypertension were reviewed and noted below.   Hypertension Medications             furosemide (LASIX) 40 MG tablet Take 1 tablet (40 mg total) by mouth once daily.          While in the hospital, will manage blood pressure as follows; Continue home antihypertensive regimen    Will utilize p.r.n. blood pressure medication only if patient's blood pressure greater than 180/110 and she develops symptoms such as worsening chest pain or shortness of breath.      VTE Risk Mitigation (From admission, onward)         Ordered     enoxaparin injection 40 mg  Daily         11/01/23 1507     IP VTE HIGH RISK PATIENT  Once         11/01/23 1507     Place sequential compression device  Until discontinued         11/01/23 1507                Discharge Planning   JANETTE:      Code Status: Full Code   Is the patient medically ready for discharge?:     Reason for patient still  in hospital (select all that apply): Patient trending condition                     Thaddeus Nassar MD  Department of Hospital Medicine   [facility] - Telemetry ([facility])

## 2023-11-02 NOTE — ASSESSMENT & PLAN NOTE
Patient with Hypoxic Respiratory failure which is Acute on chronic.  she is on home oxygen at 8 LPM. Supplemental oxygen was provided and noted- Oxygen Concentration (%):  [] 50     ON VAPOTHERM, WEAN AS TOLERATED    Signs/symptoms of respiratory failure include- increased work of breathing. Contributing diagnoses includes - Pneumonia and VIRAL PNEUMONIA Labs and images were reviewed. Patient Has recent ABG, which has been reviewed. Will treat underlying causes and adjust management of respiratory failure as follows-      · Wean VAPOTHERM per PROTOCOL  · Nocturnal NIPPV/AVAPS on home settings  · Bronchodilators  · Lasix  · PO prednisone

## 2023-11-03 ENCOUNTER — PATIENT MESSAGE (OUTPATIENT)
Dept: INFECTIOUS DISEASES | Facility: CLINIC | Age: 53
End: 2023-11-03
Payer: COMMERCIAL

## 2023-11-03 LAB
ALBUMIN SERPL BCP-MCNC: 2.8 G/DL (ref 3.5–5.2)
ALP SERPL-CCNC: 66 U/L (ref 55–135)
ALT SERPL W/O P-5'-P-CCNC: 29 U/L (ref 10–44)
ANION GAP SERPL CALC-SCNC: 10 MMOL/L (ref 8–16)
AST SERPL-CCNC: 22 U/L (ref 10–40)
BASOPHILS # BLD AUTO: 0.08 K/UL (ref 0–0.2)
BASOPHILS NFR BLD: 0.6 % (ref 0–1.9)
BILIRUB SERPL-MCNC: 0.2 MG/DL (ref 0.1–1)
BUN SERPL-MCNC: 15 MG/DL (ref 6–20)
CALCIUM SERPL-MCNC: 8.9 MG/DL (ref 8.7–10.5)
CHLORIDE SERPL-SCNC: 102 MMOL/L (ref 95–110)
CO2 SERPL-SCNC: 30 MMOL/L (ref 23–29)
CREAT SERPL-MCNC: 0.8 MG/DL (ref 0.5–1.4)
DIFFERENTIAL METHOD: ABNORMAL
EOSINOPHIL # BLD AUTO: 0.5 K/UL (ref 0–0.5)
EOSINOPHIL NFR BLD: 3.6 % (ref 0–8)
ERYTHROCYTE [DISTWIDTH] IN BLOOD BY AUTOMATED COUNT: 16.8 % (ref 11.5–14.5)
EST. GFR  (NO RACE VARIABLE): >60 ML/MIN/1.73 M^2
GLUCOSE SERPL-MCNC: 87 MG/DL (ref 70–110)
HCT VFR BLD AUTO: 33.6 % (ref 37–48.5)
HGB BLD-MCNC: 10 G/DL (ref 12–16)
IMM GRANULOCYTES # BLD AUTO: 0.1 K/UL (ref 0–0.04)
IMM GRANULOCYTES NFR BLD AUTO: 0.7 % (ref 0–0.5)
LYMPHOCYTES # BLD AUTO: 1.9 K/UL (ref 1–4.8)
LYMPHOCYTES NFR BLD: 13.2 % (ref 18–48)
MAGNESIUM SERPL-MCNC: 2 MG/DL (ref 1.6–2.6)
MCH RBC QN AUTO: 30.2 PG (ref 27–31)
MCHC RBC AUTO-ENTMCNC: 29.8 G/DL (ref 32–36)
MCV RBC AUTO: 102 FL (ref 82–98)
MONOCYTES # BLD AUTO: 0.9 K/UL (ref 0.3–1)
MONOCYTES NFR BLD: 6.3 % (ref 4–15)
NEUTROPHILS # BLD AUTO: 10.8 K/UL (ref 1.8–7.7)
NEUTROPHILS NFR BLD: 75.6 % (ref 38–73)
NRBC BLD-RTO: 0 /100 WBC
PHOSPHATE SERPL-MCNC: 4.6 MG/DL (ref 2.7–4.5)
PLATELET # BLD AUTO: 369 K/UL (ref 150–450)
PMV BLD AUTO: 10.3 FL (ref 9.2–12.9)
POTASSIUM SERPL-SCNC: 4.2 MMOL/L (ref 3.5–5.1)
PROT SERPL-MCNC: 6.4 G/DL (ref 6–8.4)
RBC # BLD AUTO: 3.31 M/UL (ref 4–5.4)
SODIUM SERPL-SCNC: 142 MMOL/L (ref 136–145)
WBC # BLD AUTO: 14.26 K/UL (ref 3.9–12.7)

## 2023-11-03 PROCEDURE — 27000207 HC ISOLATION

## 2023-11-03 PROCEDURE — 25000003 PHARM REV CODE 250: Performed by: FAMILY MEDICINE

## 2023-11-03 PROCEDURE — 25000003 PHARM REV CODE 250: Performed by: NURSE PRACTITIONER

## 2023-11-03 PROCEDURE — 99233 SBSQ HOSP IP/OBS HIGH 50: CPT | Mod: ,,, | Performed by: INTERNAL MEDICINE

## 2023-11-03 PROCEDURE — 99233 PR SUBSEQUENT HOSPITAL CARE,LEVL III: ICD-10-PCS | Mod: ,,, | Performed by: INTERNAL MEDICINE

## 2023-11-03 PROCEDURE — 94660 CPAP INITIATION&MGMT: CPT

## 2023-11-03 PROCEDURE — 84100 ASSAY OF PHOSPHORUS: CPT | Performed by: NURSE PRACTITIONER

## 2023-11-03 PROCEDURE — 25000242 PHARM REV CODE 250 ALT 637 W/ HCPCS: Performed by: FAMILY MEDICINE

## 2023-11-03 PROCEDURE — 83735 ASSAY OF MAGNESIUM: CPT | Performed by: NURSE PRACTITIONER

## 2023-11-03 PROCEDURE — 99900035 HC TECH TIME PER 15 MIN (STAT)

## 2023-11-03 PROCEDURE — 85025 COMPLETE CBC W/AUTO DIFF WBC: CPT | Performed by: NURSE PRACTITIONER

## 2023-11-03 PROCEDURE — 21400001 HC TELEMETRY ROOM

## 2023-11-03 PROCEDURE — 94761 N-INVAS EAR/PLS OXIMETRY MLT: CPT

## 2023-11-03 PROCEDURE — 25000242 PHARM REV CODE 250 ALT 637 W/ HCPCS: Performed by: NURSE PRACTITIONER

## 2023-11-03 PROCEDURE — 27100171 HC OXYGEN HIGH FLOW UP TO 24 HOURS

## 2023-11-03 PROCEDURE — 27000249 HC VAPOTHERM CIRCUIT

## 2023-11-03 PROCEDURE — 94799 UNLISTED PULMONARY SVC/PX: CPT

## 2023-11-03 PROCEDURE — 25000003 PHARM REV CODE 250: Performed by: INTERNAL MEDICINE

## 2023-11-03 PROCEDURE — 94640 AIRWAY INHALATION TREATMENT: CPT

## 2023-11-03 PROCEDURE — 80053 COMPREHEN METABOLIC PANEL: CPT | Performed by: NURSE PRACTITIONER

## 2023-11-03 PROCEDURE — 63600175 PHARM REV CODE 636 W HCPCS: Performed by: INTERNAL MEDICINE

## 2023-11-03 PROCEDURE — 63600175 PHARM REV CODE 636 W HCPCS: Performed by: NURSE PRACTITIONER

## 2023-11-03 PROCEDURE — 25500020 PHARM REV CODE 255: Performed by: FAMILY MEDICINE

## 2023-11-03 RX ORDER — BUDESONIDE 0.5 MG/2ML
0.5 INHALANT ORAL 2 TIMES DAILY
Status: DISCONTINUED | OUTPATIENT
Start: 2023-11-03 | End: 2023-11-14 | Stop reason: HOSPADM

## 2023-11-03 RX ORDER — BENZONATATE 100 MG/1
100 CAPSULE ORAL 3 TIMES DAILY PRN
Status: DISCONTINUED | OUTPATIENT
Start: 2023-11-03 | End: 2023-11-14 | Stop reason: HOSPADM

## 2023-11-03 RX ORDER — CALCIUM CARBONATE 200(500)MG
1000 TABLET,CHEWABLE ORAL 3 TIMES DAILY PRN
Status: DISCONTINUED | OUTPATIENT
Start: 2023-11-03 | End: 2023-11-14 | Stop reason: HOSPADM

## 2023-11-03 RX ADMIN — PROMETHAZINE HYDROCHLORIDE AND CODEINE PHOSPHATE 5 ML: 6.25; 1 SOLUTION ORAL at 08:11

## 2023-11-03 RX ADMIN — ARFORMOTEROL TARTRATE 15 MCG: 15 SOLUTION RESPIRATORY (INHALATION) at 07:11

## 2023-11-03 RX ADMIN — IPRATROPIUM BROMIDE AND ALBUTEROL SULFATE 3 ML: 2.5; .5 SOLUTION RESPIRATORY (INHALATION) at 01:11

## 2023-11-03 RX ADMIN — CEFTRIAXONE 2 G: 2 INJECTION, POWDER, FOR SOLUTION INTRAMUSCULAR; INTRAVENOUS at 11:11

## 2023-11-03 RX ADMIN — PROMETHAZINE HYDROCHLORIDE AND CODEINE PHOSPHATE 5 ML: 6.25; 1 SOLUTION ORAL at 03:11

## 2023-11-03 RX ADMIN — OSELTAMIVIR PHOSPHATE 75 MG: 75 CAPSULE ORAL at 08:11

## 2023-11-03 RX ADMIN — CALCIUM CARBONATE (ANTACID) CHEW TAB 500 MG 1000 MG: 500 CHEW TAB at 03:11

## 2023-11-03 RX ADMIN — MUPIROCIN: 20 OINTMENT TOPICAL at 08:11

## 2023-11-03 RX ADMIN — NINTEDANIB 150 MG: 150 CAPSULE ORAL at 08:11

## 2023-11-03 RX ADMIN — FUROSEMIDE 40 MG: 40 TABLET ORAL at 11:11

## 2023-11-03 RX ADMIN — DOCUSATE SODIUM 100 MG: 100 CAPSULE, LIQUID FILLED ORAL at 09:11

## 2023-11-03 RX ADMIN — IPRATROPIUM BROMIDE AND ALBUTEROL SULFATE 3 ML: 2.5; .5 SOLUTION RESPIRATORY (INHALATION) at 07:11

## 2023-11-03 RX ADMIN — PREDNISONE 10 MG: 10 TABLET ORAL at 09:11

## 2023-11-03 RX ADMIN — NINTEDANIB 150 MG: 150 CAPSULE ORAL at 09:11

## 2023-11-03 RX ADMIN — BUDESONIDE 0.5 MG: 0.5 INHALANT ORAL at 07:11

## 2023-11-03 RX ADMIN — IOHEXOL 100 ML: 350 INJECTION, SOLUTION INTRAVENOUS at 09:11

## 2023-11-03 RX ADMIN — OSELTAMIVIR PHOSPHATE 75 MG: 75 CAPSULE ORAL at 09:11

## 2023-11-03 RX ADMIN — ENOXAPARIN SODIUM 40 MG: 40 INJECTION SUBCUTANEOUS at 05:11

## 2023-11-03 RX ADMIN — Medication 400 MG: at 09:11

## 2023-11-03 RX ADMIN — MONTELUKAST 10 MG: 10 TABLET, FILM COATED ORAL at 08:11

## 2023-11-03 RX ADMIN — MUPIROCIN: 20 OINTMENT TOPICAL at 11:11

## 2023-11-03 RX ADMIN — PANTOPRAZOLE SODIUM 40 MG: 40 TABLET, DELAYED RELEASE ORAL at 09:11

## 2023-11-03 RX ADMIN — DOCUSATE SODIUM 100 MG: 100 CAPSULE, LIQUID FILLED ORAL at 08:11

## 2023-11-03 RX ADMIN — BUDESONIDE INHALATION 0.5 MG: 0.5 SUSPENSION RESPIRATORY (INHALATION) at 07:11

## 2023-11-03 RX ADMIN — PROMETHAZINE HYDROCHLORIDE AND CODEINE PHOSPHATE 5 ML: 6.25; 1 SOLUTION ORAL at 05:11

## 2023-11-03 NOTE — PROGRESS NOTES
"[facility] ([facility])  Pulmonology  Progress Note    Patient Name: Ayanna Alicia  MRN: [id number]  Admission Date: 11/1/2023  Hospital Length of Stay: 2 days  Code Status: Full Code  Attending Provider: Thaddeus Nassar MD  Primary Care Provider: Madeleine Enrique MD   Principal Problem: Acute on chronic respiratory failure with hypoxia    Subjective:     Ayanna Alicia is 53 y.o.  Known to pulmonary with advanced ILD, Pul HTN WHO grp 3  Evaluation for lung transplant [facility]  Recent hospitalization for resp exacerbation discharged on micafungin ( complteted), Ceftriaxone ( active), metronidazole ( completed ) for actinomyces in setting of chr steriod use  She is on POC 6-8 LPM, bronchodilators Albuterol, Serevent and Yulperi  Has chr stable loose stolls while on OFEV  Actemra infusions on hold due to infection  Has PulHTN and TYVASO pending   Prednisone 10 mg on weaning  Also has Home NIV , in pul rehab  Present with increased oxygen demands, weakness, desaturation, low grade fever  Tested +ve for influenza A  Denies fever, chest pain    Interval History:  11/2: no new issues or c/o noted, OOB standing at the sink brushing her teeth upon mu arrival to her room this AM, some SOB when returned to bed, vapotherm 20L 50%  11/3: pt reports a "rough night" with coughing and desats that required increased O2 support, c/o cough and feelings of "air hunger" despite good sats, also with reflux     ROS complete and negative unless stated in the interval HPI     Objective:     Vital Signs (Most Recent):  Temp: 98.7 °F (37.1 °C) (11/03/23 1143)  Pulse: 101 (11/03/23 1229)  Resp: 20 (11/03/23 1229)  BP: (!) 140/80 (11/03/23 1145)  SpO2: (!) 92 % (11/03/23 1229) Vital Signs (24h Range):  Temp:  [97.7 °F (36.5 °C)-98.7 °F (37.1 °C)] 98.7 °F (37.1 °C)  Pulse:  [] 101  Resp:  [18-28] 20  SpO2:  [90 %-100 %] 92 %  BP: (125-141)/(67-80) 140/80     Weight: 103.1 kg (227 lb 4.7 oz)  Body mass index is 36.69 " kg/m².      Intake/Output Summary (Last 24 hours) at 11/3/2023 1329  Last data filed at 11/3/2023 0608  Gross per 24 hour   Intake --   Output 1000 ml   Net -1000 ml        Physical Exam  Vitals reviewed.   Constitutional:       General: She is awake.      Appearance: She is obese. She is ill-appearing.      Comments: Chronically ill appearing female semi-upright in bed on vapotherm with some mild distress noted, with frequent cough    Pulmonary:      Effort: No tachypnea or bradypnea.      Breath sounds: Decreased breath sounds and rhonchi present. No wheezing.      Comments: Vapotherm 20L 60%  Neurological:      Mental Status: She is alert.   Psychiatric:         Behavior: Behavior is cooperative.               Vents:  Oxygen Concentration (%): 60 (11/03/23 1229)    Lines/Drains/Airways       Peripherally Inserted Central Catheter Line  Duration             PICC Double Lumen 10/13/23 1627 right basilic 20 days              Peripheral Intravenous Line  Duration                  Peripheral IV - Single Lumen 11/01/23 1205 18 G Left Antecubital 2 days                    Significant Labs:    CBC/Anemia Profile:  Recent Labs   Lab 11/02/23  0537 11/03/23  0549   WBC 15.22* 14.26*   HGB 10.3* 10.0*   HCT 33.7* 33.6*    369   MCV 99* 102*   RDW 16.6* 16.8*        Chemistries:  Recent Labs   Lab 11/02/23  0537 11/03/23  0549    142   K 4.7 4.2    102   CO2 28 30*   BUN 9 15   CREATININE 0.7 0.8   CALCIUM 9.2 8.9   ALBUMIN 2.6* 2.8*   PROT 6.4 6.4   BILITOT 0.3 0.2   ALKPHOS 67 66   ALT 23 29   AST 18 22   MG 2.0 2.0   PHOS 4.4 4.6*       All pertinent labs within the past 24 hours have been reviewed.    Significant Imaging:  I have reviewed all pertinent imaging results/findings within the past 24 hours.      ABG  Recent Labs   Lab 11/01/23  1229   PH 7.465*   PO2 62*   PCO2 37.3   HCO3 26.9   BE 3*   CTA Chest Non-Coronary (PE Studies)  Narrative: EXAMINATION:  CTA CHEST NON CORONARY (PE  STUDIES)    CLINICAL HISTORY:  Pulmonary embolism (PE) suspected, positive D-dimer; chest pain and shortness of breath    TECHNIQUE:  Axial CTA images performed through the chest after the administration of 100 cc intravenous contrast. Maximum intensity projections were performed and interpreted.    COMPARISON:  Chest CT on 11/03/2023    FINDINGS:  There is no evidence of pulmonary embolus. Pulmonary artery caliber is normal.  Four-chamber cardiomegaly.  Coronary artery calcifications.  Right central venous catheter in the SVC.  Stable mildly enlarged mediastinal and hilar lymph nodes, likely reactive.    Chronic, severe bilateral upper and lower lobe cylindrical bronchiectasis with confluent areas of ground-glass and alveolar opacities predominantly in the lung bases.  No significant change compared to the prior study.  No pleural effusions.    The upper abdominal visualized organs are within normal limits.    Stable multinodular goiter.    The bones are intact.  Impression: No pulmonary embolus.    Stable pulmonary fibrosis with associated bronchiectasis and reactive intrathoracic lymphadenopathy.    Four chamber cardiomegaly.    All CT scans at this facility are performed  using dose modulation techniques as appropriate to performed exam including the following:  automated exposure control; adjustment of mA and/or kV according to the patients size (this includes techniques or standardized protocols for targeted exams where dose is matched to indication/reason for exam: i.e. extremities or head);  iterative reconstruction technique.    Electronically signed by: Suleiman Frazier MD  Date:    11/03/2023  Time:    09:58     Assessment/Plan:     ENT  Dysphonia due to laryngeal ulcers  Avoid IV steriods, on CEFTRIXONE for actinomyces infection    Pulmonary  * Acute on chronic respiratory failure with hypoxia  Patient with Hypoxic Respiratory failure which is Acute on chronic.  she is on home oxygen at 8 LPM. Supplemental  oxygen was provided and noted- Oxygen Concentration (%):  [50-70] 60     ON VAPOTHERM, WEAN AS TOLERATED    Signs/symptoms of respiratory failure include- increased work of breathing. Contributing diagnoses includes - Pneumonia and VIRAL PNEUMONIA Labs and images were reviewed. Patient Has recent ABG, which has been reviewed. Will treat underlying causes and adjust management of respiratory failure as follows-      · Wean VAPOTHERM per PROTOCOL  · Nocturnal NIPPV/AVAPS on home settings  · Bronchodilators  · Lasix  · PO prednisone    Interstitial lung disease  ON OFEV  Undergoing lung transplant eval in [place]   CT this AM per  without acute findings and stable disease     ID  Influenza A with respiratory manifestations  tamiflu  Cough meds PRN  Airborne droplet isolation  Supportive care    Actinomyces infection  Complete treatment plan per ID, on CEFTRIAXONE via PICC    Pulmonary team will continue to follow along. Please call with questions.       Agustín Aviles MD  Pulmonology  [facility] - Telemetry ([facility])

## 2023-11-03 NOTE — PROGRESS NOTES
[facility] - Telemetry ([facility])  [facility]  Progress Note    Patient Name: Ayanna Alicia  MRN: [id number]  Patient Class: IP- Inpatient   Admission Date: 2023  Length of Stay: 2 days  Attending Physician: Thaddeus Nassar MD  Primary Care Provider: Madeleine Enrique MD        Subjective:     Principal Problem:Acute on chronic respiratory failure with hypoxia        HPI:  53-year-old white female with a history of pulmonary fibrosis, pulmonary HTN, RA, Actinomyces infection, COPD, GLENN. Presented with worsening shortness of breath over the past 48 hours.  The patient was in the hospital 2 weeks ago and since has been weaning off of steroids.  She is oxygen dependent at home usually at 8 liters/minute.  She was progressive shortness of breath with orthopnea.  There is no chest pain or pedal edema.  She denies any fevers or chills though now has a productive cough.  Patient was compliant with her medications.  In the ED, vitals: 155/93, 130, 16, 99.1, 54% RA. Placed on vapotherm in ED. Labs: WBC: 18.29, A, Lactic: 2.6, Trop; 0.029, +FluA, CXR: unchanged. EKG: Neg for STEMI. Treated with IV fluids, Steroid, Nebs. Vapotherm set to 20L @ 90% FiO2. Patient is a full code. Admitted to Hospital Medicine for management of Acute on Chronic Resp Failure, Influenza A.       Overview/Hospital Course:  : pt currently on vapotherm. Cont duonebs, budesonide, and brovana nebs. Wean O2 as tolerated. Cont tamiflu. Will order procal and d dimer.   11/3: CTA negative for PE. Cont current management. Wean O2 as tolerated. Pulm on case.         Interval History: Patient still requiring vapotherm.     Review of Systems   Constitutional:  Negative for fatigue and fever.   HENT:  Negative for sinus pressure.    Eyes:  Negative for visual disturbance.   Respiratory:  Positive for cough and shortness of breath.    Cardiovascular:  Negative for chest pain.   Gastrointestinal:  Negative for nausea and vomiting.   Genitourinary:   Negative for difficulty urinating.   Musculoskeletal:  Negative for back pain.   Skin:  Negative for rash.   Neurological:  Negative for headaches.   Psychiatric/Behavioral:  Negative for confusion.      Objective:     Vital Signs (Most Recent):  Temp: 98.7 °F (37.1 °C) (11/03/23 1143)  Pulse: 102 (11/03/23 1143)  Resp: 20 (11/03/23 1143)  BP: (!) 141/79 (11/03/23 1143)  SpO2: 95 % (11/03/23 1143) Vital Signs (24h Range):  Temp:  [97.7 °F (36.5 °C)-98.7 °F (37.1 °C)] 98.7 °F (37.1 °C)  Pulse:  [] 102  Resp:  [16-28] 20  SpO2:  [90 %-100 %] 95 %  BP: (113-141)/(61-79) 141/79     Weight: 103.1 kg (227 lb 4.7 oz)  Body mass index is 36.69 kg/m².    Intake/Output Summary (Last 24 hours) at 11/3/2023 1222  Last data filed at 11/3/2023 0608  Gross per 24 hour   Intake --   Output 1000 ml   Net -1000 ml         Physical Exam  Vitals and nursing note reviewed.   Constitutional:       General: She is in acute distress.      Appearance: She is overweight. She is ill-appearing.      Comments: Vapotherm   HENT:      Head: Normocephalic and atraumatic.      Right Ear: Hearing and external ear normal.      Left Ear: Hearing and external ear normal.      Nose: No rhinorrhea.      Right Sinus: No maxillary sinus tenderness or frontal sinus tenderness.      Left Sinus: No maxillary sinus tenderness or frontal sinus tenderness.      Mouth/Throat:      Mouth: No oral lesions.      Pharynx: Uvula midline.   Eyes:      General:         Right eye: No discharge.         Left eye: No discharge.      Conjunctiva/sclera: Conjunctivae normal.      Pupils: Pupils are equal, round, and reactive to light.   Neck:      Thyroid: No thyromegaly.      Vascular: No carotid bruit.      Trachea: No tracheal deviation.   Cardiovascular:      Rate and Rhythm: Regular rhythm. Tachycardia present.      Pulses:           Dorsalis pedis pulses are 2+ on the right side and 2+ on the left side.      Heart sounds: Normal heart sounds, S1 normal and S2  normal. No murmur heard.  Pulmonary:      Effort: Respiratory distress present.      Breath sounds: Examination of the right-lower field reveals rales. Examination of the left-lower field reveals rales. Rales present.   Abdominal:      General: Bowel sounds are decreased. There is distension.      Palpations: Abdomen is soft. There is no mass.      Tenderness: There is no abdominal tenderness.   Musculoskeletal:         General: Normal range of motion.      Cervical back: Normal range of motion.   Lymphadenopathy:      Cervical: No cervical adenopathy.      Upper Body:      Right upper body: No supraclavicular adenopathy.      Left upper body: No supraclavicular adenopathy.   Skin:     General: Skin is warm and dry.      Capillary Refill: Capillary refill takes less than 2 seconds.      Findings: No rash.   Neurological:      Mental Status: She is alert and oriented to person, place, and time.      Sensory: No sensory deficit.      Coordination: Coordination normal.      Gait: Gait normal.   Psychiatric:         Mood and Affect: Mood is not anxious or depressed.         Speech: Speech normal.         Behavior: Behavior normal.         Thought Content: Thought content normal.         Judgment: Judgment normal.             Significant Labs: All pertinent labs within the past 24 hours have been reviewed.    Significant Imaging: I have reviewed all pertinent imaging results/findings within the past 24 hours.        Assessment/Plan:      * Acute on chronic respiratory failure with hypoxia  Patient with Hypoxic Respiratory failure which is Acute on chronic.  she is on home oxygen at 8 LPM. Supplemental oxygen was provided and noted- Oxygen Concentration (%):  [50-70] 65    .   Signs/symptoms of respiratory failure include- tachypnea, increased work of breathing and respiratory distress. Contributing diagnoses includes - CHF and Interstitial lung disease Labs and images were reviewed. Patient Has recent ABG, which has been  reviewed. Will treat underlying causes and adjust management of respiratory failure as follows-     --Vapotherm  --Consult Pulm  --Cont home meds where possible  --JESSI  --Bipap @ HS  --Avoid steroid    11/3:  Cont duonebs   brovana budesonide nebs  Resume home lasix   Wean o2 as tolerated  D dimer elevated  CTA neg for PE    Influenza A with respiratory manifestations  +FluA on admssion    --Airborne/droplet isolation  --Tamiflu      Actinomyces infection  Followed by ID as OP    --cont Ceftriaxone  --PICC line care       Dysphonia due to laryngeal ulcers    --avoid steroids per Pulmonology due to actinomyces infection  --throat spray prn      Elevated troponin  Likely secondary to demand ischemia, hypoxia, resp failure, fever, infuenza    --trend trop  --tele      Interstitial lung disease  Managed with OFEV as OP, followed by Pulmonology as OP    --Cont home regimen, request family to bring meds not available on formulary      HTN (hypertension)  Chronic, controlled. Latest blood pressure and vitals reviewed-     Temp:  [99.1 °F (37.3 °C)-99.4 °F (37.4 °C)]   Pulse:  [118-138]   Resp:  [16-28]   BP: (138-155)/(71-93)   SpO2:  [54 %-100 %] .   Home meds for hypertension were reviewed and noted below.   Hypertension Medications             furosemide (LASIX) 40 MG tablet Take 1 tablet (40 mg total) by mouth once daily.          While in the hospital, will manage blood pressure as follows; Continue home antihypertensive regimen    Will utilize p.r.n. blood pressure medication only if patient's blood pressure greater than 180/110 and she develops symptoms such as worsening chest pain or shortness of breath.      VTE Risk Mitigation (From admission, onward)         Ordered     enoxaparin injection 40 mg  Daily         11/01/23 1507     IP VTE HIGH RISK PATIENT  Once         11/01/23 1507     Place sequential compression device  Until discontinued         11/01/23 1507                Discharge Planning   JANETTE:      Code  Status: Full Code   Is the patient medically ready for discharge?:     Reason for patient still in hospital (select all that apply): Patient trending condition                     Thaddeus Nassar MD  Department of Hospital Medicine   [facility] [facility])

## 2023-11-03 NOTE — ASSESSMENT & PLAN NOTE
Patient with Hypoxic Respiratory failure which is Acute on chronic.  she is on home oxygen at 8 LPM. Supplemental oxygen was provided and noted- Oxygen Concentration (%):  [50-70] 60     ON VAPOTHERM, WEAN AS TOLERATED    Signs/symptoms of respiratory failure include- increased work of breathing. Contributing diagnoses includes - Pneumonia and VIRAL PNEUMONIA Labs and images were reviewed. Patient Has recent ABG, which has been reviewed. Will treat underlying causes and adjust management of respiratory failure as follows-      · Wean VAPOTHERM per PROTOCOL  · Nocturnal NIPPV/AVAPS on home settings  · Bronchodilators  · Lasix  · PO prednisone

## 2023-11-03 NOTE — SUBJECTIVE & OBJECTIVE
"Ayanna Alicia is 53 y.o.  Known to pulmonary with advanced ILD, Pul HTN WHO grp 3  Evaluation for lung transplant [facility]  Recent hospitalization for resp exacerbation discharged on micafungin ( complteted), Ceftriaxone ( active), metronidazole ( completed ) for actinomyces in setting of chr steriod use  She is on POC 6-8 LPM, bronchodilators Albuterol, Serevent and Yulperi  Has chr stable loose stolls while on OFEV  Actemra infusions on hold due to infection  Has PulAURE and TYVASO pending   Prednisone 10 mg on weaning  Also has Home NIV , in pul rehab  Present with increased oxygen demands, weakness, desaturation, low grade fever  Tested +ve for influenza A  Denies fever, chest pain    Interval History:  11/2: no new issues or c/o noted, OOB standing at the sink brushing her teeth upon mu arrival to her room this AM, some SOB when returned to bed, vapotherm 20L 50%  11/3: pt reports a "rough night" with coughing and desats that required increased O2 support, c/o cough and feelings of "air hunger" despite good sats, also with reflux     ROS complete and negative unless stated in the interval HPI     Objective:     Vital Signs (Most Recent):  Temp: 98.7 °F (37.1 °C) (11/03/23 1143)  Pulse: 101 (11/03/23 1229)  Resp: 20 (11/03/23 1229)  BP: (!) 140/80 (11/03/23 1145)  SpO2: (!) 92 % (11/03/23 1229) Vital Signs (24h Range):  Temp:  [97.7 °F (36.5 °C)-98.7 °F (37.1 °C)] 98.7 °F (37.1 °C)  Pulse:  [] 101  Resp:  [18-28] 20  SpO2:  [90 %-100 %] 92 %  BP: (125-141)/(67-80) 140/80     Weight: 103.1 kg (227 lb 4.7 oz)  Body mass index is 36.69 kg/m².      Intake/Output Summary (Last 24 hours) at 11/3/2023 1329  Last data filed at 11/3/2023 0608  Gross per 24 hour   Intake --   Output 1000 ml   Net -1000 ml        Physical Exam  Vitals reviewed.   Constitutional:       General: She is awake.      Appearance: She is obese. She is ill-appearing.      Comments: Chronically ill appearing female semi-upright in " bed on vapotherm with some mild distress noted, with frequent cough    Pulmonary:      Effort: No tachypnea or bradypnea.      Breath sounds: Decreased breath sounds and rhonchi present. No wheezing.      Comments: Vapotherm 20L 60%  Neurological:      Mental Status: She is alert.   Psychiatric:         Behavior: Behavior is cooperative.               Vents:  Oxygen Concentration (%): 60 (11/03/23 1229)    Lines/Drains/Airways       Peripherally Inserted Central Catheter Line  Duration             PICC Double Lumen 10/13/23 1627 right basilic 20 days              Peripheral Intravenous Line  Duration                  Peripheral IV - Single Lumen 11/01/23 1205 18 G Left Antecubital 2 days                    Significant Labs:    CBC/Anemia Profile:  Recent Labs   Lab 11/02/23  0537 11/03/23  0549   WBC 15.22* 14.26*   HGB 10.3* 10.0*   HCT 33.7* 33.6*    369   MCV 99* 102*   RDW 16.6* 16.8*        Chemistries:  Recent Labs   Lab 11/02/23  0537 11/03/23  0549    142   K 4.7 4.2    102   CO2 28 30*   BUN 9 15   CREATININE 0.7 0.8   CALCIUM 9.2 8.9   ALBUMIN 2.6* 2.8*   PROT 6.4 6.4   BILITOT 0.3 0.2   ALKPHOS 67 66   ALT 23 29   AST 18 22   MG 2.0 2.0   PHOS 4.4 4.6*       All pertinent labs within the past 24 hours have been reviewed.    Significant Imaging:  I have reviewed all pertinent imaging results/findings within the past 24 hours.

## 2023-11-03 NOTE — ASSESSMENT & PLAN NOTE
Patient with Hypoxic Respiratory failure which is Acute on chronic.  she is on home oxygen at 8 LPM. Supplemental oxygen was provided and noted- Oxygen Concentration (%):  [50-70] 65    .   Signs/symptoms of respiratory failure include- tachypnea, increased work of breathing and respiratory distress. Contributing diagnoses includes - CHF and Interstitial lung disease Labs and images were reviewed. Patient Has recent ABG, which has been reviewed. Will treat underlying causes and adjust management of respiratory failure as follows-     --Vapotherm  --Consult Pulm  --Cont home meds where possible  --JESSI  --Bipap @ HS  --Avoid steroid    11/3:  Cont duonebs   brovana budesonide nebs  Resume home lasix   Wean o2 as tolerated  D dimer elevated  CTA neg for PE

## 2023-11-03 NOTE — ASSESSMENT & PLAN NOTE
ON OFEV  Undergoing lung transplant eval in [place]   CT this AM per  without acute findings and stable disease

## 2023-11-03 NOTE — SUBJECTIVE & OBJECTIVE
Interval History: Patient still requiring vapotherm.     Review of Systems   Constitutional:  Negative for fatigue and fever.   HENT:  Negative for sinus pressure.    Eyes:  Negative for visual disturbance.   Respiratory:  Positive for cough and shortness of breath.    Cardiovascular:  Negative for chest pain.   Gastrointestinal:  Negative for nausea and vomiting.   Genitourinary:  Negative for difficulty urinating.   Musculoskeletal:  Negative for back pain.   Skin:  Negative for rash.   Neurological:  Negative for headaches.   Psychiatric/Behavioral:  Negative for confusion.      Objective:     Vital Signs (Most Recent):  Temp: 98.7 °F (37.1 °C) (11/03/23 1143)  Pulse: 102 (11/03/23 1143)  Resp: 20 (11/03/23 1143)  BP: (!) 141/79 (11/03/23 1143)  SpO2: 95 % (11/03/23 1143) Vital Signs (24h Range):  Temp:  [97.7 °F (36.5 °C)-98.7 °F (37.1 °C)] 98.7 °F (37.1 °C)  Pulse:  [] 102  Resp:  [16-28] 20  SpO2:  [90 %-100 %] 95 %  BP: (113-141)/(61-79) 141/79     Weight: 103.1 kg (227 lb 4.7 oz)  Body mass index is 36.69 kg/m².    Intake/Output Summary (Last 24 hours) at 11/3/2023 1222  Last data filed at 11/3/2023 0608  Gross per 24 hour   Intake --   Output 1000 ml   Net -1000 ml         Physical Exam  Vitals and nursing note reviewed.   Constitutional:       General: She is in acute distress.      Appearance: She is overweight. She is ill-appearing.      Comments: Vapotherm   HENT:      Head: Normocephalic and atraumatic.      Right Ear: Hearing and external ear normal.      Left Ear: Hearing and external ear normal.      Nose: No rhinorrhea.      Right Sinus: No maxillary sinus tenderness or frontal sinus tenderness.      Left Sinus: No maxillary sinus tenderness or frontal sinus tenderness.      Mouth/Throat:      Mouth: No oral lesions.      Pharynx: Uvula midline.   Eyes:      General:         Right eye: No discharge.         Left eye: No discharge.      Conjunctiva/sclera: Conjunctivae normal.      Pupils:  Pupils are equal, round, and reactive to light.   Neck:      Thyroid: No thyromegaly.      Vascular: No carotid bruit.      Trachea: No tracheal deviation.   Cardiovascular:      Rate and Rhythm: Regular rhythm. Tachycardia present.      Pulses:           Dorsalis pedis pulses are 2+ on the right side and 2+ on the left side.      Heart sounds: Normal heart sounds, S1 normal and S2 normal. No murmur heard.  Pulmonary:      Effort: Respiratory distress present.      Breath sounds: Examination of the right-lower field reveals rales. Examination of the left-lower field reveals rales. Rales present.   Abdominal:      General: Bowel sounds are decreased. There is distension.      Palpations: Abdomen is soft. There is no mass.      Tenderness: There is no abdominal tenderness.   Musculoskeletal:         General: Normal range of motion.      Cervical back: Normal range of motion.   Lymphadenopathy:      Cervical: No cervical adenopathy.      Upper Body:      Right upper body: No supraclavicular adenopathy.      Left upper body: No supraclavicular adenopathy.   Skin:     General: Skin is warm and dry.      Capillary Refill: Capillary refill takes less than 2 seconds.      Findings: No rash.   Neurological:      Mental Status: She is alert and oriented to person, place, and time.      Sensory: No sensory deficit.      Coordination: Coordination normal.      Gait: Gait normal.   Psychiatric:         Mood and Affect: Mood is not anxious or depressed.         Speech: Speech normal.         Behavior: Behavior normal.         Thought Content: Thought content normal.         Judgment: Judgment normal.             Significant Labs: All pertinent labs within the past 24 hours have been reviewed.    Significant Imaging: I have reviewed all pertinent imaging results/findings within the past 24 hours.

## 2023-11-04 LAB
ALBUMIN SERPL BCP-MCNC: 2.8 G/DL (ref 3.5–5.2)
ALP SERPL-CCNC: 80 U/L (ref 55–135)
ALT SERPL W/O P-5'-P-CCNC: 26 U/L (ref 10–44)
ANION GAP SERPL CALC-SCNC: 11 MMOL/L (ref 8–16)
AST SERPL-CCNC: 22 U/L (ref 10–40)
BASOPHILS # BLD AUTO: 0.06 K/UL (ref 0–0.2)
BASOPHILS NFR BLD: 0.4 % (ref 0–1.9)
BILIRUB SERPL-MCNC: 0.3 MG/DL (ref 0.1–1)
BUN SERPL-MCNC: 8 MG/DL (ref 6–20)
CALCIUM SERPL-MCNC: 9.5 MG/DL (ref 8.7–10.5)
CHLORIDE SERPL-SCNC: 99 MMOL/L (ref 95–110)
CO2 SERPL-SCNC: 30 MMOL/L (ref 23–29)
CREAT SERPL-MCNC: 0.8 MG/DL (ref 0.5–1.4)
DIFFERENTIAL METHOD: ABNORMAL
EOSINOPHIL # BLD AUTO: 0.5 K/UL (ref 0–0.5)
EOSINOPHIL NFR BLD: 3.4 % (ref 0–8)
ERYTHROCYTE [DISTWIDTH] IN BLOOD BY AUTOMATED COUNT: 16.7 % (ref 11.5–14.5)
EST. GFR  (NO RACE VARIABLE): >60 ML/MIN/1.73 M^2
GLUCOSE SERPL-MCNC: 136 MG/DL (ref 70–110)
HCT VFR BLD AUTO: 34.4 % (ref 37–48.5)
HGB BLD-MCNC: 10.5 G/DL (ref 12–16)
IMM GRANULOCYTES # BLD AUTO: 0.08 K/UL (ref 0–0.04)
IMM GRANULOCYTES NFR BLD AUTO: 0.6 % (ref 0–0.5)
LYMPHOCYTES # BLD AUTO: 0.9 K/UL (ref 1–4.8)
LYMPHOCYTES NFR BLD: 6.4 % (ref 18–48)
MAGNESIUM SERPL-MCNC: 1.8 MG/DL (ref 1.6–2.6)
MCH RBC QN AUTO: 30.1 PG (ref 27–31)
MCHC RBC AUTO-ENTMCNC: 30.5 G/DL (ref 32–36)
MCV RBC AUTO: 99 FL (ref 82–98)
MONOCYTES # BLD AUTO: 1.1 K/UL (ref 0.3–1)
MONOCYTES NFR BLD: 7.7 % (ref 4–15)
NEUTROPHILS # BLD AUTO: 11.2 K/UL (ref 1.8–7.7)
NEUTROPHILS NFR BLD: 81.5 % (ref 38–73)
NRBC BLD-RTO: 0 /100 WBC
PHOSPHATE SERPL-MCNC: 3.1 MG/DL (ref 2.7–4.5)
PLATELET # BLD AUTO: 329 K/UL (ref 150–450)
PMV BLD AUTO: 10 FL (ref 9.2–12.9)
POTASSIUM SERPL-SCNC: 3.7 MMOL/L (ref 3.5–5.1)
PROT SERPL-MCNC: 6.8 G/DL (ref 6–8.4)
RBC # BLD AUTO: 3.49 M/UL (ref 4–5.4)
SODIUM SERPL-SCNC: 140 MMOL/L (ref 136–145)
WBC # BLD AUTO: 13.72 K/UL (ref 3.9–12.7)

## 2023-11-04 PROCEDURE — 63600175 PHARM REV CODE 636 W HCPCS: Performed by: FAMILY MEDICINE

## 2023-11-04 PROCEDURE — 25000003 PHARM REV CODE 250: Performed by: INTERNAL MEDICINE

## 2023-11-04 PROCEDURE — 63600175 PHARM REV CODE 636 W HCPCS: Performed by: NURSE PRACTITIONER

## 2023-11-04 PROCEDURE — 99900035 HC TECH TIME PER 15 MIN (STAT)

## 2023-11-04 PROCEDURE — 25000242 PHARM REV CODE 250 ALT 637 W/ HCPCS: Performed by: FAMILY MEDICINE

## 2023-11-04 PROCEDURE — 94799 UNLISTED PULMONARY SVC/PX: CPT

## 2023-11-04 PROCEDURE — 94761 N-INVAS EAR/PLS OXIMETRY MLT: CPT

## 2023-11-04 PROCEDURE — 25000003 PHARM REV CODE 250: Performed by: NURSE PRACTITIONER

## 2023-11-04 PROCEDURE — 94640 AIRWAY INHALATION TREATMENT: CPT

## 2023-11-04 PROCEDURE — 99233 SBSQ HOSP IP/OBS HIGH 50: CPT | Mod: ,,, | Performed by: INTERNAL MEDICINE

## 2023-11-04 PROCEDURE — 25000242 PHARM REV CODE 250 ALT 637 W/ HCPCS: Performed by: NURSE PRACTITIONER

## 2023-11-04 PROCEDURE — 63600175 PHARM REV CODE 636 W HCPCS: Performed by: INTERNAL MEDICINE

## 2023-11-04 PROCEDURE — 21400001 HC TELEMETRY ROOM

## 2023-11-04 PROCEDURE — 85025 COMPLETE CBC W/AUTO DIFF WBC: CPT | Performed by: NURSE PRACTITIONER

## 2023-11-04 PROCEDURE — 25000003 PHARM REV CODE 250: Performed by: FAMILY MEDICINE

## 2023-11-04 PROCEDURE — 99233 PR SUBSEQUENT HOSPITAL CARE,LEVL III: ICD-10-PCS | Mod: ,,, | Performed by: INTERNAL MEDICINE

## 2023-11-04 PROCEDURE — 84100 ASSAY OF PHOSPHORUS: CPT | Performed by: NURSE PRACTITIONER

## 2023-11-04 PROCEDURE — 94660 CPAP INITIATION&MGMT: CPT

## 2023-11-04 PROCEDURE — 80053 COMPREHEN METABOLIC PANEL: CPT | Performed by: NURSE PRACTITIONER

## 2023-11-04 PROCEDURE — 83735 ASSAY OF MAGNESIUM: CPT | Performed by: NURSE PRACTITIONER

## 2023-11-04 PROCEDURE — 27100171 HC OXYGEN HIGH FLOW UP TO 24 HOURS

## 2023-11-04 PROCEDURE — 27000207 HC ISOLATION

## 2023-11-04 RX ORDER — FUROSEMIDE 10 MG/ML
40 INJECTION INTRAMUSCULAR; INTRAVENOUS ONCE
Status: COMPLETED | OUTPATIENT
Start: 2023-11-04 | End: 2023-11-04

## 2023-11-04 RX ORDER — FUROSEMIDE 10 MG/ML
20 INJECTION INTRAMUSCULAR; INTRAVENOUS ONCE
Status: COMPLETED | OUTPATIENT
Start: 2023-11-04 | End: 2023-11-04

## 2023-11-04 RX ADMIN — IPRATROPIUM BROMIDE AND ALBUTEROL SULFATE 3 ML: 2.5; .5 SOLUTION RESPIRATORY (INHALATION) at 02:11

## 2023-11-04 RX ADMIN — ENOXAPARIN SODIUM 40 MG: 40 INJECTION SUBCUTANEOUS at 04:11

## 2023-11-04 RX ADMIN — PANTOPRAZOLE SODIUM 40 MG: 40 TABLET, DELAYED RELEASE ORAL at 10:11

## 2023-11-04 RX ADMIN — FUROSEMIDE 40 MG: 10 INJECTION, SOLUTION INTRAMUSCULAR; INTRAVENOUS at 03:11

## 2023-11-04 RX ADMIN — CALCIUM CARBONATE (ANTACID) CHEW TAB 500 MG 1000 MG: 500 CHEW TAB at 04:11

## 2023-11-04 RX ADMIN — FUROSEMIDE 40 MG: 40 TABLET ORAL at 09:11

## 2023-11-04 RX ADMIN — OSELTAMIVIR PHOSPHATE 75 MG: 75 CAPSULE ORAL at 08:11

## 2023-11-04 RX ADMIN — IPRATROPIUM BROMIDE AND ALBUTEROL SULFATE 3 ML: 2.5; .5 SOLUTION RESPIRATORY (INHALATION) at 07:11

## 2023-11-04 RX ADMIN — ARFORMOTEROL TARTRATE 15 MCG: 15 SOLUTION RESPIRATORY (INHALATION) at 07:11

## 2023-11-04 RX ADMIN — MONTELUKAST 10 MG: 10 TABLET, FILM COATED ORAL at 08:11

## 2023-11-04 RX ADMIN — BUDESONIDE 0.5 MG: 0.5 INHALANT ORAL at 07:11

## 2023-11-04 RX ADMIN — PROMETHAZINE HYDROCHLORIDE AND CODEINE PHOSPHATE 5 ML: 6.25; 1 SOLUTION ORAL at 10:11

## 2023-11-04 RX ADMIN — DOCUSATE SODIUM 100 MG: 100 CAPSULE, LIQUID FILLED ORAL at 10:11

## 2023-11-04 RX ADMIN — FUROSEMIDE 20 MG: 10 INJECTION, SOLUTION INTRAMUSCULAR; INTRAVENOUS at 10:11

## 2023-11-04 RX ADMIN — NINTEDANIB 150 MG: 150 CAPSULE ORAL at 09:11

## 2023-11-04 RX ADMIN — MUPIROCIN: 20 OINTMENT TOPICAL at 08:11

## 2023-11-04 RX ADMIN — NINTEDANIB 150 MG: 150 CAPSULE ORAL at 08:11

## 2023-11-04 RX ADMIN — PREDNISONE 10 MG: 10 TABLET ORAL at 10:11

## 2023-11-04 RX ADMIN — OSELTAMIVIR PHOSPHATE 75 MG: 75 CAPSULE ORAL at 10:11

## 2023-11-04 RX ADMIN — PROMETHAZINE HYDROCHLORIDE AND CODEINE PHOSPHATE 5 ML: 6.25; 1 SOLUTION ORAL at 04:11

## 2023-11-04 RX ADMIN — CEFTRIAXONE 2 G: 2 INJECTION, POWDER, FOR SOLUTION INTRAMUSCULAR; INTRAVENOUS at 10:11

## 2023-11-04 RX ADMIN — Medication 400 MG: at 10:11

## 2023-11-04 RX ADMIN — IPRATROPIUM BROMIDE AND ALBUTEROL SULFATE 3 ML: 2.5; .5 SOLUTION RESPIRATORY (INHALATION) at 01:11

## 2023-11-04 NOTE — SUBJECTIVE & OBJECTIVE
Interval History: Requiring increased vapotherm today.     Review of Systems   Constitutional:  Negative for fatigue and fever.   HENT:  Negative for sinus pressure.    Eyes:  Negative for visual disturbance.   Respiratory:  Positive for cough and shortness of breath.    Cardiovascular:  Negative for chest pain.   Gastrointestinal:  Negative for nausea and vomiting.   Genitourinary:  Negative for difficulty urinating.   Musculoskeletal:  Negative for back pain.   Skin:  Negative for rash.   Neurological:  Negative for headaches.   Psychiatric/Behavioral:  Negative for confusion.      Objective:     Vital Signs (Most Recent):  Temp: 98 °F (36.7 °C) (11/04/23 1214)  Pulse: (!) 120 (11/04/23 1214)  Resp: 16 (11/04/23 1214)  BP: (!) 103/59 (11/04/23 1214)  SpO2: 96 % (11/04/23 1214) Vital Signs (24h Range):  Temp:  [98 °F (36.7 °C)-99.4 °F (37.4 °C)] 98 °F (36.7 °C)  Pulse:  [] 120  Resp:  [16-31] 16  SpO2:  [87 %-100 %] 96 %  BP: (103-139)/(53-74) 103/59     Weight: 103.1 kg (227 lb 4.7 oz)  Body mass index is 36.69 kg/m².  No intake or output data in the 24 hours ending 11/04/23 1325      Physical Exam  Vitals and nursing note reviewed.   Constitutional:       General: She is in acute distress.      Appearance: She is overweight. She is ill-appearing.      Comments: Vapotherm   HENT:      Head: Normocephalic and atraumatic.      Right Ear: Hearing and external ear normal.      Left Ear: Hearing and external ear normal.      Nose: No rhinorrhea.      Right Sinus: No maxillary sinus tenderness or frontal sinus tenderness.      Left Sinus: No maxillary sinus tenderness or frontal sinus tenderness.      Mouth/Throat:      Mouth: No oral lesions.      Pharynx: Uvula midline.   Eyes:      General:         Right eye: No discharge.         Left eye: No discharge.      Conjunctiva/sclera: Conjunctivae normal.      Pupils: Pupils are equal, round, and reactive to light.   Neck:      Thyroid: No thyromegaly.      Vascular:  No carotid bruit.      Trachea: No tracheal deviation.   Cardiovascular:      Rate and Rhythm: Regular rhythm. Tachycardia present.      Pulses:           Dorsalis pedis pulses are 2+ on the right side and 2+ on the left side.      Heart sounds: Normal heart sounds, S1 normal and S2 normal. No murmur heard.  Pulmonary:      Effort: Respiratory distress present.      Breath sounds: Examination of the right-lower field reveals rales. Examination of the left-lower field reveals rales. Rales present.   Abdominal:      General: Bowel sounds are decreased. There is distension.      Palpations: Abdomen is soft. There is no mass.      Tenderness: There is no abdominal tenderness.   Musculoskeletal:         General: Normal range of motion.      Cervical back: Normal range of motion.   Lymphadenopathy:      Cervical: No cervical adenopathy.      Upper Body:      Right upper body: No supraclavicular adenopathy.      Left upper body: No supraclavicular adenopathy.   Skin:     General: Skin is warm and dry.      Capillary Refill: Capillary refill takes less than 2 seconds.      Findings: No rash.   Neurological:      Mental Status: She is alert and oriented to person, place, and time.      Sensory: No sensory deficit.      Coordination: Coordination normal.      Gait: Gait normal.   Psychiatric:         Mood and Affect: Mood is not anxious or depressed.         Speech: Speech normal.         Behavior: Behavior normal.         Thought Content: Thought content normal.         Judgment: Judgment normal.             Significant Labs: All pertinent labs within the past 24 hours have been reviewed.    Significant Imaging: I have reviewed all pertinent imaging results/findings within the past 24 hours.

## 2023-11-04 NOTE — PROGRESS NOTES
"[facility] - Tele[facility] ([facility])  Pulmonology  Progress Note    Patient Name: Ayanna Alicia  MRN: [id number]  Admission Date: 11/1/2023  Hospital Length of Stay: 3 days  Code Status: Full Code  Attending Provider: Thaddeus Nassar MD  Primary Care Provider: Madeleine Enrique MD   Principal Problem: Acute on chronic respiratory failure with hypoxia    Subjective:     Ayanna Alicia is 53 y.o.  Known to pulmonary with advanced ILD, Pul HTN WHO grp 3  Evaluation for lung transplant [facility]  Recent hospitalization for resp exacerbation discharged on micafungin ( complteted), Ceftriaxone ( active), metronidazole ( completed ) for actinomyces in setting of chr steriod use  She is on POC 6-8 LPM, bronchodilators Albuterol, Serevent and Yulperi  Has chr stable loose stolls while on OFEV  Actemra infusions on hold due to infection  Has PulHTN and TYVASO pending   Prednisone 10 mg on weaning  Also has Home NIV , in pul rehab  Present with increased oxygen demands, weakness, desaturation, low grade fever  Tested +ve for influenza A  Denies fever, chest pain    Interval History:  11/2: no new issues or c/o noted, OOB standing at the sink brushing her teeth upon mu arrival to her room this AM, some SOB when returned to bed, vapotherm 20L 50%  11/3: pt reports a "rough night" with coughing and desats that required increased O2 support, c/o cough and feelings of "air hunger" despite good sats, also with reflux   11/4: down to 20L 40%, dose of lasix IV this AM, continues with HA, no family at bedside     ROS complete and negative unless stated in the interval HPI    Objective:     Vital Signs (Most Recent):  Temp: 98.6 °F (37 °C) (11/04/23 0349)  Pulse: 108 (11/04/23 0726)  Resp: 18 (11/04/23 0726)  BP: 116/65 (11/04/23 0900)  SpO2: (!) 92 % (11/04/23 0726) Vital Signs (24h Range):  Temp:  [98.4 °F (36.9 °C)-99.4 °F (37.4 °C)] 98.6 °F (37 °C)  Pulse:  [] 108  Resp:  [18-31] 18  SpO2:  [90 %-100 %] 92 %  BP: " (116-141)/(53-80) 116/65     Weight: 103.1 kg (227 lb 4.7 oz)  Body mass index is 36.69 kg/m².    No intake or output data in the 24 hours ending 11/04/23 1037     Physical Exam  Vitals reviewed.   Constitutional:       General: She is awake. She is not in acute distress.     Appearance: She is obese. She is ill-appearing.      Comments: On vapotherm 20L 40%   Pulmonary:      Effort: No tachypnea, bradypnea, accessory muscle usage or respiratory distress.      Breath sounds: Decreased breath sounds, rhonchi and rales present. No wheezing.      Comments: Vapotherm 20L 40%, poor inspiratory effort   Neurological:      Mental Status: She is alert.   Psychiatric:         Behavior: Behavior is cooperative.             Vents:  Oxygen Concentration (%): 40 (11/04/23 0725)    Lines/Drains/Airways       Peripherally Inserted Central Catheter Line  Duration             PICC Double Lumen 10/13/23 1627 right basilic 21 days              Peripheral Intravenous Line  Duration                  Peripheral IV - Single Lumen 11/01/23 1205 18 G Left Antecubital 2 days                    Significant Labs:    CBC/Anemia Profile:  Recent Labs   Lab 11/03/23  0549   WBC 14.26*   HGB 10.0*   HCT 33.6*      *   RDW 16.8*        Chemistries:  Recent Labs   Lab 11/03/23  0549      K 4.2      CO2 30*   BUN 15   CREATININE 0.8   CALCIUM 8.9   ALBUMIN 2.8*   PROT 6.4   BILITOT 0.2   ALKPHOS 66   ALT 29   AST 22   MG 2.0   PHOS 4.6*       All pertinent labs within the past 24 hours have been reviewed.    Significant Imaging:  I have reviewed all pertinent imaging results/findings within the past 24 hours.      ABG  Recent Labs   Lab 11/01/23  1229   PH 7.465*   PO2 62*   PCO2 37.3   HCO3 26.9   BE 3*     Assessment/Plan:     ENT  Dysphonia due to laryngeal ulcers  Avoid IV steriods, on CEFTRIXONE for actinomyces infection    Pulmonary  * Acute on chronic respiratory failure with hypoxia  Patient with Hypoxic Respiratory  failure which is Acute on chronic.  she is on home oxygen at 8 LPM. Supplemental oxygen was provided and noted- Oxygen Concentration (%):  [40-60] 40     ON VAPOTHERM, WEAN AS TOLERATED    Signs/symptoms of respiratory failure include- increased work of breathing. Contributing diagnoses includes - Pneumonia and VIRAL PNEUMONIA Labs and images were reviewed. Patient Has recent ABG, which has been reviewed. Will treat underlying causes and adjust management of respiratory failure as follows-      · Wean VAPOTHERM per PROTOCOL  · Nocturnal NIPPV/AVAPS on home settings  · Bronchodilators  · PO Lasix, dose of IV lasix this AM x1  · PO prednisone    Interstitial lung disease  ON OFEV  Undergoing lung transplant eval in [place]     ID  Influenza A with respiratory manifestations  tamiflu  Cough meds PRN  Airborne droplet isolation  Supportive care    Actinomyces infection  Complete treatment plan per ID, on CEFTRIAXONE via PICC    Pulmonary team will continue to follow along. Please call with questions.       Agustín Aviles MD  Pulmonology  [facility] - Telemetry ([facility])

## 2023-11-04 NOTE — SUBJECTIVE & OBJECTIVE
"Ayanna Alicia is 53 y.o.  Known to pulmonary with advanced ILD, Pul HTN WHO grp 3  Evaluation for lung transplant [facility]  Recent hospitalization for resp exacerbation discharged on micafungin ( complteted), Ceftriaxone ( active), metronidazole ( completed ) for actinomyces in setting of chr steriod use  She is on POC 6-8 LPM, bronchodilators Albuterol, Serevent and Yulperi  Has chr stable loose stolls while on OFEV  Actemra infusions on hold due to infection  Has PulAURE and TYVASO pending   Prednisone 10 mg on weaning  Also has Home NIV , in pul rehab  Present with increased oxygen demands, weakness, desaturation, low grade fever  Tested +ve for influenza A  Denies fever, chest pain    Interval History:  11/2: no new issues or c/o noted, OOB standing at the sink brushing her teeth upon mu arrival to her room this AM, some SOB when returned to bed, vapotherm 20L 50%  11/3: pt reports a "rough night" with coughing and desats that required increased O2 support, c/o cough and feelings of "air hunger" despite good sats, also with reflux   11/4: down to 20L 40%, dose of lasix IV this AM, continues with HA, no family at bedside     ROS complete and negative unless stated in the interval HPI    Objective:     Vital Signs (Most Recent):  Temp: 98.6 °F (37 °C) (11/04/23 0349)  Pulse: 108 (11/04/23 0726)  Resp: 18 (11/04/23 0726)  BP: 116/65 (11/04/23 0900)  SpO2: (!) 92 % (11/04/23 0726) Vital Signs (24h Range):  Temp:  [98.4 °F (36.9 °C)-99.4 °F (37.4 °C)] 98.6 °F (37 °C)  Pulse:  [] 108  Resp:  [18-31] 18  SpO2:  [90 %-100 %] 92 %  BP: (116-141)/(53-80) 116/65     Weight: 103.1 kg (227 lb 4.7 oz)  Body mass index is 36.69 kg/m².    No intake or output data in the 24 hours ending 11/04/23 1037     Physical Exam  Vitals reviewed.   Constitutional:       General: She is awake. She is not in acute distress.     Appearance: She is obese. She is ill-appearing.      Comments: On vapotherm 20L 40%   Pulmonary: "      Effort: No tachypnea, bradypnea, accessory muscle usage or respiratory distress.      Breath sounds: Decreased breath sounds, rhonchi and rales present. No wheezing.      Comments: Vapotherm 20L 40%, poor inspiratory effort   Neurological:      Mental Status: She is alert.   Psychiatric:         Behavior: Behavior is cooperative.             Vents:  Oxygen Concentration (%): 40 (11/04/23 0725)    Lines/Drains/Airways       Peripherally Inserted Central Catheter Line  Duration             PICC Double Lumen 10/13/23 1627 right basilic 21 days              Peripheral Intravenous Line  Duration                  Peripheral IV - Single Lumen 11/01/23 1205 18 G Left Antecubital 2 days                    Significant Labs:    CBC/Anemia Profile:  Recent Labs   Lab 11/03/23  0549   WBC 14.26*   HGB 10.0*   HCT 33.6*      *   RDW 16.8*        Chemistries:  Recent Labs   Lab 11/03/23  0549      K 4.2      CO2 30*   BUN 15   CREATININE 0.8   CALCIUM 8.9   ALBUMIN 2.8*   PROT 6.4   BILITOT 0.2   ALKPHOS 66   ALT 29   AST 22   MG 2.0   PHOS 4.6*       All pertinent labs within the past 24 hours have been reviewed.    Significant Imaging:  I have reviewed all pertinent imaging results/findings within the past 24 hours.

## 2023-11-04 NOTE — ASSESSMENT & PLAN NOTE
Patient with Hypoxic Respiratory failure which is Acute on chronic.  she is on home oxygen at 8 LPM. Supplemental oxygen was provided and noted- Oxygen Concentration (%):  [40-60] 40     ON VAPOTHERM, WEAN AS TOLERATED    Signs/symptoms of respiratory failure include- increased work of breathing. Contributing diagnoses includes - Pneumonia and VIRAL PNEUMONIA Labs and images were reviewed. Patient Has recent ABG, which has been reviewed. Will treat underlying causes and adjust management of respiratory failure as follows-      · Wean VAPOTHERM per PROTOCOL  · Nocturnal NIPPV/AVAPS on home settings  · Bronchodilators  · PO Lasix, dose of IV lasix this AM x1  · PO prednisone

## 2023-11-04 NOTE — PLAN OF CARE
Problem: Infection  Goal: Absence of Infection Signs and Symptoms  Outcome: Ongoing, Progressing  Intervention: Prevent or Manage Infection  Flowsheets (Taken 11/4/2023 1500)  Fever Reduction/Comfort Measures:   lightweight bedding   lightweight clothing  Infection Management: aseptic technique maintained  Isolation Precautions: droplet     Problem: Skin Injury Risk Increased  Goal: Skin Health and Integrity  Outcome: Ongoing, Progressing  Intervention: Optimize Skin Protection  Flowsheets (Taken 11/4/2023 1500)  Pressure Reduction Techniques: frequent weight shift encouraged  Pressure Reduction Devices: positioning supports utilized  Skin Protection: adhesive use limited  Head of Bed (HOB) Positioning: HOB elevated

## 2023-11-04 NOTE — ASSESSMENT & PLAN NOTE
Patient with Hypoxic Respiratory failure which is Acute on chronic.  she is on home oxygen at 8 LPM. Supplemental oxygen was provided and noted- Oxygen Concentration (%):  [40-55] 50    .   Signs/symptoms of respiratory failure include- tachypnea, increased work of breathing and respiratory distress. Contributing diagnoses includes - CHF and Interstitial lung disease Labs and images were reviewed. Patient Has recent ABG, which has been reviewed. Will treat underlying causes and adjust management of respiratory failure as follows-     --Vapotherm  --Consult Pulm  --Cont home meds where possible  --JESSI  --Bipap @ HS  --Avoid steroid    11/4:  Cont duonebs   brovana budesonide nebs  Resume home lasix   Wean o2 as tolerated  D dimer elevated  CTA neg for PE

## 2023-11-04 NOTE — PROGRESS NOTES
[facility] - Telemetry ([facility])  [facility] Medicine  Progress Note    Patient Name: Ayanna Alicia  MRN: [id number]  Patient Class: IP- Inpatient   Admission Date: 2023  Length of Stay: 3 days  Attending Physician: Thaddeus Nassar MD  Primary Care Provider: Madeleine Enrique MD        Subjective:     Principal Problem:Acute on chronic respiratory failure with hypoxia        HPI:  53-year-old white female with a history of pulmonary fibrosis, pulmonary HTN, RA, Actinomyces infection, COPD, GLENN. Presented with worsening shortness of breath over the past 48 hours.  The patient was in the hospital 2 weeks ago and since has been weaning off of steroids.  She is oxygen dependent at home usually at 8 liters/minute.  She was progressive shortness of breath with orthopnea.  There is no chest pain or pedal edema.  She denies any fevers or chills though now has a productive cough.  Patient was compliant with her medications.  In the ED, vitals: 155/93, 130, 16, 99.1, 54% RA. Placed on vapotherm in ED. Labs: WBC: 18.29, A, Lactic: 2.6, Trop; 0.029, +FluA, CXR: unchanged. EKG: Neg for STEMI. Treated with IV fluids, Steroid, Nebs. Vapotherm set to 20L @ 90% FiO2. Patient is a full code. Admitted to Hospital Medicine for management of Acute on Chronic Resp Failure, Influenza A.       Overview/Hospital Course:  : pt currently on vapotherm. Cont duonebs, budesonide, and brovana nebs. Wean O2 as tolerated. Cont tamiflu. Will order procal and d dimer.   11/3: CTA negative for PE. Cont current management. Wean O2 as tolerated. Pulm on case.   : cont current management, requiring increased vapotherm.       Interval History: Requiring increased vapotherm today.     Review of Systems   Constitutional:  Negative for fatigue and fever.   HENT:  Negative for sinus pressure.    Eyes:  Negative for visual disturbance.   Respiratory:  Positive for cough and shortness of breath.    Cardiovascular:  Negative for chest pain.    Gastrointestinal:  Negative for nausea and vomiting.   Genitourinary:  Negative for difficulty urinating.   Musculoskeletal:  Negative for back pain.   Skin:  Negative for rash.   Neurological:  Negative for headaches.   Psychiatric/Behavioral:  Negative for confusion.      Objective:     Vital Signs (Most Recent):  Temp: 98 °F (36.7 °C) (11/04/23 1214)  Pulse: (!) 120 (11/04/23 1214)  Resp: 16 (11/04/23 1214)  BP: (!) 103/59 (11/04/23 1214)  SpO2: 96 % (11/04/23 1214) Vital Signs (24h Range):  Temp:  [98 °F (36.7 °C)-99.4 °F (37.4 °C)] 98 °F (36.7 °C)  Pulse:  [] 120  Resp:  [16-31] 16  SpO2:  [87 %-100 %] 96 %  BP: (103-139)/(53-74) 103/59     Weight: 103.1 kg (227 lb 4.7 oz)  Body mass index is 36.69 kg/m².  No intake or output data in the 24 hours ending 11/04/23 1325      Physical Exam  Vitals and nursing note reviewed.   Constitutional:       General: She is in acute distress.      Appearance: She is overweight. She is ill-appearing.      Comments: Vapotherm   HENT:      Head: Normocephalic and atraumatic.      Right Ear: Hearing and external ear normal.      Left Ear: Hearing and external ear normal.      Nose: No rhinorrhea.      Right Sinus: No maxillary sinus tenderness or frontal sinus tenderness.      Left Sinus: No maxillary sinus tenderness or frontal sinus tenderness.      Mouth/Throat:      Mouth: No oral lesions.      Pharynx: Uvula midline.   Eyes:      General:         Right eye: No discharge.         Left eye: No discharge.      Conjunctiva/sclera: Conjunctivae normal.      Pupils: Pupils are equal, round, and reactive to light.   Neck:      Thyroid: No thyromegaly.      Vascular: No carotid bruit.      Trachea: No tracheal deviation.   Cardiovascular:      Rate and Rhythm: Regular rhythm. Tachycardia present.      Pulses:           Dorsalis pedis pulses are 2+ on the right side and 2+ on the left side.      Heart sounds: Normal heart sounds, S1 normal and S2 normal. No murmur  heard.  Pulmonary:      Effort: Respiratory distress present.      Breath sounds: Examination of the right-lower field reveals rales. Examination of the left-lower field reveals rales. Rales present.   Abdominal:      General: Bowel sounds are decreased. There is distension.      Palpations: Abdomen is soft. There is no mass.      Tenderness: There is no abdominal tenderness.   Musculoskeletal:         General: Normal range of motion.      Cervical back: Normal range of motion.   Lymphadenopathy:      Cervical: No cervical adenopathy.      Upper Body:      Right upper body: No supraclavicular adenopathy.      Left upper body: No supraclavicular adenopathy.   Skin:     General: Skin is warm and dry.      Capillary Refill: Capillary refill takes less than 2 seconds.      Findings: No rash.   Neurological:      Mental Status: She is alert and oriented to person, place, and time.      Sensory: No sensory deficit.      Coordination: Coordination normal.      Gait: Gait normal.   Psychiatric:         Mood and Affect: Mood is not anxious or depressed.         Speech: Speech normal.         Behavior: Behavior normal.         Thought Content: Thought content normal.         Judgment: Judgment normal.             Significant Labs: All pertinent labs within the past 24 hours have been reviewed.    Significant Imaging: I have reviewed all pertinent imaging results/findings within the past 24 hours.        Assessment/Plan:      * Acute on chronic respiratory failure with hypoxia  Patient with Hypoxic Respiratory failure which is Acute on chronic.  she is on home oxygen at 8 LPM. Supplemental oxygen was provided and noted- Oxygen Concentration (%):  [40-55] 50    .   Signs/symptoms of respiratory failure include- tachypnea, increased work of breathing and respiratory distress. Contributing diagnoses includes - CHF and Interstitial lung disease Labs and images were reviewed. Patient Has recent ABG, which has been reviewed. Will  treat underlying causes and adjust management of respiratory failure as follows-     --Vapotherm  --Consult Pulm  --Cont home meds where possible  --JESSI  --Bipap @ HS  --Avoid steroid    11/4:  Cont duonebs   brovana budesonide nebs  Resume home lasix   Wean o2 as tolerated  D dimer elevated  CTA neg for PE    Influenza A with respiratory manifestations  +FluA on admssion    --Airborne/droplet isolation  --Tamiflu      Actinomyces infection  Followed by ID as OP    --cont Ceftriaxone  --PICC line care       Dysphonia due to laryngeal ulcers    --avoid steroids per Pulmonology due to actinomyces infection  --throat spray prn      Elevated troponin  Likely secondary to demand ischemia, hypoxia, resp failure, fever, infuenza    --trend trop  --tele      Interstitial lung disease  Managed with OFEV as OP, followed by Pulmonology as OP    --Cont home regimen, request family to bring meds not available on formulary      HTN (hypertension)  Chronic, controlled. Latest blood pressure and vitals reviewed-     Temp:  [99.1 °F (37.3 °C)-99.4 °F (37.4 °C)]   Pulse:  [118-138]   Resp:  [16-28]   BP: (138-155)/(71-93)   SpO2:  [54 %-100 %] .   Home meds for hypertension were reviewed and noted below.   Hypertension Medications             furosemide (LASIX) 40 MG tablet Take 1 tablet (40 mg total) by mouth once daily.          While in the hospital, will manage blood pressure as follows; Continue home antihypertensive regimen    Will utilize p.r.n. blood pressure medication only if patient's blood pressure greater than 180/110 and she develops symptoms such as worsening chest pain or shortness of breath.      VTE Risk Mitigation (From admission, onward)         Ordered     enoxaparin injection 40 mg  Daily         11/01/23 1507     IP VTE HIGH RISK PATIENT  Once         11/01/23 1507     Place sequential compression device  Until discontinued         11/01/23 1507                Discharge Planning   JANETTE:      Code Status: Full  Code   Is the patient medically ready for discharge?:     Reason for patient still in hospital (select all that apply): Patient trending condition                     Thaddeus Nassar MD  Department of Hospital Medicine   [facility] - Tele[facility] ([facility])

## 2023-11-05 LAB
ANION GAP SERPL CALC-SCNC: 15 MMOL/L (ref 8–16)
BASOPHILS # BLD AUTO: 0.08 K/UL (ref 0–0.2)
BASOPHILS NFR BLD: 0.6 % (ref 0–1.9)
BUN SERPL-MCNC: 7 MG/DL (ref 6–20)
CALCIUM SERPL-MCNC: 9.3 MG/DL (ref 8.7–10.5)
CHLORIDE SERPL-SCNC: 98 MMOL/L (ref 95–110)
CO2 SERPL-SCNC: 29 MMOL/L (ref 23–29)
CREAT SERPL-MCNC: 0.8 MG/DL (ref 0.5–1.4)
DIFFERENTIAL METHOD: ABNORMAL
EOSINOPHIL # BLD AUTO: 0.7 K/UL (ref 0–0.5)
EOSINOPHIL NFR BLD: 5.3 % (ref 0–8)
ERYTHROCYTE [DISTWIDTH] IN BLOOD BY AUTOMATED COUNT: 16.4 % (ref 11.5–14.5)
EST. GFR  (NO RACE VARIABLE): >60 ML/MIN/1.73 M^2
GLUCOSE SERPL-MCNC: 111 MG/DL (ref 70–110)
HCT VFR BLD AUTO: 37.3 % (ref 37–48.5)
HGB BLD-MCNC: 11.5 G/DL (ref 12–16)
IMM GRANULOCYTES # BLD AUTO: 0.1 K/UL (ref 0–0.04)
IMM GRANULOCYTES NFR BLD AUTO: 0.7 % (ref 0–0.5)
LYMPHOCYTES # BLD AUTO: 0.8 K/UL (ref 1–4.8)
LYMPHOCYTES NFR BLD: 6.1 % (ref 18–48)
MCH RBC QN AUTO: 30.3 PG (ref 27–31)
MCHC RBC AUTO-ENTMCNC: 30.8 G/DL (ref 32–36)
MCV RBC AUTO: 98 FL (ref 82–98)
MONOCYTES # BLD AUTO: 0.8 K/UL (ref 0.3–1)
MONOCYTES NFR BLD: 6 % (ref 4–15)
NEUTROPHILS # BLD AUTO: 11.1 K/UL (ref 1.8–7.7)
NEUTROPHILS NFR BLD: 81.3 % (ref 38–73)
NRBC BLD-RTO: 0 /100 WBC
PLATELET # BLD AUTO: 336 K/UL (ref 150–450)
PMV BLD AUTO: 9.6 FL (ref 9.2–12.9)
POTASSIUM SERPL-SCNC: 3.7 MMOL/L (ref 3.5–5.1)
RBC # BLD AUTO: 3.79 M/UL (ref 4–5.4)
SODIUM SERPL-SCNC: 142 MMOL/L (ref 136–145)
WBC # BLD AUTO: 13.72 K/UL (ref 3.9–12.7)

## 2023-11-05 PROCEDURE — 63600175 PHARM REV CODE 636 W HCPCS: Performed by: FAMILY MEDICINE

## 2023-11-05 PROCEDURE — 25000003 PHARM REV CODE 250: Performed by: INTERNAL MEDICINE

## 2023-11-05 PROCEDURE — 94761 N-INVAS EAR/PLS OXIMETRY MLT: CPT

## 2023-11-05 PROCEDURE — 27100171 HC OXYGEN HIGH FLOW UP TO 24 HOURS

## 2023-11-05 PROCEDURE — 94640 AIRWAY INHALATION TREATMENT: CPT

## 2023-11-05 PROCEDURE — 25000003 PHARM REV CODE 250: Performed by: NURSE PRACTITIONER

## 2023-11-05 PROCEDURE — 25000242 PHARM REV CODE 250 ALT 637 W/ HCPCS: Performed by: NURSE PRACTITIONER

## 2023-11-05 PROCEDURE — 63600175 PHARM REV CODE 636 W HCPCS: Performed by: INTERNAL MEDICINE

## 2023-11-05 PROCEDURE — 99900035 HC TECH TIME PER 15 MIN (STAT)

## 2023-11-05 PROCEDURE — 25000242 PHARM REV CODE 250 ALT 637 W/ HCPCS: Performed by: FAMILY MEDICINE

## 2023-11-05 PROCEDURE — 94799 UNLISTED PULMONARY SVC/PX: CPT

## 2023-11-05 PROCEDURE — 99233 PR SUBSEQUENT HOSPITAL CARE,LEVL III: ICD-10-PCS | Mod: ,,, | Performed by: INTERNAL MEDICINE

## 2023-11-05 PROCEDURE — 80048 BASIC METABOLIC PNL TOTAL CA: CPT | Performed by: FAMILY MEDICINE

## 2023-11-05 PROCEDURE — 27000207 HC ISOLATION

## 2023-11-05 PROCEDURE — 25000003 PHARM REV CODE 250: Performed by: FAMILY MEDICINE

## 2023-11-05 PROCEDURE — 21400001 HC TELEMETRY ROOM

## 2023-11-05 PROCEDURE — 85025 COMPLETE CBC W/AUTO DIFF WBC: CPT | Performed by: FAMILY MEDICINE

## 2023-11-05 PROCEDURE — 63600175 PHARM REV CODE 636 W HCPCS: Performed by: NURSE PRACTITIONER

## 2023-11-05 PROCEDURE — 99233 SBSQ HOSP IP/OBS HIGH 50: CPT | Mod: ,,, | Performed by: INTERNAL MEDICINE

## 2023-11-05 RX ORDER — KETOROLAC TROMETHAMINE 30 MG/ML
30 INJECTION, SOLUTION INTRAMUSCULAR; INTRAVENOUS ONCE
Status: COMPLETED | OUTPATIENT
Start: 2023-11-05 | End: 2023-11-05

## 2023-11-05 RX ORDER — FUROSEMIDE 10 MG/ML
20 INJECTION INTRAMUSCULAR; INTRAVENOUS ONCE
Status: COMPLETED | OUTPATIENT
Start: 2023-11-05 | End: 2023-11-05

## 2023-11-05 RX ADMIN — BUDESONIDE 0.5 MG: 0.5 INHALANT ORAL at 07:11

## 2023-11-05 RX ADMIN — POLYETHYLENE GLYCOL 3350 17 G: 17 POWDER, FOR SOLUTION ORAL at 09:11

## 2023-11-05 RX ADMIN — MONTELUKAST 10 MG: 10 TABLET, FILM COATED ORAL at 08:11

## 2023-11-05 RX ADMIN — Medication 400 MG: at 09:11

## 2023-11-05 RX ADMIN — OSELTAMIVIR PHOSPHATE 75 MG: 75 CAPSULE ORAL at 09:11

## 2023-11-05 RX ADMIN — IPRATROPIUM BROMIDE AND ALBUTEROL SULFATE 3 ML: 2.5; .5 SOLUTION RESPIRATORY (INHALATION) at 01:11

## 2023-11-05 RX ADMIN — KETOROLAC TROMETHAMINE 30 MG: 30 INJECTION, SOLUTION INTRAMUSCULAR; INTRAVENOUS at 01:11

## 2023-11-05 RX ADMIN — ENOXAPARIN SODIUM 40 MG: 40 INJECTION SUBCUTANEOUS at 05:11

## 2023-11-05 RX ADMIN — MUPIROCIN: 20 OINTMENT TOPICAL at 08:11

## 2023-11-05 RX ADMIN — PANTOPRAZOLE SODIUM 40 MG: 40 TABLET, DELAYED RELEASE ORAL at 09:11

## 2023-11-05 RX ADMIN — FUROSEMIDE 40 MG: 40 TABLET ORAL at 09:11

## 2023-11-05 RX ADMIN — ARFORMOTEROL TARTRATE 15 MCG: 15 SOLUTION RESPIRATORY (INHALATION) at 07:11

## 2023-11-05 RX ADMIN — IPRATROPIUM BROMIDE AND ALBUTEROL SULFATE 3 ML: 2.5; .5 SOLUTION RESPIRATORY (INHALATION) at 07:11

## 2023-11-05 RX ADMIN — NINTEDANIB 150 MG: 150 CAPSULE ORAL at 09:11

## 2023-11-05 RX ADMIN — OSELTAMIVIR PHOSPHATE 75 MG: 75 CAPSULE ORAL at 08:11

## 2023-11-05 RX ADMIN — FUROSEMIDE 20 MG: 10 INJECTION, SOLUTION INTRAMUSCULAR; INTRAVENOUS at 09:11

## 2023-11-05 RX ADMIN — PREDNISONE 10 MG: 10 TABLET ORAL at 09:11

## 2023-11-05 RX ADMIN — CEFTRIAXONE 2 G: 2 INJECTION, POWDER, FOR SOLUTION INTRAMUSCULAR; INTRAVENOUS at 09:11

## 2023-11-05 RX ADMIN — PROMETHAZINE HYDROCHLORIDE AND CODEINE PHOSPHATE 5 ML: 6.25; 1 SOLUTION ORAL at 08:11

## 2023-11-05 RX ADMIN — DOCUSATE SODIUM 100 MG: 100 CAPSULE, LIQUID FILLED ORAL at 09:11

## 2023-11-05 RX ADMIN — DOCUSATE SODIUM 100 MG: 100 CAPSULE, LIQUID FILLED ORAL at 08:11

## 2023-11-05 RX ADMIN — MUPIROCIN: 20 OINTMENT TOPICAL at 09:11

## 2023-11-05 RX ADMIN — NINTEDANIB 150 MG: 150 CAPSULE ORAL at 08:11

## 2023-11-05 RX ADMIN — PROMETHAZINE HYDROCHLORIDE AND CODEINE PHOSPHATE 5 ML: 6.25; 1 SOLUTION ORAL at 12:11

## 2023-11-05 RX ADMIN — IPRATROPIUM BROMIDE AND ALBUTEROL SULFATE 3 ML: 2.5; .5 SOLUTION RESPIRATORY (INHALATION) at 12:11

## 2023-11-05 NOTE — SUBJECTIVE & OBJECTIVE
Interval History: No issues overnight. Reports having chest pain from too much coughing.     Review of Systems   Constitutional:  Negative for fatigue and fever.   HENT:  Negative for sinus pressure.    Eyes:  Negative for visual disturbance.   Respiratory:  Positive for cough and shortness of breath.    Cardiovascular:  Negative for chest pain.   Gastrointestinal:  Negative for nausea and vomiting.   Genitourinary:  Negative for difficulty urinating.   Musculoskeletal:  Negative for back pain.   Skin:  Negative for rash.   Neurological:  Negative for headaches.   Psychiatric/Behavioral:  Negative for confusion.      Objective:     Vital Signs (Most Recent):  Temp: 97.6 °F (36.4 °C) (11/05/23 0741)  Pulse: (!) 129 (11/05/23 0800)  Resp: 12 (11/05/23 0741)  BP: 109/67 (11/05/23 0921)  SpO2: 95 % (11/05/23 0741) Vital Signs (24h Range):  Temp:  [97.6 °F (36.4 °C)-98.9 °F (37.2 °C)] 97.6 °F (36.4 °C)  Pulse:  [] 129  Resp:  [12-21] 12  SpO2:  [92 %-96 %] 95 %  BP: ()/(55-72) 109/67     Weight: 103.1 kg (227 lb 4.7 oz)  Body mass index is 36.69 kg/m².    Intake/Output Summary (Last 24 hours) at 11/5/2023 1145  Last data filed at 11/5/2023 0447  Gross per 24 hour   Intake --   Output 1000 ml   Net -1000 ml         Physical Exam  Vitals and nursing note reviewed.   Constitutional:       General: She is not in acute distress.     Appearance: She is overweight. She is ill-appearing.      Comments: Vapotherm   HENT:      Head: Normocephalic and atraumatic.      Right Ear: Hearing and external ear normal.      Left Ear: Hearing and external ear normal.      Nose: No rhinorrhea.      Right Sinus: No maxillary sinus tenderness or frontal sinus tenderness.      Left Sinus: No maxillary sinus tenderness or frontal sinus tenderness.      Mouth/Throat:      Mouth: No oral lesions.      Pharynx: Uvula midline.   Eyes:      General:         Right eye: No discharge.         Left eye: No discharge.      Conjunctiva/sclera:  Conjunctivae normal.      Pupils: Pupils are equal, round, and reactive to light.   Neck:      Thyroid: No thyromegaly.      Vascular: No carotid bruit.      Trachea: No tracheal deviation.   Cardiovascular:      Rate and Rhythm: Regular rhythm. Tachycardia present.      Pulses:           Dorsalis pedis pulses are 2+ on the right side and 2+ on the left side.      Heart sounds: Normal heart sounds, S1 normal and S2 normal. No murmur heard.  Pulmonary:      Effort: No respiratory distress.      Breath sounds: Examination of the right-lower field reveals rales. Examination of the left-lower field reveals rales. Rales present.   Abdominal:      General: Bowel sounds are decreased. There is distension.      Palpations: Abdomen is soft. There is no mass.      Tenderness: There is no abdominal tenderness.   Musculoskeletal:         General: Normal range of motion.      Cervical back: Normal range of motion.   Lymphadenopathy:      Cervical: No cervical adenopathy.      Upper Body:      Right upper body: No supraclavicular adenopathy.      Left upper body: No supraclavicular adenopathy.   Skin:     General: Skin is warm and dry.      Capillary Refill: Capillary refill takes less than 2 seconds.      Findings: No rash.   Neurological:      Mental Status: She is alert and oriented to person, place, and time.      Sensory: No sensory deficit.      Coordination: Coordination normal.      Gait: Gait normal.   Psychiatric:         Mood and Affect: Mood is not anxious or depressed.         Speech: Speech normal.         Behavior: Behavior normal.         Thought Content: Thought content normal.         Judgment: Judgment normal.             Significant Labs: All pertinent labs within the past 24 hours have been reviewed.    Significant Imaging: I have reviewed all pertinent imaging results/findings within the past 24 hours.

## 2023-11-05 NOTE — SUBJECTIVE & OBJECTIVE
"Ayanna Alicia is 53 y.o.  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  She is on POC 6-8 LPM, bronchodilators Albuterol, Serevent and Yulperi  Has chr stable loose stolls while on OFEV  Actemra infusions on hold due to infection  Has PulHTN and TYVASO pending   Prednisone 10 mg on weaning  Also has Home NIV , in pul rehab  Present with increased oxygen demands, weakness, desaturation, low grade fever  Tested +ve for influenza A  Denies fever, chest pain    Interval History:  11/2: no new issues or c/o noted, OOB standing at the sink brushing her teeth upon mu arrival to her room this AM, some SOB when returned to bed, vapotherm 20L 50%  11/3: pt reports a "rough night" with coughing and desats that required increased O2 support, c/o cough and feelings of "air hunger" despite good sats, also with reflux   11/4: down to 20L 40%, dose of lasix IV this AM, continues with HA, no family at bedside   11/5: remains at 20L and 40% on vapotherm, dose lasix IV again this AM, wondering if she'll be able to make her Thursday appt in [place], no new issues noted     ROS complete and negative unless stated in the interval HPI     Objective:     Vital Signs (Most Recent):  Temp: 97.6 °F (36.4 °C) (11/05/23 0741)  Pulse: (!) 129 (11/05/23 0800)  Resp: 12 (11/05/23 0741)  BP: 109/67 (11/05/23 0741)  SpO2: 95 % (11/05/23 0741) Vital Signs (24h Range):  Temp:  [97.6 °F (36.4 °C)-98.9 °F (37.2 °C)] 97.6 °F (36.4 °C)  Pulse:  [] 129  Resp:  [12-21] 12  SpO2:  [87 %-96 %] 95 %  BP: ()/(55-72) 109/67     Weight: 103.1 kg (227 lb 4.7 oz)  Body mass index is 36.69 kg/m².      Intake/Output Summary (Last 24 hours) at 11/5/2023 0918  Last data filed at 11/5/2023 0447  Gross per 24 hour "   Intake --   Output 1000 ml   Net -1000 ml        Physical Exam  Vitals reviewed.   Constitutional:       General: She is awake.      Appearance: She is obese. She is ill-appearing.      Comments: Chronically ill appearing female sitting up in bed on vapotherm in NAD   Pulmonary:      Breath sounds: Decreased breath sounds present. No wheezing.      Comments: Poor inspiratory effort, remains with crackles at the bases psoterior, on 20L 40% vapotherm   Neurological:      Mental Status: She is alert.   Psychiatric:         Behavior: Behavior is cooperative.               Vents:  Oxygen Concentration (%): 40 (11/05/23 0740)    Lines/Drains/Airways       Peripherally Inserted Central Catheter Line  Duration             PICC Double Lumen 10/13/23 1627 right basilic 22 days              Peripheral Intravenous Line  Duration                  Peripheral IV - Single Lumen 11/01/23 1205 18 G Left Antecubital 3 days                    Significant Labs:    CBC/Anemia Profile:  Recent Labs   Lab 11/04/23  1131   WBC 13.72*   HGB 10.5*   HCT 34.4*      MCV 99*   RDW 16.7*        Chemistries:  Recent Labs   Lab 11/04/23  1131      K 3.7   CL 99   CO2 30*   BUN 8   CREATININE 0.8   CALCIUM 9.5   ALBUMIN 2.8*   PROT 6.8   BILITOT 0.3   ALKPHOS 80   ALT 26   AST 22   MG 1.8   PHOS 3.1       All pertinent labs within the past 24 hours have been reviewed.    Significant Imaging:  I have reviewed all pertinent imaging results/findings within the past 24 hours.

## 2023-11-05 NOTE — ASSESSMENT & PLAN NOTE
- ON OFEV  - undergoing lung transplant le in [place], has an appt on Thursday that she is hoping to make, think she will be able to

## 2023-11-05 NOTE — PLAN OF CARE
Problem: Infection  Goal: Absence of Infection Signs and Symptoms  Outcome: Ongoing, Progressing  Intervention: Prevent or Manage Infection  Flowsheets (Taken 11/5/2023 1237)  Fever Reduction/Comfort Measures:   lightweight clothing   lightweight bedding  Infection Management: aseptic technique maintained     Problem: Skin Injury Risk Increased  Goal: Skin Health and Integrity  Outcome: Ongoing, Progressing  Intervention: Optimize Skin Protection  Flowsheets (Taken 11/5/2023 1237)  Head of Bed (HOB) Positioning: HOB elevated

## 2023-11-05 NOTE — ASSESSMENT & PLAN NOTE
Patient with Hypoxic Respiratory failure which is Acute on chronic.  she is on home oxygen at 8 LPM. Supplemental oxygen was provided and noted- Oxygen Concentration (%):  [40-50] 40     Signs/symptoms of respiratory failure include- increased work of breathing. Contributing diagnoses includes - Pneumonia and VIRAL PNEUMONIA Labs and images were reviewed. Patient Has recent ABG, which has been reviewed. Will treat underlying causes and adjust management of respiratory failure as follows-      · Wean VAPOTHERM per PROTOCOL, currently on 20L 40% FiO2  · Nocturnal NIPPV/AVAPS on home settings  · Bronchodilators  · Continue with PO Lasix, additional dose of IV lasix again this AM x1  · PO prednisone, avoid IV steroids

## 2023-11-05 NOTE — ASSESSMENT & PLAN NOTE
Patient with Hypoxic Respiratory failure which is Acute on chronic.  she is on home oxygen at 8 LPM. Supplemental oxygen was provided and noted- Oxygen Concentration (%):  [40-50] 40    .   Signs/symptoms of respiratory failure include- tachypnea, increased work of breathing and respiratory distress. Contributing diagnoses includes - CHF and Interstitial lung disease Labs and images were reviewed. Patient Has recent ABG, which has been reviewed. Will treat underlying causes and adjust management of respiratory failure as follows-     --Vapotherm  --Consult Pulm  --Cont home meds where possible  --JESSI  --Bipap @ HS  --Avoid steroid    11/5:  Cont duonebs   brovana budesonide nebs  Resume home lasix   Wean o2 as tolerated  D dimer elevated  CTA neg for PE

## 2023-11-05 NOTE — PROGRESS NOTES
[facility] - Telemetry ([facility])  [facility] Medicine  Progress Note    Patient Name: Ayanna Alicia  MRN: [id number]  Patient Class: IP- Inpatient   Admission Date: 2023  Length of Stay: 4 days  Attending Physician: Thaddeus Nassar MD  Primary Care Provider: Madeleine Enrique MD        Subjective:     Principal Problem:Acute on chronic respiratory failure with hypoxia        HPI:  53-year-old white female with a history of pulmonary fibrosis, pulmonary HTN, RA, Actinomyces infection, COPD, GLENN. Presented with worsening shortness of breath over the past 48 hours.  The patient was in the hospital 2 weeks ago and since has been weaning off of steroids.  She is oxygen dependent at home usually at 8 liters/minute.  She was progressive shortness of breath with orthopnea.  There is no chest pain or pedal edema.  She denies any fevers or chills though now has a productive cough.  Patient was compliant with her medications.  In the ED, vitals: 155/93, 130, 16, 99.1, 54% RA. Placed on vapotherm in ED. Labs: WBC: 18.29, A, Lactic: 2.6, Trop; 0.029, +FluA, CXR: unchanged. EKG: Neg for STEMI. Treated with IV fluids, Steroid, Nebs. Vapotherm set to 20L @ 90% FiO2. Patient is a full code. Admitted to Hospital Medicine for management of Acute on Chronic Resp Failure, Influenza A.       Overview/Hospital Course:  : pt currently on vapotherm. Cont duonebs, budesonide, and brovana nebs. Wean O2 as tolerated. Cont tamiflu. Will order procal and d dimer.   11/3: CTA negative for PE. Cont current management. Wean O2 as tolerated. Pulm on case.   : cont current management, requiring increased vapotherm.   : vapotherm being weaned down. Cont current management.       Interval History: No issues overnight. Reports having chest pain from too much coughing.     Review of Systems   Constitutional:  Negative for fatigue and fever.   HENT:  Negative for sinus pressure.    Eyes:  Negative for visual disturbance.   Respiratory:   Positive for cough and shortness of breath.    Cardiovascular:  Negative for chest pain.   Gastrointestinal:  Negative for nausea and vomiting.   Genitourinary:  Negative for difficulty urinating.   Musculoskeletal:  Negative for back pain.   Skin:  Negative for rash.   Neurological:  Negative for headaches.   Psychiatric/Behavioral:  Negative for confusion.      Objective:     Vital Signs (Most Recent):  Temp: 97.6 °F (36.4 °C) (11/05/23 0741)  Pulse: (!) 129 (11/05/23 0800)  Resp: 12 (11/05/23 0741)  BP: 109/67 (11/05/23 0921)  SpO2: 95 % (11/05/23 0741) Vital Signs (24h Range):  Temp:  [97.6 °F (36.4 °C)-98.9 °F (37.2 °C)] 97.6 °F (36.4 °C)  Pulse:  [] 129  Resp:  [12-21] 12  SpO2:  [92 %-96 %] 95 %  BP: ()/(55-72) 109/67     Weight: 103.1 kg (227 lb 4.7 oz)  Body mass index is 36.69 kg/m².    Intake/Output Summary (Last 24 hours) at 11/5/2023 1145  Last data filed at 11/5/2023 0447  Gross per 24 hour   Intake --   Output 1000 ml   Net -1000 ml         Physical Exam  Vitals and nursing note reviewed.   Constitutional:       General: She is not in acute distress.     Appearance: She is overweight. She is ill-appearing.      Comments: Vapotherm   HENT:      Head: Normocephalic and atraumatic.      Right Ear: Hearing and external ear normal.      Left Ear: Hearing and external ear normal.      Nose: No rhinorrhea.      Right Sinus: No maxillary sinus tenderness or frontal sinus tenderness.      Left Sinus: No maxillary sinus tenderness or frontal sinus tenderness.      Mouth/Throat:      Mouth: No oral lesions.      Pharynx: Uvula midline.   Eyes:      General:         Right eye: No discharge.         Left eye: No discharge.      Conjunctiva/sclera: Conjunctivae normal.      Pupils: Pupils are equal, round, and reactive to light.   Neck:      Thyroid: No thyromegaly.      Vascular: No carotid bruit.      Trachea: No tracheal deviation.   Cardiovascular:      Rate and Rhythm: Regular rhythm. Tachycardia  present.      Pulses:           Dorsalis pedis pulses are 2+ on the right side and 2+ on the left side.      Heart sounds: Normal heart sounds, S1 normal and S2 normal. No murmur heard.  Pulmonary:      Effort: No respiratory distress.      Breath sounds: Examination of the right-lower field reveals rales. Examination of the left-lower field reveals rales. Rales present.   Abdominal:      General: Bowel sounds are decreased. There is distension.      Palpations: Abdomen is soft. There is no mass.      Tenderness: There is no abdominal tenderness.   Musculoskeletal:         General: Normal range of motion.      Cervical back: Normal range of motion.   Lymphadenopathy:      Cervical: No cervical adenopathy.      Upper Body:      Right upper body: No supraclavicular adenopathy.      Left upper body: No supraclavicular adenopathy.   Skin:     General: Skin is warm and dry.      Capillary Refill: Capillary refill takes less than 2 seconds.      Findings: No rash.   Neurological:      Mental Status: She is alert and oriented to person, place, and time.      Sensory: No sensory deficit.      Coordination: Coordination normal.      Gait: Gait normal.   Psychiatric:         Mood and Affect: Mood is not anxious or depressed.         Speech: Speech normal.         Behavior: Behavior normal.         Thought Content: Thought content normal.         Judgment: Judgment normal.             Significant Labs: All pertinent labs within the past 24 hours have been reviewed.    Significant Imaging: I have reviewed all pertinent imaging results/findings within the past 24 hours.        Assessment/Plan:      * Acute on chronic respiratory failure with hypoxia  Patient with Hypoxic Respiratory failure which is Acute on chronic.  she is on home oxygen at 8 LPM. Supplemental oxygen was provided and noted- Oxygen Concentration (%):  [40-50] 40    .   Signs/symptoms of respiratory failure include- tachypnea, increased work of breathing and  respiratory distress. Contributing diagnoses includes - CHF and Interstitial lung disease Labs and images were reviewed. Patient Has recent ABG, which has been reviewed. 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 Latest blood pressure and vitals reviewed-     Temp:  [99.1 °F (37.3 °C)-99.4 °F (37.4 °C)]   Pulse:  [118-138]   Resp:  [16-28]   BP: (138-155)/(71-93)   SpO2:  [54 %-100 %] .   Home meds for hypertension were reviewed and noted below.   Hypertension Medications             furosemide (LASIX) 40 MG tablet Take 1 tablet (40 mg total) by mouth once daily.          While in the hospital, will manage blood pressure as follows; Continue home antihypertensive regimen    Will utilize p.r.n. blood pressure medication only if patient's blood pressure greater than 180/110 and she develops symptoms such as worsening chest pain or shortness of breath.      VTE Risk Mitigation (From admission, onward)         Ordered     enoxaparin injection 40 mg  Daily         11/01/23 1507     IP VTE HIGH RISK PATIENT   Once         11/01/23 1507     Place sequential compression device  Until discontinued         11/01/23 1507                Discharge Planning   JANETTE:      Code Status: Full Code   Is the patient medically ready for discharge?:     Reason for patient still in hospital (select all that apply): Patient trending condition                     Thaddeus Nassar MD  Department of Hospital Medicine   [facility]

## 2023-11-05 NOTE — PROGRESS NOTES
"[facility] - Telemetry ([facility])  Pulmonology  Progress Note    Patient Name: Ayanna Alicia  MRN: [id number]  Admission Date: 11/1/2023  Hospital Length of Stay: 4 days  Code Status: Full Code  Attending Provider: Thaddeus Nassar MD  Primary Care Provider: Madeleine Enrique MD   Principal Problem: Acute on chronic respiratory failure with hypoxia    Subjective:     Ayanna Alicia is 53 y.o.  Known to pulmonary with advanced ILD, Pul HTN WHO grp 3  Evaluation for lung transplant [facility]  Recent hospitalization for resp exacerbation discharged on micafungin ( complteted), Ceftriaxone ( active), metronidazole ( completed ) for actinomyces in setting of chr steriod use  She is on POC 6-8 LPM, bronchodilators Albuterol, Serevent and Yulperi  Has chr stable loose stolls while on OFEV  Actemra infusions on hold due to infection  Has PulHTN and TYVASO pending   Prednisone 10 mg on weaning  Also has Home NIV , in pul rehab  Present with increased oxygen demands, weakness, desaturation, low grade fever  Tested +ve for influenza A  Denies fever, chest pain    Interval History:  11/2: no new issues or c/o noted, OOB standing at the sink brushing her teeth upon mu arrival to her room this AM, some SOB when returned to bed, vapotherm 20L 50%  11/3: pt reports a "rough night" with coughing and desats that required increased O2 support, c/o cough and feelings of "air hunger" despite good sats, also with reflux   11/4: down to 20L 40%, dose of lasix IV this AM, continues with HA, no family at bedside   11/5: remains at 20L and 40% on vapotherm, dose lasix IV again this AM, wondering if she'll be able to make her Thursday appt in [place], no new issues noted     ROS complete and negative unless stated in the interval HPI     Objective:     Vital Signs (Most Recent):  Temp: 97.6 °F (36.4 °C) (11/05/23 0741)  Pulse: (!) 129 (11/05/23 0800)  Resp: 12 (11/05/23 0741)  BP: 109/67 (11/05/23 0741)  SpO2: 95 % (11/05/23 0741) Vital " Signs (24h Range):  Temp:  [97.6 °F (36.4 °C)-98.9 °F (37.2 °C)] 97.6 °F (36.4 °C)  Pulse:  [] 129  Resp:  [12-21] 12  SpO2:  [87 %-96 %] 95 %  BP: ()/(55-72) 109/67     Weight: 103.1 kg (227 lb 4.7 oz)  Body mass index is 36.69 kg/m².      Intake/Output Summary (Last 24 hours) at 11/5/2023 0918  Last data filed at 11/5/2023 0447  Gross per 24 hour   Intake --   Output 1000 ml   Net -1000 ml        Physical Exam  Vitals reviewed.   Constitutional:       General: She is awake.      Appearance: She is obese. She is ill-appearing.      Comments: Chronically ill appearing female sitting up in bed on vapotherm in NAD   Pulmonary:      Breath sounds: Decreased breath sounds present. No wheezing.      Comments: Poor inspiratory effort, remains with crackles at the bases psoterior, on 20L 40% vapotherm   Neurological:      Mental Status: She is alert.   Psychiatric:         Behavior: Behavior is cooperative.               Vents:  Oxygen Concentration (%): 40 (11/05/23 0740)    Lines/Drains/Airways       Peripherally Inserted Central Catheter Line  Duration             PICC Double Lumen 10/13/23 1627 right basilic 22 days              Peripheral Intravenous Line  Duration                  Peripheral IV - Single Lumen 11/01/23 1205 18 G Left Antecubital 3 days                    Significant Labs:    CBC/Anemia Profile:  Recent Labs   Lab 11/04/23  1131   WBC 13.72*   HGB 10.5*   HCT 34.4*      MCV 99*   RDW 16.7*        Chemistries:  Recent Labs   Lab 11/04/23  1131      K 3.7   CL 99   CO2 30*   BUN 8   CREATININE 0.8   CALCIUM 9.5   ALBUMIN 2.8*   PROT 6.8   BILITOT 0.3   ALKPHOS 80   ALT 26   AST 22   MG 1.8   PHOS 3.1       All pertinent labs within the past 24 hours have been reviewed.    Significant Imaging:  I have reviewed all pertinent imaging results/findings within the past 24 hours.      ABG  Recent Labs   Lab 11/01/23  1229   PH 7.465*   PO2 62*   PCO2 37.3   HCO3 26.9   BE 3*      Assessment/Plan:     ENT  Dysphonia due to laryngeal ulcers  - Avoid IV steriods, on CEFTRIXONE for actinomyces infection    Pulmonary  * Acute on chronic respiratory failure with hypoxia  Patient with Hypoxic Respiratory failure which is Acute on chronic.  she is on home oxygen at 8 LPM. Supplemental oxygen was provided and noted- Oxygen Concentration (%):  [40-50] 40     Signs/symptoms of respiratory failure include- increased work of breathing. Contributing diagnoses includes - Pneumonia and VIRAL PNEUMONIA Labs and images were reviewed. Patient Has recent ABG, which has been reviewed. Will treat underlying causes and adjust management of respiratory failure as follows-      · Wean VAPOTHERM per PROTOCOL, currently on 20L 40% FiO2  · Nocturnal NIPPV/AVAPS on home settings  · Bronchodilators  · Continue with PO Lasix, additional dose of IV lasix again this AM x1  · PO prednisone, avoid IV steroids     Interstitial lung disease  - ON OFEV  - undergoing lung transplant eval in [place], has an appt on Thursday that she is hoping to make, think she will be able to     Cardiac/Vascular  Pulmonary hypertension due to interstitial lung disease  Additional IV lasix given  Await commencement TYVASO DPI as outpatient    ID  Influenza A with respiratory manifestations  - continue tamiflu to complete course   - antitussives PRN    Actinomyces infection  - Complete treatment plan per ID, on CEFTRIAXONE via PICC    Pulmonary team will continue to follow along. Please call with questions.       Agustín Aviles MD  Pulmonology  [facility] - Telemetry ([facility])

## 2023-11-06 LAB
ANION GAP SERPL CALC-SCNC: 11 MMOL/L (ref 8–16)
BACTERIA BLD CULT: NORMAL
BACTERIA BLD CULT: NORMAL
BASOPHILS # BLD AUTO: 0.07 K/UL (ref 0–0.2)
BASOPHILS NFR BLD: 0.7 % (ref 0–1.9)
BUN SERPL-MCNC: 11 MG/DL (ref 6–20)
CALCIUM SERPL-MCNC: 8.7 MG/DL (ref 8.7–10.5)
CHLORIDE SERPL-SCNC: 99 MMOL/L (ref 95–110)
CO2 SERPL-SCNC: 31 MMOL/L (ref 23–29)
CREAT SERPL-MCNC: 0.8 MG/DL (ref 0.5–1.4)
DIFFERENTIAL METHOD: ABNORMAL
EOSINOPHIL # BLD AUTO: 0.8 K/UL (ref 0–0.5)
EOSINOPHIL NFR BLD: 7.5 % (ref 0–8)
ERYTHROCYTE [DISTWIDTH] IN BLOOD BY AUTOMATED COUNT: 16 % (ref 11.5–14.5)
EST. GFR  (NO RACE VARIABLE): >60 ML/MIN/1.73 M^2
FUNGUS SPEC CULT: ABNORMAL
GLUCOSE SERPL-MCNC: 102 MG/DL (ref 70–110)
HCT VFR BLD AUTO: 33.9 % (ref 37–48.5)
HGB BLD-MCNC: 10.2 G/DL (ref 12–16)
IMM GRANULOCYTES # BLD AUTO: 0.1 K/UL (ref 0–0.04)
IMM GRANULOCYTES NFR BLD AUTO: 0.9 % (ref 0–0.5)
LYMPHOCYTES # BLD AUTO: 1.3 K/UL (ref 1–4.8)
LYMPHOCYTES NFR BLD: 12.5 % (ref 18–48)
MCH RBC QN AUTO: 29.7 PG (ref 27–31)
MCHC RBC AUTO-ENTMCNC: 30.1 G/DL (ref 32–36)
MCV RBC AUTO: 99 FL (ref 82–98)
MONOCYTES # BLD AUTO: 0.6 K/UL (ref 0.3–1)
MONOCYTES NFR BLD: 5.7 % (ref 4–15)
NEUTROPHILS # BLD AUTO: 7.8 K/UL (ref 1.8–7.7)
NEUTROPHILS NFR BLD: 72.7 % (ref 38–73)
NRBC BLD-RTO: 0 /100 WBC
PLATELET # BLD AUTO: 337 K/UL (ref 150–450)
PMV BLD AUTO: 9.8 FL (ref 9.2–12.9)
POTASSIUM SERPL-SCNC: 3.9 MMOL/L (ref 3.5–5.1)
RBC # BLD AUTO: 3.43 M/UL (ref 4–5.4)
SODIUM SERPL-SCNC: 141 MMOL/L (ref 136–145)
WBC # BLD AUTO: 10.76 K/UL (ref 3.9–12.7)

## 2023-11-06 PROCEDURE — 94640 AIRWAY INHALATION TREATMENT: CPT

## 2023-11-06 PROCEDURE — 25000242 PHARM REV CODE 250 ALT 637 W/ HCPCS: Performed by: FAMILY MEDICINE

## 2023-11-06 PROCEDURE — 80048 BASIC METABOLIC PNL TOTAL CA: CPT | Performed by: FAMILY MEDICINE

## 2023-11-06 PROCEDURE — 25000003 PHARM REV CODE 250: Performed by: NURSE PRACTITIONER

## 2023-11-06 PROCEDURE — 27100171 HC OXYGEN HIGH FLOW UP TO 24 HOURS

## 2023-11-06 PROCEDURE — 99900035 HC TECH TIME PER 15 MIN (STAT)

## 2023-11-06 PROCEDURE — 85025 COMPLETE CBC W/AUTO DIFF WBC: CPT | Performed by: FAMILY MEDICINE

## 2023-11-06 PROCEDURE — 25000003 PHARM REV CODE 250: Performed by: INTERNAL MEDICINE

## 2023-11-06 PROCEDURE — 63600175 PHARM REV CODE 636 W HCPCS: Performed by: INTERNAL MEDICINE

## 2023-11-06 PROCEDURE — 94799 UNLISTED PULMONARY SVC/PX: CPT

## 2023-11-06 PROCEDURE — 25000242 PHARM REV CODE 250 ALT 637 W/ HCPCS: Performed by: NURSE PRACTITIONER

## 2023-11-06 PROCEDURE — 27000207 HC ISOLATION

## 2023-11-06 PROCEDURE — 21400001 HC TELEMETRY ROOM

## 2023-11-06 PROCEDURE — 36415 COLL VENOUS BLD VENIPUNCTURE: CPT | Performed by: FAMILY MEDICINE

## 2023-11-06 PROCEDURE — 94761 N-INVAS EAR/PLS OXIMETRY MLT: CPT

## 2023-11-06 PROCEDURE — 63600175 PHARM REV CODE 636 W HCPCS: Performed by: NURSE PRACTITIONER

## 2023-11-06 PROCEDURE — 25000003 PHARM REV CODE 250: Performed by: FAMILY MEDICINE

## 2023-11-06 RX ADMIN — FUROSEMIDE 40 MG: 40 TABLET ORAL at 10:11

## 2023-11-06 RX ADMIN — BUDESONIDE 0.5 MG: 0.5 INHALANT ORAL at 07:11

## 2023-11-06 RX ADMIN — MUPIROCIN: 20 OINTMENT TOPICAL at 09:11

## 2023-11-06 RX ADMIN — DOCUSATE SODIUM 100 MG: 100 CAPSULE, LIQUID FILLED ORAL at 09:11

## 2023-11-06 RX ADMIN — PROMETHAZINE HYDROCHLORIDE AND CODEINE PHOSPHATE 5 ML: 6.25; 1 SOLUTION ORAL at 10:11

## 2023-11-06 RX ADMIN — DOCUSATE SODIUM 100 MG: 100 CAPSULE, LIQUID FILLED ORAL at 10:11

## 2023-11-06 RX ADMIN — BENZONATATE 100 MG: 100 CAPSULE ORAL at 09:11

## 2023-11-06 RX ADMIN — OSELTAMIVIR PHOSPHATE 75 MG: 75 CAPSULE ORAL at 09:11

## 2023-11-06 RX ADMIN — IPRATROPIUM BROMIDE AND ALBUTEROL SULFATE 3 ML: 2.5; .5 SOLUTION RESPIRATORY (INHALATION) at 08:11

## 2023-11-06 RX ADMIN — ARFORMOTEROL TARTRATE 15 MCG: 15 SOLUTION RESPIRATORY (INHALATION) at 07:11

## 2023-11-06 RX ADMIN — PROMETHAZINE HYDROCHLORIDE AND CODEINE PHOSPHATE 5 ML: 6.25; 1 SOLUTION ORAL at 03:11

## 2023-11-06 RX ADMIN — IPRATROPIUM BROMIDE AND ALBUTEROL SULFATE 3 ML: 2.5; .5 SOLUTION RESPIRATORY (INHALATION) at 12:11

## 2023-11-06 RX ADMIN — CEFTRIAXONE 2 G: 2 INJECTION, POWDER, FOR SOLUTION INTRAMUSCULAR; INTRAVENOUS at 10:11

## 2023-11-06 RX ADMIN — IPRATROPIUM BROMIDE AND ALBUTEROL SULFATE 3 ML: 2.5; .5 SOLUTION RESPIRATORY (INHALATION) at 07:11

## 2023-11-06 RX ADMIN — BUDESONIDE 0.5 MG: 0.5 INHALANT ORAL at 08:11

## 2023-11-06 RX ADMIN — Medication 400 MG: at 10:11

## 2023-11-06 RX ADMIN — PANTOPRAZOLE SODIUM 40 MG: 40 TABLET, DELAYED RELEASE ORAL at 10:11

## 2023-11-06 RX ADMIN — MONTELUKAST 10 MG: 10 TABLET, FILM COATED ORAL at 09:11

## 2023-11-06 RX ADMIN — NINTEDANIB 150 MG: 150 CAPSULE ORAL at 09:11

## 2023-11-06 RX ADMIN — PREDNISONE 10 MG: 10 TABLET ORAL at 10:11

## 2023-11-06 RX ADMIN — OSELTAMIVIR PHOSPHATE 75 MG: 75 CAPSULE ORAL at 10:11

## 2023-11-06 RX ADMIN — HYDROCODONE BITARTRATE AND ACETAMINOPHEN 1 TABLET: 5; 325 TABLET ORAL at 09:11

## 2023-11-06 RX ADMIN — ENOXAPARIN SODIUM 40 MG: 40 INJECTION SUBCUTANEOUS at 05:11

## 2023-11-06 RX ADMIN — BENZONATATE 100 MG: 100 CAPSULE ORAL at 03:11

## 2023-11-06 RX ADMIN — ARFORMOTEROL TARTRATE 15 MCG: 15 SOLUTION RESPIRATORY (INHALATION) at 08:11

## 2023-11-06 NOTE — PROGRESS NOTES
[facility] Tele[facility] ([facility])  [facility] Medicine  Progress Note    Patient Name: Ayanna Alicia  MRN: [id number]  Patient Class: IP- Inpatient   Admission Date: 2023  Length of Stay: 5 days  Attending Physician: Thaddeus Nassar MD  Primary Care Provider: Madeleine Enrique MD        Subjective:     Principal Problem:Acute on chronic respiratory failure with hypoxia        HPI:  53-year-old white female with a history of pulmonary fibrosis, pulmonary HTN, RA, Actinomyces infection, COPD, GLENN. Presented with worsening shortness of breath over the past 48 hours.  The patient was in the hospital 2 weeks ago and since has been weaning off of steroids.  She is oxygen dependent at home usually at 8 liters/minute.  She was progressive shortness of breath with orthopnea.  There is no chest pain or pedal edema.  She denies any fevers or chills though now has a productive cough.  Patient was compliant with her medications.  In the ED, vitals: 155/93, 130, 16, 99.1, 54% RA. Placed on vapotherm in ED. Labs: WBC: 18.29, A, Lactic: 2.6, Trop; 0.029, +FluA, CXR: unchanged. EKG: Neg for STEMI. Treated with IV fluids, Steroid, Nebs. Vapotherm set to 20L @ 90% FiO2. Patient is a full code. Admitted to Hospital Medicine for management of Acute on Chronic Resp Failure, Influenza A.       Overview/Hospital Course:  : pt currently on vapotherm. Cont duonebs, budesonide, and brovana nebs. Wean O2 as tolerated. Cont tamiflu. Will order procal and d dimer.   11/3: CTA negative for PE. Cont current management. Wean O2 as tolerated. Pulm on case.   : cont current management, requiring increased vapotherm.   : vapotherm being weaned down. Cont current management.   : cont supportive care. Currently on vapotherm 20L 50% fio2.       Interval History: No issues reported overnight.     Review of Systems   Constitutional:  Negative for fatigue and fever.   HENT:  Negative for sinus pressure.    Eyes:  Negative for visual  disturbance.   Respiratory:  Positive for cough and shortness of breath.    Cardiovascular:  Negative for chest pain.   Gastrointestinal:  Negative for nausea and vomiting.   Genitourinary:  Negative for difficulty urinating.   Musculoskeletal:  Negative for back pain.   Skin:  Negative for rash.   Neurological:  Negative for headaches.   Psychiatric/Behavioral:  Negative for confusion.      Objective:     Vital Signs (Most Recent):  Temp: 98.1 °F (36.7 °C) (11/06/23 0442)  Pulse: 87 (11/06/23 0722)  Resp: 20 (11/06/23 0722)  BP: 138/70 (11/06/23 0442)  SpO2: 100 % (11/06/23 0722) Vital Signs (24h Range):  Temp:  [98.1 °F (36.7 °C)-99 °F (37.2 °C)] 98.1 °F (36.7 °C)  Pulse:  [] 87  Resp:  [14-22] 20  SpO2:  [81 %-100 %] 100 %  BP: (104-138)/(62-79) 138/70     Weight: 103.1 kg (227 lb 4.7 oz)  Body mass index is 36.69 kg/m².    Intake/Output Summary (Last 24 hours) at 11/6/2023 0912  Last data filed at 11/6/2023 0454  Gross per 24 hour   Intake --   Output 1501 ml   Net -1501 ml         Physical Exam  Vitals and nursing note reviewed.   Constitutional:       General: She is not in acute distress.     Appearance: She is overweight. She is ill-appearing.      Comments: Vapotherm   HENT:      Head: Normocephalic and atraumatic.      Right Ear: Hearing and external ear normal.      Left Ear: Hearing and external ear normal.      Nose: No rhinorrhea.      Right Sinus: No maxillary sinus tenderness or frontal sinus tenderness.      Left Sinus: No maxillary sinus tenderness or frontal sinus tenderness.      Mouth/Throat:      Mouth: No oral lesions.      Pharynx: Uvula midline.   Eyes:      General:         Right eye: No discharge.         Left eye: No discharge.      Conjunctiva/sclera: Conjunctivae normal.      Pupils: Pupils are equal, round, and reactive to light.   Neck:      Thyroid: No thyromegaly.      Vascular: No carotid bruit.      Trachea: No tracheal deviation.   Cardiovascular:      Rate and Rhythm:  Regular rhythm. Tachycardia present.      Pulses:           Dorsalis pedis pulses are 2+ on the right side and 2+ on the left side.      Heart sounds: Normal heart sounds, S1 normal and S2 normal. No murmur heard.  Pulmonary:      Effort: No respiratory distress.      Breath sounds: Examination of the right-lower field reveals rales. Examination of the left-lower field reveals rales. Rales present.   Abdominal:      General: Bowel sounds are decreased. There is no distension.      Palpations: Abdomen is soft. There is no mass.      Tenderness: There is no abdominal tenderness.   Musculoskeletal:         General: Normal range of motion.      Cervical back: Normal range of motion.   Lymphadenopathy:      Cervical: No cervical adenopathy.      Upper Body:      Right upper body: No supraclavicular adenopathy.      Left upper body: No supraclavicular adenopathy.   Skin:     General: Skin is warm and dry.      Capillary Refill: Capillary refill takes less than 2 seconds.      Findings: No rash.   Neurological:      Mental Status: She is alert and oriented to person, place, and time.      Sensory: No sensory deficit.      Coordination: Coordination normal.      Gait: Gait normal.   Psychiatric:         Mood and Affect: Mood is not anxious or depressed.         Speech: Speech normal.         Behavior: Behavior normal.         Thought Content: Thought content normal.         Judgment: Judgment normal.             Significant Labs: All pertinent labs within the past 24 hours have been reviewed.    Significant Imaging: I have reviewed all pertinent imaging results/findings within the past 24 hours.      Assessment/Plan:      * Acute on chronic respiratory failure with hypoxia  Patient with Hypoxic Respiratory failure which is Acute on chronic.  she is on home oxygen at 8 LPM. Supplemental oxygen was provided and noted- Oxygen Concentration (%):  [40-60] 50    .   Signs/symptoms of respiratory failure include- tachypnea,  increased work of breathing and respiratory distress. Contributing diagnoses includes - CHF and Interstitial lung disease Labs and images were reviewed. Patient Has recent ABG, which has been reviewed. Will treat underlying causes and adjust management of respiratory failure as follows-     --Vapotherm  --Consult Pulm  --Cont home meds where possible  --JESSI  --Bipap @ HS  --Avoid steroid    11/6:  Cont duonebs   brovana budesonide nebs  Resume home lasix   Wean o2 as tolerated  D dimer elevated  CTA neg for PE  Currently on vapotherm 20L 50% fio2         Influenza A with respiratory manifestations  +FluA on admssion    --Airborne/droplet isolation  --Tamiflu    Due to severity of illness  Will plan to cont tamiflu for 7 days      Actinomyces infection  Followed by ID as OP    --cont Ceftriaxone  --PICC line care       Dysphonia due to laryngeal ulcers    --avoid steroids per Pulmonology due to actinomyces infection  --throat spray prn      Elevated troponin  Likely secondary to demand ischemia, hypoxia, resp failure, fever, infuenza    --trend trop  --tele      Interstitial lung disease  Managed with OFEV as OP, followed by Pulmonology as OP    --Cont home regimen, request family to bring meds not available on formulary      HTN (hypertension)  Chronic, controlled. Latest blood pressure and vitals reviewed-     Temp:  [99.1 °F (37.3 °C)-99.4 °F (37.4 °C)]   Pulse:  [118-138]   Resp:  [16-28]   BP: (138-155)/(71-93)   SpO2:  [54 %-100 %] .   Home meds for hypertension were reviewed and noted below.   Hypertension Medications             furosemide (LASIX) 40 MG tablet Take 1 tablet (40 mg total) by mouth once daily.          While in the hospital, will manage blood pressure as follows; Continue home antihypertensive regimen    Will utilize p.r.n. blood pressure medication only if patient's blood pressure greater than 180/110 and she develops symptoms such as worsening chest pain or shortness of breath.      VTE Risk  Mitigation (From admission, onward)         Ordered     enoxaparin injection 40 mg  Daily         11/01/23 1507     IP VTE HIGH RISK PATIENT  Once         11/01/23 1507     Place sequential compression device  Until discontinued         11/01/23 1507                Discharge Planning   JANETTE:      Code Status: Full Code   Is the patient medically ready for discharge?:     Reason for patient still in hospital (select all that apply): Patient trending condition           Critical care time spent on the evaluation and treatment of severe organ dysfunction, review of pertinent labs and imaging studies, discussions with consulting providers and discussions with patient/family: 37 minutes.          Thaddeus Nassar MD  Department of Hospital Medicine   [facility] [facility])

## 2023-11-06 NOTE — SUBJECTIVE & OBJECTIVE
Interval History: No issues reported overnight.     Review of Systems   Constitutional:  Negative for fatigue and fever.   HENT:  Negative for sinus pressure.    Eyes:  Negative for visual disturbance.   Respiratory:  Positive for cough and shortness of breath.    Cardiovascular:  Negative for chest pain.   Gastrointestinal:  Negative for nausea and vomiting.   Genitourinary:  Negative for difficulty urinating.   Musculoskeletal:  Negative for back pain.   Skin:  Negative for rash.   Neurological:  Negative for headaches.   Psychiatric/Behavioral:  Negative for confusion.      Objective:     Vital Signs (Most Recent):  Temp: 98.1 °F (36.7 °C) (11/06/23 0442)  Pulse: 87 (11/06/23 0722)  Resp: 20 (11/06/23 0722)  BP: 138/70 (11/06/23 0442)  SpO2: 100 % (11/06/23 0722) Vital Signs (24h Range):  Temp:  [98.1 °F (36.7 °C)-99 °F (37.2 °C)] 98.1 °F (36.7 °C)  Pulse:  [] 87  Resp:  [14-22] 20  SpO2:  [81 %-100 %] 100 %  BP: (104-138)/(62-79) 138/70     Weight: 103.1 kg (227 lb 4.7 oz)  Body mass index is 36.69 kg/m².    Intake/Output Summary (Last 24 hours) at 11/6/2023 0912  Last data filed at 11/6/2023 0454  Gross per 24 hour   Intake --   Output 1501 ml   Net -1501 ml         Physical Exam  Vitals and nursing note reviewed.   Constitutional:       General: She is not in acute distress.     Appearance: She is overweight. She is ill-appearing.      Comments: Vapotherm   HENT:      Head: Normocephalic and atraumatic.      Right Ear: Hearing and external ear normal.      Left Ear: Hearing and external ear normal.      Nose: No rhinorrhea.      Right Sinus: No maxillary sinus tenderness or frontal sinus tenderness.      Left Sinus: No maxillary sinus tenderness or frontal sinus tenderness.      Mouth/Throat:      Mouth: No oral lesions.      Pharynx: Uvula midline.   Eyes:      General:         Right eye: No discharge.         Left eye: No discharge.      Conjunctiva/sclera: Conjunctivae normal.      Pupils: Pupils are  equal, round, and reactive to light.   Neck:      Thyroid: No thyromegaly.      Vascular: No carotid bruit.      Trachea: No tracheal deviation.   Cardiovascular:      Rate and Rhythm: Regular rhythm. Tachycardia present.      Pulses:           Dorsalis pedis pulses are 2+ on the right side and 2+ on the left side.      Heart sounds: Normal heart sounds, S1 normal and S2 normal. No murmur heard.  Pulmonary:      Effort: No respiratory distress.      Breath sounds: Examination of the right-lower field reveals rales. Examination of the left-lower field reveals rales. Rales present.   Abdominal:      General: Bowel sounds are decreased. There is no distension.      Palpations: Abdomen is soft. There is no mass.      Tenderness: There is no abdominal tenderness.   Musculoskeletal:         General: Normal range of motion.      Cervical back: Normal range of motion.   Lymphadenopathy:      Cervical: No cervical adenopathy.      Upper Body:      Right upper body: No supraclavicular adenopathy.      Left upper body: No supraclavicular adenopathy.   Skin:     General: Skin is warm and dry.      Capillary Refill: Capillary refill takes less than 2 seconds.      Findings: No rash.   Neurological:      Mental Status: She is alert and oriented to person, place, and time.      Sensory: No sensory deficit.      Coordination: Coordination normal.      Gait: Gait normal.   Psychiatric:         Mood and Affect: Mood is not anxious or depressed.         Speech: Speech normal.         Behavior: Behavior normal.         Thought Content: Thought content normal.         Judgment: Judgment normal.             Significant Labs: All pertinent labs within the past 24 hours have been reviewed.    Significant Imaging: I have reviewed all pertinent imaging results/findings within the past 24 hours.

## 2023-11-06 NOTE — PROGRESS NOTES
[facility] Tele[facility] ([facility])  Pulmonology  Progress Note    Patient Name: Ayanna Alicia  MRN: [id number]  Admission Date: 11/1/2023  Hospital Length of Stay: 5 days  Code Status: Full Code  Attending Provider: Thaddeus Nassar MD  Primary Care Provider: Madeleine Enrique MD   Principal Problem: Acute on chronic respiratory failure with hypoxia    Subjective:     Interval History: on vapotherm   20/50   Some dyspnea with walking   Dry cough      Objective:     Vital Signs (Most Recent):  Temp: 97.6 °F (36.4 °C) (11/06/23 1136)  Pulse: (!) 112 (11/06/23 1214)  Resp: 20 (11/06/23 1214)  BP: 110/68 (11/06/23 1136)  SpO2: 95 % (11/06/23 1214) Vital Signs (24h Range):  Temp:  [97.6 °F (36.4 °C)-98.6 °F (37 °C)] 97.6 °F (36.4 °C)  Pulse:  [] 112  Resp:  [16-22] 20  SpO2:  [91 %-100 %] 95 %  BP: (104-138)/(62-70) 110/68     Weight: 103.1 kg (227 lb 4.7 oz)  Body mass index is 36.69 kg/m².      Intake/Output Summary (Last 24 hours) at 11/6/2023 1527  Last data filed at 11/6/2023 0454  Gross per 24 hour   Intake --   Output 701 ml   Net -701 ml        Physical Exam  Vitals and nursing note reviewed.   Constitutional:       General: She is not in acute distress.  HENT:      Head: Normocephalic and atraumatic.   Eyes:      General:         Right eye: No discharge.         Left eye: No discharge.   Cardiovascular:      Rate and Rhythm: Normal rate and regular rhythm.   Pulmonary:      Effort: No respiratory distress.      Breath sounds: No stridor. No wheezing.   Abdominal:      General: There is no distension.      Palpations: Abdomen is soft.   Musculoskeletal:         General: No swelling or tenderness.      Cervical back: Neck supple.   Neurological:      General: No focal deficit present.      Mental Status: She is alert and oriented to person, place, and time.           Review of Systems   Constitutional: Negative.    HENT: Negative.     Respiratory:  Positive for cough and shortness of breath.    Gastrointestinal:  Negative.    Genitourinary: Negative.    Neurological: Negative.    Psychiatric/Behavioral: Negative.         Vents:  Oxygen Concentration (%): 50 (11/06/23 1151)    Lines/Drains/Airways       Peripherally Inserted Central Catheter Line  Duration             PICC Double Lumen 10/13/23 1627 right basilic 24 days                    Significant Labs:    CBC/Anemia Profile:  Recent Labs   Lab 11/05/23  1301 11/06/23  0649   WBC 13.72* 10.76   HGB 11.5* 10.2*   HCT 37.3 33.9*    337   MCV 98 99*   RDW 16.4* 16.0*        Chemistries:  Recent Labs   Lab 11/05/23  1301 11/06/23  0649    141   K 3.7 3.9   CL 98 99   CO2 29 31*   BUN 7 11   CREATININE 0.8 0.8   CALCIUM 9.3 8.7       All pertinent labs within the past 24 hours have been reviewed.    Significant Imaging:  I have reviewed all pertinent imaging results/findings within the past 24 hours.      ABG  Recent Labs   Lab 11/01/23  1229   PH 7.465*   PO2 62*   PCO2 37.3   HCO3 26.9   BE 3*     Assessment/Plan:     ENT  Dysphonia due to laryngeal ulcers  - see actinomyces     Pulmonary  * Acute on chronic respiratory failure with hypoxia  Patient with Hypoxic Respiratory failure which is Acute on chronic.  she is on home oxygen at 8 LPM. Supplemental oxygen was provided and noted- Oxygen Concentration (%):  [45-60] 50     Signs/symptoms of respiratory failure include- increased work of breathing. Contributing diagnoses includes - Pneumonia and VIRAL PNEUMONIA Labs and images were reviewed. Patient Has recent ABG, which has been reviewed. Will treat underlying causes and adjust management of respiratory failure as follows-      · Wean VAPOTHERM per PROTOCOL, currently on 20L 50% FiO2  · Nocturnal NIPPV/AVAPS on home settings  · Bronchodilators  · On lasix po monitor on home pred / avoiding iv steroids / on ofev    Interstitial lung disease  - ON OFEV  - undergoing lung transplant eval in [place], appt cancelled     Cardiac/Vascular  Pulmonary hypertension due  "to interstitial lung disease  Support     ID  Influenza A with respiratory manifestations  -cont tamiflu    Actinomyces infection  - Complete treatment plan per ID, on CEFTRIAXONE via PICC  -patient states like neck "tighter" consider ct neck , no stridor            Carmelina Tuttle MD  Pulmonology  [facility] - Telemetry (Hospital)    "

## 2023-11-06 NOTE — SUBJECTIVE & OBJECTIVE
Interval History: on vapotherm   20/50   Some dyspnea with walking   Dry cough      Objective:     Vital Signs (Most Recent):  Temp: 97.6 °F (36.4 °C) (11/06/23 1136)  Pulse: (!) 112 (11/06/23 1214)  Resp: 20 (11/06/23 1214)  BP: 110/68 (11/06/23 1136)  SpO2: 95 % (11/06/23 1214) Vital Signs (24h Range):  Temp:  [97.6 °F (36.4 °C)-98.6 °F (37 °C)] 97.6 °F (36.4 °C)  Pulse:  [] 112  Resp:  [16-22] 20  SpO2:  [91 %-100 %] 95 %  BP: (104-138)/(62-70) 110/68     Weight: 103.1 kg (227 lb 4.7 oz)  Body mass index is 36.69 kg/m².      Intake/Output Summary (Last 24 hours) at 11/6/2023 1527  Last data filed at 11/6/2023 0454  Gross per 24 hour   Intake --   Output 701 ml   Net -701 ml        Physical Exam  Vitals and nursing note reviewed.   Constitutional:       General: She is not in acute distress.  HENT:      Head: Normocephalic and atraumatic.   Eyes:      General:         Right eye: No discharge.         Left eye: No discharge.   Cardiovascular:      Rate and Rhythm: Normal rate and regular rhythm.   Pulmonary:      Effort: No respiratory distress.      Breath sounds: No stridor. No wheezing.   Abdominal:      General: There is no distension.      Palpations: Abdomen is soft.   Musculoskeletal:         General: No swelling or tenderness.      Cervical back: Neck supple.   Neurological:      General: No focal deficit present.      Mental Status: She is alert and oriented to person, place, and time.           Review of Systems   Constitutional: Negative.    HENT: Negative.     Respiratory:  Positive for cough and shortness of breath.    Gastrointestinal: Negative.    Genitourinary: Negative.    Neurological: Negative.    Psychiatric/Behavioral: Negative.         Vents:  Oxygen Concentration (%): 50 (11/06/23 1151)    Lines/Drains/Airways       Peripherally Inserted Central Catheter Line  Duration             PICC Double Lumen 10/13/23 1627 right basilic 24 days                    Significant Labs:    CBC/Anemia  Profile:  Recent Labs   Lab 11/05/23  1301 11/06/23  0649   WBC 13.72* 10.76   HGB 11.5* 10.2*   HCT 37.3 33.9*    337   MCV 98 99*   RDW 16.4* 16.0*        Chemistries:  Recent Labs   Lab 11/05/23  1301 11/06/23  0649    141   K 3.7 3.9   CL 98 99   CO2 29 31*   BUN 7 11   CREATININE 0.8 0.8   CALCIUM 9.3 8.7       All pertinent labs within the past 24 hours have been reviewed.    Significant Imaging:  I have reviewed all pertinent imaging results/findings within the past 24 hours.

## 2023-11-06 NOTE — ASSESSMENT & PLAN NOTE
Patient with Hypoxic Respiratory failure which is Acute on chronic.  she is on home oxygen at 8 LPM. Supplemental oxygen was provided and noted- Oxygen Concentration (%):  [40-60] 50    .   Signs/symptoms of respiratory failure include- tachypnea, increased work of breathing and respiratory distress. Contributing diagnoses includes - CHF and Interstitial lung disease Labs and images were reviewed. Patient Has recent ABG, which has been reviewed. Will treat underlying causes and adjust management of respiratory failure as follows-     --Vapotherm  --Consult Pulm  --Cont home meds where possible  --JESSI  --Bipap @ HS  --Avoid steroid    11/6:  Cont duonebs   brovana budesonide nebs  Resume home lasix   Wean o2 as tolerated  D dimer elevated  CTA neg for PE  Currently on vapotherm 20L 50% fio2

## 2023-11-06 NOTE — ASSESSMENT & PLAN NOTE
"- Complete treatment plan per ID, on CEFTRIAXONE via PICC  -patient states like neck "tighter" consider ct neck , no stridor   "

## 2023-11-06 NOTE — ASSESSMENT & PLAN NOTE
Patient with Hypoxic Respiratory failure which is Acute on chronic.  she is on home oxygen at 8 LPM. Supplemental oxygen was provided and noted- Oxygen Concentration (%):  [45-60] 50     Signs/symptoms of respiratory failure include- increased work of breathing. Contributing diagnoses includes - Pneumonia and VIRAL PNEUMONIA Labs and images were reviewed. Patient Has recent ABG, which has been reviewed. Will treat underlying causes and adjust management of respiratory failure as follows-      · Wean VAPOTHERM per PROTOCOL, currently on 20L 50% FiO2  · Nocturnal NIPPV/AVAPS on home settings  · Bronchodilators  · On lasix po monitor on home pred / avoiding iv steroids / on ofev

## 2023-11-07 ENCOUNTER — PATIENT MESSAGE (OUTPATIENT)
Dept: PULMONOLOGY | Facility: CLINIC | Age: 53
End: 2023-11-07
Payer: COMMERCIAL

## 2023-11-07 LAB
ANION GAP SERPL CALC-SCNC: 9 MMOL/L (ref 8–16)
BASOPHILS # BLD AUTO: 0.09 K/UL (ref 0–0.2)
BASOPHILS NFR BLD: 0.9 % (ref 0–1.9)
BUN SERPL-MCNC: 11 MG/DL (ref 6–20)
CALCIUM SERPL-MCNC: 8.7 MG/DL (ref 8.7–10.5)
CHLORIDE SERPL-SCNC: 102 MMOL/L (ref 95–110)
CO2 SERPL-SCNC: 31 MMOL/L (ref 23–29)
CREAT SERPL-MCNC: 0.8 MG/DL (ref 0.5–1.4)
DIFFERENTIAL METHOD: ABNORMAL
EOSINOPHIL # BLD AUTO: 0.8 K/UL (ref 0–0.5)
EOSINOPHIL NFR BLD: 7.9 % (ref 0–8)
ERYTHROCYTE [DISTWIDTH] IN BLOOD BY AUTOMATED COUNT: 15.9 % (ref 11.5–14.5)
EST. GFR  (NO RACE VARIABLE): >60 ML/MIN/1.73 M^2
FERRITIN SERPL-MCNC: 577 NG/ML (ref 20–300)
GLUCOSE SERPL-MCNC: 83 MG/DL (ref 70–110)
HCT VFR BLD AUTO: 32.8 % (ref 37–48.5)
HGB BLD-MCNC: 9.8 G/DL (ref 12–16)
IMM GRANULOCYTES # BLD AUTO: 0.13 K/UL (ref 0–0.04)
IMM GRANULOCYTES NFR BLD AUTO: 1.4 % (ref 0–0.5)
IRON SERPL-MCNC: 49 UG/DL (ref 30–160)
LYMPHOCYTES # BLD AUTO: 1.7 K/UL (ref 1–4.8)
LYMPHOCYTES NFR BLD: 17.5 % (ref 18–48)
MCH RBC QN AUTO: 29.6 PG (ref 27–31)
MCHC RBC AUTO-ENTMCNC: 29.9 G/DL (ref 32–36)
MCV RBC AUTO: 99 FL (ref 82–98)
MONOCYTES # BLD AUTO: 0.7 K/UL (ref 0.3–1)
MONOCYTES NFR BLD: 7.5 % (ref 4–15)
NEUTROPHILS # BLD AUTO: 6.2 K/UL (ref 1.8–7.7)
NEUTROPHILS NFR BLD: 64.8 % (ref 38–73)
NRBC BLD-RTO: 0 /100 WBC
PLATELET # BLD AUTO: 377 K/UL (ref 150–450)
PMV BLD AUTO: 9.5 FL (ref 9.2–12.9)
POTASSIUM SERPL-SCNC: 3.9 MMOL/L (ref 3.5–5.1)
RBC # BLD AUTO: 3.31 M/UL (ref 4–5.4)
SATURATED IRON: 18 % (ref 20–50)
SODIUM SERPL-SCNC: 142 MMOL/L (ref 136–145)
TOTAL IRON BINDING CAPACITY: 271 UG/DL (ref 250–450)
TRANSFERRIN SERPL-MCNC: 183 MG/DL (ref 200–375)
WBC # BLD AUTO: 9.58 K/UL (ref 3.9–12.7)

## 2023-11-07 PROCEDURE — 27100171 HC OXYGEN HIGH FLOW UP TO 24 HOURS

## 2023-11-07 PROCEDURE — 82728 ASSAY OF FERRITIN: CPT | Performed by: FAMILY MEDICINE

## 2023-11-07 PROCEDURE — 94799 UNLISTED PULMONARY SVC/PX: CPT

## 2023-11-07 PROCEDURE — 27000207 HC ISOLATION

## 2023-11-07 PROCEDURE — 21400001 HC TELEMETRY ROOM

## 2023-11-07 PROCEDURE — 25000003 PHARM REV CODE 250: Performed by: INTERNAL MEDICINE

## 2023-11-07 PROCEDURE — 82746 ASSAY OF FOLIC ACID SERUM: CPT | Performed by: FAMILY MEDICINE

## 2023-11-07 PROCEDURE — 25000242 PHARM REV CODE 250 ALT 637 W/ HCPCS: Performed by: FAMILY MEDICINE

## 2023-11-07 PROCEDURE — 82607 VITAMIN B-12: CPT | Performed by: FAMILY MEDICINE

## 2023-11-07 PROCEDURE — 83540 ASSAY OF IRON: CPT | Performed by: FAMILY MEDICINE

## 2023-11-07 PROCEDURE — 94640 AIRWAY INHALATION TREATMENT: CPT

## 2023-11-07 PROCEDURE — 25000003 PHARM REV CODE 250: Performed by: NURSE PRACTITIONER

## 2023-11-07 PROCEDURE — 63600175 PHARM REV CODE 636 W HCPCS: Performed by: INTERNAL MEDICINE

## 2023-11-07 PROCEDURE — 94761 N-INVAS EAR/PLS OXIMETRY MLT: CPT

## 2023-11-07 PROCEDURE — 99900035 HC TECH TIME PER 15 MIN (STAT)

## 2023-11-07 PROCEDURE — 25000242 PHARM REV CODE 250 ALT 637 W/ HCPCS: Performed by: NURSE PRACTITIONER

## 2023-11-07 PROCEDURE — 63600175 PHARM REV CODE 636 W HCPCS: Performed by: NURSE PRACTITIONER

## 2023-11-07 PROCEDURE — 85025 COMPLETE CBC W/AUTO DIFF WBC: CPT | Performed by: FAMILY MEDICINE

## 2023-11-07 PROCEDURE — 80048 BASIC METABOLIC PNL TOTAL CA: CPT | Performed by: FAMILY MEDICINE

## 2023-11-07 PROCEDURE — 25000003 PHARM REV CODE 250: Performed by: FAMILY MEDICINE

## 2023-11-07 PROCEDURE — 84466 ASSAY OF TRANSFERRIN: CPT | Performed by: FAMILY MEDICINE

## 2023-11-07 PROCEDURE — 25500020 PHARM REV CODE 255: Performed by: FAMILY MEDICINE

## 2023-11-07 PROCEDURE — 27000249 HC VAPOTHERM CIRCUIT

## 2023-11-07 RX ADMIN — MELATONIN TAB 3 MG 6 MG: 3 TAB at 10:11

## 2023-11-07 RX ADMIN — POLYETHYLENE GLYCOL 3350 17 G: 17 POWDER, FOR SOLUTION ORAL at 09:11

## 2023-11-07 RX ADMIN — FUROSEMIDE 40 MG: 40 TABLET ORAL at 09:11

## 2023-11-07 RX ADMIN — IPRATROPIUM BROMIDE AND ALBUTEROL SULFATE 3 ML: 2.5; .5 SOLUTION RESPIRATORY (INHALATION) at 01:11

## 2023-11-07 RX ADMIN — OSELTAMIVIR PHOSPHATE 75 MG: 75 CAPSULE ORAL at 09:11

## 2023-11-07 RX ADMIN — PANTOPRAZOLE SODIUM 40 MG: 40 TABLET, DELAYED RELEASE ORAL at 09:11

## 2023-11-07 RX ADMIN — ARFORMOTEROL TARTRATE 15 MCG: 15 SOLUTION RESPIRATORY (INHALATION) at 08:11

## 2023-11-07 RX ADMIN — CEFTRIAXONE 2 G: 2 INJECTION, POWDER, FOR SOLUTION INTRAMUSCULAR; INTRAVENOUS at 09:11

## 2023-11-07 RX ADMIN — NINTEDANIB 150 MG: 150 CAPSULE ORAL at 09:11

## 2023-11-07 RX ADMIN — IPRATROPIUM BROMIDE AND ALBUTEROL SULFATE 3 ML: 2.5; .5 SOLUTION RESPIRATORY (INHALATION) at 08:11

## 2023-11-07 RX ADMIN — DOCUSATE SODIUM 100 MG: 100 CAPSULE, LIQUID FILLED ORAL at 09:11

## 2023-11-07 RX ADMIN — OSELTAMIVIR PHOSPHATE 75 MG: 75 CAPSULE ORAL at 10:11

## 2023-11-07 RX ADMIN — BUDESONIDE 0.5 MG: 0.5 INHALANT ORAL at 08:11

## 2023-11-07 RX ADMIN — NINTEDANIB 150 MG: 150 CAPSULE ORAL at 10:11

## 2023-11-07 RX ADMIN — BENZONATATE 100 MG: 100 CAPSULE ORAL at 06:11

## 2023-11-07 RX ADMIN — HYDROCODONE BITARTRATE AND ACETAMINOPHEN 1 TABLET: 5; 325 TABLET ORAL at 04:11

## 2023-11-07 RX ADMIN — DOCUSATE SODIUM 100 MG: 100 CAPSULE, LIQUID FILLED ORAL at 10:11

## 2023-11-07 RX ADMIN — BENZONATATE 100 MG: 100 CAPSULE ORAL at 04:11

## 2023-11-07 RX ADMIN — ENOXAPARIN SODIUM 40 MG: 40 INJECTION SUBCUTANEOUS at 04:11

## 2023-11-07 RX ADMIN — ARFORMOTEROL TARTRATE 15 MCG: 15 SOLUTION RESPIRATORY (INHALATION) at 07:11

## 2023-11-07 RX ADMIN — IOHEXOL 100 ML: 350 INJECTION, SOLUTION INTRAVENOUS at 03:11

## 2023-11-07 RX ADMIN — BUDESONIDE 0.5 MG: 0.5 INHALANT ORAL at 07:11

## 2023-11-07 RX ADMIN — Medication 400 MG: at 09:11

## 2023-11-07 RX ADMIN — IPRATROPIUM BROMIDE AND ALBUTEROL SULFATE 3 ML: 2.5; .5 SOLUTION RESPIRATORY (INHALATION) at 07:11

## 2023-11-07 RX ADMIN — HYDROCODONE BITARTRATE AND ACETAMINOPHEN 1 TABLET: 5; 325 TABLET ORAL at 06:11

## 2023-11-07 RX ADMIN — CALCIUM CARBONATE (ANTACID) CHEW TAB 500 MG 1000 MG: 500 CHEW TAB at 10:11

## 2023-11-07 RX ADMIN — MONTELUKAST 10 MG: 10 TABLET, FILM COATED ORAL at 10:11

## 2023-11-07 RX ADMIN — IPRATROPIUM BROMIDE AND ALBUTEROL SULFATE 3 ML: 2.5; .5 SOLUTION RESPIRATORY (INHALATION) at 02:11

## 2023-11-07 RX ADMIN — PREDNISONE 10 MG: 10 TABLET ORAL at 09:11

## 2023-11-07 NOTE — SUBJECTIVE & OBJECTIVE
Interval History: See above.     Review of Systems   Constitutional:  Negative for fatigue and fever.   HENT:  Positive for trouble swallowing and voice change. Negative for sinus pressure.    Eyes:  Negative for visual disturbance.   Respiratory:  Positive for cough and shortness of breath.    Cardiovascular:  Negative for chest pain.   Gastrointestinal:  Negative for nausea and vomiting.   Genitourinary:  Negative for difficulty urinating.   Musculoskeletal:  Negative for back pain.   Skin:  Negative for rash.   Neurological:  Negative for headaches.   Psychiatric/Behavioral:  Negative for confusion.      Objective:     Vital Signs (Most Recent):  Temp: 98.3 °F (36.8 °C) (11/07/23 1124)  Pulse: 65 (11/07/23 1124)  Resp: 18 (11/07/23 1124)  BP: 108/64 (11/07/23 1124)  SpO2: 96 % (11/07/23 1124) Vital Signs (24h Range):  Temp:  [97.6 °F (36.4 °C)-98.8 °F (37.1 °C)] 98.3 °F (36.8 °C)  Pulse:  [] 65  Resp:  [18-30] 18  SpO2:  [93 %-100 %] 96 %  BP: (101-134)/(56-74) 108/64     Weight: 103.1 kg (227 lb 4.7 oz)  Body mass index is 36.69 kg/m².  No intake or output data in the 24 hours ending 11/07/23 1440      Physical Exam  Vitals and nursing note reviewed.   Constitutional:       General: She is not in acute distress.     Appearance: She is overweight. She is ill-appearing.      Comments: Vapotherm   HENT:      Head: Normocephalic and atraumatic.      Right Ear: Hearing and external ear normal.      Left Ear: Hearing and external ear normal.      Nose: No rhinorrhea.      Right Sinus: No maxillary sinus tenderness or frontal sinus tenderness.      Left Sinus: No maxillary sinus tenderness or frontal sinus tenderness.      Mouth/Throat:      Mouth: No oral lesions.      Pharynx: Uvula midline.   Eyes:      General:         Right eye: No discharge.         Left eye: No discharge.      Conjunctiva/sclera: Conjunctivae normal.      Pupils: Pupils are equal, round, and reactive to light.   Neck:      Thyroid: No  thyromegaly.      Vascular: No carotid bruit.      Trachea: No tracheal deviation.   Cardiovascular:      Rate and Rhythm: Regular rhythm. Tachycardia present.      Pulses:           Dorsalis pedis pulses are 2+ on the right side and 2+ on the left side.      Heart sounds: Normal heart sounds, S1 normal and S2 normal. No murmur heard.  Pulmonary:      Effort: No respiratory distress.      Breath sounds: Examination of the right-lower field reveals rales. Examination of the left-lower field reveals rales. Rales present.   Abdominal:      General: Bowel sounds are decreased. There is no distension.      Palpations: Abdomen is soft. There is no mass.      Tenderness: There is no abdominal tenderness.   Musculoskeletal:         General: Normal range of motion.      Cervical back: Normal range of motion.   Lymphadenopathy:      Cervical: No cervical adenopathy.      Upper Body:      Right upper body: No supraclavicular adenopathy.      Left upper body: No supraclavicular adenopathy.   Skin:     General: Skin is warm and dry.      Capillary Refill: Capillary refill takes less than 2 seconds.      Findings: No rash.   Neurological:      Mental Status: She is alert and oriented to person, place, and time.      Sensory: No sensory deficit.      Coordination: Coordination normal.      Gait: Gait normal.   Psychiatric:         Mood and Affect: Mood is not anxious or depressed.         Speech: Speech normal.         Behavior: Behavior normal.         Thought Content: Thought content normal.         Judgment: Judgment normal.             Significant Labs: All pertinent labs within the past 24 hours have been reviewed.    Significant Imaging: I have reviewed all pertinent imaging results/findings within the past 24 hours.

## 2023-11-07 NOTE — PLAN OF CARE
Preference obtained for [facility] or [facility] in [place].  Referrals sent via [employer].       11/07/23 6289   Post-Acute Status   Post-Acute Authorization Placement   Post-Acute Placement Status Referrals Sent   Discharge Plan   Discharge Plan A Long-term acute care facility (LTAC)

## 2023-11-07 NOTE — PROGRESS NOTES
[facility] - Telemetry ([facility])  [facility] Medicine  Progress Note    Patient Name: Ayanna Alicia  MRN: [id number]  Patient Class: IP- Inpatient   Admission Date: 2023  Length of Stay: 6 days  Attending Physician: Thaddeus Nassar MD  Primary Care Provider: Madeleine Enrique MD        Subjective:     Principal Problem:Acute on chronic respiratory failure with hypoxia        HPI:  53-year-old white female with a history of pulmonary fibrosis, pulmonary HTN, RA, Actinomyces infection, COPD, GLENN. Presented with worsening shortness of breath over the past 48 hours.  The patient was in the hospital 2 weeks ago and since has been weaning off of steroids.  She is oxygen dependent at home usually at 8 liters/minute.  She was progressive shortness of breath with orthopnea.  There is no chest pain or pedal edema.  She denies any fevers or chills though now has a productive cough.  Patient was compliant with her medications.  In the ED, vitals: 155/93, 130, 16, 99.1, 54% RA. Placed on vapotherm in ED. Labs: WBC: 18.29, A, Lactic: 2.6, Trop; 0.029, +FluA, CXR: unchanged. EKG: Neg for STEMI. Treated with IV fluids, Steroid, Nebs. Vapotherm set to 20L @ 90% FiO2. Patient is a full code. Admitted to Hospital Medicine for management of Acute on Chronic Resp Failure, Influenza A.       Overview/Hospital Course:  : pt currently on vapotherm. Cont duonebs, budesonide, and brovana nebs. Wean O2 as tolerated. Cont tamiflu. Will order procal and d dimer.   11/3: CTA negative for PE. Cont current management. Wean O2 as tolerated. Pulm on case.   : cont current management, requiring increased vapotherm.   : vapotherm being weaned down. Cont current management.   : cont supportive care. Currently on vapotherm 20L 50% fio2.   : pt continues to require vapotherm, will start LTAC placement process for O2 weaning. Cont supportive care. Pt reports feeling swelling in her neck, will obtain CT scan.       Interval History: See  above.     Review of Systems   Constitutional:  Negative for fatigue and fever.   HENT:  Positive for trouble swallowing and voice change. Negative for sinus pressure.    Eyes:  Negative for visual disturbance.   Respiratory:  Positive for cough and shortness of breath.    Cardiovascular:  Negative for chest pain.   Gastrointestinal:  Negative for nausea and vomiting.   Genitourinary:  Negative for difficulty urinating.   Musculoskeletal:  Negative for back pain.   Skin:  Negative for rash.   Neurological:  Negative for headaches.   Psychiatric/Behavioral:  Negative for confusion.      Objective:     Vital Signs (Most Recent):  Temp: 98.3 °F (36.8 °C) (11/07/23 1124)  Pulse: 65 (11/07/23 1124)  Resp: 18 (11/07/23 1124)  BP: 108/64 (11/07/23 1124)  SpO2: 96 % (11/07/23 1124) Vital Signs (24h Range):  Temp:  [97.6 °F (36.4 °C)-98.8 °F (37.1 °C)] 98.3 °F (36.8 °C)  Pulse:  [] 65  Resp:  [18-30] 18  SpO2:  [93 %-100 %] 96 %  BP: (101-134)/(56-74) 108/64     Weight: 103.1 kg (227 lb 4.7 oz)  Body mass index is 36.69 kg/m².  No intake or output data in the 24 hours ending 11/07/23 1440      Physical Exam  Vitals and nursing note reviewed.   Constitutional:       General: She is not in acute distress.     Appearance: She is overweight. She is ill-appearing.      Comments: Vapotherm   HENT:      Head: Normocephalic and atraumatic.      Right Ear: Hearing and external ear normal.      Left Ear: Hearing and external ear normal.      Nose: No rhinorrhea.      Right Sinus: No maxillary sinus tenderness or frontal sinus tenderness.      Left Sinus: No maxillary sinus tenderness or frontal sinus tenderness.      Mouth/Throat:      Mouth: No oral lesions.      Pharynx: Uvula midline.   Eyes:      General:         Right eye: No discharge.         Left eye: No discharge.      Conjunctiva/sclera: Conjunctivae normal.      Pupils: Pupils are equal, round, and reactive to light.   Neck:      Thyroid: No thyromegaly.       Vascular: No carotid bruit.      Trachea: No tracheal deviation.   Cardiovascular:      Rate and Rhythm: Regular rhythm. Tachycardia present.      Pulses:           Dorsalis pedis pulses are 2+ on the right side and 2+ on the left side.      Heart sounds: Normal heart sounds, S1 normal and S2 normal. No murmur heard.  Pulmonary:      Effort: No respiratory distress.      Breath sounds: Examination of the right-lower field reveals rales. Examination of the left-lower field reveals rales. Rales present.   Abdominal:      General: Bowel sounds are decreased. There is no distension.      Palpations: Abdomen is soft. There is no mass.      Tenderness: There is no abdominal tenderness.   Musculoskeletal:         General: Normal range of motion.      Cervical back: Normal range of motion.   Lymphadenopathy:      Cervical: No cervical adenopathy.      Upper Body:      Right upper body: No supraclavicular adenopathy.      Left upper body: No supraclavicular adenopathy.   Skin:     General: Skin is warm and dry.      Capillary Refill: Capillary refill takes less than 2 seconds.      Findings: No rash.   Neurological:      Mental Status: She is alert and oriented to person, place, and time.      Sensory: No sensory deficit.      Coordination: Coordination normal.      Gait: Gait normal.   Psychiatric:         Mood and Affect: Mood is not anxious or depressed.         Speech: Speech normal.         Behavior: Behavior normal.         Thought Content: Thought content normal.         Judgment: Judgment normal.             Significant Labs: All pertinent labs within the past 24 hours have been reviewed.    Significant Imaging: I have reviewed all pertinent imaging results/findings within the past 24 hours.        Assessment/Plan:      * Acute on chronic respiratory failure with hypoxia  Patient with Hypoxic Respiratory failure which is Acute on chronic.  she is on home oxygen at 8 LPM. Supplemental oxygen was provided and noted-  Oxygen Concentration (%):  [40-60] 50    .   Signs/symptoms of respiratory failure include- tachypnea, increased work of breathing and respiratory distress. Contributing diagnoses includes - CHF and Interstitial lung disease Labs and images were reviewed. Patient Has recent ABG, which has been reviewed. Will treat underlying causes and adjust management of respiratory failure as follows-     --Vapotherm  --Consult Pulm  --Cont home meds where possible  --JESSI  --Bipap @ HS  --Avoid steroid    11/7:  Cont duonebs   brovana budesonide nebs  Resume home lasix   Wean o2 as tolerated  D dimer elevated  CTA neg for PE  Currently on vapotherm 20L 50% fio2   Will start LTAC placement process for O2 weaning         Influenza A with respiratory manifestations  +FluA on admssion    --Airborne/droplet isolation  --Tamiflu    Due to severity of illness  Will plan to cont tamiflu for 7 days      Actinomyces infection  Followed by ID as OP    --cont Ceftriaxone  --PICC line care       Dysphonia due to laryngeal ulcers    --avoid steroids per Pulmonology due to actinomyces infection  --throat spray prn      Elevated troponin  Likely secondary to demand ischemia, hypoxia, resp failure, fever, infuenza    --trend trop  --tele      Interstitial lung disease  Managed with OFEV as OP, followed by Pulmonology as OP    --Cont home regimen, request family to bring meds not available on formulary      HTN (hypertension)  Chronic, controlled. Latest blood pressure and vitals reviewed-     Temp:  [99.1 °F (37.3 °C)-99.4 °F (37.4 °C)]   Pulse:  [118-138]   Resp:  [16-28]   BP: (138-155)/(71-93)   SpO2:  [54 %-100 %] .   Home meds for hypertension were reviewed and noted below.   Hypertension Medications             furosemide (LASIX) 40 MG tablet Take 1 tablet (40 mg total) by mouth once daily.          While in the hospital, will manage blood pressure as follows; Continue home antihypertensive regimen    Will utilize p.r.n. blood pressure  medication only if patient's blood pressure greater than 180/110 and she develops symptoms such as worsening chest pain or shortness of breath.      VTE Risk Mitigation (From admission, onward)         Ordered     enoxaparin injection 40 mg  Daily         11/01/23 1507     IP VTE HIGH RISK PATIENT  Once         11/01/23 1507     Place sequential compression device  Until discontinued         11/01/23 1507                Discharge Planning   JANETTE:      Code Status: Full Code   Is the patient medically ready for discharge?:     Reason for patient still in hospital (select all that apply): Patient trending condition                     Thaddeus Nassar MD  Department of Hospital Medicine   [facility] [facility] ([facility])

## 2023-11-07 NOTE — PLAN OF CARE
Problem: Adult Inpatient Plan of Care  Goal: Plan of Care Review  Outcome: Ongoing, Progressing  Flowsheets (Taken 11/7/2023 0489)  Plan of Care Reviewed With: patient     Problem: Infection  Goal: Absence of Infection Signs and Symptoms  Outcome: Ongoing, Progressing     Problem: Skin Injury Risk Increased  Goal: Skin Health and Integrity  Outcome: Ongoing, Progressing

## 2023-11-07 NOTE — ASSESSMENT & PLAN NOTE
Patient with Hypoxic Respiratory failure which is Acute on chronic.  she is on home oxygen at 8 LPM. Supplemental oxygen was provided and noted- Oxygen Concentration (%):  [40-60] 50    .   Signs/symptoms of respiratory failure include- tachypnea, increased work of breathing and respiratory distress. Contributing diagnoses includes - CHF and Interstitial lung disease Labs and images were reviewed. Patient Has recent ABG, which has been reviewed. Will treat underlying causes and adjust management of respiratory failure as follows-     --Vapotherm  --Consult Pulm  --Cont home meds where possible  --JESSI  --Bipap @ HS  --Avoid steroid    11/7:  Cont duonebs   brovana budesonide nebs  Resume home lasix   Wean o2 as tolerated  D dimer elevated  CTA neg for PE  Currently on vapotherm 20L 50% fio2   Will start LTAC placement process for O2 weaning

## 2023-11-08 LAB
ANION GAP SERPL CALC-SCNC: 11 MMOL/L (ref 8–16)
BASOPHILS # BLD AUTO: 0.11 K/UL (ref 0–0.2)
BASOPHILS NFR BLD: 0.8 % (ref 0–1.9)
BUN SERPL-MCNC: 8 MG/DL (ref 6–20)
CALCIUM SERPL-MCNC: 9.1 MG/DL (ref 8.7–10.5)
CHLORIDE SERPL-SCNC: 99 MMOL/L (ref 95–110)
CO2 SERPL-SCNC: 30 MMOL/L (ref 23–29)
CREAT SERPL-MCNC: 0.8 MG/DL (ref 0.5–1.4)
DIFFERENTIAL METHOD: ABNORMAL
EOSINOPHIL # BLD AUTO: 0.8 K/UL (ref 0–0.5)
EOSINOPHIL NFR BLD: 6 % (ref 0–8)
ERYTHROCYTE [DISTWIDTH] IN BLOOD BY AUTOMATED COUNT: 16.2 % (ref 11.5–14.5)
EST. GFR  (NO RACE VARIABLE): >60 ML/MIN/1.73 M^2
FOLATE SERPL-MCNC: 10.9 NG/ML (ref 4–24)
GLUCOSE SERPL-MCNC: 115 MG/DL (ref 70–110)
HCT VFR BLD AUTO: 33.3 % (ref 37–48.5)
HGB BLD-MCNC: 10.1 G/DL (ref 12–16)
IMM GRANULOCYTES # BLD AUTO: 0.22 K/UL (ref 0–0.04)
IMM GRANULOCYTES NFR BLD AUTO: 1.6 % (ref 0–0.5)
LYMPHOCYTES # BLD AUTO: 1.3 K/UL (ref 1–4.8)
LYMPHOCYTES NFR BLD: 9.4 % (ref 18–48)
MCH RBC QN AUTO: 30.2 PG (ref 27–31)
MCHC RBC AUTO-ENTMCNC: 30.3 G/DL (ref 32–36)
MCV RBC AUTO: 100 FL (ref 82–98)
MONOCYTES # BLD AUTO: 1 K/UL (ref 0.3–1)
MONOCYTES NFR BLD: 7.4 % (ref 4–15)
NEUTROPHILS # BLD AUTO: 10.1 K/UL (ref 1.8–7.7)
NEUTROPHILS NFR BLD: 74.8 % (ref 38–73)
NRBC BLD-RTO: 0 /100 WBC
PLATELET # BLD AUTO: 345 K/UL (ref 150–450)
PMV BLD AUTO: 9.4 FL (ref 9.2–12.9)
POTASSIUM SERPL-SCNC: 3.8 MMOL/L (ref 3.5–5.1)
RBC # BLD AUTO: 3.34 M/UL (ref 4–5.4)
SODIUM SERPL-SCNC: 140 MMOL/L (ref 136–145)
THYROPEROXIDASE IGG SERPL-ACNC: <6 IU/ML
TSH SERPL DL<=0.005 MIU/L-ACNC: 0.88 UIU/ML (ref 0.4–4)
VIT B12 SERPL-MCNC: 762 PG/ML (ref 210–950)
WBC # BLD AUTO: 13.44 K/UL (ref 3.9–12.7)

## 2023-11-08 PROCEDURE — 85025 COMPLETE CBC W/AUTO DIFF WBC: CPT | Performed by: FAMILY MEDICINE

## 2023-11-08 PROCEDURE — 27000249 HC VAPOTHERM CIRCUIT

## 2023-11-08 PROCEDURE — 63600175 PHARM REV CODE 636 W HCPCS: Performed by: NURSE PRACTITIONER

## 2023-11-08 PROCEDURE — 27000207 HC ISOLATION

## 2023-11-08 PROCEDURE — 63600175 PHARM REV CODE 636 W HCPCS: Performed by: FAMILY MEDICINE

## 2023-11-08 PROCEDURE — 27100171 HC OXYGEN HIGH FLOW UP TO 24 HOURS

## 2023-11-08 PROCEDURE — 21400001 HC TELEMETRY ROOM

## 2023-11-08 PROCEDURE — 25000003 PHARM REV CODE 250: Performed by: NURSE PRACTITIONER

## 2023-11-08 PROCEDURE — 25000003 PHARM REV CODE 250: Performed by: INTERNAL MEDICINE

## 2023-11-08 PROCEDURE — 84443 ASSAY THYROID STIM HORMONE: CPT | Performed by: FAMILY MEDICINE

## 2023-11-08 PROCEDURE — 86376 MICROSOMAL ANTIBODY EACH: CPT | Performed by: FAMILY MEDICINE

## 2023-11-08 PROCEDURE — 27000646 HC AEROBIKA DEVICE

## 2023-11-08 PROCEDURE — 25000242 PHARM REV CODE 250 ALT 637 W/ HCPCS: Performed by: NURSE PRACTITIONER

## 2023-11-08 PROCEDURE — 25000242 PHARM REV CODE 250 ALT 637 W/ HCPCS: Performed by: FAMILY MEDICINE

## 2023-11-08 PROCEDURE — 94761 N-INVAS EAR/PLS OXIMETRY MLT: CPT

## 2023-11-08 PROCEDURE — 63600175 PHARM REV CODE 636 W HCPCS: Performed by: INTERNAL MEDICINE

## 2023-11-08 PROCEDURE — 99900035 HC TECH TIME PER 15 MIN (STAT)

## 2023-11-08 PROCEDURE — 94799 UNLISTED PULMONARY SVC/PX: CPT

## 2023-11-08 PROCEDURE — 94664 DEMO&/EVAL PT USE INHALER: CPT

## 2023-11-08 PROCEDURE — 94640 AIRWAY INHALATION TREATMENT: CPT

## 2023-11-08 PROCEDURE — 25000003 PHARM REV CODE 250: Performed by: FAMILY MEDICINE

## 2023-11-08 PROCEDURE — 80048 BASIC METABOLIC PNL TOTAL CA: CPT | Performed by: FAMILY MEDICINE

## 2023-11-08 RX ORDER — LANOLIN ALCOHOL/MO/W.PET/CERES
1 CREAM (GRAM) TOPICAL DAILY
Status: DISCONTINUED | OUTPATIENT
Start: 2023-11-08 | End: 2023-11-14 | Stop reason: HOSPADM

## 2023-11-08 RX ORDER — GUAIFENESIN 600 MG/1
600 TABLET, EXTENDED RELEASE ORAL 2 TIMES DAILY
Status: COMPLETED | OUTPATIENT
Start: 2023-11-08 | End: 2023-11-10

## 2023-11-08 RX ORDER — KETOROLAC TROMETHAMINE 30 MG/ML
30 INJECTION, SOLUTION INTRAMUSCULAR; INTRAVENOUS ONCE
Status: COMPLETED | OUTPATIENT
Start: 2023-11-08 | End: 2023-11-08

## 2023-11-08 RX ORDER — SODIUM CHLORIDE FOR INHALATION 3 %
4 VIAL, NEBULIZER (ML) INHALATION ONCE
Status: COMPLETED | OUTPATIENT
Start: 2023-11-08 | End: 2023-11-08

## 2023-11-08 RX ADMIN — KETOROLAC TROMETHAMINE 30 MG: 30 INJECTION, SOLUTION INTRAMUSCULAR; INTRAVENOUS at 10:11

## 2023-11-08 RX ADMIN — DOCUSATE SODIUM 100 MG: 100 CAPSULE, LIQUID FILLED ORAL at 09:11

## 2023-11-08 RX ADMIN — MONTELUKAST 10 MG: 10 TABLET, FILM COATED ORAL at 09:11

## 2023-11-08 RX ADMIN — OSELTAMIVIR PHOSPHATE 75 MG: 75 CAPSULE ORAL at 09:11

## 2023-11-08 RX ADMIN — ARFORMOTEROL TARTRATE 15 MCG: 15 SOLUTION RESPIRATORY (INHALATION) at 07:11

## 2023-11-08 RX ADMIN — Medication 400 MG: at 09:11

## 2023-11-08 RX ADMIN — PANTOPRAZOLE SODIUM 40 MG: 40 TABLET, DELAYED RELEASE ORAL at 09:11

## 2023-11-08 RX ADMIN — GUAIFENESIN 600 MG: 600 TABLET, EXTENDED RELEASE ORAL at 10:11

## 2023-11-08 RX ADMIN — IPRATROPIUM BROMIDE AND ALBUTEROL SULFATE 3 ML: 2.5; .5 SOLUTION RESPIRATORY (INHALATION) at 07:11

## 2023-11-08 RX ADMIN — IPRATROPIUM BROMIDE AND ALBUTEROL SULFATE 3 ML: 2.5; .5 SOLUTION RESPIRATORY (INHALATION) at 01:11

## 2023-11-08 RX ADMIN — PREDNISONE 10 MG: 10 TABLET ORAL at 09:11

## 2023-11-08 RX ADMIN — FERROUS SULFATE TAB 325 MG (65 MG ELEMENTAL FE) 1 EACH: 325 (65 FE) TAB at 09:11

## 2023-11-08 RX ADMIN — BENZONATATE 100 MG: 100 CAPSULE ORAL at 03:11

## 2023-11-08 RX ADMIN — SODIUM CHLORIDE 30 MG/ML INHALATION SOLUTION 4 ML: 30 SOLUTION INHALANT at 10:11

## 2023-11-08 RX ADMIN — NINTEDANIB 150 MG: 150 CAPSULE ORAL at 09:11

## 2023-11-08 RX ADMIN — PROMETHAZINE HYDROCHLORIDE AND CODEINE PHOSPHATE 5 ML: 6.25; 1 SOLUTION ORAL at 09:11

## 2023-11-08 RX ADMIN — BUDESONIDE 0.5 MG: 0.5 INHALANT ORAL at 07:11

## 2023-11-08 RX ADMIN — ENOXAPARIN SODIUM 40 MG: 40 INJECTION SUBCUTANEOUS at 05:11

## 2023-11-08 RX ADMIN — FUROSEMIDE 40 MG: 40 TABLET ORAL at 09:11

## 2023-11-08 RX ADMIN — GUAIFENESIN 600 MG: 600 TABLET, EXTENDED RELEASE ORAL at 09:11

## 2023-11-08 RX ADMIN — BENZONATATE 100 MG: 100 CAPSULE ORAL at 09:11

## 2023-11-08 RX ADMIN — PROMETHAZINE HYDROCHLORIDE AND CODEINE PHOSPHATE 5 ML: 6.25; 1 SOLUTION ORAL at 03:11

## 2023-11-08 RX ADMIN — CEFTRIAXONE 2 G: 2 INJECTION, POWDER, FOR SOLUTION INTRAMUSCULAR; INTRAVENOUS at 10:11

## 2023-11-08 RX ADMIN — BENZONATATE 100 MG: 100 CAPSULE ORAL at 05:11

## 2023-11-08 NOTE — PLAN OF CARE
Accepted by [facility] in [place].  Submitting for authorization.       11/08/23 1551   Post-Acute Status   Post-Acute Authorization Placement   Post-Acute Placement Status Pending payor review/awaiting authorization (if required)   Discharge Plan   Discharge Plan A Long-term acute care facility (LTAC)

## 2023-11-08 NOTE — PLAN OF CARE
Declined by [facility].  Want patient on 10L before acceptance.  Accepted by [facility].  Patient declined going due to bad reviews.    Referral sent to [facility] in [place].       11/08/23 9075   Post-Acute Status   Post-Acute Authorization Placement   Post-Acute Placement Status Referrals Sent   Discharge Plan   Discharge Plan A Long-term acute care facility (LTAC)

## 2023-11-08 NOTE — CARE UPDATE
Still on vapotherm 50  No other acute events  Still some cough  Cont supportive care and try to wean02

## 2023-11-08 NOTE — PROGRESS NOTES
[facility] - Telemetry ([facility])  [facility] Medicine  Progress Note    Patient Name: Ayanna Alicia  MRN: [id number]  Patient Class: IP- Inpatient   Admission Date: 2023  Length of Stay: 7 days  Attending Physician: Thaddeus Nassar MD  Primary Care Provider: Madeleine Enrique MD        Subjective:     Principal Problem:Acute on chronic respiratory failure with hypoxia        HPI:  53-year-old white female with a history of pulmonary fibrosis, pulmonary HTN, RA, Actinomyces infection, COPD, GLENN. Presented with worsening shortness of breath over the past 48 hours.  The patient was in the hospital 2 weeks ago and since has been weaning off of steroids.  She is oxygen dependent at home usually at 8 liters/minute.  She was progressive shortness of breath with orthopnea.  There is no chest pain or pedal edema.  She denies any fevers or chills though now has a productive cough.  Patient was compliant with her medications.  In the ED, vitals: 155/93, 130, 16, 99.1, 54% RA. Placed on vapotherm in ED. Labs: WBC: 18.29, A, Lactic: 2.6, Trop; 0.029, +FluA, CXR: unchanged. EKG: Neg for STEMI. Treated with IV fluids, Steroid, Nebs. Vapotherm set to 20L @ 90% FiO2. Patient is a full code. Admitted to Hospital Medicine for management of Acute on Chronic Resp Failure, Influenza A.       Overview/Hospital Course:  : pt currently on vapotherm. Cont duonebs, budesonide, and brovana nebs. Wean O2 as tolerated. Cont tamiflu. Will order procal and d dimer.   11/3: CTA negative for PE. Cont current management. Wean O2 as tolerated. Pulm on case.   : cont current management, requiring increased vapotherm.   : vapotherm being weaned down. Cont current management.   : cont supportive care. Currently on vapotherm 20L 50% fio2.   : pt continues to require vapotherm, will start LTAC placement process for O2 weaning. Cont supportive care. Pt reports feeling swelling in her neck, will obtain CT scan.   : LTAC placement  pending. Still on 20L 50%fio2. CT scan of neck did not reveal abscess or obstruction. Pt with hx of multinodular goiter. Will obtain tsh and anti-tpo. Outpatient f/u with surgery to discuss thyroidectomy given symptoms.       Interval History: No issues overnight.     Review of Systems   Constitutional:  Negative for fatigue and fever.   HENT:  Positive for trouble swallowing and voice change. Negative for sinus pressure.    Eyes:  Negative for visual disturbance.   Respiratory:  Positive for cough and shortness of breath.    Cardiovascular:  Negative for chest pain.   Gastrointestinal:  Negative for nausea and vomiting.   Genitourinary:  Negative for difficulty urinating.   Musculoskeletal:  Negative for back pain.   Skin:  Negative for rash.   Neurological:  Negative for headaches.   Psychiatric/Behavioral:  Negative for confusion.      Objective:     Vital Signs (Most Recent):  Temp: 98.4 °F (36.9 °C) (11/08/23 0729)  Pulse: 103 (11/08/23 0751)  Resp: 15 (11/08/23 0729)  BP: 123/65 (11/08/23 0729)  SpO2: (!) 88 % (11/08/23 0729) Vital Signs (24h Range):  Temp:  [98 °F (36.7 °C)-98.6 °F (37 °C)] 98.4 °F (36.9 °C)  Pulse:  [] 103  Resp:  [15-24] 15  SpO2:  [88 %-100 %] 88 %  BP: (101-123)/(58-70) 123/65     Weight: 100.6 kg (221 lb 12.5 oz)  Body mass index is 35.8 kg/m².    Intake/Output Summary (Last 24 hours) at 11/8/2023 1007  Last data filed at 11/7/2023 1730  Gross per 24 hour   Intake 650 ml   Output --   Net 650 ml         Physical Exam  Vitals and nursing note reviewed.   Constitutional:       General: She is not in acute distress.     Appearance: She is overweight. She is ill-appearing.      Comments: Vapotherm   HENT:      Head: Normocephalic and atraumatic.      Right Ear: Hearing and external ear normal.      Left Ear: Hearing and external ear normal.      Nose: No rhinorrhea.      Right Sinus: No maxillary sinus tenderness or frontal sinus tenderness.      Left Sinus: No maxillary sinus  tenderness or frontal sinus tenderness.      Mouth/Throat:      Mouth: No oral lesions.      Pharynx: Uvula midline.   Eyes:      General:         Right eye: No discharge.         Left eye: No discharge.      Conjunctiva/sclera: Conjunctivae normal.      Pupils: Pupils are equal, round, and reactive to light.   Neck:      Thyroid: No thyromegaly.      Vascular: No carotid bruit.      Trachea: No tracheal deviation.   Cardiovascular:      Rate and Rhythm: Regular rhythm. Tachycardia present.      Pulses:           Dorsalis pedis pulses are 2+ on the right side and 2+ on the left side.      Heart sounds: Normal heart sounds, S1 normal and S2 normal. No murmur heard.  Pulmonary:      Effort: No respiratory distress.      Breath sounds: Examination of the right-lower field reveals rales. Examination of the left-lower field reveals rales. Rales present.   Abdominal:      General: Bowel sounds are decreased. There is no distension.      Palpations: Abdomen is soft. There is no mass.      Tenderness: There is no abdominal tenderness.   Musculoskeletal:         General: Normal range of motion.      Cervical back: Normal range of motion.   Lymphadenopathy:      Cervical: No cervical adenopathy.      Upper Body:      Right upper body: No supraclavicular adenopathy.      Left upper body: No supraclavicular adenopathy.   Skin:     General: Skin is warm and dry.      Capillary Refill: Capillary refill takes less than 2 seconds.      Findings: No rash.   Neurological:      Mental Status: She is alert and oriented to person, place, and time.      Sensory: No sensory deficit.      Coordination: Coordination normal.      Gait: Gait normal.   Psychiatric:         Mood and Affect: Mood is not anxious or depressed.         Speech: Speech normal.         Behavior: Behavior normal.         Thought Content: Thought content normal.         Judgment: Judgment normal.             Significant Labs: All pertinent labs within the past 24 hours  have been reviewed.    Significant Imaging: I have reviewed all pertinent imaging results/findings within the past 24 hours.      Assessment/Plan:      * Acute on chronic respiratory failure with hypoxia  Patient with Hypoxic Respiratory failure which is Acute on chronic.  she is on home oxygen at 8 LPM. Supplemental oxygen was provided and noted- Oxygen Concentration (%):  [50] 50    .   Signs/symptoms of respiratory failure include- tachypnea, increased work of breathing and respiratory distress. Contributing diagnoses includes - CHF and Interstitial lung disease Labs and images were reviewed. Patient Has recent ABG, which has been reviewed. Will treat underlying causes and adjust management of respiratory failure as follows-     --Vapotherm  --Consult Pulm  --Cont home meds where possible  --JESSI  --Bipap @ HS  --Avoid steroid    11/7:  Cont duonebs   brovana budesonide nebs  Resume home lasix   Wean o2 as tolerated  D dimer elevated  CTA neg for PE  Currently on vapotherm 20L 50% fio2   awaiting LTAC placement process for O2 weaning         Influenza A with respiratory manifestations  +FluA on admssion    --Airborne/droplet isolation  --Tamiflu    Due to severity of illness  Will plan to cont tamiflu for 7 days      Actinomyces infection  Followed by ID as OP    --cont Ceftriaxone  --PICC line care       Dysphonia due to laryngeal ulcers    --avoid steroids per Pulmonology due to actinomyces infection  --throat spray prn      Elevated troponin  Likely secondary to demand ischemia, hypoxia, resp failure, fever, infuenza    --trend trop  --tele      Interstitial lung disease  Managed with OFEV as OP, followed by Pulmonology as OP    --Cont home regimen, request family to bring meds not available on formulary      HTN (hypertension)  Chronic, controlled. Latest blood pressure and vitals reviewed-     Temp:  [99.1 °F (37.3 °C)-99.4 °F (37.4 °C)]   Pulse:  [118-138]   Resp:  [16-28]   BP: (138-155)/(71-93)   SpO2:   [54 %-100 %] .   Home meds for hypertension were reviewed and noted below.   Hypertension Medications             furosemide (LASIX) 40 MG tablet Take 1 tablet (40 mg total) by mouth once daily.          While in the hospital, will manage blood pressure as follows; Continue home antihypertensive regimen    Will utilize p.r.n. blood pressure medication only if patient's blood pressure greater than 180/110 and she develops symptoms such as worsening chest pain or shortness of breath.      VTE Risk Mitigation (From admission, onward)         Ordered     enoxaparin injection 40 mg  Daily         11/01/23 1507     IP VTE HIGH RISK PATIENT  Once         11/01/23 1507     Place sequential compression device  Until discontinued         11/01/23 1507                Discharge Planning   JANETTE:      Code Status: Full Code   Is the patient medically ready for discharge?:     Reason for patient still in hospital (select all that apply): Patient trending condition  Discharge Plan A: Long-term acute care facility (LTAC)                  Thaddeus Nassar MD  Department of Hospital Medicine   [facility] [facility]

## 2023-11-08 NOTE — ASSESSMENT & PLAN NOTE
Patient with Hypoxic Respiratory failure which is Acute on chronic.  she is on home oxygen at 8 LPM. Supplemental oxygen was provided and noted- Oxygen Concentration (%):  [50] 50    .   Signs/symptoms of respiratory failure include- tachypnea, increased work of breathing and respiratory distress. Contributing diagnoses includes - CHF and Interstitial lung disease Labs and images were reviewed. Patient Has recent ABG, which has been reviewed. Will treat underlying causes and adjust management of respiratory failure as follows-     --Vapotherm  --Consult Pulm  --Cont home meds where possible  --JESSI  --Bipap @ HS  --Avoid steroid    11/7:  Cont duonebs   brovana budesonide nebs  Resume home lasix   Wean o2 as tolerated  D dimer elevated  CTA neg for PE  Currently on vapotherm 20L 50% fio2   awaiting LTAC placement process for O2 weaning

## 2023-11-08 NOTE — SUBJECTIVE & OBJECTIVE
Interval History: No issues overnight.     Review of Systems   Constitutional:  Negative for fatigue and fever.   HENT:  Positive for trouble swallowing and voice change. Negative for sinus pressure.    Eyes:  Negative for visual disturbance.   Respiratory:  Positive for cough and shortness of breath.    Cardiovascular:  Negative for chest pain.   Gastrointestinal:  Negative for nausea and vomiting.   Genitourinary:  Negative for difficulty urinating.   Musculoskeletal:  Negative for back pain.   Skin:  Negative for rash.   Neurological:  Negative for headaches.   Psychiatric/Behavioral:  Negative for confusion.      Objective:     Vital Signs (Most Recent):  Temp: 98.4 °F (36.9 °C) (11/08/23 0729)  Pulse: 103 (11/08/23 0751)  Resp: 15 (11/08/23 0729)  BP: 123/65 (11/08/23 0729)  SpO2: (!) 88 % (11/08/23 0729) Vital Signs (24h Range):  Temp:  [98 °F (36.7 °C)-98.6 °F (37 °C)] 98.4 °F (36.9 °C)  Pulse:  [] 103  Resp:  [15-24] 15  SpO2:  [88 %-100 %] 88 %  BP: (101-123)/(58-70) 123/65     Weight: 100.6 kg (221 lb 12.5 oz)  Body mass index is 35.8 kg/m².    Intake/Output Summary (Last 24 hours) at 11/8/2023 1007  Last data filed at 11/7/2023 1730  Gross per 24 hour   Intake 650 ml   Output --   Net 650 ml         Physical Exam  Vitals and nursing note reviewed.   Constitutional:       General: She is not in acute distress.     Appearance: She is overweight. She is ill-appearing.      Comments: Vapotherm   HENT:      Head: Normocephalic and atraumatic.      Right Ear: Hearing and external ear normal.      Left Ear: Hearing and external ear normal.      Nose: No rhinorrhea.      Right Sinus: No maxillary sinus tenderness or frontal sinus tenderness.      Left Sinus: No maxillary sinus tenderness or frontal sinus tenderness.      Mouth/Throat:      Mouth: No oral lesions.      Pharynx: Uvula midline.   Eyes:      General:         Right eye: No discharge.         Left eye: No discharge.      Conjunctiva/sclera:  Conjunctivae normal.      Pupils: Pupils are equal, round, and reactive to light.   Neck:      Thyroid: No thyromegaly.      Vascular: No carotid bruit.      Trachea: No tracheal deviation.   Cardiovascular:      Rate and Rhythm: Regular rhythm. Tachycardia present.      Pulses:           Dorsalis pedis pulses are 2+ on the right side and 2+ on the left side.      Heart sounds: Normal heart sounds, S1 normal and S2 normal. No murmur heard.  Pulmonary:      Effort: No respiratory distress.      Breath sounds: Examination of the right-lower field reveals rales. Examination of the left-lower field reveals rales. Rales present.   Abdominal:      General: Bowel sounds are decreased. There is no distension.      Palpations: Abdomen is soft. There is no mass.      Tenderness: There is no abdominal tenderness.   Musculoskeletal:         General: Normal range of motion.      Cervical back: Normal range of motion.   Lymphadenopathy:      Cervical: No cervical adenopathy.      Upper Body:      Right upper body: No supraclavicular adenopathy.      Left upper body: No supraclavicular adenopathy.   Skin:     General: Skin is warm and dry.      Capillary Refill: Capillary refill takes less than 2 seconds.      Findings: No rash.   Neurological:      Mental Status: She is alert and oriented to person, place, and time.      Sensory: No sensory deficit.      Coordination: Coordination normal.      Gait: Gait normal.   Psychiatric:         Mood and Affect: Mood is not anxious or depressed.         Speech: Speech normal.         Behavior: Behavior normal.         Thought Content: Thought content normal.         Judgment: Judgment normal.             Significant Labs: All pertinent labs within the past 24 hours have been reviewed.    Significant Imaging: I have reviewed all pertinent imaging results/findings within the past 24 hours.

## 2023-11-09 ENCOUNTER — PATIENT MESSAGE (OUTPATIENT)
Dept: PULMONOLOGY | Facility: CLINIC | Age: 53
End: 2023-11-09
Payer: COMMERCIAL

## 2023-11-09 PROBLEM — D64.9 ANEMIA: Status: ACTIVE | Noted: 2023-11-09

## 2023-11-09 PROBLEM — R79.89 ELEVATED TROPONIN: Status: RESOLVED | Noted: 2023-07-31 | Resolved: 2023-11-09

## 2023-11-09 LAB
ANION GAP SERPL CALC-SCNC: 10 MMOL/L (ref 8–16)
BASOPHILS # BLD AUTO: 0.07 K/UL (ref 0–0.2)
BASOPHILS NFR BLD: 0.7 % (ref 0–1.9)
BUN SERPL-MCNC: 7 MG/DL (ref 6–20)
CALCIUM SERPL-MCNC: 9.1 MG/DL (ref 8.7–10.5)
CHLORIDE SERPL-SCNC: 102 MMOL/L (ref 95–110)
CO2 SERPL-SCNC: 28 MMOL/L (ref 23–29)
CREAT SERPL-MCNC: 0.8 MG/DL (ref 0.5–1.4)
DIFFERENTIAL METHOD: ABNORMAL
EOSINOPHIL # BLD AUTO: 0.7 K/UL (ref 0–0.5)
EOSINOPHIL NFR BLD: 6.6 % (ref 0–8)
ERYTHROCYTE [DISTWIDTH] IN BLOOD BY AUTOMATED COUNT: 16 % (ref 11.5–14.5)
EST. GFR  (NO RACE VARIABLE): >60 ML/MIN/1.73 M^2
GLUCOSE SERPL-MCNC: 81 MG/DL (ref 70–110)
HCT VFR BLD AUTO: 31.8 % (ref 37–48.5)
HGB BLD-MCNC: 9.7 G/DL (ref 12–16)
IMM GRANULOCYTES # BLD AUTO: 0.18 K/UL (ref 0–0.04)
IMM GRANULOCYTES NFR BLD AUTO: 1.8 % (ref 0–0.5)
LYMPHOCYTES # BLD AUTO: 1.5 K/UL (ref 1–4.8)
LYMPHOCYTES NFR BLD: 15.6 % (ref 18–48)
MCH RBC QN AUTO: 30.3 PG (ref 27–31)
MCHC RBC AUTO-ENTMCNC: 30.5 G/DL (ref 32–36)
MCV RBC AUTO: 99 FL (ref 82–98)
MONOCYTES # BLD AUTO: 0.8 K/UL (ref 0.3–1)
MONOCYTES NFR BLD: 8.3 % (ref 4–15)
NEUTROPHILS # BLD AUTO: 6.6 K/UL (ref 1.8–7.7)
NEUTROPHILS NFR BLD: 67 % (ref 38–73)
NRBC BLD-RTO: 0 /100 WBC
PLATELET # BLD AUTO: 354 K/UL (ref 150–450)
PMV BLD AUTO: 9.7 FL (ref 9.2–12.9)
POTASSIUM SERPL-SCNC: 3.9 MMOL/L (ref 3.5–5.1)
RBC # BLD AUTO: 3.2 M/UL (ref 4–5.4)
SODIUM SERPL-SCNC: 140 MMOL/L (ref 136–145)
WBC # BLD AUTO: 9.85 K/UL (ref 3.9–12.7)

## 2023-11-09 PROCEDURE — 94640 AIRWAY INHALATION TREATMENT: CPT

## 2023-11-09 PROCEDURE — 27000646 HC AEROBIKA DEVICE

## 2023-11-09 PROCEDURE — 27100171 HC OXYGEN HIGH FLOW UP TO 24 HOURS

## 2023-11-09 PROCEDURE — 25000242 PHARM REV CODE 250 ALT 637 W/ HCPCS: Performed by: FAMILY MEDICINE

## 2023-11-09 PROCEDURE — 80048 BASIC METABOLIC PNL TOTAL CA: CPT | Performed by: FAMILY MEDICINE

## 2023-11-09 PROCEDURE — 25000003 PHARM REV CODE 250: Performed by: FAMILY MEDICINE

## 2023-11-09 PROCEDURE — 63600175 PHARM REV CODE 636 W HCPCS: Performed by: INTERNAL MEDICINE

## 2023-11-09 PROCEDURE — 94799 UNLISTED PULMONARY SVC/PX: CPT

## 2023-11-09 PROCEDURE — 63600175 PHARM REV CODE 636 W HCPCS: Performed by: NURSE PRACTITIONER

## 2023-11-09 PROCEDURE — 94761 N-INVAS EAR/PLS OXIMETRY MLT: CPT

## 2023-11-09 PROCEDURE — 85025 COMPLETE CBC W/AUTO DIFF WBC: CPT | Performed by: FAMILY MEDICINE

## 2023-11-09 PROCEDURE — 25000003 PHARM REV CODE 250: Performed by: NURSE PRACTITIONER

## 2023-11-09 PROCEDURE — 25000242 PHARM REV CODE 250 ALT 637 W/ HCPCS: Performed by: NURSE PRACTITIONER

## 2023-11-09 PROCEDURE — 27000249 HC VAPOTHERM CIRCUIT

## 2023-11-09 PROCEDURE — 99900035 HC TECH TIME PER 15 MIN (STAT)

## 2023-11-09 PROCEDURE — 25000003 PHARM REV CODE 250: Performed by: INTERNAL MEDICINE

## 2023-11-09 PROCEDURE — 94664 DEMO&/EVAL PT USE INHALER: CPT

## 2023-11-09 PROCEDURE — 21400001 HC TELEMETRY ROOM

## 2023-11-09 RX ADMIN — HYDROCODONE BITARTRATE AND ACETAMINOPHEN 1 TABLET: 5; 325 TABLET ORAL at 06:11

## 2023-11-09 RX ADMIN — BUDESONIDE 0.5 MG: 0.5 INHALANT ORAL at 07:11

## 2023-11-09 RX ADMIN — PROMETHAZINE HYDROCHLORIDE AND CODEINE PHOSPHATE 5 ML: 6.25; 1 SOLUTION ORAL at 09:11

## 2023-11-09 RX ADMIN — NINTEDANIB 150 MG: 150 CAPSULE ORAL at 09:11

## 2023-11-09 RX ADMIN — NINTEDANIB 150 MG: 150 CAPSULE ORAL at 10:11

## 2023-11-09 RX ADMIN — GUAIFENESIN 600 MG: 600 TABLET, EXTENDED RELEASE ORAL at 09:11

## 2023-11-09 RX ADMIN — GUAIFENESIN 600 MG: 600 TABLET, EXTENDED RELEASE ORAL at 10:11

## 2023-11-09 RX ADMIN — DOCUSATE SODIUM 100 MG: 100 CAPSULE, LIQUID FILLED ORAL at 09:11

## 2023-11-09 RX ADMIN — MONTELUKAST 10 MG: 10 TABLET, FILM COATED ORAL at 09:11

## 2023-11-09 RX ADMIN — Medication 400 MG: at 10:11

## 2023-11-09 RX ADMIN — IPRATROPIUM BROMIDE AND ALBUTEROL SULFATE 3 ML: 2.5; .5 SOLUTION RESPIRATORY (INHALATION) at 12:11

## 2023-11-09 RX ADMIN — FERROUS SULFATE TAB 325 MG (65 MG ELEMENTAL FE) 1 EACH: 325 (65 FE) TAB at 10:11

## 2023-11-09 RX ADMIN — PANTOPRAZOLE SODIUM 40 MG: 40 TABLET, DELAYED RELEASE ORAL at 10:11

## 2023-11-09 RX ADMIN — DOCUSATE SODIUM 100 MG: 100 CAPSULE, LIQUID FILLED ORAL at 10:11

## 2023-11-09 RX ADMIN — BENZONATATE 100 MG: 100 CAPSULE ORAL at 09:11

## 2023-11-09 RX ADMIN — PROMETHAZINE HYDROCHLORIDE AND CODEINE PHOSPHATE 5 ML: 6.25; 1 SOLUTION ORAL at 12:11

## 2023-11-09 RX ADMIN — PREDNISONE 10 MG: 10 TABLET ORAL at 10:11

## 2023-11-09 RX ADMIN — ENOXAPARIN SODIUM 40 MG: 40 INJECTION SUBCUTANEOUS at 04:11

## 2023-11-09 RX ADMIN — CALCIUM CARBONATE (ANTACID) CHEW TAB 500 MG 1000 MG: 500 CHEW TAB at 04:11

## 2023-11-09 RX ADMIN — CEFTRIAXONE 2 G: 2 INJECTION, POWDER, FOR SOLUTION INTRAMUSCULAR; INTRAVENOUS at 10:11

## 2023-11-09 RX ADMIN — ARFORMOTEROL TARTRATE 15 MCG: 15 SOLUTION RESPIRATORY (INHALATION) at 07:11

## 2023-11-09 RX ADMIN — BENZONATATE 100 MG: 100 CAPSULE ORAL at 12:11

## 2023-11-09 RX ADMIN — IPRATROPIUM BROMIDE AND ALBUTEROL SULFATE 3 ML: 2.5; .5 SOLUTION RESPIRATORY (INHALATION) at 01:11

## 2023-11-09 RX ADMIN — IPRATROPIUM BROMIDE AND ALBUTEROL SULFATE 3 ML: 2.5; .5 SOLUTION RESPIRATORY (INHALATION) at 07:11

## 2023-11-09 NOTE — ASSESSMENT & PLAN NOTE
Patient with Hypoxic Respiratory failure which is Acute on chronic.  she is on home oxygen at 8 LPM. Supplemental oxygen was provided and noted- Oxygen Concentration (%):  [40-50] 50    .   Signs/symptoms of respiratory failure include- tachypnea, increased work of breathing and respiratory distress. Contributing diagnoses includes - CHF and Interstitial lung disease Labs and images were reviewed. Patient Has recent ABG, which has been reviewed. Will treat underlying causes and adjust management of respiratory failure as follows-     --Vapotherm  --Consult Pulm  --Cont home meds where possible  --JESSI  --Bipap @ HS  --Avoid steroid    11/7:  Cont duonebs   brovana budesonide nebs  Resume home lasix   Wean o2 as tolerated  D dimer elevated  CTA neg for PE  Currently on vapotherm 15L 50% fio2   awaiting LTAC placement process for O2 weaning

## 2023-11-09 NOTE — PROGRESS NOTES
[facility] - Telemetry ([facility])  [facility] Medicine  Progress Note    Patient Name: Ayanna Alicia  MRN: [id number]  Patient Class: IP- Inpatient   Admission Date: 2023  Length of Stay: 8 days  Attending Physician: Thaddeus Nassar MD  Primary Care Provider: Madeleine Enrique MD        Subjective:     Principal Problem:Acute on chronic respiratory failure with hypoxia        HPI:  53-year-old white female with a history of pulmonary fibrosis, pulmonary HTN, RA, Actinomyces infection, COPD, GLENN. Presented with worsening shortness of breath over the past 48 hours.  The patient was in the hospital 2 weeks ago and since has been weaning off of steroids.  She is oxygen dependent at home usually at 8 liters/minute.  She was progressive shortness of breath with orthopnea.  There is no chest pain or pedal edema.  She denies any fevers or chills though now has a productive cough.  Patient was compliant with her medications.  In the ED, vitals: 155/93, 130, 16, 99.1, 54% RA. Placed on vapotherm in ED. Labs: WBC: 18.29, A, Lactic: 2.6, Trop; 0.029, +FluA, CXR: unchanged. EKG: Neg for STEMI. Treated with IV fluids, Steroid, Nebs. Vapotherm set to 20L @ 90% FiO2. Patient is a full code. Admitted to Hospital Medicine for management of Acute on Chronic Resp Failure, Influenza A.       Overview/Hospital Course:  : pt currently on vapotherm. Cont duonebs, budesonide, and brovana nebs. Wean O2 as tolerated. Cont tamiflu. Will order procal and d dimer.   11/3: CTA negative for PE. Cont current management. Wean O2 as tolerated. Pulm on case.   : cont current management, requiring increased vapotherm.   : vapotherm being weaned down. Cont current management.   : cont supportive care. Currently on vapotherm 20L 50% fio2.   : pt continues to require vapotherm, will start LTAC placement process for O2 weaning. Cont supportive care. Pt reports feeling swelling in her neck, will obtain CT scan.   : LTAC placement  pending. Still on 20L 50%fio2. CT scan of neck did not reveal abscess or obstruction. Pt with hx of multinodular goiter. Will obtain tsh and anti-tpo. Outpatient f/u with surgery to discuss thyroidectomy given symptoms.   11/9: weaned down to 15L 50% fio2. TSH and anti tpo normal. Thyroid u/s reviewed, will need outpatient FNA. LTAC placement pending however pt may improve quickly and not require it. Pt reports dark stools however she was started on iron tablets. Will trend h/h and consider consulting GI.       Interval History: Patient reports having dark stools this am. No other issues.     Review of Systems   Constitutional:  Negative for fatigue and fever.   HENT:  Positive for trouble swallowing and voice change. Negative for sinus pressure.    Eyes:  Negative for visual disturbance.   Respiratory:  Positive for cough and shortness of breath.    Cardiovascular:  Negative for chest pain.   Gastrointestinal:  Negative for nausea and vomiting.   Genitourinary:  Negative for difficulty urinating.   Musculoskeletal:  Negative for back pain.   Skin:  Negative for rash.   Neurological:  Negative for headaches.   Psychiatric/Behavioral:  Negative for confusion.      Objective:     Vital Signs (Most Recent):  Temp: 97.9 °F (36.6 °C) (11/09/23 0834)  Pulse: 97 (11/09/23 0834)  Resp: 20 (11/09/23 0735)  BP: (!) 100/58 (11/09/23 0834)  SpO2: (!) 93 % (11/09/23 0834) Vital Signs (24h Range):  Temp:  [97.8 °F (36.6 °C)-98.1 °F (36.7 °C)] 97.9 °F (36.6 °C)  Pulse:  [] 97  Resp:  [16-20] 20  SpO2:  [89 %-98 %] 93 %  BP: (100-120)/(58-73) 100/58     Weight: 99.5 kg (219 lb 5.7 oz)  Body mass index is 35.41 kg/m².    Intake/Output Summary (Last 24 hours) at 11/9/2023 0980  Last data filed at 11/9/2023 0617  Gross per 24 hour   Intake 670.35 ml   Output --   Net 670.35 ml         Physical Exam  Vitals and nursing note reviewed.   Constitutional:       General: She is not in acute distress.     Appearance: She is overweight.  She is ill-appearing.      Comments: Vapotherm   HENT:      Head: Normocephalic and atraumatic.      Right Ear: Hearing and external ear normal.      Left Ear: Hearing and external ear normal.      Nose: No rhinorrhea.      Right Sinus: No maxillary sinus tenderness or frontal sinus tenderness.      Left Sinus: No maxillary sinus tenderness or frontal sinus tenderness.      Mouth/Throat:      Mouth: No oral lesions.      Pharynx: Uvula midline.   Eyes:      General:         Right eye: No discharge.         Left eye: No discharge.      Conjunctiva/sclera: Conjunctivae normal.      Pupils: Pupils are equal, round, and reactive to light.   Neck:      Thyroid: No thyromegaly.      Vascular: No carotid bruit.      Trachea: No tracheal deviation.   Cardiovascular:      Rate and Rhythm: Regular rhythm. Tachycardia present.      Pulses:           Dorsalis pedis pulses are 2+ on the right side and 2+ on the left side.      Heart sounds: Normal heart sounds, S1 normal and S2 normal. No murmur heard.  Pulmonary:      Effort: No respiratory distress.      Breath sounds: Examination of the right-lower field reveals rales. Examination of the left-lower field reveals rales. Rales present.   Abdominal:      General: Bowel sounds are decreased. There is no distension.      Palpations: Abdomen is soft. There is no mass.      Tenderness: There is no abdominal tenderness.   Musculoskeletal:         General: Normal range of motion.      Cervical back: Normal range of motion.   Lymphadenopathy:      Cervical: No cervical adenopathy.      Upper Body:      Right upper body: No supraclavicular adenopathy.      Left upper body: No supraclavicular adenopathy.   Skin:     General: Skin is warm and dry.      Capillary Refill: Capillary refill takes less than 2 seconds.      Findings: No rash.   Neurological:      Mental Status: She is alert and oriented to person, place, and time.      Sensory: No sensory deficit.      Coordination:  Coordination normal.      Gait: Gait normal.   Psychiatric:         Mood and Affect: Mood is not anxious or depressed.         Speech: Speech normal.         Behavior: Behavior normal.         Thought Content: Thought content normal.         Judgment: Judgment normal.             Significant Labs: All pertinent labs within the past 24 hours have been reviewed.    Significant Imaging: I have reviewed all pertinent imaging results/findings within the past 24 hours.        Assessment/Plan:      * Acute on chronic respiratory failure with hypoxia  Patient with Hypoxic Respiratory failure which is Acute on chronic.  she is on home oxygen at 8 LPM. Supplemental oxygen was provided and noted- Oxygen Concentration (%):  [40-50] 50    .   Signs/symptoms of respiratory failure include- tachypnea, increased work of breathing and respiratory distress. Contributing diagnoses includes - CHF and Interstitial lung disease Labs and images were reviewed. Patient Has recent ABG, which has been reviewed. Will treat underlying causes and adjust management of respiratory failure as follows-     --Vapotherm  --Consult Pulm  --Cont home meds where possible  --JESSI  --Bipap @ HS  --Avoid steroid    11/7:  Cont duonebs   brovana budesonide nebs  Resume home lasix   Wean o2 as tolerated  D dimer elevated  CTA neg for PE  Currently on vapotherm 15L 50% fio2   awaiting LTAC placement process for O2 weaning         Anemia  PANCHO noted  Ferrous sulfate started  Pt reports black stools however this was after  Starting iron tablets  Will trend h/h   Will consider GI consult should h/h continue to drop    Influenza A with respiratory manifestations  +FluA on admssion    --Airborne/droplet isolation  --Tamiflu    Due to severity of illness  Will plan to cont tamiflu for 7 days      Actinomyces infection  Followed by ID as OP    --cont Ceftriaxone  --PICC line care       Dysphonia due to laryngeal ulcers    --avoid steroids per Pulmonology due to  actinomyces infection  --throat spray prn      Interstitial lung disease  Managed with [facility] as OP, followed by Pulmonology as OP    --Cont home regimen, request family to bring meds not available on formulary      Multinodular goiter  Thyroid U/S reviewed  Outpatient referral placed to IR for FNA   Outpatient referral placed to endocrinology for   Radioiodine therapy evaluation  Pt poor surgical candidate given lung issues  She complains of fullness in her neck       HTN (hypertension)  Chronic, controlled. Latest blood pressure and vitals reviewed-     Temp:  [99.1 °F (37.3 °C)-99.4 °F (37.4 °C)]   Pulse:  [118-138]   Resp:  [16-28]   BP: (138-155)/(71-93)   SpO2:  [54 %-100 %] .   Home meds for hypertension were reviewed and noted below.   Hypertension Medications             furosemide (LASIX) 40 MG tablet Take 1 tablet (40 mg total) by mouth once daily.          While in the hospital, will manage blood pressure as follows; Continue home antihypertensive regimen    Will utilize p.r.n. blood pressure medication only if patient's blood pressure greater than 180/110 and she develops symptoms such as worsening chest pain or shortness of breath.      VTE Risk Mitigation (From admission, onward)         Ordered     enoxaparin injection 40 mg  Daily         11/01/23 1507     IP VTE HIGH RISK PATIENT  Once         11/01/23 1507     Place sequential compression device  Until discontinued         11/01/23 1507                Discharge Planning   JANETTE:      Code Status: Full Code   Is the patient medically ready for discharge?:     Reason for patient still in hospital (select all that apply): Patient trending condition  Discharge Plan A: Long-term acute care facility (LTAC)                  Thaddeus Nassar MD  Department of Hospital Medicine   [facility] - Telemetry ([facility])

## 2023-11-09 NOTE — PLAN OF CARE
Nutrition recommendations 11/9:  1. Recommend modify pt's diet to Low sodium diet   2. Recommend Banatrol (Bananna flakes) TID to assist with diarreha   3. Weekly weights  4. Collaboration with other providers     Goals:   1. Pt will continue to tolerate and consume > 75% EEN and EPN prior to RD follow up   2. Pt's diarreha will improve prior to RD follow up    Radha Portillo, Registration Eligible, Provisional LDN

## 2023-11-09 NOTE — SUBJECTIVE & OBJECTIVE
Interval History: Patient reports having dark stools this am. No other issues.     Review of Systems   Constitutional:  Negative for fatigue and fever.   HENT:  Positive for trouble swallowing and voice change. Negative for sinus pressure.    Eyes:  Negative for visual disturbance.   Respiratory:  Positive for cough and shortness of breath.    Cardiovascular:  Negative for chest pain.   Gastrointestinal:  Negative for nausea and vomiting.   Genitourinary:  Negative for difficulty urinating.   Musculoskeletal:  Negative for back pain.   Skin:  Negative for rash.   Neurological:  Negative for headaches.   Psychiatric/Behavioral:  Negative for confusion.      Objective:     Vital Signs (Most Recent):  Temp: 97.9 °F (36.6 °C) (11/09/23 0834)  Pulse: 97 (11/09/23 0834)  Resp: 20 (11/09/23 0735)  BP: (!) 100/58 (11/09/23 0834)  SpO2: (!) 93 % (11/09/23 0834) Vital Signs (24h Range):  Temp:  [97.8 °F (36.6 °C)-98.1 °F (36.7 °C)] 97.9 °F (36.6 °C)  Pulse:  [] 97  Resp:  [16-20] 20  SpO2:  [89 %-98 %] 93 %  BP: (100-120)/(58-73) 100/58     Weight: 99.5 kg (219 lb 5.7 oz)  Body mass index is 35.41 kg/m².    Intake/Output Summary (Last 24 hours) at 11/9/2023 0952  Last data filed at 11/9/2023 0617  Gross per 24 hour   Intake 670.35 ml   Output --   Net 670.35 ml         Physical Exam  Vitals and nursing note reviewed.   Constitutional:       General: She is not in acute distress.     Appearance: She is overweight. She is ill-appearing.      Comments: Vapotherm   HENT:      Head: Normocephalic and atraumatic.      Right Ear: Hearing and external ear normal.      Left Ear: Hearing and external ear normal.      Nose: No rhinorrhea.      Right Sinus: No maxillary sinus tenderness or frontal sinus tenderness.      Left Sinus: No maxillary sinus tenderness or frontal sinus tenderness.      Mouth/Throat:      Mouth: No oral lesions.      Pharynx: Uvula midline.   Eyes:      General:         Right eye: No discharge.         Left  eye: No discharge.      Conjunctiva/sclera: Conjunctivae normal.      Pupils: Pupils are equal, round, and reactive to light.   Neck:      Thyroid: No thyromegaly.      Vascular: No carotid bruit.      Trachea: No tracheal deviation.   Cardiovascular:      Rate and Rhythm: Regular rhythm. Tachycardia present.      Pulses:           Dorsalis pedis pulses are 2+ on the right side and 2+ on the left side.      Heart sounds: Normal heart sounds, S1 normal and S2 normal. No murmur heard.  Pulmonary:      Effort: No respiratory distress.      Breath sounds: Examination of the right-lower field reveals rales. Examination of the left-lower field reveals rales. Rales present.   Abdominal:      General: Bowel sounds are decreased. There is no distension.      Palpations: Abdomen is soft. There is no mass.      Tenderness: There is no abdominal tenderness.   Musculoskeletal:         General: Normal range of motion.      Cervical back: Normal range of motion.   Lymphadenopathy:      Cervical: No cervical adenopathy.      Upper Body:      Right upper body: No supraclavicular adenopathy.      Left upper body: No supraclavicular adenopathy.   Skin:     General: Skin is warm and dry.      Capillary Refill: Capillary refill takes less than 2 seconds.      Findings: No rash.   Neurological:      Mental Status: She is alert and oriented to person, place, and time.      Sensory: No sensory deficit.      Coordination: Coordination normal.      Gait: Gait normal.   Psychiatric:         Mood and Affect: Mood is not anxious or depressed.         Speech: Speech normal.         Behavior: Behavior normal.         Thought Content: Thought content normal.         Judgment: Judgment normal.             Significant Labs: All pertinent labs within the past 24 hours have been reviewed.    Significant Imaging: I have reviewed all pertinent imaging results/findings within the past 24 hours.

## 2023-11-09 NOTE — ASSESSMENT & PLAN NOTE
PANCHO noted  Ferrous sulfate started  Pt reports black stools however this was after  Starting iron tablets  Will trend h/h   Will consider GI consult should h/h continue to drop

## 2023-11-09 NOTE — PLAN OF CARE
Received authorization for patient to go to [facility].  Secure chat to Dr. Nassar for discharge orders.  Patient will stay today to moniter dark stools.  CM will follow up with [facility] in the am.       11/09/23 1412   Post-Acute Status   Post-Acute Authorization Placement   Post-Acute Placement Status Pending medical clearance/testing   Discharge Plan   Discharge Plan A Long-term acute care facility (LTAC)

## 2023-11-09 NOTE — ASSESSMENT & PLAN NOTE
Thyroid U/S reviewed  Outpatient referral placed to IR for FNA   Outpatient referral placed to endocrinology for   Radioiodine therapy evaluation  Pt poor surgical candidate given lung issues  She complains of fullness in her neck

## 2023-11-09 NOTE — PROGRESS NOTES
"[facility] - Telemetry ([facility])  Adult Nutrition  Progress Note    SUMMARY       Recommendations    Recommendation/Intervention:   1. Recommend modify pt's diet to Low sodium diet   2. Recommend Banatrol (Bananna flakes) TID to assist with diarreha   3. Weekly weights    Goals:   1. Pt will continue to tolerate and consume > 75% EEN and EPN prior to RD follow up   2. Pt's diarreha will improve prior to RD follow up  Nutrition Goal Status: new  Communication of RD Recs: other (comment) (POC, sticky note)    Assessment and Plan    Nutrition Problem  Altered GI function     Related to (etiology):   Alteration in GI tract structure and/or function     Signs and Symptoms (as evidenced by):   Diarrhea     Interventions/Recommendations (treatment strategy):  1. Commercial food   2. Collaboration with other providers     Nutrition Diagnosis Status:   New      Malnutrition Assessment     Skin (Micronutrient): red                                 Reason for Assessment    Reason For Assessment: length of stay  Diagnosis:  (Acute on chronic respiratory failure with hypoxia)  Relevant Medical History: HTN, Multinodular goiter, Interstitial lung disease, Dysphonia d/t laryngeal ulcers, Actinomyces infection, Influenza w/ respiratory manifestations, Anemia  Hx: GERD, COPD, Dysphagia, Obesity  General Information Comments:   11/9/23: 53 y.o. Female admitted for Acute on chronic respiratory failure with hypoxia. Pt currently on a Regular diet and on isolation d/t  infection. LOS x 8 days. H&P noted that the pt presented to the ED with worsening SOB w/ orthopnea,  no chest pain or pedal edema, oxygen dependent at home usually at 8 liters/minute,  compliant with her medications, noted pt was in the hospital x 2 weeks ago and since has been weaning off of steroids. Hospital Medicine Physician noted on 11/9 that the pt was started on iron tablets and now reporting dark stools w/ no other issues. Spoke to RN via secure chat, stated "90% all " "of breakfast and 80% of lunch. Diarrhea today". Reviewed chart: LBM charted 11/8; Skin: bilateral buttocks redness; Curt score: 21 (no risk); Edema: None. Labs, meds, weight reviewed. Weight charted 8/25 219 lbs, 11/9 219 lbs (BMI 35.41, Obese), wt stable x 3 months, some fuctuations in weights, note pt was on steroids. No NFPE warranted at this time, BMI > 30. RD will continue to monitor.    Nutrition Discharge Planning: Low sodium diet    Nutrition Risk Screen    Nutrition Risk Screen: no indicators present    Nutrition/Diet History    Food Allergies: NKFA  Factors Affecting Nutritional Intake: diarrhea    Anthropometrics    Temp: 97.9 °F (36.6 °C)  Height Method: Stated  Height: 5' 6" (167.6 cm)  Height (inches): 66 in  Weight Method: Bed Scale  Weight: 99.5 kg (219 lb 5.7 oz)  Weight (lb): 219.36 lb  Ideal Body Weight (IBW), Female: 130 lb  % Ideal Body Weight, Female (lb): 168.74 %  BMI (Calculated): 35.4  BMI Grade: 35 - 39.9 - obesity - grade II     Wt Readings from Last 15 Encounters:   11/09/23 99.5 kg (219 lb 5.7 oz)   10/30/23 100 kg (220 lb 6.4 oz)   10/24/23 99.5 kg (219 lb 4 oz)   10/23/23 98.9 kg (218 lb)   10/16/23 99 kg (218 lb 4.1 oz)   10/05/23 102.5 kg (226 lb)   09/28/23 100.1 kg (220 lb 11.2 oz)   09/25/23 103.6 kg (228 lb 6.4 oz)   09/20/23 101.6 kg (224 lb)   09/19/23 101.2 kg (223 lb 1.7 oz)   09/18/23 100.3 kg (221 lb 3.2 oz)   09/16/23 100.3 kg (221 lb 1.9 oz)   09/11/23 100.6 kg (221 lb 11.2 oz)   09/08/23 99.8 kg (220 lb)   08/25/23 99.5 kg (219 lb 5.7 oz)     Lab/Procedures/Meds    Pertinent Labs Reviewed: reviewed  Pertinent Medications Reviewed: reviewed  Pertinent Medications Comments: Prednisone, polyethylene glycol, pantoprazole, magnesium oxide, furosemide, ferrous sulfate, enoxaparin, docusate, Abx  BMP  Lab Results   Component Value Date     11/09/2023    K 3.9 11/09/2023     11/09/2023    CO2 28 11/09/2023    BUN 7 11/09/2023    CREATININE 0.8 11/09/2023    " CALCIUM 9.1 11/09/2023    ANIONGAP 10 11/09/2023    EGFRNORACEVR >60 11/09/2023     Lab Results   Component Value Date    WBC 9.85 11/09/2023    HGB 9.7 (L) 11/09/2023    HCT 31.8 (L) 11/09/2023    MCV 99 (H) 11/09/2023     11/09/2023       Scheduled Meds:   albuterol-ipratropium  3 mL Nebulization Q6H    arformoteroL  15 mcg Nebulization BID    budesonide  0.5 mg Nebulization BID    cefTRIAXone (ROCEPHIN) IVPB  2 g Intravenous Q24H    docusate sodium  100 mg Oral BID    enoxparin  40 mg Subcutaneous Daily    ferrous sulfate  1 tablet Oral Daily    furosemide  40 mg Oral Daily    guaiFENesin  600 mg Oral BID    magnesium oxide  400 mg Oral Daily    montelukast  10 mg Oral QHS    nintedanib  150 mg Oral BID    pantoprazole  40 mg Oral Daily    polyethylene glycol  17 g Oral Daily    predniSONE  10 mg Oral Daily     Continuous Infusions:  PRN Meds:.acetaminophen, aluminum-magnesium hydroxide-simethicone, benzonatate, calcium carbonate, dextrose 10%, dextrose 10%, glucagon (human recombinant), glucose, glucose, HYDROcodone-acetaminophen, melatonin, naloxone, ondansetron, prochlorperazine, promethazine-codeine 6.25-10 mg/5 ml, sodium chloride 0.9%    Physical Findings/Assessment         Estimated/Assessed Needs    Weight Used For Calorie Calculations: 99.5 kg (219 lb 5.7 oz)  Energy Calorie Requirements (kcal): 1607 kcals (MSJ no AF (Obese, BMI 35.41)  Energy Need Method: [facility]  Protein Requirements:  g (0.8-1.0 g/kg ABW (Standard)  Weight Used For Protein Calculations: 99.5 kg (219 lb 5.7 oz)  Fluid Requirements (mL): 1607 mL (1 mL/kcal)  Estimated Fluid Requirement Method: RDA Method  RDA Method (mL): 1607  CHO Requirement: 201 g (1607 kcals/8)      Nutrition Prescription Ordered    Current Diet Order: Regular diet    Evaluation of Received Nutrient/Fluid Intake  I/O: (Net since admit)   11/9: -3680.7 mL    Energy Calories Required: meeting needs  Protein Required: meeting needs  Fluid Required:  not meeting needs  Total Fluid Intake (mL): 670.4  Tolerance: tolerating  % Intake of Estimated Energy Needs: 75 - 100 %  % Meal Intake: 75 - 100 %    Nutrition Risk    Level of Risk/Frequency of Follow-up: low (F/u x 1 weekly)     Monitor and Evaluation    Food and Nutrient Intake: energy intake, food and beverage intake  Food and Nutrient Adminstration: diet order  Knowledge/Beliefs/Attitudes: food and nutrition knowledge/skill, beliefs and attitudes  Anthropometric Measurements: weight, weight change, body mass index  Biochemical Data, Medical Tests and Procedures: electrolyte and renal panel, gastrointestinal profile     Nutrition Follow-Up    RD Follow-up?: Yes  Radha Portillo, Registration Eligible, Provisional LDN

## 2023-11-10 ENCOUNTER — PATIENT MESSAGE (OUTPATIENT)
Dept: PULMONOLOGY | Facility: CLINIC | Age: 53
End: 2023-11-10
Payer: COMMERCIAL

## 2023-11-10 LAB
ANION GAP SERPL CALC-SCNC: 10 MMOL/L (ref 8–16)
BASOPHILS # BLD AUTO: 0.09 K/UL (ref 0–0.2)
BASOPHILS NFR BLD: 0.9 % (ref 0–1.9)
BUN SERPL-MCNC: 7 MG/DL (ref 6–20)
CALCIUM SERPL-MCNC: 8.9 MG/DL (ref 8.7–10.5)
CHLORIDE SERPL-SCNC: 104 MMOL/L (ref 95–110)
CO2 SERPL-SCNC: 27 MMOL/L (ref 23–29)
CREAT SERPL-MCNC: 0.8 MG/DL (ref 0.5–1.4)
DIFFERENTIAL METHOD: ABNORMAL
EOSINOPHIL # BLD AUTO: 0.5 K/UL (ref 0–0.5)
EOSINOPHIL NFR BLD: 5.5 % (ref 0–8)
ERYTHROCYTE [DISTWIDTH] IN BLOOD BY AUTOMATED COUNT: 16.5 % (ref 11.5–14.5)
EST. GFR  (NO RACE VARIABLE): >60 ML/MIN/1.73 M^2
GLUCOSE SERPL-MCNC: 80 MG/DL (ref 70–110)
HCT VFR BLD AUTO: 30.3 % (ref 37–48.5)
HCT VFR BLD AUTO: 31.2 % (ref 37–48.5)
HCT VFR BLD AUTO: 31.4 % (ref 37–48.5)
HCT VFR BLD AUTO: 34 % (ref 37–48.5)
HGB BLD-MCNC: 10.4 G/DL (ref 12–16)
HGB BLD-MCNC: 9.3 G/DL (ref 12–16)
HGB BLD-MCNC: 9.4 G/DL (ref 12–16)
HGB BLD-MCNC: 9.6 G/DL (ref 12–16)
IMM GRANULOCYTES # BLD AUTO: 0.2 K/UL (ref 0–0.04)
IMM GRANULOCYTES NFR BLD AUTO: 2 % (ref 0–0.5)
LYMPHOCYTES # BLD AUTO: 1.4 K/UL (ref 1–4.8)
LYMPHOCYTES NFR BLD: 14.6 % (ref 18–48)
MCH RBC QN AUTO: 30.1 PG (ref 27–31)
MCHC RBC AUTO-ENTMCNC: 29.9 G/DL (ref 32–36)
MCV RBC AUTO: 101 FL (ref 82–98)
MONOCYTES # BLD AUTO: 0.8 K/UL (ref 0.3–1)
MONOCYTES NFR BLD: 8 % (ref 4–15)
NEUTROPHILS # BLD AUTO: 6.8 K/UL (ref 1.8–7.7)
NEUTROPHILS NFR BLD: 69 % (ref 38–73)
NRBC BLD-RTO: 0 /100 WBC
OB PNL STL: POSITIVE
PLATELET # BLD AUTO: 340 K/UL (ref 150–450)
PMV BLD AUTO: 9.5 FL (ref 9.2–12.9)
POTASSIUM SERPL-SCNC: 4 MMOL/L (ref 3.5–5.1)
RBC # BLD AUTO: 3.12 M/UL (ref 4–5.4)
SODIUM SERPL-SCNC: 141 MMOL/L (ref 136–145)
WBC # BLD AUTO: 9.87 K/UL (ref 3.9–12.7)

## 2023-11-10 PROCEDURE — 94761 N-INVAS EAR/PLS OXIMETRY MLT: CPT

## 2023-11-10 PROCEDURE — 27000249 HC VAPOTHERM CIRCUIT

## 2023-11-10 PROCEDURE — 25000003 PHARM REV CODE 250: Performed by: FAMILY MEDICINE

## 2023-11-10 PROCEDURE — 85025 COMPLETE CBC W/AUTO DIFF WBC: CPT | Performed by: FAMILY MEDICINE

## 2023-11-10 PROCEDURE — 85014 HEMATOCRIT: CPT | Mod: 91 | Performed by: FAMILY MEDICINE

## 2023-11-10 PROCEDURE — 25000003 PHARM REV CODE 250: Performed by: INTERNAL MEDICINE

## 2023-11-10 PROCEDURE — 25000003 PHARM REV CODE 250: Performed by: NURSE PRACTITIONER

## 2023-11-10 PROCEDURE — 94799 UNLISTED PULMONARY SVC/PX: CPT

## 2023-11-10 PROCEDURE — 27100171 HC OXYGEN HIGH FLOW UP TO 24 HOURS

## 2023-11-10 PROCEDURE — 25000242 PHARM REV CODE 250 ALT 637 W/ HCPCS: Performed by: FAMILY MEDICINE

## 2023-11-10 PROCEDURE — 80048 BASIC METABOLIC PNL TOTAL CA: CPT | Performed by: FAMILY MEDICINE

## 2023-11-10 PROCEDURE — 94664 DEMO&/EVAL PT USE INHALER: CPT

## 2023-11-10 PROCEDURE — 85018 HEMOGLOBIN: CPT | Mod: 91 | Performed by: FAMILY MEDICINE

## 2023-11-10 PROCEDURE — 63600175 PHARM REV CODE 636 W HCPCS: Performed by: NURSE PRACTITIONER

## 2023-11-10 PROCEDURE — 85014 HEMATOCRIT: CPT | Performed by: FAMILY MEDICINE

## 2023-11-10 PROCEDURE — 99900035 HC TECH TIME PER 15 MIN (STAT)

## 2023-11-10 PROCEDURE — 63600175 PHARM REV CODE 636 W HCPCS: Performed by: INTERNAL MEDICINE

## 2023-11-10 PROCEDURE — 85018 HEMOGLOBIN: CPT | Performed by: FAMILY MEDICINE

## 2023-11-10 PROCEDURE — 27000646 HC AEROBIKA DEVICE

## 2023-11-10 PROCEDURE — 94640 AIRWAY INHALATION TREATMENT: CPT

## 2023-11-10 PROCEDURE — 21400001 HC TELEMETRY ROOM

## 2023-11-10 PROCEDURE — 25000242 PHARM REV CODE 250 ALT 637 W/ HCPCS: Performed by: NURSE PRACTITIONER

## 2023-11-10 PROCEDURE — 82272 OCCULT BLD FECES 1-3 TESTS: CPT | Performed by: FAMILY MEDICINE

## 2023-11-10 RX ORDER — PANTOPRAZOLE SODIUM 40 MG/1
40 TABLET, DELAYED RELEASE ORAL 2 TIMES DAILY
Status: DISCONTINUED | OUTPATIENT
Start: 2023-11-10 | End: 2023-11-12

## 2023-11-10 RX ADMIN — ENOXAPARIN SODIUM 40 MG: 40 INJECTION SUBCUTANEOUS at 05:11

## 2023-11-10 RX ADMIN — IPRATROPIUM BROMIDE AND ALBUTEROL SULFATE 3 ML: 2.5; .5 SOLUTION RESPIRATORY (INHALATION) at 07:11

## 2023-11-10 RX ADMIN — ARFORMOTEROL TARTRATE 15 MCG: 15 SOLUTION RESPIRATORY (INHALATION) at 07:11

## 2023-11-10 RX ADMIN — PROMETHAZINE HYDROCHLORIDE AND CODEINE PHOSPHATE 5 ML: 6.25; 1 SOLUTION ORAL at 02:11

## 2023-11-10 RX ADMIN — PANTOPRAZOLE SODIUM 40 MG: 40 TABLET, DELAYED RELEASE ORAL at 09:11

## 2023-11-10 RX ADMIN — MONTELUKAST 10 MG: 10 TABLET, FILM COATED ORAL at 09:11

## 2023-11-10 RX ADMIN — PANTOPRAZOLE SODIUM 40 MG: 40 TABLET, DELAYED RELEASE ORAL at 10:11

## 2023-11-10 RX ADMIN — FUROSEMIDE 40 MG: 40 TABLET ORAL at 10:11

## 2023-11-10 RX ADMIN — GUAIFENESIN 600 MG: 600 TABLET, EXTENDED RELEASE ORAL at 09:11

## 2023-11-10 RX ADMIN — Medication 400 MG: at 10:11

## 2023-11-10 RX ADMIN — NINTEDANIB 150 MG: 150 CAPSULE ORAL at 09:11

## 2023-11-10 RX ADMIN — ACETAMINOPHEN 650 MG: 325 TABLET ORAL at 06:11

## 2023-11-10 RX ADMIN — BUDESONIDE 0.5 MG: 0.5 INHALANT ORAL at 07:11

## 2023-11-10 RX ADMIN — PREDNISONE 10 MG: 10 TABLET ORAL at 10:11

## 2023-11-10 RX ADMIN — IPRATROPIUM BROMIDE AND ALBUTEROL SULFATE 3 ML: 2.5; .5 SOLUTION RESPIRATORY (INHALATION) at 01:11

## 2023-11-10 RX ADMIN — PROMETHAZINE HYDROCHLORIDE AND CODEINE PHOSPHATE 5 ML: 6.25; 1 SOLUTION ORAL at 09:11

## 2023-11-10 RX ADMIN — DOCUSATE SODIUM 100 MG: 100 CAPSULE, LIQUID FILLED ORAL at 10:11

## 2023-11-10 RX ADMIN — BENZONATATE 100 MG: 100 CAPSULE ORAL at 09:11

## 2023-11-10 RX ADMIN — DOCUSATE SODIUM 100 MG: 100 CAPSULE, LIQUID FILLED ORAL at 09:11

## 2023-11-10 RX ADMIN — FERROUS SULFATE TAB 325 MG (65 MG ELEMENTAL FE) 1 EACH: 325 (65 FE) TAB at 10:11

## 2023-11-10 RX ADMIN — GUAIFENESIN 600 MG: 600 TABLET, EXTENDED RELEASE ORAL at 10:11

## 2023-11-10 RX ADMIN — CEFTRIAXONE 2 G: 2 INJECTION, POWDER, FOR SOLUTION INTRAMUSCULAR; INTRAVENOUS at 11:11

## 2023-11-10 RX ADMIN — NINTEDANIB 150 MG: 150 CAPSULE ORAL at 12:11

## 2023-11-10 RX ADMIN — BENZONATATE 100 MG: 100 CAPSULE ORAL at 01:11

## 2023-11-10 NOTE — SUBJECTIVE & OBJECTIVE
"Ayanna Alicia is 53 year-old female known to pulmonary service with advanced ILD and pulmonary hypertension WHO grp 3 who is undergoing evaluation for lung transplant [facility]. Recent hospitalization for acute ILD exacerbation discharged on micafungin ( complteted), Ceftriaxone ( active), metronidazole ( completed ) for actinomyces in setting of chronic steriod use. She is on home oxygen of 6-8 LPM and bronchodilators Albuterol, Serevent and Yulperi. Has chronic stable loose stools while on OFEV. Actemra infusions on hold due to infection. Has pulmonary hypertension and TYVASO pending. Prednisone 10 mg on weaning. Also has home NIPPV and pulmonary rehab. Patient presented with increased oxygen demands, weakness, desaturation, low grade fever. Tested + for influenza A. Denied fever or chest pain at time of presentation.    Interval History:  11/2: no new issues or c/o noted, OOB standing at the sink brushing her teeth upon mu arrival to her room this AM, some SOB when returned to bed, vapotherm 20L 50%  11/3: pt reports a "rough night" with coughing and desats that required increased O2 support, c/o cough and feelings of "air hunger" despite good sats, also with reflux   11/4: down to 20L 40%, dose of lasix IV this AM, continues with HA, no family at bedside   11/5: remains at 20L and 40% on vapotherm, dose lasix IV again this AM, wondering if she'll be able to make her Thursday appt in [place], no new issues noted   11/6 on vapotherm 20/50 , dry cough dyspnea on exertion  11/10: Sitting up in bed this afternoon. Currently on Vapotherm 15/0.60. Patient reports desats when oxygen decreased to 0.50. Nonproductive dry cough. Patient concerned with blood loss and decrease in H&H trends. Discussed blood transfusions and patient advised transfusion not needed at this time and we would monitor the trends closely.     Objective:     Vital Signs (Most Recent):  Temp: 98 °F (36.7 °C) (11/10/23 1546)  Pulse: (!) " 113 (11/10/23 1546)  Resp: 16 (11/10/23 1546)  BP: 132/76 (11/10/23 1546)  SpO2: 100 % (11/10/23 1546) Vital Signs (24h Range):  Temp:  [97.8 °F (36.6 °C)-98.7 °F (37.1 °C)] 98 °F (36.7 °C)  Pulse:  [] 113  Resp:  [8-24] 16  SpO2:  [87 %-100 %] 100 %  BP: (103-132)/(57-76) 132/76   Weight: 99.5 kg (219 lb 5.7 oz);  Body mass index is 35.41 kg/m².    Intake/Output Summary (Last 24 hours) at 11/10/2023 1754  Last data filed at 11/10/2023 0629  Gross per 24 hour   Intake 75.63 ml   Output --   Net 75.63 ml      Physical Exam  Vitals and nursing note reviewed.   Constitutional:       Appearance: She is obese.   HENT:      Head: Normocephalic.   Eyes:      Conjunctiva/sclera: Conjunctivae normal.   Cardiovascular:      Rate and Rhythm: Normal rate.   Pulmonary:      Comments: Tachypnea, shallow respiratory effort  Abdominal:      Palpations: Abdomen is soft.   Musculoskeletal:         General: Normal range of motion.      Cervical back: Normal range of motion.   Skin:     General: Skin is warm and dry.   Neurological:      Mental Status: She is alert and oriented to person, place, and time.         Review of Systems: Negative except as indicated in HPI    Significant Labs:  CBC/Anemia Profile:  Recent Labs   Lab 11/09/23  0605 11/10/23  0121 11/10/23  0618 11/10/23  0916 11/10/23  1607 11/10/23  1632   WBC 9.85  --  9.87  --   --   --    HGB 9.7*   < > 9.4* 9.3*  --  10.4*   HCT 31.8*   < > 31.4* 30.3*  --  34.0*     --  340  --   --   --    MCV 99*  --  101*  --   --   --    RDW 16.0*  --  16.5*  --   --   --    OCCULTBLOOD  --   --   --   --  Positive*  --     < > = values in this interval not displayed.   Chemistries:  Recent Labs   Lab 11/09/23  0605 11/10/23  0618    141   K 3.9 4.0    104   CO2 28 27   BUN 7 7   CREATININE 0.8 0.8   CALCIUM 9.1 8.9

## 2023-11-10 NOTE — PLAN OF CARE
Problem: Adult Inpatient Plan of Care  Goal: Plan of Care Review  Outcome: Ongoing, Progressing  Goal: Patient-Specific Goal (Individualized)  Outcome: Ongoing, Progressing  Goal: Absence of Hospital-Acquired Illness or Injury  Outcome: Ongoing, Progressing  Goal: Optimal Comfort and Wellbeing  Outcome: Ongoing, Progressing  Goal: Readiness for Transition of Care  Outcome: Ongoing, Progressing

## 2023-11-10 NOTE — TELEPHONE ENCOUNTER
Patient concern about anemia  Team interventions  Await stool results  Also reassured in hospital pulmonary team were daily abreast with her care

## 2023-11-10 NOTE — PROGRESS NOTES
[facility] - Telemetry ([facility])  [facility] Medicine  Progress Note    Patient Name: Ayanna Alicia  MRN: [id number]  Patient Class: IP- Inpatient   Admission Date: 2023  Length of Stay: 9 days  Attending Physician: Thaddeus Nassar MD  Primary Care Provider: Madeleine Enrique MD        Subjective:     Principal Problem:Acute on chronic respiratory failure with hypoxia        HPI:  53-year-old white female with a history of pulmonary fibrosis, pulmonary HTN, RA, Actinomyces infection, COPD, GLENN. Presented with worsening shortness of breath over the past 48 hours.  The patient was in the hospital 2 weeks ago and since has been weaning off of steroids.  She is oxygen dependent at home usually at 8 liters/minute.  She was progressive shortness of breath with orthopnea.  There is no chest pain or pedal edema.  She denies any fevers or chills though now has a productive cough.  Patient was compliant with her medications.  In the ED, vitals: 155/93, 130, 16, 99.1, 54% RA. Placed on vapotherm in ED. Labs: WBC: 18.29, A, Lactic: 2.6, Trop; 0.029, +FluA, CXR: unchanged. EKG: Neg for STEMI. Treated with IV fluids, Steroid, Nebs. Vapotherm set to 20L @ 90% FiO2. Patient is a full code. Admitted to Hospital Medicine for management of Acute on Chronic Resp Failure, Influenza A.       Overview/Hospital Course:  : pt currently on vapotherm. Cont duonebs, budesonide, and brovana nebs. Wean O2 as tolerated. Cont tamiflu. Will order procal and d dimer.   11/3: CTA negative for PE. Cont current management. Wean O2 as tolerated. Pulm on case.   : cont current management, requiring increased vapotherm.   : vapotherm being weaned down. Cont current management.   : cont supportive care. Currently on vapotherm 20L 50% fio2.   : pt continues to require vapotherm, will start LTAC placement process for O2 weaning. Cont supportive care. Pt reports feeling swelling in her neck, will obtain CT scan.   : LTAC placement  pending. Still on 20L 50%fio2. CT scan of neck did not reveal abscess or obstruction. Pt with hx of multinodular goiter. Will obtain tsh and anti-tpo. Outpatient f/u with surgery to discuss thyroidectomy given symptoms.   11/9: weaned down to 15L 50% fio2. TSH and anti tpo normal. Thyroid u/s reviewed, will need outpatient FNA. LTAC placement pending however pt may improve quickly and not require it. Pt reports dark stools however she was started on iron tablets. Will trend h/h and consider consulting GI.   11/10: FOBT pending. Cont to trend h/h. Pt still on 15L, LTAC accepted awaiting medical stability. Consult GI if FOBT positive and h/h continues to trend down. Increase PPI to bid.       Interval History: H/H trending down. Pt reports dark stools. FOBT pending.     Review of Systems   Constitutional:  Negative for fatigue and fever.   HENT:  Positive for trouble swallowing and voice change. Negative for sinus pressure.    Eyes:  Negative for visual disturbance.   Respiratory:  Positive for cough and shortness of breath.    Cardiovascular:  Negative for chest pain.   Gastrointestinal:  Negative for nausea and vomiting.   Genitourinary:  Negative for difficulty urinating.   Musculoskeletal:  Negative for back pain.   Skin:  Negative for rash.   Neurological:  Negative for headaches.   Psychiatric/Behavioral:  Negative for confusion.      Objective:     Vital Signs (Most Recent):  Temp: 98.6 °F (37 °C) (11/10/23 1125)  Pulse: 104 (11/10/23 1443)  Resp: 18 (11/10/23 1436)  BP: 103/63 (11/10/23 1125)  SpO2: (!) 94 % (11/10/23 1358) Vital Signs (24h Range):  Temp:  [97.8 °F (36.6 °C)-99 °F (37.2 °C)] 98.6 °F (37 °C)  Pulse:  [] 104  Resp:  [8-24] 18  SpO2:  [87 %-98 %] 94 %  BP: (103-122)/(57-89) 103/63     Weight: 99.5 kg (219 lb 5.7 oz)  Body mass index is 35.41 kg/m².    Intake/Output Summary (Last 24 hours) at 11/10/2023 1541  Last data filed at 11/10/2023 0629  Gross per 24 hour   Intake 75.63 ml   Output --    Net 75.63 ml         Physical Exam  Vitals and nursing note reviewed.   Constitutional:       General: She is not in acute distress.     Appearance: She is overweight. She is ill-appearing.      Comments: Vapotherm   HENT:      Head: Normocephalic and atraumatic.      Right Ear: Hearing and external ear normal.      Left Ear: Hearing and external ear normal.      Nose: No rhinorrhea.      Right Sinus: No maxillary sinus tenderness or frontal sinus tenderness.      Left Sinus: No maxillary sinus tenderness or frontal sinus tenderness.      Mouth/Throat:      Mouth: No oral lesions.      Pharynx: Uvula midline.   Eyes:      General:         Right eye: No discharge.         Left eye: No discharge.      Conjunctiva/sclera: Conjunctivae normal.      Pupils: Pupils are equal, round, and reactive to light.   Neck:      Thyroid: No thyromegaly.      Vascular: No carotid bruit.      Trachea: No tracheal deviation.   Cardiovascular:      Rate and Rhythm: Regular rhythm. Tachycardia present.      Pulses:           Dorsalis pedis pulses are 2+ on the right side and 2+ on the left side.      Heart sounds: Normal heart sounds, S1 normal and S2 normal. No murmur heard.  Pulmonary:      Effort: No respiratory distress.      Breath sounds: Examination of the right-lower field reveals rales. Examination of the left-lower field reveals rales. Rales present.   Abdominal:      General: Bowel sounds are decreased. There is no distension.      Palpations: Abdomen is soft. There is no mass.      Tenderness: There is no abdominal tenderness.   Musculoskeletal:         General: Normal range of motion.      Cervical back: Normal range of motion.   Lymphadenopathy:      Cervical: No cervical adenopathy.      Upper Body:      Right upper body: No supraclavicular adenopathy.      Left upper body: No supraclavicular adenopathy.   Skin:     General: Skin is warm and dry.      Capillary Refill: Capillary refill takes less than 2 seconds.       Findings: No rash.   Neurological:      Mental Status: She is alert and oriented to person, place, and time.      Sensory: No sensory deficit.      Coordination: Coordination normal.      Gait: Gait normal.   Psychiatric:         Mood and Affect: Mood is not anxious or depressed.         Speech: Speech normal.         Behavior: Behavior normal.         Thought Content: Thought content normal.         Judgment: Judgment normal.             Significant Labs: All pertinent labs within the past 24 hours have been reviewed.    Significant Imaging: I have reviewed all pertinent imaging results/findings within the past 24 hours.        Assessment/Plan:      * Acute on chronic respiratory failure with hypoxia  Patient with Hypoxic Respiratory failure which is Acute on chronic.  she is on home oxygen at 8 LPM. Supplemental oxygen was provided and noted- Oxygen Concentration (%):  [50] 50    .   Signs/symptoms of respiratory failure include- tachypnea, increased work of breathing and respiratory distress. Contributing diagnoses includes - CHF and Interstitial lung disease Labs and images were reviewed. Patient Has recent ABG, which has been reviewed. Will treat underlying causes and adjust management of respiratory failure as follows-     --Vapotherm  --Consult Pulm  --Cont home meds where possible  --JESSI  --Bipap @ HS  --Avoid steroid    11/7:  Cont duonebs   brovana budesonide nebs  Resume home lasix   Wean o2 as tolerated  D dimer elevated  CTA neg for PE  Currently on vapotherm 15L 60% fio2   awaiting LTAC placement process for O2 weaning         Anemia  PANCHO noted  Ferrous sulfate started  Pt reports black stools however this was after  Starting iron tablets  Will trend h/h   H/h trending down slowly  Will increase PPI to bid  FOBT pending  Consider GI consult if positive     Influenza A with respiratory manifestations  +FluA on admssion    --Airborne/droplet isolation  --Tamiflu    Due to severity of illness  Will plan  to cont tamiflu for 7 days      Actinomyces infection  Followed by ID as OP    --cont Ceftriaxone  --PICC line care       Dysphonia due to laryngeal ulcers    --avoid steroids per Pulmonology due to actinomyces infection  --throat spray prn      Interstitial lung disease  Managed with OFEV as OP, followed by Pulmonology as OP    --Cont home regimen, request family to bring meds not available on formulary      Multinodular goiter  Thyroid U/S reviewed  Outpatient referral placed to IR for FNA   Outpatient referral placed to endocrinology for   Radioiodine therapy evaluation  Pt poor surgical candidate given lung issues  She complains of fullness in her neck       HTN (hypertension)  Chronic, controlled. Latest blood pressure and vitals reviewed-     Temp:  [99.1 °F (37.3 °C)-99.4 °F (37.4 °C)]   Pulse:  [118-138]   Resp:  [16-28]   BP: (138-155)/(71-93)   SpO2:  [54 %-100 %] .   Home meds for hypertension were reviewed and noted below.   Hypertension Medications             furosemide (LASIX) 40 MG tablet Take 1 tablet (40 mg total) by mouth once daily.          While in the hospital, will manage blood pressure as follows; Continue home antihypertensive regimen    Will utilize p.r.n. blood pressure medication only if patient's blood pressure greater than 180/110 and she develops symptoms such as worsening chest pain or shortness of breath.      VTE Risk Mitigation (From admission, onward)         Ordered     enoxaparin injection 40 mg  Daily         11/01/23 1507     IP VTE HIGH RISK PATIENT  Once         11/01/23 1507     Place sequential compression device  Until discontinued         11/01/23 1507                Discharge Planning   JANETTE:      Code Status: Full Code   Is the patient medically ready for discharge?:     Reason for patient still in hospital (select all that apply): Patient trending condition  Discharge Plan A: Long-term acute care facility (LTAC)                  Thaddeus Nassar MD  Department of Hospital  Medicine   [facility] - Telemetry ([facility])

## 2023-11-10 NOTE — ASSESSMENT & PLAN NOTE
PANCHO noted  Ferrous sulfate started  Pt reports black stools however this was after  Starting iron tablets  Will trend h/h   H/h trending down slowly  Will increase PPI to bid  FOBT pending  Consider GI consult if positive

## 2023-11-10 NOTE — ASSESSMENT & PLAN NOTE
Patient with Hypoxic Respiratory failure which is Acute on chronic.  she is on home oxygen at 8 LPM. Supplemental oxygen was provided and noted- Oxygen Concentration (%):  [50] 50    .   Signs/symptoms of respiratory failure include- tachypnea, increased work of breathing and respiratory distress. Contributing diagnoses includes - CHF and Interstitial lung disease Labs and images were reviewed. Patient Has recent ABG, which has been reviewed. Will treat underlying causes and adjust management of respiratory failure as follows-     --Vapotherm  --Consult Pulm  --Cont home meds where possible  --JESSI  --Bipap @ HS  --Avoid steroid    11/7:  Cont duonebs   brovana budesonide nebs  Resume home lasix   Wean o2 as tolerated  D dimer elevated  CTA neg for PE  Currently on vapotherm 15L 60% fio2   awaiting LTAC placement process for O2 weaning

## 2023-11-10 NOTE — SUBJECTIVE & OBJECTIVE
Interval History: H/H trending down. Pt reports dark stools. FOBT pending.     Review of Systems   Constitutional:  Negative for fatigue and fever.   HENT:  Positive for trouble swallowing and voice change. Negative for sinus pressure.    Eyes:  Negative for visual disturbance.   Respiratory:  Positive for cough and shortness of breath.    Cardiovascular:  Negative for chest pain.   Gastrointestinal:  Negative for nausea and vomiting.   Genitourinary:  Negative for difficulty urinating.   Musculoskeletal:  Negative for back pain.   Skin:  Negative for rash.   Neurological:  Negative for headaches.   Psychiatric/Behavioral:  Negative for confusion.      Objective:     Vital Signs (Most Recent):  Temp: 98.6 °F (37 °C) (11/10/23 1125)  Pulse: 104 (11/10/23 1443)  Resp: 18 (11/10/23 1436)  BP: 103/63 (11/10/23 1125)  SpO2: (!) 94 % (11/10/23 1358) Vital Signs (24h Range):  Temp:  [97.8 °F (36.6 °C)-99 °F (37.2 °C)] 98.6 °F (37 °C)  Pulse:  [] 104  Resp:  [8-24] 18  SpO2:  [87 %-98 %] 94 %  BP: (103-122)/(57-89) 103/63     Weight: 99.5 kg (219 lb 5.7 oz)  Body mass index is 35.41 kg/m².    Intake/Output Summary (Last 24 hours) at 11/10/2023 1541  Last data filed at 11/10/2023 0629  Gross per 24 hour   Intake 75.63 ml   Output --   Net 75.63 ml         Physical Exam  Vitals and nursing note reviewed.   Constitutional:       General: She is not in acute distress.     Appearance: She is overweight. She is ill-appearing.      Comments: Vapotherm   HENT:      Head: Normocephalic and atraumatic.      Right Ear: Hearing and external ear normal.      Left Ear: Hearing and external ear normal.      Nose: No rhinorrhea.      Right Sinus: No maxillary sinus tenderness or frontal sinus tenderness.      Left Sinus: No maxillary sinus tenderness or frontal sinus tenderness.      Mouth/Throat:      Mouth: No oral lesions.      Pharynx: Uvula midline.   Eyes:      General:         Right eye: No discharge.         Left eye: No  discharge.      Conjunctiva/sclera: Conjunctivae normal.      Pupils: Pupils are equal, round, and reactive to light.   Neck:      Thyroid: No thyromegaly.      Vascular: No carotid bruit.      Trachea: No tracheal deviation.   Cardiovascular:      Rate and Rhythm: Regular rhythm. Tachycardia present.      Pulses:           Dorsalis pedis pulses are 2+ on the right side and 2+ on the left side.      Heart sounds: Normal heart sounds, S1 normal and S2 normal. No murmur heard.  Pulmonary:      Effort: No respiratory distress.      Breath sounds: Examination of the right-lower field reveals rales. Examination of the left-lower field reveals rales. Rales present.   Abdominal:      General: Bowel sounds are decreased. There is no distension.      Palpations: Abdomen is soft. There is no mass.      Tenderness: There is no abdominal tenderness.   Musculoskeletal:         General: Normal range of motion.      Cervical back: Normal range of motion.   Lymphadenopathy:      Cervical: No cervical adenopathy.      Upper Body:      Right upper body: No supraclavicular adenopathy.      Left upper body: No supraclavicular adenopathy.   Skin:     General: Skin is warm and dry.      Capillary Refill: Capillary refill takes less than 2 seconds.      Findings: No rash.   Neurological:      Mental Status: She is alert and oriented to person, place, and time.      Sensory: No sensory deficit.      Coordination: Coordination normal.      Gait: Gait normal.   Psychiatric:         Mood and Affect: Mood is not anxious or depressed.         Speech: Speech normal.         Behavior: Behavior normal.         Thought Content: Thought content normal.         Judgment: Judgment normal.             Significant Labs: All pertinent labs within the past 24 hours have been reviewed.    Significant Imaging: I have reviewed all pertinent imaging results/findings within the past 24 hours.

## 2023-11-10 NOTE — PLAN OF CARE
[facility] - Telemetry (Hospital)  Discharge Reassessment    Primary Care Provider: Madeleine Enrique MD    Expected Discharge Date:     Reassessment (most recent)       Discharge Reassessment - 11/10/23 1558          Discharge Reassessment    Assessment Type Discharge Planning Reassessment (P)      Did the patient's condition or plan change since previous assessment? Yes (P)      Discharge Plan discussed with: Patient (P)      Communicated JANETTE with patient/caregiver Yes (P)      Discharge Plan A Long-term acute care facility (LTAC) (P)      DME Needed Upon Discharge  none (P)      Transition of Care Barriers None (P)      Why the patient remains in the hospital Requires continued medical care (P)         Post-Acute Status    Post-Acute Authorization Placement (P)      Post-Acute Placement Status Pending medical clearance/testing (P)      Discharge Delays None known at this time (P)                    Patient remains in the hospital due to declining HGB.  Discharge plan is transfer to [facility] in [facility] for oxygen weaning.  Authorization from insurance has been received.

## 2023-11-10 NOTE — PLAN OF CARE
Please call Mary Onofre [phone] @ [facility] if patient able to transfer tomorrow otherwise patient will have to wait until Monday as her auth expires on Sunday.

## 2023-11-11 LAB
ANION GAP SERPL CALC-SCNC: 10 MMOL/L (ref 8–16)
BASOPHILS # BLD AUTO: 0.08 K/UL (ref 0–0.2)
BASOPHILS NFR BLD: 0.8 % (ref 0–1.9)
BUN SERPL-MCNC: 7 MG/DL (ref 6–20)
CALCIUM SERPL-MCNC: 8.7 MG/DL (ref 8.7–10.5)
CHLORIDE SERPL-SCNC: 105 MMOL/L (ref 95–110)
CO2 SERPL-SCNC: 27 MMOL/L (ref 23–29)
CREAT SERPL-MCNC: 0.8 MG/DL (ref 0.5–1.4)
DIFFERENTIAL METHOD: ABNORMAL
EOSINOPHIL # BLD AUTO: 0.6 K/UL (ref 0–0.5)
EOSINOPHIL NFR BLD: 5.6 % (ref 0–8)
ERYTHROCYTE [DISTWIDTH] IN BLOOD BY AUTOMATED COUNT: 16.4 % (ref 11.5–14.5)
EST. GFR  (NO RACE VARIABLE): >60 ML/MIN/1.73 M^2
GLUCOSE SERPL-MCNC: 87 MG/DL (ref 70–110)
HCT VFR BLD AUTO: 31.8 % (ref 37–48.5)
HGB BLD-MCNC: 9.7 G/DL (ref 12–16)
IMM GRANULOCYTES # BLD AUTO: 0.25 K/UL (ref 0–0.04)
IMM GRANULOCYTES NFR BLD AUTO: 2.4 % (ref 0–0.5)
LYMPHOCYTES # BLD AUTO: 1.5 K/UL (ref 1–4.8)
LYMPHOCYTES NFR BLD: 14.8 % (ref 18–48)
MCH RBC QN AUTO: 30.8 PG (ref 27–31)
MCHC RBC AUTO-ENTMCNC: 30.5 G/DL (ref 32–36)
MCV RBC AUTO: 101 FL (ref 82–98)
MONOCYTES # BLD AUTO: 0.8 K/UL (ref 0.3–1)
MONOCYTES NFR BLD: 7.3 % (ref 4–15)
NEUTROPHILS # BLD AUTO: 7.1 K/UL (ref 1.8–7.7)
NEUTROPHILS NFR BLD: 69.1 % (ref 38–73)
NRBC BLD-RTO: 0 /100 WBC
PLATELET # BLD AUTO: 360 K/UL (ref 150–450)
PMV BLD AUTO: 9.6 FL (ref 9.2–12.9)
POTASSIUM SERPL-SCNC: 4.1 MMOL/L (ref 3.5–5.1)
RBC # BLD AUTO: 3.15 M/UL (ref 4–5.4)
SODIUM SERPL-SCNC: 142 MMOL/L (ref 136–145)
WBC # BLD AUTO: 10.26 K/UL (ref 3.9–12.7)

## 2023-11-11 PROCEDURE — 94640 AIRWAY INHALATION TREATMENT: CPT

## 2023-11-11 PROCEDURE — 99900035 HC TECH TIME PER 15 MIN (STAT)

## 2023-11-11 PROCEDURE — 25000003 PHARM REV CODE 250: Performed by: INTERNAL MEDICINE

## 2023-11-11 PROCEDURE — 94799 UNLISTED PULMONARY SVC/PX: CPT

## 2023-11-11 PROCEDURE — 63600175 PHARM REV CODE 636 W HCPCS: Performed by: NURSE PRACTITIONER

## 2023-11-11 PROCEDURE — 80048 BASIC METABOLIC PNL TOTAL CA: CPT | Performed by: FAMILY MEDICINE

## 2023-11-11 PROCEDURE — 25000003 PHARM REV CODE 250: Performed by: FAMILY MEDICINE

## 2023-11-11 PROCEDURE — 25000003 PHARM REV CODE 250: Performed by: NURSE PRACTITIONER

## 2023-11-11 PROCEDURE — 21400001 HC TELEMETRY ROOM

## 2023-11-11 PROCEDURE — 94761 N-INVAS EAR/PLS OXIMETRY MLT: CPT

## 2023-11-11 PROCEDURE — 25000242 PHARM REV CODE 250 ALT 637 W/ HCPCS: Performed by: FAMILY MEDICINE

## 2023-11-11 PROCEDURE — 27000249 HC VAPOTHERM CIRCUIT

## 2023-11-11 PROCEDURE — 63600175 PHARM REV CODE 636 W HCPCS: Performed by: INTERNAL MEDICINE

## 2023-11-11 PROCEDURE — 25000242 PHARM REV CODE 250 ALT 637 W/ HCPCS: Performed by: NURSE PRACTITIONER

## 2023-11-11 PROCEDURE — 27100171 HC OXYGEN HIGH FLOW UP TO 24 HOURS

## 2023-11-11 PROCEDURE — 85025 COMPLETE CBC W/AUTO DIFF WBC: CPT | Performed by: FAMILY MEDICINE

## 2023-11-11 RX ADMIN — BENZONATATE 100 MG: 100 CAPSULE ORAL at 08:11

## 2023-11-11 RX ADMIN — IPRATROPIUM BROMIDE AND ALBUTEROL SULFATE 3 ML: 2.5; .5 SOLUTION RESPIRATORY (INHALATION) at 07:11

## 2023-11-11 RX ADMIN — CALCIUM CARBONATE (ANTACID) CHEW TAB 500 MG 1000 MG: 500 CHEW TAB at 01:11

## 2023-11-11 RX ADMIN — PROMETHAZINE HYDROCHLORIDE AND CODEINE PHOSPHATE 5 ML: 6.25; 1 SOLUTION ORAL at 08:11

## 2023-11-11 RX ADMIN — BENZONATATE 100 MG: 100 CAPSULE ORAL at 09:11

## 2023-11-11 RX ADMIN — ENOXAPARIN SODIUM 40 MG: 40 INJECTION SUBCUTANEOUS at 05:11

## 2023-11-11 RX ADMIN — NINTEDANIB 150 MG: 150 CAPSULE ORAL at 09:11

## 2023-11-11 RX ADMIN — PROMETHAZINE HYDROCHLORIDE AND CODEINE PHOSPHATE 5 ML: 6.25; 1 SOLUTION ORAL at 01:11

## 2023-11-11 RX ADMIN — BUDESONIDE 0.5 MG: 0.5 INHALANT ORAL at 07:11

## 2023-11-11 RX ADMIN — CEFTRIAXONE 2 G: 2 INJECTION, POWDER, FOR SOLUTION INTRAMUSCULAR; INTRAVENOUS at 08:11

## 2023-11-11 RX ADMIN — FUROSEMIDE 40 MG: 40 TABLET ORAL at 08:11

## 2023-11-11 RX ADMIN — MONTELUKAST 10 MG: 10 TABLET, FILM COATED ORAL at 09:11

## 2023-11-11 RX ADMIN — PREDNISONE 10 MG: 10 TABLET ORAL at 08:11

## 2023-11-11 RX ADMIN — PROMETHAZINE HYDROCHLORIDE AND CODEINE PHOSPHATE 5 ML: 6.25; 1 SOLUTION ORAL at 09:11

## 2023-11-11 RX ADMIN — HYDROCODONE BITARTRATE AND ACETAMINOPHEN 1 TABLET: 5; 325 TABLET ORAL at 01:11

## 2023-11-11 RX ADMIN — IPRATROPIUM BROMIDE AND ALBUTEROL SULFATE 3 ML: 2.5; .5 SOLUTION RESPIRATORY (INHALATION) at 12:11

## 2023-11-11 RX ADMIN — FERROUS SULFATE TAB 325 MG (65 MG ELEMENTAL FE) 1 EACH: 325 (65 FE) TAB at 08:11

## 2023-11-11 RX ADMIN — DOCUSATE SODIUM 100 MG: 100 CAPSULE, LIQUID FILLED ORAL at 09:11

## 2023-11-11 RX ADMIN — PANTOPRAZOLE SODIUM 40 MG: 40 TABLET, DELAYED RELEASE ORAL at 08:11

## 2023-11-11 RX ADMIN — DOCUSATE SODIUM 100 MG: 100 CAPSULE, LIQUID FILLED ORAL at 08:11

## 2023-11-11 RX ADMIN — Medication 400 MG: at 08:11

## 2023-11-11 RX ADMIN — ARFORMOTEROL TARTRATE 15 MCG: 15 SOLUTION RESPIRATORY (INHALATION) at 07:11

## 2023-11-11 RX ADMIN — PANTOPRAZOLE SODIUM 40 MG: 40 TABLET, DELAYED RELEASE ORAL at 09:11

## 2023-11-11 RX ADMIN — NINTEDANIB 150 MG: 150 CAPSULE ORAL at 08:11

## 2023-11-11 NOTE — ASSESSMENT & PLAN NOTE
PANCHO noted  Ferrous sulfate started  Hb/hct stable  Continue PPI   FOBT positive   cscope reviewed, benign pathology in 2020  Referral placed on discharge for gastroenterology   Will need pulmonology clearance prior to gastroenterology procedure

## 2023-11-11 NOTE — ASSESSMENT & PLAN NOTE
Patient with Hypoxic Respiratory failure which is Acute on chronic.  she is on home oxygen at 8 LPM. Supplemental oxygen was provided and noted- Oxygen Concentration (%):  [50-60] 50    .   Signs/symptoms of respiratory failure include- tachypnea, increased work of breathing and respiratory distress. Contributing diagnoses includes - CHF and Interstitial lung disease Labs and images were reviewed. Patient Has recent ABG, which has been reviewed. Will treat underlying causes and adjust management of respiratory failure as follows-     --Vapotherm  --Consult Pulm  --Cont home meds where possible  --JESSI  --Bipap @ HS  --Avoid steroid    Cont duonebs   brovana budesonide nebs  Resume home lasix   Wean o2 as tolerated  D dimer elevated  CTA neg for PE  Currently on vapotherm 15L 60% fio2   awaiting LTAC placement process for O2 weaning

## 2023-11-11 NOTE — PROGRESS NOTES
"[facility]  Pulmonology Progress Note    Patient Name: Ayanna Alicia   MRN: [id number]  Admission Date: 11/1/2023   Hospital Length of Stay: 9 days  Code Status: Full Code    Attending Provider: Thaddeus Nassar MD  Primary Care Provider: Madeleine Enrique MD   Principal Problem: Acute on chronic respiratory failure with hypoxia  Subjective:   History of Present Illness:  Ayanna Alicia is 53 year-old female known to pulmonary service with advanced ILD and pulmonary hypertension WHO grp 3 who is undergoing evaluation for lung transplant [facility]. Recent hospitalization for acute ILD exacerbation discharged on micafungin ( complteted), Ceftriaxone ( active), metronidazole ( completed ) for actinomyces in setting of chronic steriod use. She is on home oxygen of 6-8 LPM and bronchodilators Albuterol, Serevent and Yulperi. Has chronic stable loose stools while on OFEV. Actemra infusions on hold due to infection. Has pulmonary hypertension and TYVASO pending. Prednisone 10 mg on weaning. Also has home NIPPV and pulmonary rehab. Patient presented with increased oxygen demands, weakness, desaturation, low grade fever. Tested + for influenza A. Denied fever or chest pain at time of presentation.    Interval History:   11/2: no new issues or c/o noted, OOB standing at the sink brushing her teeth upon mu arrival to her room this AM, some SOB when returned to bed, vapotherm 20L 50%   11/3: pt reports a "rough night" with coughing and desats that required increased O2 support, c/o cough and feelings of "air hunger" despite good sats, also with reflux    11/4: down to 20L 40%, dose of lasix IV this AM, continues with HA, no family at bedside    11/5: remains at 20L and 40% on vapotherm, dose lasix IV again this AM, wondering if she'll be able to make her Thursday appt in [place], no new issues noted    11/6 on vapotherm 20/50 , dry cough dyspnea on exertion   11/10: Sitting up " in bed this afternoon. Currently on Vapotherm 15/0.60. Patient reports desats when oxygen decreased to 0.50. Nonproductive dry cough. Patient concerned with blood loss and decrease in H&H trends. Discussed blood transfusions and patient advised transfusion not needed at this time and we would monitor the trends closely. Monitoring for acute blood loss and FOBT pending.     Objective:     Vital Signs (Most Recent):  Temp: 98 °F (36.7 °C) (11/10/23 1546)  Pulse: (!) 113 (11/10/23 1546)  Resp: 16 (11/10/23 1546)  BP: 132/76 (11/10/23 1546)  SpO2: 100 % (11/10/23 1546) Vital Signs (24h Range):  Temp:  [97.8 °F (36.6 °C)-98.7 °F (37.1 °C)] 98 °F (36.7 °C)  Pulse:  [] 113  Resp:  [8-24] 16  SpO2:  [87 %-100 %] 100 %  BP: (103-132)/(57-76) 132/76   Weight: 99.5 kg (219 lb 5.7 oz);  Body mass index is 35.41 kg/m².    Intake/Output Summary (Last 24 hours) at 11/10/2023 1754  Last data filed at 11/10/2023 0629  Gross per 24 hour   Intake 75.63 ml   Output --   Net 75.63 ml      Physical Exam  Vitals and nursing note reviewed.   Constitutional:       Appearance: She is obese.   HENT:      Head: Normocephalic.   Eyes:      Conjunctiva/sclera: Conjunctivae normal.   Cardiovascular:      Rate and Rhythm: Normal rate.   Pulmonary:      Comments: Tachypnea, shallow respiratory effort  Abdominal:      Palpations: Abdomen is soft.   Musculoskeletal:         General: Normal range of motion.      Cervical back: Normal range of motion.   Skin:     General: Skin is warm and dry.   Neurological:      Mental Status: She is alert and oriented to person, place, and time.       Review of Systems: Negative except as indicated in HPI    Significant Labs:  CBC/Anemia Profile:  Recent Labs   Lab 11/09/23  0605 11/10/23  0121 11/10/23  0618 11/10/23  0916 11/10/23  1607 11/10/23  1632   WBC 9.85  --  9.87  --   --   --    HGB 9.7*   < > 9.4* 9.3*  --  10.4*   HCT 31.8*   < > 31.4* 30.3*  --  34.0*     --  340  --   --   --    MCV 99*   --  101*  --   --   --    RDW 16.0*  --  16.5*  --   --   --    OCCULTBLOOD  --   --   --   --  Positive*  --     < > = values in this interval not displayed.   Chemistries:  Recent Labs   Lab 11/09/23  0605 11/10/23  0618    141   K 3.9 4.0    104   CO2 28 27   BUN 7 7   CREATININE 0.8 0.8   CALCIUM 9.1 8.9     Assessment/Plan:   Acute on chronic respiratory failure with hypoxia  Interstitial lung disease  Patient with acute on chronic hypoxic respiratory failure who is on home oxygen at 8 LPM. Supplemental oxygen is being provided and currently on Vapotherm 15/0.60. Signs/symptoms of respiratory failure include increased work of breathing. Contributing diagnoses includes viral pneumonia. Labs and images were reviewed. Patient has recent ABG, which has been reviewed.     Will treat underlying causes and adjust management of respiratory failure as follows-    · Wean VAPOTHERM per PROTOCOL, currently on 15L/0.60  · Continue nocturnal NIPPV/AVAPS on home settings  · Continue scheduled nebs  · Continue oral lasix; monitor I&O trends  · Continue oral prednisone; avoiding iv steroids  · Mobilize as able  · Continue IS and acapella   · Follow chest imaging and ABGs as needed    Pulmonary hypertension due to interstitial lung disease  · Continue supportive care    Influenza A with respiratory manifestations  · Completed Tamiflu course    Actinomyces infection  Dysphonia due to laryngeal ulcers  · Complete treatment plan per ID, on Ceftriaxone       Taylor Mccann NP  Pulmonary and Critical Care  [facility]

## 2023-11-11 NOTE — ASSESSMENT & PLAN NOTE
Patient with acute on chronic hypoxic respiratory failure who is on home oxygen at 8 LPM. Supplemental oxygen is being provided and currently on Vapotherm 15/0.60. Signs/symptoms of respiratory failure include increased work of breathing. Contributing diagnoses includes viral pneumonia. Labs and images were reviewed. Patient has recent ABG, which has been reviewed.     Will treat underlying causes and adjust management of respiratory failure as follows-    · Wean VAPOTHERM per PROTOCOL, currently on 15L/0.60  · Continue nocturnal NIPPV/AVAPS on home settings  · Continue scheduled nebs  · Continue oral lasix; monitor I&O trends  · Continue oral prednisone; avoiding iv steroids  · Mobilize as able  · Continue IS and acapella   · Follow chest imaging and ABGs as needed

## 2023-11-11 NOTE — SUBJECTIVE & OBJECTIVE
Interval History: See hospital course for today      Review of Systems   Constitutional:  Negative for activity change, appetite change, fatigue and fever.   HENT:  Positive for voice change.    Respiratory:  Positive for shortness of breath.    Gastrointestinal:  Positive for diarrhea. Negative for abdominal pain, blood in stool (dark stool), constipation, nausea and vomiting.   Allergic/Immunologic: Positive for immunocompromised state.   Psychiatric/Behavioral:  Positive for dysphoric mood. Negative for agitation, behavioral problems, confusion and decreased concentration. The patient is not nervous/anxious.      Objective:     Vital Signs (Most Recent):  Temp: 96.4 °F (35.8 °C) (11/11/23 0809)  Pulse: 97 (11/11/23 0809)  Resp: 18 (11/11/23 0809)  BP: 107/60 (11/11/23 0809)  SpO2: (!) 92 % (11/11/23 0809) Vital Signs (24h Range):  Temp:  [96.4 °F (35.8 °C)-98.7 °F (37.1 °C)] 96.4 °F (35.8 °C)  Pulse:  [] 97  Resp:  [16-30] 18  SpO2:  [90 %-100 %] 92 %  BP: (100-132)/(60-76) 107/60     Weight: 99.5 kg (219 lb 5.7 oz)  Body mass index is 35.41 kg/m².    Intake/Output Summary (Last 24 hours) at 11/11/2023 0956  Last data filed at 11/11/2023 0628  Gross per 24 hour   Intake 74.81 ml   Output --   Net 74.81 ml         Physical Exam  Vitals and nursing note reviewed. Exam conducted with a chaperone present (visitor).   Constitutional:       General: She is not in acute distress.     Appearance: She is obese. She is ill-appearing. She is not toxic-appearing.      Interventions: Nasal cannula in place.   HENT:      Head: Normocephalic and atraumatic.      Comments: dysphonia  Cardiovascular:      Rate and Rhythm: Normal rate.   Pulmonary:      Effort: Respiratory distress present.      Breath sounds: Decreased air movement present.   Abdominal:      Palpations: Abdomen is soft.   Genitourinary:      Skin:     General: Skin is warm.   Neurological:      Mental Status: She is alert and oriented to person, place, and  time.   Psychiatric:         Behavior: Behavior is cooperative.             Significant Labs: All pertinent labs within the past 24 hours have been reviewed.  CBC:   Recent Labs   Lab 11/10/23  0618 11/10/23  0916 11/10/23  1632 11/11/23  0610   WBC 9.87  --   --  10.26   HGB 9.4* 9.3* 10.4* 9.7*   HCT 31.4* 30.3* 34.0* 31.8*     --   --  360     CMP:   Recent Labs   Lab 11/10/23  0618 11/11/23  0610    142   K 4.0 4.1    105   CO2 27 27   GLU 80 87   BUN 7 7   CREATININE 0.8 0.8   CALCIUM 8.9 8.7   ANIONGAP 10 10       Significant Imaging: I have reviewed all pertinent imaging results/findings within the past 24 hours.

## 2023-11-11 NOTE — PLAN OF CARE
A203/A203 KAMALJIT Alicia is a 53 y.o.female admitted on 11/1/2023 for Acute on chronic respiratory failure with hypoxia   Code Status: Full Code MRN: [id number]   Review of patient's allergies indicates:   Allergen Reactions    Doxycycline Nausea Only     Other reaction(s): Nausea    Fluticasone Other (See Comments)     Other reaction(s): Epistaxis       Past Medical History:   Diagnosis Date    Abnormal Pap smear of cervix     in the past with repeat pap smear okay.    Acute interstitial pneumonitis     Allergic rhinitis, cause unspecified     Arthritis of both knees     Asthma     Eczema     Fatty liver 10/2014    Fibrocystic breast changes     Headache(784.0)     Hepatomegaly 10/2014    Hypertension     Liver cyst 10/2014    Multinodular goiter     Followed by ENT - Dr. Nicolas Gray    Polymenorrhea 2008    TMJ (dislocation of temporomandibular joint)     Uterine fibroid     in the past    Vitamin D deficiency disease       PRN meds    acetaminophen, 650 mg, Q4H PRN  aluminum-magnesium hydroxide-simethicone, 30 mL, QID PRN  benzonatate, 100 mg, TID PRN  calcium carbonate, 1,000 mg, TID PRN  dextrose 10%, 12.5 g, PRN  dextrose 10%, 25 g, PRN  glucagon (human recombinant), 1 mg, PRN  glucose, 16 g, PRN  glucose, 24 g, PRN  HYDROcodone-acetaminophen, 1 tablet, Q6H PRN  melatonin, 6 mg, Nightly PRN  naloxone, 0.02 mg, PRN  ondansetron, 4 mg, Q8H PRN  prochlorperazine, 5 mg, Q6H PRN  promethazine-codeine 6.25-10 mg/5 ml, 5 mL, Q4H PRN  sodium chloride 0.9%, 10 mL, Q12H PRN      Medications given as ordered. Lovenox for VTE. Awaiting placement. Chart check completed. Will continue plan of care.      Orientation: oriented x 4  [place] Coma Scale Score: 15     Lead Monitored: Lead II Rhythm: sinus tachycardia Frequency/Ectopy: PACs  Cardiac/Telemetry Box Number: [id number]  VTE Required Core Measure: Pharmacological prophylaxis initiated/maintained Last Bowel Movement: 11/11/23  Diet Adult Regular (IDDSI Level 7)  Voiding  Characteristics: external catheter  Curt Score: 21  Fall Risk Score: 8  Accucheck []   Freq?      Lines/Drains/Airways       Peripherally Inserted Central Catheter Line  Duration             PICC Double Lumen 10/13/23 1627 right basilic 29 days

## 2023-11-11 NOTE — ASSESSMENT & PLAN NOTE
Chronic, controlled. Latest blood pressure and vitals reviewed-     Temp:  [96.4 °F (35.8 °C)-98.7 °F (37.1 °C)]   Pulse:  []   Resp:  [16-30]   BP: (100-132)/(60-76)   SpO2:  [90 %-100 %] .   Home meds for hypertension were reviewed and noted below.   Hypertension Medications             furosemide (LASIX) 40 MG tablet Take 1 tablet (40 mg total) by mouth once daily.          While in the hospital, will manage blood pressure as follows; Continue home antihypertensive regimen    Will utilize p.r.n. blood pressure medication only if patient's blood pressure greater than 180/110 and she develops symptoms such as worsening chest pain or shortness of breath.

## 2023-11-11 NOTE — PROGRESS NOTES
[facility] - Telemetry ([facility])  [facility] Medicine  Progress Note    Patient Name: Ayanna Alicia  MRN: [id number]  Patient Class: IP- Inpatient   Admission Date: 2023  Length of Stay: 10 days  Attending Physician: Som Cabello MD  Primary Care Provider: Madeleine Enrique MD        Subjective:     Principal Problem:Acute on chronic respiratory failure with hypoxia        HPI:  53-year-old white female with a history of pulmonary fibrosis, pulmonary HTN, RA, Actinomyces infection, COPD, GLENN. Presented with worsening shortness of breath over the past 48 hours.  The patient was in the hospital 2 weeks ago and since has been weaning off of steroids.  She is oxygen dependent at home usually at 8 liters/minute.  She was progressive shortness of breath with orthopnea.  There is no chest pain or pedal edema.  She denies any fevers or chills though now has a productive cough.  Patient was compliant with her medications.  In the ED, vitals: 155/93, 130, 16, 99.1, 54% RA. Placed on vapotherm in ED. Labs: WBC: 18.29, A, Lactic: 2.6, Trop; 0.029, +FluA, CXR: unchanged. EKG: Neg for STEMI. Treated with IV fluids, Steroid, Nebs. Vapotherm set to 20L @ 90% FiO2. Patient is a full code. Admitted to Hospital Medicine for management of Acute on Chronic Resp Failure, Influenza A.       Overview/Hospital Course:  : pt currently on vapotherm. Cont duonebs, budesonide, and brovana nebs. Wean O2 as tolerated. Cont tamiflu. Will order procal and d dimer.   11/3: CTA negative for PE. Cont current management. Wean O2 as tolerated. Pulm on case.   : cont current management, requiring increased vapotherm.   : vapotherm being weaned down. Cont current management.   : cont supportive care. Currently on vapotherm 20L 50% fio2.   : pt continues to require vapotherm, will start LTAC placement process for O2 weaning. Cont supportive care. Pt reports feeling swelling in her neck, will obtain CT scan.   : LTAC  placement pending. Still on 20L 50%fio2. CT scan of neck did not reveal abscess or obstruction. Pt with hx of multinodular goiter. Will obtain tsh and anti-tpo. Outpatient f/u with surgery to discuss thyroidectomy given symptoms.   11/9: weaned down to 15L 50% fio2. TSH and anti tpo normal. Thyroid u/s reviewed, will need outpatient FNA. LTAC placement pending however pt may improve quickly and not require it. Pt reports dark stools however she was started on iron tablets. Will trend h/h and consider consulting GI.   11/10: FOBT pending. Cont to trend h/h. Pt still on 15L, LTAC accepted awaiting medical stability. Consult GI if FOBT positive and h/h continues to trend down. Increase PPI to bid.   11/11 fobt positive. Hb/hct stable. Cscope performed in 2020 with normal pathology. Continue fe supplement. Follow up outpatient gastroenterology but will need pulmonology clearance for procedure. Continue proton pump inhibitor as chronically on steroids. Awaiting ltac placement      Interval History: See hospital course for today      Review of Systems   Constitutional:  Negative for activity change, appetite change, fatigue and fever.   HENT:  Positive for voice change.    Respiratory:  Positive for shortness of breath.    Gastrointestinal:  Positive for diarrhea. Negative for abdominal pain, blood in stool (dark stool), constipation, nausea and vomiting.   Allergic/Immunologic: Positive for immunocompromised state.   Psychiatric/Behavioral:  Positive for dysphoric mood. Negative for agitation, behavioral problems, confusion and decreased concentration. The patient is not nervous/anxious.      Objective:     Vital Signs (Most Recent):  Temp: 96.4 °F (35.8 °C) (11/11/23 0809)  Pulse: 97 (11/11/23 0809)  Resp: 18 (11/11/23 0809)  BP: 107/60 (11/11/23 0809)  SpO2: (!) 92 % (11/11/23 0809) Vital Signs (24h Range):  Temp:  [96.4 °F (35.8 °C)-98.7 °F (37.1 °C)] 96.4 °F (35.8 °C)  Pulse:  [] 97  Resp:  [16-30] 18  SpO2:   [90 %-100 %] 92 %  BP: (100-132)/(60-76) 107/60     Weight: 99.5 kg (219 lb 5.7 oz)  Body mass index is 35.41 kg/m².    Intake/Output Summary (Last 24 hours) at 11/11/2023 0956  Last data filed at 11/11/2023 0628  Gross per 24 hour   Intake 74.81 ml   Output --   Net 74.81 ml         Physical Exam  Vitals and nursing note reviewed. Exam conducted with a chaperone present (visitor).   Constitutional:       General: She is not in acute distress.     Appearance: She is obese. She is ill-appearing. She is not toxic-appearing.      Interventions: Nasal cannula in place.   HENT:      Head: Normocephalic and atraumatic.      Comments: dysphonia  Cardiovascular:      Rate and Rhythm: Normal rate.   Pulmonary:      Effort: Respiratory distress present.      Breath sounds: Decreased air movement present.   Abdominal:      Palpations: Abdomen is soft.   Genitourinary:      Skin:     General: Skin is warm.   Neurological:      Mental Status: She is alert and oriented to person, place, and time.   Psychiatric:         Behavior: Behavior is cooperative.             Significant Labs: All pertinent labs within the past 24 hours have been reviewed.  CBC:   Recent Labs   Lab 11/10/23  0618 11/10/23  0916 11/10/23  1632 11/11/23  0610   WBC 9.87  --   --  10.26   HGB 9.4* 9.3* 10.4* 9.7*   HCT 31.4* 30.3* 34.0* 31.8*     --   --  360     CMP:   Recent Labs   Lab 11/10/23  0618 11/11/23  0610    142   K 4.0 4.1    105   CO2 27 27   GLU 80 87   BUN 7 7   CREATININE 0.8 0.8   CALCIUM 8.9 8.7   ANIONGAP 10 10       Significant Imaging: I have reviewed all pertinent imaging results/findings within the past 24 hours.      Assessment/Plan:      * Acute on chronic respiratory failure with hypoxia  Patient with Hypoxic Respiratory failure which is Acute on chronic.  she is on home oxygen at 8 LPM. Supplemental oxygen was provided and noted- Oxygen Concentration (%):  [50-60] 50    .   Signs/symptoms of respiratory failure  include- tachypnea, increased work of breathing and respiratory distress. Contributing diagnoses includes - CHF and Interstitial lung disease Labs and images were reviewed. Patient Has recent ABG, which has been reviewed. Will treat underlying causes and adjust management of respiratory failure as follows-     --Vapotherm  --Consult Pulm  --Cont home meds where possible  --JESSI  --Bipap @ HS  --Avoid steroid    Cont duonebs   brovana budesonide nebs  Resume home lasix   Wean o2 as tolerated  D dimer elevated  CTA neg for PE  Currently on vapotherm 15L 60% fio2   awaiting LTAC placement process for O2 weaning         Anemia  PANCHO noted  Ferrous sulfate started  Hb/hct stable  Continue PPI   FOBT positive   cscope reviewed, benign pathology in 2020  Referral placed on discharge for gastroenterology   Will need pulmonology clearance prior to gastroenterology procedure    Influenza A with respiratory manifestations  +FluA on admssion    --Airborne/droplet isolation  --Tamiflu    Due to severity of illness  Completed tamiflu course      Actinomyces infection  Followed by ID as OP    --cont Ceftriaxone  --PICC line care       Pulmonary hypertension due to interstitial lung disease  Follow up outpatient       Dysphonia due to laryngeal ulcers  --avoid steroids per Pulmonology due to actinomyces infection  --throat spray prn      Interstitial lung disease  Managed with OF[facility] as OP, followed by Pulmonology as OP    --Cont home regimen, request family to bring meds not available on formulary      Multinodular goiter  Thyroid U/S reviewed  Outpatient referral placed to IR for FNA   Outpatient referral placed to endocrinology for   Radioiodine therapy evaluation  Pt poor surgical candidate given lung issues  She complains of fullness in her neck       HTN (hypertension)  Chronic, controlled. Latest blood pressure and vitals reviewed-     Temp:  [96.4 °F (35.8 °C)-98.7 °F (37.1 °C)]   Pulse:  []   Resp:  [16-30]   BP:  (100-132)/(60-76)   SpO2:  [90 %-100 %] .   Home meds for hypertension were reviewed and noted below.   Hypertension Medications             furosemide (LASIX) 40 MG tablet Take 1 tablet (40 mg total) by mouth once daily.          While in the hospital, will manage blood pressure as follows; Continue home antihypertensive regimen    Will utilize p.r.n. blood pressure medication only if patient's blood pressure greater than 180/110 and she develops symptoms such as worsening chest pain or shortness of breath.    VTE Risk Mitigation (From admission, onward)         Ordered     enoxaparin injection 40 mg  Daily         11/01/23 1507     IP VTE HIGH RISK PATIENT  Once         11/01/23 1507     Place sequential compression device  Until discontinued         11/01/23 1507                Discharge Planning   JANETTE:      Code Status: Full Code   Is the patient medically ready for discharge?:     Reason for patient still in hospital (select all that apply): Pending disposition  Discharge Plan A: Long-term acute care facility (LTAC)   Discharge Delays: None known at this time              Som Cabello MD  Department of Hospital Medicine   [facility] - Telemetry ([facility])

## 2023-11-11 NOTE — ASSESSMENT & PLAN NOTE
+FluA on admssion    --Airborne/droplet isolation  --Tamiflu    Due to severity of illness  Completed tamiflu course

## 2023-11-12 PROBLEM — R50.9 FEVER: Status: ACTIVE | Noted: 2023-11-12

## 2023-11-12 PROBLEM — R19.7 DIARRHEA: Status: ACTIVE | Noted: 2023-11-12

## 2023-11-12 LAB
ANION GAP SERPL CALC-SCNC: 11 MMOL/L (ref 8–16)
BASOPHILS # BLD AUTO: 0.06 K/UL (ref 0–0.2)
BASOPHILS NFR BLD: 0.4 % (ref 0–1.9)
BUN SERPL-MCNC: 7 MG/DL (ref 6–20)
CALCIUM SERPL-MCNC: 8.8 MG/DL (ref 8.7–10.5)
CHLORIDE SERPL-SCNC: 103 MMOL/L (ref 95–110)
CO2 SERPL-SCNC: 27 MMOL/L (ref 23–29)
CREAT SERPL-MCNC: 0.8 MG/DL (ref 0.5–1.4)
DIFFERENTIAL METHOD: ABNORMAL
EOSINOPHIL # BLD AUTO: 0.6 K/UL (ref 0–0.5)
EOSINOPHIL NFR BLD: 4.4 % (ref 0–8)
ERYTHROCYTE [DISTWIDTH] IN BLOOD BY AUTOMATED COUNT: 16.5 % (ref 11.5–14.5)
EST. GFR  (NO RACE VARIABLE): >60 ML/MIN/1.73 M^2
GLUCOSE SERPL-MCNC: 94 MG/DL (ref 70–110)
HCT VFR BLD AUTO: 32.7 % (ref 37–48.5)
HGB BLD-MCNC: 9.9 G/DL (ref 12–16)
IMM GRANULOCYTES # BLD AUTO: 0.23 K/UL (ref 0–0.04)
IMM GRANULOCYTES NFR BLD AUTO: 1.7 % (ref 0–0.5)
LYMPHOCYTES # BLD AUTO: 1.3 K/UL (ref 1–4.8)
LYMPHOCYTES NFR BLD: 9.4 % (ref 18–48)
MCH RBC QN AUTO: 30.5 PG (ref 27–31)
MCHC RBC AUTO-ENTMCNC: 30.3 G/DL (ref 32–36)
MCV RBC AUTO: 101 FL (ref 82–98)
MONOCYTES # BLD AUTO: 0.6 K/UL (ref 0.3–1)
MONOCYTES NFR BLD: 4.3 % (ref 4–15)
NEUTROPHILS # BLD AUTO: 11 K/UL (ref 1.8–7.7)
NEUTROPHILS NFR BLD: 79.8 % (ref 38–73)
NRBC BLD-RTO: 0 /100 WBC
PLATELET # BLD AUTO: 394 K/UL (ref 150–450)
PMV BLD AUTO: 9.3 FL (ref 9.2–12.9)
POTASSIUM SERPL-SCNC: 3.9 MMOL/L (ref 3.5–5.1)
RBC # BLD AUTO: 3.25 M/UL (ref 4–5.4)
RV AG STL QL IA.RAPID: NEGATIVE
SODIUM SERPL-SCNC: 141 MMOL/L (ref 136–145)
WBC # BLD AUTO: 13.77 K/UL (ref 3.9–12.7)

## 2023-11-12 PROCEDURE — 25000003 PHARM REV CODE 250: Performed by: NURSE PRACTITIONER

## 2023-11-12 PROCEDURE — 36415 COLL VENOUS BLD VENIPUNCTURE: CPT | Performed by: FAMILY MEDICINE

## 2023-11-12 PROCEDURE — 87040 BLOOD CULTURE FOR BACTERIA: CPT | Performed by: FAMILY MEDICINE

## 2023-11-12 PROCEDURE — 87209 SMEAR COMPLEX STAIN: CPT | Performed by: FAMILY MEDICINE

## 2023-11-12 PROCEDURE — 99222 PR INITIAL HOSPITAL CARE,LEVL II: ICD-10-PCS | Mod: ,,, | Performed by: STUDENT IN AN ORGANIZED HEALTH CARE EDUCATION/TRAINING PROGRAM

## 2023-11-12 PROCEDURE — 85025 COMPLETE CBC W/AUTO DIFF WBC: CPT | Performed by: FAMILY MEDICINE

## 2023-11-12 PROCEDURE — 87425 ROTAVIRUS AG IA: CPT | Performed by: FAMILY MEDICINE

## 2023-11-12 PROCEDURE — 87427 SHIGA-LIKE TOXIN AG IA: CPT | Mod: 59 | Performed by: FAMILY MEDICINE

## 2023-11-12 PROCEDURE — 27000249 HC VAPOTHERM CIRCUIT

## 2023-11-12 PROCEDURE — 87045 FECES CULTURE AEROBIC BACT: CPT | Performed by: FAMILY MEDICINE

## 2023-11-12 PROCEDURE — 25000242 PHARM REV CODE 250 ALT 637 W/ HCPCS: Performed by: NURSE PRACTITIONER

## 2023-11-12 PROCEDURE — 25000242 PHARM REV CODE 250 ALT 637 W/ HCPCS: Performed by: FAMILY MEDICINE

## 2023-11-12 PROCEDURE — 80048 BASIC METABOLIC PNL TOTAL CA: CPT | Performed by: FAMILY MEDICINE

## 2023-11-12 PROCEDURE — 94640 AIRWAY INHALATION TREATMENT: CPT

## 2023-11-12 PROCEDURE — 87329 GIARDIA AG IA: CPT | Performed by: FAMILY MEDICINE

## 2023-11-12 PROCEDURE — 21400001 HC TELEMETRY ROOM

## 2023-11-12 PROCEDURE — 94761 N-INVAS EAR/PLS OXIMETRY MLT: CPT

## 2023-11-12 PROCEDURE — 99900035 HC TECH TIME PER 15 MIN (STAT)

## 2023-11-12 PROCEDURE — 87449 NOS EACH ORGANISM AG IA: CPT | Performed by: FAMILY MEDICINE

## 2023-11-12 PROCEDURE — 87046 STOOL CULTR AEROBIC BACT EA: CPT | Performed by: FAMILY MEDICINE

## 2023-11-12 PROCEDURE — 94799 UNLISTED PULMONARY SVC/PX: CPT

## 2023-11-12 PROCEDURE — 63600175 PHARM REV CODE 636 W HCPCS: Performed by: NURSE PRACTITIONER

## 2023-11-12 PROCEDURE — 25000003 PHARM REV CODE 250: Performed by: FAMILY MEDICINE

## 2023-11-12 PROCEDURE — 27100171 HC OXYGEN HIGH FLOW UP TO 24 HOURS

## 2023-11-12 PROCEDURE — 27000207 HC ISOLATION

## 2023-11-12 PROCEDURE — 99222 1ST HOSP IP/OBS MODERATE 55: CPT | Mod: ,,, | Performed by: STUDENT IN AN ORGANIZED HEALTH CARE EDUCATION/TRAINING PROGRAM

## 2023-11-12 RX ORDER — PANTOPRAZOLE SODIUM 40 MG/1
40 TABLET, DELAYED RELEASE ORAL DAILY
Status: DISCONTINUED | OUTPATIENT
Start: 2023-11-12 | End: 2023-11-14 | Stop reason: HOSPADM

## 2023-11-12 RX ADMIN — DOCUSATE SODIUM 100 MG: 100 CAPSULE, LIQUID FILLED ORAL at 09:11

## 2023-11-12 RX ADMIN — PROMETHAZINE HYDROCHLORIDE AND CODEINE PHOSPHATE 5 ML: 6.25; 1 SOLUTION ORAL at 05:11

## 2023-11-12 RX ADMIN — ARFORMOTEROL TARTRATE 15 MCG: 15 SOLUTION RESPIRATORY (INHALATION) at 07:11

## 2023-11-12 RX ADMIN — ENOXAPARIN SODIUM 40 MG: 40 INJECTION SUBCUTANEOUS at 04:11

## 2023-11-12 RX ADMIN — BENZONATATE 100 MG: 100 CAPSULE ORAL at 10:11

## 2023-11-12 RX ADMIN — PREDNISONE 10 MG: 10 TABLET ORAL at 09:11

## 2023-11-12 RX ADMIN — BENZONATATE 100 MG: 100 CAPSULE ORAL at 05:11

## 2023-11-12 RX ADMIN — FERROUS SULFATE TAB 325 MG (65 MG ELEMENTAL FE) 1 EACH: 325 (65 FE) TAB at 09:11

## 2023-11-12 RX ADMIN — BUDESONIDE 0.5 MG: 0.5 INHALANT ORAL at 07:11

## 2023-11-12 RX ADMIN — NINTEDANIB 150 MG: 150 CAPSULE ORAL at 09:11

## 2023-11-12 RX ADMIN — HYDROCODONE BITARTRATE AND ACETAMINOPHEN 1 TABLET: 5; 325 TABLET ORAL at 12:11

## 2023-11-12 RX ADMIN — FUROSEMIDE 40 MG: 40 TABLET ORAL at 09:11

## 2023-11-12 RX ADMIN — PANTOPRAZOLE SODIUM 40 MG: 40 TABLET, DELAYED RELEASE ORAL at 09:11

## 2023-11-12 RX ADMIN — MONTELUKAST 10 MG: 10 TABLET, FILM COATED ORAL at 09:11

## 2023-11-12 RX ADMIN — IPRATROPIUM BROMIDE AND ALBUTEROL SULFATE 3 ML: 2.5; .5 SOLUTION RESPIRATORY (INHALATION) at 12:11

## 2023-11-12 RX ADMIN — PROMETHAZINE HYDROCHLORIDE AND CODEINE PHOSPHATE 5 ML: 6.25; 1 SOLUTION ORAL at 10:11

## 2023-11-12 RX ADMIN — PROMETHAZINE HYDROCHLORIDE AND CODEINE PHOSPHATE 5 ML: 6.25; 1 SOLUTION ORAL at 09:11

## 2023-11-12 RX ADMIN — SODIUM CHLORIDE 500 ML: 9 INJECTION, SOLUTION INTRAVENOUS at 09:11

## 2023-11-12 RX ADMIN — IPRATROPIUM BROMIDE AND ALBUTEROL SULFATE 3 ML: 2.5; .5 SOLUTION RESPIRATORY (INHALATION) at 07:11

## 2023-11-12 RX ADMIN — Medication 400 MG: at 09:11

## 2023-11-12 RX ADMIN — BENZONATATE 100 MG: 100 CAPSULE ORAL at 09:11

## 2023-11-12 NOTE — ASSESSMENT & PLAN NOTE
Patient with Hypoxic Respiratory failure which is Acute on chronic.  she is on home oxygen at 8 LPM. Supplemental oxygen was provided and noted- Oxygen Concentration (%):  [60] 60    .   Signs/symptoms of respiratory failure include- tachypnea, increased work of breathing and respiratory distress. Contributing diagnoses includes - CHF and Interstitial lung disease Labs and images were reviewed. Patient Has recent ABG, which has been reviewed. Will treat underlying causes and adjust management of respiratory failure as follows-     --Vapotherm  --Consult Pulm  --Cont home meds where possible  --JESSI  --Bipap @ HS  --Avoid steroid    Cont duonebs   brovana budesonide nebs  Resume home lasix   Wean o2 as tolerated  D dimer elevated  CTA neg for PE  Currently on vapotherm 15L 60% fio2   awaiting LTAC placement process for O2 weaning

## 2023-11-12 NOTE — SUBJECTIVE & OBJECTIVE
Interval History: See hospital course for today      Review of Systems   Constitutional:  Positive for diaphoresis and fever. Negative for activity change and appetite change.   HENT:  Positive for voice change. Negative for ear pain (ear fullness).    Respiratory:  Positive for shortness of breath (w exertion).    Gastrointestinal:  Positive for abdominal pain (cramping) and diarrhea. Negative for nausea and vomiting.   Neurological:  Positive for weakness and headaches.   Psychiatric/Behavioral:  Positive for dysphoric mood. Negative for agitation, behavioral problems, confusion and decreased concentration.      Objective:     Vital Signs (Most Recent):  Temp: (!) 101.5 °F (38.6 °C) (11/12/23 0754)  Pulse: 106 (11/12/23 0754)  Resp: (!) 30 (11/12/23 0754)  BP: 116/72 (11/12/23 0754)  SpO2: (!) 88 % (11/12/23 0754) Vital Signs (24h Range):  Temp:  [97.8 °F (36.6 °C)-101.5 °F (38.6 °C)] 101.5 °F (38.6 °C)  Pulse:  [] 106  Resp:  [16-30] 30  SpO2:  [88 %-97 %] 88 %  BP: (103-135)/(59-83) 116/72     Weight: 99.5 kg (219 lb 5.7 oz)  Body mass index is 35.41 kg/m².    Intake/Output Summary (Last 24 hours) at 11/12/2023 0841  Last data filed at 11/11/2023 1750  Gross per 24 hour   Intake 96.88 ml   Output --   Net 96.88 ml         Physical Exam  Vitals and nursing note reviewed.   Constitutional:       General: She is not in acute distress.     Appearance: She is ill-appearing and diaphoretic. She is not toxic-appearing.      Interventions: Nasal cannula in place.   HENT:      Head: Normocephalic and atraumatic.   Cardiovascular:      Rate and Rhythm: Tachycardia present.   Pulmonary:      Effort: Pulmonary effort is normal. Tachypnea present. No respiratory distress.      Breath sounds: Examination of the right-middle field reveals rales. Examination of the left-middle field reveals rales. Examination of the right-lower field reveals rales. Examination of the left-lower field reveals rales. Rales present.    Abdominal:      Palpations: Abdomen is soft.      Tenderness: There is no abdominal tenderness.   Musculoskeletal:      Right lower leg: No edema.      Left lower leg: No edema.   Skin:     General: Skin is warm.   Neurological:      Mental Status: She is alert and oriented to person, place, and time.      Motor: Weakness present.   Psychiatric:         Behavior: Behavior is cooperative.             Significant Labs: All pertinent labs within the past 24 hours have been reviewed.  CBC:   Recent Labs   Lab 11/10/23  1632 11/11/23  0610 11/12/23  0624   WBC  --  10.26 13.77*   HGB 10.4* 9.7* 9.9*   HCT 34.0* 31.8* 32.7*   PLT  --  360 394     CMP:   Recent Labs   Lab 11/11/23  0610 11/12/23  0624    141   K 4.1 3.9    103   CO2 27 27   GLU 87 94   BUN 7 7   CREATININE 0.8 0.8   CALCIUM 8.7 8.8   ANIONGAP 10 11       Significant Imaging: I have reviewed all pertinent imaging results/findings within the past 24 hours.

## 2023-11-12 NOTE — CONSULTS
[facility] - Tele[facility] ([facility])  Infectious Disease  Consult Note    Patient Name: Ayanna Alicia  MRN: [id number]  Admission Date: 11/1/2023  Hospital Length of Stay: 11 days  Attending Physician: Som Cabello MD  Primary Care Provider: Madeleine Enrique MD     Isolation Status: Special Contact    Patient information was obtained from patient, ER records and primary team.      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 Has completed course of micafungin and metronidazole for Prevotella and C glabrata isolates. Completing 6 week course of ceftriaxone for the Actino. Anticipated stop date is 11/22. However, patient has developed fever plus diarrhea. Awaiting stool studies. Holding ceftriaxone due to C diff risk. Will plan to transition to PO amoxicillin 500 mg TID for at least 2 months. If patient tolerates may extend longer.     GI  Diarrhea  Patient has developed diarrhea. Only one bowel movement today. Stool studies in process. Will check for C diff as beta lactams known to predispose to C diff regardless of how long patient has been admitted. Holding ceftriaxone for today.     Other  Fever  Fever to 101.5 this morning. Unclear source given patient's prolonged hospital stay. Has not had productive cough. Has remained on Vapotherm which may be related to worsening ILD. CT soft tissue neck on 11/7 with abscess or infection involving the neck. Interval development of hazy ground-glass density involving the upper lobes and interstitial thickening. Findings are nonspecific but could be related to atypical infection or pneumonia. Patient has developed diarrhea while on ceftriaxone. Holding for now.     Recommendations:  --Follow stool studies  --Unclear if this is infectious diarrhea; holding ceftriaxone for now  --Consider CT chest/abd/pelvis with contrast to look for pneumonia and intraabdominal source for diarrhea   --Sputum culture if able to collect    --Would add empiric Staph/atypical pneumonia coverage with PO doxycycline 100 mg BID for now   --Follow blood cultures         Thank you for your consult. I will follow-up with patient. Please contact us if you have any additional questions.    Sarabjit Wayne, DO  Infectious Disease  [facility] (Hospital)    Subjective:     Principal Problem: Acute on chronic respiratory failure with hypoxia    HPI: This is a 53-year-old F with a PMH of Sjogren's syndrome, ILD, RA, chronic hypoxic respiratory failure on 6-8L/NC, asthma, GLENN (CPAP), possible Lung Tx candidate, who presented to ER last month for evaluation of SOB which started gradually over a few days.  Patient was seen by ENT on 10/06 and underwent a scope which noted a vocal cords covered and exudate that was sent for culture that grew both Actinomyces and prevotella.  Patient febrile that admission to 101.6 F. Then afebrile. Was discharged on 6 week course of IV ceftriaxone. Now here since 11/1 for worsening shortness of breath. Found to be positive for influenza A. Has completed course of Tamiflu. However, fever to 101.5 this morning. Patient also reports loose watery stools. Stool studies in process. Ceftriaxone being held. ID consulted for fever.       Past Medical History:   Diagnosis Date    Abnormal Pap smear of cervix     in the past with repeat pap smear okay.    Acute interstitial pneumonitis     Allergic rhinitis, cause unspecified     Arthritis of both knees     Asthma     Eczema     Fatty liver 10/2014    Fibrocystic breast changes     Headache(784.0)     Hepatomegaly 10/2014    Hypertension     Liver cyst 10/2014    Multinodular goiter     Followed by ENT - Dr. Nicolas Gray    Polymenorrhea 2008    TMJ (dislocation of temporomandibular joint)     Uterine fibroid     in the past    Vitamin D deficiency disease        Past Surgical History:   Procedure Laterality Date    BRONCHOSCOPY Bilateral 4/5/2023    Procedure: Bronchoscopy - insert  lighted tube into airway to take a biopsy of lung;  Surgeon: Agustín Aviles MD;  Location: [facility];  Service: Pulmonary;  Laterality: Bilateral;     SECTION, CLASSIC      x 1    COLONOSCOPY N/A 2020    Procedure: COLONOSCOPY;  Surgeon: Angie Magana MD;  Location: [facility];  Service: Endoscopy;  Laterality: N/A;    HYSTERECTOMY      MULTIPLE TOOTH EXTRACTIONS      PARTIAL HYSTERECTOMY  2013    Due to fibroids       Review of patient's allergies indicates:   Allergen Reactions    Doxycycline Nausea Only     Other reaction(s): Nausea    Fluticasone Other (See Comments)     Other reaction(s): Epistaxis         Medications:  Facility-Administered Medications Prior to Admission   Medication    0.9%  NaCl infusion     Medications Prior to Admission   Medication Sig    acetaminophen (TYLENOL) 500 MG tablet Take 500 mg by mouth every 6 (six) hours as needed for Pain. Only use when needed    acidophilus-pectin, citrus 100 million cell-10 mg Cap Take 1 capsule by mouth once daily.    albuterol (PROVENTIL/VENTOLIN HFA) 90 mcg/actuation inhaler INHALE 1 TO 2 PUFFS BY MOUTH INTO THE LUNGS EVERY 4 TO 6 HOURS AS NEEDED FOR WHEEZING OR SHORTNESS OF BREATH    docusate sodium (COLACE) 100 MG capsule Take 100 mg by mouth 2 (two) times daily.    fluticasone (VERAMYST) 27.5 mcg/actuation nasal spray 2 sprays by Nasal route once daily.    furosemide (LASIX) 40 MG tablet Take 1 tablet (40 mg total) by mouth once daily.    ibuprofen (ADVIL,MOTRIN) 200 MG tablet Take 200 mg by mouth every 6 (six) hours as needed for Pain.    levocetirizine (XYZAL) 5 MG tablet TAKE 1 TABLET(5 MG) BY MOUTH EVERY DAY    magnesium oxide (MAG-OX) 400 mg (241.3 mg magnesium) tablet Take 1 tablet (400 mg total) by mouth once daily.    montelukast (SINGULAIR) 10 mg tablet Take 1 tablet (10 mg total) by mouth every evening.    mv-minerals/FA/omega 3,6,9 #3 (WOMEN'S 50+ ADVANCED ORAL) Take 1 tablet by mouth Daily.    nintedanib (OFEV)  150 mg Cap Take 1 capsule (150 mg total) by mouth 2 (two) times a day.    omega-3s/dha/epa/fish oil/D3 (VITAMIN-D + OMEGA-3 ORAL) Take 2 tablets by mouth once daily at 6am.    omeprazole (PRILOSEC) 20 MG capsule Take 1 capsule (20 mg total) by mouth once daily.    potassium chloride SA (K-DUR,KLOR-CON) 20 MEQ tablet Take 1 tablet (20 mEq total) by mouth once daily.    predniSONE (DELTASONE) 10 MG tablet Take 1 tablet (10 mg total) by mouth once daily.    SEREVENT DISKUS 50 mcg/dose diskus inhaler Inhale 1 puff into the lungs 2 (two) times daily. Controller    vitamin D (VITAMIN D3) 1000 units Tab Take 1,000 Units by mouth once daily.    benzonatate (TESSALON) 200 MG capsule Take 200 mg by mouth 3 (three) times daily as needed for Cough.    dextrose 5 % in water (D5W) PgBk 100 mL with cefTRIAXone 2 gram SolR 2 g Inject 2 g into the vein once daily.    pantoprazole (PROTONIX) 40 MG tablet Take 1 tablet (40 mg total) by mouth once daily. (Patient not taking: Reported on 2023)    revefenacin 175 mcg/3 mL Nebu Inhale 175 mcg into the lungs once daily. (Patient taking differently: Inhale 175 mcg into the lungs once daily. Patient has not received medication yet)    [] treprostiniL (TYVASO DPI) 16(112)-32(112) -48(28) mcg CtDv Inhale 16 mcg into the lungs 4 (four) times daily. (Patient taking differently: Inhale 16 mcg into the lungs 4 (four) times daily. Patient has not received medication  yet)     Antibiotics (From admission, onward)      Start     Stop Route Frequency Ordered    23 1045  mupirocin 2 % ointment         23 0859 Nasl 2 times daily 23 0934          Antifungals (From admission, onward)      None          Antivirals (From admission, onward)      None             Immunization History   Administered Date(s) Administered    COVID-19 Vaccine 2021, 10/14/2021    COVID-19, MRNA, LN-S, PF (MODERNA FULL 0.5 ML DOSE) 2021, 10/14/2021    COVID-19, mRNA, LNP-S, bivalent  booster, PF (Moderna Omicron)12 + YEARS 08/11/2023    Influenza 10/13/2010    Influenza (FLUAD) - Quadrivalent - Adjuvanted - PF *Preferred* (65+) 11/01/2022    Influenza - Quadrivalent 10/02/2014, 01/12/2016, 02/01/2017    Influenza - Quadrivalent - PF *Preferred* (6 months and older) 09/22/2020, 10/14/2021    Influenza - Trivalent (ADULT) 10/13/2010    Pneumococcal Conjugate - 20 Valent 11/01/2022    Tdap 03/23/2012       Family History       Problem Relation (Age of Onset)    Cancer Maternal Grandmother (60), Maternal Aunt (50), Maternal Aunt (66)    Cataracts Cousin    Diabetes Maternal Grandfather    Diverticulitis Mother    Heart disease Mother, Father (63)    Hypertension Father    Migraines Cousin    No Known Problems Brother    Peripheral vascular disease Maternal Grandfather    Stroke Mother, Sister, Maternal Grandfather          Social History     Socioeconomic History    Marital status: Single    Number of children: 0   Occupational History    Occupation:      Employer: [employer]     Comment: [employer]   Tobacco Use    Smoking status: Never     Passive exposure: Past    Smokeless tobacco: Never   Substance and Sexual Activity    Alcohol use: Not Currently     Comment: last use 03/2022    Drug use: Not Currently     Frequency: 4.0 times per week     Types: Marijuana     Comment: last year was last use 03/2022    Sexual activity: Yes     Partners: Female   Social History Narrative    She wears seatbelt. Her son was killed in 2010.     Social Determinants of Health     Financial Resource Strain: Low Risk  (4/2/2020)    Overall Financial Resource Strain (CARDIA)     Difficulty of Paying Living Expenses: Not very hard   Food Insecurity: No Food Insecurity (4/2/2020)    Hunger Vital Sign     Worried About Running Out of Food in the Last Year: Never true     Ran Out of Food in the Last Year: Never true   Transportation Needs: No Transportation Needs (4/2/2020)    PRAPARE - Transportation      Lack of Transportation (Medical): No     Lack of Transportation (Non-Medical): No   Physical Activity: Inactive (3/24/2022)    Exercise Vital Sign     Days of Exercise per Week: 0 days     Minutes of Exercise per Session: 0 min   Stress: No Stress Concern Present (8/15/2019)    [employer] [place] [employer] - Occupational Stress Questionnaire     Feeling of Stress : Not at all   Social Connections: Moderately Isolated (4/2/2020)    Social Connection and Isolation Panel [NHANES]     Frequency of Communication with Friends and Family: More than three times a week     Frequency of Social Gatherings with Friends and Family: Twice a week     Attends Temple Services: 1 to 4 times per year     Active Member of Clubs or Organizations: No     Attends Club or Organization Meetings: Never     Marital Status: Never      Review of Systems   Respiratory:  Positive for cough and shortness of breath.    Gastrointestinal:  Positive for diarrhea.   Allergic/Immunologic: Positive for immunocompromised state.   All other systems reviewed and are negative.    Objective:     Vital Signs (Most Recent):  Temp: 99 °F (37.2 °C) (11/12/23 1306)  Pulse: (!) 112 (11/12/23 1436)  Resp: 20 (11/12/23 1306)  BP: (!) 91/59 (11/12/23 1306)  SpO2: (!) 93 % (11/12/23 1500) Vital Signs (24h Range):  Temp:  [97.8 °F (36.6 °C)-101.5 °F (38.6 °C)] 99 °F (37.2 °C)  Pulse:  [] 112  Resp:  [16-30] 20  SpO2:  [88 %-98 %] 93 %  BP: ()/(59-72) 91/59     Weight: 99.5 kg (219 lb 5.7 oz)  Body mass index is 35.41 kg/m².    Estimated Creatinine Clearance: 94.6 mL/min (based on SCr of 0.8 mg/dL).     Physical Exam  Constitutional:       General: She is not in acute distress.     Appearance: Normal appearance. She is not ill-appearing.   Cardiovascular:      Rate and Rhythm: Normal rate and regular rhythm.      Pulses: Normal pulses.      Heart sounds: Normal heart sounds. No murmur heard.     No friction rub. No gallop.   Pulmonary:       Effort: Pulmonary effort is normal. No respiratory distress.      Breath sounds: Normal breath sounds.      Comments: Vapotherm   Abdominal:      General: Abdomen is flat. Bowel sounds are normal. There is no distension.      Palpations: Abdomen is soft.      Tenderness: There is no abdominal tenderness.   Skin:     General: Skin is warm and dry.   Neurological:      Mental Status: She is alert.          Significant Labs: Blood Culture:   Recent Labs   Lab 09/14/23  1136 10/11/23  1318 10/11/23  1326 11/01/23  1250 11/01/23  1450   LABBLOO No growth after 5 days. No growth after 5 days. No growth after 5 days. No growth after 5 days. No growth after 5 days.     CBC:   Recent Labs   Lab 11/10/23  1632 11/11/23  0610 11/12/23  0624   WBC  --  10.26 13.77*   HGB 10.4* 9.7* 9.9*   HCT 34.0* 31.8* 32.7*   PLT  --  360 394     CMP:   Recent Labs   Lab 11/11/23  0610 11/12/23  0624    141   K 4.1 3.9    103   CO2 27 27   GLU 87 94   BUN 7 7   CREATININE 0.8 0.8   CALCIUM 8.7 8.8   ANIONGAP 10 11     Microbiology Results (last 7 days)       Procedure Component Value Units Date/Time    Stool culture [[id number]] Collected: 11/12/23 1212    Order Status: Sent Specimen: Stool Updated: 11/12/23 1517    E. coli 0157 antigen [[id number]] Collected: 11/12/23 1212    Order Status: No result Specimen: Stool Updated: 11/12/23 1517    Blood culture [[id number]] Collected: 11/12/23 0907    Order Status: Sent Specimen: Blood Updated: 11/12/23 1313    Blood culture [[id number]] Collected: 11/12/23 0907    Order Status: Sent Specimen: Blood Updated: 11/12/23 1313    Clostridium difficile EIA [[id number]] Collected: 11/12/23 1212    Order Status: Canceled Specimen: Stool     Blood Culture #1 **CANNOT BE ORDERED STAT** [[id number]] Collected: 11/01/23 1450    Order Status: Completed Specimen: Blood Updated: 11/06/23 2212     Blood Culture, Routine No growth after 5 days.    Blood culture #2 **CANNOT BE ORDERED STAT**  "[[id number]] Collected: 11/01/23 1250    Order Status: Completed Specimen: Blood from Line, PICC Right Basilic Updated: 11/06/23 2212     Blood Culture, Routine No growth after 5 days.          Urine Culture: No results for input(s): "LABURIN" in the last 4320 hours.  Urine Studies:   Recent Labs   Lab 10/11/23  1554   COLORU Colorless*   APPEARANCEUA Clear   PHUR 7.0   SPECGRAV 1.010   PROTEINUA Negative   GLUCUA Negative   KETONESU Negative   BILIRUBINUA Negative   OCCULTUA Negative   NITRITE Negative   UROBILINOGEN Negative   LEUKOCYTESUR Negative     All pertinent labs within the past 24 hours have been reviewed.    Significant Imaging: I have reviewed all pertinent imaging results/findings within the past 24 hours.              "

## 2023-11-12 NOTE — ASSESSMENT & PLAN NOTE
--avoid steroids per Pulmonology due to actinomyces infection  --throat spray prn    Discontinue intravenous rocephin, 11/12 due to diarrhea  Anticipated end date of treatment 11/22  Follow up outpatient ent

## 2023-11-12 NOTE — HPI
This is a 53-year-old F with a PMH of Sjogren's syndrome, ILD, RA, chronic hypoxic respiratory failure on 6-8L/NC, asthma, GLENN (CPAP), possible Lung Tx candidate, who presented to ER last month for evaluation of SOB which started gradually over a few days.  Patient was seen by ENT on 10/06 and underwent a scope which noted a vocal cords covered and exudate that was sent for culture that grew both Actinomyces and prevotella.  Patient febrile that admission to 101.6 F. Then afebrile. Was discharged on 6 week course of IV ceftriaxone. Now here since 11/1 for worsening shortness of breath. Found to be positive for influenza A. Has completed course of Tamiflu. However, fever to 101.5 this morning. Patient also reports loose watery stools. Stool studies in process. Ceftriaxone being held. ID consulted for fever.

## 2023-11-12 NOTE — PLAN OF CARE
Received communication from hospital medicine regarding hemoccult positive stool. No overt GI bleeding and H&H stable. Given the patient is admitted with acute on chronic respiratory failure from interstitial lung disease and actinomyces infection will defer inpatient GI evaluation.     Patient's last colonoscopy in 2020. Prep good. Few polyps removed were normal colon tissue. Repeat recommended in 10 years. Recommend patient be seen by her pulmonologist Dr. Aviles and be cleared by pulmonary before being evaluated in oupatient GI clinic and before proceeding with endoscopic evaluation for heme positive stools. Recommend outpatient GI referral.

## 2023-11-12 NOTE — ASSESSMENT & PLAN NOTE
+FluA on admssion    --Airborne/droplet isolation  --Tamiflu    Due to severity of illness  Completed tamiflu course  Febrile, concerning for double infection  Leukocytosis   reconsult infectious disease   Repeat blood culture  Stool studies pending

## 2023-11-12 NOTE — ASSESSMENT & PLAN NOTE
Patient has developed diarrhea. Only one bowel movement today. Stool studies in process. Holding ceftriaxone for today.

## 2023-11-12 NOTE — ASSESSMENT & PLAN NOTE
Chronic, controlled. Latest blood pressure and vitals reviewed-     Temp:  [97.8 °F (36.6 °C)-101.5 °F (38.6 °C)]   Pulse:  []   Resp:  [16-30]   BP: (103-135)/(59-83)   SpO2:  [88 %-97 %] .   Home meds for hypertension were reviewed and noted below.   Hypertension Medications             furosemide (LASIX) 40 MG tablet Take 1 tablet (40 mg total) by mouth once daily.          While in the hospital, will manage blood pressure as follows; Continue home antihypertensive regimen    Will utilize p.r.n. blood pressure medication only if patient's blood pressure greater than 180/110 and she develops symptoms such as worsening chest pain or shortness of breath.

## 2023-11-12 NOTE — AI DETERIORATION ALERT
Artificial Intelligence Notification  [facility]  [id number] [place] [address] [address] Hal MATIAS [id number]  Phone: [phone]    This documentation was triggered by an Artificial Intelligence Notification:    Admit Date: 2023   LOS: 11  Code Status: Full Code  : 1970  Age: 53 y.o.  Weight:   Wt Readings from Last 1 Encounters:   23 99.5 kg (219 lb 5.7 oz)        Sex: female  Bed: A203/A203 A  MRN: [id number]  Attending Physician: Som Cabello MD     Date of Alert: 2023  Time AI Alert Received: 8:18A            Vitals:    23 0754   BP: 116/72   Pulse: 106   Resp: (!) 30   Temp: (!) 101.5 °F (38.6 °C)     SpO2: (!) 88 %      Artificial Intelligence alert discussed with Provider:     Name: Dr. Cabello   Date/Time of Provider Notification: 23 at 8:18AM      Patient Condition: stable

## 2023-11-12 NOTE — ASSESSMENT & PLAN NOTE
Isolated from vocal cords by ENT in October. Has completed course of micafungin and metronidazole for Prevotella and C glabrata isolates. Completing 6 week course of ceftriaxone for the Actino. Anticipated stop date is 11/22. However, patient has developed fever plus diarrhea. Awaiting stool studies. Holding ceftriaxone due to C diff risk. Will plan to transition to PO amoxicillin 500 mg TID for at least 2 months. If patient tolerates may extend longer.

## 2023-11-12 NOTE — PROGRESS NOTES
[facility] - Telemetry ([facility])  [facility] Medicine  Progress Note    Patient Name: Ayanna Alicia  MRN: [id number]  Patient Class: IP- Inpatient   Admission Date: 2023  Length of Stay: 11 days  Attending Physician: Som Cabello MD  Primary Care Provider: Madeleine Enrique MD        Subjective:     Principal Problem:Acute on chronic respiratory failure with hypoxia        HPI:  53-year-old white female with a history of pulmonary fibrosis, pulmonary HTN, RA, Actinomyces infection, COPD, GLENN. Presented with worsening shortness of breath over the past 48 hours.  The patient was in the hospital 2 weeks ago and since has been weaning off of steroids.  She is oxygen dependent at home usually at 8 liters/minute.  She was progressive shortness of breath with orthopnea.  There is no chest pain or pedal edema.  She denies any fevers or chills though now has a productive cough.  Patient was compliant with her medications.  In the ED, vitals: 155/93, 130, 16, 99.1, 54% RA. Placed on vapotherm in ED. Labs: WBC: 18.29, A, Lactic: 2.6, Trop; 0.029, +FluA, CXR: unchanged. EKG: Neg for STEMI. Treated with IV fluids, Steroid, Nebs. Vapotherm set to 20L @ 90% FiO2. Patient is a full code. Admitted to Hospital Medicine for management of Acute on Chronic Resp Failure, Influenza A.       Overview/Hospital Course:  : pt currently on vapotherm. Cont duonebs, budesonide, and brovana nebs. Wean O2 as tolerated. Cont tamiflu. Will order procal and d dimer.   11/3: CTA negative for PE. Cont current management. Wean O2 as tolerated. Pulm on case.   : cont current management, requiring increased vapotherm.   : vapotherm being weaned down. Cont current management.   : cont supportive care. Currently on vapotherm 20L 50% fio2.   : pt continues to require vapotherm, will start LTAC placement process for O2 weaning. Cont supportive care. Pt reports feeling swelling in her neck, will obtain CT scan.   : LTAC  placement pending. Still on 20L 50%fio2. CT scan of neck did not reveal abscess or obstruction. Pt with hx of multinodular goiter. Will obtain tsh and anti-tpo. Outpatient f/u with surgery to discuss thyroidectomy given symptoms.   11/9: weaned down to 15L 50% fio2. TSH and anti tpo normal. Thyroid u/s reviewed, will need outpatient FNA. LTAC placement pending however pt may improve quickly and not require it. Pt reports dark stools however she was started on iron tablets. Will trend h/h and consider consulting GI.   11/10: FOBT pending. Cont to trend h/h. Pt still on 15L, LTAC accepted awaiting medical stability. Consult GI if FOBT positive and h/h continues to trend down. Increase PPI to bid.   11/11 fobt positive. Hb/hct stable. Cscope performed in 2020 with normal pathology. Continue fe supplement. Follow up outpatient gastroenterology but will need pulmonology clearance for procedure. Continue proton pump inhibitor as chronically on steroids. Awaiting ltac placement  11/12 febrile overnight associated with fullness in ear and headache. Reports diarrhea. On intravenous rocephin until approximately 11/22. Discontinue rocephin and bolus fluids. Repeat blood culture due to immunocompromised status and recent flu positive, concerns for double infection. Reconsult infectious disease.       Interval History: See hospital course for today      Review of Systems   Constitutional:  Positive for diaphoresis and fever. Negative for activity change and appetite change.   HENT:  Positive for voice change. Negative for ear pain (ear fullness).    Respiratory:  Positive for shortness of breath (w exertion).    Gastrointestinal:  Positive for abdominal pain (cramping) and diarrhea. Negative for nausea and vomiting.   Neurological:  Positive for weakness and headaches.   Psychiatric/Behavioral:  Positive for dysphoric mood. Negative for agitation, behavioral problems, confusion and decreased concentration.      Objective:      Vital Signs (Most Recent):  Temp: (!) 101.5 °F (38.6 °C) (11/12/23 0754)  Pulse: 106 (11/12/23 0754)  Resp: (!) 30 (11/12/23 0754)  BP: 116/72 (11/12/23 0754)  SpO2: (!) 88 % (11/12/23 0754) Vital Signs (24h Range):  Temp:  [97.8 °F (36.6 °C)-101.5 °F (38.6 °C)] 101.5 °F (38.6 °C)  Pulse:  [] 106  Resp:  [16-30] 30  SpO2:  [88 %-97 %] 88 %  BP: (103-135)/(59-83) 116/72     Weight: 99.5 kg (219 lb 5.7 oz)  Body mass index is 35.41 kg/m².    Intake/Output Summary (Last 24 hours) at 11/12/2023 0841  Last data filed at 11/11/2023 1750  Gross per 24 hour   Intake 96.88 ml   Output --   Net 96.88 ml         Physical Exam  Vitals and nursing note reviewed.   Constitutional:       General: She is not in acute distress.     Appearance: She is ill-appearing and diaphoretic. She is not toxic-appearing.      Interventions: Nasal cannula in place.   HENT:      Head: Normocephalic and atraumatic.   Cardiovascular:      Rate and Rhythm: Tachycardia present.   Pulmonary:      Effort: Pulmonary effort is normal. Tachypnea present. No respiratory distress.      Breath sounds: Examination of the right-middle field reveals rales. Examination of the left-middle field reveals rales. Examination of the right-lower field reveals rales. Examination of the left-lower field reveals rales. Rales present.   Abdominal:      Palpations: Abdomen is soft.      Tenderness: There is no abdominal tenderness.   Musculoskeletal:      Right lower leg: No edema.      Left lower leg: No edema.   Skin:     General: Skin is warm.   Neurological:      Mental Status: She is alert and oriented to person, place, and time.      Motor: Weakness present.   Psychiatric:         Behavior: Behavior is cooperative.             Significant Labs: All pertinent labs within the past 24 hours have been reviewed.  CBC:   Recent Labs   Lab 11/10/23  1632 11/11/23  0610 11/12/23  0624   WBC  --  10.26 13.77*   HGB 10.4* 9.7* 9.9*   HCT 34.0* 31.8* 32.7*   PLT  --   360 394     CMP:   Recent Labs   Lab 11/11/23  0610 11/12/23  0624    141   K 4.1 3.9    103   CO2 27 27   GLU 87 94   BUN 7 7   CREATININE 0.8 0.8   CALCIUM 8.7 8.8   ANIONGAP 10 11       Significant Imaging: I have reviewed all pertinent imaging results/findings within the past 24 hours.      Assessment/Plan:      * Acute on chronic respiratory failure with hypoxia  Patient with Hypoxic Respiratory failure which is Acute on chronic.  she is on home oxygen at 8 LPM. Supplemental oxygen was provided and noted- Oxygen Concentration (%):  [60] 60    .   Signs/symptoms of respiratory failure include- tachypnea, increased work of breathing and respiratory distress. Contributing diagnoses includes - CHF and Interstitial lung disease Labs and images were reviewed. Patient Has recent ABG, which has been reviewed. Will treat underlying causes and adjust management of respiratory failure as follows-     --Vapotherm  --Consult Pulm  --Cont home meds where possible  --JESSI  --Bipap @ HS  --Avoid steroid    Cont duonebs   brovana budesonide nebs  Resume home lasix   Wean o2 as tolerated  D dimer elevated  CTA neg for PE  Currently on vapotherm 15L 60% fio2   awaiting LTAC placement process for O2 weaning         Diarrhea  Has been on long term intravenous rocephin  Fluid bolus  Stool studies pending  Infectious disease consult        Anemia  PANCHO noted  Ferrous sulfate started  Hb/hct stable  Continue PPI   FOBT positive   cscope reviewed, benign pathology in 2020  Referral placed on discharge for gastroenterology   Will need pulmonology clearance prior to gastroenterology procedure    Influenza A with respiratory manifestations  +FluA on admssion    --Airborne/droplet isolation  --Tamiflu    Due to severity of illness  Completed tamiflu course  Febrile, concerning for double infection  Leukocytosis   reconsult infectious disease   Repeat blood culture  Stool studies pending      Actinomyces infection  Followed by ID  as OP    Discontinue Ceftriaxone  --PICC line care       Pulmonary hypertension due to interstitial lung disease  Follow up outpatient       Dysphonia due to laryngeal ulcers  --avoid steroids per Pulmonology due to actinomyces infection  --throat spray prn    Discontinue intravenous rocephin, 11/12 due to diarrhea  Anticipated end date of treatment 11/22  Follow up outpatient ent      Interstitial lung disease  Managed with OFEV as OP, followed by Pulmonology as OP    --Cont home regimen, request family to bring meds not available on formulary      Multinodular goiter  Thyroid U/S reviewed  Outpatient referral placed to IR for FNA   Outpatient referral placed to endocrinology for   Radioiodine therapy evaluation  Pt poor surgical candidate given lung issues  She complains of fullness in her neck       HTN (hypertension)  Chronic, controlled. Latest blood pressure and vitals reviewed-     Temp:  [97.8 °F (36.6 °C)-101.5 °F (38.6 °C)]   Pulse:  []   Resp:  [16-30]   BP: (103-135)/(59-83)   SpO2:  [88 %-97 %] .   Home meds for hypertension were reviewed and noted below.   Hypertension Medications             furosemide (LASIX) 40 MG tablet Take 1 tablet (40 mg total) by mouth once daily.          While in the hospital, will manage blood pressure as follows; Continue home antihypertensive regimen    Will utilize p.r.n. blood pressure medication only if patient's blood pressure greater than 180/110 and she develops symptoms such as worsening chest pain or shortness of breath.    VTE Risk Mitigation (From admission, onward)         Ordered     enoxaparin injection 40 mg  Daily         11/01/23 1507     IP VTE HIGH RISK PATIENT  Once         11/01/23 1507     Place sequential compression device  Until discontinued         11/01/23 1507                Discharge Planning   JANETTE:      Code Status: Full Code   Is the patient medically ready for discharge?:     Reason for patient still in hospital (select all that apply):  Patient new problem, Patient trending condition, Laboratory test, Treatment, Imaging and Consult recommendations  Discharge Plan A: Long-term acute care facility (LTAC)   Discharge Delays: None known at this time              Som Cabello MD  Department of Hospital Medicine   [facility])

## 2023-11-12 NOTE — ASSESSMENT & PLAN NOTE
Has been on long term intravenous rocephin  Fluid bolus  Stool studies pending  Infectious disease consult

## 2023-11-12 NOTE — ASSESSMENT & PLAN NOTE
Fever to 101.5 this morning. Unclear source given patient's prolonged hospital stay. Has not had productive cough. Has remained on Vapotherm which may be related to worsening ILD. CT soft tissue neck on 11/7 with abscess or infection involving the neck. Interval development of hazy ground-glass density involving the upper lobes and interstitial thickening. Findings are nonspecific but could be related to atypical infection or pneumonia. Patient has developed diarrhea while on ceftriaxone. Holding for now.     Recommendations:  --Follow stool studies  --Unclear if this is infectious diarrhea; holding ceftriaxone for now  --Consider CT chest/abd/pelvis with contrast to look for pneumonia and intraabdominal source for diarrhea   --Sputum culture if able to collect   --Would add empiric Staph/atypical pneumonia coverage with PO doxycycline 100 mg BID for now   --Follow blood cultures

## 2023-11-12 NOTE — SUBJECTIVE & OBJECTIVE
Past Medical History:   Diagnosis Date    Abnormal Pap smear of cervix     in the past with repeat pap smear okay.    Acute interstitial pneumonitis     Allergic rhinitis, cause unspecified     Arthritis of both knees     Asthma     Eczema     Fatty liver 10/2014    Fibrocystic breast changes     Headache(784.0)     Hepatomegaly 10/2014    Hypertension     Liver cyst 10/2014    Multinodular goiter     Followed by ENT - Dr. Nicolas Gray    Polymenorrhea     TMJ (dislocation of temporomandibular joint)     Uterine fibroid     in the past    Vitamin D deficiency disease        Past Surgical History:   Procedure Laterality Date    BRONCHOSCOPY Bilateral 2023    Procedure: Bronchoscopy - insert lighted tube into airway to take a biopsy of lung;  Surgeon: Agustín Aviles MD;  Location: [facility];  Service: Pulmonary;  Laterality: Bilateral;     SECTION, CLASSIC      x 1    COLONOSCOPY N/A 2020    Procedure: COLONOSCOPY;  Surgeon: Angie Magana MD;  Location: [facility];  Service: Endoscopy;  Laterality: N/A;    HYSTERECTOMY      MULTIPLE TOOTH EXTRACTIONS      PARTIAL HYSTERECTOMY  2013    Due to fibroids       Review of patient's allergies indicates:   Allergen Reactions    Doxycycline Nausea Only     Other reaction(s): Nausea    Fluticasone Other (See Comments)     Other reaction(s): Epistaxis         Medications:  Facility-Administered Medications Prior to Admission   Medication    0.9%  NaCl infusion     Medications Prior to Admission   Medication Sig    acetaminophen (TYLENOL) 500 MG tablet Take 500 mg by mouth every 6 (six) hours as needed for Pain. Only use when needed    acidophilus-pectin, citrus 100 million cell-10 mg Cap Take 1 capsule by mouth once daily.    albuterol (PROVENTIL/VENTOLIN HFA) 90 mcg/actuation inhaler INHALE 1 TO 2 PUFFS BY MOUTH INTO THE LUNGS EVERY 4 TO 6 HOURS AS NEEDED FOR WHEEZING OR SHORTNESS OF BREATH    docusate sodium (COLACE) 100 MG capsule Take 100  mg by mouth 2 (two) times daily.    fluticasone (VERAMYST) 27.5 mcg/actuation nasal spray 2 sprays by Nasal route once daily.    furosemide (LASIX) 40 MG tablet Take 1 tablet (40 mg total) by mouth once daily.    ibuprofen (ADVIL,MOTRIN) 200 MG tablet Take 200 mg by mouth every 6 (six) hours as needed for Pain.    levocetirizine (XYZAL) 5 MG tablet TAKE 1 TABLET(5 MG) BY MOUTH EVERY DAY    magnesium oxide (MAG-OX) 400 mg (241.3 mg magnesium) tablet Take 1 tablet (400 mg total) by mouth once daily.    montelukast (SINGULAIR) 10 mg tablet Take 1 tablet (10 mg total) by mouth every evening.    mv-minerals/FA/omega 3,6,9 #3 (WOMEN'S 50+ ADVANCED ORAL) Take 1 tablet by mouth Daily.    nintedanib (OFEV) 150 mg Cap Take 1 capsule (150 mg total) by mouth 2 (two) times a day.    omega-3s/dha/epa/fish oil/D3 (VITAMIN-D + OMEGA-3 ORAL) Take 2 tablets by mouth once daily at 6am.    omeprazole (PRILOSEC) 20 MG capsule Take 1 capsule (20 mg total) by mouth once daily.    potassium chloride SA (K-DUR,KLOR-CON) 20 MEQ tablet Take 1 tablet (20 mEq total) by mouth once daily.    predniSONE (DELTASONE) 10 MG tablet Take 1 tablet (10 mg total) by mouth once daily.    SEREVENT DISKUS 50 mcg/dose diskus inhaler Inhale 1 puff into the lungs 2 (two) times daily. Controller    vitamin D (VITAMIN D3) 1000 units Tab Take 1,000 Units by mouth once daily.    benzonatate (TESSALON) 200 MG capsule Take 200 mg by mouth 3 (three) times daily as needed for Cough.    dextrose 5 % in water (D5W) PgBk 100 mL with cefTRIAXone 2 gram SolR 2 g Inject 2 g into the vein once daily.    pantoprazole (PROTONIX) 40 MG tablet Take 1 tablet (40 mg total) by mouth once daily. (Patient not taking: Reported on 2023)    revefenacin 175 mcg/3 mL Nebu Inhale 175 mcg into the lungs once daily. (Patient taking differently: Inhale 175 mcg into the lungs once daily. Patient has not received medication yet)    [] treprostiniL (TYVASO DPI) 16(112)-32(112) -48(28)  mcg CtDv Inhale 16 mcg into the lungs 4 (four) times daily. (Patient taking differently: Inhale 16 mcg into the lungs 4 (four) times daily. Patient has not received medication  yet)     Antibiotics (From admission, onward)      Start     Stop Route Frequency Ordered    11/02/23 1045  mupirocin 2 % ointment         11/07/23 0859 Nasl 2 times daily 11/02/23 0934          Antifungals (From admission, onward)      None          Antivirals (From admission, onward)      None             Immunization History   Administered Date(s) Administered    COVID-19 Vaccine 08/20/2021, 10/14/2021    COVID-19, MRNA, LN-S, PF (MODERNA FULL 0.5 ML DOSE) 08/20/2021, 10/14/2021    COVID-19, mRNA, LNP-S, bivalent booster, PF (Moderna Omicron)12 + YEARS 08/11/2023    Influenza 10/13/2010    Influenza (FLUAD) - Quadrivalent - Adjuvanted - PF *Preferred* (65+) 11/01/2022    Influenza - Quadrivalent 10/02/2014, 01/12/2016, 02/01/2017    Influenza - Quadrivalent - PF *Preferred* (6 months and older) 09/22/2020, 10/14/2021    Influenza - Trivalent (ADULT) 10/13/2010    Pneumococcal Conjugate - 20 Valent 11/01/2022    Tdap 03/23/2012       Family History       Problem Relation (Age of Onset)    Cancer Maternal Grandmother (60), Maternal Aunt (50), Maternal Aunt (66)    Cataracts Cousin    Diabetes Maternal Grandfather    Diverticulitis Mother    Heart disease Mother, Father (63)    Hypertension Father    Migraines Cousin    No Known Problems Brother    Peripheral vascular disease Maternal Grandfather    Stroke Mother, Sister, Maternal Grandfather          Social History     Socioeconomic History    Marital status: Single    Number of children: 0   Occupational History    Occupation:      Employer: [employer]     Comment: [employer]   Tobacco Use    Smoking status: Never     Passive exposure: Past    Smokeless tobacco: Never   Substance and Sexual Activity    Alcohol use: Not Currently     Comment: last use 03/2022    Drug use:  Not Currently     Frequency: 4.0 times per week     Types: Marijuana     Comment: last year was last use 03/2022    Sexual activity: Yes     Partners: Female   Social History Narrative    She wears seatbelt. Her son was killed in 2010.     Social Determinants of Health     Financial Resource Strain: Low Risk  (4/2/2020)    Overall Financial Resource Strain (CARDIA)     Difficulty of Paying Living Expenses: Not very hard   Food Insecurity: No Food Insecurity (4/2/2020)    Hunger Vital Sign     Worried About Running Out of Food in the Last Year: Never true     Ran Out of Food in the Last Year: Never true   Transportation Needs: No Transportation Needs (4/2/2020)    PRAPARE - Transportation     Lack of Transportation (Medical): No     Lack of Transportation (Non-Medical): No   Physical Activity: Inactive (3/24/2022)    Exercise Vital Sign     Days of Exercise per Week: 0 days     Minutes of Exercise per Session: 0 min   Stress: No Stress Concern Present (8/15/2019)    [place] [employer] - Occupational Stress Questionnaire     Feeling of Stress : Not at all   Social Connections: Moderately Isolated (4/2/2020)    Social Connection and Isolation Panel [NHANES]     Frequency of Communication with Friends and Family: More than three times a week     Frequency of Social Gatherings with Friends and Family: Twice a week     Attends Mu-ism Services: 1 to 4 times per year     Active Member of Clubs or Organizations: No     Attends Club or Organization Meetings: Never     Marital Status: Never      Review of Systems   Respiratory:  Positive for cough and shortness of breath.    Gastrointestinal:  Positive for diarrhea.   Allergic/Immunologic: Positive for immunocompromised state.   All other systems reviewed and are negative.    Objective:     Vital Signs (Most Recent):  Temp: 99 °F (37.2 °C) (11/12/23 1306)  Pulse: (!) 112 (11/12/23 1436)  Resp: 20 (11/12/23 1306)  BP: (!) 91/59 (11/12/23  1306)  SpO2: (!) 93 % (11/12/23 1500) Vital Signs (24h Range):  Temp:  [97.8 °F (36.6 °C)-101.5 °F (38.6 °C)] 99 °F (37.2 °C)  Pulse:  [] 112  Resp:  [16-30] 20  SpO2:  [88 %-98 %] 93 %  BP: ()/(59-72) 91/59     Weight: 99.5 kg (219 lb 5.7 oz)  Body mass index is 35.41 kg/m².    Estimated Creatinine Clearance: 94.6 mL/min (based on SCr of 0.8 mg/dL).     Physical Exam  Constitutional:       General: She is not in acute distress.     Appearance: Normal appearance. She is not ill-appearing.   Cardiovascular:      Rate and Rhythm: Normal rate and regular rhythm.      Pulses: Normal pulses.      Heart sounds: Normal heart sounds. No murmur heard.     No friction rub. No gallop.   Pulmonary:      Effort: Pulmonary effort is normal. No respiratory distress.      Breath sounds: Normal breath sounds.      Comments: Vapotherm   Abdominal:      General: Abdomen is flat. Bowel sounds are normal. There is no distension.      Palpations: Abdomen is soft.      Tenderness: There is no abdominal tenderness.   Skin:     General: Skin is warm and dry.   Neurological:      Mental Status: She is alert.          Significant Labs: Blood Culture:   Recent Labs   Lab 09/14/23  1136 10/11/23  1318 10/11/23  1326 11/01/23  1250 11/01/23  1450   LABBLOO No growth after 5 days. No growth after 5 days. No growth after 5 days. No growth after 5 days. No growth after 5 days.     CBC:   Recent Labs   Lab 11/10/23  1632 11/11/23  0610 11/12/23  0624   WBC  --  10.26 13.77*   HGB 10.4* 9.7* 9.9*   HCT 34.0* 31.8* 32.7*   PLT  --  360 394     CMP:   Recent Labs   Lab 11/11/23  0610 11/12/23  0624    141   K 4.1 3.9    103   CO2 27 27   GLU 87 94   BUN 7 7   CREATININE 0.8 0.8   CALCIUM 8.7 8.8   ANIONGAP 10 11     Microbiology Results (last 7 days)       Procedure Component Value Units Date/Time    Stool culture [[id number]] Collected: 11/12/23 1212    Order Status: Sent Specimen: Stool Updated: 11/12/23 1517    E. coli 0157  "antigen [[id number]] Collected: 11/12/23 1212    Order Status: No result Specimen: Stool Updated: 11/12/23 1517    Blood culture [[id number]] Collected: 11/12/23 0907    Order Status: Sent Specimen: Blood Updated: 11/12/23 1313    Blood culture [[id number]] Collected: 11/12/23 0907    Order Status: Sent Specimen: Blood Updated: 11/12/23 1313    Clostridium difficile EIA [[id number]] Collected: 11/12/23 1212    Order Status: Canceled Specimen: Stool     Blood Culture #1 **CANNOT BE ORDERED STAT** [[id number]] Collected: 11/01/23 1450    Order Status: Completed Specimen: Blood Updated: 11/06/23 2212     Blood Culture, Routine No growth after 5 days.    Blood culture #2 **CANNOT BE ORDERED STAT** [[id number]] Collected: 11/01/23 1250    Order Status: Completed Specimen: Blood from Line, PICC Right Basilic Updated: 11/06/23 2212     Blood Culture, Routine No growth after 5 days.          Urine Culture: No results for input(s): "LABURIN" in the last 4320 hours.  Urine Studies:   Recent Labs   Lab 10/11/23  1554   COLORU Colorless*   APPEARANCEUA Clear   PHUR 7.0   SPECGRAV 1.010   PROTEINUA Negative   GLUCUA Negative   KETONESU Negative   BILIRUBINUA Negative   OCCULTUA Negative   NITRITE Negative   UROBILINOGEN Negative   LEUKOCYTESUR Negative     All pertinent labs within the past 24 hours have been reviewed.    Significant Imaging: I have reviewed all pertinent imaging results/findings within the past 24 hours.  "

## 2023-11-13 LAB
CRYPTOSP AG STL QL IA: NEGATIVE
G LAMBLIA AG STL QL IA: NEGATIVE

## 2023-11-13 PROCEDURE — 94761 N-INVAS EAR/PLS OXIMETRY MLT: CPT

## 2023-11-13 PROCEDURE — 21400001 HC TELEMETRY ROOM

## 2023-11-13 PROCEDURE — 27000249 HC VAPOTHERM CIRCUIT

## 2023-11-13 PROCEDURE — 99900035 HC TECH TIME PER 15 MIN (STAT)

## 2023-11-13 PROCEDURE — 94799 UNLISTED PULMONARY SVC/PX: CPT

## 2023-11-13 PROCEDURE — 94640 AIRWAY INHALATION TREATMENT: CPT

## 2023-11-13 PROCEDURE — 25000003 PHARM REV CODE 250: Performed by: NURSE PRACTITIONER

## 2023-11-13 PROCEDURE — 25000003 PHARM REV CODE 250: Performed by: FAMILY MEDICINE

## 2023-11-13 PROCEDURE — 27100092 HC HIGH FLOW DELIVERY CANNULA

## 2023-11-13 PROCEDURE — 25000242 PHARM REV CODE 250 ALT 637 W/ HCPCS: Performed by: NURSE PRACTITIONER

## 2023-11-13 PROCEDURE — 99232 PR SUBSEQUENT HOSPITAL CARE,LEVL II: ICD-10-PCS | Mod: ,,, | Performed by: STUDENT IN AN ORGANIZED HEALTH CARE EDUCATION/TRAINING PROGRAM

## 2023-11-13 PROCEDURE — 63600175 PHARM REV CODE 636 W HCPCS: Performed by: NURSE PRACTITIONER

## 2023-11-13 PROCEDURE — 25000242 PHARM REV CODE 250 ALT 637 W/ HCPCS: Performed by: FAMILY MEDICINE

## 2023-11-13 PROCEDURE — 99232 SBSQ HOSP IP/OBS MODERATE 35: CPT | Mod: ,,, | Performed by: STUDENT IN AN ORGANIZED HEALTH CARE EDUCATION/TRAINING PROGRAM

## 2023-11-13 PROCEDURE — 27100171 HC OXYGEN HIGH FLOW UP TO 24 HOURS

## 2023-11-13 RX ORDER — AMOXICILLIN AND CLAVULANATE POTASSIUM 500; 125 MG/1; MG/1
1 TABLET, FILM COATED ORAL 3 TIMES DAILY
Status: DISCONTINUED | OUTPATIENT
Start: 2023-11-13 | End: 2023-11-14

## 2023-11-13 RX ORDER — DOXYCYCLINE HYCLATE 100 MG
100 TABLET ORAL EVERY 12 HOURS
Status: DISCONTINUED | OUTPATIENT
Start: 2023-11-13 | End: 2023-11-14 | Stop reason: HOSPADM

## 2023-11-13 RX ORDER — DOXYCYCLINE HYCLATE 100 MG
100 TABLET ORAL EVERY 12 HOURS
Status: DISCONTINUED | OUTPATIENT
Start: 2023-11-13 | End: 2023-11-13

## 2023-11-13 RX ADMIN — BENZONATATE 100 MG: 100 CAPSULE ORAL at 05:11

## 2023-11-13 RX ADMIN — DOCUSATE SODIUM 100 MG: 100 CAPSULE, LIQUID FILLED ORAL at 08:11

## 2023-11-13 RX ADMIN — IPRATROPIUM BROMIDE AND ALBUTEROL SULFATE 3 ML: 2.5; .5 SOLUTION RESPIRATORY (INHALATION) at 12:11

## 2023-11-13 RX ADMIN — MONTELUKAST 10 MG: 10 TABLET, FILM COATED ORAL at 08:11

## 2023-11-13 RX ADMIN — IPRATROPIUM BROMIDE AND ALBUTEROL SULFATE 3 ML: 2.5; .5 SOLUTION RESPIRATORY (INHALATION) at 01:11

## 2023-11-13 RX ADMIN — ARFORMOTEROL TARTRATE 15 MCG: 15 SOLUTION RESPIRATORY (INHALATION) at 07:11

## 2023-11-13 RX ADMIN — NINTEDANIB 150 MG: 150 CAPSULE ORAL at 09:11

## 2023-11-13 RX ADMIN — IPRATROPIUM BROMIDE AND ALBUTEROL SULFATE 3 ML: 2.5; .5 SOLUTION RESPIRATORY (INHALATION) at 07:11

## 2023-11-13 RX ADMIN — PANTOPRAZOLE SODIUM 40 MG: 40 TABLET, DELAYED RELEASE ORAL at 08:11

## 2023-11-13 RX ADMIN — PROMETHAZINE HYDROCHLORIDE AND CODEINE PHOSPHATE 5 ML: 6.25; 1 SOLUTION ORAL at 05:11

## 2023-11-13 RX ADMIN — FERROUS SULFATE TAB 325 MG (65 MG ELEMENTAL FE) 1 EACH: 325 (65 FE) TAB at 08:11

## 2023-11-13 RX ADMIN — AMOXICILLIN AND CLAVULANATE POTASSIUM 500 MG: 500; 125 TABLET, FILM COATED ORAL at 08:11

## 2023-11-13 RX ADMIN — BUDESONIDE 0.5 MG: 0.5 INHALANT ORAL at 06:11

## 2023-11-13 RX ADMIN — Medication 400 MG: at 08:11

## 2023-11-13 RX ADMIN — HYDROCODONE BITARTRATE AND ACETAMINOPHEN 1 TABLET: 5; 325 TABLET ORAL at 08:11

## 2023-11-13 RX ADMIN — ENOXAPARIN SODIUM 40 MG: 40 INJECTION SUBCUTANEOUS at 04:11

## 2023-11-13 RX ADMIN — BENZONATATE 100 MG: 100 CAPSULE ORAL at 06:11

## 2023-11-13 RX ADMIN — PROMETHAZINE HYDROCHLORIDE AND CODEINE PHOSPHATE 5 ML: 6.25; 1 SOLUTION ORAL at 06:11

## 2023-11-13 RX ADMIN — ARFORMOTEROL TARTRATE 15 MCG: 15 SOLUTION RESPIRATORY (INHALATION) at 06:11

## 2023-11-13 RX ADMIN — DOXYCYCLINE HYCLATE 100 MG: 100 TABLET, COATED ORAL at 08:11

## 2023-11-13 RX ADMIN — IPRATROPIUM BROMIDE AND ALBUTEROL SULFATE 3 ML: 2.5; .5 SOLUTION RESPIRATORY (INHALATION) at 06:11

## 2023-11-13 RX ADMIN — BUDESONIDE 0.5 MG: 0.5 INHALANT ORAL at 07:11

## 2023-11-13 RX ADMIN — PREDNISONE 10 MG: 10 TABLET ORAL at 08:11

## 2023-11-13 RX ADMIN — FUROSEMIDE 40 MG: 40 TABLET ORAL at 08:11

## 2023-11-13 NOTE — ASSESSMENT & PLAN NOTE
Fever to 101.5 on 11/12. Unclear source given patient's prolonged hospital stay. Has not had productive cough. Has remained on Vapotherm which may be related to worsening ILD. CT soft tissue neck on 11/7 with abscess or infection involving the neck. Interval development of hazy ground-glass density involving the upper lobes and interstitial thickening. Findings are nonspecific but could be related to atypical infection or pneumonia. Patient has developed diarrhea while on ceftriaxone. Holding for now.     Recommendations:  --Follow stool studies  --Unclear if this is infectious diarrhea; stools are formed   --Consider CT chest/abd/pelvis with contrast to look for pneumonia and intraabdominal source for diarrhea   --Sputum culture if able to collect   --Would add empiric Staph/atypical pneumonia coverage with PO doxycycline 100 mg BID for now and will start PO amoxicillin 500 mg TID for Actinomyces; patient in agreement   --Follow blood cultures

## 2023-11-13 NOTE — ASSESSMENT & PLAN NOTE
Patient with Hypoxic Respiratory failure which is Acute on chronic.  she is on home oxygen at 8 LPM. Supplemental oxygen was provided and noted- Oxygen Concentration (%):  [] 100    .   Signs/symptoms of respiratory failure include- tachypnea, increased work of breathing and respiratory distress. Contributing diagnoses includes - CHF and Interstitial lung disease Labs and images were reviewed. Patient Has recent ABG, which has been reviewed. Will treat underlying causes and adjust management of respiratory failure as follows-     --Vapotherm  --Consult Pulm  --Cont home meds where possible  --JESSI  --Bipap @ HS  --Avoid steroid    Cont duonebs   brovana budesonide nebs  Resume home lasix   Wean o2 as tolerated  D dimer elevated  awaiting LTAC placement process for O2 weaning

## 2023-11-13 NOTE — PROGRESS NOTES
[facility] - Telemetry ([facility])  [facility] Medicine  Progress Note    Patient Name: Ayanna Alicia  MRN: [id number]  Patient Class: IP- Inpatient   Admission Date: 2023  Length of Stay: 12 days  Attending Physician: Som Cabello MD  Primary Care Provider: Madeleine Enrique MD        Subjective:     Principal Problem:Acute on chronic respiratory failure with hypoxia        HPI:  53-year-old white female with a history of pulmonary fibrosis, pulmonary HTN, RA, Actinomyces infection, COPD, GLENN. Presented with worsening shortness of breath over the past 48 hours.  The patient was in the hospital 2 weeks ago and since has been weaning off of steroids.  She is oxygen dependent at home usually at 8 liters/minute.  She was progressive shortness of breath with orthopnea.  There is no chest pain or pedal edema.  She denies any fevers or chills though now has a productive cough.  Patient was compliant with her medications.  In the ED, vitals: 155/93, 130, 16, 99.1, 54% RA. Placed on vapotherm in ED. Labs: WBC: 18.29, A, Lactic: 2.6, Trop; 0.029, +FluA, CXR: unchanged. EKG: Neg for STEMI. Treated with IV fluids, Steroid, Nebs. Vapotherm set to 20L @ 90% FiO2. Patient is a full code. Admitted to Hospital Medicine for management of Acute on Chronic Resp Failure, Influenza A.     Overview/Hospital Course:  : pt currently on vapotherm. Cont duonebs, budesonide, and brovana nebs. Wean O2 as tolerated. Cont tamiflu. Will order procal and d dimer.   11/3: CTA negative for PE. Cont current management. Wean O2 as tolerated. Pulm on case.   : cont current management, requiring increased vapotherm.   : vapotherm being weaned down. Cont current management.   : cont supportive care. Currently on vapotherm 20L 50% fio2.   : pt continues to require vapotherm, will start LTAC placement process for O2 weaning. Cont supportive care. Pt reports feeling swelling in her neck, will obtain CT scan.   : LTAC placement  pending. Still on 20L 50%fio2. CT scan of neck did not reveal abscess or obstruction. Pt with hx of multinodular goiter. Will obtain tsh and anti-tpo. Outpatient f/u with surgery to discuss thyroidectomy given symptoms.   11/9: weaned down to 15L 50% fio2. TSH and anti tpo normal. Thyroid u/s reviewed, will need outpatient FNA. LTAC placement pending however pt may improve quickly and not require it. Pt reports dark stools however she was started on iron tablets. Will trend h/h and consider consulting GI.   11/10: FOBT pending. Cont to trend h/h. Pt still on 15L, LTAC accepted awaiting medical stability. Consult GI if FOBT positive and h/h continues to trend down. Increase PPI to bid.   11/11 fobt positive. Hb/hct stable. Cscope performed in 2020 with normal pathology. Continue fe supplement. Follow up outpatient gastroenterology but will need pulmonology clearance for procedure. Continue proton pump inhibitor as chronically on steroids. Awaiting ltac placement  11/12 febrile overnight associated with fullness in ear and headache. Reports diarrhea. On intravenous rocephin until approximately 11/22. Discontinue rocephin and bolus fluids. Repeat blood culture due to immunocompromised status and recent flu positive, concerns for double infection. Reconsult infectious disease.   11/13 afebrile overnight. Patient states possibly drinking hot coffee when temperature was taken yesterday. Intravenous rocephin discontinued to diarrhea. Infectious diarrhea workup unrevealing thus far. Now with partially formed stools. Awaiting ltac placement for continued weaning. Start po doxy per infectious disease recommendations     Interval History: See hospital course for today    Review of Systems   Constitutional:  Positive for activity change. Negative for appetite change and fever.   HENT:  Positive for voice change.    Respiratory:  Positive for shortness of breath.    Gastrointestinal:  Positive for diarrhea. Negative for  abdominal pain, nausea and vomiting.   Neurological:  Positive for weakness.   Psychiatric/Behavioral:  Positive for agitation and dysphoric mood. Negative for behavioral problems, confusion and decreased concentration.      Objective:     Vital Signs (Most Recent):  Temp: 98.9 °F (37.2 °C) (11/13/23 1201)  Pulse: 102 (11/13/23 1300)  Resp: 16 (11/13/23 1231)  BP: 118/72 (11/13/23 1201)  SpO2: 96 % (11/13/23 1231) Vital Signs (24h Range):  Temp:  [98.2 °F (36.8 °C)-99 °F (37.2 °C)] 98.9 °F (37.2 °C)  Pulse:  [] 102  Resp:  [16-20] 16  SpO2:  [91 %-99 %] 96 %  BP: (105-125)/(55-72) 118/72     Weight: 99.5 kg (219 lb 5.7 oz)  Body mass index is 35.41 kg/m².  No intake or output data in the 24 hours ending 11/13/23 1500      Physical Exam  Vitals and nursing note reviewed.   Constitutional:       General: She is not in acute distress.     Appearance: She is ill-appearing. She is not toxic-appearing.      Interventions: Nasal cannula in place.   HENT:      Head: Normocephalic and atraumatic.      Comments: Dysphonia   Cardiovascular:      Rate and Rhythm: Tachycardia present.   Pulmonary:      Effort: Respiratory distress present.      Breath sounds: Rales present.   Abdominal:      Palpations: Abdomen is soft.   Musculoskeletal:      Right lower leg: No edema.      Left lower leg: No edema.   Skin:     General: Skin is warm.   Neurological:      Mental Status: She is alert. Mental status is at baseline.      Motor: Weakness present.   Psychiatric:         Mood and Affect: Mood is depressed. Affect is angry.         Behavior: Behavior is agitated.             Significant Labs: All pertinent labs within the past 24 hours have been reviewed.  CBC:   Recent Labs   Lab 11/12/23  0624   WBC 13.77*   HGB 9.9*   HCT 32.7*        CMP:   Recent Labs   Lab 11/12/23  0624      K 3.9      CO2 27   GLU 94   BUN 7   CREATININE 0.8   CALCIUM 8.8   ANIONGAP 11       Significant Imaging: I have reviewed all  pertinent imaging results/findings within the past 24 hours.    Assessment/Plan:      * Acute on chronic respiratory failure with hypoxia  Patient with Hypoxic Respiratory failure which is Acute on chronic.  she is on home oxygen at 8 LPM. Supplemental oxygen was provided and noted- Oxygen Concentration (%):  [] 100    .   Signs/symptoms of respiratory failure include- tachypnea, increased work of breathing and respiratory distress. Contributing diagnoses includes - CHF and Interstitial lung disease Labs and images were reviewed. Patient Has recent ABG, which has been reviewed. Will treat underlying causes and adjust management of respiratory failure as follows-     --Vapotherm  --Consult Pulm  --Cont home meds where possible  --JESSI  --Bipap @ HS  --Avoid steroid    Cont duonebs   brovana budesonide nebs  Resume home lasix   Wean o2 as tolerated  D dimer elevated  awaiting LTAC placement process for O2 weaning         Fever  Afebrile x 24h      Diarrhea  Multifactorial  Has been on long term intravenous rocephin  Diarrhea at baseline due to home medication(s)   Stool studies negative thus far  Infectious disease consult  Stools more formed, cdiff not tested        Anemia  PANCHO noted  Ferrous sulfate started  Hb/hct stable  Continue PPI   FOBT positive   cscope reviewed, benign pathology in 2020  Referral placed on discharge for gastroenterology   Will need pulmonology clearance prior to gastroenterology procedure    Influenza A with respiratory manifestations  +FluA on admssion    --Airborne/droplet isolation  --Tamiflu    Due to severity of illness  Completed tamiflu course        Actinomyces infection  Followed by ID as OP    Discontinue Ceftriaxone  --PICC line care       Pulmonary hypertension due to interstitial lung disease  Follow up outpatient       Dysphonia due to laryngeal ulcers  --avoid steroids per Pulmonology due to actinomyces infection  --throat spray prn    Discontinue intravenous rocephin,  11/12 due to diarrhea  Anticipated end date of treatment 11/22  Follow up outpatient ent      Interstitial lung disease  Managed with [facility] as OP, followed by Pulmonology as OP    --Cont home regimen, request family to bring meds not available on formulary  Awaiting ltac for continued weaning of supplemental oxygen     Multinodular goiter  Thyroid U/S reviewed  Outpatient referral placed to IR for FNA   Outpatient referral placed to endocrinology for   Radioiodine therapy evaluation  Pt poor surgical candidate given lung issues  She complains of fullness in her neck       HTN (hypertension)  Chronic, controlled. Latest blood pressure and vitals reviewed-     Temp:  [98.2 °F (36.8 °C)-99 °F (37.2 °C)]   Pulse:  []   Resp:  [16-20]   BP: (105-125)/(55-72)   SpO2:  [91 %-99 %] .   Home meds for hypertension were reviewed and noted below.   Hypertension Medications               furosemide (LASIX) 40 MG tablet Take 1 tablet (40 mg total) by mouth once daily.            While in the hospital, will manage blood pressure as follows; Continue home antihypertensive regimen    Will utilize p.r.n. blood pressure medication only if patient's blood pressure greater than 180/110 and she develops symptoms such as worsening chest pain or shortness of breath.      VTE Risk Mitigation (From admission, onward)           Ordered     enoxaparin injection 40 mg  Daily         11/01/23 1507     IP VTE HIGH RISK PATIENT  Once         11/01/23 1507     Place sequential compression device  Until discontinued         11/01/23 1507                    Discharge Planning   JANETTE:      Code Status: Full Code   Is the patient medically ready for discharge?:     Reason for patient still in hospital (select all that apply): Pending disposition  Discharge Plan A: Long-term acute care facility (LTAC)   Discharge Delays: None known at this time              Som Cabello MD  Department of Hospital Medicine   [facility] - Telemetry ([facility])

## 2023-11-13 NOTE — ASSESSMENT & PLAN NOTE
Chronic, controlled. Latest blood pressure and vitals reviewed-     Temp:  [98.2 °F (36.8 °C)-99 °F (37.2 °C)]   Pulse:  []   Resp:  [16-20]   BP: (105-125)/(55-72)   SpO2:  [91 %-99 %] .   Home meds for hypertension were reviewed and noted below.   Hypertension Medications             furosemide (LASIX) 40 MG tablet Take 1 tablet (40 mg total) by mouth once daily.          While in the hospital, will manage blood pressure as follows; Continue home antihypertensive regimen    Will utilize p.r.n. blood pressure medication only if patient's blood pressure greater than 180/110 and she develops symptoms such as worsening chest pain or shortness of breath.

## 2023-11-13 NOTE — ASSESSMENT & PLAN NOTE
Multifactorial  Has been on long term intravenous rocephin  Diarrhea at baseline due to home medication(s)   Stool studies negative thus far  Infectious disease consult  Stools more formed, cdiff not tested

## 2023-11-13 NOTE — ASSESSMENT & PLAN NOTE
Managed with [facility] as OP, followed by Pulmonology as OP    --Cont home regimen, request family to bring meds not available on formulary  Awaiting ltac for continued weaning of supplemental oxygen

## 2023-11-13 NOTE — SUBJECTIVE & OBJECTIVE
Interval History: See hospital course for today    Review of Systems   Constitutional:  Positive for activity change. Negative for appetite change and fever.   HENT:  Positive for voice change.    Respiratory:  Positive for shortness of breath.    Gastrointestinal:  Positive for diarrhea. Negative for abdominal pain, nausea and vomiting.   Neurological:  Positive for weakness.   Psychiatric/Behavioral:  Positive for agitation and dysphoric mood. Negative for behavioral problems, confusion and decreased concentration.      Objective:     Vital Signs (Most Recent):  Temp: 98.9 °F (37.2 °C) (11/13/23 1201)  Pulse: 102 (11/13/23 1300)  Resp: 16 (11/13/23 1231)  BP: 118/72 (11/13/23 1201)  SpO2: 96 % (11/13/23 1231) Vital Signs (24h Range):  Temp:  [98.2 °F (36.8 °C)-99 °F (37.2 °C)] 98.9 °F (37.2 °C)  Pulse:  [] 102  Resp:  [16-20] 16  SpO2:  [91 %-99 %] 96 %  BP: (105-125)/(55-72) 118/72     Weight: 99.5 kg (219 lb 5.7 oz)  Body mass index is 35.41 kg/m².  No intake or output data in the 24 hours ending 11/13/23 1500      Physical Exam  Vitals and nursing note reviewed.   Constitutional:       General: She is not in acute distress.     Appearance: She is ill-appearing. She is not toxic-appearing.      Interventions: Nasal cannula in place.   HENT:      Head: Normocephalic and atraumatic.      Comments: Dysphonia   Cardiovascular:      Rate and Rhythm: Tachycardia present.   Pulmonary:      Effort: Respiratory distress present.      Breath sounds: Rales present.   Abdominal:      Palpations: Abdomen is soft.   Musculoskeletal:      Right lower leg: No edema.      Left lower leg: No edema.   Skin:     General: Skin is warm.   Neurological:      Mental Status: She is alert. Mental status is at baseline.      Motor: Weakness present.   Psychiatric:         Mood and Affect: Mood is depressed. Affect is angry.         Behavior: Behavior is agitated.             Significant Labs: All pertinent labs within the past 24  hours have been reviewed.  CBC:   Recent Labs   Lab 11/12/23 0624   WBC 13.77*   HGB 9.9*   HCT 32.7*        CMP:   Recent Labs   Lab 11/12/23 0624      K 3.9      CO2 27   GLU 94   BUN 7   CREATININE 0.8   CALCIUM 8.8   ANIONGAP 11       Significant Imaging: I have reviewed all pertinent imaging results/findings within the past 24 hours.

## 2023-11-13 NOTE — PROGRESS NOTES
[facility] - Telemetry ([facility])  Infectious Disease  Progress Note    Patient Name: Ayanna Alicia  MRN: [id number]  Admission Date: 11/1/2023  Length of Stay: 12 days  Attending Physician: Som Cabello MD  Primary Care Provider: Madeleine Enrique MD    Isolation Status: No active isolations  Assessment/Plan:      Pulmonary  * Acute on chronic respiratory failure with hypoxia  Currently requiring Vapotherm. Being worked up for lung transplant.     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 Has completed course of micafungin and metronidazole for Prevotella and C glabrata isolates. Completing 6 week course of ceftriaxone for the Actino. Anticipated stop date is 11/22. However, patient has developed fever plus diarrhea. Awaiting stool studies. Holding ceftriaxone due to C diff risk. Will transition to PO amoxicillin 500 mg TID for at least 2 months. If patient tolerates may extend longer.     Other  Fever  Fever to 101.5 on 11/12. Unclear source given patient's prolonged hospital stay. Has not had productive cough. Has remained on Vapotherm which may be related to worsening ILD. CT soft tissue neck on 11/7 with abscess or infection involving the neck. Interval development of hazy ground-glass density involving the upper lobes and interstitial thickening. Findings are nonspecific but could be related to atypical infection or pneumonia. Patient has developed diarrhea while on ceftriaxone. Holding for now.     Recommendations:  --Follow stool studies  --Unclear if this is infectious diarrhea; stools are formed   --Consider CT chest/abd/pelvis with contrast to look for pneumonia and intraabdominal source for diarrhea   --Sputum culture if able to collect   --Would add empiric Staph/atypical pneumonia coverage with PO doxycycline 100 mg BID for now and will start PO amoxicillin 500 mg TID for Actinomyces; patient in agreement   --Follow blood cultures       Thank you for your consult. I will  follow-up with patient. Please contact us if you have any additional questions.    Sarabjit Wayne, DO  Infectious Disease  [facility] - Telemetry (Hospital)    Subjective:     Principal Problem:Acute on chronic respiratory failure with hypoxia    HPI: This is a 53-year-old F with a PMH of Sjogren's syndrome, ILD, RA, chronic hypoxic respiratory failure on 6-8L/NC, asthma, GLENN (CPAP), possible Lung Tx candidate, who presented to ER last month for evaluation of SOB which started gradually over a few days.  Patient was seen by ENT on 10/06 and underwent a scope which noted a vocal cords covered and exudate that was sent for culture that grew both Actinomyces and prevotella.  Patient febrile that admission to 101.6 F. Then afebrile. Was discharged on 6 week course of IV ceftriaxone. Now here since 11/1 for worsening shortness of breath. Found to be positive for influenza A. Has completed course of Tamiflu. However, fever to 101.5 this morning. Patient also reports loose watery stools. Stool studies in process. Ceftriaxone being held. ID consulted for fever.   Interval History: Afebrile since 11/12 AM. Stool formed per nursing staff. No suspicion for C diff at this time. More frequent BM expected with antibiotics. Will transition from ceftriaxone to amoxicillin for remainder of Actinomyces course. PO doxy for empiric pneumonia coverage.     Review of Systems   Respiratory:  Positive for cough and shortness of breath.    Gastrointestinal:  Positive for diarrhea.   Allergic/Immunologic: Positive for immunocompromised state.   All other systems reviewed and are negative.    Objective:     Vital Signs (Most Recent):  Temp: 98.2 °F (36.8 °C) (11/13/23 0732)  Pulse: 108 (11/13/23 0800)  Resp: 18 (11/13/23 0814)  BP: (!) 105/55 (11/13/23 0732)  SpO2: 96 % (11/13/23 0732) Vital Signs (24h Range):  Temp:  [98.2 °F (36.8 °C)-99 °F (37.2 °C)] 98.2 °F (36.8 °C)  Pulse:  [] 108  Resp:  [17-20] 18  SpO2:  [91 %-99 %] 96 %  BP:  ()/(55-71) 105/55     Weight: 99.5 kg (219 lb 5.7 oz)  Body mass index is 35.41 kg/m².    Estimated Creatinine Clearance: 94.6 mL/min (based on SCr of 0.8 mg/dL).     Physical Exam  Constitutional:       General: She is not in acute distress.     Appearance: Normal appearance. She is not ill-appearing.   Cardiovascular:      Rate and Rhythm: Normal rate and regular rhythm.      Pulses: Normal pulses.      Heart sounds: Normal heart sounds. No murmur heard.     No friction rub. No gallop.   Pulmonary:      Effort: Pulmonary effort is normal. No respiratory distress.      Breath sounds: Normal breath sounds.      Comments: Vapotherm   Abdominal:      General: Abdomen is flat. Bowel sounds are normal. There is no distension.      Palpations: Abdomen is soft.      Tenderness: There is no abdominal tenderness.   Skin:     General: Skin is warm and dry.   Neurological:      Mental Status: She is alert.          Significant Labs: Blood Culture:   Recent Labs   Lab 10/11/23  1318 10/11/23  1326 11/01/23  1250 11/01/23  1450 11/12/23  0907   LABBLOO No growth after 5 days. No growth after 5 days. No growth after 5 days. No growth after 5 days. No Growth to date  No Growth to date     CBC:   Recent Labs   Lab 11/12/23  0624   WBC 13.77*   HGB 9.9*   HCT 32.7*        CMP:   Recent Labs   Lab 11/12/23  0624      K 3.9      CO2 27   GLU 94   BUN 7   CREATININE 0.8   CALCIUM 8.8   ANIONGAP 11     Microbiology Results (last 7 days)       Procedure Component Value Units Date/Time    Stool culture [[id number]] Collected: 11/12/23 1212    Order Status: Sent Specimen: Stool Updated: 11/13/23 0232    E. coli 0157 antigen [[id number]] Collected: 11/12/23 1212    Order Status: No result Specimen: Stool Updated: 11/13/23 0232    Blood culture [[id number]] Collected: 11/12/23 0907    Order Status: Completed Specimen: Blood Updated: 11/12/23 2115     Blood Culture, Routine No Growth to date    Narrative:      1 of  2  Please collect from separate site as 2 of 2    Blood culture [[id number]] Collected: 11/12/23 0907    Order Status: Completed Specimen: Blood Updated: 11/12/23 2115     Blood Culture, Routine No Growth to date    Narrative:      2 of 2  Please collect from separate site as 1 of 2    Clostridium difficile EIA [[id number]] Collected: 11/12/23 1931    Order Status: Canceled Specimen: Stool     Clostridium difficile EIA [[id number]] Collected: 11/12/23 1212    Order Status: Canceled Specimen: Stool     Blood Culture #1 **CANNOT BE ORDERED STAT** [[id number]] Collected: 11/01/23 1450    Order Status: Completed Specimen: Blood Updated: 11/06/23 2212     Blood Culture, Routine No growth after 5 days.    Blood culture #2 **CANNOT BE ORDERED STAT** [[id number]] Collected: 11/01/23 1250    Order Status: Completed Specimen: Blood from Line, PICC Right Basilic Updated: 11/06/23 2212     Blood Culture, Routine No growth after 5 days.          All pertinent labs within the past 24 hours have been reviewed.    Significant Imaging: I have reviewed all pertinent imaging results/findings within the past 24 hours.

## 2023-11-13 NOTE — PLAN OF CARE
Problem: Adult Inpatient Plan of Care  Goal: Plan of Care Review  Outcome: Ongoing, Progressing  Goal: Patient-Specific Goal (Individualized)  Outcome: Ongoing, Progressing  Goal: Absence of Hospital-Acquired Illness or Injury  Outcome: Ongoing, Progressing  Goal: Optimal Comfort and Wellbeing  Outcome: Ongoing, Progressing  Goal: Readiness for Transition of Care  Outcome: Ongoing, Progressing     Problem: Adjustment to Illness (Sepsis/Septic Shock)  Goal: Optimal Coping  Outcome: Ongoing, Progressing     Problem: Bleeding (Sepsis/Septic Shock)  Goal: Absence of Bleeding  Outcome: Ongoing, Progressing     Problem: Glycemic Control Impaired (Sepsis/Septic Shock)  Goal: Blood Glucose Level Within Desired Range  Outcome: Ongoing, Progressing     Problem: Infection Progression (Sepsis/Septic Shock)  Goal: Absence of Infection Signs and Symptoms  Outcome: Ongoing, Progressing     Problem: Nutrition Impaired (Sepsis/Septic Shock)  Goal: Optimal Nutrition Intake  Outcome: Ongoing, Progressing     Problem: Infection  Goal: Absence of Infection Signs and Symptoms  Outcome: Ongoing, Progressing     Problem: Skin Injury Risk Increased  Goal: Skin Health and Integrity  Outcome: Ongoing, Progressing     Problem: Fall Injury Risk  Goal: Absence of Fall and Fall-Related Injury  Outcome: Ongoing, Progressing

## 2023-11-13 NOTE — ASSESSMENT & PLAN NOTE
Isolated from vocal cords by ENT in October. Has completed course of micafungin and metronidazole for Prevotella and C glabrata isolates. Completing 6 week course of ceftriaxone for the Actino. Anticipated stop date is 11/22. However, patient has developed fever plus diarrhea. Awaiting stool studies. Holding ceftriaxone due to C diff risk. Will transition to PO amoxicillin 500 mg TID for at least 2 months. If patient tolerates may extend longer.

## 2023-11-14 VITALS
WEIGHT: 219.38 LBS | HEIGHT: 66 IN | TEMPERATURE: 98 F | SYSTOLIC BLOOD PRESSURE: 110 MMHG | DIASTOLIC BLOOD PRESSURE: 57 MMHG | OXYGEN SATURATION: 96 % | RESPIRATION RATE: 1 BRPM | HEART RATE: 104 BPM | BODY MASS INDEX: 35.26 KG/M2

## 2023-11-14 PROBLEM — J10.1 INFLUENZA A WITH RESPIRATORY MANIFESTATIONS: Status: RESOLVED | Noted: 2023-11-01 | Resolved: 2023-11-14

## 2023-11-14 PROBLEM — R50.9 FEVER: Status: RESOLVED | Noted: 2023-11-12 | Resolved: 2023-11-14

## 2023-11-14 LAB
E COLI SXT1 STL QL IA: NEGATIVE
E COLI SXT2 STL QL IA: NEGATIVE

## 2023-11-14 PROCEDURE — 25000242 PHARM REV CODE 250 ALT 637 W/ HCPCS: Performed by: FAMILY MEDICINE

## 2023-11-14 PROCEDURE — 27000249 HC VAPOTHERM CIRCUIT

## 2023-11-14 PROCEDURE — 25000003 PHARM REV CODE 250: Performed by: STUDENT IN AN ORGANIZED HEALTH CARE EDUCATION/TRAINING PROGRAM

## 2023-11-14 PROCEDURE — 99900035 HC TECH TIME PER 15 MIN (STAT)

## 2023-11-14 PROCEDURE — 94640 AIRWAY INHALATION TREATMENT: CPT

## 2023-11-14 PROCEDURE — 27100171 HC OXYGEN HIGH FLOW UP TO 24 HOURS

## 2023-11-14 PROCEDURE — 99232 SBSQ HOSP IP/OBS MODERATE 35: CPT | Mod: ,,, | Performed by: STUDENT IN AN ORGANIZED HEALTH CARE EDUCATION/TRAINING PROGRAM

## 2023-11-14 PROCEDURE — 25000003 PHARM REV CODE 250: Performed by: NURSE PRACTITIONER

## 2023-11-14 PROCEDURE — 27000646 HC AEROBIKA DEVICE

## 2023-11-14 PROCEDURE — 25000242 PHARM REV CODE 250 ALT 637 W/ HCPCS: Performed by: NURSE PRACTITIONER

## 2023-11-14 PROCEDURE — 25000003 PHARM REV CODE 250: Performed by: FAMILY MEDICINE

## 2023-11-14 PROCEDURE — 63600175 PHARM REV CODE 636 W HCPCS: Performed by: NURSE PRACTITIONER

## 2023-11-14 PROCEDURE — 94664 DEMO&/EVAL PT USE INHALER: CPT

## 2023-11-14 PROCEDURE — 99232 PR SUBSEQUENT HOSPITAL CARE,LEVL II: ICD-10-PCS | Mod: ,,, | Performed by: STUDENT IN AN ORGANIZED HEALTH CARE EDUCATION/TRAINING PROGRAM

## 2023-11-14 PROCEDURE — 94799 UNLISTED PULMONARY SVC/PX: CPT

## 2023-11-14 PROCEDURE — 94761 N-INVAS EAR/PLS OXIMETRY MLT: CPT

## 2023-11-14 RX ORDER — FERROUS SULFATE 324(65)MG
324 TABLET, DELAYED RELEASE (ENTERIC COATED) ORAL EVERY OTHER DAY
Start: 2023-11-14

## 2023-11-14 RX ORDER — DOXYCYCLINE HYCLATE 100 MG
100 TABLET ORAL EVERY 12 HOURS
Start: 2023-11-14 | End: 2023-11-19

## 2023-11-14 RX ORDER — TREPROSTINIL 16-32-48
16 KIT INHALATION 4 TIMES DAILY
Start: 2023-11-14 | End: 2023-12-14

## 2023-11-14 RX ORDER — AMOXICILLIN 500 MG/1
500 CAPSULE ORAL 3 TIMES DAILY
Start: 2023-11-14 | End: 2023-11-29

## 2023-11-14 RX ORDER — AMOXICILLIN 500 MG/1
500 CAPSULE ORAL 3 TIMES DAILY
Status: DISCONTINUED | OUTPATIENT
Start: 2023-11-14 | End: 2023-11-14 | Stop reason: HOSPADM

## 2023-11-14 RX ADMIN — IPRATROPIUM BROMIDE AND ALBUTEROL SULFATE 3 ML: 2.5; .5 SOLUTION RESPIRATORY (INHALATION) at 02:11

## 2023-11-14 RX ADMIN — HYDROCODONE BITARTRATE AND ACETAMINOPHEN 1 TABLET: 5; 325 TABLET ORAL at 02:11

## 2023-11-14 RX ADMIN — DOXYCYCLINE HYCLATE 100 MG: 100 TABLET, COATED ORAL at 08:11

## 2023-11-14 RX ADMIN — NINTEDANIB 150 MG: 150 CAPSULE ORAL at 08:11

## 2023-11-14 RX ADMIN — BUDESONIDE 0.5 MG: 0.5 INHALANT ORAL at 09:11

## 2023-11-14 RX ADMIN — AMOXICILLIN AND CLAVULANATE POTASSIUM 500 MG: 500; 125 TABLET, FILM COATED ORAL at 08:11

## 2023-11-14 RX ADMIN — PREDNISONE 10 MG: 10 TABLET ORAL at 08:11

## 2023-11-14 RX ADMIN — HYDROCODONE BITARTRATE AND ACETAMINOPHEN 1 TABLET: 5; 325 TABLET ORAL at 11:11

## 2023-11-14 RX ADMIN — FUROSEMIDE 40 MG: 40 TABLET ORAL at 08:11

## 2023-11-14 RX ADMIN — PANTOPRAZOLE SODIUM 40 MG: 40 TABLET, DELAYED RELEASE ORAL at 08:11

## 2023-11-14 RX ADMIN — FERROUS SULFATE TAB 325 MG (65 MG ELEMENTAL FE) 1 EACH: 325 (65 FE) TAB at 08:11

## 2023-11-14 RX ADMIN — BENZONATATE 100 MG: 100 CAPSULE ORAL at 11:11

## 2023-11-14 RX ADMIN — Medication 400 MG: at 08:11

## 2023-11-14 RX ADMIN — IPRATROPIUM BROMIDE AND ALBUTEROL SULFATE 3 ML: 2.5; .5 SOLUTION RESPIRATORY (INHALATION) at 12:11

## 2023-11-14 RX ADMIN — IPRATROPIUM BROMIDE AND ALBUTEROL SULFATE 3 ML: 2.5; .5 SOLUTION RESPIRATORY (INHALATION) at 07:11

## 2023-11-14 RX ADMIN — DOCUSATE SODIUM 100 MG: 100 CAPSULE, LIQUID FILLED ORAL at 08:11

## 2023-11-14 RX ADMIN — IPRATROPIUM BROMIDE AND ALBUTEROL SULFATE 3 ML: 2.5; .5 SOLUTION RESPIRATORY (INHALATION) at 09:11

## 2023-11-14 RX ADMIN — ENOXAPARIN SODIUM 40 MG: 40 INJECTION SUBCUTANEOUS at 04:11

## 2023-11-14 RX ADMIN — ARFORMOTEROL TARTRATE 15 MCG: 15 SOLUTION RESPIRATORY (INHALATION) at 07:11

## 2023-11-14 RX ADMIN — ARFORMOTEROL TARTRATE 15 MCG: 15 SOLUTION RESPIRATORY (INHALATION) at 09:11

## 2023-11-14 RX ADMIN — BENZONATATE 100 MG: 100 CAPSULE ORAL at 01:11

## 2023-11-14 RX ADMIN — AMOXICILLIN 500 MG: 500 CAPSULE ORAL at 04:11

## 2023-11-14 RX ADMIN — BUDESONIDE 0.5 MG: 0.5 INHALANT ORAL at 07:11

## 2023-11-14 NOTE — SUBJECTIVE & OBJECTIVE
"Interval History: Patient seen at bedside. Stool remains formed. Weaning down vapotherm. Discharging to LTAC today.     Review of Systems   Respiratory:  Positive for cough. Negative for shortness of breath.    Gastrointestinal:  Negative for diarrhea.   Allergic/Immunologic: Positive for immunocompromised state.   All other systems reviewed and are negative.    Objective:     Vital Signs (Most Recent):  Temp: 100.1 °F (37.8 °C) (11/14/23 1140)  Pulse: 107 (11/14/23 1427)  Resp: 20 (11/14/23 1427)  BP: 125/73 (11/14/23 0838)  SpO2: 100 % (11/14/23 1427) Vital Signs (24h Range):  Temp:  [97.8 °F (36.6 °C)-100.1 °F (37.8 °C)] 100.1 °F (37.8 °C)  Pulse:  [] 107  Resp:  [16-22] 20  SpO2:  [83 %-100 %] 100 %  BP: (104-125)/(53-73) 125/73     Weight: 99.5 kg (219 lb 5.7 oz)  Body mass index is 35.41 kg/m².    Estimated Creatinine Clearance: 94.6 mL/min (based on SCr of 0.8 mg/dL).     Physical Exam  Constitutional:       General: She is not in acute distress.     Appearance: Normal appearance. She is not ill-appearing.   Cardiovascular:      Rate and Rhythm: Normal rate and regular rhythm.      Pulses: Normal pulses.      Heart sounds: Normal heart sounds. No murmur heard.     No friction rub. No gallop.   Pulmonary:      Effort: Pulmonary effort is normal. No respiratory distress.      Breath sounds: Normal breath sounds.      Comments: Vapotherm   Abdominal:      General: Abdomen is flat. Bowel sounds are normal. There is no distension.      Palpations: Abdomen is soft.      Tenderness: There is no abdominal tenderness.   Skin:     General: Skin is warm and dry.   Neurological:      Mental Status: She is alert.          Significant Labs: CBC: No results for input(s): "WBC", "HGB", "HCT", "PLT" in the last 48 hours.  CMP: No results for input(s): "NA", "K", "CL", "CO2", "GLU", "BUN", "CREATININE", "CALCIUM", "PROT", "ALBUMIN", "BILITOT", "ALKPHOS", "AST", "ALT", "ANIONGAP", "EGFRNONAA" in the last 48 " "hours.    Invalid input(s): "ESTGFAFRICA"  Microbiology Results (last 7 days)       Procedure Component Value Units Date/Time    Blood culture [[id number]] Collected: 11/12/23 0907    Order Status: Completed Specimen: Blood Updated: 11/14/23 1412     Blood Culture, Routine No Growth to date      No Growth to date      No Growth to date    Narrative:      1 of 2  Please collect from separate site as 2 of 2    Blood culture [[id number]] Collected: 11/12/23 0907    Order Status: Completed Specimen: Blood Updated: 11/14/23 1412     Blood Culture, Routine No Growth to date      No Growth to date      No Growth to date    Narrative:      2 of 2  Please collect from separate site as 1 of 2    Stool culture [[id number]] Collected: 11/12/23 1212    Order Status: Completed Specimen: Stool Updated: 11/14/23 1027     Stool Culture Culture in progress    E. coli 0157 antigen [[id number]] Collected: 11/12/23 1212    Order Status: Completed Specimen: Stool Updated: 11/14/23 0846     Shiga Toxin 1 E.coli Negative     Shiga Toxin 2 E.coli Negative    Clostridium difficile EIA [[id number]] Collected: 11/12/23 1931    Order Status: Canceled Specimen: Stool     Clostridium difficile EIA [[id number]] Collected: 11/12/23 1212    Order Status: Canceled Specimen: Stool           All pertinent labs within the past 24 hours have been reviewed.    Significant Imaging: I have reviewed all pertinent imaging results/findings within the past 24 hours.  "

## 2023-11-14 NOTE — DISCHARGE INSTRUCTIONS
You have been started on new medication(s) from this hospitalization. Please take as directed and schedule hospital follow up appointments with your primary providers.

## 2023-11-14 NOTE — PLAN OF CARE
Authorization received.  Discharge paperwork sent via [employer].    Patient transferring to [facility] in [place].  Please call report to [phone] ask for Charge nurse Fadia.       11/14/23 1410   Post-Acute Status   Post-Acute Authorization Placement   Post-Acute Placement Status Set-up Complete/Auth obtained   Discharge Plan   Discharge Plan A Long-term acute care facility (LTAC)

## 2023-11-14 NOTE — PROGRESS NOTES
[facility])  Infectious Disease  Progress Note    Patient Name: Ayanna Alicia  MRN: [id number]  Admission Date: 11/1/2023  Length of Stay: 13 days  Attending Physician: Som Cabello MD  Primary Care Provider: Madeleine Enrique MD    Isolation Status: No active isolations  Assessment/Plan:      Pulmonary  * Acute on chronic respiratory failure with hypoxia  Currently requiring Vapotherm. Being worked up for lung transplant. Discharging to LTAC.     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 Has completed course of micafungin and metronidazole for Prevotella and C glabrata isolates. Has now completed almost 6 week course of ceftriaxone for the Actino. Will now continue PO amoxicillin 500 mg TID for at least 2 months. If patient tolerates may extend longer. Can treat for up to 6 months if clinically indicated. Advised her that diarrhea a likely side effect but so long as not watery and going >4 times daily should not be an infectious concern. Also recommend continuing PO doxycycline to complete 5 day course for pneumonia coverage. Will see patient in clinic.     GI  Diarrhea  Stools more formed. Studies unrevealing for infection at this time. Encourage hydration.       Thank you for your consult. I will sign off. Please contact us if you have any additional questions.    Sarabjit Wayne, DO  Infectious Disease  [facility])    Subjective:     Principal Problem:Acute on chronic respiratory failure with hypoxia    HPI: This is a 53-year-old F with a PMH of Sjogren's syndrome, ILD, RA, chronic hypoxic respiratory failure on 6-8L/NC, asthma, GLENN (CPAP), possible Lung Tx candidate, who presented to ER last month for evaluation of SOB which started gradually over a few days.  Patient was seen by ENT on 10/06 and underwent a scope which noted a vocal cords covered and exudate that was sent for culture that grew both Actinomyces and prevotella.  Patient febrile  that admission to 101.6 F. Then afebrile. Was discharged on 6 week course of IV ceftriaxone. Now here since 11/1 for worsening shortness of breath. Found to be positive for influenza A. Has completed course of Tamiflu. However, fever to 101.5 this morning. Patient also reports loose watery stools. Stool studies in process. Ceftriaxone being held. ID consulted for fever.   Interval History: Patient seen at bedside. Stool remains formed. Weaning down vapotherm. Discharging to LTAC today.     Review of Systems   Respiratory:  Positive for cough. Negative for shortness of breath.    Gastrointestinal:  Negative for diarrhea.   Allergic/Immunologic: Positive for immunocompromised state.   All other systems reviewed and are negative.    Objective:     Vital Signs (Most Recent):  Temp: 100.1 °F (37.8 °C) (11/14/23 1140)  Pulse: 107 (11/14/23 1427)  Resp: 20 (11/14/23 1427)  BP: 125/73 (11/14/23 0838)  SpO2: 100 % (11/14/23 1427) Vital Signs (24h Range):  Temp:  [97.8 °F (36.6 °C)-100.1 °F (37.8 °C)] 100.1 °F (37.8 °C)  Pulse:  [] 107  Resp:  [16-22] 20  SpO2:  [83 %-100 %] 100 %  BP: (104-125)/(53-73) 125/73     Weight: 99.5 kg (219 lb 5.7 oz)  Body mass index is 35.41 kg/m².    Estimated Creatinine Clearance: 94.6 mL/min (based on SCr of 0.8 mg/dL).     Physical Exam  Constitutional:       General: She is not in acute distress.     Appearance: Normal appearance. She is not ill-appearing.   Cardiovascular:      Rate and Rhythm: Normal rate and regular rhythm.      Pulses: Normal pulses.      Heart sounds: Normal heart sounds. No murmur heard.     No friction rub. No gallop.   Pulmonary:      Effort: Pulmonary effort is normal. No respiratory distress.      Breath sounds: Normal breath sounds.      Comments: Vapotherm   Abdominal:      General: Abdomen is flat. Bowel sounds are normal. There is no distension.      Palpations: Abdomen is soft.      Tenderness: There is no abdominal tenderness.   Skin:     General: Skin  "is warm and dry.   Neurological:      Mental Status: She is alert.          Significant Labs: CBC: No results for input(s): "WBC", "HGB", "HCT", "PLT" in the last 48 hours.  CMP: No results for input(s): "NA", "K", "CL", "CO2", "GLU", "BUN", "CREATININE", "CALCIUM", "PROT", "ALBUMIN", "BILITOT", "ALKPHOS", "AST", "ALT", "ANIONGAP", "EGFRNONAA" in the last 48 hours.    Invalid input(s): "ESTGFAFRICA"  Microbiology Results (last 7 days)       Procedure Component Value Units Date/Time    Blood culture [[id number]] Collected: 11/12/23 0907    Order Status: Completed Specimen: Blood Updated: 11/14/23 1412     Blood Culture, Routine No Growth to date      No Growth to date      No Growth to date    Narrative:      1 of 2  Please collect from separate site as 2 of 2    Blood culture [[id number]] Collected: 11/12/23 0907    Order Status: Completed Specimen: Blood Updated: 11/14/23 1412     Blood Culture, Routine No Growth to date      No Growth to date      No Growth to date    Narrative:      2 of 2  Please collect from separate site as 1 of 2    Stool culture [[id number]] Collected: 11/12/23 1212    Order Status: Completed Specimen: Stool Updated: 11/14/23 1027     Stool Culture Culture in progress    E. coli 0157 antigen [[id number]] Collected: 11/12/23 1212    Order Status: Completed Specimen: Stool Updated: 11/14/23 0846     Shiga Toxin 1 E.coli Negative     Shiga Toxin 2 E.coli Negative    Clostridium difficile EIA [[id number]] Collected: 11/12/23 1931    Order Status: Canceled Specimen: Stool     Clostridium difficile EIA [[id number]] Collected: 11/12/23 1212    Order Status: Canceled Specimen: Stool           All pertinent labs within the past 24 hours have been reviewed.    Significant Imaging: I have reviewed all pertinent imaging results/findings within the past 24 hours.  "

## 2023-11-14 NOTE — ASSESSMENT & PLAN NOTE
Isolated from vocal cords by ENT in October. Has completed course of micafungin and metronidazole for Prevotella and C glabrata isolates. Has now completed almost 6 week course of ceftriaxone for the Actino.Will now continue PO amoxicillin 500 mg TID for at least 2 months. If patient tolerates may extend longer. Can treat for up to 6 months if clinically indicated. Advised her that diarrhea a likely side effect but so long as not watery and going >4 times daily should not be an infectious concern. Also recommend continuing PO doxycycline to complete 5 day course for pneumonia coverage. Will see patient in clinic.

## 2023-11-14 NOTE — DISCHARGE SUMMARY
[facility] - Telemetry ([facility])  [facility]  Discharge Summary      Patient Name: Ayanna Alicia  MRN: [id number]  DARLENE: [id number]  Patient Class: IP- Inpatient  Admission Date: 2023  Hospital Length of Stay: 13 days  Discharge Date and Time:  2023 12:34 PM  Attending Physician: Som Cabello MD   Discharging Provider: Som Cabello MD  Primary Care Provider: Madeleine Enrique MD    Primary Care Team: [facility] MEDICINE D    HPI:   53-year-old white female with a history of pulmonary fibrosis, pulmonary HTN, RA, Actinomyces infection, COPD, GLENN. Presented with worsening shortness of breath over the past 48 hours.  The patient was in the hospital 2 weeks ago and since has been weaning off of steroids.  She is oxygen dependent at home usually at 8 liters/minute.  She was progressive shortness of breath with orthopnea.  There is no chest pain or pedal edema.  She denies any fevers or chills though now has a productive cough.  Patient was compliant with her medications.  In the ED, vitals: 155/93, 130, 16, 99.1, 54% RA. Placed on vapotherm in ED. Labs: WBC: 18.29, A, Lactic: 2.6, Trop; 0.029, +FluA, CXR: unchanged. EKG: Neg for STEMI. Treated with IV fluids, Steroid, Nebs. Vapotherm set to 20L @ 90% FiO2. Patient is a full code. Admitted to [facility] for management of Acute on Chronic Resp Failure, Influenza A.     * No surgery found *      Hospital Course:   : pt currently on vapotherm. Cont duonebs, budesonide, and brovana nebs. Wean O2 as tolerated. Cont tamiflu. Will order procal and d dimer.   11/3: CTA negative for PE. Cont current management. Wean O2 as tolerated. Pulm on case.   : cont current management, requiring increased vapotherm.   : vapotherm being weaned down. Cont current management.   : cont supportive care. Currently on vapotherm 20L 50% fio2.   : pt continues to require vapotherm, will start LTAC placement process for O2 weaning. Cont supportive  care. Pt reports feeling swelling in her neck, will obtain CT scan.   11/8: LTAC placement pending. Still on 20L 50%fio2. CT scan of neck did not reveal abscess or obstruction. Pt with hx of multinodular goiter. Will obtain tsh and anti-tpo. Outpatient f/u with surgery to discuss thyroidectomy given symptoms.   11/9: weaned down to 15L 50% fio2. TSH and anti tpo normal. Thyroid u/s reviewed, will need outpatient FNA. LTAC placement pending however pt may improve quickly and not require it. Pt reports dark stools however she was started on iron tablets. Will trend h/h and consider consulting GI.   11/10: FOBT pending. Cont to trend h/h. Pt still on 15L, LTAC accepted awaiting medical stability. Consult GI if FOBT positive and h/h continues to trend down. Increase PPI to bid.   11/11 fobt positive. Hb/hct stable. Cscope performed in 2020 with normal pathology. Continue fe supplement. Follow up outpatient gastroenterology but will need pulmonology clearance for procedure. Continue proton pump inhibitor as chronically on steroids. Awaiting ltac placement  11/12 febrile overnight associated with fullness in ear and headache. Reports diarrhea. On intravenous rocephin until approximately 11/22. Discontinue rocephin and bolus fluids. Repeat blood culture due to immunocompromised status and recent flu positive, concerns for double infection. Reconsult infectious disease.   11/13 afebrile overnight. Patient states possibly drinking hot coffee when temperature was taken yesterday. Intravenous rocephin discontinued to diarrhea. Infectious diarrhea workup unrevealing thus far. Now with partially formed stools. Awaiting ltac placement for continued weaning. Start po doxy per infectious disease recommendations     11/14  Received insurance authorization for ltac placement for continued weaning of supplemental oxygen. Case management present.    On exam, no acute distress. Respiratory distress on vapotherm. Dysphonia. AO3.  Depressed.    Patient seen and evaluated by me. Patient was determined to be suitable for discharge. Patient deemed stable for discharge to ltac.       Goals of Care Treatment Preferences:  Code Status: Full Code      Consults:   Consults (From admission, onward)          Status Ordering Provider     Inpatient consult to Infectious Diseases  Once        Provider:  Sarabjit Wayne DO Acknowledged CRUZ, RIZA E.            No new Assessment & Plan notes have been filed under this hospital service since the last note was generated.  Service: Hospital Medicine    Final Active Diagnoses:    Diagnosis Date Noted POA    PRINCIPAL PROBLEM:  Acute on chronic respiratory failure with hypoxia [J96.21] 11/01/2023 Yes    Diarrhea [R19.7] 11/12/2023 No    Anemia [D64.9] 11/09/2023 Yes    Actinomyces infection [A42.9] 10/12/2023 Yes    Pulmonary hypertension due to interstitial lung disease [I27.23, J84.9] 10/11/2023 Yes    Dysphonia due to laryngeal ulcers [R49.0] 08/02/2023 Yes    Interstitial lung disease [J84.9] 10/27/2022 Yes    Multinodular goiter [E04.2] 02/25/2014 Yes    HTN (hypertension) [I10] 09/04/2013 Yes      Problems Resolved During this Admission:    Diagnosis Date Noted Date Resolved POA    Fever [R50.9] 11/12/2023 11/14/2023 No    Influenza A with respiratory manifestations [J10.1] 11/01/2023 11/14/2023 Yes    Elevated troponin [R79.89] 07/31/2023 11/09/2023 Yes       Discharged Condition: stable    Disposition:     Follow Up:    Patient Instructions:      Ambulatory referral/consult to Interventional RAD   Standing Status: Future   Referral Priority: Routine Referral Type: Consultation   Number of Visits Requested: 1     Ambulatory referral/consult to Endocrinology   Standing Status: Future   Referral Priority: Routine Referral Type: Consultation   Requested Specialty: Endocrinology   Number of Visits Requested: 1     Ambulatory referral/consult to Gastroenterology   Standing Status: Future   Referral  "Priority: Routine Referral Type: Consultation   Referral Reason: Specialty Services Required   Requested Specialty: Gastroenterology   Number of Visits Requested: 1       Significant Diagnostic Studies: Labs: BMP: No results for input(s): "GLU", "NA", "K", "CL", "CO2", "BUN", "CREATININE", "CALCIUM", "MG" in the last 48 hours., CMP No results for input(s): "NA", "K", "CL", "CO2", "GLU", "BUN", "CREATININE", "CALCIUM", "PROT", "ALBUMIN", "BILITOT", "ALKPHOS", "AST", "ALT", "ANIONGAP", "ESTGFRAFRICA", "EGFRNONAA" in the last 48 hours., CBC No results for input(s): "WBC", "HGB", "HCT", "PLT" in the last 48 hours., and All labs within the past 24 hours have been reviewed  Microbiology: Blood Culture   Lab Results   Component Value Date    LABBLOO No Growth to date 11/12/2023    LABBLOO No Growth to date 11/12/2023    LABBLOO No Growth to date 11/12/2023    LABBLOO No Growth to date 11/12/2023   , Sputum Culture   Lab Results   Component Value Date    GSRESP <10 epithelial cells per low power field. 10/16/2023    GSRESP Few WBC's 10/16/2023    GSRESP Moderate Gram positive cocci 10/16/2023    GSRESP Rare Gram positive rods 10/16/2023    GSRESP Rare Gram negative rods 10/16/2023    GSRESP Rare budding yeast 10/16/2023    RESPIRATORYC Normal respiratory iris 10/16/2023    RESPIRATORYC No S aureus or Pseudomonas isolated. 10/16/2023   , and stool negative for ecoli; blood culture negative growth to date. Sputum culture moderate gram positive cocci  Radiology: X-Ray: CXR: X-Ray Chest 1 View (CXR): No results found for this visit on 11/01/23. and portable  CT scan:  CT soft tissue neck with contrast; CTA chest noncoronary  Ultrasound: soft tissue head neck thyroid    Pending Diagnostic Studies:       Procedure Component Value Units Date/Time    Stool Exam-Ova,Cysts,Parasites [[id number]] Collected: 11/12/23 1213    Order Status: Sent Lab Status: In process Updated: 11/13/23 0232    Specimen: Stool     WBC, Stool [[id number]] " Collected: 11/12/23 1213    Order Status: Sent Lab Status: In process Updated: 11/12/23 1213    Specimen: Stool            Medications:  Reconciled Home Medications:      Medication List        START taking these medications      amoxicillin 500 MG capsule  Commonly known as: AMOXIL  Take 1 capsule (500 mg total) by mouth 3 (three) times daily.     doxycycline 100 MG tablet  Commonly known as: VIBRA-TABS  Take 1 tablet (100 mg total) by mouth every 12 (twelve) hours. for 5 days     ferrous sulfate 324 mg (65 mg iron) Tbec  Take 1 tablet (324 mg total) by mouth every other day.            CONTINUE taking these medications      acetaminophen 500 MG tablet  Commonly known as: TYLENOL  Take 500 mg by mouth every 6 (six) hours as needed for Pain. Only use when needed     acidophilus-pectin, citrus 100 million cell-10 mg Cap  Take 1 capsule by mouth once daily.     albuterol 90 mcg/actuation inhaler  Commonly known as: PROVENTIL/VENTOLIN HFA  INHALE 1 TO 2 PUFFS BY MOUTH INTO THE LUNGS EVERY 4 TO 6 HOURS AS NEEDED FOR WHEEZING OR SHORTNESS OF BREATH     FLONASE SENSIMIST 27.5 mcg/actuation nasal spray  Generic drug: fluticasone  2 sprays by Nasal route once daily.     furosemide 40 MG tablet  Commonly known as: LASIX  Take 1 tablet (40 mg total) by mouth once daily.     ibuprofen 200 MG tablet  Commonly known as: ADVIL,MOTRIN  Take 200 mg by mouth every 6 (six) hours as needed for Pain.     levocetirizine 5 MG tablet  Commonly known as: XYZAL  TAKE 1 TABLET(5 MG) BY MOUTH EVERY DAY     magnesium oxide 400 mg (241.3 mg magnesium) tablet  Commonly known as: MAG-OX  Take 1 tablet (400 mg total) by mouth once daily.     montelukast 10 mg tablet  Commonly known as: SINGULAIR  Take 1 tablet (10 mg total) by mouth every evening.     nintedanib 150 mg Cap  Commonly known as: OFEV  Take 1 capsule (150 mg total) by mouth 2 (two) times a day.     omeprazole 20 MG capsule  Commonly known as: PRILOSEC  Take 1 capsule (20 mg total) by  mouth once daily.     potassium chloride SA 20 MEQ tablet  Commonly known as: K-DUR,KLOR-CON  Take 1 tablet (20 mEq total) by mouth once daily.     predniSONE 10 MG tablet  Commonly known as: DELTASONE  Take 1 tablet (10 mg total) by mouth once daily.     revefenacin 175 mcg/3 mL Nebu  Inhale 175 mcg into the lungs once daily. Patient has not received medication yet     SEREVENT DISKUS 50 mcg/dose diskus inhaler  Generic drug: salmeteroL  Inhale 1 puff into the lungs 2 (two) times daily. Controller     TYVASO DPI 16(112)-32(112) -48(28) mcg Ctdv  Generic drug: treprostiniL  Inhale 16 mcg into the lungs 4 (four) times daily. Patient has not received medication  yet     VITAMIN-D + OMEGA-3 ORAL  Take 2 tablets by mouth once daily at 6am.     WOMEN'S 50+ ADVANCED ORAL  Take 1 tablet by mouth Daily.            STOP taking these medications      benzonatate 200 MG capsule  Commonly known as: TESSALON     dextrose 5 % in water (D5W) PgBk 100 mL with cefTRIAXone 2 gram SolR 2 g     docusate sodium 100 MG capsule  Commonly known as: COLACE     pantoprazole 40 MG tablet  Commonly known as: PROTONIX            ASK your doctor about these medications      vitamin D 1000 units Tab  Commonly known as: VITAMIN D3  Take 1,000 Units by mouth once daily.              Indwelling Lines/Drains at time of discharge:   Lines/Drains/Airways       None                   Time spent on the discharge of patient: 49 minutes         Som Cabello MD  Department of Hospital Medicine  '[place] - Telemetry ([facility])

## 2023-11-14 NOTE — PLAN OF CARE
A203/A203 KAMALJIT Alicia is a 53 y.o.female admitted on 11/1/2023 for Acute on chronic respiratory failure with hypoxia   Code Status: Full Code MRN: [id number]   Review of patient's allergies indicates:   Allergen Reactions    Doxycycline Nausea Only     Other reaction(s): Nausea    Fluticasone Other (See Comments)     Other reaction(s): Epistaxis       Past Medical History:   Diagnosis Date    Abnormal Pap smear of cervix     in the past with repeat pap smear okay.    Acute interstitial pneumonitis     Allergic rhinitis, cause unspecified     Arthritis of both knees     Asthma     Eczema     Fatty liver 10/2014    Fibrocystic breast changes     Headache(784.0)     Hepatomegaly 10/2014    Hypertension     Liver cyst 10/2014    Multinodular goiter     Followed by ENT - Dr. Nicolas Gray    Polymenorrhea 2008    TMJ (dislocation of temporomandibular joint)     Uterine fibroid     in the past    Vitamin D deficiency disease       PRN meds    acetaminophen, 650 mg, Q4H PRN  aluminum-magnesium hydroxide-simethicone, 30 mL, QID PRN  benzonatate, 100 mg, TID PRN  calcium carbonate, 1,000 mg, TID PRN  dextrose 10%, 12.5 g, PRN  dextrose 10%, 25 g, PRN  glucagon (human recombinant), 1 mg, PRN  glucose, 16 g, PRN  glucose, 24 g, PRN  HYDROcodone-acetaminophen, 1 tablet, Q6H PRN  melatonin, 6 mg, Nightly PRN  naloxone, 0.02 mg, PRN  ondansetron, 4 mg, Q8H PRN  prochlorperazine, 5 mg, Q6H PRN  promethazine-codeine 6.25-10 mg/5 ml, 5 mL, Q4H PRN  sodium chloride 0.9%, 10 mL, Q12H PRN      AVS Discharge instructions received and reviewed with pt.  Pt voiced understanding and all questions answered to satisfaction.  Stressed importance to making and keeping all follow up appointments.  Tele monitor removed and brought to monitor tech.  Pt will be transported to front of hospital to be discharged to facility via [facility]     Orientation: oriented x 4  Monica Coma Scale Score: 15     Lead Monitored: Lead II Rhythm: normal sinus  rhythm Frequency/Ectopy: PACs, PVCs, less than or equal to 6/min  Cardiac/Telemetry Box Number: [id number]  VTE Required Core Measure: Pharmacological prophylaxis initiated/maintained Last Bowel Movement: 11/14/23  Diet Adult Regular (IDDSI Level 7)  Voiding Characteristics: external catheter  Curt Score: 23  Fall Risk Score: 13  Accucheck []   Freq?      Lines/Drains/Airways       None

## 2023-11-15 LAB
BACTERIA STL CULT: NORMAL
BACTERIA STL CULT: NORMAL
O+P STL MICRO: NORMAL

## 2023-11-17 ENCOUNTER — EXTERNAL HOME HEALTH (OUTPATIENT)
Dept: HOME HEALTH SERVICES | Facility: HOSPITAL | Age: 53
End: 2023-11-17
Payer: COMMERCIAL

## 2023-11-17 LAB
BACTERIA BLD CULT: NORMAL
BACTERIA BLD CULT: NORMAL

## 2023-11-20 ENCOUNTER — DOCUMENT SCAN (OUTPATIENT)
Dept: HOME HEALTH SERVICES | Facility: HOSPITAL | Age: 53
End: 2023-11-20
Payer: COMMERCIAL

## 2023-11-22 DIAGNOSIS — J84.112 UIP (USUAL INTERSTITIAL PNEUMONITIS): ICD-10-CM

## 2023-11-22 DIAGNOSIS — J98.4 RESTRICTIVE LUNG DISEASE: ICD-10-CM

## 2023-11-22 DIAGNOSIS — J96.11 CHRONIC RESPIRATORY FAILURE WITH HYPOXIA: ICD-10-CM

## 2023-11-22 DIAGNOSIS — J44.9 COPD MIXED TYPE: ICD-10-CM

## 2023-11-24 ENCOUNTER — PATIENT MESSAGE (OUTPATIENT)
Dept: OTOLARYNGOLOGY | Facility: CLINIC | Age: 53
End: 2023-11-24
Payer: COMMERCIAL

## 2023-11-24 RX ORDER — REVEFENACIN 175 UG/3ML
175 SOLUTION RESPIRATORY (INHALATION) DAILY
Qty: 90 ML | Refills: 3 | Status: ACTIVE | OUTPATIENT
Start: 2023-11-24 | End: 2024-11-23

## 2023-11-27 ENCOUNTER — TELEPHONE (OUTPATIENT)
Dept: INFECTIOUS DISEASES | Facility: CLINIC | Age: 53
End: 2023-11-27
Payer: COMMERCIAL

## 2023-11-27 NOTE — TELEPHONE ENCOUNTER
----- Message from Karen Garcia sent at 11/27/2023 12:44 PM CST -----  Regarding: Appointment  Contact: [phone]  Calling to schedule an appointment per hospital follow up as soon as possible. Please call patient to schedule today.

## 2023-11-27 NOTE — TELEPHONE ENCOUNTER
----- Message from Emerald Zepedalatonya sent at 11/27/2023  3:40 PM CST -----  Contact: pt  Pt is calling in regard to her medication and need the nurse to call her back. Please call her back at [phone]  thanks/flora

## 2023-11-27 NOTE — TELEPHONE ENCOUNTER
Informed pt that Dr. Wayne is currently out of clinic for the week and won't return until next week. Informed pt that I will inform Dr. Mireles of the situation.

## 2023-11-27 NOTE — TELEPHONE ENCOUNTER
Pt scheduled for hosp f/u/ Pt verbalized understanding and agreed to appt date, time, and location.

## 2023-11-28 ENCOUNTER — TELEPHONE (OUTPATIENT)
Dept: PULMONOLOGY | Facility: CLINIC | Age: 53
End: 2023-11-28
Payer: COMMERCIAL

## 2023-11-28 RX ORDER — AMOXICILLIN AND CLAVULANATE POTASSIUM 875; 125 MG/1; MG/1
1 TABLET, FILM COATED ORAL 2 TIMES DAILY
Qty: 28 TABLET | Refills: 0 | Status: ON HOLD | OUTPATIENT
Start: 2023-11-28 | End: 2023-12-07 | Stop reason: SDUPTHER

## 2023-11-28 NOTE — TELEPHONE ENCOUNTER
----- Message from Indu Cuellar sent at 11/28/2023  7:48 AM CST -----  Contact: jeromy Sanon stated that patient was discharged from rehab but does not have any inhalers that she usually use. He also has questions about oxygen concentrator. Please call him back at [phone].            [facility]/pharmacy #[id number] [facility] - [facility] [place] - [address] AT AT [facility]  [address]  [place] [address]  Phone: [phone] Fax: [phone]

## 2023-11-29 ENCOUNTER — PATIENT MESSAGE (OUTPATIENT)
Dept: PULMONOLOGY | Facility: CLINIC | Age: 53
End: 2023-11-29

## 2023-11-29 ENCOUNTER — OFFICE VISIT (OUTPATIENT)
Dept: PULMONOLOGY | Facility: CLINIC | Age: 53
End: 2023-11-29
Payer: COMMERCIAL

## 2023-11-29 ENCOUNTER — HOSPITAL ENCOUNTER (OUTPATIENT)
Dept: RADIOLOGY | Facility: HOSPITAL | Age: 53
Discharge: HOME | End: 2023-11-29
Attending: INTERNAL MEDICINE
Payer: COMMERCIAL

## 2023-11-29 ENCOUNTER — HOSPITAL ENCOUNTER (OUTPATIENT)
Dept: PULMONOLOGY | Facility: HOSPITAL | Age: 53
Discharge: HOME | End: 2023-11-29
Payer: COMMERCIAL

## 2023-11-29 VITALS
SYSTOLIC BLOOD PRESSURE: 116 MMHG | BODY MASS INDEX: 34.59 KG/M2 | RESPIRATION RATE: 18 BRPM | HEIGHT: 66 IN | DIASTOLIC BLOOD PRESSURE: 72 MMHG | WEIGHT: 215.25 LBS | OXYGEN SATURATION: 96 %

## 2023-11-29 VITALS — WEIGHT: 215 LBS | BODY MASS INDEX: 34.7 KG/M2

## 2023-11-29 DIAGNOSIS — J84.112 UIP (USUAL INTERSTITIAL PNEUMONITIS): Primary | ICD-10-CM

## 2023-11-29 DIAGNOSIS — J98.4 RESTRICTIVE LUNG DISEASE: ICD-10-CM

## 2023-11-29 DIAGNOSIS — J96.11 CHRONIC RESPIRATORY FAILURE WITH HYPOXIA: ICD-10-CM

## 2023-11-29 DIAGNOSIS — G47.33 OSA ON CPAP: ICD-10-CM

## 2023-11-29 DIAGNOSIS — D64.9 ANEMIA, UNSPECIFIED TYPE: ICD-10-CM

## 2023-11-29 DIAGNOSIS — R53.83 FATIGUE, UNSPECIFIED TYPE: ICD-10-CM

## 2023-11-29 DIAGNOSIS — I27.23 PULMONARY HYPERTENSION DUE TO INTERSTITIAL LUNG DISEASE: ICD-10-CM

## 2023-11-29 DIAGNOSIS — R49.0 DYSPHONIA: ICD-10-CM

## 2023-11-29 DIAGNOSIS — J84.9 INTERSTITIAL LUNG DISEASE: ICD-10-CM

## 2023-11-29 DIAGNOSIS — J84.9 PULMONARY HYPERTENSION DUE TO INTERSTITIAL LUNG DISEASE: ICD-10-CM

## 2023-11-29 DIAGNOSIS — I10 PRIMARY HYPERTENSION: ICD-10-CM

## 2023-11-29 DIAGNOSIS — R09.02 EXERCISE HYPOXEMIA: ICD-10-CM

## 2023-11-29 PROBLEM — J44.1 COPD EXACERBATION: Status: RESOLVED | Noted: 2023-03-17 | Resolved: 2023-11-29

## 2023-11-29 PROCEDURE — 3008F BODY MASS INDEX DOCD: CPT | Mod: CPTII,S$GLB,, | Performed by: INTERNAL MEDICINE

## 2023-11-29 PROCEDURE — 1159F PR MEDICATION LIST DOCUMENTED IN MEDICAL RECORD: ICD-10-PCS | Mod: CPTII,S$GLB,, | Performed by: INTERNAL MEDICINE

## 2023-11-29 PROCEDURE — 1111F DSCHRG MED/CURRENT MED MERGE: CPT | Mod: CPTII,S$GLB,, | Performed by: INTERNAL MEDICINE

## 2023-11-29 PROCEDURE — 71046 X-RAY EXAM CHEST 2 VIEWS: CPT | Mod: 26,,, | Performed by: RADIOLOGY

## 2023-11-29 PROCEDURE — 3074F PR MOST RECENT SYSTOLIC BLOOD PRESSURE < 130 MM HG: ICD-10-PCS | Mod: CPTII,S$GLB,, | Performed by: INTERNAL MEDICINE

## 2023-11-29 PROCEDURE — 3078F PR MOST RECENT DIASTOLIC BLOOD PRESSURE < 80 MM HG: ICD-10-PCS | Mod: CPTII,S$GLB,, | Performed by: INTERNAL MEDICINE

## 2023-11-29 PROCEDURE — 99214 OFFICE O/P EST MOD 30 MIN: CPT | Mod: S$GLB,,, | Performed by: INTERNAL MEDICINE

## 2023-11-29 PROCEDURE — 1159F MED LIST DOCD IN RCRD: CPT | Mod: CPTII,S$GLB,, | Performed by: INTERNAL MEDICINE

## 2023-11-29 PROCEDURE — 3044F PR MOST RECENT HEMOGLOBIN A1C LEVEL <7.0%: ICD-10-PCS | Mod: CPTII,S$GLB,, | Performed by: INTERNAL MEDICINE

## 2023-11-29 PROCEDURE — 99214 PR OFFICE/OUTPT VISIT, EST, LEVL IV, 30-39 MIN: ICD-10-PCS | Mod: S$GLB,,, | Performed by: INTERNAL MEDICINE

## 2023-11-29 PROCEDURE — 99999 PR PBB SHADOW E&M-EST. PATIENT-LVL V: CPT | Mod: PBBFAC,,, | Performed by: INTERNAL MEDICINE

## 2023-11-29 PROCEDURE — G0239 OTH RESP PROC, GROUP: HCPCS

## 2023-11-29 PROCEDURE — 1160F PR REVIEW ALL MEDS BY PRESCRIBER/CLIN PHARMACIST DOCUMENTED: ICD-10-PCS | Mod: CPTII,S$GLB,, | Performed by: INTERNAL MEDICINE

## 2023-11-29 PROCEDURE — 3074F SYST BP LT 130 MM HG: CPT | Mod: CPTII,S$GLB,, | Performed by: INTERNAL MEDICINE

## 2023-11-29 PROCEDURE — 1160F RVW MEDS BY RX/DR IN RCRD: CPT | Mod: CPTII,S$GLB,, | Performed by: INTERNAL MEDICINE

## 2023-11-29 PROCEDURE — 3044F HG A1C LEVEL LT 7.0%: CPT | Mod: CPTII,S$GLB,, | Performed by: INTERNAL MEDICINE

## 2023-11-29 PROCEDURE — 71046 XR CHEST PA AND LATERAL: ICD-10-PCS | Mod: 26,,, | Performed by: RADIOLOGY

## 2023-11-29 PROCEDURE — 1111F PR DISCHARGE MEDS RECONCILED W/ CURRENT OUTPATIENT MED LIST: ICD-10-PCS | Mod: CPTII,S$GLB,, | Performed by: INTERNAL MEDICINE

## 2023-11-29 PROCEDURE — 71046 X-RAY EXAM CHEST 2 VIEWS: CPT | Mod: TC

## 2023-11-29 PROCEDURE — 3078F DIAST BP <80 MM HG: CPT | Mod: CPTII,S$GLB,, | Performed by: INTERNAL MEDICINE

## 2023-11-29 PROCEDURE — 99999 PR PBB SHADOW E&M-EST. PATIENT-LVL V: ICD-10-PCS | Mod: PBBFAC,,, | Performed by: INTERNAL MEDICINE

## 2023-11-29 PROCEDURE — 3008F PR BODY MASS INDEX (BMI) DOCUMENTED: ICD-10-PCS | Mod: CPTII,S$GLB,, | Performed by: INTERNAL MEDICINE

## 2023-11-29 RX ORDER — IPRATROPIUM BROMIDE AND ALBUTEROL SULFATE 2.5; .5 MG/3ML; MG/3ML
3 SOLUTION RESPIRATORY (INHALATION) 2 TIMES DAILY
Qty: 180 ML | Refills: 11 | Status: SHIPPED | OUTPATIENT
Start: 2023-11-29 | End: 2024-11-28

## 2023-11-29 NOTE — PROGRESS NOTES
[facility] - Pulmonary Rehab  Pulmonary Rehab  Session Summary    SUMMARY     Session Data  Session Number: 9  Time In: 1100  Time Out: 1200  Weight: 97.5 kg (215 lb)  Target Heart Rate Zone: Minimum: 99 bpm  Target Heart Rate Zone: Maximum: 132 bpm  Patient Motivation: Good  Patient Effort: Good      Pre Exercise Vitals  SpO2: 91 %  Supplemental O2?: Yes  O2 Device: nasal cannula  O2 Flow (L/min): 8  Pulse: 114  BP: 124/68  Jessica Dyspnea Rating : 2      During Exercise Vitals  SpO2: 94 %  Supplemental O2?: Yes  O2 Device: nonrebreather mask  O2 Flow (L/min): 15  Pulse: 120  BP: 128/72  Jessica Dyspnea Rating : 2      Post Exercise Vitals  SpO2: 92 %  Supplemental O2?: Yes  O2 Device: nasal cannula  O2 Flow (L/min): 15  Pulse: 122  BP: 135/71  Jessica Dyspnea Rating : 2       Modality  Modality: Arm Ergometer, Hand Free Weights, Nustep      Arm Ergometer  Time: 5 minutes  Level: 1      Nustep  Time: 20 minutes  Steps: 1244  Load: 3  Mets: 1.6    Biceps  lbs: 3 lbs  Sets: 2  Reps: 10  Weight Type : dumbbell      Chest  lbs: 3 lbs  Sets: 2  Reps: 10  Weight Type : dumbbell    Education  PFT/ABG/CXR      Therapist Notes   Good effort. Pt took frequent breaks but completed exercises as charted. Pt placed on 100% NRB for exercise. Today is her first day back after being hospitalized and at an LTAC for 2 weeks. She did well and is determined to follow through with the lung transplant process. Will continue to greatly motivate and educate pt in rehab.     Dr. Aviles immediately available as needed.       Pulmonary Rehab COVID19 Screening Checklist     1. Are you having any of the following physical symptoms?              Yes [] No [x]      Fever or chills  Unexplained muscle or body aches  Cough  Shortness of breath or difficulty breathing  Fatigue  Headache  New loss of taste or smell  Sore throat  Congestion or runny nose  Nausea or vomiting  Diarrhea        2.  Have you come in close contact with anyone with the above symptoms  or with known COVID19 in the last 14 days?           Yes [] No [x]                    3.  Have you tested positive for COVID19 in the last 30 days?   Yes [] No [x]

## 2023-11-29 NOTE — PROGRESS NOTES
Pulmonary Outpatient  Visit     Subjective:       Patient ID: Ayanna Alicia is a 53 y.o. female.    Chief Complaint: hospital followup        Ayanna Alicia is 51 y.o.  Post hospital f/u  Acute respiratory failure ILD, +ve RF  Was discharged on oxygen  Here to review results  Explained  PFT: severe restriction and reduced DLCO  ABG  6MWD: oxygen desat needs supplementary oxygen 3 LPM  Has some nose bleed will add AYR gel and humifier bottle  FMLA paper work given  Out of work letter   Added PEPCID and Bactrim  Adherent with inhaler    05/18/2022  Here to see today  Concern, Dyspnea with activity palpitations  Seen Rheumatology: Added CELLCEPT  On steriod taper  No cough wheezing  On oxygen 3 LPM  Had GLENN in 2019: was prescribed CPAP returned back  Needs PAP at night  Motivated to restart      07/15/2022  Last seen 05/18/2022  ACT score 10, McRoberts score 6  CPAP study: CPAP ordered  Await   Says cannot go to work, tied, focus off  6MWD: RA sat was 95%  O2 titrated to 4-6 LPM  Tachycardia noted : 145 BPM    09/20/2022  Last seen 07/15/2022  Here to review progress  Enrolled in pul rehab, session 8  Sat Stable @ 2-3 LPM  ACt score 5, McRoberts 6  No cough, better exercise tolerance  Stable on cellcept 1000mg BID + prednisone 10 mg  Will DW Rheum whether can titrate up cellcept to wean steriods  CPAP download shows adherence   CPAP 9 cm with resudual AHI 0.2  Usage > 4 hrs was 67%  Cehst CT reviewed  Repeat PFT pending      12/28/2022  Last seen 11/07/2022  Here to review chest CT done recently  Fatigue, SOB, on 6 LPM  SP rituxan 4 doses  ABG reveiwed  ESR and CRP were increased  On Prednisone 10 mg  Hoarse voice  Discussed utility of bronchoscopy if infection is considered  TBBX would have risk of flaring and acute resp failure:  Serologies sent  Added OFEV  Will also reduce fluid intake to approx 32 oz  Will reenrol on Pul rehab      03/07/2023  Urgent  visit: weak, easily desat  Subjectively better  Seen Dr Stallings: Diflucan and bactrim  Oral thrush: not present today  No fever  On 6 LPM  Dropped weight from 241Lb to 228 lb  Ofev increased: felt better onofev  Will decrease prednisone to 10 mg since concern for PJP  Discussed referal to advance lung disease  Sputum culture  In wheel chair  Adherent with CPAP  Schedule bronch for BAL next week    05/01/2023  Follow up visit  Care from multiple specialities noted  ENT referred to Dr Koenig, 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  Bronch specimens were negative for any infection, No PCP or fungus  [facility] declined w/u for transplant due to BMI  DW patient: reenrol in pul rehab since feels better and options for out of state transplant: [place] may be considered  GI : GERD, and esophagram reveiwed: reiterated role on GERD in advanced lung disease: behavioural interventions since last 6 weeks helped  Stable on POC 6 LPM  Disucssed options for scooter  Will increase OFEV to 150 BID  Keep prednisone at 10 mg daily ( no need for bactrim prophylaxis at thiss dose)  ACT 10  Changed BREO to TRELEGY  [place] next visit  Cough improved  Refill for tessalon  Encouaged to wear CPAP      08/07/2023  Followup  Recent hospitalization  CPAP changed to AVAPS  Appt for Transplant eval 08/14  Here with nephew  Santa 10. Asthma score 11  Still HICKS, mRC score 2-3  Attempted 6MWD: low capacity  Office notes: Dr Ashton: possibility Sertraline ILD: case reports reviewed  In abundance : medication stopped  Stable OFEV 150 mg BID  Still has dysphonia  GERD has improved      09/21/2023  Follow :  was in the emergency room over the weekend  Seen ENT [facility] office notes reviewed.  Has home noninvasive ventilation  Tired SOB  In pul rehab; had 2   On 6-8 LPM  During. rehab escalated to 15 liters/minute  Needs more than 7 tanks a week  Will ask for par to be increased to 15 LPM  Tolerating OFEV  Colonoscopy scheduled oct  16th, reflux procedure was supposed to be today: Rescheduled  Lasix was increased to 40 mg.    Recent echocardiogram diastolic dysfunction grade 2  Recent infusion:tocilizumab   SpO2: 93 %  Supplemental O2?: Yes  O2 Device: nonrebreather mask  O2 Flow (L/min): 15  Pulse: 100  BP: 111/71  Jessica Dyspnea Rating : 4      10/24/2023   Follow-up visit   Last seen 09/21/2023   Recent hospitalization   Was discharged with a PICC line infusions micafungin, ceftriaxone  Cultures from throat also positive for Actinomyces  Duration of IV antibiotics it before weeks   Currently on 8 liters/minute but can ambulate with 6 liters/minute in the home   Adherent with nocturnal home ventilation.    tocilizumab  infusions on hold   Right heart catheterization performed in [place] was reviewed with patient   I discussed with [place] team and they were okay with ask commencing TYVASCO  Risks benefits side-effects and titration protocol of this medication discussed with patient   Will commence this titration once completed IV micafungin and ceftriaxone   Patient tolerating OFEV potassium today was 3.5   Additional potassium instructions were given   Will recheck her potassium on Monday   Will discontinue DuoNeb and ipratropium and start her on YULPERI  Muscle cramps have resolved   Will wean prednisone 10 mg x 30 days, 5 mg x 30 days, 2.5 mg x 30 days and stop   Aim is to avoid any steroids because of risk of fungal infections   Patient is currently engaged in pulmonary rehab    Session Data  Session Number: 7  Time In: 0930  Time Out: 1030  Weight: 98.9 kg (218 lb)  Target Heart Rate Zone: Minimum: 99 bpm  Target Heart Rate Zone: Maximum: 132 bpm  Patient Motivation: Excellent  Patient Effort: Excellent        Pre Exercise Vitals  SpO2: 92 %  Supplemental O2?: Yes  O2 Device: nasal cannula  O2 Flow (L/min): 8  Pulse: 122  BP: 123/64  Jessica Dyspnea Rating : 3      11/29/2023  [place]  Discharged from LTAC 2 days ago  Here with family .  IOANA  Fatigue but feels better  Wheel chair   On Oxygen 6 LPM  Was on prednisone 10 mg weaned to 5 mg  Tolerating ofex  PICC is out  Will rejoin pul rehab  Was given LIFE  ( Home vent ) in lieu of trilogy  Discussed Lasix: standing labs  Will take every other day  Home weight dropped fro 218 lbs to 209Lb  Still not started on TYVASO  COPD score 22  [place] 1  Working on [facility]               [facility] - Tele[facility] ([facility])  [facility] Medicine  Discharge Summary        Patient Name: Ayanna Alicia  MRN: [id number]  DARLENE: [id number]  Patient Class: IP- Inpatient  Admission Date: 2023  Hospital Length of Stay: 13 days  Discharge Date and Time:  2023 12:34 PM  Attending Physician: Som Cabello MD   Discharging Provider: Som Cabello MD  Primary Care Provider: Madeleine Enrique MD     Primary Care Team: [facility]     HPI:   53-year-old white female with a history of pulmonary fibrosis, pulmonary HTN, RA, Actinomyces infection, COPD, GLENN. Presented with worsening shortness of breath over the past 48 hours.  The patient was in the hospital 2 weeks ago and since has been weaning off of steroids.  She is oxygen dependent at home usually at 8 liters/minute.  She was progressive shortness of breath with orthopnea.  There is no chest pain or pedal edema.  She denies any fevers or chills though now has a productive cough.  Patient was compliant with her medications.  In the ED, vitals: 155/93, 130, 16, 99.1, 54% RA. Placed on vapotherm in ED. Labs: WBC: 18.29, A, Lactic: 2.6, Trop; 0.029, +FluA, CXR: unchanged. EKG: Neg for STEMI. Treated with IV fluids, Steroid, Nebs. Vapotherm set to 20L @ 90% FiO2. Patient is a full code. Admitted to Hospital Medicine for management of Acute on Chronic Resp Failure, Influenza A.      * No surgery found *       Hospital Course:   : pt currently on vapotherm. Cont duonebs, budesonide, and brovana nebs. Wean O2 as tolerated. Cont tamiflu. Will  order procal and d dimer.   11/3: CTA negative for PE. Cont current management. Wean O2 as tolerated. Pulm on case.   11/4: cont current management, requiring increased vapotherm.   11/5: vapotherm being weaned down. Cont current management.   11/6: cont supportive care. Currently on vapotherm 20L 50% fio2.   11/7: pt continues to require vapotherm, will start LTAC placement process for O2 weaning. Cont supportive care. Pt reports feeling swelling in her neck, will obtain CT scan.   11/8: LTAC placement pending. Still on 20L 50%fio2. CT scan of neck did not reveal abscess or obstruction. Pt with hx of multinodular goiter. Will obtain tsh and anti-tpo. Outpatient f/u with surgery to discuss thyroidectomy given symptoms.   11/9: weaned down to 15L 50% fio2. TSH and anti tpo normal. Thyroid u/s reviewed, will need outpatient FNA. LTAC placement pending however pt may improve quickly and not require it. Pt reports dark stools however she was started on iron tablets. Will trend h/h and consider consulting GI.   11/10: FOBT pending. Cont to trend h/h. Pt still on 15L, LTAC accepted awaiting medical stability. Consult GI if FOBT positive and h/h continues to trend down. Increase PPI to bid.   11/11 fobt positive. Hb/hct stable. Cscope performed in 2020 with normal pathology. Continue fe supplement. Follow up outpatient gastroenterology but will need pulmonology clearance for procedure. Continue proton pump inhibitor as chronically on steroids. Awaiting ltac placement  11/12 febrile overnight associated with fullness in ear and headache. Reports diarrhea. On intravenous rocephin until approximately 11/22. Discontinue rocephin and bolus fluids. Repeat blood culture due to immunocompromised status and recent flu positive, concerns for double infection. Reconsult infectious disease.   11/13 afebrile overnight. Patient states possibly drinking hot coffee when temperature was taken yesterday. Intravenous rocephin discontinued to  diarrhea. Infectious diarrhea workup unrevealing thus far. Now with partially formed stools. Awaiting ltac placement for continued weaning. Start po doxy per infectious disease recommendations      11/14  Received insurance authorization for ltac placement for continued weaning of supplemental oxygen. Case management present.     On exam, no acute distress. Respiratory distress on vapotherm. Dysphonia. AO3. Depressed.     Patient seen and evaluated by me. Patient was determined to be suitable for discharge. Patient deemed stable for discharge to ltac.    COPD Questionnaire  How often do you cough?: Most of the time  How often do you have phlegm (mucus) in your chest?: Some of the time  How often does your chest feel tight?: Some of the time  When you walk up a hill or one flight of stairs, how often are you breathless?: All of the time  How often are you limited doing any activities at home?: Some of the time  How often are you confident leaving the house despite your lung condition?: All of the time  How often do you sleep soundly?: Some of the time  How often do you have energy?: Some of the time  Total score: 22   MMRC Dyspnea Scale (4 is worst)     [] MMRC 0: Dyspneic on strenuous excercise (0 points)    [] MMRC 1: Dyspneic on walking a slight hill (0 points)    [x] MMRC 2: Dyspneic on walking level ground; must stop occasionally due to breathlessness (1 point)    [] MMRC 3: Must stop for breathlessness after walking 100 yards or after a few minutes (2 points)    [] MMRC 4: Cannot leave house; breathless on dressing/undressing (3 points)              10/24/2023    11:37 AM   EPWORTH SLEEPINESS SCALE   Sitting and reading 0   Watching TV 0   Sitting, inactive in a public place (e.g. a theatre or a meeting) 0   As a passenger in a car for an hour without a break 0   Lying down to rest in the afternoon when circumstances permit 3   Sitting and talking to someone 0   Sitting quietly after a lunch without alcohol 0    In a car, while stopped for a few minutes in traffic 0   Total score 3            [facility] - Telemetry (Hospital)  Hospital Medicine  Discharge Summary        Patient Name: Ayanna Alicia  MRN: [id number]  [facility]: [id number]  Patient Class: IP- Inpatient  Admission Date: 7/31/2023  Hospital Length of Stay: 4 days  Discharge Date and Time: 8/5/2023  Attending Physician: Rosa Heart,*   Discharging Provider: Rosa Heart MD  Primary Care Provider: Madeleine Enrique MD     Primary Care Team: Networked reference to record PCT      HPI:   [id number]  has a past medical history of Abnormal Pap smear of cervix, Acute interstitial pneumonitis, Allergic rhinitis, cause unspecified, Arthritis of both knees, Asthma, Eczema, Fatty liver, Fibrocystic breast changes, Headache(784.0), Hepatomegaly, Hypertension, Liver cyst, Multinodular goiter, Polymenorrhea, TMJ (dislocation of temporomandibular joint), Uterine fibroid, and Vitamin D deficiency disease.  Presents for worsening dyspnea. Associated with fever, sneezing, rhinorrhea, chest tightness, and wheezing. Onset Friday after being without supplemental oxygen for 15 minutes with difficulties recovering, which prompted a visit to clinic/urgent care. At baseline, patient wears 6L supplemental oxygen. Reports taking prednisone for a long time. States compliance to ofev for ild.     Initial workup in emergency department revealed increased supplemental oxygen of 8L, tachycardia and tachypnea. Leukocytosis on cbc. Cmp with hyperglycemia and mildly elevated liver enzymes. Troponin(s) elevated. Bnp within normal limits. Abg metabolic and respiratory alkalosis. Chest x-ray with bibasilar infiltrates. Covid and flu negative. Electrocardiography with sinus tachycardia.     Hospital medicine consulted for admission under observation, pneumonia vs asthma exacerbation. Pulmonology consulted        * No surgery found *       Hospital Course:   8/1 admitted for acute on  chronic respiratory failure. Weaned down to 5L. Pulmonology following. Continue current care.   8/2 no acute events overnight. remains on baseline supplemental oxygen. Pulmonology recommending trilogy for home use. Case management consulted. Physical/occupational therapy consulted. Patient will have homehealth physical/occupational therapy on discharge.   8/3 reports dyspnea with exertion with hypoxia requiring increased supplemental oxygen needs to recover. Patient reports compliance with cpap overnight. Pulmonology recommending avaps for home use. Case management consulted for trilogy. Continue current regimen. Speech consulted for dysphagia and dysphonia, recommending follow up ent for laryngeal ulcers.  8/4- stated improvement in symptoms in regards of dyspnea compared to yesterday; currently saturating about 92 on room air,; blood culture x1 as of 7/31/2 for fusobacterium, likely contaminant, will follow up on repeat cultures from today, if cultures resulted negative, likely plan for discharge accordingly in next 24- 48 hrs;    worked on arrangements for trilogy; pulmonology on board, appreciate recommendations.  PT OT recommended home.  8/5   Examination done at bedside, patient appeared alert and oriented x3, denied headache, dizziness, chest pain, shortness O breath, palpitations, bowel or bladder issues.    Currently saturating above 92 on 5 L nasal cannula, currently at baseline.    Stated significant improvement in cough with minimal sputum production.    Discussed with pulmonology, stated okay for discharge-recommended oral antibiotics to complete course, Lasix, prednisone 40 mg upon discharge, compliance with outpatient follow-up visits  Considering clinical and hemodynamic stability, planning to discharge patient today, emphasized on compliance with medications, follow-up visits, diet, fluid restriction, salt restriction, compliance with trilogy.    Patient agreed to the plan, has upcoming  appointment within a week with Dr. Aviles pulmonology, with transplant doctor at [place] on ;  Given underlying medical conditions, prognosis guarded- discussed on code status, opted for full code at this point; to consider outpatient palliative visits if needed.     PT OT recommended home.  Speech consulted for dysphagia and dysphonia, recommending follow up ent for laryngeal ulcers.  Discharge today, medications delivered to patient preferred pharmacy.              The following portions of the patient's history were reviewed and updated as appropriate:   She  has a past medical history of Abnormal Pap smear of cervix, Acute interstitial pneumonitis, Allergic rhinitis, cause unspecified, Arthritis of both knees, Asthma, Eczema, Fatty liver (10/2014), Fibrocystic breast changes, Headache(784.0), Hepatomegaly (10/2014), Hypertension, Liver cyst (10/2014), Multinodular goiter, Polymenorrhea (), TMJ (dislocation of temporomandibular joint), Uterine fibroid, and Vitamin D deficiency disease.  She does not have any pertinent problems on file.  She  has a past surgical history that includes  section, classic; Multiple tooth extractions; Partial hysterectomy (2013); Hysterectomy; Colonoscopy (N/A, 2020); and Bronchoscopy (Bilateral, 2023).  Her family history includes Cancer (age of onset: 50) in her maternal aunt; Cancer (age of onset: 60) in her maternal grandmother; Cancer (age of onset: 66) in her maternal aunt; Cataracts in her cousin; Diabetes in her maternal grandfather; Diverticulitis in her mother; Heart disease in her mother; Heart disease (age of onset: 63) in her father; Hypertension in her father; Migraines in her cousin; No Known Problems in her brother; Peripheral vascular disease in her maternal grandfather; Stroke in her maternal grandfather, mother, and sister.  She  reports that she has never smoked. She has been exposed to tobacco smoke. She has never used smokeless  tobacco. She reports that she does not currently use alcohol. She reports that she does not currently use drugs after having used the following drugs: Marijuana. Frequency: 4.00 times per week.  She has a current medication list which includes the following prescription(s): acetaminophen, acidophilus-pectin, citrus, albuterol, amoxicillin-clavulanate 875-125mg, ferrous sulfate, fluticasone, furosemide, ibuprofen, levocetirizine, mv-minerals/fa/omega 3,6,9 #3, nintedanib, omega-3s/dha/epa/fish oil/d3, omeprazole, yupelri, revefenacin, tyvaso dpi, vitamin d, albuterol-ipratropium, and serevent diskus.  Current Outpatient Medications on File Prior to Visit   Medication Sig Dispense Refill    acetaminophen (TYLENOL) 500 MG tablet Take 500 mg by mouth every 6 (six) hours as needed for Pain. Only use when needed      acidophilus-pectin, citrus 100 million cell-10 mg Cap Take 1 capsule by mouth once daily.      albuterol (PROVENTIL/VENTOLIN HFA) 90 mcg/actuation inhaler INHALE 1 TO 2 PUFFS BY MOUTH INTO THE LUNGS EVERY 4 TO 6 HOURS AS NEEDED FOR WHEEZING OR SHORTNESS OF BREATH 8.5 g 5    amoxicillin-clavulanate 875-125mg (AUGMENTIN) 875-125 mg per tablet Take 1 tablet by mouth 2 (two) times daily. for 14 days 28 tablet 0    ferrous sulfate 324 mg (65 mg iron) TbEC Take 1 tablet (324 mg total) by mouth every other day.      fluticasone (VERAMYST) 27.5 mcg/actuation nasal spray 2 sprays by Nasal route once daily. 9.1 mL 3    furosemide (LASIX) 40 MG tablet Take 1 tablet (40 mg total) by mouth once daily. 30 tablet 0    ibuprofen (ADVIL,MOTRIN) 200 MG tablet Take 200 mg by mouth every 6 (six) hours as needed for Pain.      levocetirizine (XYZAL) 5 MG tablet TAKE 1 TABLET(5 MG) BY MOUTH EVERY DAY 90 tablet 1    mv-minerals/FA/omega 3,6,9 #3 (WOMEN'S 50+ ADVANCED ORAL) Take 1 tablet by mouth Daily.      nintedanib (OFEV) 150 mg Cap Take 1 capsule (150 mg total) by mouth 2 (two) times a day. 60 capsule 11     omega-3s/dha/epa/fish oil/D3 (VITAMIN-D + OMEGA-3 ORAL) Take 2 tablets by mouth once daily at 6am.      omeprazole (PRILOSEC) 20 MG capsule Take 1 capsule (20 mg total) by mouth once daily. 30 capsule 1    revefenacin (YUPELRI) 175 mcg/3 mL Nebu Inhale 175 mcg into the lungs once daily. 90 mL 3    revefenacin 175 mcg/3 mL Nebu Inhale 175 mcg into the lungs once daily. Patient has not received medication yet      treprostiniL (TYVASO DPI) 16(112)-32(112) -48(28) mcg CtDv Inhale 16 mcg into the lungs 4 (four) times daily. Patient has not received medication  yet      vitamin D (VITAMIN D3) 1000 units Tab Take 1,000 Units by mouth once daily.      SEREVENT DISKUS 50 mcg/dose diskus inhaler Inhale 1 puff into the lungs 2 (two) times daily. Controller (Patient not taking: Reported on 11/29/2023) 60 each 11    [DISCONTINUED] amoxicillin (AMOXIL) 500 MG capsule Take 1 capsule (500 mg total) by mouth 3 (three) times daily.       No current facility-administered medications on file prior to visit.     She is allergic to doxycycline and fluticasone..      Review of Systems   Constitutional:  Positive for fatigue. Negative for activity change.   Respiratory:  Positive for dyspnea on extertion. Negative for cough and shortness of breath.    Gastrointestinal:  Negative for acid reflux.   All other systems reviewed and are negative.      Outpatient Encounter Medications as of 11/29/2023   Medication Sig Dispense Refill    acetaminophen (TYLENOL) 500 MG tablet Take 500 mg by mouth every 6 (six) hours as needed for Pain. Only use when needed      acidophilus-pectin, citrus 100 million cell-10 mg Cap Take 1 capsule by mouth once daily.      albuterol (PROVENTIL/VENTOLIN HFA) 90 mcg/actuation inhaler INHALE 1 TO 2 PUFFS BY MOUTH INTO THE LUNGS EVERY 4 TO 6 HOURS AS NEEDED FOR WHEEZING OR SHORTNESS OF BREATH 8.5 g 5    amoxicillin-clavulanate 875-125mg (AUGMENTIN) 875-125 mg per tablet Take 1 tablet by mouth 2 (two) times daily. for  14 days 28 tablet 0    ferrous sulfate 324 mg (65 mg iron) TbEC Take 1 tablet (324 mg total) by mouth every other day.      fluticasone (VERAMYST) 27.5 mcg/actuation nasal spray 2 sprays by Nasal route once daily. 9.1 mL 3    furosemide (LASIX) 40 MG tablet Take 1 tablet (40 mg total) by mouth once daily. 30 tablet 0    ibuprofen (ADVIL,MOTRIN) 200 MG tablet Take 200 mg by mouth every 6 (six) hours as needed for Pain.      levocetirizine (XYZAL) 5 MG tablet TAKE 1 TABLET(5 MG) BY MOUTH EVERY DAY 90 tablet 1    mv-minerals/FA/omega 3,6,9 #3 (WOMEN'S 50+ ADVANCED ORAL) Take 1 tablet by mouth Daily.      nintedanib (OFEV) 150 mg Cap Take 1 capsule (150 mg total) by mouth 2 (two) times a day. 60 capsule 11    omega-3s/dha/epa/fish oil/D3 (VITAMIN-D + OMEGA-3 ORAL) Take 2 tablets by mouth once daily at 6am.      omeprazole (PRILOSEC) 20 MG capsule Take 1 capsule (20 mg total) by mouth once daily. 30 capsule 1    revefenacin (YUPELRI) 175 mcg/3 mL Nebu Inhale 175 mcg into the lungs once daily. 90 mL 3    revefenacin 175 mcg/3 mL Nebu Inhale 175 mcg into the lungs once daily. Patient has not received medication yet      treprostiniL (TYVASO DPI) 16(112)-32(112) -48(28) mcg CtDv Inhale 16 mcg into the lungs 4 (four) times daily. Patient has not received medication  yet      vitamin D (VITAMIN D3) 1000 units Tab Take 1,000 Units by mouth once daily.      albuterol-ipratropium (DUO-NEB) 2.5 mg-0.5 mg/3 mL nebulizer solution Take 3 mLs by nebulization 2 (two) times a day. Rescue 180 mL 11    SEREVENT DISKUS 50 mcg/dose diskus inhaler Inhale 1 puff into the lungs 2 (two) times daily. Controller (Patient not taking: Reported on 11/29/2023) 60 each 11    [DISCONTINUED] amoxicillin (AMOXIL) 500 MG capsule Take 1 capsule (500 mg total) by mouth 3 (three) times daily.       No facility-administered encounter medications on file as of 11/29/2023.       Objective:     Vital Signs (Most Recent)  Vital Signs  Resp: 18  SpO2: 96 % (Pt  "currently on 6LPM via nasal cannula)  BP: 116/72  Height and Weight  Height: 5' 6" (167.6 cm)  Weight: 97.7 kg (215 lb 4.5 oz)  BSA (Calculated - sq m): 2.13 sq meters  BMI (Calculated): 34.8  Weight in (lb) to have BMI = 25: 154.6]  Wt Readings from Last 2 Encounters:   11/29/23 97.7 kg (215 lb 4.5 oz)   11/09/23 99.5 kg (219 lb 5.7 oz)       Physical Exam   Constitutional: She is oriented to person, place, and time. She appears well-developed and well-nourished.   HENT:   Head: Normocephalic.   Mouth/Throat: Mallampati Score: II.   Neck: No JVD present.   Cardiovascular: Normal rate and intact distal pulses.   No murmur heard.  Pulmonary/Chest: Normal expansion and effort normal. She has decreased breath sounds. She has no rhonchi. She has no rales.   Abdominal: Soft. Bowel sounds are normal.   Musculoskeletal:         General: No edema. Normal range of motion.      Cervical back: Normal range of motion and neck supple.   Lymphadenopathy:     She has no axillary adenopathy.   Neurological: She is alert and oriented to person, place, and time.   Skin: Skin is warm and dry. No cyanosis. Nails show no clubbing.   Psychiatric: She has a normal mood and affect.   Nursing note and vitals reviewed.      Laboratory  Lab Results   Component Value Date    WBC 13.77 (H) 11/12/2023    RBC 3.25 (L) 11/12/2023    HGB 9.9 (L) 11/12/2023    HCT 32.7 (L) 11/12/2023     (H) 11/12/2023    MCH 30.5 11/12/2023    MCHC 30.3 (L) 11/12/2023    RDW 16.5 (H) 11/12/2023     11/12/2023    MPV 9.3 11/12/2023    GRAN 11.0 (H) 11/12/2023    GRAN 79.8 (H) 11/12/2023    LYMPH 1.3 11/12/2023    LYMPH 9.4 (L) 11/12/2023    MONO 0.6 11/12/2023    MONO 4.3 11/12/2023    EOS 0.6 (H) 11/12/2023    BASO 0.06 11/12/2023    EOSINOPHIL 4.4 11/12/2023    BASOPHIL 0.4 11/12/2023       BMP  Lab Results   Component Value Date     11/27/2023    K 4.9 11/27/2023     11/12/2023    CO2 31 11/27/2023    BUN 8 11/27/2023    CREATININE 0.94 " "11/27/2023    CALCIUM 9.1 11/27/2023    ANIONGAP 9 11/27/2023    ESTGFRAFRICA >60 07/20/2022    EGFRNONAA >60 07/20/2022    AST 31 11/27/2023    ALT 27 11/27/2023    PROT 6.0 (L) 11/27/2023       Lab Results   Component Value Date    BNP 16 11/01/2023    BNP 16 11/01/2023    BNP <10 10/17/2023    BNP <10 10/11/2023    BNP 36 09/14/2023    BNP 40 07/31/2023       Lab Results   Component Value Date    TSH 0.882 11/08/2023       Lab Results   Component Value Date    SEDRATE 50 (H) 05/10/2023       Lab Results   Component Value Date    CRP 54.6 (H) 05/10/2023     Lab Results   Component Value Date     (H) 05/02/2022        Lab Results   Component Value Date    ASPERGILLUS Not Detected 09/16/2023     No results found for: "AFUMIGATUSCL"     Lab Results   Component Value Date    ACE 22 04/28/2022        Diagnostic Results:  I have personally reviewed today the following studies:  Office Spirometry Results:             X-Ray Chest PA And Lateral  Narrative: EXAMINATION:  XR CHEST PA AND LATERAL    CLINICAL HISTORY:  Other fatigue    TECHNIQUE:  PA and lateral views of the chest were performed.    COMPARISON:  Prior radiographs    FINDINGS:  Cardiac silhouette and mediastinal contours are unchanged.  Lungs demonstrate persistent bilateral lower lung predominant opacities.  No large pleural effusion.  Osseous structures are intact.  Impression: Similar appearance of the chest    Electronically signed by: Andres Encarnacion MD  Date:    11/29/2023  Time:    09:52        No results for input(s): "PH", "PCO2", "PO2", "HCO3", "POCSATURATED", "BE" in the last 72 hours.              Assessment/Plan:     Problem List Items Addressed This Visit       Exercise hypoxemia    Restrictive lung disease    Relevant Medications    albuterol-ipratropium (DUO-NEB) 2.5 mg-0.5 mg/3 mL nebulizer solution    UIP (usual interstitial pneumonitis) - Primary    Relevant Medications    albuterol-ipratropium (DUO-NEB) 2.5 mg-0.5 mg/3 mL nebulizer " solution    Other Relevant Orders    X-Ray Chest PA And Lateral    Spirometry with/without bronchodilator    Stress test, pulmonary    Basic Metabolic Panel    GLENN on CPAP    Chronic respiratory failure with hypoxia    Relevant Medications    albuterol-ipratropium (DUO-NEB) 2.5 mg-0.5 mg/3 mL nebulizer solution    BMI 35.0-35.9,adult    HTN (hypertension)    Interstitial lung disease    Dysphonia due to laryngeal ulcers    Pulmonary hypertension due to interstitial lung disease    Relevant Orders    Basic Metabolic Panel    Anemia    Relevant Orders    Ambulatory referral/consult to Hematology / Oncology        Subjectively appears improved since LTAC  Continue Lasix regimen potassium supplementation  Will aim to wean off prednisone  Current oxygen needs are being met  Await starting TYVASO               Agustín Aviles MD     Requested Prescriptions     Signed Prescriptions Disp Refills    albuterol-ipratropium (DUO-NEB) 2.5 mg-0.5 mg/3 mL nebulizer solution 180 mL 11     Sig: Take 3 mLs by nebulization 2 (two) times a day. Rescue            The patient was given open opportunity to ask questions and/or express concerns about treatment plan.   All questions/concerns were discussed.     Follow up in about 8 weeks (around 1/24/2024), or cxr, tervor, 6mwd, wean prednisone to 5 mg, Labs standing, ref hematology.    This note was prepared using voice recognition system and is likely to have sound alike errors that may have been overlooked even after proof reading.  Please call me with any questions    Discussed diagnosis, its evaluation, treatment and usual course. All questions answered.    Thank you for the courtesy of participating in the care of this patient    Agustín Aviles MD      Personal Diagnostic Review  []  CXR    []  ECHO    []  ONSAT    []  6MWD    []  LABS    []  CHEST CT    []  PET CT    []  Biopsy results

## 2023-12-04 ENCOUNTER — HOSPITAL ENCOUNTER (OUTPATIENT)
Dept: PULMONOLOGY | Facility: HOSPITAL | Age: 53
Discharge: HOME | End: 2023-12-04
Payer: COMMERCIAL

## 2023-12-04 ENCOUNTER — DOCUMENTATION ONLY (OUTPATIENT)
Dept: SLEEP MEDICINE | Facility: CLINIC | Age: 53
End: 2023-12-04
Payer: COMMERCIAL

## 2023-12-04 ENCOUNTER — HOSPITAL ENCOUNTER (INPATIENT)
Facility: HOSPITAL | Age: 53
LOS: 3 days | Discharge: HOME HEALTH | DRG: 871 | End: 2023-12-07
Attending: EMERGENCY MEDICINE | Admitting: HOSPITALIST
Payer: COMMERCIAL

## 2023-12-04 DIAGNOSIS — Z51.5 ENCOUNTER FOR PALLIATIVE CARE: ICD-10-CM

## 2023-12-04 DIAGNOSIS — R06.02 SOB (SHORTNESS OF BREATH): ICD-10-CM

## 2023-12-04 DIAGNOSIS — J84.9 INTERSTITIAL LUNG DISEASE: ICD-10-CM

## 2023-12-04 DIAGNOSIS — J84.9 INTERSTITIAL PULMONARY DISEASE, UNSPECIFIED: ICD-10-CM

## 2023-12-04 DIAGNOSIS — I27.23 PULMONARY HYPERTENSION DUE TO INTERSTITIAL LUNG DISEASE: ICD-10-CM

## 2023-12-04 DIAGNOSIS — J96.11 CHRONIC RESPIRATORY FAILURE WITH HYPOXIA: ICD-10-CM

## 2023-12-04 DIAGNOSIS — R06.02 SHORTNESS OF BREATH: ICD-10-CM

## 2023-12-04 DIAGNOSIS — J96.21 ACUTE ON CHRONIC RESPIRATORY FAILURE WITH HYPOXIA AND HYPERCAPNIA: ICD-10-CM

## 2023-12-04 DIAGNOSIS — J84.112 UIP (USUAL INTERSTITIAL PNEUMONITIS): ICD-10-CM

## 2023-12-04 DIAGNOSIS — J96.22 ACUTE ON CHRONIC RESPIRATORY FAILURE WITH HYPOXIA AND HYPERCAPNIA: ICD-10-CM

## 2023-12-04 DIAGNOSIS — J84.9 PULMONARY HYPERTENSION DUE TO INTERSTITIAL LUNG DISEASE: ICD-10-CM

## 2023-12-04 DIAGNOSIS — J98.4 RESTRICTIVE LUNG DISEASE: ICD-10-CM

## 2023-12-04 DIAGNOSIS — J96.21 ACUTE ON CHRONIC RESPIRATORY FAILURE WITH HYPOXIA: Primary | ICD-10-CM

## 2023-12-04 PROBLEM — J96.20 ACUTE ON CHRONIC RESPIRATORY FAILURE: Status: ACTIVE | Noted: 2023-11-01

## 2023-12-04 PROBLEM — A41.9 SEPSIS: Status: ACTIVE | Noted: 2023-12-04

## 2023-12-04 LAB
ALBUMIN SERPL BCP-MCNC: 3.2 G/DL (ref 3.5–5.2)
ALLENS TEST: ABNORMAL
ALP SERPL-CCNC: 91 U/L (ref 55–135)
ALT SERPL W/O P-5'-P-CCNC: 40 U/L (ref 10–44)
ANION GAP SERPL CALC-SCNC: 14 MMOL/L (ref 8–16)
AST SERPL-CCNC: 35 U/L (ref 10–40)
BASOPHILS # BLD AUTO: 0.13 K/UL (ref 0–0.2)
BASOPHILS NFR BLD: 0.7 % (ref 0–1.9)
BILIRUB SERPL-MCNC: 0.4 MG/DL (ref 0.1–1)
BILIRUB UR QL STRIP: NEGATIVE
BNP SERPL-MCNC: 29 PG/ML (ref 0–99)
BSA FOR ECHO PROCEDURE: 2.09 M2
BUN SERPL-MCNC: 8 MG/DL (ref 6–20)
CALCIUM SERPL-MCNC: 10.2 MG/DL (ref 8.7–10.5)
CHLORIDE SERPL-SCNC: 100 MMOL/L (ref 95–110)
CLARITY UR: CLEAR
CO2 SERPL-SCNC: 28 MMOL/L (ref 23–29)
COLOR UR: COLORLESS
CREAT SERPL-MCNC: 0.8 MG/DL (ref 0.5–1.4)
CV ECHO LV RWT: 0.41 CM
D DIMER PPP IA.FEU-MCNC: 0.45 MG/L FEU
DELSYS: ABNORMAL
DIFFERENTIAL METHOD: ABNORMAL
E WAVE DECELERATION TIME: 110.52 MSEC
E/E' RATIO: 13.12 M/S
ECHO LV POSTERIOR WALL: 0.9 CM (ref 0.6–1.1)
EJECTION FRACTION: 60 %
EOSINOPHIL # BLD AUTO: 0.6 K/UL (ref 0–0.5)
EOSINOPHIL NFR BLD: 3.4 % (ref 0–8)
ERYTHROCYTE [DISTWIDTH] IN BLOOD BY AUTOMATED COUNT: 16.2 % (ref 11.5–14.5)
EST. GFR  (NO RACE VARIABLE): >60 ML/MIN/1.73 M^2
FLOW: 8
FRACTIONAL SHORTENING: 35 % (ref 28–44)
GLUCOSE SERPL-MCNC: 114 MG/DL (ref 70–110)
GLUCOSE SERPL-MCNC: 127 MG/DL (ref 70–110)
GLUCOSE UR QL STRIP: NEGATIVE
HCO3 UR-SCNC: 30 MMOL/L (ref 24–28)
HCT VFR BLD AUTO: 38.7 % (ref 37–48.5)
HCT VFR BLD CALC: 35 %PCV (ref 36–54)
HGB BLD-MCNC: 11.5 G/DL (ref 12–16)
HGB UR QL STRIP: NEGATIVE
IMM GRANULOCYTES # BLD AUTO: 0.14 K/UL (ref 0–0.04)
IMM GRANULOCYTES NFR BLD AUTO: 0.8 % (ref 0–0.5)
INFLUENZA A, MOLECULAR: NEGATIVE
INFLUENZA B, MOLECULAR: NEGATIVE
INTERVENTRICULAR SEPTUM: 0.8 CM (ref 0.6–1.1)
IVC DIAMETER: 0.97 CM
KETONES UR QL STRIP: NEGATIVE
LA MAJOR: 5.83 CM
LA MINOR: 5.77 CM
LA WIDTH: 3.4 CM
LACTATE SERPL-SCNC: 1.6 MMOL/L (ref 0.5–2.2)
LEFT ATRIUM SIZE: 2.83 CM
LEFT ATRIUM VOLUME INDEX MOD: 17.8 ML/M2
LEFT ATRIUM VOLUME INDEX: 23.5 ML/M2
LEFT ATRIUM VOLUME MOD: 35.98 CM3
LEFT ATRIUM VOLUME: 47.44 CM3
LEFT INTERNAL DIMENSION IN SYSTOLE: 2.85 CM (ref 2.1–4)
LEFT VENTRICLE DIASTOLIC VOLUME INDEX: 43.32 ML/M2
LEFT VENTRICLE DIASTOLIC VOLUME: 87.5 ML
LEFT VENTRICLE MASS INDEX: 59 G/M2
LEFT VENTRICLE SYSTOLIC VOLUME INDEX: 15.2 ML/M2
LEFT VENTRICLE SYSTOLIC VOLUME: 30.8 ML
LEFT VENTRICULAR INTERNAL DIMENSION IN DIASTOLE: 4.4 CM (ref 3.5–6)
LEFT VENTRICULAR MASS: 118.58 G
LEUKOCYTE ESTERASE UR QL STRIP: NEGATIVE
LV LATERAL E/E' RATIO: 18.22 M/S
LV SEPTAL E/E' RATIO: 10.25 M/S
LYMPHOCYTES # BLD AUTO: 1 K/UL (ref 1–4.8)
LYMPHOCYTES NFR BLD: 5.7 % (ref 18–48)
MCH RBC QN AUTO: 28.6 PG (ref 27–31)
MCHC RBC AUTO-ENTMCNC: 29.7 G/DL (ref 32–36)
MCV RBC AUTO: 96 FL (ref 82–98)
MODE: ABNORMAL
MONOCYTES # BLD AUTO: 0.9 K/UL (ref 0.3–1)
MONOCYTES NFR BLD: 4.9 % (ref 4–15)
MV PEAK E VEL: 1.64 M/S
MV STENOSIS PRESSURE HALF TIME: 32.05 MS
MV VALVE AREA P 1/2 METHOD: 6.86 CM2
NEUTROPHILS # BLD AUTO: 15.2 K/UL (ref 1.8–7.7)
NEUTROPHILS NFR BLD: 84.5 % (ref 38–73)
NITRITE UR QL STRIP: NEGATIVE
NRBC BLD-RTO: 0 /100 WBC
PCO2 BLDA: 43.4 MMHG (ref 35–45)
PH SMN: 7.45 [PH] (ref 7.35–7.45)
PH UR STRIP: 8 [PH] (ref 5–8)
PISA TR MAX VEL: 3 M/S
PLATELET # BLD AUTO: 498 K/UL (ref 150–450)
PMV BLD AUTO: 9.1 FL (ref 9.2–12.9)
PO2 BLDA: 83 MMHG (ref 80–100)
POC BE: 6 MMOL/L
POC IONIZED CALCIUM: 1.32 MMOL/L (ref 1.06–1.42)
POC SATURATED O2: 97 % (ref 95–100)
POCT GLUCOSE: 114 MG/DL (ref 70–110)
POTASSIUM BLD-SCNC: 3.7 MMOL/L (ref 3.5–5.1)
POTASSIUM SERPL-SCNC: 4 MMOL/L (ref 3.5–5.1)
PROT SERPL-MCNC: 7.8 G/DL (ref 6–8.4)
PROT UR QL STRIP: NEGATIVE
PULM VEIN S/D RATIO: 0.83
PV PEAK D VEL: 1.11 M/S
PV PEAK S VEL: 0.92 M/S
RA MAJOR: 5.69 CM
RA PRESSURE ESTIMATED: 3 MMHG
RA VOL SYS: 58.1 ML
RA WIDTH: 2.4 CM
RBC # BLD AUTO: 4.02 M/UL (ref 4–5.4)
RIGHT ATRIAL AREA: 19.5 CM2
RIGHT ATRIUM VOLUME AREA LENGTH APICAL 4 CHAMBER: 58 ML
RIGHT VENTRICLE DIASTOLIC LENGTH: 8.1 CM
RIGHT VENTRICLE DIASTOLIC MID DIMENSION: 2.8 CM
RIGHT VENTRICULAR END-DIASTOLIC DIMENSION: 4.6 CM
RIGHT VENTRICULAR LENGTH IN DIASTOLE (APICAL 4-CHAMBER VIEW): 8.08 CM
RV MID DIAMA: 2.84 CM
RV TB RVSP: 6 MMHG
RV TISSUE DOPPLER FREE WALL SYSTOLIC VELOCITY 1 (APICAL 4 CHAMBER VIEW): 17.44 CM/S
SAMPLE: ABNORMAL
SARS-COV-2 RDRP RESP QL NAA+PROBE: NEGATIVE
SITE: ABNORMAL
SODIUM BLD-SCNC: 139 MMOL/L (ref 136–145)
SODIUM SERPL-SCNC: 142 MMOL/L (ref 136–145)
SP GR UR STRIP: 1.01 (ref 1–1.03)
SPECIMEN SOURCE: NORMAL
TASV: 17 CM/S
TDI LATERAL: 0.09 M/S
TDI SEPTAL: 0.16 M/S
TDI: 0.13 M/S
TR MAX PG: 36 MMHG
TRICUSPID ANNULAR PLANE SYSTOLIC EXCURSION: 2.31 CM
TROPONIN I SERPL DL<=0.01 NG/ML-MCNC: 0.03 NG/ML (ref 0–0.03)
TV REST PULMONARY ARTERY PRESSURE: 39 MMHG
URN SPEC COLLECT METH UR: ABNORMAL
UROBILINOGEN UR STRIP-ACNC: NEGATIVE EU/DL
WBC # BLD AUTO: 17.98 K/UL (ref 3.9–12.7)
Z-SCORE OF LEFT VENTRICULAR DIMENSION IN END DIASTOLE: -3.02
Z-SCORE OF LEFT VENTRICULAR DIMENSION IN END SYSTOLE: -1.96

## 2023-12-04 PROCEDURE — 99285 EMERGENCY DEPT VISIT HI MDM: CPT | Mod: 25

## 2023-12-04 PROCEDURE — 94761 N-INVAS EAR/PLS OXIMETRY MLT: CPT | Mod: XB

## 2023-12-04 PROCEDURE — 25000003 PHARM REV CODE 250: Performed by: HOSPITALIST

## 2023-12-04 PROCEDURE — 99900035 HC TECH TIME PER 15 MIN (STAT)

## 2023-12-04 PROCEDURE — 85379 FIBRIN DEGRADATION QUANT: CPT | Performed by: REGISTERED NURSE

## 2023-12-04 PROCEDURE — 93010 EKG 12-LEAD: ICD-10-PCS | Mod: ,,, | Performed by: INTERNAL MEDICINE

## 2023-12-04 PROCEDURE — 84132 ASSAY OF SERUM POTASSIUM: CPT

## 2023-12-04 PROCEDURE — 82330 ASSAY OF CALCIUM: CPT

## 2023-12-04 PROCEDURE — 87502 INFLUENZA DNA AMP PROBE: CPT | Performed by: REGISTERED NURSE

## 2023-12-04 PROCEDURE — 36600 WITHDRAWAL OF ARTERIAL BLOOD: CPT

## 2023-12-04 PROCEDURE — 84295 ASSAY OF SERUM SODIUM: CPT

## 2023-12-04 PROCEDURE — 25000003 PHARM REV CODE 250

## 2023-12-04 PROCEDURE — 63600175 PHARM REV CODE 636 W HCPCS: Performed by: NURSE PRACTITIONER

## 2023-12-04 PROCEDURE — 83880 ASSAY OF NATRIURETIC PEPTIDE: CPT | Performed by: REGISTERED NURSE

## 2023-12-04 PROCEDURE — 63600175 PHARM REV CODE 636 W HCPCS

## 2023-12-04 PROCEDURE — 94640 AIRWAY INHALATION TREATMENT: CPT

## 2023-12-04 PROCEDURE — 85014 HEMATOCRIT: CPT

## 2023-12-04 PROCEDURE — 93005 ELECTROCARDIOGRAM TRACING: CPT

## 2023-12-04 PROCEDURE — 85025 COMPLETE CBC W/AUTO DIFF WBC: CPT | Performed by: REGISTERED NURSE

## 2023-12-04 PROCEDURE — 87502 INFLUENZA DNA AMP PROBE: CPT

## 2023-12-04 PROCEDURE — 11000001 HC ACUTE MED/SURG PRIVATE ROOM

## 2023-12-04 PROCEDURE — 96365 THER/PROPH/DIAG IV INF INIT: CPT

## 2023-12-04 PROCEDURE — 82803 BLOOD GASES ANY COMBINATION: CPT

## 2023-12-04 PROCEDURE — 93010 ELECTROCARDIOGRAM REPORT: CPT | Mod: ,,, | Performed by: INTERNAL MEDICINE

## 2023-12-04 PROCEDURE — 63600175 PHARM REV CODE 636 W HCPCS: Performed by: HOSPITALIST

## 2023-12-04 PROCEDURE — U0002 COVID-19 LAB TEST NON-CDC: HCPCS | Performed by: REGISTERED NURSE

## 2023-12-04 PROCEDURE — 81003 URINALYSIS AUTO W/O SCOPE: CPT | Performed by: EMERGENCY MEDICINE

## 2023-12-04 PROCEDURE — 87040 BLOOD CULTURE FOR BACTERIA: CPT | Performed by: REGISTERED NURSE

## 2023-12-04 PROCEDURE — 25000242 PHARM REV CODE 250 ALT 637 W/ HCPCS: Performed by: HOSPITALIST

## 2023-12-04 PROCEDURE — 80053 COMPREHEN METABOLIC PANEL: CPT | Performed by: REGISTERED NURSE

## 2023-12-04 PROCEDURE — 84484 ASSAY OF TROPONIN QUANT: CPT | Performed by: REGISTERED NURSE

## 2023-12-04 PROCEDURE — 82800 BLOOD PH: CPT

## 2023-12-04 PROCEDURE — 83605 ASSAY OF LACTIC ACID: CPT | Performed by: REGISTERED NURSE

## 2023-12-04 PROCEDURE — 27100171 HC OXYGEN HIGH FLOW UP TO 24 HOURS

## 2023-12-04 PROCEDURE — 25500020 PHARM REV CODE 255: Performed by: EMERGENCY MEDICINE

## 2023-12-04 PROCEDURE — 63600175 PHARM REV CODE 636 W HCPCS: Performed by: EMERGENCY MEDICINE

## 2023-12-04 RX ORDER — ARFORMOTEROL TARTRATE 15 UG/2ML
15 SOLUTION RESPIRATORY (INHALATION) 2 TIMES DAILY
Status: DISCONTINUED | OUTPATIENT
Start: 2023-12-04 | End: 2023-12-07 | Stop reason: HOSPADM

## 2023-12-04 RX ORDER — FUROSEMIDE 40 MG/1
40 TABLET ORAL DAILY
Status: DISCONTINUED | OUTPATIENT
Start: 2023-12-05 | End: 2023-12-07 | Stop reason: HOSPADM

## 2023-12-04 RX ORDER — IPRATROPIUM BROMIDE AND ALBUTEROL SULFATE 2.5; .5 MG/3ML; MG/3ML
3 SOLUTION RESPIRATORY (INHALATION) EVERY 6 HOURS
Status: DISCONTINUED | OUTPATIENT
Start: 2023-12-04 | End: 2023-12-05

## 2023-12-04 RX ORDER — MONTELUKAST SODIUM 10 MG/1
10 TABLET ORAL NIGHTLY
Status: DISCONTINUED | OUTPATIENT
Start: 2023-12-04 | End: 2023-12-07 | Stop reason: HOSPADM

## 2023-12-04 RX ORDER — ENOXAPARIN SODIUM 100 MG/ML
40 INJECTION SUBCUTANEOUS EVERY 24 HOURS
Status: DISCONTINUED | OUTPATIENT
Start: 2023-12-04 | End: 2023-12-07 | Stop reason: HOSPADM

## 2023-12-04 RX ORDER — LEVOFLOXACIN 5 MG/ML
750 INJECTION, SOLUTION INTRAVENOUS
Status: COMPLETED | OUTPATIENT
Start: 2023-12-04 | End: 2023-12-04

## 2023-12-04 RX ORDER — BENZONATATE 100 MG/1
100 CAPSULE ORAL 3 TIMES DAILY PRN
Status: DISCONTINUED | OUTPATIENT
Start: 2023-12-04 | End: 2023-12-07 | Stop reason: HOSPADM

## 2023-12-04 RX ORDER — GLUCAGON 1 MG
1 KIT INJECTION
Status: DISCONTINUED | OUTPATIENT
Start: 2023-12-04 | End: 2023-12-07 | Stop reason: HOSPADM

## 2023-12-04 RX ORDER — DOCUSATE SODIUM 100 MG/1
100 CAPSULE, LIQUID FILLED ORAL 2 TIMES DAILY
Status: DISCONTINUED | OUTPATIENT
Start: 2023-12-04 | End: 2023-12-07 | Stop reason: HOSPADM

## 2023-12-04 RX ORDER — MAG HYDROX/ALUMINUM HYD/SIMETH 200-200-20
30 SUSPENSION, ORAL (FINAL DOSE FORM) ORAL 4 TIMES DAILY PRN
Status: DISCONTINUED | OUTPATIENT
Start: 2023-12-04 | End: 2023-12-07 | Stop reason: HOSPADM

## 2023-12-04 RX ORDER — TALC
6 POWDER (GRAM) TOPICAL NIGHTLY PRN
Status: DISCONTINUED | OUTPATIENT
Start: 2023-12-04 | End: 2023-12-07 | Stop reason: HOSPADM

## 2023-12-04 RX ORDER — IBUPROFEN 200 MG
16 TABLET ORAL
Status: DISCONTINUED | OUTPATIENT
Start: 2023-12-04 | End: 2023-12-07 | Stop reason: HOSPADM

## 2023-12-04 RX ORDER — BUDESONIDE 0.5 MG/2ML
0.5 INHALANT ORAL 2 TIMES DAILY
Status: DISCONTINUED | OUTPATIENT
Start: 2023-12-04 | End: 2023-12-07 | Stop reason: HOSPADM

## 2023-12-04 RX ORDER — HYDROCODONE BITARTRATE AND ACETAMINOPHEN 5; 325 MG/1; MG/1
1 TABLET ORAL EVERY 6 HOURS PRN
Status: DISCONTINUED | OUTPATIENT
Start: 2023-12-04 | End: 2023-12-06

## 2023-12-04 RX ORDER — ACETAMINOPHEN 325 MG/1
650 TABLET ORAL EVERY 4 HOURS PRN
Status: DISCONTINUED | OUTPATIENT
Start: 2023-12-04 | End: 2023-12-07 | Stop reason: HOSPADM

## 2023-12-04 RX ORDER — INSULIN ASPART 100 [IU]/ML
0-5 INJECTION, SOLUTION INTRAVENOUS; SUBCUTANEOUS
Status: DISCONTINUED | OUTPATIENT
Start: 2023-12-04 | End: 2023-12-07 | Stop reason: HOSPADM

## 2023-12-04 RX ORDER — MORPHINE SULFATE 4 MG/ML
2 INJECTION, SOLUTION INTRAMUSCULAR; INTRAVENOUS EVERY 4 HOURS PRN
Status: DISCONTINUED | OUTPATIENT
Start: 2023-12-04 | End: 2023-12-06

## 2023-12-04 RX ORDER — CALCIUM CARBONATE 200(500)MG
1000 TABLET,CHEWABLE ORAL 3 TIMES DAILY PRN
Status: DISCONTINUED | OUTPATIENT
Start: 2023-12-04 | End: 2023-12-07 | Stop reason: HOSPADM

## 2023-12-04 RX ORDER — FUROSEMIDE 10 MG/ML
INJECTION INTRAMUSCULAR; INTRAVENOUS
Status: DISCONTINUED
Start: 2023-12-04 | End: 2023-12-04 | Stop reason: WASHOUT

## 2023-12-04 RX ORDER — ONDANSETRON 2 MG/ML
4 INJECTION INTRAMUSCULAR; INTRAVENOUS EVERY 8 HOURS PRN
Status: DISCONTINUED | OUTPATIENT
Start: 2023-12-04 | End: 2023-12-07 | Stop reason: HOSPADM

## 2023-12-04 RX ORDER — PANTOPRAZOLE SODIUM 40 MG/1
40 TABLET, DELAYED RELEASE ORAL DAILY
Status: DISCONTINUED | OUTPATIENT
Start: 2023-12-05 | End: 2023-12-07 | Stop reason: HOSPADM

## 2023-12-04 RX ORDER — IBUPROFEN 200 MG
24 TABLET ORAL
Status: DISCONTINUED | OUTPATIENT
Start: 2023-12-04 | End: 2023-12-07 | Stop reason: HOSPADM

## 2023-12-04 RX ADMIN — MONTELUKAST 10 MG: 10 TABLET, FILM COATED ORAL at 10:12

## 2023-12-04 RX ADMIN — IOHEXOL 100 ML: 350 INJECTION, SOLUTION INTRAVENOUS at 02:12

## 2023-12-04 RX ADMIN — LEVOFLOXACIN 750 MG: 750 INJECTION, SOLUTION INTRAVENOUS at 04:12

## 2023-12-04 RX ADMIN — ARFORMOTEROL TARTRATE 15 MCG: 15 SOLUTION RESPIRATORY (INHALATION) at 07:12

## 2023-12-04 RX ADMIN — PIPERACILLIN SODIUM AND TAZOBACTAM SODIUM 4.5 G: 4; .5 INJECTION, POWDER, FOR SOLUTION INTRAVENOUS at 06:12

## 2023-12-04 RX ADMIN — METHYLPREDNISOLONE SODIUM SUCCINATE 40 MG: 40 INJECTION, POWDER, FOR SOLUTION INTRAMUSCULAR; INTRAVENOUS at 10:12

## 2023-12-04 RX ADMIN — MORPHINE SULFATE 2 MG: 4 INJECTION INTRAVENOUS at 10:12

## 2023-12-04 RX ADMIN — MORPHINE SULFATE 2 MG: 4 INJECTION INTRAVENOUS at 06:12

## 2023-12-04 RX ADMIN — IPRATROPIUM BROMIDE AND ALBUTEROL SULFATE 3 ML: 2.5; .5 SOLUTION RESPIRATORY (INHALATION) at 07:12

## 2023-12-04 RX ADMIN — BUDESONIDE 0.5 MG: 0.5 INHALANT ORAL at 07:12

## 2023-12-04 NOTE — ASSESSMENT & PLAN NOTE
Patient with Hypoxic Respiratory failure which is Acute on chronic.  she is on home oxygen at 8 LPM. Supplemental oxygen was provided and noted-      .   Signs/symptoms of respiratory failure include- tachypnea, increased work of breathing, respiratory distress, use of accessory muscles, and wheezing. Contributing diagnoses includes - COPD, Interstitial lung disease, and Pneumonia Labs and images were reviewed. Patient Has not had a recent ABG. Will treat underlying causes and adjust management of respiratory failure as follows-     IV antibiotics  Scheduled breathing treatments  BiPAP PRN  Consult Pulmonary

## 2023-12-04 NOTE — SUBJECTIVE & OBJECTIVE
Past Medical History:   Diagnosis Date    Abnormal Pap smear of cervix     in the past with repeat pap smear okay.    Acute interstitial pneumonitis     Allergic rhinitis, cause unspecified     Arthritis of both knees     Asthma     Eczema     Fatty liver 10/2014    Fibrocystic breast changes     Headache(784.0)     Hepatomegaly 10/2014    Hypertension     Liver cyst 10/2014    Multinodular goiter     Followed by ENT - Dr. Nicolas Gray    Polymenorrhea     TMJ (dislocation of temporomandibular joint)     Uterine fibroid     in the past    Vitamin D deficiency disease        Past Surgical History:   Procedure Laterality Date    BRONCHOSCOPY Bilateral 2023    Procedure: Bronchoscopy - insert lighted tube into airway to take a biopsy of lung;  Surgeon: Agustín Aviles MD;  Location: [facility] ENDO;  Service: Pulmonary;  Laterality: Bilateral;     SECTION, CLASSIC      x 1    COLONOSCOPY N/A 2020    Procedure: COLONOSCOPY;  Surgeon: Angie Magana MD;  Location: [facility] ENDO;  Service: Endoscopy;  Laterality: N/A;    HYSTERECTOMY      MULTIPLE TOOTH EXTRACTIONS      PARTIAL HYSTERECTOMY  2013    Due to fibroids       Review of patient's allergies indicates:   Allergen Reactions    Doxycycline Nausea Only     Other reaction(s): Nausea    Fluticasone Other (See Comments)     Other reaction(s): Epistaxis         No current facility-administered medications on file prior to encounter.     Current Outpatient Medications on File Prior to Encounter   Medication Sig    acetaminophen (TYLENOL) 500 MG tablet Take 500 mg by mouth every 6 (six) hours as needed for Pain. Only use when needed    acidophilus-pectin, citrus 100 million cell-10 mg Cap Take 1 capsule by mouth once daily.    albuterol (PROVENTIL/VENTOLIN HFA) 90 mcg/actuation inhaler INHALE 1 TO 2 PUFFS BY MOUTH INTO THE LUNGS EVERY 4 TO 6 HOURS AS NEEDED FOR WHEEZING OR SHORTNESS OF BREATH    albuterol-ipratropium (DUO-NEB) 2.5 mg-0.5 mg/3 mL  nebulizer solution Take 3 mLs by nebulization 2 (two) times a day. Rescue    amoxicillin-clavulanate 875-125mg (AUGMENTIN) 875-125 mg per tablet Take 1 tablet by mouth 2 (two) times daily. for 14 days    ferrous sulfate 324 mg (65 mg iron) TbEC Take 1 tablet (324 mg total) by mouth every other day.    fluticasone (VERAMYST) 27.5 mcg/actuation nasal spray 2 sprays by Nasal route once daily.    furosemide (LASIX) 40 MG tablet Take 1 tablet (40 mg total) by mouth once daily.    ibuprofen (ADVIL,MOTRIN) 200 MG tablet Take 200 mg by mouth every 6 (six) hours as needed for Pain.    levocetirizine (XYZAL) 5 MG tablet TAKE 1 TABLET(5 MG) BY MOUTH EVERY DAY    mv-minerals/FA/omega 3,6,9 #3 (WOMEN'S 50+ ADVANCED ORAL) Take 1 tablet by mouth Daily.    nintedanib (OFEV) 150 mg Cap Take 1 capsule (150 mg total) by mouth 2 (two) times a day.    omega-3s/dha/epa/fish oil/D3 (VITAMIN-D + OMEGA-3 ORAL) Take 2 tablets by mouth once daily at 6am.    omeprazole (PRILOSEC) 20 MG capsule Take 1 capsule (20 mg total) by mouth once daily.    revefenacin (YUPELRI) 175 mcg/3 mL Nebu Inhale 175 mcg into the lungs once daily.    revefenacin 175 mcg/3 mL Nebu Inhale 175 mcg into the lungs once daily. Patient has not received medication yet    SEREVENT DISKUS 50 mcg/dose diskus inhaler Inhale 1 puff into the lungs 2 (two) times daily. Controller (Patient not taking: Reported on 11/29/2023)    treprostiniL (TYVASO DPI) 16(112)-32(112) -48(28) mcg CtDv Inhale 16 mcg into the lungs 4 (four) times daily. Patient has not received medication  yet    vitamin D (VITAMIN D3) 1000 units Tab Take 1,000 Units by mouth once daily.     Family History       Problem Relation (Age of Onset)    Cancer Maternal Grandmother (60), Maternal Aunt (50), Maternal Aunt (66)    Cataracts Cousin    Diabetes Maternal Grandfather    Diverticulitis Mother    Heart disease Mother, Father (63)    Hypertension Father    Migraines Cousin    No Known Problems Brother    Peripheral  vascular disease Maternal Grandfather    Stroke Mother, Sister, Maternal Grandfather          Tobacco Use    Smoking status: Never     Passive exposure: Past    Smokeless tobacco: Never   Substance and Sexual Activity    Alcohol use: Not Currently     Comment: last use 03/2022    Drug use: Not Currently     Frequency: 4.0 times per week     Types: Marijuana     Comment: last year was last use 03/2022    Sexual activity: Yes     Partners: Female     Review of Systems   All other systems reviewed and are negative.    Objective:     Vital Signs (Most Recent):  Temp: 97.8 °F (36.6 °C) (12/04/23 1700)  Pulse: (!) 122 (12/04/23 1722)  Resp: (!) 28 (12/04/23 1700)  BP: 109/68 (12/04/23 1700)  SpO2: 100 % (12/04/23 1700) Vital Signs (24h Range):  Temp:  [97.8 °F (36.6 °C)-99.6 °F (37.6 °C)] 97.8 °F (36.6 °C)  Pulse:  [109-125] 122  Resp:  [21-34] 28  SpO2:  [80 %-100 %] 100 %  BP: (109-175)/(61-87) 109/68     Weight: 95.7 kg (211 lb)  Body mass index is 35.11 kg/m².     Physical Exam  Vitals and nursing note reviewed.   Constitutional:       General: She is not in acute distress.     Appearance: Normal appearance. She is obese. She is ill-appearing.   HENT:      Head: Normocephalic and atraumatic.      Nose: Nose normal.      Mouth/Throat:      Pharynx: Oropharynx is clear.   Eyes:      Extraocular Movements: Extraocular movements intact.      Pupils: Pupils are equal, round, and reactive to light.   Cardiovascular:      Rate and Rhythm: Regular rhythm. Tachycardia present.      Pulses: Normal pulses.      Heart sounds: Normal heart sounds. No murmur heard.  Pulmonary:      Effort: Respiratory distress present.      Breath sounds: Wheezing and rhonchi present. No rales.      Comments: Wearing HFNC  Abdominal:      General: Abdomen is flat. Bowel sounds are normal. There is no distension.      Palpations: Abdomen is soft.      Tenderness: There is no abdominal tenderness. There is no guarding.   Neurological:      General:  No focal deficit present.      Mental Status: She is alert and oriented to person, place, and time.   Psychiatric:         Mood and Affect: Mood normal.         Behavior: Behavior normal.              CRANIAL NERVES     CN III, IV, VI   Pupils are equal, round, and reactive to light.       Significant Labs: All pertinent labs within the past 24 hours have been reviewed.  Recent Lab Results  (Last 5 results in the past 24 hours)        12/04/23  1733   12/04/23  1358   12/04/23  1339   12/04/23  1240   12/04/23  1233        Albumin         3.2       ALP         91       Allens Test       Pass         ALT         40       Anion Gap         14       Appearance, UA   Clear             AST         35       Baso #         0.13       Basophil %         0.7       Bilirubin (UA)   Negative             BILIRUBIN TOTAL         0.4  Comment: For infants and newborns, interpretation of results should be based  on gestational age, weight and in agreement with clinical  observations.    Premature Infant recommended reference ranges:  Up to 24 hours.............<8.0 mg/dL  Up to 48 hours............<12.0 mg/dL  3-5 days..................<15.0 mg/dL  6-29 days.................<15.0 mg/dL         BNP         29  Comment: Values of less than 100 pg/ml are consistent with non-CHF populations.       Site       RR         BSA     2.09           BUN         8       Calcium         10.2       Chloride         100       CO2         28       Color, UA   Colorless             Creatinine         0.8       Left Ventricle Relative Wall Thickness     0.41           D-Dimer         0.45  Comment: The quantitative D-dimer assay should be used as an aid in   the diagnosis of deep vein thrombosis and pulmonary embolism  in patients with the appropriate presentation and clinical  history. The upper limit of the reference interval and the clinical   cut off   point are identical. Causes of a positive (>0.50 mg/L FEU) D-Dimer   test  include, but are  not limited to: DVT, PE, DIC, thrombolytic   therapy, anticoagulant therapy, recent surgery, trauma, or   pregnancy, disseminated malignancy, aortic aneurysm, cirrhosis,  and severe infection. False negative results may occur in   patients with distal DVT.         DelSys       Nasal Can         Differential Method         Automated       E/E' ratio     13.12           eGFR         >60       EF     60           Eos #         0.6       Eosinophil %         3.4       E wave deceleration time     110.52           Flow       8         FS     35           Glucose         114       Glucose, UA   Negative             Gran # (ANC)         15.2       Gran %         84.5       Hematocrit         38.7       Hemoglobin         11.5       Immature Grans (Abs)         0.14  Comment: Mild elevation in immature granulocytes is non specific and   can be seen in a variety of conditions including stress response,   acute inflammation, trauma and pregnancy. Correlation with other   laboratory and clinical findings is essential.         Immature Granulocytes         0.8       IVC diameter     0.97           IVSd     0.80           Ketones, UA   Negative             LA WIDTH     3.4           Lactate, Anton         1.6  Comment: Falsely low lactic acid results can be found in samples   containing >=13.0 mg/dL total bilirubin and/or >=3.5 mg/dL   direct bilirubin.         Left Atrium Major Axis     5.83           Left Atrium Minor Axis     5.77           LA size     2.83           LA volume     47.44                35.98           LA vol index     23.5           LA Volume Index (Mod)     17.8           Leukocytes, UA   Negative             LV LATERAL E/E' RATIO     18.22           LV SEPTAL E/E' RATIO     10.25           LV EDV BP     87.50           LV Diastolic Volume Index     43.32           LVIDd     4.40           LVIDs     2.85           LV mass     118.58           LV Mass Index     59           LV ESV BP     30.80           LV  Systolic Volume Index     15.2           Lymph #         1.0       Lymph %         5.7       MCH         28.6       MCHC         29.7       MCV         96       Mean e'     0.13           Mode       SPONT         Mono #         0.9       Mono %         4.9       MPV         9.1       MV valve area p 1/2 method     6.86           MV Peak E Stephan     1.64           MV stenosis pressure 1/2 time     32.05           NITRITE UA   Negative             nRBC         0       Occult Blood UA   Negative             pH, UA   8.0             Platelet Count         498       POC BE       6         POC Glucose       127         POC HCO3       30.0         POC Hematocrit       35         POC Ionized Calcium       1.32         POC PCO2       43.4         POC PH       7.447         POC PO2       83         Potassium, Blood Gas       3.7         POC SATURATED O2       97         Sodium, Blood Gas       139         POCT Glucose 114               Potassium         4.0       PROTEIN TOTAL         7.8       Protein, UA   Negative  Comment: Recommend a 24 hour urine protein or a urine   protein/creatinine ratio if globulin induced proteinuria is  clinically suspected.               PV Peak D Stephan     1.11           PV Peak S Stephan     0.92           Pulm vein S/D ratio     0.83           Posterior Wall     0.90           Right Atrium Volume Systolic     58.10           RA area     19.5           RA Major Axis     5.69           Est. RA pres     3           RA area length vol     58.0           RA Width     2.4           RBC         4.02       RDW         16.2       RV mid diameter     2.84           RV S'     17.44           RV TB RVSP     6           RVDD     4.60           RV-vila length     8.1           RV-vila mid d     2.8           Right ventricular length in diastole (apical 4-chamber view)     8.08           Sample       ARTERIAL         Sodium         142       Specific [place], UA   1.010             Specimen UA   Urine, Clean  Catch             TAPSE     2.31           TASV     17.0           TDI SEPTAL     0.16           TDI LATERAL     0.09           Triscuspid Valve Regurgitation Peak Gradient     36           TR Max Stephan     3.0           Troponin I         0.034  Comment: The reference interval for Troponin I represents the 99th percentile   cutoff   for our facility and is consistent with 3rd generation assay   performance.         TV resting pulmonary artery pressure     39           UROBILINOGEN UA   Negative             WBC         17.98       ZLVIDD     -3.02           ZLVIDS     -1.96                                  Significant Imaging: I have reviewed all pertinent imaging results/findings within the past 24 hours.    CTA Chest Non-Coronary (PE Studies)   Final Result      Negative CTA of the chest. No evidence of pulmonary embolism.  Stable cardiomegaly.  Stable extensive lung parenchymal changes bilaterally with lower lobe bronchiectasis.      All CT scans at this facility use dose modulation, iterative reconstruction and/or weight based dosing when appropriate to reduce radiation dose to as low as reasonably achievable.         Electronically signed by: Terry Monet MD   Date:    12/04/2023   Time:    15:23      X-Ray Chest 1 View   Final Result      Moderate diffuse bilateral lung infiltrates.  Possible slight worsening since prior study.         Electronically signed by: Terry Monet MD   Date:    12/04/2023   Time:    12:43

## 2023-12-04 NOTE — ADMISSIONCARE
AdmissionCare    Guideline: Pneumonia - INPT, Inpatient    Based on the indications selected for the patient, the bed status of Admit to Inpatient was determined to be MET    The following indications were selected as present at the time of evaluation of the patient:      Hypoxemia, as indicated by 1 or more of the following:   -     - Patient with baseline need for supplemental oxygen who now requires increased supplemental oxygen to maintain oxygenation at baseline or acceptable level   - Moderate-risk-category or high-risk-category patient (Pneumonia Severity Index class IV or V, or CURB-65 score of 3 or greater) PSI CalculatorCURB-65 Calculator   - Presence of risk factor for poor outcome (eg, gross hemoptysis, cavitary infiltrate, neuromuscular weakness, cystic fibrosis)    AdmissionCare documentation entered by: Cecilio Ponce    [facility], 27th edition, Copyright © 2023 [facility] [employer], [facility] All Rights Reserved.  [id number]:[id number]:00

## 2023-12-04 NOTE — PROGRESS NOTES
Pt came to [facility] rehab. Stated she did not feel well. O2 sat 70-72% on NC 8lpm. Pt placed on 100% Non-rebreather. O2 sat slowly came up to 85%. Dr. Aviles saw pt in rehab waiting area. /67, . O2 sat slowly increased to 94%. Dr. Aviles recommended pt go to ER. Pt weaned back to NC 8lpm after sitting in wheelchair approx. 30 mns. O2 sat was 93% on NC 8lpm when leaving rehab.

## 2023-12-04 NOTE — Clinical Note
Diagnosis: Shortness of breath [786.05.ICD-9-CM]   Future Attending Provider: JOSE G NORTON [[id number]]   Admitting Provider:: JOSE G NORTON [[id number]]   Reason for IP Medical Treatment  (Clinical interventions that can only be accomplished in the IP setting? ) :: Lung disease/hypoxia/shortness of breath   I certify that Inpatient services for greater than or equal to 2 midnights are medically necessary:: Yes   Plans for Post-Acute care--if anticipated (pick the single best option):: A. No post acute care anticipated at this time   Special Needs:: No Special Needs [1]

## 2023-12-04 NOTE — RESPIRATORY THERAPY
Pt had desaturation episode down to about 60% after being laid flat. O2 increased to 12L for recovery. Pt now back to 6L sat's about 92%. Will continue to monitor.

## 2023-12-04 NOTE — H&P
"  [facility] Emergency Dept.  [facility]  History & Physical    Patient Name: Ayanna Alicia  MRN: [id number]  Patient Class: IP- Inpatient  Admission Date: 12/4/2023  Attending Physician: Cecilio Ponce MD   Primary Care Provider: Madeleine Enrique MD         Patient information was obtained from patient, past medical records, and ER records.     Subjective:     Principal Problem:Acute on chronic respiratory failure    Chief Complaint:   Chief Complaint   Patient presents with    Shortness of Breath     Sob and poor appetite for the last few days. Low o2 sat today (in the 70's on 8L NC) uses between 4-8 liters O2 NC at home.        HPI: 52 y/o female with PMHx of Pulmonary Fibrosis, Pulmonary HTN, COPD, GLENN, chronic respiratory failure on 8 L NC baseline, Rheumatoid Arthritis who presented to Pulmonary Rehab today and referred to the ER due to worsening SOB. 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 She is currently being tapered off of prednisone, states she is on 5 mg daily. She reports onset of SOB yesterday, associated with coughing, worse with movement. In ER, patient found to have elevated WBC 17.5k, CTA Chest with no PE, noted "diffuse parenchymal changes with ground-glass density and irregular consolidation bilaterally." Blood cultures were obtained, patient was started on Levaquin. [facility] consulted for further management, will admit as inpatient for acute on chronic respiratory failure, sepsis.    Past Medical History:   Diagnosis Date    Abnormal Pap smear of cervix     in the past with repeat pap smear okay.    Acute interstitial pneumonitis     Allergic rhinitis, cause unspecified     Arthritis of both knees     Asthma     Eczema     Fatty liver 10/2014    Fibrocystic breast changes     " Headache(784.0)     Hepatomegaly 10/2014    Hypertension     Liver cyst 10/2014    Multinodular goiter     Followed by ENT - Dr. Nicolas Gray    Polymenorrhea     TMJ (dislocation of temporomandibular joint)     Uterine fibroid     in the past    Vitamin D deficiency disease        Past Surgical History:   Procedure Laterality Date    BRONCHOSCOPY Bilateral 2023    Procedure: Bronchoscopy - insert lighted tube into airway to take a biopsy of lung;  Surgeon: Agustín Aviles MD;  Location: [facility];  Service: Pulmonary;  Laterality: Bilateral;     SECTION, CLASSIC      x 1    COLONOSCOPY N/A 2020    Procedure: COLONOSCOPY;  Surgeon: Angie Magana MD;  Location: [facility];  Service: Endoscopy;  Laterality: N/A;    HYSTERECTOMY      MULTIPLE TOOTH EXTRACTIONS      PARTIAL HYSTERECTOMY  2013    Due to fibroids       Review of patient's allergies indicates:   Allergen Reactions    Doxycycline Nausea Only     Other reaction(s): Nausea    Fluticasone Other (See Comments)     Other reaction(s): Epistaxis         No current facility-administered medications on file prior to encounter.     Current Outpatient Medications on File Prior to Encounter   Medication Sig    acetaminophen (TYLENOL) 500 MG tablet Take 500 mg by mouth every 6 (six) hours as needed for Pain. Only use when needed    acidophilus-pectin, citrus 100 million cell-10 mg Cap Take 1 capsule by mouth once daily.    albuterol (PROVENTIL/VENTOLIN HFA) 90 mcg/actuation inhaler INHALE 1 TO 2 PUFFS BY MOUTH INTO THE LUNGS EVERY 4 TO 6 HOURS AS NEEDED FOR WHEEZING OR SHORTNESS OF BREATH    albuterol-ipratropium (DUO-NEB) 2.5 mg-0.5 mg/3 mL nebulizer solution Take 3 mLs by nebulization 2 (two) times a day. Rescue    amoxicillin-clavulanate 875-125mg (AUGMENTIN) 875-125 mg per tablet Take 1 tablet by mouth 2 (two) times daily. for 14 days    ferrous sulfate 324 mg (65 mg iron) TbEC Take 1 tablet (324 mg total) by mouth every other  day.    fluticasone (VERAMYST) 27.5 mcg/actuation nasal spray 2 sprays by Nasal route once daily.    furosemide (LASIX) 40 MG tablet Take 1 tablet (40 mg total) by mouth once daily.    ibuprofen (ADVIL,MOTRIN) 200 MG tablet Take 200 mg by mouth every 6 (six) hours as needed for Pain.    levocetirizine (XYZAL) 5 MG tablet TAKE 1 TABLET(5 MG) BY MOUTH EVERY DAY    mv-minerals/FA/omega 3,6,9 #3 (WOMEN'S 50+ ADVANCED ORAL) Take 1 tablet by mouth Daily.    nintedanib (OFEV) 150 mg Cap Take 1 capsule (150 mg total) by mouth 2 (two) times a day.    omega-3s/dha/epa/fish oil/D3 (VITAMIN-D + OMEGA-3 ORAL) Take 2 tablets by mouth once daily at 6am.    omeprazole (PRILOSEC) 20 MG capsule Take 1 capsule (20 mg total) by mouth once daily.    revefenacin (YUPELRI) 175 mcg/3 mL Nebu Inhale 175 mcg into the lungs once daily.    revefenacin 175 mcg/3 mL Nebu Inhale 175 mcg into the lungs once daily. Patient has not received medication yet    SEREVENT DISKUS 50 mcg/dose diskus inhaler Inhale 1 puff into the lungs 2 (two) times daily. Controller (Patient not taking: Reported on 11/29/2023)    treprostiniL (TYVASO DPI) 16(112)-32(112) -48(28) mcg CtDv Inhale 16 mcg into the lungs 4 (four) times daily. Patient has not received medication  yet    vitamin D (VITAMIN D3) 1000 units Tab Take 1,000 Units by mouth once daily.     Family History       Problem Relation (Age of Onset)    Cancer Maternal Grandmother (60), Maternal Aunt (50), Maternal Aunt (66)    Cataracts Cousin    Diabetes Maternal Grandfather    Diverticulitis Mother    Heart disease Mother, Father (63)    Hypertension Father    Migraines Cousin    No Known Problems Brother    Peripheral vascular disease Maternal Grandfather    Stroke Mother, Sister, Maternal Grandfather          Tobacco Use    Smoking status: Never     Passive exposure: Past    Smokeless tobacco: Never   Substance and Sexual Activity    Alcohol use: Not Currently     Comment: last use 03/2022    Drug use: Not  Currently     Frequency: 4.0 times per week     Types: Marijuana     Comment: last year was last use 03/2022    Sexual activity: Yes     Partners: Female     Review of Systems   All other systems reviewed and are negative.    Objective:     Vital Signs (Most Recent):  Temp: 97.8 °F (36.6 °C) (12/04/23 1700)  Pulse: (!) 122 (12/04/23 1722)  Resp: (!) 28 (12/04/23 1700)  BP: 109/68 (12/04/23 1700)  SpO2: 100 % (12/04/23 1700) Vital Signs (24h Range):  Temp:  [97.8 °F (36.6 °C)-99.6 °F (37.6 °C)] 97.8 °F (36.6 °C)  Pulse:  [109-125] 122  Resp:  [21-34] 28  SpO2:  [80 %-100 %] 100 %  BP: (109-175)/(61-87) 109/68     Weight: 95.7 kg (211 lb)  Body mass index is 35.11 kg/m².     Physical Exam  Vitals and nursing note reviewed.   Constitutional:       General: She is not in acute distress.     Appearance: Normal appearance. She is obese. She is ill-appearing.   HENT:      Head: Normocephalic and atraumatic.      Nose: Nose normal.      Mouth/Throat:      Pharynx: Oropharynx is clear.   Eyes:      Extraocular Movements: Extraocular movements intact.      Pupils: Pupils are equal, round, and reactive to light.   Cardiovascular:      Rate and Rhythm: Regular rhythm. Tachycardia present.      Pulses: Normal pulses.      Heart sounds: Normal heart sounds. No murmur heard.  Pulmonary:      Effort: Respiratory distress present.      Breath sounds: Wheezing and rhonchi present. No rales.      Comments: Wearing HFNC  Abdominal:      General: Abdomen is flat. Bowel sounds are normal. There is no distension.      Palpations: Abdomen is soft.      Tenderness: There is no abdominal tenderness. There is no guarding.   Neurological:      General: No focal deficit present.      Mental Status: She is alert and oriented to person, place, and time.   Psychiatric:         Mood and Affect: Mood normal.         Behavior: Behavior normal.              CRANIAL NERVES     CN III, IV, VI   Pupils are equal, round, and reactive to light.        Significant Labs: All pertinent labs within the past 24 hours have been reviewed.  Recent Lab Results  (Last 5 results in the past 24 hours)        12/04/23  1733   12/04/23  1358   12/04/23  1339   12/04/23  1240   12/04/23  1233        Albumin         3.2       ALP         91       Allens Test       Pass         ALT         40       Anion Gap         14       Appearance, UA   Clear             AST         35       Baso #         0.13       Basophil %         0.7       Bilirubin (UA)   Negative             BILIRUBIN TOTAL         0.4  Comment: For infants and newborns, interpretation of results should be based  on gestational age, weight and in agreement with clinical  observations.    Premature Infant recommended reference ranges:  Up to 24 hours.............<8.0 mg/dL  Up to 48 hours............<12.0 mg/dL  3-5 days..................<15.0 mg/dL  6-29 days.................<15.0 mg/dL         BNP         29  Comment: Values of less than 100 pg/ml are consistent with non-CHF populations.       Site       RR         BSA     2.09           BUN         8       Calcium         10.2       Chloride         100       CO2         28       Color, UA   Colorless             Creatinine         0.8       Left Ventricle Relative Wall Thickness     0.41           D-Dimer         0.45  Comment: The quantitative D-dimer assay should be used as an aid in   the diagnosis of deep vein thrombosis and pulmonary embolism  in patients with the appropriate presentation and clinical  history. The upper limit of the reference interval and the clinical   cut off   point are identical. Causes of a positive (>0.50 mg/L FEU) D-Dimer   test  include, but are not limited to: DVT, PE, DIC, thrombolytic   therapy, anticoagulant therapy, recent surgery, trauma, or   pregnancy, disseminated malignancy, aortic aneurysm, cirrhosis,  and severe infection. False negative results may occur in   patients with distal DVT.         DelSys       Nasal Can          Differential Method         Automated       E/E' ratio     13.12           eGFR         >60       EF     60           Eos #         0.6       Eosinophil %         3.4       E wave deceleration time     110.52           Flow       8         FS     35           Glucose         114       Glucose, UA   Negative             Gran # (ANC)         15.2       Gran %         84.5       Hematocrit         38.7       Hemoglobin         11.5       Immature Grans (Abs)         0.14  Comment: Mild elevation in immature granulocytes is non specific and   can be seen in a variety of conditions including stress response,   acute inflammation, trauma and pregnancy. Correlation with other   laboratory and clinical findings is essential.         Immature Granulocytes         0.8       IVC diameter     0.97           IVSd     0.80           Ketones, UA   Negative             LA WIDTH     3.4           Lactate, Anton         1.6  Comment: Falsely low lactic acid results can be found in samples   containing >=13.0 mg/dL total bilirubin and/or >=3.5 mg/dL   direct bilirubin.         Left Atrium Major Axis     5.83           Left Atrium Minor Axis     5.77           LA size     2.83           LA volume     47.44                35.98           LA vol index     23.5           LA Volume Index (Mod)     17.8           Leukocytes, UA   Negative             LV LATERAL E/E' RATIO     18.22           LV SEPTAL E/E' RATIO     10.25           LV EDV BP     87.50           LV Diastolic Volume Index     43.32           LVIDd     4.40           LVIDs     2.85           LV mass     118.58           LV Mass Index     59           LV ESV BP     30.80           LV Systolic Volume Index     15.2           Lymph #         1.0       Lymph %         5.7       MCH         28.6       MCHC         29.7       MCV         96       Mean e'     0.13           Mode       SPONT         Mono #         0.9       Mono %         4.9       MPV         9.1       MV valve  area p 1/2 method     6.86           MV Peak E Stephan     1.64           MV stenosis pressure 1/2 time     32.05           NITRITE UA   Negative             nRBC         0       Occult Blood UA   Negative             pH, UA   8.0             Platelet Count         498       POC BE       6         POC Glucose       127         POC HCO3       30.0         POC Hematocrit       35         POC Ionized Calcium       1.32         POC PCO2       43.4         POC PH       7.447         POC PO2       83         Potassium, Blood Gas       3.7         POC SATURATED O2       97         Sodium, Blood Gas       139         POCT Glucose 114               Potassium         4.0       PROTEIN TOTAL         7.8       Protein, UA   Negative  Comment: Recommend a 24 hour urine protein or a urine   protein/creatinine ratio if globulin induced proteinuria is  clinically suspected.               PV Peak D Stephan     1.11           PV Peak S Stephan     0.92           Pulm vein S/D ratio     0.83           Posterior Wall     0.90           Right Atrium Volume Systolic     58.10           RA area     19.5           RA Major Axis     5.69           Est. RA pres     3           RA area length vol     58.0           RA Width     2.4           RBC         4.02       RDW         16.2       RV mid diameter     2.84           RV S'     17.44           RV TB RVSP     6           RVDD     4.60           RV-vila length     8.1           RV-vila mid d     2.8           Right ventricular length in diastole (apical 4-chamber view)     8.08           Sample       ARTERIAL         Sodium         142       Specific [place], UA   1.010             Specimen UA   Urine, Clean Catch             TAPSE     2.31           TASV     17.0           TDI SEPTAL     0.16           TDI LATERAL     0.09           Triscuspid Valve Regurgitation Peak Gradient     36           TR Max Stephan     3.0           Troponin I         0.034  Comment: The reference interval for Troponin I  represents the 99th percentile   cutoff   for our facility and is consistent with 3rd generation assay   performance.         TV resting pulmonary artery pressure     39           UROBILINOGEN UA   Negative             WBC         17.98       ZLVIDD     -3.02           ZLVIDS     -1.96                                  Significant Imaging: I have reviewed all pertinent imaging results/findings within the past 24 hours.    CTA Chest Non-Coronary (PE Studies)   Final Result      Negative CTA of the chest. No evidence of pulmonary embolism.  Stable cardiomegaly.  Stable extensive lung parenchymal changes bilaterally with lower lobe bronchiectasis.      All CT scans at this facility use dose modulation, iterative reconstruction and/or weight based dosing when appropriate to reduce radiation dose to as low as reasonably achievable.         Electronically signed by: Terry Monet MD   Date:    12/04/2023   Time:    15:23      X-Ray Chest 1 View   Final Result      Moderate diffuse bilateral lung infiltrates.  Possible slight worsening since prior study.         Electronically signed by: Terry Monet MD   Date:    12/04/2023   Time:    12:43          Assessment/Plan:     * Acute on chronic respiratory failure with hypoxia and hypercapnia  Patient with Hypoxic Respiratory failure which is Acute on chronic.  she is on home oxygen at 8 LPM. Supplemental oxygen was provided and noted-      .   Signs/symptoms of respiratory failure include- tachypnea, increased work of breathing, respiratory distress, use of accessory muscles, and wheezing. Contributing diagnoses includes - COPD, Interstitial lung disease, and Pneumonia Labs and images were reviewed. Patient Has not had a recent ABG. Will treat underlying causes and adjust management of respiratory failure as follows-     IV antibiotics  Scheduled breathing treatments  HFNC, BiPAP PRN  Consult Pulmonary    Sepsis  This patient does have evidence of infective focus  My  overall impression is sepsis.  Source: Respiratory  Antibiotics given-   Antibiotics (72h ago, onward)      Start     Stop Route Frequency Ordered    12/04/23 1830  vancomycin (VANCOCIN) 2,250 mg in dextrose 5 % (D5W) 500 mL IVPB         -- IV Once 12/04/23 1719 12/04/23 1815  piperacillin-tazobactam (ZOSYN) 4.5 g in dextrose 5 % in water (D5W) 100 mL IVPB (MB+)  ( Pneumonia - Moderate-High MDR )         12/09/23 1814 IV Every 8 hours (non-standard times) 12/04/23 1711 12/04/23 1810  vancomycin - pharmacy to dose  ( Pneumonia - Moderate-High MDR )        See Hyperspace for full Linked Orders Report.    -- IV pharmacy to manage frequency 12/04/23 1711          Latest lactate reviewed-  Recent Labs   Lab 12/04/23  1233   LACTATE 1.6     Organ dysfunction indicated by Acute respiratory failure    Fluid challenge Not needed - patient is not hypotensive      Post- resuscitation assessment No - Post resuscitation assessment not needed       Will Not start Pressors- Levophed for MAP of 65  Source control achieved by: IV antibiotics  Blood and sputums cultures ordered, monitor  Vancomycin and Zosyn - De-escalate when appropriate    GLENN on CPAP  BiPAP qHS    Interstitial lung disease  Restart home meds  On Lasix and Ofev  Ofev non-formulary, okay for patient to use home supply    HTN (hypertension)  Chronic, controlled. Latest blood pressure and vitals reviewed-     Temp:  [97.8 °F (36.6 °C)-99.6 °F (37.6 °C)]   Pulse:  [109-125]   Resp:  [21-34]   BP: (109-175)/(61-87)   SpO2:  [80 %-100 %] .   Home meds for hypertension were reviewed and noted below.   Hypertension Medications               furosemide (LASIX) 40 MG tablet Take 1 tablet (40 mg total) by mouth once daily.            While in the hospital, will manage blood pressure as follows; Continue home antihypertensive regimen    Will utilize p.r.n. blood pressure medication only if patient's blood pressure greater than 180/110 and she develops symptoms such as  worsening chest pain or shortness of breath.      VTE Risk Mitigation (From admission, onward)           Ordered     enoxaparin injection 40 mg  Daily         12/04/23 1711     IP VTE HIGH RISK PATIENT  Once         12/04/23 1711     Place sequential compression device  Until discontinued         12/04/23 1711                               AdmissionCare    Guideline: Pneumonia - INPT, Inpatient    Based on the indications selected for the patient, the bed status of Admit to Inpatient was determined to be MET    The following indications were selected as present at the time of evaluation of the patient:      Hypoxemia, as indicated by 1 or more of the following:   -     - Patient with baseline need for supplemental oxygen who now requires increased supplemental oxygen to maintain oxygenation at baseline or acceptable level   - Moderate-risk-category or high-risk-category patient (Pneumonia Severity Index class IV or V, or CURB-65 score of 3 or greater) PSI CalculatorCURB-65 Calculator   - Presence of risk factor for poor outcome (eg, gross hemoptysis, cavitary infiltrate, neuromuscular weakness, cystic fibrosis)    AdmissionCare documentation entered by: Cecilio Ponce    [facility], 27th edition, Copyright © 2023 [facility], [facility] All Rights Reserved.  [id number]:07:14-06:00    Cecilio Ponce MD  Department of Hospital Medicine  [facility] - Emergency Dept.

## 2023-12-04 NOTE — PROGRESS NOTES
[facility] - [facility] Rehab  Pulmonary Rehab  30 Day Evaluation and Recertification     SUMMARY             Summary of Progress:   Ms. Ayanna Alicia has been doing good in pulmonary rehab and is increasing her exercise tolerance. She will continue to benefit from the program. Pt has missed several sessions this period due to a hospitalization and admission to LTAC. She is attentive in educational discussions. Pt states she exercises at home, outside of rehab. Her absences are affecting her progress in pulmonary rehab. She is a possible lung transplant pt and is doing pre-op workup at [facility]. Will continue to motivate and educate pt while striving to increase her strength and endurance in rehab.     Attends:   Patient attends  2 days a week and has completed 9 visits. The patient has missed 3 visits. The patients current compliance is  75% .     Referring Provider:  Dr. Aviles      Start date:  9/11/23     Problems this Certification Period:   hospitalization for SOB, oxygenation issues, LTAC admission     Exercise Capacity Summary :                                     Nustep Date:  10/23/23 Time Load Steps Date:  11/29/23 Time Load Steps     25 3 1845  20 3 1244   Recumbent Bike Date:    Time Level Date:    Time Level                Treadmill Date:       Time Speed Grade Date:    Time Speed Grade                    Arm Ergometer Date:  10/23/23  (wall)    Time Level Date:  11/29/23  (Wall) Time Level     10 2  5 1   Free Weights Date:  10/2323  (Dumb)    Bicep Curls  Chest Press  Triceps  Legs lbs sets reps Date:  11/29/23  (Dumb) Bicep Curls  Chest Press  Triceps  Legs lbs Sets Reps      4 3 10   3 2 10                      Education:   PFT/ABG/CXR  Pursed lip breathing  Maximizing and conserving energy  Breathing techniques and exercises       Goals:   not met, continue previous goals        Updated Exercise Prescription:  Endurance Training: Arm ergometer, 12 mins, level 2  Strength Training: Nustep,  load 4, 10 mins, 500 steps           I certify that the patient is making progress in pulmonary rehabilitation, is physically able to participate, medically stable and remains motivated.

## 2023-12-04 NOTE — ED PROVIDER NOTES
SCRIBE #1 NOTE: I, Mariano Villa, am scribing for, and in the presence of, Anant Harris MD. I have scribed the entire note.      History      Chief Complaint   Patient presents with    Shortness of Breath     Sob and poor appetite for the last few days. Low o2 sat today (in the 70's on 8L NC) uses between 4-8 liters O2 NC at home.       Review of patient's allergies indicates:   Allergen Reactions    Doxycycline Nausea Only     Other reaction(s): Nausea    Fluticasone Other (See Comments)     Other reaction(s): Epistaxis        HPI   HPI    12/4/2023, 12:21 PM   History obtained from the patient      History of Present Illness: Ayanna Alicia is a 53 y.o. female patient with a PMHx of HTN, asthma who presents to the Emergency Department for SOB, onset several days PTA. Pt is on between 6-8 L O2 at home but states that her SpO2 was in the 70s earlier today. She was referred to the ED by Dr. Aviles (Pulmonology) for further evaluation. Symptoms are constant and moderate in severity. No mitigating or exacerbating factors reported. No associated sxs reported. Patient denies any fever, chills, n/v/d, CP, weakness, numbness, dizziness, headache, and all other sxs at this time. No prior Tx reported. No further complaints or concerns at this time.     Arrival mode: Personal vehicle     PCP: Madeleine Enrique MD       Past Medical History:  Past Medical History:   Diagnosis Date    Abnormal Pap smear of cervix     in the past with repeat pap smear okay.    Acute interstitial pneumonitis     Allergic rhinitis, cause unspecified     Arthritis of both knees     Asthma     Eczema     Fatty liver 10/2014    Fibrocystic breast changes     Headache(784.0)     Hepatomegaly 10/2014    Hypertension     Liver cyst 10/2014    Multinodular goiter     Followed by ENT - Dr. Nicolas Gray    Polymenorrhea 2008    TMJ (dislocation of temporomandibular joint)     Uterine fibroid     in the past    Vitamin D deficiency  disease        Past Surgical History:  Past Surgical History:   Procedure Laterality Date    BRONCHOSCOPY Bilateral 2023    Procedure: Bronchoscopy - insert lighted tube into airway to take a biopsy of lung;  Surgeon: Agustín Aviles MD;  Location: [facility] ENDO;  Service: Pulmonary;  Laterality: Bilateral;     SECTION, CLASSIC      x 1    COLONOSCOPY N/A 2020    Procedure: COLONOSCOPY;  Surgeon: Angie Magana MD;  Location: [facility] ENDO;  Service: Endoscopy;  Laterality: N/A;    HYSTERECTOMY      MULTIPLE TOOTH EXTRACTIONS      PARTIAL HYSTERECTOMY  2013    Due to fibroids         Family History:  Family History   Problem Relation Age of Onset    Stroke Mother     Heart disease Mother     Diverticulitis Mother     Heart disease Father 63        MI/CAD    Hypertension Father     Stroke Sister     No Known Problems Brother     Cancer Maternal Grandmother 60        Rectal and stomach cancer    Stroke Maternal Grandfather     Diabetes Maternal Grandfather     Peripheral vascular disease Maternal Grandfather     Cancer Maternal Aunt 50        Stomach cancer    Cancer Maternal Aunt 66        Lung cancer (smoker)    Migraines Cousin     Cataracts Cousin        Social History:  Social History     Tobacco Use    Smoking status: Never     Passive exposure: Past    Smokeless tobacco: Never   Substance and Sexual Activity    Alcohol use: Not Currently     Comment: last use 2022    Drug use: Not Currently     Frequency: 4.0 times per week     Types: Marijuana     Comment: last year was last use 2022    Sexual activity: Yes     Partners: Female       ROS   Review of Systems   Constitutional:  Negative for chills and fever.   HENT:  Negative for sore throat.    Respiratory:  Positive for shortness of breath.    Cardiovascular:  Negative for chest pain.   Gastrointestinal:  Negative for diarrhea, nausea and vomiting.   Genitourinary:  Negative for dysuria.    Musculoskeletal:  Negative for back pain.   Skin:  Negative for rash.   Neurological:  Negative for dizziness, weakness, numbness and headaches.   Hematological:  Does not bruise/bleed easily.   All other systems reviewed and are negative.    Physical Exam      Initial Vitals [12/04/23 1201]   BP Pulse Resp Temp SpO2   133/77 (!) 125 (!) 24 99.6 °F (37.6 °C) (!) 80 %      MAP       --          Physical Exam  Nursing Notes and Vital Signs Reviewed.  Constitutional: Patient is in no acute distress. Well-developed and well-nourished.  Head: Atraumatic. Normocephalic.  Eyes: PERRL. EOM intact. Conjunctivae are not pale. No scleral icterus.  ENT: Mucous membranes are moist. Oropharynx is clear and symmetric.    Neck: Supple. Full ROM. No lymphadenopathy.  Cardiovascular: Tachycardic. Regular rhythm. No murmurs, rubs, or gallops. Distal pulses are 2+ and symmetric.  Pulmonary/Chest: No respiratory distress. Clear to auscultation bilaterally. No wheezing or rales.  Abdominal: Soft and non-distended.  There is no tenderness.  No rebound, guarding, or rigidity.   Musculoskeletal: Moves all extremities. No obvious deformities. No edema.  Skin: Warm and dry.  Neurological:  Alert, awake, and appropriate.  Normal speech.  No acute focal neurological deficits are appreciated.  Psychiatric: Normal affect. Good eye contact. Appropriate in content.    ED Course    Procedures  ED Vital Signs:  Vitals:    12/04/23 1201 12/04/23 1215 12/04/23 1222 12/04/23 1230   BP: 133/77  (!) 175/82 126/61   Pulse: (!) 125  (!) 118 (!) 118   Resp: (!) 24  (!) 31 (!) 26   Temp: 99.6 °F (37.6 °C)      TempSrc: Oral      SpO2: (!) 80% 97% (!) 84% (!) 91%   Weight: 96 kg (211 lb 10.3 oz)      Height:        12/04/23 1235 12/04/23 1247 12/04/23 1302 12/04/23 1317   BP:  134/87 (!) 140/86 132/78   Pulse: (!) 119 (!) 125 109 (!) 115   Resp:  (!) 34 (!) 21 (!) 28   Temp:       TempSrc:       SpO2:  (!) 89% (!) 92% (!) 92%   Weight:    95.7 kg (211 lb)  "  Height:    5' 5" (1.651 m)    12/04/23 1400 12/04/23 1509 12/04/23 1600 12/04/23 1700   BP: 121/71 138/84 (!) 145/71 109/68   Pulse: (!) 116 (!) 118 (!) 119 (!) 119   Resp: (!) 30  (!) 32 (!) 28   Temp: 98 °F (36.7 °C)   97.8 °F (36.6 °C)   TempSrc: Oral   Oral   SpO2: (!) 94% (!) 92% 100% 100%   Weight:       Height:        12/04/23 1722   BP:    Pulse: (!) 122   Resp:    Temp:    TempSrc:    SpO2:    Weight:    Height:        Abnormal Lab Results:  Labs Reviewed   CBC W/ AUTO DIFFERENTIAL - Abnormal; Notable for the following components:       Result Value    WBC 17.98 (*)     Hemoglobin 11.5 (*)     MCHC 29.7 (*)     RDW 16.2 (*)     Platelets 498 (*)     MPV 9.1 (*)     Immature Granulocytes 0.8 (*)     Gran # (ANC) 15.2 (*)     Immature Grans (Abs) 0.14 (*)     Eos # 0.6 (*)     Gran % 84.5 (*)     Lymph % 5.7 (*)     All other components within normal limits   COMPREHENSIVE METABOLIC PANEL - Abnormal; Notable for the following components:    Glucose 114 (*)     Albumin 3.2 (*)     All other components within normal limits   TROPONIN I - Abnormal; Notable for the following components:    Troponin I 0.034 (*)     All other components within normal limits   URINALYSIS, REFLEX TO URINE CULTURE - Abnormal; Notable for the following components:    Color, UA Colorless (*)     All other components within normal limits    Narrative:     Specimen Source->Urine   ISTAT PROCEDURE - Abnormal; Notable for the following components:    POC HCO3 30.0 (*)     POC BE 6 (*)     POC Glucose 127 (*)     POC Hematocrit 35 (*)     All other components within normal limits   POCT GLUCOSE - Abnormal; Notable for the following components:    POCT Glucose 114 (*)     All other components within normal limits   INFLUENZA A & B BY MOLECULAR   CULTURE, BLOOD   CULTURE, BLOOD   CULTURE, RESPIRATORY   RESPIRATORY VIRAL PANEL BY PCR   B-TYPE NATRIURETIC PEPTIDE   SARS-COV-2 RNA AMPLIFICATION, QUAL   LACTIC ACID, PLASMA   D DIMER, " QUANTITATIVE   LACTIC ACID, PLASMA   TROPONIN I   POCT GLUCOSE MONITORING CONTINUOUS        All Lab Results:  Results for orders placed or performed during the hospital encounter of 12/04/23   Influenza A & B by Molecular    Specimen: Nasopharyngeal Swab   Result Value Ref Range    Influenza A, Molecular Negative Negative    Influenza B, Molecular Negative Negative    Flu A & B Source Nasal swab    CBC Auto Differential   Result Value Ref Range    WBC 17.98 (H) 3.90 - 12.70 K/uL    RBC 4.02 4.00 - 5.40 M/uL    Hemoglobin 11.5 (L) 12.0 - 16.0 g/dL    Hematocrit 38.7 37.0 - 48.5 %    MCV 96 82 - 98 fL    MCH 28.6 27.0 - 31.0 pg    MCHC 29.7 (L) 32.0 - 36.0 g/dL    RDW 16.2 (H) 11.5 - 14.5 %    Platelets 498 (H) 150 - 450 K/uL    MPV 9.1 (L) 9.2 - 12.9 fL    Immature Granulocytes 0.8 (H) 0.0 - 0.5 %    Gran # (ANC) 15.2 (H) 1.8 - 7.7 K/uL    Immature Grans (Abs) 0.14 (H) 0.00 - 0.04 K/uL    Lymph # 1.0 1.0 - 4.8 K/uL    Mono # 0.9 0.3 - 1.0 K/uL    Eos # 0.6 (H) 0.0 - 0.5 K/uL    Baso # 0.13 0.00 - 0.20 K/uL    nRBC 0 0 /100 WBC    Gran % 84.5 (H) 38.0 - 73.0 %    Lymph % 5.7 (L) 18.0 - 48.0 %    Mono % 4.9 4.0 - 15.0 %    Eosinophil % 3.4 0.0 - 8.0 %    Basophil % 0.7 0.0 - 1.9 %    Differential Method Automated    Comprehensive Metabolic Panel   Result Value Ref Range    Sodium 142 136 - 145 mmol/L    Potassium 4.0 3.5 - 5.1 mmol/L    Chloride 100 95 - 110 mmol/L    CO2 28 23 - 29 mmol/L    Glucose 114 (H) 70 - 110 mg/dL    BUN 8 6 - 20 mg/dL    Creatinine 0.8 0.5 - 1.4 mg/dL    Calcium 10.2 8.7 - 10.5 mg/dL    Total Protein 7.8 6.0 - 8.4 g/dL    Albumin 3.2 (L) 3.5 - 5.2 g/dL    Total Bilirubin 0.4 0.1 - 1.0 mg/dL    Alkaline Phosphatase 91 55 - 135 U/L    AST 35 10 - 40 U/L    ALT 40 10 - 44 U/L    eGFR >60 >60 mL/min/1.73 m^2    Anion Gap 14 8 - 16 mmol/L   BNP   Result Value Ref Range    BNP 29 0 - 99 pg/mL   Troponin I   Result Value Ref Range    Troponin I 0.034 (H) 0.000 - 0.026 ng/mL   COVID-19 Rapid  Screening   Result Value Ref Range    SARS-CoV-2 RNA, Amplification, Qual Negative Negative   Lactic acid, plasma   Result Value Ref Range    Lactate (Lactic Acid) 1.6 0.5 - 2.2 mmol/L   D dimer, quantitative   Result Value Ref Range    D-Dimer 0.45 <0.50 mg/L FEU   Urinalysis, Reflex to Urine Culture Urine, Clean Catch    Specimen: Urine   Result Value Ref Range    Specimen UA Urine, Clean Catch     Color, UA Colorless (A) Yellow, Straw, Hallie    Appearance, UA Clear Clear    pH, UA 8.0 5.0 - 8.0    Specific Gravity, UA 1.010 1.005 - 1.030    Protein, UA Negative Negative    Glucose, UA Negative Negative    Ketones, UA Negative Negative    Bilirubin (UA) Negative Negative    Occult Blood UA Negative Negative    Nitrite, UA Negative Negative    Urobilinogen, UA Negative <2.0 EU/dL    Leukocytes, UA Negative Negative   Echo Saline Bubble? No   Result Value Ref Range    BSA 2.09 m2    LVIDd 4.40 3.5 - 6.0 cm    LV Systolic Volume 30.80 mL    LV Systolic Volume Index 15.2 mL/m2    LVIDs 2.85 2.1 - 4.0 cm    LV Diastolic Volume 87.50 mL    LV Diastolic Volume Index 43.32 mL/m2    IVS 0.80 0.6 - 1.1 cm    FS 35 28 - 44 %    Left Ventricle Relative Wall Thickness 0.41 cm    Posterior Wall 0.90 0.6 - 1.1 cm    LV mass 118.58 g    LV Mass Index 59 g/m2    MV Peak E Stephan 1.64 m/s    TDI LATERAL 0.09 m/s    TDI SEPTAL 0.16 m/s    E/E' ratio 13.12 m/s    TR Max Stephan 3.0 m/s    E wave deceleration time 110.52 msec    LV SEPTAL E/E' RATIO 10.25 m/s    LV LATERAL E/E' RATIO 18.22 m/s    PV Peak S Stephan 0.92 m/s    PV Peak D Stephan 1.11 m/s    Pulm vein S/D ratio 0.83     RVDD 4.60 cm    Right ventricular length in diastole (apical 4-chamber view) 8.08 cm    RV mid diameter 2.84 cm    RV S' 17.44 cm/s    TAPSE 2.31 cm    LA size 2.83 cm    Left Atrium Minor Axis 5.77 cm    Left Atrium Major Axis 5.83 cm    LA volume (mod) 35.98 cm3    LA Volume Index (Mod) 17.8 mL/m2    RA area 19.5 cm2    RA Major Axis 5.69 cm    Right Atrium Volume  Systolic 58.10 mL    MV stenosis pressure 1/2 time 32.05 ms    MV valve area p 1/2 method 6.86 cm2    Triscuspid Valve Regurgitation Peak Gradient 36 mmHg    IVC diameter 0.97 cm    Mean e' 0.13 m/s    ZLVIDS -1.96     ZLVIDD -3.02     LA Volume Index 23.5 mL/m2    LA volume 47.44 cm3    RV-vila mid d 2.8 cm    RV-vila length 8.1 cm    TASV 17.0 cm/s    LA WIDTH 3.4 cm    RA area length vol 58.0 mL    RA Width 2.4 cm    EF 60 %    TV resting pulmonary artery pressure 39 mmHg    RV TB RVSP 6 mmHg    Est. RA pres 3 mmHg   ISTAT PROCEDURE   Result Value Ref Range    POC PH 7.447 7.35 - 7.45    POC PCO2 43.4 35 - 45 mmHg    POC PO2 83 80 - 100 mmHg    POC HCO3 30.0 (H) 24 - 28 mmol/L    POC BE 6 (H) -2 to 2 mmol/L    POC SATURATED O2 97 95 - 100 %    POC Glucose 127 (H) 70 - 110 mg/dL    POC Sodium 139 136 - 145 mmol/L    POC Potassium 3.7 3.5 - 5.1 mmol/L    POC Ionized Calcium 1.32 1.06 - 1.42 mmol/L    POC Hematocrit 35 (L) 36 - 54 %PCV    Sample ARTERIAL     Site RR     Allens Test Pass     DelSys Nasal Can     Mode SPONT     Flow 8    POCT glucose   Result Value Ref Range    POCT Glucose 114 (H) 70 - 110 mg/dL     *Note: Due to a large number of results and/or encounters for the requested time period, some results have not been displayed. A complete set of results can be found in Results Review.     Imaging Results:  Imaging Results              CTA Chest Non-Coronary (PE Studies) (Final result)  Result time 12/04/23 15:23:12      Final result by Terry Monet MD (Timothy) (12/04/23 15:23:12)                   Impression:      Negative CTA of the chest. No evidence of pulmonary embolism.  Stable cardiomegaly.  Stable extensive lung parenchymal changes bilaterally with lower lobe bronchiectasis.    All CT scans at this facility use dose modulation, iterative reconstruction and/or weight based dosing when appropriate to reduce radiation dose to as low as reasonably achievable.      Electronically signed  by: Terry Monet MD  Date:    12/04/2023  Time:    15:23               Narrative:    EXAMINATION:  CTA CHEST NON CORONARY (PE STUDIES)    CLINICAL HISTORY:  Pulmonary embolism (PE) suspected, high prob;    TECHNIQUE:  Standard CTA of the chest performed with 100 cc Omnipaque 350 utilizing 3-D MIP reformations.    COMPARISON:  CTA chest, 11/03/2023    FINDINGS:  Heart is mildly enlarged.  No coronary artery calcifications.  No evidence of pulmonary embolism.  No evidence of aortic dissection or aneurysm.  Stable bilateral mediastinal and hilar lymph node enlargement likely reactive.    There are diffuse parenchymal changes with ground-glass density and irregular consolidation bilaterally.  Findings are most prominent in the mid to lower lung zones with relative sparing of the upper lobes.  Additionally, there appears to be associated bronchiectasis especially involving the lower lobes.  Findings appears stable.    No pleural effusions.    Upper abdominal images are negative.                                       X-Ray Chest 1 View (Final result)  Result time 12/04/23 12:43:40      Final result by Teryr Monet ([facility]), MD (12/04/23 12:43:40)                   Impression:      Moderate diffuse bilateral lung infiltrates.  Possible slight worsening since prior study.      Electronically signed by: Terry Monet MD  Date:    12/04/2023  Time:    12:43               Narrative:    EXAMINATION:  XR CHEST 1 VIEW    CLINICAL HISTORY:  Shortness of breath,    COMPARISON:  Chest one view 11/29/2023.    FINDINGS:  Stable cardiomegaly.  Moderate diffuse bilateral lung infiltrates may be slightly worse than on previous exam.                                     The EKG was ordered, reviewed, and independently interpreted by the ED provider.  Interpretation time: 11:58  Rate: 120 BPM  Rhythm: Sinus tachycardia with PACs  Interpretation: Right superior axis deviation. Nonspecific ST and T wave abnormalities. No STEMI.            The Emergency Provider reviewed the vital signs and test results, which are outlined above.    ED Discussion     3:57 PM: Discussed case with Dr. Espinosa ([facility]). Dr. Ponce agrees with current care and management of pt and accepts admission.   Admitting Service: [facility]  Admitting Physician: Dr. Ponce  Admit to: Inpatient    3:58 PM: Re-evaluated pt. I have discussed test results, shared treatment plan, and the need for admission with patient and family at bedside. Pt and family express understanding at this time and agree with all information. All questions answered. Pt and family have no further questions or concerns at this time. Pt is ready for admit.    ED Course as of 12/04/23 1744   Mon Dec 04, 2023   1337 POC PH: 7.447 [SYLVESTER]   1337 POC PO2: 83 [SYLVESTER]   1337 POC SATURATED O2: 97 [SYLVESTER]   1337 Troponin I(!): 0.034 [SYLVESTER]   1337 WBC(!): 17.98 [SYLVESTER]   1337 Hemoglobin(!): 11.5 [SYLVESTER]   1337 Hematocrit: 38.7 [SYLVESTER]   1337 Platelet Count(!): 498 [SYLVESTER]   1337 BUN: 8 [SYLVESTER]   1337 Creatinine: 0.8 [SYLVESTER]   1439 Lactate, Anton: 1.6 [SYLVESTER]   1439 Troponin I(!): 0.034 [SYLVESTER]   1439 SARS-CoV-2 RNA, Amplification, Qual: Negative [SYLVESTER]   1439 Influenza A, Molecular: Negative [SYLVESTER]   1439 Influenza B, Molecular: Negative [SYLVESTER]      ED Course User Index  [SYLVESTER] Anant Harris MD       ED Medication(s):  Medications   albuterol-ipratropium 2.5 mg-0.5 mg/3 mL nebulizer solution 3 mL (has no administration in time range)   arformoteroL nebulizer solution 15 mcg (has no administration in time range)   budesonide nebulizer solution 0.5 mg (has no administration in time range)   docusate sodium capsule 100 mg (has no administration in time range)   enoxaparin injection 40 mg (has no administration in time range)   montelukast tablet 10 mg (has no administration in time range)   nintedanib Cap 150 mg (has no administration in time range)   pantoprazole EC tablet 40 mg (has no administration in time range)   acetaminophen tablet 650 mg  (has no administration in time range)   aluminum-magnesium hydroxide-simethicone 200-200-20 mg/5 mL suspension 30 mL (has no administration in time range)   benzonatate capsule 100 mg (has no administration in time range)   calcium carbonate 200 mg calcium (500 mg) chewable tablet 1,000 mg (has no administration in time range)   HYDROcodone-acetaminophen 5-325 mg per tablet 1 tablet (has no administration in time range)   melatonin tablet 6 mg (has no administration in time range)   furosemide tablet 40 mg (has no administration in time range)   glucose chewable tablet 16 g (has no administration in time range)   glucose chewable tablet 24 g (has no administration in time range)   glucagon (human recombinant) injection 1 mg (has no administration in time range)   piperacillin-tazobactam (ZOSYN) 4.5 g in dextrose 5 % in water (D5W) 100 mL IVPB (MB+) (has no administration in time range)   vancomycin - pharmacy to dose (has no administration in time range)   ondansetron injection 4 mg (has no administration in time range)   insulin aspart U-100 pen 0-5 Units (has no administration in time range)   dextrose 10% bolus 125 mL 125 mL (has no administration in time range)   dextrose 10% bolus 250 mL 250 mL (has no administration in time range)   vancomycin (VANCOCIN) 2,250 mg in dextrose 5 % (D5W) 500 mL IVPB (has no administration in time range)   levoFLOXacin 750 mg/150 mL IVPB 750 mg (0 mg Intravenous Stopped 12/4/23 1732)   iohexoL (OMNIPAQUE 350) injection 100 mL (100 mLs Intravenous Given 12/4/23 7227)         New Prescriptions    No medications on file         Medical Decision Making    Medical Decision Making  Problems Addressed:  Acute on chronic respiratory failure with hypoxia: acute illness or injury that poses a threat to life or bodily functions  Restrictive lung disease: chronic illness or injury with exacerbation, progression, or side effects of treatment  Shortness of breath: acute illness or injury that  "poses a threat to life or bodily functions    Amount and/or Complexity of Data Reviewed  Independent Historian: caregiver     Details: Dr. Aviles is very familiar with the patient and notified me that he was sending her to the emergency department for further evaluation the anticipation she should be admitted to Hospital Medicine and consult pulmonology  Labs: ordered. Decision-making details documented in ED Course.  Radiology: ordered. Decision-making details documented in ED Course.     Details: Chest x-ray with no obvious acute consolidation  ECG/medicine tests: ordered and independent interpretation performed. Decision-making details documented in ED Course.     Details: No STEMI    Risk  Prescription drug management.  Decision regarding hospitalization.  Risk Details:  this patient was sent over over from Pul rehab with SOB, increased O2 requirements, known advanced ILD, on 10 LPM, concern about PE, she also has PulHTN.  WBC- 18 but serum lactate normal at 1.6.  Flu and COVID are negative.  BNP was 29 and troponin was minimally elevated at 0.034.  On ABG PaO2 was 83, PaCO2 was 41.4, and oxygen saturation was 97% on her home O2.  Echo pending. CTA Chest Impression: "Negative CTA of the chest. No evidence of pulmonary embolism.  Stable cardiomegaly.  Stable extensive lung parenchymal changes bilaterally with lower lobe bronchiectasis."  Dr. Aviles wanted her admitted to Hospital Medicine with a consult Pulm. (Pirzahda aware).    Differential diagnosis includes exacerbation of chronic lung disease, COPD, COVID, pneumonia, CHF, ACS, pneumothorax, PE, etc.      Critical Care  Total time providing critical care: 55 minutes              Scribe Attestation:   Scribe #1: I performed the above scribed service and the documentation accurately describes the services I performed. I attest to the accuracy of the note.    Attending:   Physician Attestation Statement for Scribe #1: ISteven Jon M., MD, personally " performed the services described in this documentation, as scribed by Mariano Villa, in my presence, and it is both accurate and complete.         Attending Attestation:         Attending Critical Care:   Critical Care Times:   Direct Patient Care (initial evaluation, reassessments, and time considering the case)................................................................15 minutes.   Additional History from reviewing old medical records or taking additional history from the family, EMS, PCP, etc.......................10 minutes.   Ordering, Reviewing, and Interpreting Diagnostic Studies...............................................................................................................10 minutes.   Documentation..................................................................................................................................................................................10 minutes.   Consultation with other Physicians. .................................................................................................................................................5 minutes.   Consultation with the patient's family directly relating to the patient's condition, care, and DNR status (when patient unable)......5 minutes.   ==============================================================  Total Critical Care Time - exclusive of procedural time: 55 minutes.  ==============================================================  Critical care was necessary to treat or prevent imminent or life-threatening deterioration of the following conditions: respiratory failure (hypoxia).   Critical care was time spent personally by me on the following activities: examination of patient, review of old charts, obtaining history from patient or relative, ordering lab, x-rays, and/or EKG, development of treatment plan with patient or relative, ordering and performing treatments and interventions, evaluation of patient's  response to treatment, discussions with primary provider, interpretation of cardiac measurements and re-evaluation of patient's conition.   Critical Care Condition: potentially life-threatening         Clinical Impression       ICD-10-CM ICD-9-CM   1. Acute on chronic respiratory failure with hypoxia  J96.21 518.84     799.02   2. Shortness of breath  R06.02 786.05   3. SOB (shortness of breath)  R06.02 786.05   4. Restrictive lung disease  J98.4 518.89   5. Pulmonary hypertension due to interstitial lung disease  I27.23 416.8    J84.9 515       Disposition:   Disposition: Admitted  Condition: Fair         Anant Harris MD  12/04/23 1745       Anant Harris MD  12/18/23 115

## 2023-12-04 NOTE — ASSESSMENT & PLAN NOTE
This patient does have evidence of infective focus  My overall impression is sepsis.  Source: Respiratory  Antibiotics given-   Antibiotics (72h ago, onward)      Start     Stop Route Frequency Ordered    12/04/23 1830  vancomycin (VANCOCIN) 2,250 mg in dextrose 5 % (D5W) 500 mL IVPB         -- IV Once 12/04/23 1719 12/04/23 1815  piperacillin-tazobactam (ZOSYN) 4.5 g in dextrose 5 % in water (D5W) 100 mL IVPB (MB+)  ( Pneumonia - Moderate-High MDR )         12/09/23 1814 IV Every 8 hours (non-standard times) 12/04/23 1711 12/04/23 1810  vancomycin - pharmacy to dose  ( Pneumonia - Moderate-High MDR )        See Hyperspace for full Linked Orders Report.    -- IV pharmacy to manage frequency 12/04/23 1711          Latest lactate reviewed-  Recent Labs   Lab 12/04/23  1233   LACTATE 1.6     Organ dysfunction indicated by Acute respiratory failure    Fluid challenge Not needed - patient is not hypotensive      Post- resuscitation assessment No - Post resuscitation assessment not needed       Will Not start Pressors- Levophed for MAP of 65  Source control achieved by: IV antibiotics  Blood and sputums cultures ordered, monitor  Vancomycin and Zosyn - De-escalate when appropriate

## 2023-12-04 NOTE — FIRST PROVIDER EVALUATION
Medical screening examination initiated.  I have conducted a focused provider triage encounter, findings are as follows:    Brief history of present illness:  Shortness of breath, sent from pulmonology for further evaluation    There were no vitals filed for this visit.    Pertinent physical exam:  Mild, tachypneic, vital signs stable 82 % on 8 L    Brief workup plan:  Workup    Preliminary workup initiated; this workup will be continued and followed by the physician or advanced practice provider that is assigned to the patient when roomed.

## 2023-12-04 NOTE — ASSESSMENT & PLAN NOTE
Chronic, controlled. Latest blood pressure and vitals reviewed-     Temp:  [97.8 °F (36.6 °C)-99.6 °F (37.6 °C)]   Pulse:  [109-125]   Resp:  [21-34]   BP: (109-175)/(61-87)   SpO2:  [80 %-100 %] .   Home meds for hypertension were reviewed and noted below.   Hypertension Medications               furosemide (LASIX) 40 MG tablet Take 1 tablet (40 mg total) by mouth once daily.            While in the hospital, will manage blood pressure as follows; Continue home antihypertensive regimen    Will utilize p.r.n. blood pressure medication only if patient's blood pressure greater than 180/110 and she develops symptoms such as worsening chest pain or shortness of breath.

## 2023-12-04 NOTE — HPI
"52 y/o female with PMHx of Pulmonary Fibrosis, Pulmonary HTN, COPD, GLENN, chronic respiratory failure on 8 L NC baseline, Rheumatoid Arthritis who presented to Pulmonary Rehab today and referred to the ER due to worsening SOB. 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 She is currently being tapered off of prednisone, states she is on 5 mg daily. She reports onset of SOB yesterday, associated with coughing, worse with movement. In ER, patient found to have elevated WBC 17.5k, CTA Chest with no PE, noted "diffuse parenchymal changes with ground-glass density and irregular consolidation bilaterally." Blood cultures were obtained, patient was started on Levaquin. [facility] consulted for further management, will admit as inpatient for acute on chronic respiratory failure, sepsis.  "

## 2023-12-05 ENCOUNTER — TELEPHONE (OUTPATIENT)
Dept: FAMILY MEDICINE | Facility: CLINIC | Age: 53
End: 2023-12-05
Payer: COMMERCIAL

## 2023-12-05 LAB
ALBUMIN SERPL BCP-MCNC: 2.8 G/DL (ref 3.5–5.2)
ALP SERPL-CCNC: 82 U/L (ref 55–135)
ALT SERPL W/O P-5'-P-CCNC: 33 U/L (ref 10–44)
ANION GAP SERPL CALC-SCNC: 10 MMOL/L (ref 8–16)
AST SERPL-CCNC: 24 U/L (ref 10–40)
BASOPHILS # BLD AUTO: 0.03 K/UL (ref 0–0.2)
BASOPHILS NFR BLD: 0.3 % (ref 0–1.9)
BILIRUB SERPL-MCNC: 0.3 MG/DL (ref 0.1–1)
BUN SERPL-MCNC: 7 MG/DL (ref 6–20)
CALCIUM SERPL-MCNC: 9.3 MG/DL (ref 8.7–10.5)
CHLORIDE SERPL-SCNC: 100 MMOL/L (ref 95–110)
CO2 SERPL-SCNC: 26 MMOL/L (ref 23–29)
CREAT SERPL-MCNC: 0.9 MG/DL (ref 0.5–1.4)
DIFFERENTIAL METHOD: ABNORMAL
EOSINOPHIL # BLD AUTO: 0 K/UL (ref 0–0.5)
EOSINOPHIL NFR BLD: 0 % (ref 0–8)
ERYTHROCYTE [DISTWIDTH] IN BLOOD BY AUTOMATED COUNT: 16.3 % (ref 11.5–14.5)
EST. GFR  (NO RACE VARIABLE): >60 ML/MIN/1.73 M^2
GLUCOSE SERPL-MCNC: 176 MG/DL (ref 70–110)
HCT VFR BLD AUTO: 35 % (ref 37–48.5)
HGB BLD-MCNC: 10.4 G/DL (ref 12–16)
IMM GRANULOCYTES # BLD AUTO: 0.09 K/UL (ref 0–0.04)
IMM GRANULOCYTES NFR BLD AUTO: 1 % (ref 0–0.5)
LYMPHOCYTES # BLD AUTO: 0.5 K/UL (ref 1–4.8)
LYMPHOCYTES NFR BLD: 5.3 % (ref 18–48)
MAGNESIUM SERPL-MCNC: 1.7 MG/DL (ref 1.6–2.6)
MCH RBC QN AUTO: 28.5 PG (ref 27–31)
MCHC RBC AUTO-ENTMCNC: 29.7 G/DL (ref 32–36)
MCV RBC AUTO: 96 FL (ref 82–98)
MONOCYTES # BLD AUTO: 0.1 K/UL (ref 0.3–1)
MONOCYTES NFR BLD: 1.4 % (ref 4–15)
NEUTROPHILS # BLD AUTO: 8.6 K/UL (ref 1.8–7.7)
NEUTROPHILS NFR BLD: 92 % (ref 38–73)
NRBC BLD-RTO: 0 /100 WBC
PHOSPHATE SERPL-MCNC: 4.2 MG/DL (ref 2.7–4.5)
PLATELET # BLD AUTO: 459 K/UL (ref 150–450)
PMV BLD AUTO: 9.5 FL (ref 9.2–12.9)
POCT GLUCOSE: 128 MG/DL (ref 70–110)
POTASSIUM SERPL-SCNC: 4.5 MMOL/L (ref 3.5–5.1)
PROT SERPL-MCNC: 7 G/DL (ref 6–8.4)
RBC # BLD AUTO: 3.65 M/UL (ref 4–5.4)
SODIUM SERPL-SCNC: 136 MMOL/L (ref 136–145)
WBC # BLD AUTO: 9.29 K/UL (ref 3.9–12.7)

## 2023-12-05 PROCEDURE — 94761 N-INVAS EAR/PLS OXIMETRY MLT: CPT

## 2023-12-05 PROCEDURE — 25000242 PHARM REV CODE 250 ALT 637 W/ HCPCS: Performed by: HOSPITALIST

## 2023-12-05 PROCEDURE — 63600175 PHARM REV CODE 636 W HCPCS: Performed by: NURSE PRACTITIONER

## 2023-12-05 PROCEDURE — 99900035 HC TECH TIME PER 15 MIN (STAT)

## 2023-12-05 PROCEDURE — 25000003 PHARM REV CODE 250: Performed by: HOSPITALIST

## 2023-12-05 PROCEDURE — 85025 COMPLETE CBC W/AUTO DIFF WBC: CPT | Performed by: HOSPITALIST

## 2023-12-05 PROCEDURE — 84100 ASSAY OF PHOSPHORUS: CPT | Performed by: HOSPITALIST

## 2023-12-05 PROCEDURE — 83735 ASSAY OF MAGNESIUM: CPT | Performed by: HOSPITALIST

## 2023-12-05 PROCEDURE — 63600175 PHARM REV CODE 636 W HCPCS: Performed by: HOSPITALIST

## 2023-12-05 PROCEDURE — 36415 COLL VENOUS BLD VENIPUNCTURE: CPT | Performed by: HOSPITALIST

## 2023-12-05 PROCEDURE — 21400001 HC TELEMETRY ROOM

## 2023-12-05 PROCEDURE — 94640 AIRWAY INHALATION TREATMENT: CPT

## 2023-12-05 PROCEDURE — 27100171 HC OXYGEN HIGH FLOW UP TO 24 HOURS

## 2023-12-05 PROCEDURE — 80053 COMPREHEN METABOLIC PANEL: CPT | Performed by: HOSPITALIST

## 2023-12-05 RX ORDER — IPRATROPIUM BROMIDE AND ALBUTEROL SULFATE 2.5; .5 MG/3ML; MG/3ML
3 SOLUTION RESPIRATORY (INHALATION) EVERY 6 HOURS PRN
Status: DISCONTINUED | OUTPATIENT
Start: 2023-12-05 | End: 2023-12-07 | Stop reason: HOSPADM

## 2023-12-05 RX ORDER — FLUTICASONE PROPIONATE 50 MCG
2 SPRAY, SUSPENSION (ML) NASAL DAILY
Status: DISCONTINUED | OUTPATIENT
Start: 2023-12-05 | End: 2023-12-05

## 2023-12-05 RX ORDER — IPRATROPIUM BROMIDE AND ALBUTEROL SULFATE 2.5; .5 MG/3ML; MG/3ML
3 SOLUTION RESPIRATORY (INHALATION) EVERY 4 HOURS
Status: DISCONTINUED | OUTPATIENT
Start: 2023-12-06 | End: 2023-12-07 | Stop reason: HOSPADM

## 2023-12-05 RX ORDER — MAGNESIUM SULFATE HEPTAHYDRATE 40 MG/ML
2 INJECTION, SOLUTION INTRAVENOUS
Status: COMPLETED | OUTPATIENT
Start: 2023-12-05 | End: 2023-12-05

## 2023-12-05 RX ADMIN — PANTOPRAZOLE SODIUM 40 MG: 40 TABLET, DELAYED RELEASE ORAL at 09:12

## 2023-12-05 RX ADMIN — IPRATROPIUM BROMIDE AND ALBUTEROL SULFATE 3 ML: 2.5; .5 SOLUTION RESPIRATORY (INHALATION) at 02:12

## 2023-12-05 RX ADMIN — ARFORMOTEROL TARTRATE 15 MCG: 15 SOLUTION RESPIRATORY (INHALATION) at 08:12

## 2023-12-05 RX ADMIN — VANCOMYCIN HYDROCHLORIDE 1500 MG: 1.5 INJECTION, POWDER, LYOPHILIZED, FOR SOLUTION INTRAVENOUS at 10:12

## 2023-12-05 RX ADMIN — METHYLPREDNISOLONE SODIUM SUCCINATE 40 MG: 40 INJECTION, POWDER, FOR SOLUTION INTRAMUSCULAR; INTRAVENOUS at 11:12

## 2023-12-05 RX ADMIN — PIPERACILLIN SODIUM AND TAZOBACTAM SODIUM 4.5 G: 4; .5 INJECTION, POWDER, FOR SOLUTION INTRAVENOUS at 12:12

## 2023-12-05 RX ADMIN — PIPERACILLIN SODIUM AND TAZOBACTAM SODIUM 4.5 G: 4; .5 INJECTION, POWDER, FOR SOLUTION INTRAVENOUS at 02:12

## 2023-12-05 RX ADMIN — IPRATROPIUM BROMIDE AND ALBUTEROL SULFATE 3 ML: 2.5; .5 SOLUTION RESPIRATORY (INHALATION) at 06:12

## 2023-12-05 RX ADMIN — IPRATROPIUM BROMIDE AND ALBUTEROL SULFATE 3 ML: 2.5; .5 SOLUTION RESPIRATORY (INHALATION) at 08:12

## 2023-12-05 RX ADMIN — MAGNESIUM SULFATE HEPTAHYDRATE 2 G: 40 INJECTION, SOLUTION INTRAVENOUS at 01:12

## 2023-12-05 RX ADMIN — VANCOMYCIN HYDROCHLORIDE 1500 MG: 1.5 INJECTION, POWDER, LYOPHILIZED, FOR SOLUTION INTRAVENOUS at 09:12

## 2023-12-05 RX ADMIN — MAGNESIUM SULFATE HEPTAHYDRATE 2 G: 40 INJECTION, SOLUTION INTRAVENOUS at 11:12

## 2023-12-05 RX ADMIN — ARFORMOTEROL TARTRATE 15 MCG: 15 SOLUTION RESPIRATORY (INHALATION) at 06:12

## 2023-12-05 RX ADMIN — MONTELUKAST 10 MG: 10 TABLET, FILM COATED ORAL at 09:12

## 2023-12-05 RX ADMIN — FUROSEMIDE 40 MG: 40 TABLET ORAL at 09:12

## 2023-12-05 RX ADMIN — DOCUSATE SODIUM 100 MG: 100 CAPSULE, LIQUID FILLED ORAL at 09:12

## 2023-12-05 RX ADMIN — PIPERACILLIN SODIUM AND TAZOBACTAM SODIUM 4.5 G: 4; .5 INJECTION, POWDER, FOR SOLUTION INTRAVENOUS at 05:12

## 2023-12-05 RX ADMIN — SALINE NASAL SPRAY 1 SPRAY: 1.5 SOLUTION NASAL at 09:12

## 2023-12-05 RX ADMIN — IPRATROPIUM BROMIDE AND ALBUTEROL SULFATE 3 ML: 2.5; .5 SOLUTION RESPIRATORY (INHALATION) at 11:12

## 2023-12-05 RX ADMIN — BUDESONIDE 0.5 MG: 0.5 INHALANT ORAL at 08:12

## 2023-12-05 RX ADMIN — HYDROCODONE BITARTRATE AND ACETAMINOPHEN 1 TABLET: 5; 325 TABLET ORAL at 09:12

## 2023-12-05 RX ADMIN — BENZONATATE 100 MG: 100 CAPSULE ORAL at 09:12

## 2023-12-05 RX ADMIN — IPRATROPIUM BROMIDE AND ALBUTEROL SULFATE 3 ML: 2.5; .5 SOLUTION RESPIRATORY (INHALATION) at 01:12

## 2023-12-05 RX ADMIN — BUDESONIDE 0.5 MG: 0.5 INHALANT ORAL at 06:12

## 2023-12-05 NOTE — SUBJECTIVE & OBJECTIVE
Past Medical History:   Diagnosis Date    Abnormal Pap smear of cervix     in the past with repeat pap smear okay.    Acute interstitial pneumonitis     Allergic rhinitis, cause unspecified     Arthritis of both knees     Asthma     Eczema     Fatty liver 10/2014    Fibrocystic breast changes     Headache(784.0)     Hepatomegaly 10/2014    Hypertension     Liver cyst 10/2014    Multinodular goiter     Followed by ENT - Dr. Nicolas Gray    Polymenorrhea     TMJ (dislocation of temporomandibular joint)     Uterine fibroid     in the past    Vitamin D deficiency disease        Past Surgical History:   Procedure Laterality Date    BRONCHOSCOPY Bilateral 2023    Procedure: Bronchoscopy - insert lighted tube into airway to take a biopsy of lung;  Surgeon: Agustín Aviles MD;  Location: [facility] ENDO;  Service: Pulmonary;  Laterality: Bilateral;     SECTION, CLASSIC      x 1    COLONOSCOPY N/A 2020    Procedure: COLONOSCOPY;  Surgeon: Angie Magana MD;  Location: [facility] ENDO;  Service: Endoscopy;  Laterality: N/A;    HYSTERECTOMY      MULTIPLE TOOTH EXTRACTIONS      PARTIAL HYSTERECTOMY  2013    Due to fibroids       Review of patient's allergies indicates:   Allergen Reactions    Doxycycline Nausea Only     Other reaction(s): Nausea    Fluticasone Other (See Comments)     Other reaction(s): Epistaxis         Family History       Problem Relation (Age of Onset)    Cancer Maternal Grandmother (60), Maternal Aunt (50), Maternal Aunt (66)    Cataracts Cousin    Diabetes Maternal Grandfather    Diverticulitis Mother    Heart disease Mother, Father (63)    Hypertension Father    Migraines Cousin    No Known Problems Brother    Peripheral vascular disease Maternal Grandfather    Stroke Mother, Sister, Maternal Grandfather          Tobacco Use    Smoking status: Never     Passive exposure: Past    Smokeless tobacco: Never   Substance and Sexual Activity    Alcohol use: Not Currently     Comment:  last use 03/2022    Drug use: Not Currently     Frequency: 4.0 times per week     Types: Marijuana     Comment: last year was last use 03/2022    Sexual activity: Yes     Partners: Female         Review of Systems   Constitutional:  Positive for fatigue. Negative for chills, diaphoresis, fever and unexpected weight change.   HENT:  Positive for congestion.    Respiratory:  Positive for cough and shortness of breath. Negative for apnea, choking, chest tightness, wheezing and stridor.    Genitourinary: Negative.    Neurological: Negative.    Psychiatric/Behavioral: Negative.     All other systems reviewed and are negative.    Objective:     Vital Signs (Most Recent):  Temp: 97.8 °F (36.6 °C) (12/04/23 1700)  Pulse: (!) 122 (12/04/23 1722)  Resp: (!) 28 (12/04/23 1700)  BP: 109/68 (12/04/23 1700)  SpO2: 100 % (12/04/23 1700) Vital Signs (24h Range):  Temp:  [97.8 °F (36.6 °C)-99.6 °F (37.6 °C)] 97.8 °F (36.6 °C)  Pulse:  [109-125] 122  Resp:  [21-34] 28  SpO2:  [80 %-100 %] 100 %  BP: (109-175)/(61-87) 109/68     Weight: 95.7 kg (211 lb)  Body mass index is 35.11 kg/m².      Intake/Output Summary (Last 24 hours) at 12/4/2023 1808  Last data filed at 12/4/2023 1732  Gross per 24 hour   Intake 148.57 ml   Output --   Net 148.57 ml        Physical Exam  Constitutional:       General: She is not in acute distress.     Appearance: She is ill-appearing. She is not toxic-appearing or diaphoretic.   HENT:      Head: Normocephalic.   Eyes:      Pupils: Pupils are equal, round, and reactive to light.   Cardiovascular:      Rate and Rhythm: Tachycardia present.   Pulmonary:      Effort: Respiratory distress present.      Breath sounds: Rhonchi and rales present.   Abdominal:      Palpations: Abdomen is soft.   Skin:     General: Skin is warm.      Capillary Refill: Capillary refill takes less than 2 seconds.   Neurological:      General: No focal deficit present.      Mental Status: She is alert.          Vents:        Lines/Drains/Airways       Peripheral Intravenous Line  Duration                  Peripheral IV - Single Lumen 12/04/23 1234 Left Antecubital <1 day                    Significant Labs:    CBC/Anemia Profile:  Recent Labs   Lab 12/04/23  1233 12/04/23  1240   WBC 17.98*  --    HGB 11.5*  --    HCT 38.7 35*   *  --    MCV 96  --    RDW 16.2*  --         Chemistries:  Recent Labs   Lab 12/04/23  1233      K 4.0      CO2 28   BUN 8   CREATININE 0.8   CALCIUM 10.2   ALBUMIN 3.2*   PROT 7.8   BILITOT 0.4   ALKPHOS 91   ALT 40   AST 35       All pertinent labs within the past 24 hours have been reviewed.    Significant Imaging:   I have reviewed all pertinent imaging results/findings within the past 24 hours.

## 2023-12-05 NOTE — ASSESSMENT & PLAN NOTE
Patient with Hypoxic Respiratory failure which is Acute on chronic.  she is on home oxygen at 6 LPM. Supplemental oxygen was provided and noted-      .   Signs/symptoms of respiratory failure include- increased work of breathing. Contributing diagnoses includes -  UIP  Labs and images were reviewed. Patient Has recent ABG, which has been reviewed. Will treat underlying causes and adjust management of respiratory failure as follows-   Vapotherm when awake  IV steroids. Continue chronic UIP medications.   Sputum culture  Empiric Abx  Patient re-assured her CT does not suggest a new process.   AVAPS - Home Qhs

## 2023-12-05 NOTE — CONSULTS
[facility] - Emergency Dept.  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 She was changed to AVAPS and has been optimized on her pulmonary medications. Patient has had numerous hospitalization and has been to LTAC. She is undergoing work at [facility] for possible lung transplant. She was in Rehab today when she became symptomatic.  She presents with acute on ch ronic SOB and inability to get her oxygen saturation up despite 6-8L of oxygen. No fever, chest pain, N/V or GI symptoms.       Past Medical History:   Diagnosis Date    Abnormal Pap smear of cervix     in the past with repeat pap smear okay.    Acute interstitial pneumonitis     Allergic rhinitis, cause unspecified     Arthritis of both knees     Asthma     Eczema     Fatty liver 10/2014    Fibrocystic breast changes     Headache(784.0)     Hepatomegaly 10/2014    Hypertension     Liver cyst 10/2014    Multinodular goiter     Followed by ENT - Dr. Nicolas Gray    Polymenorrhea     TMJ (dislocation of temporomandibular joint)     Uterine fibroid     in the past    Vitamin D deficiency disease        Past Surgical History:   Procedure Laterality Date    BRONCHOSCOPY Bilateral 2023    Procedure: Bronchoscopy - insert lighted tube into airway to take a biopsy of lung;  Surgeon: Agustín Aviles MD;  Location: [facility];  Service: Pulmonary;  Laterality: Bilateral;     SECTION, CLASSIC      x 1    COLONOSCOPY N/A 2020     Procedure: COLONOSCOPY;  Surgeon: Angie Magana MD;  Location: [facility];  Service: Endoscopy;  Laterality: N/A;    HYSTERECTOMY      MULTIPLE TOOTH EXTRACTIONS      PARTIAL HYSTERECTOMY  03/20/2013    Due to fibroids       Review of patient's allergies indicates:   Allergen Reactions    Doxycycline Nausea Only     Other reaction(s): Nausea    Fluticasone Other (See Comments)     Other reaction(s): Epistaxis         Family History       Problem Relation (Age of Onset)    Cancer Maternal Grandmother (60), Maternal Aunt (50), Maternal Aunt (66)    Cataracts Cousin    Diabetes Maternal Grandfather    Diverticulitis Mother    Heart disease Mother, Father (63)    Hypertension Father    Migraines Cousin    No Known Problems Brother    Peripheral vascular disease Maternal Grandfather    Stroke Mother, Sister, Maternal Grandfather          Tobacco Use    Smoking status: Never     Passive exposure: Past    Smokeless tobacco: Never   Substance and Sexual Activity    Alcohol use: Not Currently     Comment: last use 03/2022    Drug use: Not Currently     Frequency: 4.0 times per week     Types: Marijuana     Comment: last year was last use 03/2022    Sexual activity: Yes     Partners: Female         Review of Systems   Constitutional:  Positive for fatigue. Negative for chills, diaphoresis, fever and unexpected weight change.   HENT:  Positive for congestion.    Respiratory:  Positive for cough and shortness of breath. Negative for apnea, choking, chest tightness, wheezing and stridor.    Genitourinary: Negative.    Neurological: Negative.    Psychiatric/Behavioral: Negative.     All other systems reviewed and are negative.    Objective:     Vital Signs (Most Recent):  Temp: 97.8 °F (36.6 °C) (12/04/23 1700)  Pulse: (!) 122 (12/04/23 1722)  Resp: (!) 28 (12/04/23 1700)  BP: 109/68 (12/04/23 1700)  SpO2: 100 % (12/04/23 1700) Vital Signs (24h Range):  Temp:  [97.8 °F (36.6 °C)-99.6 °F (37.6 °C)] 97.8 °F (36.6  °C)  Pulse:  [109-125] 122  Resp:  [21-34] 28  SpO2:  [80 %-100 %] 100 %  BP: (109-175)/(61-87) 109/68     Weight: 95.7 kg (211 lb)  Body mass index is 35.11 kg/m².      Intake/Output Summary (Last 24 hours) at 12/4/2023 1808  Last data filed at 12/4/2023 1732  Gross per 24 hour   Intake 148.57 ml   Output --   Net 148.57 ml        Physical Exam  Constitutional:       General: She is not in acute distress.     Appearance: She is ill-appearing. She is not toxic-appearing or diaphoretic.   HENT:      Head: Normocephalic.   Eyes:      Pupils: Pupils are equal, round, and reactive to light.   Cardiovascular:      Rate and Rhythm: Tachycardia present.   Pulmonary:      Effort: Respiratory distress present.      Breath sounds: Rhonchi and rales present.   Abdominal:      Palpations: Abdomen is soft.   Skin:     General: Skin is warm.      Capillary Refill: Capillary refill takes less than 2 seconds.   Neurological:      General: No focal deficit present.      Mental Status: She is alert.          Vents:       Lines/Drains/Airways       Peripheral Intravenous Line  Duration                  Peripheral IV - Single Lumen 12/04/23 1234 Left Antecubital <1 day                    Significant Labs:    CBC/Anemia Profile:  Recent Labs   Lab 12/04/23  1233 12/04/23  1240   WBC 17.98*  --    HGB 11.5*  --    HCT 38.7 35*   *  --    MCV 96  --    RDW 16.2*  --         Chemistries:  Recent Labs   Lab 12/04/23  1233      K 4.0      CO2 28   BUN 8   CREATININE 0.8   CALCIUM 10.2   ALBUMIN 3.2*   PROT 7.8   BILITOT 0.4   ALKPHOS 91   ALT 40   AST 35       All pertinent labs within the past 24 hours have been reviewed.    Significant Imaging:   I have reviewed all pertinent imaging results/findings within the past 24 hours.    ABG  Recent Labs   Lab 12/04/23  1240   PH 7.447   PO2 83   PCO2 43.4   HCO3 30.0*   BE 6*     Assessment/Plan:     Pulmonary  * Acute on chronic respiratory failure with hypoxia and  hypercapnia  Patient with Hypoxic Respiratory failure which is Acute on chronic.  she is on home oxygen at 6 LPM. Supplemental oxygen was provided and noted-      .   Signs/symptoms of respiratory failure include- increased work of breathing. Contributing diagnoses includes -  UIP  Labs and images were reviewed. Patient Has recent ABG, which has been reviewed. Will treat underlying causes and adjust management of respiratory failure as follows-   Vapotherm when awake  IV steroids. Continue chronic UIP medications.   Sputum culture  Empiric Abx  Patient re-assured her CT does not suggest a new process.   AVAPS - Home Qhs    Interstitial lung disease  Continue home medications    Cardiac/Vascular  Pulmonary hypertension due to interstitial lung disease  Continue home medications          Thank you for your consult. I will follow-up with patient. Please contact us if you have any additional questions.     Chris Hernandez MD  Pulmonology  [facility] - Emergency Dept.

## 2023-12-05 NOTE — PROGRESS NOTES
Seen in pul rehab  Arrived for session  Increased WOB, needed escalated O2 delivery  /66  -130  SpO2 93%  On 15LPM, NRB  2 family at her side  After discussion O2 requirements exceed safety to return home  Sent to ER for CTA chest and Stat Echo  Communicated with ER providers

## 2023-12-05 NOTE — NURSING
Patient arrived to unit from ER via stretcher.   Patient in room 247.  Transferred into bed with minimal assist.   Telephone report given.  Charge nurse advised of patient arrival.   VS currently stable.   Tele monitor applied.   Patient oriented to room, unit, and call bell.   Bed in lowest position, call light in reach.  Encouraged to notify of all needs.   Will continue to monitor.

## 2023-12-05 NOTE — ASSESSMENT & PLAN NOTE
Patient with Hypoxic Respiratory failure which is Acute on chronic.  she is on home oxygen at 8 LPM. Supplemental oxygen was provided and noted-      .   Signs/symptoms of respiratory failure include- tachypnea, increased work of breathing, respiratory distress, use of accessory muscles, and wheezing. Contributing diagnoses includes - COPD, Interstitial lung disease, and Pneumonia Labs and images were reviewed. Patient Has not had a recent ABG. Will treat underlying causes and adjust management of respiratory failure as follows-     IV antibiotics  Scheduled breathing treatments  HFNC, BiPAP PRN  Consult Pulmonary

## 2023-12-05 NOTE — PROGRESS NOTES
Pharmacokinetic Initial Assessment: IV Vancomycin    Assessment/Plan:    Initiate intravenous vancomycin with loading dose of 2250 mg once followed by a maintenance dose of vancomycin 1500 mg IV every 12 hours  Desired empiric serum trough concentration is 15 to 20 mcg/mL  Draw vancomycin trough level 60 min prior to fourth dose on 12/6/23 at approximately 0700.  Pharmacy will continue to follow and monitor vancomycin.      Please contact pharmacy at extension [phone] with any questions regarding this assessment.     Thank you for the consult,   Balwinder Arvizu       Patient brief summary:  Ayanna Alicia is a 53 y.o. female initiated on antimicrobial therapy with IV Vancomycin for treatment of suspected lower respiratory infection    Drug Allergies:   Review of patient's allergies indicates:   Allergen Reactions    Doxycycline Nausea Only     Other reaction(s): Nausea    Fluticasone Other (See Comments)     Other reaction(s): Epistaxis         Actual Body Weight:   95.7 kg    Renal Function:   Estimated Creatinine Clearance: 90.9 mL/min (based on SCr of 0.8 mg/dL).,     Dialysis Method (if applicable):  N/A    CBC (last 72 hours):  Recent Labs   Lab Result Units 12/04/23  1233   WBC K/uL 17.98*   Hemoglobin g/dL 11.5*   Hematocrit % 38.7   Platelets K/uL 498*   Gran % % 84.5*   Lymph % % 5.7*   Mono % % 4.9   Eosinophil % % 3.4   Basophil % % 0.7   Differential Method  Automated       Metabolic Panel (last 72 hours):  Recent Labs   Lab Result Units 12/04/23  1233 12/04/23  1358   Sodium mmol/L 142  --    Potassium mmol/L 4.0  --    Chloride mmol/L 100  --    CO2 mmol/L 28  --    Glucose mg/dL 114*  --    Glucose, UA   --  Negative   BUN mg/dL 8  --    Creatinine mg/dL 0.8  --    Albumin g/dL 3.2*  --    Total Bilirubin mg/dL 0.4  --    Alkaline Phosphatase U/L 91  --    AST U/L 35  --    ALT U/L 40  --        Microbiologic Results:  Microbiology Results (last 7 days)       Procedure Component Value Units Date/Time     Blood Culture #1 **CANNOT BE ORDERED STAT** [[id number]] Collected: 12/04/23 1234    Order Status: Sent Specimen: Blood from Peripheral, Antecubital, Left Updated: 12/04/23 2121    Blood Culture #2 **CANNOT BE ORDERED STAT** [[id number]] Collected: 12/04/23 1234    Order Status: Sent Specimen: Blood from Peripheral, Hand, Right Updated: 12/04/23 2121    Culture, Respiratory with Gram Stain [[id number]]     Order Status: No result Specimen: Respiratory     Respiratory Viral Panel by PCR (Sources other than NP Swab) [facility]; Nasal Swab [[id number]]     Order Status: No result Specimen: Respiratory     Influenza A & B by Molecular [[id number]] Collected: 12/04/23 1225    Order Status: Completed Specimen: Nasopharyngeal Swab Updated: 12/04/23 1256     Influenza A, Molecular Negative     Influenza B, Molecular Negative     Flu A & B Source Nasal swab

## 2023-12-05 NOTE — PLAN OF CARE
Pt AAO x4.  VSS.  Pt remained afebrile throughout this shift.   IV abx administered per order.   Pt remained free of falls this shift.   Pt c/o of no pain this shift.  Plan of care reviewed. Patient verbalizes understanding.   Pt moving/turing with min assistance. Frequent weight shifting encouraged.  Patient NSR on monitor.   Bed low, side rails up x 2, wheels locked, call light in reach.   Bed alarm maintained for safety.   Patient instructed to call for assistance.   Hourly rounding completed.   24 hour chart check completed.  Will continue to monitor.

## 2023-12-05 NOTE — HPI
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 She was changed to AVAPS and has been optimized on her pulmonary medications. Patient has had numerous hospitalization and has been to LTAC. She is undergoing work at [facility] for possible lung transplant. She was in Rehab today when she became symptomatic.  She presents with acute on ch ronic SOB and inability to get her oxygen saturation up despite 6-8L of oxygen. No fever, chest pain, N/V or GI symptoms.

## 2023-12-06 PROBLEM — Z51.5 ENCOUNTER FOR PALLIATIVE CARE: Status: ACTIVE | Noted: 2023-12-06

## 2023-12-06 LAB
ALBUMIN SERPL BCP-MCNC: 3 G/DL (ref 3.5–5.2)
ALP SERPL-CCNC: 82 U/L (ref 55–135)
ALT SERPL W/O P-5'-P-CCNC: 39 U/L (ref 10–44)
ANION GAP SERPL CALC-SCNC: 15 MMOL/L (ref 8–16)
AST SERPL-CCNC: 33 U/L (ref 10–40)
BASOPHILS # BLD AUTO: 0.07 K/UL (ref 0–0.2)
BASOPHILS NFR BLD: 0.5 % (ref 0–1.9)
BILIRUB SERPL-MCNC: 0.3 MG/DL (ref 0.1–1)
BUN SERPL-MCNC: 10 MG/DL (ref 6–20)
CALCIUM SERPL-MCNC: 8.9 MG/DL (ref 8.7–10.5)
CHLORIDE SERPL-SCNC: 101 MMOL/L (ref 95–110)
CO2 SERPL-SCNC: 25 MMOL/L (ref 23–29)
CREAT SERPL-MCNC: 0.9 MG/DL (ref 0.5–1.4)
DIFFERENTIAL METHOD: ABNORMAL
EOSINOPHIL # BLD AUTO: 0.1 K/UL (ref 0–0.5)
EOSINOPHIL NFR BLD: 0.7 % (ref 0–8)
ERYTHROCYTE [DISTWIDTH] IN BLOOD BY AUTOMATED COUNT: 16.3 % (ref 11.5–14.5)
EST. GFR  (NO RACE VARIABLE): >60 ML/MIN/1.73 M^2
GLUCOSE SERPL-MCNC: 91 MG/DL (ref 70–110)
HCT VFR BLD AUTO: 37.3 % (ref 37–48.5)
HGB BLD-MCNC: 10.9 G/DL (ref 12–16)
IMM GRANULOCYTES # BLD AUTO: 0.14 K/UL (ref 0–0.04)
IMM GRANULOCYTES NFR BLD AUTO: 1 % (ref 0–0.5)
LYMPHOCYTES # BLD AUTO: 1.8 K/UL (ref 1–4.8)
LYMPHOCYTES NFR BLD: 12.4 % (ref 18–48)
MAGNESIUM SERPL-MCNC: 2.5 MG/DL (ref 1.6–2.6)
MCH RBC QN AUTO: 28.2 PG (ref 27–31)
MCHC RBC AUTO-ENTMCNC: 29.2 G/DL (ref 32–36)
MCV RBC AUTO: 97 FL (ref 82–98)
MONOCYTES # BLD AUTO: 1 K/UL (ref 0.3–1)
MONOCYTES NFR BLD: 7.3 % (ref 4–15)
NEUTROPHILS # BLD AUTO: 11.1 K/UL (ref 1.8–7.7)
NEUTROPHILS NFR BLD: 78.1 % (ref 38–73)
NRBC BLD-RTO: 0 /100 WBC
PHOSPHATE SERPL-MCNC: 4.3 MG/DL (ref 2.7–4.5)
PLATELET # BLD AUTO: 383 K/UL (ref 150–450)
PMV BLD AUTO: 10.8 FL (ref 9.2–12.9)
POTASSIUM SERPL-SCNC: 4.3 MMOL/L (ref 3.5–5.1)
PROT SERPL-MCNC: 7.3 G/DL (ref 6–8.4)
RBC # BLD AUTO: 3.86 M/UL (ref 4–5.4)
SODIUM SERPL-SCNC: 141 MMOL/L (ref 136–145)
VANCOMYCIN TROUGH SERPL-MCNC: 15.5 UG/ML (ref 10–22)
WBC # BLD AUTO: 14.25 K/UL (ref 3.9–12.7)

## 2023-12-06 PROCEDURE — 99498 ADVNCD CARE PLAN ADDL 30 MIN: CPT | Mod: ,,, | Performed by: NURSE PRACTITIONER

## 2023-12-06 PROCEDURE — 99497 ADVNCD CARE PLAN 30 MIN: CPT | Mod: 25,,, | Performed by: NURSE PRACTITIONER

## 2023-12-06 PROCEDURE — 25000242 PHARM REV CODE 250 ALT 637 W/ HCPCS: Performed by: HOSPITALIST

## 2023-12-06 PROCEDURE — 99497 PR ADVNCD CARE PLAN 30 MIN: ICD-10-PCS | Mod: 25,,, | Performed by: NURSE PRACTITIONER

## 2023-12-06 PROCEDURE — 99498 PR ADVNCD CARE PLAN ADDL 30 MIN: ICD-10-PCS | Mod: ,,, | Performed by: NURSE PRACTITIONER

## 2023-12-06 PROCEDURE — 99223 PR INITIAL HOSPITAL CARE,LEVL III: ICD-10-PCS | Mod: ,,, | Performed by: NURSE PRACTITIONER

## 2023-12-06 PROCEDURE — 94761 N-INVAS EAR/PLS OXIMETRY MLT: CPT

## 2023-12-06 PROCEDURE — 84100 ASSAY OF PHOSPHORUS: CPT | Performed by: HOSPITALIST

## 2023-12-06 PROCEDURE — 83735 ASSAY OF MAGNESIUM: CPT | Performed by: HOSPITALIST

## 2023-12-06 PROCEDURE — 99900035 HC TECH TIME PER 15 MIN (STAT)

## 2023-12-06 PROCEDURE — 80202 ASSAY OF VANCOMYCIN: CPT | Performed by: HOSPITALIST

## 2023-12-06 PROCEDURE — 99223 1ST HOSP IP/OBS HIGH 75: CPT | Mod: ,,, | Performed by: NURSE PRACTITIONER

## 2023-12-06 PROCEDURE — 63600175 PHARM REV CODE 636 W HCPCS: Performed by: HOSPITALIST

## 2023-12-06 PROCEDURE — 27000221 HC OXYGEN, UP TO 24 HOURS

## 2023-12-06 PROCEDURE — 85025 COMPLETE CBC W/AUTO DIFF WBC: CPT | Performed by: HOSPITALIST

## 2023-12-06 PROCEDURE — 94640 AIRWAY INHALATION TREATMENT: CPT

## 2023-12-06 PROCEDURE — 80053 COMPREHEN METABOLIC PANEL: CPT | Performed by: HOSPITALIST

## 2023-12-06 PROCEDURE — 25000003 PHARM REV CODE 250: Performed by: NURSE PRACTITIONER

## 2023-12-06 PROCEDURE — 25000003 PHARM REV CODE 250: Performed by: HOSPITALIST

## 2023-12-06 PROCEDURE — 21400001 HC TELEMETRY ROOM

## 2023-12-06 PROCEDURE — 36415 COLL VENOUS BLD VENIPUNCTURE: CPT | Performed by: HOSPITALIST

## 2023-12-06 RX ORDER — SODIUM CHLORIDE 9 MG/ML
INJECTION, SOLUTION INTRAVENOUS
Status: DISCONTINUED | OUTPATIENT
Start: 2023-12-06 | End: 2023-12-07 | Stop reason: HOSPADM

## 2023-12-06 RX ORDER — MORPHINE SULFATE ORAL SOLUTION 10 MG/5ML
5 SOLUTION ORAL EVERY 4 HOURS PRN
Status: DISCONTINUED | OUTPATIENT
Start: 2023-12-06 | End: 2023-12-07 | Stop reason: HOSPADM

## 2023-12-06 RX ORDER — MORPHINE SULFATE 4 MG/ML
2 INJECTION, SOLUTION INTRAMUSCULAR; INTRAVENOUS EVERY 4 HOURS PRN
Status: DISCONTINUED | OUTPATIENT
Start: 2023-12-06 | End: 2023-12-07 | Stop reason: HOSPADM

## 2023-12-06 RX ADMIN — ENOXAPARIN SODIUM 40 MG: 40 INJECTION SUBCUTANEOUS at 04:12

## 2023-12-06 RX ADMIN — NINTEDANIB 150 MG: 150 CAPSULE ORAL at 09:12

## 2023-12-06 RX ADMIN — MORPHINE SULFATE 5 MG: 10 SOLUTION ORAL at 02:12

## 2023-12-06 RX ADMIN — ARFORMOTEROL TARTRATE 15 MCG: 15 SOLUTION RESPIRATORY (INHALATION) at 08:12

## 2023-12-06 RX ADMIN — IPRATROPIUM BROMIDE AND ALBUTEROL SULFATE 3 ML: 2.5; .5 SOLUTION RESPIRATORY (INHALATION) at 07:12

## 2023-12-06 RX ADMIN — IPRATROPIUM BROMIDE AND ALBUTEROL SULFATE 3 ML: 2.5; .5 SOLUTION RESPIRATORY (INHALATION) at 12:12

## 2023-12-06 RX ADMIN — FUROSEMIDE 40 MG: 40 TABLET ORAL at 08:12

## 2023-12-06 RX ADMIN — BENZONATATE 100 MG: 100 CAPSULE ORAL at 04:12

## 2023-12-06 RX ADMIN — IPRATROPIUM BROMIDE AND ALBUTEROL SULFATE 3 ML: 2.5; .5 SOLUTION RESPIRATORY (INHALATION) at 04:12

## 2023-12-06 RX ADMIN — DOCUSATE SODIUM 100 MG: 100 CAPSULE, LIQUID FILLED ORAL at 08:12

## 2023-12-06 RX ADMIN — BENZONATATE 100 MG: 100 CAPSULE ORAL at 10:12

## 2023-12-06 RX ADMIN — SODIUM CHLORIDE: 9 INJECTION, SOLUTION INTRAVENOUS at 10:12

## 2023-12-06 RX ADMIN — VANCOMYCIN HYDROCHLORIDE 1500 MG: 1.5 INJECTION, POWDER, LYOPHILIZED, FOR SOLUTION INTRAVENOUS at 10:12

## 2023-12-06 RX ADMIN — ARFORMOTEROL TARTRATE 15 MCG: 15 SOLUTION RESPIRATORY (INHALATION) at 07:12

## 2023-12-06 RX ADMIN — MONTELUKAST 10 MG: 10 TABLET, FILM COATED ORAL at 09:12

## 2023-12-06 RX ADMIN — PANTOPRAZOLE SODIUM 40 MG: 40 TABLET, DELAYED RELEASE ORAL at 08:12

## 2023-12-06 RX ADMIN — BUDESONIDE 0.5 MG: 0.5 INHALANT ORAL at 08:12

## 2023-12-06 RX ADMIN — PIPERACILLIN SODIUM AND TAZOBACTAM SODIUM 4.5 G: 4; .5 INJECTION, POWDER, FOR SOLUTION INTRAVENOUS at 10:12

## 2023-12-06 RX ADMIN — SODIUM CHLORIDE: 9 INJECTION, SOLUTION INTRAVENOUS at 06:12

## 2023-12-06 RX ADMIN — IPRATROPIUM BROMIDE AND ALBUTEROL SULFATE 3 ML: 2.5; .5 SOLUTION RESPIRATORY (INHALATION) at 08:12

## 2023-12-06 RX ADMIN — PIPERACILLIN SODIUM AND TAZOBACTAM SODIUM 4.5 G: 4; .5 INJECTION, POWDER, FOR SOLUTION INTRAVENOUS at 06:12

## 2023-12-06 RX ADMIN — PIPERACILLIN SODIUM AND TAZOBACTAM SODIUM 4.5 G: 4; .5 INJECTION, POWDER, FOR SOLUTION INTRAVENOUS at 02:12

## 2023-12-06 RX ADMIN — BUDESONIDE 0.5 MG: 0.5 INHALANT ORAL at 07:12

## 2023-12-06 NOTE — ASSESSMENT & PLAN NOTE
This patient does have evidence of infective focus  My overall impression is sepsis.  Source: Respiratory  Antibiotics given-   Antibiotics (72h ago, onward)    Start     Stop Route Frequency Ordered    12/05/23 0800  vancomycin 1,500 mg in dextrose 5 % (D5W) 250 mL IVPB (Vial-Mate)         -- IV Every 12 hours (non-standard times) 12/04/23 2258    12/04/23 1815  piperacillin-tazobactam (ZOSYN) 4.5 g in dextrose 5 % in water (D5W) 100 mL IVPB (MB+)  ( Pneumonia - Moderate-High MDR )         12/09/23 1814 IV Every 8 hours (non-standard times) 12/04/23 1711    12/04/23 1810  vancomycin - pharmacy to dose  ( Pneumonia - Moderate-High MDR )        See Hyperspace for full Linked Orders Report.    -- IV pharmacy to manage frequency 12/04/23 1711        Latest lactate reviewed-  Recent Labs   Lab 12/04/23  1233   LACTATE 1.6       Organ dysfunction indicated by Acute respiratory failure    Fluid challenge Not needed - patient is not hypotensive      Post- resuscitation assessment No - Post resuscitation assessment not needed       Will Not start Pressors- Levophed for MAP of 65  Source control achieved by: IV antibiotics  Blood and sputums cultures ordered, monitor  Vancomycin and Zosyn - De-escalate when appropriate

## 2023-12-06 NOTE — PROGRESS NOTES
"[facility] - [facility] ([facility]  Progress Note    Patient Name: Ayanna Alicia  MRN: [id number]  Patient Class: IP- Inpatient   Admission Date: 12/4/2023  Length of Stay: 1 days  Attending Physician: Cecilio Ponce MD  Primary Care Provider: Madeleine Enrique MD        Subjective:     Principal Problem:Acute on chronic respiratory failure with hypoxia and hypercapnia        HPI:  54 y/o female with PMHx of Pulmonary Fibrosis, Pulmonary HTN, COPD, GLENN, chronic respiratory failure on 8 L NC baseline, Rheumatoid Arthritis who presented to Pulmonary Rehab today and referred to the ER due to worsening SOB. 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 She is currently being tapered off of prednisone, states she is on 5 mg daily. She reports onset of SOB yesterday, associated with coughing, worse with movement. In ER, patient found to have elevated WBC 17.5k, CTA Chest with no PE, noted "diffuse parenchymal changes with ground-glass density and irregular consolidation bilaterally." Blood cultures were obtained, patient was started on Levaquin. [facility] [place], will admit as inpatient for acute on chronic respiratory failure, sepsis.    Overview/Hospital Course:  12/5/23  JOSE  Patient reports productive coughing, fatigue  Weaned to 6L NC  WBC 18k --> 9k  Will continue vancomycin and zosyn for now  Cultures pending        Review of Systems   All other systems reviewed and are negative.    Objective:     Vital Signs (Most Recent):  Temp: 97.8 °F (36.6 °C) (12/05/23 1945)  Pulse: 107 (12/05/23 1945)  Resp: 19 (12/05/23 1945)  BP: 116/60 (12/05/23 1945)  SpO2: (!) 94 % (12/05/23 1945) Vital Signs (24h Range):  Temp:  [97.8 °F (36.6 °C)-99.1 °F (37.3 °C)] 97.8 °F (36.6 °C)  Pulse:  " [] 107  Resp:  [16-28] 19  SpO2:  [94 %-100 %] 94 %  BP: (108-139)/(60-80) 116/60     Weight: 95.7 kg (211 lb)  Body mass index is 35.11 kg/m².    Intake/Output Summary (Last 24 hours) at 12/5/2023 2100  Last data filed at 12/5/2023 1816  Gross per 24 hour   Intake 600 ml   Output 600 ml   Net 0 ml         Physical Exam  Vitals and nursing note reviewed.   Constitutional:       General: She is not in acute distress.     Appearance: Normal appearance. She is obese. She is ill-appearing.   Cardiovascular:      Rate and Rhythm: Normal rate and regular rhythm.      Heart sounds: No murmur heard.  Pulmonary:      Effort: Pulmonary effort is normal. No respiratory distress.      Breath sounds: Rhonchi present. No wheezing.      Comments: dysphonia  Neurological:      General: No focal deficit present.      Mental Status: She is alert and oriented to person, place, and time.   Psychiatric:         Mood and Affect: Mood normal.         Behavior: Behavior normal.             Significant Labs: All pertinent labs within the past 24 hours have been reviewed.  Recent Lab Results         12/05/23  0643        Albumin 2.8       ALP 82       ALT 33       Anion Gap 10       AST 24       Baso # 0.03       Basophil % 0.3       BILIRUBIN TOTAL 0.3  Comment: For infants and newborns, interpretation of results should be based  on gestational age, weight and in agreement with clinical  observations.    Premature Infant recommended reference ranges:  Up to 24 hours.............<8.0 mg/dL  Up to 48 hours............<12.0 mg/dL  3-5 days..................<15.0 mg/dL  6-29 days.................<15.0 mg/dL         BUN 7       Calcium 9.3       Chloride 100       CO2 26       Creatinine 0.9       Differential Method Automated       eGFR >60       Eos # 0.0       Eosinophil % 0.0       Glucose 176       Gran # (ANC) 8.6       Gran % 92.0       Hematocrit 35.0       Hemoglobin 10.4       Immature Grans (Abs) 0.09  Comment: Mild elevation in  immature granulocytes is non specific and   can be seen in a variety of conditions including stress response,   acute inflammation, trauma and pregnancy. Correlation with other   laboratory and clinical findings is essential.         Immature Granulocytes 1.0       Lymph # 0.5       Lymph % 5.3       Magnesium  1.7       MCH 28.5       MCHC 29.7       MCV 96       Mono # 0.1       Mono % 1.4       MPV 9.5       nRBC 0       Phosphorus Level 4.2       Platelet Count 459       Potassium 4.5       PROTEIN TOTAL 7.0       RBC 3.65       RDW 16.3       Sodium 136       WBC 9.29               Significant Imaging: I have reviewed all pertinent imaging results/findings within the past 24 hours.    CTA Chest Non-Coronary (PE Studies)   Final Result      Negative CTA of the chest. No evidence of pulmonary embolism.  Stable cardiomegaly.  Stable extensive lung parenchymal changes bilaterally with lower lobe bronchiectasis.      All CT scans at this facility use dose modulation, iterative reconstruction and/or weight based dosing when appropriate to reduce radiation dose to as low as reasonably achievable.         Electronically signed by: Terry Monet MD   Date:    12/04/2023   Time:    15:23      X-Ray Chest 1 View   Final Result      Moderate diffuse bilateral lung infiltrates.  Possible slight worsening since prior study.         Electronically signed by: Terry Monet MD   Date:    12/04/2023   Time:    12:43            Assessment/Plan:      * Acute on chronic respiratory failure with hypoxia and hypercapnia  Patient with Hypoxic Respiratory failure which is Acute on chronic.  she is on home oxygen at 8 LPM. Supplemental oxygen was provided and noted-      .   Signs/symptoms of respiratory failure include- tachypnea, increased work of breathing, respiratory distress, use of accessory muscles, and wheezing. Contributing diagnoses includes - COPD, Interstitial lung disease, and Pneumonia Labs and images were reviewed.  Patient Has not had a recent ABG. Will treat underlying causes and adjust management of respiratory failure as follows-     IV antibiotics  Scheduled breathing treatments  HFNC, BiPAP PRN  Consult Pulmonary    Sepsis  This patient does have evidence of infective focus  My overall impression is sepsis.  Source: Respiratory  Antibiotics given-   Antibiotics (72h ago, onward)      Start     Stop Route Frequency Ordered    12/04/23 1830  vancomycin (VANCOCIN) 2,250 mg in dextrose 5 % (D5W) 500 mL IVPB         -- IV Once 12/04/23 1719    12/04/23 1815  piperacillin-tazobactam (ZOSYN) 4.5 g in dextrose 5 % in water (D5W) 100 mL IVPB (MB+)  ( Pneumonia - Moderate-High MDR )         12/09/23 1814 IV Every 8 hours (non-standard times) 12/04/23 1711 12/04/23 1810  vancomycin - pharmacy to dose  ( Pneumonia - Moderate-High MDR )        See Hyperspace for full Linked Orders Report.    -- IV pharmacy to manage frequency 12/04/23 1711          Latest lactate reviewed-  Recent Labs   Lab 12/04/23  1233   LACTATE 1.6     Organ dysfunction indicated by Acute respiratory failure    Fluid challenge Not needed - patient is not hypotensive      Post- resuscitation assessment No - Post resuscitation assessment not needed       Will Not start Pressors- Levophed for MAP of 65  Source control achieved by: IV antibiotics  Blood and sputums cultures ordered, monitor  Vancomycin and Zosyn - De-escalate when appropriate    GLENN on CPAP  BiPAP qHS    Interstitial lung disease  Restart home meds  On Lasix and Ofev  Ofev non-formulary, okay for patient to use home supply    HTN (hypertension)  Chronic, controlled. Latest blood pressure and vitals reviewed-     Temp:  [97.8 °F (36.6 °C)-99.6 °F (37.6 °C)]   Pulse:  [109-125]   Resp:  [21-34]   BP: (109-175)/(61-87)   SpO2:  [80 %-100 %] .   Home meds for hypertension were reviewed and noted below.   Hypertension Medications               furosemide (LASIX) 40 MG tablet Take 1 tablet (40 mg total)  by mouth once daily.            While in the hospital, will manage blood pressure as follows; Continue home antihypertensive regimen    Will utilize p.r.n. blood pressure medication only if patient's blood pressure greater than 180/110 and she develops symptoms such as worsening chest pain or shortness of breath.      VTE Risk Mitigation (From admission, onward)           Ordered     enoxaparin injection 40 mg  Daily         12/04/23 1711     IP VTE HIGH RISK PATIENT  Once         12/04/23 1711     Place sequential compression device  Until discontinued         12/04/23 1711                    Discharge Planning   JANETTE:      Code Status: Full Code   Is the patient medically ready for discharge?:     Reason for patient still in hospital (select all that apply): Patient trending condition, Laboratory test, Treatment, and Consult recommendations                     Cecilio Ponce MD  Department of Hospital Medicine   [facility] [facility] ([facility])

## 2023-12-06 NOTE — CONSULTS
[facility] - Telemetry ([facility])  Palliative Medicine  Consult Note    Patient Name: Ayanna Alicia  MRN: [id number]  Admission Date: 12/4/2023  Hospital Length of Stay: 2 days  Code Status: DNR   Attending Provider: Cecilio Ponce MD  Consulting Provider: Chhaya Parish NP  Primary Care Physician: Madeleine Enrique MD  Principal Problem:Acute on chronic respiratory failure with hypoxia and hypercapnia    Patient information was obtained from patient, past medical records, and primary team.      Inpatient consult to Palliative Care  Consult performed by: Chhaya Parish, AWAIS  Consult ordered by: Cecilio Ponce MD        Assessment/Plan:     Pulmonary  Interstitial lung disease  -Under the care of Dr. Aviles. Noted medical and functional decline, significant symptom burden on 8 L NC at baseline, hopes for lung transplant, decreased appetite and po intake (Albumin 3.0), recurrent hospitalizations and LTAC stays, along with good understanding of her life limiting illness of pulmonary fibrosis and chronic respiratory failure. Also with GLENN, COPD, PHTN, RA. Being treated for Pneumonia, ABX on board.   -Symptom management: Start Roxanol 5mg po q 4 hrs prn SOB, dyspnea. Changed IVP morphine to be administered if Roxanol not effective. Discussed safe administration, prescribing, and prevention of OIC, she verbalized understanding.  -GOC: Undergoing lung transplant to improve quality of life, maintaining independence, symptom management, good quality of life despite ILD.   -DNR/I  -Will follow up    Palliative Care  Encounter for palliative care  -Code status: DNR/I, will complete LaPOST prior to discharge.  -HCPOA: Brother who she lives with Fab Alicia ([phone]) and her niece Camelia Alicia ([phone]).  -GOC: Focus on spending time at home, avoiding the hospital, remaining as independent as possible, and symptom/pain control. Accordingly, we have decided that the best plan to meet the patient's goals includes  "continuing with treatment with goals of undergoing lung transplant to improve quality of life. Will be referred to home based palliative program for symptom management and continued GOC discussions.   -See HPI for further details          Thank you for your consult. I will follow-up with patient. Please contact us if you have any additional questions.    Subjective:     HPI:   54 y/o female with PMHx of Pulmonary Fibrosis, Pulmonary HTN, COPD, GLENN, chronic respiratory failure on 8 L NC baseline, Rheumatoid Arthritis who presented to Pulmonary Rehab today and referred to the ER due to worsening SOB. 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 She is currently being tapered off of prednisone, states she is on 5 mg daily. She reports onset of SOB yesterday, associated with coughing, worse with movement. In ER, patient found to have elevated WBC 17.5k, CTA Chest with no PE, noted "diffuse parenchymal changes with ground-glass density and irregular consolidation bilaterally." Blood cultures were obtained, patient was started on Levaquin. [facility] [place] [facility] consulted for further management, will admit as inpatient for acute on chronic respiratory failure, sepsis. Patient noted she is being considered for a lung transplant at [facility] in which she has an appointment next Wednesday. Patient with concerns of returning home, anxiety given multiple hospitalizations and over all poor long term prognosis thus palliative medicine consulted for goals of care, advance care planning, and symptom management.     Hospital Course:  No notes on file    Interval History: Upon examination, patient in bed in no acute distress, tolerating 5 L HFNC, conversational dyspnea noted. She noted medical and functional decline, significant symptom " burden, hopes for lung transplant, decreased appetite and po intake, recurrent hospitalizations and LTAC stays, along with good understanding of her life limiting illness of pulmonary fibrosis and chronic respiratory failure. 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 We then discussed goals of care and code status: full code vs DNR/I along with risks, benefits, and alternatives. She noted her prayer and hope is to be a lung transplant candidate and be able to tolerate surgery and post op and have a better quality of life. As not to over whelm her we did discuss that we would further discuss tomorrow but to be thinking about what her wishes would be if she were not a candidate or unable to have surgery going back to the prayer and the hope is that she is but we always want to be prepared for if the first plan fails. Based on her goals we discuss home based palliative for symptom management and continued GOC discussions in which she was in agreement. She elected DNR/I code status to allow for a natural and peaceful death as she would not want to be on life support or resuscitated knowing that it would not reverse or improve her lungs or current quality of life. Reflected in the chart. We then discussed her significant symptoms of shortness of breath at rest or dyspnea with minimal exertion and how it affects her independence. She noted the IVP morphine did improve her breathing, thus will d/c prn norco and start Roxanol 5mg po q 4 hrs prn SOB, dyspnea. Changed IVP morphine to be administered if Roxanol not effective. Discussed safe administration, prescribing, and prevention of OIC, she verbalized understanding. We will complete HCPOA document with patient reflecting her brother who she lives with Fab Alicia ([phone]) and her niece Camelia Alicia ([phone]) today and LaPOST prior to discharge. All questions and concerns addressed.  Primary team updated.     Past Medical History:   Diagnosis Date    Abnormal Pap smear of cervix     in the past with repeat pap smear okay.    Acute interstitial pneumonitis     Allergic rhinitis, cause unspecified     Arthritis of both knees     Asthma     Eczema     Fatty liver 10/2014    Fibrocystic breast changes     Headache(784.0)     Hepatomegaly 10/2014    Hypertension     Liver cyst 10/2014    Multinodular goiter     Followed by ENT - Dr. Nicolas Gray    Polymenorrhea     TMJ (dislocation of temporomandibular joint)     Uterine fibroid     in the past    Vitamin D deficiency disease        Past Surgical History:   Procedure Laterality Date    BRONCHOSCOPY Bilateral 2023    Procedure: Bronchoscopy - insert lighted tube into airway to take a biopsy of lung;  Surgeon: Agustín Aviles MD;  Location: [facility] ENDO;  Service: Pulmonary;  Laterality: Bilateral;     SECTION, CLASSIC      x 1    COLONOSCOPY N/A 2020    Procedure: COLONOSCOPY;  Surgeon: Angie Magana MD;  Location: [facility] ENDO;  Service: Endoscopy;  Laterality: N/A;    HYSTERECTOMY      MULTIPLE TOOTH EXTRACTIONS      PARTIAL HYSTERECTOMY  2013    Due to fibroids       Review of patient's allergies indicates:   Allergen Reactions    Doxycycline Nausea Only     Other reaction(s): Nausea    Fluticasone Other (See Comments)     Other reaction(s): Epistaxis         Medications:  Continuous Infusions:  Scheduled Meds:   albuterol-ipratropium  3 mL Nebulization Q4H    arformoteroL  15 mcg Nebulization BID    budesonide  0.5 mg Nebulization BID    docusate sodium  100 mg Oral BID    enoxparin  40 mg Subcutaneous Daily    furosemide  40 mg Oral Daily    montelukast  10 mg Oral QHS    nintedanib  150 mg Oral BID    pantoprazole  40 mg Oral Daily    piperacillin-tazobactam (Zosyn) IV (PEDS and ADULTS) (extended infusion is not appropriate)  4.5 g Intravenous Q8H    vancomycin (VANCOCIN) IV (PEDS and ADULTS)  1,500 mg Intravenous  Q12H     PRN Meds:sodium chloride 0.9%, acetaminophen, albuterol-ipratropium, aluminum-magnesium hydroxide-simethicone, benzonatate, calcium carbonate, dextrose 10%, dextrose 10%, glucagon (human recombinant), glucose, glucose, insulin aspart U-100, melatonin, morphine, morphine, ondansetron, sodium chloride, Pharmacy to dose Vancomycin consult **AND** vancomycin - pharmacy to dose    Family History       Problem Relation (Age of Onset)    Cancer Maternal Grandmother (60), Maternal Aunt (50), Maternal Aunt (66)    Cataracts Cousin    Diabetes Maternal Grandfather    Diverticulitis Mother    Heart disease Mother, Father (63)    Hypertension Father    Migraines Cousin    No Known Problems Brother    Peripheral vascular disease Maternal Grandfather    Stroke Mother, Sister, Maternal Grandfather          Tobacco Use    Smoking status: Never     Passive exposure: Past    Smokeless tobacco: Never   Substance and Sexual Activity    Alcohol use: Not Currently     Comment: last use 03/2022    Drug use: Not Currently     Frequency: 4.0 times per week     Types: Marijuana     Comment: last year was last use 03/2022    Sexual activity: Yes     Partners: Female       Review of Systems   Constitutional:  Positive for activity change, appetite change, fatigue and unexpected weight change.   Respiratory:  Positive for cough and shortness of breath.    Neurological:  Positive for weakness.   Psychiatric/Behavioral:  The patient is nervous/anxious.         Associated when she experiences dyspnea    All other systems reviewed and are negative.    Objective:     Vital Signs (Most Recent):  Temp: 97.8 °F (36.6 °C) (12/06/23 1118)  Pulse: 97 (12/06/23 1324)  Resp: 20 (12/06/23 1210)  BP: 125/65 (12/06/23 1118)  SpO2: (!) 93 % (12/06/23 1210) Vital Signs (24h Range):  Temp:  [97.8 °F (36.6 °C)-98.8 °F (37.1 °C)] 97.8 °F (36.6 °C)  Pulse:  [] 97  Resp:  [16-20] 20  SpO2:  [93 %-100 %] 93 %  BP: (100-128)/(57-74) 125/65     Weight:  95.7 kg (211 lb)  Body mass index is 35.11 kg/m².       Physical Exam  Vitals and nursing note reviewed.   Constitutional:       Appearance: She is ill-appearing.   HENT:      Head: Normocephalic.      Nose: Nose normal.      Mouth/Throat:      Mouth: Mucous membranes are moist.   Eyes:      General:         Right eye: No discharge.         Left eye: No discharge.   Cardiovascular:      Rate and Rhythm: Normal rate.   Pulmonary:      Breath sounds: Rhonchi present.      Comments: Conversational dyspnea noted, tolerating 5 L HFNC, dysphonia   Abdominal:      Palpations: Abdomen is soft.   Musculoskeletal:      Cervical back: Normal range of motion.   Skin:     General: Skin is warm and dry.   Neurological:      Mental Status: She is alert and oriented to person, place, and time.   Psychiatric:         Behavior: Behavior normal.         Thought Content: Thought content normal.         Judgment: Judgment normal.            Review of Symptoms      Symptom Assessment (ESAS 0-10 Scale)  Pain:  0  Dyspnea:  0  Anxiety:  0  Nausea:  0  Depression:  0  Anorexia:  0  Fatigue:  0  Insomnia:  0  Restlessness:  0  Agitation:  0       Anxiety:  Is nervous/anxious    Performance Status:  50    Living Arrangements:  Lives with family    Psychosocial/Cultural:   See Palliative Psychosocial Note: No  **Primary  to Follow**  Palliative Care  Consult: No        Advance Care Planning  Advance Directives:   Do Not Resuscitate Status: Yes    Medical Power of : Yes      Decision Making:  Patient answered questions  Goals of Care: The patient endorses that what is most important right now is to focus on spending time at home, avoiding the hospital, remaining as independent as possible, and symptom/pain control    Accordingly, we have decided that the best plan to meet the patient's goals includes continuing with treatment with goals of undergoing lung transplant to improve quality of life. Will be referred  to home based palliative program for symptom management and continued GOC discussions.          Significant Labs: All pertinent labs within the past 24 hours have been reviewed.  CBC:   Recent Labs   Lab 12/06/23 0444   WBC 14.25*   HGB 10.9*   HCT 37.3   MCV 97        BMP:  Recent Labs   Lab 12/06/23 0443   GLU 91      K 4.3      CO2 25   BUN 10   CREATININE 0.9   CALCIUM 8.9   MG 2.5     LFT:  Lab Results   Component Value Date    AST 33 12/06/2023    GGT 60 (H) 06/09/2023    ALKPHOS 82 12/06/2023    BILITOT 0.3 12/06/2023     Albumin:   Albumin   Date Value Ref Range Status   12/06/2023 3.0 (L) 3.5 - 5.2 g/dL Final     Protein:   Total Protein   Date Value Ref Range Status   12/06/2023 7.3 6.0 - 8.4 g/dL Final     Lactic acid:   Lab Results   Component Value Date    LACTATE 1.6 12/04/2023    LACTATE 2.6 (H) 11/01/2023       Significant Imaging: I have reviewed all pertinent imaging results/findings within the past 24 hours.    I spent face to face time and non-face to face time preparing to see the patient (eg, review of tests), Obtaining and/or reviewing separately obtained history, Documenting clinical information in the electronic or other health record, Independently interpreting results and communicating results to the patient/family/caregiver, or Care coordination.     > 46 minutes spent in discussing ACP    Chhaya Parish NP  Palliative Medicine  '[facility])

## 2023-12-06 NOTE — PLAN OF CARE
[facility] - Telemetry (Hospital)  Initial Discharge Assessment       Primary Care Provider: Madeleine Enrique MD    Admission Diagnosis: Shortness of breath [R06.02]  Restrictive lung disease [J98.4]  SOB (shortness of breath) [R06.02]  Pulmonary hypertension due to interstitial lung disease [I27.23, J84.9]  Acute on chronic respiratory failure with hypoxia [J96.21]  Acute on chronic respiratory failure with hypoxia and hypercapnia [J96.21, J96.22]    Admission Date: 12/4/2023  Expected Discharge Date:     Transition of Care Barriers: None    Payor: BLUE CROSS BLUE [employer] / Plan: [employer] [place] / Product Type: Commercial /     Extended Emergency Contact Information  Primary Emergency Contact: Fab Alicia  Home Phone: [phone]  Mobile Phone: [phone]  Relation: Healthcare Power of   Secondary Emergency Contact: Camelia Alicia   [place]  Mobile Phone: [phone]  Relation: Relative    Discharge Plan A: Home Health         [facility] [address]  [place] [address]  Phone: [phone] Fax: [phone]    [id number]  [place] - [address] [facility] [address] Located within [facility]  [address]  [facility] [address]  Phone: [phone] Fax: [phone]    [facility] [address] [address]  Phone: [phone] Fax: [phone]    [id number]  [place] - [address] [facility]  [address] [facility] [address]  Phone: [phone] Fax: [phone]    [place] [address] [facility]  [address] [place] [address]  Phone: [phone] Fax: [phone]    [id number] - [place], [place] - [address] AT [facility]  [address] [place] [place] [address]  Phone: [phone] Fax: [phone]      Initial Assessment (most recent)       Adult  Discharge Assessment - 12/06/23 1426          Discharge Assessment    Assessment Type Discharge Planning Assessment     Confirmed/corrected address, phone number and insurance Yes     Confirmed Demographics Correct on Facesheet     Source of Information patient;health record     Communicated JANETTE with patient/caregiver Date not available/Unable to determine     Reason For Admission Acute on chronic respiratory failure with hypoxia and hypercapnia     People in Home sibling(s);other relative(s)     Facility Arrived From: home     Do you expect to return to your current living situation? Yes     Do you have help at home or someone to help you manage your care at home? Yes     Who are your caregiver(s) and their phone number(s)?  and Temi     Prior to hospitilization cognitive status: Alert/Oriented     Current cognitive status: Alert/Oriented     Walking or Climbing Stairs ambulation difficulty, requires equipment     Mobility Management power chair     Dressing/Bathing bathing difficulty, requires equipment     Dressing/Bathing Management shower chair     Home Accessibility wheelchair accessible     Home Layout Able to live on 1st floor     Equipment Currently Used at Home oxygen;shower chair;power chair     Readmission within 30 days? No     Patient currently being followed by outpatient case management? No     Do you currently have service(s) that help you manage your care at home? Yes     Name and Contact number of agency [facility]     Is the pt/caregiver preference to resume services with current agency Yes     Do you take prescription medications? No     Do you have prescription coverage? Yes     Coverage Commercial     Do you have any problems affording any of your prescribed medications? No     Is the patient taking medications as prescribed? yes     Who is going to help you get home at discharge? Fab Hunter, or temiLianna     How do you get to doctors appointments? car, drives self;family or  friend will provide     Are you on dialysis? No     Do you take coumadin? No     DME Needed Upon Discharge  none     Discharge Plan discussed with: Patient     Transition of Care Barriers None     Discharge Plan A Home Health                   Anticipated DC dispo: home health - current with [facility]   Prior Level of Function: has shower chair and power chair for bathing and ambulation   People in home:      Comments:  CM met with patient at bedside to introduce role and discuss discharge planning. Brother will be help at home and can provide transport at time of discharge. Home O2 through [facility] and current with [facility]. Confirmed demographics, insurance, and emergency contacts. CM discharge needs depends on hospital progress. CM will continue following to assist with other needs.

## 2023-12-06 NOTE — ACP (ADVANCE CARE PLANNING)
Advance Care Planning   [facility]  Palliative Care RN      Patient Name: Ayanna Alicia  MRN: [id number]  Admission Date: 12/4/2023  Hospital Length of Stay: 2 days  Code Status: DNR   Attending Provider: Cecilio Ponce MD  Palliative Care Provider: DIANA Okeefe  Primary Care Physician: Madeleine Enrique MD  Principal Problem:Acute on chronic respiratory failure with hypoxia and hypercapnia    HCPOA completed per patient's preferences for 1. Fab Alicia, brother, [phone] or [phone] 2. Camelia Alicia (Josephine), niece, [phone]. Uploaded to chart and demographics updated. Camelia had arrived to bedside during completion and Ms. Alicia and I reviewed HCPOA with Camelia. Additionally, will complete LaPOST tomorrow to reflect wishes for DNR/I and plan to email to: [email or web address] and [email or web address] per patient's request.             Juventino Valentino RN-CHPN, Palliative Medicine   [phone]

## 2023-12-06 NOTE — HPI
"52 y/o female with PMHx of Pulmonary Fibrosis, Pulmonary HTN, COPD, GLENN, chronic respiratory failure on 8 L NC baseline, Rheumatoid Arthritis who presented to Pulmonary Rehab today and referred to the ER due to worsening SOB. 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 She is currently being tapered off of prednisone, states she is on 5 mg daily. She reports onset of SOB yesterday, associated with coughing, worse with movement. In ER, patient found to have elevated WBC 17.5k, CTA Chest with no PE, noted "diffuse parenchymal changes with ground-glass density and irregular consolidation bilaterally." Blood cultures were obtained, patient was started on Levaquin. [facility] for [facility] management, will admit as inpatient for acute on chronic respiratory failure, sepsis. Patient noted she is being considered for a lung transplant at [facility] in which she has an appointment next Wednesday. Patient with concerns of returning home, anxiety given multiple hospitalizations and over all poor long term prognosis thus palliative medicine consulted for goals of care, advance care planning, and symptom management.   "

## 2023-12-06 NOTE — SUBJECTIVE & OBJECTIVE
Review of Systems   All other systems reviewed and are negative.    Objective:     Vital Signs (Most Recent):  Temp: 97.8 °F (36.6 °C) (12/06/23 1118)  Pulse: 93 (12/06/23 1118)  Resp: 18 (12/06/23 1118)  BP: 125/65 (12/06/23 1118)  SpO2: 96 % (12/06/23 1118) Vital Signs (24h Range):  Temp:  [97.8 °F (36.6 °C)-98.8 °F (37.1 °C)] 97.8 °F (36.6 °C)  Pulse:  [] 93  Resp:  [16-20] 18  SpO2:  [94 %-100 %] 96 %  BP: (100-128)/(57-80) 125/65     Weight: 95.7 kg (211 lb)  Body mass index is 35.11 kg/m².    Intake/Output Summary (Last 24 hours) at 12/6/2023 1122  Last data filed at 12/5/2023 1816  Gross per 24 hour   Intake --   Output 600 ml   Net -600 ml           Physical Exam  Vitals and nursing note reviewed.   Constitutional:       General: She is not in acute distress.     Appearance: Normal appearance. She is obese. She is ill-appearing.   Cardiovascular:      Rate and Rhythm: Normal rate and regular rhythm.      Heart sounds: No murmur heard.  Pulmonary:      Effort: Pulmonary effort is normal. No respiratory distress.      Breath sounds: Rhonchi present. No wheezing.      Comments: dysphonia  Neurological:      General: No focal deficit present.      Mental Status: She is alert and oriented to person, place, and time.   Psychiatric:         Mood and Affect: Mood normal.         Behavior: Behavior normal.             Significant Labs: All pertinent labs within the past 24 hours have been reviewed.  Recent Lab Results         12/06/23  0849   12/06/23  0444   12/06/23  0443   12/05/23  2304        Albumin     3.0         ALP     82         ALT     39         Anion Gap     15         AST     33         Baso #   0.07           Basophil %   0.5           BILIRUBIN TOTAL     0.3  Comment: For infants and newborns, interpretation of results should be based  on gestational age, weight and in agreement with clinical  observations.    Premature Infant recommended reference ranges:  Up to 24 hours.............<8.0  mg/dL  Up to 48 hours............<12.0 mg/dL  3-5 days..................<15.0 mg/dL  6-29 days.................<15.0 mg/dL           BUN     10         Calcium     8.9         Chloride     101         CO2     25         Creatinine     0.9         Differential Method   Automated           eGFR     >60         Eos #   0.1           Eosinophil %   0.7           Glucose     91         Gran # (ANC)   11.1           Gran %   78.1           Hematocrit   37.3           Hemoglobin   10.9           Immature Grans (Abs)   0.14  Comment: Mild elevation in immature granulocytes is non specific and   can be seen in a variety of conditions including stress response,   acute inflammation, trauma and pregnancy. Correlation with other   laboratory and clinical findings is essential.             Immature Granulocytes   1.0           Lymph #   1.8           Lymph %   12.4           Magnesium      2.5         MCH   28.2           MCHC   29.2           MCV   97           Mono #   1.0           Mono %   7.3           MPV   10.8           nRBC   0           Phosphorus Level     4.3         Platelet Count   383           POCT Glucose       128       Potassium     4.3         PROTEIN TOTAL     7.3         RBC   3.86           RDW   16.3           Sodium     141         Vancomycin-Trough 15.5             WBC   14.25                   Significant Imaging: I have reviewed all pertinent imaging results/findings within the past 24 hours.    CTA Chest Non-Coronary (PE Studies)   Final Result      Negative CTA of the chest. No evidence of pulmonary embolism.  Stable cardiomegaly.  Stable extensive lung parenchymal changes bilaterally with lower lobe bronchiectasis.      All CT scans at this facility use dose modulation, iterative reconstruction and/or weight based dosing when appropriate to reduce radiation dose to as low as reasonably achievable.         Electronically signed by: Terry Monet MD   Date:    12/04/2023   Time:    15:23      X-Ray  Chest 1 View   Final Result      Moderate diffuse bilateral lung infiltrates.  Possible slight worsening since prior study.         Electronically signed by: Terry Monet MD   Date:    12/04/2023   Time:    12:43

## 2023-12-06 NOTE — SUBJECTIVE & OBJECTIVE
Review of Systems   All other systems reviewed and are negative.    Objective:     Vital Signs (Most Recent):  Temp: 97.8 °F (36.6 °C) (12/05/23 1945)  Pulse: 107 (12/05/23 1945)  Resp: 19 (12/05/23 1945)  BP: 116/60 (12/05/23 1945)  SpO2: (!) 94 % (12/05/23 1945) Vital Signs (24h Range):  Temp:  [97.8 °F (36.6 °C)-99.1 °F (37.3 °C)] 97.8 °F (36.6 °C)  Pulse:  [] 107  Resp:  [16-28] 19  SpO2:  [94 %-100 %] 94 %  BP: (108-139)/(60-80) 116/60     Weight: 95.7 kg (211 lb)  Body mass index is 35.11 kg/m².    Intake/Output Summary (Last 24 hours) at 12/5/2023 2100  Last data filed at 12/5/2023 1816  Gross per 24 hour   Intake 600 ml   Output 600 ml   Net 0 ml         Physical Exam  Vitals and nursing note reviewed.   Constitutional:       General: She is not in acute distress.     Appearance: Normal appearance. She is obese. She is ill-appearing.   Cardiovascular:      Rate and Rhythm: Normal rate and regular rhythm.      Heart sounds: No murmur heard.  Pulmonary:      Effort: Pulmonary effort is normal. No respiratory distress.      Breath sounds: Rhonchi present. No wheezing.      Comments: dysphonia  Neurological:      General: No focal deficit present.      Mental Status: She is alert and oriented to person, place, and time.   Psychiatric:         Mood and Affect: Mood normal.         Behavior: Behavior normal.             Significant Labs: All pertinent labs within the past 24 hours have been reviewed.  Recent Lab Results         12/05/23  0643        Albumin 2.8       ALP 82       ALT 33       Anion Gap 10       AST 24       Baso # 0.03       Basophil % 0.3       BILIRUBIN TOTAL 0.3  Comment: For infants and newborns, interpretation of results should be based  on gestational age, weight and in agreement with clinical  observations.    Premature Infant recommended reference ranges:  Up to 24 hours.............<8.0 mg/dL  Up to 48 hours............<12.0 mg/dL  3-5 days..................<15.0 mg/dL  6-29  days.................<15.0 mg/dL         BUN 7       Calcium 9.3       Chloride 100       CO2 26       Creatinine 0.9       Differential Method Automated       eGFR >60       Eos # 0.0       Eosinophil % 0.0       Glucose 176       Gran # (ANC) 8.6       Gran % 92.0       Hematocrit 35.0       Hemoglobin 10.4       Immature Grans (Abs) 0.09  Comment: Mild elevation in immature granulocytes is non specific and   can be seen in a variety of conditions including stress response,   acute inflammation, trauma and pregnancy. Correlation with other   laboratory and clinical findings is essential.         Immature Granulocytes 1.0       Lymph # 0.5       Lymph % 5.3       Magnesium  1.7       MCH 28.5       MCHC 29.7       MCV 96       Mono # 0.1       Mono % 1.4       MPV 9.5       nRBC 0       Phosphorus Level 4.2       Platelet Count 459       Potassium 4.5       PROTEIN TOTAL 7.0       RBC 3.65       RDW 16.3       Sodium 136       WBC 9.29               Significant Imaging: I have reviewed all pertinent imaging results/findings within the past 24 hours.    CTA Chest Non-Coronary (PE Studies)   Final Result      Negative CTA of the chest. No evidence of pulmonary embolism.  Stable cardiomegaly.  Stable extensive lung parenchymal changes bilaterally with lower lobe bronchiectasis.      All CT scans at this facility use dose modulation, iterative reconstruction and/or weight based dosing when appropriate to reduce radiation dose to as low as reasonably achievable.         Electronically signed by: Terry Monet MD   Date:    12/04/2023   Time:    15:23      X-Ray Chest 1 View   Final Result      Moderate diffuse bilateral lung infiltrates.  Possible slight worsening since prior study.         Electronically signed by: Terry Monet MD   Date:    12/04/2023   Time:    12:43

## 2023-12-06 NOTE — ASSESSMENT & PLAN NOTE
Patient with Hypoxic Respiratory failure which is Acute on chronic.  she is on home oxygen at 8 LPM. Supplemental oxygen was provided and noted- Oxygen Concentration (%):  [40] 40    .   Signs/symptoms of respiratory failure include- tachypnea, increased work of breathing, respiratory distress, use of accessory muscles, and wheezing. Contributing diagnoses includes - COPD, Interstitial lung disease, and Pneumonia Labs and images were reviewed. Patient Has not had a recent ABG. Will treat underlying causes and adjust management of respiratory failure as follows-     IV antibiotics  Scheduled breathing treatments  HFNC, baseline 6-8L  Pulmonary following

## 2023-12-06 NOTE — SUBJECTIVE & OBJECTIVE
Interval History: Upon examination, patient in bed in no acute distress, tolerating 5 L HFNC, conversational dyspnea noted. She noted medical and functional decline, significant symptom burden, hopes for lung transplant, decreased appetite and po intake, recurrent hospitalizations and LTAC stays, along with good understanding of her life limiting illness of pulmonary fibrosis and chronic respiratory failure. 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 We then discussed goals of care and code status: full code vs DNR/I along with risks, benefits, and alternatives. She noted her prayer and hope is to be a lung transplant candidate and be able to tolerate surgery and post op and have a better quality of life. As not to over whelm her we did discuss that we would further discuss tomorrow but to be thinking about what her wishes would be if she were not a candidate or unable to have surgery going back to the prayer and the hope is that she is but we always want to be prepared for if the first plan fails. Based on her goals we discuss home based palliative for symptom management and continued GOC discussions in which she was in agreement. She elected DNR/I code status to allow for a natural and peaceful death as she would not want to be on life support or resuscitated knowing that it would not reverse or improve her lungs or current quality of life. Reflected in the chart. We then discussed her significant symptoms of shortness of breath at rest or dyspnea with minimal exertion and how it affects her independence. She noted the IVP morphine did improve her breathing, thus will d/c prn norco and start Roxanol 5mg po q 4 hrs prn SOB, dyspnea. Changed IVP morphine to be administered if Roxanol not effective. Discussed safe administration, prescribing, and prevention of OIC, she verbalized understanding. We will complete HCPOA document with patient  reflecting her brother who she lives with Fab Alicia ([phone]) and her niece Camelia Alicia ([phone]) today and [facility] prior to discharge. All questions and concerns addressed. Primary team updated.     Past Medical History:   Diagnosis Date    Abnormal Pap smear of cervix     in the past with repeat pap smear okay.    Acute interstitial pneumonitis     Allergic rhinitis, cause unspecified     Arthritis of both knees     Asthma     Eczema     Fatty liver 10/2014    Fibrocystic breast changes     Headache(784.0)     Hepatomegaly 10/2014    Hypertension     Liver cyst 10/2014    Multinodular goiter     Followed by ENT - Dr. Nicolas Gray    Polymenorrhea     TMJ (dislocation of temporomandibular joint)     Uterine fibroid     in the past    Vitamin D deficiency disease        Past Surgical History:   Procedure Laterality Date    BRONCHOSCOPY Bilateral 2023    Procedure: Bronchoscopy - insert lighted tube into airway to take a biopsy of lung;  Surgeon: Agustín Aviles MD;  Location: [facility];  Service: Pulmonary;  Laterality: Bilateral;     SECTION, CLASSIC      x 1    COLONOSCOPY N/A 2020    Procedure: COLONOSCOPY;  Surgeon: Angie Magana MD;  Location: [facility];  Service: Endoscopy;  Laterality: N/A;    HYSTERECTOMY      MULTIPLE TOOTH EXTRACTIONS      PARTIAL HYSTERECTOMY  2013    Due to fibroids       Review of patient's allergies indicates:   Allergen Reactions    Doxycycline Nausea Only     Other reaction(s): Nausea    Fluticasone Other (See Comments)     Other reaction(s): Epistaxis         Medications:  Continuous Infusions:  Scheduled Meds:   albuterol-ipratropium  3 mL Nebulization Q4H    arformoteroL  15 mcg Nebulization BID    budesonide  0.5 mg Nebulization BID    docusate sodium  100 mg Oral BID    enoxparin  40 mg Subcutaneous Daily    furosemide  40 mg Oral Daily    montelukast  10 mg Oral QHS    nintedanib  150 mg Oral BID    pantoprazole  40 mg Oral  Daily    piperacillin-tazobactam (Zosyn) IV (PEDS and ADULTS) (extended infusion is not appropriate)  4.5 g Intravenous Q8H    vancomycin (VANCOCIN) IV (PEDS and ADULTS)  1,500 mg Intravenous Q12H     PRN Meds:sodium chloride 0.9%, acetaminophen, albuterol-ipratropium, aluminum-magnesium hydroxide-simethicone, benzonatate, calcium carbonate, dextrose 10%, dextrose 10%, glucagon (human recombinant), glucose, glucose, insulin aspart U-100, melatonin, morphine, morphine, ondansetron, sodium chloride, Pharmacy to dose Vancomycin consult **AND** vancomycin - pharmacy to dose    Family History       Problem Relation (Age of Onset)    Cancer Maternal Grandmother (60), Maternal Aunt (50), Maternal Aunt (66)    Cataracts Cousin    Diabetes Maternal Grandfather    Diverticulitis Mother    Heart disease Mother, Father (63)    Hypertension Father    Migraines Cousin    No Known Problems Brother    Peripheral vascular disease Maternal Grandfather    Stroke Mother, Sister, Maternal Grandfather          Tobacco Use    Smoking status: Never     Passive exposure: Past    Smokeless tobacco: Never   Substance and Sexual Activity    Alcohol use: Not Currently     Comment: last use 03/2022    Drug use: Not Currently     Frequency: 4.0 times per week     Types: Marijuana     Comment: last year was last use 03/2022    Sexual activity: Yes     Partners: Female       Review of Systems   Constitutional:  Positive for activity change, appetite change, fatigue and unexpected weight change.   Respiratory:  Positive for cough and shortness of breath.    Neurological:  Positive for weakness.   Psychiatric/Behavioral:  The patient is nervous/anxious.         Associated when she experiences dyspnea    All other systems reviewed and are negative.    Objective:     Vital Signs (Most Recent):  Temp: 97.8 °F (36.6 °C) (12/06/23 1118)  Pulse: 97 (12/06/23 1324)  Resp: 20 (12/06/23 1210)  BP: 125/65 (12/06/23 1118)  SpO2: (!) 93 % (12/06/23 1210) Vital  Signs (24h Range):  Temp:  [97.8 °F (36.6 °C)-98.8 °F (37.1 °C)] 97.8 °F (36.6 °C)  Pulse:  [] 97  Resp:  [16-20] 20  SpO2:  [93 %-100 %] 93 %  BP: (100-128)/(57-74) 125/65     Weight: 95.7 kg (211 lb)  Body mass index is 35.11 kg/m².       Physical Exam  Vitals and nursing note reviewed.   Constitutional:       Appearance: She is ill-appearing.   HENT:      Head: Normocephalic.      Nose: Nose normal.      Mouth/Throat:      Mouth: Mucous membranes are moist.   Eyes:      General:         Right eye: No discharge.         Left eye: No discharge.   Cardiovascular:      Rate and Rhythm: Normal rate.   Pulmonary:      Breath sounds: Rhonchi present.      Comments: Conversational dyspnea noted, tolerating 5 L HFNC, dysphonia   Abdominal:      Palpations: Abdomen is soft.   Musculoskeletal:      Cervical back: Normal range of motion.   Skin:     General: Skin is warm and dry.   Neurological:      Mental Status: She is alert and oriented to person, place, and time.   Psychiatric:         Behavior: Behavior normal.         Thought Content: Thought content normal.         Judgment: Judgment normal.            Review of Symptoms      Symptom Assessment (ESAS 0-10 Scale)  Pain:  0  Dyspnea:  0  Anxiety:  0  Nausea:  0  Depression:  0  Anorexia:  0  Fatigue:  0  Insomnia:  0  Restlessness:  0  Agitation:  0       Anxiety:  Is nervous/anxious    Performance Status:  50    Living Arrangements:  Lives with family    Psychosocial/Cultural:   See Palliative Psychosocial Note: No  **Primary  to Follow**  Palliative Care  Consult: No        Advance Care Planning   Advance Directives:   Do Not Resuscitate Status: Yes    Medical Power of : Yes      Decision Making:  Patient answered questions  Goals of Care: The patient endorses that what is most important right now is to focus on spending time at home, avoiding the hospital, remaining as independent as possible, and symptom/pain  control    Accordingly, we have decided that the best plan to meet the patient's goals includes continuing with treatment with goals of undergoing lung transplant to improve quality of life. Will be referred to home based palliative program for symptom management and continued GOC discussions.          Significant Labs: All pertinent labs within the past 24 hours have been reviewed.  CBC:   Recent Labs   Lab 12/06/23 0444   WBC 14.25*   HGB 10.9*   HCT 37.3   MCV 97        BMP:  Recent Labs   Lab 12/06/23 0443   GLU 91      K 4.3      CO2 25   BUN 10   CREATININE 0.9   CALCIUM 8.9   MG 2.5     LFT:  Lab Results   Component Value Date    AST 33 12/06/2023    GGT 60 (H) 06/09/2023    ALKPHOS 82 12/06/2023    BILITOT 0.3 12/06/2023     Albumin:   Albumin   Date Value Ref Range Status   12/06/2023 3.0 (L) 3.5 - 5.2 g/dL Final     Protein:   Total Protein   Date Value Ref Range Status   12/06/2023 7.3 6.0 - 8.4 g/dL Final     Lactic acid:   Lab Results   Component Value Date    LACTATE 1.6 12/04/2023    LACTATE 2.6 (H) 11/01/2023       Significant Imaging: I have reviewed all pertinent imaging results/findings within the past 24 hours.

## 2023-12-06 NOTE — ASSESSMENT & PLAN NOTE
-Under the care of Dr. Aviles. Noted medical and functional decline, significant symptom burden on 8 L NC at baseline, hopes for lung transplant, decreased appetite and po intake (Albumin 3.0), recurrent hospitalizations and LTAC stays, along with good understanding of her life limiting illness of pulmonary fibrosis and chronic respiratory failure. Also with GLENN, COPD, PHTN, RA. Being treated for Pneumonia, ABX on board.   -Symptom management: Start Roxanol 5mg po q 4 hrs prn SOB, dyspnea. Changed IVP morphine to be administered if Roxanol not effective. Discussed safe administration, prescribing, and prevention of OIC, she verbalized understanding.  -GOC: Undergoing lung transplant to improve quality of life, maintaining independence, symptom management, good quality of life despite ILD.   -DNR/I  -Will follow up

## 2023-12-06 NOTE — CONSULTS
Pharmacokinetic Assessment Follow Up: IV Vancomycin    Vancomycin serum concentration assessment(s):    The trough level was drawn correctly and can be used to guide therapy at this time. The measurement is within the desired definitive target range of 15 to 20 mcg/mL.    Vancomycin Regimen Plan:    Continue regimen of Vancomycin 1500 mg IV every 12 hours with next serum trough concentration measured at 2100 prior to 8th dose on 12/7.    Drug levels (last 3 results):  Recent Labs   Lab Result Units 12/06/23  0849   Vancomycin-Trough ug/mL 15.5       Pharmacy will continue to follow and monitor vancomycin.    Please contact pharmacy at extension [phone]   for questions regarding this assessment.    Patient brief summary:  Ayanna Alicia is a 53 y.o. female initiated on antimicrobial therapy with IV Vancomycin for treatment of lower respiratory infection    The patient's current regimen is vancomycin 1500 mg IV every 12 hours.    Drug Allergies:   Review of patient's allergies indicates:   Allergen Reactions    Doxycycline Nausea Only     Other reaction(s): Nausea    Fluticasone Other (See Comments)     Other reaction(s): Epistaxis         Actual Body Weight:   95.7 kg    Renal Function:   Estimated Creatinine Clearance: 80.8 mL/min (based on SCr of 0.9 mg/dL).,     Dialysis Method (if applicable):  N/A    CBC (last 72 hours):  Recent Labs   Lab Result Units 12/04/23  1233 12/05/23  0643 12/06/23  0444   WBC K/uL 17.98* 9.29 14.25*   Hemoglobin g/dL 11.5* 10.4* 10.9*   Hematocrit % 38.7 35.0* 37.3   Platelets K/uL 498* 459* 383   Gran % % 84.5* 92.0* 78.1*   Lymph % % 5.7* 5.3* 12.4*   Mono % % 4.9 1.4* 7.3   Eosinophil % % 3.4 0.0 0.7   Basophil % % 0.7 0.3 0.5   Differential Method  Automated Automated Automated       Metabolic Panel (last 72 hours):  Recent Labs   Lab Result Units 12/04/23  1233 12/04/23  1358 12/05/23  0643 12/06/23  0443   Sodium mmol/L 142  --  136 141   Potassium mmol/L 4.0  --  4.5 4.3    Chloride mmol/L 100  --  100 101   CO2 mmol/L 28  --  26 25   Glucose mg/dL 114*  --  176* 91   Glucose, UA   --  Negative  --   --    BUN mg/dL 8  --  7 10   Creatinine mg/dL 0.8  --  0.9 0.9   Albumin g/dL 3.2*  --  2.8* 3.0*   Total Bilirubin mg/dL 0.4  --  0.3 0.3   Alkaline Phosphatase U/L 91  --  82 82   AST U/L 35  --  24 33   ALT U/L 40  --  33 39   Magnesium mg/dL  --   --  1.7 2.5   Phosphorus mg/dL  --   --  4.2 4.3       Vancomycin Administrations:  vancomycin given in the last 96 hours                     vancomycin 1,500 mg in dextrose 5 % (D5W) 250 mL IVPB (Vial-Mate) (mg) 1,500 mg New Bag 12/05/23 2126     1,500 mg New Bag  1015    vancomycin (VANCOCIN) 2,250 mg in dextrose 5 % (D5W) 500 mL IVPB (mg) 2,250 mg New Bag 12/04/23 2033                    Microbiologic Results:  Microbiology Results (last 7 days)       Procedure Component Value Units Date/Time    Blood Culture #1 **CANNOT BE ORDERED STAT** [[id number]] Collected: 12/04/23 1234    Order Status: Completed Specimen: Blood from Peripheral, Antecubital, Left Updated: 12/05/23 2222     Blood Culture, Routine No Growth to date      No Growth to date    Blood Culture #2 **CANNOT BE ORDERED STAT** [[id number]] Collected: 12/04/23 1234    Order Status: Completed Specimen: Blood from Peripheral, Hand, Right Updated: 12/05/23 2222     Blood Culture, Routine No Growth to date      No Growth to date    Culture, Respiratory with Gram Stain [[id number]]     Order Status: No result Specimen: Respiratory     Respiratory Viral Panel by PCR (Sources other than NP Swab) [facility]; Nasal Swab [[id number]]     Order Status: No result Specimen: Respiratory     Influenza A & B by Molecular [[id number]] Collected: 12/04/23 1225    Order Status: Completed Specimen: Nasopharyngeal Swab Updated: 12/04/23 1256     Influenza A, Molecular Negative     Influenza B, Molecular Negative     Flu A & B Source Nasal swab

## 2023-12-06 NOTE — CHAPLAIN
Consult visit with patient.  Visited with patient to determine how I might be of support to her today.  Pt was struggling due to tough conversations she has had today.  Pt spent some time talking about the conversations she had and how she is feeling as a result of these.  Pt asked for prayer and I took time to do this before leaving and spiritual care remains available as needed.    Chaplain Galo Landon M.Div., [facility]

## 2023-12-06 NOTE — PROGRESS NOTES
"[facility] - Telemetry ([facility] Medicine  Progress Note    Patient Name: Ayanna Alicia  MRN: [id number]  Patient Class: IP- Inpatient   Admission Date: 12/4/2023  Length of Stay: 2 days  Attending Physician: Cecilio Ponce MD  Primary Care Provider: Madeleine Enrique MD        Subjective:     Principal Problem:Acute on chronic respiratory failure with hypoxia and hypercapnia        HPI:  52 y/o female with PMHx of Pulmonary Fibrosis, Pulmonary HTN, COPD, GLENN, chronic respiratory failure on 8 L NC baseline, Rheumatoid Arthritis who presented to Pulmonary Rehab today and referred to the ER due to worsening SOB. 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 She is currently being tapered off of prednisone, states she is on 5 mg daily. She reports onset of SOB yesterday, associated with coughing, worse with movement. In ER, patient found to have elevated WBC 17.5k, CTA Chest with no PE, noted "diffuse parenchymal changes with ground-glass density and irregular consolidation bilaterally." Blood cultures were obtained, patient was started on Levaquin. [facility] consulted for further management, will admit as inpatient for acute on chronic respiratory failure, sepsis.    Overview/Hospital Course:  12/5/23  DANIELON  Patient reports productive coughing, fatigue  Weaned to 6L NC  WBC 18k --> 9k  Will continue vancomycin and zosyn for now  Cultures pending    12/6/23  WBC 9k --> 14.2k, cultures NGTD  Patient weaned down to 5L NC, reports dry cough, fatigue  Patient with concerns for returning home, anxiety given multiple hospitalization and overall poor long term poor prognosis  Patient states she [facility] next Wednesday/13/23  Interested in discussing with " PC re: GOC/ACP, symptom management, etc.      Review of Systems   All other systems reviewed and are negative.    Objective:     Vital Signs (Most Recent):  Temp: 97.8 °F (36.6 °C) (12/06/23 1118)  Pulse: 93 (12/06/23 1118)  Resp: 18 (12/06/23 1118)  BP: 125/65 (12/06/23 1118)  SpO2: 96 % (12/06/23 1118) Vital Signs (24h Range):  Temp:  [97.8 °F (36.6 °C)-98.8 °F (37.1 °C)] 97.8 °F (36.6 °C)  Pulse:  [] 93  Resp:  [16-20] 18  SpO2:  [94 %-100 %] 96 %  BP: (100-128)/(57-80) 125/65     Weight: 95.7 kg (211 lb)  Body mass index is 35.11 kg/m².    Intake/Output Summary (Last 24 hours) at 12/6/2023 1122  Last data filed at 12/5/2023 1816  Gross per 24 hour   Intake --   Output 600 ml   Net -600 ml           Physical Exam  Vitals and nursing note reviewed.   Constitutional:       General: She is not in acute distress.     Appearance: Normal appearance. She is obese. She is ill-appearing.   Cardiovascular:      Rate and Rhythm: Normal rate and regular rhythm.      Heart sounds: No murmur heard.  Pulmonary:      Effort: Pulmonary effort is normal. No respiratory distress.      Breath sounds: Rhonchi present. No wheezing.      Comments: dysphonia  Neurological:      General: No focal deficit present.      Mental Status: She is alert and oriented to person, place, and time.   Psychiatric:         Mood and Affect: Mood normal.         Behavior: Behavior normal.             Significant Labs: All pertinent labs within the past 24 hours have been reviewed.  Recent Lab Results         12/06/23  0849   12/06/23  0444   12/06/23  0443   12/05/23  2304        Albumin     3.0         ALP     82         ALT     39         Anion Gap     15         AST     33         Baso #   0.07           Basophil %   0.5           BILIRUBIN TOTAL     0.3  Comment: For infants and newborns, interpretation of results should be based  on gestational age, weight and in agreement with clinical  observations.    Premature Infant recommended reference  ranges:  Up to 24 hours.............<8.0 mg/dL  Up to 48 hours............<12.0 mg/dL  3-5 days..................<15.0 mg/dL  6-29 days.................<15.0 mg/dL           BUN     10         Calcium     8.9         Chloride     101         CO2     25         Creatinine     0.9         Differential Method   Automated           eGFR     >60         Eos #   0.1           Eosinophil %   0.7           Glucose     91         Gran # (ANC)   11.1           Gran %   78.1           Hematocrit   37.3           Hemoglobin   10.9           Immature Grans (Abs)   0.14  Comment: Mild elevation in immature granulocytes is non specific and   can be seen in a variety of conditions including stress response,   acute inflammation, trauma and pregnancy. Correlation with other   laboratory and clinical findings is essential.             Immature Granulocytes   1.0           Lymph #   1.8           Lymph %   12.4           Magnesium      2.5         MCH   28.2           MCHC   29.2           MCV   97           Mono #   1.0           Mono %   7.3           MPV   10.8           nRBC   0           Phosphorus Level     4.3         Platelet Count   383           POCT Glucose       128       Potassium     4.3         PROTEIN TOTAL     7.3         RBC   3.86           RDW   16.3           Sodium     141         Vancomycin-Trough 15.5             WBC   14.25                   Significant Imaging: I have reviewed all pertinent imaging results/findings within the past 24 hours.    CTA Chest Non-Coronary (PE Studies)   Final Result      Negative CTA of the chest. No evidence of pulmonary embolism.  Stable cardiomegaly.  Stable extensive lung parenchymal changes bilaterally with lower lobe bronchiectasis.      All CT scans at this facility use dose modulation, iterative reconstruction and/or weight based dosing when appropriate to reduce radiation dose to as low as reasonably achievable.         Electronically signed by: Terry Monet MD    Date:    12/04/2023   Time:    15:23      X-Ray Chest 1 View   Final Result      Moderate diffuse bilateral lung infiltrates.  Possible slight worsening since prior study.         Electronically signed by: Terry Monet MD   Date:    12/04/2023   Time:    12:43            Assessment/Plan:      * Acute on chronic respiratory failure with hypoxia and hypercapnia  Patient with Hypoxic Respiratory failure which is Acute on chronic.  she is on home oxygen at 8 LPM. Supplemental oxygen was provided and noted- Oxygen Concentration (%):  [40] 40    .   Signs/symptoms of respiratory failure include- tachypnea, increased work of breathing, respiratory distress, use of accessory muscles, and wheezing. Contributing diagnoses includes - COPD, Interstitial lung disease, and Pneumonia Labs and images were reviewed. Patient Has not had a recent ABG. Will treat underlying causes and adjust management of respiratory failure as follows-     IV antibiotics  Scheduled breathing treatments  HFNC, baseline 6-8L  Pulmonary following    Sepsis  This patient does have evidence of infective focus  My overall impression is sepsis.  Source: Respiratory  Antibiotics given-   Antibiotics (72h ago, onward)      Start     Stop Route Frequency Ordered    12/05/23 0800  vancomycin 1,500 mg in dextrose 5 % (D5W) 250 mL IVPB (Vial-Mate)         -- IV Every 12 hours (non-standard times) 12/04/23 2258    12/04/23 1815  piperacillin-tazobactam (ZOSYN) 4.5 g in dextrose 5 % in water (D5W) 100 mL IVPB (MB+)  ( Pneumonia - Moderate-High MDR )         12/09/23 1814 IV Every 8 hours (non-standard times) 12/04/23 1711    12/04/23 1810  vancomycin - pharmacy to dose  ( Pneumonia - Moderate-High MDR )        See Hyperspace for full Linked Orders Report.    -- IV pharmacy to manage frequency 12/04/23 1711          Latest lactate reviewed-  Recent Labs   Lab 12/04/23  1233   LACTATE 1.6       Organ dysfunction indicated by Acute respiratory failure    Fluid  challenge Not needed - patient is not hypotensive      Post- resuscitation assessment No - Post resuscitation assessment not needed       Will Not start Pressors- Levophed for MAP of 65  Source control achieved by: IV antibiotics  Blood and sputums cultures ordered, monitor  Vancomycin and Zosyn - De-escalate when appropriate    GLENN on CPAP  BiPAP qHS    Interstitial lung disease  Restart home meds  On Lasix and Ofev  Ofev non-formulary, okay for patient to use home supply    HTN (hypertension)  Chronic, controlled. Latest blood pressure and vitals reviewed-     Temp:  [97.8 °F (36.6 °C)-99.6 °F (37.6 °C)]   Pulse:  [109-125]   Resp:  [21-34]   BP: (109-175)/(61-87)   SpO2:  [80 %-100 %] .   Home meds for hypertension were reviewed and noted below.   Hypertension Medications               furosemide (LASIX) 40 MG tablet Take 1 tablet (40 mg total) by mouth once daily.            While in the hospital, will manage blood pressure as follows; Continue home antihypertensive regimen    Will utilize p.r.n. blood pressure medication only if patient's blood pressure greater than 180/110 and she develops symptoms such as worsening chest pain or shortness of breath.      VTE Risk Mitigation (From admission, onward)           Ordered     enoxaparin injection 40 mg  Daily         12/04/23 1711     IP VTE HIGH RISK PATIENT  Once         12/04/23 1711     Place sequential compression device  Until discontinued         12/04/23 1711                    Discharge Planning   JANETTE:      Code Status: Full Code   Is the patient medically ready for discharge?:     Reason for patient still in hospital (select all that apply): Patient trending condition, Laboratory test, Treatment, and Consult recommendations                     Cecilio Ponce MD  Department of Hospital Medicine   [facility] - Telemetry ([facility])

## 2023-12-06 NOTE — ASSESSMENT & PLAN NOTE
-Code status: DNR/I, will complete LaPOST prior to discharge.  -HCPOA: Brother who she lives with Fab Alicia ([phone]) and her niece Camelia Alicia ([phone]).  -GOC: Focus on spending time at home, avoiding the hospital, remaining as independent as possible, and symptom/pain control. Accordingly, we have decided that the best plan to meet the patient's goals includes continuing with treatment with goals of undergoing lung transplant to improve quality of life. Will be referred to home based palliative program for symptom management and continued GOC discussions.   -See HPI for further details

## 2023-12-06 NOTE — HOSPITAL COURSE
12/5/23  JOSE  Patient reports productive coughing, fatigue  Weaned to 6L NC  WBC 18k --> 9k  Will continue vancomycin and zosyn for now  Cultures pending    12/6/23  WBC 9k --> 14.2k, cultures NGTD  Patient weaned down to 5L NC, reports dry cough, fatigue  Patient with concerns for returning home, anxiety given multiple hospitalization and overall poor long term poor prognosis  Patient states she is in consideration for lung transplant @ [facility] - has appointment next Wednesday/13/23  Interested in discussing with PC re: GOC/ACP, symptom management, etc.    12/7/23  WBC 14.2k --> 14.9k, remains afebrile, cutlures NGTD  Likely plateau affect from IV steroid 12/4-12/5  De-escalate vancomycin/zosyn due to Augmentin, plan to continue on d/c  F/U with ID 12/12/23

## 2023-12-07 ENCOUNTER — TELEPHONE (OUTPATIENT)
Dept: HEMATOLOGY/ONCOLOGY | Facility: CLINIC | Age: 53
End: 2023-12-07
Payer: COMMERCIAL

## 2023-12-07 VITALS
RESPIRATION RATE: 18 BRPM | TEMPERATURE: 99 F | BODY MASS INDEX: 35.16 KG/M2 | OXYGEN SATURATION: 96 % | DIASTOLIC BLOOD PRESSURE: 70 MMHG | SYSTOLIC BLOOD PRESSURE: 105 MMHG | HEART RATE: 110 BPM | WEIGHT: 211 LBS | HEIGHT: 65 IN

## 2023-12-07 DIAGNOSIS — R79.89 ABNORMAL CBC: Primary | ICD-10-CM

## 2023-12-07 PROBLEM — A41.9 SEPSIS: Status: RESOLVED | Noted: 2023-12-04 | Resolved: 2023-12-07

## 2023-12-07 LAB
ALBUMIN SERPL BCP-MCNC: 2.8 G/DL (ref 3.5–5.2)
ALP SERPL-CCNC: 74 U/L (ref 55–135)
ALT SERPL W/O P-5'-P-CCNC: 29 U/L (ref 10–44)
ANION GAP SERPL CALC-SCNC: 13 MMOL/L (ref 8–16)
AST SERPL-CCNC: 22 U/L (ref 10–40)
BASOPHILS # BLD AUTO: 0.08 K/UL (ref 0–0.2)
BASOPHILS NFR BLD: 0.5 % (ref 0–1.9)
BILIRUB SERPL-MCNC: 0.5 MG/DL (ref 0.1–1)
BUN SERPL-MCNC: 10 MG/DL (ref 6–20)
CALCIUM SERPL-MCNC: 8.7 MG/DL (ref 8.7–10.5)
CHLORIDE SERPL-SCNC: 99 MMOL/L (ref 95–110)
CO2 SERPL-SCNC: 27 MMOL/L (ref 23–29)
CREAT SERPL-MCNC: 0.8 MG/DL (ref 0.5–1.4)
DIFFERENTIAL METHOD: ABNORMAL
EOSINOPHIL # BLD AUTO: 0.5 K/UL (ref 0–0.5)
EOSINOPHIL NFR BLD: 3.3 % (ref 0–8)
ERYTHROCYTE [DISTWIDTH] IN BLOOD BY AUTOMATED COUNT: 16.3 % (ref 11.5–14.5)
EST. GFR  (NO RACE VARIABLE): >60 ML/MIN/1.73 M^2
GLUCOSE SERPL-MCNC: 128 MG/DL (ref 70–110)
HCT VFR BLD AUTO: 33.2 % (ref 37–48.5)
HGB BLD-MCNC: 10 G/DL (ref 12–16)
IMM GRANULOCYTES # BLD AUTO: 0.14 K/UL (ref 0–0.04)
IMM GRANULOCYTES NFR BLD AUTO: 0.9 % (ref 0–0.5)
LYMPHOCYTES # BLD AUTO: 1.1 K/UL (ref 1–4.8)
LYMPHOCYTES NFR BLD: 7.7 % (ref 18–48)
MAGNESIUM SERPL-MCNC: 1.8 MG/DL (ref 1.6–2.6)
MCH RBC QN AUTO: 28 PG (ref 27–31)
MCHC RBC AUTO-ENTMCNC: 30.1 G/DL (ref 32–36)
MCV RBC AUTO: 93 FL (ref 82–98)
MONOCYTES # BLD AUTO: 1.3 K/UL (ref 0.3–1)
MONOCYTES NFR BLD: 8.7 % (ref 4–15)
NEUTROPHILS # BLD AUTO: 11.7 K/UL (ref 1.8–7.7)
NEUTROPHILS NFR BLD: 78.9 % (ref 38–73)
NRBC BLD-RTO: 0 /100 WBC
PHOSPHATE SERPL-MCNC: 3.4 MG/DL (ref 2.7–4.5)
PLATELET # BLD AUTO: 504 K/UL (ref 150–450)
PMV BLD AUTO: 9.6 FL (ref 9.2–12.9)
POTASSIUM SERPL-SCNC: 3.9 MMOL/L (ref 3.5–5.1)
PROT SERPL-MCNC: 6.6 G/DL (ref 6–8.4)
RBC # BLD AUTO: 3.57 M/UL (ref 4–5.4)
SODIUM SERPL-SCNC: 139 MMOL/L (ref 136–145)
WBC # BLD AUTO: 14.89 K/UL (ref 3.9–12.7)

## 2023-12-07 PROCEDURE — 80053 COMPREHEN METABOLIC PANEL: CPT | Performed by: HOSPITALIST

## 2023-12-07 PROCEDURE — 99900035 HC TECH TIME PER 15 MIN (STAT)

## 2023-12-07 PROCEDURE — 84100 ASSAY OF PHOSPHORUS: CPT | Performed by: HOSPITALIST

## 2023-12-07 PROCEDURE — 99498 PR ADVNCD CARE PLAN ADDL 30 MIN: ICD-10-PCS | Mod: ,,, | Performed by: NURSE PRACTITIONER

## 2023-12-07 PROCEDURE — 94761 N-INVAS EAR/PLS OXIMETRY MLT: CPT

## 2023-12-07 PROCEDURE — 99233 PR SUBSEQUENT HOSPITAL CARE,LEVL III: ICD-10-PCS | Mod: ,,, | Performed by: NURSE PRACTITIONER

## 2023-12-07 PROCEDURE — 94640 AIRWAY INHALATION TREATMENT: CPT

## 2023-12-07 PROCEDURE — 99498 ADVNCD CARE PLAN ADDL 30 MIN: CPT | Mod: ,,, | Performed by: NURSE PRACTITIONER

## 2023-12-07 PROCEDURE — 27100171 HC OXYGEN HIGH FLOW UP TO 24 HOURS

## 2023-12-07 PROCEDURE — 99233 SBSQ HOSP IP/OBS HIGH 50: CPT | Mod: ,,, | Performed by: NURSE PRACTITIONER

## 2023-12-07 PROCEDURE — 25000003 PHARM REV CODE 250: Performed by: HOSPITALIST

## 2023-12-07 PROCEDURE — 25000003 PHARM REV CODE 250: Performed by: NURSE PRACTITIONER

## 2023-12-07 PROCEDURE — 99497 PR ADVNCD CARE PLAN 30 MIN: ICD-10-PCS | Mod: ,,, | Performed by: NURSE PRACTITIONER

## 2023-12-07 PROCEDURE — 36415 COLL VENOUS BLD VENIPUNCTURE: CPT | Performed by: HOSPITALIST

## 2023-12-07 PROCEDURE — 63600175 PHARM REV CODE 636 W HCPCS: Performed by: HOSPITALIST

## 2023-12-07 PROCEDURE — 25000242 PHARM REV CODE 250 ALT 637 W/ HCPCS: Performed by: HOSPITALIST

## 2023-12-07 PROCEDURE — 83735 ASSAY OF MAGNESIUM: CPT | Performed by: HOSPITALIST

## 2023-12-07 PROCEDURE — 85025 COMPLETE CBC W/AUTO DIFF WBC: CPT | Performed by: HOSPITALIST

## 2023-12-07 PROCEDURE — 99497 ADVNCD CARE PLAN 30 MIN: CPT | Mod: ,,, | Performed by: NURSE PRACTITIONER

## 2023-12-07 RX ORDER — MONTELUKAST SODIUM 10 MG/1
10 TABLET ORAL NIGHTLY
Qty: 30 TABLET | Refills: 2 | Status: SHIPPED | OUTPATIENT
Start: 2023-12-07 | End: 2024-03-06

## 2023-12-07 RX ORDER — ALPRAZOLAM 0.25 MG/1
0.25 TABLET ORAL 2 TIMES DAILY PRN
Qty: 60 TABLET | Refills: 0 | Status: SHIPPED | OUTPATIENT
Start: 2023-12-07 | End: 2024-01-06

## 2023-12-07 RX ORDER — AMOXICILLIN AND CLAVULANATE POTASSIUM 875; 125 MG/1; MG/1
1 TABLET, FILM COATED ORAL 2 TIMES DAILY
Qty: 20 TABLET | Refills: 0 | Status: SHIPPED | OUTPATIENT
Start: 2023-12-07 | End: 2023-12-12 | Stop reason: SDUPTHER

## 2023-12-07 RX ORDER — AMOXICILLIN AND CLAVULANATE POTASSIUM 875; 125 MG/1; MG/1
1 TABLET, FILM COATED ORAL EVERY 12 HOURS
Status: DISCONTINUED | OUTPATIENT
Start: 2023-12-07 | End: 2023-12-07 | Stop reason: HOSPADM

## 2023-12-07 RX ORDER — ALPRAZOLAM 0.25 MG/1
0.25 TABLET ORAL 2 TIMES DAILY PRN
Status: DISCONTINUED | OUTPATIENT
Start: 2023-12-07 | End: 2023-12-07 | Stop reason: HOSPADM

## 2023-12-07 RX ORDER — DOCUSATE SODIUM 100 MG/1
100 CAPSULE, LIQUID FILLED ORAL 2 TIMES DAILY
Start: 2023-12-07

## 2023-12-07 RX ORDER — MORPHINE SULFATE ORAL SOLUTION 10 MG/5ML
5 SOLUTION ORAL EVERY 4 HOURS PRN
Qty: 100 ML | Refills: 0 | OUTPATIENT
Start: 2023-12-07 | End: 2023-12-18

## 2023-12-07 RX ORDER — FUROSEMIDE 40 MG/1
40 TABLET ORAL DAILY
Qty: 30 TABLET | Refills: 2 | Status: SHIPPED | OUTPATIENT
Start: 2023-12-07 | End: 2024-12-06

## 2023-12-07 RX ADMIN — IPRATROPIUM BROMIDE AND ALBUTEROL SULFATE 3 ML: 2.5; .5 SOLUTION RESPIRATORY (INHALATION) at 04:12

## 2023-12-07 RX ADMIN — IPRATROPIUM BROMIDE AND ALBUTEROL SULFATE 3 ML: 2.5; .5 SOLUTION RESPIRATORY (INHALATION) at 12:12

## 2023-12-07 RX ADMIN — NINTEDANIB 150 MG: 150 CAPSULE ORAL at 09:12

## 2023-12-07 RX ADMIN — ARFORMOTEROL TARTRATE 15 MCG: 15 SOLUTION RESPIRATORY (INHALATION) at 07:12

## 2023-12-07 RX ADMIN — DOCUSATE SODIUM 100 MG: 100 CAPSULE, LIQUID FILLED ORAL at 09:12

## 2023-12-07 RX ADMIN — PANTOPRAZOLE SODIUM 40 MG: 40 TABLET, DELAYED RELEASE ORAL at 09:12

## 2023-12-07 RX ADMIN — PIPERACILLIN SODIUM AND TAZOBACTAM SODIUM 4.5 G: 4; .5 INJECTION, POWDER, FOR SOLUTION INTRAVENOUS at 03:12

## 2023-12-07 RX ADMIN — IPRATROPIUM BROMIDE AND ALBUTEROL SULFATE 3 ML: 2.5; .5 SOLUTION RESPIRATORY (INHALATION) at 11:12

## 2023-12-07 RX ADMIN — BUDESONIDE 0.5 MG: 0.5 INHALANT ORAL at 07:12

## 2023-12-07 RX ADMIN — VANCOMYCIN HYDROCHLORIDE 1500 MG: 1.5 INJECTION, POWDER, LYOPHILIZED, FOR SOLUTION INTRAVENOUS at 12:12

## 2023-12-07 RX ADMIN — AMOXICILLIN AND CLAVULANATE POTASSIUM 1 TABLET: 875; 125 TABLET, FILM COATED ORAL at 09:12

## 2023-12-07 RX ADMIN — ALPRAZOLAM 0.25 MG: 0.25 TABLET ORAL at 01:12

## 2023-12-07 RX ADMIN — IPRATROPIUM BROMIDE AND ALBUTEROL SULFATE 3 ML: 2.5; .5 SOLUTION RESPIRATORY (INHALATION) at 07:12

## 2023-12-07 NOTE — PLAN OF CARE
Problem: Adult Inpatient Plan of Care  Goal: Plan of Care Review  Outcome: Met  Goal: Patient-Specific Goal (Individualized)  Outcome: Met  Goal: Absence of Hospital-Acquired Illness or Injury  Outcome: Met  Goal: Optimal Comfort and Wellbeing  Outcome: Met  Goal: Readiness for Transition of Care  Outcome: Met     Problem: Adjustment to Illness (Sepsis/Septic Shock)  Goal: Optimal Coping  Outcome: Met     Problem: Bleeding (Sepsis/Septic Shock)  Goal: Absence of Bleeding  Outcome: Met     Problem: Glycemic Control Impaired (Sepsis/Septic Shock)  Goal: Blood Glucose Level Within Desired Range  Outcome: Met     Problem: Infection Progression (Sepsis/Septic Shock)  Goal: Absence of Infection Signs and Symptoms  Outcome: Met     Problem: Nutrition Impaired (Sepsis/Septic Shock)  Goal: Optimal Nutrition Intake  Outcome: Met     Problem: Fall Injury Risk  Goal: Absence of Fall and Fall-Related Injury  Outcome: Met     Problem: Coping Ineffective  Goal: Effective Coping  Outcome: Met  Discharge instructions received and reviewed with pt and family at bedside.  Pt voiced understanding and all questions answered to satisfaction.  Stressed importance to making and keeping all follow up appointments.  Medications received from  pharmacy and reviewed with pt.  Tele monitor removed and brought to monitor tech.  IV d/c'd with tip intact, pressure dressing applied.  Pt transported to front  hospital via w/c by PCT to be discharged home. Pt remained free from any falls the entire shift.

## 2023-12-07 NOTE — ASSESSMENT & PLAN NOTE
-Under the care of Dr. Aviles. Noted medical and functional decline, significant symptom burden on 8 L NC at baseline, hopes for lung transplant, decreased appetite and po intake (Albumin 3.0), recurrent hospitalizations and LTAC stays, along with good understanding of her life limiting illness of pulmonary fibrosis and chronic respiratory failure. Also with GLENN, COPD, PHTN, RA. Being treated for Pneumonia, ABX on board.   -Symptom management: Continue Roxanol 5mg po q 4 hrs prn SOB, dyspnea. Discussed safe administration, prescribing, and prevention of OIC, she verbalized understanding. Started xanax 0.25mg po bid prn dyspnea, SOB, anxiety and spoke with Primary team about starting antidepressant.   -GOC: Goal is to attend lung transplant visit next week with hopes of undergoing lung transplant and having a better quality of life. We also discussed hospice if she continues to decline or is not a transplant candidate vs home based palliative. At this time will enroll in home based palliative.  -DNR/I  -Will follow up

## 2023-12-07 NOTE — PLAN OF CARE
[facility] (Hospital)  Discharge Final Note    Primary Care Provider: Madeleine Enrique MD    Expected Discharge Date: 12/7/2023    Final Discharge Note (most recent)       Final Note - 12/07/23 1433          Final Note    Assessment Type Final Discharge Note     Anticipated Discharge Disposition [place] [facility]     Hospital Resources/Appts/Education Provided Appointments scheduled and added to AVS        Post-Acute Status    Post-Acute Authorization Home Health     Home Health Status Referrals Sent     Coverage BCBS - Commercial     Discharge Delays None known at this time                     Important Message from Medicare             Contact Info       Madeleine Enrique MD   Specialty: Family Medicine   Relationship: PCP - General    [address] [facility] [address]   Phone: [phone]       Next Steps: Schedule an appointment as soon as possible for a visit in 2 week(s)    Instructions: Hospital discharge follow up    Rolando Negro    [address] [place] [address]   Phone: [phone]       Next Steps: Follow up on 12/13/2023    Instructions: Lung Transplant Clinic  Previously established appointment    Sarabjit Wayne DO   Specialty: Infectious Diseases    [address] [place] [address]   Phone: [phone]       Next Steps: Follow up on 12/12/2023    Instructions: Previously scheduled appointment          DC Disposition: [facility]   Family Notified: Patient by nurse at bedside  Transportation: personal transportation    Patient needed Home Health services resumed upon discharge. Patient has [facility] Referral sent.     Patient has PCP hospital follow up with Madeleine Enrique MD, on 12/12/23 at 3:40 pm.

## 2023-12-07 NOTE — ACP (ADVANCE CARE PLANNING)
Advance Care Planning   [facility] Tele[facility] ([facility])  Palliative Care RN      Patient Name: Ayanna Alicia  MRN: [id number]  Admission Date: 12/4/2023  Hospital Length of Stay: 3 days  Code Status: DNR   Attending Provider: Cecilio Ponce MD  Palliative Care Provider: DIANA Okeefe  Primary Care Physician: Madeleine Enrique MD  Principal Problem:Acute on chronic respiratory failure with hypoxia and hypercapnia    LaPOST completed for: DNR/I, selective treatment for reversible issues and short-term artificial nutrition IF goal is for recovery. Emailed to patient and niece/secondary HCPOA Camelia, as well as copy provided. Updated Yariel with [facility] home-based palliative who will reach out to patient to continue symptom management.       Juventino Valentino RN-CHPN, Palliative Medicine   [phone]

## 2023-12-07 NOTE — SUBJECTIVE & OBJECTIVE
Interval History: Upon examination, patient in bed in no acute distress, supplemental O2. Patient stated that she feels better today than yesterday in which clinically patient appears better. No conversational dyspnea noted today and patient in good spirits. She noted that po morphine did help to calm down her breathing, she only had one dose and she noted she slept better last night. Today we followed up on GOC discussion and ACP in which we completed LaPOST today which reflected wishes of DNR/I. HCPOA document was completed yesterday by PM team. At this time her goals are to attend lung transplant visit next week with hopes of undergoing lung transplant and having a better quality of life. We also discussed hospice if she continues to decline, risks another hospitalization,  or is not a transplant candidate vs home based palliative. At this time will enroll in home based palliative with [facility] for continued Goc discussions and symptom management. She noted anxiety in general and associated with SOB, dyspnea in which I started xanax 0.5mg po bid prn and spoke with primary team about starting patient on antidepressant. All ACP documents completed. All questions and concerns addressed. Primary team updated.     In the last 24hrs the patient has used the following pain medications (7a-7a):  - Roxanol x 1    Past Medical History:   Diagnosis Date    Abnormal Pap smear of cervix     in the past with repeat pap smear okay.    Acute interstitial pneumonitis     Allergic rhinitis, cause unspecified     Arthritis of both knees     Asthma     Eczema     Fatty liver 10/2014    Fibrocystic breast changes     Headache(784.0)     Hepatomegaly 10/2014    Hypertension     Liver cyst 10/2014    Multinodular goiter     Followed by ENT - Dr. Nicolas Gray    Polymenorrhea 2008    TMJ (dislocation of temporomandibular joint)     Uterine fibroid     in the past    Vitamin D deficiency disease        Past Surgical History:    Procedure Laterality Date    BRONCHOSCOPY Bilateral 2023    Procedure: Bronchoscopy - insert lighted tube into airway to take a biopsy of lung;  Surgeon: Agustín Aviles MD;  Location: [facility] ENDO;  Service: Pulmonary;  Laterality: Bilateral;     SECTION, CLASSIC      x 1    COLONOSCOPY N/A 2020    Procedure: COLONOSCOPY;  Surgeon: Angie Magana MD;  Location: [facility] ENDO;  Service: Endoscopy;  Laterality: N/A;    HYSTERECTOMY      MULTIPLE TOOTH EXTRACTIONS      PARTIAL HYSTERECTOMY  2013    Due to fibroids       Review of patient's allergies indicates:   Allergen Reactions    Doxycycline Nausea Only     Other reaction(s): Nausea    Fluticasone Other (See Comments)     Other reaction(s): Epistaxis         Medications:  Continuous Infusions:  Scheduled Meds:   albuterol-ipratropium  3 mL Nebulization Q4H    amoxicillin-clavulanate 875-125mg  1 tablet Oral Q12H    arformoteroL  15 mcg Nebulization BID    budesonide  0.5 mg Nebulization BID    docusate sodium  100 mg Oral BID    enoxparin  40 mg Subcutaneous Daily    furosemide  40 mg Oral Daily    montelukast  10 mg Oral QHS    nintedanib  150 mg Oral BID    pantoprazole  40 mg Oral Daily     PRN Meds:sodium chloride 0.9%, acetaminophen, albuterol-ipratropium, ALPRAZolam, aluminum-magnesium hydroxide-simethicone, benzonatate, calcium carbonate, dextrose 10%, dextrose 10%, glucagon (human recombinant), glucose, glucose, insulin aspart U-100, melatonin, morphine, morphine, ondansetron, sodium chloride    Family History       Problem Relation (Age of Onset)    Cancer Maternal Grandmother (60), Maternal Aunt (50), Maternal Aunt (66)    Cataracts Cousin    Diabetes Maternal Grandfather    Diverticulitis Mother    Heart disease Mother, Father (63)    Hypertension Father    Migraines Cousin    No Known Problems Brother    Peripheral vascular disease Maternal Grandfather    Stroke Mother, Sister, Maternal Grandfather          Tobacco Use     Smoking status: Never     Passive exposure: Past    Smokeless tobacco: Never   Substance and Sexual Activity    Alcohol use: Not Currently     Comment: last use 03/2022    Drug use: Not Currently     Frequency: 4.0 times per week     Types: Marijuana     Comment: last year was last use 03/2022    Sexual activity: Yes     Partners: Female       Review of Systems   Constitutional:  Positive for activity change, appetite change, fatigue and unexpected weight change.   Respiratory:  Positive for cough and shortness of breath.    Neurological:  Positive for weakness.   Psychiatric/Behavioral:  The patient is nervous/anxious.         Associated when she experiences dyspnea    All other systems reviewed and are negative.    Objective:     Vital Signs (Most Recent):  Temp: 99 °F (37.2 °C) (12/07/23 1138)  Pulse: 110 (12/07/23 1138)  Resp: 18 (12/07/23 1138)  BP: 105/70 (12/07/23 1138)  SpO2: 96 % (12/07/23 1138) Vital Signs (24h Range):  Temp:  [97.5 °F (36.4 °C)-99.1 °F (37.3 °C)] 99 °F (37.2 °C)  Pulse:  [] 110  Resp:  [18-22] 18  SpO2:  [92 %-99 %] 96 %  BP: (102-125)/(55-78) 105/70     Weight: 95.7 kg (211 lb)  Body mass index is 35.11 kg/m².       Physical Exam  Vitals and nursing note reviewed.   Constitutional:       Appearance: She is ill-appearing.   HENT:      Head: Normocephalic.      Nose: Nose normal.      Mouth/Throat:      Mouth: Mucous membranes are moist.   Eyes:      General:         Right eye: No discharge.         Left eye: No discharge.   Cardiovascular:      Rate and Rhythm: Normal rate.   Pulmonary:      Breath sounds: Rhonchi present.      Comments: Conversational dyspnea noted, tolerating 5 L HFNC, dysphonia   Abdominal:      Palpations: Abdomen is soft.   Musculoskeletal:      Cervical back: Normal range of motion.   Skin:     General: Skin is warm and dry.   Neurological:      Mental Status: She is alert and oriented to person, place, and time.   Psychiatric:         Behavior: Behavior  normal.         Thought Content: Thought content normal.         Judgment: Judgment normal.            Review of Symptoms      Symptom Assessment (ESAS 0-10 Scale)  Pain:  0  Dyspnea:  0  Anxiety:  0  Nausea:  0  Depression:  0  Anorexia:  0  Fatigue:  0  Insomnia:  0  Restlessness:  0  Agitation:  0       Anxiety:  Is nervous/anxious    Performance Status:  50    Living Arrangements:  Lives with family    Psychosocial/Cultural:   See Palliative Psychosocial Note: No  Lives with her brother Fab  **Primary  to Follow**  Palliative Care  Consult: No        Advance Care Planning   Advance Directives:   LaPOST: Yes    Do Not Resuscitate Status: Yes    Medical Power of : Yes      Decision Making:  Patient answered questions  Goals of Care: What is most important right now is to focus on spending time at home, avoiding the hospital, remaining as independent as possible, symptom/pain control. Accordingly, we have decided that the best plan to meet the patient's goals includes continuing with treatment.Goal is to attend lung transplant visit next week with hopes of undergoing lung transplant and having a better quality of life. We also discussed hospice if she continues to decline or is not a transplant candidate vs home based palliative. At this time will enroll in home based palliative with [facility] for continued Goc discussions and symptom management.          Significant Labs: All pertinent labs within the past 24 hours have been reviewed.  CBC:   Recent Labs   Lab 12/07/23 0452   WBC 14.89*   HGB 10.0*   HCT 33.2*   MCV 93   *       BMP:  Recent Labs   Lab 12/07/23 0452   *      K 3.9   CL 99   CO2 27   BUN 10   CREATININE 0.8   CALCIUM 8.7   MG 1.8       LFT:  Lab Results   Component Value Date    AST 22 12/07/2023    GGT 60 (H) 06/09/2023    ALKPHOS 74 12/07/2023    BILITOT 0.5 12/07/2023     Albumin:   Albumin   Date Value Ref Range Status    12/07/2023 2.8 (L) 3.5 - 5.2 g/dL Final     Protein:   Total Protein   Date Value Ref Range Status   12/07/2023 6.6 6.0 - 8.4 g/dL Final     Lactic acid:   Lab Results   Component Value Date    LACTATE 1.6 12/04/2023    LACTATE 2.6 (H) 11/01/2023       Significant Imaging: I have reviewed all pertinent imaging results/findings within the past 24 hours.

## 2023-12-07 NOTE — DISCHARGE SUMMARY
"[facility] - Tele[facility] ([facility])  [facility] Medicine  Discharge Summary      Patient Name: Ayanna Alicia  MRN: [id number]  DARLENE: [id number]  Patient Class: IP- Inpatient  Admission Date: 12/4/2023  Hospital Length of Stay: 3 days  Discharge Date and Time: No discharge date for patient encounter.  Attending Physician: Cecilio Ponce MD   Discharging Provider: Cecilio Ponce MD  Primary Care Provider: Madeleine Enrique MD    Primary Care Team: Networked reference to record PCT     HPI:   52 y/o female with PMHx of Pulmonary Fibrosis, Pulmonary HTN, COPD, GLENN, chronic respiratory failure on 8 L NC baseline, Rheumatoid Arthritis who presented to Pulmonary Rehab today and referred to the ER due to worsening SOB. 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 She is currently being tapered off of prednisone, states she is on 5 mg daily. She reports onset of SOB yesterday, associated with coughing, worse with movement. In ER, patient found to have elevated WBC 17.5k, CTA Chest with no PE, noted "diffuse parenchymal changes with ground-glass density and irregular consolidation bilaterally." Blood cultures were obtained, patient was started on Levaquin. [facility] consulted for further management, will admit as inpatient for acute on chronic respiratory failure, sepsis.    * No surgery found *      Hospital Course:   12/5/23  NAEON  Patient reports productive coughing, fatigue  Weaned to 6L NC  WBC 18k --> 9k  Will continue vancomycin and zosyn for now  Cultures pending    12/6/23  WBC 9k --> 14.2k, cultures NGTD  Patient weaned down to 5L NC, reports dry cough, fatigue  Patient with concerns for returning home, anxiety given multiple hospitalization and overall poor long term poor prognosis  Patient states she is in consideration for " lung transplant @ [facility] - has appointment next Wednesday/13/23  Interested in discussing with PC re: GOC/ACP, symptom management, etc.    12/7/23  WBC 14.2k --> 14.9k, remains afebrile, cutlures NGTD  Likely plateau affect from IV steroid 12/4-12/5  De-escalate vancomycin/zosyn due to Augmentin, plan to continue on d/c  F/U with ID 12/12/23       Goals of Care Treatment Preferences:  Code Status: DNR       LaPOST: Yes  What is most important right now is to focus on spending time at home, avoiding the hospital, remaining as independent as possible, symptom/pain control.  Accordingly, we have decided that the best plan to meet the patient's goals includes continuing with treatment.      Consults:   Consults (From admission, onward)          Status Ordering Provider     Inpatient consult to Spiritual Care  Once        Provider:  (Not yet assigned)    Completed NORTON, JOSE G     Inpatient consult to Palliative Care  Once        Provider:  (Not yet assigned)    Completed NORTON, JOSE G     Inpatient consult to Pulmonology  Once        Provider:  Chris Hernandez MD    Acknowledged NORTON, JOSE G            No new Assessment & Plan notes have been filed under this hospital service since the last note was generated.  Service: Hospital Medicine    Final Active Diagnoses:    Diagnosis Date Noted POA    PRINCIPAL PROBLEM:  Acute on chronic respiratory failure with hypoxia and hypercapnia [J96.21, J96.22] 11/01/2023 Yes    GLENN on CPAP [G47.33] 04/13/2022 Yes    Interstitial lung disease [J84.9] 10/27/2022 Yes    Pulmonary hypertension due to interstitial lung disease [I27.23, J84.9] 10/11/2023 Yes    Seropositive rheumatoid arthritis [M05.9] 05/18/2023 Yes    High risk medication use [Z79.899] 11/10/2022 Not Applicable    Anemia [D64.9] 11/09/2023 Yes    HTN (hypertension) [I10] 09/04/2013 Yes    Multinodular goiter [E04.2] 02/25/2014 Yes    BMI 35.0-35.9,adult [Z68.35] 05/01/2023 Not Applicable    Encounter  for palliative care [Z51.5] 12/06/2023 Not Applicable      Problems Resolved During this Admission:    Diagnosis Date Noted Date Resolved POA    Sepsis [A41.9] 12/04/2023 12/07/2023 Yes       Discharged Condition: fair    Disposition: Home-Health Care [facility]    Follow Up:   Follow-up Information       Madeleine Enrique MD. Schedule an appointment as soon as possible for a visit in 2 week(s).    Specialty: Family Medicine  Why: Hospital discharge follow up  Contact information:  [address]  [facility] [address] [phone]               Rolando Negro Follow up on 12/13/2023.    Why: Lung Transplant Clinic  Previously established appointment  Contact information:  [address] [facility] [address] [phone]               Sarabjit Wayne DO Follow up on 12/12/2023.    Specialty: Infectious Diseases  Why: Previously scheduled appointment  Contact information:  [address] [facility]  [place] [id number] [phone]                           Patient Instructions:      Ambulatory referral/consult to HOME Palliative Care   Standing Status: Future   Referral Priority: Routine Referral Type: Consultation   Requested Specialty: Hospice and Palliative Medicine   Number of Visits Requested: 1     HOME HEALTH ORDERS   Order Comments: Resume previous home health orders     Order Specific Question Answer Comments   What Home Health Agency is the patient currently using? [facility]        Significant Diagnostic Studies: Labs: All labs within the past 24 hours have been reviewed    Pending Diagnostic Studies:       None           Medications:  Reconciled Home Medications:      Medication List        START taking these medications      ALPRAZolam 0.25 MG tablet  Commonly known as: XANAX  Take 1 tablet (0.25 mg total) by mouth 2 (two) times daily as needed for Anxiety.     docusate sodium 100 MG capsule  Commonly known as: COLACE  Take 1 capsule (100 mg total) by mouth 2 (two) times daily.      montelukast 10 mg tablet  Commonly known as: SINGULAIR  Take 1 tablet (10 mg total) by mouth every evening.     morphine 10 mg/5 mL solution  Take 2.5 mLs (5 mg total) by mouth every 4 (four) hours as needed for Pain (Dyspnea, Shortness of breath).            CONTINUE taking these medications      acetaminophen 500 MG tablet  Commonly known as: TYLENOL  Take 500 mg by mouth every 6 (six) hours as needed for Pain. Only use when needed     acidophilus-pectin, citrus 100 million cell-10 mg Cap  Take 1 capsule by mouth once daily.     albuterol 90 mcg/actuation inhaler  Commonly known as: PROVENTIL/VENTOLIN HFA  INHALE 1 TO 2 PUFFS BY MOUTH INTO THE LUNGS EVERY 4 TO 6 HOURS AS NEEDED FOR WHEEZING OR SHORTNESS OF BREATH     albuterol-ipratropium 2.5 mg-0.5 mg/3 mL nebulizer solution  Commonly known as: DUO-NEB  Take 3 mLs by nebulization 2 (two) times a day. Rescue     amoxicillin-clavulanate 875-125mg 875-125 mg per tablet  Commonly known as: AUGMENTIN  Take 1 tablet by mouth 2 (two) times daily. for 10 days     ferrous sulfate 324 mg (65 mg iron) Tbec  Take 1 tablet (324 mg total) by mouth every other day.     FLONASE SENSIMIST 27.5 mcg/actuation nasal spray  Generic drug: fluticasone  2 sprays by Nasal route once daily.     furosemide 40 MG tablet  Commonly known as: LASIX  Take 1 tablet (40 mg total) by mouth once daily.     ibuprofen 200 MG tablet  Commonly known as: ADVIL,MOTRIN  Take 200 mg by mouth every 6 (six) hours as needed for Pain.     levocetirizine 5 MG tablet  Commonly known as: XYZAL  TAKE 1 TABLET(5 MG) BY MOUTH EVERY DAY     nintedanib 150 mg Cap  Commonly known as: OFEV  Take 1 capsule (150 mg total) by mouth 2 (two) times a day.     omeprazole 20 MG capsule  Commonly known as: PRILOSEC  Take 1 capsule (20 mg total) by mouth once daily.     * revefenacin 175 mcg/3 mL Nebu  Inhale 175 mcg into the lungs once daily. Patient has not received medication yet     * YUPELRI 175 mcg/3 mL Nebu  Generic  drug: revefenacin  Inhale 175 mcg into the lungs once daily.     TYVASO DPI 16(112)-32(112) -48(28) mcg Ctdv  Generic drug: treprostiniL  Inhale 16 mcg into the lungs 4 (four) times daily. Patient has not received medication  yet     vitamin D 1000 units Tab  Commonly known as: VITAMIN D3  Take 1,000 Units by mouth once daily.     VITAMIN-D + OMEGA-3 ORAL  Take 2 tablets by mouth once daily at 6am.     WOMEN'S 50+ ADVANCED ORAL  Take 1 tablet by mouth Daily.           * This list has 2 medication(s) that are the same as other medications prescribed for you. Read the directions carefully, and ask your doctor or other care provider to review them with you.                ASK your doctor about these medications      SEREVENT DISKUS 50 mcg/dose diskus inhaler  Generic drug: salmeteroL  Inhale 1 puff into the lungs 2 (two) times daily. Controller              Indwelling Lines/Drains at time of discharge:   Lines/Drains/Airways       None                   Time spent on the discharge of patient: 45 minutes         Cecilio Ponce MD  Department of Hospital Medicine  [facility] - Tele[facility] ([facility])

## 2023-12-07 NOTE — PROGRESS NOTES
[facility] - Telemetry ([facility])  Palliative Medicine  Progress Note    Patient Name: Ayanna Alicia  MRN: [id number]  Admission Date: 12/4/2023  Hospital Length of Stay: 3 days  Code Status: DNR   Attending Provider: Cecilio Ponce MD  Consulting Provider: Chhaya Parish NP  Primary Care Physician: Madeleine Enrique MD  Principal Problem:Acute on chronic respiratory failure with hypoxia and hypercapnia    Patient information was obtained from patient, past medical records, and primary team.      Assessment/Plan:     Pulmonary  Interstitial lung disease  -Under the care of Dr. Aviles. Noted medical and functional decline, significant symptom burden on 8 L NC at baseline, hopes for lung transplant, decreased appetite and po intake (Albumin 3.0), recurrent hospitalizations and LTAC stays, along with good understanding of her life limiting illness of pulmonary fibrosis and chronic respiratory failure. Also with GLENN, COPD, PHTN, RA. Being treated for Pneumonia, ABX on board.   -Symptom management: Continue Roxanol 5mg po q 4 hrs prn SOB, dyspnea. Discussed safe administration, prescribing, and prevention of OIC, she verbalized understanding. Started xanax 0.25mg po bid prn dyspnea, SOB, anxiety and spoke with Primary team about starting antidepressant.   -GOC: Goal is to attend lung transplant visit next week with hopes of undergoing lung transplant and having a better quality of life. We also discussed hospice if she continues to decline or is not a transplant candidate vs home based palliative. At this time will enroll in home based palliative.  -DNR/I  -Will follow up    Palliative Care  Encounter for palliative care  -Code status: DNR/I, PM team completed LaPOST today.  -HCPOA: Brother who she lives with Fab Alicia ([phone]) and her niece Camelia Alicia ([phone]). HCPOA document completed.   -GOC: Focus on spending time at home, avoiding the hospital, remaining as independent as possible, symptom/pain  "control. Accordingly, we have decided that the best plan to meet the patient's goals includes continuing with treatment.Goal is to attend lung transplant visit next week with hopes of undergoing lung transplant and having a better quality of life. We also discussed hospice if she continues to decline or is not a transplant candidate vs home based palliative. At this time will enroll in home based palliative with [facility] for continued Goc discussions and symptom management.         I will follow-up with patient. Please contact us if you have any additional questions.    Subjective:     Chief Complaint:   Chief Complaint   Patient presents with    Shortness of Breath     Sob and poor appetite for the last few days. Low o2 sat today (in the 70's on 8L NC) uses between 4-8 liters O2 NC at home.       HPI:   52 y/o female with PMHx of Pulmonary Fibrosis, Pulmonary HTN, COPD, GLENN, chronic respiratory failure on 8 L NC baseline, Rheumatoid Arthritis who presented to Pulmonary Rehab today and referred to the ER due to worsening SOB. 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 She is currently being tapered off of prednisone, states she is on 5 mg daily. She reports onset of SOB yesterday, associated with coughing, worse with movement. In ER, patient found to have elevated WBC 17.5k, CTA Chest with no PE, noted "diffuse parenchymal changes with ground-glass density and irregular consolidation bilaterally." Blood cultures were obtained, patient was started on Levaquin. [facility] consulted for further management, will admit as inpatient for acute on chronic respiratory failure, sepsis. Patient noted she is being considered for a lung transplant at [facility] in which she has an appointment next Wednesday. Patient " with concerns of returning home, anxiety given multiple hospitalizations and over all poor long term prognosis thus palliative medicine consulted for goals of care, advance care planning, and symptom management.     Hospital Course:  No notes on file    Interval History: Upon examination, patient in bed in no acute distress, supplemental O2. Patient stated that she feels better today than yesterday in which clinically patient appears better. No conversational dyspnea noted today and patient in good spirits. She noted that po morphine did help to calm down her breathing, she only had one dose and she noted she slept better last night. Today we followed up on GOC discussion and ACP in which we completed LaPOST today which reflected wishes of DNR/I. HCPOA document was completed yesterday by PM team. At this time her goals are to attend lung transplant visit next week with hopes of undergoing lung transplant and having a better quality of life. We also discussed hospice if she continues to decline, risks another hospitalization,  or is not a transplant candidate vs home based palliative. At this time will enroll in home based palliative with [facility] for continued Goc discussions and symptom management. She noted anxiety in general and associated with SOB, dyspnea in which I started xanax 0.5mg po bid prn and spoke with primary team about starting patient on antidepressant. All ACP documents completed. All questions and concerns addressed. Primary team updated.     In the last 24hrs the patient has used the following pain medications (7a-7a):  - Roxanol x 1    Past Medical History:   Diagnosis Date    Abnormal Pap smear of cervix     in the past with repeat pap smear okay.    Acute interstitial pneumonitis     Allergic rhinitis, cause unspecified     Arthritis of both knees     Asthma     Eczema     Fatty liver 10/2014    Fibrocystic breast changes     Headache(784.0)     Hepatomegaly 10/2014    Hypertension      Liver cyst 10/2014    Multinodular goiter     Followed by ENT - Dr. Nicolas Gray    Polymenorrhea     TMJ (dislocation of temporomandibular joint)     Uterine fibroid     in the past    Vitamin D deficiency disease        Past Surgical History:   Procedure Laterality Date    BRONCHOSCOPY Bilateral 2023    Procedure: Bronchoscopy - insert lighted tube into airway to take a biopsy of lung;  Surgeon: Agustín Aviles MD;  Location: [facility] ENDO;  Service: Pulmonary;  Laterality: Bilateral;     SECTION, CLASSIC      x 1    COLONOSCOPY N/A 2020    Procedure: COLONOSCOPY;  Surgeon: Angie Magana MD;  Location: [facility] ENDO;  Service: Endoscopy;  Laterality: N/A;    HYSTERECTOMY      MULTIPLE TOOTH EXTRACTIONS      PARTIAL HYSTERECTOMY  2013    Due to fibroids       Review of patient's allergies indicates:   Allergen Reactions    Doxycycline Nausea Only     Other reaction(s): Nausea    Fluticasone Other (See Comments)     Other reaction(s): Epistaxis         Medications:  Continuous Infusions:  Scheduled Meds:   albuterol-ipratropium  3 mL Nebulization Q4H    amoxicillin-clavulanate 875-125mg  1 tablet Oral Q12H    arformoteroL  15 mcg Nebulization BID    budesonide  0.5 mg Nebulization BID    docusate sodium  100 mg Oral BID    enoxparin  40 mg Subcutaneous Daily    furosemide  40 mg Oral Daily    montelukast  10 mg Oral QHS    nintedanib  150 mg Oral BID    pantoprazole  40 mg Oral Daily     PRN Meds:sodium chloride 0.9%, acetaminophen, albuterol-ipratropium, ALPRAZolam, aluminum-magnesium hydroxide-simethicone, benzonatate, calcium carbonate, dextrose 10%, dextrose 10%, glucagon (human recombinant), glucose, glucose, insulin aspart U-100, melatonin, morphine, morphine, ondansetron, sodium chloride    Family History       Problem Relation (Age of Onset)    Cancer Maternal Grandmother (60), Maternal Aunt (50), Maternal Aunt (66)    Cataracts Cousin    Diabetes Maternal Grandfather     Diverticulitis Mother    Heart disease Mother, Father (63)    Hypertension Father    Migraines Cousin    No Known Problems Brother    Peripheral vascular disease Maternal Grandfather    Stroke Mother, Sister, Maternal Grandfather          Tobacco Use    Smoking status: Never     Passive exposure: Past    Smokeless tobacco: Never   Substance and Sexual Activity    Alcohol use: Not Currently     Comment: last use 03/2022    Drug use: Not Currently     Frequency: 4.0 times per week     Types: Marijuana     Comment: last year was last use 03/2022    Sexual activity: Yes     Partners: Female       Review of Systems   Constitutional:  Positive for activity change, appetite change, fatigue and unexpected weight change.   Respiratory:  Positive for cough and shortness of breath.    Neurological:  Positive for weakness.   Psychiatric/Behavioral:  The patient is nervous/anxious.         Associated when she experiences dyspnea    All other systems reviewed and are negative.    Objective:     Vital Signs (Most Recent):  Temp: 99 °F (37.2 °C) (12/07/23 1138)  Pulse: 110 (12/07/23 1138)  Resp: 18 (12/07/23 1138)  BP: 105/70 (12/07/23 1138)  SpO2: 96 % (12/07/23 1138) Vital Signs (24h Range):  Temp:  [97.5 °F (36.4 °C)-99.1 °F (37.3 °C)] 99 °F (37.2 °C)  Pulse:  [] 110  Resp:  [18-22] 18  SpO2:  [92 %-99 %] 96 %  BP: (102-125)/(55-78) 105/70     Weight: 95.7 kg (211 lb)  Body mass index is 35.11 kg/m².       Physical Exam  Vitals and nursing note reviewed.   Constitutional:       Appearance: She is ill-appearing.   HENT:      Head: Normocephalic.      Nose: Nose normal.      Mouth/Throat:      Mouth: Mucous membranes are moist.   Eyes:      General:         Right eye: No discharge.         Left eye: No discharge.   Cardiovascular:      Rate and Rhythm: Normal rate.   Pulmonary:      Breath sounds: Rhonchi present.      Comments: Conversational dyspnea noted, tolerating 5 L HFNC, dysphonia   Abdominal:      Palpations:  Abdomen is soft.   Musculoskeletal:      Cervical back: Normal range of motion.   Skin:     General: Skin is warm and dry.   Neurological:      Mental Status: She is alert and oriented to person, place, and time.   Psychiatric:         Behavior: Behavior normal.         Thought Content: Thought content normal.         Judgment: Judgment normal.            Review of Symptoms      Symptom Assessment (ESAS 0-10 Scale)  Pain:  0  Dyspnea:  0  Anxiety:  0  Nausea:  0  Depression:  0  Anorexia:  0  Fatigue:  0  Insomnia:  0  Restlessness:  0  Agitation:  0       Anxiety:  Is nervous/anxious    Performance Status:  50    Living Arrangements:  Lives with family    Psychosocial/Cultural:   See Palliative Psychosocial Note: No  Lives with her brother Fab  **Primary  to Follow**  Palliative Care  Consult: No        Advance Care Planning  Advance Directives:   LaPOST: Yes    Do Not Resuscitate Status: Yes    Medical Power of : Yes      Decision Making:  Patient answered questions  Goals of Care: What is most important right now is to focus on spending time at home, avoiding the hospital, remaining as independent as possible, symptom/pain control. Accordingly, we have decided that the best plan to meet the patient's goals includes continuing with treatment.Goal is to attend lung transplant visit next week with hopes of undergoing lung transplant and having a better quality of life. We also discussed hospice if she continues to decline or is not a transplant candidate vs home based palliative. At this time will enroll in home based palliative with [facility] for continued Goc discussions and symptom management.          Significant Labs: All pertinent labs within the past 24 hours have been reviewed.  CBC:   Recent Labs   Lab 12/07/23  0452   WBC 14.89*   HGB 10.0*   HCT 33.2*   MCV 93   *       BMP:  Recent Labs   Lab 12/07/23  0452   *      K 3.9   CL 99   CO2 27    BUN 10   CREATININE 0.8   CALCIUM 8.7   MG 1.8       LFT:  Lab Results   Component Value Date    AST 22 12/07/2023    GGT 60 (H) 06/09/2023    ALKPHOS 74 12/07/2023    BILITOT 0.5 12/07/2023     Albumin:   Albumin   Date Value Ref Range Status   12/07/2023 2.8 (L) 3.5 - 5.2 g/dL Final     Protein:   Total Protein   Date Value Ref Range Status   12/07/2023 6.6 6.0 - 8.4 g/dL Final     Lactic acid:   Lab Results   Component Value Date    LACTATE 1.6 12/04/2023    LACTATE 2.6 (H) 11/01/2023       Significant Imaging: I have reviewed all pertinent imaging results/findings within the past 24 hours.    I spent face to face time and non-face to face time preparing to see the patient (eg, review of tests), Obtaining and/or reviewing separately obtained history, Documenting clinical information in the electronic or other health record, Independently interpreting results and communicating results to the patient/family/caregiver, or Care coordination.     > 46 minutes spent in discussing ACP    Chhaya Parish NP  Palliative Medicine  '[facility]

## 2023-12-07 NOTE — ASSESSMENT & PLAN NOTE
-Code status: DNR/I, PM team completed LaPOST today.  -HCPOA: Brother who she lives with Fab Alicia ([phone]) and her niece Camelia Alicia ([phone]). HCPOA document completed.   -GOC: Focus on spending time at home, avoiding the hospital, remaining as independent as possible, symptom/pain control. Accordingly, we have decided that the best plan to meet the patient's goals includes continuing with treatment.Goal is to attend lung transplant visit next week with hopes of undergoing lung transplant and having a better quality of life. We also discussed hospice if she continues to decline or is not a transplant candidate vs home based palliative. At this time will enroll in home based palliative with [facility] for continued Goc discussions and symptom management.

## 2023-12-07 NOTE — TELEPHONE ENCOUNTER
Spoke to patient in reference to Hematology referral from Dr. Aviles.  Appointment scheduled per patient's request next available.  Appointment notice via pt portal.

## 2023-12-07 NOTE — CONSULTS
Pharmacy consult sign off    Therapy with vancomycin is complete and/or consult has been discontinued by the provider.     Pharmacy will sign off. Please re-consult as needed.    Thank you for allowing us to participate in this patient's care.   Brandon Shell, Saúl 12/7/2023 10:23 AM

## 2023-12-08 ENCOUNTER — PATIENT OUTREACH (OUTPATIENT)
Dept: ADMINISTRATIVE | Facility: CLINIC | Age: 53
End: 2023-12-08
Payer: COMMERCIAL

## 2023-12-08 NOTE — PROGRESS NOTES
C3 nurse spoke with Ayanna Alicia for a TCC post hospital discharge follow up call. The patient has a scheduled HOSFU appointment with Madeleine Enrique MD on 12/07/23 @ 9018.

## 2023-12-09 LAB
BACTERIA BLD CULT: NORMAL
BACTERIA BLD CULT: NORMAL

## 2023-12-11 ENCOUNTER — PATIENT MESSAGE (OUTPATIENT)
Dept: PULMONOLOGY | Facility: CLINIC | Age: 53
End: 2023-12-11
Payer: COMMERCIAL

## 2023-12-12 ENCOUNTER — OFFICE VISIT (OUTPATIENT)
Dept: INFECTIOUS DISEASES | Facility: CLINIC | Age: 53
End: 2023-12-12
Payer: COMMERCIAL

## 2023-12-12 ENCOUNTER — PATIENT MESSAGE (OUTPATIENT)
Dept: PULMONOLOGY | Facility: CLINIC | Age: 53
End: 2023-12-12
Payer: COMMERCIAL

## 2023-12-12 VITALS
SYSTOLIC BLOOD PRESSURE: 126 MMHG | HEART RATE: 100 BPM | OXYGEN SATURATION: 94 % | WEIGHT: 211 LBS | DIASTOLIC BLOOD PRESSURE: 86 MMHG | HEIGHT: 65 IN | BODY MASS INDEX: 35.16 KG/M2

## 2023-12-12 DIAGNOSIS — J84.9 PULMONARY HYPERTENSION DUE TO INTERSTITIAL LUNG DISEASE: ICD-10-CM

## 2023-12-12 DIAGNOSIS — I27.23 PULMONARY HYPERTENSION DUE TO INTERSTITIAL LUNG DISEASE: ICD-10-CM

## 2023-12-12 DIAGNOSIS — A42.9 ACTINOMYCES INFECTION: Primary | ICD-10-CM

## 2023-12-12 DIAGNOSIS — J84.9 INTERSTITIAL LUNG DISEASE: ICD-10-CM

## 2023-12-12 PROCEDURE — 1159F MED LIST DOCD IN RCRD: CPT | Mod: CPTII,S$GLB,, | Performed by: STUDENT IN AN ORGANIZED HEALTH CARE EDUCATION/TRAINING PROGRAM

## 2023-12-12 PROCEDURE — 3044F PR MOST RECENT HEMOGLOBIN A1C LEVEL <7.0%: ICD-10-PCS | Mod: CPTII,S$GLB,, | Performed by: STUDENT IN AN ORGANIZED HEALTH CARE EDUCATION/TRAINING PROGRAM

## 2023-12-12 PROCEDURE — 99999 PR PBB SHADOW E&M-EST. PATIENT-LVL V: ICD-10-PCS | Mod: PBBFAC,,, | Performed by: STUDENT IN AN ORGANIZED HEALTH CARE EDUCATION/TRAINING PROGRAM

## 2023-12-12 PROCEDURE — 3074F PR MOST RECENT SYSTOLIC BLOOD PRESSURE < 130 MM HG: ICD-10-PCS | Mod: CPTII,S$GLB,, | Performed by: STUDENT IN AN ORGANIZED HEALTH CARE EDUCATION/TRAINING PROGRAM

## 2023-12-12 PROCEDURE — 99999 PR PBB SHADOW E&M-EST. PATIENT-LVL V: CPT | Mod: PBBFAC,,, | Performed by: STUDENT IN AN ORGANIZED HEALTH CARE EDUCATION/TRAINING PROGRAM

## 2023-12-12 PROCEDURE — 99214 PR OFFICE/OUTPT VISIT, EST, LEVL IV, 30-39 MIN: ICD-10-PCS | Mod: S$GLB,,, | Performed by: STUDENT IN AN ORGANIZED HEALTH CARE EDUCATION/TRAINING PROGRAM

## 2023-12-12 PROCEDURE — 3079F DIAST BP 80-89 MM HG: CPT | Mod: CPTII,S$GLB,, | Performed by: STUDENT IN AN ORGANIZED HEALTH CARE EDUCATION/TRAINING PROGRAM

## 2023-12-12 PROCEDURE — 3008F PR BODY MASS INDEX (BMI) DOCUMENTED: ICD-10-PCS | Mod: CPTII,S$GLB,, | Performed by: STUDENT IN AN ORGANIZED HEALTH CARE EDUCATION/TRAINING PROGRAM

## 2023-12-12 PROCEDURE — 3079F PR MOST RECENT DIASTOLIC BLOOD PRESSURE 80-89 MM HG: ICD-10-PCS | Mod: CPTII,S$GLB,, | Performed by: STUDENT IN AN ORGANIZED HEALTH CARE EDUCATION/TRAINING PROGRAM

## 2023-12-12 PROCEDURE — 1159F PR MEDICATION LIST DOCUMENTED IN MEDICAL RECORD: ICD-10-PCS | Mod: CPTII,S$GLB,, | Performed by: STUDENT IN AN ORGANIZED HEALTH CARE EDUCATION/TRAINING PROGRAM

## 2023-12-12 PROCEDURE — 3044F HG A1C LEVEL LT 7.0%: CPT | Mod: CPTII,S$GLB,, | Performed by: STUDENT IN AN ORGANIZED HEALTH CARE EDUCATION/TRAINING PROGRAM

## 2023-12-12 PROCEDURE — 99214 OFFICE O/P EST MOD 30 MIN: CPT | Mod: S$GLB,,, | Performed by: STUDENT IN AN ORGANIZED HEALTH CARE EDUCATION/TRAINING PROGRAM

## 2023-12-12 PROCEDURE — 3074F SYST BP LT 130 MM HG: CPT | Mod: CPTII,S$GLB,, | Performed by: STUDENT IN AN ORGANIZED HEALTH CARE EDUCATION/TRAINING PROGRAM

## 2023-12-12 PROCEDURE — 3008F BODY MASS INDEX DOCD: CPT | Mod: CPTII,S$GLB,, | Performed by: STUDENT IN AN ORGANIZED HEALTH CARE EDUCATION/TRAINING PROGRAM

## 2023-12-12 RX ORDER — AMOXICILLIN AND CLAVULANATE POTASSIUM 875; 125 MG/1; MG/1
1 TABLET, FILM COATED ORAL 2 TIMES DAILY
Qty: 84 TABLET | Refills: 0 | Status: SHIPPED | OUTPATIENT
Start: 2023-12-12 | End: 2024-01-23

## 2023-12-12 NOTE — PROGRESS NOTES
Infectious Disease Clinic Note    Patient Name: Ayanna Alicia  YOB: 1970    PRESENTING HISTORY       History of Present Illness:  Ms. Ayanna Alicia is a 53 y.o. female w/ significant PMHx of  Sjogren's syndrome, ILD, RA, chronic hypoxic respiratory failure on 6-8L/NC, asthma, GLENN (CPAP), possible Lung Tx candidate, who presented to ER last month for evaluation of SOB which started gradually over a few days.  Patient was seen by ENT on 10/06 and underwent a scope which noted a vocal cords covered and exudate that was sent for culture that grew both Actinomyces and prevotella.  Patient febrile that admission to 101.6 F. Then afebrile. Was discharged on 6 week course of IV ceftriaxone. Admitted 11/1 for worsening shortness of breath. Found to be positive for influenza A. Has completed course of Tamiflu. However, fever to 101.5 this morning. Patient also reports loose watery stools. Stool studies negative. Ceftriaxone switched to amoxicillin on 11/14. Patient was sent to [facility] but then readmitted on 12/4 for worsening SOB. Treated for pneumonia with Augmentin. Does not know if it is the antibiotic or infection making her feel sick currently. Supposed to see transplant team in [place] tomorrow but may be too ill for trip. Also seeing ENT on Friday. Asking how to tell if infection has been treated. Will see if ENT can collect a new sample from vocal cords. Overall patient needs transplant to successfully treat her ILD. Prognosis tenuous at this time. Advised to seek ER attention if symptoms continue to worsen.     Review of Systems:  Constitutional: no fever or chills  Eyes: no visual changes  ENT: no nasal congestion or sore throat  Respiratory: no cough; worsening shortness of breath   Cardiovascular: no chest pain  Gastrointestinal: no nausea or vomiting, no abdominal pain, no constipation, no diarrhea  Genitourinary: no hematuria or dysuria  Musculoskeletal: no arthralgias or myalgias  Skin: no  rash  Neurological: no headaches, numbness, or paresthesias    The following portions of the patient's history were reviewed and updated as appropriate: allergies, current medications, past family history, past medical history, past social history, past surgical history, and problem list.    PAST HISTORY:     Immunization History   Administered Date(s) Administered    COVID-19 Vaccine 2021, 10/14/2021    COVID-19, MRNA, LN-S, PF (MODERNA FULL 0.5 ML DOSE) 2021, 10/14/2021    COVID-19, mRNA, LNP-S, bivalent booster, PF (Moderna Omicron)12 + YEARS 2023    Influenza 10/13/2010    Influenza (FLUAD) - Quadrivalent - Adjuvanted - PF *Preferred* (65+) 2022    Influenza - Quadrivalent 10/02/2014, 2016, 2017    Influenza - Quadrivalent - PF *Preferred* (6 months and older) 2020, 10/14/2021    Influenza - Trivalent (ADULT) 10/13/2010    Pneumococcal Conjugate - 20 Valent 2022    Tdap 2012       Past Medical History:   Diagnosis Date    Abnormal Pap smear of cervix     in the past with repeat pap smear okay.    Acute interstitial pneumonitis     Allergic rhinitis, cause unspecified     Arthritis of both knees     Asthma     Eczema     Fatty liver 10/2014    Fibrocystic breast changes     Headache(784.0)     Hepatomegaly 10/2014    Hypertension     Liver cyst 10/2014    Multinodular goiter     Followed by ENT - Dr. Nicolas Gray    Polymenorrhea 2008    TMJ (dislocation of temporomandibular joint)     Uterine fibroid     in the past    Vitamin D deficiency disease        Past Surgical History:   Procedure Laterality Date    BRONCHOSCOPY Bilateral 2023    Procedure: Bronchoscopy - insert lighted tube into airway to take a biopsy of lung;  Surgeon: Agustín Aviles MD;  Location: [facility];  Service: Pulmonary;  Laterality: Bilateral;     SECTION, CLASSIC      x 1    COLONOSCOPY N/A 2020    Procedure: COLONOSCOPY;  Surgeon: Angie Magana MD;  Location:  [facility];  Service: Endoscopy;  Laterality: N/A;    HYSTERECTOMY      MULTIPLE TOOTH EXTRACTIONS      PARTIAL HYSTERECTOMY  03/20/2013    Due to fibroids       Family History   Problem Relation Age of Onset    Stroke Mother     Heart disease Mother     Diverticulitis Mother     Heart disease Father 63        MI/CAD    Hypertension Father     Stroke Sister     No Known Problems Brother     Cancer Maternal Grandmother 60        Rectal and stomach cancer    Stroke Maternal Grandfather     Diabetes Maternal Grandfather     Peripheral vascular disease Maternal Grandfather     Cancer Maternal Aunt 50        Stomach cancer    Cancer Maternal Aunt 66        Lung cancer (smoker)    Migraines Cousin     Cataracts Cousin        Social History     Socioeconomic History    Marital status: Single    Number of children: 0   Occupational History    Occupation:      Employer: [employer]     Comment: [employer]   Tobacco Use    Smoking status: Never     Passive exposure: Past    Smokeless tobacco: Never   Substance and Sexual Activity    Alcohol use: Not Currently     Comment: last use 03/2022    Drug use: Not Currently     Frequency: 4.0 times per week     Types: Marijuana     Comment: last year was last use 03/2022    Sexual activity: Yes     Partners: Female   Social History Narrative    She wears seatbelt. Her son was killed in 2010.     Social Determinants of Health     Financial Resource Strain: Low Risk  (4/2/2020)    Overall Financial Resource Strain (CARDIA)     Difficulty of Paying Living Expenses: Not very hard   Food Insecurity: No Food Insecurity (4/2/2020)    Hunger Vital Sign     Worried About Running Out of Food in the Last Year: Never true     Ran Out of Food in the Last Year: Never true   Transportation Needs: No Transportation Needs (4/2/2020)    PRAPARE - Transportation     Lack of Transportation (Medical): No     Lack of Transportation (Non-Medical): No   Physical Activity: Inactive  (3/24/2022)    Exercise Vital Sign     Days of Exercise per Week: 0 days     Minutes of Exercise per Session: 0 min   Stress: No Stress Concern Present (8/15/2019)    [place] [employer] - Occupational Stress Questionnaire     Feeling of Stress : Not at all   Social Connections: Moderately Isolated (4/2/2020)    Social Connection and Isolation Panel [NHANES]     Frequency of Communication with Friends and Family: More than three times a week     Frequency of Social Gatherings with Friends and Family: Twice a week     Attends Congregational Services: 1 to 4 times per year     Active Member of Clubs or Organizations: No     Attends Club or Organization Meetings: Never     Marital Status: Never        MEDICATIONS & ALLERGIES:     Current Outpatient Medications on File Prior to Visit   Medication Sig    acetaminophen (TYLENOL) 500 MG tablet Take 500 mg by mouth every 6 (six) hours as needed for Pain. Only use when needed    acidophilus-pectin, citrus 100 million cell-10 mg Cap Take 1 capsule by mouth once daily.    albuterol (PROVENTIL/VENTOLIN HFA) 90 mcg/actuation inhaler INHALE 1 TO 2 PUFFS BY MOUTH INTO THE LUNGS EVERY 4 TO 6 HOURS AS NEEDED FOR WHEEZING OR SHORTNESS OF BREATH    albuterol-ipratropium (DUO-NEB) 2.5 mg-0.5 mg/3 mL nebulizer solution Take 3 mLs by nebulization 2 (two) times a day. Rescue    ALPRAZolam (XANAX) 0.25 MG tablet Take 1 tablet (0.25 mg total) by mouth 2 (two) times daily as needed for Anxiety.    docusate sodium (COLACE) 100 MG capsule Take 1 capsule (100 mg total) by mouth 2 (two) times daily.    ferrous sulfate 324 mg (65 mg iron) TbEC Take 1 tablet (324 mg total) by mouth every other day. (Patient not taking: Reported on 12/8/2023)    fluticasone (VERAMYST) 27.5 mcg/actuation nasal spray 2 sprays by Nasal route once daily.    furosemide (LASIX) 40 MG tablet Take 1 tablet (40 mg total) by mouth once daily.    ibuprofen (ADVIL,MOTRIN) 200 MG tablet Take 200 mg by  "mouth every 6 (six) hours as needed for Pain.    levocetirizine (XYZAL) 5 MG tablet TAKE 1 TABLET(5 MG) BY MOUTH EVERY DAY    montelukast (SINGULAIR) 10 mg tablet Take 1 tablet (10 mg total) by mouth every evening.    morphine 10 mg/5 mL solution Take 2.5 mLs (5 mg total) by mouth every 4 (four) hours as needed for Pain (Dyspnea, Shortness of breath).    mv-minerals/FA/omega 3,6,9 #3 (WOMEN'S 50+ ADVANCED ORAL) Take 1 tablet by mouth Daily.    nintedanib (OFEV) 150 mg Cap Take 1 capsule (150 mg total) by mouth 2 (two) times a day.    omega-3s/dha/epa/fish oil/D3 (VITAMIN-D + OMEGA-3 ORAL) Take 2 tablets by mouth once daily at 6am.    omeprazole (PRILOSEC) 20 MG capsule Take 1 capsule (20 mg total) by mouth once daily.    revefenacin (YUPELRI) 175 mcg/3 mL Nebu Inhale 175 mcg into the lungs once daily.    revefenacin 175 mcg/3 mL Nebu Inhale 175 mcg into the lungs once daily. Patient has not received medication yet (Patient not taking: Reported on 12/8/2023)    SEREVENT DISKUS 50 mcg/dose diskus inhaler Inhale 1 puff into the lungs 2 (two) times daily. Controller    treprostiniL (TYVASO DPI) 16(112)-32(112) -48(28) mcg CtDv Inhale 16 mcg into the lungs 4 (four) times daily. Patient has not received medication  yet (Patient not taking: Reported on 12/8/2023)    vitamin D (VITAMIN D3) 1000 units Tab Take 1,000 Units by mouth once daily.    [DISCONTINUED] amoxicillin-clavulanate 875-125mg (AUGMENTIN) 875-125 mg per tablet Take 1 tablet by mouth 2 (two) times daily. for 10 days     No current facility-administered medications on file prior to visit.       Review of patient's allergies indicates:   Allergen Reactions    Doxycycline Nausea Only     Other reaction(s): Nausea    Fluticasone Other (See Comments)     Other reaction(s): Epistaxis         OBJECTIVE:   Vital Signs:  Vitals:    12/12/23 1019   BP: 126/86   Pulse: 100   SpO2: (!) 94%   Weight: 95.7 kg (210 lb 15.7 oz)   Height: 5' 5" (1.651 m)       No results " found for this or any previous visit (from the past 24 hour(s)).      Physical Exam:   General:  Well developed, well nourished, no acute distress; wheelchair; portable oxygen tank   HEENT:  Normocephalic, atraumatic  CVS:  RRR, S1 and S2 normal, no murmurs, rubs, gallops  Resp:  Lungs clear to auscultation, no wheezes, rales, rhonchi; diminished breath sounds   GI:  Abdomen soft, non-tender, non-distended, normoactive bowel sounds, no masses  MSK:  No muscle atrophy, peripheral edema, full range of motion  Skin:  No rashes, ulcers, erythema  Psych:  Alert and oriented to person, place, and time    ASSESSMENT:     Actinomyces infection  --Will continue Augmentin for another month  --Recommend ENT collect another sample in clinic Friday   --If negative will discontinue treatment   --Poor prognosis at this time given need for lung transplant  --Supposed to go to [place] tomorrow but may not be medically safe due to oxygen needs at this time  --Follow up with ID in 4 weeks     Pulmonary hypertension due to interstitial lung disease  Continue current medication. Follow up with pulmonology.     Interstitial lung disease  Continue current medications. Follow up with pulmonology. In need of lung transplant.     PLAN:     Diagnoses and all orders for this visit:    Actinomyces infection    Pulmonary hypertension due to interstitial lung disease    Interstitial lung disease    Other orders  -     amoxicillin-clavulanate 875-125mg (AUGMENTIN) 875-125 mg per tablet; Take 1 tablet by mouth 2 (two) times daily.      The total time for evaluation and management services performed on 12/12/23 was greater than 30 minutes.        Matthew Wayne DO   Infectious Diseases

## 2023-12-12 NOTE — ASSESSMENT & PLAN NOTE
--Will continue Augmentin for another month  --Recommend ENT collect another sample in clinic Friday   --If negative will discontinue treatment   --Poor prognosis at this time given need for lung transplant  --Supposed to go to [place] tomorrow but may not be medically safe due to oxygen needs at this time  --Follow up with ID in 4 weeks

## 2023-12-13 ENCOUNTER — DOCUMENTATION ONLY (OUTPATIENT)
Dept: PULMONOLOGY | Facility: CLINIC | Age: 53
End: 2023-12-13
Payer: COMMERCIAL

## 2023-12-13 NOTE — PROGRESS NOTES
Spoke with patient  Await accessory for life 2000 that will allow her to wear on ambulation  Michael to deliver  Rescheduled [facility]  Will return to rehab Monday

## 2023-12-14 ENCOUNTER — PATIENT MESSAGE (OUTPATIENT)
Dept: PULMONOLOGY | Facility: CLINIC | Age: 53
End: 2023-12-14
Payer: COMMERCIAL

## 2023-12-15 ENCOUNTER — TELEPHONE (OUTPATIENT)
Dept: TRANSPLANT | Facility: CLINIC | Age: 53
End: 2023-12-15
Payer: COMMERCIAL

## 2023-12-15 ENCOUNTER — OFFICE VISIT (OUTPATIENT)
Dept: OTOLARYNGOLOGY | Facility: CLINIC | Age: 53
End: 2023-12-15
Payer: COMMERCIAL

## 2023-12-15 VITALS — SYSTOLIC BLOOD PRESSURE: 130 MMHG | DIASTOLIC BLOOD PRESSURE: 84 MMHG | RESPIRATION RATE: 24 BRPM | HEART RATE: 109 BPM

## 2023-12-15 DIAGNOSIS — J04.0 LARYNGITIS: Primary | ICD-10-CM

## 2023-12-15 DIAGNOSIS — R49.0 DYSPHONIA: ICD-10-CM

## 2023-12-15 PROCEDURE — 3044F PR MOST RECENT HEMOGLOBIN A1C LEVEL <7.0%: ICD-10-PCS | Mod: CPTII,S$GLB,, | Performed by: OTOLARYNGOLOGY

## 2023-12-15 PROCEDURE — 3079F DIAST BP 80-89 MM HG: CPT | Mod: CPTII,S$GLB,, | Performed by: OTOLARYNGOLOGY

## 2023-12-15 PROCEDURE — 99213 OFFICE O/P EST LOW 20 MIN: CPT | Mod: 25,S$GLB,, | Performed by: OTOLARYNGOLOGY

## 2023-12-15 PROCEDURE — 1159F MED LIST DOCD IN RCRD: CPT | Mod: CPTII,S$GLB,, | Performed by: OTOLARYNGOLOGY

## 2023-12-15 PROCEDURE — 1111F DSCHRG MED/CURRENT MED MERGE: CPT | Mod: CPTII,S$GLB,, | Performed by: OTOLARYNGOLOGY

## 2023-12-15 PROCEDURE — 3075F SYST BP GE 130 - 139MM HG: CPT | Mod: CPTII,S$GLB,, | Performed by: OTOLARYNGOLOGY

## 2023-12-15 PROCEDURE — 99999 PR PBB SHADOW E&M-EST. PATIENT-LVL IV: CPT | Mod: PBBFAC,,, | Performed by: OTOLARYNGOLOGY

## 2023-12-15 PROCEDURE — 31575 PR LARYNGOSCOPY, FLEXIBLE; DIAGNOSTIC: ICD-10-PCS | Mod: S$GLB,,, | Performed by: OTOLARYNGOLOGY

## 2023-12-15 PROCEDURE — 3075F PR MOST RECENT SYSTOLIC BLOOD PRESS GE 130-139MM HG: ICD-10-PCS | Mod: CPTII,S$GLB,, | Performed by: OTOLARYNGOLOGY

## 2023-12-15 PROCEDURE — 3044F HG A1C LEVEL LT 7.0%: CPT | Mod: CPTII,S$GLB,, | Performed by: OTOLARYNGOLOGY

## 2023-12-15 PROCEDURE — 99213 PR OFFICE/OUTPT VISIT, EST, LEVL III, 20-29 MIN: ICD-10-PCS | Mod: 25,S$GLB,, | Performed by: OTOLARYNGOLOGY

## 2023-12-15 PROCEDURE — 31575 DIAGNOSTIC LARYNGOSCOPY: CPT | Mod: S$GLB,,, | Performed by: OTOLARYNGOLOGY

## 2023-12-15 PROCEDURE — 1159F PR MEDICATION LIST DOCUMENTED IN MEDICAL RECORD: ICD-10-PCS | Mod: CPTII,S$GLB,, | Performed by: OTOLARYNGOLOGY

## 2023-12-15 PROCEDURE — 99999 PR PBB SHADOW E&M-EST. PATIENT-LVL IV: ICD-10-PCS | Mod: PBBFAC,,, | Performed by: OTOLARYNGOLOGY

## 2023-12-15 PROCEDURE — 1111F PR DISCHARGE MEDS RECONCILED W/ CURRENT OUTPATIENT MED LIST: ICD-10-PCS | Mod: CPTII,S$GLB,, | Performed by: OTOLARYNGOLOGY

## 2023-12-15 PROCEDURE — 3079F PR MOST RECENT DIASTOLIC BLOOD PRESSURE 80-89 MM HG: ICD-10-PCS | Mod: CPTII,S$GLB,, | Performed by: OTOLARYNGOLOGY

## 2023-12-15 NOTE — PROGRESS NOTES
[facility]  Department of Otorhinolaryngology and Communication Sciences    Subjective:      Ayanna Alicia is a 53 y.o. female who presents for follow-up regarding laryngitis.    She has been on antibiotics since her last visit under the direction of gastroenterology. Her voice is more or less stable. She denies throat pain, odynophagia, otalgia, hemoptysis, hematemesis, neck mass.    The patient's medications, allergies, past medical, surgical, social and family histories were reviewed and updated as appropriate.    A detailed review of systems was obtained with pertinent positives as per the above HPI, and otherwise negative.      Objective:     /84   Pulse 109   Resp (!) 24      Constitutional: comfortable, well dressed  Psychiatric: appropriate affect  Respiratory: comfortably breathing, symmetric chest rise, no stridor  Voice: moderate to severe breathy strain; interval improvements  Head: normocephalic  Eyes: conjunctivae and sclerae clear  Indirect laryngoscopy is limited due to gag    Procedure  Flexible Laryngoscopy (20816): Laryngoscopy is indicated for assessment of upper aerodigestive structure and function. This was carried out transnasally with a distal chip videoendoscope. After verbal consent was obtained, the patient was positioned and the nose was topically decongested with 1% phenylephrine and topically anesthetized with 4% lidocaine. The endoscope was passed through the most patent nasal cavity and positioned to image the nasopharynx, larynx, and hypopharynx in detail. The following features were examined: nasopharyngeal, laryngeal, hypopharyngeal masses; velopharyngeal strength, closure, and symmetry of motion; vocal fold range and symmetry of motion; laryngeal mucosal edema, erythema, inflammation, and hydration; salivary pooling; and gross laryngeal sensation. The equipment was removed. The patient tolerated the procedure well without complication. All findings were  normal except:  - bilateral true vocal folds with erythema/edema; mild left vocal fold sulcus  - resolution of endolarynegal exudate      Assessment:     Ayanna Alicia is a 53 y.o. female with chronic laryngitis, improving following antibiotic therapy.     Plan:     Reassurance was provided    I think it reasonable at this point to discontinue her antibiotic therapy.    She will follow up with me in about 2 months, or sooner if needed.    All questions were answered, and the patient is in agreement with the plan.    Jose A Kelly M.D.  [facility]  Department of Otorhinolaryngology and Communication Sciences

## 2023-12-15 NOTE — Clinical Note
The infection to me looks resolved. I think fine to stop abx. I'm seeing her back in 2 months. Thanks

## 2023-12-15 NOTE — TELEPHONE ENCOUNTER
12/15/23: Received the plan of care from Dr. Leung. Sent a message to  Mehul Esquivel with a request to obtain insurance authorization for a LUT visit and testing.    12/19/23: Notified by  Mehul Esquivel that patient cannot be seen at [facility] for lung transplant services while she has an open evaluation authorization from her insurance provider at [facility]. Sent an Epic message to update Dr. Leung and Dr. Aviles.      ----- Message from Shayy Leung DO sent at 12/15/2023  8:38 AM CST -----  We can absolutely take a look.  Cora, can you schedule her for a LUT follow-up?  Would need a 6MWT and PFTs.  She can either come to us for inperson visit or do testing local with a virtual visit with us.  Whatever she prefers.  Thanks    ----- Message -----  From: Agustín Aviles MD  Sent: 12/14/2023   4:53 PM CST  To: Shayy Leung DO; Sarabjit Wayne DO      Hi,  Just reaching out   This patient was seen March 2023 for lung transplant consideration  Was ineligible due to BMI  Has lost weight   BMI 35  In interim was going to [facility] for most of her evaluation however recently cannot travel due to condition escalation and unable to stay out of hospital long enough to travel  Would you be willing to look over  her stuff,   Main things pending from [place] were GI w/u  Dr Wayne is seeing her for Actinomyces infection and advocated if we would relook her transplant options locally    Thanks

## 2023-12-17 PROCEDURE — G0179 MD RECERTIFICATION HHA PT: HCPCS | Mod: ,,, | Performed by: FAMILY MEDICINE

## 2023-12-18 ENCOUNTER — PATIENT MESSAGE (OUTPATIENT)
Dept: RHEUMATOLOGY | Facility: CLINIC | Age: 53
End: 2023-12-18
Payer: COMMERCIAL

## 2023-12-18 ENCOUNTER — PATIENT MESSAGE (OUTPATIENT)
Dept: PULMONOLOGY | Facility: CLINIC | Age: 53
End: 2023-12-18
Payer: COMMERCIAL

## 2023-12-18 ENCOUNTER — HOSPITAL ENCOUNTER (EMERGENCY)
Facility: HOSPITAL | Age: 53
Discharge: HOME | End: 2023-12-18
Attending: EMERGENCY MEDICINE
Payer: COMMERCIAL

## 2023-12-18 VITALS
HEART RATE: 98 BPM | RESPIRATION RATE: 18 BRPM | SYSTOLIC BLOOD PRESSURE: 117 MMHG | WEIGHT: 211 LBS | OXYGEN SATURATION: 98 % | TEMPERATURE: 99 F | BODY MASS INDEX: 35.11 KG/M2 | DIASTOLIC BLOOD PRESSURE: 71 MMHG

## 2023-12-18 DIAGNOSIS — R09.1 PLEURISY: Primary | ICD-10-CM

## 2023-12-18 DIAGNOSIS — Z99.81 CHRONIC RESPIRATORY FAILURE WITH HYPOXIA, ON HOME OXYGEN THERAPY: ICD-10-CM

## 2023-12-18 DIAGNOSIS — R07.9 CHEST PAIN: ICD-10-CM

## 2023-12-18 DIAGNOSIS — J96.11 CHRONIC RESPIRATORY FAILURE WITH HYPOXIA, ON HOME OXYGEN THERAPY: ICD-10-CM

## 2023-12-18 DIAGNOSIS — J98.4 CHRONIC LUNG DISEASE: ICD-10-CM

## 2023-12-18 LAB
ALBUMIN SERPL BCP-MCNC: 2.8 G/DL (ref 3.5–5.2)
ALP SERPL-CCNC: 90 U/L (ref 55–135)
ALT SERPL W/O P-5'-P-CCNC: 39 U/L (ref 10–44)
ANION GAP SERPL CALC-SCNC: 11 MMOL/L (ref 8–16)
APTT PPP: 31.4 SEC (ref 21–32)
AST SERPL-CCNC: 30 U/L (ref 10–40)
BASOPHILS # BLD AUTO: 0.07 K/UL (ref 0–0.2)
BASOPHILS NFR BLD: 0.4 % (ref 0–1.9)
BILIRUB SERPL-MCNC: 0.4 MG/DL (ref 0.1–1)
BNP SERPL-MCNC: 56 PG/ML (ref 0–99)
BUN SERPL-MCNC: 8 MG/DL (ref 6–20)
CALCIUM SERPL-MCNC: 9.5 MG/DL (ref 8.7–10.5)
CHLORIDE SERPL-SCNC: 101 MMOL/L (ref 95–110)
CO2 SERPL-SCNC: 30 MMOL/L (ref 23–29)
CREAT SERPL-MCNC: 0.9 MG/DL (ref 0.5–1.4)
DIFFERENTIAL METHOD: ABNORMAL
EOSINOPHIL # BLD AUTO: 0.4 K/UL (ref 0–0.5)
EOSINOPHIL NFR BLD: 2.3 % (ref 0–8)
ERYTHROCYTE [DISTWIDTH] IN BLOOD BY AUTOMATED COUNT: 16.1 % (ref 11.5–14.5)
EST. GFR  (NO RACE VARIABLE): >60 ML/MIN/1.73 M^2
GLUCOSE SERPL-MCNC: 127 MG/DL (ref 70–110)
HCT VFR BLD AUTO: 35.6 % (ref 37–48.5)
HGB BLD-MCNC: 10.6 G/DL (ref 12–16)
IMM GRANULOCYTES # BLD AUTO: 0.09 K/UL (ref 0–0.04)
IMM GRANULOCYTES NFR BLD AUTO: 0.6 % (ref 0–0.5)
INFLUENZA A, MOLECULAR: NEGATIVE
INFLUENZA B, MOLECULAR: NEGATIVE
INR PPP: 1 (ref 0.8–1.2)
LACTATE SERPL-SCNC: 1.4 MMOL/L (ref 0.5–2.2)
LYMPHOCYTES # BLD AUTO: 0.9 K/UL (ref 1–4.8)
LYMPHOCYTES NFR BLD: 5.5 % (ref 18–48)
MAGNESIUM SERPL-MCNC: 1.9 MG/DL (ref 1.6–2.6)
MCH RBC QN AUTO: 27.5 PG (ref 27–31)
MCHC RBC AUTO-ENTMCNC: 29.8 G/DL (ref 32–36)
MCV RBC AUTO: 92 FL (ref 82–98)
MONOCYTES # BLD AUTO: 0.5 K/UL (ref 0.3–1)
MONOCYTES NFR BLD: 2.9 % (ref 4–15)
NEUTROPHILS # BLD AUTO: 13.8 K/UL (ref 1.8–7.7)
NEUTROPHILS NFR BLD: 88.3 % (ref 38–73)
NRBC BLD-RTO: 0 /100 WBC
PLATELET # BLD AUTO: 483 K/UL (ref 150–450)
PMV BLD AUTO: 9.7 FL (ref 9.2–12.9)
POTASSIUM SERPL-SCNC: 3.8 MMOL/L (ref 3.5–5.1)
PROCALCITONIN SERPL IA-MCNC: 0.08 NG/ML
PROT SERPL-MCNC: 7 G/DL (ref 6–8.4)
PROTHROMBIN TIME: 11 SEC (ref 9–12.5)
RBC # BLD AUTO: 3.86 M/UL (ref 4–5.4)
SARS-COV-2 RDRP RESP QL NAA+PROBE: NEGATIVE
SODIUM SERPL-SCNC: 142 MMOL/L (ref 136–145)
SPECIMEN SOURCE: NORMAL
TROPONIN I SERPL DL<=0.01 NG/ML-MCNC: 0.05 NG/ML (ref 0–0.03)
WBC # BLD AUTO: 15.68 K/UL (ref 3.9–12.7)

## 2023-12-18 PROCEDURE — 83735 ASSAY OF MAGNESIUM: CPT | Mod: NTX | Performed by: EMERGENCY MEDICINE

## 2023-12-18 PROCEDURE — 83605 ASSAY OF LACTIC ACID: CPT | Mod: NTX | Performed by: EMERGENCY MEDICINE

## 2023-12-18 PROCEDURE — U0002 COVID-19 LAB TEST NON-CDC: HCPCS | Mod: NTX | Performed by: EMERGENCY MEDICINE

## 2023-12-18 PROCEDURE — 93010 EKG 12-LEAD: ICD-10-PCS | Mod: NTX,,, | Performed by: INTERNAL MEDICINE

## 2023-12-18 PROCEDURE — 96375 TX/PRO/DX INJ NEW DRUG ADDON: CPT

## 2023-12-18 PROCEDURE — 96374 THER/PROPH/DIAG INJ IV PUSH: CPT

## 2023-12-18 PROCEDURE — 85025 COMPLETE CBC W/AUTO DIFF WBC: CPT | Mod: NTX | Performed by: EMERGENCY MEDICINE

## 2023-12-18 PROCEDURE — 63600175 PHARM REV CODE 636 W HCPCS: Mod: NTX | Performed by: EMERGENCY MEDICINE

## 2023-12-18 PROCEDURE — 80053 COMPREHEN METABOLIC PANEL: CPT | Mod: NTX | Performed by: EMERGENCY MEDICINE

## 2023-12-18 PROCEDURE — 83880 ASSAY OF NATRIURETIC PEPTIDE: CPT | Mod: NTX | Performed by: EMERGENCY MEDICINE

## 2023-12-18 PROCEDURE — 93010 ELECTROCARDIOGRAM REPORT: CPT | Mod: NTX,,, | Performed by: INTERNAL MEDICINE

## 2023-12-18 PROCEDURE — 93005 ELECTROCARDIOGRAM TRACING: CPT | Mod: NTX

## 2023-12-18 PROCEDURE — 84145 PROCALCITONIN (PCT): CPT | Mod: NTX | Performed by: EMERGENCY MEDICINE

## 2023-12-18 PROCEDURE — 84484 ASSAY OF TROPONIN QUANT: CPT | Mod: NTX | Performed by: EMERGENCY MEDICINE

## 2023-12-18 PROCEDURE — 99285 EMERGENCY DEPT VISIT HI MDM: CPT | Mod: 25

## 2023-12-18 PROCEDURE — 87040 BLOOD CULTURE FOR BACTERIA: CPT | Mod: 59,NTX | Performed by: EMERGENCY MEDICINE

## 2023-12-18 PROCEDURE — 85610 PROTHROMBIN TIME: CPT | Mod: NTX | Performed by: EMERGENCY MEDICINE

## 2023-12-18 PROCEDURE — 85730 THROMBOPLASTIN TIME PARTIAL: CPT | Mod: NTX | Performed by: EMERGENCY MEDICINE

## 2023-12-18 PROCEDURE — 87502 INFLUENZA DNA AMP PROBE: CPT | Mod: NTX | Performed by: EMERGENCY MEDICINE

## 2023-12-18 RX ORDER — MEPERIDINE HYDROCHLORIDE 50 MG/5ML
25 SOLUTION ORAL EVERY 6 HOURS PRN
Qty: 50 ML | Refills: 0 | Status: SHIPPED | OUTPATIENT
Start: 2023-12-18 | End: 2023-12-23

## 2023-12-18 RX ORDER — SUCRALFATE 1 G/1
1 TABLET ORAL 4 TIMES DAILY
Qty: 20 TABLET | Refills: 0 | Status: SHIPPED | OUTPATIENT
Start: 2023-12-18

## 2023-12-18 RX ORDER — ONDANSETRON 2 MG/ML
4 INJECTION INTRAMUSCULAR; INTRAVENOUS
Status: COMPLETED | OUTPATIENT
Start: 2023-12-18 | End: 2023-12-18

## 2023-12-18 RX ORDER — MEPERIDINE HYDROCHLORIDE 25 MG/ML
12.5 INJECTION INTRAMUSCULAR; INTRAVENOUS; SUBCUTANEOUS
Status: COMPLETED | OUTPATIENT
Start: 2023-12-18 | End: 2023-12-18

## 2023-12-18 RX ORDER — KETOROLAC TROMETHAMINE 30 MG/ML
15 INJECTION, SOLUTION INTRAMUSCULAR; INTRAVENOUS
Status: COMPLETED | OUTPATIENT
Start: 2023-12-18 | End: 2023-12-18

## 2023-12-18 RX ADMIN — KETOROLAC TROMETHAMINE 15 MG: 30 INJECTION, SOLUTION INTRAMUSCULAR; INTRAVENOUS at 10:12

## 2023-12-18 RX ADMIN — ONDANSETRON 4 MG: 2 INJECTION INTRAMUSCULAR; INTRAVENOUS at 09:12

## 2023-12-18 RX ADMIN — MEPERIDINE HYDROCHLORIDE 12.5 MG: 25 INJECTION INTRAMUSCULAR; INTRAVENOUS; SUBCUTANEOUS at 09:12

## 2023-12-18 NOTE — ED PROVIDER NOTES
SCRIBE #1 NOTE: I, Mariano Allen, am scribing for, and in the presence of, Mary Whittington MD. I have scribed the entire note.      History      Chief Complaint   Patient presents with    Chest Pain     Right side chest pain started yesterday, hx of lung disease and wears 02 per N/C daily 6-8 lpm. Currently on 8 lpm per N/C. States pain increases with deep inspirations. Today started having diarrhea. Denies fever, sore throat or runny nose.       Review of patient's allergies indicates:   Allergen Reactions    Doxycycline Nausea Only     Other reaction(s): Nausea    Fluticasone Other (See Comments)     Other reaction(s): Epistaxis      Latex      Added based on information entered during case entry, please review and add reactions, type, and severity as needed        HPI   HPI    12/18/2023, 8:57 AM   History obtained from the patient      History of Present Illness: Ayanna Alicia is a 53 y.o. female patient with a PMHx of pulmonary HTN, chronic respiratory failure who presents to the Emergency Department for sharp R-sided chest pain, onset 1 day PTA. Pt wears 6L O2 at home. Symptoms are constant and moderate in severity. Pain is worse with deep breaths. No associated sxs reported. Patient denies any fever, chills, n/v, SOB, weakness, numbness, dizziness, headache, and all other sxs at this time. Pt was on augmentin for the past 6 days for actinomyces infection, but was d/c off the abx yesterday after seeing ENT 3 days ago. Pt is prescribed PO morphine at home, but pt does not take it because she does not like the way it makes her feel. No further complaints or concerns at this time.     Arrival mode: Personal vehicle    PCP: Madeleine Enrique MD       Past Medical History:  Past Medical History:   Diagnosis Date    Abnormal Pap smear of cervix     in the past with repeat pap smear okay.    Acute interstitial pneumonitis     Allergic rhinitis, cause unspecified     Arthritis of both knees     Asthma     Eczema      Fatty liver 10/2014    Fibrocystic breast changes     Headache(784.0)     Hepatomegaly 10/2014    Hypertension     Liver cyst 10/2014    Multinodular goiter     Followed by ENT - Dr. Nicolas Gray    Polymenorrhea 2008    TMJ (dislocation of temporomandibular joint)     Uterine fibroid     in the past    Vitamin D deficiency disease      Past Surgical History:  Past Surgical History:   Procedure Laterality Date    BRONCHOSCOPY Bilateral 2023    Procedure: Bronchoscopy - insert lighted tube into airway to take a biopsy of lung;  Surgeon: Agustín Aviles MD;  Location: [facility] [place] ENDO;  Service: Pulmonary;  Laterality: Bilateral;     SECTION, CLASSIC      x 1    COLONOSCOPY N/A 2020    Procedure: COLONOSCOPY;  Surgeon: Angie Magana MD;  Location: [facility] [place] ENDO;  Service: Endoscopy;  Laterality: N/A;    HYSTERECTOMY      MULTIPLE TOOTH EXTRACTIONS      PARTIAL HYSTERECTOMY  2013    Due to fibroids         Family History:  Family History   Problem Relation Age of Onset    Stroke Mother     Heart disease Mother     Diverticulitis Mother     Heart disease Father 63        MI/CAD    Hypertension Father     Stroke Sister     No Known Problems Brother     Cancer Maternal Grandmother 60        Rectal and stomach cancer    Stroke Maternal Grandfather     Diabetes Maternal Grandfather     Peripheral vascular disease Maternal Grandfather     Cancer Maternal Aunt 50        Stomach cancer    Cancer Maternal Aunt 66        Lung cancer (smoker)    Migraines Cousin     Cataracts Cousin        Social History:  Social History     Tobacco Use    Smoking status: Never     Passive exposure: Past    Smokeless tobacco: Never   Substance and Sexual Activity    Alcohol use: Not Currently     Comment: last use 2022    Drug use: Not Currently     Frequency: 4.0 times per week     Types: Marijuana     Comment: last year was last use 2022    Sexual activity: Yes     Partners: Female       ROS   Review of Systems    Constitutional:  Negative for chills and fever.   HENT:  Negative for sore throat.    Respiratory:  Negative for shortness of breath.    Cardiovascular:  Positive for chest pain (R, sharp).   Gastrointestinal:  Negative for diarrhea, nausea and vomiting.   Genitourinary:  Negative for dysuria.   Musculoskeletal:  Negative for back pain.   Skin:  Negative for rash.   Neurological:  Negative for dizziness, weakness, numbness and headaches.   Hematological:  Does not bruise/bleed easily.   All other systems reviewed and are negative.    Physical Exam      Initial Vitals [12/18/23 0751]   BP Pulse Resp Temp SpO2   119/79 107 (!) 32 99.2 °F (37.3 °C) 98 %      MAP       --          Physical Exam  Nursing Notes and Vital Signs Reviewed.  Constitutional: Patient is in no acute distress. Appears older than stated ag.e Chronically ill appearing.  Head: Atraumatic. Normocephalic.  Eyes: PERRL. EOM intact. Conjunctivae are not pale. No scleral icterus.  ENT: Mucous membranes are moist. Oropharynx is clear and symmetric.    Neck: Supple. Full ROM. No lymphadenopathy.  Cardiovascular: Regular rate. Regular rhythm. No murmurs, rubs, or gallops. Distal pulses are 2+ and symmetric.  Pulmonary/Chest: Diminished breath sounds bilaterally. No wheezing or rales.  Abdominal: Soft and non-distended.  There is no tenderness.  No rebound, guarding, or rigidity.   Musculoskeletal: Moves all extremities. No obvious deformities. No edema.  Skin: Warm and dry.  Neurological:  Alert, awake, and appropriate.  Normal speech.  No acute focal neurological deficits are appreciated.  Psychiatric: Normal affect. Good eye contact. Appropriate in content.    ED Course    Procedures  ED Vital Signs:  Vitals:    12/18/23 0751 12/18/23 0816 12/18/23 0825 12/18/23 0902   BP: 119/79 (!) 141/68  (!) 142/82   Pulse: 107 (!) 116 94 102   Resp: (!) 32      Temp: 99.2 °F (37.3 °C)      TempSrc: Oral      SpO2: 98%  99%    Weight:   95.7 kg (210 lb 15.7 oz)      12/18/23 0944 12/18/23 1002 12/18/23 1102   BP:  123/64 117/71   Pulse:  81 98   Resp: 18     Temp:      TempSrc:      SpO2:  100% 98%   Weight:          Abnormal Lab Results:  Labs Reviewed   CBC W/ AUTO DIFFERENTIAL - Abnormal; Notable for the following components:       Result Value    WBC 15.68 (*)     RBC 3.86 (*)     Hemoglobin 10.6 (*)     Hematocrit 35.6 (*)     MCHC 29.8 (*)     RDW 16.1 (*)     Platelets 483 (*)     Immature Granulocytes 0.6 (*)     Gran # (ANC) 13.8 (*)     Immature Grans (Abs) 0.09 (*)     Lymph # 0.9 (*)     Gran % 88.3 (*)     Lymph % 5.5 (*)     Mono % 2.9 (*)     All other components within normal limits   COMPREHENSIVE METABOLIC PANEL - Abnormal; Notable for the following components:    CO2 30 (*)     Glucose 127 (*)     Albumin 2.8 (*)     All other components within normal limits   TROPONIN I - Abnormal; Notable for the following components:    Troponin I 0.051 (*)     All other components within normal limits   CULTURE, BLOOD    Narrative:     Aerobic and anaerobic   CULTURE, BLOOD    Narrative:     Aerobic and anaerobic   INFLUENZA A & B BY MOLECULAR   LACTIC ACID, PLASMA   PROCALCITONIN   B-TYPE NATRIURETIC PEPTIDE   PROTIME-INR   APTT   MAGNESIUM   SARS-COV-2 RNA AMPLIFICATION, QUAL        All Lab Results:  Results for orders placed or performed during the hospital encounter of 12/18/23   Blood culture x two cultures. Draw prior to antibiotics.    Specimen: Peripheral, Antecubital, Right; Blood   Result Value Ref Range    Blood Culture, Routine No Growth to date     Blood Culture, Routine No Growth to date     Blood Culture, Routine No Growth to date     Blood Culture, Routine No Growth to date    Blood culture x two cultures. Draw prior to antibiotics.    Specimen: Peripheral, Antecubital, Left; Blood   Result Value Ref Range    Blood Culture, Routine No Growth to date     Blood Culture, Routine No Growth to date     Blood Culture, Routine No Growth to date     Blood Culture,  Routine No Growth to date    Influenza A & B by Molecular    Specimen: Nasopharyngeal Swab   Result Value Ref Range    Influenza A, Molecular Negative Negative    Influenza B, Molecular Negative Negative    Flu A & B Source NP    CBC auto differential   Result Value Ref Range    WBC 15.68 (H) 3.90 - 12.70 K/uL    RBC 3.86 (L) 4.00 - 5.40 M/uL    Hemoglobin 10.6 (L) 12.0 - 16.0 g/dL    Hematocrit 35.6 (L) 37.0 - 48.5 %    MCV 92 82 - 98 fL    MCH 27.5 27.0 - 31.0 pg    MCHC 29.8 (L) 32.0 - 36.0 g/dL    RDW 16.1 (H) 11.5 - 14.5 %    Platelets 483 (H) 150 - 450 K/uL    MPV 9.7 9.2 - 12.9 fL    Immature Granulocytes 0.6 (H) 0.0 - 0.5 %    Gran # (ANC) 13.8 (H) 1.8 - 7.7 K/uL    Immature Grans (Abs) 0.09 (H) 0.00 - 0.04 K/uL    Lymph # 0.9 (L) 1.0 - 4.8 K/uL    Mono # 0.5 0.3 - 1.0 K/uL    Eos # 0.4 0.0 - 0.5 K/uL    Baso # 0.07 0.00 - 0.20 K/uL    nRBC 0 0 /100 WBC    Gran % 88.3 (H) 38.0 - 73.0 %    Lymph % 5.5 (L) 18.0 - 48.0 %    Mono % 2.9 (L) 4.0 - 15.0 %    Eosinophil % 2.3 0.0 - 8.0 %    Basophil % 0.4 0.0 - 1.9 %    Differential Method Automated    Comprehensive metabolic panel   Result Value Ref Range    Sodium 142 136 - 145 mmol/L    Potassium 3.8 3.5 - 5.1 mmol/L    Chloride 101 95 - 110 mmol/L    CO2 30 (H) 23 - 29 mmol/L    Glucose 127 (H) 70 - 110 mg/dL    BUN 8 6 - 20 mg/dL    Creatinine 0.9 0.5 - 1.4 mg/dL    Calcium 9.5 8.7 - 10.5 mg/dL    Total Protein 7.0 6.0 - 8.4 g/dL    Albumin 2.8 (L) 3.5 - 5.2 g/dL    Total Bilirubin 0.4 0.1 - 1.0 mg/dL    Alkaline Phosphatase 90 55 - 135 U/L    AST 30 10 - 40 U/L    ALT 39 10 - 44 U/L    eGFR >60 >60 mL/min/1.73 m^2    Anion Gap 11 8 - 16 mmol/L   Lactic acid, plasma #1   Result Value Ref Range    Lactate (Lactic Acid) 1.4 0.5 - 2.2 mmol/L   Procalcitonin   Result Value Ref Range    Procalcitonin 0.08 <0.25 ng/mL   Troponin I   Result Value Ref Range    Troponin I 0.051 (H) 0.000 - 0.026 ng/mL   Brain natriuretic peptide   Result Value Ref Range    BNP 56 0 - 99  pg/mL   Protime-INR   Result Value Ref Range    Prothrombin Time 11.0 9.0 - 12.5 sec    INR 1.0 0.8 - 1.2   APTT   Result Value Ref Range    aPTT 31.4 21.0 - 32.0 sec   Magnesium   Result Value Ref Range    Magnesium 1.9 1.6 - 2.6 mg/dL   COVID-19 Rapid Screening   Result Value Ref Range    SARS-CoV-2 RNA, Amplification, Qual Negative Negative     *Note: Due to a large number of results and/or encounters for the requested time period, some results have not been displayed. A complete set of results can be found in Results Review.       Imaging Results:  Imaging Results              X-Ray Chest AP Portable (Final result)  Result time 12/18/23 08:57:26      Final result by Suleiman Frazier MD (12/18/23 08:57:26)                   Impression:      Stable bilateral infiltrates.      Electronically signed by: Suleiman Frazier MD  Date:    12/18/2023  Time:    08:57               Narrative:    EXAMINATION:  XR CHEST AP PORTABLE    CLINICAL HISTORY:  Sepsis;    TECHNIQUE:  AP view of the chest was performed.    COMPARISON:  12/04/2023    FINDINGS:  The cardiac and mediastinal silhouettes appear within normal limits. Bilateral diffuse mixed interstitial and ground-glass infiltrates greatest in the lower lobes.  Stable pulmonary vascular prominence.  No pneumothorax.  No acute osseous findings demonstrated.                                     The EKG was ordered, reviewed, and independently interpreted by the ED provider.  Interpretation time: 08:04  Rate: 103 BPM  Rhythm: sinus tachycardia  Interpretation: Anterolateral infarct, age undetermined. No STEMI.           The Emergency Provider reviewed the vital signs and test results, which are outlined above.    ED Discussion     10:03 AM: Reassessed pt at this time. Discussed with pt all pertinent ED information and results. Discussed pt dx and plan of tx. Gave pt all f/u and return to the ED instructions. All questions and concerns were addressed at this time. Pt expresses  understanding of information and instructions, and is comfortable with plan to discharge. Pt is stable for discharge.    I discussed with patient and/or family/caretaker that evaluation in the ED does not suggest any emergent or life threatening medical conditions requiring immediate intervention beyond what was provided in the ED, and I believe patient is safe for discharge.  Regardless, an unremarkable evaluation in the ED does not preclude the development or presence of a serious of life threatening condition. As such, patient was instructed to return immediately for any worsening or change in current symptoms.         ED Medication(s):  Medications   meperidine (PF) injection 12.5 mg (12.5 mg Intravenous Given 12/18/23 0944)   ondansetron injection 4 mg (4 mg Intravenous Given 12/18/23 0944)   ketorolac injection 15 mg (15 mg Intravenous Given 12/18/23 1037)        Follow-up Information       Madeleine Enrique MD. Schedule an appointment as soon as possible for a visit in 2 days.    Specialty: Family Medicine  Why: Return to the Emergency Room, If symptoms worsen  Contact information:  [address] [facility] [address]  [phone]                           Discharge Medication List as of 12/18/2023 11:12 AM        START taking these medications    Details   meperidine (DEMEROL) 50 mg/5 mL Soln Take 2.5 mLs (25 mg total) by mouth every 6 (six) hours as needed (pain, shortness of breath)., Starting Mon 12/18/2023, Until Sat 12/23/2023 at 2359, Normal      sucralfate (CARAFATE) 1 gram tablet Take 1 tablet (1 g total) by mouth 4 (four) times daily., Starting Mon 12/18/2023, Normal               Medical Decision Making    Medical Decision Making  DDX:  1. Pleurisy  2. Pneumonia  3. Costochondritis  4. PTX    Extensive lung hx of sarcoid, CLD, Chronic respiratory failure on 6 liters NC, c/o right sided sharp chest pain, pain is chronic, she does not like the pain meds she was given by palliative care, her  vital signs are stable, ECG reviewed and unchanged from baseline, CXR unchanged from baseline and stable, lab work reviewed and unchanged from baseline, troponin is chronically elevated, covid and flu negative, h/h stable, leukocytosis noted but do not suspect sepsis, she is on steroids and has been on antibiotics and recently seen ID and ENT, admission considered but not indicated at this time. She is safe for discharge with close outpatient follow up    Amount and/or Complexity of Data Reviewed  External Data Reviewed: labs, radiology and notes.  Labs: ordered. Decision-making details documented in ED Course.  Radiology: ordered. Decision-making details documented in ED Course.  ECG/medicine tests: ordered and independent interpretation performed. Decision-making details documented in ED Course.    Risk  Prescription drug management.  Decision regarding hospitalization.                Scribe Attestation:   Scribe #1: I performed the above scribed service and the documentation accurately describes the services I performed. I attest to the accuracy of the note.    Attending:   Physician Attestation Statement for Scribe #1: I, Mary Whittington MD, personally performed the services described in this documentation, as scribed by Mariano Villa, in my presence, and it is both accurate and complete.          Clinical Impression       ICD-10-CM ICD-9-CM   1. Pleurisy  R09.1 511.0   2. Chest pain  R07.9 786.50   3. Chronic lung disease  J98.4 518.89   4. Chronic respiratory failure with hypoxia, on home oxygen therapy  J96.11 518.83    Z99.81 799.02     V46.2       Disposition:   Disposition: Discharged  Condition: Stable         Mary Whittington MD  12/21/23 6606

## 2023-12-18 NOTE — ED NOTES
Patient asked for a urine sample. Patient stated she just used the bed pan and is unable to use the restroom at the moment. Patient was given juice and will try again to collect the specimen.

## 2023-12-19 RX ORDER — LANOLIN ALCOHOL/MO/W.PET/CERES
400 CREAM (GRAM) TOPICAL DAILY
Qty: 30 TABLET | Refills: 0 | Status: SHIPPED | OUTPATIENT
Start: 2023-12-19 | End: 2024-01-19

## 2023-12-19 NOTE — PROGRESS NOTES
[facility] - Pul[facility] Rehab  Pulmonary Rehab  Session Summary    SUMMARY     Session Data  Session Number: 6  Time In: 1100  Time Out: 1200  Weight: 102.5 kg (226 lb)  Target Heart Rate Zone: Minimum: 99 bpm  Target Heart Rate Zone: Maximum: 132 bpm  Patient Motivation: Excellent  Patient Effort: Excellent      Pre Exercise Vitals  SpO2: 92 %  Supplemental O2?: Yes  O2 Device: high-flow nasal cannula  O2 Flow (L/min): 8  Pulse: 97  BP: 140/72  Jessica Dyspnea Rating : 4      During Exercise Vitals  SpO2:  (unable to obtain)  Supplemental O2?: Yes  O2 Device: nonrebreather mask  O2 Flow (L/min): 15  Pulse: 104  BP: 128/79  Jessica Dyspnea Rating : 3      Post Exercise Vitals  SpO2:  (unable to obtain)  Supplemental O2?: Yes  O2 Device: nonrebreather mask  O2 Flow (L/min): 15  Pulse: 108  BP: 132/64  Jessica Dyspnea Rating : 3       Modality  Modality: Arm Ergometer, Hand Free Weights, Nustep      Arm Ergometer  Time: 12 minutes  Level: 1  Mets: 1.7      Nustep  Time: 22 minutes  Steps: 1623  Load: 6  Mets: 1.8      Biceps  lbs: 3 lbs  Sets: 2  Reps: 10  Weight Type : dumbbell      Chest  lbs: 3 lbs  Sets: 2  Reps: 10  Weight Type : dumbbell      Education  Tips for safe exercise      Therapist Notes   Excellent effort. Pt started on high flow NC at 8 lpm then changed to 100% non-rebreather. . She was able to exercise 12 mins on arm ergometer today. Vitals were stable. Will continue to motivate and educate pt in rehab.     Dr. Contreras immediately available as needed.     Pulmonary Rehab COVID19 Screening Checklist     1. Are you having any of the following physical symptoms?              Yes [] No [x]      Fever or chills  Unexplained muscle or body aches  Cough  Shortness of breath or difficulty breathing  Fatigue  Headache  New loss of taste or smell  Sore throat  Congestion or runny nose  Nausea or vomiting  Diarrhea        2.  Have you come in close contact with anyone with the above symptoms or with known COVID19 in the last  14 days?           Yes [] No [x]                    3.  Have you tested positive for COVID19 in the last 30 days?   Yes [] No [x]                          - sent from [facility] for reported fever and oxygen saturation in low 90s.   - Pt reporting lethargy/weakness, A&Ox0, weeping purulent sacral ulcer noticed on exam   - Vital signs sig for temp 101 F (resolved, 98.6F),  > 101, 97% on RA, intermittently hypoxic to low 90s on RA   - EKG sinus tachycardia 106 HR  - Labs sig for WC 14, Cr 1.5, lac 2.8  - RVP - covid positive , U/A negative   - CXR: Diffuse right lung pneumonia.  - s/p cefepime 2g and 2.9 L in ED  Admitted for Sepsis 2/2 pneumonia vs. infected sacral ulcer vs. COVID   c/w vanc, cefepime, flagyl   bcx NGTD 4d   Vanc trough 20.2 - sent from [facility] for reported fever and oxygen saturation in low 90s.   - Pt reporting lethargy/weakness, A&Ox0, weeping purulent sacral ulcer noticed on exam   - Vital signs sig for temp 101 F (resolved, 98.6F),  > 101, 97% on RA, intermittently hypoxic to low 90s on RA   - EKG sinus tachycardia 106 HR  - Labs sig for WC 14, Cr 1.5, lac 2.8  - RVP - covid positive , U/A negative   - CXR: Diffuse right lung pneumonia.  - s/p cefepime 2g and 2.9 L in ED  Admitted for Sepsis 2/2 pneumonia vs. infected sacral ulcer vs. COVID   c/w vanc, cefepime, flagyl   bcx NGTD 4d   Vanc trough 20.2

## 2023-12-20 ENCOUNTER — HOSPITAL ENCOUNTER (OUTPATIENT)
Dept: RADIOLOGY | Facility: HOSPITAL | Age: 53
Discharge: HOME | End: 2023-12-20
Attending: PHYSICIAN ASSISTANT
Payer: COMMERCIAL

## 2023-12-20 ENCOUNTER — PATIENT MESSAGE (OUTPATIENT)
Dept: INFECTIOUS DISEASES | Facility: CLINIC | Age: 53
End: 2023-12-20
Payer: COMMERCIAL

## 2023-12-20 ENCOUNTER — PATIENT MESSAGE (OUTPATIENT)
Dept: HEMATOLOGY/ONCOLOGY | Facility: CLINIC | Age: 53
End: 2023-12-20
Payer: COMMERCIAL

## 2023-12-20 ENCOUNTER — PATIENT MESSAGE (OUTPATIENT)
Dept: PULMONOLOGY | Facility: CLINIC | Age: 53
End: 2023-12-20

## 2023-12-20 ENCOUNTER — OFFICE VISIT (OUTPATIENT)
Dept: PULMONOLOGY | Facility: CLINIC | Age: 53
End: 2023-12-20
Payer: COMMERCIAL

## 2023-12-20 VITALS
RESPIRATION RATE: 18 BRPM | DIASTOLIC BLOOD PRESSURE: 72 MMHG | HEART RATE: 107 BPM | OXYGEN SATURATION: 98 % | HEIGHT: 65 IN | BODY MASS INDEX: 35.11 KG/M2 | SYSTOLIC BLOOD PRESSURE: 118 MMHG

## 2023-12-20 DIAGNOSIS — J84.112 UIP (USUAL INTERSTITIAL PNEUMONITIS): ICD-10-CM

## 2023-12-20 DIAGNOSIS — J84.112 UIP (USUAL INTERSTITIAL PNEUMONITIS): Primary | ICD-10-CM

## 2023-12-20 PROCEDURE — 71046 X-RAY EXAM CHEST 2 VIEWS: CPT | Mod: 26,,, | Performed by: RADIOLOGY

## 2023-12-20 PROCEDURE — 3044F PR MOST RECENT HEMOGLOBIN A1C LEVEL <7.0%: ICD-10-PCS | Mod: CPTII,S$GLB,, | Performed by: PHYSICIAN ASSISTANT

## 2023-12-20 PROCEDURE — 3074F PR MOST RECENT SYSTOLIC BLOOD PRESSURE < 130 MM HG: ICD-10-PCS | Mod: CPTII,S$GLB,, | Performed by: PHYSICIAN ASSISTANT

## 2023-12-20 PROCEDURE — 99214 PR OFFICE/OUTPT VISIT, EST, LEVL IV, 30-39 MIN: ICD-10-PCS | Mod: S$GLB,,, | Performed by: PHYSICIAN ASSISTANT

## 2023-12-20 PROCEDURE — 3044F HG A1C LEVEL LT 7.0%: CPT | Mod: CPTII,S$GLB,, | Performed by: PHYSICIAN ASSISTANT

## 2023-12-20 PROCEDURE — 99999 PR PBB SHADOW E&M-EST. PATIENT-LVL V: ICD-10-PCS | Mod: PBBFAC,,, | Performed by: PHYSICIAN ASSISTANT

## 2023-12-20 PROCEDURE — 1111F DSCHRG MED/CURRENT MED MERGE: CPT | Mod: CPTII,S$GLB,, | Performed by: PHYSICIAN ASSISTANT

## 2023-12-20 PROCEDURE — 3078F DIAST BP <80 MM HG: CPT | Mod: CPTII,S$GLB,, | Performed by: PHYSICIAN ASSISTANT

## 2023-12-20 PROCEDURE — 71046 XR CHEST PA AND LATERAL: ICD-10-PCS | Mod: 26,,, | Performed by: RADIOLOGY

## 2023-12-20 PROCEDURE — 3008F BODY MASS INDEX DOCD: CPT | Mod: CPTII,S$GLB,, | Performed by: PHYSICIAN ASSISTANT

## 2023-12-20 PROCEDURE — 99214 OFFICE O/P EST MOD 30 MIN: CPT | Mod: S$GLB,,, | Performed by: PHYSICIAN ASSISTANT

## 2023-12-20 PROCEDURE — 1160F PR REVIEW ALL MEDS BY PRESCRIBER/CLIN PHARMACIST DOCUMENTED: ICD-10-PCS | Mod: CPTII,S$GLB,, | Performed by: PHYSICIAN ASSISTANT

## 2023-12-20 PROCEDURE — 3078F PR MOST RECENT DIASTOLIC BLOOD PRESSURE < 80 MM HG: ICD-10-PCS | Mod: CPTII,S$GLB,, | Performed by: PHYSICIAN ASSISTANT

## 2023-12-20 PROCEDURE — 1159F PR MEDICATION LIST DOCUMENTED IN MEDICAL RECORD: ICD-10-PCS | Mod: CPTII,S$GLB,, | Performed by: PHYSICIAN ASSISTANT

## 2023-12-20 PROCEDURE — 1160F RVW MEDS BY RX/DR IN RCRD: CPT | Mod: CPTII,S$GLB,, | Performed by: PHYSICIAN ASSISTANT

## 2023-12-20 PROCEDURE — 3074F SYST BP LT 130 MM HG: CPT | Mod: CPTII,S$GLB,, | Performed by: PHYSICIAN ASSISTANT

## 2023-12-20 PROCEDURE — 1111F PR DISCHARGE MEDS RECONCILED W/ CURRENT OUTPATIENT MED LIST: ICD-10-PCS | Mod: CPTII,S$GLB,, | Performed by: PHYSICIAN ASSISTANT

## 2023-12-20 PROCEDURE — 71046 X-RAY EXAM CHEST 2 VIEWS: CPT | Mod: TC

## 2023-12-20 PROCEDURE — 3008F PR BODY MASS INDEX (BMI) DOCUMENTED: ICD-10-PCS | Mod: CPTII,S$GLB,, | Performed by: PHYSICIAN ASSISTANT

## 2023-12-20 PROCEDURE — 99999 PR PBB SHADOW E&M-EST. PATIENT-LVL V: CPT | Mod: PBBFAC,,, | Performed by: PHYSICIAN ASSISTANT

## 2023-12-20 PROCEDURE — 1159F MED LIST DOCD IN RCRD: CPT | Mod: CPTII,S$GLB,, | Performed by: PHYSICIAN ASSISTANT

## 2023-12-20 RX ORDER — METHYLPREDNISOLONE 4 MG/1
TABLET ORAL
Qty: 21 EACH | Refills: 0 | Status: SHIPPED | OUTPATIENT
Start: 2023-12-20

## 2023-12-20 NOTE — TELEPHONE ENCOUNTER
Chicho Lewis MD [facility] Staff38 minutes ago (11:49 AM)       May resume Actemra if no active infections and clearance given by ID clinic after completing antibiotic therapy with Actinomyces infection.

## 2023-12-20 NOTE — PROGRESS NOTES
Pulmonary Outpatient  Visit     Subjective:       Patient ID: Ayanna Alicia is a 53 y.o. female.    Chief Complaint: pain        Ayanna Alicia is 51 y.o.  Post hospital f/u  Acute respiratory failure ILD, +ve RF  Was discharged on oxygen  Here to review results  Explained  PFT: severe restriction and reduced DLCO  ABG  6MWD: oxygen desat needs supplementary oxygen 3 LPM  Has some nose bleed will add AYR gel and humifier bottle  FMLA paper work given  Out of work letter   Added PEPCID and Bactrim  Adherent with inhaler    05/18/2022  Here to see today  Concern, Dyspnea with activity palpitations  Seen Rheumatology: Added CELLCEPT  On steriod taper  No cough wheezing  On oxygen 3 LPM  Had GLENN in 2019: was prescribed CPAP returned back  Needs PAP at night  Motivated to restart      07/15/2022  Last seen 05/18/2022  ACT score 10, Klamath score 6  CPAP study: CPAP ordered  Await   Says cannot go to work, tied, focus off  6MWD: RA sat was 95%  O2 titrated to 4-6 LPM  Tachycardia noted : 145 BPM    09/20/2022  Last seen 07/15/2022  Here to review progress  Enrolled in pul rehab, session 8  Sat Stable @ 2-3 LPM  ACt score 5, Klamath 6  No cough, better exercise tolerance  Stable on cellcept 1000mg BID + prednisone 10 mg  Will DW Rheum whether can titrate up cellcept to wean steriods  CPAP download shows adherence   CPAP 9 cm with resudual AHI 0.2  Usage > 4 hrs was 67%  Cehst CT reviewed  Repeat PFT pending      12/28/2022  Last seen 11/07/2022  Here to review chest CT done recently  Fatigue, SOB, on 6 LPM  SP rituxan 4 doses  ABG reveiwed  ESR and CRP were increased  On Prednisone 10 mg  Hoarse voice  Discussed utility of bronchoscopy if infection is considered  TBBX would have risk of flaring and acute resp failure:  Serologies sent  Added OFEV  Will also reduce fluid intake to approx 32 oz  Will reenrol on Pul rehab      03/07/2023  Urgent visit: weak,  easily desat  Subjectively better  Seen Dr Stallings: Diflucan and bactrim  Oral thrush: not present today  No fever  On 6 LPM  Dropped weight from 241Lb to 228 lb  Ofev increased: felt better onofev  Will decrease prednisone to 10 mg since concern for PJP  Discussed referal to advance lung disease  Sputum culture  In wheel chair  Adherent with CPAP  Schedule bronch for BAL next week    05/01/2023  Follow up visit  Care from multiple specialities noted  ENT referred to Dr Koenig, 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  Bronch specimens were negative for any infection, No PCP or fungus  [facility] declined w/u for transplant due to BMI  DW patient: reenrol in pul rehab since feels better and options for out of state transplant: [place] may be considered  GI : GERD, and esophagram reveiwed: reiterated role on GERD in advanced lung disease: behavioural interventions since last 6 weeks helped  Stable on POC 6 LPM  Disucssed options for scooter  Will increase OFEV to 150 BID  Keep prednisone at 10 mg daily ( no need for bactrim prophylaxis at thiss dose)  ACT 10  Changed BREO to TRELEGY  [place] next visit  Cough improved  Refill for tessalon  Encouaged to wear CPAP      08/07/2023  Followup  Recent hospitalization  CPAP changed to AVAPS  Appt for Transplant eval 08/14  Here with nephew  Santa 10. Asthma score 11  Still HICKS, mRC score 2-3  Attempted 6MWD: low capacity  Office notes: Dr Ashton: possibility Sertraline ILD: case reports reviewed  In abundance : medication stopped  Stable OFEV 150 mg BID  Still has dysphonia  GERD has improved      09/21/2023  Follow :  was in the emergency room over the weekend  Seen ENT [facility] office notes reviewed.  Has home noninvasive ventilation  Tired SOB  In pul rehab; had 2   On 6-8 LPM  During. rehab escalated to 15 liters/minute  Needs more than 7 tanks a week  Will ask for par to be increased to 15 LPM  Tolerating OFEV  Colonoscopy scheduled oct 16th, reflux  procedure was supposed to be today: Rescheduled  Lasix was increased to 40 mg.    Recent echocardiogram diastolic dysfunction grade 2  Recent infusion:tocilizumab   SpO2: 93 %  Supplemental O2?: Yes  O2 Device: nonrebreather mask  O2 Flow (L/min): 15  Pulse: 100  BP: 111/71  Jessica Dyspnea Rating : 4      10/24/2023   Follow-up visit   Last seen 09/21/2023   Recent hospitalization   Was discharged with a PICC line infusions micafungin, ceftriaxone  Cultures from throat also positive for Actinomyces  Duration of IV antibiotics it before weeks   Currently on 8 liters/minute but can ambulate with 6 liters/minute in the home   Adherent with nocturnal home ventilation.    tocilizumab  infusions on hold   Right heart catheterization performed in [place] was reviewed with patient   I discussed with [place] team and they were okay with ask commencing TYVASCO  Risks benefits side-effects and titration protocol of this medication discussed with patient   Will commence this titration once completed IV micafungin and ceftriaxone   Patient tolerating OFEV potassium today was 3.5   Additional potassium instructions were given   Will recheck her potassium on Monday   Will discontinue DuoNeb and ipratropium and start her on YULPERI  Muscle cramps have resolved   Will wean prednisone 10 mg x 30 days, 5 mg x 30 days, 2.5 mg x 30 days and stop   Aim is to avoid any steroids because of risk of fungal infections   Patient is currently engaged in pulmonary rehab    Session Data  Session Number: 7  Time In: 0930  Time Out: 1030  Weight: 98.9 kg (218 lb)  Target Heart Rate Zone: Minimum: 99 bpm  Target Heart Rate Zone: Maximum: 132 bpm  Patient Motivation: Excellent  Patient Effort: Excellent        Pre Exercise Vitals  SpO2: 92 %  Supplemental O2?: Yes  O2 Device: nasal cannula  O2 Flow (L/min): 8  Pulse: 122  BP: 123/64  Jessica Dyspnea Rating : 3      11/29/2023  [place]  Discharged from LTAC 2 days ago  Here with family . IOANA  Fatigue but  feels better  Wheel chair   On Oxygen 6 LPM  Was on prednisone 10 mg weaned to 5 mg  Tolerating ofex  PICC is out  Will rejoin pul rehab  Was given LIFE  ( Home vent ) in lieu of trilogy  Discussed Lasix: standing labs  Will take every other day  Home weight dropped fro 218 lbs to 209Lb  Still not started on TYVASO  COPD score 22  [place] 1  Working on [employer]    23  Here for acute care visit  Right side chest pain, wraps around back  Hurts to breath or cough  Sharp pain  Went to ER, diagnosed with pleurisy, given liquid pain medication for a few days, this gives some relief  No fever  No sputum production  Wheel chair   On Oxygen 6 LPM             [facility] - Telemetry ([facility])  [facility] Medicine  Discharge Summary        Patient Name: Ayanna Alicia  MRN: [id number]  DARLENE: [id number]  Patient Class: IP- Inpatient  Admission Date: 2023  Hospital Length of Stay: 13 days  Discharge Date and Time:  2023 12:34 PM  Attending Physician: Som Cabello MD   Discharging Provider: Som Cabello MD  Primary Care Provider: Madeleine Enrique MD     Primary Care Team: [facility] MEDICINE D     HPI:   53-year-old white female with a history of pulmonary fibrosis, pulmonary HTN, RA, Actinomyces infection, COPD, GLENN. Presented with worsening shortness of breath over the past 48 hours.  The patient was in the hospital 2 weeks ago and since has been weaning off of steroids.  She is oxygen dependent at home usually at 8 liters/minute.  She was progressive shortness of breath with orthopnea.  There is no chest pain or pedal edema.  She denies any fevers or chills though now has a productive cough.  Patient was compliant with her medications.  In the ED, vitals: 155/93, 130, 16, 99.1, 54% RA. Placed on vapotherm in ED. Labs: WBC: 18.29, A, Lactic: 2.6, Trop; 0.029, +FluA, CXR: unchanged. EKG: Neg for STEMI. Treated with IV fluids, Steroid, Nebs. Vapotherm set to 20L @ 90% FiO2. Patient is a full  code. Admitted to Hospital Medicine for management of Acute on Chronic Resp Failure, Influenza A.      * No surgery found *       Hospital Course:   11/2: pt currently on vapotherm. Cont duonebs, budesonide, and brovana nebs. Wean O2 as tolerated. Cont tamiflu. Will order procal and d dimer.   11/3: CTA negative for PE. Cont current management. Wean O2 as tolerated. Pulm on case.   11/4: cont current management, requiring increased vapotherm.   11/5: vapotherm being weaned down. Cont current management.   11/6: cont supportive care. Currently on vapotherm 20L 50% fio2.   11/7: pt continues to require vapotherm, will start LTAC placement process for O2 weaning. Cont supportive care. Pt reports feeling swelling in her neck, will obtain CT scan.   11/8: LTAC placement pending. Still on 20L 50%fio2. CT scan of neck did not reveal abscess or obstruction. Pt with hx of multinodular goiter. Will obtain tsh and anti-tpo. Outpatient f/u with surgery to discuss thyroidectomy given symptoms.   11/9: weaned down to 15L 50% fio2. TSH and anti tpo normal. Thyroid u/s reviewed, will need outpatient FNA. LTAC placement pending however pt may improve quickly and not require it. Pt reports dark stools however she was started on iron tablets. Will trend h/h and consider consulting GI.   11/10: FOBT pending. Cont to trend h/h. Pt still on 15L, LTAC accepted awaiting medical stability. Consult GI if FOBT positive and h/h continues to trend down. Increase PPI to bid.   11/11 fobt positive. Hb/hct stable. Cscope performed in 2020 with normal pathology. Continue fe supplement. Follow up outpatient gastroenterology but will need pulmonology clearance for procedure. Continue proton pump inhibitor as chronically on steroids. Awaiting ltac placement  11/12 febrile overnight associated with fullness in ear and headache. Reports diarrhea. On intravenous rocephin until approximately 11/22. Discontinue rocephin and bolus fluids. Repeat blood  culture due to immunocompromised status and recent flu positive, concerns for double infection. Reconsult infectious disease.   11/13 afebrile overnight. Patient states possibly drinking hot coffee when temperature was taken yesterday. Intravenous rocephin discontinued to diarrhea. Infectious diarrhea workup unrevealing thus far. Now with partially formed stools. Awaiting ltac placement for continued weaning. Start po doxy per infectious disease recommendations      11/14  Received insurance authorization for ltac placement for continued weaning of supplemental oxygen. Case management present.     On exam, no acute distress. Respiratory distress on vapotherm. Dysphonia. AO3. Depressed.     Patient seen and evaluated by me. Patient was determined to be suitable for discharge. Patient deemed stable for discharge to ltac.        MMRC Dyspnea Scale (4 is worst)     [] MMRC 0: Dyspneic on strenuous excercise (0 points)    [] MMRC 1: Dyspneic on walking a slight hill (0 points)    [x] MMRC 2: Dyspneic on walking level ground; must stop occasionally due to breathlessness (1 point)    [] MMRC 3: Must stop for breathlessness after walking 100 yards or after a few minutes (2 points)    [] MMRC 4: Cannot leave house; breathless on dressing/undressing (3 points)              10/24/2023    11:37 AM   EPWORTH SLEEPINESS SCALE   Sitting and reading 0   Watching TV 0   Sitting, inactive in a public place (e.g. a theatre or a meeting) 0   As a passenger in a car for an hour without a break 0   Lying down to rest in the afternoon when circumstances permit 3   Sitting and talking to someone 0   Sitting quietly after a lunch without alcohol 0   In a car, while stopped for a few minutes in traffic 0   Total score 3            [facility] - Telemetry ([facility]  [facility] Medicine  Discharge Summary        Patient Name: Ayanna Alicia  MRN: [id number]  DARLENE: [id number]  Patient Class: IP- Inpatient  Admission Date: 7/31/2023  Hospital Length  of Stay: 4 days  Discharge Date and Time: 8/5/2023  Attending Physician: Rosa Heart,*   Discharging Provider: Rosa Heart MD  Primary Care Provider: Madeleine Enrique MD     Primary Care Team: Networked reference to record PCT      HPI:   [id number]  has a past medical history of Abnormal Pap smear of cervix, Acute interstitial pneumonitis, Allergic rhinitis, cause unspecified, Arthritis of both knees, Asthma, Eczema, Fatty liver, Fibrocystic breast changes, Headache(784.0), Hepatomegaly, Hypertension, Liver cyst, Multinodular goiter, Polymenorrhea, TMJ (dislocation of temporomandibular joint), Uterine fibroid, and Vitamin D deficiency disease.  Presents for worsening dyspnea. Associated with fever, sneezing, rhinorrhea, chest tightness, and wheezing. Onset Friday after being without supplemental oxygen for 15 minutes with difficulties recovering, which prompted a visit to clinic/urgent care. At baseline, patient wears 6L supplemental oxygen. Reports taking prednisone for a long time. States compliance to ofev for ild.     Initial workup in emergency department revealed increased supplemental oxygen of 8L, tachycardia and tachypnea. Leukocytosis on cbc. Cmp with hyperglycemia and mildly elevated liver enzymes. Troponin(s) elevated. Bnp within normal limits. Abg metabolic and respiratory alkalosis. Chest x-ray with bibasilar infiltrates. Covid and flu negative. Electrocardiography with sinus tachycardia.     Hospital medicine consulted for admission under observation, pneumonia vs asthma exacerbation. Pulmonology consulted        * No surgery found *       Hospital Course:   8/1 admitted for acute on chronic respiratory failure. Weaned down to 5L. Pulmonology following. Continue current care.   8/2 no acute events overnight. remains on baseline supplemental oxygen. Pulmonology recommending trilogy for home use. Case management consulted. Physical/occupational therapy consulted. Patient will have  homehealth physical/occupational therapy on discharge.   8/3 reports dyspnea with exertion with hypoxia requiring increased supplemental oxygen needs to recover. Patient reports compliance with cpap overnight. Pulmonology recommending avaps for home use. Case management consulted for trilogy. Continue current regimen. Speech consulted for dysphagia and dysphonia, recommending follow up ent for laryngeal ulcers.  8/4- stated improvement in symptoms in regards of dyspnea compared to yesterday; currently saturating about 92 on room air,; blood culture x1 as of 7/31/2 for fusobacterium, likely contaminant, will follow up on repeat cultures from today, if cultures resulted negative, likely plan for discharge accordingly in next 24- 48 hrs;    worked on arrangements for trilogy; pulmonology on board, appreciate recommendations.  PT OT recommended home.  8/5   Examination done at bedside, patient appeared alert and oriented x3, denied headache, dizziness, chest pain, shortness O breath, palpitations, bowel or bladder issues.    Currently saturating above 92 on 5 L nasal cannula, currently at baseline.    Stated significant improvement in cough with minimal sputum production.    Discussed with pulmonology, stated okay for discharge-recommended oral antibiotics to complete course, Lasix, prednisone 40 mg upon discharge, compliance with outpatient follow-up visits  Considering clinical and hemodynamic stability, planning to discharge patient today, emphasized on compliance with medications, follow-up visits, diet, fluid restriction, salt restriction, compliance with trilogy.    Patient agreed to the plan, has upcoming appointment within a week with Dr. Aviles pulmonology, with transplant doctor at [facility] on 08/14;  Given underlying medical conditions, prognosis guarded- discussed on code status, opted for full code at this point; to consider outpatient palliative visits if needed.     PT OT recommended  home.  Speech consulted for dysphagia and dysphonia, recommending follow up ent for laryngeal ulcers.  Discharge today, medications delivered to patient preferred pharmacy.              The following portions of the patient's history were reviewed and updated as appropriate:   She  has a past medical history of Abnormal Pap smear of cervix, Acute interstitial pneumonitis, Allergic rhinitis, cause unspecified, Arthritis of both knees, Asthma, Eczema, Fatty liver (10/2014), Fibrocystic breast changes, Headache(784.0), Hepatomegaly (10/2014), Hypertension, Liver cyst (10/2014), Multinodular goiter, Polymenorrhea (), TMJ (dislocation of temporomandibular joint), Uterine fibroid, and Vitamin D deficiency disease.  She does not have any pertinent problems on file.  She  has a past surgical history that includes  section, classic; Multiple tooth extractions; Partial hysterectomy (2013); Hysterectomy; Colonoscopy (N/A, 2020); and Bronchoscopy (Bilateral, 2023).  Her family history includes Cancer (age of onset: 50) in her maternal aunt; Cancer (age of onset: 60) in her maternal grandmother; Cancer (age of onset: 66) in her maternal aunt; Cataracts in her cousin; Diabetes in her maternal grandfather; Diverticulitis in her mother; Heart disease in her mother; Heart disease (age of onset: 63) in her father; Hypertension in her father; Migraines in her cousin; No Known Problems in her brother; Peripheral vascular disease in her maternal grandfather; Stroke in her maternal grandfather, mother, and sister.  She  reports that she has never smoked. She has been exposed to tobacco smoke. She has never used smokeless tobacco. She reports that she does not currently use alcohol. She reports that she does not currently use drugs after having used the following drugs: Marijuana. Frequency: 4.00 times per week.  She has a current medication list which includes the following prescription(s): acetaminophen,  acidophilus-pectin, citrus, albuterol, albuterol-ipratropium, alprazolam, amoxicillin-clavulanate 875-125mg, docusate sodium, ferrous sulfate, fluticasone, furosemide, ibuprofen, levocetirizine, magnesium oxide, montelukast, mv-minerals/fa/omega 3,6,9 #3, nintedanib, omega-3s/dha/epa/fish oil/d3, omeprazole, yupelri, revefenacin, serevent diskus, sucralfate, vitamin d, and methylprednisolone.  Current Outpatient Medications on File Prior to Visit   Medication Sig Dispense Refill    acetaminophen (TYLENOL) 500 MG tablet Take 500 mg by mouth every 6 (six) hours as needed for Pain. Only use when needed      acidophilus-pectin, citrus 100 million cell-10 mg Cap Take 1 capsule by mouth once daily.      albuterol (PROVENTIL/VENTOLIN HFA) 90 mcg/actuation inhaler INHALE 1 TO 2 PUFFS BY MOUTH INTO THE LUNGS EVERY 4 TO 6 HOURS AS NEEDED FOR WHEEZING OR SHORTNESS OF BREATH 8.5 g 5    albuterol-ipratropium (DUO-NEB) 2.5 mg-0.5 mg/3 mL nebulizer solution Take 3 mLs by nebulization 2 (two) times a day. Rescue 180 mL 11    ALPRAZolam (XANAX) 0.25 MG tablet Take 1 tablet (0.25 mg total) by mouth 2 (two) times daily as needed for Anxiety. 60 tablet 0    amoxicillin-clavulanate 875-125mg (AUGMENTIN) 875-125 mg per tablet Take 1 tablet by mouth 2 (two) times daily. 84 tablet 0    docusate sodium (COLACE) 100 MG capsule Take 1 capsule (100 mg total) by mouth 2 (two) times daily.      ferrous sulfate 324 mg (65 mg iron) TbEC Take 1 tablet (324 mg total) by mouth every other day.      fluticasone (VERAMYST) 27.5 mcg/actuation nasal spray 2 sprays by Nasal route once daily. 9.1 mL 3    furosemide (LASIX) 40 MG tablet Take 1 tablet (40 mg total) by mouth once daily. 30 tablet 2    ibuprofen (ADVIL,MOTRIN) 200 MG tablet Take 200 mg by mouth every 6 (six) hours as needed for Pain.      levocetirizine (XYZAL) 5 MG tablet TAKE 1 TABLET(5 MG) BY MOUTH EVERY DAY 90 tablet 1    magnesium oxide (MAG-OX) 400 mg (241.3 mg magnesium) tablet Take 1  tablet (400 mg total) by mouth once daily. 30 tablet 0    montelukast (SINGULAIR) 10 mg tablet Take 1 tablet (10 mg total) by mouth every evening. 30 tablet 2    mv-minerals/FA/omega 3,6,9 #3 (WOMEN'S 50+ ADVANCED ORAL) Take 1 tablet by mouth Daily.      nintedanib (OFEV) 150 mg Cap Take 1 capsule (150 mg total) by mouth 2 (two) times a day. 60 capsule 11    omega-3s/dha/epa/fish oil/D3 (VITAMIN-D + OMEGA-3 ORAL) Take 2 tablets by mouth once daily at 6am.      omeprazole (PRILOSEC) 20 MG capsule Take 1 capsule (20 mg total) by mouth once daily. 30 capsule 1    revefenacin (YUPELRI) 175 mcg/3 mL Nebu Inhale 175 mcg into the lungs once daily. 90 mL 3    revefenacin 175 mcg/3 mL Nebu Inhale 175 mcg into the lungs once daily. Patient has not received medication yet      SEREVENT DISKUS 50 mcg/dose diskus inhaler Inhale 1 puff into the lungs 2 (two) times daily. Controller 60 each 11    sucralfate (CARAFATE) 1 gram tablet Take 1 tablet (1 g total) by mouth 4 (four) times daily. 20 tablet 0    vitamin D (VITAMIN D3) 1000 units Tab Take 1,000 Units by mouth once daily.       No current facility-administered medications on file prior to visit.     She is allergic to doxycycline, fluticasone, and latex..      Review of Systems   Constitutional:  Positive for fatigue. Negative for activity change.   Respiratory:  Positive for dyspnea on extertion. Negative for cough and shortness of breath.    Gastrointestinal:  Negative for acid reflux.   All other systems reviewed and are negative.      Outpatient Encounter Medications as of 12/20/2023   Medication Sig Dispense Refill    acetaminophen (TYLENOL) 500 MG tablet Take 500 mg by mouth every 6 (six) hours as needed for Pain. Only use when needed      acidophilus-pectin, citrus 100 million cell-10 mg Cap Take 1 capsule by mouth once daily.      albuterol (PROVENTIL/VENTOLIN HFA) 90 mcg/actuation inhaler INHALE 1 TO 2 PUFFS BY MOUTH INTO THE LUNGS EVERY 4 TO 6 HOURS AS NEEDED FOR  WHEEZING OR SHORTNESS OF BREATH 8.5 g 5    albuterol-ipratropium (DUO-NEB) 2.5 mg-0.5 mg/3 mL nebulizer solution Take 3 mLs by nebulization 2 (two) times a day. Rescue 180 mL 11    ALPRAZolam (XANAX) 0.25 MG tablet Take 1 tablet (0.25 mg total) by mouth 2 (two) times daily as needed for Anxiety. 60 tablet 0    amoxicillin-clavulanate 875-125mg (AUGMENTIN) 875-125 mg per tablet Take 1 tablet by mouth 2 (two) times daily. 84 tablet 0    docusate sodium (COLACE) 100 MG capsule Take 1 capsule (100 mg total) by mouth 2 (two) times daily.      ferrous sulfate 324 mg (65 mg iron) TbEC Take 1 tablet (324 mg total) by mouth every other day.      fluticasone (VERAMYST) 27.5 mcg/actuation nasal spray 2 sprays by Nasal route once daily. 9.1 mL 3    furosemide (LASIX) 40 MG tablet Take 1 tablet (40 mg total) by mouth once daily. 30 tablet 2    ibuprofen (ADVIL,MOTRIN) 200 MG tablet Take 200 mg by mouth every 6 (six) hours as needed for Pain.      levocetirizine (XYZAL) 5 MG tablet TAKE 1 TABLET(5 MG) BY MOUTH EVERY DAY 90 tablet 1    magnesium oxide (MAG-OX) 400 mg (241.3 mg magnesium) tablet Take 1 tablet (400 mg total) by mouth once daily. 30 tablet 0    [] meperidine (DEMEROL) 50 mg/5 mL Soln Take 2.5 mLs (25 mg total) by mouth every 6 (six) hours as needed (pain, shortness of breath). 50 mL 0    montelukast (SINGULAIR) 10 mg tablet Take 1 tablet (10 mg total) by mouth every evening. 30 tablet 2    mv-minerals/FA/omega 3,6,9 #3 (WOMEN'S 50+ ADVANCED ORAL) Take 1 tablet by mouth Daily.      nintedanib (OFEV) 150 mg Cap Take 1 capsule (150 mg total) by mouth 2 (two) times a day. 60 capsule 11    omega-3s/dha/epa/fish oil/D3 (VITAMIN-D + OMEGA-3 ORAL) Take 2 tablets by mouth once daily at 6am.      omeprazole (PRILOSEC) 20 MG capsule Take 1 capsule (20 mg total) by mouth once daily. 30 capsule 1    revefenacin (YUPELRI) 175 mcg/3 mL Nebu Inhale 175 mcg into the lungs once daily. 90 mL 3    revefenacin 175 mcg/3 mL Nebu  "Inhale 175 mcg into the lungs once daily. Patient has not received medication yet      SEREVENT DISKUS 50 mcg/dose diskus inhaler Inhale 1 puff into the lungs 2 (two) times daily. Controller 60 each 11    sucralfate (CARAFATE) 1 gram tablet Take 1 tablet (1 g total) by mouth 4 (four) times daily. 20 tablet 0    vitamin D (VITAMIN D3) 1000 units Tab Take 1,000 Units by mouth once daily.      methylPREDNISolone (MEDROL DOSEPACK) 4 mg tablet Take as directed 21 each 0     No facility-administered encounter medications on file as of 12/20/2023.       Objective:     Vital Signs (Most Recent)  Vital Signs  Pulse: 107  Resp: 18  SpO2: 98 %  BP: 118/72  Height and Weight  Height: 5' 5" (165.1 cm)  Weight in (lb) to have BMI = 25: 149.9]  Wt Readings from Last 2 Encounters:   12/18/23 95.7 kg (210 lb 15.7 oz)   12/12/23 95.7 kg (210 lb 15.7 oz)       Physical Exam   Constitutional: She is oriented to person, place, and time. She appears well-developed and well-nourished.   HENT:   Head: Normocephalic.   Mouth/Throat: Mallampati Score: II.   Neck: No JVD present.   Cardiovascular: Normal rate and intact distal pulses.   No murmur heard.  Pulmonary/Chest: Normal expansion and effort normal. She has decreased breath sounds. She has no rhonchi. She has no rales.   Abdominal: Soft. Bowel sounds are normal.   Musculoskeletal:         General: No edema. Normal range of motion.      Cervical back: Normal range of motion and neck supple.   Lymphadenopathy:     She has no axillary adenopathy.   Neurological: She is alert and oriented to person, place, and time.   Skin: Skin is warm and dry. No cyanosis. Nails show no clubbing.   Psychiatric: She has a normal mood and affect.   Nursing note and vitals reviewed.      Laboratory  Lab Results   Component Value Date    WBC 15.68 (H) 12/18/2023    RBC 3.86 (L) 12/18/2023    HGB 10.6 (L) 12/18/2023    HCT 35.6 (L) 12/18/2023    MCV 92 12/18/2023    MCH 27.5 12/18/2023    MCHC 29.8 (L) " "12/18/2023    RDW 16.1 (H) 12/18/2023     (H) 12/18/2023    MPV 9.7 12/18/2023    GRAN 13.8 (H) 12/18/2023    GRAN 88.3 (H) 12/18/2023    LYMPH 0.9 (L) 12/18/2023    LYMPH 5.5 (L) 12/18/2023    MONO 0.5 12/18/2023    MONO 2.9 (L) 12/18/2023    EOS 0.4 12/18/2023    BASO 0.07 12/18/2023    EOSINOPHIL 2.3 12/18/2023    BASOPHIL 0.4 12/18/2023       BMP  Lab Results   Component Value Date     12/18/2023    K 3.8 12/18/2023     12/18/2023    CO2 30 (H) 12/18/2023    BUN 8 12/18/2023    CREATININE 0.9 12/18/2023    CALCIUM 9.5 12/18/2023    ANIONGAP 11 12/18/2023    ESTGFRAFRICA >60 07/20/2022    EGFRNONAA >60 07/20/2022    AST 30 12/18/2023    ALT 39 12/18/2023    PROT 7.0 12/18/2023       Lab Results   Component Value Date    BNP 56 12/18/2023    BNP 29 12/04/2023    BNP 16 11/01/2023    BNP 16 11/01/2023    BNP <10 10/17/2023    BNP <10 10/11/2023       Lab Results   Component Value Date    TSH 0.882 11/08/2023       Lab Results   Component Value Date    SEDRATE 50 (H) 05/10/2023       Lab Results   Component Value Date    CRP 54.6 (H) 05/10/2023     Lab Results   Component Value Date     (H) 05/02/2022        Lab Results   Component Value Date    ASPERGILLUS Not Detected 09/16/2023     No results found for: "AFUMIGATUSCL"     Lab Results   Component Value Date    ACE 22 04/28/2022        Diagnostic Results:  I have personally reviewed today the following studies:  Office Spirometry Results:             X-Ray Chest PA And Lateral  Narrative: EXAM:  XR CHEST PA AND LATERAL    CLINICAL HISTORY: Idiopathic pulmonary fibrosis    COMPARISON: 08/22/2023    FINDINGS: Slightly increased patchy interstitial alveolar opacities throughout the mid and lower lung zones which could reflect acute on chronic interstitial pneumonia.  Heart size is enlarged but stable.  Moderate scattered degenerative change and atherosclerotic disease.  Impression:  See above    Finalized on: 12/20/2023 12:29 PM By:  Jerry COBOS" "Costa BASURTO  [facility]# [id number]      2023-12-20 12:32:[id number]    [facility]        No results for input(s): "PH", "PCO2", "PO2", "HCO3", "POCSATURATED", "BE" in the last 72 hours.              Assessment/Plan:     Problem List Items Addressed This Visit          Pulmonary    UIP (usual interstitial pneumonitis) - Primary    Relevant Medications    methylPREDNISolone (MEDROL DOSEPACK) 4 mg tablet    Other Relevant Orders    X-Ray Chest PA And Lateral (Completed)     Recent steroid taper  Usual Intersitial Pneumonitis exacerbation  Continue current pulmonary rx prescribed by Dr Aviles, add medrol pack x1  Follow up Dr Aviles as schedule  ER for any worsening symptoms          Marilu Izquierdo, PA-C     Requested Prescriptions     Signed Prescriptions Disp Refills    methylPREDNISolone (MEDROL DOSEPACK) 4 mg tablet 21 each 0     Sig: Take as directed          "

## 2023-12-22 ENCOUNTER — TELEPHONE (OUTPATIENT)
Dept: INFUSION THERAPY | Facility: HOSPITAL | Age: 53
End: 2023-12-22
Payer: COMMERCIAL

## 2023-12-22 ENCOUNTER — PATIENT MESSAGE (OUTPATIENT)
Dept: INFUSION THERAPY | Facility: HOSPITAL | Age: 53
End: 2023-12-22
Payer: COMMERCIAL

## 2023-12-22 RX ORDER — HEPARIN 100 UNIT/ML
500 SYRINGE INTRAVENOUS
Status: CANCELLED | OUTPATIENT
Start: 2023-12-22

## 2023-12-22 RX ORDER — ACETAMINOPHEN 325 MG/1
650 TABLET ORAL
Status: CANCELLED | OUTPATIENT
Start: 2023-12-22

## 2023-12-22 RX ORDER — SODIUM CHLORIDE 0.9 % (FLUSH) 0.9 %
10 SYRINGE (ML) INJECTION
Status: CANCELLED | OUTPATIENT
Start: 2023-12-22

## 2023-12-22 NOTE — TELEPHONE ENCOUNTER
Pt informed of orders to resume Actemra. New therapy plan was placed which generates a new referral and requires authorization. Infusion will be scheduled in January pending insurance authorization.  Pt verbalized understanding

## 2023-12-22 NOTE — TELEPHONE ENCOUNTER
----- Message from Chicho Lewis MD sent at 12/22/2023 12:47 PM CST -----  Clearance given by ID to resume Actemra.  Treatment plan replaced.  Thank you!

## 2023-12-23 LAB
BACTERIA BLD CULT: NORMAL
BACTERIA BLD CULT: NORMAL

## 2023-12-26 ENCOUNTER — PATIENT MESSAGE (OUTPATIENT)
Dept: PULMONOLOGY | Facility: CLINIC | Age: 53
End: 2023-12-26
Payer: COMMERCIAL

## 2023-12-28 ENCOUNTER — TELEPHONE (OUTPATIENT)
Dept: PALLIATIVE MEDICINE | Facility: HOSPITAL | Age: 53
End: 2023-12-28
Payer: COMMERCIAL

## 2023-12-28 ENCOUNTER — PATIENT MESSAGE (OUTPATIENT)
Dept: PULMONOLOGY | Facility: CLINIC | Age: 53
End: 2023-12-28
Payer: COMMERCIAL

## 2023-12-28 NOTE — TELEPHONE ENCOUNTER
SpO2 55%    Sick after visit  Completed pH test 24 hr probe  Sister and brother with here    Brother called EMS

## 2023-12-28 NOTE — TELEPHONE ENCOUNTER
Advance Care Planning   [facility] Palliative Medicine ([facility])  Palliative Care RN      Patient Name: Ayanna Ailcia  MRN: [id number]  Palliative Care Provider: David ([facility]   Primary Care Physician: Madeleine Enrique MD    Received message from Dr. Aviles that patient was requesting refill opioids. [facility] managing patient's symptoms. Updated Yariel with [facility] who will reach out to patient and schedule visit with nurse tomorrow.         Juventino Valentino RN-[facility], Palliative Medicine   [phone]

## 2024-01-03 ENCOUNTER — DOCUMENT SCAN (OUTPATIENT)
Dept: HOME HEALTH SERVICES | Facility: HOSPITAL | Age: 54
End: 2024-01-03
Payer: COMMERCIAL

## 2024-01-03 ENCOUNTER — PATIENT MESSAGE (OUTPATIENT)
Dept: PULMONOLOGY | Facility: CLINIC | Age: 54
End: 2024-01-03
Payer: COMMERCIAL

## 2024-01-03 NOTE — PROGRESS NOTES
[facility] - [facility] Re[facility]  Pulmonary Rehab  30 Day Evaluation and Recertification     SUMMARY             Summary of Progress:   Ms. Ayanna Alicia has been doing fair in pulmonary rehab. She will continue to benefit from the program. Pt has missed several sessions this period due to a hospitalization in [place] and admission in [facility] She is attentive in educational discussions. Pt states she exercises at home, outside of rehab. Her absences are affecting her progress in pulmonary rehab. She is a possible lung transplant pt and is doing pre-op workup at [facility]. Will continue to motivate and educate pt while striving to increase her strength and endurance in rehab.     Attends:   Patient attends  2 days a week and has completed 9 visits. The patient has missed 3 visits. The patients current compliance is  75% .     Referring Provider:  Dr. Aviles      Start date:  9/11/23     Problems this Certification Period:   hospitalization for SOB, oxygenation issues,[facility] admission and testing for lung transplant     Exercise Capacity Summary :                                     Nustep Date:  11/29/23 Time Load Steps Date:   Time Load Steps     20 3 1244       Recumbent Bike Date:    Time Level Date:    Time Level                Treadmill Date:       Time Speed Grade Date:    Time Speed Grade                    Arm Ergometer Date:  11/29/23  (wall)    Time Level Date:   Time Level     5 1      Free Weights Date:  11/2923  (Dumb)    Bicep Curls  Chest Press  Triceps  Legs lbs sets reps Date:   Bicep Curls  Chest Press  Triceps  Legs lbs Sets Reps      3 3 10                           Education:   Pt has not attended since 11/29/23     Goals:   not met, continue previous goals        Updated Exercise Prescription:  Endurance Training: Arm ergometer, 12 mins, level 2  Strength Training: Nustep, load 4, 10 mins, 500 steps

## 2024-01-10 ENCOUNTER — DOCUMENT SCAN (OUTPATIENT)
Dept: HOME HEALTH SERVICES | Facility: HOSPITAL | Age: 54
End: 2024-01-10
Payer: COMMERCIAL

## 2024-01-11 ENCOUNTER — PATIENT MESSAGE (OUTPATIENT)
Dept: PULMONOLOGY | Facility: CLINIC | Age: 54
End: 2024-01-11
Payer: COMMERCIAL

## 2024-01-12 ENCOUNTER — PATIENT MESSAGE (OUTPATIENT)
Dept: PULMONOLOGY | Facility: CLINIC | Age: 54
End: 2024-01-12
Payer: COMMERCIAL

## 2024-01-14 ENCOUNTER — DOCUMENT SCAN (OUTPATIENT)
Dept: HOME HEALTH SERVICES | Facility: HOSPITAL | Age: 54
End: 2024-01-14
Payer: COMMERCIAL

## 2024-01-15 PROBLEM — R65.20 SEVERE SEPSIS: Status: RESOLVED | Noted: 2023-10-11 | Resolved: 2024-01-15

## 2024-01-15 PROBLEM — A41.9 SEVERE SEPSIS: Status: RESOLVED | Noted: 2023-10-11 | Resolved: 2024-01-15

## 2024-01-17 ENCOUNTER — TELEPHONE (OUTPATIENT)
Dept: HEMATOLOGY/ONCOLOGY | Facility: CLINIC | Age: 54
End: 2024-01-17
Payer: COMMERCIAL

## 2024-01-22 ENCOUNTER — TELEPHONE (OUTPATIENT)
Dept: GASTROENTEROLOGY | Facility: CLINIC | Age: 54
End: 2024-01-22
Payer: COMMERCIAL

## 2024-01-29 PROBLEM — J96.11 CHRONIC RESPIRATORY FAILURE WITH HYPOXIA: Status: RESOLVED | Noted: 2022-04-27 | Resolved: 2024-01-29

## 2024-02-01 ENCOUNTER — PATIENT MESSAGE (OUTPATIENT)
Dept: PULMONOLOGY | Facility: CLINIC | Age: 54
End: 2024-02-01
Payer: COMMERCIAL

## 2024-02-01 DIAGNOSIS — J84.9 INTERSTITIAL LUNG DISEASE: ICD-10-CM

## 2024-02-01 DIAGNOSIS — J84.111 CHRONIC INTERSTITIAL PNEUMONIA: ICD-10-CM

## 2024-02-01 DIAGNOSIS — J84.112 UIP (USUAL INTERSTITIAL PNEUMONITIS): ICD-10-CM

## 2024-02-05 ENCOUNTER — DOCUMENT SCAN (OUTPATIENT)
Dept: HOME HEALTH SERVICES | Facility: HOSPITAL | Age: 54
End: 2024-02-05
Payer: COMMERCIAL

## 2024-02-12 ENCOUNTER — TELEPHONE (OUTPATIENT)
Dept: SLEEP MEDICINE | Facility: CLINIC | Age: 54
End: 2024-02-12
Payer: COMMERCIAL

## 2024-02-12 NOTE — TELEPHONE ENCOUNTER
----- Message from Kary Randolph MA sent at 2/9/2024  3:17 PM CST -----  Contact: patient  Spoke with pt. She wants to speak with you about something.  ----- Message -----  From: Sharon Robles  Sent: 2/9/2024   3:00 PM CST  To: Stefan Randolph Staff    Ayanna Alicia would like a call back at [phone], in regards to speaking with the nurse. She states she is needing to share some information.

## 2024-02-19 ENCOUNTER — DOCUMENT SCAN (OUTPATIENT)
Dept: HOME HEALTH SERVICES | Facility: HOSPITAL | Age: 54
End: 2024-02-19
Payer: COMMERCIAL

## 2024-02-26 ENCOUNTER — EXTERNAL HOME HEALTH (OUTPATIENT)
Dept: HOME HEALTH SERVICES | Facility: HOSPITAL | Age: 54
End: 2024-02-26
Payer: COMMERCIAL

## 2024-03-04 ENCOUNTER — TELEPHONE (OUTPATIENT)
Dept: PALLIATIVE MEDICINE | Facility: HOSPITAL | Age: 54
End: 2024-03-04
Payer: COMMERCIAL

## 2024-03-04 ENCOUNTER — TELEPHONE (OUTPATIENT)
Dept: SLEEP MEDICINE | Facility: CLINIC | Age: 54
End: 2024-03-04
Payer: COMMERCIAL

## 2024-03-04 PROBLEM — J96.21 ACUTE ON CHRONIC RESPIRATORY FAILURE WITH HYPOXIA AND HYPERCAPNIA: Status: RESOLVED | Noted: 2023-11-01 | Resolved: 2024-03-04

## 2024-03-04 PROBLEM — J96.22 ACUTE ON CHRONIC RESPIRATORY FAILURE WITH HYPOXIA AND HYPERCAPNIA: Status: RESOLVED | Noted: 2023-11-01 | Resolved: 2024-03-04

## 2024-03-26 NOTE — ASSESSMENT & PLAN NOTE
Currently normotensive. BP usually well controlled per patient with home medications.  Plan:  -Optimize pain control   -Continue home medications (HCTZ, norvasc, metolazone), titrate as needed   -Monitor BP  -Low salt/cardiac diet when not NPO  -IV hydralazine prn for SBP>160 or DBP>90          Detail Level: Detailed

## 2024-04-11 NOTE — ASSESSMENT & PLAN NOTE
+FluA on admssion    --Airborne/droplet isolation  --Tamiflu    Due to severity of illness  Will plan to cont tamiflu for 7 days     yes

## 2024-10-20 NOTE — ASSESSMENT & PLAN NOTE
Thyroid U/S reviewed  Outpatient referral placed to IR for FNA   Outpatient referral placed to endocrinology for   Radioiodine therapy evaluation  Pt poor surgical candidate given lung issues  She complains of fullness in her neck      Alert-The patient is alert, awake and responds to voice. The patient is oriented to time, place, and person. The triage nurse is able to obtain subjective information.

## 2024-12-23 NOTE — ASSESSMENT & PLAN NOTE
Medication: Spironolactone passed protocol.   Last office visit date: 9/12/2024  Next appointment scheduled?: Yes   Number of refills given: 3    No evidence of acute exacerbation. Currently on Trelegy and Singulair for home medications.    · Continue Singulair  · Continue Brovana and Atrovent scheduled nebs  · Monitor for exacerbation